# Patient Record
Sex: FEMALE | Race: WHITE | NOT HISPANIC OR LATINO | Employment: OTHER | URBAN - METROPOLITAN AREA
[De-identification: names, ages, dates, MRNs, and addresses within clinical notes are randomized per-mention and may not be internally consistent; named-entity substitution may affect disease eponyms.]

---

## 2017-01-06 ENCOUNTER — DOCUMENTATION ONLY (OUTPATIENT)
Dept: FAMILY MEDICINE | Facility: CLINIC | Age: 57
End: 2017-01-06

## 2017-01-06 NOTE — PROGRESS NOTES
Pre-Visit Chart Review  For Appointment Scheduled on 01/09/2017      There are no preventive care reminders to display for this patient.

## 2017-01-10 ENCOUNTER — DOCUMENTATION ONLY (OUTPATIENT)
Dept: FAMILY MEDICINE | Facility: CLINIC | Age: 57
End: 2017-01-10

## 2017-01-10 NOTE — PROGRESS NOTES
Pre-Visit Chart Review  For Appointment Scheduled on 1/11/17    Health Maintenance Due   Topic Date Due    Hepatitis C Screening  1960    Lipid Panel  1960    TETANUS VACCINE  03/29/1980    Pap Smear  03/29/1983    Mammogram  03/29/2002    Colonoscopy  03/29/2012    Influenza Vaccine  08/01/2016

## 2017-06-23 ENCOUNTER — HOSPITAL ENCOUNTER (INPATIENT)
Facility: HOSPITAL | Age: 57
LOS: 3 days | Discharge: HOME | DRG: 291 | End: 2017-06-27
Attending: EMERGENCY MEDICINE | Admitting: INTERNAL MEDICINE
Payer: MEDICARE

## 2017-06-23 DIAGNOSIS — R18.8 OTHER ASCITES: ICD-10-CM

## 2017-06-23 DIAGNOSIS — I50.9 CHF, ACUTE: ICD-10-CM

## 2017-06-23 DIAGNOSIS — I10 ESSENTIAL HYPERTENSION: ICD-10-CM

## 2017-06-23 DIAGNOSIS — I25.10 CORONARY ARTERY DISEASE, ANGINA PRESENCE UNSPECIFIED, UNSPECIFIED VESSEL OR LESION TYPE, UNSPECIFIED WHETHER NATIVE OR TRANSPLANTED HEART: ICD-10-CM

## 2017-06-23 DIAGNOSIS — I50.23 ACUTE ON CHRONIC SYSTOLIC CONGESTIVE HEART FAILURE: ICD-10-CM

## 2017-06-23 DIAGNOSIS — I50.9 ACUTE ON CHRONIC CONGESTIVE HEART FAILURE, UNSPECIFIED CONGESTIVE HEART FAILURE TYPE: Primary | ICD-10-CM

## 2017-06-23 DIAGNOSIS — R06.02 SHORTNESS OF BREATH: ICD-10-CM

## 2017-06-23 PROCEDURE — 96374 THER/PROPH/DIAG INJ IV PUSH: CPT

## 2017-06-23 PROCEDURE — 99285 EMERGENCY DEPT VISIT HI MDM: CPT | Mod: 25

## 2017-06-23 PROCEDURE — 93005 ELECTROCARDIOGRAM TRACING: CPT

## 2017-06-23 RX ORDER — GLIMEPIRIDE 4 MG/1
4 TABLET ORAL
Status: ON HOLD | COMMUNITY
End: 2017-11-27 | Stop reason: HOSPADM

## 2017-06-23 RX ORDER — ALBUTEROL SULFATE 1.25 MG/3ML
1.25 SOLUTION RESPIRATORY (INHALATION) EVERY 6 HOURS PRN
Status: ON HOLD | COMMUNITY
End: 2017-12-29 | Stop reason: HOSPADM

## 2017-06-23 RX ORDER — GABAPENTIN 300 MG/1
300 CAPSULE ORAL 3 TIMES DAILY
Status: ON HOLD | COMMUNITY
End: 2017-11-27 | Stop reason: HOSPADM

## 2017-06-24 PROBLEM — I10 ESSENTIAL HYPERTENSION: Status: ACTIVE | Noted: 2017-06-24

## 2017-06-24 PROBLEM — I25.10 CAD (CORONARY ARTERY DISEASE): Status: ACTIVE | Noted: 2017-06-24

## 2017-06-24 PROBLEM — J90 PLEURAL EFFUSION: Status: ACTIVE | Noted: 2017-06-24

## 2017-06-24 PROBLEM — N18.30 CONTROLLED TYPE 2 DIABETES MELLITUS WITH STAGE 3 CHRONIC KIDNEY DISEASE: Status: ACTIVE | Noted: 2017-06-24

## 2017-06-24 PROBLEM — E03.9 HYPOTHYROID: Status: ACTIVE | Noted: 2017-06-24

## 2017-06-24 PROBLEM — I50.9 ACUTE ON CHRONIC CONGESTIVE HEART FAILURE: Status: ACTIVE | Noted: 2017-06-24

## 2017-06-24 PROBLEM — N18.30 CKD (CHRONIC KIDNEY DISEASE) STAGE 3, GFR 30-59 ML/MIN: Status: ACTIVE | Noted: 2017-06-24

## 2017-06-24 PROBLEM — E11.22 CONTROLLED TYPE 2 DIABETES MELLITUS WITH STAGE 3 CHRONIC KIDNEY DISEASE: Status: ACTIVE | Noted: 2017-06-24

## 2017-06-24 LAB
ALBUMIN SERPL BCP-MCNC: 3.1 G/DL
ALP SERPL-CCNC: 69 U/L
ALT SERPL W/O P-5'-P-CCNC: 8 U/L
ANION GAP SERPL CALC-SCNC: 10 MMOL/L
ANION GAP SERPL CALC-SCNC: 11 MMOL/L
AST SERPL-CCNC: 18 U/L
BASOPHILS # BLD AUTO: 0.1 K/UL
BASOPHILS NFR BLD: 1 %
BILIRUB SERPL-MCNC: 0.8 MG/DL
BNP SERPL-MCNC: 1092 PG/ML
BUN SERPL-MCNC: 22 MG/DL
BUN SERPL-MCNC: 24 MG/DL
CALCIUM SERPL-MCNC: 9 MG/DL
CALCIUM SERPL-MCNC: 9.3 MG/DL
CHLORIDE SERPL-SCNC: 102 MMOL/L
CHLORIDE SERPL-SCNC: 102 MMOL/L
CO2 SERPL-SCNC: 23 MMOL/L
CO2 SERPL-SCNC: 26 MMOL/L
CREAT SERPL-MCNC: 1.3 MG/DL
CREAT SERPL-MCNC: 1.4 MG/DL
DIFFERENTIAL METHOD: ABNORMAL
EOSINOPHIL # BLD AUTO: 0.2 K/UL
EOSINOPHIL NFR BLD: 2.9 %
ERYTHROCYTE [DISTWIDTH] IN BLOOD BY AUTOMATED COUNT: 17.5 %
EST. GFR  (AFRICAN AMERICAN): 49 ML/MIN/1.73 M^2
EST. GFR  (AFRICAN AMERICAN): 53 ML/MIN/1.73 M^2
EST. GFR  (NON AFRICAN AMERICAN): 42 ML/MIN/1.73 M^2
EST. GFR  (NON AFRICAN AMERICAN): 46 ML/MIN/1.73 M^2
GLUCOSE SERPL-MCNC: 119 MG/DL
GLUCOSE SERPL-MCNC: 151 MG/DL
HCT VFR BLD AUTO: 41.7 %
HGB BLD-MCNC: 13 G/DL
INR PPP: 1.3
LYMPHOCYTES # BLD AUTO: 1.2 K/UL
LYMPHOCYTES NFR BLD: 14.2 %
MCH RBC QN AUTO: 24.5 PG
MCHC RBC AUTO-ENTMCNC: 31.1 %
MCV RBC AUTO: 79 FL
MONOCYTES # BLD AUTO: 0.8 K/UL
MONOCYTES NFR BLD: 9.1 %
NEUTROPHILS # BLD AUTO: 6 K/UL
NEUTROPHILS NFR BLD: 72.8 %
PLATELET # BLD AUTO: 235 K/UL
PMV BLD AUTO: 9.3 FL
POCT GLUCOSE: 114 MG/DL (ref 70–110)
POCT GLUCOSE: 165 MG/DL (ref 70–110)
POCT GLUCOSE: 177 MG/DL (ref 70–110)
POCT GLUCOSE: 248 MG/DL (ref 70–110)
POTASSIUM SERPL-SCNC: 4.3 MMOL/L
POTASSIUM SERPL-SCNC: 4.9 MMOL/L
PROT SERPL-MCNC: 6.8 G/DL
PROTHROMBIN TIME: 13.6 SEC
RBC # BLD AUTO: 5.3 M/UL
SODIUM SERPL-SCNC: 136 MMOL/L
SODIUM SERPL-SCNC: 138 MMOL/L
TROPONIN I SERPL DL<=0.01 NG/ML-MCNC: 0.04 NG/ML
WBC # BLD AUTO: 8.3 K/UL

## 2017-06-24 PROCEDURE — 83880 ASSAY OF NATRIURETIC PEPTIDE: CPT

## 2017-06-24 PROCEDURE — 63600175 PHARM REV CODE 636 W HCPCS: Performed by: NURSE PRACTITIONER

## 2017-06-24 PROCEDURE — C8929 TTE W OR WO FOL WCON,DOPPLER: HCPCS

## 2017-06-24 PROCEDURE — 36415 COLL VENOUS BLD VENIPUNCTURE: CPT

## 2017-06-24 PROCEDURE — 97161 PT EVAL LOW COMPLEX 20 MIN: CPT

## 2017-06-24 PROCEDURE — 94640 AIRWAY INHALATION TREATMENT: CPT

## 2017-06-24 PROCEDURE — 63600175 PHARM REV CODE 636 W HCPCS: Performed by: HOSPITALIST

## 2017-06-24 PROCEDURE — 80053 COMPREHEN METABOLIC PANEL: CPT

## 2017-06-24 PROCEDURE — 25000003 PHARM REV CODE 250: Performed by: NURSE PRACTITIONER

## 2017-06-24 PROCEDURE — 84484 ASSAY OF TROPONIN QUANT: CPT

## 2017-06-24 PROCEDURE — 63600175 PHARM REV CODE 636 W HCPCS: Performed by: EMERGENCY MEDICINE

## 2017-06-24 PROCEDURE — 27000221 HC OXYGEN, UP TO 24 HOURS

## 2017-06-24 PROCEDURE — 85610 PROTHROMBIN TIME: CPT

## 2017-06-24 PROCEDURE — 94761 N-INVAS EAR/PLS OXIMETRY MLT: CPT

## 2017-06-24 PROCEDURE — 85025 COMPLETE CBC W/AUTO DIFF WBC: CPT

## 2017-06-24 PROCEDURE — 25000242 PHARM REV CODE 250 ALT 637 W/ HCPCS: Performed by: NURSE PRACTITIONER

## 2017-06-24 PROCEDURE — 80048 BASIC METABOLIC PNL TOTAL CA: CPT

## 2017-06-24 PROCEDURE — 97530 THERAPEUTIC ACTIVITIES: CPT

## 2017-06-24 PROCEDURE — 63600175 PHARM REV CODE 636 W HCPCS: Performed by: SPECIALIST

## 2017-06-24 PROCEDURE — 12000002 HC ACUTE/MED SURGE SEMI-PRIVATE ROOM

## 2017-06-24 RX ORDER — ONDANSETRON 2 MG/ML
4 INJECTION INTRAMUSCULAR; INTRAVENOUS EVERY 6 HOURS PRN
Status: DISCONTINUED | OUTPATIENT
Start: 2017-06-24 | End: 2017-06-27 | Stop reason: HOSPADM

## 2017-06-24 RX ORDER — IBUPROFEN 200 MG
16 TABLET ORAL
Status: DISCONTINUED | OUTPATIENT
Start: 2017-06-24 | End: 2017-06-25

## 2017-06-24 RX ORDER — LISINOPRIL 10 MG/1
10 TABLET ORAL DAILY
Status: DISCONTINUED | OUTPATIENT
Start: 2017-06-24 | End: 2017-06-27

## 2017-06-24 RX ORDER — LEVOTHYROXINE SODIUM 50 UG/1
50 TABLET ORAL
Status: DISCONTINUED | OUTPATIENT
Start: 2017-06-24 | End: 2017-06-27 | Stop reason: HOSPADM

## 2017-06-24 RX ORDER — ACETAMINOPHEN 500 MG
1000 TABLET ORAL EVERY 6 HOURS PRN
Status: DISCONTINUED | OUTPATIENT
Start: 2017-06-24 | End: 2017-06-27 | Stop reason: HOSPADM

## 2017-06-24 RX ORDER — GABAPENTIN 300 MG/1
300 CAPSULE ORAL 3 TIMES DAILY
Status: DISCONTINUED | OUTPATIENT
Start: 2017-06-24 | End: 2017-06-27 | Stop reason: HOSPADM

## 2017-06-24 RX ORDER — CARVEDILOL 25 MG/1
25 TABLET ORAL 2 TIMES DAILY
Status: DISCONTINUED | OUTPATIENT
Start: 2017-06-24 | End: 2017-06-27 | Stop reason: HOSPADM

## 2017-06-24 RX ORDER — SODIUM CHLORIDE 0.9 % (FLUSH) 0.9 %
3 SYRINGE (ML) INJECTION EVERY 8 HOURS
Status: DISCONTINUED | OUTPATIENT
Start: 2017-06-24 | End: 2017-06-27 | Stop reason: HOSPADM

## 2017-06-24 RX ORDER — ACETAMINOPHEN 325 MG/1
650 TABLET ORAL EVERY 4 HOURS PRN
Status: DISCONTINUED | OUTPATIENT
Start: 2017-06-24 | End: 2017-06-24

## 2017-06-24 RX ORDER — GLIMEPIRIDE 2 MG/1
4 TABLET ORAL
Status: DISCONTINUED | OUTPATIENT
Start: 2017-06-24 | End: 2017-06-24

## 2017-06-24 RX ORDER — GLUCAGON 1 MG
1 KIT INJECTION
Status: DISCONTINUED | OUTPATIENT
Start: 2017-06-24 | End: 2017-06-25

## 2017-06-24 RX ORDER — INSULIN ASPART 100 [IU]/ML
INJECTION, SUSPENSION SUBCUTANEOUS DAILY PRN
Status: ON HOLD | COMMUNITY
Start: 2017-03-15 | End: 2017-11-27 | Stop reason: HOSPADM

## 2017-06-24 RX ORDER — FUROSEMIDE 10 MG/ML
40 INJECTION INTRAMUSCULAR; INTRAVENOUS
Status: COMPLETED | OUTPATIENT
Start: 2017-06-24 | End: 2017-06-24

## 2017-06-24 RX ORDER — INSULIN ASPART 100 [IU]/ML
0-5 INJECTION, SOLUTION INTRAVENOUS; SUBCUTANEOUS
Status: DISCONTINUED | OUTPATIENT
Start: 2017-06-24 | End: 2017-06-24

## 2017-06-24 RX ORDER — ASPIRIN 81 MG/1
81 TABLET ORAL DAILY
Status: DISCONTINUED | OUTPATIENT
Start: 2017-06-24 | End: 2017-06-27 | Stop reason: HOSPADM

## 2017-06-24 RX ORDER — ZOLPIDEM TARTRATE 5 MG/1
5 TABLET ORAL NIGHTLY PRN
Status: DISCONTINUED | OUTPATIENT
Start: 2017-06-24 | End: 2017-06-27 | Stop reason: HOSPADM

## 2017-06-24 RX ORDER — INSULIN ASPART 100 [IU]/ML
1-10 INJECTION, SOLUTION INTRAVENOUS; SUBCUTANEOUS
Status: DISCONTINUED | OUTPATIENT
Start: 2017-06-24 | End: 2017-06-25

## 2017-06-24 RX ORDER — IBUPROFEN 200 MG
24 TABLET ORAL
Status: DISCONTINUED | OUTPATIENT
Start: 2017-06-24 | End: 2017-06-25

## 2017-06-24 RX ORDER — ENOXAPARIN SODIUM 100 MG/ML
40 INJECTION SUBCUTANEOUS EVERY 24 HOURS
Status: DISCONTINUED | OUTPATIENT
Start: 2017-06-24 | End: 2017-06-27 | Stop reason: HOSPADM

## 2017-06-24 RX ORDER — FUROSEMIDE 10 MG/ML
40 INJECTION INTRAMUSCULAR; INTRAVENOUS 2 TIMES DAILY
Status: DISCONTINUED | OUTPATIENT
Start: 2017-06-24 | End: 2017-06-27 | Stop reason: HOSPADM

## 2017-06-24 RX ORDER — CITALOPRAM 10 MG/1
20 TABLET ORAL DAILY
Status: DISCONTINUED | OUTPATIENT
Start: 2017-06-24 | End: 2017-06-27 | Stop reason: HOSPADM

## 2017-06-24 RX ORDER — ALBUTEROL SULFATE 2.5 MG/.5ML
1.25 SOLUTION RESPIRATORY (INHALATION) EVERY 6 HOURS
Status: DISCONTINUED | OUTPATIENT
Start: 2017-06-24 | End: 2017-06-27 | Stop reason: HOSPADM

## 2017-06-24 RX ADMIN — ALBUTEROL SULFATE 1.25 MG: 2.5 SOLUTION RESPIRATORY (INHALATION) at 01:06

## 2017-06-24 RX ADMIN — LEVOTHYROXINE SODIUM 50 MCG: 50 TABLET ORAL at 06:06

## 2017-06-24 RX ADMIN — SODIUM CHLORIDE, PRESERVATIVE FREE 3 ML: 5 INJECTION INTRAVENOUS at 01:06

## 2017-06-24 RX ADMIN — ALBUTEROL SULFATE 1.25 MG: 2.5 SOLUTION RESPIRATORY (INHALATION) at 07:06

## 2017-06-24 RX ADMIN — SODIUM CHLORIDE, PRESERVATIVE FREE 3 ML: 5 INJECTION INTRAVENOUS at 06:06

## 2017-06-24 RX ADMIN — ASPIRIN 81 MG: 81 TABLET, COATED ORAL at 08:06

## 2017-06-24 RX ADMIN — FUROSEMIDE 40 MG: 10 INJECTION, SOLUTION INTRAMUSCULAR; INTRAVENOUS at 08:06

## 2017-06-24 RX ADMIN — GABAPENTIN 300 MG: 300 CAPSULE ORAL at 09:06

## 2017-06-24 RX ADMIN — CARVEDILOL 25 MG: 25 TABLET, FILM COATED ORAL at 08:06

## 2017-06-24 RX ADMIN — GABAPENTIN 300 MG: 300 CAPSULE ORAL at 06:06

## 2017-06-24 RX ADMIN — FUROSEMIDE 40 MG: 10 INJECTION, SOLUTION INTRAMUSCULAR; INTRAVENOUS at 02:06

## 2017-06-24 RX ADMIN — HUMAN ALBUMIN MICROSPHERES AND PERFLUTREN 0.11 MG: 10; .22 INJECTION, SOLUTION INTRAVENOUS at 03:06

## 2017-06-24 RX ADMIN — ALBUTEROL SULFATE 2.5 MG: 2.5 SOLUTION RESPIRATORY (INHALATION) at 07:06

## 2017-06-24 RX ADMIN — LISINOPRIL 10 MG: 10 TABLET ORAL at 08:06

## 2017-06-24 RX ADMIN — GLIMEPIRIDE 4 MG: 2 TABLET ORAL at 06:06

## 2017-06-24 RX ADMIN — ENOXAPARIN SODIUM 40 MG: 100 INJECTION SUBCUTANEOUS at 05:06

## 2017-06-24 RX ADMIN — INSULIN ASPART 2 UNITS: 100 INJECTION, SOLUTION INTRAVENOUS; SUBCUTANEOUS at 09:06

## 2017-06-24 RX ADMIN — CITALOPRAM HYDROBROMIDE 20 MG: 10 TABLET ORAL at 08:06

## 2017-06-24 RX ADMIN — GABAPENTIN 300 MG: 300 CAPSULE ORAL at 01:06

## 2017-06-24 RX ADMIN — CARVEDILOL 25 MG: 25 TABLET, FILM COATED ORAL at 09:06

## 2017-06-24 RX ADMIN — FUROSEMIDE 40 MG: 10 INJECTION, SOLUTION INTRAMUSCULAR; INTRAVENOUS at 05:06

## 2017-06-24 NOTE — HPI
Ms. Ramos is a 58yo F with a PMH MI, HTN, DM2, and Hypothyroid. She presents to the hospital with c/o abdominal distention. It started about 1 month ago. She started to develop leg swelling and weakness, so she came to the hospital. Other associated symptoms include decreased appetite, abdominal pain, and umbilical discharge. In the ED, her BNP was found to be 1092 and her CXR showed a pleural effusion and she was given lasix 40mg IV. She currently reports feeling like her abdomen has less fluid since getting the lasix. She denies sob, pain, or other discomforts.

## 2017-06-24 NOTE — ASSESSMENT & PLAN NOTE
Not on chronic statin or ASA. States she was not told that she should take ASA.  Start ASA 81mg daily

## 2017-06-24 NOTE — PLAN OF CARE
Problem: Patient Care Overview  Goal: Plan of Care Review  Plan of care reviewed with pt when they arrived on the unit at 0300.  Pt/family verbalized understanding.  Hourly/ Q 2 hour rounds completed on this pt throughout shift.  Pain monitored and covered with medication if needed.  Restroom offered, repositions per self, safety maintained.  Patient has remained free from fall/injury, no skin breakdown noted.  Side rails up x 2, bed locked and lowest position, call light within reach.  Needs attended to, will continue to monitor/update as indicated.  O2 delivered via NC at 2l.  SpO2 monitored intermittently.

## 2017-06-24 NOTE — PT/OT/SLP EVAL
"Physical Therapy  Evaluation    Juliane Ramos   MRN: [id number]   Admitting Diagnosis: Acute on chronic congestive heart failure    PT Received On: 06/24/17  PT Start Time: 0845     PT Stop Time: 0915    PT Total Time (min): 30 min       Billable Minutes:  Re-eval 10 and Therapeutic Activity 20    Diagnosis: Acute on chronic congestive heart failure      Past Medical History:   Diagnosis Date    CHF (congestive heart failure)     COPD (chronic obstructive pulmonary disease)     Coronary artery disease     Diabetes mellitus     Encounter for blood transfusion     Hypertension     MI (myocardial infarction) 2010    Stroke     July 2005    Thyroid disease       Past Surgical History:   Procedure Laterality Date    ABCESS DRAINAGE Right     Between "little toe" and the one next to it    BREAST SURGERY      Reduction [id number]    CARDIAC SURGERY      TUBAL LIGATION  03/1986       Referring physician:   Date referred to PT: 6/24/17    General Precautions: Standard, fall  Orthopedic Precautions: N/A   Braces:         Do you have any cultural, spiritual, Mormon conflicts, given your current situation?: none    Patient History:  Lives With: spouse  Living Arrangements: mobile home  Home Accessibility: stairs to enter home  Transportation Available: family or friend will provide  Equipment Currently Used at Home: walker, rolling, rollator, 3-in-1 commode  DME owned (not currently used): rolling walker and bedside commode    Previous Level of Function:  Ambulation Skills: needs device  Transfer Skills: needs device  ADL Skills: needs device and assist  Work/Leisure Activity: needs device    Subjective:  Communicated with pt's nurse Hina prior to session.  Pt sitting eob willing to participate w PT. States she does not typically use O2  Chief Complaint: weakness  Patient goals: go home soon    Pain/Comfort  Pain Rating 1: 3/10  Location - Orientation 1: generalized  Pain Rating Post-Intervention 1: " 2/10  Pain Addressed 2: Reposition, Distraction      Objective:   Patient found with: telemetry, peripheral IV, oxygen     Cognitive Exam:  Oriented to: Person, Place and Situation    Follows Commands/attention: Follows multistep  commands  Communication: clear/fluent  Safety awareness/insight to disability: intact    Physical Exam:  Postural examination/scapula alignment: Rounded shoulder, Head forward and Posterior pelvic tilt    Skin integrity: Visible skin intact  Edema: noted B ankles    Sensation:   Intact    Upper Extremity Range of Motion:  Right Upper Extremity: WFL  Left Upper Extremity: WFL    Upper Extremity Strength:  Right Upper Extremity: WFL  Left Upper Extremity: WFL    Lower Extremity Range of Motion:  Right Lower Extremity: WFL  Left Lower Extremity: WFL    Lower Extremity Strength:  Right Lower Extremity: 4/5 globally  Left Lower Extremity: 4/5 globally     Fine motor coordination:     5 x sit to stand using AD from elevated bed level = 25 sec    Functional Mobility:  Bed Mobility:  Rolling/Turning to Left: Modified independent  Rolling/Turning Right: Modified independent  Scooting/Bridging: Modified Independent  Supine to Sit: Modified Independent  Sit to Supine: Modified Independent    Transfers:  Sit <> Stand Assistance: Stand By Assistance  Sit <> Stand Assistive Device: Rolling Walker  Bed <> Chair Technique: Stand Pivot  Bed <> Chair Assistance: Supervision  Bed <> Chair Assistive Device: Rolling Walker    Gait:   Gait Distance: 220 ft  Assistance 1: Contact Guard Assistance  Gait Assistive Device: Rollator  Gait Pattern: 3-point gait  Gait Deviation(s): decreased estefania, increased stride width, decreased swing-to-stance ratio, decreased weight-shifting ability, decreased toe-to-floor clearance    Stairs:  Pt ascended/descend 4 stair(s) with rail on L to ascend/rail on R to descend similar to use at home with step to pattern  turned to side step with Contact Guard Assistance.     Balance:    Static Sit: GOOD+: Takes MAXIMAL challenges from all directions.    Dynamic Sit: GOOD-: Maintains balance through MODERATE excursions of active trunk movement,     Static Stand: FAIR: Maintains without assist but unable to take challenges  Dynamic stand: FAIR: Needs CONTACT GUARD during gait    Therapeutic Activities and Exercises:  Assisted pt w tx sit to stand. Educated on raising level of bed for greater ease w transfers. CGA w amb in hallway w personal rollator 220 ft requiring 3 rest breaks. Pt declined use of O2. Will moniter when ambulating in future. Noted poor eccentric control in quads w sit to stand and stairclimbing. Pt stepping down w poor knee control 2 HHA on rail and CGA from PT.    AM-PAC 6 CLICK MOBILITY  How much help from another person does this patient currently need?   1 = Unable, Total/Dependent Assistance  2 = A lot, Maximum/Moderate Assistance  3 = A little, Minimum/Contact Guard/Supervision  4 = None, Modified [place]/Independent    Turning over in bed (including adjusting bedclothes, sheets and blankets)?: 3  Sitting down on and standing up from a chair with arms (e.g., wheelchair, bedside commode, etc.): 2  Moving from lying on back to sitting on the side of the bed?: 3  Moving to and from a bed to a chair (including a wheelchair)?: 3  Need to walk in hospital room?: 3  Climbing 3-5 steps with a railing?: 2  Total Score: 16     AM-PAC Raw Score CMS G-Code Modifier Level of Impairment Assistance   6 % Total / Unable   7 - 9 CM 80 - 100% Maximal Assist   10 - 14 CL 60 - 80% Moderate Assist   15 - 19 CK 40 - 60% Moderate Assist   20 - 22 CJ 20 - 40% Minimal Assist   23 CI 1-20% SBA / CGA   24 CH 0% Independent/ Mod I     Patient left HOB elevated with call button in reach and nursing notified.    Assessment:   Juliane Ramos is a 57 y.o. female with a medical diagnosis of Acute on chronic congestive heart failure and is at risk for falls. She presents with decreased strength  and endurance. She would benefit from skilled PT to address mobility and strength concerns.    Rehab identified problem list/impairments: Rehab identified problem list/impairments: weakness, impaired endurance, impaired functional mobilty, impaired balance, decreased lower extremity function    Rehab potential is good.    Activity tolerance: Good    Discharge recommendations: Discharge Facility/Level Of Care Needs: outpatient PT     Barriers to discharge:  may benefit from ramp to entrance of home    GOALS:    Physical Therapy Goals        Problem: Physical Therapy Goal    Goal Priority Disciplines Outcome Goal Variances Interventions   Physical Therapy Goal     PT/OT, PT      Description:  Goals to be met by: 17     Patient will increase functional independence with mobility by performin. Sit to stand transfer with [place]  2. Gait  x 300 feet with Modified [place] using Rolling Walker.   3. Ascend/descend 6 stair with left handrail to ascend, R to descend Stand-by Assistance   4. Lower extremity exercise program x25 reps with assistance as needed                      PLAN:    Patient to be seen 6 x/week to address the above listed problems via gait training, therapeutic activities, therapeutic exercises, neuromuscular re-education  Plan of Care expires: 17  Plan of Care reviewed with: patient, spouse          Luz Franco, PT  2017

## 2017-06-24 NOTE — ED PROVIDER NOTES
Encounter Date: 6/23/2017    SCRIBE #1 NOTE: IDuane, am scribing for, and in the presence of, Dr. Chilel.       History     Chief Complaint   Patient presents with    Leg Swelling    Weakness     06/23/2017  11:39 PM     Chief Complaint: Abd Distention     The patient is a 57 y.o. female who has a PMHx of CAD; diabetes mellitus; hypertension; MI; and thyroid disease is presenting to ED with c/o worsening abd distention since one month ago.  She has no abdominal pain.  Pt came to ED today because of leg swelling and weight gain. She reports decreased appetite since three days ago. Pt has a PSHx that includes cardiac surgery.        The history is provided by the patient.     Review of patient's allergies indicates:  No Known Allergies  Past Medical History:   Diagnosis Date    Coronary artery disease     Diabetes mellitus     Hypertension     MI (myocardial infarction) 2010    Thyroid disease      Past Surgical History:   Procedure Laterality Date    CARDIAC SURGERY       History reviewed. No pertinent family history.  Social History   Substance Use Topics    Smoking status: Never Smoker    Smokeless tobacco: Not on file    Alcohol use No     Review of Systems   Constitutional: Positive for appetite change (decreased). Negative for chills and fever.   HENT: Negative for sore throat.    Respiratory: Negative for shortness of breath.    Cardiovascular: Positive for leg swelling. Negative for chest pain.   Gastrointestinal: Positive for abdominal distention. Negative for abdominal pain and nausea.   Genitourinary: Negative for dysuria.   Musculoskeletal: Negative for back pain.   Skin: Negative for rash.   Neurological: Positive for weakness (bilateral leg).   Hematological: Does not bruise/bleed easily.   All other systems reviewed and are negative.      Physical Exam     Initial Vitals [06/23/17 2318]   BP Pulse Resp Temp SpO2   (!) 178/84 82 17 99 °F (37.2 °C) (!) 93 %      MAP       115.33          Physical Exam    Nursing note and vitals reviewed.  Constitutional: She appears well-developed and well-nourished. She is not diaphoretic.  Non-toxic appearance. She does not have a sickly appearance. She does not appear ill. No distress.   HENT:   Head: Normocephalic and atraumatic.   Eyes: EOM are normal.   Neck: Normal range of motion. Neck supple. Normal range of motion present.   Cardiovascular: Normal rate, regular rhythm and normal heart sounds.   Pulmonary/Chest: Breath sounds normal. No respiratory distress. She has no wheezes.   Midline CABG scar.    Abdominal: She exhibits distension (large). There is no tenderness. There is no rebound and no guarding.   No abdominal tenderness on exam   Musculoskeletal: Normal range of motion. She exhibits edema.   Bilateral lower extremity edema.    Neurological: She is alert and oriented to person, place, and time.   Skin: Skin is warm and dry.   Psychiatric: She has a normal mood and affect. Her behavior is normal. Judgment and thought content normal.         ED Course   Critical Care  Date/Time: 6/24/2017 3:20 AM  Performed by: SINGH KRUEGER  Authorized by: JAI SALEEM   Direct patient critical care time: 11 minutes  Additional history critical care time: 8 minutes  Ordering / reviewing critical care time: 15 minutes  Documentation critical care time: 8 minutes  Consulting other physicians critical care time: 5 minutes  Consult with family critical care time: 5 minutes  Total critical care time (exclusive of procedural time) : 52 minutes  Critical care was necessary to treat or prevent imminent or life-threatening deterioration of the following conditions: cardiac failure.  Critical care was time spent personally by me on the following activities: development of treatment plan with patient or surrogate, evaluation of patient's response to treatment, examination of patient, obtaining history from patient or surrogate, ordering and performing treatments and  interventions, ordering and review of radiographic studies, pulse oximetry, ordering and review of laboratory studies, re-evaluation of patient's condition and review of old charts.  Comments: Care for CHF requiring IV Lasix        Labs Reviewed - No data to display          Medical Decision Making:   History:   I obtained history from: someone other than patient.  Clinical Tests:   Lab Tests: Ordered and Reviewed  Radiological Study: Ordered and Reviewed  Medical Tests: Ordered and Reviewed            Scribe Attestation:   Scribe #1: I performed the above scribed service and the documentation accurately describes the services I performed. I attest to the accuracy of the note.    Attending Attestation:           Physician Attestation for Scribe:  Physician Attestation Statement for Scribe #1: I, Dr. Chilel, reviewed documentation, as scribed by Duane Day in my presence, and it is both accurate and complete.                 ED Course   Value Comment By Time    IMPRESSION:  1. Mild cardiomegaly with right-sided pleural effusion and ascites.  2. Probable cholelithiasis with gallbladder wall thickening, a nonspecific finding in this setting  of ascites. Sonographic correlation may be useful as clinically warranted.  3. Additional nonemergent findings, as above.  Discussed with Dr. Chilel at 1:55 AM on 6/24/2017.  Thank you for allowing us to participate in the care of your patient.  Dictated and Authenticated by: Lee Johnson MD  06/24/2017 1:01 AM Central Time ([place] & [place]) Alejandro Chilel MD 06/24 0115   BNP: (!) 1,092 (Reviewed) Alejandro Chilel MD 06/24 0115    Mazariegos to admit Alejandro Chilel MD 06/24 0126     Clinical Impression:   The primary encounter diagnosis was Acute on chronic congestive heart failure, unspecified congestive heart failure type. A diagnosis of Other ascites was also pertinent to this visit.              57-year-old female with a history of hypertension diabetes and MI presents to  the ER with abdominal distention general malaise weight gain and bilateral lower extremity edema.  No distress in the emergency department.  Chest x-ray demonstrates right sided pleural effusion.  CT of the abdomen and pelvis demonstrates ascites.  Gallbladder wall thickening but the patient has no abdominal tenderness on exam and I doubt cholecystitis.  She reports significant weight gain recently.  Bilateral lower extremity edema is present on exam.  BNP is markedly elevated.  Patient will be admitted to the hospital for a presumed heart failure exacerbation, she does not carry a diagnosis of heart failure so this would be new.  There is no distress in the emergency room.  No indication for BiPAP or intubation.  Patient will be admitted to hospital medicine for further care.             Alejandro Chilel MD  06/24/17 0235       Alejandro Chilel MD  06/24/17 0329

## 2017-06-24 NOTE — CONSULTS
"  [facility]  Cardiology  Consult Note    Patient Name: Juliane Ramos  MRN: [id number]  Admission Date: 6/23/2017  Hospital Length of Stay: 0 days  Code Status: Full Code   Attending Provider: Reg Devine MD   Consulting Provider: Manan Corrigan MD  Primary Care Physician: JOS Dumont  Principal Problem:Acute on chronic congestive heart failure    Patient information was obtained from patient and ER records.     Consults  Subjective:     Chief Complaint:  Sob due to chf     HPI:   Sob and gaining weight for 2 monts gainned 28 lbs,also having abdominal girth increase not responding to her regular medicines  Has swelling of the leggs, denied cp or angina   had cabg 2011 dr Fan     Past Medical History:   Diagnosis Date    CHF (congestive heart failure)     COPD (chronic obstructive pulmonary disease)     Coronary artery disease     Diabetes mellitus     Encounter for blood transfusion     Hypertension     MI (myocardial infarction) 2010    Stroke     July 2005    Thyroid disease        Past Surgical History:   Procedure Laterality Date    ABCESS DRAINAGE Right     Between "little toe" and the one next to it    BREAST SURGERY      Reduction [id number]    CARDIAC SURGERY      TUBAL LIGATION  03/1986       Review of patient's allergies indicates:  No Known Allergies    No current facility-administered medications on file prior to encounter.      Current Outpatient Prescriptions on File Prior to Encounter   Medication Sig    carvedilol (COREG) 12.5 MG tablet Take 25 mg by mouth 2 (two) times daily.     citalopram (CELEXA) 20 MG tablet Take 20 mg by mouth once daily.      furosemide (LASIX) 20 MG tablet Take 20 mg by mouth once daily.     levothyroxine (SYNTHROID) 50 MCG tablet Take 50 mcg by mouth once daily.      lisinopril (PRINIVIL,ZESTRIL) 20 MG tablet Take 10 mg by mouth once daily.     spironolactone (ALDACTONE) 25 MG tablet Take 25 mg by mouth once daily.   "     Family History     Problem Relation (Age of Onset)    Arthritis Mother    Cancer Father    Depression Sister    Heart disease Father    Hypertension Son        Social History Main Topics    Smoking status: Never Smoker    Smokeless tobacco: Not on file    Alcohol use 0.6 - 1.2 oz/week     1 - 2 Glasses of wine per week    Drug use: No    Sexual activity: Yes     Partners: Male     Birth control/ protection: None     Review of Systems   Cardiovascular: Positive for dyspnea on exertion, leg swelling, orthopnea and paroxysmal nocturnal dyspnea. Negative for chest pain, claudication and irregular heartbeat.   Respiratory: Positive for cough, shortness of breath and wheezing.    All other systems reviewed and are negative.    Objective:     Vital Signs (Most Recent):  Temp: 97.7 °F (36.5 °C) (06/24/17 1059)  Pulse: 73 (06/24/17 1059)  Resp: 18 (06/24/17 1059)  BP: 136/79 (06/24/17 1059)  SpO2: 95 % (06/24/17 1059) Vital Signs (24h Range):  Temp:  [97.6 °F (36.4 °C)-99 °F (37.2 °C)] 97.7 °F (36.5 °C)  Pulse:  [70-82] 73  Resp:  [16-18] 18  SpO2:  [91 %-95 %] 95 %  BP: (136-178)/(79-97) 136/79     Weight: 117.5 kg (259 lb)  Body mass index is 41.8 kg/m².    SpO2: 95 %  O2 Device (Oxygen Therapy): nasal cannula      Intake/Output Summary (Last 24 hours) at 06/24/17 1226  Last data filed at 06/24/17 0600   Gross per 24 hour   Intake               10 ml   Output                0 ml   Net               10 ml       Lines/Drains/Airways     Peripheral Intravenous Line                 Peripheral IV - Single Lumen 06/24/17 0007 Left Forearm less than 1 day                Physical Exam   Constitutional: She is oriented to person, place, and time.   HENT:   Head: Normocephalic.   Eyes: Pupils are equal, round, and reactive to light.   Neck: Neck supple.   Cardiovascular: Normal rate.  Exam reveals gallop.    Pulmonary/Chest: She has rales.   Abdominal: Soft. She exhibits distension.   Musculoskeletal: She exhibits edema.    Neurological: She is alert and oriented to person, place, and time.   Skin: Skin is warm.       Significant Labs:   ABG: No results for input(s): PH, PCO2, HCO3, POCSATURATED, BE in the last 48 hours., Blood Culture: No results for input(s): LABBLOO in the last 48 hours., BMP:   Recent Labs  Lab 06/24/17  0005 06/24/17  0504   * 119*    138   K 4.9 4.3    102   CO2 23 26   BUN 24* 22*   CREATININE 1.4 1.3   CALCIUM 9.3 9.0   , CMP   Recent Labs  Lab 06/24/17  0005 06/24/17  0504    138   K 4.9 4.3    102   CO2 23 26   * 119*   BUN 24* 22*   CREATININE 1.4 1.3   CALCIUM 9.3 9.0   PROT 6.8  --    ALBUMIN 3.1*  --    BILITOT 0.8  --    ALKPHOS 69  --    AST 18  --    ALT 8*  --    ANIONGAP 11 10   ESTGFRAFRICA 49* 53*   EGFRNONAA 42* 46*   , CBC   Recent Labs  Lab 06/24/17  0005   WBC 8.30   HGB 13.0   HCT 41.7      , INR   Recent Labs  Lab 06/24/17  0005   INR 1.3*   , Lipid Panel No results for input(s): CHOL, HDL, LDLCALC, TRIG, CHOLHDL in the last 48 hours. and Troponin   Recent Labs  Lab 06/24/17  0005   TROPONINI 0.035*       Significant Imaging: EKG:and X-Ray: CXR: X-Ray Chest 1 View (CXR): No results found for this visit on 06/23/17.    Assessment and Plan:     No notes have been filed under this hospital service.  Service: Cardiology  ischemic cmp  systolilc dysfuntion acute and chronic    VTE Risk Mitigation         Ordered     enoxaparin injection 40 mg  Daily     Route:  Subcutaneous        06/24/17 0237     Medium Risk of VTE  Once      06/24/17 0237      continue lasix iv 40mg q 12hrs  check a 2decho    Thank you for your consult. I will follow-up with patient. Please contact us if you have any additional questions.    Manan Corrigan MD  Cardiology   [facility]

## 2017-06-24 NOTE — SUBJECTIVE & OBJECTIVE
Past Medical History:   Diagnosis Date    Coronary artery disease     Diabetes mellitus     Hypertension     MI (myocardial infarction) 2010    Thyroid disease        Past Surgical History:   Procedure Laterality Date    CARDIAC SURGERY         Review of patient's allergies indicates:  No Known Allergies    No current facility-administered medications on file prior to encounter.      Current Outpatient Prescriptions on File Prior to Encounter   Medication Sig    carvedilol (COREG) 12.5 MG tablet Take 25 mg by mouth 2 (two) times daily.     citalopram (CELEXA) 20 MG tablet Take 20 mg by mouth once daily.      furosemide (LASIX) 20 MG tablet Take 20 mg by mouth once daily.     levothyroxine (SYNTHROID) 50 MCG tablet Take 50 mcg by mouth once daily.      lisinopril (PRINIVIL,ZESTRIL) 20 MG tablet Take 10 mg by mouth once daily.     spironolactone (ALDACTONE) 25 MG tablet Take 25 mg by mouth once daily.       Family History     None        Social History Main Topics    Smoking status: Never Smoker    Smokeless tobacco: Not on file    Alcohol use No    Drug use: No    Sexual activity: Not on file     Review of Systems   Constitutional: Positive for appetite change. Negative for fever.   HENT: Negative for congestion and postnasal drip.    Eyes: Negative for visual disturbance.   Respiratory: Positive for shortness of breath. Negative for cough.    Cardiovascular: Positive for leg swelling. Negative for chest pain and palpitations.   Gastrointestinal: Positive for abdominal distention and abdominal pain. Negative for nausea and vomiting.   Genitourinary: Negative for dysuria.   Musculoskeletal: Negative for arthralgias.   Skin: Negative for wound.   Neurological: Negative for dizziness, seizures and syncope.        BLE weakness   Hematological: Does not bruise/bleed easily.   Psychiatric/Behavioral: Negative for confusion and hallucinations.     Objective:     Vital Signs (Most Recent):  Temp: 99 °F (37.2  °C) (06/23/17 2318)  Pulse: 76 (06/24/17 0102)  Resp: 17 (06/23/17 2318)  BP: (!) 171/97 (06/24/17 0102)  SpO2: (!) 94 % (06/24/17 0102) Vital Signs (24h Range):  Temp:  [99 °F (37.2 °C)] 99 °F (37.2 °C)  Pulse:  [76-82] 76  Resp:  [17] 17  SpO2:  [93 %-94 %] 94 %  BP: (160-178)/(84-97) 171/97     Weight: 111.8 kg (246 lb 8 oz)  Body mass index is 39.79 kg/m².    Physical Exam   Constitutional: She is oriented to person, place, and time. No distress.   HENT:   Head: Normocephalic.   Eyes: Pupils are equal, round, and reactive to light.   Neck: Normal range of motion. Neck supple. No JVD present. No tracheal deviation present.   Cardiovascular: Normal rate, regular rhythm, normal heart sounds and intact distal pulses.    No murmur heard.  NSR on telemetry   Pulmonary/Chest: Effort normal and breath sounds normal. No respiratory distress.   Diminished right base. Wearing O2 via NC.   Abdominal: Soft. Bowel sounds are normal. She exhibits ascites. There is no tenderness.   Musculoskeletal: Normal range of motion. She exhibits edema.   1+ edema to BLE   Neurological: She is alert and oriented to person, place, and time. No cranial nerve deficit.   Skin: Skin is warm, dry and intact. Capillary refill takes less than 2 seconds.   Psychiatric: She has a normal mood and affect. Her behavior is normal. Judgment and thought content normal.        Significant Labs:   CBC:   Recent Labs  Lab 06/24/17  0005   WBC 8.30   HGB 13.0   HCT 41.7        CMP:   Recent Labs  Lab 06/24/17  0005      K 4.9      CO2 23   *   BUN 24*   CREATININE 1.4   CALCIUM 9.3   PROT 6.8   ALBUMIN 3.1*   BILITOT 0.8   ALKPHOS 69   AST 18   ALT 8*   ANIONGAP 11   EGFRNONAA 42*     Cardiac Markers:   Recent Labs  Lab 06/24/17  0005   BNP 1,092*     Coagulation:   Recent Labs  Lab 06/24/17  0005   INR 1.3*     Troponin:   Recent Labs  Lab 06/24/17  0005   TROPONINI 0.035*       Significant Imaging:     CXR: Reviewed film, shows  right pleural effusion

## 2017-06-24 NOTE — PLAN OF CARE
06/24/17 0747   Patient Assessment/Suction   Level of Consciousness (AVPU) alert   Respiratory Effort Unlabored   Expansion/Accessory Muscles/Retractions no use of accessory muscles   All Lung Fields Breath Sounds diminished;clear   PRE-TX-O2-ETCO2   O2 Device (Oxygen Therapy) nasal cannula   $ Is the patient on Oxygen? Yes   Flow (L/min) 2   Oxygen Concentration (%) 28   SpO2 (!) 91 %   Pulse Oximetry Type Intermittent   $ Pulse Oximetry - Multiple Charge Pulse Oximetry - Multiple   Pulse 70   Resp 16   Aerosol Therapy   $ Aerosol Therapy Charges Aerosol Treatment   Respiratory Treatment Status given   SVN/Inhaler Treatment Route mask   Position During Treatment HOB at 45 degrees   Patient Tolerance good   Post-Treatment   Post-treatment Heart Rate (beats/min) 74   Post-treatment Resp Rate (breaths/min) 16   All Fields Breath Sounds aeration increased   Ready to Wean/Extubation Screen   FIO2<60 (chart decimal) 0.28

## 2017-06-24 NOTE — H&P
[facility] Hospital Medicine  History & Physical    Patient Name: Juliane Ramos  MRN: [id number]  Admission Date: 6/23/2017  Attending Physician: Reg Devine MD   Primary Care Provider: JOS Dumont         Patient information was obtained from patient and ER records.     Subjective:     Principal Problem:Acute on chronic congestive heart failure    Chief Complaint:   Chief Complaint   Patient presents with    Leg Swelling    Weakness        HPI: Ms. Ramos is a 58yo F with a PMH MI, HTN, DM2, and Hypothyroid. She presents to the hospital with c/o abdominal distention. It started about 1 month ago. She started to develop leg swelling and weakness, so she came to the hospital. Other associated symptoms include decreased appetite, abdominal pain, and umbilical discharge. In the ED, her BNP was found to be 1092 and her CXR showed a pleural effusion and she was given lasix 40mg IV. She currently reports feeling like her abdomen has less fluid since getting the lasix. She denies sob, pain, or other discomforts.          Past Medical History:   Diagnosis Date    Coronary artery disease     Diabetes mellitus     Hypertension     MI (myocardial infarction) 2010    Thyroid disease        Past Surgical History:   Procedure Laterality Date    CARDIAC SURGERY         Review of patient's allergies indicates:  No Known Allergies    No current facility-administered medications on file prior to encounter.      Current Outpatient Prescriptions on File Prior to Encounter   Medication Sig    carvedilol (COREG) 12.5 MG tablet Take 25 mg by mouth 2 (two) times daily.     citalopram (CELEXA) 20 MG tablet Take 20 mg by mouth once daily.      furosemide (LASIX) 20 MG tablet Take 20 mg by mouth once daily.     levothyroxine (SYNTHROID) 50 MCG tablet Take 50 mcg by mouth once daily.      lisinopril (PRINIVIL,ZESTRIL) 20 MG tablet Take 10 mg by mouth once daily.     spironolactone (ALDACTONE) 25 MG  tablet Take 25 mg by mouth once daily.       Family History     None        Social History Main Topics    Smoking status: Never Smoker    Smokeless tobacco: Not on file    Alcohol use No    Drug use: No    Sexual activity: Not on file     Review of Systems   Constitutional: Positive for appetite change. Negative for fever.   HENT: Negative for congestion and postnasal drip.    Eyes: Negative for visual disturbance.   Respiratory: Positive for shortness of breath. Negative for cough.    Cardiovascular: Positive for leg swelling. Negative for chest pain and palpitations.   Gastrointestinal: Positive for abdominal distention and abdominal pain. Negative for nausea and vomiting.   Genitourinary: Negative for dysuria.   Musculoskeletal: Negative for arthralgias.   Skin: Negative for wound.   Neurological: Negative for dizziness, seizures and syncope.        BLE weakness   Hematological: Does not bruise/bleed easily.   Psychiatric/Behavioral: Negative for confusion and hallucinations.     Objective:     Vital Signs (Most Recent):  Temp: 99 °F (37.2 °C) (06/23/17 2318)  Pulse: 76 (06/24/17 0102)  Resp: 17 (06/23/17 2318)  BP: (!) 171/97 (06/24/17 0102)  SpO2: (!) 94 % (06/24/17 0102) Vital Signs (24h Range):  Temp:  [99 °F (37.2 °C)] 99 °F (37.2 °C)  Pulse:  [76-82] 76  Resp:  [17] 17  SpO2:  [93 %-94 %] 94 %  BP: (160-178)/(84-97) 171/97     Weight: 111.8 kg (246 lb 8 oz)  Body mass index is 39.79 kg/m².    Physical Exam   Constitutional: She is oriented to person, place, and time. No distress.   HENT:   Head: Normocephalic.   Eyes: Pupils are equal, round, and reactive to light.   Neck: Normal range of motion. Neck supple. No JVD present. No tracheal deviation present.   Cardiovascular: Normal rate, regular rhythm, normal heart sounds and intact distal pulses.    No murmur heard.  NSR on telemetry   Pulmonary/Chest: Effort normal and breath sounds normal. No respiratory distress.   Diminished right base. Wearing O2  via NC.   Abdominal: Soft. Bowel sounds are normal. She exhibits ascites. There is no tenderness.   Musculoskeletal: Normal range of motion. She exhibits edema.   1+ edema to BLE   Neurological: She is alert and oriented to person, place, and time. No cranial nerve deficit.   Skin: Skin is warm, dry and intact. Capillary refill takes less than 2 seconds.   Psychiatric: She has a normal mood and affect. Her behavior is normal. Judgment and thought content normal.        Significant Labs:   CBC:   Recent Labs  Lab 06/24/17  0005   WBC 8.30   HGB 13.0   HCT 41.7        CMP:   Recent Labs  Lab 06/24/17  0005      K 4.9      CO2 23   *   BUN 24*   CREATININE 1.4   CALCIUM 9.3   PROT 6.8   ALBUMIN 3.1*   BILITOT 0.8   ALKPHOS 69   AST 18   ALT 8*   ANIONGAP 11   EGFRNONAA 42*     Cardiac Markers:   Recent Labs  Lab 06/24/17  0005   BNP 1,092*     Coagulation:   Recent Labs  Lab 06/24/17  0005   INR 1.3*     Troponin:   Recent Labs  Lab 06/24/17  0005   TROPONINI 0.035*       Significant Imaging:     CXR: Reviewed film, shows right pleural effusion    Assessment/Plan:     * Acute on chronic congestive heart failure    Diures  Continue home ACEI, BB  Obtain echocardiogram  Consult Cardiology          Essential hypertension    Chronic. Continue chronic medications.  Uncontrolled--likely due to volume overload.   Diures          Pleural effusion    Diures  O2 therapy  Nebulizers          Hypothyroid    Continue home levothyroxine.          CAD (coronary artery disease)    Not on chronic statin or ASA. States she was not told that she should take ASA.  Start ASA 81mg daily        Controlled type 2 diabetes mellitus with stage 3 chronic kidney disease    Monitor blood sugars qac&hs  SSI prn  Continue amaryl  Diabetic diet          CKD (chronic kidney disease) stage 3, GFR 30-59 ml/min    No recent labs to compare to.  Creat nml  Monitor BMP while diuresing  Continue ACEI for now            VTE Risk  Mitigation         Ordered     enoxaparin injection 40 mg  Daily     Route:  Subcutaneous        06/24/17 0237     Medium Risk of VTE  Once      06/24/17 0237        Joselyn Mazariegos NP  Department of Hospital Medicine   [facility]

## 2017-06-24 NOTE — UM SECONDARY REVIEW
Physician Advisor External    Level of Care Issue    Approved Inpatient for admit 6/23/17 per dr cuellar at [facility]

## 2017-06-24 NOTE — SUBJECTIVE & OBJECTIVE
"Past Medical History:   Diagnosis Date    CHF (congestive heart failure)     COPD (chronic obstructive pulmonary disease)     Coronary artery disease     Diabetes mellitus     Encounter for blood transfusion     Hypertension     MI (myocardial infarction) 2010    Stroke     July 2005    Thyroid disease        Past Surgical History:   Procedure Laterality Date    ABCESS DRAINAGE Right     Between "little toe" and the one next to it    BREAST SURGERY      Reduction [id number]    CARDIAC SURGERY      TUBAL LIGATION  03/1986       Review of patient's allergies indicates:  No Known Allergies    No current facility-administered medications on file prior to encounter.      Current Outpatient Prescriptions on File Prior to Encounter   Medication Sig    carvedilol (COREG) 12.5 MG tablet Take 25 mg by mouth 2 (two) times daily.     citalopram (CELEXA) 20 MG tablet Take 20 mg by mouth once daily.      furosemide (LASIX) 20 MG tablet Take 20 mg by mouth once daily.     levothyroxine (SYNTHROID) 50 MCG tablet Take 50 mcg by mouth once daily.      lisinopril (PRINIVIL,ZESTRIL) 20 MG tablet Take 10 mg by mouth once daily.     spironolactone (ALDACTONE) 25 MG tablet Take 25 mg by mouth once daily.       Family History     Problem Relation (Age of Onset)    Arthritis Mother    Cancer Father    Depression Sister    Heart disease Father    Hypertension Son        Social History Main Topics    Smoking status: Never Smoker    Smokeless tobacco: Not on file    Alcohol use 0.6 - 1.2 oz/week     1 - 2 Glasses of wine per week    Drug use: No    Sexual activity: Yes     Partners: Male     Birth control/ protection: None     Review of Systems   Cardiovascular: Positive for dyspnea on exertion, leg swelling, orthopnea and paroxysmal nocturnal dyspnea. Negative for chest pain, claudication and irregular heartbeat.   Respiratory: Positive for cough, shortness of breath and wheezing.    All other systems reviewed and are " negative.    Objective:     Vital Signs (Most Recent):  Temp: 97.7 °F (36.5 °C) (06/24/17 1059)  Pulse: 73 (06/24/17 1059)  Resp: 18 (06/24/17 1059)  BP: 136/79 (06/24/17 1059)  SpO2: 95 % (06/24/17 1059) Vital Signs (24h Range):  Temp:  [97.6 °F (36.4 °C)-99 °F (37.2 °C)] 97.7 °F (36.5 °C)  Pulse:  [70-82] 73  Resp:  [16-18] 18  SpO2:  [91 %-95 %] 95 %  BP: (136-178)/(79-97) 136/79     Weight: 117.5 kg (259 lb)  Body mass index is 41.8 kg/m².    SpO2: 95 %  O2 Device (Oxygen Therapy): nasal cannula      Intake/Output Summary (Last 24 hours) at 06/24/17 1226  Last data filed at 06/24/17 0600   Gross per 24 hour   Intake               10 ml   Output                0 ml   Net               10 ml       Lines/Drains/Airways     Peripheral Intravenous Line                 Peripheral IV - Single Lumen 06/24/17 0007 Left Forearm less than 1 day                Physical Exam   Constitutional: She is oriented to person, place, and time.   HENT:   Head: Normocephalic.   Eyes: Pupils are equal, round, and reactive to light.   Neck: Neck supple.   Cardiovascular: Normal rate.  Exam reveals gallop.    Pulmonary/Chest: She has rales.   Abdominal: Soft. She exhibits distension.   Musculoskeletal: She exhibits edema.   Neurological: She is alert and oriented to person, place, and time.   Skin: Skin is warm.       Significant Labs:   ABG: No results for input(s): PH, PCO2, HCO3, POCSATURATED, BE in the last 48 hours., Blood Culture: No results for input(s): LABBLOO in the last 48 hours., BMP:   Recent Labs  Lab 06/24/17  0005 06/24/17  0504   * 119*    138   K 4.9 4.3    102   CO2 23 26   BUN 24* 22*   CREATININE 1.4 1.3   CALCIUM 9.3 9.0   , CMP   Recent Labs  Lab 06/24/17  0005 06/24/17  0504    138   K 4.9 4.3    102   CO2 23 26   * 119*   BUN 24* 22*   CREATININE 1.4 1.3   CALCIUM 9.3 9.0   PROT 6.8  --    ALBUMIN 3.1*  --    BILITOT 0.8  --    ALKPHOS 69  --    AST 18  --    ALT 8*  --     ANIONGAP 11 10   ESTGFRAFRICA 49* 53*   EGFRNONAA 42* 46*   , CBC   Recent Labs  Lab 06/24/17  0005   WBC 8.30   HGB 13.0   HCT 41.7      , INR   Recent Labs  Lab 06/24/17  0005   INR 1.3*   , Lipid Panel No results for input(s): CHOL, HDL, LDLCALC, TRIG, CHOLHDL in the last 48 hours. and Troponin   Recent Labs  Lab 06/24/17  0005   TROPONINI 0.035*       Significant Imaging: EKG:and X-Ray: CXR: X-Ray Chest 1 View (CXR): No results found for this visit on 06/23/17.

## 2017-06-24 NOTE — PLAN OF CARE
Problem: Patient Care Overview  Goal: Plan of Care Review  Outcome: Ongoing (interventions implemented as appropriate)  Resting quietly with eyes open, pt denies abdominal pain/discomfort at this time, abdomen is large, distended and soft, + BS to all 4 quads, abdomen is red and shiny in color, 02 2L NC in progress, pt is easily exerted with locomotion, non-productive cough noted, +2-3 edema palpated to BLE with + palpable pedal pulses, compliant with care, continue to monitor, observe and note any changes, safety maintain

## 2017-06-24 NOTE — ED NOTES
Patient identifiers for Juliane Patrolia checked and correct.  LOC: Patient is awake, alert, and aware of environment with an appropriate affect. Patient is oriented x 3 and speaking appropriately.  APPEARANCE: Patient resting comfortably and appears in mild distress. Patient is clean and well groomed, patient's clothing is properly fastened.  SKIN: The skin is warm and dry. Patient has normal skin turgor and moist mucus membrances. Skin is intact; no bruising or breakdown noted.  MUSCULOSKELETAL: Patient is moving all extremities well, no obvious deformities noted. Pulses intact.C/o weakness to the lower exts and weakness with changing positions, new, slow onset over the past month   RESPIRATORY: Airway is open and patent. Respirations are spontaneous and labored, unable to lie flat without increase in SOB  CARDIAC: Patient has a normal rate and rhythm. Romel Lower ext pitting peripheral edema noted. Capillary refill < 3 seconds.  ABDOMEN: Distended. Bowel sounds active in all 4 quadrants. Firm. Patient reports 20 pound weight gain in the past month  NEUROLOGICAL: PERRL. Facial expression is symmetrical. Hand grasps are equal bilaterally. Normal sensation in all extremities when touched with finger.  Allergies reported: Review of patient's allergies indicates:  No Known Allergies  ]

## 2017-06-24 NOTE — PLAN OF CARE
06/24/17 0328   Patient Assessment/Suction   Level of Consciousness (AVPU) alert   Respiratory Effort Normal;Unlabored   Expansion/Accessory Muscles/Retractions no use of accessory muscles   PRE-TX-O2-ETCO2   O2 Device (Oxygen Therapy) nasal cannula   Flow (L/min) 2   SpO2 (!) 94 %   Pulse 72   Pt tolerates NC well.

## 2017-06-25 LAB
ANION GAP SERPL CALC-SCNC: 8 MMOL/L
BASOPHILS # BLD AUTO: 0 K/UL
BASOPHILS NFR BLD: 0.6 %
BUN SERPL-MCNC: 23 MG/DL
CALCIUM SERPL-MCNC: 9.1 MG/DL
CHLORIDE SERPL-SCNC: 100 MMOL/L
CO2 SERPL-SCNC: 29 MMOL/L
CREAT SERPL-MCNC: 1.3 MG/DL
DIASTOLIC DYSFUNCTION: YES
DIFFERENTIAL METHOD: ABNORMAL
EOSINOPHIL # BLD AUTO: 0.2 K/UL
EOSINOPHIL NFR BLD: 3.6 %
ERYTHROCYTE [DISTWIDTH] IN BLOOD BY AUTOMATED COUNT: 17.7 %
EST. GFR  (AFRICAN AMERICAN): 53 ML/MIN/1.73 M^2
EST. GFR  (NON AFRICAN AMERICAN): 46 ML/MIN/1.73 M^2
ESTIMATED PA SYSTOLIC PRESSURE: 31.36
GLOBAL PERICARDIAL EFFUSION: ABNORMAL
GLUCOSE SERPL-MCNC: 122 MG/DL
HCT VFR BLD AUTO: 36.4 %
HGB BLD-MCNC: 11.6 G/DL
LYMPHOCYTES # BLD AUTO: 1 K/UL
LYMPHOCYTES NFR BLD: 16.6 %
MAGNESIUM SERPL-MCNC: 1.5 MG/DL
MCH RBC QN AUTO: 25.1 PG
MCHC RBC AUTO-ENTMCNC: 32 %
MCV RBC AUTO: 79 FL
MITRAL VALVE REGURGITATION: ABNORMAL
MONOCYTES # BLD AUTO: 0.7 K/UL
MONOCYTES NFR BLD: 11.9 %
NEUTROPHILS # BLD AUTO: 3.9 K/UL
NEUTROPHILS NFR BLD: 67.3 %
PLATELET # BLD AUTO: 205 K/UL
PMV BLD AUTO: 9.4 FL
POCT GLUCOSE: 178 MG/DL (ref 70–110)
POCT GLUCOSE: 199 MG/DL (ref 70–110)
POCT GLUCOSE: 254 MG/DL (ref 70–110)
POTASSIUM SERPL-SCNC: 4 MMOL/L
RBC # BLD AUTO: 4.64 M/UL
RETIRED EF AND QEF - SEE NOTES: 25 (ref 55–65)
SODIUM SERPL-SCNC: 137 MMOL/L
TRICUSPID VALVE REGURGITATION: ABNORMAL
WBC # BLD AUTO: 5.9 K/UL

## 2017-06-25 PROCEDURE — 25000003 PHARM REV CODE 250: Performed by: HOSPITALIST

## 2017-06-25 PROCEDURE — 83036 HEMOGLOBIN GLYCOSYLATED A1C: CPT

## 2017-06-25 PROCEDURE — 83735 ASSAY OF MAGNESIUM: CPT

## 2017-06-25 PROCEDURE — 63600175 PHARM REV CODE 636 W HCPCS: Performed by: NURSE PRACTITIONER

## 2017-06-25 PROCEDURE — 94761 N-INVAS EAR/PLS OXIMETRY MLT: CPT

## 2017-06-25 PROCEDURE — 36415 COLL VENOUS BLD VENIPUNCTURE: CPT

## 2017-06-25 PROCEDURE — 25000003 PHARM REV CODE 250: Performed by: NURSE PRACTITIONER

## 2017-06-25 PROCEDURE — 80048 BASIC METABOLIC PNL TOTAL CA: CPT

## 2017-06-25 PROCEDURE — 94640 AIRWAY INHALATION TREATMENT: CPT

## 2017-06-25 PROCEDURE — 27000221 HC OXYGEN, UP TO 24 HOURS

## 2017-06-25 PROCEDURE — 63600175 PHARM REV CODE 636 W HCPCS: Performed by: HOSPITALIST

## 2017-06-25 PROCEDURE — 25000242 PHARM REV CODE 250 ALT 637 W/ HCPCS: Performed by: NURSE PRACTITIONER

## 2017-06-25 PROCEDURE — 12000002 HC ACUTE/MED SURGE SEMI-PRIVATE ROOM

## 2017-06-25 PROCEDURE — 85025 COMPLETE CBC W/AUTO DIFF WBC: CPT

## 2017-06-25 RX ORDER — SODIUM,POTASSIUM PHOSPHATES 280-250MG
2 POWDER IN PACKET (EA) ORAL
Status: DISCONTINUED | OUTPATIENT
Start: 2017-06-25 | End: 2017-06-27 | Stop reason: HOSPADM

## 2017-06-25 RX ORDER — LANOLIN ALCOHOL/MO/W.PET/CERES
800 CREAM (GRAM) TOPICAL
Status: DISCONTINUED | OUTPATIENT
Start: 2017-06-25 | End: 2017-06-27 | Stop reason: HOSPADM

## 2017-06-25 RX ORDER — POTASSIUM CHLORIDE 20 MEQ/15ML
40 SOLUTION ORAL
Status: DISCONTINUED | OUTPATIENT
Start: 2017-06-25 | End: 2017-06-27 | Stop reason: HOSPADM

## 2017-06-25 RX ORDER — POTASSIUM CHLORIDE 20 MEQ/15ML
60 SOLUTION ORAL
Status: DISCONTINUED | OUTPATIENT
Start: 2017-06-25 | End: 2017-06-27 | Stop reason: HOSPADM

## 2017-06-25 RX ORDER — INSULIN ASPART 100 [IU]/ML
1-10 INJECTION, SOLUTION INTRAVENOUS; SUBCUTANEOUS
Status: DISCONTINUED | OUTPATIENT
Start: 2017-06-25 | End: 2017-06-27 | Stop reason: HOSPADM

## 2017-06-25 RX ORDER — IBUPROFEN 200 MG
24 TABLET ORAL
Status: DISCONTINUED | OUTPATIENT
Start: 2017-06-25 | End: 2017-06-27 | Stop reason: HOSPADM

## 2017-06-25 RX ORDER — ROSUVASTATIN CALCIUM 5 MG/1
10 TABLET, COATED ORAL DAILY
Status: DISCONTINUED | OUTPATIENT
Start: 2017-06-26 | End: 2017-06-27 | Stop reason: HOSPADM

## 2017-06-25 RX ORDER — GLUCAGON 1 MG
1 KIT INJECTION
Status: DISCONTINUED | OUTPATIENT
Start: 2017-06-25 | End: 2017-06-27 | Stop reason: HOSPADM

## 2017-06-25 RX ORDER — IBUPROFEN 200 MG
16 TABLET ORAL
Status: DISCONTINUED | OUTPATIENT
Start: 2017-06-25 | End: 2017-06-27 | Stop reason: HOSPADM

## 2017-06-25 RX ADMIN — FUROSEMIDE 40 MG: 10 INJECTION, SOLUTION INTRAMUSCULAR; INTRAVENOUS at 09:06

## 2017-06-25 RX ADMIN — ALBUTEROL SULFATE 2.5 MG: 2.5 SOLUTION RESPIRATORY (INHALATION) at 07:06

## 2017-06-25 RX ADMIN — ALBUTEROL SULFATE 1.25 MG: 2.5 SOLUTION RESPIRATORY (INHALATION) at 07:06

## 2017-06-25 RX ADMIN — GABAPENTIN 300 MG: 300 CAPSULE ORAL at 05:06

## 2017-06-25 RX ADMIN — ENOXAPARIN SODIUM 40 MG: 100 INJECTION SUBCUTANEOUS at 05:06

## 2017-06-25 RX ADMIN — LEVOTHYROXINE SODIUM 50 MCG: 50 TABLET ORAL at 05:06

## 2017-06-25 RX ADMIN — Medication 800 MG: at 01:06

## 2017-06-25 RX ADMIN — CARVEDILOL 25 MG: 25 TABLET, FILM COATED ORAL at 08:06

## 2017-06-25 RX ADMIN — INSULIN ASPART 3 UNITS: 100 INJECTION, SOLUTION INTRAVENOUS; SUBCUTANEOUS at 08:06

## 2017-06-25 RX ADMIN — ASPIRIN 81 MG: 81 TABLET, COATED ORAL at 08:06

## 2017-06-25 RX ADMIN — GABAPENTIN 300 MG: 300 CAPSULE ORAL at 01:06

## 2017-06-25 RX ADMIN — SODIUM CHLORIDE, PRESERVATIVE FREE 3 ML: 5 INJECTION INTRAVENOUS at 01:06

## 2017-06-25 RX ADMIN — FUROSEMIDE 40 MG: 10 INJECTION, SOLUTION INTRAMUSCULAR; INTRAVENOUS at 05:06

## 2017-06-25 RX ADMIN — ALBUTEROL SULFATE 1.25 MG: 2.5 SOLUTION RESPIRATORY (INHALATION) at 01:06

## 2017-06-25 RX ADMIN — CITALOPRAM HYDROBROMIDE 20 MG: 10 TABLET ORAL at 08:06

## 2017-06-25 RX ADMIN — LISINOPRIL 10 MG: 10 TABLET ORAL at 08:06

## 2017-06-25 RX ADMIN — ALBUTEROL SULFATE 2.5 MG: 2.5 SOLUTION RESPIRATORY (INHALATION) at 12:06

## 2017-06-25 RX ADMIN — GABAPENTIN 300 MG: 300 CAPSULE ORAL at 08:06

## 2017-06-25 NOTE — PLAN OF CARE
06/24/17 1959   Patient Assessment/Suction   Level of Consciousness (AVPU) alert   Respiratory Effort Normal;Unlabored   Expansion/Accessory Muscles/Retractions no use of accessory muscles   All Lung Fields Breath Sounds clear;diminished   PRE-TX-O2-ETCO2   O2 Device (Oxygen Therapy) nasal cannula   Flow (L/min) 2   SpO2 97 %   Pulse 77   Resp 20   Aerosol Therapy   $ Aerosol Therapy Charges Aerosol Treatment   Respiratory Treatment Status given   SVN/Inhaler Treatment Route mask;with oxygen   Position During Treatment Sitting on edge of bed (dangling)   Patient Tolerance good   Post-Treatment   Post-treatment Heart Rate (beats/min) 76   Post-treatment Resp Rate (breaths/min) 20   All Fields Breath Sounds unchanged   Pt tolerates NC and treatments well.

## 2017-06-25 NOTE — PROGRESS NOTES
[facility]  Cardiology  Progress Note    Patient Name: Juliane Ramos  MRN: [id number]  Admission Date: 6/23/2017  Hospital Length of Stay: 1 days  Code Status: Full Code   Attending Physician: Reg Devine MD   Primary Care Physician: JOS Dumont  Expected Discharge Date:   Principal Problem:Acute on chronic congestive heart failure    Subjective:     Hospital Course:still diuresing and  Due ef 25% needs lexiscan myoview if negative may need a vest or ICD    Interval History: swelling is improving    ROS sob and lower leg edema, denies chest pain  Objective:     Vital Signs (Most Recent):  Temp: 99 °F (37.2 °C) (06/25/17 1130)  Pulse: 78 (06/25/17 1355)  Resp: 18 (06/25/17 1355)  BP: 139/60 (06/25/17 1130)  SpO2: (!) 87 % (06/25/17 1355) Vital Signs (24h Range):  Temp:  [97.9 °F (36.6 °C)-99.4 °F (37.4 °C)] 99 °F (37.2 °C)  Pulse:  [70-86] 78  Resp:  [17-20] 18  SpO2:  [87 %-97 %] 87 %  BP: (139-172)/(60-97) 139/60     Weight: 113.5 kg (250 lb 4.8 oz)  Body mass index is 40.4 kg/m².    SpO2: (!) 87 %  O2 Device (Oxygen Therapy): nasal cannula      Intake/Output Summary (Last 24 hours) at 06/25/17 1452  Last data filed at 06/25/17 0500   Gross per 24 hour   Intake              840 ml   Output                0 ml   Net              840 ml       Lines/Drains/Airways     Peripheral Intravenous Line                 Peripheral IV - Single Lumen 06/24/17 0007 Left Forearm 1 day                Physical Exam   Constitutional: She is oriented to person, place, and time. She appears well-developed.   HENT:   Head: Normocephalic.   Eyes: Pupils are equal, round, and reactive to light.   Neck: Normal range of motion.   Cardiovascular: Normal rate.    Pulmonary/Chest: She has rales.   Abdominal: Soft.   Musculoskeletal: She exhibits edema.   Neurological: She is alert and oriented to person, place, and time.       Significant Labs:   CMP   Recent Labs  Lab 06/24/17  0005 06/24/17  0504 06/25/17  050     138 137   K 4.9 4.3 4.0    102 100   CO2 23 26 29   * 119* 122*   BUN 24* 22* 23*   CREATININE 1.4 1.3 1.3   CALCIUM 9.3 9.0 9.1   PROT 6.8  --   --    ALBUMIN 3.1*  --   --    BILITOT 0.8  --   --    ALKPHOS 69  --   --    AST 18  --   --    ALT 8*  --   --    ANIONGAP 11 10 8   ESTGFRAFRICA 49* 53* 53*   EGFRNONAA 42* 46* 46*   , INR   Recent Labs  Lab 06/24/17  0005   INR 1.3*    and Lipid Panel No results for input(s): CHOL, HDL, LDLCALC, TRIG, CHOLHDL in the last 48 hours.    Significant Imaging: Echocardiogram:   2D echo with color flow doppler:   Results for orders placed or performed during the hospital encounter of 06/23/17   2D echo with color flow doppler   Result Value Ref Range    EF 25 (A) 55 - 65    Mitral Valve Regurgitation MILD     Diastolic Dysfunction Yes (A)     Est. PA Systolic Pressure 31.36     Pericardial Effusion TRIVIAL     Tricuspid Valve Regurgitation MODERATE (A)      Assessment and Plan:     Brief HPI:  Sob and edema of lower extremities    Active Diagnoses:    Diagnosis Date Noted POA    PRINCIPAL PROBLEM:  Acute on chronic congestive heart failure [I50.9] 06/24/2017 Yes    Essential hypertension [I10] 06/24/2017 Yes    CKD (chronic kidney disease) stage 3, GFR 30-59 ml/min [N18.3] 06/24/2017 Yes    Controlled type 2 diabetes mellitus with stage 3 chronic kidney disease [E11.22, N18.3] 06/24/2017 Yes    CAD (coronary artery disease) [I25.10] 06/24/2017 Yes    Hypothyroid [E03.9] 06/24/2017 Yes    Pleural effusion [J90] 06/24/2017 Yes      Problems Resolved During this Admission:    Diagnosis Date Noted Date Resolved POA       VTE Risk Mitigation         Ordered     enoxaparin injection 40 mg  Daily     Route:  Subcutaneous        06/24/17 0237     Medium Risk of VTE  Once      06/24/17 0237      needs lexiscan myoview    Manan Corrigan MD  Cardiology  [facility]

## 2017-06-25 NOTE — PLAN OF CARE
Problem: Patient Care Overview  Goal: Plan of Care Review  Hourly rounding utilized. Pt. Able to verbalize needs. POC discussed with pt. And her . Both verbalized understanding to same. Increased SOB noted with exertion. Abd, distended, but soft. Bowel sounds audible x4 quads. No complaints of pain. O2 2L/NC. Tele monitored. Pt. Did shower this shift. Remains free from injury. Call light in reach. Bed low and locked.

## 2017-06-25 NOTE — PLAN OF CARE
06/25/17 0758   Patient Assessment/Suction   Level of Consciousness (AVPU) alert   Respiratory Effort Unlabored   Expansion/Accessory Muscles/Retractions no use of accessory muscles   All Lung Fields Breath Sounds clear;diminished   PRE-TX-O2-ETCO2   O2 Device (Oxygen Therapy) nasal cannula   $ Is the patient on Oxygen? Yes   Flow (L/min) 2   Oxygen Concentration (%) 28   SpO2 (!) 92 %   Pulse Oximetry Type Intermittent   $ Pulse Oximetry - Multiple Charge Pulse Oximetry - Multiple   Pulse 73   Resp 18   Aerosol Therapy   $ Aerosol Therapy Charges Aerosol Treatment   Respiratory Treatment Status given   SVN/Inhaler Treatment Route mask   Position During Treatment HOB at 30 degrees   Patient Tolerance good   Post-Treatment   Post-treatment Heart Rate (beats/min) 75   Post-treatment Resp Rate (breaths/min) 18   All Fields Breath Sounds aeration increased   Ready to Wean/Extubation Screen   FIO2<60 (chart decimal) 0.28

## 2017-06-25 NOTE — PROGRESS NOTES
"Progress Note  Hospital Medicine    Admit Date: 6/23/2017    SUBJECTIVE:     Follow-up For:  Acute on chronic congestive heart failure      Interval history (See H&P for complete P,F,SHx) : - Patient doing well. Diuresing well. Reports symptoms better controlled. No events overnight. Discussed with Dr. Corrigan- will need stress test tomorrow    Review of Systems:   Gen- Weakness/ Fatigue  Resp: SOB  Extrem- Swelling        OBJECTIVE:     Vital Signs Range (Last 24H):  Temp:  [97.9 °F (36.6 °C)-99.4 °F (37.4 °C)]   Pulse:  [70-86]   Resp:  [17-20]   BP: (139-172)/(60-97)   SpO2:  [87 %-97 %]     I & O (Last 24H):    Intake/Output Summary (Last 24 hours) at 06/25/17 1545  Last data filed at 06/25/17 0500   Gross per 24 hour   Intake              840 ml   Output                0 ml   Net              840 ml       Estimated body mass index is 40.4 kg/m² as calculated from the following:    Height as of this encounter: 5' 6" (1.676 m).    Weight as of this encounter: 113.5 kg (250 lb 4.8 oz).    Physical Exam:  General- Patient alert and oriented x3 in NAD  HEENT- PERRLA, EOMI, OP clear, MMM  Neck- No JVD, Lymphadenopathy, Thyromegaly  CV- Regular rate and rhythm, No Murmur/john/rubs  Resp- Lungs with crackles 1/2 up lower lung fields R>L.  GI- Non tender/non-distended, BS normoactive x4 quads, no HSM  Extrem- No cyanosis, clubbing, 2+ edema. Pulses 2+ and symmetric      Laboratory/Diagnostic Data:  Reviewed and noted in plan where applicable- Please see chart for full lab data.    Medications:  Medication list was reviewed and changes noted under Assessment/Plan.    ASSESSMENT/PLAN:     Active Problems:    Active Hospital Problems    Diagnosis  POA    *Acute on chronic congestive heart failure [I50.9]-   CHF improved. Latest ECHO shows ejection fraction of 35%. Continue BB, ACEi and Lasix and monitor clinical status closely. Monitor on telemetry. Last BNP is   Recent Labs  Lab 06/24/17  0005   BNP 1,092*   . Continue " to stress to patient importance of self efficacy and  on diet for CHF.    Yes    Essential hypertension [I10]- Chronic, controlled.  Continue home regimen monitoring BP closely.  Will titrate BP medications as needed for sustained BP control.  Yes    CKD (chronic kidney disease) stage 3, GFR 30-59 ml/min [N18.3]- Creatine stable for now. Monitor UOP and serial BMP and adjust therapy as needed. Renally dose meds.  Yes    Controlled type 2 diabetes mellitus with stage 3 chronic kidney disease [E11.22, N18.3]-   Patient's FSGs are controlled on current hypoglycemics.   Most recent inpatient diabetic meds include- low dose SSI  Will hold PO hypoglycemics and will not adjust insulin dose   Most recent fingerstick glucose reviewed-   Recent Labs  Lab 06/25/17  1141   POCTGLUCOSE 199*     Current correctional  scale  Low    Yes    CAD (coronary artery disease) [I25.10]- Patient with known CAD s/p stent placement. Will continue Aspirin and monitor for S/Sx of angina/ACS. Continue to monitor on telemetry.   Yes    Hypothyroid [E03.9]- Patient has chronic hypothyroidism. TFTs reviewed- No results found for: TSH. Will continue chronic levothyroxine and adjust for and clinical changes.  Yes    Pleural effusion [J90]-  D/t CHF. Treat CHF. No need for drainage for now.  Yes      Resolved Hospital Problems    Diagnosis Date Resolved POA   No resolved problems to display.         Disposition- Home    VTE Risk Mitigation         Ordered     enoxaparin injection 40 mg  Daily     Route:  Subcutaneous        06/24/17 0237     Medium Risk of VTE  Once      06/24/17 0237

## 2017-06-25 NOTE — PLAN OF CARE
Problem: Patient Care Overview  Goal: Plan of Care Review  Outcome: Ongoing (interventions implemented as appropriate)  Resting quietly with eyes open, abdomen is still distended, appear to has decrease in size a large amount compare to yesterday, abdomen is soft, tender, normal skin color, +3 edema palpated to BLE with + palpable pedal pulses, pt is encourage to elevate BLE while at rest, ambulates with rolling walker, clear diminished breathe sounds auscultated, pt is encourage to cough and deep breath,  at bedside, pt express feelings of doing better, continue to monitor, observe and note any changes

## 2017-06-26 LAB
ANION GAP SERPL CALC-SCNC: 10 MMOL/L
BASOPHILS # BLD AUTO: 0 K/UL
BASOPHILS NFR BLD: 0.5 %
BUN SERPL-MCNC: 24 MG/DL
CALCIUM SERPL-MCNC: 9 MG/DL
CHLORIDE SERPL-SCNC: 97 MMOL/L
CO2 SERPL-SCNC: 31 MMOL/L
CREAT SERPL-MCNC: 1.2 MG/DL
DIFFERENTIAL METHOD: ABNORMAL
EOSINOPHIL # BLD AUTO: 0.2 K/UL
EOSINOPHIL NFR BLD: 3.7 %
ERYTHROCYTE [DISTWIDTH] IN BLOOD BY AUTOMATED COUNT: 17.8 %
EST. GFR  (AFRICAN AMERICAN): 59 ML/MIN/1.73 M^2
EST. GFR  (NON AFRICAN AMERICAN): 51 ML/MIN/1.73 M^2
ESTIMATED AVG GLUCOSE: 240 MG/DL
GLUCOSE SERPL-MCNC: 115 MG/DL
HBA1C MFR BLD HPLC: 10 %
HCT VFR BLD AUTO: 38 %
HGB BLD-MCNC: 11.9 G/DL
LYMPHOCYTES # BLD AUTO: 0.9 K/UL
LYMPHOCYTES NFR BLD: 17 %
MAGNESIUM SERPL-MCNC: 1.6 MG/DL
MCH RBC QN AUTO: 24.8 PG
MCHC RBC AUTO-ENTMCNC: 31.4 %
MCV RBC AUTO: 79 FL
MONOCYTES # BLD AUTO: 0.6 K/UL
MONOCYTES NFR BLD: 11.3 %
NEUTROPHILS # BLD AUTO: 3.7 K/UL
NEUTROPHILS NFR BLD: 67.5 %
PLATELET # BLD AUTO: 207 K/UL
PMV BLD AUTO: 9.3 FL
POCT GLUCOSE: 120 MG/DL (ref 70–110)
POCT GLUCOSE: 232 MG/DL (ref 70–110)
POCT GLUCOSE: 291 MG/DL (ref 70–110)
POTASSIUM SERPL-SCNC: 4.5 MMOL/L
RBC # BLD AUTO: 4.8 M/UL
SODIUM SERPL-SCNC: 138 MMOL/L
WBC # BLD AUTO: 5.5 K/UL

## 2017-06-26 PROCEDURE — 99232 SBSQ HOSP IP/OBS MODERATE 35: CPT | Mod: ,,, | Performed by: INTERNAL MEDICINE

## 2017-06-26 PROCEDURE — 85025 COMPLETE CBC W/AUTO DIFF WBC: CPT

## 2017-06-26 PROCEDURE — 94761 N-INVAS EAR/PLS OXIMETRY MLT: CPT

## 2017-06-26 PROCEDURE — 63600175 PHARM REV CODE 636 W HCPCS

## 2017-06-26 PROCEDURE — 12000002 HC ACUTE/MED SURGE SEMI-PRIVATE ROOM

## 2017-06-26 PROCEDURE — 25000242 PHARM REV CODE 250 ALT 637 W/ HCPCS: Performed by: NURSE PRACTITIONER

## 2017-06-26 PROCEDURE — 63600175 PHARM REV CODE 636 W HCPCS: Performed by: NURSE PRACTITIONER

## 2017-06-26 PROCEDURE — 25000003 PHARM REV CODE 250: Performed by: SPECIALIST

## 2017-06-26 PROCEDURE — 83735 ASSAY OF MAGNESIUM: CPT

## 2017-06-26 PROCEDURE — 27000221 HC OXYGEN, UP TO 24 HOURS

## 2017-06-26 PROCEDURE — 97116 GAIT TRAINING THERAPY: CPT

## 2017-06-26 PROCEDURE — 94640 AIRWAY INHALATION TREATMENT: CPT

## 2017-06-26 PROCEDURE — 80048 BASIC METABOLIC PNL TOTAL CA: CPT

## 2017-06-26 PROCEDURE — 25000003 PHARM REV CODE 250: Performed by: NURSE PRACTITIONER

## 2017-06-26 PROCEDURE — 25000003 PHARM REV CODE 250: Performed by: HOSPITALIST

## 2017-06-26 RX ORDER — REGADENOSON 0.08 MG/ML
INJECTION, SOLUTION INTRAVENOUS
Status: DISPENSED
Start: 2017-06-26 | End: 2017-06-26

## 2017-06-26 RX ORDER — LISINOPRIL 10 MG/1
10 TABLET ORAL 2 TIMES DAILY
Status: DISCONTINUED | OUTPATIENT
Start: 2017-06-26 | End: 2017-06-27 | Stop reason: HOSPADM

## 2017-06-26 RX ADMIN — LISINOPRIL 10 MG: 10 TABLET ORAL at 01:06

## 2017-06-26 RX ADMIN — FUROSEMIDE 40 MG: 10 INJECTION, SOLUTION INTRAMUSCULAR; INTRAVENOUS at 08:06

## 2017-06-26 RX ADMIN — GABAPENTIN 300 MG: 300 CAPSULE ORAL at 08:06

## 2017-06-26 RX ADMIN — Medication 800 MG: at 01:06

## 2017-06-26 RX ADMIN — LISINOPRIL 10 MG: 10 TABLET ORAL at 08:06

## 2017-06-26 RX ADMIN — ALBUTEROL SULFATE 1.25 MG: 2.5 SOLUTION RESPIRATORY (INHALATION) at 07:06

## 2017-06-26 RX ADMIN — ALBUTEROL SULFATE 2.5 MG: 2.5 SOLUTION RESPIRATORY (INHALATION) at 12:06

## 2017-06-26 RX ADMIN — SODIUM CHLORIDE, PRESERVATIVE FREE 3 ML: 5 INJECTION INTRAVENOUS at 05:06

## 2017-06-26 RX ADMIN — ENOXAPARIN SODIUM 40 MG: 100 INJECTION SUBCUTANEOUS at 04:06

## 2017-06-26 RX ADMIN — GABAPENTIN 300 MG: 300 CAPSULE ORAL at 01:06

## 2017-06-26 RX ADMIN — ROSUVASTATIN CALCIUM 10 MG: 5 TABLET, FILM COATED ORAL at 01:06

## 2017-06-26 RX ADMIN — FUROSEMIDE 40 MG: 10 INJECTION, SOLUTION INTRAMUSCULAR; INTRAVENOUS at 05:06

## 2017-06-26 RX ADMIN — CARVEDILOL 25 MG: 25 TABLET, FILM COATED ORAL at 01:06

## 2017-06-26 RX ADMIN — INSULIN ASPART 6 UNITS: 100 INJECTION, SOLUTION INTRAVENOUS; SUBCUTANEOUS at 04:06

## 2017-06-26 RX ADMIN — INSULIN ASPART 2 UNITS: 100 INJECTION, SOLUTION INTRAVENOUS; SUBCUTANEOUS at 08:06

## 2017-06-26 RX ADMIN — ASPIRIN 81 MG: 81 TABLET, COATED ORAL at 05:06

## 2017-06-26 RX ADMIN — LEVOTHYROXINE SODIUM 50 MCG: 50 TABLET ORAL at 05:06

## 2017-06-26 RX ADMIN — GABAPENTIN 300 MG: 300 CAPSULE ORAL at 05:06

## 2017-06-26 RX ADMIN — CARVEDILOL 25 MG: 25 TABLET, FILM COATED ORAL at 08:06

## 2017-06-26 RX ADMIN — Medication 800 MG: at 05:06

## 2017-06-26 RX ADMIN — CITALOPRAM HYDROBROMIDE 20 MG: 10 TABLET ORAL at 01:06

## 2017-06-26 NOTE — PLAN OF CARE
Problem: Patient Care Overview  Goal: Plan of Care Review  Hourly rounding utilized. Pt. Able to verbalize needs. POC discussed with pt. And her . Both verbalized understanding to same. Increased SOB noted with exertion. Pt. States abdomen is more at baseline size now.  Bowel sounds audible x4 quads. No complaints of pain. O2 2L/NC. Tele monitored. Remains free from injury. Call light in reach. Bed

## 2017-06-26 NOTE — PHYSICIAN QUERY
"PT Name: Juliane Ramos  MR #: [id number]    Physician Query Form - Heart  Condition Clarification     CDS/: Jj Beck RN             Contact information:[phone]  This form is a permanent document in the medical record.     Query Date: June 26, 2017    By submitting this query, we are merely seeking further clarification of documentation. Please utilize your independent clinical judgment when addressing the question(s) below.    The medical record contains the following   Indicators     Supporting Clinical Findings Location in Medical Record   X BNP BNP 1092   6/24 Lab   X EF EF 25   6/24 echo   X Radiology findings Right basilar opacification consistent with combination of small right pleural effusion and atelectasis.    6/24 CXR   X Echo Results EF 55 - 65 25   Mitral Valve Regurgitation MILD  Diastolic Dysfunction Yes   Est. PA Systolic Pressure 31.36  Pericardial Effusion TRIVIAL   Tricuspid ValveRegurgitation MODERATE    6/24 Echo   X "Ascites" documented A diagnosis of Other ascites was also pertinent to this visit   6/23 ED MD note   X "SOB" or "MONAE" documented Positive for shortness of breath    Positive for cough, shortness of breath and wheezing   6/24 H&P    6/24 Cardiology Consult      "Hypoxia" documented     X Heart Failure documented Acute on chronic congestive heart failure     systolilc dysfuntion acute and chronic      6/24 H&P      6/24 Cardiology Consult   X "Edema" documented Bilateral lower extremity  edema is present on exam.    +2-3 edema palpated to BLE   6/23 ED MD note      6/24 Nurse care Plan   X Diuretics/Meds furosemide injection 40 mg  IV     furosemide injection 40 mg  IV  2 times daily    Home Medications  carvediol(Coreg) 12.5 mg Tabs BID  Furosemide(Lasix ) 20 mg daily   6/24 MAR    6/25 -6/26 MAR      6/24 MAR/ H &P   X Treatment: Cardiac Monitoring    In patient consult Cardiology    Pulse Oximetry every 4 hrs    Oxygen Continuous  6/24 MD orders          Other:    "       Provider, please specify diagnosis or diagnoses associated with above clinical findings.                               [  ] Acute Systolic Heart Failure ( EF < 40)*  [ x ] Acute on Chronic Systolic Heart Failure ( EF < 40)*  [  ] Chronic Systolic Heart Failure (EF < 40)*  [  ] Acute Combined Systolic and Diastolic Heart Failure  [  ] Acute on Chronic Combined Systolic and Diastolic Heart Failure  [  ] Other Type of Heart Failure (please specify type): _________________________  [  ] Heart Failure Ruled Out  [  ] Other (please specify): ___________________________________  [  ] Clinically Undetermined            *American [employer] Association                                                                                                          Please document in your progress notes daily for the duration of treatment until resolved and include in your discharge summary.

## 2017-06-26 NOTE — PLAN OF CARE
Problem: Patient Care Overview  Goal: Plan of Care Review  Outcome: Ongoing (interventions implemented as appropriate)  Resting quietly with eyes open, denies CP or abdominal pain at this time, abdomen is distended with improviement from yesterday , +2 edema palpated to BLE with + palpable pedal pulses bennie, pt is ambulating at [place] with rolling walker, easily exerted, desat to 80's when 02 2L is not applied, pt is encourage to wear 02 at all times, continue to monitor, observe and note any changes, safety maintain

## 2017-06-26 NOTE — PLAN OF CARE
Discharge planner met with patient at the bedside, accompanied by her spouse.   Patient denies any inpatient admissions less than 30 days. Patient provided discharge planner with corrected contact phone numbers, her new number is [phone] and spouse [phone], updated epic system.  No needs identified at this time, case management to continue to follow. [facility]    06/24/17 1405   Discharge Assessment   Assessment Type Discharge Planning Assessment   Confirmed/corrected address and phone number on facesheet? Yes   Assessment information obtained from? Patient   Expected Length of Stay (days) 3-5   Communicated expected length of stay with patient/caregiver Yes    Type of Healthcare Directive Received Other (Comment)  (no)   Prior to hospitalization cognitive status: Alert/Oriented   Prior to hospitalization functional status: Independent   Current cognitive status: Alert/Oriented   Current Functional Status: Independent   Arrived From home or self-care   Lives With spouse   Is patient able to care for self after discharge? Yes   Patient's perception of discharge disposition home or selfcare   Readmission Within The Last 30 Days no   Patient currently being followed by outpatient case management? No   Patient currently receives home health services? No   Does the patient currently use HME? Yes   Equipment Currently Used at Home Walker, rollator, nebulizer (as needed) , cane (not used)    Do you have any problems affording any of your prescribed medications? No   Is the patient taking medications as prescribed? yes   Do you have any financial concerns preventing you from receiving the healthcare you need? No   Does the patient have transportation to healthcare appointments? Yes   Transportation Available car   On Dialysis? No   Does the patient receive services at the Coumadin Clinic? No   Are there any open cases? No   Discharge Plan A Home with family   Discharge Plan B Home with family   Patient/Family  In Agreement With Plan Yes

## 2017-06-26 NOTE — PLAN OF CARE
06/25/17 1949   Patient Assessment/Suction   Level of Consciousness (AVPU) alert   Respiratory Effort Normal;Unlabored   Expansion/Accessory Muscles/Retractions no use of accessory muscles   All Lung Fields Breath Sounds clear;diminished   PRE-TX-O2-ETCO2   O2 Device (Oxygen Therapy) nasal cannula   Flow (L/min) 2   SpO2 95 %   Pulse 75   Resp 18   Aerosol Therapy   $ Aerosol Therapy Charges Aerosol Treatment   Respiratory Treatment Status given   SVN/Inhaler Treatment Route mask;with oxygen   Position During Treatment HOB at 30 degrees   Patient Tolerance good   Post-Treatment   Post-treatment Heart Rate (beats/min) 76   Post-treatment Resp Rate (breaths/min) 18   All Fields Breath Sounds unchanged   Pt tolerates NC and treatments well.

## 2017-06-26 NOTE — PROGRESS NOTES
Progress Note  Hospital Medicine  Patient Name:Juliane Ramos  MRN:  [id number]  Patient Class: IP- Inpatient  Admit Date: 6/23/2017  Length of Stay: 2 days  Expected Discharge Date:   Attending Physician: Reg Devine MD  Primary Care Provider:  JOS Dumont    SUBJECTIVE:     Principal Problem: Acute on chronic congestive heart failure  Initial history of present illness:  Ms. Ramos is a 58yo F with a PMH MI, HTN, DM2, and Hypothyroid. She presents to the hospital with c/o abdominal distention. It started about 1 month ago. She started to develop leg swelling and weakness, so she came to the hospital. Other associated symptoms include decreased appetite, abdominal pain, and umbilical discharge. In the ED, her BNP was found to be 1092 and her CXR showed a pleural effusion and she was given lasix 40mg IV. She currently reports feeling like her abdomen has less fluid since getting the lasix. She denies sob, pain, or other discomforts.    PMH/PSH/SH/FH/Meds: reviewed.    Symptoms/Review of Systems:  Less SOB, getting Lexiscan performed. No chest pain or headache, fever or abdominal pain.     Diet:  Adequate intake.    Activity level: Normal.    Pain:  Patient reports no pain.       OBJECTIVE:   Vital Signs (Most Recent):      Temp: 98.4 °F (36.9 °C) (06/26/17 0305)  Pulse: 72 (06/26/17 0732)  Resp: 18 (06/26/17 0732)  BP: 136/82 (06/26/17 0305)  SpO2: 96 % (06/26/17 0732)       Vital Signs Range (Last 24H):  Temp:  [97.8 °F (36.6 °C)-99.1 °F (37.3 °C)]   Pulse:  [72-79]   Resp:  [18-20]   BP: (136-155)/(60-82)   SpO2:  [87 %-96 %]     Weight: 111.9 kg (246 lb 9.6 oz)  Body mass index is 39.8 kg/m².    Intake/Output Summary (Last 24 hours) at 06/26/17 0838  Last data filed at 06/26/17 0500   Gross per 24 hour   Intake             1680 ml   Output                0 ml   Net             1680 ml     Physical Examination:  General appearance: well developed, appears stated age  Head: normocephalic, atraumatic  Eyes:   conjunctivae/corneas clear. PERRL.  Nose: Nares normal. Septum midline.  Throat: lips, mucosa, and tongue normal; teeth and gums normal, no throat erythema Neck: supple, symmetrical, trachea midline, no JVD and thyroid not enlarged, symmetric, no tenderness/mass/nodules  Lungs: Bibasal rales R>L.   Chest wall: no tenderness  Heart: regular rate and rhythm, S1, S2 normal, no murmur, click, rub or gallop  Abdomen: soft, non-tender non-distented; bowel sounds normal; no masses,  no organomegaly  Extremities: no cyanosis or clubbing. 2 + pitting edema.  Pulses: 2+ and symmetric  Skin: Skin color, texture, turgor normal. No rashes or lesions.  Lymph nodes: Cervical, supraclavicular, and axillary nodes normal.  Neurologic: Normal strength and tone. No focal numbness or weakness. CNII-XII intact.     CBC:    Recent Labs  Lab 06/24/17  0005 06/25/17  0508 06/26/17  0427   WBC 8.30 5.90 5.50   RBC 5.30 4.64 4.80   HGB 13.0 11.6* 11.9*   HCT 41.7 36.4* 38.0    205 207   MCV 79* 79* 79*   MCH 24.5* 25.1* 24.8*   MCHC 31.1* 32.0 31.4*   BMP    Recent Labs  Lab 06/24/17  0504 06/25/17  0508 06/26/17  0427   * 122* 115*    137 138   K 4.3 4.0 4.5    100 97   CO2 26 29 31*   BUN 22* 23* 24*   CREATININE 1.3 1.3 1.2   CALCIUM 9.0 9.1 9.0   MG  --  1.5* 1.6      Diagnostic Results:  Microbiology Results (last 7 days)     ** No results found for the last 168 hours. **      CT abdomen:  Small right pleural effusion and right posterior basilar atelectasis.  Diffuse subcutaneous fat stranding and fluid in the soft tissues of the lower chest and abdominal pelvic wall, small volume of ascites.  Cholelithiasis.  Mild gallbladder wall thickening which can be seen in the presence of ascites although recommend correlation clinically if indicated clinically ultrasound could be obtained to evaluate gallbladder.  Diverticulosis without CT findings of acute diverticulitis.     CXR: Right basilar opacification consistent  with combination of small right pleural effusion and atelectasis.    ECHO:  Technical Quality: This is a technically adequate study. This study was performed in conjunction with a 3ml intravenous injection of Optison contrast agent.     Aorta: The aortic root is normal in size, measuring 2.5 cm at sinotubular junction.     Left Atrium: The left atrial volume index is normal, measuring 30.02 cc/m2.     Left Ventricle: The left ventricle is normal in size, with an end-diastolic diameter of 6.4 cm, and an end-systolic diameter of 4.9 cm. LV wall thickness is normal, with the septum measuring 0.9 cm and the posterior wall measuring 1.0 cm across. Relative   wall thickness was normal at 0.31, and the LV mass index was increased at 138.3 g/m2 consistent with eccentric left ventricular hypertrophy. There are no regional wall motion abnormalities. Left ventricular systolic function appears severely depressed.   Visually estimated ejection fraction is 25-30%. The LV Doppler derived stroke volume equals 53.0 ccs.     Diastolic indices: E wave velocity 1.4 m/s, E/A ratio 1.4,  msec., E/e' ratio(avg) 36. There is pseudonormalization of mitral inflow pattern consistent with significant diastolic dysfunction.     Right Atrium: The right atrium is normal in size, measuring 5.4 cm in length and 3.7 cm in width in the apical view.     Right Ventricle: The right ventricle is normal in size measuring 3.0 cm at the base in the apical right ventricle-focused view. Global right ventricular systolic function appears normal. Tricuspid annular plane systolic excursion (TAPSE) is 1.6 cm. The   estimated PA systolic pressure is greater than 31 mmHg.     Aortic Valve:  The peak velocity obtained across the aortic valve is 1.34 m/s, which translates to a peak gradient of 7 mmHg. The mean gradient is 4 mmHg. Using a left ventricular outflow tract diameter of 2.0 cm, a left ventricular outflow tract   velocity time integral of 18 cm, and a  peak instantaneous transvalvular velocity time integral of 29 cm, the calculated aortic valve area is 1.84 cm2.     Mitral Valve:  The pressure half time is 75 msec. The calculated mitral valve area is 2.93 cm2. There is mild mitral regurgitation.     Tricuspid Valve:  There is moderate tricuspid regurgitation.     Pericardium: There is evidence of a trivial pericardial effusion.     Intracavitary: There is no evidence of intracavity mass, thrombi, or vegetation.   Assessment/Plan:      *Acute on chronic congestive heart failure [I50.9]-   CHF improved. Latest ECHO shows ejection fraction of 35%. Continue BB, ACEi and Lasix and monitor clinical status closely. Monitor on telemetry. Last BNP is   Recent Labs  Lab 06/24/17  0005   BNP 1,092*   Continue IV Lasix.  Continue to stress to patient importance of self efficacy and  on diet for CHF.   Yes    Essential hypertension [I10]- Chronic, controlled.  Continue home regimen monitoring BP closely.  Will titrate BP medications as needed for sustained BP control.   Yes    CKD (chronic kidney disease) stage 3, GFR 30-59 ml/min [N18.3]- Creatine stable for now. Monitor UOP and serial BMP and adjust therapy as needed. Renally dose meds.   Yes    Controlled type 2 diabetes mellitus with stage 3 chronic kidney disease [E11.22, N18.3]-   Patient's FSGs are controlled on current hypoglycemics.   Most recent inpatient diabetic meds include- low dose SSI  Will hold PO hypoglycemics and will not adjust insulin dose   Most recent fingerstick glucose reviewed-   Recent Labs  Lab 06/25/17  1141   POCTGLUCOSE 199*      Current correctional  scale  Low   Yes    CAD (coronary artery disease) [I25.10]- Patient with known CAD s/p stent placement. Will continue Aspirin and monitor for S/Sx of angina/ACS. Continue to monitor on telemetry.   Follow Lexiscan results. Discussed with Dr. STERLING Palomo.   Yes    Hypothyroid [E03.9]- Patient has chronic hypothyroidism. TFTs reviewed- No  results found for: TSH. Will continue chronic levothyroxine and adjust for and clinical changes.   Yes    Pleural effusion [J90]-  D/t CHF. Treat CHF. No need for drainage for now.   Yes       VTE Risk Mitigation         Ordered     enoxaparin injection 40 mg  Daily     Route:  Subcutaneous        06/24/17 0237     Medium Risk of VTE  Once      06/24/17 0237        Reg Devine MD  Department of Hospital Medicine   [facility]

## 2017-06-26 NOTE — PLAN OF CARE
Problem: Physical Therapy Goal  Goal: Physical Therapy Goal  Goals to be met by: 17     Patient will increase functional independence with mobility by performin. Sit to stand transfer with [place]  2. Gait  x 300 feet with Modified [place] using Rolling Walker.   3. Ascend/descend 6 stair with left handrail to ascend, R to descend Stand-by Assistance   4. Lower extremity exercise program x25 reps with assistance as needed     Outcome: Ongoing (interventions implemented as appropriate)  PT for gait training with Rollator

## 2017-06-26 NOTE — CONSULTS
CHF TEACHING PERFORMED USING BROCHURE AND VIDEO.  QUESTIONS ANSWERED AND PT VERBALIZES UNDERSTANDING.

## 2017-06-26 NOTE — PT/OT/SLP PROGRESS
Physical Therapy  Treatment    Juliane Ramos   MRN: [id number]   Admitting Diagnosis: Acute on chronic congestive heart failure    PT Received On: 06/26/17  PT Start Time: 0902     PT Stop Time: 0915    PT Total Time (min): 13 min       Billable Minutes:  Gait [id number]    Treatment Type: Treatment  PT/PTA: PTA     PTA Visit Number: 1       General Precautions: Standard, fall  Orthopedic Precautions: N/A   Braces:      Do you have any cultural, spiritual, Restoration conflicts, given your current situation?: none    Subjective:  Communicated with nurse Mcghee prior to session. Per nurse Mcghee, ambulate pt on room air and check O2 sats.   Pt agreeable to PT.     Pain/Comfort  Pain Rating 1: 0/10    Objective:   Patient found with: telemetry, peripheral IV, oxygen    Functional Mobility:  Bed Mobility:   Supine to Sit: Modified Independent  Sit to Supine: Modified Independent    Transfers:  Sit <> Stand Assistance: Stand By Assistance  Sit <> Stand Assistive Device: 4 wheeled walker    Gait:   Gait Distance: 240'  Assistance 1: Contact Guard Assistance  Gait Assistive Device: Rollator  Gait Deviation(s): decreased estefania, increased time in double stance, decreased velocity of limb motion, decreased toe-to-floor clearance        Balance:   Static Sit: NORMAL: No deviations seen in posture held statically  Dynamic Sit: NORMAL: No deviations seen in posture held dynamically  Static Stand: FAIR: Maintains without assist but unable to take challenges  Dynamic stand: FAIR: Needs CONTACT GUARD during gait     Therapeutic Activities and Exercises:  O2 75-77% on room air after ambulating 250'. Pt denied SOB. O2 placed back on pt and sats able to increase to 92-94%         Patient left seated EOB with all lines intact, call button in reach, nurse Mcghee notified and family memeber present.    Assessment:  Juliane Ramos is a 57 y.o. female with a medical diagnosis of Acute on chronic congestive heart failure and presents with  overall deconditioning and limited endurance.    Rehab identified problem list/impairments: Rehab identified problem list/impairments: weakness, impaired endurance, impaired functional mobilty, gait instability, decreased lower extremity function, impaired cardiopulmonary response to activity    Rehab potential is good.    Activity tolerance: Good      GOALS:    Physical Therapy Goals        Problem: Physical Therapy Goal    Goal Priority Disciplines Outcome Goal Variances Interventions   Physical Therapy Goal     PT/OT, PT      Description:  Goals to be met by: 17     Patient will increase functional independence with mobility by performin. Sit to stand transfer with [place]  2. Gait  x 300 feet with Modified [place] using Rolling Walker.   3. Ascend/descend 6 stair with left handrail to ascend, R to descend Stand-by Assistance   4. Lower extremity exercise program x25 reps with assistance as needed                      PLAN:    Patient to be seen 6 x/week  to address the above listed problems via gait training, therapeutic activities, therapeutic exercises, neuromuscular re-education  Plan of Care expires: 17  Plan of Care reviewed with: patient         Gladys Calderon, PTA  2017

## 2017-06-27 VITALS
BODY MASS INDEX: 38.58 KG/M2 | WEIGHT: 240.06 LBS | HEART RATE: 72 BPM | DIASTOLIC BLOOD PRESSURE: 95 MMHG | TEMPERATURE: 98 F | SYSTOLIC BLOOD PRESSURE: 187 MMHG | OXYGEN SATURATION: 95 % | HEIGHT: 66 IN | RESPIRATION RATE: 18 BRPM

## 2017-06-27 LAB
ANION GAP SERPL CALC-SCNC: 10 MMOL/L
BASOPHILS # BLD AUTO: 0 K/UL
BASOPHILS NFR BLD: 0.7 %
BUN SERPL-MCNC: 25 MG/DL
CALCIUM SERPL-MCNC: 9.1 MG/DL
CHLORIDE SERPL-SCNC: 97 MMOL/L
CHOLEST/HDLC SERPL: 4.6 {RATIO}
CO2 SERPL-SCNC: 32 MMOL/L
CREAT SERPL-MCNC: 1.1 MG/DL
DIASTOLIC DYSFUNCTION: NO
DIFFERENTIAL METHOD: ABNORMAL
EOSINOPHIL # BLD AUTO: 0.2 K/UL
EOSINOPHIL NFR BLD: 3.7 %
ERYTHROCYTE [DISTWIDTH] IN BLOOD BY AUTOMATED COUNT: 17.5 %
EST. GFR  (AFRICAN AMERICAN): >60 ML/MIN/1.73 M^2
EST. GFR  (NON AFRICAN AMERICAN): 57 ML/MIN/1.73 M^2
GLUCOSE SERPL-MCNC: 145 MG/DL
HCT VFR BLD AUTO: 36.4 %
HDL/CHOLESTEROL RATIO: 21.7 %
HDLC SERPL-MCNC: 115 MG/DL
HDLC SERPL-MCNC: 25 MG/DL
HGB BLD-MCNC: 11.6 G/DL
LDLC SERPL CALC-MCNC: 72.4 MG/DL
LYMPHOCYTES # BLD AUTO: 0.9 K/UL
LYMPHOCYTES NFR BLD: 15.4 %
MCH RBC QN AUTO: 25.1 PG
MCHC RBC AUTO-ENTMCNC: 32 %
MCV RBC AUTO: 78 FL
MONOCYTES # BLD AUTO: 0.7 K/UL
MONOCYTES NFR BLD: 12.9 %
NEUTROPHILS # BLD AUTO: 3.7 K/UL
NEUTROPHILS NFR BLD: 67.3 %
NONHDLC SERPL-MCNC: 90 MG/DL
PLATELET # BLD AUTO: 190 K/UL
PMV BLD AUTO: 9.6 FL
POCT GLUCOSE: 204 MG/DL (ref 70–110)
POCT GLUCOSE: 206 MG/DL (ref 70–110)
POTASSIUM SERPL-SCNC: 4.5 MMOL/L
RBC # BLD AUTO: 4.64 M/UL
SODIUM SERPL-SCNC: 139 MMOL/L
TRIGL SERPL-MCNC: 88 MG/DL
WBC # BLD AUTO: 5.5 K/UL

## 2017-06-27 PROCEDURE — 94640 AIRWAY INHALATION TREATMENT: CPT

## 2017-06-27 PROCEDURE — 25000242 PHARM REV CODE 250 ALT 637 W/ HCPCS: Performed by: NURSE PRACTITIONER

## 2017-06-27 PROCEDURE — 80048 BASIC METABOLIC PNL TOTAL CA: CPT

## 2017-06-27 PROCEDURE — 99239 HOSP IP/OBS DSCHRG MGMT >30: CPT | Mod: ,,, | Performed by: INTERNAL MEDICINE

## 2017-06-27 PROCEDURE — 80061 LIPID PANEL: CPT

## 2017-06-27 PROCEDURE — 97116 GAIT TRAINING THERAPY: CPT

## 2017-06-27 PROCEDURE — 25000003 PHARM REV CODE 250: Performed by: HOSPITALIST

## 2017-06-27 PROCEDURE — 94761 N-INVAS EAR/PLS OXIMETRY MLT: CPT

## 2017-06-27 PROCEDURE — 25000003 PHARM REV CODE 250: Performed by: NURSE PRACTITIONER

## 2017-06-27 PROCEDURE — 63600175 PHARM REV CODE 636 W HCPCS: Performed by: NURSE PRACTITIONER

## 2017-06-27 PROCEDURE — 36415 COLL VENOUS BLD VENIPUNCTURE: CPT

## 2017-06-27 PROCEDURE — 25000003 PHARM REV CODE 250: Performed by: SPECIALIST

## 2017-06-27 PROCEDURE — 85025 COMPLETE CBC W/AUTO DIFF WBC: CPT

## 2017-06-27 PROCEDURE — 27000221 HC OXYGEN, UP TO 24 HOURS

## 2017-06-27 RX ORDER — ASPIRIN 81 MG/1
81 TABLET ORAL DAILY
Refills: 0 | Status: ON HOLD | COMMUNITY
Start: 2017-06-27 | End: 2017-11-27 | Stop reason: HOSPADM

## 2017-06-27 RX ORDER — FUROSEMIDE 40 MG/1
TABLET ORAL
Qty: 40 TABLET | Refills: 0 | Status: ON HOLD | OUTPATIENT
Start: 2017-06-27 | End: 2017-07-04

## 2017-06-27 RX ORDER — ROSUVASTATIN CALCIUM 10 MG/1
10 TABLET, COATED ORAL DAILY
Qty: 30 TABLET | Refills: 0 | Status: ON HOLD | OUTPATIENT
Start: 2017-06-27 | End: 2017-12-29 | Stop reason: HOSPADM

## 2017-06-27 RX ADMIN — ALBUTEROL SULFATE 1.25 MG: 2.5 SOLUTION RESPIRATORY (INHALATION) at 12:06

## 2017-06-27 RX ADMIN — LISINOPRIL 10 MG: 10 TABLET ORAL at 08:06

## 2017-06-27 RX ADMIN — LEVOTHYROXINE SODIUM 50 MCG: 50 TABLET ORAL at 06:06

## 2017-06-27 RX ADMIN — GABAPENTIN 300 MG: 300 CAPSULE ORAL at 06:06

## 2017-06-27 RX ADMIN — FUROSEMIDE 40 MG: 10 INJECTION, SOLUTION INTRAMUSCULAR; INTRAVENOUS at 05:06

## 2017-06-27 RX ADMIN — ASPIRIN 81 MG: 81 TABLET, COATED ORAL at 08:06

## 2017-06-27 RX ADMIN — ROSUVASTATIN CALCIUM 10 MG: 5 TABLET, FILM COATED ORAL at 08:06

## 2017-06-27 RX ADMIN — SODIUM CHLORIDE, PRESERVATIVE FREE 3 ML: 5 INJECTION INTRAVENOUS at 02:06

## 2017-06-27 RX ADMIN — GABAPENTIN 300 MG: 300 CAPSULE ORAL at 02:06

## 2017-06-27 RX ADMIN — SODIUM CHLORIDE, PRESERVATIVE FREE 3 ML: 5 INJECTION INTRAVENOUS at 06:06

## 2017-06-27 RX ADMIN — INSULIN ASPART 4 UNITS: 100 INJECTION, SOLUTION INTRAVENOUS; SUBCUTANEOUS at 12:06

## 2017-06-27 RX ADMIN — CARVEDILOL 25 MG: 25 TABLET, FILM COATED ORAL at 08:06

## 2017-06-27 RX ADMIN — ALBUTEROL SULFATE 1.25 MG: 2.5 SOLUTION RESPIRATORY (INHALATION) at 07:06

## 2017-06-27 RX ADMIN — INSULIN ASPART 4 UNITS: 100 INJECTION, SOLUTION INTRAVENOUS; SUBCUTANEOUS at 05:06

## 2017-06-27 RX ADMIN — CITALOPRAM HYDROBROMIDE 20 MG: 10 TABLET ORAL at 08:06

## 2017-06-27 RX ADMIN — FUROSEMIDE 40 MG: 10 INJECTION, SOLUTION INTRAMUSCULAR; INTRAVENOUS at 08:06

## 2017-06-27 NOTE — PROGRESS NOTES
Progress Note  Cardiology    Admit Date: 6/23/2017   LOS: 3 days     Follow-up For:  CHF    Scheduled Meds:   albuterol sulfate  1.25 mg Nebulization Q6H    aspirin  81 mg Oral Daily    carvedilol  25 mg Oral BID    citalopram  20 mg Oral Daily    enoxaparin  40 mg Subcutaneous Daily    furosemide  40 mg Intravenous BID    gabapentin  300 mg Oral TID    levothyroxine  50 mcg Oral Before breakfast    lisinopril  10 mg Oral BID    rosuvastatin  10 mg Oral Daily    sodium chloride 0.9%  3 mL Intravenous Q8H     Continuous Infusions:   PRN Meds:acetaminophen, dextrose 50%, dextrose 50%, glucagon (human recombinant), glucose, glucose, insulin aspart, magnesium oxide, magnesium oxide, ondansetron, potassium chloride 10%, potassium chloride 10%, potassium chloride 10%, potassium, sodium phosphates, potassium, sodium phosphates, potassium, sodium phosphates, zolpidem    Review of patient's allergies indicates:  No Known Allergies    SUBJECTIVE:     Interval History: Patient has no complaint of SOB. amulated with PT and had MONAE.    Review of Systems  Respiratory: negative for hemoptysis and wheezing    OBJECTIVE:     Vital Signs (Most Recent)  Temp: 97.8 °F (36.6 °C) (06/27/17 1200)  Pulse: 72 (06/27/17 1200)  Resp: 16 (06/27/17 1200)  BP: (!) 158/84 (06/27/17 1200)  SpO2: 96 % (06/27/17 1200)    Vital Signs Range (Last 24H):  Temp:  [97.7 °F (36.5 °C)-98.8 °F (37.1 °C)]   Pulse:  [72-76]   Resp:  [16-20]   BP: (130-158)/(68-92)   SpO2:  [75 %-97 %]       Physical Exam:  Lungs: diminished breath sounds RLL  Heart: regular rate and rhythm, S1, S2 normal, no murmur, click, rub or gallop  Abdomen: soft, non-tender; bowel sounds normal; no masses,  no organomegaly  Extremities: Positive for: edema 1+ and pitting bilaterally    Recent Results (from the past 24 hour(s))   POCT glucose    Collection Time: 06/26/17  4:30 PM   Result Value Ref Range    POCT Glucose 291 (H) 70 - 110 mg/dL   POCT glucose    Collection Time:  06/26/17  8:02 PM   Result Value Ref Range    POCT Glucose 232 (H) 70 - 110 mg/dL   CBC auto differential    Collection Time: 06/27/17  5:03 AM   Result Value Ref Range    WBC 5.50 3.90 - 12.70 K/uL    RBC 4.64 4.00 - 5.40 M/uL    Hemoglobin 11.6 (L) 12.0 - 16.0 g/dL    Hematocrit 36.4 (L) 37.0 - 48.5 %    MCV 78 (L) 82 - 98 fL    MCH 25.1 (L) 27.0 - 31.0 pg    MCHC 32.0 32.0 - 36.0 %    RDW 17.5 (H) 11.5 - 14.5 %    Platelets 190 150 - 350 K/uL    MPV 9.6 9.2 - 12.9 fL    Gran # 3.7 1.8 - 7.7 K/uL    Lymph # 0.9 (L) 1.0 - 4.8 K/uL    Mono # 0.7 0.3 - 1.0 K/uL    Eos # 0.2 0.0 - 0.5 K/uL    Baso # 0.00 0.00 - 0.20 K/uL    Gran% 67.3 38.0 - 73.0 %    Lymph% 15.4 (L) 18.0 - 48.0 %    Mono% 12.9 4.0 - 15.0 %    Eosinophil% 3.7 0.0 - 8.0 %    Basophil% 0.7 0.0 - 1.9 %    Differential Method Automated    Basic metabolic panel     Collection Time: 06/27/17  5:03 AM   Result Value Ref Range    Sodium 139 136 - 145 mmol/L    Potassium 4.5 3.5 - 5.1 mmol/L    Chloride 97 95 - 110 mmol/L    CO2 32 (H) 23 - 29 mmol/L    Glucose 145 (H) 70 - 110 mg/dL    BUN, Bld 25 (H) 6 - 20 mg/dL    Creatinine 1.1 0.5 - 1.4 mg/dL    Calcium 9.1 8.7 - 10.5 mg/dL    Anion Gap 10 8 - 16 mmol/L    eGFR if African American >60 >60 mL/min/1.73 m^2    eGFR if non African American 57 (A) >60 mL/min/1.73 m^2   Lipid panel    Collection Time: 06/27/17  5:03 AM   Result Value Ref Range    Cholesterol 115 (L) 120 - 199 mg/dL    Triglycerides 88 30 - 150 mg/dL    HDL 25 (L) 40 - 75 mg/dL    LDL Cholesterol 72.4 63.0 - 159.0 mg/dL    HDL/Chol Ratio 21.7 20.0 - 50.0 %    Total Cholesterol/HDL Ratio 4.6 2.0 - 5.0    Non-HDL Cholesterol 90 mg/dL   POCT glucose    Collection Time: 06/27/17 11:49 AM   Result Value Ref Range    POCT Glucose 206 (H) 70 - 110 mg/dL       Diagnostic Results:  Labs: Reviewed    ASSESSMENT/PLAN:     Home today. Lasix 40 mg am and 20 mg pm for 1 Week. Resume ACEI. F/u in 2 weeks with labs. Daily weight and low Na diet.

## 2017-06-27 NOTE — PROGRESS NOTES
Pt D/C'd home with  via wheelchair, 3 LPM oxygen on per pts personal o2 tank. IV removed. Telemetry removed. Paper prescriptions given to patient. Pt. Verbalized understanding of discharge instructions. Care relinquished to pt and pt's .

## 2017-06-27 NOTE — DISCHARGE SUMMARY
Discharge Summary  Hospital Medicine    Admit Date: 6/23/2017    Date and Time: 6/27/20171:54 PM    Discharge Attending Physician: Reg Devine MD    Primary Care Physician: JOS Dumont    Diagnoses:  Active Hospital Problems    Diagnosis  POA    *Acute on chronic congestive heart failure [I50.9]  Yes    Essential hypertension [I10]  Yes    CKD (chronic kidney disease) stage 3, GFR 30-59 ml/min [N18.3]  Yes    Controlled type 2 diabetes mellitus with stage 3 chronic kidney disease [E11.22, N18.3]  Yes    CAD (coronary artery disease) [I25.10]  Yes    Hypothyroid [E03.9]  Yes    Pleural effusion [J90]  Yes      Resolved Hospital Problems    Diagnosis Date Resolved POA   No resolved problems to display.     Discharged Condition: Good    Hospital Course:   Ms. Ramos is a 56yo F with a PMH MI, HTN, DM2, and Hypothyroid. She presents to the hospital with c/o abdominal distention. It started about 1 month ago. She started to develop leg swelling and weakness, so she came to the hospital. Other associated symptoms include decreased appetite, abdominal pain, and umbilical discharge. In the ED, her BNP was found to be 1092 and her CXR showed a pleural effusion and she was given lasix 40mg IV. She currently reports feeling like her abdomen has less fluid since getting the lasix. She denies sob, pain, or other discomforts. Patient was admitted to Hospitalist medicine service. Patient was evaluated by Dr. STERLING Palomo. Patient had serial cardiac enzymes. Patient was given IV lasix and intake and output closely monitored. Patient was evaluated by cardiologist and underwent ECHO. Patient's renal panel and electrolytes closely monitored. Patient had daily weights. Patient was educated on monitoring daily weight, following low salt diet and in case if gain more than 3 pounds in 24 hours, to call their doctor for further advice. Patient's symptoms resolved. Patient was discharged home in stable condition with  following discharge plan of care. Total time with the patient was 30 minutes and greater than 50% was spent in counseling and coordination of care. The assessment and plan have been discussed at length. Physicians' notes reviewed. Labs and procedure reviewed.     Consults: Dr. STERLING Palomo    Significant Diagnostic Studies:   CT abdomen:  Small right pleural effusion and right posterior basilar atelectasis.  Diffuse subcutaneous fat stranding and fluid in the soft tissues of the lower chest and abdominal pelvic wall, small volume of ascites.  Cholelithiasis.  Mild gallbladder wall thickening which can be seen in the presence of ascites although recommend correlation clinically if indicated clinically ultrasound could be obtained to evaluate gallbladder.  Diverticulosis without CT findings of acute diverticulitis.      CXR: Right basilar opacification consistent with combination of small right pleural effusion and atelectasis.     ECHO:  Technical Quality: This is a technically adequate study. This study was performed in conjunction with a 3ml intravenous injection of Optison contrast agent.   Aorta: The aortic root is normal in size, measuring 2.5 cm at sinotubular junction.   Left Atrium: The left atrial volume index is normal, measuring 30.02 cc/m2.   Left Ventricle: The left ventricle is normal in size, with an end-diastolic diameter of 6.4 cm, and an end-systolic diameter of 4.9 cm. LV wall thickness is normal, with the septum measuring 0.9 cm and the posterior wall measuring 1.0 cm across. Relative   wall thickness was normal at 0.31, and the LV mass index was increased at 138.3 g/m2 consistent with eccentric left ventricular hypertrophy. There are no regional wall motion abnormalities. Left ventricular systolic function appears severely depressed.   Visually estimated ejection fraction is 25-30%. The LV Doppler derived stroke volume equals 53.0 ccs.   Diastolic indices: E wave velocity 1.4 m/s, E/A ratio 1.4, DT  258 msec., E/e' ratio(avg) 36. There is pseudonormalization of mitral inflow pattern consistent with significant diastolic dysfunction.   Right Atrium: The right atrium is normal in size, measuring 5.4 cm in length and 3.7 cm in width in the apical view.   Right Ventricle: The right ventricle is normal in size measuring 3.0 cm at the base in the apical right ventricle-focused view. Global right ventricular systolic function appears normal. Tricuspid annular plane systolic excursion (TAPSE) is 1.6 cm. The   estimated PA systolic pressure is greater than 31 mmHg.   Aortic Valve:  The peak velocity obtained across the aortic valve is 1.34 m/s, which translates to a peak gradient of 7 mmHg. The mean gradient is 4 mmHg. Using a left ventricular outflow tract diameter of 2.0 cm, a left ventricular outflow tract   velocity time integral of 18 cm, and a peak instantaneous transvalvular velocity time integral of 29 cm, the calculated aortic valve area is 1.84 cm2.   Mitral Valve:  The pressure half time is 75 msec. The calculated mitral valve area is 2.93 cm2. There is mild mitral regurgitation.   Tricuspid Valve:  There is moderate tricuspid regurgitation.   Pericardium: There is evidence of a trivial pericardial effusion.   Intracavitary: There is no evidence of intracavity mass, thrombi, or vegetation.      Lexiscan:  1.  Large, chronic anteroseptal infarct.  No evidence for acute myocardial ischemia..  2. Dilated, hypokinetic left ventricle with a moderately reduced ejection fraction of 35%.       Microbiology Results (last 7 days)     ** No results found for the last 168 hours. **        Special Treatments/Procedures: None  Disposition: Home or Self Care    Medications:  Reconciled Home Medications: Current Discharge Medication List      START taking these medications    Details   aspirin (ECOTRIN) 81 MG EC tablet Take 1 tablet (81 mg total) by mouth once daily.  Refills: 0         CONTINUE these medications which have  "CHANGED    Details   furosemide (LASIX) 40 MG tablet Take 1 tablet q AM and 1/2 tablet q PM for 1 week and then  Take 1 tablet q AM.  Qty: 40 tablet, Refills: 0      rosuvastatin (CRESTOR) 10 MG tablet Take 1 tablet (10 mg total) by mouth once daily.  Qty: 30 tablet, Refills: 0         CONTINUE these medications which have NOT CHANGED    Details   albuterol (ACCUNEB) 1.25 mg/3 mL Nebu Take 1.25 mg by nebulization every 6 (six) hours as needed. Rescue      carvedilol (COREG) 12.5 MG tablet Take 25 mg by mouth 2 (two) times daily.       citalopram (CELEXA) 20 MG tablet Take 20 mg by mouth once daily.        gabapentin (NEURONTIN) 300 MG capsule Take 300 mg by mouth 3 (three) times daily.      glimepiride (AMARYL) 4 MG tablet Take 4 mg by mouth before breakfast.      insulin aspart protamine-insulin aspart (NOVOLOG 70/30) 100 unit/mL (70-30) InPn pen Inject into the skin daily as needed (pt states "She told me to just take it whenever my sugar was too high").      levothyroxine (SYNTHROID) 50 MCG tablet Take 50 mcg by mouth once daily.        lisinopril (PRINIVIL,ZESTRIL) 20 MG tablet Take 10 mg by mouth once daily.       spironolactone (ALDACTONE) 25 MG tablet Take 25 mg by mouth once daily.           STOP taking these medications       ergocalciferol (VITAMIN D) 50,000 unit capsule Comments:   Reason for Stopping:         ferrous gluconate 246 mg (27 mg iron) Tab Comments:   Reason for Stopping:         metformin (GLUCOPHAGE) 1000 MG tablet Comments:   Reason for Stopping:         warfarin (COUMADIN) 2.5 MG tablet Comments:   Reason for Stopping:               Discharge Procedure Orders  OXYGEN FOR HOME USE   Order Specific Question Answer Comments   Liter Flow 3    Duration Continuous    Qualifying SpO2: 81%    Testing done at: Rest    Route nasal cannula    Portable mode: continuous    Device home concentrator with portable tanks    Length of need (in months): 3 mos    Patient condition with qualifying saturation " "CHF    Height: 5' 6" (1.676 m)    Weight: 108.9 kg (240 lb 1.3 oz)    Does patient have medical equipment at home? walker, rolling    Does patient have medical equipment at home? rollator    Does patient have medical equipment at home? 3-in-1 commode    Alternative treatment measures have been tried or considered and deemed clinically ineffective. Yes      Diet general   Order Comments: Cardiac/ 2 gram sodium low cholesterol diet.    Patient to monitor daily weight, follow low salt diet and in case if gain more than 3 pounds in 24 hours, please call your doctor for further advice.     Other restrictions (specify):   Order Comments: Fall precautions     Call MD for:   Order Comments: For worsening symptoms, chest pain, shortness of breath, increased abdominal pain, high grade fever, stroke or stroke like symptoms, immediately go to the nearest Emergency Room or call 911 as soon as possible.       Follow-up Information     JOS Dumont On 7/3/2017.    Specialty:  Family Medicine  Why:  @11:00am   Contact information:  [address] [facility] [address]  [place] [address]  [phone]             Dale Palomo MD In 2 weeks.    Specialty:  Cardiology  Contact information:  [address] [address]  [phone]             Please follow up.    Contact information:  Have BMP checked in 7 days.                            "

## 2017-06-27 NOTE — PLAN OF CARE
[facility] sent dme orders for home 02 to [facility]. Fax sent to [phone]. Faxed confirmation received. Awaiting approval  Vj LESTER

## 2017-06-27 NOTE — PT/OT/SLP PROGRESS
Physical Therapy  Treatment    Juliane Ramos   MRN: [id number]   Admitting Diagnosis: Acute on chronic congestive heart failure    PT Received On: 06/27/17  PT Start Time: 1343     PT Stop Time: 1402    PT Total Time (min): 19 min       Billable Minutes:  Gait [id number]    Treatment Type: Treatment  PT/PTA: PTA     PTA Visit Number: 2       General Precautions: Standard, fall  Orthopedic Precautions: N/A   Braces:      Do you have any cultural, spiritual, Latter day conflicts, given your current situation?: none    Subjective:  Communicated with nurse Campos prior to session.  Pt agreeable to gait training.    Pain/Comfort  Pain Rating 1: 0/10    Objective:   Patient found with: telemetry, peripheral IV, oxygen    Functional Mobility:  Bed Mobility:        Transfers:  Sit <> Stand Assistance: Stand By Assistance  Sit <> Stand Assistive Device: 4 wheeled walker    Gait:   Gait Distance: 600'  Assistance 1: Stand by Assistance  Gait Assistive Device: Rollator (and portable O2)  Gait Deviation(s): decreased estefania, increased time in double stance, decreased velocity of limb motion, decreased toe-to-floor clearance      Balance:   Static Sit: NORMAL: No deviations seen in posture held statically  Dynamic Sit: NORMAL: No deviations seen in posture held dynamically  Static Stand: FAIR+: Takes MINIMAL challenges from all directions  Dynamic stand: FAIR+: Needs CLOSE SUPERVISION during gait and is able to right self with minor LOB         Patient left seated on rollator seat with all lines intact, call button in reach and nursing notified.    Assessment:  Juliane Ramos is a 57 y.o. female with a medical diagnosis of Acute on chronic congestive heart failure and presents with significantly improved gait tolerance/distance today.    Rehab identified problem list/impairments: Rehab identified problem list/impairments: weakness, impaired endurance, gait instability, impaired cardiopulmonary response to activity    Rehab  potential is good.    Activity tolerance: Good        GOALS:    Physical Therapy Goals        Problem: Physical Therapy Goal    Goal Priority Disciplines Outcome Goal Variances Interventions   Physical Therapy Goal     PT/OT, PT Ongoing (interventions implemented as appropriate)     Description:  Goals to be met by: 17     Patient will increase functional independence with mobility by performin. Sit to stand transfer with [place]  2. Gait  x 300 feet with Modified [place] using Rolling Walker.   3. Ascend/descend 6 stair with left handrail to ascend, R to descend Stand-by Assistance   4. Lower extremity exercise program x25 reps with assistance as needed                      PLAN:    Patient to be seen 6 x/week  to address the above listed problems via gait training, therapeutic activities, therapeutic exercises, neuromuscular re-education  Plan of Care expires: 17  Plan of Care reviewed with: patient, spouse         Gladys Calderon, PTA  2017

## 2017-06-27 NOTE — PLAN OF CARE
Spoke with the pt and her  at the bedside regarding her discharge plan; Dr Devine is anticipating the pt discharging today once cleared by Dr Palomo, cardiology.   The pt is aware that she is going to go home with oxygen. She watched the CHF video yesterday and is aware of her restrictions.   She follows up with her PCP, Keri Escalante regularly and has an appt scheduled for 7/3/17 @11am.....OANH Viveros CM

## 2017-06-27 NOTE — PLAN OF CARE
Problem: Patient Care Overview  Goal: Plan of Care Review  Plan of care reviewed with pt/family at beginning of shift.  Pt/family verbalized understanding. Hourly/ Q2 hourly rounds completed on this pt throughout shift.  Pt denies need for pain medication up to this point, up to restroom with SBA, repositions self, safety maintained.  Patient has remained free from fall/injury, no skin breakdown noted.  Side rails up x2, bed in locked and lowest position, call light kept within reach.  Needs attended to, will continue to monitor/ update as indicated

## 2017-06-27 NOTE — TREATMENT PLAN
Received in report that pt was desaturating when off NC into the 70's.  O2 eval completed on this pt after ambulating to restroom on RA.  O2 sats with pt off of O2 via NC was 75%.

## 2017-06-27 NOTE — PLAN OF CARE
Problem: Patient Care Overview  Goal: Plan of Care Review  Outcome: Ongoing (interventions implemented as appropriate)  Patient receives aerosol therapy Q6 and is on NC-2L.

## 2017-06-27 NOTE — PROGRESS NOTES
06/27/17 1041   Home Oxygen Qualification   Room Air SpO2 At Rest (!) 81 %   SpO2 During Exertion on O2 92 %   Heart Rate on O2 121 bpm   Exertion O2 LPM 3 LPM   SpO2 on Recovery 93 %   Recovery Heart Rate 78 bpm   Recovery O2 LPM 3 LPM   Home O2 Eval Comments patient meets requirements for home 02

## 2017-06-27 NOTE — PLAN OF CARE
Problem: Physical Therapy Goal  Goal: Physical Therapy Goal  Goals to be met by: 17     Patient will increase functional independence with mobility by performin. Sit to stand transfer with [place]  2. Gait  x 300 feet with Modified [place] using Rolling Walker.   3. Ascend/descend 6 stair with left handrail to ascend, R to descend Stand-by Assistance   4. Lower extremity exercise program x25 reps with assistance as needed     Outcome: Ongoing (interventions implemented as appropriate)  PT for gait training with RW.

## 2017-06-27 NOTE — PLAN OF CARE
06/27/17 0724   Patient Assessment/Suction   Level of Consciousness (AVPU) alert   Respiratory Effort Unlabored   Expansion/Accessory Muscles/Retractions no use of accessory muscles   All Lung Fields Breath Sounds clear;diminished   PRE-TX-O2-ETCO2   O2 Device (Oxygen Therapy) nasal cannula   $ Is the patient on Oxygen? Yes   Flow (L/min) 2   Oxygen Concentration (%) 28   SpO2 (!) 94 %   Pulse 72   Resp 18   Aerosol Therapy   $ Aerosol Therapy Charges Aerosol Treatment   Respiratory Treatment Status given   SVN/Inhaler Treatment Route mask   Position During Treatment Sitting on edge of bed (dangling)   Patient Tolerance good   Post-Treatment   Post-treatment Heart Rate (beats/min) 75   Post-treatment Resp Rate (breaths/min) 18   All Fields Breath Sounds unchanged   Ready to Wean/Extubation Screen   FIO2<60 (chart decimal) 0.28

## 2017-06-28 NOTE — PLAN OF CARE
06/28/17 1417   Final Note   Assessment Type Final Discharge Note   Discharge Disposition Home   Discharge planning education complete? Yes

## 2017-06-29 ENCOUNTER — HOSPITAL ENCOUNTER (INPATIENT)
Facility: HOSPITAL | Age: 57
LOS: 5 days | Discharge: HOME HEALTH | DRG: 682 | End: 2017-07-04
Attending: EMERGENCY MEDICINE | Admitting: INTERNAL MEDICINE
Payer: MEDICARE

## 2017-06-29 DIAGNOSIS — E87.5 HYPERKALEMIA: ICD-10-CM

## 2017-06-29 DIAGNOSIS — I50.23 ACUTE ON CHRONIC SYSTOLIC CONGESTIVE HEART FAILURE: Primary | ICD-10-CM

## 2017-06-29 DIAGNOSIS — N17.9 AKI (ACUTE KIDNEY INJURY): ICD-10-CM

## 2017-06-29 LAB
ALBUMIN SERPL BCP-MCNC: 3.3 G/DL
ALP SERPL-CCNC: 70 U/L
ALT SERPL W/O P-5'-P-CCNC: 15 U/L
ANION GAP SERPL CALC-SCNC: 10 MMOL/L
AST SERPL-CCNC: 37 U/L
BASOPHILS # BLD AUTO: 0 K/UL
BASOPHILS NFR BLD: 0.1 %
BILIRUB SERPL-MCNC: 1.1 MG/DL
BNP SERPL-MCNC: 1126 PG/ML
BUN SERPL-MCNC: 39 MG/DL
CALCIUM SERPL-MCNC: 9.2 MG/DL
CHLORIDE SERPL-SCNC: 95 MMOL/L
CO2 SERPL-SCNC: 30 MMOL/L
CREAT SERPL-MCNC: 1.7 MG/DL
DIFFERENTIAL METHOD: ABNORMAL
EOSINOPHIL # BLD AUTO: 0 K/UL
EOSINOPHIL NFR BLD: 0.3 %
ERYTHROCYTE [DISTWIDTH] IN BLOOD BY AUTOMATED COUNT: 18.1 %
EST. GFR  (AFRICAN AMERICAN): 39 ML/MIN/1.73 M^2
EST. GFR  (NON AFRICAN AMERICAN): 33 ML/MIN/1.73 M^2
GLUCOSE SERPL-MCNC: 278 MG/DL
HCT VFR BLD AUTO: 39.9 %
HGB BLD-MCNC: 12.6 G/DL
INR PPP: 1.3
LYMPHOCYTES # BLD AUTO: 0.7 K/UL
LYMPHOCYTES NFR BLD: 7.3 %
MCH RBC QN AUTO: 25 PG
MCHC RBC AUTO-ENTMCNC: 31.6 %
MCV RBC AUTO: 79 FL
MONOCYTES # BLD AUTO: 0.8 K/UL
MONOCYTES NFR BLD: 9.3 %
NEUTROPHILS # BLD AUTO: 7.5 K/UL
NEUTROPHILS NFR BLD: 83 %
PLATELET # BLD AUTO: 172 K/UL
PMV BLD AUTO: 9.6 FL
POCT GLUCOSE: 267 MG/DL (ref 70–110)
POTASSIUM SERPL-SCNC: 6.1 MMOL/L
PROT SERPL-MCNC: 7.4 G/DL
PROTHROMBIN TIME: 13.5 SEC
RBC # BLD AUTO: 5.05 M/UL
SODIUM SERPL-SCNC: 135 MMOL/L
WBC # BLD AUTO: 9.1 K/UL

## 2017-06-29 PROCEDURE — 96365 THER/PROPH/DIAG IV INF INIT: CPT

## 2017-06-29 PROCEDURE — 63600175 PHARM REV CODE 636 W HCPCS: Performed by: INTERNAL MEDICINE

## 2017-06-29 PROCEDURE — 80053 COMPREHEN METABOLIC PANEL: CPT

## 2017-06-29 PROCEDURE — 84484 ASSAY OF TROPONIN QUANT: CPT

## 2017-06-29 PROCEDURE — 93005 ELECTROCARDIOGRAM TRACING: CPT

## 2017-06-29 PROCEDURE — 82553 CREATINE MB FRACTION: CPT

## 2017-06-29 PROCEDURE — 11000001 HC ACUTE MED/SURG PRIVATE ROOM

## 2017-06-29 PROCEDURE — 85025 COMPLETE CBC W/AUTO DIFF WBC: CPT

## 2017-06-29 PROCEDURE — 36415 COLL VENOUS BLD VENIPUNCTURE: CPT

## 2017-06-29 PROCEDURE — 83880 ASSAY OF NATRIURETIC PEPTIDE: CPT

## 2017-06-29 PROCEDURE — 99900035 HC TECH TIME PER 15 MIN (STAT)

## 2017-06-29 PROCEDURE — 94761 N-INVAS EAR/PLS OXIMETRY MLT: CPT

## 2017-06-29 PROCEDURE — 25000003 PHARM REV CODE 250: Performed by: EMERGENCY MEDICINE

## 2017-06-29 PROCEDURE — 99285 EMERGENCY DEPT VISIT HI MDM: CPT | Mod: 25

## 2017-06-29 PROCEDURE — 25000003 PHARM REV CODE 250: Performed by: INTERNAL MEDICINE

## 2017-06-29 PROCEDURE — 85610 PROTHROMBIN TIME: CPT

## 2017-06-29 PROCEDURE — 99223 1ST HOSP IP/OBS HIGH 75: CPT | Mod: ,,, | Performed by: INTERNAL MEDICINE

## 2017-06-29 RX ORDER — CITALOPRAM 10 MG/1
20 TABLET ORAL DAILY
Status: DISCONTINUED | OUTPATIENT
Start: 2017-06-30 | End: 2017-07-04 | Stop reason: HOSPADM

## 2017-06-29 RX ORDER — PANTOPRAZOLE SODIUM 40 MG/1
40 TABLET, DELAYED RELEASE ORAL DAILY
Status: DISCONTINUED | OUTPATIENT
Start: 2017-06-30 | End: 2017-07-04 | Stop reason: HOSPADM

## 2017-06-29 RX ORDER — FUROSEMIDE 20 MG/1
20 TABLET ORAL NIGHTLY
Status: ON HOLD | COMMUNITY
End: 2017-07-04 | Stop reason: HOSPADM

## 2017-06-29 RX ORDER — ALBUTEROL SULFATE 1.25 MG/3ML
1.25 SOLUTION RESPIRATORY (INHALATION) EVERY 6 HOURS PRN
Status: DISCONTINUED | OUTPATIENT
Start: 2017-06-29 | End: 2017-06-29 | Stop reason: SDUPTHER

## 2017-06-29 RX ORDER — AMOXICILLIN 250 MG
1 CAPSULE ORAL 2 TIMES DAILY PRN
Status: DISCONTINUED | OUTPATIENT
Start: 2017-06-29 | End: 2017-07-04 | Stop reason: HOSPADM

## 2017-06-29 RX ORDER — GABAPENTIN 300 MG/1
300 CAPSULE ORAL 3 TIMES DAILY
Status: DISCONTINUED | OUTPATIENT
Start: 2017-06-29 | End: 2017-07-04 | Stop reason: HOSPADM

## 2017-06-29 RX ORDER — INSULIN ASPART 100 [IU]/ML
0-5 INJECTION, SOLUTION INTRAVENOUS; SUBCUTANEOUS
Status: DISCONTINUED | OUTPATIENT
Start: 2017-06-29 | End: 2017-07-04 | Stop reason: HOSPADM

## 2017-06-29 RX ORDER — ASPIRIN 81 MG/1
81 TABLET ORAL DAILY
Status: DISCONTINUED | OUTPATIENT
Start: 2017-06-30 | End: 2017-07-04 | Stop reason: HOSPADM

## 2017-06-29 RX ORDER — IBUPROFEN 200 MG
24 TABLET ORAL
Status: DISCONTINUED | OUTPATIENT
Start: 2017-06-29 | End: 2017-07-04 | Stop reason: HOSPADM

## 2017-06-29 RX ORDER — LEVOTHYROXINE SODIUM 50 UG/1
50 TABLET ORAL
Status: DISCONTINUED | OUTPATIENT
Start: 2017-06-30 | End: 2017-07-04 | Stop reason: HOSPADM

## 2017-06-29 RX ORDER — IBUPROFEN 200 MG
16 TABLET ORAL
Status: DISCONTINUED | OUTPATIENT
Start: 2017-06-29 | End: 2017-07-04 | Stop reason: HOSPADM

## 2017-06-29 RX ORDER — ALBUTEROL SULFATE 2.5 MG/.5ML
1.25 SOLUTION RESPIRATORY (INHALATION) EVERY 6 HOURS PRN
Status: DISCONTINUED | OUTPATIENT
Start: 2017-06-29 | End: 2017-07-04 | Stop reason: HOSPADM

## 2017-06-29 RX ORDER — ONDANSETRON 2 MG/ML
4 INJECTION INTRAMUSCULAR; INTRAVENOUS EVERY 8 HOURS PRN
Status: DISCONTINUED | OUTPATIENT
Start: 2017-06-29 | End: 2017-07-04 | Stop reason: HOSPADM

## 2017-06-29 RX ORDER — ACETAMINOPHEN 325 MG/1
650 TABLET ORAL EVERY 6 HOURS PRN
Status: DISCONTINUED | OUTPATIENT
Start: 2017-06-29 | End: 2017-07-04 | Stop reason: HOSPADM

## 2017-06-29 RX ORDER — CARVEDILOL 25 MG/1
25 TABLET ORAL 2 TIMES DAILY
Status: DISCONTINUED | OUTPATIENT
Start: 2017-06-29 | End: 2017-07-04 | Stop reason: HOSPADM

## 2017-06-29 RX ORDER — GLUCAGON 1 MG
1 KIT INJECTION
Status: DISCONTINUED | OUTPATIENT
Start: 2017-06-29 | End: 2017-07-04 | Stop reason: HOSPADM

## 2017-06-29 RX ORDER — GLIMEPIRIDE 2 MG/1
4 TABLET ORAL
Status: DISCONTINUED | OUTPATIENT
Start: 2017-06-30 | End: 2017-07-04 | Stop reason: HOSPADM

## 2017-06-29 RX ORDER — ROSUVASTATIN CALCIUM 5 MG/1
10 TABLET, COATED ORAL DAILY
Status: DISCONTINUED | OUTPATIENT
Start: 2017-06-30 | End: 2017-07-04 | Stop reason: HOSPADM

## 2017-06-29 RX ORDER — ENOXAPARIN SODIUM 100 MG/ML
40 INJECTION SUBCUTANEOUS
Status: DISCONTINUED | OUTPATIENT
Start: 2017-06-29 | End: 2017-07-04 | Stop reason: HOSPADM

## 2017-06-29 RX ADMIN — ENOXAPARIN SODIUM 40 MG: 100 INJECTION SUBCUTANEOUS at 09:06

## 2017-06-29 RX ADMIN — SODIUM CHLORIDE 250 ML: 0.9 INJECTION, SOLUTION INTRAVENOUS at 03:06

## 2017-06-29 RX ADMIN — SODIUM POLYSTYRENE SULFONATE 30 G: 15 SUSPENSION ORAL; RECTAL at 03:06

## 2017-06-29 RX ADMIN — Medication 1 G: at 03:06

## 2017-06-29 RX ADMIN — INSULIN ASPART 1 UNITS: 100 INJECTION, SOLUTION INTRAVENOUS; SUBCUTANEOUS at 09:06

## 2017-06-29 RX ADMIN — CARVEDILOL 25 MG: 25 TABLET, FILM COATED ORAL at 09:06

## 2017-06-29 RX ADMIN — GABAPENTIN 300 MG: 300 CAPSULE ORAL at 09:06

## 2017-06-29 NOTE — ED PROVIDER NOTES
Encounter Date: 6/29/2017    SCRIBE #1 NOTE: I, Fe Jeffries, am scribing for, and in the presence of, Dr. Leung.       History     Chief Complaint   Patient presents with    Shortness of Breath     Pt became SOB when her O2 hose became disconnected from Tank, Issue resolved when reconnected.  Pt wanted to be checked out anyway.       06/29/2017 12:00 PM     Chief Complaint: Fatigue      The patient is a 57 y.o. female with a history of CAD, HTN, DM, MI, CHF, COPD, on 3 liters home O2, who presents to the ED with complaint of fatigue. This morning, the patient was napping without her home oxygen and her  had difficulty waking her up, so he called an ambulance. On EMS arrival, the patient states that she became more responsive. The patient states that she experienced an episode of fecal incontinence while napping this morning, but that her daughter cleaned her up. The patient also reports that she was staggering when walking to the bathroom this morning after waking from the nap. The patient took her oxygen off when napping this morning because her granddaughter was scared of the tubing. At baseline, her oxygen saturations drop into the 70s when she is not wearing it. The patient was recently admitted to the hospital on 6/23/2017 for an acute CHF exacerbation. She describes 40 pound weight gain from March to May, but states she lost 20 pounds during her admission. She denies chest pain, increased SOB, focal weakness, focal numbness, confusion, or headache. No known drug allergies. Pertinent surgical history includes: CABG.      The history is provided by the patient.     Review of patient's allergies indicates:  No Known Allergies  Past Medical History:   Diagnosis Date    CHF (congestive heart failure)     COPD (chronic obstructive pulmonary disease)     Coronary artery disease     Diabetes mellitus     Encounter for blood transfusion     Hypertension     MI (myocardial infarction) 2010    Stroke      "July 2005    Thyroid disease      Past Surgical History:   Procedure Laterality Date    ABCESS DRAINAGE Right     Between "little toe" and the one next to it    BREAST SURGERY      Reduction [id number]    CARDIAC SURGERY      CABG    CORONARY ARTERY BYPASS GRAFT      TUBAL LIGATION  03/1986     Family History   Problem Relation Age of Onset    Arthritis Mother     Heart disease Father     Cancer Father     Depression Sister     Hypertension Son      Social History   Substance Use Topics    Smoking status: Never Smoker    Smokeless tobacco: Never Used    Alcohol use 0.6 - 1.2 oz/week     1 - 2 Glasses of wine per week     Review of Systems   Constitutional: Positive for fatigue and unexpected weight change. Negative for fever.   HENT: Negative for sore throat.    Respiratory: Positive for shortness of breath (baseline).    Cardiovascular: Negative for chest pain.   Gastrointestinal: Negative for nausea.        +Fecal incontinence   Genitourinary: Negative for dysuria.   Musculoskeletal: Negative for back pain.   Skin: Negative for rash.   Neurological: Negative for weakness, numbness and headaches.   Hematological: Does not bruise/bleed easily.   Psychiatric/Behavioral: Negative for confusion.       Physical Exam     Initial Vitals [06/29/17 1152]   BP Pulse Resp Temp SpO2   (!) 144/76 79 20 96.5 °F (35.8 °C) 95 %      MAP       98.67         Physical Exam    Nursing note and vitals reviewed.  Constitutional: She appears well-developed and well-nourished. No distress.   Patient is unkept appearing.   HENT:   Head: Normocephalic and atraumatic.   Eyes: Conjunctivae are normal.   Neck: Normal range of motion. Neck supple.   Cardiovascular: Normal rate, regular rhythm, normal heart sounds and intact distal pulses.   Pulmonary/Chest: Tachypnea noted. She has no wheezes.   Equal breath sounds.  Patient is able to speak in full sentences.   Abdominal: Soft. She exhibits distension. There is no tenderness. "   Musculoskeletal: She exhibits edema (1+ pitting edema).   Neurological: She is alert and oriented to person, place, and time. She has normal strength. No cranial nerve deficit or sensory deficit.   Skin: Skin is warm and dry.   Psychiatric: She has a normal mood and affect.         ED Course   Procedures  Labs Reviewed   CBC W/ AUTO DIFFERENTIAL - Abnormal; Notable for the following:        Result Value    MCV 79 (*)     MCH 25.0 (*)     MCHC 31.6 (*)     RDW 18.1 (*)     Lymph # 0.7 (*)     Gran% 83.0 (*)     Lymph% 7.3 (*)     All other components within normal limits   COMPREHENSIVE METABOLIC PANEL - Abnormal; Notable for the following:     Sodium 135 (*)     Potassium 6.1 (*)     CO2 30 (*)     Glucose 278 (*)     BUN, Bld 39 (*)     Creatinine 1.7 (*)     Albumin 3.3 (*)     Total Bilirubin 1.1 (*)     eGFR if  39 (*)     eGFR if non  33 (*)     All other components within normal limits   B-TYPE NATRIURETIC PEPTIDE - Abnormal; Notable for the following:     BNP 1,126 (*)     All other components within normal limits   PROTIME-INR - Abnormal; Notable for the following:     Prothrombin Time 13.5 (*)     INR 1.3 (*)     All other components within normal limits     EKG Readings: (Independently Interpreted)   Initial Reading: No STEMI.   Normal sinus rhythm, 80 bpm, normal ST segments and T waves.  Lateral and septal infarct, age undetermined.       X-Rays:   Independently Interpreted Readings:   Chest X-Ray: Right basilar pleural effusion unchanged from previous chest x-ray     Medical Decision Making:   History:   Old Medical Records: I decided to obtain old medical records.  Initial Assessment:   Patient is a 57-year-old woman who presents emergency department for evaluation of decreased level of consciousness after being found asleep without her oxygen and difficult to arouse this morning.  EKG showed no acute ischemic changes.  Patient denies any complaints at this time.  She  is back at baseline mentally.  She has no focal neurological deficits on examination.  Patient states her oxygen drops in the 70s without her oxygen.  Currently she is at 95% on 4 L nasal cannula.  Chest x-ray can continue to show right basilar effusion.  She was recently discharged after treatment for CHF exacerbation with 40 pound weight gain and was diuresed 20 pounds prior to discharge.  Laboratory evaluation reveals that she has MC with increase in her creatinine and decrease in her BUN as well as hyperkalemia with potassium of 6.1.  I believe her change in mental status was likely secondary to anoxia and has now returned to baseline.  Have low suspicion for CVA.  I doubt ACS.  There is elevated BNP with 1126 but she states she does not feel more short of breath and normally.  Discussed with Dr. Devine who agrees to admit the patient.  Will gently hydrate the patient and she may have been diuresis to aggressively.  Will consult cardiology for further evaluation.  Clinical Tests:   Lab Tests: Ordered and Reviewed  Radiological Study: Reviewed and Ordered  Medical Tests: Ordered and Reviewed  ED Management:  Critical Care Time MDM    The high probability of sudden, clinically significant deterioration in the patient's condition required the highest level of my preparedness to intervene urgently.    The services I provided to this patient were to treat and/or prevent clinically significant deterioration that could result in permanent disability, chronic pain and/or death.     Services included the following: chart data review, reviewing nursing notes and/or old charts, documentation time, consultant collaboration regarding findings and treatment options, medication orders and management, direct patient care, vital sign assessments and ordering, interpreting and reviewing diagnostic studies/lab tests.    Aggregate critical care time was between 30 and 74 minutes, which includes only time during which I was engaged  in work directly related to the patient's care, as described above, whether at the bedside or elsewhere in the Emergency Department.     Markos Leung M.D.                 Scribe Attestation:   Scribe #1: I performed the above scribed service and the documentation accurately describes the services I performed. I attest to the accuracy of the note.    Attending Attestation:           Physician Attestation for Scribe:  Physician Attestation Statement for Scribe #1: I, Dr. Leung, reviewed documentation, as scribed by Fe Jeffries in my presence, and it is both accurate and complete.                 ED Course     Clinical Impression:   The primary encounter diagnosis was Hyperkalemia. A diagnosis of MC (acute kidney injury) was also pertinent to this visit.                           Markos Leung MD  06/29/17 1539       Markos Leung MD  07/15/17 170

## 2017-06-29 NOTE — H&P
PCP: JOS Dumont    History & Physical    Chief Complaint: Worsening SOB for 1 day    History of Present Illness:  Patient is a 57 y.o. female admitted to Hospitalist Service from [facility] Emergency Room with complaint of worsening SOB for 1 day. Patient reportedly has past medical history significant for CAD, HTN, DM, MI, Systolic CHF, COPD, on 3 liters home O2. Patient presented with complaint of fatigue. This morning, the patient was napping without her home oxygen and her  had difficulty waking her up, so he called an ambulance. On EMS arrival, the patient stated that she became more responsive. The patient stated that she experienced an episode of fecal incontinence while napping this morning, but that her daughter cleaned her up. The patient also reported that she was staggering when walking to the bathroom this morning after waking from the nap. The patient took her oxygen off when napping this morning because her granddaughter was scared of the tubing. At baseline, her oxygen saturations drop into the 70s when she is not wearing it. The patient was recently admitted to the hospital on 6/23/2017 for an acute CHF exacerbation. She describes 40 pound weight gain from March to May, but states she lost 20 pounds during her admission. Patient was taking lasix 60 mg PO q day. Patient was recently hospiatlized from 06-23-17 to 06-27-17 related to acute systolic CHF exacerbation. Patient has an known EF of 35%, under care by Dr. Palomo. Patient denied chest pain, abdominal pain, nausea, vomiting, headache, vision changes, focal neuro-deficits, cough or fever.    Past Medical History:   Diagnosis Date    CHF (congestive heart failure)     COPD (chronic obstructive pulmonary disease)     Coronary artery disease     Diabetes mellitus     Encounter for blood transfusion     Hypertension     MI (myocardial infarction) 2010    Stroke     July 2005    Thyroid disease      Past Surgical  "History:   Procedure Laterality Date    ABCESS DRAINAGE Right     Between "little toe" and the one next to it    BREAST SURGERY      Reduction [id number]    CARDIAC SURGERY      CABG    CORONARY ARTERY BYPASS GRAFT      TUBAL LIGATION  03/1986     Family History   Problem Relation Age of Onset    Arthritis Mother     Heart disease Father     Cancer Father     Depression Sister     Hypertension Son      Social History   Substance Use Topics    Smoking status: Never Smoker    Smokeless tobacco: Never Used    Alcohol use 0.6 - 1.2 oz/week     1 - 2 Glasses of wine per week      Review of patient's allergies indicates:  No Known Allergies    (Not in a hospital admission)  Review of Systems:  Constitutional: no fever or chills. + Fatigue. + Weight gain  Eyes: no visual changes  Ears, nose, mouth, throat, and face: no nasal congestion or sore throat  Respiratory: see HPI  Cardiovascular: no chest pain or palpitations  Gastrointestinal: no nausea or vomiting, no abdominal pain or change in bowel habits  Genitourinary: no hematuria or dysuria  Integument/breast: no rash or pruritis  Hematologic/lymphatic: no easy bruising or lymphadenopathy  Musculoskeletal: no arthralgias or myalgias  Neurological: no seizures or tremors.  Behavioral/Psych: no auditory or visual hallucinations  Endocrine: no heat or cold intolerance     OBJECTIVE:     Vital Signs (Most Recent)  Temp: 96.5 °F (35.8 °C) (06/29/17 1152)  Pulse: 78 (06/29/17 1440)  Resp: 20 (06/29/17 1440)  BP: (!) 156/88 (06/29/17 1431)  SpO2: 95 % (06/29/17 1440)    Physical Exam:  General appearance: well developed, appears stated age, unkept  Head: normocephalic, atraumatic  Eyes:  conjunctivae/corneas clear. PERRL.  Nose: Nares normal. Septum midline.  Throat: lips, mucosa, and tongue normal; teeth and gums normal, no throat erythema.  Neck: supple, symmetrical, trachea midline, no JVD and thyroid not enlarged, symmetric, no tenderness/mass/nodules  Lungs: " Decreased breath sounds at lung bases  Chest wall: no tenderness  Heart: regular rate and rhythm, S1, S2 normal, no murmur, click, rub or gallop  Abdomen: soft, non-tender non-distented; bowel sounds normal; no masses,  no organomegaly  Extremities: no cyanosis, clubbing. 1+ pitting edema.   Pulses: 2+ and symmetric  Skin: Skin color, texture, turgor normal. No rashes or lesions.  Lymph nodes: Cervical, supraclavicular, and axillary nodes normal.  Neurologic: Normal strength and tone. No focal numbness or weakness. CNII-XII intact.      Laboratory:   CBC:   Recent Labs  Lab 06/29/17  1230   WBC 9.10   RBC 5.05   HGB 12.6   HCT 39.9      MCV 79*   MCH 25.0*   MCHC 31.6*     CMP:   Recent Labs  Lab 06/29/17  1230   *   CALCIUM 9.2   ALBUMIN 3.3*   PROT 7.4   *   K 6.1*   CO2 30*   CL 95   BUN 39*   CREATININE 1.7*   ALKPHOS 70   ALT 15   AST 37   BILITOT 1.1*     Coagulation:   Recent Labs  Lab 06/29/17  1230   LABPROT 13.5*   INR 1.3*     Cardiac markers:   Recent Labs  Lab 06/24/17  0005   TROPONINI 0.035*     Microbiology Results (last 7 days)     ** No results found for the last 168 hours. **        Hemoglobin A1C   Date Value Ref Range Status   06/25/2017 10.0 (H) 4.0 - 5.6 % Final     Comment:     According to ADA guidelines, hemoglobin A1c <7.0% represents  optimal control in non-pregnant diabetic patients. Different  metrics may apply to specific patient populations.   Standards of Medical Care in Diabetes-2016.  For the purpose of screening for the presence of diabetes:  <5.7%     Consistent with the absence of diabetes  5.7-6.4%  Consistent with increasing risk for diabetes   (prediabetes)  >or=6.5%  Consistent with diabetes  Currently, no consensus exists for use of hemoglobin A1c  for diagnosis of diabetes for children.  This Hemoglobin A1c assay has significant interference with fetal   hemoglobin   (HbF). The results are invalid for patients with abnormal amounts of   HbF,   including  those with known Hereditary Persistence   of Fetal Hemoglobin. Heterozygous hemoglobin variants (HbAS, HbAC,   HbAD, HbAE, HbA2) do not significantly interfere with this assay;   however, presence of multiple variants in a sample may impact the %   interference.       Microbiology Results (last 7 days)     ** No results found for the last 168 hours. **        Diagnostic Results:  Chest X-Ray: Right basilar opacification consistent with small right pleural effusion and mild infiltrate or atelectasis not significantly changed.    Lexiscan (06-25-17):  1.  Large, chronic anteroseptal infarct.  No evidence for acute myocardial ischemia..  2. Dilated, hypokinetic left ventricle with a moderately reduced ejection fraction of 35%.    Assessment/Plan:     Active Hospital Problems    Diagnosis  POA    *MC (acute kidney injury) [N17.9]  DC lasix.  Start gentle IVF hydration.  Follow BMP.    Yes    Hyperkalemia [E87.5]  Tele-monitoring.  Continue IVF.   Patient received Ca gluconate IVPB and Kayexalte in the ER.  Repeat BMP in the evening.  Hold Aldactone.    Chronis Systolic CHF  Consult Dr. STERLING Palomo.  Supplemental O2 via nasal canula; titrate O2 saturation to >92%.  Serial Cardiac enzymes × 3.  Hold lasix and Aldactone.  Monitor electrolytes for hypokalemia and hypomagnesemia.  Daily weights.  Strict I/Os.  Fluid restriction.  Na restriction <2g/d.    Yes    Essential hypertension [I10]  Chronic problem. Will continue chronic medications and monitor for any changes, adjusting as needed.    Yes    CKD (chronic kidney disease) stage 3, GFR 30-59 ml/min [N18.3]  Monitor BUN/SCr.  Monitor I/Os.  Monitor electrolytes.    Yes    Pleural effusion [J90]  Closely follow pleural effusion, no need for thoracentesis at this time.    Yes    Hypothyroid [E03.9]  Chronic problem. Will continue chronic medications and monitor for any changes, adjusting as needed.    Yes    CAD (coronary artery disease) [I25.10]  Patient with known  CAD and monitor for S/Sx of angina/ACS. Continue to monitor on telemetry.    Yes    Controlled type 2 diabetes mellitus with stage 3 chronic kidney disease [E11.22, N18.3]  Check blood glucose level q AC/HS.  Use Novolog Insulin Sliding Scale as needed.   Continue American Diabetic Association 1800 Kcal diet.    Yes        VTE Risk Mitigation         Ordered     enoxaparin injection 40 mg  Every 24 hours (non-standard times)     Route:  Subcutaneous        06/29/17 1800     Medium Risk of VTE  Once      06/29/17 1615     Place MARY ELLEN hose  Until discontinued      06/29/17 1615        Reg Devine MD  Department of Hospital Medicine   [facility]

## 2017-06-30 LAB
ANION GAP SERPL CALC-SCNC: 8 MMOL/L
BASOPHILS # BLD AUTO: 0.1 K/UL
BASOPHILS NFR BLD: 1.4 %
BUN SERPL-MCNC: 41 MG/DL
CALCIUM SERPL-MCNC: 9.1 MG/DL
CHLORIDE SERPL-SCNC: 99 MMOL/L
CK MB SERPL-MCNC: 2.3 NG/ML
CK MB SERPL-MCNC: 2.3 NG/ML
CK MB SERPL-MCNC: 3.5 NG/ML
CK MB SERPL-RTO: 0.9 %
CK MB SERPL-RTO: 1 %
CK MB SERPL-RTO: 1.2 %
CK SERPL-CCNC: 226 U/L
CK SERPL-CCNC: 251 U/L
CK SERPL-CCNC: 297 U/L
CO2 SERPL-SCNC: 31 MMOL/L
CREAT SERPL-MCNC: 1.5 MG/DL
DIFFERENTIAL METHOD: ABNORMAL
EOSINOPHIL # BLD AUTO: 0.2 K/UL
EOSINOPHIL NFR BLD: 2.9 %
ERYTHROCYTE [DISTWIDTH] IN BLOOD BY AUTOMATED COUNT: 17.6 %
EST. GFR  (AFRICAN AMERICAN): 45 ML/MIN/1.73 M^2
EST. GFR  (NON AFRICAN AMERICAN): 39 ML/MIN/1.73 M^2
GLUCOSE SERPL-MCNC: 145 MG/DL
HCT VFR BLD AUTO: 36 %
HGB BLD-MCNC: 11.5 G/DL
LYMPHOCYTES # BLD AUTO: 1.4 K/UL
LYMPHOCYTES NFR BLD: 19.1 %
MCH RBC QN AUTO: 25.2 PG
MCHC RBC AUTO-ENTMCNC: 31.9 %
MCV RBC AUTO: 79 FL
MONOCYTES # BLD AUTO: 0.8 K/UL
MONOCYTES NFR BLD: 11.2 %
NEUTROPHILS # BLD AUTO: 4.6 K/UL
NEUTROPHILS NFR BLD: 65.4 %
PLATELET # BLD AUTO: 175 K/UL
PMV BLD AUTO: 10.1 FL
POCT GLUCOSE: 209 MG/DL (ref 70–110)
POCT GLUCOSE: 239 MG/DL (ref 70–110)
POCT GLUCOSE: 250 MG/DL (ref 70–110)
POTASSIUM SERPL-SCNC: 4.2 MMOL/L
RBC # BLD AUTO: 4.55 M/UL
SODIUM SERPL-SCNC: 138 MMOL/L
TROPONIN I SERPL DL<=0.01 NG/ML-MCNC: 0.1 NG/ML
TROPONIN I SERPL DL<=0.01 NG/ML-MCNC: 0.12 NG/ML
WBC # BLD AUTO: 7.1 K/UL

## 2017-06-30 PROCEDURE — 25000003 PHARM REV CODE 250: Performed by: EMERGENCY MEDICINE

## 2017-06-30 PROCEDURE — 25000003 PHARM REV CODE 250: Performed by: INTERNAL MEDICINE

## 2017-06-30 PROCEDURE — 82553 CREATINE MB FRACTION: CPT

## 2017-06-30 PROCEDURE — 63600175 PHARM REV CODE 636 W HCPCS: Performed by: INTERNAL MEDICINE

## 2017-06-30 PROCEDURE — 99232 SBSQ HOSP IP/OBS MODERATE 35: CPT | Mod: ,,, | Performed by: INTERNAL MEDICINE

## 2017-06-30 PROCEDURE — 27000221 HC OXYGEN, UP TO 24 HOURS

## 2017-06-30 PROCEDURE — 94761 N-INVAS EAR/PLS OXIMETRY MLT: CPT

## 2017-06-30 PROCEDURE — 80048 BASIC METABOLIC PNL TOTAL CA: CPT

## 2017-06-30 PROCEDURE — 99900035 HC TECH TIME PER 15 MIN (STAT)

## 2017-06-30 PROCEDURE — 36415 COLL VENOUS BLD VENIPUNCTURE: CPT

## 2017-06-30 PROCEDURE — 94640 AIRWAY INHALATION TREATMENT: CPT

## 2017-06-30 PROCEDURE — 11000001 HC ACUTE MED/SURG PRIVATE ROOM

## 2017-06-30 PROCEDURE — 85025 COMPLETE CBC W/AUTO DIFF WBC: CPT

## 2017-06-30 PROCEDURE — 84484 ASSAY OF TROPONIN QUANT: CPT

## 2017-06-30 PROCEDURE — 25000242 PHARM REV CODE 250 ALT 637 W/ HCPCS: Performed by: INTERNAL MEDICINE

## 2017-06-30 RX ADMIN — GABAPENTIN 300 MG: 300 CAPSULE ORAL at 06:06

## 2017-06-30 RX ADMIN — ROSUVASTATIN CALCIUM 10 MG: 5 TABLET, FILM COATED ORAL at 09:06

## 2017-06-30 RX ADMIN — ENOXAPARIN SODIUM 40 MG: 100 INJECTION SUBCUTANEOUS at 06:06

## 2017-06-30 RX ADMIN — CITALOPRAM HYDROBROMIDE 20 MG: 10 TABLET ORAL at 09:06

## 2017-06-30 RX ADMIN — CARVEDILOL 25 MG: 25 TABLET, FILM COATED ORAL at 09:06

## 2017-06-30 RX ADMIN — INSULIN ASPART 2 UNITS: 100 INJECTION, SOLUTION INTRAVENOUS; SUBCUTANEOUS at 11:06

## 2017-06-30 RX ADMIN — INSULIN ASPART 1 UNITS: 100 INJECTION, SOLUTION INTRAVENOUS; SUBCUTANEOUS at 08:06

## 2017-06-30 RX ADMIN — GABAPENTIN 300 MG: 300 CAPSULE ORAL at 02:06

## 2017-06-30 RX ADMIN — LEVOTHYROXINE SODIUM 50 MCG: 50 TABLET ORAL at 06:06

## 2017-06-30 RX ADMIN — GABAPENTIN 300 MG: 300 CAPSULE ORAL at 11:06

## 2017-06-30 RX ADMIN — PANTOPRAZOLE SODIUM 40 MG: 40 TABLET, DELAYED RELEASE ORAL at 09:06

## 2017-06-30 RX ADMIN — ACETAMINOPHEN 650 MG: 325 TABLET, FILM COATED ORAL at 12:06

## 2017-06-30 RX ADMIN — ALBUTEROL SULFATE 1.25 MG: 2.5 SOLUTION RESPIRATORY (INHALATION) at 12:06

## 2017-06-30 RX ADMIN — CARVEDILOL 25 MG: 25 TABLET, FILM COATED ORAL at 08:06

## 2017-06-30 RX ADMIN — GLIMEPIRIDE 4 MG: 2 TABLET ORAL at 06:06

## 2017-06-30 RX ADMIN — ASPIRIN 81 MG: 81 TABLET, COATED ORAL at 09:06

## 2017-06-30 RX ADMIN — INSULIN ASPART 2 UNITS: 100 INJECTION, SOLUTION INTRAVENOUS; SUBCUTANEOUS at 04:06

## 2017-06-30 NOTE — PLAN OF CARE
"Met with patient and spouse at bedside.  Patient reports she was "just here with CHF, she was found with her oxygen off and blue in color by spouse".  DME: home O2 with [employer], nebulizer, cane, Rollator, walker, lift chair. PCP Dr. Escalante, Dr. Palomo cardiologist. Has appt with PCP on July 3, at 11 A M.  Patient teaching regarding compliance and importance of home oxygen therapy and medications.  Education re: home health services, patient denies needing HH and is not home bound.  PATY Black RN [facility]     06/30/17 1539   Discharge Assessment   Assessment Type Discharge Planning Assessment   Confirmed/corrected address and phone number on facesheet? Yes   Assessment information obtained from? Patient   Expected Length of Stay (days) 2   Communicated expected length of stay with patient/caregiver yes   Prior to hospitilization cognitive status: Alert/Oriented   Prior to hospitalization functional status: Assistive Equipment   Current cognitive status: Alert/Oriented   Current Functional Status: Assistive Equipment   Arrived From home or self-care   Lives With spouse   Able to Return to Prior Arrangements yes   Is patient able to care for self after discharge? Yes   How many people do you have in your home that can help with your care after discharge? 1   Who are your caregiver(s) and their phone number(s)? - named Jan [phone]   Patient's perception of discharge disposition home or selfcare   Readmission Within The Last 30 Days current reason for admission unrelated to previous admission   Patient currently being followed by outpatient case management? No   Patient currently receives home health services? No   Does the patient currently use HME? Yes   Name and contact number for HME provider: [employer] home O2-- patient has nebulizer    Patient currently receives private duty nursing? No   Patient currently receives any other outside agency services? No   Equipment Currently Used at Home cane, " quad;rollator;walker, rolling;oxygen;other (see comments)   Do you have any problems affording any of your prescribed medications? No   Is the patient taking medications as prescribed? yes   Do you have any financial concerns preventing you from receiving the healthcare you need? No   Does the patient have transportation to healthcare appointments? Yes   Transportation Available air transport;family or friend will provide   On Dialysis? No   Discharge Plan A Home with family   Discharge Plan B Home Health   Patient/Family In Agreement With Plan yes

## 2017-06-30 NOTE — PHYSICIAN QUERY
PT Name: Juliane Ramos  MR #: [id number]    Physician Query Form - Respiratory Condition Clarification      CDS/: Jj Beck RN            Contact information:[phone]    This form is a permanent document in the medical record.    Query Date: June 30, 2017    By submitting this query, we are merely seeking further clarification of documentation. Please utilize your independent clinical judgment when addressing the question(s) below.    The Medical record contains the following   Indicators   Supporting Clinical Findings Location in Medical Record   X   SOB, MONAE, Wheezing, Productive Cough, Use of Accessory Muscles, etc. Patient is a 57 y.o. female admitted to  with complaint of worsening SOB for 1 day.   6/29 ED MD note   X   Acute/Chronic Illness CAD, HTN, DM, MI, Systolic  CHF, COPD, on 3 liters home O2   6/29 H&P   X   Radiology Findings Right basilar opacification consistent with small right pleural effusion and mild infiltrate or atelectasis    6/29 CXR      Respiratory Distress or Failure        Hypoxia or Hypercapnia     X   RR         ABGs         O2 sat At baseline, her oxygen saturations drop into the 70s when she is not wearing it    95% on 4 L nasal cannula    93 % on 3 L nasal cannula 6/29 H&P      6/29 ED MD note    6/30 VS Flowsheet  0429        BiPAP/Intubation     X   Supplemental O2     X   Home O2, Oxygen Dependence 3 Liters home O2 6/29 H&P   X   Treatment Inhalational tx's with albuterol 6/29 MAR   X   Other the patient was napping without her home oxygen and her  had difficulty waking her up, so he called an ambulance    I believe her change in mental status was likely secondary to anoxia and has now returned to baseline    Patient states her oxygen drops in the 70s without her oxygen. 6/29 ED MD note     Provider, please specify diagnosis or diagnoses associated with above clinical findings.      [ x ] Chronic Respiratory Failure with Hypoxia  [  ] Chronic Respiratory Failure  with Hypercapnia  [  ] Chronic Respiratory Failure with Hypoxia and Hypercapnia  [  ] Other Chronic Respiratory Failure  [  ] Other Respiratory Diagnosis (please specify): _______________________________  [  ] Clinically Undetermined    Please document in your progress notes daily for the duration of treatment until resolved and include in your discharge summary.

## 2017-06-30 NOTE — PLAN OF CARE
Problem: Patient Care Overview  Goal: Plan of Care Review  Outcome: Ongoing (interventions implemented as appropriate)  Safety maintained Remains on cardiac monitor Chi draining clear yellow urine No acute distress noted will continue to monitor

## 2017-06-30 NOTE — PLAN OF CARE
Problem: Patient Care Overview  Goal: Plan of Care Review  Outcome: Ongoing (interventions implemented as appropriate)  Safety maintained Remains on cardiac monitor No acute distress noted will continue to monitor

## 2017-06-30 NOTE — CONSULTS
Educated patient on CHF dietary restrictions. See education tab. Provided nutrition packet for after discharge with email address.

## 2017-06-30 NOTE — PROGRESS NOTES
Progress Note  Hospital Medicine  Patient Name:Juliane Ramos  MRN:  [id number]  Patient Class: IP- Inpatient  Admit Date: 6/29/2017  Length of Stay: 1 days  Expected Discharge Date:   Attending Physician: Reg Devine MD  Primary Care Provider:  JOS Dumont    SUBJECTIVE:     Principal Problem: MC (acute kidney injury)  Initial history of present illness: Patient is a 57 y.o. female admitted to Hospitalist Service from [facility] with complaint of worsening SOB for 1 day. Patient reportedly has past medical history significant for CAD, HTN, DM, MI, Systolic CHF, COPD, on 3 liters home O2. Patient presented with complaint of fatigue. This morning, the patient was napping without her home oxygen and her  had difficulty waking her up, so he called an ambulance. On EMS arrival, the patient stated that she became more responsive. The patient stated that she experienced an episode of fecal incontinence while napping this morning, but that her daughter cleaned her up. The patient also reported that she was staggering when walking to the bathroom this morning after waking from the nap. The patient took her oxygen off when napping this morning because her granddaughter was scared of the tubing. At baseline, her oxygen saturations drop into the 70s when she is not wearing it. The patient was recently admitted to the hospital on 6/23/2017 for an acute CHF exacerbation. She describes 40 pound weight gain from March to May, but states she lost 20 pounds during her admission. Patient was taking lasix 60 mg PO q day. Patient was recently hospiatlized from 06-23-17 to 06-27-17 related to acute systolic CHF exacerbation. Patient has an known EF of 35%, under care by Dr. Palomo. Patient denied chest pain, abdominal pain, nausea, vomiting, headache, vision changes, focal neuro-deficits, cough or fever.    PMH/PSH/SH/FH/Meds: reviewed.    Symptoms/Review of Systems:  Feeling better and  stronger. Received gentle IVF hydration. Diuretic therapy is on hold. No shortness of breath, cough, chest pain or headache, fever or abdominal pain.     Diet:  Adequate intake.    Activity level: Normal.    Pain:  Patient reports no pain.       OBJECTIVE:   Vital Signs (Most Recent):      Temp: 98.3 °F (36.8 °C) (06/30/17 0952)  Pulse: 72 (06/30/17 0952)  Resp: 16 (06/30/17 0952)  BP: (!) 152/95 (06/30/17 0952)  SpO2: 98 % (06/30/17 0952)       Vital Signs Range (Last 24H):  Temp:  [96.5 °F (35.8 °C)-98.5 °F (36.9 °C)]   Pulse:  [67-80]   Resp:  [16-20]   BP: (129-203)/()   SpO2:  [93 %-98 %]     Weight: 107.2 kg (236 lb 5.3 oz)  Body mass index is 38.15 kg/m².    Intake/Output Summary (Last 24 hours) at 06/30/17 1015  Last data filed at 06/30/17 0600   Gross per 24 hour   Intake             1190 ml   Output              600 ml   Net              590 ml     Physical Examination:  General appearance: well developed, appears stated age, unkept  Head: normocephalic, atraumatic  Eyes:  conjunctivae/corneas clear. PERRL.  Nose: Nares normal. Septum midline.  Throat: lips, mucosa, and tongue normal; teeth and gums normal, no throat erythema.  Neck: supple, symmetrical, trachea midline, no JVD and thyroid not enlarged, symmetric, no tenderness/mass/nodules  Lungs: Decreased breath sounds at lung bases  Chest wall: no tenderness  Heart: regular rate and rhythm, S1, S2 normal, no murmur, click, rub or gallop  Abdomen: soft, non-tender non-distented; bowel sounds normal; no masses,  no organomegaly  Extremities: no cyanosis, clubbing. 1+ pitting edema.   Pulses: 2+ and symmetric  Skin: Skin color, texture, turgor normal. No rashes or lesions.  Lymph nodes: Cervical, supraclavicular, and axillary nodes normal.  Neurologic: Normal strength and tone. No focal numbness or weakness. CNII-XII intact.      CBC:    Recent Labs  Lab 06/27/17  0503 06/29/17  1230 06/30/17  0553   WBC 5.50 9.10 7.10   RBC 4.64 5.05 4.55   HGB  11.6* 12.6 11.5*   HCT 36.4* 39.9 36.0*    172 175   MCV 78* 79* 79*   MCH 25.1* 25.0* 25.2*   MCHC 32.0 31.6* 31.9*   BMP    Recent Labs  Lab 06/25/17  0508 06/26/17  0427 06/27/17  0503 06/29/17  1230 06/30/17  0553   * 115* 145* 278* 145*    138 139 135* 138   K 4.0 4.5 4.5 6.1* 4.2    97 97 95 99   CO2 29 31* 32* 30* 31*   BUN 23* 24* 25* 39* 41*   CREATININE 1.3 1.2 1.1 1.7* 1.5*   CALCIUM 9.1 9.0 9.1 9.2 9.1   MG 1.5* 1.6  --   --   --       Diagnostic Results:  Microbiology Results (last 7 days)     ** No results found for the last 168 hours. **         Chest X-Ray: Right basilar opacification consistent with small right pleural effusion and mild infiltrate or atelectasis not significantly changed.     Lexiscan (06-25-17):  1.  Large, chronic anteroseptal infarct.  No evidence for acute myocardial ischemia..  2. Dilated, hypokinetic left ventricle with a moderately reduced ejection fraction of 35%.    Assessment/Plan:      *MC (acute kidney injury) [N17.9] - improving  DC lasix.  Now will hold IVF.  Follow BMP.      Yes    Hyperkalemia [E87.5] - corrected  Tele-monitoring.  Hold IVF.   Follow BMP.  Hold Aldactone.     Chronis Systolic CHF  Supplemental O2 via nasal canula; titrate O2 saturation to >92%.  Follow cardiology recommendations.  Hold lasix and Aldactone.  Monitor electrolytes for hypokalemia and hypomagnesemia.  Daily weights.  Strict I/Os.  Fluid restriction.  Na restriction <2g/d.     Yes    Essential hypertension [I10]  Chronic problem. Will continue chronic medications and monitor for any changes, adjusting as needed.     Yes    CKD (chronic kidney disease) stage 3, GFR 30-59 ml/min [N18.3]  Monitor BUN/SCr.  Monitor I/Os.  Monitor electrolytes.     Yes    Pleural effusion [J90]  Closely follow pleural effusion, no need for thoracentesis at this time.      Yes    Hypothyroid [E03.9]  Chronic problem. Will continue chronic medications and monitor for any changes,  adjusting as needed.     Yes    CAD (coronary artery disease) [I25.10]  Patient with known CAD and monitor for S/Sx of angina/ACS. Continue to monitor on telemetry.     Yes    Controlled type 2 diabetes mellitus with stage 3 chronic kidney disease [E11.22, N18.3]  Check blood glucose level q AC/HS.  Use Novolog Insulin Sliding Scale as needed.   Continue American Diabetic Association 1800 Kcal diet.   Yes   Discussed with patient's , likely needs less Lasix therapy for home than before.         VTE Risk Mitigation         Ordered     enoxaparin injection 40 mg  Every 24 hours (non-standard times)     Route:  Subcutaneous        06/29/17 1800     Medium Risk of VTE  Once      06/29/17 1615     Place MARY ELLEN hose  Until discontinued      06/29/17 1615        Reg Devine MD  Department of Hospital Medicine   [facility]

## 2017-07-01 LAB
ANION GAP SERPL CALC-SCNC: 10 MMOL/L
BASOPHILS # BLD AUTO: 0 K/UL
BASOPHILS NFR BLD: 0.6 %
BUN SERPL-MCNC: 38 MG/DL
CALCIUM SERPL-MCNC: 9.1 MG/DL
CHLORIDE SERPL-SCNC: 97 MMOL/L
CO2 SERPL-SCNC: 30 MMOL/L
CREAT SERPL-MCNC: 1.2 MG/DL
DIFFERENTIAL METHOD: ABNORMAL
EOSINOPHIL # BLD AUTO: 0.2 K/UL
EOSINOPHIL NFR BLD: 2.4 %
ERYTHROCYTE [DISTWIDTH] IN BLOOD BY AUTOMATED COUNT: 17.9 %
EST. GFR  (AFRICAN AMERICAN): 59 ML/MIN/1.73 M^2
EST. GFR  (NON AFRICAN AMERICAN): 51 ML/MIN/1.73 M^2
GLUCOSE SERPL-MCNC: 208 MG/DL
HCT VFR BLD AUTO: 36.6 %
HGB BLD-MCNC: 11.6 G/DL
LYMPHOCYTES # BLD AUTO: 0.9 K/UL
LYMPHOCYTES NFR BLD: 14.2 %
MCH RBC QN AUTO: 25 PG
MCHC RBC AUTO-ENTMCNC: 31.7 %
MCV RBC AUTO: 79 FL
MONOCYTES # BLD AUTO: 0.7 K/UL
MONOCYTES NFR BLD: 11.5 %
NEUTROPHILS # BLD AUTO: 4.4 K/UL
NEUTROPHILS NFR BLD: 71.3 %
PLATELET # BLD AUTO: 168 K/UL
PMV BLD AUTO: 10.2 FL
POCT GLUCOSE: 194 MG/DL (ref 70–110)
POCT GLUCOSE: 196 MG/DL (ref 70–110)
POCT GLUCOSE: 245 MG/DL (ref 70–110)
POTASSIUM SERPL-SCNC: 4.1 MMOL/L
RBC # BLD AUTO: 4.64 M/UL
SODIUM SERPL-SCNC: 137 MMOL/L
WBC # BLD AUTO: 6.2 K/UL

## 2017-07-01 PROCEDURE — 94761 N-INVAS EAR/PLS OXIMETRY MLT: CPT

## 2017-07-01 PROCEDURE — 25000003 PHARM REV CODE 250: Performed by: INTERNAL MEDICINE

## 2017-07-01 PROCEDURE — 25000003 PHARM REV CODE 250: Performed by: EMERGENCY MEDICINE

## 2017-07-01 PROCEDURE — 27000221 HC OXYGEN, UP TO 24 HOURS

## 2017-07-01 PROCEDURE — G8979 MOBILITY GOAL STATUS: HCPCS | Mod: CK

## 2017-07-01 PROCEDURE — G8980 MOBILITY D/C STATUS: HCPCS | Mod: CK

## 2017-07-01 PROCEDURE — 97161 PT EVAL LOW COMPLEX 20 MIN: CPT

## 2017-07-01 PROCEDURE — 63600175 PHARM REV CODE 636 W HCPCS: Performed by: INTERNAL MEDICINE

## 2017-07-01 PROCEDURE — G8978 MOBILITY CURRENT STATUS: HCPCS | Mod: CK

## 2017-07-01 PROCEDURE — 36415 COLL VENOUS BLD VENIPUNCTURE: CPT

## 2017-07-01 PROCEDURE — 99900035 HC TECH TIME PER 15 MIN (STAT)

## 2017-07-01 PROCEDURE — 80048 BASIC METABOLIC PNL TOTAL CA: CPT

## 2017-07-01 PROCEDURE — 85025 COMPLETE CBC W/AUTO DIFF WBC: CPT

## 2017-07-01 PROCEDURE — 11000001 HC ACUTE MED/SURG PRIVATE ROOM

## 2017-07-01 RX ADMIN — INSULIN ASPART 2 UNITS: 100 INJECTION, SOLUTION INTRAVENOUS; SUBCUTANEOUS at 05:07

## 2017-07-01 RX ADMIN — ROSUVASTATIN CALCIUM 10 MG: 5 TABLET, FILM COATED ORAL at 08:07

## 2017-07-01 RX ADMIN — ASPIRIN 81 MG: 81 TABLET, COATED ORAL at 08:07

## 2017-07-01 RX ADMIN — LEVOTHYROXINE SODIUM 50 MCG: 50 TABLET ORAL at 05:07

## 2017-07-01 RX ADMIN — CITALOPRAM HYDROBROMIDE 20 MG: 10 TABLET ORAL at 08:07

## 2017-07-01 RX ADMIN — INSULIN ASPART 2 UNITS: 100 INJECTION, SOLUTION INTRAVENOUS; SUBCUTANEOUS at 12:07

## 2017-07-01 RX ADMIN — GLIMEPIRIDE 4 MG: 2 TABLET ORAL at 05:07

## 2017-07-01 RX ADMIN — GABAPENTIN 300 MG: 300 CAPSULE ORAL at 08:07

## 2017-07-01 RX ADMIN — GABAPENTIN 300 MG: 300 CAPSULE ORAL at 05:07

## 2017-07-01 RX ADMIN — PANTOPRAZOLE SODIUM 40 MG: 40 TABLET, DELAYED RELEASE ORAL at 08:07

## 2017-07-01 RX ADMIN — ENOXAPARIN SODIUM 40 MG: 100 INJECTION SUBCUTANEOUS at 06:07

## 2017-07-01 RX ADMIN — CARVEDILOL 25 MG: 25 TABLET, FILM COATED ORAL at 08:07

## 2017-07-01 RX ADMIN — GABAPENTIN 300 MG: 300 CAPSULE ORAL at 01:07

## 2017-07-01 NOTE — PT/OT/SLP EVAL
"Physical Therapy  Evaluation    Juliane Ramos   MRN: [id number]   Admitting Diagnosis: MC (acute kidney injury)    PT Received On: 07/01/17  PT Start Time: 0907     PT Stop Time: 0917    PT Total Time (min): 10 min       Billable Minutes:  Evaluation 10    Diagnosis: MC (acute kidney injury)      Past Medical History:   Diagnosis Date    CHF (congestive heart failure)     COPD (chronic obstructive pulmonary disease)     Coronary artery disease     Diabetes mellitus     Encounter for blood transfusion     Hypertension     MI (myocardial infarction) 2010    Stroke     July 2005    Thyroid disease       Past Surgical History:   Procedure Laterality Date    ABCESS DRAINAGE Right     Between "little toe" and the one next to it    BREAST SURGERY      Reduction [id number]    CARDIAC SURGERY      CABG 4vessel ring in one valve mitral valve prolapse    CORONARY ARTERY BYPASS GRAFT      TUBAL LIGATION  03/1986       Referring physician:   Date referred to PT: 6/30/17    General Precautions: Standard, fall (STD)  Orthopedic Precautions: N/A   Braces:              Patient History:  Lives With: spouse  Living Arrangements: mobile home  Home Layout: Able to live on 1st floor  Transportation Available: car, family or friend will provide  Equipment Currently Used at Home: rollator  DME owned (not currently used): ROLLATOR    Previous Level of Function:  Ambulation Skills: independent (USING ROLLATOR PRN)  Transfer Skills: independent  ADL Skills: independent  Work/Leisure Activity: independent    Subjective:  Communicated with RN prior to session.  OK for PT eval.  Chief Complaint:   Patient goals: go home today.    Pain/Comfort  Pain Rating 1: 0/10  Pain Rating Post-Intervention 1: 0/10      Objective:   Patient found with: oxygen     Cognitive Exam:  Oriented to: Person, Place, Time and Situation    Follows Commands/attention: Follows multistep  commands  Communication: clear/fluent  Safety awareness/insight to " disability: intact    Physical Exam:  Postural examination/scapula alignment: Rounded shoulder and Head forward    Skin integrity: Visible skin intact  Edema: None noted     Sensation:   Intact    Upper Extremity Range of Motion:  Right Upper Extremity:   Left Upper Extremity:     Upper Extremity Strength:  Right Upper Extremity:   Left Upper Extremity:     Lower Extremity Range of Motion:  Right Lower Extremity: WFL  Left Lower Extremity: WFL    Lower Extremity Strength:  Right Lower Extremity: WFL  Left Lower Extremity: WFL     Fine motor coordination:      Gross motor coordination: WFL    Functional Mobility:  Bed Mobility:  Rolling/Turning to Left: Supervision  Scooting/Bridging: Stand by Assistance, Supervision  Supine to Sit: Stand by Assistance  Sit to Supine: Stand by Assistance    Transfers:  Sit <> Stand Assistance: Stand By Assistance  Sit <> Stand Assistive Device: Rolling Walker    Gait:   Gait Distance: 60  Assistance 1: Stand by Assistance  Gait Assistive Device: Rolling walker  Gait Pattern: 3-point gait  Gait Deviation(s): decreased estefania    Stairs:      Balance:   Static Sit: GOOD: Takes MODERATE challenges from all directions  Dynamic Sit: GOOD: Maintains balance through MODERATE excursions of active trunk movement  Static Stand: GOOD-: Takes MODERATE challenges from all directions inconsistently  Dynamic stand: FAIR+: Needs CLOSE SUPERVISION during gait and is able to right self with minor LOB    Therapeutic Activities and Exercises:      AM-PAC 6 CLICK MOBILITY  How much help from another person does this patient currently need?   1 = Unable, Total/Dependent Assistance  2 = A lot, Maximum/Moderate Assistance  3 = A little, Minimum/Contact Guard/Supervision  4 = None, Modified [place]/Independent          AM-PAC Raw Score CMS G-Code Modifier Level of Impairment Assistance   6 % Total / Unable   7 - 9 CM 80 - 100% Maximal Assist   10 - 14 CL 60 - 80% Moderate Assist   15 - 19 CK 40  - 60% Moderate Assist   20 - 22 CJ 20 - 40% Minimal Assist   23 CI 1-20% SBA / CGA   24 CH 0% Independent/ Mod I     Patient left supine with all lines intact, call button in reach and  present.    Assessment:   Juliane Ramos is a 57 y.o. female with a medical diagnosis of MC (acute kidney injury) and presents with decreased endurance.    Rehab identified problem list/impairments: Rehab identified problem list/impairments: impaired endurance    Rehab potential is good.    Activity tolerance: Good    Discharge recommendations: Discharge Facility/Level Of Care Needs: home     Barriers to discharge: Barriers to Discharge: None    Equipment recommendations: Equipment Needed After Discharge: none     GOALS:    Physical Therapy Goals     Not on file                PLAN:    Patient to be seen 1 x/week (Pt will not require skilled PT) to address the above listed problems via    Plan of Care expires: 07/01/17 ([place] PT services.)  Plan of Care reviewed with: patient, spouse (pt states she is independent with ambulation will not require skilled PT.)    Functional Assessment Tool Used: FIM  Functional Limitation: Mobility: Walking and moving around  Mobility: Walking and Moving Around Current Status (): CK  Mobility: Walking and Moving Around Goal Status (): CK  Mobility: Walking and Moving Around Discharge Status (): CK     Luis Shirley, PT  07/01/2017

## 2017-07-01 NOTE — PROGRESS NOTES
Progress Note  Hospital Medicine  Patient Name:Juliane Ramos  MRN:  [id number]  Patient Class: IP- Inpatient  Admit Date: 6/29/2017  Length of Stay: 2 days  Expected Discharge Date:   Attending Physician: Reg Devine MD  Primary Care Provider:  JOS Dumont    SUBJECTIVE:     Principal Problem: MC (acute kidney injury)  Initial history of present illness: Patient is a 57 y.o. female admitted to Hospitalist Service from [facility] with complaint of worsening SOB for 1 day. Patient reportedly has past medical history significant for CAD, HTN, DM, MI, Systolic CHF, COPD, on 3 liters home O2. Patient presented with complaint of fatigue. This morning, the patient was napping without her home oxygen and her  had difficulty waking her up, so he called an ambulance. On EMS arrival, the patient stated that she became more responsive. The patient stated that she experienced an episode of fecal incontinence while napping this morning, but that her daughter cleaned her up. The patient also reported that she was staggering when walking to the bathroom this morning after waking from the nap. The patient took her oxygen off when napping this morning because her granddaughter was scared of the tubing. At baseline, her oxygen saturations drop into the 70s when she is not wearing it. The patient was recently admitted to the hospital on 6/23/2017 for an acute CHF exacerbation. She describes 40 pound weight gain from March to May, but states she lost 20 pounds during her admission. Patient was taking lasix 60 mg PO q day. Patient was recently hospiatlized from 06-23-17 to 06-27-17 related to acute systolic CHF exacerbation. Patient has an known EF of 35%, under care by Dr. Palomo. Patient denied chest pain, abdominal pain, nausea, vomiting, headache, vision changes, focal neuro-deficits, cough or fever.    PMH/PSH/SH/FH/Meds: reviewed.    Symptoms/Review of Systems:  No acute events overnight.  Feels good. No shortness of breath, cough, chest pain or headache, fever or abdominal pain.     Diet:  Adequate intake.    Activity level: Normal.    Pain:  Patient reports no pain.       OBJECTIVE:   Vital Signs (Most Recent):      Temp: 97.8 °F (36.6 °C) (07/01/17 1100)  Pulse: 66 (07/01/17 1100)  Resp: 18 (07/01/17 1100)  BP: 139/87 (07/01/17 1100)  SpO2: 97 % (07/01/17 1100)       Vital Signs Range (Last 24H):  Temp:  [97.7 °F (36.5 °C)-98 °F (36.7 °C)]   Pulse:  [66-73]   Resp:  [18-20]   BP: (136-165)/(77-99)   SpO2:  [94 %-100 %]     Weight: 107.2 kg (236 lb 5.3 oz)  Body mass index is 38.15 kg/m².    Intake/Output Summary (Last 24 hours) at 07/01/17 1312  Last data filed at 07/01/17 0529   Gross per 24 hour   Intake             1190 ml   Output             1000 ml   Net              190 ml     Physical Examination:  General appearance: well developed, appears stated age  Head: normocephalic, atraumatic  Eyes:  conjunctivae/corneas clear. PERRL.  Nose: Nares normal. Septum midline.  Throat: lips, mucosa, and tongue normal; teeth and gums normal, no throat erythema.  Neck: supple, symmetrical, trachea midline, no JVD and thyroid not enlarged, symmetric, no tenderness/mass/nodules  Lungs: Good air movement bilaterally. CTAB  Chest wall: no tenderness  Heart: regular rate and rhythm, S1, S2 normal, no murmur, click, rub or gallop  Abdomen: soft, non-tender non-distented; bowel sounds normal; no masses,  no organomegaly  Extremities: no cyanosis, clubbing. 1+ pitting edema.   Pulses: 2+ and symmetric  Skin: Skin color, texture, turgor normal. No rashes or lesions.    CBC:    Recent Labs  Lab 06/29/17  1230 06/30/17  0553 07/01/17  0423   WBC 9.10 7.10 6.20   RBC 5.05 4.55 4.64   HGB 12.6 11.5* 11.6*   HCT 39.9 36.0* 36.6*    175 168   MCV 79* 79* 79*   MCH 25.0* 25.2* 25.0*   MCHC 31.6* 31.9* 31.7*   BMP    Recent Labs  Lab 06/25/17  0508 06/26/17  0427  06/29/17  1230 06/30/17  0553 07/01/17  0423   GLU  122* 115*  < > 278* 145* 208*    138  < > 135* 138 137   K 4.0 4.5  < > 6.1* 4.2 4.1    97  < > 95 99 97   CO2 29 31*  < > 30* 31* 30*   BUN 23* 24*  < > 39* 41* 38*   CREATININE 1.3 1.2  < > 1.7* 1.5* 1.2   CALCIUM 9.1 9.0  < > 9.2 9.1 9.1   MG 1.5* 1.6  --   --   --   --    < > = values in this interval not displayed.     Diagnostic Results:   Chest X-Ray: Right basilar opacification consistent with small right pleural effusion and mild infiltrate or atelectasis not significantly changed.     Lexiscan (06-25-17):  1.  Large, chronic anteroseptal infarct.  No evidence for acute myocardial ischemia..  2. Dilated, hypokinetic left ventricle with a moderately reduced ejection fraction of 35%.    Assessment/Plan:      *MC (acute kidney injury) [N17.9] - improving  DC lasix. Estimated Creatinine Clearance: 65.6 mL/min (based on Cr of 1.2).   Hold IVFs  Follow BMP.      Yes    Hyperkalemia [E87.5] - resolved      Chronic combined CHF  Last echo with EF of 35% w DD. Supplemental O2 via nasal canula; titrate O2 saturation to >92%.  Follow cardiology recommendations- awaiting consult  Hold lasix and Aldactone 2/2 MC  Monitor electrolytes for hypokalemia and hypomagnesemia.  Daily weights.  Strict I/Os.  Fluid restriction.  Na restriction <2g/d.     Yes    Essential hypertension [I10]  Stable. Chronic problem. Will continue chronic medications and monitor for any changes, adjusting as needed.     Yes    CKD (chronic kidney disease) stage 3, GFR 30-59 ml/min [N18.3]  With acute MC. Stable.      Yes    Pleural effusion [J90]  Stable. Closely follow pleural effusion, no need for thoracentesis at this time.      Yes    Hypothyroid [E03.9]  Chronic problem. Stable. Will continue chronic medications and monitor for any changes, adjusting as needed.     Yes    CAD (coronary artery disease) [I25.10]  Patient with known CAD and monitor for S/Sx of angina/ACS. Continue to monitor on telemetry.     Yes     Uncontrolled type 2 diabetes mellitus with stage 3 chronic kidney disease [E11.22, N18.3]  A1C 10 6 days ago. BS has been stable. Check blood glucose level q AC/HS.  Use Novolog Insulin Sliding Scale as needed.   Continue American Diabetic Association 1800 Kcal diet.   Yes           VTE Risk Mitigation         Ordered     enoxaparin injection 40 mg  Every 24 hours (non-standard times)     Route:  Subcutaneous        06/29/17 1800     Medium Risk of VTE  Once      06/29/17 1615     Place MARY ELLEN hose  Until discontinued      06/29/17 1615        Alivia Infante MD  Department of Hospital Medicine   [facility]

## 2017-07-01 NOTE — PLAN OF CARE
Problem: Patient Care Overview  Goal: Plan of Care Review  Outcome: Ongoing (interventions implemented as appropriate)   07/01/17 9348   Coping/Psychosocial   Plan Of Care Reviewed With patient;spouse   No distress or complaints tonight.  BLE edema  Denies pain

## 2017-07-01 NOTE — PLAN OF CARE
06/30/17 1917   Patient Assessment/Suction   Level of Consciousness (AVPU) alert   Respiratory Effort Normal;Unlabored   Expansion/Accessory Muscles/Retractions no use of accessory muscles   All Lung Fields Breath Sounds clear   PRE-TX-O2-ETCO2   O2 Device (Oxygen Therapy) nasal cannula   Flow (L/min) 3  (Down to 2Lpm)   SpO2 99 %   Pulse 68   Resp 20   Temp 98 °F (36.7 °C)   BP (!) 165/99   Aerosol Therapy   $ Aerosol Therapy Charges PRN treatment not required   Respiratory Treatment Status PRN treatment not required   Pt tolerates NC well. No PRN treatment required at this time.

## 2017-07-02 LAB
ANION GAP SERPL CALC-SCNC: 8 MMOL/L
BASOPHILS # BLD AUTO: 0 K/UL
BASOPHILS NFR BLD: 0.6 %
BNP SERPL-MCNC: 1336 PG/ML
BUN SERPL-MCNC: 35 MG/DL
CALCIUM SERPL-MCNC: 9.1 MG/DL
CHLORIDE SERPL-SCNC: 100 MMOL/L
CO2 SERPL-SCNC: 31 MMOL/L
CREAT SERPL-MCNC: 1.3 MG/DL
DIFFERENTIAL METHOD: ABNORMAL
EOSINOPHIL # BLD AUTO: 0.2 K/UL
EOSINOPHIL NFR BLD: 4.1 %
ERYTHROCYTE [DISTWIDTH] IN BLOOD BY AUTOMATED COUNT: 17.7 %
EST. GFR  (AFRICAN AMERICAN): 53 ML/MIN/1.73 M^2
EST. GFR  (NON AFRICAN AMERICAN): 46 ML/MIN/1.73 M^2
GLUCOSE SERPL-MCNC: 134 MG/DL
HCT VFR BLD AUTO: 35.9 %
HGB BLD-MCNC: 11.3 G/DL
LYMPHOCYTES # BLD AUTO: 0.9 K/UL
LYMPHOCYTES NFR BLD: 14.8 %
MCH RBC QN AUTO: 24.9 PG
MCHC RBC AUTO-ENTMCNC: 31.4 %
MCV RBC AUTO: 79 FL
MONOCYTES # BLD AUTO: 0.6 K/UL
MONOCYTES NFR BLD: 10.7 %
NEUTROPHILS # BLD AUTO: 4.2 K/UL
NEUTROPHILS NFR BLD: 69.8 %
PLATELET # BLD AUTO: 167 K/UL
PMV BLD AUTO: 10 FL
POCT GLUCOSE: 235 MG/DL (ref 70–110)
POCT GLUCOSE: 245 MG/DL (ref 70–110)
POTASSIUM SERPL-SCNC: 4.4 MMOL/L
RBC # BLD AUTO: 4.53 M/UL
SODIUM SERPL-SCNC: 139 MMOL/L
WBC # BLD AUTO: 6.1 K/UL

## 2017-07-02 PROCEDURE — 83880 ASSAY OF NATRIURETIC PEPTIDE: CPT

## 2017-07-02 PROCEDURE — 85025 COMPLETE CBC W/AUTO DIFF WBC: CPT

## 2017-07-02 PROCEDURE — 27000221 HC OXYGEN, UP TO 24 HOURS

## 2017-07-02 PROCEDURE — 11000001 HC ACUTE MED/SURG PRIVATE ROOM

## 2017-07-02 PROCEDURE — 63600175 PHARM REV CODE 636 W HCPCS: Performed by: FAMILY MEDICINE

## 2017-07-02 PROCEDURE — 63600175 PHARM REV CODE 636 W HCPCS: Performed by: INTERNAL MEDICINE

## 2017-07-02 PROCEDURE — 94761 N-INVAS EAR/PLS OXIMETRY MLT: CPT

## 2017-07-02 PROCEDURE — 25000003 PHARM REV CODE 250: Performed by: INTERNAL MEDICINE

## 2017-07-02 PROCEDURE — 99900035 HC TECH TIME PER 15 MIN (STAT)

## 2017-07-02 PROCEDURE — 25000003 PHARM REV CODE 250: Performed by: EMERGENCY MEDICINE

## 2017-07-02 PROCEDURE — 36415 COLL VENOUS BLD VENIPUNCTURE: CPT

## 2017-07-02 PROCEDURE — 80048 BASIC METABOLIC PNL TOTAL CA: CPT

## 2017-07-02 RX ORDER — FUROSEMIDE 10 MG/ML
40 INJECTION INTRAMUSCULAR; INTRAVENOUS 2 TIMES DAILY
Status: DISCONTINUED | OUTPATIENT
Start: 2017-07-03 | End: 2017-07-03

## 2017-07-02 RX ORDER — FUROSEMIDE 10 MG/ML
20 INJECTION INTRAMUSCULAR; INTRAVENOUS DAILY
Status: DISCONTINUED | OUTPATIENT
Start: 2017-07-02 | End: 2017-07-02 | Stop reason: SDUPTHER

## 2017-07-02 RX ADMIN — ROSUVASTATIN CALCIUM 10 MG: 5 TABLET, FILM COATED ORAL at 09:07

## 2017-07-02 RX ADMIN — CARVEDILOL 25 MG: 25 TABLET, FILM COATED ORAL at 09:07

## 2017-07-02 RX ADMIN — FUROSEMIDE 20 MG: 10 INJECTION, SOLUTION INTRAMUSCULAR; INTRAVENOUS at 02:07

## 2017-07-02 RX ADMIN — GABAPENTIN 300 MG: 300 CAPSULE ORAL at 09:07

## 2017-07-02 RX ADMIN — GABAPENTIN 300 MG: 300 CAPSULE ORAL at 02:07

## 2017-07-02 RX ADMIN — PANTOPRAZOLE SODIUM 40 MG: 40 TABLET, DELAYED RELEASE ORAL at 09:07

## 2017-07-02 RX ADMIN — LEVOTHYROXINE SODIUM 50 MCG: 50 TABLET ORAL at 05:07

## 2017-07-02 RX ADMIN — GLIMEPIRIDE 4 MG: 2 TABLET ORAL at 05:07

## 2017-07-02 RX ADMIN — INSULIN ASPART 2 UNITS: 100 INJECTION, SOLUTION INTRAVENOUS; SUBCUTANEOUS at 05:07

## 2017-07-02 RX ADMIN — ASPIRIN 81 MG: 81 TABLET, COATED ORAL at 09:07

## 2017-07-02 RX ADMIN — GABAPENTIN 300 MG: 300 CAPSULE ORAL at 05:07

## 2017-07-02 RX ADMIN — CITALOPRAM HYDROBROMIDE 20 MG: 10 TABLET ORAL at 09:07

## 2017-07-02 RX ADMIN — INSULIN ASPART 2 UNITS: 100 INJECTION, SOLUTION INTRAVENOUS; SUBCUTANEOUS at 12:07

## 2017-07-02 RX ADMIN — ENOXAPARIN SODIUM 40 MG: 100 INJECTION SUBCUTANEOUS at 06:07

## 2017-07-02 NOTE — PLAN OF CARE
Problem: Patient Care Overview  Goal: Plan of Care Review  Outcome: Ongoing (interventions implemented as appropriate)  PRN tx not needed.  02 saturation 95% on nc at 2lpm.  Patient placed on Humidifier.

## 2017-07-02 NOTE — PLAN OF CARE
Problem: Patient Care Overview  Goal: Plan of Care Review  Outcome: Ongoing (interventions implemented as appropriate)  Denies pain  No resp distress at rest  Safety maintained  Encourage incresed activity

## 2017-07-02 NOTE — PROGRESS NOTES
Progress Note  Hospital Medicine  Patient Name:Juliane Ramos  MRN:  [id number]  Patient Class: IP- Inpatient  Admit Date: 6/29/2017  Length of Stay: 3 days  Expected Discharge Date:   Attending Physician: Reg Devine MD  Primary Care Provider:  JOS Dumont    SUBJECTIVE:     Principal Problem: MC (acute kidney injury)  Initial history of present illness: Patient is a 57 y.o. female admitted to Hospitalist Service from [facility] with complaint of worsening SOB for 1 day. Patient reportedly has past medical history significant for CAD, HTN, DM, MI, Systolic CHF, COPD, on 3 liters home O2. Patient presented with complaint of fatigue. This morning, the patient was napping without her home oxygen and her  had difficulty waking her up, so he called an ambulance. On EMS arrival, the patient stated that she became more responsive. The patient stated that she experienced an episode of fecal incontinence while napping this morning, but that her daughter cleaned her up. The patient also reported that she was staggering when walking to the bathroom this morning after waking from the nap. The patient took her oxygen off when napping this morning because her granddaughter was scared of the tubing. At baseline, her oxygen saturations drop into the 70s when she is not wearing it. The patient was recently admitted to the hospital on 6/23/2017 for an acute CHF exacerbation. She describes 40 pound weight gain from March to May, but states she lost 20 pounds during her admission. Patient was taking lasix 60 mg PO q day. Patient was recently hospiatlized from 06-23-17 to 06-27-17 related to acute systolic CHF exacerbation. Patient has an known EF of 35%, under care by Dr. Palomo. Patient denied chest pain, abdominal pain, nausea, vomiting, headache, vision changes, focal neuro-deficits, cough or fever.    PMH/PSH/SH/FH/Meds: reviewed.    Symptoms/Review of Systems:  No acute events overnight.  Feels the same today, good. No shortness of breath, cough, chest pain or headache, fever or abdominal pain.     Diet:  Adequate intake.    Activity level: Normal.    Pain:  Patient reports no pain.       OBJECTIVE:   Vital Signs (Most Recent):      Temp: 98.3 °F (36.8 °C) (07/02/17 1122)  Pulse: 70 (07/02/17 1122)  Resp: 18 (07/02/17 1122)  BP: (!) 165/82 (07/02/17 1122)  SpO2: (!) 94 % (07/02/17 1122)       Vital Signs Range (Last 24H):  Temp:  [97.2 °F (36.2 °C)-98.3 °F (36.8 °C)]   Pulse:  [70-78]   Resp:  [17-19]   BP: (129-165)/(79-98)   SpO2:  [94 %-99 %]     Weight: 107.3 kg (236 lb 8.9 oz)  Body mass index is 38.18 kg/m².    Intake/Output Summary (Last 24 hours) at 07/02/17 1146  Last data filed at 07/02/17 0921   Gross per 24 hour   Intake             1330 ml   Output                0 ml   Net             1330 ml     Physical Examination:  General appearance: well developed, appears stated age  Head: normocephalic, atraumatic  Eyes:  conjunctivae/corneas clear. PERRL.  Nose: Nares normal. Septum midline.  Throat: lips, mucosa, and tongue normal; teeth and gums normal, no throat erythema.  Neck: supple, symmetrical, trachea midline, no JVD and thyroid not enlarged, symmetric, no tenderness/mass/nodules  Lungs: Good air movement bilaterally. CTAB  Chest wall: no tenderness  Heart: regular rate and rhythm, S1, S2 normal, no murmur, click, rub or gallop  Abdomen: soft, non-tender non-distented; bowel sounds normal; no masses,  no organomegaly  Extremities: no cyanosis, clubbing. 1+ pitting edema bilaterally.   Pulses: 2+ and symmetric  Skin: Skin color, texture, turgor normal. No rashes or lesions.    CBC:    Recent Labs  Lab 06/30/17  0553 07/01/17  0423 07/02/17  0437   WBC 7.10 6.20 6.10   RBC 4.55 4.64 4.53   HGB 11.5* 11.6* 11.3*   HCT 36.0* 36.6* 35.9*    168 167   MCV 79* 79* 79*   MCH 25.2* 25.0* 24.9*   MCHC 31.9* 31.7* 31.4*   BMP    Recent Labs  Lab 06/26/17  0427  06/30/17  0553  07/01/17  0423 07/02/17  0437   *  < > 145* 208* 134*     < > 138 137 139   K 4.5  < > 4.2 4.1 4.4   CL 97  < > 99 97 100   CO2 31*  < > 31* 30* 31*   BUN 24*  < > 41* 38* 35*   CREATININE 1.2  < > 1.5* 1.2 1.3   CALCIUM 9.0  < > 9.1 9.1 9.1   MG 1.6  --   --   --   --    < > = values in this interval not displayed.     Diagnostic Results:   Chest X-Ray: Right basilar opacification consistent with small right pleural effusion and mild infiltrate or atelectasis not significantly changed.     Lexiscan (06-25-17):  1.  Large, chronic anteroseptal infarct.  No evidence for acute myocardial ischemia..  2. Dilated, hypokinetic left ventricle with a moderately reduced ejection fraction of 35%.    Assessment/Plan:      *MC (acute kidney injury) [N17.9] - improving   Estimated Creatinine Clearance: 60.6 mL/min (based on Cr of 1.3).   Hold IVFs  Follow BMP.      Yes    Hyperkalemia [E87.5] - resolved      Chronic combined CHF- acute on chronic  BNP more elevated  Last echo with EF of 25% w DD. Supplemental O2 via nasal canula; titrate O2 saturation to >92%.  Follow cardiology recommendations- awaiting consult  Lasix IV daily intiated  Monitor electrolytes for hypokalemia and hypomagnesemia.  Daily weights.  Strict I/Os.  Fluid restriction.  Na restriction <2g/d.     Yes    Essential hypertension [I10]  Stable. Chronic problem. Will continue chronic medications and monitor for any changes, adjusting as needed.     Yes    CKD (chronic kidney disease) stage 3, GFR 30-59 ml/min [N18.3]  Stable.      Yes    Pleural effusion [J90]  Stable. Closely follow pleural effusion, no need for thoracentesis at this time.      Yes    Hypothyroid [E03.9]  Chronic problem. Stable. Will continue chronic medications and monitor for any changes, adjusting as needed.     Yes    CAD (coronary artery disease) [I25.10]  Patient with known CAD and monitor for S/Sx of angina/ACS. Continue to monitor on telemetry.     Yes     Uncontrolled type 2 diabetes mellitus with stage 3 chronic kidney disease [E11.22, N18.3]  A1C 10, 6 days ago. BS has been stable. Check blood glucose level q AC/HS.  Use Novolog Insulin Sliding Scale as needed.   Continue American Diabetic Association 1800 Kcal diet.   Yes           VTE Risk Mitigation         Ordered     enoxaparin injection 40 mg  Every 24 hours (non-standard times)     Route:  Subcutaneous        06/29/17 1800     Medium Risk of VTE  Once      06/29/17 1615     Place MARY ELLEN hose  Until discontinued      06/29/17 1615        Alivia Infante MD  Department of Hospital Medicine   [facility]

## 2017-07-02 NOTE — PROGRESS NOTES
07/01/17 2007   Patient Assessment/Suction   Level of Consciousness (AVPU) alert   Respiratory Effort Unlabored   All Lung Fields Breath Sounds clear   PRE-TX-O2-ETCO2   O2 Device (Oxygen Therapy) nasal cannula   $ Is the patient on Oxygen? Yes   Flow (L/min) 2   SpO2 96 %   Pulse Oximetry Type Intermittent   $ Pulse Oximetry - Multiple Charge Pulse Oximetry - Multiple   Pulse 76   Resp 18   Aerosol Therapy   $ Aerosol Therapy Charges PRN treatment not required

## 2017-07-02 NOTE — CONSULTS
PCP: JOS Dumont    CARDIOLOGY CONSULT    Chief Complaint: Worsening SOB for 1 day    Patient was seen by me during her last admit 1 week ago.    History of Present Illness:  Patient is a 57 y.o. female admitted to Hospitalist Service from [facility] with complaint of worsening SOB for 1 day. Patient reportedly has past medical history significant for CAD, HTN, DM, MI, Systolic CHF, COPD, on 3 liters home O2. Patient presented with complaint of fatigue. This morning, the patient was napping without her home oxygen and her  had difficulty waking her up;[ probably slept all night without her O2.]; so he called an ambulance. On EMS arrival, the patient stated that she became more responsive. The patient stated that she experienced an episode of fecal incontinence while napping this morning, but that her daughter cleaned her up. The patient also reported that she was staggering when walking to the bathroom this morning after waking from the nap. The patient took her oxygen off when napping this morning because her granddaughter was scared of the tubing. At baseline, her oxygen saturations drop into the 70s when she is not wearing it. The patient was recently admitted to the hospital on 6/23/2017 for an acute CHF exacerbation. She describes 40 pound weight gain from March to May, but states she lost 20 pounds during her admission. Patient was taking lasix 60 mg PO q day. Patient was recently hospiatlized from 06-23-17 to 06-27-17 related to acute systolic CHF exacerbation. Patient has an known EF of 35%.. Patient denied chest pain, abdominal pain, nausea, vomiting, headache, vision changes, focal neuro-deficits, cough or fever.    Past Medical History:   Diagnosis Date    CHF (congestive heart failure)     COPD (chronic obstructive pulmonary disease)     Coronary artery disease     Diabetes mellitus     Encounter for blood transfusion     Hypertension     MI (myocardial  "infarction)     Stroke     2005    Thyroid disease      Past Surgical History:   Procedure Laterality Date    ABCESS DRAINAGE Right     Between "little toe" and the one next to it    BREAST SURGERY      Reduction [id number]    CARDIAC SURGERY      CABG 4vessel ring in one valve mitral valve prolapse    CORONARY ARTERY BYPASS GRAFT      TUBAL LIGATION  1986     Family History   Problem Relation Age of Onset    Arthritis Mother     Heart disease Father     Cancer Father 68     prostae and bladder ca  in     Diabetes Father     Hypertension Father     Depression Sister     Hypertension Son     Diabetes Maternal Grandmother      lost leg    Kidney disease Paternal Grandfather      had kidney removed    Stroke Paternal Grandfather      Social History   Substance Use Topics    Smoking status: Never Smoker    Smokeless tobacco: Never Used    Alcohol use 0.6 - 1.2 oz/week     1 - 2 Glasses of wine per week      Review of patient's allergies indicates:  No Known Allergies  PTA Medications   Medication Sig    albuterol (ACCUNEB) 1.25 mg/3 mL Nebu Take 1.25 mg by nebulization every 6 (six) hours as needed. Rescue    aspirin (ECOTRIN) 81 MG EC tablet Take 1 tablet (81 mg total) by mouth once daily.    carvedilol (COREG) 25 MG tablet Take 25 mg by mouth 2 (two) times daily.     citalopram (CELEXA) 20 MG tablet Take 20 mg by mouth once daily.      furosemide (LASIX) 20 MG tablet Take 20 mg by mouth every evening.    furosemide (LASIX) 40 MG tablet Take 1 tablet q AM and 1/2 tablet q PM for 1 week and then  Take 1 tablet q AM. (Patient taking differently: Take 1 tablet q AM and 1/2 tablet q PM   .)    gabapentin (NEURONTIN) 300 MG capsule Take 300 mg by mouth 3 (three) times daily. 300 mg AM and 600 mg PM    glimepiride (AMARYL) 4 MG tablet Take 4 mg by mouth before breakfast.    insulin aspart protamine-insulin aspart (NOVOLOG 70/30) 100 unit/mL (70-30) InPn pen Inject into the skin " "daily as needed (pt states "She told me to just take it whenever my sugar was too high").    levothyroxine (SYNTHROID) 50 MCG tablet Take 50 mcg by mouth once daily.      lisinopril 10 MG tablet Take 10 mg by mouth once daily.     rosuvastatin (CRESTOR) 10 MG tablet Take 1 tablet (10 mg total) by mouth once daily.    spironolactone (ALDACTONE) 25 MG tablet Take 25 mg by mouth every evening.      Review of Systems:  Constitutional: no fever or chills. + Fatigue. + Weight gain  Eyes: no visual changes  Ears, nose, mouth, throat, and face: no nasal congestion or sore throat  Respiratory: see HPI  Cardiovascular: no chest pain or palpitations  Gastrointestinal: no nausea or vomiting, no abdominal pain or change in bowel habits  Genitourinary: no hematuria or dysuria  Integument/breast: no rash or pruritis  Hematologic/lymphatic: no easy bruising or lymphadenopathy  Musculoskeletal: no arthralgias or myalgias  Neurological: no seizures or tremors.  Behavioral/Psych: no auditory or visual hallucinations  Endocrine: no heat or cold intolerance     OBJECTIVE:     Vital Signs (Most Recent)  Temp: 99.3 °F (37.4 °C) (07/02/17 1541)  Pulse: 76 (07/02/17 1541)  Resp: 18 (07/02/17 1541)  BP: (!) 172/77 (07/02/17 1541)  SpO2: (!) 94 % (07/02/17 1541)    Physical Exam:  General appearance: well developed, appears stated age, unkept  Head: normocephalic, atraumatic  Eyes:  conjunctivae/corneas clear. PERRL.  Nose: Nares normal. Septum midline.  Throat: lips, mucosa, and tongue normal; teeth and gums normal, no throat erythema.  Neck: supple, symmetrical, trachea midline,marked JVD;  thyroid not enlarged, symmetric, no tenderness/mass/nodules  Lungs: Decreased breath sounds at lung bases  Chest wall: no tenderness  Heart: regular rate and rhythm, S1, S2 normal, no murmur, click, rub or gallop  Abdomen: soft, non-tender non-distented; bowel sounds normal; no masses,  no organomegaly  Extremities: no cyanosis, clubbing. 2+ pitting " edema.   Pulses: 2+ and symmetric  Skin: Skin color, texture, turgor normal. No rashes or lesions.  Lymph nodes: Cervical, supraclavicular, and axillary nodes normal.  Neurologic: Normal strength and tone. No focal numbness or weakness. CNII-XII intact.      Laboratory:   CBC:     Recent Labs  Lab 07/02/17  0437   WBC 6.10   RBC 4.53   HGB 11.3*   HCT 35.9*      MCV 79*   MCH 24.9*   MCHC 31.4*     CMP:   Recent Labs  Lab 06/29/17  1230  07/02/17  0437   *  < > 134*   CALCIUM 9.2  < > 9.1   ALBUMIN 3.3*  --   --    PROT 7.4  --   --    *  < > 139   K 6.1*  < > 4.4   CO2 30*  < > 31*   CL 95  < > 100   BUN 39*  < > 35*   CREATININE 1.7*  < > 1.3   ALKPHOS 70  --   --    ALT 15  --   --    AST 37  --   --    BILITOT 1.1*  --   --    < > = values in this interval not displayed.  Coagulation:     Recent Labs  Lab 06/29/17  1230   LABPROT 13.5*   INR 1.3*     Cardiac markers:     Recent Labs  Lab 06/30/17  0553 06/30/17  1123   CPKMB 2.3 2.3   TROPONINI 0.102*  --      Microbiology Results (last 7 days)     ** No results found for the last 168 hours. **        Hemoglobin A1C   Date Value Ref Range Status   06/25/2017 10.0 (H) 4.0 - 5.6 % Final     Comment:     According to ADA guidelines, hemoglobin A1c <7.0% represents  optimal control in non-pregnant diabetic patients. Different  metrics may apply to specific patient populations.   Standards of Medical Care in Diabetes-2016.  For the purpose of screening for the presence of diabetes:  <5.7%     Consistent with the absence of diabetes  5.7-6.4%  Consistent with increasing risk for diabetes   (prediabetes)  >or=6.5%  Consistent with diabetes  Currently, no consensus exists for use of hemoglobin A1c  for diagnosis of diabetes for children.  This Hemoglobin A1c assay has significant interference with fetal   hemoglobin   (HbF). The results are invalid for patients with abnormal amounts of   HbF,   including those with known Hereditary Persistence   of  Fetal Hemoglobin. Heterozygous hemoglobin variants (HbAS, HbAC,   HbAD, HbAE, HbA2) do not significantly interfere with this assay;   however, presence of multiple variants in a sample may impact the %   interference.       Microbiology Results (last 7 days)     ** No results found for the last 168 hours. **        Diagnostic Results:  Chest X-Ray: Right basilar opacification consistent with small right pleural effusion and mild infiltrate or atelectasis not significantly changed.    Lexiscan (06-25-17):  1.  Large, chronic anteroseptal infarct.  No evidence for acute myocardial ischemia..  2. Dilated, hypokinetic left ventricle with a moderately reduced ejection fraction of 35%.    Assessment/Plan:     Active Hospital Problems    Diagnosis  POA    *MC (acute kidney injury) [N17.9]  DC lasix.  Start gentle IVF hydration.  Follow BMP.  Resume IV Lasix; suspect pt has significant underlying CKD.    Yes    Hyperkalemia [E87.5]  Tele-monitoring.  Continue IVF.   Patient received Ca gluconate IVPB and Kayexalte in the ER.  Repeat BMP in the evening.  Hold Aldactone.    Chronis Systolic CHF  Consult Dr. STERLING Palomo.  Supplemental O2 via nasal canula; titrate O2 saturation to >92%.  Serial Cardiac enzymes × 3.  Hold lasix and Aldactone.  Monitor electrolytes for hypokalemia and hypomagnesemia.  Daily weights.  Strict I/Os.  Fluid restriction.  Na restriction <2g/d.    Yes    Essential hypertension [I10]  Chronic problem. Will continue chronic medications and monitor for any changes, adjusting as needed.    Yes    CKD (chronic kidney disease) stage 3, GFR 30-59 ml/min [N18.3]  Monitor BUN/SCr.  Monitor I/Os.  Monitor electrolytes.    Yes    Pleural effusion [J90]  Closely follow pleural effusion, no need for thoracentesis at this time.    Yes    Hypothyroid [E03.9]  Chronic problem. Will continue chronic medications and monitor for any changes, adjusting as needed.    Yes    CAD (coronary artery disease)  [I25.10]  Patient with known CAD and monitor for S/Sx of angina/ACS. Continue to monitor on telemetry.    Yes    Controlled type 2 diabetes mellitus with stage 3 chronic kidney disease [E11.22, N18.3]  Check blood glucose level q AC/HS.  Use Novolog Insulin Sliding Scale as needed.   Continue American Diabetic Association 1800 Kcal diet.    Yes           Suspect hypercapnia for the lethargy and AMS; HCO3 ? Contraction may have exacerbated the problem.         Lasix 40 mg iv bid 7/3 on. CXR in am.    VTE Risk Mitigation         Ordered     enoxaparin injection 40 mg  Every 24 hours (non-standard times)     Route:  Subcutaneous        06/29/17 1800     Medium Risk of VTE  Once      06/29/17 1615     Place MARY ELLEN hose  Until discontinued      06/29/17 1615        Dale Palomo MD  CARDIOLOGY  [facility]

## 2017-07-03 LAB
ALLENS TEST: ABNORMAL
ANION GAP SERPL CALC-SCNC: 8 MMOL/L
BASOPHILS # BLD AUTO: 0 K/UL
BASOPHILS NFR BLD: 0.2 %
BUN SERPL-MCNC: 38 MG/DL
CALCIUM SERPL-MCNC: 9.1 MG/DL
CHLORIDE SERPL-SCNC: 99 MMOL/L
CO2 SERPL-SCNC: 28 MMOL/L
CREAT SERPL-MCNC: 1.1 MG/DL
DELSYS: ABNORMAL
DIFFERENTIAL METHOD: ABNORMAL
EOSINOPHIL # BLD AUTO: 0.3 K/UL
EOSINOPHIL NFR BLD: 3.7 %
ERYTHROCYTE [DISTWIDTH] IN BLOOD BY AUTOMATED COUNT: 18.1 %
ERYTHROCYTE [SEDIMENTATION RATE] IN BLOOD BY WESTERGREN METHOD: 18 MM/H
EST. GFR  (AFRICAN AMERICAN): >60 ML/MIN/1.73 M^2
EST. GFR  (NON AFRICAN AMERICAN): 57 ML/MIN/1.73 M^2
FLOW: 2
GLUCOSE SERPL-MCNC: 139 MG/DL
HCO3 UR-SCNC: 30.7 MMOL/L (ref 24–28)
HCT VFR BLD AUTO: 34.6 %
HGB BLD-MCNC: 11 G/DL
LYMPHOCYTES # BLD AUTO: 0.9 K/UL
LYMPHOCYTES NFR BLD: 12.6 %
MCH RBC QN AUTO: 24.9 PG
MCHC RBC AUTO-ENTMCNC: 31.7 %
MCV RBC AUTO: 78 FL
MODE: ABNORMAL
MONOCYTES # BLD AUTO: 0.6 K/UL
MONOCYTES NFR BLD: 9.3 %
NEUTROPHILS # BLD AUTO: 5.1 K/UL
NEUTROPHILS NFR BLD: 74.2 %
PCO2 BLDA: 50.5 MMHG (ref 35–45)
PH SMN: 7.39 [PH] (ref 7.35–7.45)
PLATELET # BLD AUTO: 173 K/UL
PMV BLD AUTO: 9.9 FL
PO2 BLDA: 44 MMHG (ref 80–100)
POC BE: 6 MMOL/L
POC SATURATED O2: 78 % (ref 95–100)
POC TCO2: 32 MMOL/L (ref 23–27)
POCT GLUCOSE: 146 MG/DL (ref 70–110)
POCT GLUCOSE: 196 MG/DL (ref 70–110)
POCT GLUCOSE: 221 MG/DL (ref 70–110)
POCT GLUCOSE: 236 MG/DL (ref 70–110)
POCT GLUCOSE: 275 MG/DL (ref 70–110)
POTASSIUM SERPL-SCNC: 4.6 MMOL/L
RBC # BLD AUTO: 4.42 M/UL
SAMPLE: ABNORMAL
SITE: ABNORMAL
SODIUM SERPL-SCNC: 135 MMOL/L
SP02: 94
WBC # BLD AUTO: 6.9 K/UL

## 2017-07-03 PROCEDURE — 80048 BASIC METABOLIC PNL TOTAL CA: CPT

## 2017-07-03 PROCEDURE — 36415 COLL VENOUS BLD VENIPUNCTURE: CPT

## 2017-07-03 PROCEDURE — 63600175 PHARM REV CODE 636 W HCPCS: Performed by: SPECIALIST

## 2017-07-03 PROCEDURE — 63600175 PHARM REV CODE 636 W HCPCS: Performed by: INTERNAL MEDICINE

## 2017-07-03 PROCEDURE — 36600 WITHDRAWAL OF ARTERIAL BLOOD: CPT

## 2017-07-03 PROCEDURE — 11000001 HC ACUTE MED/SURG PRIVATE ROOM

## 2017-07-03 PROCEDURE — 27000221 HC OXYGEN, UP TO 24 HOURS

## 2017-07-03 PROCEDURE — 85025 COMPLETE CBC W/AUTO DIFF WBC: CPT

## 2017-07-03 PROCEDURE — 63600175 PHARM REV CODE 636 W HCPCS: Performed by: HOSPITALIST

## 2017-07-03 PROCEDURE — 25000003 PHARM REV CODE 250: Performed by: INTERNAL MEDICINE

## 2017-07-03 PROCEDURE — 25000003 PHARM REV CODE 250: Performed by: EMERGENCY MEDICINE

## 2017-07-03 PROCEDURE — 99900035 HC TECH TIME PER 15 MIN (STAT)

## 2017-07-03 PROCEDURE — 94761 N-INVAS EAR/PLS OXIMETRY MLT: CPT

## 2017-07-03 RX ORDER — FUROSEMIDE 10 MG/ML
40 INJECTION INTRAMUSCULAR; INTRAVENOUS DAILY
Status: DISCONTINUED | OUTPATIENT
Start: 2017-07-04 | End: 2017-07-04 | Stop reason: HOSPADM

## 2017-07-03 RX ORDER — FUROSEMIDE 10 MG/ML
40 INJECTION INTRAMUSCULAR; INTRAVENOUS ONCE
Status: COMPLETED | OUTPATIENT
Start: 2017-07-03 | End: 2017-07-03

## 2017-07-03 RX ADMIN — ROSUVASTATIN CALCIUM 10 MG: 5 TABLET, FILM COATED ORAL at 08:07

## 2017-07-03 RX ADMIN — LEVOTHYROXINE SODIUM 50 MCG: 50 TABLET ORAL at 05:07

## 2017-07-03 RX ADMIN — INSULIN ASPART 2 UNITS: 100 INJECTION, SOLUTION INTRAVENOUS; SUBCUTANEOUS at 12:07

## 2017-07-03 RX ADMIN — CITALOPRAM HYDROBROMIDE 20 MG: 10 TABLET ORAL at 08:07

## 2017-07-03 RX ADMIN — GLIMEPIRIDE 4 MG: 2 TABLET ORAL at 05:07

## 2017-07-03 RX ADMIN — ASPIRIN 81 MG: 81 TABLET, COATED ORAL at 08:07

## 2017-07-03 RX ADMIN — CARVEDILOL 25 MG: 25 TABLET, FILM COATED ORAL at 08:07

## 2017-07-03 RX ADMIN — FUROSEMIDE 40 MG: 10 INJECTION, SOLUTION INTRAMUSCULAR; INTRAVENOUS at 08:07

## 2017-07-03 RX ADMIN — GABAPENTIN 300 MG: 300 CAPSULE ORAL at 09:07

## 2017-07-03 RX ADMIN — GABAPENTIN 300 MG: 300 CAPSULE ORAL at 05:07

## 2017-07-03 RX ADMIN — GABAPENTIN 300 MG: 300 CAPSULE ORAL at 02:07

## 2017-07-03 RX ADMIN — INSULIN ASPART 1 UNITS: 100 INJECTION, SOLUTION INTRAVENOUS; SUBCUTANEOUS at 09:07

## 2017-07-03 RX ADMIN — INSULIN ASPART 2 UNITS: 100 INJECTION, SOLUTION INTRAVENOUS; SUBCUTANEOUS at 05:07

## 2017-07-03 RX ADMIN — FUROSEMIDE 40 MG: 10 INJECTION, SOLUTION INTRAMUSCULAR; INTRAVENOUS at 05:07

## 2017-07-03 RX ADMIN — CARVEDILOL 25 MG: 25 TABLET, FILM COATED ORAL at 09:07

## 2017-07-03 RX ADMIN — ENOXAPARIN SODIUM 40 MG: 100 INJECTION SUBCUTANEOUS at 07:07

## 2017-07-03 RX ADMIN — PANTOPRAZOLE SODIUM 40 MG: 40 TABLET, DELAYED RELEASE ORAL at 08:07

## 2017-07-03 NOTE — CONSULTS
Education completed with patient and  for CHF regarding dietary, S/S of exacerbation, when to call MD, medication compliance, and daily weights. Given [facility] education print outs for CHF.

## 2017-07-03 NOTE — PROGRESS NOTES
ABG's done per respiratory therapy. (Pt had order in computer for ABG's at midnight.)  Po2=44, PCo2=50.5, PH=7.39, HCO3=30.7 sat 78%.  Pt on 2 liters NC with capillary o2sat of 93%.  She is in no distress and wakes to speech.  Results of ABG's called to Dr. Palomo.  No new orders given.  MD states to just keep pt on the oxygen.

## 2017-07-03 NOTE — PROGRESS NOTES
07/02/17 2106   Patient Assessment/Suction   Level of Consciousness (AVPU) alert   Respiratory Effort Unlabored   PRE-TX-O2-ETCO2   O2 Device (Oxygen Therapy) nasal cannula   $ Is the patient on Oxygen? Yes   Flow (L/min) 2   SpO2 (!) 94 %   Pulse Oximetry Type Intermittent   $ Pulse Oximetry - Multiple Charge Pulse Oximetry - Multiple   Pulse 76   Resp 16   Aerosol Therapy   $ Aerosol Therapy Charges PRN treatment not required

## 2017-07-03 NOTE — PLAN OF CARE
Problem: Patient Care Overview  Goal: Plan of Care Review  No acute repiratory disress noted  Abnormal ABG's; discussed results with Dr. Palomo  Reminded of need for accurate intake and output  Daily weight

## 2017-07-03 NOTE — PLAN OF CARE
Problem: Patient Care Overview  Goal: Plan of Care Review  Outcome: Ongoing (interventions implemented as appropriate)  Pt lying in bed, respirations even and unlabored, no acute distress noted.  Respiratory treatments continued.  She ambulated to and from bathroom without assistance.  Side rails up times two, bed low and locked, call light within reach.

## 2017-07-03 NOTE — PROGRESS NOTES
Abg done per Dr Palomo order.   Ph 7.39  Pco2 50.5  Po2 44  Be 6  Hco3 30  Sats 78%    Results read back and verified to Kyle Smith.

## 2017-07-03 NOTE — PLAN OF CARE
Problem: Patient Care Overview  Goal: Plan of Care Review  Outcome: Ongoing (interventions implemented as appropriate)  Pt on 2L NC with Q6 PRN albuterol, no treatment needed at this time

## 2017-07-03 NOTE — PROGRESS NOTES
07/03/17 0118   Labs   $ Was an ABG obtained? Arterial Puncture   $ Labs Tech Time 15 min   Critical Value Communication   Notified Physician/Designee rn Luisana barger   Date Result Received 07/03/17   Time Result Received 0122   Resulting Department of Critical Value resp    Who communicated critical value from resulting department? dariela billy RRT   Critical Test #1 ph   Critical Test #1 Result 7.39   Critical Test #2 Co2   Critical Test #2 Result 50.5   Critical Test #3  Po2   Critical Test #3 Result 44   Critical Test #4 Be   Critical Test #4 Result 6   Critical Test #5 Hco3   Critical Test #5 Result 30

## 2017-07-04 VITALS
RESPIRATION RATE: 20 BRPM | DIASTOLIC BLOOD PRESSURE: 84 MMHG | BODY MASS INDEX: 38.02 KG/M2 | OXYGEN SATURATION: 94 % | TEMPERATURE: 99 F | HEART RATE: 70 BPM | SYSTOLIC BLOOD PRESSURE: 147 MMHG | HEIGHT: 66 IN | WEIGHT: 236.56 LBS

## 2017-07-04 LAB
ANION GAP SERPL CALC-SCNC: 9 MMOL/L
BASOPHILS # BLD AUTO: 0 K/UL
BASOPHILS NFR BLD: 0.7 %
BUN SERPL-MCNC: 36 MG/DL
CALCIUM SERPL-MCNC: 9.3 MG/DL
CHLORIDE SERPL-SCNC: 98 MMOL/L
CO2 SERPL-SCNC: 30 MMOL/L
CREAT SERPL-MCNC: 1.1 MG/DL
DIFFERENTIAL METHOD: ABNORMAL
EOSINOPHIL # BLD AUTO: 0.3 K/UL
EOSINOPHIL NFR BLD: 5.2 %
ERYTHROCYTE [DISTWIDTH] IN BLOOD BY AUTOMATED COUNT: 18 %
EST. GFR  (AFRICAN AMERICAN): >60 ML/MIN/1.73 M^2
EST. GFR  (NON AFRICAN AMERICAN): 57 ML/MIN/1.73 M^2
GLUCOSE SERPL-MCNC: 208 MG/DL
HCT VFR BLD AUTO: 35.3 %
HGB BLD-MCNC: 11.2 G/DL
LYMPHOCYTES # BLD AUTO: 0.9 K/UL
LYMPHOCYTES NFR BLD: 16.4 %
MCH RBC QN AUTO: 24.9 PG
MCHC RBC AUTO-ENTMCNC: 31.9 %
MCV RBC AUTO: 78 FL
MONOCYTES # BLD AUTO: 0.6 K/UL
MONOCYTES NFR BLD: 11.4 %
NEUTROPHILS # BLD AUTO: 3.6 K/UL
NEUTROPHILS NFR BLD: 66.3 %
PLATELET # BLD AUTO: 186 K/UL
PMV BLD AUTO: 10.4 FL
POCT GLUCOSE: 239 MG/DL (ref 70–110)
POTASSIUM SERPL-SCNC: 4.3 MMOL/L
RBC # BLD AUTO: 4.51 M/UL
SODIUM SERPL-SCNC: 137 MMOL/L
WBC # BLD AUTO: 5.4 K/UL

## 2017-07-04 PROCEDURE — 36415 COLL VENOUS BLD VENIPUNCTURE: CPT

## 2017-07-04 PROCEDURE — 63600175 PHARM REV CODE 636 W HCPCS: Performed by: HOSPITALIST

## 2017-07-04 PROCEDURE — 25000003 PHARM REV CODE 250: Performed by: EMERGENCY MEDICINE

## 2017-07-04 PROCEDURE — 99900035 HC TECH TIME PER 15 MIN (STAT)

## 2017-07-04 PROCEDURE — 85025 COMPLETE CBC W/AUTO DIFF WBC: CPT

## 2017-07-04 PROCEDURE — 25000003 PHARM REV CODE 250: Performed by: INTERNAL MEDICINE

## 2017-07-04 PROCEDURE — 94761 N-INVAS EAR/PLS OXIMETRY MLT: CPT

## 2017-07-04 PROCEDURE — 80048 BASIC METABOLIC PNL TOTAL CA: CPT

## 2017-07-04 PROCEDURE — 25000003 PHARM REV CODE 250: Performed by: SPECIALIST

## 2017-07-04 PROCEDURE — 27000221 HC OXYGEN, UP TO 24 HOURS

## 2017-07-04 RX ORDER — FUROSEMIDE 40 MG/1
TABLET ORAL
Qty: 30 TABLET | Refills: 0 | Status: SHIPPED | OUTPATIENT
Start: 2017-07-04 | End: 2017-07-04

## 2017-07-04 RX ORDER — LISINOPRIL 2.5 MG/1
5 TABLET ORAL DAILY
Status: DISCONTINUED | OUTPATIENT
Start: 2017-07-04 | End: 2017-07-04 | Stop reason: HOSPADM

## 2017-07-04 RX ORDER — LISINOPRIL 2.5 MG/1
2.5 TABLET ORAL DAILY
Qty: 30 TABLET | Refills: 2 | Status: ON HOLD | OUTPATIENT
Start: 2017-07-04 | End: 2017-11-27 | Stop reason: HOSPADM

## 2017-07-04 RX ORDER — FUROSEMIDE 40 MG/1
TABLET ORAL
Qty: 30 TABLET | Refills: 0 | Status: ON HOLD | OUTPATIENT
Start: 2017-07-04 | End: 2017-11-27 | Stop reason: HOSPADM

## 2017-07-04 RX ADMIN — INSULIN ASPART 2 UNITS: 100 INJECTION, SOLUTION INTRAVENOUS; SUBCUTANEOUS at 11:07

## 2017-07-04 RX ADMIN — ASPIRIN 81 MG: 81 TABLET, COATED ORAL at 08:07

## 2017-07-04 RX ADMIN — LEVOTHYROXINE SODIUM 50 MCG: 50 TABLET ORAL at 05:07

## 2017-07-04 RX ADMIN — CITALOPRAM HYDROBROMIDE 20 MG: 10 TABLET ORAL at 08:07

## 2017-07-04 RX ADMIN — ROSUVASTATIN CALCIUM 10 MG: 5 TABLET, FILM COATED ORAL at 08:07

## 2017-07-04 RX ADMIN — GABAPENTIN 300 MG: 300 CAPSULE ORAL at 05:07

## 2017-07-04 RX ADMIN — GABAPENTIN 300 MG: 300 CAPSULE ORAL at 01:07

## 2017-07-04 RX ADMIN — GLIMEPIRIDE 4 MG: 2 TABLET ORAL at 05:07

## 2017-07-04 RX ADMIN — PANTOPRAZOLE SODIUM 40 MG: 40 TABLET, DELAYED RELEASE ORAL at 08:07

## 2017-07-04 RX ADMIN — INSULIN ASPART 2 UNITS: 100 INJECTION, SOLUTION INTRAVENOUS; SUBCUTANEOUS at 05:07

## 2017-07-04 RX ADMIN — LISINOPRIL 5 MG: 2.5 TABLET ORAL at 11:07

## 2017-07-04 RX ADMIN — FUROSEMIDE 40 MG: 10 INJECTION, SOLUTION INTRAMUSCULAR; INTRAVENOUS at 08:07

## 2017-07-04 RX ADMIN — CARVEDILOL 25 MG: 25 TABLET, FILM COATED ORAL at 08:07

## 2017-07-04 NOTE — PLAN OF CARE
07/04/17 1333   Final Note   Assessment Type Final Discharge Note   Discharge Disposition Home-Health   Discharge planning education complete? Yes

## 2017-07-04 NOTE — PROGRESS NOTES
07/03/17 2019   Patient Assessment/Suction   Level of Consciousness (AVPU) alert   Respiratory Effort Unlabored   PRE-TX-O2-ETCO2   O2 Device (Oxygen Therapy) nasal cannula w/ humidification   $ Is the patient on Oxygen? Yes   Flow (L/min) 2   SpO2 (!) 94 %   Pulse Oximetry Type Continuous  (setup at this time )   SpO2 Alarm Limit Low 90   SpO2 Alarm Limit High 100   Probe Placed On (Pulse Ox) Left:;finger   Oximetry Probe Site Applied;Assessed;Intact

## 2017-07-04 NOTE — PLAN OF CARE
Problem: Patient Care Overview  Goal: Plan of Care Review  Outcome: Ongoing (interventions implemented as appropriate)  POC reviewed with pt, verbalized understanding  Spouse at bedside all shift  NAD noted  Vs obtained  Pt ambulated to BR  SR on tele, HR 68  Denies sob, pain  Free of fall or injury  Call light and personal belongings at bedside  Bed in lowest position, wheels locked  Side rails up X2  Call light in reach  Will continue to monitor

## 2017-07-04 NOTE — PLAN OF CARE
CM met with patient and spouse at bedside to address social service consult order. Patient wants to go home with HH and plans to use [facility], patient choice disclosure signed and placed in blue folder.  Patient also requesting a portable oxygen tank for home.     1250pm CM called in referral for HH to [facility], faxed H&P, AVS, orders and consult to [phone].    100pm CM received call back from DELMY Castillo RN , nurse on call for [facility] and they will accept patient and admit to HH tomorrow.     120pm CM informed patient that HH is set up and floor nurse notified also

## 2017-07-04 NOTE — PROGRESS NOTES
Progress Note  Cardiology    Admit Date: 6/29/2017   LOS: 5 days     Follow-up For:  Chf; arf; ams.    Scheduled Meds:   aspirin  81 mg Oral Daily    carvedilol  25 mg Oral BID    citalopram  20 mg Oral Daily    enoxaparin  40 mg Subcutaneous Q24H    furosemide  40 mg Intravenous Daily    gabapentin  300 mg Oral TID    glimepiride  4 mg Oral Before breakfast    levothyroxine  50 mcg Oral Before breakfast    lisinopril  5 mg Oral Daily    pantoprazole  40 mg Oral Daily    rosuvastatin  10 mg Oral Daily     Continuous Infusions:   PRN Meds:acetaminophen, albuterol sulfate, dextrose 50%, dextrose 50%, glucagon (human recombinant), glucose, glucose, insulin aspart, ondansetron, senna-docusate 8.6-50 mg    Review of patient's allergies indicates:  No Known Allergies    SUBJECTIVE:     Interval History: Patient has no complaint of sob or cp.    Review of Systems  Respiratory: positive for cough, negative for sputum and wheezing  Cardiovascular: negative for chest pressure/discomfort, orthopnea, palpitations and paroxysmal nocturnal dyspnea    OBJECTIVE:     Vital Signs (Most Recent)  Temp: 98.4 °F (36.9 °C) (07/04/17 0720)  Pulse: 74 (07/04/17 0759)  Resp: 18 (07/04/17 0759)  BP: (!) 145/86 (07/04/17 0720)  SpO2: 96 % (07/04/17 0759)    Vital Signs Range (Last 24H):  Temp:  [97.1 °F (36.2 °C)-98.4 °F (36.9 °C)]   Pulse:  [68-78]   Resp:  [18]   BP: (145-167)/()   SpO2:  [92 %-98 %]       Physical Exam:  Neck: no carotid bruit and supple, symmetrical, trachea midline  Lungs: diminished breath sounds RLL  Heart: regular rate and rhythm, S1, S2 normal, no murmur, click, rub or gallop  Abdomen: soft, non-tender; bowel sounds normal; no masses,  no organomegaly  Extremities: Positive for: edema 2+ and pitting bilaterally    Recent Results (from the past 24 hour(s))   POCT glucose    Collection Time: 07/03/17 10:54 AM   Result Value Ref Range    POCT Glucose 221 (H) 70 - 110 mg/dL   POCT glucose    Collection  Time: 07/03/17  5:05 PM   Result Value Ref Range    POCT Glucose 236 (H) 70 - 110 mg/dL   POCT glucose    Collection Time: 07/03/17  9:13 PM   Result Value Ref Range    POCT Glucose 275 (H) 70 - 110 mg/dL   CBC auto differential    Collection Time: 07/04/17  4:20 AM   Result Value Ref Range    WBC 5.40 3.90 - 12.70 K/uL    RBC 4.51 4.00 - 5.40 M/uL    Hemoglobin 11.2 (L) 12.0 - 16.0 g/dL    Hematocrit 35.3 (L) 37.0 - 48.5 %    MCV 78 (L) 82 - 98 fL    MCH 24.9 (L) 27.0 - 31.0 pg    MCHC 31.9 (L) 32.0 - 36.0 %    RDW 18.0 (H) 11.5 - 14.5 %    Platelets 186 150 - 350 K/uL    MPV 10.4 9.2 - 12.9 fL    Gran # 3.6 1.8 - 7.7 K/uL    Lymph # 0.9 (L) 1.0 - 4.8 K/uL    Mono # 0.6 0.3 - 1.0 K/uL    Eos # 0.3 0.0 - 0.5 K/uL    Baso # 0.00 0.00 - 0.20 K/uL    Gran% 66.3 38.0 - 73.0 %    Lymph% 16.4 (L) 18.0 - 48.0 %    Mono% 11.4 4.0 - 15.0 %    Eosinophil% 5.2 0.0 - 8.0 %    Basophil% 0.7 0.0 - 1.9 %    Differential Method Automated    Basic metabolic panel    Collection Time: 07/04/17  4:20 AM   Result Value Ref Range    Sodium 137 136 - 145 mmol/L    Potassium 4.3 3.5 - 5.1 mmol/L    Chloride 98 95 - 110 mmol/L    CO2 30 (H) 23 - 29 mmol/L    Glucose 208 (H) 70 - 110 mg/dL    BUN, Bld 36 (H) 6 - 20 mg/dL    Creatinine 1.1 0.5 - 1.4 mg/dL    Calcium 9.3 8.7 - 10.5 mg/dL    Anion Gap 9 8 - 16 mmol/L    eGFR if African American >60 >60 mL/min/1.73 m^2    eGFR if non African American 57 (A) >60 mL/min/1.73 m^2       Diagnostic Results:  Labs: Reviewed  X-Ray: Reviewed    ASSESSMENT/PLAN:     No significant weight loss. Clinically better. Resume lisinopril at a lower dose.  Home am.

## 2017-07-04 NOTE — PLAN OF CARE
Problem: Patient Care Overview  Goal: Plan of Care Review  Aerosol Q 6 prn not needed at this time Nc 3 lpm

## 2017-07-04 NOTE — HOSPITAL COURSE
Admitted with mc-lasix and aldactone and aceI held. Pt with known systolic and diastolic hf(no echo available here) and became volume overloaded so lasix resumed. MC resolved and renal function returned to normal -lasix decreased to once daily, lisinopril resumed at lower dose(2.5 from 10 mg) and aldactone not resumed. HH to follow and monitor labs 2x weekly and report to Dr KANCHAN Maldonado her cardiologist. She will also fu with him outpt and fu with her pcp. In next 1-2 weeks.   She will continue home oxygen and portable o2 ordered.   She has known Cad s/p CaBG and resumed asa statin and b blocker.

## 2017-07-04 NOTE — PROGRESS NOTES
Progress Note  Hospital Medicine  Patient Name:Juliane Ramos  MRN:  [id number]  Patient Class: IP- Inpatient  Admit Date: 6/29/2017  Length of Stay: 4 days  Expected Discharge Date:   Attending Physician: Reg Devine MD  Primary Care Provider:  JOS Dumont    SUBJECTIVE:     Principal Problem: MC (acute kidney injury)  Initial history of present illness: Patient is a 57 y.o. female admitted to Hospitalist Service from [facility] Room with complaint of worsening SOB for 1 day. Patient reportedly has past medical history significant for CAD, HTN, DM, MI, Systolic CHF, COPD, on 3 liters home O2. Patient presented with complaint of fatigue. This morning, the patient was napping without her home oxygen and her  had difficulty waking her up, so he called an ambulance. On EMS arrival, the patient stated that she became more responsive. The patient stated that she experienced an episode of fecal incontinence while napping this morning, but that her daughter cleaned her up. The patient also reported that she was staggering when walking to the bathroom this morning after waking from the nap. The patient took her oxygen off when napping this morning because her granddaughter was scared of the tubing. At baseline, her oxygen saturations drop into the 70s when she is not wearing it. The patient was recently admitted to the hospital on 6/23/2017 for an acute CHF exacerbation. She describes 40 pound weight gain from March to May, but states she lost 20 pounds during her admission. Patient was taking lasix 60 mg PO q day. Patient was recently hospiatlized from 06-23-17 to 06-27-17 related to acute systolic CHF exacerbation. Patient has an known EF of 35%, under care by Dr. Palomo. Patient denied chest pain, abdominal pain, nausea, vomiting, headache, vision changes, focal neuro-deficits, cough or fever.    PMH/PSH/SH/FH/Meds: reviewed.    Symptoms/Review of Systems:  No acute events overnight.  Feels the same today, good. No shortness of breath, cough, chest pain or headache, fever or abdominal pain.     Diet:  Adequate intake.    Activity level: Normal.    Pain:  Patient reports no pain.       OBJECTIVE:   Vital Signs (Most Recent):      Temp: 98 °F (36.7 °C) (07/03/17 1700)  Pulse: 68 (07/03/17 1700)  Resp: 18 (07/03/17 1700)  BP: (!) 167/100 (07/03/17 1700)  SpO2: 98 % (07/03/17 1700)       Vital Signs Range (Last 24H):  Temp:  [97.1 °F (36.2 °C)-98.5 °F (36.9 °C)]   Pulse:  [68-79]   Resp:  [16-20]   BP: (124-167)/()   SpO2:  [90 %-98 %]     Weight: 108.5 kg (239 lb 3.2 oz)  Body mass index is 38.61 kg/m².    Intake/Output Summary (Last 24 hours) at 07/03/17 1905  Last data filed at 07/03/17 1500   Gross per 24 hour   Intake              900 ml   Output             1350 ml   Net             -450 ml     Physical Examination:  General appearance: well developed, appears stated age  Head: normocephalic, atraumatic  Eyes:  conjunctivae/corneas clear. PERRL.  Nose: Nares normal. Septum midline.  Throat: lips, mucosa, and tongue normal; teeth and gums normal, no throat erythema.  Neck: supple, symmetrical, trachea midline, no JVD and thyroid not enlarged, symmetric, no tenderness/mass/nodules  Lungs: Good air movement bilaterally. CTAB  Chest wall: no tenderness  Heart: regular rate and rhythm, S1, S2 normal, no murmur, click, rub or gallop  Abdomen: soft, non-tender non-distented; bowel sounds normal; no masses,  no organomegaly  Extremities: no cyanosis, clubbing. 1+ pitting edema bilaterally.   Pulses: 2+ and symmetric  Skin: Skin color, texture, turgor normal. No rashes or lesions.    CBC:    Recent Labs  Lab 07/01/17  0423 07/02/17  0437 07/03/17  0459   WBC 6.20 6.10 6.90   RBC 4.64 4.53 4.42   HGB 11.6* 11.3* 11.0*   HCT 36.6* 35.9* 34.6*    167 173   MCV 79* 79* 78*   MCH 25.0* 24.9* 24.9*   MCHC 31.7* 31.4* 31.7*   BMP    Recent Labs  Lab 07/01/17  0423 07/02/17  0437 07/03/17  0453    * 134* 139*    139 135*   K 4.1 4.4 4.6   CL 97 100 99   CO2 30* 31* 28   BUN 38* 35* 38*   CREATININE 1.2 1.3 1.1   CALCIUM 9.1 9.1 9.1        Diagnostic Results:   Chest X-Ray: Right basilar opacification consistent with small right pleural effusion and mild infiltrate or atelectasis not significantly changed.     Lexiscan (06-25-17):  1.  Large, chronic anteroseptal infarct.  No evidence for acute myocardial ischemia..  2. Dilated, hypokinetic left ventricle with a moderately reduced ejection fraction of 35%.    Assessment/Plan:      *MC (acute kidney injury) [N17.9] - improving   Estimated Creatinine Clearance: 72.1 mL/min (based on Cr of 1.1).   Hold IVFs  Follow BMP.improving. Increase diureses       Yes    Hyperkalemia [E87.5] - resolved      Chronic combined CHF- acute on chronic  BNP more elevated  Last echo with EF of 25% w DD. Supplemental O2 via nasal canula; titrate O2 saturation to >92%.  Follow cardiology recommendations- awaiting consult  Lasix IV daily intiated  Monitor electrolytes for hypokalemia and hypomagnesemia.  Daily weights.  Strict I/Os.  Fluid restriction.  Na restriction <2g/d.  HH on dischart   Overnight pulse oximetry as was noted hypoxia early am      Yes    Essential hypertension [I10]  Stable. Chronic problem. Will continue chronic medications and monitor for any changes, adjusting as needed.     Yes    CKD (chronic kidney disease) stage 3, GFR 30-59 ml/min [N18.3]   CKD (chronic kidney disease) stage 3, GFR 30-59 ml/min renal dose all meds; no nephrotoxic agents          Yes    Pleural effusion [J90]  Stable. Closely follow pleural effusion, no need for thoracentesis at this time.  cxr showed persistent effusion-increase lasix and monitor   Likely cause of hypoxia with increased chf due to hypoxia at home       Yes    Hypothyroid [E03.9]  Chronic problem. Stable. Will continue chronic medications and monitor for any changes, adjusting as needed.  No results  found for: TSH     Yes    CAD (coronary artery disease) [I25.10]  Patient with known CAD and monitor for S/Sx of angina/ACS. Continue to monitor on telemetry.  On asa, statin  .     Yes    Uncontrolled type 2 diabetes mellitus with stage 3 chronic kidney disease [E11.22, N18.3]  A1C 10, 6 days ago. BS has been stable. Check blood glucose level q AC/HS.  Use Novolog Insulin Sliding Scale as needed.   Continue American Diabetic Association 1800 Kcal diet.  Diabetic education    Yes           VTE Risk Mitigation         Ordered     enoxaparin injection 40 mg  Every 24 hours (non-standard times)     Route:  Subcutaneous        06/29/17 1800     Medium Risk of VTE  Once      06/29/17 1615     Place MARY ELLEN hose  Until discontinued      06/29/17 1615      dispo-home with theresa murguia -REFUGIO consulted     Loli Padron MD  Department of Hospital Medicine   [facility]

## 2017-11-18 ENCOUNTER — HOSPITAL ENCOUNTER (INPATIENT)
Facility: HOSPITAL | Age: 57
LOS: 6 days | Discharge: ANOTHER HOSPITAL | DRG: 637 | End: 2017-11-24
Attending: EMERGENCY MEDICINE | Admitting: INTERNAL MEDICINE
Payer: MEDICARE

## 2017-11-18 DIAGNOSIS — E11.22 CONTROLLED TYPE 2 DIABETES MELLITUS WITH STAGE 3 CHRONIC KIDNEY DISEASE, UNSPECIFIED LONG TERM INSULIN USE STATUS: ICD-10-CM

## 2017-11-18 DIAGNOSIS — N18.30 CKD (CHRONIC KIDNEY DISEASE) STAGE 3, GFR 30-59 ML/MIN: ICD-10-CM

## 2017-11-18 DIAGNOSIS — E03.9 HYPOTHYROIDISM, UNSPECIFIED TYPE: ICD-10-CM

## 2017-11-18 DIAGNOSIS — N18.3 CONTROLLED TYPE 2 DIABETES MELLITUS WITH STAGE 3 CHRONIC KIDNEY DISEASE, UNSPECIFIED LONG TERM INSULIN USE STATUS: ICD-10-CM

## 2017-11-18 DIAGNOSIS — R06.02 SHORTNESS OF BREATH: ICD-10-CM

## 2017-11-18 DIAGNOSIS — I10 ESSENTIAL HYPERTENSION: ICD-10-CM

## 2017-11-18 DIAGNOSIS — E11.622 DIABETIC LEG ULCER: Primary | ICD-10-CM

## 2017-11-18 DIAGNOSIS — I50.9 CHF (CONGESTIVE HEART FAILURE): ICD-10-CM

## 2017-11-18 DIAGNOSIS — L97.909 DIABETIC LEG ULCER: Primary | ICD-10-CM

## 2017-11-18 LAB
ANION GAP SERPL CALC-SCNC: 10 MMOL/L
BASOPHILS # BLD AUTO: 0 K/UL
BASOPHILS NFR BLD: 0.3 %
BUN SERPL-MCNC: 38 MG/DL
CALCIUM SERPL-MCNC: 9 MG/DL
CHLORIDE SERPL-SCNC: 98 MMOL/L
CO2 SERPL-SCNC: 27 MMOL/L
CREAT SERPL-MCNC: 1.4 MG/DL
CRP SERPL-MCNC: 83.9 MG/L
DIFFERENTIAL METHOD: ABNORMAL
EOSINOPHIL # BLD AUTO: 0.2 K/UL
EOSINOPHIL NFR BLD: 2.7 %
ERYTHROCYTE [DISTWIDTH] IN BLOOD BY AUTOMATED COUNT: 18.4 %
EST. GFR  (AFRICAN AMERICAN): 49 ML/MIN/1.73 M^2
EST. GFR  (NON AFRICAN AMERICAN): 42 ML/MIN/1.73 M^2
GLUCOSE SERPL-MCNC: 166 MG/DL
HCT VFR BLD AUTO: 32.4 %
HGB BLD-MCNC: 10.3 G/DL
LYMPHOCYTES # BLD AUTO: 0.8 K/UL
LYMPHOCYTES NFR BLD: 9.1 %
MCH RBC QN AUTO: 24.6 PG
MCHC RBC AUTO-ENTMCNC: 31.8 G/DL
MCV RBC AUTO: 77 FL
MONOCYTES # BLD AUTO: 1 K/UL
MONOCYTES NFR BLD: 10.8 %
NEUTROPHILS # BLD AUTO: 7.1 K/UL
NEUTROPHILS NFR BLD: 77.1 %
PLATELET # BLD AUTO: 228 K/UL
PMV BLD AUTO: 9.4 FL
POCT GLUCOSE: 183 MG/DL (ref 70–110)
POCT GLUCOSE: 211 MG/DL (ref 70–110)
POTASSIUM SERPL-SCNC: 5.2 MMOL/L
RBC # BLD AUTO: 4.19 M/UL
SODIUM SERPL-SCNC: 135 MMOL/L
WBC # BLD AUTO: 9.2 K/UL

## 2017-11-18 PROCEDURE — 85025 COMPLETE CBC W/AUTO DIFF WBC: CPT

## 2017-11-18 PROCEDURE — 80048 BASIC METABOLIC PNL TOTAL CA: CPT

## 2017-11-18 PROCEDURE — 94640 AIRWAY INHALATION TREATMENT: CPT

## 2017-11-18 PROCEDURE — 83036 HEMOGLOBIN GLYCOSYLATED A1C: CPT

## 2017-11-18 PROCEDURE — 63600175 PHARM REV CODE 636 W HCPCS: Performed by: EMERGENCY MEDICINE

## 2017-11-18 PROCEDURE — 96366 THER/PROPH/DIAG IV INF ADDON: CPT

## 2017-11-18 PROCEDURE — 36415 COLL VENOUS BLD VENIPUNCTURE: CPT

## 2017-11-18 PROCEDURE — 25000003 PHARM REV CODE 250: Performed by: EMERGENCY MEDICINE

## 2017-11-18 PROCEDURE — 96367 TX/PROPH/DG ADDL SEQ IV INF: CPT

## 2017-11-18 PROCEDURE — 96365 THER/PROPH/DIAG IV INF INIT: CPT

## 2017-11-18 PROCEDURE — 86140 C-REACTIVE PROTEIN: CPT

## 2017-11-18 PROCEDURE — 87040 BLOOD CULTURE FOR BACTERIA: CPT | Mod: 59

## 2017-11-18 PROCEDURE — 25000003 PHARM REV CODE 250: Performed by: INTERNAL MEDICINE

## 2017-11-18 PROCEDURE — 99285 EMERGENCY DEPT VISIT HI MDM: CPT

## 2017-11-18 PROCEDURE — 25000242 PHARM REV CODE 250 ALT 637 W/ HCPCS: Performed by: INTERNAL MEDICINE

## 2017-11-18 PROCEDURE — 63600175 PHARM REV CODE 636 W HCPCS: Performed by: INTERNAL MEDICINE

## 2017-11-18 PROCEDURE — 12000002 HC ACUTE/MED SURGE SEMI-PRIVATE ROOM

## 2017-11-18 PROCEDURE — 99223 1ST HOSP IP/OBS HIGH 75: CPT | Mod: ,,, | Performed by: INTERNAL MEDICINE

## 2017-11-18 RX ORDER — LEVOTHYROXINE SODIUM 50 UG/1
50 TABLET ORAL DAILY
Status: DISCONTINUED | OUTPATIENT
Start: 2017-11-19 | End: 2017-11-24 | Stop reason: HOSPADM

## 2017-11-18 RX ORDER — GABAPENTIN 300 MG/1
300 CAPSULE ORAL 3 TIMES DAILY
Status: DISCONTINUED | OUTPATIENT
Start: 2017-11-18 | End: 2017-11-24 | Stop reason: HOSPADM

## 2017-11-18 RX ORDER — ALBUTEROL SULFATE 2.5 MG/.5ML
1.25 SOLUTION RESPIRATORY (INHALATION) EVERY 6 HOURS PRN
Status: DISCONTINUED | OUTPATIENT
Start: 2017-11-18 | End: 2017-11-21

## 2017-11-18 RX ORDER — ENOXAPARIN SODIUM 100 MG/ML
40 INJECTION SUBCUTANEOUS EVERY 24 HOURS
Status: DISCONTINUED | OUTPATIENT
Start: 2017-11-18 | End: 2017-11-18

## 2017-11-18 RX ORDER — ACETAMINOPHEN 325 MG/1
650 TABLET ORAL EVERY 6 HOURS PRN
Status: DISCONTINUED | OUTPATIENT
Start: 2017-11-18 | End: 2017-11-24 | Stop reason: HOSPADM

## 2017-11-18 RX ORDER — ENOXAPARIN SODIUM 100 MG/ML
30 INJECTION SUBCUTANEOUS EVERY 24 HOURS
Status: DISCONTINUED | OUTPATIENT
Start: 2017-11-18 | End: 2017-11-19

## 2017-11-18 RX ORDER — ALBUTEROL SULFATE 2.5 MG/.5ML
1.25 SOLUTION RESPIRATORY (INHALATION) EVERY 6 HOURS PRN
Status: DISCONTINUED | OUTPATIENT
Start: 2017-11-18 | End: 2017-11-18

## 2017-11-18 RX ORDER — FUROSEMIDE 10 MG/ML
20 INJECTION INTRAMUSCULAR; INTRAVENOUS ONCE
Status: COMPLETED | OUTPATIENT
Start: 2017-11-18 | End: 2017-11-18

## 2017-11-18 RX ORDER — ROSUVASTATIN CALCIUM 5 MG/1
10 TABLET, COATED ORAL DAILY
Status: DISCONTINUED | OUTPATIENT
Start: 2017-11-19 | End: 2017-11-24 | Stop reason: HOSPADM

## 2017-11-18 RX ORDER — LISINOPRIL 2.5 MG/1
2.5 TABLET ORAL DAILY
Status: DISCONTINUED | OUTPATIENT
Start: 2017-11-19 | End: 2017-11-22

## 2017-11-18 RX ORDER — CARVEDILOL 25 MG/1
25 TABLET ORAL 2 TIMES DAILY
Status: DISCONTINUED | OUTPATIENT
Start: 2017-11-18 | End: 2017-11-24 | Stop reason: HOSPADM

## 2017-11-18 RX ORDER — CITALOPRAM 10 MG/1
20 TABLET ORAL DAILY
Status: DISCONTINUED | OUTPATIENT
Start: 2017-11-19 | End: 2017-11-24 | Stop reason: HOSPADM

## 2017-11-18 RX ORDER — IBUPROFEN 200 MG
16 TABLET ORAL
Status: DISCONTINUED | OUTPATIENT
Start: 2017-11-18 | End: 2017-11-24 | Stop reason: HOSPADM

## 2017-11-18 RX ORDER — IBUPROFEN 200 MG
24 TABLET ORAL
Status: DISCONTINUED | OUTPATIENT
Start: 2017-11-18 | End: 2017-11-24 | Stop reason: HOSPADM

## 2017-11-18 RX ORDER — IPRATROPIUM BROMIDE AND ALBUTEROL SULFATE 2.5; .5 MG/3ML; MG/3ML
3 SOLUTION RESPIRATORY (INHALATION) EVERY 4 HOURS
Status: DISCONTINUED | OUTPATIENT
Start: 2017-11-18 | End: 2017-11-24 | Stop reason: HOSPADM

## 2017-11-18 RX ORDER — SODIUM CHLORIDE 0.9 % (FLUSH) 0.9 %
5 SYRINGE (ML) INJECTION
Status: DISCONTINUED | OUTPATIENT
Start: 2017-11-18 | End: 2017-11-24 | Stop reason: HOSPADM

## 2017-11-18 RX ORDER — FUROSEMIDE 40 MG/1
40 TABLET ORAL DAILY
Status: DISCONTINUED | OUTPATIENT
Start: 2017-11-19 | End: 2017-11-18

## 2017-11-18 RX ORDER — ASPIRIN 81 MG/1
81 TABLET ORAL DAILY
Status: DISCONTINUED | OUTPATIENT
Start: 2017-11-19 | End: 2017-11-24

## 2017-11-18 RX ORDER — GLUCAGON 1 MG
1 KIT INJECTION
Status: DISCONTINUED | OUTPATIENT
Start: 2017-11-18 | End: 2017-11-24 | Stop reason: HOSPADM

## 2017-11-18 RX ORDER — IPRATROPIUM BROMIDE AND ALBUTEROL SULFATE 2.5; .5 MG/3ML; MG/3ML
3 SOLUTION RESPIRATORY (INHALATION) EVERY 4 HOURS
Status: DISCONTINUED | OUTPATIENT
Start: 2017-11-18 | End: 2017-11-18

## 2017-11-18 RX ORDER — INSULIN ASPART 100 [IU]/ML
0-5 INJECTION, SOLUTION INTRAVENOUS; SUBCUTANEOUS
Status: DISCONTINUED | OUTPATIENT
Start: 2017-11-18 | End: 2017-11-24 | Stop reason: HOSPADM

## 2017-11-18 RX ORDER — HYDROCODONE BITARTRATE AND ACETAMINOPHEN 10; 325 MG/1; MG/1
1 TABLET ORAL EVERY 6 HOURS PRN
Status: DISCONTINUED | OUTPATIENT
Start: 2017-11-18 | End: 2017-11-24 | Stop reason: HOSPADM

## 2017-11-18 RX ADMIN — INSULIN DETEMIR 10 UNITS: 100 INJECTION, SOLUTION SUBCUTANEOUS at 09:11

## 2017-11-18 RX ADMIN — Medication 3.38 G: at 03:11

## 2017-11-18 RX ADMIN — IPRATROPIUM BROMIDE AND ALBUTEROL SULFATE 3 ML: .5; 3 SOLUTION RESPIRATORY (INHALATION) at 07:11

## 2017-11-18 RX ADMIN — VANCOMYCIN HYDROCHLORIDE 1250 MG: 1 INJECTION, POWDER, LYOPHILIZED, FOR SOLUTION INTRAVENOUS at 04:11

## 2017-11-18 RX ADMIN — PIPERACILLIN SODIUM AND TAZOBACTAM SODIUM 3.38 G: 3; .375 INJECTION, POWDER, FOR SOLUTION INTRAVENOUS at 08:11

## 2017-11-18 RX ADMIN — CARVEDILOL 25 MG: 25 TABLET, FILM COATED ORAL at 08:11

## 2017-11-18 RX ADMIN — SODIUM POLYSTYRENE SULFONATE 15 G: 15 SUSPENSION ORAL; RECTAL at 06:11

## 2017-11-18 RX ADMIN — GABAPENTIN 300 MG: 300 CAPSULE ORAL at 08:11

## 2017-11-18 RX ADMIN — FUROSEMIDE 20 MG: 10 INJECTION, SOLUTION INTRAMUSCULAR; INTRAVENOUS at 06:11

## 2017-11-18 RX ADMIN — ENOXAPARIN SODIUM 30 MG: 100 INJECTION SUBCUTANEOUS at 06:11

## 2017-11-18 RX ADMIN — HYDROCODONE BITARTRATE AND ACETAMINOPHEN 1 TABLET: 10; 325 TABLET ORAL at 06:11

## 2017-11-18 NOTE — ASSESSMENT & PLAN NOTE
- likely osteo  - continue vanc/zosyn  - blood cultures drawn  - podiatry consulted  - wound care consult

## 2017-11-18 NOTE — ED PROVIDER NOTES
"Encounter Date: 2017    SCRIBE #1 NOTE: I, Sarah Howard, am scribing for, and in the presence of, Dr. Barker.       History     Chief Complaint   Patient presents with    Abscess     L heel with purulent drainage x 2 weeks.       2017  2:18 PM    Chief Complaint: Left Heel Wound      The patient is a 57 y.o. female who presents with worsening left heel wound x 2 weeks with associated symptoms of 102°F fever, drainage, and difficulty ambulating secondary to pain. Pt reports the wound started small, but never healed after regular foot care regimen. Pt reports her regular foot surgeon is Dr. Wick. Denies injury, vomiting. PMHx of CHF, COPD, coronary artery disease, diabetes mellitus with neuropathy; hypertension; MI (), stroke, and thyroid disease. PSHx that includes breast surgery; abscess drainage (right), CABG, and cardiac surgery.        The history is provided by the patient and the spouse.     Review of patient's allergies indicates:  No Known Allergies  Past Medical History:   Diagnosis Date    CHF (congestive heart failure)     COPD (chronic obstructive pulmonary disease)     Coronary artery disease     Diabetes mellitus     Encounter for blood transfusion     Hypertension     MI (myocardial infarction)     Stroke     2005    Thyroid disease      Past Surgical History:   Procedure Laterality Date    ABCESS DRAINAGE Right     Between "little toe" and the one next to it    BREAST SURGERY      Reduction [id number]    CARDIAC SURGERY      CABG 4vessel ring in one valve mitral valve prolapse    CORONARY ARTERY BYPASS GRAFT      TUBAL LIGATION  1986     Family History   Problem Relation Age of Onset    Arthritis Mother     Heart disease Father     Cancer Father 68     prostae and bladder ca  in     Diabetes Father     Hypertension Father     Depression Sister     Hypertension Son     Diabetes Maternal Grandmother      lost leg    Kidney disease Paternal " Grandfather      had kidney removed    Stroke Paternal Grandfather      Social History   Substance Use Topics    Smoking status: Never Smoker    Smokeless tobacco: Never Used    Alcohol use 0.6 - 1.2 oz/week     1 - 2 Glasses of wine per week     Review of Systems   Constitutional: Positive for fever (102).        Positive for difficulty ambulating secondary to pain.   HENT: Negative for sore throat.    Respiratory: Negative for shortness of breath.    Cardiovascular: Negative for chest pain.   Gastrointestinal: Negative for nausea and vomiting.   Genitourinary: Negative for dysuria.   Musculoskeletal: Negative for back pain.   Skin: Positive for wound (Left posterior ankle.). Negative for rash.   Neurological: Negative for weakness.   Hematological: Does not bruise/bleed easily.       Physical Exam     Initial Vitals [11/18/17 1410]   BP Pulse Resp Temp SpO2   109/60 77 20 99.4 °F (37.4 °C) 95 %      MAP       76.33         Physical Exam    Nursing note and vitals reviewed.  Constitutional: She appears well-developed and well-nourished. She is not diaphoretic. No distress.   HENT:   Head: Normocephalic and atraumatic.   Mouth/Throat: Oropharynx is clear and moist.   Eyes: Conjunctivae are normal.   Neck: Neck supple.   Cardiovascular: Normal rate, regular rhythm, normal heart sounds and intact distal pulses. Exam reveals no gallop and no friction rub.    No murmur heard.  Pulses:       Dorsalis pedis pulses are 1+ on the right side, and 1+ on the left side.        Posterior tibial pulses are 1+ on the right side, and 1+ on the left side.   Pulmonary/Chest: Breath sounds normal. She has no wheezes. She has no rhonchi. She has no rales.   Pt is on O2.   Abdominal: Soft. She exhibits no distension. There is no tenderness.   Musculoskeletal: Normal range of motion.   4 cm ulceration to left posterior ankle area with erosion into achilles tendon. Induration, no fluctuance. Maggots seen in wound margin and moving.  Tender to palpation around wound. Full ROM.   Neurological: She is alert and oriented to person, place, and time. She has normal strength.   Decreased, but symmetric sensation to BLE. Intact motor.   Skin: No rash noted.   Psychiatric: She has a normal mood and affect. Her speech is normal and behavior is normal. Judgment and thought content normal.         ED Course   Procedures  Labs Reviewed   CBC W/ AUTO DIFFERENTIAL - Abnormal; Notable for the following:        Result Value    Hemoglobin 10.3 (*)     Hematocrit 32.4 (*)     MCV 77 (*)     MCH 24.6 (*)     MCHC 31.8 (*)     RDW 18.4 (*)     Lymph # 0.8 (*)     Gran% 77.1 (*)     Lymph% 9.1 (*)     All other components within normal limits   BASIC METABOLIC PANEL             Medical Decision Making:   History:   Old Medical Records: I decided to obtain old medical records.  Clinical Tests:   Lab Tests: Ordered and Reviewed            Scribe Attestation:   Scribe #1: I performed the above scribed service and the documentation accurately describes the services I performed. I attest to the accuracy of the note.    I, Dr. Ernesto Barker, personally performed the services described in this documentation. All medical record entries made by the scribe were at my direction and in my presence.  I have reviewed the chart and agree that the record reflects my personal performance and is accurate and complete. Ernesto Barker MD.  3:33 PM 11/18/2017    Juliane Ramos is a 57 y.o. female presenting with left posterior ankle wound with erosion into the fascia.  Achilles tendon is exposed without obvious compromise and function.  No abscess amenable to bedside drainage.  There is necrotic tissue requiring debridement.  There is associated cellulitis.  There is no pain out of proportion to exam or crepitus.  I do not think immediate emergent operative intervention is necessary.  She will be admitted for podiatry consultation with likely debridement as well as IV  antibiotics.  IV vancomycin and Zosyn initiated in the ED.  I discussed with hospitalist service who will assume care.  Laboratories pending at the time admission to be followed by hospitalist service.          ED Course      Clinical Impression:     1. Diabetic leg ulcer          Disposition:   Disposition: Admitted                        Ernesto Barker MD  11/18/17 8841

## 2017-11-18 NOTE — SUBJECTIVE & OBJECTIVE
"Past Medical History:   Diagnosis Date    CHF (congestive heart failure)     COPD (chronic obstructive pulmonary disease)     Coronary artery disease     Diabetes mellitus     Encounter for blood transfusion     Hypertension     MI (myocardial infarction) 2010    Stroke     July 2005    Thyroid disease        Past Surgical History:   Procedure Laterality Date    ABCESS DRAINAGE Right     Between "little toe" and the one next to it    BREAST SURGERY      Reduction [id number]    CARDIAC SURGERY      CABG 4vessel ring in one valve mitral valve prolapse    CORONARY ARTERY BYPASS GRAFT      TUBAL LIGATION  03/1986       Review of patient's allergies indicates:  No Known Allergies    No current facility-administered medications on file prior to encounter.      Current Outpatient Prescriptions on File Prior to Encounter   Medication Sig    albuterol (ACCUNEB) 1.25 mg/3 mL Nebu Take 1.25 mg by nebulization every 6 (six) hours as needed. Rescue    aspirin (ECOTRIN) 81 MG EC tablet Take 1 tablet (81 mg total) by mouth once daily.    carvedilol (COREG) 25 MG tablet Take 25 mg by mouth 2 (two) times daily.     citalopram (CELEXA) 20 MG tablet Take 20 mg by mouth once daily.      furosemide (LASIX) 40 MG tablet ONE TAB DAILY IN AM . TAKE EXTRA 1/2 TABLET IF WEIGHT IS >3-4 POUNDS   WEIGH YOURSELF EVERY EM    gabapentin (NEURONTIN) 300 MG capsule Take 300 mg by mouth 3 (three) times daily. 300 mg AM and 600 mg PM    glimepiride (AMARYL) 4 MG tablet Take 4 mg by mouth before breakfast.    insulin aspart protamine-insulin aspart (NOVOLOG 70/30) 100 unit/mL (70-30) InPn pen Inject into the skin daily as needed (pt states "She told me to just take it whenever my sugar was too high").    levothyroxine (SYNTHROID) 50 MCG tablet Take 50 mcg by mouth once daily.      lisinopril (PRINIVIL,ZESTRIL) 2.5 MG tablet Take 1 tablet (2.5 mg total) by mouth once daily.    rosuvastatin (CRESTOR) 10 MG tablet Take 1 tablet (10 mg " total) by mouth once daily.     Family History     Problem Relation (Age of Onset)    Arthritis Mother    Cancer Father (68)    Depression Sister    Diabetes Father, Maternal Grandmother    Heart disease Father    Hypertension Father, Son    Kidney disease Paternal Grandfather    Stroke Paternal Grandfather        Social History Main Topics    Smoking status: Never Smoker    Smokeless tobacco: Never Used    Alcohol use 0.6 - 1.2 oz/week     1 - 2 Glasses of wine per week    Drug use: No    Sexual activity: Yes     Partners: Male     Birth control/ protection: None     Review of Systems   Constitutional: Positive for fever.   Skin: Positive for wound.        Left heel wound     Objective:     Vital Signs (Most Recent):  Temp: 99.4 °F (37.4 °C) (11/18/17 1410)  Pulse: 77 (11/18/17 1410)  Resp: 20 (11/18/17 1410)  BP: 109/60 (11/18/17 1410)  SpO2: 95 % (on 3L/NC) (11/18/17 1410) Vital Signs (24h Range):  Temp:  [99.4 °F (37.4 °C)] 99.4 °F (37.4 °C)  Pulse:  [77] 77  Resp:  [20] 20  SpO2:  [95 %] 95 %  BP: (109)/(60) 109/60     Weight: 106.6 kg (235 lb)  Body mass index is 37.93 kg/m².    Physical Exam   Constitutional: She is oriented to person, place, and time. She appears well-developed and well-nourished. No distress.   HENT:   Head: Normocephalic and atraumatic.   Eyes: Pupils are equal, round, and reactive to light.   Neck: Neck supple. No thyromegaly present.   Cardiovascular: Normal rate and regular rhythm.  Exam reveals no gallop and no friction rub.    No murmur heard.  Pulmonary/Chest: Effort normal and breath sounds normal. No respiratory distress. She has no wheezes.   Mild crackles bilaterally   Abdominal: Soft. Bowel sounds are normal. She exhibits no distension. There is no tenderness. There is no guarding.   Musculoskeletal: Normal range of motion. She exhibits no edema.   Neurological: She is alert and oriented to person, place, and time.   Skin: Skin is warm and dry. No erythema.   Left heel  wound with mild surrounding erythema but no tracking up the leg.  Bandaged at this time   Psychiatric: She has a normal mood and affect.        Significant Labs:   CBC:   Recent Labs  Lab 11/18/17  1458   WBC 9.20   HGB 10.3*   HCT 32.4*          Significant Imaging: I have reviewed all pertinent imaging results/findings within the past 24 hours.

## 2017-11-18 NOTE — H&P
"[facility]  History & Physical    Patient Name: Juliane Ramos  MRN: [id number]  Admission Date: 11/18/2017  Attending Physician: Ernesto Barker MD   Primary Care Provider: JOS Dumont         Patient information was obtained from patient and ER records.     Subjective:     Principal Problem:Diabetic leg ulcer    Chief Complaint:   Chief Complaint   Patient presents with    Abscess     L heel with purulent drainage x 2 weeks.        HPI: 57 year old female with HTN, DM2, HF, CKD here for a left heel ulcer that she first noticed two weeks ago.  She states that over the past few days it has been associated with worsening pain, drainage, and a fever of up to 102 and for this she came for evaluation.  On initial exam in the ED, she was afebrile and stable but her wound supposedly had maggots on the edges.  She denies any chest pain, shortness of breath, abdominal pain, confusion, or any other related complaints.  She states she is compliant with her medications but only takes her basal insulin when she needs it per sliding scale.    Past records reviewed.    Collateral information obtained from  in the room.     Past Medical History:   Diagnosis Date    CHF (congestive heart failure)     COPD (chronic obstructive pulmonary disease)     Coronary artery disease     Diabetes mellitus     Encounter for blood transfusion     Hypertension     MI (myocardial infarction) 2010    Stroke     July 2005    Thyroid disease        Past Surgical History:   Procedure Laterality Date    ABCESS DRAINAGE Right     Between "little toe" and the one next to it    BREAST SURGERY      Reduction [id number]    CARDIAC SURGERY      CABG 4vessel ring in one valve mitral valve prolapse    CORONARY ARTERY BYPASS GRAFT      TUBAL LIGATION  03/1986       Review of patient's allergies indicates:  No Known Allergies    No current facility-administered medications on file prior to " "encounter.      Current Outpatient Prescriptions on File Prior to Encounter   Medication Sig    albuterol (ACCUNEB) 1.25 mg/3 mL Nebu Take 1.25 mg by nebulization every 6 (six) hours as needed. Rescue    aspirin (ECOTRIN) 81 MG EC tablet Take 1 tablet (81 mg total) by mouth once daily.    carvedilol (COREG) 25 MG tablet Take 25 mg by mouth 2 (two) times daily.     citalopram (CELEXA) 20 MG tablet Take 20 mg by mouth once daily.      furosemide (LASIX) 40 MG tablet ONE TAB DAILY IN AM . TAKE EXTRA 1/2 TABLET IF WEIGHT IS >3-4 POUNDS   WEIGH YOURSELF EVERY EM    gabapentin (NEURONTIN) 300 MG capsule Take 300 mg by mouth 3 (three) times daily. 300 mg AM and 600 mg PM    glimepiride (AMARYL) 4 MG tablet Take 4 mg by mouth before breakfast.    insulin aspart protamine-insulin aspart (NOVOLOG 70/30) 100 unit/mL (70-30) InPn pen Inject into the skin daily as needed (pt states "She told me to just take it whenever my sugar was too high").    levothyroxine (SYNTHROID) 50 MCG tablet Take 50 mcg by mouth once daily.      lisinopril (PRINIVIL,ZESTRIL) 2.5 MG tablet Take 1 tablet (2.5 mg total) by mouth once daily.    rosuvastatin (CRESTOR) 10 MG tablet Take 1 tablet (10 mg total) by mouth once daily.     Family History     Problem Relation (Age of Onset)    Arthritis Mother    Cancer Father (68)    Depression Sister    Diabetes Father, Maternal Grandmother    Heart disease Father    Hypertension Father, Son    Kidney disease Paternal Grandfather    Stroke Paternal Grandfather        Social History Main Topics    Smoking status: Never Smoker    Smokeless tobacco: Never Used    Alcohol use 0.6 - 1.2 oz/week     1 - 2 Glasses of wine per week    Drug use: No    Sexual activity: Yes     Partners: Male     Birth control/ protection: None     Review of Systems   Constitutional: Positive for fever.   Skin: Positive for wound.        Left heel wound     Objective:     Vital Signs (Most Recent):  Temp: 99.4 °F (37.4 °C) " (11/18/17 1410)  Pulse: 77 (11/18/17 1410)  Resp: 20 (11/18/17 1410)  BP: 109/60 (11/18/17 1410)  SpO2: 95 % (on 3L/NC) (11/18/17 1410) Vital Signs (24h Range):  Temp:  [99.4 °F (37.4 °C)] 99.4 °F (37.4 °C)  Pulse:  [77] 77  Resp:  [20] 20  SpO2:  [95 %] 95 %  BP: (109)/(60) 109/60     Weight: 106.6 kg (235 lb)  Body mass index is 37.93 kg/m².    Physical Exam   Constitutional: She is oriented to person, place, and time. She appears well-developed and well-nourished. No distress.   HENT:   Head: Normocephalic and atraumatic.   Eyes: Pupils are equal, round, and reactive to light.   Neck: Neck supple. No thyromegaly present.   Cardiovascular: Normal rate and regular rhythm.  Exam reveals no gallop and no friction rub.    No murmur heard.  Pulmonary/Chest: Effort normal and breath sounds normal. No respiratory distress. She has no wheezes.   Mild crackles bilaterally   Abdominal: Soft. Bowel sounds are normal. She exhibits no distension. There is no tenderness. There is no guarding.   Musculoskeletal: Normal range of motion. She exhibits no edema.   Neurological: She is alert and oriented to person, place, and time.   Skin: Skin is warm and dry. No erythema.   Left heel wound with mild surrounding erythema but no tracking up the leg.  Bandaged at this time   Psychiatric: She has a normal mood and affect.        Significant Labs:   CBC:   Recent Labs  Lab 11/18/17  1458   WBC 9.20   HGB 10.3*   HCT 32.4*          Significant Imaging: I have reviewed all pertinent imaging results/findings within the past 24 hours.    Assessment/Plan:     * Diabetic leg ulcer    - likely osteo  - continue vanc/zosyn  - blood cultures drawn  - podiatry consulted  - wound care consult          Hypothyroid    - continue synthroid          Controlled type 2 diabetes mellitus with stage 3 chronic kidney disease    - SSI  - Lantus 10 units nightly with titration as needed          CKD (chronic kidney disease) stage 3, GFR 30-59 ml/min     - avoid nephrotoxins and renally dose meds  - mild hyperkalemia at this time will treat with kayexalate           Essential hypertension    - continue home meds and will titrate as needed            VTE Risk Mitigation     None             Dano Casillas MD  Department of Hospital Medicine   [facility]

## 2017-11-18 NOTE — ASSESSMENT & PLAN NOTE
- avoid nephrotoxins and renally dose meds  - mild hyperkalemia at this time will treat with kayexalate

## 2017-11-18 NOTE — PROGRESS NOTES
Spoke with [facility] answering service for Podiatry consult.  Pt info and room number given.  Awaiting call back.

## 2017-11-18 NOTE — ED NOTES
Dressed stage 3 diabetic ulcer on poster aspect of left ankle with non-adherent dressing, gauze 4x4s and Koban.

## 2017-11-18 NOTE — CONSULTS
Juliane Ramos [id number] is a 57 y.o. female who has been consulted for vancomycin dosing.    The patient has the following labs:     Date Creatinine (mg/dl)    WBC Count   11/18/2017 Estimated Creatinine Clearance: 56.1 mL/min (based on SCr of 1.4 mg/dL). Lab Results   Component Value Date    WBC 9.20 11/18/2017        Current weight is 106.6 kg (235 lb)    Pt being treated with vancomycin for skin and soft tissue.    The patient received  1250 mg on 11/18 at 1605.    The patient will be started on vancomycin at a dose of 1250 mg every 12 hours (15mg/kg/dose). The vancomycin trough has been ordered for 11/20 at 0330.  Target goal is 10-20.     Patient will be followed by pharmacy for changes in renal function, toxicity, and efficacy.      Thank you for allowing us to participate in this patient's care.     Lisa Saldana, JessD

## 2017-11-18 NOTE — HPI
57 year old female with HTN, DM2, HF, CKD here for a left heel ulcer that she first noticed two weeks ago.  She states that over the past few days it has been associated with worsening pain, drainage, and a fever of up to 102 and for this she came for evaluation.  On initial exam in the ED, she was afebrile and stable but her wound supposedly had maggots on the edges.  She denies any chest pain, shortness of breath, abdominal pain, confusion, or any other related complaints.  She states she is compliant with her medications but only takes her basal insulin when she needs it per sliding scale.    Past records reviewed.    Collateral information obtained from  in the room.

## 2017-11-19 LAB
ESTIMATED AVG GLUCOSE: 194 MG/DL
HBA1C MFR BLD HPLC: 8.4 %
POCT GLUCOSE: 136 MG/DL (ref 70–110)
POCT GLUCOSE: 198 MG/DL (ref 70–110)
POCT GLUCOSE: 224 MG/DL (ref 70–110)
POCT GLUCOSE: 251 MG/DL (ref 70–110)

## 2017-11-19 PROCEDURE — 12000002 HC ACUTE/MED SURGE SEMI-PRIVATE ROOM

## 2017-11-19 PROCEDURE — 94761 N-INVAS EAR/PLS OXIMETRY MLT: CPT

## 2017-11-19 PROCEDURE — 27000221 HC OXYGEN, UP TO 24 HOURS

## 2017-11-19 PROCEDURE — 63600175 PHARM REV CODE 636 W HCPCS: Performed by: INTERNAL MEDICINE

## 2017-11-19 PROCEDURE — 94640 AIRWAY INHALATION TREATMENT: CPT

## 2017-11-19 PROCEDURE — 25000003 PHARM REV CODE 250: Performed by: EMERGENCY MEDICINE

## 2017-11-19 PROCEDURE — 25000003 PHARM REV CODE 250: Performed by: INTERNAL MEDICINE

## 2017-11-19 PROCEDURE — 25000242 PHARM REV CODE 250 ALT 637 W/ HCPCS: Performed by: INTERNAL MEDICINE

## 2017-11-19 RX ORDER — ENOXAPARIN SODIUM 100 MG/ML
40 INJECTION SUBCUTANEOUS EVERY 24 HOURS
Status: DISCONTINUED | OUTPATIENT
Start: 2017-11-19 | End: 2017-11-20

## 2017-11-19 RX ADMIN — INSULIN ASPART 1 UNITS: 100 INJECTION, SOLUTION INTRAVENOUS; SUBCUTANEOUS at 09:11

## 2017-11-19 RX ADMIN — IPRATROPIUM BROMIDE AND ALBUTEROL SULFATE 3 ML: .5; 3 SOLUTION RESPIRATORY (INHALATION) at 03:11

## 2017-11-19 RX ADMIN — ASPIRIN 81 MG: 81 TABLET, COATED ORAL at 08:11

## 2017-11-19 RX ADMIN — HYDROCODONE BITARTRATE AND ACETAMINOPHEN 1 TABLET: 10; 325 TABLET ORAL at 12:11

## 2017-11-19 RX ADMIN — IPRATROPIUM BROMIDE AND ALBUTEROL SULFATE 3 ML: .5; 3 SOLUTION RESPIRATORY (INHALATION) at 07:11

## 2017-11-19 RX ADMIN — IPRATROPIUM BROMIDE AND ALBUTEROL SULFATE 3 ML: .5; 3 SOLUTION RESPIRATORY (INHALATION) at 12:11

## 2017-11-19 RX ADMIN — LISINOPRIL 2.5 MG: 2.5 TABLET ORAL at 08:11

## 2017-11-19 RX ADMIN — HYDROCODONE BITARTRATE AND ACETAMINOPHEN 1 TABLET: 10; 325 TABLET ORAL at 07:11

## 2017-11-19 RX ADMIN — IPRATROPIUM BROMIDE AND ALBUTEROL SULFATE 3 ML: .5; 3 SOLUTION RESPIRATORY (INHALATION) at 04:11

## 2017-11-19 RX ADMIN — IPRATROPIUM BROMIDE AND ALBUTEROL SULFATE 3 ML: .5; 3 SOLUTION RESPIRATORY (INHALATION) at 11:11

## 2017-11-19 RX ADMIN — ENOXAPARIN SODIUM 40 MG: 100 INJECTION SUBCUTANEOUS at 06:11

## 2017-11-19 RX ADMIN — LEVOTHYROXINE SODIUM 50 MCG: 50 TABLET ORAL at 08:11

## 2017-11-19 RX ADMIN — CARVEDILOL 25 MG: 25 TABLET, FILM COATED ORAL at 08:11

## 2017-11-19 RX ADMIN — PIPERACILLIN SODIUM AND TAZOBACTAM SODIUM 3.38 G: 3; .375 INJECTION, POWDER, FOR SOLUTION INTRAVENOUS at 04:11

## 2017-11-19 RX ADMIN — CARVEDILOL 25 MG: 25 TABLET, FILM COATED ORAL at 09:11

## 2017-11-19 RX ADMIN — INSULIN ASPART 2 UNITS: 100 INJECTION, SOLUTION INTRAVENOUS; SUBCUTANEOUS at 05:11

## 2017-11-19 RX ADMIN — PIPERACILLIN SODIUM AND TAZOBACTAM SODIUM 3.38 G: 3; .375 INJECTION, POWDER, FOR SOLUTION INTRAVENOUS at 09:11

## 2017-11-19 RX ADMIN — PIPERACILLIN SODIUM AND TAZOBACTAM SODIUM 3.38 G: 3; .375 INJECTION, POWDER, FOR SOLUTION INTRAVENOUS at 03:11

## 2017-11-19 RX ADMIN — VANCOMYCIN HYDROCHLORIDE 1250 MG: 1 INJECTION, POWDER, LYOPHILIZED, FOR SOLUTION INTRAVENOUS at 05:11

## 2017-11-19 RX ADMIN — GABAPENTIN 300 MG: 300 CAPSULE ORAL at 02:11

## 2017-11-19 RX ADMIN — INSULIN DETEMIR 10 UNITS: 100 INJECTION, SOLUTION SUBCUTANEOUS at 09:11

## 2017-11-19 RX ADMIN — ROSUVASTATIN CALCIUM 10 MG: 5 TABLET ORAL at 08:11

## 2017-11-19 RX ADMIN — GABAPENTIN 300 MG: 300 CAPSULE ORAL at 10:11

## 2017-11-19 RX ADMIN — GABAPENTIN 300 MG: 300 CAPSULE ORAL at 05:11

## 2017-11-19 RX ADMIN — HYDROCODONE BITARTRATE AND ACETAMINOPHEN 1 TABLET: 10; 325 TABLET ORAL at 06:11

## 2017-11-19 RX ADMIN — CITALOPRAM HYDROBROMIDE 20 MG: 10 TABLET ORAL at 08:11

## 2017-11-19 NOTE — PLAN OF CARE
Problem: Patient Care Overview  Goal: Plan of Care Review  Outcome: Ongoing (interventions implemented as appropriate)  Patient AAOx4. POC reviewed with patient. Verbalized understanding. Dressing to left ankle; CDI. Neuro checks every 4 hours; Neuro intact. Blood glucose monitoring during shift; insulin coverage given per orders. Patient reports pain treated with medication ordered by Md. Patient tolerating a regular diet well. Patient up to the bathroom independently. Hourly rounding on patient to promote safety. Safety maintained throughout the shift.Patient positions and repositions self  Independently. No  Skin break down during shift.   Bed locked and in lowest position. Call light in reach. Side rails up x2. NON skid socks on when OOB. Patient remained free of falls/ trauma.  Will continue to monitor.

## 2017-11-19 NOTE — PROGRESS NOTES
Pharmacist Renal Dose Adjustment Note    Juliane Ramos is a 57 y.o. female being treated with the medication Lovenox     Patient Data:    Vital Signs (Most Recent):  Temp: 98.4 °F (36.9 °C) (11/19/17 0811)  Pulse: 71 (11/19/17 1200)  Resp: 18 (11/19/17 1200)  BP: 132/87 (11/19/17 1200)  SpO2: 96 % (11/19/17 1200) Vital Signs (72h Range):  Temp:  [97.7 °F (36.5 °C)-99.4 °F (37.4 °C)]   Pulse:  [64-80]   Resp:  [16-20]   BP: (109-142)/(60-87)   SpO2:  [92 %-97 %]        Recent Labs     Lab 11/18/17  1458   CREATININE 1.4     Serum creatinine: 1.4 mg/dL 11/18/17 1458  Estimated creatinine clearance: 56.1 mL/min    Medication:Lovenox  dose: 30mg  frequency q24h will be changed to medication: dose:40mg frequency:q24h    Pharmacist's Name: Kobi Junior

## 2017-11-19 NOTE — PLAN OF CARE
11/18/17 1900   Patient Assessment/Suction   Level of Consciousness (AVPU) alert   Respiratory Effort Normal;Unlabored   Expansion/Accessory Muscles/Retractions no use of accessory muscles   All Lung Fields Breath Sounds clear   PRE-TX-O2-ETCO2   O2 Device (Oxygen Therapy) nasal cannula   Flow (L/min) 2   SpO2 96 %   Pulse 80   Resp 20   Aerosol Therapy   $ Aerosol Therapy Charges Aerosol Treatment   Respiratory Treatment Status given   SVN/Inhaler Treatment Route mouthpiece;with oxygen   Position During Treatment HOB at 30 degrees   Patient Tolerance good   Post-Treatment   Post-treatment Heart Rate (beats/min) 79   Post-treatment Resp Rate (breaths/min) 20   All Fields Breath Sounds unchanged   Pt tolerates NC and treatments well.

## 2017-11-19 NOTE — PROGRESS NOTES
[facility]  Progress Note    Patient Name: Juliane Ramos  MRN: [id number]  Patient Class: IP- Inpatient   Admission Date: 11/18/2017  Length of Stay: 1 days  Attending Physician: Reg Devine MD  Primary Care Provider: JOS Dumont        Subjective:     Principal Problem:Diabetic leg ulcer    HPI:  57 year old female with HTN, DM2, HF, CKD here for a left heel ulcer that she first noticed two weeks ago.  She states that over the past few days it has been associated with worsening pain, drainage, and a fever of up to 102 and for this she came for evaluation.  On initial exam in the ED, she was afebrile and stable but her wound supposedly had maggots on the edges.  She denies any chest pain, shortness of breath, abdominal pain, confusion, or any other related complaints.  She states she is compliant with her medications but only takes her basal insulin when she needs it per sliding scale.    Past records reviewed.    Collateral information obtained from  in the room.     Hospital Course:  No notes on file    Interval History: Patient seen and examined with no acute events overnight. Wound cleaned by podiatry this morning.    Review of Systems   Constitutional: Negative for activity change, appetite change and fever.   HENT: Negative.    Eyes: Negative.    Respiratory: Negative for chest tightness, shortness of breath and wheezing.    Cardiovascular: Negative for chest pain, palpitations and leg swelling.   Gastrointestinal: Negative for abdominal distention, abdominal pain, blood in stool, diarrhea and vomiting.   Genitourinary: Negative for dysuria and hematuria.   Neurological: Negative for headaches.   Hematological: Negative for adenopathy.   Psychiatric/Behavioral: Negative for confusion.     Objective:     Vital Signs (Most Recent):  Temp: 98.4 °F (36.9 °C) (11/19/17 0811)  Pulse: 64 (11/19/17 1108)  Resp: 18 (11/19/17 1108)  BP: 124/79 (11/19/17 0811)  SpO2:  97 % (11/19/17 1108) Vital Signs (24h Range):  Temp:  [97.7 °F (36.5 °C)-99.4 °F (37.4 °C)] 98.4 °F (36.9 °C)  Pulse:  [64-80] 64  Resp:  [16-20] 18  SpO2:  [92 %-97 %] 97 %  BP: (109-142)/(60-82) 124/79     Weight: 106.6 kg (235 lb)  Body mass index is 37.93 kg/m².    Intake/Output Summary (Last 24 hours) at 11/19/17 1130  Last data filed at 11/19/17 0942   Gross per 24 hour   Intake             1475 ml   Output                0 ml   Net             1475 ml      Physical Exam   Constitutional: She is oriented to person, place, and time. She appears well-developed and well-nourished. No distress.   HENT:   Head: Normocephalic and atraumatic.   Eyes: Pupils are equal, round, and reactive to light.   Neck: Neck supple. No thyromegaly present.   Cardiovascular: Normal rate and regular rhythm.  Exam reveals no gallop and no friction rub.    No murmur heard.  Pulmonary/Chest: Effort normal and breath sounds normal. No respiratory distress. She has no wheezes.   Abdominal: Soft. Bowel sounds are normal. She exhibits no distension. There is no tenderness. There is no guarding.   Musculoskeletal: Normal range of motion. She exhibits no edema.   Neurological: She is alert and oriented to person, place, and time.   Skin: Skin is warm and dry. No erythema.   Psychiatric: She has a normal mood and affect.       Significant Labs:   CBC:   Recent Labs  Lab 11/18/17  1458   WBC 9.20   HGB 10.3*   HCT 32.4*          Significant Imaging: I have reviewed all pertinent imaging results/findings within the past 24 hours.    Assessment/Plan:      * Diabetic leg ulcer    - likely osteo  - continue vanc/zosyn  - to OR tomorrow with podiatry          Hypothyroid    - continue synthroid          Controlled type 2 diabetes mellitus with stage 3 chronic kidney disease    - SSI  - Lantus 10 units nightly with titration as needed          CKD (chronic kidney disease) stage 3, GFR 30-59 ml/min    - avoid nephrotoxins and renally dose  meds          Essential hypertension    - continue home meds and will titrate as needed            VTE Risk Mitigation         Ordered     enoxaparin injection 30 mg  Daily     Route:  Subcutaneous        11/18/17 1642     Medium Risk of VTE  Once      11/18/17 1642              Dano Casillas MD  Department of Hospital Medicine   [facility]

## 2017-11-19 NOTE — PLAN OF CARE
11/19/17 0729   Patient Assessment/Suction   Level of Consciousness (AVPU) alert   All Lung Fields Breath Sounds clear;diminished   PRE-TX-O2-ETCO2   O2 Device (Oxygen Therapy) nasal cannula   $ Is the patient on Low Flow Oxygen? Yes   Flow (L/min) 2   Oxygen Concentration (%) 28   SpO2 95 %   Pulse 70   Resp 18   Aerosol Therapy   $ Aerosol Therapy Charges Aerosol Treatment   Respiratory Treatment Status given   SVN/Inhaler Treatment Route mask   Position During Treatment HOB at 45 degrees   Patient Tolerance good   Post-Treatment   Post-treatment Heart Rate (beats/min) 71   Post-treatment Resp Rate (breaths/min) 18   All Fields Breath Sounds unchanged   Ready to Wean/Extubation Screen   FIO2<=50 (chart decimal) 0.28

## 2017-11-19 NOTE — PLAN OF CARE
SW met w/ pt for assessment.  Pt is not current w/ HH but had [facility] previously (d/c approx 1 month ago), pt wants [facility] if HH is ordered again     11/19/17 1427   Discharge Assessment   Assessment Type Discharge Planning Assessment   Confirmed/corrected address and phone number on facesheet? Yes   Assessment information obtained from? Patient   Prior to hospitilization cognitive status: Alert/Oriented   Prior to hospitalization functional status: Independent   Current cognitive status: Alert/Oriented   Current Functional Status: Independent   Lives With spouse   Able to Return to Prior Arrangements yes   Is patient able to care for self after discharge? Yes   Who are your caregiver(s) and their phone number(s)? : Nba Ramos [phone]    dtr:  Flower Holman [phone]   Patient's perception of discharge disposition home health   Readmission Within The Last 30 Days no previous admission in last 30 days   Patient currently being followed by outpatient case management? No   Patient currently receives any other outside agency services? No   Equipment Currently Used at Home rollator;lift device;hospital bed;power chair;shower chair   Do you have any problems affording any of your prescribed medications? No   Is the patient taking medications as prescribed? yes   Does the patient have transportation home? Yes   Transportation Available family or friend will provide   Does the patient receive services at the [facility]? No   Discharge Plan A Home Health;Home with family   Discharge Plan B Home Health;Home with family   Patient/Family In Agreement With Plan yes   .

## 2017-11-19 NOTE — SUBJECTIVE & OBJECTIVE
Interval History: Patient seen and examined with no acute events overnight. Wound cleaned by podiatry this morning.    Review of Systems   Constitutional: Negative for activity change, appetite change and fever.   HENT: Negative.    Eyes: Negative.    Respiratory: Negative for chest tightness, shortness of breath and wheezing.    Cardiovascular: Negative for chest pain, palpitations and leg swelling.   Gastrointestinal: Negative for abdominal distention, abdominal pain, blood in stool, diarrhea and vomiting.   Genitourinary: Negative for dysuria and hematuria.   Neurological: Negative for headaches.   Hematological: Negative for adenopathy.   Psychiatric/Behavioral: Negative for confusion.     Objective:     Vital Signs (Most Recent):  Temp: 98.4 °F (36.9 °C) (11/19/17 0811)  Pulse: 64 (11/19/17 1108)  Resp: 18 (11/19/17 1108)  BP: 124/79 (11/19/17 0811)  SpO2: 97 % (11/19/17 1108) Vital Signs (24h Range):  Temp:  [97.7 °F (36.5 °C)-99.4 °F (37.4 °C)] 98.4 °F (36.9 °C)  Pulse:  [64-80] 64  Resp:  [16-20] 18  SpO2:  [92 %-97 %] 97 %  BP: (109-142)/(60-82) 124/79     Weight: 106.6 kg (235 lb)  Body mass index is 37.93 kg/m².    Intake/Output Summary (Last 24 hours) at 11/19/17 1130  Last data filed at 11/19/17 0942   Gross per 24 hour   Intake             1475 ml   Output                0 ml   Net             1475 ml      Physical Exam   Constitutional: She is oriented to person, place, and time. She appears well-developed and well-nourished. No distress.   HENT:   Head: Normocephalic and atraumatic.   Eyes: Pupils are equal, round, and reactive to light.   Neck: Neck supple. No thyromegaly present.   Cardiovascular: Normal rate and regular rhythm.  Exam reveals no gallop and no friction rub.    No murmur heard.  Pulmonary/Chest: Effort normal and breath sounds normal. No respiratory distress. She has no wheezes.   Abdominal: Soft. Bowel sounds are normal. She exhibits no distension. There is no tenderness. There is  no guarding.   Musculoskeletal: Normal range of motion. She exhibits no edema.   Neurological: She is alert and oriented to person, place, and time.   Skin: Skin is warm and dry. No erythema.   Psychiatric: She has a normal mood and affect.       Significant Labs:   CBC:   Recent Labs  Lab 11/18/17  1458   WBC 9.20   HGB 10.3*   HCT 32.4*          Significant Imaging: I have reviewed all pertinent imaging results/findings within the past 24 hours.

## 2017-11-19 NOTE — HPI
Posterior ankle ulcer left foot for months be treated by out pt home health.  Foot and ankle became more infected and pt admitted through ED for treatment.  The ulcer has magets in the wound with foul odor.

## 2017-11-20 LAB
ALLENS TEST: ABNORMAL
ALLENS TEST: ABNORMAL
ANION GAP SERPL CALC-SCNC: 9 MMOL/L
BASOPHILS # BLD AUTO: 0 K/UL
BASOPHILS NFR BLD: 0.1 %
BUN SERPL-MCNC: 36 MG/DL
CALCIUM SERPL-MCNC: 8.4 MG/DL
CHLORIDE SERPL-SCNC: 98 MMOL/L
CK MB SERPL-MCNC: 4.6 NG/ML
CK MB SERPL-MCNC: 4.8 NG/ML
CK MB SERPL-RTO: 4.6 %
CK MB SERPL-RTO: 5 %
CK SERPL-CCNC: 96 U/L
CK SERPL-CCNC: 96 U/L
CK SERPL-CCNC: 99 U/L
CK SERPL-CCNC: 99 U/L
CO2 SERPL-SCNC: 25 MMOL/L
CREAT SERPL-MCNC: 1.4 MG/DL
CREAT SERPL-MCNC: 1.4 MG/DL
DELSYS: ABNORMAL
DELSYS: ABNORMAL
DIFFERENTIAL METHOD: ABNORMAL
EOSINOPHIL # BLD AUTO: 0.3 K/UL
EOSINOPHIL NFR BLD: 4.1 %
EP: 6
ERYTHROCYTE [DISTWIDTH] IN BLOOD BY AUTOMATED COUNT: 18.5 %
ERYTHROCYTE [SEDIMENTATION RATE] IN BLOOD BY WESTERGREN METHOD: 12 MM/H
EST. GFR  (AFRICAN AMERICAN): 49 ML/MIN/1.73 M^2
EST. GFR  (AFRICAN AMERICAN): 49 ML/MIN/1.73 M^2
EST. GFR  (NON AFRICAN AMERICAN): 42 ML/MIN/1.73 M^2
EST. GFR  (NON AFRICAN AMERICAN): 42 ML/MIN/1.73 M^2
FIO2: 30
GLUCOSE SERPL-MCNC: 162 MG/DL
HCO3 UR-SCNC: 27.8 MMOL/L (ref 24–28)
HCO3 UR-SCNC: 30 MMOL/L (ref 24–28)
HCT VFR BLD AUTO: 30.7 %
HGB BLD-MCNC: 9.6 G/DL
IP: 12
LYMPHOCYTES # BLD AUTO: 0.6 K/UL
LYMPHOCYTES NFR BLD: 8 %
MCH RBC QN AUTO: 24.4 PG
MCHC RBC AUTO-ENTMCNC: 31.5 G/DL
MCV RBC AUTO: 77 FL
MIN VOL: 8.4
MODE: ABNORMAL
MONOCYTES # BLD AUTO: 0.9 K/UL
MONOCYTES NFR BLD: 11.5 %
NEUTROPHILS # BLD AUTO: 5.8 K/UL
NEUTROPHILS NFR BLD: 76.3 %
PCO2 BLDA: 58.4 MMHG (ref 35–45)
PCO2 BLDA: 59.7 MMHG (ref 35–45)
PH SMN: 7.29 [PH] (ref 7.35–7.45)
PH SMN: 7.31 [PH] (ref 7.35–7.45)
PLATELET # BLD AUTO: 187 K/UL
PMV BLD AUTO: 8.9 FL
PO2 BLDA: 69 MMHG (ref 80–100)
PO2 BLDA: 78 MMHG (ref 80–100)
POC BE: 1 MMOL/L
POC BE: 4 MMOL/L
POC SATURATED O2: 91 % (ref 95–100)
POC SATURATED O2: 93 % (ref 95–100)
POC TCO2: 30 MMOL/L (ref 23–27)
POC TCO2: 32 MMOL/L (ref 23–27)
POCT GLUCOSE: 141 MG/DL (ref 70–110)
POCT GLUCOSE: 149 MG/DL (ref 70–110)
POCT GLUCOSE: 182 MG/DL (ref 70–110)
POCT GLUCOSE: 194 MG/DL (ref 70–110)
POCT GLUCOSE: 201 MG/DL (ref 70–110)
POTASSIUM SERPL-SCNC: 4.6 MMOL/L
RBC # BLD AUTO: 3.96 M/UL
SAMPLE: ABNORMAL
SAMPLE: ABNORMAL
SITE: ABNORMAL
SITE: ABNORMAL
SODIUM SERPL-SCNC: 132 MMOL/L
SP02: 96
SPONT RATE: 13
TROPONIN I SERPL DL<=0.01 NG/ML-MCNC: 0.01 NG/ML
TROPONIN I SERPL DL<=0.01 NG/ML-MCNC: <0.006 NG/ML
VANCOMYCIN TROUGH SERPL-MCNC: 22.5 UG/ML
VANCOMYCIN TROUGH SERPL-MCNC: 30.5 UG/ML
WBC # BLD AUTO: 7.6 K/UL

## 2017-11-20 PROCEDURE — 82553 CREATINE MB FRACTION: CPT

## 2017-11-20 PROCEDURE — 99232 SBSQ HOSP IP/OBS MODERATE 35: CPT | Mod: ,,, | Performed by: INTERNAL MEDICINE

## 2017-11-20 PROCEDURE — 80202 ASSAY OF VANCOMYCIN: CPT

## 2017-11-20 PROCEDURE — 25000003 PHARM REV CODE 250: Performed by: INTERNAL MEDICINE

## 2017-11-20 PROCEDURE — 25000242 PHARM REV CODE 250 ALT 637 W/ HCPCS: Performed by: INTERNAL MEDICINE

## 2017-11-20 PROCEDURE — 12000002 HC ACUTE/MED SURGE SEMI-PRIVATE ROOM

## 2017-11-20 PROCEDURE — 63600175 PHARM REV CODE 636 W HCPCS: Performed by: INTERNAL MEDICINE

## 2017-11-20 PROCEDURE — 94761 N-INVAS EAR/PLS OXIMETRY MLT: CPT

## 2017-11-20 PROCEDURE — 99900035 HC TECH TIME PER 15 MIN (STAT)

## 2017-11-20 PROCEDURE — 93005 ELECTROCARDIOGRAM TRACING: CPT

## 2017-11-20 PROCEDURE — 94660 CPAP INITIATION&MGMT: CPT

## 2017-11-20 PROCEDURE — 25500020 PHARM REV CODE 255

## 2017-11-20 PROCEDURE — 94640 AIRWAY INHALATION TREATMENT: CPT

## 2017-11-20 PROCEDURE — 80202 ASSAY OF VANCOMYCIN: CPT | Mod: 91

## 2017-11-20 PROCEDURE — 63600175 PHARM REV CODE 636 W HCPCS: Performed by: NURSE PRACTITIONER

## 2017-11-20 PROCEDURE — 27000190 HC CPAP FULL FACE MASK W/VALVE

## 2017-11-20 PROCEDURE — 27000221 HC OXYGEN, UP TO 24 HOURS

## 2017-11-20 PROCEDURE — 99291 CRITICAL CARE FIRST HOUR: CPT | Mod: ,,, | Performed by: INTERNAL MEDICINE

## 2017-11-20 PROCEDURE — 84484 ASSAY OF TROPONIN QUANT: CPT

## 2017-11-20 PROCEDURE — 82803 BLOOD GASES ANY COMBINATION: CPT

## 2017-11-20 PROCEDURE — 25000003 PHARM REV CODE 250: Performed by: EMERGENCY MEDICINE

## 2017-11-20 PROCEDURE — 80048 BASIC METABOLIC PNL TOTAL CA: CPT

## 2017-11-20 PROCEDURE — 63600175 PHARM REV CODE 636 W HCPCS: Performed by: SPECIALIST

## 2017-11-20 PROCEDURE — 36415 COLL VENOUS BLD VENIPUNCTURE: CPT

## 2017-11-20 PROCEDURE — 85025 COMPLETE CBC W/AUTO DIFF WBC: CPT

## 2017-11-20 PROCEDURE — 36600 WITHDRAWAL OF ARTERIAL BLOOD: CPT

## 2017-11-20 RX ORDER — LIDOCAINE HYDROCHLORIDE 10 MG/ML
1 INJECTION, SOLUTION EPIDURAL; INFILTRATION; INTRACAUDAL; PERINEURAL
Status: CANCELLED | OUTPATIENT
Start: 2017-11-20

## 2017-11-20 RX ORDER — SODIUM CHLORIDE 0.9 % (FLUSH) 0.9 %
3 SYRINGE (ML) INJECTION EVERY 8 HOURS
Status: CANCELLED | OUTPATIENT
Start: 2017-11-20

## 2017-11-20 RX ORDER — PANTOPRAZOLE SODIUM 40 MG/1
40 TABLET, DELAYED RELEASE ORAL DAILY
Status: DISCONTINUED | OUTPATIENT
Start: 2017-11-20 | End: 2017-11-24 | Stop reason: HOSPADM

## 2017-11-20 RX ORDER — ENOXAPARIN SODIUM 100 MG/ML
1 INJECTION SUBCUTANEOUS EVERY 12 HOURS
Status: DISCONTINUED | OUTPATIENT
Start: 2017-11-20 | End: 2017-11-20

## 2017-11-20 RX ORDER — ENOXAPARIN SODIUM 100 MG/ML
1 INJECTION SUBCUTANEOUS EVERY 12 HOURS
Status: DISCONTINUED | OUTPATIENT
Start: 2017-11-21 | End: 2017-11-23

## 2017-11-20 RX ORDER — ONDANSETRON 2 MG/ML
8 INJECTION INTRAMUSCULAR; INTRAVENOUS EVERY 8 HOURS PRN
Status: DISCONTINUED | OUTPATIENT
Start: 2017-11-20 | End: 2017-11-24 | Stop reason: HOSPADM

## 2017-11-20 RX ORDER — ENOXAPARIN SODIUM 100 MG/ML
70 INJECTION SUBCUTANEOUS ONCE
Status: COMPLETED | OUTPATIENT
Start: 2017-11-20 | End: 2017-11-20

## 2017-11-20 RX ORDER — FUROSEMIDE 10 MG/ML
40 INJECTION INTRAMUSCULAR; INTRAVENOUS ONCE
Status: COMPLETED | OUTPATIENT
Start: 2017-11-20 | End: 2017-11-20

## 2017-11-20 RX ORDER — FUROSEMIDE 20 MG/1
20 TABLET ORAL ONCE
Status: COMPLETED | OUTPATIENT
Start: 2017-11-20 | End: 2017-11-20

## 2017-11-20 RX ADMIN — ASPIRIN 81 MG: 81 TABLET, COATED ORAL at 09:11

## 2017-11-20 RX ADMIN — PIPERACILLIN SODIUM AND TAZOBACTAM SODIUM 3.38 G: 3; .375 INJECTION, POWDER, FOR SOLUTION INTRAVENOUS at 09:11

## 2017-11-20 RX ADMIN — GABAPENTIN 300 MG: 300 CAPSULE ORAL at 02:11

## 2017-11-20 RX ADMIN — IPRATROPIUM BROMIDE AND ALBUTEROL SULFATE 3 ML: .5; 3 SOLUTION RESPIRATORY (INHALATION) at 11:11

## 2017-11-20 RX ADMIN — IPRATROPIUM BROMIDE AND ALBUTEROL SULFATE 3 ML: .5; 3 SOLUTION RESPIRATORY (INHALATION) at 07:11

## 2017-11-20 RX ADMIN — VANCOMYCIN HYDROCHLORIDE 1 G: 1 INJECTION, POWDER, LYOPHILIZED, FOR SOLUTION INTRAVENOUS at 10:11

## 2017-11-20 RX ADMIN — ROSUVASTATIN CALCIUM 10 MG: 5 TABLET ORAL at 09:11

## 2017-11-20 RX ADMIN — ENOXAPARIN SODIUM 70 MG: 100 INJECTION SUBCUTANEOUS at 06:11

## 2017-11-20 RX ADMIN — IOHEXOL 100 ML: 350 INJECTION, SOLUTION INTRAVENOUS at 03:11

## 2017-11-20 RX ADMIN — CARVEDILOL 25 MG: 25 TABLET, FILM COATED ORAL at 10:11

## 2017-11-20 RX ADMIN — INSULIN ASPART 2 UNITS: 100 INJECTION, SOLUTION INTRAVENOUS; SUBCUTANEOUS at 04:11

## 2017-11-20 RX ADMIN — FUROSEMIDE 40 MG: 10 INJECTION, SOLUTION INTRAVENOUS at 10:11

## 2017-11-20 RX ADMIN — IPRATROPIUM BROMIDE AND ALBUTEROL SULFATE 3 ML: .5; 3 SOLUTION RESPIRATORY (INHALATION) at 04:11

## 2017-11-20 RX ADMIN — PIPERACILLIN SODIUM AND TAZOBACTAM SODIUM 3.38 G: 3; .375 INJECTION, POWDER, FOR SOLUTION INTRAVENOUS at 03:11

## 2017-11-20 RX ADMIN — CARVEDILOL 25 MG: 25 TABLET, FILM COATED ORAL at 09:11

## 2017-11-20 RX ADMIN — CITALOPRAM HYDROBROMIDE 20 MG: 10 TABLET ORAL at 09:11

## 2017-11-20 RX ADMIN — ENOXAPARIN SODIUM 40 MG: 100 INJECTION SUBCUTANEOUS at 04:11

## 2017-11-20 RX ADMIN — ONDANSETRON 8 MG: 2 INJECTION INTRAMUSCULAR; INTRAVENOUS at 03:11

## 2017-11-20 RX ADMIN — IPRATROPIUM BROMIDE AND ALBUTEROL SULFATE 3 ML: .5; 3 SOLUTION RESPIRATORY (INHALATION) at 12:11

## 2017-11-20 RX ADMIN — LISINOPRIL 2.5 MG: 2.5 TABLET ORAL at 09:11

## 2017-11-20 RX ADMIN — GABAPENTIN 300 MG: 300 CAPSULE ORAL at 05:11

## 2017-11-20 RX ADMIN — GABAPENTIN 300 MG: 300 CAPSULE ORAL at 10:11

## 2017-11-20 RX ADMIN — LEVOTHYROXINE SODIUM 50 MCG: 50 TABLET ORAL at 09:11

## 2017-11-20 RX ADMIN — IPRATROPIUM BROMIDE AND ALBUTEROL SULFATE 3 ML: .5; 3 SOLUTION RESPIRATORY (INHALATION) at 08:11

## 2017-11-20 RX ADMIN — ONDANSETRON 8 MG: 2 INJECTION INTRAMUSCULAR; INTRAVENOUS at 05:11

## 2017-11-20 RX ADMIN — PIPERACILLIN SODIUM AND TAZOBACTAM SODIUM 3.38 G: 3; .375 INJECTION, POWDER, FOR SOLUTION INTRAVENOUS at 10:11

## 2017-11-20 RX ADMIN — HYDROCODONE BITARTRATE AND ACETAMINOPHEN 1 TABLET: 10; 325 TABLET ORAL at 05:11

## 2017-11-20 RX ADMIN — FUROSEMIDE 20 MG: 20 TABLET ORAL at 10:11

## 2017-11-20 NOTE — PROGRESS NOTES
This nurse was waved to room by  of patient where patient was found having difficulty breathing, RRT called and charge notified, vent-mask placed and head of patient held up, RRT personnel arrived and MD at bedside

## 2017-11-20 NOTE — PLAN OF CARE
Problem: Patient Care Overview  Goal: Plan of Care Review  Outcome: Ongoing (interventions implemented as appropriate)  Patient AAOx4. POC reviewed with patient. Verbalized understanding. Dressing to left ankle; CDI. Neuro checks every 4 hours; Neuro intact. Blood glucose monitoring during shift; insulin coverage given per doctors  orders. Patient reports pain treated with medication ordered by Md. Patient NPO after midnight. Patient up to the bathroom independently. Hourly rounding on patient to promote safety. Safety maintained throughout the shift.Patient positions and repositions self  independently. No  Skin break down during shift.   Bed locked and in lowest position. Call light in reach. Side rails up x2. NON skid socks on when OOB. Patient remained free of falls/ trauma.  Will continue to monitor.

## 2017-11-20 NOTE — PROGRESS NOTES
Progress Note  Hospital Medicine  Patient Name:Juliane Ramos  MRN:  [id number]  Patient Class: IP- Inpatient  Admit Date: 11/18/2017  Length of Stay: 2 days  Expected Discharge Date:   Attending Physician: Reg Devine MD  Primary Care Provider:  JOS Dumont    SUBJECTIVE:     Principal Problem: Diabetic leg ulcer  Initial history of present illness: 57 year old female with HTN, DM2, HF, CKD here for a left heel ulcer that she first noticed two weeks ago.  She states that over the past few days it has been associated with worsening pain, drainage, and a fever of up to 102 and for this she came for evaluation.  On initial exam in the ED, she was afebrile and stable but her wound supposedly had maggots on the edges.  She denies any chest pain, shortness of breath, abdominal pain, confusion, or any other related complaints.  She states she is compliant with her medications but only takes her basal insulin when she needs it per sliding scale.    PMH/PSH/SH/FH/Meds: reviewed.    Symptoms/Review of Systems: Patient is scheduled for foot wound debridement today.  No shortness of breath, cough, chest pain or headache, fever or abdominal pain.     Diet:  NPO   Activity level: Normal.    Pain:  Patient reports no pain.       OBJECTIVE:   Vital Signs (Most Recent):      Temp: 97.2 °F (36.2 °C) (11/20/17 0747)  Pulse: 62 (11/20/17 0747)  Resp: 16 (11/20/17 0747)  BP: 112/70 (11/20/17 0747)  SpO2: (!) 94 % (11/20/17 0747)       Vital Signs Range (Last 24H):  Temp:  [96.6 °F (35.9 °C)-97.8 °F (36.6 °C)]   Pulse:  [62-71]   Resp:  [16-20]   BP: (101-140)/(56-87)   SpO2:  [64 %-99 %]     Weight: 106.6 kg (235 lb)  Body mass index is 37.93 kg/m².    Intake/Output Summary (Last 24 hours) at 11/20/17 0913  Last data filed at 11/20/17 0630   Gross per 24 hour   Intake              725 ml   Output                0 ml   Net              725 ml     Physical Examination:  General appearance: well developed, appears stated age  Head:  normocephalic, atraumatic  Eyes:  conjunctivae/corneas clear. PERRL.  Nose: Nares normal. Septum midline.  Throat: lips, mucosa, and tongue normal; teeth and gums normal, no throat erythema Neck: supple, symmetrical, trachea midline, no JVD and thyroid not enlarged, symmetric, no tenderness/mass/nodules  Lungs:  clear to auscultation bilaterally and normal respiratory effort  Chest wall: no tenderness  Heart: regular rate and rhythm, S1, S2 normal, no murmur, click, rub or gallop  Abdomen: soft, non-tender non-distented; bowel sounds normal; no masses,  no organomegaly  Extremities: no cyanosis or edema, or clubbing. Left heel wound with mild surrounding erythema but no tracking up the leg.  Bandaged at this time.  Pulses: 2+ and symmetric  Skin: Skin color, texture, turgor normal. No rashes or lesions.  Lymph nodes: Cervical, supraclavicular, and axillary nodes normal.  Neurologic: Normal strength and tone. No focal numbness or weakness. CNII-XII intact.     CBC:    Recent Labs  Lab 11/18/17  1458   WBC 9.20   RBC 4.19   HGB 10.3*   HCT 32.4*      MCV 77*   MCH 24.6*   MCHC 31.8*   BMP    Recent Labs  Lab 11/18/17  1458   *   *   K 5.2*   CL 98   CO2 27   BUN 38*   CREATININE 1.4   CALCIUM 9.0      Diagnostic Results:  Microbiology Results (last 7 days)     Procedure Component Value Units Date/Time    Blood culture (site 2) [[id number]] Collected:  11/18/17 1658    Order Status:  Completed Specimen:  Blood from Antecubital, Left  Arm Updated:  11/20/17 0613     Blood Culture, Routine No Growth to date     Blood Culture, Routine No Growth to date    Narrative:       Site # 2, aerobic only    Blood culture (site 1) [[id number]] Collected:  11/18/17 1657    Order Status:  Completed Specimen:  Blood from Antecubital, Right  Arm Updated:  11/20/17 0613     Blood Culture, Routine No Growth to date     Blood Culture, Routine No Growth to date    Narrative:       Site # 1, aerobic and anaerobic          Left Calcaneum:   1.  No radiographically apparent acute osteomyelitis. Skin irregularity and bandage noted in the posterior aspect of the ankle.  2. Degenerative changes in the ankle, hindfoot and midfoot are noted.    MRI left foot:  1. Retro-Achilles soft tissue wound. No evidence for osteomyelitis.  2. Mild nonspecific Achilles and posterior tibial tenosynovitis.  3. Small tibiotalar joint effusion.    Assessment/Plan:     * Diabetic leg ulcer     - continue vanc/zosyn  - to OR today with podiatry.  - Follow ID recommendations.  - wound care.           Hypothyroid     - continue synthroid          Controlled type 2 diabetes mellitus with stage 3 chronic kidney disease     - SSI  - Lantus 10 units nightly with titration as needed          CKD (chronic kidney disease) stage 3, GFR 30-59 ml/min     - avoid nephrotoxins and renally dose meds          Essential hypertension     - continue home meds and will titrate as needed       VTE Risk Mitigation         Ordered     enoxaparin injection 40 mg  Daily     Route:  Subcutaneous        11/19/17 1334     Medium Risk of VTE  Once      11/18/17 1642        Reg Devine MD  Department of Hospital Medicine   [facility]

## 2017-11-20 NOTE — PLAN OF CARE
11/19/17 1901   Patient Assessment/Suction   Level of Consciousness (AVPU) alert   Respiratory Effort Normal;Unlabored   Expansion/Accessory Muscles/Retractions no use of accessory muscles   All Lung Fields Breath Sounds clear;diminished   PRE-TX-O2-ETCO2   O2 Device (Oxygen Therapy) nasal cannula   Flow (L/min) 2   SpO2 95 %   Pulse 64   Resp 18   Aerosol Therapy   $ Aerosol Therapy Charges Aerosol Treatment   Respiratory Treatment Status given   SVN/Inhaler Treatment Route mouthpiece;with oxygen   Position During Treatment HOB at 30 degrees   Patient Tolerance good   Post-Treatment   Post-treatment Heart Rate (beats/min) 65   Post-treatment Resp Rate (breaths/min) 18   All Fields Breath Sounds unchanged   Pt tolerates NC and treatments well.

## 2017-11-20 NOTE — PROGRESS NOTES
11/20/17 0715   Patient Assessment/Suction   Level of Consciousness (AVPU) alert   All Lung Fields Breath Sounds diminished   Cough Frequency infrequent   Cough Type good;nonproductive   PRE-TX-O2-ETCO2   O2 Device (Oxygen Therapy) nasal cannula   $ Is the patient on Low Flow Oxygen? Yes   SpO2 (!) 93 %   Pulse Oximetry Type Intermittent   $ Pulse Oximetry - Multiple Charge Pulse Oximetry - Multiple   Pulse 65   Resp 20   Aerosol Therapy   $ Aerosol Therapy Charges Aerosol Treatment   Respiratory Treatment Status given   SVN/Inhaler Treatment Route mouthpiece   Position During Treatment Sitting on edge of bed (dangling)   Patient Tolerance good   Post-Treatment   Post-treatment Heart Rate (beats/min) 68   Post-treatment Resp Rate (breaths/min) 20   All Fields Breath Sounds unchanged

## 2017-11-20 NOTE — SIGNIFICANT EVENT
1500-  RRT called for unresponsive patient.  This charge nurse present at bedside with primary nurse, staff nurses and Dr. Devine.  Face mask applied.  O2 sats 86% on 10L.  Pt placed in Trendelenburg position.  Positive pulse noted.    1505-  Navya- ICU charge nurse +ICU staff nurse, Galo- House Supervisor, Vale- RT, Kristin- RT, Yany RT & unit float nurse all present at bedside.  FSBG= 194.  /87 HR 66 O2 97% on 10L.  Cardiac monitor in place= NSR 68 @ at this time.  ABG's, Cardiac profile, CTA  and EKG ordered.  Pt arousable at this time.      1510- EKG and ABG's completed.  Dr. Devine reviewed.  Considering cardiology consult at this time.  Pt placed on NC.  Pt N/V.  Observed partially digested burger, red beans and rice.      1515-  Repeat VS: /99 HR 68 sats 94% 2L NC.  Pt remains alert and oriented at this time.  Answering questions appropriately.      1520- This charge nurse contacted OR charge nurse to cancel surgery per Dr. Devine recommendation and notified of the above- Anesthesia informed.  Call to Dr. Wick via cell phone- no response.  Contacted Carmen Mcgregor MD's office- notified of the above.      1540- Return call from Carmen @ Dr. Wick's office requesting change OR time to tomorrow @ 0700.  Call referred to OR control desk.  Primary nurse updated.  Cardiology consulted.  Pt preparing for CTA.

## 2017-11-20 NOTE — PROGRESS NOTES
Reported to RRT call. Patient found unresponsive in bed  With  at bedside. Patient has been NPO for foot surgery planned for this evening at 6 PM. Patient looked pale with dusky facial discoloration suggesting central cyanosis. Vital signs immediately checked. Patient noted to have pulse. Patient was able to respond slowly to painful stimulus. Blood glucose 130 mg% range. EKG performed, NSR, 56 bpm, anteroseptal MI changes. ABG showing acute on chronic hypoxic and hypercarbic respiratory failure. Tele-monitor placed. Patient later on vomited chunks of Marcelino which she and her , just had prior to RRT. Patient admitted being non-compliant with CPAP. Needs evaluation by her cardiologist. Will follow serial cardiac enzymes and obtain CTA of chest to rule out PE. Patient already receiving SQ Lovenox. Critical care time spent on the evaluation and treatment of severe organ dysfunction in excess of 35 minutes, review of pertinent labs and imaging studies, discussions with consulting providers and discussions with patient/family  Minutes. Nursing supervisor to inform Dr. Wick and anesthesiologist to cancel procedure.

## 2017-11-20 NOTE — PLAN OF CARE
Problem: Patient Care Overview  Goal: Plan of Care Review  Outcome: Ongoing (interventions implemented as appropriate)  Plan of care reviewed with patient she verbalized understanding. She is a 57 yr old female admitted for Left leg Diabetic ulcer posterior. Wound is deep down to the tendon Dr Wick did wound care at the bedside stated there were magnets present in the wound. Scheduled her for surgery tomorrow if his schedule permits she is to be NPO at midnight. Cont on IV Zosyn and Vanco no ADR noted also is blood glucose monitoring ssc given x 1 medicated for pain with prn which was effective linen changed.  Call light within reach will cont to monitor.

## 2017-11-20 NOTE — CONSULTS
Juliane Ramos [id number] is a 57 y.o. female who has been consulted for vancomycin dosing.    The patient has the following labs:     Date Creatinine (mg/dl)    BUN WBC Count   11/20/2017 Estimated Creatinine Clearance: 56.1 mL/min (based on SCr of 1.4 mg/dL). Lab Results   Component Value Date    BUN 38 (H) 11/18/2017     Lab Results   Component Value Date    WBC 9.20 11/18/2017        Current weight is 106.6 kg (235 lb)    Pt is receiving vancomycin 1250 mg every 12 hours.  Vancomycin trough from 11/20 at 0255 was 30.5 mg/dL.  Target trough range is 15-20 mg/dL.   Trough was drawn on time and anticipate it is supratherapeutic. Pharmacy will hold one dose and re-check level in 12 hours.A level  has been ordered prior to next dose due 11/20 at 1700.      Patient will be followed by pharmacy for changes in renal function, toxicity, and efficacy.  Thank you for allowing us to participate in this patient's care.     Wandy Castañeda

## 2017-11-21 LAB
ALBUMIN SERPL BCP-MCNC: 2.6 G/DL
ALLENS TEST: ABNORMAL
ALP SERPL-CCNC: 65 U/L
ALT SERPL W/O P-5'-P-CCNC: 10 U/L
ANION GAP SERPL CALC-SCNC: 9 MMOL/L
AST SERPL-CCNC: 10 U/L
BASOPHILS # BLD AUTO: 0.1 K/UL
BASOPHILS NFR BLD: 1 %
BILIRUB SERPL-MCNC: 0.6 MG/DL
BNP SERPL-MCNC: 1202 PG/ML
BUN SERPL-MCNC: 37 MG/DL
CALCIUM SERPL-MCNC: 8.5 MG/DL
CHLORIDE SERPL-SCNC: 97 MMOL/L
CK MB SERPL-MCNC: 4.4 NG/ML
CK MB SERPL-RTO: 4.4 %
CK SERPL-CCNC: 101 U/L
CK SERPL-CCNC: 101 U/L
CO2 SERPL-SCNC: 27 MMOL/L
CREAT SERPL-MCNC: 1.6 MG/DL
DELSYS: ABNORMAL
DIFFERENTIAL METHOD: ABNORMAL
EOSINOPHIL # BLD AUTO: 0.3 K/UL
EOSINOPHIL NFR BLD: 4.3 %
EP: 6
ERYTHROCYTE [DISTWIDTH] IN BLOOD BY AUTOMATED COUNT: 17.9 %
ERYTHROCYTE [SEDIMENTATION RATE] IN BLOOD BY WESTERGREN METHOD: 10 MM/H
EST. GFR  (AFRICAN AMERICAN): 42 ML/MIN/1.73 M^2
EST. GFR  (NON AFRICAN AMERICAN): 36 ML/MIN/1.73 M^2
FIO2: 30
GLUCOSE SERPL-MCNC: 110 MG/DL
HCO3 UR-SCNC: 29.9 MMOL/L (ref 24–28)
HCT VFR BLD AUTO: 30.4 %
HGB BLD-MCNC: 9.7 G/DL
IP: 12
LYMPHOCYTES # BLD AUTO: 0.8 K/UL
LYMPHOCYTES NFR BLD: 10.4 %
MCH RBC QN AUTO: 24.7 PG
MCHC RBC AUTO-ENTMCNC: 31.9 G/DL
MCV RBC AUTO: 78 FL
MIN VOL: 9.2
MODE: ABNORMAL
MONOCYTES # BLD AUTO: 0.9 K/UL
MONOCYTES NFR BLD: 10.9 %
NEUTROPHILS # BLD AUTO: 6 K/UL
NEUTROPHILS NFR BLD: 73.4 %
PCO2 BLDA: 58 MMHG (ref 35–45)
PH SMN: 7.32 [PH] (ref 7.35–7.45)
PLATELET # BLD AUTO: 204 K/UL
PMV BLD AUTO: 9.5 FL
PO2 BLDA: 78 MMHG (ref 80–100)
POC BE: 4 MMOL/L
POC SATURATED O2: 94 % (ref 95–100)
POC TCO2: 32 MMOL/L (ref 23–27)
POCT GLUCOSE: 111 MG/DL (ref 70–110)
POCT GLUCOSE: 162 MG/DL (ref 70–110)
POCT GLUCOSE: 166 MG/DL (ref 70–110)
POTASSIUM SERPL-SCNC: 4.7 MMOL/L
PROT SERPL-MCNC: 6.4 G/DL
RBC # BLD AUTO: 3.91 M/UL
SAMPLE: ABNORMAL
SITE: ABNORMAL
SODIUM SERPL-SCNC: 133 MMOL/L
SP02: 96
SPONT RATE: 15
TROPONIN I SERPL DL<=0.01 NG/ML-MCNC: 0.01 NG/ML
TROPONIN I SERPL DL<=0.01 NG/ML-MCNC: <0.006 NG/ML
VANCOMYCIN SERPL-MCNC: 33.1 UG/ML
WBC # BLD AUTO: 8.1 K/UL

## 2017-11-21 PROCEDURE — 80053 COMPREHEN METABOLIC PANEL: CPT

## 2017-11-21 PROCEDURE — 84484 ASSAY OF TROPONIN QUANT: CPT | Mod: 91

## 2017-11-21 PROCEDURE — 99232 SBSQ HOSP IP/OBS MODERATE 35: CPT | Mod: ,,, | Performed by: INTERNAL MEDICINE

## 2017-11-21 PROCEDURE — 94640 AIRWAY INHALATION TREATMENT: CPT

## 2017-11-21 PROCEDURE — 36600 WITHDRAWAL OF ARTERIAL BLOOD: CPT

## 2017-11-21 PROCEDURE — 80202 ASSAY OF VANCOMYCIN: CPT

## 2017-11-21 PROCEDURE — 25000242 PHARM REV CODE 250 ALT 637 W/ HCPCS: Performed by: INTERNAL MEDICINE

## 2017-11-21 PROCEDURE — 27000221 HC OXYGEN, UP TO 24 HOURS

## 2017-11-21 PROCEDURE — 99900035 HC TECH TIME PER 15 MIN (STAT)

## 2017-11-21 PROCEDURE — 94761 N-INVAS EAR/PLS OXIMETRY MLT: CPT

## 2017-11-21 PROCEDURE — 97802 MEDICAL NUTRITION INDIV IN: CPT

## 2017-11-21 PROCEDURE — 85025 COMPLETE CBC W/AUTO DIFF WBC: CPT

## 2017-11-21 PROCEDURE — 82803 BLOOD GASES ANY COMBINATION: CPT

## 2017-11-21 PROCEDURE — 83880 ASSAY OF NATRIURETIC PEPTIDE: CPT

## 2017-11-21 PROCEDURE — 36415 COLL VENOUS BLD VENIPUNCTURE: CPT

## 2017-11-21 PROCEDURE — 25000003 PHARM REV CODE 250: Performed by: EMERGENCY MEDICINE

## 2017-11-21 PROCEDURE — 63600175 PHARM REV CODE 636 W HCPCS: Performed by: INTERNAL MEDICINE

## 2017-11-21 PROCEDURE — 25000003 PHARM REV CODE 250: Performed by: INTERNAL MEDICINE

## 2017-11-21 PROCEDURE — C8929 TTE W OR WO FOL WCON,DOPPLER: HCPCS

## 2017-11-21 PROCEDURE — 12000002 HC ACUTE/MED SURGE SEMI-PRIVATE ROOM

## 2017-11-21 PROCEDURE — 82553 CREATINE MB FRACTION: CPT

## 2017-11-21 PROCEDURE — 94660 CPAP INITIATION&MGMT: CPT

## 2017-11-21 PROCEDURE — 84484 ASSAY OF TROPONIN QUANT: CPT

## 2017-11-21 PROCEDURE — 63600175 PHARM REV CODE 636 W HCPCS: Performed by: SPECIALIST

## 2017-11-21 RX ORDER — LIDOCAINE HYDROCHLORIDE 10 MG/ML
1 INJECTION, SOLUTION EPIDURAL; INFILTRATION; INTRACAUDAL; PERINEURAL ONCE
Status: CANCELLED | OUTPATIENT
Start: 2017-11-21 | End: 2017-11-21

## 2017-11-21 RX ORDER — SCOLOPAMINE TRANSDERMAL SYSTEM 1 MG/1
1 PATCH, EXTENDED RELEASE TRANSDERMAL
Status: CANCELLED | OUTPATIENT
Start: 2017-11-21

## 2017-11-21 RX ADMIN — GABAPENTIN 300 MG: 300 CAPSULE ORAL at 02:11

## 2017-11-21 RX ADMIN — LEVOTHYROXINE SODIUM 50 MCG: 50 TABLET ORAL at 09:11

## 2017-11-21 RX ADMIN — CARVEDILOL 25 MG: 25 TABLET, FILM COATED ORAL at 09:11

## 2017-11-21 RX ADMIN — ASPIRIN 81 MG: 81 TABLET, COATED ORAL at 09:11

## 2017-11-21 RX ADMIN — CITALOPRAM HYDROBROMIDE 20 MG: 10 TABLET ORAL at 09:11

## 2017-11-21 RX ADMIN — PIPERACILLIN SODIUM AND TAZOBACTAM SODIUM 3.38 G: 3; .375 INJECTION, POWDER, FOR SOLUTION INTRAVENOUS at 09:11

## 2017-11-21 RX ADMIN — GABAPENTIN 300 MG: 300 CAPSULE ORAL at 05:11

## 2017-11-21 RX ADMIN — IPRATROPIUM BROMIDE AND ALBUTEROL SULFATE 3 ML: .5; 3 SOLUTION RESPIRATORY (INHALATION) at 11:11

## 2017-11-21 RX ADMIN — ENOXAPARIN SODIUM 110 MG: 100 INJECTION SUBCUTANEOUS at 07:11

## 2017-11-21 RX ADMIN — PANTOPRAZOLE SODIUM 40 MG: 40 TABLET, DELAYED RELEASE ORAL at 09:11

## 2017-11-21 RX ADMIN — IPRATROPIUM BROMIDE AND ALBUTEROL SULFATE 3 ML: .5; 3 SOLUTION RESPIRATORY (INHALATION) at 03:11

## 2017-11-21 RX ADMIN — IPRATROPIUM BROMIDE AND ALBUTEROL SULFATE 3 ML: .5; 3 SOLUTION RESPIRATORY (INHALATION) at 07:11

## 2017-11-21 RX ADMIN — HUMAN ALBUMIN MICROSPHERES AND PERFLUTREN 0.11 MG: 10; .22 INJECTION, SOLUTION INTRAVENOUS at 09:11

## 2017-11-21 RX ADMIN — INSULIN DETEMIR 10 UNITS: 100 INJECTION, SOLUTION SUBCUTANEOUS at 09:11

## 2017-11-21 RX ADMIN — IPRATROPIUM BROMIDE AND ALBUTEROL SULFATE 3 ML: .5; 3 SOLUTION RESPIRATORY (INHALATION) at 12:11

## 2017-11-21 RX ADMIN — LISINOPRIL 2.5 MG: 2.5 TABLET ORAL at 09:11

## 2017-11-21 RX ADMIN — IPRATROPIUM BROMIDE AND ALBUTEROL SULFATE 3 ML: .5; 3 SOLUTION RESPIRATORY (INHALATION) at 04:11

## 2017-11-21 RX ADMIN — IPRATROPIUM BROMIDE AND ALBUTEROL SULFATE 3 ML: .5; 3 SOLUTION RESPIRATORY (INHALATION) at 08:11

## 2017-11-21 RX ADMIN — ROSUVASTATIN CALCIUM 10 MG: 5 TABLET ORAL at 09:11

## 2017-11-21 RX ADMIN — PIPERACILLIN SODIUM AND TAZOBACTAM SODIUM 3.38 G: 3; .375 INJECTION, POWDER, FOR SOLUTION INTRAVENOUS at 02:11

## 2017-11-21 RX ADMIN — GABAPENTIN 300 MG: 300 CAPSULE ORAL at 09:11

## 2017-11-21 RX ADMIN — PIPERACILLIN SODIUM AND TAZOBACTAM SODIUM 3.38 G: 3; .375 INJECTION, POWDER, FOR SOLUTION INTRAVENOUS at 04:11

## 2017-11-21 NOTE — ASSESSMENT & PLAN NOTE
Nutrition Diagnosis  Increased energy needs    Related to (etiology):   Increased physiological needs for healing    Signs and Symptoms (as evidenced by):   Diabetic leg ulcer    Interventions/Recommendations (treatment strategy):  1) continue diabetic diet 2) add ONS    Nutrition Diagnosis Status:   New

## 2017-11-21 NOTE — PLAN OF CARE
11/21/17 0718   Patient Assessment/Suction   Level of Consciousness (AVPU) alert   Respiratory Effort Normal;Unlabored   Expansion/Accessory Muscles/Retractions no retractions;no use of accessory muscles   All Lung Fields Breath Sounds diminished   Rhythm/Pattern, Respiratory depth regular;pattern regular;unlabored   Cough Frequency infrequent   Cough Type good;nonproductive   PRE-TX-O2-ETCO2   O2 Device (Oxygen Therapy) BiPAP   $ Is the patient on Low Flow Oxygen? Yes   Oxygen Concentration (%) 30   SpO2 97 %   Pulse Oximetry Type Intermittent   $ Pulse Oximetry - Multiple Charge Pulse Oximetry - Multiple   Pulse 88   Resp 20   Aerosol Therapy   $ Aerosol Therapy Charges Aerosol Treatment   Respiratory Treatment Status given   SVN/Inhaler Treatment Route in-line   Position During Treatment HOB at 30 degrees   Patient Tolerance good   Post-Treatment   Post-treatment Heart Rate (beats/min) 88   Post-treatment Resp Rate (breaths/min) 18   All Fields Breath Sounds aeration increased   Preset CPAP/BiPAP Settings   Mode Of Delivery BiPAP S/T   $ Initial CPAP/BiPAP Setup? No   $ Is patient using? Yes   Equipment Type V60   Airway Device Type medium full face mask   Ipap 12   EPAP (cm H2O) 6   Pressure Support (cm H2O) 6   Set Rate (Breaths/Min) 12   ITime (sec) 1   Rise Time (sec) 0.2   Patient CPAP/BiPAP Settings   RR Total (Breaths/Min) 17   Tidal Volume (mL) 417   VE Minute Ventilation (L/min) 7 L/min   Peak Inspiratory Pressure (cm H2O) 12   TiTOT (%) 28   Total Leak (L/Min) 15   Patient Trigger - ST Mode Only (%) 93   CPAP/BiPAP Alarms   High Pressure (cm H2O) 25   Low Pressure (cm H2O) 5   Low Pressure Delay (Sec) 20   Minute Ventilation (L/Min) 2   High RR (breaths/min) 40   Low RR (breaths/min) 8       Aerosol treatments q4 with Duoneb. Bipap PRN and QHS. Patient resting comfortably with no respiratory distress noted.

## 2017-11-21 NOTE — CONSULTS
"Consult Note  Infectious Disease    Reason for Consult:  Wound infection    HPI: Juliane Ramos is a  57 y.o. female with a history of advanced cardiomyopathy, on home oxygen, with diabetes and neuropathy, presented with complicated wound of left achilles, present x 2 weeks. For 2 days prior to admission she had fever and escalating redness of the left leg. She cannot explain how the wound originated. She was found on admission to have full thickness necrosis and maggots present in the wound. She was started on vanc and zosyn and Dr. Wick prescribed wound care. She was to have debridement yesterday but aspirated food that she was not supposed to have been eating(NPO) and suffered a respiratory arrest. Further evaluation did reveal a pulmonary embolus. She has been afebrile. WBC are WNL.     Review of patient's allergies indicates:  No Known Allergies  Past Medical History:   Diagnosis Date    CHF (congestive heart failure)     COPD (chronic obstructive pulmonary disease), despite being a never smoker? Or Pickwickian?     Coronary artery disease     Diabetes mellitus     Encounter for blood transfusion     Hypertension     MI (myocardial infarction) 2010    Stroke     July 2005    Thyroid disease      Past Surgical History:   Procedure Laterality Date    ABCESS DRAINAGE Right     Between "little toe" and the one next to it, with osteomyelitis      BREAST SURGERY      Reduction [id number]    CARDIAC SURGERY      CABG 4vessel ring in one valve mitral valve prolapse    CORONARY ARTERY BYPASS GRAFT      hyperlipidemia      TUBAL LIGATION  03/1986     Social History     Social History    Marital status:      Spouse name: N/A    Number of children: N/A    Years of education: N/A     Social History Main Topics    Smoking status: Never Smoker    Smokeless tobacco: Never Used    Alcohol use No    Drug use: No    Sexual activity: Yes     Partners: Male     Birth control/ protection: None     Other " Topics Concern    None     Social History Narrative    None     Family History   Problem Relation Age of Onset    Arthritis Mother     Heart disease Father     Cancer Father 68     prostae and bladder ca  in     Diabetes Father     Hypertension Father     Depression Sister     Hypertension Son     Diabetes Maternal Grandmother      lost leg    Kidney disease Paternal Grandfather      had kidney removed    Stroke Paternal Grandfather        Pertinent medications noted:     Review of Systems:   Fever x 2 days PTA  Chronic SOB, wears oxygen, chronic RLL fluid  Diabetes with A1c 8.5%, with peripheral neuropathy. Takes po amaryl and prn insulin  PCP is MsJayy Jonathan in [place]  No recognized trauma. Sleeps in a recliner, ?erosion from pressure   reports home health was putting barrier cream on skin lesion  No recent antibiotics  Has been treated by Dr. Blair a year ago    EXAM & DIAGNOSTICS REVIEWED:   Vitals:     Temp:  [96.2 °F (35.7 °C)-98.4 °F (36.9 °C)]   Temp: 98.4 °F (36.9 °C) (17 1629)  Pulse: 68 (17 162)  Resp: 18 (17 162)  BP: (!) 99/58 (17 1629)  SpO2: (!) 92 % (17 1629)    Intake/Output Summary (Last 24 hours) at 17 1702  Last data filed at 17 2245   Gross per 24 hour   Intake              300 ml   Output              400 ml   Net             -100 ml       General:  In NAD. Looks chronically ill appearing,  Alert and attentive, cooperative  Eyes:  Anicteric, PERRL, EOMI  ENT:  Mouth w/ pink MMM, no lesions/exudate,   Neck:  Trachea midline, supple, no adenopathy appreciated  Lungs: Decreased BS RLL, no E to A changes, no wheezes or pleural rub  Heart:  RRR, no gallop/murmur noted  Abd:  soft, obese, NT, ND, normal BS, no masses/organomegaly appreciated.  :  Voids  Musc:  Joints without effusion, swelling,  erythema, synovitis,   Skin:  Generally warm, dry, normal for color.   Wound: Left achilles has oval wound with exposed tendon. No  purulence can be expressed, the maggots have been cleansed away. There is no crepitance, bullae formation.   Neuro: AAOx3, speech clear, moves all extrems equally  Extrem: LLE has edema and mild cellulitis to mid calf. No lymphangitis. Pulses at DP and popliteal are palpable  VAD:  peripherals    Lines/Tubes/Drains:    General Labs reviewed:    Recent Labs  Lab 11/21/17 0421   WBC 8.10   RBC 3.91*   HGB 9.7*   HCT 30.4*      MCV 78*   MCH 24.7*   MCHC 31.9*       Recent Labs  Lab 11/21/17 0421   CALCIUM 8.5*   PROT 6.4   *   K 4.7   CO2 27   CL 97   BUN 37*   CREATININE 1.6*   ALKPHOS 65   ALT 10   AST 10   BILITOT 0.6       Micro: no wound cultures  Microbiology Results (last 7 days)     Procedure Component Value Units Date/Time    Blood culture (site 2) [[id number]] Collected:  11/18/17 1658    Order Status:  Completed Specimen:  Blood from Antecubital, Left  Arm Updated:  11/21/17 0612     Blood Culture, Routine No Growth to date     Blood Culture, Routine No Growth to date     Blood Culture, Routine No Growth to date    Narrative:       Site # 2, aerobic only    Blood culture (site 1) [[id number]] Collected:  11/18/17 1657    Order Status:  Completed Specimen:  Blood from Antecubital, Right  Arm Updated:  11/21/17 0612     Blood Culture, Routine No Growth to date     Blood Culture, Routine No Growth to date     Blood Culture, Routine No Growth to date    Narrative:       Site # 1, aerobic and anaerobic        Imaging Reviewed:   CXR and CT and MRI ankle  FINDINGS: There is an apparent retro-achilles soft tissue wound. No associated fluid collection is identified. There is mild fluid surrounding the Achilles tendon. There is also moderate T2 hyperintense signal representing soft tissue fluid/edema anteromedial and anterolateral about the ankle as well as along the dorsal midfoot. A small tibiotalar joint effusion is present. Increased T2 signal is present within the plantar musculature of the foot of  reflective of diabetic muscle edema.    There is no abnormal marrow signal suggestive for osteomyelitis. There is scattered degenerative hypertrophic changes of the midfoot.    Mild increased fluid is present within the posterior tibial tendon sheath in keeping with tenosynovitis.   Impression       1. Retro-Achilles soft tissue wound. No evidence for osteomyelitis.  2. Mild nonspecific Achilles and posterior tibial tenosynovitis.  3. Small tibiotalar joint effusion.           IMPRESSION & PLAN   1. Cellulitis of the left leg, originating in pressure wound with necrosis left achilles  2. Diabetes, CHF, oxygen dependence, obesity  3. Acute PE, code, aspiration event, chronic right sided atelectasis/effusion  4.    Recommendation:   Continue vanc and zosyn, will follow intermittently

## 2017-11-21 NOTE — PROGRESS NOTES
The pt apparently aspirated on hamburger when she was suppose to be NPO for surgery.  Apparently she has also had a DVT.  The posterior left leg ulcer was not redressed today and still has a foul odor.  I will postpone debridement of the achilles tendon and ulcer and application of primatrix until cleared by cardiology.  Will have santyl applied to wound until surgery.  I will follow while in pt.
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
## 2017-11-21 NOTE — PROGRESS NOTES
[facility]  Adult Nutrition  Consult Note    SUMMARY     Recommendations    Recommendation/Intervention: 1) continue diabetic diet 2) add Boost Glucose Control, BID   Goals: Pt will meet at least 85% of EEN  Nutrition Goal Status: new  Communication of RD Recs:  (sticky note, second sign)    Discharge Plan     diabetic diet with ONS for prn use    Reason for Assessment    Reason for Assessment: identified at risk by screening criteria      1. Diabetic leg ulcer    2. Shortness of breath    3. CKD (chronic kidney disease) stage 3, GFR 30-59 ml/min    4. Controlled type 2 diabetes mellitus with stage 3 chronic kidney disease, unspecified long term insulin use status    5. Essential hypertension    6. Hypothyroidism, unspecified type    7. CHF (congestive heart failure)    8. CHF (congestive heart failure)      Past Medical History:   Diagnosis Date    CHF (congestive heart failure)     COPD (chronic obstructive pulmonary disease)     Coronary artery disease     Diabetes mellitus     Encounter for blood transfusion     Hypertension     MI (myocardial infarction) 2010    Stroke     July 2005    Thyroid disease        General Information Comments: Admitted with infected ulcer lt ankle and fever. Pt erports poor appetite since last Tuesday, but is better today. Wt appears stable.   Wt Readings from Last 1 Encounters:   11/21/17 1452 106.6 kg (235 lb)   11/18/17 1410 106.6 kg (235 lb)       Nutrition Prescription Ordered    Current Diet Order: diabetic 1800 calorie diet       Evaluation of Received Nutrients/Fluid Intake        Energy Calories Required: not meeting needs      Protein Required: not meeting needs    Intake/Output Summary (Last 24 hours) at 11/21/17 1502  Last data filed at 11/20/17 2245   Gross per 24 hour   Intake              300 ml   Output              400 ml   Net             -100 ml         Fluid Required: not meeting needs     Tolerance: tolerating  % Intake of Estimated  Energy Needs: 50 - 75 %  % Meal Intake: 100%  (for lunch on 11/21, with NPO for breakfast)    Nutrition Risk Screen     Nutrition Risk Screen: large or nonhealing wound, burn or pressure ulcer    Nutrition/Diet History    Patient Reported Diet/Restrictions/Preferences: diabetic diet  Typical Food/Fluid Intake: Reports use of 2 Boost daily at home.   Food Preferences: No cultural or religous food preferences identified.       Labs/Tests/Procedures/Meds    Diagnostic Test/Procedure Review: reviewed, pertinent  Pertinent Labs Reviewed: reviewed, pertinent      BMP  Lab Results   Component Value Date     (L) 11/21/2017    K 4.7 11/21/2017    CL 97 11/21/2017    CO2 27 11/21/2017    BUN 37 (H) 11/21/2017    CREATININE 1.6 (H) 11/21/2017    CALCIUM 8.5 (L) 11/21/2017    ANIONGAP 9 11/21/2017    ESTGFRAFRICA 42 (A) 11/21/2017    EGFRNONAA 36 (A) 11/21/2017     Lab Results   Component Value Date    CALCIUM 8.5 (L) 11/21/2017       Recent Labs  Lab 11/21/17  1103   POCTGLUCOSE 111*     Lab Results   Component Value Date    HGBA1C 8.4 (H) 11/18/2017     Lab Results   Component Value Date    CHOL 115 (L) 06/27/2017     Lab Results   Component Value Date    HDL 25 (L) 06/27/2017     Lab Results   Component Value Date    LDLCALC 72.4 06/27/2017     Lab Results   Component Value Date    TRIG 88 06/27/2017     Lab Results   Component Value Date    CHOLHDL 21.7 06/27/2017       Pertinent Medications Reviewed: reviewed, pertinent      Scheduled Meds:   albuterol-ipratropium 2.5mg-0.5mg/3mL  3 mL Nebulization Q4H    aspirin  81 mg Oral Daily    carvedilol  25 mg Oral BID    citalopram  20 mg Oral Daily    enoxaparin  1 mg/kg Subcutaneous Q12H    gabapentin  300 mg Oral TID    insulin detemir  10 Units Subcutaneous QHS    levothyroxine  50 mcg Oral Daily    lisinopril  2.5 mg Oral Daily    pantoprazole  40 mg Oral Daily    piperacillin-tazobactam 3.375 g in sodium chloride 0.9 % 50 mL IVPB (ready to mix system)  3.375  "g Intravenous Q6H    rosuvastatin  10 mg Oral Daily     Continuous Infusions:     Physical Findings    Overall Physical Appearance: obese     Oral/Mouth Cavity: tooth/teeth missing  Skin:  (infected ulcer lt ankle)    Anthropometrics    Temp: 98.2 °F (36.8 °C)     Height: 5' 6"  Weight Method: Stated  Weight: 106.6 kg (235 lb)     Ideal Body Weight (IBW), Female: 130 lb     % Ideal Body Weight, Female (lb): 180.77 lb  BMI (Calculated): 38  BMI Grade: 35 - 39.9 - obesity - grade II     Usual Body Weight (UBW), k.9 kg     % Usual Body Weight: 98.09  % Weight Change From Usual Weight: -2.12 %     Estimated/Assessed Needs    Weight Used For Calorie Calculations: 106.6 kg (235 lb 0.2 oz)   Height (cm): 167.6 cm     Energy Need Method: Ford-St Jeor      RMR (Ford-St. Jeor Equation): 1677.75 x 1.25=2097      Weight Used For Protein Calculations: 59 kg (130 lb) (1.2-1.5)     1.2 gm Protein (gm): 70.91 and 1.5 gm Protein (gm): 88.64     Fluid Need Method:  (or per MD rec)       CHO Requirement: 45-50% of EEN (188-236 gms)    Assessment and Plan    * Diabetic leg ulcer    Nutrition Diagnosis  Increased energy needs    Related to (etiology):   Increased physiological needs for healing    Signs and Symptoms (as evidenced by):   Diabetic leg ulcer    Interventions/Recommendations (treatment strategy):  1) continue diabetic diet 2) add ONS    Nutrition Diagnosis Status:   New              Monitor and Evaluation    Food and Nutrient Intake: energy intake  Food and Nutrient Adminstration: diet order   Anthropometric Measurements: weight, weight change  Biochemical Data, Medical Tests and Procedures: glucose/endocrine profile, inflammatory profile, lipid profile  Nutrition-Focused Physical Findings: overall appearance, skin    Nutrition Risk    Level of Risk:  (2 x weekly)    Nutrition Follow-Up     yes    "

## 2017-11-21 NOTE — CONSULTS
Juliane Ramos [id number] is a 57 y.o. female who has been consulted for vancomycin dosing.    The patient has the following labs:     Date Creatinine (mg/dl)    BUN WBC Count   11/20/2017 Estimated Creatinine Clearance: 56.1 mL/min (based on SCr of 1.4 mg/dL). Lab Results   Component Value Date    BUN 36 (H) 11/20/2017     Lab Results   Component Value Date    WBC 7.60 11/20/2017        Current weight is 106.6 kg (235 lb)    Pt is receiving vancomycin 1250 mg every 12 hours.  Vancomycin trough from 11/20 at 1832 was 22.5 mg/dL.  Target trough range is 15-20 mg/dL.   Trough was drawn about 1.5 hours late and anticipate it is supra/therapeutic.  Pharmacy will change current regimen to a vancomycin dose of 1000 mg every 12 hours. A vancomycin trough has been ordered prior to 3rd dose due 11/21 at 2130.      Patient will be followed by pharmacy for changes in renal function, toxicity, and efficacy.  Thank you for allowing us to participate in this patient's care.     Karan Jaimes, Pharm D

## 2017-11-21 NOTE — PROGRESS NOTES
Dr. Goodson was phoned and updated on pt's current ABG results.  MD ordered for pt to be NPO with BIPAP to be worn overnight. MD would like ABG;s repeated in the a.m. Unless pt becomes symptomatic then to repeat ABG's @ 0000.  Nurse will carry out orders and continue to monitor pt.

## 2017-11-21 NOTE — PROGRESS NOTES
Dr. Palomo called ordering a troponin level to be drawn in the a.m. With pt's a.m. Labs.  Order was placed.

## 2017-11-21 NOTE — CONSULTS
Date: 11/21/2017   Juliane Ramos [id number] is a 57 y.o. female who has been consulted for vancomycin dosing.    The patient has the following labs:     Creatinine (mg/dl)    WBC Count   Serum creatinine: 1.6 mg/dL High 11/21/17 0421  Estimated creatinine clearance: 49 mL/min Lab Results   Component Value Date    WBC 8.10 11/21/2017        Current weight is 106.6 kg (235 lb)      Pt's renal function is not stable. SCr trending up.  Pharmacy will dose by level. Target goal is 15-20 mg/dL.   Vanc level 11/21/2017 at 0933 was 33.1 mg/dL.  Level was taken 12 hours after last dose of vancomycin 1gm 11/20 at 2245.     A vancomycin level will be ordered on 11/21/17 at 2300 (24 hours after last dose).     Patient will be followed by pharmacy for changes in renal function, toxicity, and efficacy.    Thank you for allowing us to participate in this patient's care.     Jj Junior, Pharmacist

## 2017-11-21 NOTE — PLAN OF CARE
Problem: Pressure Ulcer Risk (Donny Scale) (Adult,Obstetrics,Pediatric)  Intervention: Promote/Optimize Nutrition  Recommendation/Intervention: 1) continue diabetic diet 2) add Boost Glucose Control, BID   Goals: Pt will meet at least 85% of EEN  Nutrition Goal Status: new  Communication of RD Recs:  (sticky note, second sign)

## 2017-11-21 NOTE — PROGRESS NOTES
Progress Note  Hospital Medicine  Patient Name:Juliane Ramos  MRN:  [id number]  Patient Class: IP- Inpatient  Admit Date: 11/18/2017  Length of Stay: 3 days  Expected Discharge Date:   Attending Physician: Reg Devine MD  Primary Care Provider:  JOS Dumont    SUBJECTIVE:     Principal Problem: Diabetic leg ulcer  Initial history of present illness: 57 year old female with HTN, DM2, HF, CKD here for a left heel ulcer that she first noticed two weeks ago.  She states that over the past few days it has been associated with worsening pain, drainage, and a fever of up to 102 and for this she came for evaluation.  On initial exam in the ED, she was afebrile and stable but her wound supposedly had maggots on the edges.  She denies any chest pain, shortness of breath, abdominal pain, confusion, or any other related complaints.  She states she is compliant with her medications but only takes her basal insulin when she needs it per sliding scale.    PMH/PSH/SH/FH/Meds: reviewed.    Symptoms/Review of Systems: No new complaints. CTA showed RLL PE. No shortness of breath, cough, chest pain or headache, fever or abdominal pain.     Diet: cardiac diabetic   Activity level: Normal.    Pain:  Patient reports no pain.       OBJECTIVE:   Vital Signs (Most Recent):      Temp: 96.2 °F (35.7 °C) (11/21/17 0812)  Pulse: 67 (11/21/17 0812)  Resp: 20 (11/21/17 0812)  BP: 125/88 (11/21/17 0812)  SpO2: 97 % (11/21/17 0812)       Vital Signs Range (Last 24H):  Temp:  [96.2 °F (35.7 °C)-97.7 °F (36.5 °C)]   Pulse:  [62-88]   Resp:  [16-25]   BP: ()/(50-88)   SpO2:  [95 %-98 %]     Weight: 106.6 kg (235 lb)  Body mass index is 37.93 kg/m².    Intake/Output Summary (Last 24 hours) at 11/21/17 0844  Last data filed at 11/20/17 2245   Gross per 24 hour   Intake              300 ml   Output              400 ml   Net             -100 ml     Physical Examination:  General appearance: well developed, appears stated age  Head:  normocephalic, atraumatic  Eyes:  conjunctivae/corneas clear. PERRL.  Nose: Nares normal. Septum midline.  Throat: lips, mucosa, and tongue normal; teeth and gums normal, no throat erythema Neck: supple, symmetrical, trachea midline, no JVD and thyroid not enlarged, symmetric, no tenderness/mass/nodules  Lungs:  clear to auscultation bilaterally and normal respiratory effort  Chest wall: no tenderness  Heart: regular rate and rhythm, S1, S2 normal, no murmur, click, rub or gallop  Abdomen: soft, non-tender non-distented; bowel sounds normal; no masses,  no organomegaly  Extremities: no cyanosis or edema, or clubbing. Left heel wound with mild surrounding erythema but no tracking up the leg.  Bandaged at this time.  Pulses: 2+ and symmetric  Skin: Skin color, texture, turgor normal. No rashes or lesions.  Lymph nodes: Cervical, supraclavicular, and axillary nodes normal.  Neurologic: Normal strength and tone. No focal numbness or weakness. CNII-XII intact.     CBC:    Recent Labs  Lab 11/18/17  1458 11/20/17  1007 11/21/17  0421   WBC 9.20 7.60 8.10   RBC 4.19 3.96* 3.91*   HGB 10.3* 9.6* 9.7*   HCT 32.4* 30.7* 30.4*    187 204   MCV 77* 77* 78*   MCH 24.6* 24.4* 24.7*   MCHC 31.8* 31.5* 31.9*   BMP    Recent Labs  Lab 11/18/17  1458 11/20/17  1007 11/21/17  0421   * 162* 110   * 132* 133*   K 5.2* 4.6 4.7   CL 98 98 97   CO2 27 25 27   BUN 38* 36* 37*   CREATININE 1.4 1.4  1.4 1.6*   CALCIUM 9.0 8.4* 8.5*      Diagnostic Results:  Microbiology Results (last 7 days)     Procedure Component Value Units Date/Time    Blood culture (site 2) [[id number]] Collected:  11/18/17 1658    Order Status:  Completed Specimen:  Blood from Antecubital, Left  Arm Updated:  11/21/17 0612     Blood Culture, Routine No Growth to date     Blood Culture, Routine No Growth to date     Blood Culture, Routine No Growth to date    Narrative:       Site # 2, aerobic only    Blood culture (site 1) [[id number]] Collected:   11/18/17 1657    Order Status:  Completed Specimen:  Blood from Antecubital, Right  Arm Updated:  11/21/17 0612     Blood Culture, Routine No Growth to date     Blood Culture, Routine No Growth to date     Blood Culture, Routine No Growth to date    Narrative:       Site # 1, aerobic and anaerobic         Left Calcaneum:   1.  No radiographically apparent acute osteomyelitis. Skin irregularity and bandage noted in the posterior aspect of the ankle.  2. Degenerative changes in the ankle, hindfoot and midfoot are noted.    MRI left foot:  1. Retro-Achilles soft tissue wound. No evidence for osteomyelitis.  2. Mild nonspecific Achilles and posterior tibial tenosynovitis.  3. Small tibiotalar joint effusion.    Bilateral leg venous doppler scan: There are some limitations but no definite acute DVT in seen in either lower extremity    Assessment/Plan:     * Diabetic leg ulcer     - continue vanc/zosyn  - Wound debridement possibly tomorrow  - Follow ID recommendations.  - wound care.          Acute RLL pulmonary embolism  Continue Lovenox 1 mg/kg sq q 12 hrs. Blood thinner risks and benefits and fall precautions discussed with patient and her .     Acute on chronic hypoxic hypercarbic respiratory failure.  Supplemental O2 via nasal canula; titrate O2 saturation to >92%.   Continue beta 2 agonist bronchodilator treatments.   Use BiPAP during sleep. Needs Polysomnogram upon DC.     Hypothyroid     - continue synthroid          Controlled type 2 diabetes mellitus with stage 3 chronic kidney disease     - SSI  - Lantus 10 units nightly with titration as needed          CKD (chronic kidney disease) stage 3, GFR 30-59 ml/min     - avoid nephrotoxins and renally dose meds          Essential hypertension     - continue home meds and will titrate as needed       VTE Risk Mitigation         Ordered     enoxaparin injection 110 mg  Every 12 hours     Route:  Subcutaneous        11/20/17 1726     Medium Risk of VTE  Once       11/18/17 1642        Reg Devine MD  Department of Hospital Medicine   [facility]

## 2017-11-21 NOTE — PLAN OF CARE
11/20/17 2044   Patient Assessment/Suction   Level of Consciousness (AVPU) alert   Respiratory Effort Normal;Unlabored   Expansion/Accessory Muscles/Retractions no use of accessory muscles   All Lung Fields Breath Sounds diminished   PRE-TX-O2-ETCO2   O2 Device (Oxygen Therapy) BiPAP   Flow (L/min) 30   SpO2 95 %   Pulse 68   Resp (!) 25   Aerosol Therapy   $ Aerosol Therapy Charges Aerosol Treatment   Respiratory Treatment Status given   SVN/Inhaler Treatment Route in-line;with oxygen   Position During Treatment HOB at 30 degrees   Patient Tolerance good   Post-Treatment   Post-treatment Heart Rate (beats/min) 68   Post-treatment Resp Rate (breaths/min) 25   All Fields Breath Sounds unchanged   Preset CPAP/BiPAP Settings   Mode Of Delivery BiPAP   $ CPAP/BiPAP Daily Charge BiPAP/CPAP Daily   $ Initial CPAP/BiPAP Setup? No   $ Is patient using? Yes   Equipment Type V60   Airway Device Type medium full face mask   Ipap 12   EPAP (cm H2O) 6   Pressure Support (cm H2O) 6   Set Rate (Breaths/Min) 12   ITime (sec) 1   Rise Time (sec) 0.2   Patient CPAP/BiPAP Settings   RR Total (Breaths/Min) 25   Tidal Volume (mL) 466   VE Minute Ventilation (L/min) 8.4 L/min   Peak Inspiratory Pressure (cm H2O) 13   TiTOT (%) 22   Total Leak (L/Min) 8   Patient Trigger - ST Mode Only (%) 99   CPAP/BiPAP Alarms   High Pressure (cm H2O) 25   Low Pressure (cm H2O) 5   Low Pressure Delay (Sec) 20   Minute Ventilation (L/Min) 2   High RR (breaths/min) 40   Low RR (breaths/min) 8   Pt tolerates BiPAP, NC, and treatments well. Pt states she has not used home CPAP in at least 10 years.

## 2017-11-22 LAB
ANION GAP SERPL CALC-SCNC: 10 MMOL/L
BASOPHILS # BLD AUTO: 0 K/UL
BASOPHILS NFR BLD: 0.4 %
BUN SERPL-MCNC: 42 MG/DL
CALCIUM SERPL-MCNC: 8.4 MG/DL
CHLORIDE SERPL-SCNC: 98 MMOL/L
CO2 SERPL-SCNC: 25 MMOL/L
CREAT SERPL-MCNC: 2.9 MG/DL
DIFFERENTIAL METHOD: ABNORMAL
EOSINOPHIL # BLD AUTO: 0.3 K/UL
EOSINOPHIL NFR BLD: 3.7 %
ERYTHROCYTE [DISTWIDTH] IN BLOOD BY AUTOMATED COUNT: 18.7 %
EST. GFR  (AFRICAN AMERICAN): 20 ML/MIN/1.73 M^2
EST. GFR  (NON AFRICAN AMERICAN): 18 ML/MIN/1.73 M^2
ESTIMATED PA SYSTOLIC PRESSURE: 27.25
GLUCOSE SERPL-MCNC: 119 MG/DL
HCT VFR BLD AUTO: 29.1 %
HGB BLD-MCNC: 9.2 G/DL
LYMPHOCYTES # BLD AUTO: 0.9 K/UL
LYMPHOCYTES NFR BLD: 10.8 %
MCH RBC QN AUTO: 24.4 PG
MCHC RBC AUTO-ENTMCNC: 31.6 G/DL
MCV RBC AUTO: 77 FL
MITRAL VALVE REGURGITATION: ABNORMAL
MONOCYTES # BLD AUTO: 0.9 K/UL
MONOCYTES NFR BLD: 11.6 %
NEUTROPHILS # BLD AUTO: 5.8 K/UL
NEUTROPHILS NFR BLD: 73.5 %
PLATELET # BLD AUTO: 216 K/UL
PMV BLD AUTO: 9.2 FL
POCT GLUCOSE: 169 MG/DL (ref 70–110)
POCT GLUCOSE: 174 MG/DL (ref 70–110)
POCT GLUCOSE: 211 MG/DL (ref 70–110)
POTASSIUM SERPL-SCNC: 5.2 MMOL/L
RBC # BLD AUTO: 3.77 M/UL
RETIRED EF AND QEF - SEE NOTES: 37 (ref 55–65)
SODIUM SERPL-SCNC: 133 MMOL/L
VANCOMYCIN SERPL-MCNC: 29.9 UG/ML
WBC # BLD AUTO: 7.9 K/UL

## 2017-11-22 PROCEDURE — 25000003 PHARM REV CODE 250: Performed by: INTERNAL MEDICINE

## 2017-11-22 PROCEDURE — 85025 COMPLETE CBC W/AUTO DIFF WBC: CPT

## 2017-11-22 PROCEDURE — 12000002 HC ACUTE/MED SURGE SEMI-PRIVATE ROOM

## 2017-11-22 PROCEDURE — 80048 BASIC METABOLIC PNL TOTAL CA: CPT

## 2017-11-22 PROCEDURE — 94761 N-INVAS EAR/PLS OXIMETRY MLT: CPT

## 2017-11-22 PROCEDURE — 99233 SBSQ HOSP IP/OBS HIGH 50: CPT | Mod: ,,, | Performed by: INTERNAL MEDICINE

## 2017-11-22 PROCEDURE — 25000242 PHARM REV CODE 250 ALT 637 W/ HCPCS: Performed by: INTERNAL MEDICINE

## 2017-11-22 PROCEDURE — 99900035 HC TECH TIME PER 15 MIN (STAT)

## 2017-11-22 PROCEDURE — 94660 CPAP INITIATION&MGMT: CPT

## 2017-11-22 PROCEDURE — 25000003 PHARM REV CODE 250: Performed by: EMERGENCY MEDICINE

## 2017-11-22 PROCEDURE — 63600175 PHARM REV CODE 636 W HCPCS: Performed by: INTERNAL MEDICINE

## 2017-11-22 PROCEDURE — 27000221 HC OXYGEN, UP TO 24 HOURS

## 2017-11-22 PROCEDURE — 94640 AIRWAY INHALATION TREATMENT: CPT

## 2017-11-22 PROCEDURE — 80202 ASSAY OF VANCOMYCIN: CPT

## 2017-11-22 RX ORDER — LIDOCAINE HYDROCHLORIDE 10 MG/ML
1 INJECTION, SOLUTION EPIDURAL; INFILTRATION; INTRACAUDAL; PERINEURAL ONCE
Status: CANCELLED | OUTPATIENT
Start: 2017-11-22 | End: 2017-11-22

## 2017-11-22 RX ORDER — SODIUM CHLORIDE 9 MG/ML
INJECTION, SOLUTION INTRAVENOUS CONTINUOUS
Status: ACTIVE | OUTPATIENT
Start: 2017-11-22 | End: 2017-11-23

## 2017-11-22 RX ADMIN — PIPERACILLIN AND TAZOBACTAM 3.38 G: 3; .375 INJECTION, POWDER, LYOPHILIZED, FOR SOLUTION INTRAVENOUS; PARENTERAL at 10:11

## 2017-11-22 RX ADMIN — IPRATROPIUM BROMIDE AND ALBUTEROL SULFATE 3 ML: .5; 3 SOLUTION RESPIRATORY (INHALATION) at 12:11

## 2017-11-22 RX ADMIN — CARVEDILOL 25 MG: 25 TABLET, FILM COATED ORAL at 08:11

## 2017-11-22 RX ADMIN — LISINOPRIL 2.5 MG: 2.5 TABLET ORAL at 08:11

## 2017-11-22 RX ADMIN — IPRATROPIUM BROMIDE AND ALBUTEROL SULFATE 3 ML: .5; 3 SOLUTION RESPIRATORY (INHALATION) at 04:11

## 2017-11-22 RX ADMIN — ASPIRIN 81 MG: 81 TABLET, COATED ORAL at 08:11

## 2017-11-22 RX ADMIN — LEVOTHYROXINE SODIUM 50 MCG: 50 TABLET ORAL at 08:11

## 2017-11-22 RX ADMIN — ROSUVASTATIN CALCIUM 10 MG: 5 TABLET ORAL at 08:11

## 2017-11-22 RX ADMIN — IPRATROPIUM BROMIDE AND ALBUTEROL SULFATE 3 ML: .5; 3 SOLUTION RESPIRATORY (INHALATION) at 07:11

## 2017-11-22 RX ADMIN — ENOXAPARIN SODIUM 110 MG: 100 INJECTION SUBCUTANEOUS at 06:11

## 2017-11-22 RX ADMIN — INSULIN ASPART 2 UNITS: 100 INJECTION, SOLUTION INTRAVENOUS; SUBCUTANEOUS at 04:11

## 2017-11-22 RX ADMIN — IPRATROPIUM BROMIDE AND ALBUTEROL SULFATE 3 ML: .5; 3 SOLUTION RESPIRATORY (INHALATION) at 11:11

## 2017-11-22 RX ADMIN — IPRATROPIUM BROMIDE AND ALBUTEROL SULFATE 3 ML: .5; 3 SOLUTION RESPIRATORY (INHALATION) at 03:11

## 2017-11-22 RX ADMIN — PIPERACILLIN SODIUM AND TAZOBACTAM SODIUM 3.38 G: 3; .375 INJECTION, POWDER, FOR SOLUTION INTRAVENOUS at 08:11

## 2017-11-22 RX ADMIN — PIPERACILLIN SODIUM AND TAZOBACTAM SODIUM 3.38 G: 3; .375 INJECTION, POWDER, FOR SOLUTION INTRAVENOUS at 02:11

## 2017-11-22 RX ADMIN — INSULIN DETEMIR 10 UNITS: 100 INJECTION, SOLUTION SUBCUTANEOUS at 10:11

## 2017-11-22 RX ADMIN — GABAPENTIN 300 MG: 300 CAPSULE ORAL at 02:11

## 2017-11-22 RX ADMIN — PANTOPRAZOLE SODIUM 40 MG: 40 TABLET, DELAYED RELEASE ORAL at 08:11

## 2017-11-22 RX ADMIN — GABAPENTIN 300 MG: 300 CAPSULE ORAL at 10:11

## 2017-11-22 RX ADMIN — SODIUM CHLORIDE: 0.9 INJECTION, SOLUTION INTRAVENOUS at 04:11

## 2017-11-22 RX ADMIN — CITALOPRAM HYDROBROMIDE 20 MG: 10 TABLET ORAL at 08:11

## 2017-11-22 RX ADMIN — CARVEDILOL 25 MG: 25 TABLET, FILM COATED ORAL at 09:11

## 2017-11-22 NOTE — PLAN OF CARE
11/21/17 2056   Patient Assessment/Suction   Level of Consciousness (AVPU) alert   Respiratory Effort Normal;Unlabored   Expansion/Accessory Muscles/Retractions no use of accessory muscles   All Lung Fields Breath Sounds clear;diminished   PRE-TX-O2-ETCO2   O2 Device (Oxygen Therapy) nasal cannula   Flow (L/min) 3   SpO2 (!) 94 %   Pulse 69   Resp 18   Aerosol Therapy   $ Aerosol Therapy Charges Aerosol Treatment   Respiratory Treatment Status given   SVN/Inhaler Treatment Route mask   Position During Treatment HOB at 30 degrees   Patient Tolerance good   Post-Treatment   Post-treatment Heart Rate (beats/min) 68   Post-treatment Resp Rate (breaths/min) 18   All Fields Breath Sounds unchanged   Pt tolerates NC and treatments well.

## 2017-11-22 NOTE — PROGRESS NOTES
Patient had 2D echo this shift, awaiting results from cardiology. Patient will go for surgery tomorrow approx 12:00pm if cleared by cardiology. Patient to be NPO after midnight.   Dressing changed to patients wound x2 this shift, VSS, bed in the lowest and locked position, side rails up x2, call light in reach, family at bedside will continue to monitor.

## 2017-11-22 NOTE — PROGRESS NOTES
Progress Note  Cardiology    Admit Date: 11/18/2017   LOS: 4 days     Follow-up For: CHF; BETH; resp failure    Scheduled Meds:   albuterol-ipratropium 2.5mg-0.5mg/3mL  3 mL Nebulization Q4H    aspirin  81 mg Oral Daily    carvedilol  25 mg Oral BID    citalopram  20 mg Oral Daily    enoxaparin  1 mg/kg Subcutaneous Q12H    gabapentin  300 mg Oral TID    insulin detemir  10 Units Subcutaneous QHS    levothyroxine  50 mcg Oral Daily    pantoprazole  40 mg Oral Daily    piperacillin-tazobactam 3.375 g in sodium chloride 0.9 % 50 mL IVPB (ready to mix system)  3.375 g Intravenous Q6H    rosuvastatin  10 mg Oral Daily     Continuous Infusions:   sodium chloride 0.9%       PRN Meds:acetaminophen, dextrose 50%, dextrose 50%, glucagon (human recombinant), glucose, glucose, hydrocodone-acetaminophen 10-325mg, insulin aspart, ondansetron, sodium chloride 0.9%    Review of patient's allergies indicates:  No Known Allergies    SUBJECTIVE:     Interval History: Patient has no complaint of sob..    Review of Systems  Respiratory: negative for cough, hemoptysis, sputum and wheezing  Cardiovascular: negative for chest pressure/discomfort, orthopnea, palpitations and paroxysmal nocturnal dyspnea    OBJECTIVE:     Vital Signs (Most Recent)  Temp: 97.6 °F (36.4 °C) (11/22/17 1207)  Pulse: 63 (11/22/17 1537)  Resp: 18 (11/22/17 1537)  BP: 129/80 (11/22/17 1207)  SpO2: 99 % (11/22/17 1537)    Vital Signs Range (Last 24H):  Temp:  [97 °F (36.1 °C)-98.6 °F (37 °C)]   Pulse:  [63-76]   Resp:  [18-20]   BP: ()/(58-81)   SpO2:  [92 %-99 %]       Physical Exam:  Neck: JVD - 2 cm above sternal notch, no carotid bruit and supple, symmetrical, trachea midline  Lungs: clear to auscultation bilaterally, normal respiratory effort  Heart: regular rate and rhythm, S1, S2 normal, no murmur, click, rub or gallop  Abdomen: abnormal findings:  hepatomegaly, liver edge palpable 3 cm below costal margin  Extremities: Positive for: edema  Trace and pitting left    Recent Results (from the past 24 hour(s))   POCT glucose    Collection Time: 11/21/17  4:33 PM   Result Value Ref Range    POCT Glucose 166 (H) 70 - 110 mg/dL   POCT glucose    Collection Time: 11/21/17  9:09 PM   Result Value Ref Range    POCT Glucose 162 (H) 70 - 110 mg/dL   CBC auto differential    Collection Time: 11/22/17  5:17 AM   Result Value Ref Range    WBC 7.90 3.90 - 12.70 K/uL    RBC 3.77 (L) 4.00 - 5.40 M/uL    Hemoglobin 9.2 (L) 12.0 - 16.0 g/dL    Hematocrit 29.1 (L) 37.0 - 48.5 %    MCV 77 (L) 82 - 98 fL    MCH 24.4 (L) 27.0 - 31.0 pg    MCHC 31.6 (L) 32.0 - 36.0 g/dL    RDW 18.7 (H) 11.5 - 14.5 %    Platelets 216 150 - 350 K/uL    MPV 9.2 9.2 - 12.9 fL    Gran # 5.8 1.8 - 7.7 K/uL    Lymph # 0.9 (L) 1.0 - 4.8 K/uL    Mono # 0.9 0.3 - 1.0 K/uL    Eos # 0.3 0.0 - 0.5 K/uL    Baso # 0.00 0.00 - 0.20 K/uL    Gran% 73.5 (H) 38.0 - 73.0 %    Lymph% 10.8 (L) 18.0 - 48.0 %    Mono% 11.6 4.0 - 15.0 %    Eosinophil% 3.7 0.0 - 8.0 %    Basophil% 0.4 0.0 - 1.9 %    Differential Method Automated    Basic metabolic panel    Collection Time: 11/22/17  5:17 AM   Result Value Ref Range    Sodium 133 (L) 136 - 145 mmol/L    Potassium 5.2 (H) 3.5 - 5.1 mmol/L    Chloride 98 95 - 110 mmol/L    CO2 25 23 - 29 mmol/L    Glucose 119 (H) 70 - 110 mg/dL    BUN, Bld 42 (H) 6 - 20 mg/dL    Creatinine 2.9 (H) 0.5 - 1.4 mg/dL    Calcium 8.4 (L) 8.7 - 10.5 mg/dL    Anion Gap 10 8 - 16 mmol/L    eGFR if African American 20 (A) >60 mL/min/1.73 m^2    eGFR if non African American 18 (A) >60 mL/min/1.73 m^2   Vancomycin, random    Collection Time: 11/22/17  5:17 AM   Result Value Ref Range    Vancomycin, Random 29.9 Not established ug/mL   POCT glucose    Collection Time: 11/22/17 11:27 AM   Result Value Ref Range    POCT Glucose 174 (H) 70 - 110 mg/dL       Diagnostic Results:  Labs: Reviewed    ASSESSMENT/PLAN:     Creat elevated . Diuretics on hold. BNP in am. Unclear if some element of ATN from 11/21  episode.

## 2017-11-22 NOTE — PLAN OF CARE
AAOX4. Up with assist. PIV infused antibiotics per order. Dressing change to Left lower leg preformed. Pt tolerated well. Denies pain, denies needs.  O2 in use. Tele monitor in use. BS monitored AC/HS per order. Lovenox administered for VTE profJayy Neuro checks preformed Q4hrs. Bed low locked, call light in reach. Will continue to monitor.

## 2017-11-22 NOTE — PLAN OF CARE
Problem: Patient Care Overview  Goal: Plan of Care Review  Outcome: Ongoing (interventions implemented as appropriate)  Pt alert and oriented, pt on bipap at night, took bipap off several times and resp called,  at bedside, dsg on left heel intact, pt NPO after MN and pt understands, safety monitored and maintained, call light next to pt

## 2017-11-22 NOTE — CONSULTS
INPATIENT NEPHROLOGY CONSULT   [facility] NEPHROLOGY    Patient Name: Juliane Ramos  Date: 11/22/2017    Reason for consultation: MC    Chief Complaint:   Chief Complaint   Patient presents with    Abscess     L heel with purulent drainage x 2 weeks.     History of Present Illness:  Ms. Ramos is a 58 y/o F with a hx of HTN, DM2, CHF, and stage III CKD who p/w a left heel ulcer x 2 weeks, nonhealing. She reports 2-3 days of worsening pain, purulent drainage, and a fever of up to 102. She denies any exac/reliev factors. We have been consulted for MC.    Plan of Care:    Assessment:  Diabetic foot wound- on IV antbx, getting debridement with podiatry  New diagnosis of PE- on AC  AoCKI, baseline stage III CKD (baseline Cr 1.1-1.4)  Contrast induced nephropathy  Hypotension  Hyponatremia  Hyperkalemia  Anemia     Plan:    1. Acute on Chronic Kidney Injury- Suspect ATN 2/2 ischemia (hypotension) and toxins (contrast). Ordered gentle hydration for 12 hours. Keep ACE/diuretic on hold. No NSAIDs or IV contrast. Renal u/s is negative for obstruction. Dose antbx for CrCl < 20.    2. Volume/Blood Pressure- BP is low. See above. Due to hx of CHF and current imaging show R pleural effusion and anasarca, will run fluids slowly.    3. Electrolytes/Acid Base- Mild abn 2/2 MC (impaired clearance of free water, K)- will trend with IVFs. Suggest a renal diet.     4. Anemia of CKD- Stable- no new recs.    Thank you for allowing us to participate in this patient's care. We will continue to follow.    Vital Signs:  Temp Readings from Last 3 Encounters:   11/22/17 97.4 °F (36.3 °C) (Oral)   07/04/17 98.9 °F (37.2 °C) (Oral)   06/27/17 98 °F (36.7 °C) (Oral)     Pulse Readings from Last 3 Encounters:   11/22/17 68   07/04/17 70   06/27/17 72     BP Readings from Last 3 Encounters:   11/22/17 130/63   07/04/17 (!) 147/84   06/27/17 (!) 187/95     Weight:  Wt Readings from Last 3 Encounters:   11/22/17 106.6 kg (234 lb 15.8 oz)  "  07/04/17 107.3 kg (236 lb 8.9 oz)   06/27/17 108.9 kg (240 lb 1.3 oz)     Past Medical & Surgical History:  Past Medical History:   Diagnosis Date    CHF (congestive heart failure)     COPD (chronic obstructive pulmonary disease)     Coronary artery disease     Diabetes mellitus     Encounter for blood transfusion     Hypertension     MI (myocardial infarction) 2010    Stroke     July 2005    Thyroid disease      Past Surgical History:   Procedure Laterality Date    ABCESS DRAINAGE Right     Between "little toe" and the one next to it    BREAST SURGERY      Reduction [id number]    CARDIAC SURGERY      CABG 4vessel ring in one valve mitral valve prolapse    CORONARY ARTERY BYPASS GRAFT      hyperlipidemia      TUBAL LIGATION  03/1986     Past Social History:  Social History     Social History    Marital status:      Spouse name: N/A    Number of children: N/A    Years of education: N/A     Social History Main Topics    Smoking status: Never Smoker    Smokeless tobacco: Never Used    Alcohol use No    Drug use: No    Sexual activity: Yes     Partners: Male     Birth control/ protection: None     Other Topics Concern    None     Social History Narrative    None     Medications:  No current facility-administered medications on file prior to encounter.      Current Outpatient Prescriptions on File Prior to Encounter   Medication Sig Dispense Refill    albuterol (ACCUNEB) 1.25 mg/3 mL Nebu Take 1.25 mg by nebulization every 6 (six) hours as needed. Rescue      aspirin (ECOTRIN) 81 MG EC tablet Take 1 tablet (81 mg total) by mouth once daily.  0    carvedilol (COREG) 25 MG tablet Take 25 mg by mouth 2 (two) times daily.       citalopram (CELEXA) 20 MG tablet Take 20 mg by mouth once daily.        furosemide (LASIX) 40 MG tablet ONE TAB DAILY IN AM . TAKE EXTRA 1/2 TABLET IF WEIGHT IS >3-4 POUNDS   WEIGH YOURSELF EVERY EM 30 tablet 0    gabapentin (NEURONTIN) 300 MG capsule Take 300 mg by " "mouth 3 (three) times daily. 300 mg AM and 600 mg PM      glimepiride (AMARYL) 4 MG tablet Take 4 mg by mouth before breakfast.      insulin aspart protamine-insulin aspart (NOVOLOG 70/30) 100 unit/mL (70-30) InPn pen Inject into the skin daily as needed (pt states "She told me to just take it whenever my sugar was too high").      levothyroxine (SYNTHROID) 50 MCG tablet Take 50 mcg by mouth once daily.        lisinopril (PRINIVIL,ZESTRIL) 2.5 MG tablet Take 1 tablet (2.5 mg total) by mouth once daily. 30 tablet 2    rosuvastatin (CRESTOR) 10 MG tablet Take 1 tablet (10 mg total) by mouth once daily. 30 tablet 0     Scheduled Meds:   albuterol-ipratropium 2.5mg-0.5mg/3mL  3 mL Nebulization Q4H    aspirin  81 mg Oral Daily    carvedilol  25 mg Oral BID    citalopram  20 mg Oral Daily    enoxaparin  1 mg/kg Subcutaneous Q12H    gabapentin  300 mg Oral TID    insulin detemir  10 Units Subcutaneous QHS    levothyroxine  50 mcg Oral Daily    pantoprazole  40 mg Oral Daily    piperacillin-tazobactam 3.375 g in sodium chloride 0.9 % 50 mL IVPB (ready to mix system)  3.375 g Intravenous Q6H    rosuvastatin  10 mg Oral Daily     Continuous Infusions:   PRN Meds:.acetaminophen, dextrose 50%, dextrose 50%, glucagon (human recombinant), glucose, glucose, hydrocodone-acetaminophen 10-325mg, insulin aspart, ondansetron, sodium chloride 0.9%    Allergies:  Patient has no known allergies.    Past Family History:  Reviewed; refer to Hospitalist Admission Note    Review of Systems:  Review of Systems - All 14 systems reviewed and negative, except as noted in HPI    Physical Exam:  /63 (Patient Position: Lying)   Pulse 68   Temp 97.4 °F (36.3 °C) (Oral)   Resp 18   Ht 5' 6" (1.676 m)   Wt 106.6 kg (234 lb 15.8 oz)   LMP 08/29/2016 (Approximate)   SpO2 97%   Breastfeeding? No   BMI 37.93 kg/m²     General Appearance:    NAD, AAO x 3, cooperative, appears stated age   Head:    Normocephalic, atraumatic "   Eyes:    PER, EOMI, and conjunctiva/sclera clear bilaterally        Throat:   Moist mucus membranes, no thrush or oral lesions, normal      dentition   Lungs:     Clear to auscultation bilaterally, no wheeze, crackles, rales      or rhonchi, symmetric air movement, respirations unlabored   Heart:    Regular rate and rhythm, S1 and S2 normal, no murmur, rub   or gallop   Abdomen:     Soft, non-tender, non-distended, bowel sounds active all four   quadrants, no RT or guarding, no masses, no organomegaly   Extremities:   Warm and well perfused, distal pulses intact, no cyanosis or    peripheral edema   MSK:   No muscle swelling, tenderness or deformity   Skin:   Foot wrapped   Neurologic/Psychiatric:   CNII-XII intact; normal affect     Results:  Lab Results   Component Value Date     (L) 11/22/2017    K 5.2 (H) 11/22/2017    CL 98 11/22/2017    CO2 25 11/22/2017    BUN 42 (H) 11/22/2017    CREATININE 2.9 (H) 11/22/2017    CALCIUM 8.4 (L) 11/22/2017    ANIONGAP 10 11/22/2017    ESTGFRAFRICA 20 (A) 11/22/2017    EGFRNONAA 18 (A) 11/22/2017     Lab Results   Component Value Date    CALCIUM 8.4 (L) 11/22/2017       Recent Labs  Lab 11/22/17  0517   WBC 7.90   RBC 3.77*   HGB 9.2*   HCT 29.1*      MCV 77*   MCH 24.4*   MCHC 31.6*     I have personally reviewed pertinent radiological imaging and reports.    Amairani Young MD MPH  [facility]  [address] [place] [id number]  [phone] (p)  [phone] (f)

## 2017-11-22 NOTE — PROGRESS NOTES
Data reviewed  Cellulitis of left leg is gradually receding. Calf is still edematous.  Plans for surgery moved to Friday 11/24  Cr noted  vanc has been stopped and I will reduce the zosyn to q8  Will review remotely 11/23

## 2017-11-22 NOTE — PHYSICIAN QUERY
"PT Name: Juliane Ramos  MR #: [id number]    Physician Query Form - Heart  Condition Clarification     Jj Beck RN CDS    Contact Information: [phone]  This form is a permanent document in the medical record.     Query Date: November 22, 2017    By submitting this query, we are merely seeking further clarification of documentation. Please utilize your independent clinical judgment when addressing the question(s) below.    The medical record contains the following   Indicators     Supporting Clinical Findings Location in Medical Record   X BNP BNP 1202 Lab 11/21   X EF EF 37  Echo 11/21   X Radiology findings There is increased opacification right lung consistent with infiltrate/atelectasis and pleural effusion CXR 11/21   X Echo Results Moderate left ventricular enlargement.     2 - Moderately depressed left ventricular systolic function (EF 35-40%).     3 - The estimated PA systolic pressure is greater than 27 mmHg.     4 - Mild mitral regurgitation.  Echo 11/21    "Ascites" documented     X "SOB" or "MONAE" documented Chronic SOB, wears oxygen, chronic RLL fluid   ID Consult 11/21   X "Hypoxia" documented Probable acute respiratory arrest; hypoxia with respiratory acidosis --   Cardiology 11/20   X Heart Failure documented CHF (congestive heart failure)   H&P 11/18   X "Edema" documented Pattern of mild pulmonary edema/CHF is also noted.  Diffuse body wall edema is also noted consistent with anasarca.   CTA Chest 11/20   X Diuretics/Meds Furosemide 40 mg and 20 mg  IVP    Home meds:   carvedilol (COREG) 25 MG tablet Take 25 mg by mouth 2 (two) times daily.   furosemide (LASIX) 40 MG tablet ONE TAB DAILY IN AM . TAKE EXTRA 1/2   MAR 11/20      H&P 11/18  MAR 11/21    Treatment:     X Other:  history of advanced cardiomyopathy, on home oxygen,    · Ms. Ramos is a 58 y/o F with a hx of HTN, DM2, CHF, and stage III CKD who p/w a left heel ulcer x 2 weeks, nonhealing.   ID Consult  11/21      Neph Consult 11/22 "       Provider, please specify diagnosis or diagnoses associated with above clinical findings.                                 [ x ] Acute on Chronic Systolic Heart Failure ( EF < 40)*  [  ] Chronic Systolic Heart Failure (EF < 40)*  [  ] Chronic Combined Systolic and Diastolic Heart Failure  [  ] Other Type of Heart Failure (please specify type): _________________________  [  ] Heart Failure Ruled Out  [  ] Other (please specify): ___________________________________  [  ] Clinically Undetermined            *American Heart Association                                                                                                          Please document in your progress notes daily for the duration of treatment until resolved and include in your discharge summary.

## 2017-11-22 NOTE — PLAN OF CARE
Problem: Patient Care Overview  Goal: Plan of Care Review  Outcome: Ongoing (interventions implemented as appropriate)  Patient receiving aerosol tx via duoneb now and q4hr.   Hr 64 and 02 saturation 95% on nc at 3lpm ..Bipap at bedside for sleeping hrs.

## 2017-11-22 NOTE — PROGRESS NOTES
Progress Note  Hospital Medicine  Patient Name:Juliane Ramos  MRN:  [id number]  Patient Class: IP- Inpatient  Admit Date: 11/18/2017  Length of Stay: 4 days  Expected Discharge Date:   Attending Physician: Reg Devine MD  Primary Care Provider:  JOS Dumont    SUBJECTIVE:     Principal Problem: Diabetic leg ulcer  Initial history of present illness: 57 year old female with HTN, DM2, HF, CKD here for a left heel ulcer that she first noticed two weeks ago.  She states that over the past few days it has been associated with worsening pain, drainage, and a fever of up to 102 and for this she came for evaluation.  On initial exam in the ED, she was afebrile and stable but her wound supposedly had maggots on the edges.  She denies any chest pain, shortness of breath, abdominal pain, confusion, or any other related complaints.  She states she is compliant with her medications but only takes her basal insulin when she needs it per sliding scale.    PMH/PSH/SH/FH/Meds: reviewed.    Symptoms/Review of Systems: No new complaints. Looking and feeling better. No shortness of breath, cough, chest pain or headache, fever or abdominal pain.     Diet: cardiac diabetic   Activity level: Normal.    Pain:  Patient reports no pain.       OBJECTIVE:   Vital Signs (Most Recent):      Temp: 97.4 °F (36.3 °C) (11/22/17 0858)  Pulse: 68 (11/22/17 0858)  Resp: 18 (11/22/17 0858)  BP: 130/63 (11/22/17 0858)  SpO2: 97 % (11/22/17 0858)       Vital Signs Range (Last 24H):  Temp:  [97 °F (36.1 °C)-98.6 °F (37 °C)]   Pulse:  [61-75]   Resp:  [18-20]   BP: ()/(58-82)   SpO2:  [92 %-100 %]     Weight: 106.6 kg (235 lb)  Body mass index is 37.93 kg/m².    Intake/Output Summary (Last 24 hours) at 11/22/17 1022  Last data filed at 11/21/17 1800   Gross per 24 hour   Intake              150 ml   Output                0 ml   Net              150 ml     Physical Examination:  General appearance: well developed, appears stated age  Head:  normocephalic, atraumatic  Eyes:  conjunctivae/corneas clear. PERRL.  Nose: Nares normal. Septum midline.  Throat: lips, mucosa, and tongue normal; teeth and gums normal, no throat erythema Neck: supple, symmetrical, trachea midline, no JVD and thyroid not enlarged, symmetric, no tenderness/mass/nodules  Lungs:  clear to auscultation bilaterally and normal respiratory effort  Chest wall: no tenderness  Heart: regular rate and rhythm, S1, S2 normal, no murmur, click, rub or gallop  Abdomen: soft, non-tender non-distented; bowel sounds normal; no masses,  no organomegaly  Extremities: no cyanosis or edema, or clubbing. Left heel wound with mild surrounding erythema but no tracking up the leg.  Bandaged at this time.  Pulses: 2+ and symmetric  Skin: Skin color, texture, turgor normal. No rashes or lesions.  Lymph nodes: Cervical, supraclavicular, and axillary nodes normal.  Neurologic: Normal strength and tone. No focal numbness or weakness. CNII-XII intact.     CBC:    Recent Labs  Lab 11/20/17 1007 11/21/17 0421 11/22/17  0517   WBC 7.60 8.10 7.90   RBC 3.96* 3.91* 3.77*   HGB 9.6* 9.7* 9.2*   HCT 30.7* 30.4* 29.1*    204 216   MCV 77* 78* 77*   MCH 24.4* 24.7* 24.4*   MCHC 31.5* 31.9* 31.6*   BMP    Recent Labs  Lab 11/20/17 1007 11/21/17 0421 11/22/17  0517   * 110 119*   * 133* 133*   K 4.6 4.7 5.2*   CL 98 97 98   CO2 25 27 25   BUN 36* 37* 42*   CREATININE 1.4  1.4 1.6* 2.9*   CALCIUM 8.4* 8.5* 8.4*      Diagnostic Results:  Microbiology Results (last 7 days)     Procedure Component Value Units Date/Time    Blood culture (site 2) [[id number]] Collected:  11/18/17 1751    Order Status:  Completed Specimen:  Blood from Antecubital, Left  Arm Updated:  11/22/17 0612     Blood Culture, Routine No Growth to date     Blood Culture, Routine No Growth to date     Blood Culture, Routine No Growth to date     Blood Culture, Routine No Growth to date    Narrative:       Site # 2, aerobic only     Blood culture (site 1) [[id number]] Collected:  11/18/17 0840    Order Status:  Completed Specimen:  Blood from Antecubital, Right  Arm Updated:  11/22/17 0612     Blood Culture, Routine No Growth to date     Blood Culture, Routine No Growth to date     Blood Culture, Routine No Growth to date     Blood Culture, Routine No Growth to date    Narrative:       Site # 1, aerobic and anaerobic         Left Calcaneum:   1.  No radiographically apparent acute osteomyelitis. Skin irregularity and bandage noted in the posterior aspect of the ankle.  2. Degenerative changes in the ankle, hindfoot and midfoot are noted.    MRI left foot:  1. Retro-Achilles soft tissue wound. No evidence for osteomyelitis.  2. Mild nonspecific Achilles and posterior tibial tenosynovitis.  3. Small tibiotalar joint effusion.    Bilateral leg venous doppler scan: There are some limitations but no definite acute DVT in seen in either lower extremity    Assessment/Plan:     * Diabetic leg ulcer     - continue zosyn  - Wound debridement pending cardiac clearance  - Follow ID recommendations.  - wound care.          Acute RLL pulmonary embolism  Continue Lovenox 1 mg/kg sq q 12 hrs. Blood thinner risks and benefits and fall precautions discussed with patient and her .     Acute on chronic hypoxic hypercarbic respiratory failure.  Supplemental O2 via nasal canula; titrate O2 saturation to >92%.   Continue beta 2 agonist bronchodilator treatments.   Use BiPAP during sleep. Needs Polysomnogram upon DC.    MC  DC Vancomycin.  Start IVF hydration.Follwo BMP.  US Kidney and follow nephrology recommendations.     Hypothyroid     - continue synthroid          Controlled type 2 diabetes mellitus with stage 3 chronic kidney disease     - SSI  - Lantus 10 units nightly with titration as needed          CKD (chronic kidney disease) stage 3, GFR 30-59 ml/min     - avoid nephrotoxins and renally dose meds          Essential hypertension     - continue home  meds and will titrate as needed       VTE Risk Mitigation         Ordered     enoxaparin injection 110 mg  Every 12 hours     Route:  Subcutaneous        11/20/17 1726     Medium Risk of VTE  Once      11/18/17 1642        Reg Devine MD  Department of Hospital Medicine   [facility]

## 2017-11-22 NOTE — CONSULTS
Juliane Ramos [id number] is a 57 y.o. female who has been consulted for vancomycin dosing.    The patient has the following labs:     Date Creatinine (mg/dl)    BUN WBC Count   11/22/2017 Estimated Creatinine Clearance: 27.1 mL/min (based on SCr of 2.9 mg/dL (H)). Lab Results   Component Value Date    BUN 42 (H) 11/22/2017     Lab Results   Component Value Date    WBC 7.90 11/22/2017        Current weight is 106.6 kg (235 lb)    Pt received vancomycin 1000 mg on 11/20 at 2245.  Vancomycin level from 11/22 at 0517 was 29.9 mg/dL.  Target level range is 15-20 mg/dL. Anticipate vancomycin is supratherapeutic. Pharmacy will continue to hold dose. Vancomycin level has been ordered with AM labs on 11/23/17 to assess the need for re.       Patient will be followed by pharmacy for changes in renal function, toxicity, and efficacy.  Thank you for allowing us to participate in this patient's care.     Zachary Baptiste    11/22/2017

## 2017-11-23 LAB
ANION GAP SERPL CALC-SCNC: 11 MMOL/L
BASOPHILS # BLD AUTO: 0 K/UL
BASOPHILS # BLD AUTO: 0 K/UL
BASOPHILS NFR BLD: 0.5 %
BASOPHILS NFR BLD: 0.5 %
BILIRUB UR QL STRIP: NEGATIVE
BNP SERPL-MCNC: 1648 PG/ML
BUN SERPL-MCNC: 48 MG/DL
CALCIUM SERPL-MCNC: 8.4 MG/DL
CHLORIDE SERPL-SCNC: 98 MMOL/L
CLARITY UR: CLEAR
CO2 SERPL-SCNC: 24 MMOL/L
COLOR UR: YELLOW
CREAT SERPL-MCNC: 4 MG/DL
DIFFERENTIAL METHOD: ABNORMAL
DIFFERENTIAL METHOD: ABNORMAL
EOSINOPHIL # BLD AUTO: 0.3 K/UL
EOSINOPHIL # BLD AUTO: 0.3 K/UL
EOSINOPHIL NFR BLD: 3.6 %
EOSINOPHIL NFR BLD: 4.1 %
ERYTHROCYTE [DISTWIDTH] IN BLOOD BY AUTOMATED COUNT: 18.6 %
ERYTHROCYTE [DISTWIDTH] IN BLOOD BY AUTOMATED COUNT: 18.8 %
EST. GFR  (AFRICAN AMERICAN): 14 ML/MIN/1.73 M^2
EST. GFR  (NON AFRICAN AMERICAN): 12 ML/MIN/1.73 M^2
GLUCOSE SERPL-MCNC: 131 MG/DL
GLUCOSE UR QL STRIP: NEGATIVE
HCT VFR BLD AUTO: 28.6 %
HCT VFR BLD AUTO: 29 %
HGB BLD-MCNC: 9 G/DL
HGB BLD-MCNC: 9.2 G/DL
HGB UR QL STRIP: ABNORMAL
INR PPP: 1.2
KETONES UR QL STRIP: NEGATIVE
LEUKOCYTE ESTERASE UR QL STRIP: NEGATIVE
LYMPHOCYTES # BLD AUTO: 0.9 K/UL
LYMPHOCYTES # BLD AUTO: 0.9 K/UL
LYMPHOCYTES NFR BLD: 12.1 %
LYMPHOCYTES NFR BLD: 12.8 %
MCH RBC QN AUTO: 24.2 PG
MCH RBC QN AUTO: 24.3 PG
MCHC RBC AUTO-ENTMCNC: 31.5 G/DL
MCHC RBC AUTO-ENTMCNC: 31.6 G/DL
MCV RBC AUTO: 77 FL
MCV RBC AUTO: 77 FL
MONOCYTES # BLD AUTO: 0.9 K/UL
MONOCYTES # BLD AUTO: 0.9 K/UL
MONOCYTES NFR BLD: 12.9 %
MONOCYTES NFR BLD: 13.2 %
NEUTROPHILS # BLD AUTO: 4.9 K/UL
NEUTROPHILS # BLD AUTO: 5.1 K/UL
NEUTROPHILS NFR BLD: 69.9 %
NEUTROPHILS NFR BLD: 70.4 %
NITRITE UR QL STRIP: NEGATIVE
PH UR STRIP: 5 [PH] (ref 5–8)
PLATELET # BLD AUTO: 205 K/UL
PLATELET # BLD AUTO: 211 K/UL
PMV BLD AUTO: 8.2 FL
PMV BLD AUTO: 8.4 FL
POCT GLUCOSE: 174 MG/DL (ref 70–110)
POCT GLUCOSE: 181 MG/DL (ref 70–110)
POCT GLUCOSE: 208 MG/DL (ref 70–110)
POTASSIUM SERPL-SCNC: 5.2 MMOL/L
PROT UR QL STRIP: ABNORMAL
PROTHROMBIN TIME: 12.4 SEC
RBC # BLD AUTO: 3.72 M/UL
RBC # BLD AUTO: 3.79 M/UL
SODIUM SERPL-SCNC: 133 MMOL/L
SP GR UR STRIP: 1.01 (ref 1–1.03)
URN SPEC COLLECT METH UR: ABNORMAL
UROBILINOGEN UR STRIP-ACNC: NEGATIVE EU/DL
WBC # BLD AUTO: 7 K/UL
WBC # BLD AUTO: 7.3 K/UL

## 2017-11-23 PROCEDURE — 83880 ASSAY OF NATRIURETIC PEPTIDE: CPT

## 2017-11-23 PROCEDURE — 86580 TB INTRADERMAL TEST: CPT | Performed by: INTERNAL MEDICINE

## 2017-11-23 PROCEDURE — 87205 SMEAR GRAM STAIN: CPT

## 2017-11-23 PROCEDURE — 99232 SBSQ HOSP IP/OBS MODERATE 35: CPT | Mod: ,,, | Performed by: INTERNAL MEDICINE

## 2017-11-23 PROCEDURE — 12000002 HC ACUTE/MED SURGE SEMI-PRIVATE ROOM

## 2017-11-23 PROCEDURE — 27000221 HC OXYGEN, UP TO 24 HOURS

## 2017-11-23 PROCEDURE — 25000003 PHARM REV CODE 250: Performed by: INTERNAL MEDICINE

## 2017-11-23 PROCEDURE — 81003 URINALYSIS AUTO W/O SCOPE: CPT

## 2017-11-23 PROCEDURE — 80048 BASIC METABOLIC PNL TOTAL CA: CPT

## 2017-11-23 PROCEDURE — 63600175 PHARM REV CODE 636 W HCPCS: Performed by: INTERNAL MEDICINE

## 2017-11-23 PROCEDURE — 94761 N-INVAS EAR/PLS OXIMETRY MLT: CPT

## 2017-11-23 PROCEDURE — 36415 COLL VENOUS BLD VENIPUNCTURE: CPT

## 2017-11-23 PROCEDURE — 85610 PROTHROMBIN TIME: CPT

## 2017-11-23 PROCEDURE — 25000242 PHARM REV CODE 250 ALT 637 W/ HCPCS: Performed by: INTERNAL MEDICINE

## 2017-11-23 PROCEDURE — 94640 AIRWAY INHALATION TREATMENT: CPT

## 2017-11-23 PROCEDURE — 85025 COMPLETE CBC W/AUTO DIFF WBC: CPT | Mod: 91

## 2017-11-23 PROCEDURE — 99900035 HC TECH TIME PER 15 MIN (STAT)

## 2017-11-23 PROCEDURE — 94660 CPAP INITIATION&MGMT: CPT

## 2017-11-23 PROCEDURE — 25000003 PHARM REV CODE 250: Performed by: EMERGENCY MEDICINE

## 2017-11-23 RX ORDER — ENOXAPARIN SODIUM 100 MG/ML
1 INJECTION SUBCUTANEOUS
Status: DISCONTINUED | OUTPATIENT
Start: 2017-11-24 | End: 2017-11-23

## 2017-11-23 RX ADMIN — INSULIN DETEMIR 10 UNITS: 100 INJECTION, SOLUTION SUBCUTANEOUS at 09:11

## 2017-11-23 RX ADMIN — IPRATROPIUM BROMIDE AND ALBUTEROL SULFATE 3 ML: .5; 3 SOLUTION RESPIRATORY (INHALATION) at 08:11

## 2017-11-23 RX ADMIN — ROSUVASTATIN CALCIUM 10 MG: 5 TABLET ORAL at 08:11

## 2017-11-23 RX ADMIN — HYDROCODONE BITARTRATE AND ACETAMINOPHEN 1 TABLET: 10; 325 TABLET ORAL at 09:11

## 2017-11-23 RX ADMIN — Medication 3.38 G: at 05:11

## 2017-11-23 RX ADMIN — INSULIN ASPART 2 UNITS: 100 INJECTION, SOLUTION INTRAVENOUS; SUBCUTANEOUS at 12:11

## 2017-11-23 RX ADMIN — PANTOPRAZOLE SODIUM 40 MG: 40 TABLET, DELAYED RELEASE ORAL at 08:11

## 2017-11-23 RX ADMIN — Medication 5 UNITS: at 12:11

## 2017-11-23 RX ADMIN — IPRATROPIUM BROMIDE AND ALBUTEROL SULFATE 3 ML: .5; 3 SOLUTION RESPIRATORY (INHALATION) at 03:11

## 2017-11-23 RX ADMIN — CITALOPRAM HYDROBROMIDE 20 MG: 10 TABLET ORAL at 08:11

## 2017-11-23 RX ADMIN — CARVEDILOL 25 MG: 25 TABLET, FILM COATED ORAL at 08:11

## 2017-11-23 RX ADMIN — ASPIRIN 81 MG: 81 TABLET, COATED ORAL at 08:11

## 2017-11-23 RX ADMIN — GABAPENTIN 300 MG: 300 CAPSULE ORAL at 09:11

## 2017-11-23 RX ADMIN — IPRATROPIUM BROMIDE AND ALBUTEROL SULFATE 3 ML: .5; 3 SOLUTION RESPIRATORY (INHALATION) at 04:11

## 2017-11-23 RX ADMIN — LEVOTHYROXINE SODIUM 50 MCG: 50 TABLET ORAL at 08:11

## 2017-11-23 RX ADMIN — PIPERACILLIN AND TAZOBACTAM 3.38 G: 3; .375 INJECTION, POWDER, LYOPHILIZED, FOR SOLUTION INTRAVENOUS; PARENTERAL at 06:11

## 2017-11-23 RX ADMIN — GABAPENTIN 300 MG: 300 CAPSULE ORAL at 06:11

## 2017-11-23 RX ADMIN — IPRATROPIUM BROMIDE AND ALBUTEROL SULFATE 3 ML: .5; 3 SOLUTION RESPIRATORY (INHALATION) at 12:11

## 2017-11-23 RX ADMIN — GABAPENTIN 300 MG: 300 CAPSULE ORAL at 02:11

## 2017-11-23 RX ADMIN — ENOXAPARIN SODIUM 110 MG: 100 INJECTION SUBCUTANEOUS at 06:11

## 2017-11-23 RX ADMIN — HYDROCODONE BITARTRATE AND ACETAMINOPHEN 1 TABLET: 10; 325 TABLET ORAL at 12:11

## 2017-11-23 RX ADMIN — CARVEDILOL 25 MG: 25 TABLET, FILM COATED ORAL at 09:11

## 2017-11-23 NOTE — PLAN OF CARE
Problem: Patient Care Overview  Goal: Plan of Care Review  Outcome: Ongoing (interventions implemented as appropriate)  Patient remains on aerosol tx via duoneb now and q4hr.  Hr 62 and 02 saturation 98% on nc at 3.5lpm.  Bipap at bedside for sleeping hrs.

## 2017-11-23 NOTE — PROGRESS NOTES
INPATIENT NEPHROLOGY PROGRESS NOTE  [facility] NEPHROLOGY    Juliane Ramos  11/23/2017    Reason for consultation:         karla    Chief Complaint:   Chief Complaint   Patient presents with    Abscess     L heel with purulent drainage x 2 weeks.        History of Present Illness:     Ms. Ramos is a 56 y/o F with a hx of HTN, DM2, CHF, and stage III CKD who p/w a left heel ulcer x 2 weeks, nonhealing. She reports 2-3 days of worsening pain, purulent drainage, and a fever of up to 102. She denies any exac/reliev factors.      11/23  No sob, nausea, vomiting or chest pain.  She says she hasn't urinated since yesterday            Plan of Care:     Assessment:    Acute kidney injury--iv contrast, vancomycin, hypotension  Inability to urinate vs anuria  Diabetic foot ulcer  anemia    Plan:     1. Acute Kidney Injury-trend bun/cr.  No nsaids, coxibs.  Chi catheter.    2. Volume/Blood Pressure-stop iv fluids due to effusion    3. Electrolytes/Acid Base-kayexalate for k    4. Bone Mineral Metabolism-no active issues    5. Anemia--trend h/h    6. Medications-renal dose for gfr less than 10     I have discussed the risks and benefits of hemodialysis with the patient.  All of their questions were answered.  Risks include low blood pressure, stroke, heart attack, seizure, infection, nausea, delirium, and death.  If her creatinine keeps rising she will need RRT.      Thank you for allowing us to participate in this patient's care. We will continue to follow.    Medications:  No current facility-administered medications on file prior to encounter.      Current Outpatient Prescriptions on File Prior to Encounter   Medication Sig Dispense Refill    albuterol (ACCUNEB) 1.25 mg/3 mL Nebu Take 1.25 mg by nebulization every 6 (six) hours as needed. Rescue      aspirin (ECOTRIN) 81 MG EC tablet Take 1 tablet (81 mg total) by mouth once daily.  0    carvedilol (COREG) 25 MG tablet Take 25 mg by mouth 2 (two) times daily.        "citalopram (CELEXA) 20 MG tablet Take 20 mg by mouth once daily.        furosemide (LASIX) 40 MG tablet ONE TAB DAILY IN AM . TAKE EXTRA 1/2 TABLET IF WEIGHT IS >3-4 POUNDS   WEIGH YOURSELF EVERY EM 30 tablet 0    gabapentin (NEURONTIN) 300 MG capsule Take 300 mg by mouth 3 (three) times daily. 300 mg AM and 600 mg PM      glimepiride (AMARYL) 4 MG tablet Take 4 mg by mouth before breakfast.      insulin aspart protamine-insulin aspart (NOVOLOG 70/30) 100 unit/mL (70-30) InPn pen Inject into the skin daily as needed (pt states "She told me to just take it whenever my sugar was too high").      levothyroxine (SYNTHROID) 50 MCG tablet Take 50 mcg by mouth once daily.        lisinopril (PRINIVIL,ZESTRIL) 2.5 MG tablet Take 1 tablet (2.5 mg total) by mouth once daily. 30 tablet 2    rosuvastatin (CRESTOR) 10 MG tablet Take 1 tablet (10 mg total) by mouth once daily. 30 tablet 0     Scheduled Meds:   albuterol-ipratropium 2.5mg-0.5mg/3mL  3 mL Nebulization Q4H    aspirin  81 mg Oral Daily    carvedilol  25 mg Oral BID    citalopram  20 mg Oral Daily    [START ON 11/24/2017] enoxaparin  1 mg/kg Subcutaneous Q24H    gabapentin  300 mg Oral TID    insulin detemir  10 Units Subcutaneous QHS    levothyroxine  50 mcg Oral Daily    pantoprazole  40 mg Oral Daily    piperacillin-tazobactam 3.375 g in sodium chloride 0.9 % 50 mL IVPB (ready to mix system)  3.375 g Intravenous Q8H    rosuvastatin  10 mg Oral Daily     Continuous Infusions:   PRN Meds:.acetaminophen, dextrose 50%, dextrose 50%, glucagon (human recombinant), glucose, glucose, hydrocodone-acetaminophen 10-325mg, insulin aspart, ondansetron, sodium chloride 0.9%    Allergies:  Patient has no known allergies.    Vital Signs:  Temp Readings from Last 3 Encounters:   11/23/17 97.3 °F (36.3 °C) (Oral)   07/04/17 98.9 °F (37.2 °C) (Oral)   06/27/17 98 °F (36.7 °C) (Oral)       Pulse Readings from Last 3 Encounters:   11/23/17 63   07/04/17 70   06/27/17 " 72       BP Readings from Last 3 Encounters:   11/23/17 126/72   07/04/17 (!) 147/84   06/27/17 (!) 187/95       Weight:  Wt Readings from Last 3 Encounters:   11/22/17 106.6 kg (234 lb 15.8 oz)   07/04/17 107.3 kg (236 lb 8.9 oz)   06/27/17 108.9 kg (240 lb 1.3 oz)       Review of Systems:  Review of Systems - All 14 systems reviewed and negative, except as noted in HPI    Physical Exam:    Constitutional: NAD  Neuro: No asterixis  Psych: Calm  EENT: NCAT, anicteric sclera   Neck:  supple  Cards: No rub  Lungs: diminished right base  GI:  Pos bowel sounds, no hsm, NTND   MSK:  WNWD  Skin: no purpura, rashes     Results:  Lab Results   Component Value Date     (L) 11/23/2017    K 5.2 (H) 11/23/2017    CL 98 11/23/2017    CO2 24 11/23/2017    BUN 48 (H) 11/23/2017    CREATININE 4.0 (H) 11/23/2017    CALCIUM 8.4 (L) 11/23/2017    ANIONGAP 11 11/23/2017    ESTGFRAFRICA 14 (A) 11/23/2017    EGFRNONAA 12 (A) 11/23/2017       Lab Results   Component Value Date    CALCIUM 8.4 (L) 11/23/2017         Recent Labs  Lab 11/23/17  0517   WBC 7.00   RBC 3.72*   HGB 9.0*   HCT 28.6*      MCV 77*   MCH 24.3*   MCHC 31.5*       I have personally reviewed pertinent radiological imaging and reports.        Elijah Gonzalez MD  Nephrology  [facility] [place]  [phone]

## 2017-11-23 NOTE — PROGRESS NOTES
Progress Note  Cardiology    Admit Date: 11/18/2017   LOS: 5 days     Follow-up For:  Cardiology    Scheduled Meds:   albuterol-ipratropium 2.5mg-0.5mg/3mL  3 mL Nebulization Q4H    aspirin  81 mg Oral Daily    carvedilol  25 mg Oral BID    citalopram  20 mg Oral Daily    [START ON 11/24/2017] enoxaparin  1 mg/kg Subcutaneous Q24H    gabapentin  300 mg Oral TID    insulin detemir  10 Units Subcutaneous QHS    levothyroxine  50 mcg Oral Daily    pantoprazole  40 mg Oral Daily    piperacillin-tazobactam 3.375 g in sodium chloride 0.9 % 50 mL IVPB (ready to mix system)  3.375 g Intravenous Q12H    rosuvastatin  10 mg Oral Daily     Continuous Infusions:   PRN Meds:acetaminophen, dextrose 50%, dextrose 50%, glucagon (human recombinant), glucose, glucose, hydrocodone-acetaminophen 10-325mg, insulin aspart, ondansetron, sodium chloride 0.9%    Review of patient's allergies indicates:  No Known Allergies    SUBJECTIVE:     Interval History: Patient has no complaints.      OBJECTIVE:     Vital Signs (Most Recent)  Temp: 97.4 °F (36.3 °C) (11/23/17 1217)  Pulse: 62 (11/23/17 1217)  Resp: 18 (11/23/17 1217)  BP: (!) 120/91 (11/23/17 1217)  SpO2: 100 % (11/23/17 1217)    Vital Signs Range (Last 24H):  Temp:  [97 °F (36.1 °C)-98 °F (36.7 °C)]   Pulse:  [61-65]   Resp:  [14-20]   BP: (119-139)/(59-91)   SpO2:  [95 %-100 %]       Physical Exam:  Neck: no carotid bruit and no JVD  Lungs: diminished breath sounds bibasilar  Heart: regular rate and rhythm  Extremities: Negative for: edema    Recent Results (from the past 24 hour(s))   POCT glucose    Collection Time: 11/22/17  4:48 PM   Result Value Ref Range    POCT Glucose 211 (H) 70 - 110 mg/dL   POCT glucose    Collection Time: 11/22/17  8:52 PM   Result Value Ref Range    POCT Glucose 169 (H) 70 - 110 mg/dL   CBC auto differential    Collection Time: 11/23/17  5:17 AM   Result Value Ref Range    WBC 7.00 3.90 - 12.70 K/uL    RBC 3.72 (L) 4.00 - 5.40 M/uL     Hemoglobin 9.0 (L) 12.0 - 16.0 g/dL    Hematocrit 28.6 (L) 37.0 - 48.5 %    MCV 77 (L) 82 - 98 fL    MCH 24.3 (L) 27.0 - 31.0 pg    MCHC 31.5 (L) 32.0 - 36.0 g/dL    RDW 18.8 (H) 11.5 - 14.5 %    Platelets 211 150 - 350 K/uL    MPV 8.4 (L) 9.2 - 12.9 fL    Gran # 4.9 1.8 - 7.7 K/uL    Lymph # 0.9 (L) 1.0 - 4.8 K/uL    Mono # 0.9 0.3 - 1.0 K/uL    Eos # 0.3 0.0 - 0.5 K/uL    Baso # 0.00 0.00 - 0.20 K/uL    Gran% 69.9 38.0 - 73.0 %    Lymph% 12.8 (L) 18.0 - 48.0 %    Mono% 13.2 4.0 - 15.0 %    Eosinophil% 3.6 0.0 - 8.0 %    Basophil% 0.5 0.0 - 1.9 %    Differential Method Automated    Basic metabolic panel    Collection Time: 11/23/17  5:17 AM   Result Value Ref Range    Sodium 133 (L) 136 - 145 mmol/L    Potassium 5.2 (H) 3.5 - 5.1 mmol/L    Chloride 98 95 - 110 mmol/L    CO2 24 23 - 29 mmol/L    Glucose 131 (H) 70 - 110 mg/dL    BUN, Bld 48 (H) 6 - 20 mg/dL    Creatinine 4.0 (H) 0.5 - 1.4 mg/dL    Calcium 8.4 (L) 8.7 - 10.5 mg/dL    Anion Gap 11 8 - 16 mmol/L    eGFR if African American 14 (A) >60 mL/min/1.73 m^2    eGFR if non African American 12 (A) >60 mL/min/1.73 m^2   Brain natriuretic peptide    Collection Time: 11/23/17  5:17 AM   Result Value Ref Range    BNP 1,648 (H) 0 - 99 pg/mL   Urinalysis    Collection Time: 11/23/17 12:52 PM   Result Value Ref Range    Specimen UA Urine, Catheterized     Color, UA Yellow Yellow, Straw, Najma    Appearance, UA Clear Clear    pH, UA 5.0 5.0 - 8.0    Specific Gravity, UA 1.015 1.005 - 1.030    Protein, UA Trace (A) Negative    Glucose, UA Negative Negative    Ketones, UA Negative Negative    Bilirubin (UA) Negative Negative    Occult Blood UA Trace (A) Negative    Nitrite, UA Negative Negative    Urobilinogen, UA Negative <2.0 EU/dL    Leukocytes, UA Negative Negative       Diagnostic Results:  Labs: Reviewed    ASSESSMENT/PLAN:     Stable CVS. Worsening of renal function.Ok for debridement of wound on left leg under local anesthesia.. Fluctuations in BP under G,A may  adversely effect renal function.    Plan: Continue Current.

## 2017-11-23 NOTE — PLAN OF CARE
Problem: Patient Care Overview  Goal: Plan of Care Review  Outcome: Ongoing (interventions implemented as appropriate)  Pt alert and oriented,  at bedside, pt wears o2 at 3L and her cpap at night, on tele [id number], safety monitored and maintained, bed alarm on and call light next to pt

## 2017-11-23 NOTE — PLAN OF CARE
AAOX4. PIV infusing per order. Dressing change preformed to L lower leg. Chi catheter draining dark yellow urine. UA collected this shift. TB placed LEFT forearm. PRN pain meds administered for c/o 7/10 pain-full relief obtained. BS monitored. SSI administered for coverage. Bed low locked call light in reach.  at bedside. Will continue to monitor.

## 2017-11-23 NOTE — PROGRESS NOTES
Foot with large posterior ankle ulcer with necrotic achilles tendon.  I will have nurses check with Dr. Devine to see if pt is cleared for debridement on Friday.Will continue the santyl to wound.

## 2017-11-24 ENCOUNTER — HOSPITAL ENCOUNTER (INPATIENT)
Facility: HOSPITAL | Age: 57
LOS: 3 days | Discharge: ANOTHER HOSPITAL | DRG: 085 | End: 2017-11-27
Attending: PSYCHIATRY & NEUROLOGY | Admitting: PSYCHIATRY & NEUROLOGY
Payer: MEDICARE

## 2017-11-24 VITALS
OXYGEN SATURATION: 94 % | TEMPERATURE: 97 F | BODY MASS INDEX: 37.77 KG/M2 | HEART RATE: 65 BPM | SYSTOLIC BLOOD PRESSURE: 108 MMHG | HEIGHT: 66 IN | RESPIRATION RATE: 19 BRPM | WEIGHT: 235 LBS | DIASTOLIC BLOOD PRESSURE: 58 MMHG

## 2017-11-24 DIAGNOSIS — E87.5 HYPERKALEMIA: ICD-10-CM

## 2017-11-24 DIAGNOSIS — N17.9 ACUTE RENAL FAILURE SUPERIMPOSED ON STAGE 3 CHRONIC KIDNEY DISEASE, UNSPECIFIED ACUTE RENAL FAILURE TYPE: ICD-10-CM

## 2017-11-24 DIAGNOSIS — E11.622 DIABETIC LEG ULCER: ICD-10-CM

## 2017-11-24 DIAGNOSIS — I26.99 OTHER ACUTE PULMONARY EMBOLISM WITHOUT ACUTE COR PULMONALE: ICD-10-CM

## 2017-11-24 DIAGNOSIS — N18.3 ACUTE RENAL FAILURE SUPERIMPOSED ON STAGE 3 CHRONIC KIDNEY DISEASE, UNSPECIFIED ACUTE RENAL FAILURE TYPE: ICD-10-CM

## 2017-11-24 DIAGNOSIS — S06.6X0A SUBARACHNOID HEMORRHAGE FOLLOWING INJURY, NO LOSS OF CONSCIOUSNESS, INITIAL ENCOUNTER: ICD-10-CM

## 2017-11-24 DIAGNOSIS — N18.30 CKD (CHRONIC KIDNEY DISEASE) STAGE 3, GFR 30-59 ML/MIN: ICD-10-CM

## 2017-11-24 DIAGNOSIS — N17.9 AKI (ACUTE KIDNEY INJURY): ICD-10-CM

## 2017-11-24 DIAGNOSIS — I60.9 SUBARACHNOID HEMORRHAGE: ICD-10-CM

## 2017-11-24 DIAGNOSIS — S06.6X0D SUBARACHNOID HEMORRHAGE FOLLOWING INJURY, NO LOSS OF CONSCIOUSNESS, SUBSEQUENT ENCOUNTER: ICD-10-CM

## 2017-11-24 DIAGNOSIS — L97.909 DIABETIC LEG ULCER: ICD-10-CM

## 2017-11-24 DIAGNOSIS — S06.6XAA TRAUMATIC SUBARACHNOID HEMORRHAGE: Primary | ICD-10-CM

## 2017-11-24 DIAGNOSIS — A49.01 STAPH AUREUS INFECTION: ICD-10-CM

## 2017-11-24 DIAGNOSIS — J90 PLEURAL EFFUSION: ICD-10-CM

## 2017-11-24 LAB
ABO + RH BLD: NORMAL
ALBUMIN SERPL BCP-MCNC: 2.5 G/DL
ALBUMIN SERPL BCP-MCNC: 2.6 G/DL
ALP SERPL-CCNC: 66 U/L
ALP SERPL-CCNC: 71 U/L
ALT SERPL W/O P-5'-P-CCNC: 10 U/L
ALT SERPL W/O P-5'-P-CCNC: 8 U/L
ANION GAP SERPL CALC-SCNC: 11 MMOL/L
ANION GAP SERPL CALC-SCNC: 13 MMOL/L
AST SERPL-CCNC: 12 U/L
AST SERPL-CCNC: 17 U/L
BACTERIA #/AREA URNS AUTO: ABNORMAL /HPF
BACTERIA BLD CULT: NORMAL
BACTERIA BLD CULT: NORMAL
BASOPHILS # BLD AUTO: 0 K/UL
BASOPHILS # BLD AUTO: 0.06 K/UL
BASOPHILS NFR BLD: 0.2 %
BASOPHILS NFR BLD: 0.6 %
BILIRUB SERPL-MCNC: 0.6 MG/DL
BILIRUB SERPL-MCNC: 0.6 MG/DL
BILIRUB UR QL STRIP: NEGATIVE
BLD GP AB SCN CELLS X3 SERPL QL: NORMAL
BUN SERPL-MCNC: 55 MG/DL
BUN SERPL-MCNC: 58 MG/DL
CALCIUM SERPL-MCNC: 8.3 MG/DL
CALCIUM SERPL-MCNC: 8.6 MG/DL
CHLORIDE SERPL-SCNC: 97 MMOL/L
CHLORIDE SERPL-SCNC: 98 MMOL/L
CHOLEST SERPL-MCNC: 75 MG/DL
CHOLEST/HDLC SERPL: 3.9 {RATIO}
CK SERPL-CCNC: 40 U/L
CLARITY UR REFRACT.AUTO: ABNORMAL
CO2 SERPL-SCNC: 22 MMOL/L
CO2 SERPL-SCNC: 23 MMOL/L
COLOR UR AUTO: ABNORMAL
CREAT SERPL-MCNC: 4.9 MG/DL
CREAT SERPL-MCNC: 5.6 MG/DL
CREAT UR-MCNC: 94 MG/DL
DIFFERENTIAL METHOD: ABNORMAL
DIFFERENTIAL METHOD: ABNORMAL
EOSINOPHIL # BLD AUTO: 0.2 K/UL
EOSINOPHIL # BLD AUTO: 0.2 K/UL
EOSINOPHIL NFR BLD: 1.9 %
EOSINOPHIL NFR BLD: 3.4 %
EOSINOPHIL URNS QL WRIGHT STN: NORMAL
ERYTHROCYTE [DISTWIDTH] IN BLOOD BY AUTOMATED COUNT: 17.5 %
ERYTHROCYTE [DISTWIDTH] IN BLOOD BY AUTOMATED COUNT: 18.5 %
EST. GFR  (AFRICAN AMERICAN): 11 ML/MIN/1.73 M^2
EST. GFR  (AFRICAN AMERICAN): 9.1 ML/MIN/1.73 M^2
EST. GFR  (NON AFRICAN AMERICAN): 7.9 ML/MIN/1.73 M^2
EST. GFR  (NON AFRICAN AMERICAN): 9 ML/MIN/1.73 M^2
FACT X PPP CHRO-ACNC: 0.7 IU/ML
FERRITIN SERPL-MCNC: 71 NG/ML
GLUCOSE SERPL-MCNC: 102 MG/DL
GLUCOSE SERPL-MCNC: 106 MG/DL
GLUCOSE UR QL STRIP: ABNORMAL
HBV CORE IGM SERPL QL IA: NEGATIVE
HBV SURFACE AB SER-ACNC: NEGATIVE M[IU]/ML
HBV SURFACE AG SERPL QL IA: NEGATIVE
HCT VFR BLD AUTO: 27.3 %
HCT VFR BLD AUTO: 28.5 %
HDLC SERPL-MCNC: 19 MG/DL
HDLC SERPL: 25.3 %
HGB BLD-MCNC: 8.3 G/DL
HGB BLD-MCNC: 9 G/DL
HGB UR QL STRIP: ABNORMAL
HYALINE CASTS UR QL AUTO: 0 /LPF
IMM GRANULOCYTES # BLD AUTO: 0.04 K/UL
IMM GRANULOCYTES NFR BLD AUTO: 0.4 %
INR PPP: 1.1
IRON SERPL-MCNC: 30 UG/DL
KETONES UR QL STRIP: NEGATIVE
LDLC SERPL CALC-MCNC: 42.8 MG/DL
LEUKOCYTE ESTERASE UR QL STRIP: NEGATIVE
LYMPHOCYTES # BLD AUTO: 0.5 K/UL
LYMPHOCYTES # BLD AUTO: 0.8 K/UL
LYMPHOCYTES NFR BLD: 11.6 %
LYMPHOCYTES NFR BLD: 5.5 %
MAGNESIUM SERPL-MCNC: 2.4 MG/DL
MCH RBC QN AUTO: 24 PG
MCH RBC QN AUTO: 24.5 PG
MCHC RBC AUTO-ENTMCNC: 30.4 G/DL
MCHC RBC AUTO-ENTMCNC: 31.7 G/DL
MCV RBC AUTO: 77 FL
MCV RBC AUTO: 79 FL
MICROSCOPIC COMMENT: ABNORMAL
MONOCYTES # BLD AUTO: 0.8 K/UL
MONOCYTES # BLD AUTO: 0.9 K/UL
MONOCYTES NFR BLD: 13.4 %
MONOCYTES NFR BLD: 8.5 %
NEUTROPHILS # BLD AUTO: 5 K/UL
NEUTROPHILS # BLD AUTO: 8 K/UL
NEUTROPHILS NFR BLD: 71.4 %
NEUTROPHILS NFR BLD: 83.1 %
NITRITE UR QL STRIP: NEGATIVE
NONHDLC SERPL-MCNC: 56 MG/DL
NRBC BLD-RTO: 0 /100 WBC
OSMOLALITY UR: 317 MOSM/KG
PH UR STRIP: 5 [PH] (ref 5–8)
PHOSPHATE SERPL-MCNC: 7.3 MG/DL
PHOSPHATE SERPL-MCNC: 7.8 MG/DL
PLATELET # BLD AUTO: 197 K/UL
PLATELET # BLD AUTO: 208 K/UL
PMV BLD AUTO: 10.8 FL
PMV BLD AUTO: 8.7 FL
POCT GLUCOSE: 112 MG/DL (ref 70–110)
POCT GLUCOSE: 117 MG/DL (ref 70–110)
POTASSIUM SERPL-SCNC: 5.3 MMOL/L
POTASSIUM SERPL-SCNC: 5.8 MMOL/L
PROT SERPL-MCNC: 6.6 G/DL
PROT SERPL-MCNC: 6.7 G/DL
PROT UR QL STRIP: ABNORMAL
PROT UR-MCNC: 832 MG/DL
PROT/CREAT RATIO, UR: 8.85
PROTHROMBIN TIME: 12 SEC
RBC # BLD AUTO: 3.46 M/UL
RBC # BLD AUTO: 3.69 M/UL
RBC #/AREA URNS AUTO: >100 /HPF (ref 0–4)
SATURATED IRON: 7 %
SODIUM SERPL-SCNC: 131 MMOL/L
SODIUM SERPL-SCNC: 133 MMOL/L
SODIUM UR-SCNC: 44 MMOL/L
SP GR UR STRIP: >1.03 (ref 1–1.03)
SQUAMOUS #/AREA URNS AUTO: 8 /HPF
T4 FREE SERPL-MCNC: 0.93 NG/DL
TOTAL IRON BINDING CAPACITY: 429 UG/DL
TRANSFERRIN SERPL-MCNC: 290 MG/DL
TRIGL SERPL-MCNC: 66 MG/DL
TSH SERPL DL<=0.005 MIU/L-ACNC: 9.5 UIU/ML
URN SPEC COLLECT METH UR: ABNORMAL
UROBILINOGEN UR STRIP-ACNC: NEGATIVE EU/DL
WBC # BLD AUTO: 7 K/UL
WBC # BLD AUTO: 9.61 K/UL
WBC #/AREA URNS AUTO: 0 /HPF (ref 0–5)

## 2017-11-24 PROCEDURE — 94660 CPAP INITIATION&MGMT: CPT

## 2017-11-24 PROCEDURE — 02HV33Z INSERTION OF INFUSION DEVICE INTO SUPERIOR VENA CAVA, PERCUTANEOUS APPROACH: ICD-10-PCS | Performed by: PSYCHIATRY & NEUROLOGY

## 2017-11-24 PROCEDURE — 25000003 PHARM REV CODE 250: Performed by: INTERNAL MEDICINE

## 2017-11-24 PROCEDURE — 82550 ASSAY OF CK (CPK): CPT

## 2017-11-24 PROCEDURE — 20000000 HC ICU ROOM

## 2017-11-24 PROCEDURE — 36415 COLL VENOUS BLD VENIPUNCTURE: CPT

## 2017-11-24 PROCEDURE — 63600175 PHARM REV CODE 636 W HCPCS: Performed by: STUDENT IN AN ORGANIZED HEALTH CARE EDUCATION/TRAINING PROGRAM

## 2017-11-24 PROCEDURE — 86920 COMPATIBILITY TEST SPIN: CPT

## 2017-11-24 PROCEDURE — 99900035 HC TECH TIME PER 15 MIN (STAT)

## 2017-11-24 PROCEDURE — 36620 INSERTION CATHETER ARTERY: CPT | Mod: ,,, | Performed by: PSYCHIATRY & NEUROLOGY

## 2017-11-24 PROCEDURE — 83735 ASSAY OF MAGNESIUM: CPT

## 2017-11-24 PROCEDURE — 27000190 HC CPAP FULL FACE MASK W/VALVE

## 2017-11-24 PROCEDURE — 99223 1ST HOSP IP/OBS HIGH 75: CPT | Mod: ,,, | Performed by: PHYSICIAN ASSISTANT

## 2017-11-24 PROCEDURE — 86705 HEP B CORE ANTIBODY IGM: CPT

## 2017-11-24 PROCEDURE — 80053 COMPREHEN METABOLIC PANEL: CPT

## 2017-11-24 PROCEDURE — 84439 ASSAY OF FREE THYROXINE: CPT

## 2017-11-24 PROCEDURE — 63600175 PHARM REV CODE 636 W HCPCS: Performed by: INTERNAL MEDICINE

## 2017-11-24 PROCEDURE — 25000242 PHARM REV CODE 250 ALT 637 W/ HCPCS: Performed by: INTERNAL MEDICINE

## 2017-11-24 PROCEDURE — 25000003 PHARM REV CODE 250: Performed by: EMERGENCY MEDICINE

## 2017-11-24 PROCEDURE — 82570 ASSAY OF URINE CREATININE: CPT

## 2017-11-24 PROCEDURE — 86706 HEP B SURFACE ANTIBODY: CPT

## 2017-11-24 PROCEDURE — 94640 AIRWAY INHALATION TREATMENT: CPT

## 2017-11-24 PROCEDURE — 84100 ASSAY OF PHOSPHORUS: CPT | Mod: 91

## 2017-11-24 PROCEDURE — 85025 COMPLETE CBC W/AUTO DIFF WBC: CPT

## 2017-11-24 PROCEDURE — 99239 HOSP IP/OBS DSCHRG MGMT >30: CPT | Mod: ,,, | Performed by: INTERNAL MEDICINE

## 2017-11-24 PROCEDURE — 80053 COMPREHEN METABOLIC PANEL: CPT | Mod: 91

## 2017-11-24 PROCEDURE — 82728 ASSAY OF FERRITIN: CPT

## 2017-11-24 PROCEDURE — 87340 HEPATITIS B SURFACE AG IA: CPT

## 2017-11-24 PROCEDURE — 36800 INSERTION OF CANNULA: CPT | Mod: GC,,, | Performed by: PSYCHIATRY & NEUROLOGY

## 2017-11-24 PROCEDURE — 94761 N-INVAS EAR/PLS OXIMETRY MLT: CPT

## 2017-11-24 PROCEDURE — 99223 1ST HOSP IP/OBS HIGH 75: CPT | Mod: ,,, | Performed by: INTERNAL MEDICINE

## 2017-11-24 PROCEDURE — 84300 ASSAY OF URINE SODIUM: CPT

## 2017-11-24 PROCEDURE — 99223 1ST HOSP IP/OBS HIGH 75: CPT | Mod: AI,25,, | Performed by: PSYCHIATRY & NEUROLOGY

## 2017-11-24 PROCEDURE — 25000003 PHARM REV CODE 250

## 2017-11-24 PROCEDURE — 85520 HEPARIN ASSAY: CPT

## 2017-11-24 PROCEDURE — 86850 RBC ANTIBODY SCREEN: CPT

## 2017-11-24 PROCEDURE — 83540 ASSAY OF IRON: CPT

## 2017-11-24 PROCEDURE — 85260 CLOT FACTOR X STUART-POWER: CPT

## 2017-11-24 PROCEDURE — 84443 ASSAY THYROID STIM HORMONE: CPT

## 2017-11-24 PROCEDURE — 84100 ASSAY OF PHOSPHORUS: CPT

## 2017-11-24 PROCEDURE — 86900 BLOOD TYPING SEROLOGIC ABO: CPT

## 2017-11-24 PROCEDURE — 81001 URINALYSIS AUTO W/SCOPE: CPT

## 2017-11-24 PROCEDURE — 80069 RENAL FUNCTION PANEL: CPT

## 2017-11-24 PROCEDURE — 85025 COMPLETE CBC W/AUTO DIFF WBC: CPT | Mod: 91

## 2017-11-24 PROCEDURE — 80061 LIPID PANEL: CPT

## 2017-11-24 PROCEDURE — 25000003 PHARM REV CODE 250: Performed by: STUDENT IN AN ORGANIZED HEALTH CARE EDUCATION/TRAINING PROGRAM

## 2017-11-24 PROCEDURE — A4216 STERILE WATER/SALINE, 10 ML: HCPCS | Performed by: STUDENT IN AN ORGANIZED HEALTH CARE EDUCATION/TRAINING PROGRAM

## 2017-11-24 PROCEDURE — 85610 PROTHROMBIN TIME: CPT

## 2017-11-24 PROCEDURE — 27000221 HC OXYGEN, UP TO 24 HOURS

## 2017-11-24 PROCEDURE — 83935 ASSAY OF URINE OSMOLALITY: CPT

## 2017-11-24 RX ORDER — ROSUVASTATIN CALCIUM 5 MG/1
10 TABLET, COATED ORAL DAILY
Status: CANCELLED | OUTPATIENT
Start: 2017-11-25

## 2017-11-24 RX ORDER — INSULIN ASPART 100 [IU]/ML
1-10 INJECTION, SOLUTION INTRAVENOUS; SUBCUTANEOUS EVERY 4 HOURS PRN
Status: DISCONTINUED | OUTPATIENT
Start: 2017-11-24 | End: 2017-11-27 | Stop reason: HOSPADM

## 2017-11-24 RX ORDER — GLUCAGON 1 MG
1 KIT INJECTION
Status: CANCELLED | OUTPATIENT
Start: 2017-11-24

## 2017-11-24 RX ORDER — LEVOTHYROXINE SODIUM 50 UG/1
50 TABLET ORAL DAILY
Status: CANCELLED | OUTPATIENT
Start: 2017-11-25

## 2017-11-24 RX ORDER — GABAPENTIN 100 MG/1
100 CAPSULE ORAL 3 TIMES DAILY
Status: DISCONTINUED | OUTPATIENT
Start: 2017-11-24 | End: 2017-11-25

## 2017-11-24 RX ORDER — SODIUM CHLORIDE 0.9 % (FLUSH) 0.9 %
5 SYRINGE (ML) INJECTION
Status: CANCELLED | OUTPATIENT
Start: 2017-11-24

## 2017-11-24 RX ORDER — CARVEDILOL 25 MG/1
25 TABLET ORAL 2 TIMES DAILY
Status: DISCONTINUED | OUTPATIENT
Start: 2017-11-24 | End: 2017-11-25

## 2017-11-24 RX ORDER — ACETAMINOPHEN 325 MG/1
650 TABLET ORAL EVERY 6 HOURS PRN
Status: CANCELLED | OUTPATIENT
Start: 2017-11-24

## 2017-11-24 RX ORDER — FENTANYL CITRATE 50 UG/ML
INJECTION, SOLUTION INTRAMUSCULAR; INTRAVENOUS
Status: DISCONTINUED
Start: 2017-11-24 | End: 2017-11-24 | Stop reason: WASHOUT

## 2017-11-24 RX ORDER — INSULIN ASPART 100 [IU]/ML
0-5 INJECTION, SOLUTION INTRAVENOUS; SUBCUTANEOUS
Status: CANCELLED | OUTPATIENT
Start: 2017-11-24

## 2017-11-24 RX ORDER — GABAPENTIN 300 MG/1
300 CAPSULE ORAL 3 TIMES DAILY
Status: DISCONTINUED | OUTPATIENT
Start: 2017-11-24 | End: 2017-11-24

## 2017-11-24 RX ORDER — GABAPENTIN 300 MG/1
300 CAPSULE ORAL 3 TIMES DAILY
Status: CANCELLED | OUTPATIENT
Start: 2017-11-24

## 2017-11-24 RX ORDER — ALBUTEROL SULFATE 90 UG/1
2 AEROSOL, METERED RESPIRATORY (INHALATION) EVERY 6 HOURS PRN
Status: DISCONTINUED | OUTPATIENT
Start: 2017-11-24 | End: 2017-11-27 | Stop reason: HOSPADM

## 2017-11-24 RX ORDER — FUROSEMIDE 10 MG/ML
120 INJECTION INTRAMUSCULAR; INTRAVENOUS ONCE
Status: COMPLETED | OUTPATIENT
Start: 2017-11-24 | End: 2017-11-24

## 2017-11-24 RX ORDER — CARVEDILOL 25 MG/1
25 TABLET ORAL 2 TIMES DAILY
Status: CANCELLED | OUTPATIENT
Start: 2017-11-24

## 2017-11-24 RX ORDER — FENTANYL CITRATE 50 UG/ML
25 INJECTION, SOLUTION INTRAMUSCULAR; INTRAVENOUS ONCE
Status: COMPLETED | OUTPATIENT
Start: 2017-11-24 | End: 2017-11-24

## 2017-11-24 RX ORDER — GLUCAGON 1 MG
1 KIT INJECTION
Status: DISCONTINUED | OUTPATIENT
Start: 2017-11-24 | End: 2017-11-27 | Stop reason: HOSPADM

## 2017-11-24 RX ORDER — LEVOTHYROXINE SODIUM 50 UG/1
50 TABLET ORAL
Status: DISCONTINUED | OUTPATIENT
Start: 2017-11-25 | End: 2017-11-25

## 2017-11-24 RX ORDER — HYDROCODONE BITARTRATE AND ACETAMINOPHEN 10; 325 MG/1; MG/1
1 TABLET ORAL EVERY 6 HOURS PRN
Status: CANCELLED | OUTPATIENT
Start: 2017-11-24

## 2017-11-24 RX ORDER — ONDANSETRON 2 MG/ML
8 INJECTION INTRAMUSCULAR; INTRAVENOUS EVERY 8 HOURS PRN
Status: CANCELLED | OUTPATIENT
Start: 2017-11-24

## 2017-11-24 RX ORDER — PANTOPRAZOLE SODIUM 40 MG/1
40 TABLET, DELAYED RELEASE ORAL DAILY
Status: CANCELLED | OUTPATIENT
Start: 2017-11-25

## 2017-11-24 RX ORDER — ROSUVASTATIN CALCIUM 10 MG/1
10 TABLET, COATED ORAL DAILY
Status: DISCONTINUED | OUTPATIENT
Start: 2017-11-25 | End: 2017-11-25

## 2017-11-24 RX ORDER — IBUPROFEN 200 MG
16 TABLET ORAL
Status: CANCELLED | OUTPATIENT
Start: 2017-11-24

## 2017-11-24 RX ORDER — LABETALOL HYDROCHLORIDE 5 MG/ML
10 INJECTION, SOLUTION INTRAVENOUS
Status: DISCONTINUED | OUTPATIENT
Start: 2017-11-24 | End: 2017-11-27 | Stop reason: HOSPADM

## 2017-11-24 RX ORDER — LABETALOL HYDROCHLORIDE 5 MG/ML
10 INJECTION, SOLUTION INTRAVENOUS
Status: DISCONTINUED | OUTPATIENT
Start: 2017-11-24 | End: 2017-11-24

## 2017-11-24 RX ORDER — LIDOCAINE HYDROCHLORIDE 10 MG/ML
INJECTION INFILTRATION; PERINEURAL
Status: COMPLETED
Start: 2017-11-24 | End: 2017-11-24

## 2017-11-24 RX ORDER — SODIUM CHLORIDE 0.9 % (FLUSH) 0.9 %
3 SYRINGE (ML) INJECTION EVERY 8 HOURS
Status: DISCONTINUED | OUTPATIENT
Start: 2017-11-24 | End: 2017-11-27 | Stop reason: HOSPADM

## 2017-11-24 RX ORDER — IPRATROPIUM BROMIDE AND ALBUTEROL SULFATE 2.5; .5 MG/3ML; MG/3ML
3 SOLUTION RESPIRATORY (INHALATION) EVERY 4 HOURS
Status: CANCELLED | OUTPATIENT
Start: 2017-11-24

## 2017-11-24 RX ORDER — LEVETIRACETAM 250 MG/1
250 TABLET ORAL 2 TIMES DAILY
Status: DISCONTINUED | OUTPATIENT
Start: 2017-11-24 | End: 2017-11-25

## 2017-11-24 RX ORDER — IBUPROFEN 200 MG
24 TABLET ORAL
Status: CANCELLED | OUTPATIENT
Start: 2017-11-24

## 2017-11-24 RX ORDER — CITALOPRAM 10 MG/1
20 TABLET ORAL DAILY
Status: CANCELLED | OUTPATIENT
Start: 2017-11-25

## 2017-11-24 RX ADMIN — CARVEDILOL 25 MG: 25 TABLET, FILM COATED ORAL at 11:11

## 2017-11-24 RX ADMIN — GABAPENTIN 100 MG: 100 CAPSULE ORAL at 10:11

## 2017-11-24 RX ADMIN — Medication 3 ML: at 10:11

## 2017-11-24 RX ADMIN — DESMOPRESSIN ACETATE 31.98 MCG: 4 SOLUTION INTRAVENOUS at 11:11

## 2017-11-24 RX ADMIN — Medication 3.38 G: at 06:11

## 2017-11-24 RX ADMIN — CITALOPRAM HYDROBROMIDE 20 MG: 10 TABLET ORAL at 11:11

## 2017-11-24 RX ADMIN — GABAPENTIN 300 MG: 300 CAPSULE ORAL at 06:11

## 2017-11-24 RX ADMIN — LIDOCAINE HYDROCHLORIDE 10 MG: 10 INJECTION, SOLUTION INFILTRATION; PERINEURAL at 06:11

## 2017-11-24 RX ADMIN — LEVETIRACETAM 250 MG: 250 TABLET, FILM COATED ORAL at 08:11

## 2017-11-24 RX ADMIN — ROSUVASTATIN CALCIUM 10 MG: 5 TABLET ORAL at 11:11

## 2017-11-24 RX ADMIN — INSULIN DETEMIR 6 UNITS: 100 INJECTION, SOLUTION SUBCUTANEOUS at 08:11

## 2017-11-24 RX ADMIN — IPRATROPIUM BROMIDE AND ALBUTEROL SULFATE 3 ML: .5; 3 SOLUTION RESPIRATORY (INHALATION) at 07:11

## 2017-11-24 RX ADMIN — PIPERACILLIN AND TAZOBACTAM 4.5 G: 4; .5 INJECTION, POWDER, LYOPHILIZED, FOR SOLUTION INTRAVENOUS; PARENTERAL at 06:11

## 2017-11-24 RX ADMIN — PANTOPRAZOLE SODIUM 40 MG: 40 TABLET, DELAYED RELEASE ORAL at 11:11

## 2017-11-24 RX ADMIN — IPRATROPIUM BROMIDE AND ALBUTEROL SULFATE 3 ML: .5; 3 SOLUTION RESPIRATORY (INHALATION) at 04:11

## 2017-11-24 RX ADMIN — IPRATROPIUM BROMIDE AND ALBUTEROL SULFATE 3 ML: .5; 3 SOLUTION RESPIRATORY (INHALATION) at 11:11

## 2017-11-24 RX ADMIN — CARVEDILOL 25 MG: 25 TABLET, FILM COATED ORAL at 08:11

## 2017-11-24 RX ADMIN — FENTANYL CITRATE 25 MCG: 50 INJECTION, SOLUTION INTRAMUSCULAR; INTRAVENOUS at 08:11

## 2017-11-24 RX ADMIN — LEVOTHYROXINE SODIUM 50 MCG: 50 TABLET ORAL at 11:11

## 2017-11-24 RX ADMIN — FUROSEMIDE 120 MG: 10 INJECTION, SOLUTION INTRAVENOUS at 06:11

## 2017-11-24 RX ADMIN — IPRATROPIUM BROMIDE AND ALBUTEROL SULFATE 3 ML: .5; 3 SOLUTION RESPIRATORY (INHALATION) at 12:11

## 2017-11-24 NOTE — PLAN OF CARE
11/23/17 2033   Patient Assessment/Suction   Level of Consciousness (AVPU) alert   All Lung Fields Breath Sounds clear   LLL Breath Sounds diminished   RLL Breath Sounds diminished   Cough Type none   PRE-TX-O2-ETCO2   O2 Device (Oxygen Therapy) nasal cannula   Flow (L/min) 3   Oxygen Concentration (%) 32   SpO2 95 %   Pulse Oximetry Type Intermittent   Pulse 61   Resp 18   Aerosol Therapy   $ Aerosol Therapy Charges Aerosol Treatment   Respiratory Treatment Status given   SVN/Inhaler Treatment Route mask   Position During Treatment HOB at 45 degrees   Patient Tolerance good   Post-Treatment   Post-treatment Heart Rate (beats/min) 64   Post-treatment Resp Rate (breaths/min) 16   All Fields Breath Sounds unchanged   Ready to Wean/Extubation Screen   FIO2<=50 (chart decimal) 0.32

## 2017-11-24 NOTE — ASSESSMENT & PLAN NOTE
- history of uncontrolled diabetes, on oral medication at home plus SSI  - hemoglobin A1c 8.4% on 11/18/17  - continuing gabapentin 100 mg TID (renally dosed)  - at OSH on detemir 10 at night and 2 during day  - starting insulin detemir 6 at night + SSI

## 2017-11-24 NOTE — PROGRESS NOTES
INPATIENT NEPHROLOGY PROGRESS NOTE  [facility] NEPHROLOGY    Juliane Ramos  11/24/2017    Reason for consultation:         karla    Chief Complaint:   Chief Complaint   Patient presents with    Abscess     L heel with purulent drainage x 2 weeks.        History of Present Illness:     Ms. Ramos is a 58 y/o F with a hx of HTN, DM2, CHF, and stage III CKD who p/w a left heel ulcer x 2 weeks, nonhealing. She reports 2-3 days of worsening pain, purulent drainage, and a fever of up to 102. She denies any exac/reliev factors.      11/23  No sob, nausea, vomiting or chest pain.  She says she hasn't urinated since yesterday    11/24  Fell today.  Has subarachnoid bleed.  Neurologically intact.  Has hematuria after villegas placement.            Plan of Care:     Assessment:    Acute kidney injury--iv contrast, vancomycin, hypotension  Inability to urinate vs anuria  Diabetic foot ulcer  Anemia  subarachnoid bleed     Plan:     1. Acute Kidney Injury-trend bun/cr.  No nsaids, coxibs.  Villegas catheter placed.  Creatinine keeps rising.  If it keeps rising with villegas placement she will need hd.    DDAVP for uremic platelet dysfunction    2. Volume/Blood Pressure-stop iv fluids due to effusion    3. Electrolytes/Acid Base-kayexalate for k    4. Bone Mineral Metabolism-no active issues    5. Anemia--trend h/h    6. Medications-renal dose for gfr less than 10    7.  Subarachnoid hemorrhage--recommend transfer to higher level of care with neurosurgical back up.      I have discussed the risks and benefits of hemodialysis with the patient.  All of their questions were answered.  Risks include low blood pressure, stroke, heart attack, seizure, infection, nausea, delirium, and death.  If her creatinine keeps rising she will need RRT.      Thank you for allowing us to participate in this patient's care. We will continue to follow.    Medications:  No current facility-administered medications on file prior to encounter.      Current  "Outpatient Prescriptions on File Prior to Encounter   Medication Sig Dispense Refill    albuterol (ACCUNEB) 1.25 mg/3 mL Nebu Take 1.25 mg by nebulization every 6 (six) hours as needed. Rescue      aspirin (ECOTRIN) 81 MG EC tablet Take 1 tablet (81 mg total) by mouth once daily.  0    carvedilol (COREG) 25 MG tablet Take 25 mg by mouth 2 (two) times daily.       citalopram (CELEXA) 20 MG tablet Take 20 mg by mouth once daily.        furosemide (LASIX) 40 MG tablet ONE TAB DAILY IN AM . TAKE EXTRA 1/2 TABLET IF WEIGHT IS >3-4 POUNDS   WEIGH YOURSELF EVERY EM 30 tablet 0    gabapentin (NEURONTIN) 300 MG capsule Take 300 mg by mouth 3 (three) times daily. 300 mg AM and 600 mg PM      glimepiride (AMARYL) 4 MG tablet Take 4 mg by mouth before breakfast.      insulin aspart protamine-insulin aspart (NOVOLOG 70/30) 100 unit/mL (70-30) InPn pen Inject into the skin daily as needed (pt states "She told me to just take it whenever my sugar was too high").      levothyroxine (SYNTHROID) 50 MCG tablet Take 50 mcg by mouth once daily.        lisinopril (PRINIVIL,ZESTRIL) 2.5 MG tablet Take 1 tablet (2.5 mg total) by mouth once daily. 30 tablet 2    rosuvastatin (CRESTOR) 10 MG tablet Take 1 tablet (10 mg total) by mouth once daily. 30 tablet 0     Scheduled Meds:   albuterol-ipratropium 2.5mg-0.5mg/3mL  3 mL Nebulization Q4H    carvedilol  25 mg Oral BID    citalopram  20 mg Oral Daily    gabapentin  300 mg Oral TID    insulin detemir  10 Units Subcutaneous QHS    levothyroxine  50 mcg Oral Daily    pantoprazole  40 mg Oral Daily    piperacillin-tazobactam 3.375 g in sodium chloride 0.9 % 50 mL IVPB (ready to mix system)  3.375 g Intravenous Q12H    rosuvastatin  10 mg Oral Daily     Continuous Infusions:   PRN Meds:.acetaminophen, dextrose 50%, dextrose 50%, glucagon (human recombinant), glucose, glucose, hydrocodone-acetaminophen 10-325mg, insulin aspart, ondansetron, sodium chloride " 0.9%    Allergies:  Patient has no known allergies.    Vital Signs:  Temp Readings from Last 3 Encounters:   11/23/17 97.4 °F (36.3 °C)   07/04/17 98.9 °F (37.2 °C) (Oral)   06/27/17 98 °F (36.7 °C) (Oral)       Pulse Readings from Last 3 Encounters:   11/24/17 64   07/04/17 70   06/27/17 72       BP Readings from Last 3 Encounters:   11/24/17 (!) 141/67   07/04/17 (!) 147/84   06/27/17 (!) 187/95       Weight:  Wt Readings from Last 3 Encounters:   11/22/17 106.6 kg (234 lb 15.8 oz)   07/04/17 107.3 kg (236 lb 8.9 oz)   06/27/17 108.9 kg (240 lb 1.3 oz)       Review of Systems:  Review of Systems - All 14 systems reviewed and negative, except as noted in HPI    Physical Exam:    Constitutional: NAD  Neuro: No asterixis  Psych: Calm  EENT: NCAT, anicteric sclera   Neck:  supple  Cards: No rub  Lungs: diminished right base  GI:  Pos bowel sounds, no hsm, NTND   MSK:  WNWD  Skin: no purpura, rashes     Results:  Lab Results   Component Value Date     (L) 11/24/2017    K 5.3 (H) 11/24/2017    CL 97 11/24/2017    CO2 23 11/24/2017    BUN 55 (H) 11/24/2017    CREATININE 4.9 (H) 11/24/2017    CALCIUM 8.6 (L) 11/24/2017    ANIONGAP 13 11/24/2017    ESTGFRAFRICA 11 (A) 11/24/2017    EGFRNONAA 9 (A) 11/24/2017       Lab Results   Component Value Date    CALCIUM 8.6 (L) 11/24/2017    PHOS 7.3 (H) 11/24/2017         Recent Labs  Lab 11/24/17  0512   WBC 7.00   RBC 3.69*   HGB 9.0*   HCT 28.5*      MCV 77*   MCH 24.5*   MCHC 31.7*       I have personally reviewed pertinent radiological imaging and reports.        Elijah Gonzalez MD  Nephrology  [facility] [place]  [phone]

## 2017-11-24 NOTE — ASSESSMENT & PLAN NOTE
- CT Head at OSH shows bilateral posttraumatic subarachnoid blood  - repeat CT Head pending  - NSGY consulted  - goal SBP <140  - holding aspirin, lovenox; Anti-Xa 0.70  - started keppra 250 mg q12 x7days  - repeating ABG, electrolytes q12  - BiPap QHS

## 2017-11-24 NOTE — SUBJECTIVE & OBJECTIVE
"Past Medical History:   Diagnosis Date    CHF (congestive heart failure)     COPD (chronic obstructive pulmonary disease)     Coronary artery disease     Diabetes mellitus     Encounter for blood transfusion     Hypertension     MI (myocardial infarction) 2010    Stroke     July 2005    Thyroid disease        Past Surgical History:   Procedure Laterality Date    ABCESS DRAINAGE Right     Between "little toe" and the one next to it    BREAST SURGERY      Reduction [id number]    CARDIAC SURGERY      CABG 4vessel ring in one valve mitral valve prolapse    CORONARY ARTERY BYPASS GRAFT      hyperlipidemia      TUBAL LIGATION  03/1986       Review of patient's allergies indicates:  No Known Allergies  Current Facility-Administered Medications   Medication Frequency    albuterol inhaler 2 puff Q6H PRN    carvedilol tablet 25 mg BID    dextrose 50% injection 12.5 g PRN    fentaNYL (SUBLIMAZE) 50 mcg/mL injection     gabapentin capsule 100 mg TID    glucagon (human recombinant) injection 1 mg PRN    insulin aspart pen 1-10 Units Q4H PRN    insulin detemir pen 6 Units QHS    labetalol injection 10 mg Q15 Min PRN    levETIRAcetam tablet 250 mg BID    [START ON 11/25/2017] levothyroxine tablet 50 mcg Before breakfast    piperacillin-tazobactam 4.5 g in dextrose 5 % 100 mL IVPB (ready to mix system) Q12H    [START ON 11/25/2017] rosuvastatin tablet 10 mg Daily    sodium chloride 0.9% flush 3 mL Q8H     Family History     Problem Relation (Age of Onset)    Arthritis Mother    Cancer Father (68)    Depression Sister    Diabetes Father, Maternal Grandmother    Heart disease Father    Hypertension Father, Son    Kidney disease Paternal Grandfather    Stroke Paternal Grandfather        Social History Main Topics    Smoking status: Never Smoker    Smokeless tobacco: Never Used    Alcohol use No    Drug use: No    Sexual activity: Yes     Partners: Male     Birth control/ protection: None     Review of " Systems   Constitutional: Positive for activity change. Negative for appetite change, fatigue, fever and unexpected weight change.   HENT: Positive for tinnitus. Negative for facial swelling and hearing loss.    Eyes: Negative for visual disturbance.   Respiratory: Positive for shortness of breath. Negative for chest tightness and wheezing.    Cardiovascular: Positive for leg swelling. Negative for chest pain.   Gastrointestinal: Negative for abdominal pain and nausea.   Genitourinary: Negative for difficulty urinating, dysuria and flank pain.   Musculoskeletal: Positive for arthralgias and myalgias.   Skin: Negative for color change.   Neurological: Positive for syncope, weakness and light-headedness. Negative for dizziness and headaches.   Psychiatric/Behavioral: Negative for behavioral problems and confusion.     Objective:     Vital Signs (Most Recent):  Temp: 97.8 °F (36.6 °C) (11/24/17 1500)  Pulse: 70 (11/24/17 1734)  Resp: (!) 21 (11/24/17 1734)  BP: (!) 151/67 (11/24/17 1715)  SpO2: 97 % (11/24/17 1734)  O2 Device (Oxygen Therapy): room air (11/24/17 1734) Vital Signs (24h Range):  Temp:  [97.4 °F (36.3 °C)-97.8 °F (36.6 °C)] 97.8 °F (36.6 °C)  Pulse:  [60-70] 70  Resp:  [16-22] 21  SpO2:  [92 %-100 %] 97 %  BP: (106-151)/(57-98) 151/67     Weight: 117.6 kg (259 lb 4.2 oz) (11/24/17 1508)  Body mass index is 41.85 kg/m².  Body surface area is 2.34 meters squared.    No intake/output data recorded.    Physical Exam   Constitutional: She is oriented to person, place, and time. She appears well-developed. No distress.   HENT:   Head: Normocephalic.   Eyes: Conjunctivae are normal. Pupils are equal, round, and reactive to light.   Neck: Trachea normal and normal range of motion. Neck supple. No JVD present.   Cardiovascular: Normal rate, regular rhythm, S1 normal, S2 normal, intact distal pulses and normal pulses.  Exam reveals no gallop and no friction rub.    Murmur heard.  Pulmonary/Chest: Effort normal. She  has decreased breath sounds in the right lower field. She has no wheezes. She has rales in the right lower field and the left lower field.   Abdominal: Soft. Bowel sounds are normal.   Musculoskeletal: Normal range of motion. She exhibits edema (1+ pitting edema LE).   Neurological: She is alert and oriented to person, place, and time.   Skin: Skin is warm and dry. Capillary refill takes less than 2 seconds.   Psychiatric: She has a normal mood and affect. Her behavior is normal.   Nursing note and vitals reviewed.      Significant Labs:  ABGs:   Recent Labs  Lab 11/21/17  0421   PH 7.320*   PCO2 58.0*   HCO3 29.9*   POCSATURATED 94*   BE 4     BMP:   Recent Labs  Lab 11/24/17  0512      CL 97   CO2 23   BUN 55*   CREATININE 4.9*   CALCIUM 8.6*     Cardiac Markers: No results for input(s): CKMB, TROPONINT, MYOGLOBIN in the last 168 hours.  CBC:   Recent Labs  Lab 11/24/17  0512   WBC 7.00   RBC 3.69*   HGB 9.0*   HCT 28.5*      MCV 77*   MCH 24.5*   MCHC 31.7*     CMP:   Recent Labs  Lab 11/24/17  0512      CALCIUM 8.6*   ALBUMIN 2.6*   PROT 6.7   *   K 5.3*   CO2 23   CL 97   BUN 55*   CREATININE 4.9*   ALKPHOS 71   ALT 10   AST 12   BILITOT 0.6       Recent Labs  Lab 11/23/17  1252   COLORU Yellow   SPECGRAV 1.015   PHUR 5.0   PROTEINUA Trace*   NITRITE Negative   LEUKOCYTESUR Negative   UROBILINOGEN Negative     All labs within the past 24 hours have been reviewed.    Significant Imaging:  Labs: Reviewed  ECG: Reviewed  X-Ray: Reviewed

## 2017-11-24 NOTE — PROGRESS NOTES
Yoana meyer  to pick pt up for transport to [facility], pt's family aware.    Misael Lake RN

## 2017-11-24 NOTE — CONSULTS
"[facility]  Nephrology  Consult Note    Patient Name: Juliane Ramos  MRN: [id number]  Admission Date: 11/24/2017  Hospital Length of Stay: 0 days  Attending Provider: Adan Iverson MD   Primary Care Physician: JOS Dumont  Principal Problem:Subarachnoid hemorrhage    Inpatient consult to Nephrology  Consult performed by: LINDA BOOKER  Consult ordered by: YOEL KEMP  Reason for consult: mc        Subjective:     HPI: Juliane Ramos is a 57 y.o.  female with a PMHx relevant for HFrEF( EF 35% on 11/21/17), CKD 3, DMT2 (a1c 8.4%), CVA, 4v CABG (2011), PE, diabetic foot ulcer of left foot transfer to [facility] from [facility] secondary to new onset SAH after fall. She was admitted with OSH on 11/18 for a diabetic left heel wound, non-healing x3 weeks with fever, purulent drainage, and reportedly with maggots, treated with zosyn and vanc. She was dx with acute PE post syncopal episode secondary to hypercapnic resp failure. CTA done 11/20/2017 with eGFR < 45 mg/dl and was started on Lovenox. She has a sCr baseline 1.1-1.4 mg/dl. She was seen by nephrology at OSH secondary to MC due to ATN and CI-MC. She was treated initially with IV fluids for gentle hydration but jordan sCr continue to rise. She presents to [facility] with sCr of 4.1 and decreased UO. She developed pleural effusion and IV fluids where stopped. Since then per report it seems like his UO has been decreasing. Her K has alos steadily bernard rising. 5.3 today.      Past Medical History:   Diagnosis Date    CHF (congestive heart failure)     COPD (chronic obstructive pulmonary disease)     Coronary artery disease     Diabetes mellitus     Encounter for blood transfusion     Hypertension     MI (myocardial infarction) 2010    Stroke     July 2005    Thyroid disease        Past Surgical History:   Procedure Laterality Date    ABCESS DRAINAGE Right     Between "little toe" and the one next to it    BREAST SURGERY "      Reduction [id number]    CARDIAC SURGERY      CABG 4vessel ring in one valve mitral valve prolapse    CORONARY ARTERY BYPASS GRAFT      hyperlipidemia      TUBAL LIGATION  03/1986       Review of patient's allergies indicates:  No Known Allergies  Current Facility-Administered Medications   Medication Frequency    albuterol inhaler 2 puff Q6H PRN    carvedilol tablet 25 mg BID    dextrose 50% injection 12.5 g PRN    fentaNYL (SUBLIMAZE) 50 mcg/mL injection     gabapentin capsule 100 mg TID    glucagon (human recombinant) injection 1 mg PRN    insulin aspart pen 1-10 Units Q4H PRN    insulin detemir pen 6 Units QHS    labetalol injection 10 mg Q15 Min PRN    levETIRAcetam tablet 250 mg BID    [START ON 11/25/2017] levothyroxine tablet 50 mcg Before breakfast    piperacillin-tazobactam 4.5 g in dextrose 5 % 100 mL IVPB (ready to mix system) Q12H    [START ON 11/25/2017] rosuvastatin tablet 10 mg Daily    sodium chloride 0.9% flush 3 mL Q8H     Family History     Problem Relation (Age of Onset)    Arthritis Mother    Cancer Father (68)    Depression Sister    Diabetes Father, Maternal Grandmother    Heart disease Father    Hypertension Father, Son    Kidney disease Paternal Grandfather    Stroke Paternal Grandfather        Social History Main Topics    Smoking status: Never Smoker    Smokeless tobacco: Never Used    Alcohol use No    Drug use: No    Sexual activity: Yes     Partners: Male     Birth control/ protection: None     Review of Systems   Constitutional: Positive for activity change. Negative for appetite change, fatigue, fever and unexpected weight change.   HENT: Positive for tinnitus. Negative for facial swelling and hearing loss.    Eyes: Negative for visual disturbance.   Respiratory: Positive for shortness of breath. Negative for chest tightness and wheezing.    Cardiovascular: Positive for leg swelling. Negative for chest pain.   Gastrointestinal: Negative for abdominal pain and  nausea.   Genitourinary: Negative for difficulty urinating, dysuria and flank pain.   Musculoskeletal: Positive for arthralgias and myalgias.   Skin: Negative for color change.   Neurological: Positive for syncope, weakness and light-headedness. Negative for dizziness and headaches.   Psychiatric/Behavioral: Negative for behavioral problems and confusion.     Objective:     Vital Signs (Most Recent):  Temp: 97.8 °F (36.6 °C) (11/24/17 1500)  Pulse: 70 (11/24/17 1734)  Resp: (!) 21 (11/24/17 1734)  BP: (!) 151/67 (11/24/17 1715)  SpO2: 97 % (11/24/17 1734)  O2 Device (Oxygen Therapy): room air (11/24/17 1734) Vital Signs (24h Range):  Temp:  [97.4 °F (36.3 °C)-97.8 °F (36.6 °C)] 97.8 °F (36.6 °C)  Pulse:  [60-70] 70  Resp:  [16-22] 21  SpO2:  [92 %-100 %] 97 %  BP: (106-151)/(57-98) 151/67     Weight: 117.6 kg (259 lb 4.2 oz) (11/24/17 1508)  Body mass index is 41.85 kg/m².  Body surface area is 2.34 meters squared.    No intake/output data recorded.    Physical Exam   Constitutional: She is oriented to person, place, and time. She appears well-developed. No distress.   HENT:   Head: Normocephalic.   Eyes: Conjunctivae are normal. Pupils are equal, round, and reactive to light.   Neck: Trachea normal and normal range of motion. Neck supple. No JVD present.   Cardiovascular: Normal rate, regular rhythm, S1 normal, S2 normal, intact distal pulses and normal pulses.  Exam reveals no gallop and no friction rub.    Murmur heard.  Pulmonary/Chest: Effort normal. She has decreased breath sounds in the right lower field. She has no wheezes. She has rales in the right lower field and the left lower field.   Abdominal: Soft. Bowel sounds are normal.   Musculoskeletal: Normal range of motion. She exhibits edema (1+ pitting edema LE).   Neurological: She is alert and oriented to person, place, and time.   Skin: Skin is warm and dry. Capillary refill takes less than 2 seconds.   Psychiatric: She has a normal mood and affect. Her  behavior is normal.   Nursing note and vitals reviewed.      Significant Labs:  ABGs:   Recent Labs  Lab 11/21/17  0421   PH 7.320*   PCO2 58.0*   HCO3 29.9*   POCSATURATED 94*   BE 4     BMP:   Recent Labs  Lab 11/24/17  0512      CL 97   CO2 23   BUN 55*   CREATININE 4.9*   CALCIUM 8.6*     Cardiac Markers: No results for input(s): CKMB, TROPONINT, MYOGLOBIN in the last 168 hours.  CBC:   Recent Labs  Lab 11/24/17  0512   WBC 7.00   RBC 3.69*   HGB 9.0*   HCT 28.5*      MCV 77*   MCH 24.5*   MCHC 31.7*     CMP:   Recent Labs  Lab 11/24/17  0512      CALCIUM 8.6*   ALBUMIN 2.6*   PROT 6.7   *   K 5.3*   CO2 23   CL 97   BUN 55*   CREATININE 4.9*   ALKPHOS 71   ALT 10   AST 12   BILITOT 0.6       Recent Labs  Lab 11/23/17  1252   COLORU Yellow   SPECGRAV 1.015   PHUR 5.0   PROTEINUA Trace*   NITRITE Negative   LEUKOCYTESUR Negative   UROBILINOGEN Negative     All labs within the past 24 hours have been reviewed.    Significant Imaging:  Labs: Reviewed  ECG: Reviewed  X-Ray: Reviewed    Assessment/Plan:     MC (acute kidney injury)    MC on CKD 3 oliguric and progressing to aniria, most likely secondary to iATN from hymodymamyc changes with hypotension and Toxic tubular injuries     · Obtain urine lytes, urine creat, urine urea to calculate FeNa/Fe urea  · Check protein creat ratio, wrights stain   · Check renal/retroperitoneal USG   · Consider Furosemide Challenge 120 mg IV x 1if UO increasing may redose at 6 hrs.  · Monitor RFP one this evening with plans to initiated RRT- SLED if renal function decline  · She has been consented and consent in her chart   · Monitor Ins and Outs and daily weights.   · Avoid nephrotoxic agents such as NSAIDS, Gadolinium and IV Radiocontrast  · Renal Dose meds to current GFR/Creat Clereance.  · Will follow with you.  · Thank you very much for you consult              CKD (chronic kidney disease) stage 3, GFR 30-59 ml/min    - suspect secondary to poorly  control A1C as in 6/2017 A1C was 10.0. During admission her A1C was 8.2  - need to optimize DMT2 for maintaining adequate ranal function control .  - Goqal A1C < 7        Hyperkalemia    - Will monitor tonight with low threshold for RRT if labs worsenning.            Thank you for your consult. I will follow-up with patient. Please contact us if you have any additional questions.    Pierre Benitez MD  Nephrology  [facility]

## 2017-11-24 NOTE — PLAN OF CARE
11/24/17 0737   Patient Assessment/Suction   Level of Consciousness (AVPU) alert   Respiratory Effort Unlabored;Normal   Expansion/Accessory Muscles/Retractions no retractions;no use of accessory muscles   All Lung Fields Breath Sounds clear;equal bilaterally   Rhythm/Pattern, Respiratory depth regular;pattern regular;unlabored   Cough Frequency infrequent   Cough Type none   PRE-TX-O2-ETCO2   O2 Device (Oxygen Therapy) nasal cannula   $ Is the patient on Low Flow Oxygen? Yes   Flow (L/min) 3   Oxygen Concentration (%) 32   SpO2 (!) 93 %   Pulse Oximetry Type Intermittent   $ Pulse Oximetry - Multiple Charge Pulse Oximetry - Multiple   Pulse 63   Resp 18   Aerosol Therapy   $ Aerosol Therapy Charges Aerosol Treatment   Respiratory Treatment Status given   SVN/Inhaler Treatment Route mask   Position During Treatment HOB at 45 degrees   Patient Tolerance good   Post-Treatment   Post-treatment Heart Rate (beats/min) 65   Post-treatment Resp Rate (breaths/min) 16   All Fields Breath Sounds unchanged       Aerosol treatments q4. Bipap on standby at bedside.

## 2017-11-24 NOTE — NURSING
Previous IV site to R wrist continuously bleeding.  Completely saturated existing dressing.  Held pressure to site for 5 minutes with patient's arm elevated.  Bleeding slowed.  Applied pressure dressing.  Will continue to monitor.

## 2017-11-24 NOTE — PROGRESS NOTES
Referral center called and stated pt would be going to Neuro Critical Care room 3228. Report may be called to [phone]. She stated that she would call and setup transport via ambulance.    Misael Lake RN

## 2017-11-24 NOTE — ASSESSMENT & PLAN NOTE
- suspect secondary to poorly control A1C as in 6/2017 A1C was 10.0. During admission her A1C was 8.2  - need to optimize DMT2 for maintaining adequate ranal function control .  - Goqal A1C < 7

## 2017-11-24 NOTE — PROGRESS NOTES
Progress Note  Hospital Medicine  Patient Name:Juliane Ramos  MRN:  [id number]  Patient Class: IP- Inpatient  Admit Date: 11/18/2017  Length of Stay: 6 days  Expected Discharge Date:   Attending Physician: Reg Devine MD  Primary Care Provider:  JOS Dumont    SUBJECTIVE:     Principal Problem: Diabetic leg ulcer  Initial history of present illness: 57 year old female with HTN, DM2, HF, CKD here for a left heel ulcer that she first noticed two weeks ago.  She states that over the past few days it has been associated with worsening pain, drainage, and a fever of up to 102 and for this she came for evaluation.  On initial exam in the ED, she was afebrile and stable but her wound supposedly had maggots on the edges.  She denies any chest pain, shortness of breath, abdominal pain, confusion, or any other related complaints.  She states she is compliant with her medications but only takes her basal insulin when she needs it per sliding scale.    PMH/PSH/SH/FH/Meds: reviewed.    Symptoms/Review of Systems: Patient while ambulated with her  lost balance and hit left forehead to the wall. Left forehead contusion noted. No LOC or any focal neurological complaints or HA or vision changes. Lovenox dose held yesterday due to bleeding from IV site. No new complaints. Renal functions worsening. No shortness of breath, cough, chest pain or headache, fever or abdominal pain.     Diet: cardiac diabetic   Activity level: Normal.    Pain:  Patient reports no pain.       OBJECTIVE:   Vital Signs (Most Recent):      Temp: 97.4 °F (36.3 °C) (11/23/17 2028)  Pulse: 64 (11/24/17 0836)  Resp: 20 (11/24/17 0836)  BP: (!) 141/67 (11/24/17 0836)  SpO2: 96 % (11/24/17 0836)       Vital Signs Range (Last 24H):  Temp:  [97.4 °F (36.3 °C)]   Pulse:  [60-64]   Resp:  [16-21]   BP: (120-141)/(67-91)   SpO2:  [93 %-100 %]     Weight: 106.6 kg (234 lb 15.8 oz)  Body mass index is 37.93 kg/m².    Intake/Output Summary (Last 24 hours) at  11/24/17 0956  Last data filed at 11/24/17 0600   Gross per 24 hour   Intake              560 ml   Output             1050 ml   Net             -490 ml     Physical Examination:  General appearance: well developed, appears stated age  Head: normocephalic, Left forehead contusion  Eyes:  conjunctivae/corneas clear. PERRL.  Nose: Nares normal. Septum midline.  Throat: lips, mucosa, and tongue normal; teeth and gums normal, no throat erythema Neck: supple, symmetrical, trachea midline, no JVD and thyroid not enlarged, symmetric, no tenderness/mass/nodules  Lungs:  clear to auscultation bilaterally and normal respiratory effort  Chest wall: no tenderness  Heart: regular rate and rhythm, S1, S2 normal, no murmur, click, rub or gallop  Abdomen: soft, non-tender non-distented; bowel sounds normal; no masses,  no organomegaly  Extremities: no cyanosis or edema, or clubbing. Left heel wound with mild surrounding erythema but no tracking up the leg.  Bandaged at this time.  Pulses: 2+ and symmetric  Skin: Skin color, texture, turgor normal. Easy bruising and ecchymoses at abdomen and extremities.  Lymph nodes: Cervical, supraclavicular, and axillary nodes normal.  Neurologic: Normal strength and tone. No focal numbness or weakness. CNII-XII intact.     CBC:    Recent Labs  Lab 11/23/17 0517 11/23/17 2110 11/24/17 0512   WBC 7.00 7.30 7.00   RBC 3.72* 3.79* 3.69*   HGB 9.0* 9.2* 9.0*   HCT 28.6* 29.0* 28.5*    205 208   MCV 77* 77* 77*   MCH 24.3* 24.2* 24.5*   MCHC 31.5* 31.6* 31.7*   BMP    Recent Labs  Lab 11/22/17 0517 11/23/17 0517 11/24/17 0512   * 131* 106   * 133* 133*   K 5.2* 5.2* 5.3*   CL 98 98 97   CO2 25 24 23   BUN 42* 48* 55*   CREATININE 2.9* 4.0* 4.9*   CALCIUM 8.4* 8.4* 8.6*      Diagnostic Results:  Microbiology Results (last 7 days)     Procedure Component Value Units Date/Time    Blood culture (site 2) [[id number]] Collected:  11/18/17 0940    Order Status:  Completed Specimen:   Blood from Antecubital, Left  Arm Updated:  11/24/17 0612     Blood Culture, Routine No growth after 5 days.    Narrative:       Site # 2, aerobic only    Blood culture (site 1) [[id number]] Collected:  11/18/17 1657    Order Status:  Completed Specimen:  Blood from Antecubital, Right  Arm Updated:  11/24/17 0612     Blood Culture, Routine No growth after 5 days.    Narrative:       Site # 1, aerobic and anaerobic         Left Calcaneum:   1.  No radiographically apparent acute osteomyelitis. Skin irregularity and bandage noted in the posterior aspect of the ankle.  2. Degenerative changes in the ankle, hindfoot and midfoot are noted.    MRI left foot:  1. Retro-Achilles soft tissue wound. No evidence for osteomyelitis.  2. Mild nonspecific Achilles and posterior tibial tenosynovitis.  3. Small tibiotalar joint effusion.    Bilateral leg venous doppler scan: There are some limitations but no definite acute DVT in seen in either lower extremity.    Renal US: Unremarkable appearance of the kidneys.  No hydronephrosis.    CT head:  1. Abnormal study.  There is a left forehead and frontal scalp hematoma without underlying fracture.  Additionally, there is bilateral posttraumatic subarachnoid blood with hyperdense blood seen in right parietal sulci as well as in the left sylvian fissure and adjacent left frontal and parietal sulci.  There is no acute parenchymal hemorrhage.  2.  Remote right frontal lobe infarction with ex vacuo dilatation of the right frontal horn.  There is no obvious acute infarction.  3.  Atherosclerosis.    Assessment/Plan:      Acute post-traumatic sub-arachnoid hemorrhage     CT head results reviewed and discussed with patient, her , RN and neurosurgeon Dr. Whittaker [place] [facility]    Transfer patient to ICU for closer monitoring.    Continue neurochecks.    Lovenox is already on hold.     ASA discontinued.     * Diabetic leg ulcer     - continue zosyn  - Wound debridement pending cardiac clearance  -  Follow ID recommendations.  - wound care.          Acute RLL pulmonary embolism  Reduce Lovenox 1 mg/kg sq q 24 hrs due to reduced renal functions.    Acute on chronic hypoxic hypercarbic respiratory failure.  Supplemental O2 via nasal canula; titrate O2 saturation to >92%.   Continue beta 2 agonist bronchodilator treatments.   Use BiPAP during sleep. Needs Polysomnogram upon DC.    MC - worsening  Continue IVF hydration.Follow BMP  And nephrology recommendations.  US Kidney results reviewed.     Hypothyroid     - continue synthroid          Controlled type 2 diabetes mellitus with stage 3 chronic kidney disease     - SSI  - Lantus 10 units nightly with titration as needed          CKD (chronic kidney disease) stage 3, GFR 30-59 ml/min     - avoid nephrotoxins and renally dose meds          Essential hypertension     - continue home meds and will titrate as needed     I discussed case with Dr. Whittaker from neuro-surgery and Dr. Medina (neuro-critical care ) who accepted transfer.  See orders. Patient and  updated.    VTE Risk Mitigation         Ordered     Medium Risk of VTE  Once      11/18/17 1642        Reg Devine MD  Department of Hospital Medicine   [facility]

## 2017-11-24 NOTE — ASSESSMENT & PLAN NOTE
- goal SBP <140  - restarted carvedilol 25 mg BID  - holding lisinopril for MC  - PRN labetalol 10 mg IV for SBP>140

## 2017-11-24 NOTE — PROGRESS NOTES
Radiology from [facility] called and notified this nurse of post traumatic bilateral bleed of subarachnoid, MD notified, new order to transfer patient to ICU, neuro check done and patient asymptomatic at this time,

## 2017-11-24 NOTE — PLAN OF CARE
Problem: Patient Care Overview  Goal: Plan of Care Review  Outcome: Ongoing (interventions implemented as appropriate)  Patient AAO, VSS. Patient reports of pain mildly relieved with PO analgesics.  Dressing change of left heel completed.  Dressing was saturated.  Accucheck performed and patient did not need SSI.  Chi cath in and draining red tinted urine.  Dr. Goodson notified.  Also patient was bleeding from previous IV sites and subcut inj sites.  TB placed in left FA on 11/23/17.  Patient free from falls and injury during shift.  Bed in lowest position, brakes locked, and call light within reach.  Will continue to monitor.

## 2017-11-24 NOTE — H&P
[facility]  Neurocritical Care  History & Physical    Admit Date: 11/24/2017  Service Date: 11/24/2017  Length of Stay: 0    Subjective:     Chief Complaint: Subarachnoid hemorrhage    History of Present Illness: Patient is a 57 year old female with HTN, HLD, T2DM (a1c 8.4%), HFrEF ( EF 35% on 11/21/17), CKD 3, remote CVA (R frontal lobe in 2005), 4v CABG (2011) who presents as a transfer from [facility] for subarachnoid hemorrhage after a mechanical fall. She initially presented to the OSH on 11/18 for a diabetic left heel wound, non-healing x3 weeks with fever, purulent drainage, and reportedly with maggots. She was being treated with zosyn and vanc. Debridement was planned, but on the day of surgery she reportedly vomited chunks of [employer] despite being NPO. In addition, she had one episode of unresponsiveness on 11/20, ABG showed acute on chronic hypoxic and hypocarbic respiratory failure. CTA was done and showed a segmental and subsegmental PE of the RLL for which therapeutic lovenox (1mg/kg) BID was started. She developed an MC with rising creatinine, nephrology was consulted and it was thought to be due to ATN 2/2 a combination of ischemia (from hypotension) and IV contrast (from CTA). She was being treated with gentle hydration in the setting of HFrEF, and fluids were later stopped due to development of effusions on 11/23. She developed bleeding at the site of her Iv's, at the site of lovenox injection, and hematuria and lovenox was held, last dose the morning of 11/23. On the morning of 11/24, she was reportedly ambulating to the bathroom with her  when she tripped, had a mechanical fall and hit her forehead on the wall. She denies loss of consciousness, confusion, headache, blurry vision, focal deficits. CT Head showed bilateral posttraumatic subarachnoid blood. She was subsequently transferred to [facility] [place], [place] for higher level of care. Here, she denies headache,  "confusion, blurry vision, weakness, focal deficits, chest pain, shortness of breath.     Past Medical History:   Diagnosis Date    CHF (congestive heart failure)     COPD (chronic obstructive pulmonary disease)     Coronary artery disease     Diabetes mellitus     Encounter for blood transfusion     Hypertension     MI (myocardial infarction) 2010    Stroke     July 2005    Thyroid disease      Past Surgical History:   Procedure Laterality Date    ABCESS DRAINAGE Right     Between "little toe" and the one next to it    BREAST SURGERY      Reduction [id number]    CARDIAC SURGERY      CABG 4vessel ring in one valve mitral valve prolapse    CORONARY ARTERY BYPASS GRAFT      hyperlipidemia      TUBAL LIGATION  03/1986      Current Facility-Administered Medications on File Prior to Encounter   Medication Dose Route Frequency Provider Last Rate Last Dose    [COMPLETED] desmopressin (DDAVP) 31.98 mcg in sodium chloride 0.9% 50 mL IVPB  0.3 mcg/kg Intravenous Once Elijah Gonzalez  mL/hr at 11/24/17 1111 31.98 mcg at 11/24/17 1111    [DISCONTINUED] acetaminophen tablet 650 mg  650 mg Oral Q6H PRN Ernesto Barker MD        [DISCONTINUED] albuterol-ipratropium 2.5mg-0.5mg/3mL nebulizer solution 3 mL  3 mL Nebulization Q4H Adarsh Goodson MD   3 mL at 11/24/17 1152    [DISCONTINUED] aspirin EC tablet 81 mg  81 mg Oral Daily Ernesto Barker MD   81 mg at 11/23/17 0831    [DISCONTINUED] carvedilol tablet 25 mg  25 mg Oral BID Ernesto Barker MD   25 mg at 11/24/17 1111    [DISCONTINUED] citalopram tablet 20 mg  20 mg Oral Daily Ernesto Barker MD   20 mg at 11/24/17 1110    [DISCONTINUED] dextrose 50% injection 12.5 g  12.5 g Intravenous PRN Dano Casillas MD        [DISCONTINUED] dextrose 50% injection 25 g  25 g Intravenous PRN Dano Casillas MD        [DISCONTINUED] enoxaparin injection 110 mg  1 mg/kg Subcutaneous Q24H Reg Devine MD        [DISCONTINUED] gabapentin " capsule 300 mg  300 mg Oral TID Ernesto Barker MD   300 mg at 11/24/17 0630    [DISCONTINUED] glucagon (human recombinant) injection 1 mg  1 mg Intramuscular PRN Dano Casillas MD        [DISCONTINUED] glucose chewable tablet 16 g  16 g Oral PRN Dano Casillas MD        [DISCONTINUED] glucose chewable tablet 24 g  24 g Oral PRN Dano Casillas MD        [DISCONTINUED] hydrocodone-acetaminophen 10-325mg per tablet 1 tablet  1 tablet Oral Q6H PRN Dano Casillas MD   1 tablet at 11/23/17 2129    [DISCONTINUED] insulin aspart pen 0-5 Units  0-5 Units Subcutaneous QID (AC + HS) PRN Dano Casillas MD   2 Units at 11/23/17 1213    [DISCONTINUED] insulin detemir pen 10 Units  10 Units Subcutaneous QHS Dano Casillas MD   10 Units at 11/23/17 2132    [DISCONTINUED] levothyroxine tablet 50 mcg  50 mcg Oral Daily Ernesto Barker MD   50 mcg at 11/24/17 1111    [DISCONTINUED] ondansetron injection 8 mg  8 mg Intravenous Q8H PRN Luisana Valera NP   8 mg at 11/20/17 1517    [DISCONTINUED] pantoprazole EC tablet 40 mg  40 mg Oral Daily Reg Devine MD   40 mg at 11/24/17 1111    [DISCONTINUED] piperacillin-tazobactam 3.375 g in sodium chloride 0.9 % 50 mL IVPB (ready to mix system)  3.375 g Intravenous Q12H Pina Turner  mL/hr at 11/24/17 0630 3.375 g at 11/24/17 0630    [DISCONTINUED] rosuvastatin tablet 10 mg  10 mg Oral Daily Ernesto Barker MD   10 mg at 11/24/17 1110    [DISCONTINUED] sodium chloride 0.9% flush 5 mL  5 mL Intravenous PRN Dano Casillas MD         Current Outpatient Prescriptions on File Prior to Encounter   Medication Sig Dispense Refill    albuterol (ACCUNEB) 1.25 mg/3 mL Nebu Take 1.25 mg by nebulization every 6 (six) hours as needed. Rescue      aspirin (ECOTRIN) 81 MG EC tablet Take 1 tablet (81 mg total) by mouth once daily.  0    carvedilol (COREG) 25 MG tablet Take 25 mg by mouth 2 (two) times daily.       citalopram  "(CELEXA) 20 MG tablet Take 20 mg by mouth once daily.        furosemide (LASIX) 40 MG tablet ONE TAB DAILY IN AM . TAKE EXTRA 1/2 TABLET IF WEIGHT IS >3-4 POUNDS   WEIGH YOURSELF EVERY EM 30 tablet 0    gabapentin (NEURONTIN) 300 MG capsule Take 300 mg by mouth 3 (three) times daily. 300 mg AM and 600 mg PM      glimepiride (AMARYL) 4 MG tablet Take 4 mg by mouth before breakfast.      insulin aspart protamine-insulin aspart (NOVOLOG 70/30) 100 unit/mL (70-30) InPn pen Inject into the skin daily as needed (pt states "She told me to just take it whenever my sugar was too high").      levothyroxine (SYNTHROID) 50 MCG tablet Take 50 mcg by mouth once daily.        lisinopril (PRINIVIL,ZESTRIL) 2.5 MG tablet Take 1 tablet (2.5 mg total) by mouth once daily. 30 tablet 2    rosuvastatin (CRESTOR) 10 MG tablet Take 1 tablet (10 mg total) by mouth once daily. 30 tablet 0      Allergies: Patient has no known allergies.    Family History   Problem Relation Age of Onset    Arthritis Mother     Heart disease Father     Cancer Father 68     prostae and bladder ca  in     Diabetes Father     Hypertension Father     Depression Sister     Hypertension Son     Diabetes Maternal Grandmother      lost leg    Kidney disease Paternal Grandfather      had kidney removed    Stroke Paternal Grandfather      Social History   Substance Use Topics    Smoking status: Never Smoker    Smokeless tobacco: Never Used    Alcohol use No     Review of Systems   Constitutional: Negative for chills, fatigue and fever.   HENT: Negative for sore throat and trouble swallowing.    Eyes: Negative for pain and visual disturbance.   Respiratory: Positive for shortness of breath. Negative for cough and wheezing.    Cardiovascular: Positive for leg swelling. Negative for chest pain and palpitations.   Gastrointestinal: Positive for vomiting. Negative for abdominal pain, constipation, diarrhea and nausea.   Endocrine: Negative for " polyuria.   Genitourinary: Positive for decreased urine volume and hematuria.   Musculoskeletal: Positive for arthralgias. Negative for myalgias.   Skin: Positive for wound (left heel wound).   Neurological: Negative for dizziness, syncope, weakness, light-headedness and headaches.   Psychiatric/Behavioral: Negative for confusion and hallucinations.     Objective:     Vitals:  Temp: 97.8 °F (36.6 °C) (11/24/17 1500)  Pulse: 68 (11/24/17 1500)  Resp: (!) 21 (11/24/17 1500)  BP: 135/63 (11/24/17 1500)  SpO2: 97 % (11/24/17 1500)    Temp:  [97.4 °F (36.3 °C)-97.8 °F (36.6 °C)] 97.8 °F (36.6 °C)  Pulse:  [60-68] 68  Resp:  [16-22] 21  SpO2:  [92 %-100 %] 97 %  BP: (106-141)/(57-76) 135/63         Oxygen Concentration (%):  [30-32] 32    No intake/output data recorded.    Physical Exam   Constitutional: She is oriented to person, place, and time. She appears well-developed and well-nourished. No distress.   obese   HENT:   Head: Normocephalic.   Hematoma on forehead above left eye   Eyes: Conjunctivae and EOM are normal. Pupils are equal, round, and reactive to light.   Neck: Normal range of motion. Neck supple.   Cardiovascular: Normal rate, regular rhythm and normal heart sounds.    Pulmonary/Chest: Effort normal and breath sounds normal. No respiratory distress. She has no wheezes. She has no rales.   Abdominal: Soft. Bowel sounds are normal. There is no tenderness.   Musculoskeletal: She exhibits edema.   Neurological: She is alert and oriented to person, place, and time. No cranial nerve deficit or sensory deficit.   Sleepy but arousable   Skin: Skin is warm and dry.     Today I personally reviewed pertinent medications, lines/drains/airways, imaging, lab results, microbiology results, notably:    Assessment/Plan:     Neuro   * Subarachnoid hemorrhage    - CT Head at OSH shows bilateral posttraumatic subarachnoid blood  - repeat CT Head pending  - NSGY consulted  - goal SBP <140  - holding aspirin, lovenox; Anti-Xa  0.70  - started keppra 250 mg q12 x7days  - repeating ABG, electrolytes q12  - BiPap QHS        Cardiac/Vascular   CAD (coronary artery disease)    - continue rosuvastatin 10 mg PO daily        Essential hypertension    - goal SBP <140  - restarted carvedilol 25 mg BID  - holding lisinopril for MC  - PRN labetalol 10 mg IV for SBP>140        Renal/   MC (acute kidney injury)    - likely ATN secondary to ischemia and toxins  - consulted nephrology; see CKD        Hyperkalemia    - K 5.3, secondary to worsening renal function  - see above CKD        CKD (chronic kidney disease) stage 3, GFR 30-59 ml/min    - MC on CKD, worsening creatinine at outside hospital. Nephrology at OSH records indicate MC due to ATN 2/2 ischemia (hypotension) and toxins (IV contrast)  - worsening creatinine, now 4.9, with K 5.3, and anuria   - consulted nephrology, appreciate recs: dialysis tonight, IV lasix 120 mg x1        Hematology   Pulmonary embolism    - seen on CTA at outside hospital  - was receiving therapeutic lovenox, stopped after bleeding from IV sites and hematuria  - holding due to subarachnoid hemorrhage  - repeat ultrasound of lower extremities pending        Endocrine   Diabetic leg ulcer    - MRI from outside hospital shows retro-achilles soft tissue wound, no osteomyelitis  - continue zosyn from outside hospital  - consult Infectious disease  - consult podiatry        Hypothyroidism    - continue levothyroxin 50 mcg  - TSH pending        Uncontrolled type 2 diabetes with stage 3 chronic kidney disease GFR 30-59    - history of uncontrolled diabetes, on oral medication at home plus SSI  - hemoglobin A1c 8.4% on 11/18/17  - continuing gabapentin 100 mg TID (renally dosed)  - at OSH on detemir 10 at night and 2 during day  - starting insulin detemir 6 at night + SSI            Prophylaxis:  Venous Thromboembolism: none  Stress Ulcer: None  Ventilator Pneumonia: not applicable     Activity Orders          None         Full Code    Bg Sánchez MD  Neurocritical Care  [facility]

## 2017-11-24 NOTE — PROGRESS NOTES
1635 Brought patient with PCT to CT scan ED with cardiac monitor and portable oxygen hooked to nasal cannula at 3LPM. No acute events.     1715 Brought back patient to [id number] with PCT. No acute events during the transport. Will continue to monitor.

## 2017-11-24 NOTE — PROGRESS NOTES
Patient sitting on bedside chair, attempted to go to bathroom in a hurry for explosive BM,  assisting patient, patient states her left ankle gave out and she fell,  came out in deras to inform this nurse, entering the room the patient lying on her back,  stated patient hit head on wall, patient returned to bed via blanket times 5 assist, abrasions noted to left leg, techs cleaning up patient, to complete SOS, MD notified, new order for CT

## 2017-11-24 NOTE — CONSULTS
Inpatient consult to Physical Medicine Rehab  Consult performed by: UVALDO ALMEIDA  Consult ordered by: YOEL KEMP  Reason for consult: assess rehab needs        Reviewed patient history and current admission.  Rehab team following.  Full consult to follow on Monday.    ROBERTO Lombardi, FNP-C  Physical Medicine & Rehabilitation   11/24/2017  Spectralink: [id number]

## 2017-11-24 NOTE — PROGRESS NOTES
Pt received from 3rd floor via bed. She was connected to BS monitors. She does not appear in any distress. Plan to monitor here until room available at [facility]. See flowsheet for full assessment.    Misael Lake RN

## 2017-11-24 NOTE — HPI
Juliane Ramos is a 57 y.o.  female with a PMHx relevant for HFrEF( EF 35% on 11/21/17), CKD 3, DMT2 (a1c 8.4%), CVA, 4v CABG (2011), PE, diabetic foot ulcer of left foot transfer to [facility] from [facility] secondary to new onset SAH after fall. She was admitted with OSH on 11/18 for a diabetic left heel wound, non-healing x3 weeks with fever, purulent drainage, and reportedly with maggots, treated with zosyn and vanc. She was dx with acute PE post syncopal episode secondary to hypercapnic resp failure. CTA done 11/20/2017 with eGFR < 45 mg/dl and was started on Lovenox. She has a sCr baseline 1.1-1.4 mg/dl. She was seen by nephrology at OSH secondary to MC due to ATN and CI-MC. She was treated initially with IV fluids for gentle hydration but jordan sCr continue to rise. She presents to [facility] with sCr of 4.1 and decreased UO. She developed pleural effusion and IV fluids where stopped. Since then per report it seems like his UO has been decreasing. Her K has alos steadily bernard rising. 5.3 today.

## 2017-11-24 NOTE — PLAN OF CARE
ICU Attending Note  Neurocritical Care    CT head mild bilateral tSAH    [id number]  Bilateral asterixis.  Bleeding from L heel ulcer.    -consult Neurosurgery for small tSAH  -CT head now  -levetiracetam 250 mg q12h x7 d  -restart gabapentin  -plan nightly BiPAP 10/5  --140  -carvedilol, hold lisinopril given MC  -consult Nephrology regarding MC and consider HD  -q12h lytes, ABG  -ID, Podiatry consult for L ankle wound management  -check Xa level  -detemir 6 units qhs, ISS for DM  -ADAT  -up with assistance

## 2017-11-24 NOTE — ASSESSMENT & PLAN NOTE
MC on CKD 3 oliguric and progressing to aniria, most likely secondary to iATN from hymodymamyc changes with hypotension and Toxic tubular injuries     · Obtain urine lytes, urine creat, urine urea to calculate FeNa/Fe urea  · Check protein creat ratio, wrights stain   · Check renal/retroperitoneal USG   · Consider Furosemide Challenge 120 mg IV x 1if UO increasing may redose at 6 hrs.  · Monitor RFP one this evening with plans to initiated RRT- SLED if renal function decline  · She has been consented and consent in her chart   · Monitor Ins and Outs and daily weights.   · Avoid nephrotoxic agents such as NSAIDS, Gadolinium and IV Radiocontrast  · Renal Dose meds to current GFR/Creat Clereance.  · Will follow with you.  · Thank you very much for you consult

## 2017-11-24 NOTE — SUBJECTIVE & OBJECTIVE
"Past Medical History:   Diagnosis Date    CHF (congestive heart failure)     COPD (chronic obstructive pulmonary disease)     Coronary artery disease     Diabetes mellitus     Encounter for blood transfusion     Hypertension     MI (myocardial infarction) 2010    Stroke     July 2005    Thyroid disease      Past Surgical History:   Procedure Laterality Date    ABCESS DRAINAGE Right     Between "little toe" and the one next to it    BREAST SURGERY      Reduction [id number]    CARDIAC SURGERY      CABG 4vessel ring in one valve mitral valve prolapse    CORONARY ARTERY BYPASS GRAFT      hyperlipidemia      TUBAL LIGATION  03/1986      Current Facility-Administered Medications on File Prior to Encounter   Medication Dose Route Frequency Provider Last Rate Last Dose    [COMPLETED] desmopressin (DDAVP) 31.98 mcg in sodium chloride 0.9% 50 mL IVPB  0.3 mcg/kg Intravenous Once Elijah Gonzalez  mL/hr at 11/24/17 1111 31.98 mcg at 11/24/17 1111    [DISCONTINUED] acetaminophen tablet 650 mg  650 mg Oral Q6H PRN Ernesto Barker MD        [DISCONTINUED] albuterol-ipratropium 2.5mg-0.5mg/3mL nebulizer solution 3 mL  3 mL Nebulization Q4H Adarsh Goodson MD   3 mL at 11/24/17 1152    [DISCONTINUED] aspirin EC tablet 81 mg  81 mg Oral Daily Ernesto Barker MD   81 mg at 11/23/17 0831    [DISCONTINUED] carvedilol tablet 25 mg  25 mg Oral BID Ernesto Barker MD   25 mg at 11/24/17 1111    [DISCONTINUED] citalopram tablet 20 mg  20 mg Oral Daily Ernesto Barker MD   20 mg at 11/24/17 1110    [DISCONTINUED] dextrose 50% injection 12.5 g  12.5 g Intravenous PRN Dano Casillas MD        [DISCONTINUED] dextrose 50% injection 25 g  25 g Intravenous PRN Dano Casillas MD        [DISCONTINUED] enoxaparin injection 110 mg  1 mg/kg Subcutaneous Q24H Reg Devine MD        [DISCONTINUED] gabapentin capsule 300 mg  300 mg Oral TID Ernesto Barker MD   300 mg at 11/24/17 0630    " [DISCONTINUED] glucagon (human recombinant) injection 1 mg  1 mg Intramuscular PRN Dano Casillas MD        [DISCONTINUED] glucose chewable tablet 16 g  16 g Oral PRN Dano Casillas MD        [DISCONTINUED] glucose chewable tablet 24 g  24 g Oral PRN Dano Casillas MD        [DISCONTINUED] hydrocodone-acetaminophen 10-325mg per tablet 1 tablet  1 tablet Oral Q6H PRN Dano Casillas MD   1 tablet at 11/23/17 2129    [DISCONTINUED] insulin aspart pen 0-5 Units  0-5 Units Subcutaneous QID (AC + HS) PRN Dano Casillas MD   2 Units at 11/23/17 1213    [DISCONTINUED] insulin detemir pen 10 Units  10 Units Subcutaneous QHS Dano Casillas MD   10 Units at 11/23/17 2132    [DISCONTINUED] levothyroxine tablet 50 mcg  50 mcg Oral Daily Ernesto Barker MD   50 mcg at 11/24/17 1111    [DISCONTINUED] ondansetron injection 8 mg  8 mg Intravenous Q8H PRN Luisana Valera NP   8 mg at 11/20/17 1517    [DISCONTINUED] pantoprazole EC tablet 40 mg  40 mg Oral Daily Reg Devine MD   40 mg at 11/24/17 1111    [DISCONTINUED] piperacillin-tazobactam 3.375 g in sodium chloride 0.9 % 50 mL IVPB (ready to mix system)  3.375 g Intravenous Q12H Pina Turner  mL/hr at 11/24/17 0630 3.375 g at 11/24/17 0630    [DISCONTINUED] rosuvastatin tablet 10 mg  10 mg Oral Daily Ernesto Barker MD   10 mg at 11/24/17 1110    [DISCONTINUED] sodium chloride 0.9% flush 5 mL  5 mL Intravenous PRN Dano Casillas MD         Current Outpatient Prescriptions on File Prior to Encounter   Medication Sig Dispense Refill    albuterol (ACCUNEB) 1.25 mg/3 mL Nebu Take 1.25 mg by nebulization every 6 (six) hours as needed. Rescue      aspirin (ECOTRIN) 81 MG EC tablet Take 1 tablet (81 mg total) by mouth once daily.  0    carvedilol (COREG) 25 MG tablet Take 25 mg by mouth 2 (two) times daily.       citalopram (CELEXA) 20 MG tablet Take 20 mg by mouth once daily.        furosemide (LASIX) 40 MG  "tablet ONE TAB DAILY IN AM . TAKE EXTRA 1/2 TABLET IF WEIGHT IS >3-4 POUNDS   WEIGH YOURSELF EVERY EM 30 tablet 0    gabapentin (NEURONTIN) 300 MG capsule Take 300 mg by mouth 3 (three) times daily. 300 mg AM and 600 mg PM      glimepiride (AMARYL) 4 MG tablet Take 4 mg by mouth before breakfast.      insulin aspart protamine-insulin aspart (NOVOLOG 70/30) 100 unit/mL (70-30) InPn pen Inject into the skin daily as needed (pt states "She told me to just take it whenever my sugar was too high").      levothyroxine (SYNTHROID) 50 MCG tablet Take 50 mcg by mouth once daily.        lisinopril (PRINIVIL,ZESTRIL) 2.5 MG tablet Take 1 tablet (2.5 mg total) by mouth once daily. 30 tablet 2    rosuvastatin (CRESTOR) 10 MG tablet Take 1 tablet (10 mg total) by mouth once daily. 30 tablet 0      Allergies: Patient has no known allergies.    Family History   Problem Relation Age of Onset    Arthritis Mother     Heart disease Father     Cancer Father 68     prostae and bladder ca  in     Diabetes Father     Hypertension Father     Depression Sister     Hypertension Son     Diabetes Maternal Grandmother      lost leg    Kidney disease Paternal Grandfather      had kidney removed    Stroke Paternal Grandfather      Social History   Substance Use Topics    Smoking status: Never Smoker    Smokeless tobacco: Never Used    Alcohol use No     Review of Systems   Constitutional: Negative for chills, fatigue and fever.   HENT: Negative for sore throat and trouble swallowing.    Eyes: Negative for pain and visual disturbance.   Respiratory: Positive for shortness of breath. Negative for cough and wheezing.    Cardiovascular: Positive for leg swelling. Negative for chest pain and palpitations.   Gastrointestinal: Positive for vomiting. Negative for abdominal pain, constipation, diarrhea and nausea.   Endocrine: Negative for polyuria.   Genitourinary: Positive for decreased urine volume and hematuria. "   Musculoskeletal: Positive for arthralgias. Negative for myalgias.   Skin: Positive for wound (left heel wound).   Neurological: Negative for dizziness, syncope, weakness, light-headedness and headaches.   Psychiatric/Behavioral: Negative for confusion and hallucinations.     Objective:     Vitals:  Temp: 97.8 °F (36.6 °C) (11/24/17 1500)  Pulse: 68 (11/24/17 1500)  Resp: (!) 21 (11/24/17 1500)  BP: 135/63 (11/24/17 1500)  SpO2: 97 % (11/24/17 1500)    Temp:  [97.4 °F (36.3 °C)-97.8 °F (36.6 °C)] 97.8 °F (36.6 °C)  Pulse:  [60-68] 68  Resp:  [16-22] 21  SpO2:  [92 %-100 %] 97 %  BP: (106-141)/(57-76) 135/63         Oxygen Concentration (%):  [30-32] 32    No intake/output data recorded.    Physical Exam   Constitutional: She is oriented to person, place, and time. She appears well-developed and well-nourished. No distress.   obese   HENT:   Head: Normocephalic.   Hematoma on forehead above left eye   Eyes: Conjunctivae and EOM are normal. Pupils are equal, round, and reactive to light.   Neck: Normal range of motion. Neck supple.   Cardiovascular: Normal rate, regular rhythm and normal heart sounds.    Pulmonary/Chest: Effort normal and breath sounds normal. No respiratory distress. She has no wheezes. She has no rales.   Abdominal: Soft. Bowel sounds are normal. There is no tenderness.   Musculoskeletal: She exhibits edema.   Neurological: She is alert and oriented to person, place, and time. No cranial nerve deficit or sensory deficit.   Sleepy but arousable   Skin: Skin is warm and dry.     Today I personally reviewed pertinent medications, lines/drains/airways, imaging, lab results, microbiology results, notably:

## 2017-11-24 NOTE — DISCHARGE SUMMARY
Discharge Summary  Hospital Medicine    Admit Date: 11/18/2017    Date and Time: 11/24/201710:41 AM    Discharge Attending Physician: Reg Devine MD    Primary Care Physician: JOS Dumont    Diagnoses:  Active Hospital Problems    Diagnosis  POA    Acute post-traumatic sub-arachnoid hemorrhage  Diabetic leg ulcer [E11.622, L97.909]  Yes    Essential hypertension [I10]  MC  Yes    CKD (chronic kidney disease) stage 3, GFR 30-59 ml/min [N18.3]  Yes    Controlled type 2 diabetes mellitus with stage 3 chronic kidney disease [E11.22, N18.3]  Yes    Hypothyroidism [E03.9]  Yes        Discharged Condition: Fair    Hospital Course:   57 year old female with HTN, DM2, HF, CKD was admitted to the hospital for a left heel ulcer that she first noticed two weeks ago.  She stated that over the past few days it had been associated with worsening pain, drainage, and a fever of up to 102 and for this she came for evaluation.  She denied any chest pain, shortness of breath, abdominal pain, confusion, or any other related complaints.  She stated she was compliant with her medications but only takes her basal insulin when she needs it per sliding scale. Patient was admitted to Hospitalist medicine service. Patient was evaluated by Dr. Wick and Dr. Turner. Patient was being treated with IV antibiotics, wound care provided and was expected to have wound debridement. Patient had an episode of sudden onset of hypoxia and unresponsiveness. Patient vomited Marcelino sandwitch. Patient was expecte dto be NPO for podiatry procedure. Work up confirmed PE. Patient started on anticoagulation which was held yesterday due to bleeding from IV site. Patient cardiologist Dr. STERLING Palomo is following the patient as well. This morning, patient while ambulated with her  lost balance and hit left forehead to the wall. Left forehead contusion noted. No LOC or any focal neurological complaints or HA or vision changes. CT head is  showing SAH. Case accepted by Dr. Iverson. Patient was discharged to neuro-critical care ICU - [facility] under care of Dr. Iverson for further neurology and surgical management.    Consults: Dr. Turner, Dr. STERLING Palomo, Dr. Wick and Dr. Young    Significant Diagnostic Studies:     Microbiology Results (last 7 days)     Procedure Component Value Units Date/Time    Blood culture (site 2) [[id number]] Collected:  11/18/17 1658    Order Status:  Completed Specimen:  Blood from Antecubital, Left  Arm Updated:  11/24/17 0612     Blood Culture, Routine No growth after 5 days.    Narrative:       Site # 2, aerobic only    Blood culture (site 1) [[id number]] Collected:  11/18/17 1657    Order Status:  Completed Specimen:  Blood from Antecubital, Right  Arm Updated:  11/24/17 0612     Blood Culture, Routine No growth after 5 days.    Narrative:       Site # 1, aerobic and anaerobic        Special Treatments/Procedures: None  Disposition: [facility] Neuro-critical care ICU    Medications:  Reconciled Home Medications:   Current Discharge Medication List      CONTINUE these medications which have NOT CHANGED    Details   albuterol (ACCUNEB) 1.25 mg/3 mL Nebu Take 1.25 mg by nebulization every 6 (six) hours as needed. Rescue      aspirin (ECOTRIN) 81 MG EC tablet Take 1 tablet (81 mg total) by mouth once daily.  Refills: 0      carvedilol (COREG) 25 MG tablet Take 25 mg by mouth 2 (two) times daily.       citalopram (CELEXA) 20 MG tablet Take 20 mg by mouth once daily.        furosemide (LASIX) 40 MG tablet ONE TAB DAILY IN AM . TAKE EXTRA 1/2 TABLET IF WEIGHT IS >3-4 POUNDS   WEIGH YOURSELF EVERY EM  Qty: 30 tablet, Refills: 0      gabapentin (NEURONTIN) 300 MG capsule Take 300 mg by mouth 3 (three) times daily. 300 mg AM and 600 mg PM      glimepiride (AMARYL) 4 MG tablet Take 4 mg by mouth before breakfast.      insulin aspart protamine-insulin aspart (NOVOLOG 70/30) 100 unit/mL (70-30) InPn pen Inject into the skin daily as needed (pt  "states "She told me to just take it whenever my sugar was too high").      levothyroxine (SYNTHROID) 50 MCG tablet Take 50 mcg by mouth once daily.        lisinopril (PRINIVIL,ZESTRIL) 2.5 MG tablet Take 1 tablet (2.5 mg total) by mouth once daily.  Qty: 30 tablet, Refills: 2      rosuvastatin (CRESTOR) 10 MG tablet Take 1 tablet (10 mg total) by mouth once daily.  Qty: 30 tablet, Refills: 0           No discharge procedures on file.  Follow-up Information     [facility] [place].    Specialties:  Hospice Services, Home Health Services  Why:  Home Health  Contact information:  [address] [place] [address] [address]  [place] [address] [phone]                             "

## 2017-11-24 NOTE — ASSESSMENT & PLAN NOTE
- MC on CKD, worsening creatinine at outside hospital. Nephrology at OSH records indicate MC due to ATN 2/2 ischemia (hypotension) and toxins (IV contrast)  - worsening creatinine, now 4.9, with K 5.3, and anuria   - consulted nephrology, appreciate recs: dialysis tonight, IV lasix 120 mg x1

## 2017-11-24 NOTE — ASSESSMENT & PLAN NOTE
- seen on CTA at outside hospital  - was receiving therapeutic lovenox, stopped after bleeding from IV sites and hematuria  - holding due to subarachnoid hemorrhage  - repeat ultrasound of lower extremities pending

## 2017-11-24 NOTE — NURSING
Placed call to Dr. Goodson to inform of patient previous IV site continuously bleeding, previous subcutaneous injection sites bleeding, and urine draining to villegas catheter bag being reddened.   Received orders to D/C lovenox and order for stat CBC and PT/INR. Inform if Hgb was lower than 7.

## 2017-11-25 PROBLEM — S06.6XAA TRAUMATIC SUBARACHNOID HEMORRHAGE: Status: ACTIVE | Noted: 2017-11-24

## 2017-11-25 PROBLEM — N18.30 ACUTE RENAL FAILURE SUPERIMPOSED ON STAGE 3 CHRONIC KIDNEY DISEASE: Status: ACTIVE | Noted: 2017-11-25

## 2017-11-25 PROBLEM — N17.9 ACUTE RENAL FAILURE SUPERIMPOSED ON STAGE 3 CHRONIC KIDNEY DISEASE: Status: ACTIVE | Noted: 2017-11-25

## 2017-11-25 PROBLEM — I26.99 PULMONARY EMBOLISM: Status: ACTIVE | Noted: 2017-11-25

## 2017-11-25 LAB
ALBUMIN SERPL BCP-MCNC: 2.1 G/DL
ALBUMIN SERPL BCP-MCNC: 2.2 G/DL
ALBUMIN SERPL BCP-MCNC: 2.3 G/DL
ALLENS TEST: ABNORMAL
ANION GAP SERPL CALC-SCNC: 12 MMOL/L
ANION GAP SERPL CALC-SCNC: 13 MMOL/L
ANION GAP SERPL CALC-SCNC: 14 MMOL/L
ANION GAP SERPL CALC-SCNC: 14 MMOL/L
ANION GAP SERPL CALC-SCNC: 9 MMOL/L
ANION GAP SERPL CALC-SCNC: 9 MMOL/L
BASOPHILS # BLD AUTO: 0.05 K/UL
BASOPHILS # BLD AUTO: 0.06 K/UL
BASOPHILS NFR BLD: 0.5 %
BASOPHILS NFR BLD: 0.6 %
BUN SERPL-MCNC: 21 MG/DL
BUN SERPL-MCNC: 21 MG/DL
BUN SERPL-MCNC: 42 MG/DL
BUN SERPL-MCNC: 59 MG/DL
BUN SERPL-MCNC: 60 MG/DL
BUN SERPL-MCNC: 60 MG/DL
CALCIUM SERPL-MCNC: 8.2 MG/DL
CALCIUM SERPL-MCNC: 8.3 MG/DL
CALCIUM SERPL-MCNC: 8.4 MG/DL
CALCIUM SERPL-MCNC: 8.5 MG/DL
CHLORIDE SERPL-SCNC: 100 MMOL/L
CHLORIDE SERPL-SCNC: 103 MMOL/L
CHLORIDE SERPL-SCNC: 103 MMOL/L
CHLORIDE SERPL-SCNC: 98 MMOL/L
CO2 SERPL-SCNC: 19 MMOL/L
CO2 SERPL-SCNC: 21 MMOL/L
CO2 SERPL-SCNC: 21 MMOL/L
CO2 SERPL-SCNC: 22 MMOL/L
CO2 SERPL-SCNC: 24 MMOL/L
CO2 SERPL-SCNC: 24 MMOL/L
CREAT SERPL-MCNC: 2.4 MG/DL
CREAT SERPL-MCNC: 2.4 MG/DL
CREAT SERPL-MCNC: 4.1 MG/DL
CREAT SERPL-MCNC: 5.5 MG/DL
CREAT SERPL-MCNC: 5.8 MG/DL
CREAT SERPL-MCNC: 6.1 MG/DL
DELSYS: ABNORMAL
DIFFERENTIAL METHOD: ABNORMAL
DIFFERENTIAL METHOD: ABNORMAL
EOSINOPHIL # BLD AUTO: 0.1 K/UL
EOSINOPHIL # BLD AUTO: 0.1 K/UL
EOSINOPHIL NFR BLD: 1.3 %
EOSINOPHIL NFR BLD: 1.4 %
ERYTHROCYTE [DISTWIDTH] IN BLOOD BY AUTOMATED COUNT: 17.7 %
ERYTHROCYTE [DISTWIDTH] IN BLOOD BY AUTOMATED COUNT: 17.8 %
EST. GFR  (AFRICAN AMERICAN): 13.3 ML/MIN/1.73 M^2
EST. GFR  (AFRICAN AMERICAN): 25.4 ML/MIN/1.73 M^2
EST. GFR  (AFRICAN AMERICAN): 25.4 ML/MIN/1.73 M^2
EST. GFR  (AFRICAN AMERICAN): 8.2 ML/MIN/1.73 M^2
EST. GFR  (AFRICAN AMERICAN): 8.8 ML/MIN/1.73 M^2
EST. GFR  (AFRICAN AMERICAN): 9.3 ML/MIN/1.73 M^2
EST. GFR  (NON AFRICAN AMERICAN): 11.5 ML/MIN/1.73 M^2
EST. GFR  (NON AFRICAN AMERICAN): 22.1 ML/MIN/1.73 M^2
EST. GFR  (NON AFRICAN AMERICAN): 22.1 ML/MIN/1.73 M^2
EST. GFR  (NON AFRICAN AMERICAN): 7.1 ML/MIN/1.73 M^2
EST. GFR  (NON AFRICAN AMERICAN): 7.6 ML/MIN/1.73 M^2
EST. GFR  (NON AFRICAN AMERICAN): 8.1 ML/MIN/1.73 M^2
ESTIMATED AVG GLUCOSE: 183 MG/DL
FLOW: 5
GLUCOSE SERPL-MCNC: 100 MG/DL
GLUCOSE SERPL-MCNC: 107 MG/DL
GLUCOSE SERPL-MCNC: 108 MG/DL
GLUCOSE SERPL-MCNC: 94 MG/DL
GLUCOSE SERPL-MCNC: 98 MG/DL
GLUCOSE SERPL-MCNC: 98 MG/DL
HBA1C MFR BLD HPLC: 8 %
HCO3 UR-SCNC: 22.2 MMOL/L (ref 24–28)
HCT VFR BLD AUTO: 23.3 %
HCT VFR BLD AUTO: 24.3 %
HGB BLD-MCNC: 7 G/DL
HGB BLD-MCNC: 7.3 G/DL
IMM GRANULOCYTES # BLD AUTO: 0.03 K/UL
IMM GRANULOCYTES # BLD AUTO: 0.03 K/UL
IMM GRANULOCYTES NFR BLD AUTO: 0.3 %
IMM GRANULOCYTES NFR BLD AUTO: 0.3 %
LACTATE SERPL-SCNC: 0.8 MMOL/L
LYMPHOCYTES # BLD AUTO: 0.6 K/UL
LYMPHOCYTES # BLD AUTO: 0.6 K/UL
LYMPHOCYTES NFR BLD: 6 %
LYMPHOCYTES NFR BLD: 6.4 %
MAGNESIUM SERPL-MCNC: 2.4 MG/DL
MAGNESIUM SERPL-MCNC: 2.4 MG/DL
MCH RBC QN AUTO: 23.4 PG
MCH RBC QN AUTO: 23.7 PG
MCHC RBC AUTO-ENTMCNC: 30 G/DL
MCHC RBC AUTO-ENTMCNC: 30 G/DL
MCV RBC AUTO: 78 FL
MCV RBC AUTO: 79 FL
MODE: ABNORMAL
MONOCYTES # BLD AUTO: 1 K/UL
MONOCYTES # BLD AUTO: 1.1 K/UL
MONOCYTES NFR BLD: 10.7 %
MONOCYTES NFR BLD: 10.9 %
NEUTROPHILS # BLD AUTO: 7.4 K/UL
NEUTROPHILS # BLD AUTO: 8 K/UL
NEUTROPHILS NFR BLD: 80.5 %
NEUTROPHILS NFR BLD: 81.1 %
NRBC BLD-RTO: 0 /100 WBC
NRBC BLD-RTO: 0 /100 WBC
PCO2 BLDA: 49.2 MMHG (ref 35–45)
PH SMN: 7.26 [PH] (ref 7.35–7.45)
PHOSPHATE SERPL-MCNC: 4.5 MG/DL
PHOSPHATE SERPL-MCNC: 4.5 MG/DL
PHOSPHATE SERPL-MCNC: 6.3 MG/DL
PHOSPHATE SERPL-MCNC: 8.1 MG/DL
PHOSPHATE SERPL-MCNC: 8.2 MG/DL
PLATELET # BLD AUTO: 170 K/UL
PLATELET # BLD AUTO: 176 K/UL
PMV BLD AUTO: 10.1 FL
PMV BLD AUTO: 10.4 FL
PO2 BLDA: 84 MMHG (ref 80–100)
POC BE: -5 MMOL/L
POC SATURATED O2: 95 % (ref 95–100)
POC TCO2: 24 MMOL/L (ref 23–27)
POCT GLUCOSE: 107 MG/DL (ref 70–110)
POCT GLUCOSE: 108 MG/DL (ref 70–110)
POCT GLUCOSE: 108 MG/DL (ref 70–110)
POCT GLUCOSE: 89 MG/DL (ref 70–110)
POCT GLUCOSE: 98 MG/DL (ref 70–110)
POTASSIUM SERPL-SCNC: 3.9 MMOL/L
POTASSIUM SERPL-SCNC: 3.9 MMOL/L
POTASSIUM SERPL-SCNC: 4.7 MMOL/L
POTASSIUM SERPL-SCNC: 5.6 MMOL/L
POTASSIUM SERPL-SCNC: 5.9 MMOL/L
RBC # BLD AUTO: 2.99 M/UL
RBC # BLD AUTO: 3.08 M/UL
SAMPLE: ABNORMAL
SITE: ABNORMAL
SODIUM SERPL-SCNC: 133 MMOL/L
SODIUM SERPL-SCNC: 135 MMOL/L
SODIUM SERPL-SCNC: 136 MMOL/L
SODIUM SERPL-SCNC: 136 MMOL/L
VANCOMYCIN SERPL-MCNC: 23.3 UG/ML
WBC # BLD AUTO: 9.21 K/UL
WBC # BLD AUTO: 9.89 K/UL

## 2017-11-25 PROCEDURE — 99223 1ST HOSP IP/OBS HIGH 75: CPT | Mod: ,,, | Performed by: INTERNAL MEDICINE

## 2017-11-25 PROCEDURE — 25000003 PHARM REV CODE 250: Performed by: PHYSICIAN ASSISTANT

## 2017-11-25 PROCEDURE — 43752 NASAL/OROGASTRIC W/TUBE PLMT: CPT

## 2017-11-25 PROCEDURE — 83735 ASSAY OF MAGNESIUM: CPT

## 2017-11-25 PROCEDURE — 99900035 HC TECH TIME PER 15 MIN (STAT)

## 2017-11-25 PROCEDURE — 25000003 PHARM REV CODE 250: Performed by: INTERNAL MEDICINE

## 2017-11-25 PROCEDURE — 85025 COMPLETE CBC W/AUTO DIFF WBC: CPT | Mod: 91

## 2017-11-25 PROCEDURE — G8997 SWALLOW GOAL STATUS: HCPCS | Mod: CM

## 2017-11-25 PROCEDURE — 80069 RENAL FUNCTION PANEL: CPT | Mod: 91

## 2017-11-25 PROCEDURE — 37799 UNLISTED PX VASCULAR SURGERY: CPT

## 2017-11-25 PROCEDURE — 99233 SBSQ HOSP IP/OBS HIGH 50: CPT | Mod: ,,, | Performed by: INTERNAL MEDICINE

## 2017-11-25 PROCEDURE — 25000003 PHARM REV CODE 250: Performed by: STUDENT IN AN ORGANIZED HEALTH CARE EDUCATION/TRAINING PROGRAM

## 2017-11-25 PROCEDURE — 82803 BLOOD GASES ANY COMBINATION: CPT

## 2017-11-25 PROCEDURE — 27000221 HC OXYGEN, UP TO 24 HOURS

## 2017-11-25 PROCEDURE — A4216 STERILE WATER/SALINE, 10 ML: HCPCS | Performed by: STUDENT IN AN ORGANIZED HEALTH CARE EDUCATION/TRAINING PROGRAM

## 2017-11-25 PROCEDURE — 63600175 PHARM REV CODE 636 W HCPCS: Performed by: PHYSICIAN ASSISTANT

## 2017-11-25 PROCEDURE — 80048 BASIC METABOLIC PNL TOTAL CA: CPT

## 2017-11-25 PROCEDURE — 80202 ASSAY OF VANCOMYCIN: CPT

## 2017-11-25 PROCEDURE — 90945 DIALYSIS ONE EVALUATION: CPT

## 2017-11-25 PROCEDURE — 20000000 HC ICU ROOM

## 2017-11-25 PROCEDURE — 63600175 PHARM REV CODE 636 W HCPCS

## 2017-11-25 PROCEDURE — 63600175 PHARM REV CODE 636 W HCPCS: Performed by: STUDENT IN AN ORGANIZED HEALTH CARE EDUCATION/TRAINING PROGRAM

## 2017-11-25 PROCEDURE — 97802 MEDICAL NUTRITION INDIV IN: CPT

## 2017-11-25 PROCEDURE — S0030 INJECTION, METRONIDAZOLE: HCPCS | Performed by: PHYSICIAN ASSISTANT

## 2017-11-25 PROCEDURE — G8996 SWALLOW CURRENT STATUS: HCPCS | Mod: CN

## 2017-11-25 PROCEDURE — 83036 HEMOGLOBIN GLYCOSYLATED A1C: CPT

## 2017-11-25 PROCEDURE — 92610 EVALUATE SWALLOWING FUNCTION: CPT

## 2017-11-25 PROCEDURE — 83605 ASSAY OF LACTIC ACID: CPT

## 2017-11-25 PROCEDURE — 99233 SBSQ HOSP IP/OBS HIGH 50: CPT | Mod: ,,, | Performed by: PHYSICIAN ASSISTANT

## 2017-11-25 PROCEDURE — 94761 N-INVAS EAR/PLS OXIMETRY MLT: CPT

## 2017-11-25 PROCEDURE — 99233 SBSQ HOSP IP/OBS HIGH 50: CPT | Mod: GC,,, | Performed by: NEUROLOGICAL SURGERY

## 2017-11-25 RX ORDER — HYDROCODONE BITARTRATE AND ACETAMINOPHEN 500; 5 MG/1; MG/1
TABLET ORAL
Status: ACTIVE | OUTPATIENT
Start: 2017-11-25 | End: 2017-11-26

## 2017-11-25 RX ORDER — MAGNESIUM SULFATE HEPTAHYDRATE 40 MG/ML
2 INJECTION, SOLUTION INTRAVENOUS
Status: ACTIVE | OUTPATIENT
Start: 2017-11-25 | End: 2017-11-26

## 2017-11-25 RX ORDER — METRONIDAZOLE 500 MG/100ML
500 INJECTION, SOLUTION INTRAVENOUS
Status: DISCONTINUED | OUTPATIENT
Start: 2017-11-25 | End: 2017-11-27

## 2017-11-25 RX ORDER — CEFEPIME HYDROCHLORIDE 1 G/50ML
1 INJECTION, SOLUTION INTRAVENOUS
Status: DISCONTINUED | OUTPATIENT
Start: 2017-11-25 | End: 2017-11-27 | Stop reason: HOSPADM

## 2017-11-25 RX ORDER — LEVETIRACETAM 100 MG/ML
250 SOLUTION ORAL 2 TIMES DAILY
Status: DISCONTINUED | OUTPATIENT
Start: 2017-11-25 | End: 2017-11-27 | Stop reason: HOSPADM

## 2017-11-25 RX ORDER — CARVEDILOL 12.5 MG/1
12.5 TABLET ORAL 2 TIMES DAILY
Status: DISCONTINUED | OUTPATIENT
Start: 2017-11-25 | End: 2017-11-27 | Stop reason: HOSPADM

## 2017-11-25 RX ORDER — HYDROCODONE BITARTRATE AND ACETAMINOPHEN 500; 5 MG/1; MG/1
TABLET ORAL CONTINUOUS
Status: ACTIVE | OUTPATIENT
Start: 2017-11-25 | End: 2017-11-26

## 2017-11-25 RX ORDER — ROSUVASTATIN CALCIUM 10 MG/1
10 TABLET, COATED ORAL DAILY
Status: DISCONTINUED | OUTPATIENT
Start: 2017-11-26 | End: 2017-11-27 | Stop reason: HOSPADM

## 2017-11-25 RX ORDER — CARVEDILOL 12.5 MG/1
12.5 TABLET ORAL 2 TIMES DAILY
Status: DISCONTINUED | OUTPATIENT
Start: 2017-11-25 | End: 2017-11-25

## 2017-11-25 RX ORDER — LEVOTHYROXINE SODIUM 50 UG/1
50 TABLET ORAL
Status: DISCONTINUED | OUTPATIENT
Start: 2017-11-26 | End: 2017-11-27 | Stop reason: HOSPADM

## 2017-11-25 RX ORDER — ONDANSETRON 2 MG/ML
INJECTION INTRAMUSCULAR; INTRAVENOUS
Status: COMPLETED
Start: 2017-11-25 | End: 2017-11-25

## 2017-11-25 RX ORDER — ONDANSETRON 2 MG/ML
4 INJECTION INTRAMUSCULAR; INTRAVENOUS EVERY 6 HOURS PRN
Status: DISCONTINUED | OUTPATIENT
Start: 2017-11-25 | End: 2017-11-27 | Stop reason: HOSPADM

## 2017-11-25 RX ORDER — GABAPENTIN 100 MG/1
100 CAPSULE ORAL 3 TIMES DAILY
Status: DISCONTINUED | OUTPATIENT
Start: 2017-11-25 | End: 2017-11-27 | Stop reason: HOSPADM

## 2017-11-25 RX ORDER — MAGNESIUM SULFATE HEPTAHYDRATE 40 MG/ML
2 INJECTION, SOLUTION INTRAVENOUS
Status: DISPENSED | OUTPATIENT
Start: 2017-11-25 | End: 2017-11-26

## 2017-11-25 RX ADMIN — SODIUM POLYSTYRENE SULFONATE 30 G: 15 SUSPENSION ORAL; RECTAL at 02:11

## 2017-11-25 RX ADMIN — LEVOTHYROXINE SODIUM 50 MCG: 50 TABLET ORAL at 05:11

## 2017-11-25 RX ADMIN — METRONIDAZOLE 500 MG: 500 INJECTION, SOLUTION INTRAVENOUS at 09:11

## 2017-11-25 RX ADMIN — GABAPENTIN 100 MG: 100 CAPSULE ORAL at 09:11

## 2017-11-25 RX ADMIN — PIPERACILLIN AND TAZOBACTAM 4.5 G: 4; .5 INJECTION, POWDER, LYOPHILIZED, FOR SOLUTION INTRAVENOUS; PARENTERAL at 04:11

## 2017-11-25 RX ADMIN — SODIUM CHLORIDE: 0.9 INJECTION, SOLUTION INTRAVENOUS at 01:11

## 2017-11-25 RX ADMIN — CARVEDILOL 12.5 MG: 12.5 TABLET, FILM COATED ORAL at 09:11

## 2017-11-25 RX ADMIN — GABAPENTIN 100 MG: 100 CAPSULE ORAL at 05:11

## 2017-11-25 RX ADMIN — Medication 3 ML: at 05:11

## 2017-11-25 RX ADMIN — Medication 3 ML: at 02:11

## 2017-11-25 RX ADMIN — LEVETIRACETAM 250 MG: 500 SOLUTION ORAL at 09:11

## 2017-11-25 RX ADMIN — CARVEDILOL 25 MG: 25 TABLET, FILM COATED ORAL at 08:11

## 2017-11-25 RX ADMIN — ROSUVASTATIN CALCIUM 10 MG: 10 TABLET, FILM COATED ORAL at 08:11

## 2017-11-25 RX ADMIN — Medication 3 ML: at 09:11

## 2017-11-25 RX ADMIN — LEVETIRACETAM 250 MG: 250 TABLET, FILM COATED ORAL at 08:11

## 2017-11-25 RX ADMIN — ONDANSETRON 4 MG: 2 INJECTION INTRAMUSCULAR; INTRAVENOUS at 11:11

## 2017-11-25 RX ADMIN — METRONIDAZOLE 500 MG: 500 INJECTION, SOLUTION INTRAVENOUS at 12:11

## 2017-11-25 RX ADMIN — CEFEPIME HYDROCHLORIDE 1 G: 1 INJECTION, POWDER, FOR SOLUTION INTRAMUSCULAR; INTRAVENOUS at 01:11

## 2017-11-25 NOTE — PROCEDURES
"Juliane Ramos is a 57 y.o. female patient.    Temp: 97.8 °F (36.6 °C) (11/24/17 1500)  Pulse: 71 (11/24/17 1800)  Resp: (!) 24 (11/24/17 1800)  BP: 133/66 (11/24/17 1800)  SpO2: 97 % (11/24/17 1800)  Weight: 117.6 kg (259 lb 4.2 oz) (11/24/17 1508)  Height: 5' 6" (167.6 cm) (11/24/17 1508)       Central Line  Date/Time: 11/24/2017 6:22 PM  Location procedure was performed: [facility] NEURO CRITICAL CARE  Performed by: BERNY CARCAMO  Pre-operative Diagnosis: ICH  Post-operative diagnosis: ICH  Consent Done: Yes  Time out: Immediately prior to procedure a "time out" was called to verify the correct patient, procedure, equipment, support staff and site/side marked as required.  Indications: hemodialysis, med administration, vascular access and hemodynamic monitoring  Anesthesia: local infiltration    Anesthesia:  Local Anesthetic: lidocaine 1% without epinephrine  Anesthetic total: 3 mL  Preparation: skin prepped with ChloraPrep  Skin prep agent dried: skin prep agent completely dried prior to procedure  Sterile barriers: all five maximum sterile barriers used - cap, mask, sterile gown, sterile gloves, and large sterile sheet  Hand hygiene: hand hygiene performed prior to central venous catheter insertion  Location details: right internal jugular  Catheter type: triple lumen  Catheter size: 12 Fr  Catheter Length: 16cm    Ultrasound guidance: yes  Vessel Caliber: medium, large, patent, compressibility normal  Needle advanced into vessel with real time Ultrasound guidance.  Guidewire confirmed in vessel.  Sterile sheath used.  Manometry: Yes  Number of attempts: 2  Assessment: successful placement  Complications: none  Estimated blood loss (mL): 3  Post-procedure: line sutured,  chlorhexidine patch,  sterile dressing applied and blood return through all ports  Complications: No          Berny Carcamo  11/24/2017  "

## 2017-11-25 NOTE — PROGRESS NOTES
[facility]  Nephrology  Progress Note    Patient Name: Juliane Ramos  MRN: [id number]  Admission Date: 11/24/2017  Hospital Length of Stay: 1 days  Attending Provider: Adan Iverson MD   Primary Care Physician: JOS Dumont  Principal Problem:Traumatic subarachnoid hemorrhage    Subjective:     HPI: Juliane Ramos is a 57 y.o.  female with a PMHx relevant for HFrEF( EF 35% on 11/21/17), CKD 3, DMT2 (a1c 8.4%), CVA, 4v CABG (2011), PE, diabetic foot ulcer of left foot [place] [facility] secondary to new onset SAH after fall. She was admitted with OSH on 11/18 for a diabetic left heel wound, non-healing x3 weeks with fever, purulent drainage, and reportedly with maggots, treated with zosyn and vanc. She was dx with acute PE post syncopal episode secondary to hypercapnic resp failure. CTA done 11/20/2017 with eGFR < 45 mg/dl and was started on Lovenox. She has a sCr baseline 1.1-1.4 mg/dl. She was seen by nephrology at OSH secondary to MC due to ATN and CI-MC. She was treated initially with IV fluids for gentle hydration but jordan sCr continue to rise. She [place] [facility] with sCr of 4.1 and decreased UO. She developed pleural effusion and IV fluids where stopped. Since then per report it seems like his UO has been decreasing. Her K has alos steadily bernard rising. 5.3 today.      Interval History: lethargic and nauseated this morning, sCr increasing to 5.5 last night with a K of 5.9, no labs from this morning yet, did receive one dose of kayexylate    Review of patient's allergies indicates:  No Known Allergies  Current Facility-Administered Medications   Medication Frequency    albuterol inhaler 2 puff Q6H PRN    carvedilol tablet 25 mg BID    dextrose 50% injection 12.5 g PRN    gabapentin capsule 100 mg TID    glucagon (human recombinant) injection 1 mg PRN    insulin aspart pen 1-10 Units Q4H PRN    insulin detemir pen 6 Units QHS    labetalol  injection 10 mg Q15 Min PRN    levETIRAcetam tablet 250 mg BID    levothyroxine tablet 50 mcg Before breakfast    piperacillin-tazobactam 4.5 g in dextrose 5 % 100 mL IVPB (ready to mix system) Q12H    rosuvastatin tablet 10 mg Daily    sodium chloride 0.9% flush 3 mL Q8H       Objective:     Vital Signs (Most Recent):  Temp: 98.7 °F (37.1 °C) (11/25/17 0701)  Pulse: 70 (11/25/17 0701)  Resp: 18 (11/25/17 0701)  BP: (!) 107/54 (11/25/17 0701)  SpO2: 100 % (11/25/17 0701)  O2 Device (Oxygen Therapy): BiPAP (11/24/17 2305) Vital Signs (24h Range):  Temp:  [97.6 °F (36.4 °C)-99.1 °F (37.3 °C)] 98.7 °F (37.1 °C)  Pulse:  [65-71] 70  Resp:  [17-38] 18  SpO2:  [89 %-100 %] 100 %  BP: ()/(52-98) 107/54  Arterial Line BP: ()/(43-63) 104/48     Weight: 117.6 kg (259 lb 4.2 oz) (11/25/17 0701)  Body mass index is 41.85 kg/m².  Body surface area is 2.34 meters squared.    I/O last 3 completed shifts:  In: 545 [P.O.:60; I.V.:285; IV Piggyback:200]  Out: 55 [Urine:55]    Physical Exam   HENT:   Head: Normocephalic and atraumatic.   Eyes: EOM are normal.   Neck: No JVD present.   Cardiovascular: Normal rate and regular rhythm.    Pulmonary/Chest: Effort normal and breath sounds normal.   Abdominal: Soft. She exhibits no distension.   Musculoskeletal: She exhibits no edema or tenderness.   Skin: Skin is warm.           Assessment/Plan:     MC (acute kidney injury)    MC on CKD 3 oliguric and progressing to aniria, most likely secondary to iATN from hymodymamyc changes with hypotension and Toxic tubular injuries     Worsening renal function as of last night (pending this morning), further decrease in urine output, urine studies noted for UPC of 8.85 (may not be accurate due to markedly elevated SG on Ua, I.e., possibly even higher, etiology possibly long standing and poorly controlled Dm))    not on IVFs and taking in very limited Po, SG on UA from yesterday > 1.030 would indicate she is volume  depleted    Plan/Recommendations:  1) follow up on this morning's labs  2) start on IVFs would give at least 1,000ccs to begin with and closely observe urine output, in addition to repeat labs this afternoon  3) depending on labs and response to #2, may need SLED to be started later today              Hyperkalemia    - received kayexylate this morning, repeat labs pending        CKD (chronic kidney disease) stage 3, GFR 30-59 ml/min    - suspect secondary to poorly control A1C as in 6/2017 A1C was 10.0. During admission her A1C was 8.2  - need to optimize DMT2 for maintaining adequate ranal function control .  - Goqal A1C < 7            Thank you for your consult. I will follow-up with patient. Please contact us if you have any additional questions.    Dano Burch MD  Nephrology  [facility]

## 2017-11-25 NOTE — HOSPITAL COURSE
11/24: admit to [facility] s/p traumatic SAH, s/p fall on eliquis for PE  11/25: SAH stable. Renal failure, nephrology consulted, started SLED today.  11/26: mental status, electrolytes improved, hemodynamically stable, will transfer to floor and transition to hemodialysis

## 2017-11-25 NOTE — PROGRESS NOTES
[facility]  Neurocritical Care  Progress Note    Admit Date: 11/24/2017  Service Date: 11/25/2017  Length of Stay: 1    Subjective:     Chief Complaint: Traumatic subarachnoid hemorrhage    History of Present Illness: Patient is a 57 year old female with HTN, HLD, T2DM (a1c 8.4%), HFrEF ( EF 35% on 11/21/17), CKD 3, remote CVA (R frontal lobe in 2005), 4v CABG (2011) who presents as a transfer from [facility] for subarachnoid hemorrhage after a mechanical fall. She initially presented to the OSH on 11/18 for a diabetic left heel wound, non-healing x3 weeks with fever, purulent drainage, and reportedly with maggots. She was being treated with zosyn and vanc. Debridement was planned, but on the day of surgery she reportedly vomited chunks of [employer] despite being NPO. In addition, she had one episode of unresponsiveness on 11/20, ABG showed acute on chronic hypoxic and hypocarbic respiratory failure. CTA was done and showed a segmental and subsegmental PE of the RLL for which therapeutic lovenox (1mg/kg) BID was started. She developed an MC with rising creatinine, nephrology was consulted and it was thought to be due to ATN 2/2 a combination of ischemia (from hypotension) and IV contrast (from CTA). She was being treated with gentle hydration in the setting of HFrEF, and fluids were later stopped due to development of effusions on 11/23. She developed bleeding at the site of her Iv's, at the site of lovenox injection, and hematuria and lovenox was held, last dose the morning of 11/23. On the morning of 11/24, she was reportedly ambulating to the bathroom with her  when she tripped, had a mechanical fall and hit her forehead on the wall. She denies loss of consciousness, confusion, headache, blurry vision, focal deficits. CT Head showed bilateral posttraumatic subarachnoid blood. She was subsequently transferred to [facility] for higher level of care. Here, she denies headache,  confusion, blurry vision, weakness, focal deficits, chest pain, shortness of breath.     Hospital Course:  11/24: admit to [facility] s/p traumatic SAH, s/p fall on eliquis for PE  11/25: renal failure, nephrology consulted, dialysis today     Interval History: worsening renal function ovn, persistent hyperkalemia, fluid overload, pleural effusions CXR, anuric, metabolic and resp acidosis, severe asterixis    Review of Systems: Unable to obtain a complete ROS due to level of consciousness.     Vitals:   Temp: 98.7 °F (37.1 °C) (11/25/17 0701)  Pulse: 69 (11/25/17 1500)  Resp: 19 (11/25/17 1500)  BP: (!) 112/56 (11/25/17 1500)  SpO2: 98 % (11/25/17 1500)    Temp:  [98.7 °F (37.1 °C)-99.1 °F (37.3 °C)]   Pulse:  [68-72]   Resp:  [17-38]   BP: ()/(52-98)   SpO2:  [89 %-100 %]   Arterial Line BP: ()/(43-63)     O2 Device (Oxygen Therapy): nasal cannula  MAP (mmHg): 80    11/24 0701 - 11/25 0700  In: 545 [P.O.:60; I.V.:285]  Out: 55 [Urine:55]   Unmeasured Output  Stool Occurrence: 1     Examination:   Constitutional: Obese. Edematous.   Eyes: Conjunctiva clear, anicteric. Lids no lesions.  Head/Ears/Nose/Mouth/Throat/Neck: Moist mucous membranes. External ears, nose atraumatic.   Cardiovascular: Regular rhythm. No murmurs. No leg edema.  Respiratory: Comfortable respirations. Clear to auscultation.  Gastrointestinal: No hernia. Soft, nondistended, nontender. + bowel sounds.    Neurologic:  -GCS [id number]  -Lethargic but arouses to voice. Oriented to person, place, and time. Speech fluent. Follows commands.  -Cranial nerves II-XII intact  -Motor MEDEIROS spont   -Sensation to painful stimuli intact  -severe asterixis     Medications:   Continuous  sodium chloride 0.9% Last Rate: 200 mL/hr at 11/25/17 1322   Scheduled  carvedilol 12.5 mg BID   ceFEPime (MAXIPIME) IVPB 1 g Q24H   gabapentin 100 mg TID   levetiracetam oral soln 250 mg BID   [START ON 11/26/2017] levothyroxine 50 mcg Before breakfast   metronidazole 500 mg Q8H    [START ON 11/26/2017] rosuvastatin 10 mg Daily   sodium chloride 0.9% 3 mL Q8H   PRN  sodium chloride 0.9%  PRN   albuterol 2 puff Q6H PRN   dextrose 50% 12.5 g PRN   glucagon (human recombinant) 1 mg PRN   insulin aspart 1-10 Units Q4H PRN   labetalol 10 mg Q15 Min PRN   magnesium sulfate IVPB 2 g PRN   magnesium sulfate IVPB 2 g PRN   ondansetron 4 mg Q6H PRN   potassium phosphate IVPB 15 mmol Daily PRN      Today I independently reviewed pertinent medications, lines/drains/airways, imaging, cardiology, lab results, microbiology results, notably: meds, labs, imaging reviewed, pt started on HD with nephrology, airway secure, VSS, HDS, NPO, +NGT     Assessment/Plan:     Neuro   * Traumatic subarachnoid hemorrhage    - CT Head at OSH shows bilateral posttraumatic subarachnoid blood  - repeat CTH stable  - NSGY following, no surgical plan  - SBP <140  - holding aspirin, lovenox; Anti-Xa 0.70  - keppra 250 mg q12 x7days  - home gabapentin 100 mg TID   - q1h neurochecks         Pulmonary   Pleural effusion    -as seen on CXR  -IVF dc 2/2  volume overload on examination, CXR, CVP        Cardiac/Vascular   CAD (coronary artery disease)    - continue rosuvastatin 10 mg PO daily        Essential hypertension    - SBP <140  - carvedilol 12.5 mg BID  - holding lisinopril for MC            Renal/   MC (acute kidney injury)    - likely ATN secondary to ischemia and toxins  - consulted nephrology; see CKD        Hyperkalemia    - K 5.6, secondary to worsening renal function  - see above CKD        CKD (chronic kidney disease) stage 3, GFR 30-59 ml/min    - MC on CKD, worsening creatinine at outside hospital. Nephrology at OSH records indicate MC due to ATN 2/2 ischemia (hypotension) and toxins (IV contrast)  -plan HD for clearance given anuria, metabolic and respiratory acidosis, hyperkalemia  -nephrology following           Hematology   Pulmonary embolism    - seen on CTA at outside hospital  - was receiving therapeutic  lovenox, stopped after bleeding from IV sites and hematuria  - holding due to subarachnoid hemorrhage  - BLE US neg for DVT bilat         Endocrine   Diabetic leg ulcer    - MRI from outside hospital shows retro-achilles soft tissue wound, no osteomyelitis  - flagyl 500 mg BID, cefepime 1g q24h  - Infectious disease consulted   - podiatry consulted, no immediate surgical plan         Hypothyroidism    - continue levothyroxin 50 mcg          Uncontrolled type 2 diabetes with stage 3 chronic kidney disease GFR 30-59    - history of uncontrolled diabetes, on oral medication at home plus SSI  - hemoglobin A1c 8.4% on 11/18/17  - continuing gabapentin 100 mg TID (renally dosed)  - SSI only for now  - BG has been adequate without detemir, pt is npo            Prophylaxis:  Venous Thromboembolism: mechanical  Stress Ulcer: None  Ventilator Pneumonia: not applicable     Activity Orders          Activity as tolerated starting at 11/25 1100        Full Code    Agatha Hathaway PA-C  Neurocritical Care  [facility]

## 2017-11-25 NOTE — PT/OT/SLP EVAL
"Speech Language Pathology  Evaluation    Juliane Ramos   MRN: [id number]   Admitting Diagnosis: Traumatic subarachnoid hemorrhage    Diet recommendations: Solid Diet Level: NPO  Liquid Diet Level: NPO   - Strict oral care advised  - ST to continue to follow to assess swallow fx as level of alertness and status improves    SLP Treatment Date: 11/25/17  Speech Start Time: 1034     Speech Stop Time: 1042     Speech Total (min): 8 min       TREATMENT BILLABLE MINUTES:  Eval Swallow and Oral Function 8    Diagnosis: Traumatic subarachnoid hemorrhage  Diagnoses of Subarachnoid hemorrhage, Acute renal failure superimposed on stage 3 chronic kidney disease, unspecified acute renal failure type, and Traumatic subarachnoid hemorrhage were pertinent to this visit.      Past Medical History:   Diagnosis Date    CHF (congestive heart failure)     COPD (chronic obstructive pulmonary disease)     Coronary artery disease     Diabetes mellitus     Encounter for blood transfusion     Hypertension     MI (myocardial infarction) 2010    Stroke     July 2005    Thyroid disease      Past Surgical History:   Procedure Laterality Date    ABCESS DRAINAGE Right     Between "little toe" and the one next to it    BREAST SURGERY      Reduction [id number]    CARDIAC SURGERY      CABG 4vessel ring in one valve mitral valve prolapse    CORONARY ARTERY BYPASS GRAFT      hyperlipidemia      TUBAL LIGATION  03/1986       Has the patient been evaluated by SLP for swallowing? : Yes  Keep patient NPO?: Yes   General Precautions: Standard, aspiration, respiratory    Current Respiratory Status: nasal cannula     CXR 11/24/17: Right IJ catheter placed with tip overlying the SVC/RA junction.  No pneumothorax.    CT Head 11/24/17: Stable bilateral subarachnoid hematoma without significant change from 11/24/2017 at 9:39. Continued followup is advised. Remote right corona radiata remote infarct.    Social Hx: Patient lives with spouse.     Prior " "diet: Spouse reports Patient consumed regular diet with thin liquids w/o difficulty prior to admit     Subjective:  SLP reviewed Pt with nurse, nurse reports pt with increased lethargy this am and off of BiPAP. Patient with intermittent nausea.   Patient presents lethargic  Patient says "Juliane"  Patient goals: RIDDHI 2/2 lethargy   \Pain/Comfort  Pain Rating 1: other (see comments) (RIDDHI 2/2 cognitive status)    Objective:   Patient found with: peripheral IV, oxygen, blood pressure cuff, pulse ox (continuous). Pt found asleep in bed with emesis bag next to L hand. Patient's spouse at bedside. HOB elevated with nurse permission and SLP attempts to wake patient with maximum verba;/visual and tactile cueing. Nurse in room to assist in attempting to wake Patient for assessment     Oral Musculature Evaluation  Oral Musculature: general weakness, unable to assess due to poor participation/comprehension (pt with decreased BRADY, not able to attend to tx task greater than 5 seconds in length despite MAX cueing)  Dentition: present and adequate  Secretion Management: oral holding  Mucosal Quality: dry, other (see comments) (blood tinged/coated lingual surface )  Mandibular Strength and Mobility: other (see comments) (limited assessment 2/2 decreased BRADY and command following)  Oral Labial Strength and Mobility: other (see comments) (generalized weakness, cracked labial surfaces s/p fall )  Lingual Strength and Mobility:  (Unable to elicit laterlization or protrusion 2/2 decreased BRADY)  Buccal Strength and Mobility: decreased tone  Volitional Cough: RIDDHI  Volitional Swallow: RIDDHI  Voice Prior to PO Intake: strained, hoarse, decreased breath support     Bedside Swallow Eval: Pt with increased lethargy and decreased command following. Pt not able to maintain eye contact greater than 5 seconds at a time and easily falls back asleep despite max verbal and visual and tactile cueing from SLP and nurse. Patient not appropriate for PO " trials.   Consistencies Assessed: none administered  Oral Phase: RIDDHI, ST to continue to assess in session  Pharyngeal Phase:  RIDDHI, ST to continue to assess in session    Additional Treatment:  SLP educated Pt and spouse on SLP Role, aspiration precautions and ongoing SLP POC including plans for ongoing swallow and cognitive-linguistic assessment. Patient's spouse v/u SLP education. Patient with decreased BRADY and not able to demonstrate understanding. Whiteboard updated. Findings reviewed with nurse and PA following session.     FIM:  Social Interaction: 1 Total Assistance--The patient interact appropriately les than 25% of the time, or not at all, and may need restraint due to socially inappropriate behaviors.           Assessment:  Juliane Ramos is a 57 y.o. female with a medical diagnosis of Traumatic subarachnoid hemorrhage and presents with decreased sustained attention this am. Patient not appropriate for PO trials upon attempt to initiate Bedside Swallow Study. ST to continue to follow. Strict oral care advised.     Do you have any cultural, spiritual, Caodaism conflicts, given your current situation?: none noted     Discharge recommendations: Discharge Facility/Level Of Care Needs: nursing facility, skilled, other (see comments) (pending progress and PT/OT recs)     Goals:    SLP Goals        Problem: SLP Goal    Goal Priority Disciplines Outcome   SLP Goal     SLP Ongoing (interventions implemented as appropriate)   Description:  Speech Language Pathology Goals  Goals expected to be met by 12/02/2017  1. Pt will participate in ongoing swallow assessment to determine safest, least restrictive means of nutrition/hydration/medication  2. Pt will participate in speech, language and cognitive assessment to determine ongoing POC needs                         Plan:   Patient to be seen Therapy Frequency: 5 x/week   Plan of Care expires: 12/25/17  Plan of Care reviewed with: patient, spouse  SLP Follow-up?: Yes        CLIFF Ibrahim, [facility]-SLP  Speech-Language Pathology  Pager: [phone]  11/25/2017

## 2017-11-25 NOTE — PLAN OF CARE
"Problem: Patient Care Overview  Goal: Plan of Care Review  Outcome: Ongoing (interventions implemented as appropriate)  POC reviewed with pt at 0300. Pt verbalized understanding. Questions and concerns addressed. No acute events overnight. Pt progressing toward goals. Will continue to monitor. See flowsheets for full assessment and VS info.  Pt placed on bipap overnight.      Goal: Individualization & Mutuality  Past Medical History:   Diagnosis Date    CHF (congestive heart failure)     COPD (chronic obstructive pulmonary disease)     Coronary artery disease     Diabetes mellitus     Encounter for blood transfusion     Hypertension     MI (myocardial infarction) 2010    Stroke     July 2005    Thyroid disease      Past Surgical History:   Procedure Laterality Date    ABCESS DRAINAGE Right     Between "little toe" and the one next to it    BREAST SURGERY      Reduction [id number]    CARDIAC SURGERY      CABG 4vessel ring in one valve mitral valve prolapse    CORONARY ARTERY BYPASS GRAFT      hyperlipidemia      TUBAL LIGATION  03/1986       Dx: CARISSA  Pt prefers room colder         "

## 2017-11-25 NOTE — ASSESSMENT & PLAN NOTE
58 yo F with multiple medical co morbidities as detailed above, s/p fall with acute traumatic B/L SAH  - HCT is without IVH or hydrocephalus, Repeat HCT is stable  - No neurosurgical interventions indicated at this time   - Medical management per NCC  - SBP < 160  - Na goal 140-150  - Keppra fro Sz prophylaxis  - Will follow, please call with questions or change in neurologic status  - Discussed with Dr. Whittaker

## 2017-11-25 NOTE — ASSESSMENT & PLAN NOTE
- seen on CTA at outside hospital  - was receiving therapeutic lovenox, stopped after bleeding from IV sites and hematuria  - holding due to subarachnoid hemorrhage  - BLE US neg for DVT bilat

## 2017-11-25 NOTE — ASSESSMENT & PLAN NOTE
MC on CKD 3 oliguric and progressing to aniria, most likely secondary to iATN from hymodymamyc changes with hypotension and Toxic tubular injuries     Worsening renal function as of last night (pending this morning), further decrease in urine output, urine studies noted for UPC of 8.85 (may not be accurate due to markedly elevated SG on Ua, I.e., possibly even higher, etiology possibly long standing and poorly controlled Dm))    not on IVFs and taking in very limited Po, SG on UA from yesterday > 1.030 would indicate she is volume depleted    Plan/Recommendations:  1) follow up on this morning's labs  2) start on IVFs would give at least 1,000ccs to begin with and closely observe urine output, in addition to repeat labs this afternoon  3) depending on labs and response to #2, may need SLED to be started later today

## 2017-11-25 NOTE — SUBJECTIVE & OBJECTIVE
Interval History: lethargic and nauseated this morning, sCr increasing to 5.5 last night with a K of 5.9, no labs from this morning yet, did receive one dose of kayexylate    Review of patient's allergies indicates:  No Known Allergies  Current Facility-Administered Medications   Medication Frequency    albuterol inhaler 2 puff Q6H PRN    carvedilol tablet 25 mg BID    dextrose 50% injection 12.5 g PRN    gabapentin capsule 100 mg TID    glucagon (human recombinant) injection 1 mg PRN    insulin aspart pen 1-10 Units Q4H PRN    insulin detemir pen 6 Units QHS    labetalol injection 10 mg Q15 Min PRN    levETIRAcetam tablet 250 mg BID    levothyroxine tablet 50 mcg Before breakfast    piperacillin-tazobactam 4.5 g in dextrose 5 % 100 mL IVPB (ready to mix system) Q12H    rosuvastatin tablet 10 mg Daily    sodium chloride 0.9% flush 3 mL Q8H       Objective:     Vital Signs (Most Recent):  Temp: 98.7 °F (37.1 °C) (11/25/17 0701)  Pulse: 70 (11/25/17 0701)  Resp: 18 (11/25/17 0701)  BP: (!) 107/54 (11/25/17 0701)  SpO2: 100 % (11/25/17 0701)  O2 Device (Oxygen Therapy): BiPAP (11/24/17 2305) Vital Signs (24h Range):  Temp:  [97.6 °F (36.4 °C)-99.1 °F (37.3 °C)] 98.7 °F (37.1 °C)  Pulse:  [65-71] 70  Resp:  [17-38] 18  SpO2:  [89 %-100 %] 100 %  BP: ()/(52-98) 107/54  Arterial Line BP: ()/(43-63) 104/48     Weight: 117.6 kg (259 lb 4.2 oz) (11/25/17 0701)  Body mass index is 41.85 kg/m².  Body surface area is 2.34 meters squared.    I/O last 3 completed shifts:  In: 545 [P.O.:60; I.V.:285; IV Piggyback:200]  Out: 55 [Urine:55]    Physical Exam   HENT:   Head: Normocephalic and atraumatic.   Eyes: EOM are normal.   Neck: No JVD present.   Cardiovascular: Normal rate and regular rhythm.    Pulmonary/Chest: Effort normal and breath sounds normal.   Abdominal: Soft. She exhibits no distension.   Musculoskeletal: She exhibits no edema or tenderness.   Skin: Skin is warm.

## 2017-11-25 NOTE — PROGRESS NOTES
"[facility]  Neurosurgery  Progress Note    Subjective:     History of Present Illness: Patient is a 57 year old female with HTN, HLD, T2DM, CHF, CKD 3, remote right frontal infarct , 4v CABG in 2011.  Pt was transferred who presents as a transfer from [facility] for acute subarachnoid hemorrhage after a mechanical fall. Pt admitted initially for diabetic non healing infected left heel wound. She was being treated with zosyn and vanc.   Pt developed respiratory failure. CTA was done and showed PE of the RLL, she was started on therapeutic lovenox.  This morning of she was  ambulating to the bathroom and had a mechanical fall hitting her forehead.  Negative LOC.  Head CT showed bilateral subarachnoid Hemorrhage . She was subsequently transferred to [facility] for higher level of care. Neurosurgery consulted for evaluation of SAH.     Post-Op Info:  * No surgery found *         Prescriptions Prior to Admission   Medication Sig Dispense Refill Last Dose    albuterol (ACCUNEB) 1.25 mg/3 mL Nebu Take 1.25 mg by nebulization every 6 (six) hours as needed. Rescue   11/18/2017    aspirin (ECOTRIN) 81 MG EC tablet Take 1 tablet (81 mg total) by mouth once daily.  0 11/18/2017    carvedilol (COREG) 25 MG tablet Take 25 mg by mouth 2 (two) times daily.    11/18/2017    citalopram (CELEXA) 20 MG tablet Take 20 mg by mouth once daily.     11/18/2017    furosemide (LASIX) 40 MG tablet ONE TAB DAILY IN AM . TAKE EXTRA 1/2 TABLET IF WEIGHT IS >3-4 POUNDS   WEIGH YOURSELF EVERY EM 30 tablet 0 11/18/2017    gabapentin (NEURONTIN) 300 MG capsule Take 300 mg by mouth 3 (three) times daily. 300 mg AM and 600 mg PM   11/18/2017    glimepiride (AMARYL) 4 MG tablet Take 4 mg by mouth before breakfast.   11/18/2017    insulin aspart protamine-insulin aspart (NOVOLOG 70/30) 100 unit/mL (70-30) InPn pen Inject into the skin daily as needed (pt states "She told me to just take it whenever my sugar was too high").   11/18/2017    " "levothyroxine (SYNTHROID) 50 MCG tablet Take 50 mcg by mouth once daily.     11/18/2017    lisinopril (PRINIVIL,ZESTRIL) 2.5 MG tablet Take 1 tablet (2.5 mg total) by mouth once daily. 30 tablet 2 11/18/2017    rosuvastatin (CRESTOR) 10 MG tablet Take 1 tablet (10 mg total) by mouth once daily. 30 tablet 0 11/18/2017       Review of patient's allergies indicates:  No Known Allergies    Past Medical History:   Diagnosis Date    CHF (congestive heart failure)     COPD (chronic obstructive pulmonary disease)     Coronary artery disease     Diabetes mellitus     Encounter for blood transfusion     Hypertension     MI (myocardial infarction) 2010    Stroke     July 2005    Thyroid disease      Past Surgical History:   Procedure Laterality Date    ABCESS DRAINAGE Right     Between "little toe" and the one next to it    BREAST SURGERY      Reduction [id number]    CARDIAC SURGERY      CABG 4vessel ring in one valve mitral valve prolapse    CORONARY ARTERY BYPASS GRAFT      hyperlipidemia      TUBAL LIGATION  03/1986     Family History     Problem Relation (Age of Onset)    Arthritis Mother    Cancer Father (68)    Depression Sister    Diabetes Father, Maternal Grandmother    Heart disease Father    Hypertension Father, Son    Kidney disease Paternal Grandfather    Stroke Paternal Grandfather        Social History Main Topics    Smoking status: Never Smoker    Smokeless tobacco: Never Used    Alcohol use No    Drug use: No    Sexual activity: Yes     Partners: Male     Birth control/ protection: None     Review of Systems    Objective:     Weight: 117.6 kg (259 lb 4.2 oz)  Body mass index is 41.85 kg/m².  Vital Signs (Most Recent):  Temp: 98.7 °F (37.1 °C) (11/25/17 0701)  Pulse: 68 (11/25/17 1212)  Resp: 18 (11/25/17 1000)  BP: (!) 100/53 (11/25/17 1000)  SpO2: 98 % (11/25/17 1212) Vital Signs (24h Range):  Temp:  [97.6 °F (36.4 °C)-99.1 °F (37.3 °C)] 98.7 °F (37.1 °C)  Pulse:  [68-72] 68  Resp:  [17-38] " "18  SpO2:  [89 %-100 %] 98 %  BP: ()/(52-98) 100/53  Arterial Line BP: ()/(43-63) 106/47                 Resp Rate Total:  [18 br/min] 18 br/min         Urethral Catheter 11/23/17 1200 (Active)   Site Assessment Clean;Intact 11/24/2017  3:00 PM   Collection Container Urimeter 11/24/2017  3:00 PM   Securement Method secured to top of thigh w/ adhesive device 11/24/2017  3:00 PM   Catheter Care Performed yes 11/24/2017  3:00 PM   Reason for Continuing Urinary Catheterization Critically ill in ICU requiring intensive monitoring 11/24/2017  3:00 PM   CAUTI Prevention Bundle StatLock in place w 1" slack;Intact seal between catheter & drainage tubing;Drainage bag off the floor;Green sheeting clip in use;No dependent loops or kinks;Drainage bag below bladder;Drainage bag not overfilled (<2/3 full) 11/24/2017  1:45 PM   Output (mL) 0 mL 11/24/2017  7:05 PM       Neurosurgery Physical Exam     General: no distress  Neurologic: Alert and oriented. Thought content appropriate.  Head: normocephalic, bruising to left forehead   GCS: Motor: 6/Verbal: 5/Eyes: 4 GCS Total: 15  Cranial nerves: face symmetric, tongue midline, pupils equal, round, reactive to light with accomodation, extraocular muscles intact  Sensory: response to light touch throughout  Motor Strength: generalized weakness, no focal   Pronator Drift: no drift noted  Finger to nose normal  Lungs:  normal respiratory effort  Abdomen: soft, non-tender   Extremities: chronic left foot ulcer       Significant Labs:    Recent Labs  Lab 11/24/17 1834 11/24/17  2339 11/25/17  0821    100 108  107   * 133* 133*  133*   K 5.8* 5.9* 5.6*  5.6*  5.6*   CL 98 98 100  100   CO2 22* 22* 19*  21*   BUN 58* 59* 60*  60*   CREATININE 5.6* 5.5* 6.1*  5.8*   CALCIUM 8.3* 8.5* 8.3*  8.2*   MG 2.4  --   --        Recent Labs  Lab 11/24/17 1834 11/25/17  0136 11/25/17  0424   WBC 9.61 9.21 9.89   HGB 8.3* 7.3* 7.0*   HCT 27.3* 24.3* 23.3*    176 " 170       Recent Labs  Lab 11/23/17  2110 11/24/17  1502   INR 1.2 1.1     Microbiology Results (last 7 days)     ** No results found for the last 168 hours. **            Significant Diagnostics:  CT: Ct Head Without Contrast    Result Date: 11/24/2017   Stable bilateral subarachnoid hematoma without significant change from 11/24/2017 at 9:39. Continued followup is advised. Remote right corona radiata remote infarct. ______________________________________ Electronically signed by resident: ANDRADE VELÁSQUEZ MD Date:     11/24/17 Time:    17:53 As the supervising and teaching physician, I personally reviewed the images and resident's interpretation and I agree with the findings. Electronically signed by: IRVING WYATT MD Date:     11/24/17 Time:    18:21     Ct Head Without Contrast    Result Date: 11/24/2017   1. Abnormal study.  There is a left forehead and frontal scalp hematoma without underlying fracture.  Additionally, there is bilateral posttraumatic subarachnoid blood with hyperdense blood seen in right parietal sulci as well as in the left sylvian fissure and adjacent left frontal and parietal sulci.  There is no acute parenchymal hemorrhage. 2.  Remote right frontal lobe infarction with ex vacuo dilatation of the right frontal horn.  There is no obvious acute infarction. 3.  Atherosclerosis. Note: Critical results were discussed directly with the patient's nurse, Reymundo, at 9:51 AM.Results were flagged in Epic. Electronically signed by: Dr. Georges White Date:     11/24/17 Time:    09:57     Assessment/Plan:     * Traumatic subarachnoid hemorrhage    58 yo F tSAH from F with multiple medical co morbidities    Neuro stable  Repeat HCT is stable  No neurosurgical interventions indicated  - Keppra fro Sz prophylaxis x7days total  SBP < 160  Goal eunatremia.   Ok for sqh. Ok to resume ASA in 10 days.     dispo- medical management per NCC. Please Call w/ questions.                 Gurinder Nathan  MD  Neurosurgery  [facility]

## 2017-11-25 NOTE — SUBJECTIVE & OBJECTIVE
"Prescriptions Prior to Admission   Medication Sig Dispense Refill Last Dose    albuterol (ACCUNEB) 1.25 mg/3 mL Nebu Take 1.25 mg by nebulization every 6 (six) hours as needed. Rescue   11/18/2017    aspirin (ECOTRIN) 81 MG EC tablet Take 1 tablet (81 mg total) by mouth once daily.  0 11/18/2017    carvedilol (COREG) 25 MG tablet Take 25 mg by mouth 2 (two) times daily.    11/18/2017    citalopram (CELEXA) 20 MG tablet Take 20 mg by mouth once daily.     11/18/2017    furosemide (LASIX) 40 MG tablet ONE TAB DAILY IN AM . TAKE EXTRA 1/2 TABLET IF WEIGHT IS >3-4 POUNDS   WEIGH YOURSELF EVERY EM 30 tablet 0 11/18/2017    gabapentin (NEURONTIN) 300 MG capsule Take 300 mg by mouth 3 (three) times daily. 300 mg AM and 600 mg PM   11/18/2017    glimepiride (AMARYL) 4 MG tablet Take 4 mg by mouth before breakfast.   11/18/2017    insulin aspart protamine-insulin aspart (NOVOLOG 70/30) 100 unit/mL (70-30) InPn pen Inject into the skin daily as needed (pt states "She told me to just take it whenever my sugar was too high").   11/18/2017    levothyroxine (SYNTHROID) 50 MCG tablet Take 50 mcg by mouth once daily.     11/18/2017    lisinopril (PRINIVIL,ZESTRIL) 2.5 MG tablet Take 1 tablet (2.5 mg total) by mouth once daily. 30 tablet 2 11/18/2017    rosuvastatin (CRESTOR) 10 MG tablet Take 1 tablet (10 mg total) by mouth once daily. 30 tablet 0 11/18/2017       Review of patient's allergies indicates:  No Known Allergies    Past Medical History:   Diagnosis Date    CHF (congestive heart failure)     COPD (chronic obstructive pulmonary disease)     Coronary artery disease     Diabetes mellitus     Encounter for blood transfusion     Hypertension     MI (myocardial infarction) 2010    Stroke     July 2005    Thyroid disease      Past Surgical History:   Procedure Laterality Date    ABCESS DRAINAGE Right     Between "little toe" and the one next to it    BREAST SURGERY      Reduction [id number]    CARDIAC SURGERY "      CABG 4vessel ring in one valve mitral valve prolapse    CORONARY ARTERY BYPASS GRAFT      hyperlipidemia      TUBAL LIGATION  03/1986     Family History     Problem Relation (Age of Onset)    Arthritis Mother    Cancer Father (68)    Depression Sister    Diabetes Father, Maternal Grandmother    Heart disease Father    Hypertension Father, Son    Kidney disease Paternal Grandfather    Stroke Paternal Grandfather        Social History Main Topics    Smoking status: Never Smoker    Smokeless tobacco: Never Used    Alcohol use No    Drug use: No    Sexual activity: Yes     Partners: Male     Birth control/ protection: None     Review of Systems  Objective:     Weight: 117.6 kg (259 lb 4.2 oz)  Body mass index is 41.85 kg/m².  Vital Signs (Most Recent):  Temp: 99.1 °F (37.3 °C) (11/24/17 1905)  Pulse: 71 (11/24/17 1931)  Resp: (!) 21 (11/24/17 1931)  BP: (!) 115/59 (11/24/17 1905)  SpO2: 96 % (11/24/17 1931) Vital Signs (24h Range):  Temp:  [97.4 °F (36.3 °C)-99.1 °F (37.3 °C)] 99.1 °F (37.3 °C)  Pulse:  [60-71] 71  Resp:  [16-24] 21  SpO2:  [92 %-100 %] 96 %  BP: (106-151)/(57-98) 115/59  Arterial Line BP: (125)/(61) 125/61       Date 11/24/17 0700 - 11/25/17 0659   Shift 3469-6551 3456-2343 1281-8125 24 Hour Total   I  N  T  A  K  E   IV Piggyback  100  100    Shift Total  (mL/kg)  100  (0.9)  100  (0.9)   O  U  T  P  U  T   Urine  40  40    Shift Total  (mL/kg)  40  (0.3)  40  (0.3)   Weight (kg)  117.6 117.6 117.6              Oxygen Concentration (%):  [30-32] 32         Urethral Catheter 11/23/17 1200 (Active)   Site Assessment Clean;Intact 11/24/2017  3:00 PM   Collection Container Urimeter 11/24/2017  3:00 PM   Securement Method secured to top of thigh w/ adhesive device 11/24/2017  3:00 PM   Catheter Care Performed yes 11/24/2017  3:00 PM   Reason for Continuing Urinary Catheterization Critically ill in ICU requiring intensive monitoring 11/24/2017  3:00 PM   CAUTI Prevention Bundle StatLock in  "place w 1" slack;Intact seal between catheter & drainage tubing;Drainage bag off the floor;Green sheeting clip in use;No dependent loops or kinks;Drainage bag below bladder;Drainage bag not overfilled (<2/3 full) 11/24/2017  1:45 PM   Output (mL) 0 mL 11/24/2017  7:05 PM       Neurosurgery Physical Exam   General: no distress  Neurologic: Alert and oriented. Thought content appropriate.  Head: normocephalic, bruising to left forehead   GCS: Motor: 6/Verbal: 5/Eyes: 3 GCS Total: 14  Cranial nerves: face symmetric, tongue midline, pupils equal, round, reactive to light with accomodation, extraocular muscles intact  Sensory: response to light touch throughout  Motor Strength: generalized weakness, no focal   Pronator Drift: no drift noted  Finger to nose normal  Lungs:  normal respiratory effort  Abdomen: soft, non-tender   Extremities: chronic left foot ulcer       Significant Labs:    Recent Labs  Lab 11/23/17  0517 11/24/17  0512 11/24/17  1834   * 106 102   * 133* 131*   K 5.2* 5.3* 5.8*   CL 98 97 98   CO2 24 23 22*   BUN 48* 55* 58*   CREATININE 4.0* 4.9* 5.6*   CALCIUM 8.4* 8.6* 8.3*   MG  --   --  2.4       Recent Labs  Lab 11/23/17 2110 11/24/17  0512 11/24/17  1834   WBC 7.30 7.00 9.61   HGB 9.2* 9.0* 8.3*   HCT 29.0* 28.5* 27.3*    208 197       Recent Labs  Lab 11/23/17 2110 11/24/17  1502   INR 1.2 1.1     Microbiology Results (last 7 days)     ** No results found for the last 168 hours. **            Significant Diagnostics:  CT: Ct Head Without Contrast    Result Date: 11/24/2017   Stable bilateral subarachnoid hematoma without significant change from 11/24/2017 at 9:39. Continued followup is advised. Remote right corona radiata remote infarct. ______________________________________ Electronically signed by resident: ANDRADE VELÁSQUEZ MD Date:     11/24/17 Time:    17:53 As the supervising and teaching physician, I personally reviewed the images and resident's interpretation and I " agree with the findings. Electronically signed by: IRVING WYATT MD Date:     11/24/17 Time:    18:21     Ct Head Without Contrast    Result Date: 11/24/2017   1. Abnormal study.  There is a left forehead and frontal scalp hematoma without underlying fracture.  Additionally, there is bilateral posttraumatic subarachnoid blood with hyperdense blood seen in right parietal sulci as well as in the left sylvian fissure and adjacent left frontal and parietal sulci.  There is no acute parenchymal hemorrhage. 2.  Remote right frontal lobe infarction with ex vacuo dilatation of the right frontal horn.  There is no obvious acute infarction. 3.  Atherosclerosis. Note: Critical results were discussed directly with the patient's nurse, Remyundo, at 9:51 AM.Results were flagged in Epic. Electronically signed by: Dr. Georges White Date:     11/24/17 Time:    09:57

## 2017-11-25 NOTE — PLAN OF CARE
Problem: Patient Care Overview  Goal: Plan of Care Review  Outcome: Ongoing (interventions implemented as appropriate)  Plan of care reviewed with patient and  at 1500. Patient and  verbalized understanding. All questions and concerns addressed today. Patient remains free from injury and falls. No acute events. Patient progressing towards goal. Will continue to monitor. See flowsheet for full assessment and vitals throughout shift.

## 2017-11-25 NOTE — CONSULTS
"[facility]  Neurosurgery  Consult Note    Consults  Subjective:     Chief Complaint: SAH    History of Present Illness: Patient is a 57 year old female with HTN, HLD, T2DM, CHF, CKD 3, remote right frontal infarct , 4v CABG in 2011.  Pt was transferred who presents as a transfer from [facility] for acute subarachnoid hemorrhage after a mechanical fall. Pt admitted initially for diabetic non healing infected left heel wound. She was being treated with zosyn and vanc.   Pt developed respiratory failure. CTA was done and showed PE of the RLL, she was started on therapeutic lovenox.  This morning of she was  ambulating to the bathroom and had a mechanical fall hitting her forehead.  Negative LOC.  Head CT showed bilateral subarachnoid Hemorrhage . She was subsequently transferred to [facility] for higher level of care. Neurosurgery consulted for evaluation of SAH.     Prescriptions Prior to Admission   Medication Sig Dispense Refill Last Dose    albuterol (ACCUNEB) 1.25 mg/3 mL Nebu Take 1.25 mg by nebulization every 6 (six) hours as needed. Rescue   11/18/2017    aspirin (ECOTRIN) 81 MG EC tablet Take 1 tablet (81 mg total) by mouth once daily.  0 11/18/2017    carvedilol (COREG) 25 MG tablet Take 25 mg by mouth 2 (two) times daily.    11/18/2017    citalopram (CELEXA) 20 MG tablet Take 20 mg by mouth once daily.     11/18/2017    furosemide (LASIX) 40 MG tablet ONE TAB DAILY IN AM . TAKE EXTRA 1/2 TABLET IF WEIGHT IS >3-4 POUNDS   WEIGH YOURSELF EVERY EM 30 tablet 0 11/18/2017    gabapentin (NEURONTIN) 300 MG capsule Take 300 mg by mouth 3 (three) times daily. 300 mg AM and 600 mg PM   11/18/2017    glimepiride (AMARYL) 4 MG tablet Take 4 mg by mouth before breakfast.   11/18/2017    insulin aspart protamine-insulin aspart (NOVOLOG 70/30) 100 unit/mL (70-30) InPn pen Inject into the skin daily as needed (pt states "She told me to just take it whenever my sugar was too high").   11/18/2017    " "levothyroxine (SYNTHROID) 50 MCG tablet Take 50 mcg by mouth once daily.     11/18/2017    lisinopril (PRINIVIL,ZESTRIL) 2.5 MG tablet Take 1 tablet (2.5 mg total) by mouth once daily. 30 tablet 2 11/18/2017    rosuvastatin (CRESTOR) 10 MG tablet Take 1 tablet (10 mg total) by mouth once daily. 30 tablet 0 11/18/2017       Review of patient's allergies indicates:  No Known Allergies    Past Medical History:   Diagnosis Date    CHF (congestive heart failure)     COPD (chronic obstructive pulmonary disease)     Coronary artery disease     Diabetes mellitus     Encounter for blood transfusion     Hypertension     MI (myocardial infarction) 2010    Stroke     July 2005    Thyroid disease      Past Surgical History:   Procedure Laterality Date    ABCESS DRAINAGE Right     Between "little toe" and the one next to it    BREAST SURGERY      Reduction [id number]    CARDIAC SURGERY      CABG 4vessel ring in one valve mitral valve prolapse    CORONARY ARTERY BYPASS GRAFT      hyperlipidemia      TUBAL LIGATION  03/1986     Family History     Problem Relation (Age of Onset)    Arthritis Mother    Cancer Father (68)    Depression Sister    Diabetes Father, Maternal Grandmother    Heart disease Father    Hypertension Father, Son    Kidney disease Paternal Grandfather    Stroke Paternal Grandfather        Social History Main Topics    Smoking status: Never Smoker    Smokeless tobacco: Never Used    Alcohol use No    Drug use: No    Sexual activity: Yes     Partners: Male     Birth control/ protection: None     Review of Systems  Objective:     Weight: 117.6 kg (259 lb 4.2 oz)  Body mass index is 41.85 kg/m².  Vital Signs (Most Recent):  Temp: 99.1 °F (37.3 °C) (11/24/17 1905)  Pulse: 71 (11/24/17 1931)  Resp: (!) 21 (11/24/17 1931)  BP: (!) 115/59 (11/24/17 1905)  SpO2: 96 % (11/24/17 1931) Vital Signs (24h Range):  Temp:  [97.4 °F (36.3 °C)-99.1 °F (37.3 °C)] 99.1 °F (37.3 °C)  Pulse:  [60-71] 71  Resp:  [16-24] " "21  SpO2:  [92 %-100 %] 96 %  BP: (106-151)/(57-98) 115/59  Arterial Line BP: (125)/(61) 125/61       Date 11/24/17 0700 - 11/25/17 0659   Shift 5727-8585 9197-2601 2902-9272 24 Hour Total   I  N  T  A  K  E   IV Piggyback  100  100    Shift Total  (mL/kg)  100  (0.9)  100  (0.9)   O  U  T  P  U  T   Urine  40  40    Shift Total  (mL/kg)  40  (0.3)  40  (0.3)   Weight (kg)  117.6 117.6 117.6              Oxygen Concentration (%):  [30-32] 32         Urethral Catheter 11/23/17 1200 (Active)   Site Assessment Clean;Intact 11/24/2017  3:00 PM   Collection Container Urimeter 11/24/2017  3:00 PM   Securement Method secured to top of thigh w/ adhesive device 11/24/2017  3:00 PM   Catheter Care Performed yes 11/24/2017  3:00 PM   Reason for Continuing Urinary Catheterization Critically ill in ICU requiring intensive monitoring 11/24/2017  3:00 PM   CAUTI Prevention Bundle StatLock in place w 1" slack;Intact seal between catheter & drainage tubing;Drainage bag off the floor;Green sheeting clip in use;No dependent loops or kinks;Drainage bag below bladder;Drainage bag not overfilled (<2/3 full) 11/24/2017  1:45 PM   Output (mL) 0 mL 11/24/2017  7:05 PM       Neurosurgery Physical Exam   General: no distress  Neurologic: Alert and oriented. Thought content appropriate.  Head: normocephalic, bruising to left forehead   GCS: Motor: 6/Verbal: 5/Eyes: 3 GCS Total: 14  Cranial nerves: face symmetric, tongue midline, pupils equal, round, reactive to light with accomodation, extraocular muscles intact  Sensory: response to light touch throughout  Motor Strength: generalized weakness, no focal   Pronator Drift: no drift noted  Finger to nose normal  Lungs:  normal respiratory effort  Abdomen: soft, non-tender   Extremities: chronic left foot ulcer       Significant Labs:    Recent Labs  Lab 11/23/17  0517 11/24/17  0512 11/24/17  1834   * 106 102   * 133* 131*   K 5.2* 5.3* 5.8*   CL 98 97 98   CO2 24 23 22*   BUN 48* " 55* 58*   CREATININE 4.0* 4.9* 5.6*   CALCIUM 8.4* 8.6* 8.3*   MG  --   --  2.4       Recent Labs  Lab 11/23/17 2110 11/24/17  0512 11/24/17  1834   WBC 7.30 7.00 9.61   HGB 9.2* 9.0* 8.3*   HCT 29.0* 28.5* 27.3*    208 197       Recent Labs  Lab 11/23/17 2110 11/24/17  1502   INR 1.2 1.1     Microbiology Results (last 7 days)     ** No results found for the last 168 hours. **            Significant Diagnostics:  CT: Ct Head Without Contrast    Result Date: 11/24/2017   Stable bilateral subarachnoid hematoma without significant change from 11/24/2017 at 9:39. Continued followup is advised. Remote right corona radiata remote infarct. ______________________________________ Electronically signed by resident: ANDRADE VELÁSQUEZ MD Date:     11/24/17 Time:    17:53 As the supervising and teaching physician, I personally reviewed the images and resident's interpretation and I agree with the findings. Electronically signed by: IRVING WYATT MD Date:     11/24/17 Time:    18:21     Ct Head Without Contrast    Result Date: 11/24/2017   1. Abnormal study.  There is a left forehead and frontal scalp hematoma without underlying fracture.  Additionally, there is bilateral posttraumatic subarachnoid blood with hyperdense blood seen in right parietal sulci as well as in the left sylvian fissure and adjacent left frontal and parietal sulci.  There is no acute parenchymal hemorrhage. 2.  Remote right frontal lobe infarction with ex vacuo dilatation of the right frontal horn.  There is no obvious acute infarction. 3.  Atherosclerosis. Note: Critical results were discussed directly with the patient's nurse, Reymundo, at 9:51 AM.Results were flagged in Epic. Electronically signed by: Dr. Georges White Date:     11/24/17 Time:    09:57     Assessment/Plan:     * Subarachnoid hemorrhage    56 yo F with multiple medical co morbidities as detailed above, s/p fall with acute traumatic B/L SAH  - HCT is without IVH or  hydrocephalus, Repeat HCT is stable  - No neurosurgical interventions indicated at this time   - Medical management per NCC  - SBP < 160  - Na goal 140-150  - Keppra fro Sz prophylaxis  - Will follow, please call with questions or change in neurologic status  - Discussed with ARNULFO Monroy  Neurosurgery  [facility]

## 2017-11-25 NOTE — SUBJECTIVE & OBJECTIVE
"Past Medical History:   Diagnosis Date    CHF (congestive heart failure)     COPD (chronic obstructive pulmonary disease)     Coronary artery disease     Diabetes mellitus     Encounter for blood transfusion     Hypertension     MI (myocardial infarction) 2010    Stroke     July 2005    Thyroid disease        Past Surgical History:   Procedure Laterality Date    ABCESS DRAINAGE Right     Between "little toe" and the one next to it    BREAST SURGERY      Reduction [id number]    CARDIAC SURGERY      CABG 4vessel ring in one valve mitral valve prolapse    CORONARY ARTERY BYPASS GRAFT      hyperlipidemia      TUBAL LIGATION  03/1986       Review of patient's allergies indicates:  No Known Allergies    Medications:  Prescriptions Prior to Admission   Medication Sig    albuterol (ACCUNEB) 1.25 mg/3 mL Nebu Take 1.25 mg by nebulization every 6 (six) hours as needed. Rescue    aspirin (ECOTRIN) 81 MG EC tablet Take 1 tablet (81 mg total) by mouth once daily.    carvedilol (COREG) 25 MG tablet Take 25 mg by mouth 2 (two) times daily.     citalopram (CELEXA) 20 MG tablet Take 20 mg by mouth once daily.      furosemide (LASIX) 40 MG tablet ONE TAB DAILY IN AM . TAKE EXTRA 1/2 TABLET IF WEIGHT IS >3-4 POUNDS   WEIGH YOURSELF EVERY EM    gabapentin (NEURONTIN) 300 MG capsule Take 300 mg by mouth 3 (three) times daily. 300 mg AM and 600 mg PM    glimepiride (AMARYL) 4 MG tablet Take 4 mg by mouth before breakfast.    insulin aspart protamine-insulin aspart (NOVOLOG 70/30) 100 unit/mL (70-30) InPn pen Inject into the skin daily as needed (pt states "She told me to just take it whenever my sugar was too high").    levothyroxine (SYNTHROID) 50 MCG tablet Take 50 mcg by mouth once daily.      lisinopril (PRINIVIL,ZESTRIL) 2.5 MG tablet Take 1 tablet (2.5 mg total) by mouth once daily.    rosuvastatin (CRESTOR) 10 MG tablet Take 1 tablet (10 mg total) by mouth once daily.     Antibiotics     Start     Stop Route " Frequency Ordered    11/25/17 1315  metronidazole IVPB 500 mg      -- IV Every 8 hours (non-standard times) 11/25/17 1214    11/25/17 1214  cefepime in dextrose 5 % 1 gram/50 mL IVPB 1 g      -- IV Every 24 hours (non-standard times) 11/25/17 1214        Antifungals     None        Antivirals     None           Immunization History   Administered Date(s) Administered    PPD Test 11/23/2017       Family History     Problem Relation (Age of Onset)    Arthritis Mother    Cancer Father (68)    Depression Sister    Diabetes Father, Maternal Grandmother    Heart disease Father    Hypertension Father, Son    Kidney disease Paternal Grandfather    Stroke Paternal Grandfather        Social History     Social History    Marital status:      Spouse name: N/A    Number of children: N/A    Years of education: N/A     Social History Main Topics    Smoking status: Never Smoker    Smokeless tobacco: Never Used    Alcohol use No    Drug use: No    Sexual activity: Yes     Partners: Male     Birth control/ protection: None     Other Topics Concern    None     Social History Narrative    None     Review of Systems   Unable to perform ROS: Patient nonverbal     Objective:     Vital Signs (Most Recent):  Temp: 98.7 °F (37.1 °C) (11/25/17 0701)  Pulse: 70 (11/25/17 1000)  Resp: 18 (11/25/17 1000)  BP: (!) 100/53 (11/25/17 1000)  SpO2: 99 % (11/25/17 1000) Vital Signs (24h Range):  Temp:  [97.6 °F (36.4 °C)-99.1 °F (37.3 °C)] 98.7 °F (37.1 °C)  Pulse:  [68-72] 70  Resp:  [17-38] 18  SpO2:  [89 %-100 %] 99 %  BP: ()/(52-98) 100/53  Arterial Line BP: ()/(43-63) 106/47     Weight: 117.6 kg (259 lb 4.2 oz)  Body mass index is 41.85 kg/m².    Estimated Creatinine Clearance: 14.3 mL/min (based on SCr of 5.8 mg/dL (H)).    Physical Exam   Constitutional: She appears well-developed and well-nourished. She appears lethargic. No distress.   Cardiovascular: Normal rate and regular rhythm.    No murmur  heard.  Pulmonary/Chest: Effort normal and breath sounds normal. No respiratory distress.   Abdominal: Soft. Bowel sounds are normal. She exhibits no distension.   Musculoskeletal: Normal range of motion. She exhibits no edema.   Neurological: She appears lethargic.   Skin: Skin is warm and dry.   Left foot with surgical dressing in place   Psychiatric: She has a normal mood and affect. Her behavior is normal.       Significant Labs:   Blood Culture:   Recent Labs  Lab 11/18/17  1657 11/18/17  1658   LABBLOO No growth after 5 days. No growth after 5 days.     CBC:   Recent Labs  Lab 11/24/17  1834 11/25/17  0136 11/25/17  0424   WBC 9.61 9.21 9.89   HGB 8.3* 7.3* 7.0*   HCT 27.3* 24.3* 23.3*    176 170     CMP:   Recent Labs  Lab 11/24/17  0512 11/24/17  1834 11/24/17  2339 11/25/17  0821   * 131* 133* 133*  133*   K 5.3* 5.8* 5.9* 5.6*  5.6*  5.6*   CL 97 98 98 100  100   CO2 23 22* 22* 19*  21*    102 100 108  107   BUN 55* 58* 59* 60*  60*   CREATININE 4.9* 5.6* 5.5* 6.1*  5.8*   CALCIUM 8.6* 8.3* 8.5* 8.3*  8.2*   PROT 6.7 6.6  --   --    ALBUMIN 2.6* 2.5* 2.3* 2.1*   BILITOT 0.6 0.6  --   --    ALKPHOS 71 66  --   --    AST 12 17  --   --    ALT 10 8*  --   --    ANIONGAP 13 11 13 14  12   EGFRNONAA 9* 7.9* 8.1* 7.1*  7.6*       Significant Imaging: I have reviewed all pertinent imaging results/findings within the past 24 hours.

## 2017-11-25 NOTE — ASSESSMENT & PLAN NOTE
56 yo F tSAH from [facility] with multiple medical co morbidities    Neuro stable  Repeat HCT is stable  No neurosurgical interventions indicated  - Keppra fro Sz prophylaxis x7days total  SBP < 160  Goal eunatremia.   Ok for sqh. Ok to resume ASA in 10 days.     dispo- medical management per NCC. Please Call w/ questions.

## 2017-11-25 NOTE — SUBJECTIVE & OBJECTIVE
"Scheduled Meds:   carvedilol  12.5 mg Per NG tube BID    ceFEPime (MAXIPIME) IVPB  1 g Intravenous Q24H    gabapentin  100 mg Per NG tube TID    levETIRAcetam  250 mg Per NG tube BID    [START ON 11/26/2017] levothyroxine  50 mcg Per NG tube Before breakfast    metronidazole  500 mg Intravenous Q8H    [START ON 11/26/2017] rosuvastatin  10 mg Per NG tube Daily    sodium chloride 0.9%  3 mL Intravenous Q8H     Continuous Infusions:   sodium chloride 0.9% 200 mL/hr at 11/25/17 1322     PRN Meds:sodium chloride 0.9%, albuterol, dextrose 50%, glucagon (human recombinant), insulin aspart, labetalol, magnesium sulfate IVPB, magnesium sulfate IVPB, ondansetron, potassium phosphate IVPB    Review of patient's allergies indicates:  No Known Allergies     Past Medical History:   Diagnosis Date    CHF (congestive heart failure)     COPD (chronic obstructive pulmonary disease)     Coronary artery disease     Diabetes mellitus     Encounter for blood transfusion     Hypertension     MI (myocardial infarction) 2010    Stroke     July 2005    Thyroid disease      Past Surgical History:   Procedure Laterality Date    ABCESS DRAINAGE Right     Between "little toe" and the one next to it    BREAST SURGERY      Reduction [id number]    CARDIAC SURGERY      CABG 4vessel ring in one valve mitral valve prolapse    CORONARY ARTERY BYPASS GRAFT      hyperlipidemia      TUBAL LIGATION  03/1986       Family History     Problem Relation (Age of Onset)    Arthritis Mother    Cancer Father (68)    Depression Sister    Diabetes Father, Maternal Grandmother    Heart disease Father    Hypertension Father, Son    Kidney disease Paternal Grandfather    Stroke Paternal Grandfather        Social History Main Topics    Smoking status: Never Smoker    Smokeless tobacco: Never Used    Alcohol use No    Drug use: No    Sexual activity: Yes     Partners: Male     Birth control/ protection: None     Review of Systems   Unable to " perform ROS: Patient nonverbal   Constitutional: Positive for diaphoresis.     Objective:     Vital Signs (Most Recent):  Temp: 98.7 °F (37.1 °C) (11/25/17 0701)  Pulse: 68 (11/25/17 1400)  Resp: 18 (11/25/17 1400)  BP: (!) 111/56 (11/25/17 1400)  SpO2: 99 % (11/25/17 1400) Vital Signs (24h Range):  Temp:  [97.8 °F (36.6 °C)-99.1 °F (37.3 °C)] 98.7 °F (37.1 °C)  Pulse:  [68-72] 68  Resp:  [17-38] 18  SpO2:  [89 %-100 %] 99 %  BP: ()/(52-98) 111/56  Arterial Line BP: ()/(43-63) 110/48     Weight: 117.6 kg (259 lb 4.2 oz)  Body mass index is 41.85 kg/m².    Foot Exam    Right Foot/Ankle     Inspection and Palpation  Ecchymosis: none  Tenderness: none   Skin Exam: skin intact;     Neurovascular  Dorsalis pedis: 1+  Posterior tibial: 1+      Left Foot/Ankle      Inspection and Palpation  Ecchymosis: none (Periwound)  Tenderness: none   Skin Exam: drainage, ulcer and erythema;     Neurovascular  Dorsalis pedis: 1+  Posterior tibial: 1+          Left foot      Laboratory:  A1C:   Recent Labs  Lab 06/25/17  0508 11/18/17  1658 11/25/17  0821   HGBA1C 10.0* 8.4* 8.0*     Blood Cultures: No results for input(s): LABBLOO in the last 48 hours.  BMP:   Recent Labs  Lab 11/24/17  1834  11/25/17  0821     < > 108  107   *  < > 133*  133*   K 5.8*  < > 5.6*  5.6*  5.6*   CL 98  < > 100  100   CO2 22*  < > 19*  21*   BUN 58*  < > 60*  60*   CREATININE 5.6*  < > 6.1*  5.8*   CALCIUM 8.3*  < > 8.3*  8.2*   MG 2.4  --   --    < > = values in this interval not displayed.  CBC:   Recent Labs  Lab 11/25/17  0424   WBC 9.89   RBC 2.99*   HGB 7.0*   HCT 23.3*      MCV 78*   MCH 23.4*   MCHC 30.0*     CMP:   Recent Labs  Lab 11/24/17  1834  11/25/17  0821     < > 108  107   CALCIUM 8.3*  < > 8.3*  8.2*   ALBUMIN 2.5*  < > 2.1*   PROT 6.6  --   --    *  < > 133*  133*   K 5.8*  < > 5.6*  5.6*  5.6*   CO2 22*  < > 19*  21*   CL 98  < > 100  100   BUN 58*  < > 60*  60*   CREATININE  5.6*  < > 6.1*  5.8*   ALKPHOS 66  --   --    ALT 8*  --   --    AST 17  --   --    BILITOT 0.6  --   --    < > = values in this interval not displayed.  Coagulation:   Recent Labs  Lab 11/24/17  1502   INR 1.1     CRP:   Recent Labs  Lab 11/18/17  1458   CRP 83.9*     ESR: No results for input(s): SEDRATE in the last 168 hours.  Wound Cultures: No results for input(s): LABAERO in the last 4320 hours.    Diagnostic Results:  MRI: I have reviewed all pertinent results/findings within the past 24 hours.  reviwed  X-Ray: I have reviewed all pertinent results/findings within the past 24 hours.  reviewed  MRI 11/19:   1. Retro-Achilles soft tissue wound. No evidence for osteomyelitis.  2. Mild nonspecific Achilles and posterior tibial tenosynovitis.  3. Small tibiotalar joint effusion.    X-ray:   1.  No radiographically apparent acute osteomyelitis. Skin irregularity and bandage noted in the posterior aspect of the ankle.  2. Degenerative changes in the ankle, hindfoot and midfoot are noted.    Clinical Findings:  Wound to left posterior ankle measuring 4.2x1.1 cm with tendon exposure. Bloody drainage noted with periwound erythema and edema. No purulence.

## 2017-11-25 NOTE — PROCEDURES
"Juliane Ramos is a 57 y.o. female patient.    Temp: 97.8 °F (36.6 °C) (17 1500)  Pulse: 71 (17 1800)  Resp: (!) 24 (17 1800)  BP: 133/66 (17 1800)  SpO2: 97 % (17 1800)  Weight: 117.6 kg (259 lb 4.2 oz) (17 1508)  Height: 5' 6" (167.6 cm) (17 1508)       Arterial Line  Date/Time: 2017 6:37 PM  Location procedure was performed: [facility]  Performed by: BG KEMP  Authorized by: BG KEMP   Assisting provider: PATTI CARCAMO  Pre-op Diagnosis: SAH  Post-operative diagnosis: SAH  Consent Done: Yes  Consent: Written consent obtained.  Risks and benefits: risks, benefits and alternatives were discussed  Consent given by: spouse  Patient understanding: patient states understanding of the procedure being performed  Patient consent: the patient's understanding of the procedure matches consent given  Procedure consent: procedure consent matches procedure scheduled  Relevant documents: relevant documents present and verified  Patient identity confirmed: name and   Time out: Immediately prior to procedure a "time out" was called to verify the correct patient, procedure, equipment, support staff and site/side marked as required.  Preparation: Patient was prepped and draped in the usual sterile fashion.  Indications: multiple ABGs and hemodynamic monitoring  Location: left radial    Anesthesia:  Local Anesthetic: lidocaine 1% without epinephrine  Anesthetic total: 2 mL  Patient sedated: no  Number of attempts: 1  Complications: No  Estimated blood loss (mL): 0  Post-procedure: dressing applied  Patient tolerance: Patient tolerated the procedure well with no immediate complications          Bg Kemp  2017  "

## 2017-11-25 NOTE — ASSESSMENT & PLAN NOTE
-Retroachilles heel ulceration with periwound erythema noted  -Plan to obtain cultures tomorrow, consider tibotalar joint tap, per previous MRI results, will discuss with family.   -Will defer surgical debridement for now  -Wale ordered, orders in for daily dressing changes per nursing satff  -Wound dressed with 4x4, kerlix, coban  -Podiatry will follow

## 2017-11-25 NOTE — ASSESSMENT & PLAN NOTE
- CT Head at OSH shows bilateral posttraumatic subarachnoid blood  - repeat CTH stable  - NSGY following, no surgical plan  - SBP <140  - holding aspirin, lovenox; Anti-Xa 0.70  - keppra 250 mg q12 x7days  - home gabapentin 100 mg TID   - q1h neurochecks

## 2017-11-25 NOTE — CONSULTS
[facility]  Podiatry  Consult Note    Patient Name: Juliane Ramos  MRN: [id number]  Admission Date: 11/24/2017  Hospital Length of Stay: 1 days  Attending Physician: Adan Iverson MD  Primary Care Provider: JOS Dumont     Inpatient consult to Podiatry  Consult performed by: LUIS MCCABE  Consult ordered by: YOEL KEMP  Reason for consult: ulcer  Assessment/Recommendations: See a/p        Subjective:     History of Present Illness:  Pt is a 58 y/o female  has a past medical history of CHF (congestive heart failure); COPD (chronic obstructive pulmonary disease); Coronary artery disease; Diabetes mellitus; Encounter for blood transfusion; Hypertension; MI (myocardial infarction); Stroke; and Thyroid disease. Admitted for SAH and consulted to podiatry for retro achilles ulceration. Pt is asleep and  relates hx at bedside. States pt was previously admitted for retro achilles ulceration and transferred to [facility] for SAH. States she fell and hit her head at previous hospital. States that he thinks the wound is looking better. No other complaints at this time.     Scheduled Meds:   carvedilol  12.5 mg Per NG tube BID    ceFEPime (MAXIPIME) IVPB  1 g Intravenous Q24H    gabapentin  100 mg Per NG tube TID    levETIRAcetam  250 mg Per NG tube BID    [START ON 11/26/2017] levothyroxine  50 mcg Per NG tube Before breakfast    metronidazole  500 mg Intravenous Q8H    [START ON 11/26/2017] rosuvastatin  10 mg Per NG tube Daily    sodium chloride 0.9%  3 mL Intravenous Q8H     Continuous Infusions:   sodium chloride 0.9% 200 mL/hr at 11/25/17 1322     PRN Meds:sodium chloride 0.9%, albuterol, dextrose 50%, glucagon (human recombinant), insulin aspart, labetalol, magnesium sulfate IVPB, magnesium sulfate IVPB, ondansetron, potassium phosphate IVPB    Review of patient's allergies indicates:  No Known Allergies     Past Medical History:   Diagnosis Date    CHF (congestive  "heart failure)     COPD (chronic obstructive pulmonary disease)     Coronary artery disease     Diabetes mellitus     Encounter for blood transfusion     Hypertension     MI (myocardial infarction) 2010    Stroke     July 2005    Thyroid disease      Past Surgical History:   Procedure Laterality Date    ABCESS DRAINAGE Right     Between "little toe" and the one next to it    BREAST SURGERY      Reduction [id number]    CARDIAC SURGERY      CABG 4vessel ring in one valve mitral valve prolapse    CORONARY ARTERY BYPASS GRAFT      hyperlipidemia      TUBAL LIGATION  03/1986       Family History     Problem Relation (Age of Onset)    Arthritis Mother    Cancer Father (68)    Depression Sister    Diabetes Father, Maternal Grandmother    Heart disease Father    Hypertension Father, Son    Kidney disease Paternal Grandfather    Stroke Paternal Grandfather        Social History Main Topics    Smoking status: Never Smoker    Smokeless tobacco: Never Used    Alcohol use No    Drug use: No    Sexual activity: Yes     Partners: Male     Birth control/ protection: None     Review of Systems   Unable to perform ROS: Patient nonverbal   Constitutional: Positive for diaphoresis.     Objective:     Vital Signs (Most Recent):  Temp: 98.7 °F (37.1 °C) (11/25/17 0701)  Pulse: 68 (11/25/17 1400)  Resp: 18 (11/25/17 1400)  BP: (!) 111/56 (11/25/17 1400)  SpO2: 99 % (11/25/17 1400) Vital Signs (24h Range):  Temp:  [97.8 °F (36.6 °C)-99.1 °F (37.3 °C)] 98.7 °F (37.1 °C)  Pulse:  [68-72] 68  Resp:  [17-38] 18  SpO2:  [89 %-100 %] 99 %  BP: ()/(52-98) 111/56  Arterial Line BP: ()/(43-63) 110/48     Weight: 117.6 kg (259 lb 4.2 oz)  Body mass index is 41.85 kg/m².    Foot Exam    Right Foot/Ankle     Inspection and Palpation  Ecchymosis: none  Tenderness: none   Skin Exam: skin intact;     Neurovascular  Dorsalis pedis: 1+  Posterior tibial: 1+      Left Foot/Ankle      Inspection and Palpation  Ecchymosis: none " (Periwound)  Tenderness: none   Skin Exam: drainage, ulcer and erythema;     Neurovascular  Dorsalis pedis: 1+  Posterior tibial: 1+          Left foot      Laboratory:  A1C:   Recent Labs  Lab 06/25/17  0508 11/18/17  1658 11/25/17  0821   HGBA1C 10.0* 8.4* 8.0*     Blood Cultures: No results for input(s): LABBLOO in the last 48 hours.  BMP:   Recent Labs  Lab 11/24/17  1834  11/25/17  0821     < > 108  107   *  < > 133*  133*   K 5.8*  < > 5.6*  5.6*  5.6*   CL 98  < > 100  100   CO2 22*  < > 19*  21*   BUN 58*  < > 60*  60*   CREATININE 5.6*  < > 6.1*  5.8*   CALCIUM 8.3*  < > 8.3*  8.2*   MG 2.4  --   --    < > = values in this interval not displayed.  CBC:   Recent Labs  Lab 11/25/17  0424   WBC 9.89   RBC 2.99*   HGB 7.0*   HCT 23.3*      MCV 78*   MCH 23.4*   MCHC 30.0*     CMP:   Recent Labs  Lab 11/24/17  1834  11/25/17  0821     < > 108  107   CALCIUM 8.3*  < > 8.3*  8.2*   ALBUMIN 2.5*  < > 2.1*   PROT 6.6  --   --    *  < > 133*  133*   K 5.8*  < > 5.6*  5.6*  5.6*   CO2 22*  < > 19*  21*   CL 98  < > 100  100   BUN 58*  < > 60*  60*   CREATININE 5.6*  < > 6.1*  5.8*   ALKPHOS 66  --   --    ALT 8*  --   --    AST 17  --   --    BILITOT 0.6  --   --    < > = values in this interval not displayed.  Coagulation:   Recent Labs  Lab 11/24/17  1502   INR 1.1     CRP:   Recent Labs  Lab 11/18/17  1458   CRP 83.9*     ESR: No results for input(s): SEDRATE in the last 168 hours.  Wound Cultures: No results for input(s): LABAERO in the last 4320 hours.    Diagnostic Results:  MRI: I have reviewed all pertinent results/findings within the past 24 hours.  reviwed  X-Ray: I have reviewed all pertinent results/findings within the past 24 hours.  reviewed  MRI 11/19:   1. Retro-Achilles soft tissue wound. No evidence for osteomyelitis.  2. Mild nonspecific Achilles and posterior tibial tenosynovitis.  3. Small tibiotalar joint effusion.    X-ray:   1.  No  radiographically apparent acute osteomyelitis. Skin irregularity and bandage noted in the posterior aspect of the ankle.  2. Degenerative changes in the ankle, hindfoot and midfoot are noted.    Clinical Findings:  Wound to left posterior ankle measuring 4.2x1.1 cm with tendon exposure. Bloody drainage noted with periwound erythema and edema. No purulence.     Assessment/Plan:     * Traumatic subarachnoid hemorrhage    Per primary        Diabetic leg ulcer    -Retroachilles heel ulceration with periwound erythema noted  -Plan to obtain cultures tomorrow, consider tibotalar joint tap, per previous MRI results, will discuss with family.   -Will defer surgical debridement for now  -Santyl ordered, orders in for daily dressing changes per nursing satff  -Wound dressed with 4x4, kerlix, coban  -Podiatry will follow           Uncontrolled type 2 diabetes with stage 3 chronic kidney disease GFR 30-59    Per primary        CKD (chronic kidney disease) stage 3, GFR 30-59 ml/min    Per primary            Thank you for your consult. I will follow-up with patient. Please contact us if you have any additional questions.    Sarika Angel MD  Podiatry  [facility]

## 2017-11-25 NOTE — SUBJECTIVE & OBJECTIVE
"Prescriptions Prior to Admission   Medication Sig Dispense Refill Last Dose    albuterol (ACCUNEB) 1.25 mg/3 mL Nebu Take 1.25 mg by nebulization every 6 (six) hours as needed. Rescue   11/18/2017    aspirin (ECOTRIN) 81 MG EC tablet Take 1 tablet (81 mg total) by mouth once daily.  0 11/18/2017    carvedilol (COREG) 25 MG tablet Take 25 mg by mouth 2 (two) times daily.    11/18/2017    citalopram (CELEXA) 20 MG tablet Take 20 mg by mouth once daily.     11/18/2017    furosemide (LASIX) 40 MG tablet ONE TAB DAILY IN AM . TAKE EXTRA 1/2 TABLET IF WEIGHT IS >3-4 POUNDS   WEIGH YOURSELF EVERY EM 30 tablet 0 11/18/2017    gabapentin (NEURONTIN) 300 MG capsule Take 300 mg by mouth 3 (three) times daily. 300 mg AM and 600 mg PM   11/18/2017    glimepiride (AMARYL) 4 MG tablet Take 4 mg by mouth before breakfast.   11/18/2017    insulin aspart protamine-insulin aspart (NOVOLOG 70/30) 100 unit/mL (70-30) InPn pen Inject into the skin daily as needed (pt states "She told me to just take it whenever my sugar was too high").   11/18/2017    levothyroxine (SYNTHROID) 50 MCG tablet Take 50 mcg by mouth once daily.     11/18/2017    lisinopril (PRINIVIL,ZESTRIL) 2.5 MG tablet Take 1 tablet (2.5 mg total) by mouth once daily. 30 tablet 2 11/18/2017    rosuvastatin (CRESTOR) 10 MG tablet Take 1 tablet (10 mg total) by mouth once daily. 30 tablet 0 11/18/2017       Review of patient's allergies indicates:  No Known Allergies    Past Medical History:   Diagnosis Date    CHF (congestive heart failure)     COPD (chronic obstructive pulmonary disease)     Coronary artery disease     Diabetes mellitus     Encounter for blood transfusion     Hypertension     MI (myocardial infarction) 2010    Stroke     July 2005    Thyroid disease      Past Surgical History:   Procedure Laterality Date    ABCESS DRAINAGE Right     Between "little toe" and the one next to it    BREAST SURGERY      Reduction [id number]    CARDIAC SURGERY " "     CABG 4vessel ring in one valve mitral valve prolapse    CORONARY ARTERY BYPASS GRAFT      hyperlipidemia      TUBAL LIGATION  03/1986     Family History     Problem Relation (Age of Onset)    Arthritis Mother    Cancer Father (68)    Depression Sister    Diabetes Father, Maternal Grandmother    Heart disease Father    Hypertension Father, Son    Kidney disease Paternal Grandfather    Stroke Paternal Grandfather        Social History Main Topics    Smoking status: Never Smoker    Smokeless tobacco: Never Used    Alcohol use No    Drug use: No    Sexual activity: Yes     Partners: Male     Birth control/ protection: None     Review of Systems    Objective:     Weight: 117.6 kg (259 lb 4.2 oz)  Body mass index is 41.85 kg/m².  Vital Signs (Most Recent):  Temp: 98.7 °F (37.1 °C) (11/25/17 0701)  Pulse: 68 (11/25/17 1212)  Resp: 18 (11/25/17 1000)  BP: (!) 100/53 (11/25/17 1000)  SpO2: 98 % (11/25/17 1212) Vital Signs (24h Range):  Temp:  [97.6 °F (36.4 °C)-99.1 °F (37.3 °C)] 98.7 °F (37.1 °C)  Pulse:  [68-72] 68  Resp:  [17-38] 18  SpO2:  [89 %-100 %] 98 %  BP: ()/(52-98) 100/53  Arterial Line BP: ()/(43-63) 106/47                 Resp Rate Total:  [18 br/min] 18 br/min         Urethral Catheter 11/23/17 1200 (Active)   Site Assessment Clean;Intact 11/24/2017  3:00 PM   Collection Container Urimeter 11/24/2017  3:00 PM   Securement Method secured to top of thigh w/ adhesive device 11/24/2017  3:00 PM   Catheter Care Performed yes 11/24/2017  3:00 PM   Reason for Continuing Urinary Catheterization Critically ill in ICU requiring intensive monitoring 11/24/2017  3:00 PM   CAUTI Prevention Bundle StatLock in place w 1" slack;Intact seal between catheter & drainage tubing;Drainage bag off the floor;Green sheeting clip in use;No dependent loops or kinks;Drainage bag below bladder;Drainage bag not overfilled (<2/3 full) 11/24/2017  1:45 PM   Output (mL) 0 mL 11/24/2017  7:05 PM       Neurosurgery " Physical Exam     General: no distress  Neurologic: Alert and oriented. Thought content appropriate.  Head: normocephalic, bruising to left forehead   GCS: Motor: 6/Verbal: 5/Eyes: 4 GCS Total: 15  Cranial nerves: face symmetric, tongue midline, pupils equal, round, reactive to light with accomodation, extraocular muscles intact  Sensory: response to light touch throughout  Motor Strength: generalized weakness, no focal   Pronator Drift: no drift noted  Finger to nose normal  Lungs:  normal respiratory effort  Abdomen: soft, non-tender   Extremities: chronic left foot ulcer       Significant Labs:    Recent Labs  Lab 11/24/17  1834 11/24/17  2339 11/25/17  0821    100 108  107   * 133* 133*  133*   K 5.8* 5.9* 5.6*  5.6*  5.6*   CL 98 98 100  100   CO2 22* 22* 19*  21*   BUN 58* 59* 60*  60*   CREATININE 5.6* 5.5* 6.1*  5.8*   CALCIUM 8.3* 8.5* 8.3*  8.2*   MG 2.4  --   --        Recent Labs  Lab 11/24/17  1834 11/25/17  0136 11/25/17  0424   WBC 9.61 9.21 9.89   HGB 8.3* 7.3* 7.0*   HCT 27.3* 24.3* 23.3*    176 170       Recent Labs  Lab 11/23/17  2110 11/24/17  1502   INR 1.2 1.1     Microbiology Results (last 7 days)     ** No results found for the last 168 hours. **            Significant Diagnostics:  CT: Ct Head Without Contrast    Result Date: 11/24/2017   Stable bilateral subarachnoid hematoma without significant change from 11/24/2017 at 9:39. Continued followup is advised. Remote right corona radiata remote infarct. ______________________________________ Electronically signed by resident: ANDRADE VELÁSQUEZ MD Date:     11/24/17 Time:    17:53 As the supervising and teaching physician, I personally reviewed the images and resident's interpretation and I agree with the findings. Electronically signed by: IRVING WYATT MD Date:     11/24/17 Time:    18:21     Ct Head Without Contrast    Result Date: 11/24/2017   1. Abnormal study.  There is a left forehead and frontal scalp  hematoma without underlying fracture.  Additionally, there is bilateral posttraumatic subarachnoid blood with hyperdense blood seen in right parietal sulci as well as in the left sylvian fissure and adjacent left frontal and parietal sulci.  There is no acute parenchymal hemorrhage. 2.  Remote right frontal lobe infarction with ex vacuo dilatation of the right frontal horn.  There is no obvious acute infarction. 3.  Atherosclerosis. Note: Critical results were discussed directly with the patient's nurse, Reymundo, at 9:51 AM.Results were flagged in Epic. Electronically signed by: Dr. Georges White Date:     11/24/17 Time:    09:57

## 2017-11-25 NOTE — CONSULTS
[facility]  Infectious Disease  Consult Note    Patient Name: Juliane Ramos  MRN: [id number]  Admission Date: 11/24/2017  Hospital Length of Stay: 1 days  Attending Physician: Adan Iverson MD  Primary Care Provider: JOS Dumont     Isolation Status: No active isolations    Patient information was obtained from spouse/SO and past medical records.      Inpatient consult to Infectious Diseases  Consult performed by: GINO MARTINEZ  Consult ordered by: YOEL KEMP        Assessment/Plan:     Diabetic leg ulcer    57 year old diabetic originally admitted to OSH for left diabetic heel wound with hospital course c/b unresponsiveness found to have PE LLL (started on lovenox) who then developed MC- seen by nephrology who though ATN secondary to ischemia/IV contrast c/b pleural effusions c/b mechanical fall with post-traumatic SAH; transferred here for higher level of care; ID consulted for abx recs for heel wound:  - patient currently afebrile, without leukocytosis, and blood cultures negative  - MRI 11/20 without evidence of osteomyelitis; c/w tenosynovitis  - no culture data thus far    Plan:  1. Will check random vanc and re-dose once level below 20  2. Given nephrotoxicity of vanc/zosyn together, will d/c zosyn and start cefepime 1 gram IV q 24 hours (renally dosed) and flagyl 500 mg IV TID for now  3. Recommend podiatry obtain tissue culture sent to micro to help narrow abx therapy  4. Will likely need debridement once stable  5. ID will follow            Thank you for your consult. I will follow-up with patient. Please contact us if you have any additional questions.  ARNULFO Chiu, pager: [phone]  Infectious Disease  [facility]    Subjective:     Principal Problem: Traumatic subarachnoid hemorrhage    HPI: Patient is a 57 year old female with HTN, HLD, T2DM, CHF, CKD 3, remote right frontal infarct , 4v CABG in 2011.  Pt  presents as a transfer from [facility] for  "acute subarachnoid hemorrhage after a mechanical fall. Pt admitted initially for diabetic non healing infected left heel wound. Per chart review, she had a left heel wound with purulence and fever and found to have maggots. Per her , this started as a pressure wound from her chair. He denies cuts or scratches, animal or water exposure.  She was being treated with zosyn and vanc empirically at outside hospital. She was scheduled for debridement but then vomited on day of surgery.  Pt developed respiratory failure. CTA was done and showed PE of the RLL, she was started on therapeutic lovenox.  T he following day she was walking to the bathroom and had a mechanical fall hitting her forehead.  Negative LOC.  Head CT showed bilateral subarachnoid Hemorrhage . She was subsequently transferred to [facility] for higher level of care.     ID consulted for abx recs. vanc is currently on hold given MC. She remains on zosyn. MRI of foot was negative for osteomyelitis.     Past Medical History:   Diagnosis Date    CHF (congestive heart failure)     COPD (chronic obstructive pulmonary disease)     Coronary artery disease     Diabetes mellitus     Encounter for blood transfusion     Hypertension     MI (myocardial infarction) 2010    Stroke     July 2005    Thyroid disease        Past Surgical History:   Procedure Laterality Date    ABCESS DRAINAGE Right     Between "little toe" and the one next to it    BREAST SURGERY      Reduction [id number]    CARDIAC SURGERY      CABG 4vessel ring in one valve mitral valve prolapse    CORONARY ARTERY BYPASS GRAFT      hyperlipidemia      TUBAL LIGATION  03/1986       Review of patient's allergies indicates:  No Known Allergies    Medications:  Prescriptions Prior to Admission   Medication Sig    albuterol (ACCUNEB) 1.25 mg/3 mL Nebu Take 1.25 mg by nebulization every 6 (six) hours as needed. Rescue    aspirin (ECOTRIN) 81 MG EC tablet Take 1 tablet (81 mg total) by mouth once " "daily.    carvedilol (COREG) 25 MG tablet Take 25 mg by mouth 2 (two) times daily.     citalopram (CELEXA) 20 MG tablet Take 20 mg by mouth once daily.      furosemide (LASIX) 40 MG tablet ONE TAB DAILY IN AM . TAKE EXTRA 1/2 TABLET IF WEIGHT IS >3-4 POUNDS   WEIGH YOURSELF EVERY EM    gabapentin (NEURONTIN) 300 MG capsule Take 300 mg by mouth 3 (three) times daily. 300 mg AM and 600 mg PM    glimepiride (AMARYL) 4 MG tablet Take 4 mg by mouth before breakfast.    insulin aspart protamine-insulin aspart (NOVOLOG 70/30) 100 unit/mL (70-30) InPn pen Inject into the skin daily as needed (pt states "She told me to just take it whenever my sugar was too high").    levothyroxine (SYNTHROID) 50 MCG tablet Take 50 mcg by mouth once daily.      lisinopril (PRINIVIL,ZESTRIL) 2.5 MG tablet Take 1 tablet (2.5 mg total) by mouth once daily.    rosuvastatin (CRESTOR) 10 MG tablet Take 1 tablet (10 mg total) by mouth once daily.     Antibiotics     Start     Stop Route Frequency Ordered    11/25/17 1315  metronidazole IVPB 500 mg      -- IV Every 8 hours (non-standard times) 11/25/17 1214    11/25/17 1214  cefepime in dextrose 5 % 1 gram/50 mL IVPB 1 g      -- IV Every 24 hours (non-standard times) 11/25/17 1214        Antifungals     None        Antivirals     None           Immunization History   Administered Date(s) Administered    PPD Test 11/23/2017       Family History     Problem Relation (Age of Onset)    Arthritis Mother    Cancer Father (68)    Depression Sister    Diabetes Father, Maternal Grandmother    Heart disease Father    Hypertension Father, Son    Kidney disease Paternal Grandfather    Stroke Paternal Grandfather        Social History     Social History    Marital status:      Spouse name: N/A    Number of children: N/A    Years of education: N/A     Social History Main Topics    Smoking status: Never Smoker    Smokeless tobacco: Never Used    Alcohol use No    Drug use: No    Sexual " activity: Yes     Partners: Male     Birth control/ protection: None     Other Topics Concern    None     Social History Narrative    None     Review of Systems   Unable to perform ROS: Patient nonverbal     Objective:     Vital Signs (Most Recent):  Temp: 98.7 °F (37.1 °C) (11/25/17 0701)  Pulse: 70 (11/25/17 1000)  Resp: 18 (11/25/17 1000)  BP: (!) 100/53 (11/25/17 1000)  SpO2: 99 % (11/25/17 1000) Vital Signs (24h Range):  Temp:  [97.6 °F (36.4 °C)-99.1 °F (37.3 °C)] 98.7 °F (37.1 °C)  Pulse:  [68-72] 70  Resp:  [17-38] 18  SpO2:  [89 %-100 %] 99 %  BP: ()/(52-98) 100/53  Arterial Line BP: ()/(43-63) 106/47     Weight: 117.6 kg (259 lb 4.2 oz)  Body mass index is 41.85 kg/m².    Estimated Creatinine Clearance: 14.3 mL/min (based on SCr of 5.8 mg/dL (H)).    Physical Exam   Constitutional: She appears well-developed and well-nourished. She appears lethargic. No distress.   Cardiovascular: Normal rate and regular rhythm.    No murmur heard.  Pulmonary/Chest: Effort normal and breath sounds normal. No respiratory distress.   Abdominal: Soft. Bowel sounds are normal. She exhibits no distension.   Musculoskeletal: Normal range of motion. She exhibits no edema.   Neurological: She appears lethargic.   Skin: Skin is warm and dry.   Left foot with surgical dressing in place   Psychiatric: She has a normal mood and affect. Her behavior is normal.       Significant Labs:   Blood Culture:   Recent Labs  Lab 11/18/17  1657 11/18/17  1658   LABBLOO No growth after 5 days. No growth after 5 days.     CBC:   Recent Labs  Lab 11/24/17  1834 11/25/17  0136 11/25/17  0424   WBC 9.61 9.21 9.89   HGB 8.3* 7.3* 7.0*   HCT 27.3* 24.3* 23.3*    176 170     CMP:   Recent Labs  Lab 11/24/17  0512 11/24/17  1834 11/24/17  2339 11/25/17  0821   * 131* 133* 133*  133*   K 5.3* 5.8* 5.9* 5.6*  5.6*  5.6*   CL 97 98 98 100  100   CO2 23 22* 22* 19*  21*    102 100 108  107   BUN 55* 58* 59* 60*  60*    CREATININE 4.9* 5.6* 5.5* 6.1*  5.8*   CALCIUM 8.6* 8.3* 8.5* 8.3*  8.2*   PROT 6.7 6.6  --   --    ALBUMIN 2.6* 2.5* 2.3* 2.1*   BILITOT 0.6 0.6  --   --    ALKPHOS 71 66  --   --    AST 12 17  --   --    ALT 10 8*  --   --    ANIONGAP 13 11 13 14  12   EGFRNONAA 9* 7.9* 8.1* 7.1*  7.6*       Significant Imaging: I have reviewed all pertinent imaging results/findings within the past 24 hours.

## 2017-11-25 NOTE — ASSESSMENT & PLAN NOTE
- MC on CKD, worsening creatinine at outside hospital. Nephrology at OSH records indicate MC due to ATN 2/2 ischemia (hypotension) and toxins (IV contrast)  -plan HD for clearance given anuria, metabolic and respiratory acidosis, hyperkalemia  -nephrology following

## 2017-11-25 NOTE — NURSING
Called MD Ramona due to pt having sustained MAPs below 60.  Ramona ordered a 250cc fluid bolus.  Will continue to monitor pt.

## 2017-11-25 NOTE — ASSESSMENT & PLAN NOTE
- MRI from outside hospital shows retro-achilles soft tissue wound, no osteomyelitis  - flagyl 500 mg BID, cefepime 1g q24h  - Infectious disease consulted   - podiatry consulted, no immediate surgical plan

## 2017-11-25 NOTE — PLAN OF CARE
Problem: Patient Care Overview  Goal: Plan of Care Review  Recommendation/Intervention:   1. Rec 2200kcal Diabetic, Renal diet.  2. If PO intake <50% x 48hrs, order Novasource Renal, 1 carton, BID.   3. If TF inititaed, rec Novasource Renal continuous to a goal of 45ml/hr, water per MD.     RD following     Goals: Meet >75% EEN/EPN by RD f/u  Nutrition Goal Status: new

## 2017-11-25 NOTE — CONSULTS
"  [facility]  Adult Nutrition  Consult Note    SUMMARY     Recommendations    Recommendation/Intervention:   1. Rec 2200kcal Diabetic, Renal diet.  2. If PO intake <50% x 48hrs, order Novasource Renal, 1 carton, BID.   3. If TF inititaed, rec Novasource Renal continuous to a goal of 45ml/hr, water per MD. HUMPHREY following     Goals: Meet >75% EEN/EPN by RD f/u  Nutrition Goal Status: new  Communication of RD Recs: reviewed with RN    Reason for Assessment    Reason for Assessment: physician consult  Diagnosis:  (subarachnoid hemorrhage )  Relevent Medical History: CAD, DM2, HTN, CKD3, CABG, Hx stroke       General Information Comments: Pt lethargic this AM, did not eat breakfast. NC for resp. support. Diabetic wound to left heel.     Nutrition Discharge Planning: Renal, Diabetic diet     Nutrition Prescription Ordered    Current Diet Order: Renal      Evaluation of Received Nutrients/Fluid Intake    I/O: +490ml x 24 hrs, poor uop  % Intake of Estimated Energy Needs: 0-25 %  % Meal Intake: 0%     Nutrition Risk Screen     Nutrition Risk Screen: large or nonhealing wound, burn or pressure ulcer    Nutrition/Diet History    Food prefs: RIDDHI     Labs/Tests/Procedures/Meds    Pertinent Labs Reviewed: reviewed, pertinent  Pertinent Labs Comments: H/H 7.0/ 23.3, Na 133, K 5.9, BUN 59, GFR 8.1, Ca 8.5, Alb 2.3, POCT Gluc 108-224, a1c 8.4  Pertinent Medications Reviewed: reviewed, pertinent  Pertinent Medications Comments: levothyroxine, statin, insulin, furosemide     Physical Findings    Overall Physical Appearance: on oxygen therapy, obese, lethargic  Skin: non-healing wound(s) (diabetic L heel, abr-L leg )    Anthropometrics    Temp: 98.7 °F (37.1 °C)     Height: 5' 6" (167.6 cm)  Weight Method: Bed Scale  Weight: 117.6 kg (259 lb 4.2 oz)  Ideal Body Weight (IBW), Female: 130 lb  % Ideal Body Weight, Female (lb): 199.43 lb  BMI (Calculated): 41.9  BMI Grade: greater than 40 - morbid obesity   "     Estimated/Assessed Needs    Weight Used For Calorie Calculations: 117.6 kg (259 lb 4.2 oz)   Energy Calorie Requirements (kcal): 2233  Energy Need Method: Stafford-St Jeor (PAL 1.25)  RMR (Stafford-St. Jeor Equation): 1787.75  Weight Used For Protein Calculations: 59 kg (130 lb 1.1 oz)  Protein Requirements: 70-88g (1.2-1.5g/kg)  Fluid Requirements (mL): or per MD  Fluid Need Method: RDA Method  RDA Method (mL): 2233        Assessment and Plan    * Traumatic subarachnoid hemorrhage    Nutrition Problem  Inadequate energy intake     Related to (etiology):   Condition related diagnoses, AMS    Signs and Symptoms (as evidenced by):   Unable to stay awake to eat, PO intake >25%    Interventions/Recommendations (treatment strategy):  See recs     Nutrition Diagnosis Status:   New                  Monitor and Evaluation    Food and Nutrient Intake: energy intake, food and beverage intake  Nutrition-Focused Physical Findings: skin, overall appearance    Nutrition Follow-Up    RD Follow-up?: Yes

## 2017-11-25 NOTE — ASSESSMENT & PLAN NOTE
- history of uncontrolled diabetes, on oral medication at home plus SSI  - hemoglobin A1c 8.4% on 11/18/17  - continuing gabapentin 100 mg TID (renally dosed)  - SSI only for now  - BG has been adequate without detemir, pt is npo

## 2017-11-25 NOTE — HPI
Patient is a 57 year old female with HTN, HLD, T2DM, CHF, CKD 3, remote right frontal infarct , 4v CABG in 2011.  Pt was transferred who presents as a transfer from [facility] for acute subarachnoid hemorrhage after a mechanical fall. Pt admitted initially for diabetic non healing infected left heel wound. She was being treated with zosyn and vanc.   Pt developed respiratory failure. CTA was done and showed PE of the RLL, she was started on therapeutic lovenox.  This morning of she was  ambulating to the bathroom and had a mechanical fall hitting her forehead.  Negative LOC.  Head CT showed bilateral subarachnoid Hemorrhage . She was subsequently transferred to [facility] for higher level of care. Neurosurgery consulted for evaluation of SAH.

## 2017-11-25 NOTE — HPI
Pt is a 56 y/o female  has a past medical history of CHF (congestive heart failure); COPD (chronic obstructive pulmonary disease); Coronary artery disease; Diabetes mellitus; Encounter for blood transfusion; Hypertension; MI (myocardial infarction); Stroke; and Thyroid disease. Admitted for SAH and consulted to podiatry for retro achilles ulceration. Pt is asleep and  relates hx at bedside. States pt was previously admitted for retro achilles ulceration and transferred to [facility] for SAH. States she fell and hit her head at previous hospital. States that he thinks the wound is looking better. No other complaints at this time.

## 2017-11-25 NOTE — HPI
Patient is a 57 year old female with HTN, HLD, T2DM, CHF, CKD 3, remote right frontal infarct , 4v CABG in 2011.  Pt presents as a transfer from [facility] for acute subarachnoid hemorrhage after a mechanical fall. Pt admitted initially for diabetic non healing infected left heel wound. Per chart review, she had a left heel wound with purulence and fever and found to have maggots. Per her , this started as a pressure wound from her chair. He denies cuts or scratches, animal or water exposure.  She was being treated with zosyn and vanc empirically at outside hospital. She was scheduled for debridement but then vomited on day of surgery.  Pt developed respiratory failure. CTA was done and showed PE of the RLL, she was started on therapeutic lovenox.  The following day she was walking to the bathroom and had a mechanical fall hitting her forehead.  Negative LOC.  Head CT showed bilateral subarachnoid Hemorrhage . She was subsequently transferred to [facility] for higher level of care.     ID consulted for abx recs. vanc is currently on hold given MC. She remains on zosyn. MRI of foot was negative for osteomyelitis.

## 2017-11-25 NOTE — ASSESSMENT & PLAN NOTE
57 year old diabetic originally admitted to OSH for left diabetic heel wound with hospital course c/b unresponsiveness found to have PE LLL (started on lovenox) who then developed MC- seen by nephrology who though ATN secondary to ischemia/IV contrast c/b pleural effusions c/b mechanical fall with post-traumatic SAH; transferred here for higher level of care; ID consulted for abx recs for heel wound:  - patient currently afebrile, without leukocytosis, and blood cultures negative  - MRI 11/20 without evidence of osteomyelitis; c/w tenosynovitis  - no culture data thus far    Plan:  1. Will check random vanc and re-dose once level below 20  2. Given nephrotoxicity of vanc/zosyn together, will d/c zosyn and start cefepime 1 gram IV q 24 hours (renally dosed) and flagyl 500 mg IV TID for now  3. Recommend podiatry obtain tissue culture sent to micro to help narrow abx therapy  4. Will likely need debridement once stable  5. ID will follow

## 2017-11-25 NOTE — PLAN OF CARE
Problem: SLP Goal  Goal: SLP Goal  Speech Language Pathology Goals  Goals expected to be met by 12/02/2017  1. Pt will participate in ongoing swallow assessment to determine safest, least restrictive means of nutrition/hydration/medication  2. Pt will participate in speech, language and cognitive assessment to determine ongoing POC needs      Outcome: Ongoing (interventions implemented as appropriate)  Bedside Swallow Study initiated. Pt with decreased level of alertness and increased lethargy. Patient not appropriate for PO trials. SLP not able to initiate speech, language and cognitive assessment 2/2 decreased BRADY. REC; NPO and ST to continue to follow. Findings reviewed with nurse and MD team. Thank you.    FAITH Ibrahim., [facility]-SLP  Speech-Language Pathology  Pager: [phone]  11/25/2017

## 2017-11-25 NOTE — PLAN OF CARE
11/25/17 0841   Final Note   Assessment Type Final Discharge Note   Discharge Disposition Other Fac UT

## 2017-11-25 NOTE — ASSESSMENT & PLAN NOTE
Nutrition Problem  Inadequate energy intake     Related to (etiology):   Condition related diagnoses, AMS    Signs and Symptoms (as evidenced by):   PO intake >50%    Interventions/Recommendations (treatment strategy):  See recs     Nutrition Diagnosis Status:   New

## 2017-11-25 NOTE — NURSING
At 0207 while attempting to give pt kayexalate pt showed signs of confusion and was visibly agitated.  During a follow up neuro exam pt was disoriented to time and place.  Dr. Greene was notified.  He ordered a blood gas be performed and a lactic acid sent drawn.  Will continue to monitor the pt.

## 2017-11-25 NOTE — PT/OT/SLP PROGRESS
Occupational Therapy      Juliane Ramos  MRN: [id number]    Patient not seen today secondary to RN report of increased lethargy this date and currently on CRRT.  Will follow-up as scheduled.      SANJU Zarate  11/25/2017

## 2017-11-25 NOTE — PLAN OF CARE
ICU Attending Note  Neurocritical Care    [id number]  Severe asterixis.    -levetiracetam prophylaxis  -gabapentin  -wean O2 by NC  -BiPAP qhs  --140  -decrease carvedilol 12.5 mg q12h  -hold IVF given volume overload on examination, CXR, CVP  -plan HD for clearance given anuria, metabolic and respiratory acidosis, hyperkalemia  -Zosyn; await ID input  -stop detemir; start ISS  -NPO currently  -hold heparin prophylaxis currently  -activity as tolerated

## 2017-11-26 PROBLEM — N18.30 TYPE 2 DIABETES MELLITUS WITH STAGE 3 CHRONIC KIDNEY DISEASE, WITHOUT LONG-TERM CURRENT USE OF INSULIN: Chronic | Status: ACTIVE | Noted: 2017-06-24

## 2017-11-26 PROBLEM — I10 ESSENTIAL HYPERTENSION: Chronic | Status: ACTIVE | Noted: 2017-06-24

## 2017-11-26 PROBLEM — I25.10 CORONARY ARTERY DISEASE INVOLVING NATIVE CORONARY ARTERY OF NATIVE HEART WITHOUT ANGINA PECTORIS: Chronic | Status: ACTIVE | Noted: 2017-06-24

## 2017-11-26 PROBLEM — D63.1 ANEMIA IN STAGE 3 CHRONIC KIDNEY DISEASE: Status: ACTIVE | Noted: 2017-11-26

## 2017-11-26 PROBLEM — E11.22 TYPE 2 DIABETES MELLITUS WITH STAGE 3 CHRONIC KIDNEY DISEASE, WITHOUT LONG-TERM CURRENT USE OF INSULIN: Chronic | Status: ACTIVE | Noted: 2017-06-24

## 2017-11-26 PROBLEM — D64.9 ANEMIA: Status: ACTIVE | Noted: 2017-11-26

## 2017-11-26 PROBLEM — N17.9 AKI (ACUTE KIDNEY INJURY): Status: RESOLVED | Noted: 2017-06-29 | Resolved: 2017-11-26

## 2017-11-26 PROBLEM — N18.30 ANEMIA IN STAGE 3 CHRONIC KIDNEY DISEASE: Status: ACTIVE | Noted: 2017-11-26

## 2017-11-26 LAB
ALBUMIN SERPL BCP-MCNC: 2.1 G/DL
ALBUMIN SERPL BCP-MCNC: 2.2 G/DL
ALLENS TEST: ABNORMAL
ANION GAP SERPL CALC-SCNC: 10 MMOL/L
ANION GAP SERPL CALC-SCNC: 10 MMOL/L
ANION GAP SERPL CALC-SCNC: 11 MMOL/L
ANION GAP SERPL CALC-SCNC: 8 MMOL/L
ANION GAP SERPL CALC-SCNC: 9 MMOL/L
BASOPHILS # BLD AUTO: 0.06 K/UL
BASOPHILS NFR BLD: 0.8 %
BUN SERPL-MCNC: 11 MG/DL
BUN SERPL-MCNC: 11 MG/DL
BUN SERPL-MCNC: 18 MG/DL
BUN SERPL-MCNC: 5 MG/DL
BUN SERPL-MCNC: 7 MG/DL
CALCIUM SERPL-MCNC: 7.9 MG/DL
CALCIUM SERPL-MCNC: 8 MG/DL
CALCIUM SERPL-MCNC: 8.2 MG/DL
CHLORIDE SERPL-SCNC: 102 MMOL/L
CHLORIDE SERPL-SCNC: 104 MMOL/L
CHLORIDE SERPL-SCNC: 104 MMOL/L
CHLORIDE SERPL-SCNC: 105 MMOL/L
CHLORIDE SERPL-SCNC: 105 MMOL/L
CHLORIDE SERPL-SCNC: 106 MMOL/L
CO2 SERPL-SCNC: 23 MMOL/L
CO2 SERPL-SCNC: 24 MMOL/L
CO2 SERPL-SCNC: 25 MMOL/L
CREAT SERPL-MCNC: 0.9 MG/DL
CREAT SERPL-MCNC: 1.1 MG/DL
CREAT SERPL-MCNC: 1.4 MG/DL
CREAT SERPL-MCNC: 1.4 MG/DL
CREAT SERPL-MCNC: 1.5 MG/DL
CREAT SERPL-MCNC: 1.5 MG/DL
CREAT SERPL-MCNC: 2 MG/DL
DELSYS: ABNORMAL
DIFFERENTIAL METHOD: ABNORMAL
EOSINOPHIL # BLD AUTO: 0.1 K/UL
EOSINOPHIL NFR BLD: 1.6 %
ERYTHROCYTE [DISTWIDTH] IN BLOOD BY AUTOMATED COUNT: 17.6 %
EST. GFR  (AFRICAN AMERICAN): 31.7 ML/MIN/1.73 M^2
EST. GFR  (AFRICAN AMERICAN): 44.9 ML/MIN/1.73 M^2
EST. GFR  (AFRICAN AMERICAN): 44.9 ML/MIN/1.73 M^2
EST. GFR  (AFRICAN AMERICAN): 48.8 ML/MIN/1.73 M^2
EST. GFR  (AFRICAN AMERICAN): 48.8 ML/MIN/1.73 M^2
EST. GFR  (AFRICAN AMERICAN): >60 ML/MIN/1.73 M^2
EST. GFR  (NON AFRICAN AMERICAN): 27.5 ML/MIN/1.73 M^2
EST. GFR  (NON AFRICAN AMERICAN): 38.9 ML/MIN/1.73 M^2
EST. GFR  (NON AFRICAN AMERICAN): 38.9 ML/MIN/1.73 M^2
EST. GFR  (NON AFRICAN AMERICAN): 42.3 ML/MIN/1.73 M^2
EST. GFR  (NON AFRICAN AMERICAN): 42.3 ML/MIN/1.73 M^2
EST. GFR  (NON AFRICAN AMERICAN): 56.7 ML/MIN/1.73 M^2
EST. GFR  (NON AFRICAN AMERICAN): >60 ML/MIN/1.73 M^2
GLUCOSE SERPL-MCNC: 202 MG/DL
GLUCOSE SERPL-MCNC: 202 MG/DL
GLUCOSE SERPL-MCNC: 83 MG/DL
GLUCOSE SERPL-MCNC: 83 MG/DL
GLUCOSE SERPL-MCNC: 90 MG/DL
GLUCOSE SERPL-MCNC: 92 MG/DL
GLUCOSE SERPL-MCNC: 94 MG/DL
HCO3 UR-SCNC: 26 MMOL/L (ref 24–28)
HCT VFR BLD AUTO: 23.6 %
HGB BLD-MCNC: 7.1 G/DL
IMM GRANULOCYTES # BLD AUTO: 0.03 K/UL
IMM GRANULOCYTES NFR BLD AUTO: 0.4 %
LYMPHOCYTES # BLD AUTO: 0.5 K/UL
LYMPHOCYTES NFR BLD: 6.6 %
MAGNESIUM SERPL-MCNC: 1.7 MG/DL
MAGNESIUM SERPL-MCNC: 1.7 MG/DL
MAGNESIUM SERPL-MCNC: 1.8 MG/DL
MAGNESIUM SERPL-MCNC: 1.8 MG/DL
MAGNESIUM SERPL-MCNC: 2 MG/DL
MAGNESIUM SERPL-MCNC: 2 MG/DL
MCH RBC QN AUTO: 23.8 PG
MCHC RBC AUTO-ENTMCNC: 30.1 G/DL
MCV RBC AUTO: 79 FL
MONOCYTES # BLD AUTO: 0.9 K/UL
MONOCYTES NFR BLD: 12.4 %
NEUTROPHILS # BLD AUTO: 5.9 K/UL
NEUTROPHILS NFR BLD: 78.2 %
NRBC BLD-RTO: 0 /100 WBC
PCO2 BLDA: 53.4 MMHG (ref 35–45)
PH SMN: 7.29 [PH] (ref 7.35–7.45)
PHOSPHATE SERPL-MCNC: 2.2 MG/DL
PHOSPHATE SERPL-MCNC: 2.6 MG/DL
PHOSPHATE SERPL-MCNC: 2.7 MG/DL
PHOSPHATE SERPL-MCNC: 2.7 MG/DL
PHOSPHATE SERPL-MCNC: 3.1 MG/DL
PHOSPHATE SERPL-MCNC: 3.1 MG/DL
PHOSPHATE SERPL-MCNC: 3.8 MG/DL
PLATELET # BLD AUTO: 160 K/UL
PMV BLD AUTO: 10.7 FL
PO2 BLDA: 75 MMHG (ref 80–100)
POC BE: 0 MMOL/L
POC SATURATED O2: 93 % (ref 95–100)
POC TCO2: 28 MMOL/L (ref 23–27)
POCT GLUCOSE: 86 MG/DL (ref 70–110)
POCT GLUCOSE: 89 MG/DL (ref 70–110)
POCT GLUCOSE: 90 MG/DL (ref 70–110)
POTASSIUM SERPL-SCNC: 3.9 MMOL/L
POTASSIUM SERPL-SCNC: 4.1 MMOL/L
POTASSIUM SERPL-SCNC: 4.1 MMOL/L
POTASSIUM SERPL-SCNC: 4.3 MMOL/L
POTASSIUM SERPL-SCNC: 4.6 MMOL/L
POTASSIUM SERPL-SCNC: 4.6 MMOL/L
RBC # BLD AUTO: 2.98 M/UL
SAMPLE: ABNORMAL
SITE: ABNORMAL
SODIUM SERPL-SCNC: 136 MMOL/L
SODIUM SERPL-SCNC: 137 MMOL/L
SODIUM SERPL-SCNC: 137 MMOL/L
SODIUM SERPL-SCNC: 139 MMOL/L
SODIUM SERPL-SCNC: 140 MMOL/L
VANCOMYCIN SERPL-MCNC: 11.5 UG/ML
WBC # BLD AUTO: 7.58 K/UL

## 2017-11-26 PROCEDURE — 20000000 HC ICU ROOM

## 2017-11-26 PROCEDURE — 25000003 PHARM REV CODE 250: Performed by: STUDENT IN AN ORGANIZED HEALTH CARE EDUCATION/TRAINING PROGRAM

## 2017-11-26 PROCEDURE — 80069 RENAL FUNCTION PANEL: CPT | Mod: 91

## 2017-11-26 PROCEDURE — 63600175 PHARM REV CODE 636 W HCPCS: Performed by: PHYSICIAN ASSISTANT

## 2017-11-26 PROCEDURE — 99233 SBSQ HOSP IP/OBS HIGH 50: CPT | Mod: ,,, | Performed by: PSYCHIATRY & NEUROLOGY

## 2017-11-26 PROCEDURE — 82803 BLOOD GASES ANY COMBINATION: CPT

## 2017-11-26 PROCEDURE — 97166 OT EVAL MOD COMPLEX 45 MIN: CPT

## 2017-11-26 PROCEDURE — 80202 ASSAY OF VANCOMYCIN: CPT

## 2017-11-26 PROCEDURE — 37799 UNLISTED PX VASCULAR SURGERY: CPT

## 2017-11-26 PROCEDURE — 85025 COMPLETE CBC W/AUTO DIFF WBC: CPT

## 2017-11-26 PROCEDURE — 63600175 PHARM REV CODE 636 W HCPCS: Performed by: INTERNAL MEDICINE

## 2017-11-26 PROCEDURE — 87075 CULTR BACTERIA EXCEPT BLOOD: CPT

## 2017-11-26 PROCEDURE — 87106 FUNGI IDENTIFICATION YEAST: CPT

## 2017-11-26 PROCEDURE — 87205 SMEAR GRAM STAIN: CPT

## 2017-11-26 PROCEDURE — 90945 DIALYSIS ONE EVALUATION: CPT | Mod: ,,, | Performed by: INTERNAL MEDICINE

## 2017-11-26 PROCEDURE — 87070 CULTURE OTHR SPECIMN AEROBIC: CPT

## 2017-11-26 PROCEDURE — 63600175 PHARM REV CODE 636 W HCPCS: Performed by: STUDENT IN AN ORGANIZED HEALTH CARE EDUCATION/TRAINING PROGRAM

## 2017-11-26 PROCEDURE — A4216 STERILE WATER/SALINE, 10 ML: HCPCS | Performed by: STUDENT IN AN ORGANIZED HEALTH CARE EDUCATION/TRAINING PROGRAM

## 2017-11-26 PROCEDURE — 83735 ASSAY OF MAGNESIUM: CPT | Mod: 91

## 2017-11-26 PROCEDURE — 87186 SC STD MICRODIL/AGAR DIL: CPT

## 2017-11-26 PROCEDURE — S0030 INJECTION, METRONIDAZOLE: HCPCS | Performed by: PHYSICIAN ASSISTANT

## 2017-11-26 PROCEDURE — 87077 CULTURE AEROBIC IDENTIFY: CPT

## 2017-11-26 PROCEDURE — 25000003 PHARM REV CODE 250: Performed by: PHYSICIAN ASSISTANT

## 2017-11-26 RX ORDER — HYDROCODONE BITARTRATE AND ACETAMINOPHEN 500; 5 MG/1; MG/1
TABLET ORAL
Status: DISCONTINUED | OUTPATIENT
Start: 2017-11-26 | End: 2017-11-27 | Stop reason: HOSPADM

## 2017-11-26 RX ORDER — ACETAMINOPHEN 325 MG/1
650 TABLET ORAL EVERY 6 HOURS PRN
Status: DISCONTINUED | OUTPATIENT
Start: 2017-11-26 | End: 2017-11-27 | Stop reason: HOSPADM

## 2017-11-26 RX ADMIN — GABAPENTIN 100 MG: 100 CAPSULE ORAL at 05:11

## 2017-11-26 RX ADMIN — ACETAMINOPHEN 650 MG: 325 TABLET ORAL at 10:11

## 2017-11-26 RX ADMIN — LEVETIRACETAM 250 MG: 500 SOLUTION ORAL at 08:11

## 2017-11-26 RX ADMIN — Medication 3 ML: at 01:11

## 2017-11-26 RX ADMIN — METRONIDAZOLE 500 MG: 500 INJECTION, SOLUTION INTRAVENOUS at 05:11

## 2017-11-26 RX ADMIN — LEVOTHYROXINE SODIUM 50 MCG: 50 TABLET ORAL at 05:11

## 2017-11-26 RX ADMIN — CARVEDILOL 12.5 MG: 12.5 TABLET, FILM COATED ORAL at 09:11

## 2017-11-26 RX ADMIN — MAGNESIUM SULFATE IN WATER 2 G: 40 INJECTION, SOLUTION INTRAVENOUS at 10:11

## 2017-11-26 RX ADMIN — Medication 3 ML: at 09:11

## 2017-11-26 RX ADMIN — METRONIDAZOLE 500 MG: 500 INJECTION, SOLUTION INTRAVENOUS at 01:11

## 2017-11-26 RX ADMIN — GABAPENTIN 100 MG: 100 CAPSULE ORAL at 09:11

## 2017-11-26 RX ADMIN — CEFEPIME HYDROCHLORIDE 1 G: 1 INJECTION, POWDER, FOR SOLUTION INTRAMUSCULAR; INTRAVENOUS at 01:11

## 2017-11-26 RX ADMIN — INSULIN ASPART 2 UNITS: 100 INJECTION, SOLUTION INTRAVENOUS; SUBCUTANEOUS at 09:11

## 2017-11-26 RX ADMIN — VANCOMYCIN HYDROCHLORIDE 1750 MG: 100 INJECTION, POWDER, LYOPHILIZED, FOR SOLUTION INTRAVENOUS at 01:11

## 2017-11-26 RX ADMIN — Medication 3 ML: at 05:11

## 2017-11-26 RX ADMIN — GABAPENTIN 100 MG: 100 CAPSULE ORAL at 01:11

## 2017-11-26 RX ADMIN — ROSUVASTATIN CALCIUM 10 MG: 10 TABLET, FILM COATED ORAL at 08:11

## 2017-11-26 RX ADMIN — METRONIDAZOLE 500 MG: 500 INJECTION, SOLUTION INTRAVENOUS at 09:11

## 2017-11-26 RX ADMIN — CARVEDILOL 12.5 MG: 12.5 TABLET, FILM COATED ORAL at 08:11

## 2017-11-26 RX ADMIN — LEVETIRACETAM 250 MG: 500 SOLUTION ORAL at 09:11

## 2017-11-26 NOTE — ASSESSMENT & PLAN NOTE
- MC on CKD, worsening creatinine at outside hospital. Nephrology at OSH records indicate MC due to ATN 2/2 ischemia (hypotension) and toxins (IV contrast)  -plan HD for clearance given anuria, metabolic and respiratory acidosis, hyperkalemia  -nephrology following   - s/p SLED x24 hours 11/25-11/26; per nephrology ok for floor, will start hemodialysis

## 2017-11-26 NOTE — PLAN OF CARE
Problem: Occupational Therapy Goal  Goal: Occupational Therapy Goal  Goals to be met by: 12/10   Patient will increase functional independence with ADLs by performing:    Bed mobility with bedrails and SBA.  UE Dressing while seated EOB with Set-up Assistance and Minimal Assistance for dynamic postural control.  Grooming while seated EOB with Minimal Assistance.  Toileting from bedside commode with Moderate Assistance for hygiene and clothing management.   Supine to sit with Maximum Assistance.  Squat pivot t/f while maintaining WB precautions for L LE with max(A).  Upper extremity exercise program for endurance training x15 reps per handout, with assistance as needed.    Outcome: Ongoing (interventions implemented as appropriate)  OT eval completed. Rec SNF at this time. SANJU Lea 11/26/2017

## 2017-11-26 NOTE — PLAN OF CARE
Hospital Medicine Consult Service  Case discussed with Dr. Bg Sánchez from Neurocritical team. Patient to be accepted to hospital medicine service and hospital medicine to assume care of patient tomorrow morning at 7:00 am if patient is out of the Neuro ICU and on the floor. Patient to be placed on IMT list this evening and be reassigned to hospital medicine service for the am. Please see Neuro ICU stepdown note for full details on patient's hospital course.     In brief, 56 y/o female originally admitted to [facility] with infected diabetic heel ulcer to left achilles area and started on IV antibiotics with plans for surgical debridement by Podiatry. On morning of procedure patient had sudden drop in oxygen sats and surgery cancelled and work-up revealed multiple segmental and subsegmental thromboemboli in RLL. Patient started on therapeutic Lovenox at [facility]. Patient then suffered an accidental fall while ambulating with her  in hospital and brain imaging revealed SAH and patient transferred to Neuro ICU here at [facility] from observation and Neurosurgery evaluation and Lovenox discontinued. Patient did not require any surgical intervention as per Neurosurgery for SAH and CT scan of head on repeat showed stable bilateral subarachnoid hematoma. Podiatry here at [facility] consulted as well as Infectious disease for heel ulcer. Plan is not for any surgical debridement at this time and patient on IV Cefepime, Flagyl and Vancomycin and cultures from wound taken today, 11/26.   Also of significant note, patient developed MC after CTA of chest and had acute worsening after transfer so Nephrology consulted here and patient started on CRRT by Nephrology yesterday after temporary line place. According to Neuro ICU, prior to starting CRRT patient was lethargic and hyperkalemic and anuric and today after 24 hours of CRRT patient is not much more awake and alert. Nephrology is following  to manage dialysis needs and okay with transfer to floor and may not further hemodialysis but nephrology to assess for needs daily.     Outstanding issues:   · Close monitoring of renal function to see if patient will require further dialysis  · Decision will need to be made on treatment of pulmonary embolus as patient currently not anticoagulated and will need to check with neurosurgery will safe to resume anticoagulation    TARUN MONZON MD  Attending Staff Physician   [facility] Medicine  Pager: [phone]  Spectralink: [phone]

## 2017-11-26 NOTE — SUBJECTIVE & OBJECTIVE
Interval History: on SLED, net negative by about 450ccs over past 24hrs    Review of patient's allergies indicates:  No Known Allergies  Current Facility-Administered Medications   Medication Frequency    0.9%  NaCl infusion (CRRT USE ONLY) Continuous    0.9%  NaCl infusion (for blood administration) Q24H PRN    acetaminophen tablet 650 mg Q6H PRN    albuterol inhaler 2 puff Q6H PRN    carvedilol tablet 12.5 mg BID    cefepime in dextrose 5 % 1 gram/50 mL IVPB 1 g Q24H    dextrose 50% injection 12.5 g PRN    gabapentin capsule 100 mg TID    glucagon (human recombinant) injection 1 mg PRN    insulin aspart pen 1-10 Units Q4H PRN    labetalol injection 10 mg Q15 Min PRN    levetiracetam oral soln Soln 250 mg BID    levothyroxine tablet 50 mcg Before breakfast    metronidazole IVPB 500 mg Q8H    ondansetron injection 4 mg Q6H PRN    potassium phosphate 15 mmol in dextrose 5 % 250 mL infusion Daily PRN    rosuvastatin tablet 10 mg Daily    sodium chloride 0.9% flush 3 mL Q8H    vancomycin 1.75 g in 5 % dextrose 500 mL IVPB Q24H       Objective:     Vital Signs (Most Recent):  Temp: 97.3 °F (36.3 °C) (11/26/17 0701)  Pulse: 72 (11/26/17 1100)  Resp: (!) 33 (11/26/17 1100)  BP: 128/70 (11/26/17 1000)  SpO2: 96 % (11/26/17 1100)  O2 Device (Oxygen Therapy): nasal cannula (11/26/17 0600) Vital Signs (24h Range):  Temp:  [97.3 °F (36.3 °C)-98.8 °F (37.1 °C)] 97.3 °F (36.3 °C)  Pulse:  [68-76] 72  Resp:  [16-33] 33  SpO2:  [94 %-100 %] 96 %  BP: (110-142)/(56-73) 128/70  Arterial Line BP: (110-141)/(48-67) 126/60     Weight: 117 kg (257 lb 15 oz) (11/26/17 0701)  Body mass index is 41.63 kg/m².  Body surface area is 2.33 meters squared.    I/O last 3 completed shifts:  In: 4125 [P.O.:60; I.V.:3675; NG/GT:90; IV Piggyback:300]  Out: 4162 [Urine:30; Other:4132]    Physical Exam   HENT:   Head: Atraumatic.   Cardiovascular: Normal rate and regular rhythm.    Pulmonary/Chest: Effort normal and breath sounds  normal.   Abdominal: Soft. She exhibits no distension.   Musculoskeletal: She exhibits edema. She exhibits no tenderness.   Skin: Skin is warm.

## 2017-11-26 NOTE — PLAN OF CARE
Problem: Patient Care Overview  Goal: Plan of Care Review  Outcome: Ongoing (interventions implemented as appropriate)  Plan of care reviewed with patient and  at 1500. Patient verbalized understanding. All questions and concerns addressed today. Patient remains free from injury and falls. No acute events. Patient progressing towards goal. Will continue to monitor. See flowsheet for full assessment and vitals throughout shift.

## 2017-11-26 NOTE — PROGRESS NOTES
Neuro Critical Care Transfer of Care note    Date of Admit: 11/24/2017  Date of Transfer / Stepdown: 11/26/2017    Brief History of Present Illness:      History of Present Illness: Patient is a 57 year old female with HTN, HLD, T2DM (a1c 8.4%), HFrEF ( EF 35% on 11/21/17), CKD 3, remote CVA (R frontal lobe in 2005), 4v CABG (2011) who presents as a transfer from [facility] for subarachnoid hemorrhage after a mechanical fall. She initially presented to the OSH on 11/18 for a diabetic left heel wound, non-healing x3 weeks with fever, purulent drainage, and reportedly with maggots. She was being treated with zosyn and vanc. Debridement was planned, but on the day of surgery she reportedly vomited chunks of [employer] despite being NPO. In addition, she had one episode of unresponsiveness on 11/20, ABG showed acute on chronic hypoxic and hypocarbic respiratory failure. CTA was done and showed a segmental and subsegmental PE of the RLL for which therapeutic lovenox (1mg/kg) BID was started. She developed an MC with rising creatinine, nephrology was consulted and it was thought to be due to ATN 2/2 a combination of ischemia (from hypotension) and IV contrast (from CTA). She was being treated with gentle hydration in the setting of HFrEF, and fluids were later stopped due to development of effusions on 11/23. She developed bleeding at the site of her Iv's, at the site of lovenox injection, and hematuria and lovenox was held, last dose the morning of 11/23. On the morning of 11/24, she was reportedly ambulating to the bathroom with her  when she tripped, had a mechanical fall and hit her forehead on the wall. She denies loss of consciousness, confusion, headache, blurry vision, focal deficits. CT Head showed bilateral posttraumatic subarachnoid blood. She was subsequently transferred to [facility] [place] for higher level of care. Here, she denies headache, confusion, blurry vision, weakness, focal deficits, chest  pain, shortness of breath.      Hospital Course: 11/24: admit to [facility] s/p traumatic SAH, s/p fall on eliquis for PE  11/25: SAH stable. Renal failure, nephrology consulted, started SLED today.  11/26: mental status, electrolytes improved, hemodynamically stable, will transfer to floor and transition to hemodialysis        Interval History:  NAEON. Patient started on SLED on 11/25. This morning, patient much more alert, awake and oriented x3. She states she feels she is doing much better. She complains of moderate intermittent pain/spasms in her left lower calf area near the wound site. Denies fever, chills, chest pain, shortness of breath.    Hospital Course By Problem with Pertinent Findings:     Neuro   * Traumatic subarachnoid hemorrhage     - CT Head at OSH shows bilateral posttraumatic subarachnoid blood  - repeat CTH stable  - NSGY following, no surgical plan  - SBP <160  - holding aspirin, lovenox; Anti-Xa 0.70  - keppra 250 mg q12 x7days  - home gabapentin 100 mg TID   - q1h neurochecks        Pulmonary   Pleural effusion     -as seen on CXR  -IVF dc 2/2  volume overload on examination, CXR, CVP       Cardiac/Vascular   CAD (coronary artery disease)     - continue rosuvastatin 10 mg PO daily       Essential hypertension     - SBP <160  - carvedilol 12.5 mg BID  - holding lisinopril for MC             Renal/   MC (acute kidney injury)     - likely ATN secondary to ischemia and toxins  - consulted nephrology; see CKD       Hyperkalemia     - K 5.6, secondary to worsening renal function  - see above CKD  - now improved       CKD (chronic kidney disease) stage 3, GFR 30-59 ml/min     - MC on CKD, worsening creatinine at outside hospital. Nephrology at OSH records indicate MC due to ATN 2/2 ischemia (hypotension) and toxins (IV contrast)  -plan HD for clearance given anuria, metabolic and respiratory acidosis, hyperkalemia  -nephrology following   - s/p SLED x24 hours 11/25-11/26; per nephrology ok for  floor, will start hemodialysis          Hematology   Pulmonary embolism     - seen on CTA at outside hospital  - was receiving therapeutic lovenox, stopped after bleeding from IV sites and hematuria  - holding due to subarachnoid hemorrhage  - BLE US neg for DVT bilat   - plan to start heparin 11/27       Oncology   Anemia     - Hgb 7.1 today  - transfusing 1 unit pRBC in anticipation of transferring to floor       Endocrine   Diabetic leg ulcer     - MRI from outside hospital shows retro-achilles soft tissue wound, no osteomyelitis  - flagyl 500 mg BID, cefepime 1g q24h  - vanc level 11.5; redosed following SLED today. Per pharmacy, ok with 1750mg daily, check trough before 4th dose or following next dialysis session  - Infectious disease consulted, appreciate assistance  - podiatry consulted, no immediate surgery - plan to draw cultures from wound today, will eventually need debridement       Hypothyroidism     - continue levothyroxin 50 mcg          Uncontrolled type 2 diabetes with stage 3 chronic kidney disease GFR 30-59     - history of uncontrolled diabetes, on oral medication at home plus SSI  - hemoglobin A1c 8.4% on 11/18/17  - continuing gabapentin 100 mg TID (renally dosed)  - SSI only for now  - BG has been adequate without detemir as pt has been npo         Consultants and Procedures:     Consultants:  Nephrology, infectious disease, podiatry, NSGY    Procedures:        Transfer Information:     Diet:  Cardiac / Renal    Physical Activity:  PT/OT/SLP ordered    To Do / Pending Studies / Follow ups:  None    Bg Sánchez  Neuro Crtical Care

## 2017-11-26 NOTE — PLAN OF CARE
Problem: Patient Care Overview  Goal: Plan of Care Review  Outcome: Ongoing (interventions implemented as appropriate)  POC reviewed with pt and pts spouse at 0500. Pts spouse  verbalized understanding. Questions and concerns addressed. No acute events overnight. Pt progressing toward goals. Will continue to monitor. See flowsheets for full assessment and VS info

## 2017-11-26 NOTE — SUBJECTIVE & OBJECTIVE
"Interval Hx: Pt seen at bedside today, no complaints at this time.     Scheduled Meds:   carvedilol  12.5 mg Per NG tube BID    ceFEPime (MAXIPIME) IVPB  1 g Intravenous Q24H    gabapentin  100 mg Per NG tube TID    levetiracetam oral soln  250 mg Per NG tube BID    levothyroxine  50 mcg Per NG tube Before breakfast    metronidazole  500 mg Intravenous Q8H    rosuvastatin  10 mg Per NG tube Daily    sodium chloride 0.9%  3 mL Intravenous Q8H    vancomycin (VANCOCIN) IVPB  1,750 mg Intravenous Q24H     Continuous Infusions:     PRN Meds:sodium chloride, acetaminophen, albuterol, dextrose 50%, glucagon (human recombinant), insulin aspart, labetalol, ondansetron, potassium phosphate IVPB    Review of patient's allergies indicates:  No Known Allergies     Past Medical History:   Diagnosis Date    CHF (congestive heart failure)     COPD (chronic obstructive pulmonary disease)     Coronary artery disease     Diabetes mellitus     Encounter for blood transfusion     Hypertension     MI (myocardial infarction) 2010    Stroke     July 2005    Thyroid disease      Past Surgical History:   Procedure Laterality Date    ABCESS DRAINAGE Right     Between "little toe" and the one next to it    BREAST SURGERY      Reduction [id number]    CARDIAC SURGERY      CABG 4vessel ring in one valve mitral valve prolapse    CORONARY ARTERY BYPASS GRAFT      hyperlipidemia      TUBAL LIGATION  03/1986       Family History     Problem Relation (Age of Onset)    Arthritis Mother    Cancer Father (68)    Depression Sister    Diabetes Father, Maternal Grandmother    Heart disease Father    Hypertension Father, Son    Kidney disease Paternal Grandfather    Stroke Paternal Grandfather        Social History Main Topics    Smoking status: Never Smoker    Smokeless tobacco: Never Used    Alcohol use No    Drug use: No    Sexual activity: Yes     Partners: Male     Birth control/ protection: None     Review of Systems   Unable " to perform ROS: Patient nonverbal   Constitutional: Positive for diaphoresis.     Objective:     Vital Signs (Most Recent):  Temp: 98.6 °F (37 °C) (11/26/17 1100)  Pulse: 75 (11/26/17 1500)  Resp: (!) 21 (11/26/17 1500)  BP: 132/79 (11/26/17 1500)  SpO2: 98 % (11/26/17 1500) Vital Signs (24h Range):  Temp:  [97.3 °F (36.3 °C)-98.8 °F (37.1 °C)] 98.6 °F (37 °C)  Pulse:  [71-76] 75  Resp:  [16-33] 21  SpO2:  [94 %-100 %] 98 %  BP: (112-142)/(60-79) 132/79  Arterial Line BP: (120-141)/(49-67) 129/63     Weight: 117 kg (257 lb 15 oz)  Body mass index is 41.63 kg/m².    Foot Exam    Right Foot/Ankle     Inspection and Palpation  Ecchymosis: none  Tenderness: none   Skin Exam: skin intact;     Neurovascular  Dorsalis pedis: 1+  Posterior tibial: 1+      Left Foot/Ankle      Inspection and Palpation  Ecchymosis: none (Periwound)  Tenderness: none   Skin Exam: drainage, ulcer and erythema;     Neurovascular  Dorsalis pedis: 1+  Posterior tibial: 1+          Left foot      Laboratory:  A1C:     Recent Labs  Lab 06/25/17  0508 11/18/17  1658 11/25/17  0821   HGBA1C 10.0* 8.4* 8.0*     Blood Cultures: No results for input(s): LABBLOO in the last 48 hours.  BMP:     Recent Labs  Lab 11/26/17  1147   GLU 92  92  92     140  140   K 4.3  4.3  4.3     106  106   CO2 25  25  25   BUN 5*  5*  5*   CREATININE 0.9  0.9  0.9   CALCIUM 7.9*  7.9*  7.9*   MG 1.8  1.8     CBC:     Recent Labs  Lab 11/26/17  0323   WBC 7.58   RBC 2.98*   HGB 7.1*   HCT 23.6*      MCV 79*   MCH 23.8*   MCHC 30.1*     CMP:   Recent Labs  Lab 11/24/17  1834  11/26/17  1147     < > 92  92  92   CALCIUM 8.3*  < > 7.9*  7.9*  7.9*   ALBUMIN 2.5*  < > 2.1*  2.1*  2.1*   PROT 6.6  --   --    *  < > 140  140  140   K 5.8*  < > 4.3  4.3  4.3   CO2 22*  < > 25  25  25   CL 98  < > 106  106  106   BUN 58*  < > 5*  5*  5*   CREATININE 5.6*  < > 0.9  0.9  0.9   ALKPHOS 66  --   --    ALT 8*  --   --     AST 17  --   --    BILITOT 0.6  --   --    < > = values in this interval not displayed.  Coagulation:     Recent Labs  Lab 11/24/17  1502   INR 1.1     CRP: No results for input(s): CRP in the last 168 hours.  ESR: No results for input(s): SEDRATE in the last 168 hours.  Wound Cultures: No results for input(s): LABAERO in the last 4320 hours.    Diagnostic Results:  MRI: I have reviewed all pertinent results/findings within the past 24 hours.  reviwed  X-Ray: I have reviewed all pertinent results/findings within the past 24 hours.  reviewed  MRI 11/19:   1. Retro-Achilles soft tissue wound. No evidence for osteomyelitis.  2. Mild nonspecific Achilles and posterior tibial tenosynovitis.  3. Small tibiotalar joint effusion.    X-ray:   1.  No radiographically apparent acute osteomyelitis. Skin irregularity and bandage noted in the posterior aspect of the ankle.  2. Degenerative changes in the ankle, hindfoot and midfoot are noted.    Clinical Findings:  Wound to left posterior ankle measuring 4.2x1.1 cm with tendon exposure. Bloody drainage noted with periwound erythema and edema. No purulence.

## 2017-11-26 NOTE — ASSESSMENT & PLAN NOTE
- MRI from outside hospital shows retro-achilles soft tissue wound, no osteomyelitis  - flagyl 500 mg BID, cefepime 1g q24h  - vanc level 11.5; redosed following SLED today. Per pharmacy, ok with 1750mg daily, check trough before 4th dose or following next dialysis session  - Infectious disease consulted, appreciate assistance  - podiatry consulted, no immediate surgery - plan to draw cultures from wound today, will eventually need debridement

## 2017-11-26 NOTE — ASSESSMENT & PLAN NOTE
- history of uncontrolled diabetes, on oral medication at home plus SSI  - hemoglobin A1c 8.4% on 11/18/17  - continuing gabapentin 100 mg TID (renally dosed)  - SSI only for now  - BG has been adequate without detemir as pt has been npo

## 2017-11-26 NOTE — PROGRESS NOTES
[facility]  Nephrology  Progress Note    Patient Name: Juliane Ramos  MRN: [id number]  Admission Date: 11/24/2017  Hospital Length of Stay: 2 days  Attending Provider: Adan Iverson MD   Primary Care Physician: JOS Dumont  Principal Problem:Traumatic subarachnoid hemorrhage    Subjective:     HPI: Juliane Ramos is a 57 y.o.  female with a PMHx relevant for HFrEF( EF 35% on 11/21/17), CKD 3, DMT2 (a1c 8.4%), CVA, 4v CABG (2011), PE, diabetic foot ulcer of left foot [place] [facility] [place] [facility] secondary to new onset SAH after fall. She was admitted with OSH on 11/18 for a diabetic left heel wound, non-healing x3 weeks with fever, purulent drainage, and reportedly with maggots, treated with zosyn and vanc. She was dx with acute PE post syncopal episode secondary to hypercapnic resp failure. CTA done 11/20/2017 with eGFR < 45 mg/dl and was started on Lovenox. She has a sCr baseline 1.1-1.4 mg/dl. She was seen by nephrology at OSH secondary to MC due to ATN and CI-MC. She was treated initially with IV fluids for gentle hydration but jordan sCr continue to rise. [place] [facility] [place]Cr of 4.1 and decreased UO. She developed pleural effusion and IV fluids where stopped. Since then per report it seems like his UO has been decreasing. Her K has alos steadily bernard rising. 5.3 today.      Interval History: on SLED, net negative by about 450ccs over past 24hrs    Review of patient's allergies indicates:  No Known Allergies  Current Facility-Administered Medications   Medication Frequency    0.9%  NaCl infusion (CRRT USE ONLY) Continuous    0.9%  NaCl infusion (for blood administration) Q24H PRN    acetaminophen tablet 650 mg Q6H PRN    albuterol inhaler 2 puff Q6H PRN    carvedilol tablet 12.5 mg BID    cefepime in dextrose 5 % 1 gram/50 mL IVPB 1 g Q24H    dextrose 50% injection 12.5 g PRN    gabapentin capsule 100 mg TID    glucagon (human recombinant) injection  1 mg PRN    insulin aspart pen 1-10 Units Q4H PRN    labetalol injection 10 mg Q15 Min PRN    levetiracetam oral soln Soln 250 mg BID    levothyroxine tablet 50 mcg Before breakfast    metronidazole IVPB 500 mg Q8H    ondansetron injection 4 mg Q6H PRN    potassium phosphate 15 mmol in dextrose 5 % 250 mL infusion Daily PRN    rosuvastatin tablet 10 mg Daily    sodium chloride 0.9% flush 3 mL Q8H    vancomycin 1.75 g in 5 % dextrose 500 mL IVPB Q24H       Objective:     Vital Signs (Most Recent):  Temp: 97.3 °F (36.3 °C) (11/26/17 0701)  Pulse: 72 (11/26/17 1100)  Resp: (!) 33 (11/26/17 1100)  BP: 128/70 (11/26/17 1000)  SpO2: 96 % (11/26/17 1100)  O2 Device (Oxygen Therapy): nasal cannula (11/26/17 0600) Vital Signs (24h Range):  Temp:  [97.3 °F (36.3 °C)-98.8 °F (37.1 °C)] 97.3 °F (36.3 °C)  Pulse:  [68-76] 72  Resp:  [16-33] 33  SpO2:  [94 %-100 %] 96 %  BP: (110-142)/(56-73) 128/70  Arterial Line BP: (110-141)/(48-67) 126/60     Weight: 117 kg (257 lb 15 oz) (11/26/17 0701)  Body mass index is 41.63 kg/m².  Body surface area is 2.33 meters squared.    I/O last 3 completed shifts:  In: 4125 [P.O.:60; I.V.:3675; NG/GT:90; IV Piggyback:300]  Out: 4162 [Urine:30; Other:4132]    Physical Exam   HENT:   Head: Atraumatic.   Cardiovascular: Normal rate and regular rhythm.    Pulmonary/Chest: Effort normal and breath sounds normal.   Abdominal: Soft. She exhibits no distension.   Musculoskeletal: She exhibits edema. She exhibits no tenderness.   Skin: Skin is warm.           Assessment/Plan:     MC (acute kidney injury)    MC on CKD 3 oliguric and progressing to aniria, most likely secondary to iATN from hymodymamyc changes with hypotension and Toxic tubular injuries     On SLED yesterday and overnight, lytes, acid/base and volume status ok this morning, continues w/ minimal urine output    Plan/Recommendations:  -hold SLED for now and monitor urine output closely                  Thank you for your consult.  I will follow-up with patient. Please contact us if you have any additional questions.    Dano Burch MD  Nephrology  [facility]

## 2017-11-26 NOTE — SUBJECTIVE & OBJECTIVE
Interval History:  NAEON. Patient started on SLED on 11/25. This morning, patient much more alert, awake and oriented x3. She states she feels she is doing much better. She complains of moderate intermittent pain/spasms in her left lower calf area near the wound site. Denies fever, chills, chest pain, shortness of breath.    Review of Systems   Constitutional: Negative for chills, fatigue and fever.   HENT: Negative for sore throat and trouble swallowing.    Eyes: Negative for pain and visual disturbance.   Respiratory: Positive for shortness of breath. Negative for cough and wheezing.    Cardiovascular: Positive for leg swelling. Negative for chest pain and palpitations.   Gastrointestinal: Positive for vomiting. Negative for abdominal pain, constipation, diarrhea and nausea.   Endocrine: Negative for polyuria.   Genitourinary: Positive for decreased urine volume and hematuria.   Musculoskeletal: Positive for arthralgias. Negative for myalgias.   Skin: Positive for wound (left heel wound).   Neurological: Negative for dizziness, syncope, weakness, light-headedness and headaches.   Psychiatric/Behavioral: Negative for confusion and hallucinations.       Objective:     Vitals:  Temp: 97.3 °F (36.3 °C) (11/26/17 0701)  Pulse: 72 (11/26/17 1100)  Resp: (!) 33 (11/26/17 1100)  BP: 128/70 (11/26/17 1000)  SpO2: 96 % (11/26/17 1100)    Temp:  [97.3 °F (36.3 °C)-98.8 °F (37.1 °C)] 97.3 °F (36.3 °C)  Pulse:  [68-76] 72  Resp:  [16-33] 33  SpO2:  [94 %-100 %] 96 %  BP: (110-142)/(56-73) 128/70  Arterial Line BP: (110-141)/(48-67) 126/60              11/25 0701 - 11/26 0700  In: 3680 [I.V.:3390]  Out: 4147 [Urine:15]    Physical Exam   Constitutional: She is oriented to person, place, and time. She appears well-developed and well-nourished. No distress.   obese   HENT:   Head: Normocephalic.   Hematoma on forehead above left eye   Eyes: Conjunctivae and EOM are normal. Pupils are equal, round, and reactive to light.   Neck:  Normal range of motion. Neck supple.   Cardiovascular: Normal rate, regular rhythm and normal heart sounds.    Pulmonary/Chest: Effort normal and breath sounds normal. No respiratory distress. She has no wheezes. She has no rales.   Abdominal: Soft. Bowel sounds are normal. There is no tenderness.   Musculoskeletal: She exhibits edema.   Tenderness of the lower left calf close to wound   Neurological: She is alert and oriented to person, place, and time. No cranial nerve deficit or sensory deficit.   More alert compared to 11/25; asterixis negative   Skin: Skin is warm and dry.   Large left lower leg/heel wound, bandaged         Medications:  Continuous Scheduled  carvedilol 12.5 mg BID   ceFEPime (MAXIPIME) IVPB 1 g Q24H   gabapentin 100 mg TID   levetiracetam oral soln 250 mg BID   levothyroxine 50 mcg Before breakfast   metronidazole 500 mg Q8H   rosuvastatin 10 mg Daily   sodium chloride 0.9% 3 mL Q8H   vancomycin (VANCOCIN) IVPB 1,750 mg Q24H   PRN  sodium chloride  Q24H PRN   acetaminophen 650 mg Q6H PRN   albuterol 2 puff Q6H PRN   dextrose 50% 12.5 g PRN   glucagon (human recombinant) 1 mg PRN   insulin aspart 1-10 Units Q4H PRN   labetalol 10 mg Q15 Min PRN   ondansetron 4 mg Q6H PRN   potassium phosphate IVPB 15 mmol Daily PRN     Today I personally reviewed pertinent medications, lines/drains/airways, imaging, lab results, notably:

## 2017-11-26 NOTE — ASSESSMENT & PLAN NOTE
- CT Head at OSH shows bilateral posttraumatic subarachnoid blood  - repeat CTH stable  - NSGY following, no surgical plan  - SBP <160  - holding aspirin, lovenox; Anti-Xa 0.70  - keppra 250 mg q12 x7days  - home gabapentin 100 mg TID   - q1h neurochecks

## 2017-11-26 NOTE — PROGRESS NOTES
[facility]  Neurocritical [facility]  Progress Note    Admit Date: 11/24/2017  Service Date: 11/26/2017  Length of Stay: 2    Subjective:     Chief Complaint: Traumatic subarachnoid hemorrhage    History of Present Illness: Patient is a 57 year old female with HTN, HLD, T2DM (a1c 8.4%), HFrEF ( EF 35% on 11/21/17), CKD 3, remote CVA (R frontal lobe in 2005), 4v CABG (2011) who presents as a transfer from [facility] for subarachnoid hemorrhage after a mechanical fall. She initially presented to the OSH on 11/18 for a diabetic left heel wound, non-healing x3 weeks with fever, purulent drainage, and reportedly with maggots. She was being treated with zosyn and vanc. Debridement was planned, but on the day of surgery she reportedly vomited chunks of [employer] despite being NPO. In addition, she had one episode of unresponsiveness on 11/20, ABG showed acute on chronic hypoxic and hypocarbic respiratory failure. CTA was done and showed a segmental and subsegmental PE of the RLL for which therapeutic lovenox (1mg/kg) BID was started. She developed an MC with rising creatinine, nephrology was consulted and it was thought to be due to ATN 2/2 a combination of ischemia (from hypotension) and IV contrast (from CTA). She was being treated with gentle hydration in the setting of HFrEF, and fluids were later stopped due to development of effusions on 11/23. She developed bleeding at the site of her Iv's, at the site of lovenox injection, and hematuria and lovenox was held, last dose the morning of 11/23. On the morning of 11/24, she was reportedly ambulating to the bathroom with her  when she tripped, had a mechanical fall and hit her forehead on the wall. She denies loss of consciousness, confusion, headache, blurry vision, focal deficits. CT Head showed bilateral posttraumatic subarachnoid blood. She was subsequently transferred to [facility] for higher level of care. Here, she denies headache,  confusion, blurry vision, weakness, focal deficits, chest pain, shortness of breath.     Hospital Course: 11/24: admit to [facility] s/p traumatic SAH, s/p fall on eliquis for PE  11/25: SAH stable. Renal failure, nephrology consulted, started SLED today.  11/26: mental status, electrolytes improved, hemodynamically stable, will transfer to floor and transition to hemodialysis      Interval History:  NAEON. Patient started on SLED on 11/25. This morning, patient much more alert, awake and oriented x3. She states she feels she is doing much better. She complains of moderate intermittent pain/spasms in her left lower calf area near the wound site. Denies fever, chills, chest pain, shortness of breath.    Review of Systems   Constitutional: Negative for chills, fatigue and fever.   HENT: Negative for sore throat and trouble swallowing.    Eyes: Negative for pain and visual disturbance.   Respiratory: Positive for shortness of breath. Negative for cough and wheezing.    Cardiovascular: Positive for leg swelling. Negative for chest pain and palpitations.   Gastrointestinal: Positive for vomiting. Negative for abdominal pain, constipation, diarrhea and nausea.   Endocrine: Negative for polyuria.   Genitourinary: Positive for decreased urine volume and hematuria.   Musculoskeletal: Positive for arthralgias. Negative for myalgias.   Skin: Positive for wound (left heel wound).   Neurological: Negative for dizziness, syncope, weakness, light-headedness and headaches.   Psychiatric/Behavioral: Negative for confusion and hallucinations.       Objective:     Vitals:  Temp: 97.3 °F (36.3 °C) (11/26/17 0701)  Pulse: 72 (11/26/17 1100)  Resp: (!) 33 (11/26/17 1100)  BP: 128/70 (11/26/17 1000)  SpO2: 96 % (11/26/17 1100)    Temp:  [97.3 °F (36.3 °C)-98.8 °F (37.1 °C)] 97.3 °F (36.3 °C)  Pulse:  [68-76] 72  Resp:  [16-33] 33  SpO2:  [94 %-100 %] 96 %  BP: (110-142)/(56-73) 128/70  Arterial Line BP: (110-141)/(48-67) 126/60               11/25 0701 - 11/26 0700  In: 3680 [I.V.:3390]  Out: 4147 [Urine:15]    Physical Exam   Constitutional: She is oriented to person, place, and time. She appears well-developed and well-nourished. No distress.   obese   HENT:   Head: Normocephalic.   Hematoma on forehead above left eye   Eyes: Conjunctivae and EOM are normal. Pupils are equal, round, and reactive to light.   Neck: Normal range of motion. Neck supple.   Cardiovascular: Normal rate, regular rhythm and normal heart sounds.    Pulmonary/Chest: Effort normal and breath sounds normal. No respiratory distress. She has no wheezes. She has no rales.   Abdominal: Soft. Bowel sounds are normal. There is no tenderness.   Musculoskeletal: She exhibits edema.   Tenderness of the lower left calf close to wound   Neurological: She is alert and oriented to person, place, and time. No cranial nerve deficit or sensory deficit.   More alert compared to 11/25; asterixis negative   Skin: Skin is warm and dry.   Large left lower leg/heel wound, bandaged         Medications:  Continuous Scheduled  carvedilol 12.5 mg BID   ceFEPime (MAXIPIME) IVPB 1 g Q24H   gabapentin 100 mg TID   levetiracetam oral soln 250 mg BID   levothyroxine 50 mcg Before breakfast   metronidazole 500 mg Q8H   rosuvastatin 10 mg Daily   sodium chloride 0.9% 3 mL Q8H   vancomycin (VANCOCIN) IVPB 1,750 mg Q24H   PRN  sodium chloride  Q24H PRN   acetaminophen 650 mg Q6H PRN   albuterol 2 puff Q6H PRN   dextrose 50% 12.5 g PRN   glucagon (human recombinant) 1 mg PRN   insulin aspart 1-10 Units Q4H PRN   labetalol 10 mg Q15 Min PRN   ondansetron 4 mg Q6H PRN   potassium phosphate IVPB 15 mmol Daily PRN     Today I personally reviewed pertinent medications, lines/drains/airways, imaging, lab results, notably:    Assessment/Plan:     Neuro   * Traumatic subarachnoid hemorrhage    - CT Head at OSH shows bilateral posttraumatic subarachnoid blood  - repeat CTH stable  - NSGY following, no surgical plan  -  SBP <160  - holding aspirin, lovenox; Anti-Xa 0.70  - keppra 250 mg q12 x7days  - home gabapentin 100 mg TID   - q1h neurochecks         Pulmonary   Pleural effusion    -as seen on CXR  -IVF dc 2/2  volume overload on examination, CXR, CVP        Cardiac/Vascular   CAD (coronary artery disease)    - continue rosuvastatin 10 mg PO daily        Essential hypertension    - SBP <160  - carvedilol 12.5 mg BID  - holding lisinopril for MC            Renal/   MC (acute kidney injury)    - likely ATN secondary to ischemia and toxins  - consulted nephrology; see CKD        Hyperkalemia    - K 5.6, secondary to worsening renal function  - see above CKD  - now improved        CKD (chronic kidney disease) stage 3, GFR 30-59 ml/min    - MC on CKD, worsening creatinine at outside hospital. Nephrology at OSH records indicate MC due to ATN 2/2 ischemia (hypotension) and toxins (IV contrast)  -plan HD for clearance given anuria, metabolic and respiratory acidosis, hyperkalemia  -nephrology following   - s/p SLED x24 hours 11/25-11/26; per nephrology ok for floor, will start hemodialysis          Hematology   Pulmonary embolism    - seen on CTA at outside hospital  - was receiving therapeutic lovenox, stopped after bleeding from IV sites and hematuria  - holding due to subarachnoid hemorrhage  - BLE US neg for DVT bilat   - plan to start heparin 11/27        Oncology   Anemia    - Hgb 7.1 today  - transfusing 1 unit pRBC in anticipation of transferring to floor        Endocrine   Diabetic leg ulcer    - MRI from outside hospital shows retro-achilles soft tissue wound, no osteomyelitis  - flagyl 500 mg BID, cefepime 1g q24h  - vanc level 11.5; redosed following SLED today. Per pharmacy, ok with 1750mg daily, check trough before 4th dose or following next dialysis session  - Infectious disease consulted, appreciate assistance  - podiatry consulted, no immediate surgery - plan to draw cultures from wound today, will eventually  need debridement        Hypothyroidism    - continue levothyroxin 50 mcg          Uncontrolled type 2 diabetes with stage 3 chronic kidney disease GFR 30-59    - history of uncontrolled diabetes, on oral medication at home plus SSI  - hemoglobin A1c 8.4% on 11/18/17  - continuing gabapentin 100 mg TID (renally dosed)  - SSI only for now  - BG has been adequate without detemir as pt has been npo            Prophylaxis:  Venous Thromboembolism: none  Stress Ulcer: None  Ventilator Pneumonia: not applicable     Activity Orders          Activity as tolerated starting at 11/25 1100        Full Code    Bg Sánchez MD  Neurocritical Care  [facility]

## 2017-11-26 NOTE — PLAN OF CARE
ICU Attending Note  Neurocritical Care    [id number]  Much improved myoclonus    -levetiracetam prophylaxis x1 wk  -BiPAP qhs  -carvedilol 12.5 mg q12h  --160  -discuss HD rather than CRRT with Nephrology  -cefepime, metronidazole, restart vancomycin and dose by level  -HGB >7, 1 U PRBC  -ADAT  -start heparin prophylaxis tomorrow  -coordinate transfer to Medicine

## 2017-11-26 NOTE — PROGRESS NOTES
"[facility]  Podiatry  Progress Note    Patient Name: Juliane Ramos  MRN: [id number]  Admission Date: 11/24/2017  Hospital Length of Stay: 2 days  Attending Physician: Adan Iverson MD  Primary Care Provider: JOS Dumont   Interval Hx: Pt seen at bedside today, no complaints at this time.     Scheduled Meds:   carvedilol  12.5 mg Per NG tube BID    ceFEPime (MAXIPIME) IVPB  1 g Intravenous Q24H    gabapentin  100 mg Per NG tube TID    levetiracetam oral soln  250 mg Per NG tube BID    levothyroxine  50 mcg Per NG tube Before breakfast    metronidazole  500 mg Intravenous Q8H    rosuvastatin  10 mg Per NG tube Daily    sodium chloride 0.9%  3 mL Intravenous Q8H    vancomycin (VANCOCIN) IVPB  1,750 mg Intravenous Q24H     Continuous Infusions:     PRN Meds:sodium chloride, acetaminophen, albuterol, dextrose 50%, glucagon (human recombinant), insulin aspart, labetalol, ondansetron, potassium phosphate IVPB    Review of patient's allergies indicates:  No Known Allergies     Past Medical History:   Diagnosis Date    CHF (congestive heart failure)     COPD (chronic obstructive pulmonary disease)     Coronary artery disease     Diabetes mellitus     Encounter for blood transfusion     Hypertension     MI (myocardial infarction) 2010    Stroke     July 2005    Thyroid disease      Past Surgical History:   Procedure Laterality Date    ABCESS DRAINAGE Right     Between "little toe" and the one next to it    BREAST SURGERY      Reduction [id number]    CARDIAC SURGERY      CABG 4vessel ring in one valve mitral valve prolapse    CORONARY ARTERY BYPASS GRAFT      hyperlipidemia      TUBAL LIGATION  03/1986       Family History     Problem Relation (Age of Onset)    Arthritis Mother    Cancer Father (68)    Depression Sister    Diabetes Father, Maternal Grandmother    Heart disease Father    Hypertension Father, Son    Kidney disease Paternal Grandfather    Stroke Paternal " Grandfather        Social History Main Topics    Smoking status: Never Smoker    Smokeless tobacco: Never Used    Alcohol use No    Drug use: No    Sexual activity: Yes     Partners: Male     Birth control/ protection: None     Review of Systems   Unable to perform ROS: Patient nonverbal   Constitutional: Positive for diaphoresis.     Objective:     Vital Signs (Most Recent):  Temp: 98.6 °F (37 °C) (11/26/17 1100)  Pulse: 75 (11/26/17 1500)  Resp: (!) 21 (11/26/17 1500)  BP: 132/79 (11/26/17 1500)  SpO2: 98 % (11/26/17 1500) Vital Signs (24h Range):  Temp:  [97.3 °F (36.3 °C)-98.8 °F (37.1 °C)] 98.6 °F (37 °C)  Pulse:  [71-76] 75  Resp:  [16-33] 21  SpO2:  [94 %-100 %] 98 %  BP: (112-142)/(60-79) 132/79  Arterial Line BP: (120-141)/(49-67) 129/63     Weight: 117 kg (257 lb 15 oz)  Body mass index is 41.63 kg/m².    Foot Exam    Right Foot/Ankle     Inspection and Palpation  Ecchymosis: none  Tenderness: none   Skin Exam: skin intact;     Neurovascular  Dorsalis pedis: 1+  Posterior tibial: 1+      Left Foot/Ankle      Inspection and Palpation  Ecchymosis: none (Periwound)  Tenderness: none   Skin Exam: drainage, ulcer and erythema;     Neurovascular  Dorsalis pedis: 1+  Posterior tibial: 1+          Left foot      Laboratory:  A1C:     Recent Labs  Lab 06/25/17  0508 11/18/17  1658 11/25/17  0821   HGBA1C 10.0* 8.4* 8.0*     Blood Cultures: No results for input(s): LABBLOO in the last 48 hours.  BMP:     Recent Labs  Lab 11/26/17  1147   GLU 92  92  92     140  140   K 4.3  4.3  4.3     106  106   CO2 25 25 25   BUN 5*  5*  5*   CREATININE 0.9  0.9  0.9   CALCIUM 7.9*  7.9*  7.9*   MG 1.8  1.8     CBC:     Recent Labs  Lab 11/26/17  0323   WBC 7.58   RBC 2.98*   HGB 7.1*   HCT 23.6*      MCV 79*   MCH 23.8*   MCHC 30.1*     CMP:   Recent Labs  Lab 11/24/17  1834  11/26/17  1147     < > 92  92  92   CALCIUM 8.3*  < > 7.9*  7.9*  7.9*   ALBUMIN 2.5*  < > 2.1*   2.1*  2.1*   PROT 6.6  --   --    *  < > 140  140  140   K 5.8*  < > 4.3  4.3  4.3   CO2 22*  < > 25  25  25   CL 98  < > 106  106  106   BUN 58*  < > 5*  5*  5*   CREATININE 5.6*  < > 0.9  0.9  0.9   ALKPHOS 66  --   --    ALT 8*  --   --    AST 17  --   --    BILITOT 0.6  --   --    < > = values in this interval not displayed.  Coagulation:     Recent Labs  Lab 11/24/17  1502   INR 1.1     CRP: No results for input(s): CRP in the last 168 hours.  ESR: No results for input(s): SEDRATE in the last 168 hours.  Wound Cultures: No results for input(s): LABAERO in the last 4320 hours.    Diagnostic Results:  MRI: I have reviewed all pertinent results/findings within the past 24 hours.  reviwed  X-Ray: I have reviewed all pertinent results/findings within the past 24 hours.  reviewed  MRI 11/19:   1. Retro-Achilles soft tissue wound. No evidence for osteomyelitis.  2. Mild nonspecific Achilles and posterior tibial tenosynovitis.  3. Small tibiotalar joint effusion.    X-ray:   1.  No radiographically apparent acute osteomyelitis. Skin irregularity and bandage noted in the posterior aspect of the ankle.  2. Degenerative changes in the ankle, hindfoot and midfoot are noted.    Clinical Findings:  Wound to left posterior ankle measuring 4.2x1.1 cm with tendon exposure. Bloody drainage noted with periwound erythema and edema. No purulence.     Assessment/Plan:     * Traumatic subarachnoid hemorrhage     Per primary       Diabetic leg ulcer     -Retroachilles heel ulceration with periwound erythema noted  -Cultures obtained, orders placed  -Discussed debridement with possible ankle joint tap, however pt relates she would like to f/u as outpt with her podiatrist in [place].  -Will defer surgical debridement for now  -Nursing to change dressings daily with santyl, orders placed  -Wound dressed with 4x4, kerlix, coban  -Podiatry will follow           Uncontrolled type 2 diabetes with stage 3 chronic kidney  disease GFR 30-59     Per primary       CKD (chronic kidney disease) stage 3, GFR 30-59 ml/min     Per primary           Sarika Angel MD  Podiatry  [facility]

## 2017-11-26 NOTE — ASSESSMENT & PLAN NOTE
- seen on CTA at outside hospital  - was receiving therapeutic lovenox, stopped after bleeding from IV sites and hematuria  - holding due to subarachnoid hemorrhage  - BLE US neg for DVT bilat   - plan to start heparin 11/27

## 2017-11-26 NOTE — ASSESSMENT & PLAN NOTE
MC on CKD 3 oliguric and progressing to aniria, most likely secondary to iATN from hymodymamyc changes with hypotension and Toxic tubular injuries     On SLED yesterday and overnight, lytes, acid/base and volume status ok this morning, continues w/ minimal urine output    Plan/Recommendations:  -hold SLED for now and monitor urine output closely

## 2017-11-26 NOTE — PT/OT/SLP EVAL
"Occupational Therapy  Evaluation    Juliane Ramos   MRN: [id number]   Admitting Diagnosis: Traumatic subarachnoid hemorrhage    OT Date of Treatment: 11/26/17   OT Start Time: 0920  OT Stop Time: 0945  OT Total Time (min): 25 min    Billable Minutes:  Evaluation 25    Diagnosis: Traumatic subarachnoid hemorrhage (bilateral)  S/p fall in outside hospital; diabetic ulcer to L ankle/heel; pulmonary embolism    Past Medical History:   Diagnosis Date    CHF (congestive heart failure)     COPD (chronic obstructive pulmonary disease)     Coronary artery disease     Diabetes mellitus     Encounter for blood transfusion     Hypertension     MI (myocardial infarction) 2010    Stroke     July 2005    Thyroid disease       Past Surgical History:   Procedure Laterality Date    ABCESS DRAINAGE Right     Between "little toe" and the one next to it    BREAST SURGERY      Reduction [id number]    CARDIAC SURGERY      CABG 4vessel ring in one valve mitral valve prolapse    CORONARY ARTERY BYPASS GRAFT      hyperlipidemia      TUBAL LIGATION  03/1986       Referring physician: MD Princess   Date referred to OT: 11/24/2017    General Precautions: Standard, aspiration, fall, respiratory, NPO, seizure  Orthopedic Precautions: N/A (assumed NWB for L LE)  Braces: N/A    Patient History:  Living Environment  Living Environment Comment: Pt reported that she lives with  in mobile home with 4 JEFF and B handrails. Home has tub/shower combo with shower seat and hand held shower. Has bedside commode over toilet. She reported that she was using a Rollator for functional mobility and frequently was "furniture walking". She wears bifocals, does not drive. Stated she was using the scooters while grocery shopping and running errands 2/2 poor endurance. She wears 3L of O2.  She requires no added assistance for ADLs/self care.  Equipment Currently Used at Home: rollator, bedside commode, shower chair (per chart review: Pt also has hospital " "bed and lift device)    Prior level of function:   Bed Mobility/Transfers: needs device  Grooming: independent  Bathing: needs device  Upper Body Dressing: independent  Lower Body Dressing: independent  Toileting: independent  Homemaking Responsibilities: Yes  Driving License: No  Occupation: On disability  Type of Occupation: Reported 2/2 MD siting "Legal Blindness"   Roles: Wife, care taker to self, home and community dweller  Dominant hand: right    Subjective:  Communicated with RN prior to session.  Pt's  at bedside. Pt agreeable to OT evaluation.  Chief Complaint: L ankle and knee pain  Patient/Family stated goals: "Get this cleared up and get me home"    Pain/Comfort  Pain Rating 1: 8/10  Location - Side 1: Left  Location 1: ankle  Pain Addressed 1: Reposition (elevated)  Pain Rating Post-Intervention 1: 8/10    Objective:  Patient found with: arterial line, bed alarm, blood pressure cuff, pulse ox (continuous), telemetry, oxygen, peripheral IV, central line, NG tube (CRRT)    Cognitive Exam:  Oriented to: Person, Place, semi oriented to Time (took 3 trials for year/no cues to correct; oriented to month/stated Friday for date) and Situation  Follows Commands/attention: Follows multistep  commands  Communication: clear/fluent  Memory:  No Deficits noted  Safety awareness/insight to disability: impaired  Coping skills/emotional control: Appropriate to situation    Visual/perceptual:  Intact  tracking    Physical Exam:  Postural examination/scapula alignment: Rounded shoulder and Head forward  Skin integrity: would L ankle; dressing in tact  Edema: Mild B UE/LE    Sensation:   Intact for B UE    Upper Extremity Range of Motion:  Right Upper Extremity: WFL  Left Upper Extremity: WFL    Upper Extremity Strength:  Right Upper Extremity: 4+/5  Left Upper Extremity: 4+/5   Strength: 4+/5 B    Fine motor coordination:   Intact  Left hand, manipulation of objects and Right hand, manipulation of " objects    Gross motor coordination: WFL for B UE    Functional Mobility:  Bed Mobility:  Rolling/Turning to Left: Minimum assistance, With side rail  Rolling/Turning Right: Minimum assistance, With side rail  Scooting/Bridging: Maximum Assistance, With assist of 2 (to HOB via drawsheet)  Supine to Sit:  Unable to complete; Pt with SOB and O2 sats in lower 90s with sustained side lying on R side. Return to supine pt reported SOB and mild dizziness; did not complete sitting on this date    Transfers:  Sit <> Stand Assistance: Activity did not occur (not appropriate//need clearance for WBS and [place] for L LE)    Activities of Daily Living:  Feeding Level of Assistance: Activity did not occur (NPO)  UE Dressing Level of Assistance: Minimum assistance (supine HOB elevated)  LE Dressing Level of Assistance: Total assistance  Toileting Where Assessed: Bed level (Pt on bed pan upon arriva; required total assistance for perineal care)  Toileting Level of Assistance: Total assistance     Balance:   Unable to assess EOB on this date (see above)    Additional:  -Pt alert and agreeable to OT eval; edu on OT role in care  -Completed AAROM for L knee flex/ext, Hip IR/ER, hip flex/ext, toe flex/ext as tolerated x8 reps  -L LE re-elevated and heel floated ; HOB Elevated   -Communication board updated; edu pt and family on importance of increased activity while supine. Encouraged chair like position throughout the day    AM-PAC 6 CLICK ADL  How much help from another person does this patient currently need?  1 = Unable, Total/Dependent Assistance  2 = A lot, Maximum/Moderate Assistance  3 = A little, Minimum/Contact Guard/Supervision  4 = None, Modified [place]/Independent    Putting on and taking off regular lower body clothing? : 1  Bathing (including washing, rinsing, drying)?: 2  Toileting, which includes using toilet, bedpan, or urinal? : 2  Putting on and taking off regular upper body clothing?: 3  Taking care of personal  "grooming such as brushing teeth?: 3  Eating meals?: 3  Total Score: 14    AM-PAC Raw Score CMS "G-Code Modifier Level of Impairment Assistance   6 % Total / Unable   7 - 9 CM 80 - 100% Maximal Assist   10-14 CL 60 - 80% Moderate Assist   15 - 19 CK 40 - 60% Moderate Assist   20 - 22 CJ 20 - 40% Minimal Assist   23 CI 1-20% SBA / CGA   24 CH 0% Independent/ Mod I       Patient left HOB elevated with all lines intact, call button in reach, bed alarm on and RN and spouse present    Assessment:  Juliane Ramos is a 57 y.o. female with a medical diagnosis of Traumatic subarachnoid hemorrhage (bilateral) S/p fall in outside hospital. Pt initially presented to [facility] for diabetic ulcer to L ankle/heel. Moreover, she also has pulmonary embolism. She presents with overall debility at this time with poor insight into self care. She demo generalized weakness and decline in endurance with bed mobility on this date. She is limited to bed level ADLs/self care on this date, which is not her PLOF/baseline level of functional indep. She demo good understanding for OT role in care and she will cont to benefit from skilled OT services at this time for best functional outcome. Rec SNF at this time pending endurance and increased activity. Will follow up 5x/w at this time.    Rehab identified problem list/impairments: Rehab identified problem list/impairments: weakness, impaired endurance, impaired self care skills, impaired functional mobilty, gait instability, impaired sensation, decreased lower extremity function, decreased safety awareness, pain, impaired skin, edema, impaired cardiopulmonary response to activity, impaired balance    Rehab potential is fair+    Activity tolerance: Limited; Pt with SOB with rolling and on CRRT on this date/Limited to bedlevel    Discharge recommendations: Discharge Facility/Level Of Care Needs: nursing facility, skilled     Barriers to discharge: Barriers to Discharge: Inaccessible home " environment, Decreased caregiver support    Equipment recommendations: none     GOALS:    Occupational Therapy Goals        Problem: Occupational Therapy Goal    Goal Priority Disciplines Outcome Interventions   Occupational Therapy Goal     OT, PT/OT Ongoing (interventions implemented as appropriate)    Description:  Goals to be met by: 12/10   Patient will increase functional independence with ADLs by performing:    Bed mobility with bedrails and SBA.  UE Dressing while seated EOB with Set-up Assistance and Minimal Assistance for dynamic postural control.  Grooming while seated EOB with Minimal Assistance.  Toileting from bedside commode with Moderate Assistance for hygiene and clothing management.   Supine to sit with Maximum Assistance.  Squat pivot t/f while maintaining WB precautions for L LE with max(A).  Upper extremity exercise program for endurance training x15 reps per handout, with assistance as needed.                      PLAN:  Patient to be seen 5 x/week to address the above listed problems via self-care/home management, therapeutic activities, therapeutic exercises, neuromuscular re-education  Plan of Care expires: 12/24/17  Plan of Care reviewed with: patient, spouse    SANJU Lea  11/26/2017

## 2017-11-27 ENCOUNTER — HOSPITAL ENCOUNTER (INPATIENT)
Facility: HOSPITAL | Age: 57
LOS: 14 days | Discharge: NURSING, REHAB OR LONG-TERM CARE | DRG: 981 | End: 2017-12-11
Attending: INTERNAL MEDICINE | Admitting: INTERNAL MEDICINE
Payer: MEDICARE

## 2017-11-27 VITALS
HEART RATE: 67 BPM | WEIGHT: 257.94 LBS | SYSTOLIC BLOOD PRESSURE: 123 MMHG | HEIGHT: 66 IN | RESPIRATION RATE: 18 BRPM | OXYGEN SATURATION: 97 % | DIASTOLIC BLOOD PRESSURE: 67 MMHG | BODY MASS INDEX: 41.45 KG/M2 | TEMPERATURE: 97 F

## 2017-11-27 DIAGNOSIS — J96.02 ACUTE RESPIRATORY FAILURE WITH HYPOXIA AND HYPERCAPNIA: ICD-10-CM

## 2017-11-27 DIAGNOSIS — N18.3 ACUTE RENAL FAILURE SUPERIMPOSED ON STAGE 3 CHRONIC KIDNEY DISEASE, UNSPECIFIED ACUTE RENAL FAILURE TYPE: ICD-10-CM

## 2017-11-27 DIAGNOSIS — R55 SYNCOPE, NEAR: ICD-10-CM

## 2017-11-27 DIAGNOSIS — N17.9 ACUTE RENAL FAILURE SUPERIMPOSED ON STAGE 3 CHRONIC KIDNEY DISEASE, UNSPECIFIED ACUTE RENAL FAILURE TYPE: ICD-10-CM

## 2017-11-27 DIAGNOSIS — N18.30 CKD (CHRONIC KIDNEY DISEASE) STAGE 3, GFR 30-59 ML/MIN: ICD-10-CM

## 2017-11-27 DIAGNOSIS — J96.01 ACUTE RESPIRATORY FAILURE WITH HYPOXIA AND HYPERCAPNIA: ICD-10-CM

## 2017-11-27 DIAGNOSIS — R09.02 HYPOXIA: ICD-10-CM

## 2017-11-27 DIAGNOSIS — I26.99 OTHER PULMONARY EMBOLISM WITHOUT ACUTE COR PULMONALE, UNSPECIFIED CHRONICITY: ICD-10-CM

## 2017-11-27 DIAGNOSIS — I26.99 PULMONARY EMBOLUS: ICD-10-CM

## 2017-11-27 DIAGNOSIS — I25.10 CORONARY ARTERY DISEASE INVOLVING NATIVE CORONARY ARTERY OF NATIVE HEART WITHOUT ANGINA PECTORIS: Chronic | ICD-10-CM

## 2017-11-27 DIAGNOSIS — S06.6X0D SUBARACHNOID HEMORRHAGE FOLLOWING INJURY, NO LOSS OF CONSCIOUSNESS, SUBSEQUENT ENCOUNTER: ICD-10-CM

## 2017-11-27 DIAGNOSIS — S06.6X0A SUBARACHNOID HEMORRHAGE FOLLOWING INJURY, NO LOSS OF CONSCIOUSNESS, INITIAL ENCOUNTER: Primary | ICD-10-CM

## 2017-11-27 LAB
ALBUMIN SERPL BCP-MCNC: 2.2 G/DL
ALBUMIN SERPL BCP-MCNC: 2.3 G/DL
ALBUMIN SERPL BCP-MCNC: 2.4 G/DL
ANION GAP SERPL CALC-SCNC: 6 MMOL/L
ANION GAP SERPL CALC-SCNC: 7 MMOL/L
ANION GAP SERPL CALC-SCNC: 8 MMOL/L
ANION GAP SERPL CALC-SCNC: 9 MMOL/L
ANION GAP SERPL CALC-SCNC: 9 MMOL/L
BASOPHILS # BLD AUTO: 0.08 K/UL
BASOPHILS NFR BLD: 0.7 %
BLD PROD TYP BPU: NORMAL
BLOOD UNIT EXPIRATION DATE: NORMAL
BLOOD UNIT TYPE CODE: 5100
BLOOD UNIT TYPE: NORMAL
BUN SERPL-MCNC: 10 MG/DL
BUN SERPL-MCNC: 12 MG/DL
BUN SERPL-MCNC: 8 MG/DL
CALCIUM SERPL-MCNC: 8.2 MG/DL
CALCIUM SERPL-MCNC: 8.4 MG/DL
CALCIUM SERPL-MCNC: 8.4 MG/DL
CALCIUM SERPL-MCNC: 8.7 MG/DL
CALCIUM SERPL-MCNC: 8.8 MG/DL
CHLORIDE SERPL-SCNC: 102 MMOL/L
CHLORIDE SERPL-SCNC: 102 MMOL/L
CHLORIDE SERPL-SCNC: 103 MMOL/L
CO2 SERPL-SCNC: 24 MMOL/L
CO2 SERPL-SCNC: 24 MMOL/L
CO2 SERPL-SCNC: 25 MMOL/L
CO2 SERPL-SCNC: 27 MMOL/L
CO2 SERPL-SCNC: 28 MMOL/L
CODING SYSTEM: NORMAL
CREAT SERPL-MCNC: 1.5 MG/DL
CREAT SERPL-MCNC: 1.9 MG/DL
CREAT SERPL-MCNC: 2.1 MG/DL
CREAT SERPL-MCNC: 2.1 MG/DL
CREAT SERPL-MCNC: 2.2 MG/DL
DIFFERENTIAL METHOD: ABNORMAL
DISPENSE STATUS: NORMAL
EOSINOPHIL # BLD AUTO: 0.2 K/UL
EOSINOPHIL NFR BLD: 1.4 %
ERYTHROCYTE [DISTWIDTH] IN BLOOD BY AUTOMATED COUNT: 17.6 %
EST. GFR  (AFRICAN AMERICAN): 28.3 ML/MIN/1.73 M^2
EST. GFR  (AFRICAN AMERICAN): 29.9 ML/MIN/1.73 M^2
EST. GFR  (AFRICAN AMERICAN): 29.9 ML/MIN/1.73 M^2
EST. GFR  (AFRICAN AMERICAN): 33.7 ML/MIN/1.73 M^2
EST. GFR  (AFRICAN AMERICAN): 44.9 ML/MIN/1.73 M^2
EST. GFR  (NON AFRICAN AMERICAN): 24.5 ML/MIN/1.73 M^2
EST. GFR  (NON AFRICAN AMERICAN): 25.9 ML/MIN/1.73 M^2
EST. GFR  (NON AFRICAN AMERICAN): 25.9 ML/MIN/1.73 M^2
EST. GFR  (NON AFRICAN AMERICAN): 29.3 ML/MIN/1.73 M^2
EST. GFR  (NON AFRICAN AMERICAN): 38.9 ML/MIN/1.73 M^2
GLUCOSE SERPL-MCNC: 178 MG/DL
GLUCOSE SERPL-MCNC: 184 MG/DL
GLUCOSE SERPL-MCNC: 184 MG/DL
GLUCOSE SERPL-MCNC: 194 MG/DL
GLUCOSE SERPL-MCNC: 196 MG/DL
GRAM STN SPEC: NORMAL
GRAM STN SPEC: NORMAL
HCT VFR BLD AUTO: 29.5 %
HGB BLD-MCNC: 8.9 G/DL
IMM GRANULOCYTES # BLD AUTO: 0.08 K/UL
IMM GRANULOCYTES NFR BLD AUTO: 0.7 %
LYMPHOCYTES # BLD AUTO: 0.5 K/UL
LYMPHOCYTES NFR BLD: 4.5 %
MAGNESIUM SERPL-MCNC: 1.9 MG/DL
MAGNESIUM SERPL-MCNC: 1.9 MG/DL
MAGNESIUM SERPL-MCNC: 2.1 MG/DL
MCH RBC QN AUTO: 24.6 PG
MCHC RBC AUTO-ENTMCNC: 30.2 G/DL
MCV RBC AUTO: 82 FL
MONOCYTES # BLD AUTO: 1.1 K/UL
MONOCYTES NFR BLD: 10.2 %
NEUTROPHILS # BLD AUTO: 9.2 K/UL
NEUTROPHILS NFR BLD: 82.5 %
NRBC BLD-RTO: 0 /100 WBC
PHOSPHATE SERPL-MCNC: 2.7 MG/DL
PHOSPHATE SERPL-MCNC: 3.2 MG/DL
PHOSPHATE SERPL-MCNC: 3.3 MG/DL
PLATELET # BLD AUTO: 188 K/UL
PMV BLD AUTO: 10 FL
POCT GLUCOSE: 181 MG/DL (ref 70–110)
POCT GLUCOSE: 185 MG/DL (ref 70–110)
POCT GLUCOSE: 204 MG/DL (ref 70–110)
POCT GLUCOSE: 218 MG/DL (ref 70–110)
POTASSIUM SERPL-SCNC: 4.3 MMOL/L
POTASSIUM SERPL-SCNC: 4.3 MMOL/L
POTASSIUM SERPL-SCNC: 4.4 MMOL/L
POTASSIUM SERPL-SCNC: 4.4 MMOL/L
POTASSIUM SERPL-SCNC: 4.5 MMOL/L
RBC # BLD AUTO: 3.62 M/UL
SODIUM SERPL-SCNC: 135 MMOL/L
SODIUM SERPL-SCNC: 135 MMOL/L
SODIUM SERPL-SCNC: 136 MMOL/L
SODIUM SERPL-SCNC: 136 MMOL/L
SODIUM SERPL-SCNC: 138 MMOL/L
TRANS ERYTHROCYTES VOL PATIENT: NORMAL ML
WBC # BLD AUTO: 11.1 K/UL

## 2017-11-27 PROCEDURE — 99233 SBSQ HOSP IP/OBS HIGH 50: CPT | Mod: ,,, | Performed by: PHYSICIAN ASSISTANT

## 2017-11-27 PROCEDURE — 85025 COMPLETE CBC W/AUTO DIFF WBC: CPT

## 2017-11-27 PROCEDURE — 25000003 PHARM REV CODE 250: Performed by: PHYSICIAN ASSISTANT

## 2017-11-27 PROCEDURE — 99233 SBSQ HOSP IP/OBS HIGH 50: CPT | Mod: ,,, | Performed by: INTERNAL MEDICINE

## 2017-11-27 PROCEDURE — 83735 ASSAY OF MAGNESIUM: CPT | Mod: 91

## 2017-11-27 PROCEDURE — 80069 RENAL FUNCTION PANEL: CPT | Mod: 91

## 2017-11-27 PROCEDURE — 92523 SPEECH SOUND LANG COMPREHEN: CPT

## 2017-11-27 PROCEDURE — 25000003 PHARM REV CODE 250: Performed by: STUDENT IN AN ORGANIZED HEALTH CARE EDUCATION/TRAINING PROGRAM

## 2017-11-27 PROCEDURE — A4216 STERILE WATER/SALINE, 10 ML: HCPCS | Performed by: STUDENT IN AN ORGANIZED HEALTH CARE EDUCATION/TRAINING PROGRAM

## 2017-11-27 PROCEDURE — 99233 SBSQ HOSP IP/OBS HIGH 50: CPT | Mod: ,,, | Performed by: PODIATRIST

## 2017-11-27 PROCEDURE — 92526 ORAL FUNCTION THERAPY: CPT

## 2017-11-27 PROCEDURE — S0030 INJECTION, METRONIDAZOLE: HCPCS | Performed by: PHYSICIAN ASSISTANT

## 2017-11-27 PROCEDURE — P9021 RED BLOOD CELLS UNIT: HCPCS

## 2017-11-27 PROCEDURE — 99222 1ST HOSP IP/OBS MODERATE 55: CPT | Mod: ,,, | Performed by: NURSE PRACTITIONER

## 2017-11-27 PROCEDURE — 12000002 HC ACUTE/MED SURGE SEMI-PRIVATE ROOM

## 2017-11-27 PROCEDURE — 36415 COLL VENOUS BLD VENIPUNCTURE: CPT

## 2017-11-27 PROCEDURE — 97161 PT EVAL LOW COMPLEX 20 MIN: CPT

## 2017-11-27 PROCEDURE — 36430 TRANSFUSION BLD/BLD COMPNT: CPT

## 2017-11-27 PROCEDURE — 99239 HOSP IP/OBS DSCHRG MGMT >30: CPT | Mod: ,,, | Performed by: HOSPITALIST

## 2017-11-27 PROCEDURE — 99900035 HC TECH TIME PER 15 MIN (STAT)

## 2017-11-27 PROCEDURE — 63600175 PHARM REV CODE 636 W HCPCS: Performed by: PHYSICIAN ASSISTANT

## 2017-11-27 RX ORDER — INSULIN ASPART 100 [IU]/ML
1-10 INJECTION, SOLUTION INTRAVENOUS; SUBCUTANEOUS
Status: DISCONTINUED | OUTPATIENT
Start: 2017-11-28 | End: 2017-12-11 | Stop reason: HOSPADM

## 2017-11-27 RX ORDER — LANOLIN ALCOHOL/MO/W.PET/CERES
800 CREAM (GRAM) TOPICAL
Status: DISCONTINUED | OUTPATIENT
Start: 2017-11-27 | End: 2017-12-11 | Stop reason: HOSPADM

## 2017-11-27 RX ORDER — ALBUTEROL SULFATE 90 UG/1
2 AEROSOL, METERED RESPIRATORY (INHALATION) EVERY 6 HOURS PRN
Status: DISCONTINUED | OUTPATIENT
Start: 2017-11-27 | End: 2017-11-27 | Stop reason: SDUPTHER

## 2017-11-27 RX ORDER — GABAPENTIN 100 MG/1
100 CAPSULE ORAL 3 TIMES DAILY
Status: DISCONTINUED | OUTPATIENT
Start: 2017-11-27 | End: 2017-11-29

## 2017-11-27 RX ORDER — ALBUTEROL SULFATE 2.5 MG/.5ML
2.5 SOLUTION RESPIRATORY (INHALATION) EVERY 6 HOURS PRN
Status: DISCONTINUED | OUTPATIENT
Start: 2017-11-27 | End: 2017-12-11 | Stop reason: HOSPADM

## 2017-11-27 RX ORDER — LABETALOL HYDROCHLORIDE 5 MG/ML
10 INJECTION, SOLUTION INTRAVENOUS EVERY 6 HOURS PRN
Status: DISCONTINUED | OUTPATIENT
Start: 2017-11-27 | End: 2017-12-11 | Stop reason: HOSPADM

## 2017-11-27 RX ORDER — GABAPENTIN 100 MG/1
100 CAPSULE ORAL 3 TIMES DAILY
Qty: 90 CAPSULE | Refills: 11 | Status: ON HOLD
Start: 2017-11-27 | End: 2017-12-29 | Stop reason: HOSPADM

## 2017-11-27 RX ORDER — IBUPROFEN 200 MG
16 TABLET ORAL
Status: DISCONTINUED | OUTPATIENT
Start: 2017-11-27 | End: 2017-12-11 | Stop reason: HOSPADM

## 2017-11-27 RX ORDER — LEVETIRACETAM 100 MG/ML
250 SOLUTION ORAL 2 TIMES DAILY
Status: DISCONTINUED | OUTPATIENT
Start: 2017-11-27 | End: 2017-11-30

## 2017-11-27 RX ORDER — ONDANSETRON 2 MG/ML
4 INJECTION INTRAMUSCULAR; INTRAVENOUS EVERY 6 HOURS PRN
Status: DISCONTINUED | OUTPATIENT
Start: 2017-11-27 | End: 2017-12-11 | Stop reason: HOSPADM

## 2017-11-27 RX ORDER — LEVOTHYROXINE SODIUM 50 UG/1
50 TABLET ORAL
Status: DISCONTINUED | OUTPATIENT
Start: 2017-11-28 | End: 2017-12-10

## 2017-11-27 RX ORDER — IBUPROFEN 200 MG
24 TABLET ORAL
Status: DISCONTINUED | OUTPATIENT
Start: 2017-11-27 | End: 2017-12-11 | Stop reason: HOSPADM

## 2017-11-27 RX ORDER — ACETAMINOPHEN 325 MG/1
650 TABLET ORAL EVERY 6 HOURS PRN
Status: DISCONTINUED | OUTPATIENT
Start: 2017-11-27 | End: 2017-12-11 | Stop reason: HOSPADM

## 2017-11-27 RX ORDER — LEVETIRACETAM 100 MG/ML
250 SOLUTION ORAL 2 TIMES DAILY
Qty: 150 ML | Refills: 11 | Status: ON HOLD
Start: 2017-11-27 | End: 2017-12-29 | Stop reason: HOSPADM

## 2017-11-27 RX ORDER — CEFEPIME HYDROCHLORIDE 1 G/50ML
1 INJECTION, SOLUTION INTRAVENOUS
Status: ON HOLD
Start: 2017-11-27 | End: 2017-12-29 | Stop reason: HOSPADM

## 2017-11-27 RX ORDER — GLUCAGON 1 MG
1 KIT INJECTION
Status: DISCONTINUED | OUTPATIENT
Start: 2017-11-27 | End: 2017-12-11 | Stop reason: HOSPADM

## 2017-11-27 RX ORDER — CARVEDILOL 6.25 MG/1
12.5 TABLET ORAL 2 TIMES DAILY
Status: DISCONTINUED | OUTPATIENT
Start: 2017-11-27 | End: 2017-12-10

## 2017-11-27 RX ORDER — CARVEDILOL 12.5 MG/1
12.5 TABLET ORAL 2 TIMES DAILY
Qty: 60 TABLET | Refills: 11 | Status: ON HOLD
Start: 2017-11-27 | End: 2017-12-29 | Stop reason: HOSPADM

## 2017-11-27 RX ORDER — SODIUM CHLORIDE 0.9 % (FLUSH) 0.9 %
3 SYRINGE (ML) INJECTION EVERY 8 HOURS
Status: DISCONTINUED | OUTPATIENT
Start: 2017-11-27 | End: 2017-12-11 | Stop reason: HOSPADM

## 2017-11-27 RX ORDER — CEFEPIME HYDROCHLORIDE 1 G/50ML
1 INJECTION, SOLUTION INTRAVENOUS
Status: DISCONTINUED | OUTPATIENT
Start: 2017-11-28 | End: 2017-11-28

## 2017-11-27 RX ORDER — METRONIDAZOLE 500 MG/100ML
500 INJECTION, SOLUTION INTRAVENOUS EVERY 8 HOURS
Status: ON HOLD
Start: 2017-11-27 | End: 2017-12-29 | Stop reason: HOSPADM

## 2017-11-27 RX ORDER — ROSUVASTATIN CALCIUM 5 MG/1
10 TABLET, COATED ORAL DAILY
Status: DISCONTINUED | OUTPATIENT
Start: 2017-11-28 | End: 2017-12-10

## 2017-11-27 RX ADMIN — LEVOTHYROXINE SODIUM 50 MCG: 50 TABLET ORAL at 05:11

## 2017-11-27 RX ADMIN — ROSUVASTATIN CALCIUM 10 MG: 10 TABLET, FILM COATED ORAL at 09:11

## 2017-11-27 RX ADMIN — METRONIDAZOLE 500 MG: 500 INJECTION, SOLUTION INTRAVENOUS at 05:11

## 2017-11-27 RX ADMIN — METRONIDAZOLE 500 MG: 500 INJECTION, SOLUTION INTRAVENOUS at 12:11

## 2017-11-27 RX ADMIN — GABAPENTIN 100 MG: 100 CAPSULE ORAL at 03:11

## 2017-11-27 RX ADMIN — COLLAGENASE SANTYL: 250 OINTMENT TOPICAL at 09:11

## 2017-11-27 RX ADMIN — Medication 3 ML: at 06:11

## 2017-11-27 RX ADMIN — CARVEDILOL 12.5 MG: 12.5 TABLET, FILM COATED ORAL at 09:11

## 2017-11-27 RX ADMIN — GABAPENTIN 100 MG: 100 CAPSULE ORAL at 05:11

## 2017-11-27 RX ADMIN — LEVETIRACETAM 250 MG: 500 SOLUTION ORAL at 09:11

## 2017-11-27 RX ADMIN — CEFEPIME HYDROCHLORIDE 1 G: 1 INJECTION, POWDER, FOR SOLUTION INTRAMUSCULAR; INTRAVENOUS at 11:11

## 2017-11-27 NOTE — CONSULTS
[facility]  Physical Medicine & Rehab  Consult Note    Patient Name: Juliane Ramos  MRN: [id number]  Admission Date: 11/24/2017  Hospital Length of Stay: 3 days  Attending Physician: Magalys Morales MD     Inpatient consult to Physical Medicine & Rehabilitation  Consult performed by: Flower Kaiser NP  Consult requested by:  Magalys Morales MD    Reason for Consult:  assess rehabilitation needs    Consults  Subjective:     Principal Problem: Traumatic subarachnoid hemorrhage    HPI: Juliane Ramos is a 57-year-old female with PMHx of HTN, HLD, DM2, CHF ( EF 35% on 11/21/17), CKD 3, remote CVA (R frontal lobe in 2005), 4v CABG (2011).  She initially presented to the OSH on 11/18 for a diabetic left heel wound, non-healing x3 weeks with fever, purulent drainage, and reportedly with maggots. She was being treated with zosyn and vanc. Debridement was planned, but on the day of surgery she reportedly vomited chunks of [employer] despite being NPO. In addition, she had one episode of unresponsiveness on 11/20, ABG showed acute on chronic hypoxic and hypocarbic respiratory failure. CTA was done and showed a segmental and subsegmental PE of the RLL for which therapeutic lovenox (1mg/kg) BID was started. She developed an MC with rising creatinine, nephrology was consulted and it was thought to be due to ATN 2/2 a combination of ischemia (from hypotension) and IV contrast (from CTA). She was being treated with gentle hydration in the setting of HFrEF, and fluids were later stopped due to development of effusions on 11/23. She developed bleeding at the site of her IVs, at the site of lovenox injection, and hematuria and lovenox was held, last dose the morning of 11/23 and was sent home.       She then presented to [facility] on [facility]/24 after a mechanical fall where a subarachnoid hemorrhage was discovered.  [place] [facility] [place] for higher care.  Upon arrival, CTH revealed left forehead  "and frontal scalp hematoma without underlying fracture with bilateral posttraumatic subarachnoid blood, remote right frontal lobe infarction, and atherosclerosis.  NSGY was consulted and no acute neurosurgical intervention was warranted.  Hospital course was further complicated by MC from contrast induced nephropathy, ischemic ATN (nephrology following and started on CRRT), diabetic leg ulcer (ID following-Cefepime, Flagyl, and Vac-will need debridement once stable), hyperkalemia, & pulmonary effusion (seen on CXR on 11/24).     Functional History: Patient lives in [place], [place] with  in a mobile home with 4 steps to enter.  Prior to admission, (I) with ADLs and did use a rollator and "furniture walked".  DME: rollator.      Hospital Course: 11/26/17: Evaluated by therapy.  Bed mobility Min-MaxA .  No sit to stand 2/2 needs WBS clearance.  UBD Antony and LBD totalA.      Past Medical History:   Diagnosis Date    Anemia in stage 3 chronic kidney disease 11/26/2017    CHF (congestive heart failure)     COPD (chronic obstructive pulmonary disease)     Coronary artery disease     Diabetes mellitus     Encounter for blood transfusion     Essential hypertension 6/24/2017    Hypertension     MI (myocardial infarction) 2010    Segmental and subsegmental pulmonary emboli of RLL without acute cor pulmonale 11/25/2017    Stroke     July 2005    Thyroid disease     Traumatic subarachnoid hemorrhage 11/24/2017    Type 2 diabetes mellitus with stage 3 chronic kidney disease, without long-term current use of insulin 6/24/2017     Past Surgical History:   Procedure Laterality Date    ABCESS DRAINAGE Right     Between "little toe" and the one next to it    BREAST SURGERY      Reduction [id number]    CARDIAC SURGERY      CABG 4vessel ring in one valve mitral valve prolapse    CORONARY ARTERY BYPASS GRAFT      hyperlipidemia      TUBAL LIGATION  03/1986     Review of patient's allergies indicates:  No Known " Allergies    Scheduled Medications:    carvedilol  12.5 mg Per NG tube BID    ceFEPime (MAXIPIME) IVPB  1 g Intravenous Q24H    collagenase   Topical Daily    gabapentin  100 mg Per NG tube TID    levetiracetam oral soln  250 mg Per NG tube BID    levothyroxine  50 mcg Per NG tube Before breakfast    metronidazole  500 mg Intravenous Q8H    rosuvastatin  10 mg Per NG tube Daily    sodium chloride 0.9%  3 mL Intravenous Q8H    vancomycin (VANCOCIN) IVPB  1,750 mg Intravenous Q24H       PRN Medications: sodium chloride, acetaminophen, albuterol, dextrose 50%, glucagon (human recombinant), insulin aspart, labetalol, ondansetron, potassium phosphate IVPB    Family History     Problem Relation (Age of Onset)    Arthritis Mother    Cancer Father (68)    Depression Sister    Diabetes Father, Maternal Grandmother    Heart disease Father    Hypertension Father, Son    Kidney disease Paternal Grandfather    Stroke Paternal Grandfather        Social History Main Topics    Smoking status: Never Smoker    Smokeless tobacco: Never Used    Alcohol use No    Drug use: No    Sexual activity: Yes     Partners: Male     Birth control/ protection: None     Review of Systems   Constitutional: Negative for chills, fatigue and fever.   HENT: Negative for trouble swallowing and voice change.    Eyes: Negative for photophobia and visual disturbance.   Respiratory: Negative for cough, shortness of breath and wheezing.    Cardiovascular: Negative for chest pain and palpitations.   Gastrointestinal: Negative for abdominal distention, nausea and vomiting.   Genitourinary: Negative for difficulty urinating and flank pain.   Musculoskeletal: Positive for arthralgias (LLE) and gait problem.   Skin: Positive for wound (LLE). Negative for color change.   Neurological: Positive for weakness. Negative for speech difficulty, numbness and headaches.   Psychiatric/Behavioral: Negative for agitation and confusion.     Objective:     Vital  Signs (Most Recent):  Temp: 97 °F (36.1 °C) (17)  Pulse: 71 (17)  Resp: 18 (17)  BP: (!) 143/78 (17)  SpO2: 96 % (17)    Vital Signs (24h Range):  Temp:  [96.4 °F (35.8 °C)-98.2 °F (36.8 °C)] 97 °F (36.1 °C)  Pulse:  [65-82] 71  Resp:  [11-] 18  SpO2:  [95 %-100 %] 96 %  BP: (112-154)/(58-89) 143/78  Arterial Line BP: (124-132)/(58-65) 132/63     Body mass index is 41.63 kg/m².    Physical Exam   Constitutional: She is oriented to person, place, and time. She appears well-developed and well-nourished.   HENT:   Head: Normocephalic and atraumatic.   Ecchymosis noted to face    Eyes: EOM are normal. Pupils are equal, round, and reactive to light.   Neck: Normal range of motion.   Cardiovascular: Normal rate and regular rhythm.    Pulmonary/Chest: No respiratory distress.   Abdominal: Soft. There is no tenderness.   Musculoskeletal: Normal range of motion. Erythema and edema noted to LLE.   Neurological: She is alert and oriented to person, place, and time.   -  Mental Status:  AAOx3.  Follows commands.  Answers correct age and .  Recent and remote memory intact.  No neglect.    -  Speech and language:  - aphasia - dysarthria.    -  Vision:  - hemianopsia.  - ptosis.     (Hypometria/hypermetria/dysmetria/ataxia)  Ataxia with rapid alt movements.  -  Motor:  RUE: .  LUE: .  RLE: 3/5.  LLE: .  -  Tone:  normal  -  Sensory:  Decreased to light touch and pin prick to LLE.  Skin: Skin is warm and dry.  Dressing noted to LLE. C/d/i.   Psychiatric: She has a normal mood and affect. Her behavior is normal.   Vitals reviewed.          Diagnostic Results:   Labs: Reviewed  ECG: Reviewed  X-Ray: Reviewed  US: Reviewed  CT: Reviewed  MRI: Reviewed    Assessment/Plan:     * Traumatic subarachnoid hemorrhage    Functional status: pending  Cognitive/Speech/Language status:  pending  Nutrition/Swallow Status:  Pending bedside swallow evaluation.      Recommendations  -  Monitor sleep disturbances and establish consistent sleep-wake cycle  ·  Day time- lights on and shades open, night time- lights dim/off  -  Environmental modifications to limit agitation/confusion   · Appropriate lighting, family at bedside, visible clock and calendar, updated white board, reduce noise, limited visitors, clustered nursing care  -  Reorient patient to person, place, time, and situation on each encounter  -  If possible, avoid restraints  -  May benefit from 24/7 supervision by sitter or camera sitter  -  Avoid/limit medications that can worsen delirium (benzodiazepines, antihistamines, anticholinergics, hypnotics, opiates)  -  Encourage mobility, OOB in chair at least 3 hours per day, and early ambulation as appropriate  -  PT/OT evaluate and treat  -  SLP speech and cognitive evaluate and treat  -  Monitor for bowel and bladder dysfunction  -  Monitor for and prevent skin breakdown and pressure ulcers  · Early mobility, repositioning/weight shifting every 20-30 minutes when sitting, turn patient every 2 hours, proper mattress/overlay and chair cushioning, pressure relief/heel protector boots  -  DVT prophylaxis:  SCDs and TEDS  -  Reviewed discharge options (IP rehab, SNF, HH therapy, and OP therapy)        Segmental and subsegmental pulmonary emboli of RLL without acute cor pulmonale    -noted on imaging from OSH  -started on Lovenox at OSH        Diabetic leg ulcer    -ID and podiatry following-will need eventual debridement per Podiatry  -MRI from outside hospital shows retro-achilles soft tissue wound, no osteomyelitis  -On Vanc, Cefepime, and Flagyl        Hyperkalemia    -improved        Pleural effusion    -noted on CXR  -IVF d/c'd        CKD (chronic kidney disease) stage 3, GFR 30-59 ml/min    -nephrology following and started on HD        Participating with therapy.  Will follow and discuss with rehab team for rehab recommendation.      Thank you for your  consult.    Flower Kaiser NP  Department of Physical Medicine & Rehab  [facility]

## 2017-11-27 NOTE — SUBJECTIVE & OBJECTIVE
Interval History:   Patient evaluated at bedside with family member present, afebrile, hemodynamically stable, no respiratory distress, total intake 2.5 L and output 2.2 L (urine 255 cc). Still with villegas cath in place.     Review of patient's allergies indicates:  No Known Allergies  Current Facility-Administered Medications   Medication Frequency    0.9%  NaCl infusion (for blood administration) Q24H PRN    acetaminophen tablet 650 mg Q6H PRN    albuterol inhaler 2 puff Q6H PRN    carvedilol tablet 12.5 mg BID    cefepime in dextrose 5 % 1 gram/50 mL IVPB 1 g Q24H    collagenase ointment Daily    dextrose 50% injection 12.5 g PRN    gabapentin capsule 100 mg TID    glucagon (human recombinant) injection 1 mg PRN    insulin aspart pen 1-10 Units Q4H PRN    labetalol injection 10 mg Q15 Min PRN    levetiracetam oral soln Soln 250 mg BID    levothyroxine tablet 50 mcg Before breakfast    metronidazole IVPB 500 mg Q8H    ondansetron injection 4 mg Q6H PRN    potassium phosphate 15 mmol in dextrose 5 % 250 mL infusion Daily PRN    rosuvastatin tablet 10 mg Daily    sodium chloride 0.9% flush 3 mL Q8H    vancomycin 1.75 g in 5 % dextrose 500 mL IVPB Q24H       Objective:     Vital Signs (Most Recent):  Temp: 97 °F (36.1 °C) (11/27/17 1510)  Pulse: 67 (11/27/17 1510)  Resp: 18 (11/27/17 1510)  BP: 123/67 (11/27/17 1510)  SpO2: 97 % (11/27/17 1510)  O2 Device (Oxygen Therapy): room air (11/27/17 1510) Vital Signs (24h Range):  Temp:  [96.4 °F (35.8 °C)-98.2 °F (36.8 °C)] 97 °F (36.1 °C)  Pulse:  [65-82] 67  Resp:  [11-27] 18  SpO2:  [95 %-100 %] 97 %  BP: (115-154)/(58-89) 123/67  Arterial Line BP: (131-132)/(60-63) 132/63     Weight: 117 kg (257 lb 15 oz) (11/27/17 0525)  Body mass index is 41.63 kg/m².  Body surface area is 2.33 meters squared.    I/O last 3 completed shifts:  In: 5313.3 [I.V.:4223.3; NG/GT:90; IV Piggyback:1000]  Out: 5149 [Urine:270; Other:4879]    Physical Exam   Constitutional:  She is oriented to person, place, and time. She appears well-developed.   obeity   HENT:   Head: Normocephalic.   Eyes: Right eye exhibits no discharge. Left eye exhibits no discharge.   Neck: No JVD present.   Cardiovascular: Normal rate and regular rhythm.    No murmur heard.  Pulmonary/Chest: Effort normal and breath sounds normal.   Abdominal: Soft. She exhibits distension.   Musculoskeletal: She exhibits edema. She exhibits no tenderness.   Neurological: She is alert and oriented to person, place, and time.   Skin: Skin is warm.       Significant Labs:  ABGs:   Recent Labs  Lab 11/26/17  0455   PH 7.295*   PCO2 53.4*   HCO3 26.0   POCSATURATED 93*   BE 0     BMP:   Recent Labs  Lab 11/27/17  0747 11/27/17  1357   * 184*  184*    102  102   CO2 28 24  24   BUN 10 12  12   CREATININE 1.9* 2.1*  2.1*   CALCIUM 8.7 8.4*  8.4*   MG 2.1  2.1  --      CBC:   Recent Labs  Lab 11/27/17  0747   WBC 11.10   RBC 3.62*   HGB 8.9*   HCT 29.5*      MCV 82   MCH 24.6*   MCHC 30.2*     CMP:   Recent Labs  Lab 11/24/17  1834  11/27/17  1357     < > 184*  184*   CALCIUM 8.3*  < > 8.4*  8.4*   ALBUMIN 2.5*  < > 2.3*  2.3*   PROT 6.6  --   --    *  < > 135*  135*   K 5.8*  < > 4.4  4.4   CO2 22*  < > 24  24   CL 98  < > 102  102   BUN 58*  < > 12  12   CREATININE 5.6*  < > 2.1*  2.1*   ALKPHOS 66  --   --    ALT 8*  --   --    AST 17  --   --    BILITOT 0.6  --   --    < > = values in this interval not displayed.  All labs within the past 24 hours have been reviewed.

## 2017-11-27 NOTE — PLAN OF CARE
See previous notes.  PAT [place], is setting up transport at extension [phone] once the      11/27/17 1505   Final Note   Assessment Type Final Discharge Note   Discharge Disposition (transfer back to [facility] )   Hospital Follow Up  Appt(s) scheduled? (n/a)   Discharge plans and expectations educations in teach back method with documentation complete? Yes   Right Care Referral Info   Post Acute Recommendation Other   nurse Dee calls report.

## 2017-11-27 NOTE — ASSESSMENT & PLAN NOTE
-Retroachilles heel ulceration stable no purulent drainage noted.   -Will defer surgical debridement for now, pt requesting debridement be performed in [place] by Dr. Wick podiatrist who regularly follows her. Wound stable no purulent drainage noted.   -Santyl applied covered with adaptic, wrapped with kerlix and ACE.   -Podiatry will follow     Weightbearing Status: NWB left   Offloading Device: CAM boot     Shelly Harrison DPM PGY-3  Pager: [phone]

## 2017-11-27 NOTE — PLAN OF CARE
3:05 PM  SW received call from [facility] with the transfer center who stated accepting physician at [facility] is Dr. Devine.     Nurse to call report to [phone]. Pt will be going to room 301 bed 2.     Arranged transportation with [facility]. [facility] will arrive at 4:30PM to  patient.     Informed RN, Dee.    Chloe Hughes, JERONIMO   [facility]  Ext [phone]

## 2017-11-27 NOTE — PLAN OF CARE
[facility]    FACILITY TRANSFER ORDERS      Patient Name: Juliane Ramos  YOB: 1960    PCP: JOS Dumont   PCP Address: [address] [place] [address] [place] / [place] [id number]  PCP Phone Number: [phone]  PCP Fax: [phone]    Encounter Date: 11/27/2017    Admit to: [facility]     Vital Signs:  Routine    Diagnoses:   Active Hospital Problems    Diagnosis  POA    *Traumatic subarachnoid hemorrhage [S06.6X9A]  Yes    Subarachnoid hemorrhage following injury, no loss of consciousness [S06.6X0A]  Unknown    Anemia in stage 3 chronic kidney disease [N18.3, D63.1]  Yes    Segmental and subsegmental pulmonary emboli of RLL without acute cor pulmonale [I26.99]  Yes    Acute renal failure superimposed on stage 3 chronic kidney disease [N17.9, N18.3]  Yes    Diabetic leg ulcer [E11.622, L97.909]  Yes    Hyperkalemia [E87.5]  Yes    Essential hypertension [I10]  Yes     Chronic    CKD (chronic kidney disease) stage 3, GFR 30-59 ml/min [N18.3]  Yes    Type 2 diabetes mellitus with stage 3 chronic kidney disease, without long-term current use of insulin [E11.22, N18.3]  Yes     Chronic    Coronary artery disease involving native coronary artery of native heart without angina pectoris [I25.10]  Yes     Chronic    Acquired hypothyroidism [E03.9]  Yes    Pleural effusion [J90]  Yes      Resolved Hospital Problems    Diagnosis Date Resolved POA    MC (acute kidney injury) [N17.9] 11/26/2017 Yes       Allergies:Review of patient's allergies indicates:  No Known Allergies    Diet: renal diet    Activities: Activity as tolerated    Nursing: per protocol     Labs: CBC and CMP Daily for 5 days     CONSULTS:    Physical Therapy to evaluate and treat.  and Occupational Therapy to evaluate and treat.    MISCELLANEOUS CARE:  Chi Care: Empty Chi bag every shift. Change Chi every month. and Routine Skin for Bedridden Patients: Apply moisture barrier cream to all skin folds and wet  areas in perineal area daily and after baths and all bowel movements.    WOUND CARE ORDERS  None    Medications: Review discharge medications with patient and family and provide education.      Current Discharge Medication List      START taking these medications    Details   cefepime in dextrose 5 % (MAXIPIME) 1 gram/50 mL PgBk Inject 50 mLs (1 g total) into the vein every 24 hours as needed.      levetiracetam oral soln 500 mg/5 mL (5 mL) Soln 2.5 mLs (250 mg total) by Per NG tube route 2 (two) times daily.  Qty: 150 mL, Refills: 11      metronidazole (FLAGYL) 500 mg/100 mL IVPB Inject 100 mLs (500 mg total) into the vein every 8 (eight) hours.      VANCOMYCIN HCL (VANCOMYCIN IN 5 % DEXTROSE) 1.75 gram/500 mL Soln Inject 500 mLs (1,750 mg total) into the vein once daily.         CONTINUE these medications which have CHANGED    Details   carvedilol (COREG) 12.5 MG tablet Take 1 tablet (12.5 mg total) by mouth 2 (two) times daily.  Qty: 60 tablet, Refills: 11      gabapentin (NEURONTIN) 100 MG capsule Take 1 capsule (100 mg total) by mouth 3 (three) times daily.  Qty: 90 capsule, Refills: 11         CONTINUE these medications which have NOT CHANGED    Details   albuterol (ACCUNEB) 1.25 mg/3 mL Nebu Take 1.25 mg by nebulization every 6 (six) hours as needed. Rescue      levothyroxine (SYNTHROID) 50 MCG tablet Take 50 mcg by mouth once daily.        rosuvastatin (CRESTOR) 10 MG tablet Take 1 tablet (10 mg total) by mouth once daily.  Qty: 30 tablet, Refills: 0         STOP taking these medications       aspirin (ECOTRIN) 81 MG EC tablet Comments:   Reason for Stopping:         citalopram (CELEXA) 20 MG tablet Comments:   Reason for Stopping:         furosemide (LASIX) 40 MG tablet Comments:   Reason for Stopping:         glimepiride (AMARYL) 4 MG tablet Comments:   Reason for Stopping:         insulin aspart protamine-insulin aspart (NOVOLOG 70/30) 100 unit/mL (70-30) InPn pen Comments:   Reason for Stopping:          lisinopril (PRINIVIL,ZESTRIL) 2.5 MG tablet Comments:   Reason for Stopping:                    _________________________________  Magalys Morales MD  11/27/2017

## 2017-11-27 NOTE — ASSESSMENT & PLAN NOTE
Functional status: pending  Cognitive/Speech/Language status:  pending  Nutrition/Swallow Status:  Pending bedside swallow evaluation.     Recommendations  -  Monitor sleep disturbances and establish consistent sleep-wake cycle  ·  Day time- lights on and shades open, night time- lights dim/off  -  Environmental modifications to limit agitation/confusion   · Appropriate lighting, family at bedside, visible clock and calendar, updated white board, reduce noise, limited visitors, clustered nursing care  -  Reorient patient to person, place, time, and situation on each encounter  -  If possible, avoid restraints  -  May benefit from 24/7 supervision by sitter or camera sitter  -  Avoid/limit medications that can worsen delirium (benzodiazepines, antihistamines, anticholinergics, hypnotics, opiates)  -  Encourage mobility, OOB in chair at least 3 hours per day, and early ambulation as appropriate  -  PT/OT evaluate and treat  -  SLP speech and cognitive evaluate and treat  -  Monitor for bowel and bladder dysfunction  -  Monitor for and prevent skin breakdown and pressure ulcers  · Early mobility, repositioning/weight shifting every 20-30 minutes when sitting, turn patient every 2 hours, proper mattress/overlay and chair cushioning, pressure relief/heel protector boots  -  DVT prophylaxis:  SCDs and TEDS  -  Reviewed discharge options (IP rehab, SNF, HH therapy, and OP therapy)

## 2017-11-27 NOTE — HPI
Juliane Ramos is a 57-year-old female with PMHx of HTN, HLD, DM2, CHF ( EF 35% on 11/21/17), CKD 3, remote CVA (R frontal lobe in 2005), 4v CABG (2011).  She initially presented to the OSH on 11/18 for a diabetic left heel wound, non-healing x3 weeks with fever, purulent drainage, and reportedly with maggots. She was being treated with zosyn and vanc. Debridement was planned, but on the day of surgery she reportedly vomited chunks of [employer] despite being NPO. In addition, she had one episode of unresponsiveness on 11/20, ABG showed acute on chronic hypoxic and hypocarbic respiratory failure. CTA was done and showed a segmental and subsegmental PE of the RLL for which therapeutic lovenox (1mg/kg) BID was started. She developed an MC with rising creatinine, nephrology was consulted and it was thought to be due to ATN 2/2 a combination of ischemia (from hypotension) and IV contrast (from CTA). She was being treated with gentle hydration in the setting of HFrEF, and fluids were later stopped due to development of effusions on 11/23. She developed bleeding at the site of her IVs, at the site of lovenox injection, and hematuria and lovenox was held, last dose the morning of 11/23 and was sent home.       She then presented to [facility] on 11/24 after a mechanical fall where a subarachnoid hemorrhage was discovered.  She was transferred to [facility] on 11/24 for higher care.  Upon arrival, CTH revealed left forehead and frontal scalp hematoma without underlying fracture with bilateral posttraumatic subarachnoid blood, remote right frontal lobe infarction, and atherosclerosis.  NSGY was consulted and no acute neurosurgical intervention was warranted.  Hospital course was further complicated by MC from contrast induced nephropathy, ischemic ATN (nephrology following and started on CRRT), diabetic leg ulcer (ID following-Cefepime, Flagyl, and Vac-will need debridement once stable), hyperkalemia, & pulmonary effusion  "(seen on CXR on 11/24).     Functional History: Patient lives in [place], [place] with  in a mobile home with 4 steps to enter.  Prior to admission, (I) with ADLs and did use a rollator and "furniture walked".  DME: rollator.    "

## 2017-11-27 NOTE — PLAN OF CARE
Problem: Physical Therapy Goal  Goal: Physical Therapy Goal  Goals to be met by: 17    Patient will increase functional independence with mobility by performin. Supine to sit with MInimal Assistance  2. Sit to supine with MInimal Assistance  3. Sit to stand transfer with Moderate Assistance  4. Bed to chair transfer with Maximum Assistance using Rolling Walker  5. Gait  x 4 feet with Moderate Assistance using Rolling Walker.     Outcome: Ongoing (interventions implemented as appropriate)  Evaluation complete    DC rec's for SNF.    Ralph Huitron III, DPT, PT  2017

## 2017-11-27 NOTE — PLAN OF CARE
SPoke with PAT Corona and DR WAGGONER in huddle: possible transfer back to [facility] today. DR. WAGGONER to update. PAT Corona confirmed w pt and . SEE MSW note.

## 2017-11-27 NOTE — PROGRESS NOTES
"[facility]  Podiatry  Progress Note    Patient Name: Juliane Ramos  MRN: [id number]  Admission Date: 11/24/2017  Hospital Length of Stay: 3 days  Attending Physician: Magalys Morales MD  Primary Care Provider: JOS Dumont   Interval Hx: Pt. Seen bedside family present. Bandage intact to left foot.     Scheduled Meds:   carvedilol  12.5 mg Per NG tube BID    ceFEPime (MAXIPIME) IVPB  1 g Intravenous Q24H    collagenase   Topical Daily    gabapentin  100 mg Per NG tube TID    levetiracetam oral soln  250 mg Per NG tube BID    levothyroxine  50 mcg Per NG tube Before breakfast    metronidazole  500 mg Intravenous Q8H    rosuvastatin  10 mg Per NG tube Daily    sodium chloride 0.9%  3 mL Intravenous Q8H    vancomycin (VANCOCIN) IVPB  1,750 mg Intravenous Q24H     Continuous Infusions:     PRN Meds:sodium chloride, acetaminophen, albuterol, dextrose 50%, glucagon (human recombinant), insulin aspart, labetalol, ondansetron, potassium phosphate IVPB    Review of patient's allergies indicates:  No Known Allergies     Past Medical History:   Diagnosis Date    Anemia in stage 3 chronic kidney disease 11/26/2017    CHF (congestive heart failure)     COPD (chronic obstructive pulmonary disease)     Coronary artery disease     Diabetes mellitus     Encounter for blood transfusion     Essential hypertension 6/24/2017    Hypertension     MI (myocardial infarction) 2010    Segmental and subsegmental pulmonary emboli of RLL without acute cor pulmonale 11/25/2017    Stroke     July 2005    Thyroid disease     Traumatic subarachnoid hemorrhage 11/24/2017    Type 2 diabetes mellitus with stage 3 chronic kidney disease, without long-term current use of insulin 6/24/2017     Past Surgical History:   Procedure Laterality Date    ABCESS DRAINAGE Right     Between "little toe" and the one next to it    BREAST SURGERY      Reduction [id number]    CARDIAC SURGERY      CABG 4vessel ring in " one valve mitral valve prolapse    CORONARY ARTERY BYPASS GRAFT      hyperlipidemia      TUBAL LIGATION  03/1986       Family History     Problem Relation (Age of Onset)    Arthritis Mother    Cancer Father (68)    Depression Sister    Diabetes Father, Maternal Grandmother    Heart disease Father    Hypertension Father, Son    Kidney disease Paternal Grandfather    Stroke Paternal Grandfather        Social History Main Topics    Smoking status: Never Smoker    Smokeless tobacco: Never Used    Alcohol use No    Drug use: No    Sexual activity: Yes     Partners: Male     Birth control/ protection: None     Review of Systems   Unable to perform ROS: Patient nonverbal   Constitutional: Positive for diaphoresis.     Objective:     Vital Signs (Most Recent):  Temp: 97 °F (36.1 °C) (11/27/17 1122)  Pulse: 71 (11/27/17 1122)  Resp: 18 (11/27/17 1122)  BP: (!) 143/78 (11/27/17 1122)  SpO2: 96 % (11/27/17 1122) Vital Signs (24h Range):  Temp:  [96.4 °F (35.8 °C)-98.2 °F (36.8 °C)] 97 °F (36.1 °C)  Pulse:  [65-82] 71  Resp:  [11-27] 18  SpO2:  [95 %-100 %] 96 %  BP: (112-154)/(58-89) 143/78  Arterial Line BP: (124-132)/(58-65) 132/63     Weight: 117 kg (257 lb 15 oz)  Body mass index is 41.63 kg/m².    Foot Exam    Right Foot/Ankle     Inspection and Palpation  Ecchymosis: none  Tenderness: none   Skin Exam: skin intact;     Neurovascular  Dorsalis pedis: 1+  Posterior tibial: 1+      Left Foot/Ankle      Inspection and Palpation  Ecchymosis: none (Periwound)  Tenderness: none   Skin Exam: drainage, ulcer and erythema;     Neurovascular  Dorsalis pedis: 1+  Posterior tibial: 1+          11/27/17        Left foot      Laboratory:  A1C:     Recent Labs  Lab 06/25/17  0508 11/18/17  1658 11/25/17  0821   HGBA1C 10.0* 8.4* 8.0*     Blood Cultures: No results for input(s): LABBLOO in the last 48 hours.  BMP:     Recent Labs  Lab 11/27/17  0747   *      K 4.5      CO2 28   BUN 10   CREATININE 1.9*    CALCIUM 8.7   MG 2.1  2.1     CBC:     Recent Labs  Lab 11/27/17  0747   WBC 11.10   RBC 3.62*   HGB 8.9*   HCT 29.5*      MCV 82   MCH 24.6*   MCHC 30.2*     CMP:   Recent Labs  Lab 11/24/17  1834  11/27/17  0747     < > 178*   CALCIUM 8.3*  < > 8.7   ALBUMIN 2.5*  < > 2.3*   PROT 6.6  --   --    *  < > 138   K 5.8*  < > 4.5   CO2 22*  < > 28   CL 98  < > 103   BUN 58*  < > 10   CREATININE 5.6*  < > 1.9*   ALKPHOS 66  --   --    ALT 8*  --   --    AST 17  --   --    BILITOT 0.6  --   --    < > = values in this interval not displayed.  Coagulation:     Recent Labs  Lab 11/24/17  1502   INR 1.1     CRP: No results for input(s): CRP in the last 168 hours.  ESR: No results for input(s): SEDRATE in the last 168 hours.  Wound Cultures: No results for input(s): LABAERO in the last 4320 hours.    Diagnostic Results:    MRI 11/19:   1. Retro-Achilles soft tissue wound. No evidence for osteomyelitis.  2. Mild nonspecific Achilles and posterior tibial tenosynovitis.  3. Small tibiotalar joint effusion.    X-ray:   1.  No radiographically apparent acute osteomyelitis. Skin irregularity and bandage noted in the posterior aspect of the ankle.  2. Degenerative changes in the ankle, hindfoot and midfoot are noted.    Clinical Findings:  Wound to left posterior ankle measuring 4.2x1.1 cm with tendon exposure. Bloody drainage noted with periwound erythema and edema. No purulence.     Assessment/Plan:     * Traumatic subarachnoid hemorrhage    Per primary        Diabetic leg ulcer    -Retroachilles heel ulceration stable no purulent drainage noted.   -Will defer surgical debridement for now, pt requesting debridement be performed in [place] by Dr. Wick podiatrist who regularly follows her. Wound stable no purulent drainage noted.   -Santyl applied covered with adaptic, wrapped with kerlix and ACE.   -Podiatry will follow     Weightbearing Status: NWB left   Offloading Device: CAM boot     Shelly Harrison DPM  PGY-3  Pager: [phone]          Type 2 diabetes mellitus with stage 3 chronic kidney disease, without long-term current use of insulin    Per primary        CKD (chronic kidney disease) stage 3, GFR 30-59 ml/min    Per primary            Shelly Harrison MD  Podiatry  [facility]

## 2017-11-27 NOTE — PLAN OF CARE
CM met with pt and her . Per , pt was tx from [facility]. Pt has a cane, w/c, rollator.  Per , pt received new HD  PMR following  OT rec SNF   Pt and  want to go home w hh  Used [facility] in the past and want to resume w this agency  If pt can't go home, they want to be transferred back to [facility].  Either way, they want to speak w PAT Lou.  SW consulted     11/27/17 0842   Discharge Assessment   Assessment Type Discharge Planning Assessment   Confirmed/corrected address and phone number on facesheet? Yes   Assessment information obtained from? Patient   Expected Length of Stay (days) 2   Communicated expected length of stay with patient/caregiver yes   Prior to hospitilization cognitive status: Alert/Oriented   Prior to hospitalization functional status: Assistive Equipment;Needs Assistance   Current cognitive status: Alert/Oriented   Current Functional Status: Assistive Equipment;Needs Assistance   Lives With spouse   Able to Return to Prior Arrangements yes   Is patient able to care for self after discharge? Yes   Who are your caregiver(s) and their phone number(s)?  Jan    [place] [phone]    Patient's perception of discharge disposition home health   Patient currently being followed by outpatient case management? No   Patient currently receives any other outside agency services? No   Equipment Currently Used at Home rollator;bedside commode;shower chair   Do you have any problems affording any of your prescribed medications? Yes   Is the patient taking medications as prescribed? yes   Does the patient have transportation home? Yes   Transportation Available family or friend will provide   Does the patient receive services at the [facility]? No   Discharge Plan A Home with family;Home Health   Discharge Plan B Skilled Nursing Facility   Patient/Family In Agreement With Plan yes

## 2017-11-27 NOTE — PLAN OF CARE
"Plan of Care Note         Per Dr. Lobo's note:     "Hospital Medicine Consult Service  Case discussed with Dr. Bg Sánchez from Neurocritical team. Patient to be accepted to hospital medicine service and hospital medicine to assume care of patient tomorrow morning at 7:00 am if patient is out of the Neuro ICU and on the floor. Patient to be placed on IMT list this evening and be reassigned to hospital medicine service for the am. Please see Neuro ICU stepdown note for full details on patient's hospital course.      In brief, 58 y/o female originally admitted to [facility] with infected diabetic heel ulcer to left achilles area and started on IV antibiotics with plans for surgical debridement by Podiatry. On morning of procedure patient had sudden drop in oxygen sats and surgery cancelled and work-up revealed multiple segmental and subsegmental thromboemboli in RLL. Patient started on therapeutic Lovenox at [facility]. Patient then suffered an accidental fall while ambulating with her  in hospital and brain imaging revealed SAH and patient transferred to Neuro ICU here at [facility] from observation and Neurosurgery evaluation and Lovenox discontinued. Patient did not require any surgical intervention as per Neurosurgery for SAH and CT scan of head on repeat showed stable bilateral subarachnoid hematoma. Podiatry here at [facility] consulted as well as Infectious disease for heel ulcer. Plan is not for any surgical debridement at this time and patient on IV Cefepime, Flagyl and Vancomycin and cultures from wound taken today, 11/26.   Also of significant note, patient developed MC after CTA of chest and had acute worsening after transfer so Nephrology consulted here and patient started on CRRT by Nephrology yesterday after temporary line place. According to Neuro ICU, prior to starting CRRT patient was lethargic and hyperkalemic and anuric and today after 24 hours of CRRT patient is " not much more awake and alert. Nephrology is following to manage dialysis needs and okay with transfer to floor and may not further hemodialysis but nephrology to assess for needs daily.      Outstanding issues:   · Close monitoring of renal function to see if patient will require further dialysis per nephrology consult.   · Decision will need to be made on treatment of pulmonary embolus as patient currently not anticoagulated and will need to check with neurosurgery will safe to resume anticoagulation Neurosurgery to place final recs in chart     Other outstanding issues   - Surgical Debriedment deferred per Podiatry. ID consult to follow up tissue culture and tailor abx recs.  Full Discharge Summary to follow.       Magalys Morales MD  [facility] Medicine

## 2017-11-27 NOTE — PROGRESS NOTES
Last HCT on 11/24/17 reviewed with Dr. Whittaker. Imaging shows stable hemorrhage. Continue to hold therapeutic lovenox for recent PE due to SAH. Will have patient follow up in clinic in 1 week with repeat head CT. If stable, OK to resume therapeutic Lovenox. SQH ok at this time.     Discussed with Dr. Whittaker  Please call with any questions      Natasha Arshad PA-C   Neurosurgery   Pager: [phone]

## 2017-11-27 NOTE — PT/OT/SLP EVAL
"Physical Therapy  Evaluation    Juliane Ramos   MRN: [id number]   Admitting Diagnosis: Traumatic subarachnoid hemorrhage    PT Received On: 11/27/17  PT Start Time: 1425     PT Stop Time: 1438    PT Total Time (min): 13 min       Billable Minutes:  Evaluation 13    Diagnosis: Traumatic subarachnoid hemorrhage      Past Medical History:   Diagnosis Date    Anemia in stage 3 chronic kidney disease 11/26/2017    CHF (congestive heart failure)     COPD (chronic obstructive pulmonary disease)     Coronary artery disease     Diabetes mellitus     Encounter for blood transfusion     Essential hypertension 6/24/2017    Hypertension     MI (myocardial infarction) 2010    Segmental and subsegmental pulmonary emboli of RLL without acute cor pulmonale 11/25/2017    Stroke     July 2005    Thyroid disease     Traumatic subarachnoid hemorrhage 11/24/2017    Type 2 diabetes mellitus with stage 3 chronic kidney disease, without long-term current use of insulin 6/24/2017      Past Surgical History:   Procedure Laterality Date    ABCESS DRAINAGE Right     Between "little toe" and the one next to it    BREAST SURGERY      Reduction [id number]    CARDIAC SURGERY      CABG 4vessel ring in one valve mitral valve prolapse    CORONARY ARTERY BYPASS GRAFT      hyperlipidemia      TUBAL LIGATION  03/1986       Referring physician: nAdrew  Date referred to PT: 11/24/17    General Precautions: Standard, fall, aspiration, vision impaired, seizure  Orthopedic Precautions: LLE non weight bearing (per podiatry)   Braces: N/A       Do you have any cultural, spiritual, Catholic conflicts, given your current situation?: none noted    Patient History:  Lives With: spouse  Living Arrangements: mobile home  Home Accessibility: stairs to enter home  Number of Stairs to Enter Home: 4  Stair Railings at Home: outside, present at both sides  Transportation Available: family or friend will provide  Living Environment Comment: Patient lives " with  in 1-story mobile home with 4 JEFF and BHRs. Tub/shower combo with seat. BSC over toilet. Indep with mobility PTA with occasional use of rollator or [employer] scooter for community ambulation.   Equipment Currently Used at Home: rollator, bedside commode, shower chair, hospital bed, lift chair  DME owned (not currently used): hospital bed    Previous Level of Function:  Ambulation Skills: needs device (rollator for community ambulation)  Transfer Skills: needs device (lift chair)  ADL Skills: needs assist    Subjective:  Communicated with RN prior to session.  Patient agreeable to PT session.  present. Reports fall PTA.  Chief Complaint: LE weakness  Patient goals: return home    Pain/Comfort  Pain Rating 1: 0/10  Pain Rating Post-Intervention 1: 0/10      Objective:   Patient found with: arterial line, telemetry, pulse ox (continuous), peripheral IV     Cognitive Exam:  Oriented to: Person, Place, Time, and Situation    Follows Commands/attention: Follows multistep  commands  Communication: clear/fluent  Safety awareness/insight to disability: intact    Physical Exam:  Postural examination/scapula alignment: Rounded shoulder and Head forward    Skin integrity: Visible skin intact; L foot wound bandaged; bruising around L eye  Edema: Moderate BLE    Sensation:   Intact BLE    Lower Extremity Range of Motion:  Right Lower Extremity: WFL  Left Lower Extremity: WFL except ankle ROM NT    Lower Extremity Strength:  Right Lower Extremity: Deficits: 3/5  Left Lower Extremity: Deficits: 2/5 knee extension; no visible hip flexion contraction; ankle strength NT secondary to wound    Functional Mobility:  Bed Mobility:  Supine to Sit: Moderate Assistance  Sit to Supine:  (not observed; left seated EOB with  present)    Transfers:  Sit <> Stand Assistance: Maximum Assistance  Sit <> Stand Assistive Device: No Assistive Device  Bed <> Chair Assistance: Activity did not occur    Gait:   Gait Distance:  Unable to perform    Balance:   Static Sit: NORMAL: No deviations seen in posture held statically  Dynamic Sit: FAIR+: Maintains balance through MINIMAL excursions of active trunk motion  Static Stand: 0: Needs MAXIMAL assist to maintain      Therapeutic Activities and Exercises:  Patient educated on:   - role of PT/POC   - safety with all functional mobility   - importance of participation with therapy services   - NWB LLE precautions   - decreased safety associated with transfer at this time secondary to LE weakness    Verbalized understanding of all education provided  present.  Educated  on bed positioning to assist patient from supine-sit and sit-supine to avoid caregiver injury. Verbalized understanding.      AM-PAC 6 CLICK MOBILITY  How much help from another person does this patient currently need?   1 = Unable, Total/Dependent Assistance  2 = A lot, Maximum/Moderate Assistance  3 = A little, Minimum/Contact Guard/Supervision  4 = None, Modified [place]/Independent    Turning over in bed (including adjusting bedclothes, sheets and blankets)?: 3  Sitting down on and standing up from a chair with arms (e.g., wheelchair, bedside commode, etc.): 2  Moving from lying on back to sitting on the side of the bed?: 2  Moving to and from a bed to a chair (including a wheelchair)?: 2  Need to walk in hospital room?: 1  Climbing 3-5 steps with a railing?: 1  Total Score: 11     AM-PAC Raw Score CMS G-Code Modifier Level of Impairment Assistance   6 % Total / Unable   7 - 9 CM 80 - 100% Maximal Assist   10 - 14 CL 60 - 80% Moderate Assist   15 - 19 CK 40 - 60% Moderate Assist   20 - 22 CJ 20 - 40% Minimal Assist   23 CI 1-20% SBA / CGA   24 CH 0% Independent/ Mod I     Patient left seated EOB with all lines intact, call button in reach, RN notified and  present.    Assessment:   Juliane Ramos is a 57 y.o. female with a medical diagnosis of Traumatic subarachnoid hemorrhage and presents  with rehab identified impairments listed below. Functional mobility significantly limited by weakness and NWB LLE precautions. Unable to hop/step laterally along EOB towards head of bed despite Max Assist to maintain WB precautions and RUE support on bed rail. Unsafe to attempt transfer this attempt secondary to weakness. To benefit from continued skilled intervention to address deficits.    History: personal factors and/or comorbidities that impact the plan of care: Traumatic subarachnoid hemorrhage; fall; recent; NWB LLE  Evaluation of Body Systems: cardiovascular/pulmonary, integumentary, musculoskeletal, neuromuscular, cognition/communication  Functional Outcome Tools: AMPAC, ROM, MMT  Clinical Presentation: stable      Evaluation Complexity Level: Low    Rehab identified problem list/impairments: Rehab identified problem list/impairments: weakness, impaired endurance, gait instability, impaired functional mobilty, impaired self care skills, impaired balance, decreased lower extremity function, impaired sensation, impaired skin, edema, impaired cardiopulmonary response to activity, orthopedic precautions    Rehab potential is fair.    Activity tolerance: Fair    Discharge recommendations: Discharge Facility/Level Of Care Needs: nursing facility, skilled     Barriers to discharge: Barriers to Discharge: Decreased caregiver support, Inaccessible home environment    Equipment recommendations: Equipment Needed After Discharge:  (TBD)     GOALS:    Physical Therapy Goals        Problem: Physical Therapy Goal    Goal Priority Disciplines Outcome Goal Variances Interventions   Physical Therapy Goal     PT/OT, PT Ongoing (interventions implemented as appropriate)     Description:  Goals to be met by: 17    Patient will increase functional independence with mobility by performin. Supine to sit with MInimal Assistance  2. Sit to supine with MInimal Assistance  3. Sit to stand transfer with Moderate  Assistance  4. Bed to chair transfer with Maximum Assistance using Rolling Walker  5. Gait  x 4 feet with Moderate Assistance using Rolling Walker.                       PLAN:    Patient to be seen 3 x/week to address the above listed problems via therapeutic activities, gait training, therapeutic exercises  Plan of Care expires: 12/27/17  Plan of Care reviewed with: patient          Ralph DOBSON Elana III, PT  11/27/2017

## 2017-11-27 NOTE — PLAN OF CARE
Problem: SLP Goal  Goal: SLP Goal  Speech Language Pathology Goals  Goals expected to be met by 12/02/2017  1. Pt will participate in ongoing swallow assessment to determine safest, least restrictive means of nutrition/hydration/medication  2. Pt will participate in speech, language and cognitive assessment to determine ongoing POC needs       Outcome: Ongoing (interventions implemented as appropriate)  Patient seen for speech, language and cognitive assessment and ongoing swallow assessment this service day. Patient with mild cognitive-linguistic impairment c/b decreased STM, attention, problem solving, reading and visiospatial skills. Patient tolerating trials of regular solids and thin liquids at the bedside w/o overt S/S aspiration. Please see note for full details.  REC: Continue regular (renal) diet with thin liquids, no straws, strict aspiration precautions, and assistance with meal set-up. ST to continue to follow to monitor tolerance of diet and improve cognitive-linguistic skills. Thank you.    FAITH Ibrahim., [facility]-SLP  Speech-Language Pathology  Pager: [phone]  11/27/2017

## 2017-11-27 NOTE — ASSESSMENT & PLAN NOTE
- suspect secondary to poorly control A1C as in 6/2017 A1C was 10.0. During admission her A1C was 8.2  - need to optimize DMT2 for maintaining adequate ranal function control .  - Goqal A1C < 7  - Patient continue to be anuric, follow strict intake and output   - Last Hd was overnight on the 11/15/17, doesn't require dialysis today   - Will schedule next dialysis tomorrow if required   - Avoid nephrotoxic medication and renal dose medications  - Follow renal diet low sodium, low potassium

## 2017-11-27 NOTE — PLAN OF CARE
2:48 PM  SW received notification that pt and family would like to transfer back to [facility]. Retrieved cost of wheelchair van paid by family of $247.50, quoted by [employer]. Informed CM who is telling family. Will await call transfer center to call so transportation can be arranged .    Chloe Hughes LMSW   [facility]  Ext [phone]

## 2017-11-27 NOTE — SUBJECTIVE & OBJECTIVE
Interval History: NAEON. Afebrile. No leukocytosis. Wound cx are showing Staph aureus. Susceptibilities are pending. No acute complaints today. Requesting to be transferred back to [place] for debridement by her podiatrist, Dr. Wick.    Review of Systems   Constitutional: Negative for chills, fatigue and fever.   HENT: Negative for trouble swallowing and voice change.    Eyes: Negative for photophobia and visual disturbance.   Respiratory: Negative for cough, shortness of breath and wheezing.    Cardiovascular: Positive for leg swelling. Negative for chest pain and palpitations.   Gastrointestinal: Negative for abdominal distention, diarrhea, nausea and vomiting.   Genitourinary: Positive for hematuria. Negative for difficulty urinating and flank pain.   Musculoskeletal: Positive for arthralgias (LLE) and gait problem. Negative for back pain.   Skin: Positive for wound (LLE). Negative for color change.   Neurological: Positive for weakness and headaches. Negative for speech difficulty and numbness.   Psychiatric/Behavioral: Negative for agitation and confusion.     Objective:     Vital Signs (Most Recent):  Temp: 97 °F (36.1 °C) (11/27/17 1122)  Pulse: 71 (11/27/17 1122)  Resp: 18 (11/27/17 1122)  BP: (!) 143/78 (11/27/17 1122)  SpO2: 96 % (11/27/17 1122) Vital Signs (24h Range):  Temp:  [96.4 °F (35.8 °C)-98.2 °F (36.8 °C)] 97 °F (36.1 °C)  Pulse:  [65-82] 71  Resp:  [11-27] 18  SpO2:  [95 %-100 %] 96 %  BP: (115-154)/(58-89) 143/78  Arterial Line BP: (124-132)/(60-63) 132/63     Weight: 117 kg (257 lb 15 oz)  Body mass index is 41.63 kg/m².    Estimated Creatinine Clearance: 43.5 mL/min (based on SCr of 1.9 mg/dL (H)).    Physical Exam   Constitutional: She is oriented to person, place, and time. She appears well-developed and well-nourished. No distress.   HENT:   Head: Normocephalic.   Hematoma to left eye   Eyes: Conjunctivae are normal. No scleral icterus.   Cardiovascular: Normal rate, regular rhythm and  normal heart sounds.    Pulmonary/Chest: Effort normal and breath sounds normal. No respiratory distress. She has no wheezes.   Abdominal: Soft. Bowel sounds are normal. There is no tenderness.   obese   Musculoskeletal: She exhibits edema and tenderness.   Neurological: She is alert and oriented to person, place, and time.   More alert today   Skin: Skin is warm and dry.   Large left leg ulcer overlying achilles with necrotic wound base. No purulence or malodor. Mild surrounding erythema       Significant Labs:   Blood Culture:   Recent Labs  Lab 11/18/17  1657 11/18/17  1658   LABBLOO No growth after 5 days. No growth after 5 days.     CBC:   Recent Labs  Lab 11/26/17  0323 11/27/17  0747   WBC 7.58 11.10   HGB 7.1* 8.9*   HCT 23.6* 29.5*    188     CMP:   Recent Labs  Lab 11/26/17  2115 11/26/17  2348 11/27/17  0747     137 136 138   K 4.6  4.6 4.3 4.5     104 103 103   CO2 24  24 27 28   *  202* 196* 178*   BUN 7  7 8 10   CREATININE 1.4  1.4 1.5* 1.9*   CALCIUM 8.2*  8.2* 8.2* 8.7   ALBUMIN 2.2*  2.2* 2.2* 2.3*   ANIONGAP 9  9 6* 7*   EGFRNONAA 42.3*  42.3* 38.9* 29.3*     Lactic Acid: No results for input(s): LACTATE in the last 48 hours.  Prealbumin: No results for input(s): PREALBUMIN in the last 48 hours.  Procalcitonin: No results for input(s): PROCAL in the last 48 hours.  Quantiferon: No results for input(s): NIL, TBAG, TBAGNIL, MITOGENNIL, TBGOLD in the last 48 hours.  Respiratory Culture: No results for input(s): GSRESP, RESPIRATORYC in the last 4320 hours.  Urine Culture: No results for input(s): LABURIN in the last 4320 hours.  Urine Studies:   Recent Labs  Lab 11/24/17  1837   COLORU Red*   APPEARANCEUA Cloudy*   PHUR 5.0   SPECGRAV >1.030*   PROTEINUA 2+*   GLUCUA 1+*   KETONESU Negative   BILIRUBINUA Negative   OCCULTUA 3+*   NITRITE Negative   UROBILINOGEN Negative   LEUKOCYTESUR Negative   RBCUA >100*   WBCUA 0   BACTERIA Rare   SQUAMEPITHEL 8   HYALINECASTS  0     Wound Culture:   Recent Labs  Lab 11/26/17  1251   LABAERO STAPHYLOCOCCUS AUREUSFewSusceptibility pending       Significant Imaging: I have reviewed all pertinent imaging results/findings within the past 24 hours.

## 2017-11-27 NOTE — PLAN OF CARE
Problem: Patient Care Overview  Goal: Plan of Care Review  Outcome: Ongoing (interventions implemented as appropriate)  Pt free of falls or injury during shift.  VSS, afebrile, AAO x 4. 1 unit PRBs given per orders. Chi to gravity draining dark brown urine to drainage bag. Incont of bowel. Pain assessed and denied.  Bed locked and in lowest position, SR raised x 2.  Call light within reach, pt belongings within reach. Spouse at bedside. Will monitor.

## 2017-11-27 NOTE — PROGRESS NOTES
Pt transferred to 1123 A by bed from 7094 on tele and portable O2 at 4 L by nurse X 2.  No acute events.  Report given to Stacy.

## 2017-11-27 NOTE — HOSPITAL COURSE
11/26/17: Evaluated by therapy.  Bed mobility Min-MaxA .  No sit to stand 2/2 needs WBS clearance.  UBD Antony and LBD totalA.

## 2017-11-27 NOTE — ASSESSMENT & PLAN NOTE
57 year old female with hx of CMP and DM2 originally admitted to OSH for infected left achilles wound and cellulitis. She was found on admission to have full thickness necrosis and maggots present in the wound. Hospital course c/b respiratory failure after vomiting and patient found to have PE on CTA (started on lovenox) who then developed MC- seen by nephrology who though ATN secondary to ischemia/IV contrast. Course further complicated by mechanical fall with post-traumatic SAH. She was transferred here for higher level of care. ID consulted for abx recs for heel wound.    Patient afebrile here without leukocytosis. On empiric Vanc/Cefepime/Flagyl. Blood cx are NGTD. Wound culture is now showing Staph aureus, susceptibilities pending. Podiatry has evaluated patient and recommends surgical debridement of necrotic wound. MRI without evidence of osteomyelitis and more c/w tenosynovitis. AMS improved.       Plan:  - Continue IV Vancomycin and Cefepime. Will need Vanc trough before 4th dose  - Discontinue Flagyl  - Will likely need debridement once stable. Patient has requested transfer back to [place] for debridement by primary podiatrist, Dr. Wick.   - If patient transferred today, please consult ID physician, Dr. Turner at [facility] to follow patient and patient cultures  - ID will follow while at [facility] and tailor antibiotics according to culture growth.

## 2017-11-27 NOTE — SUBJECTIVE & OBJECTIVE
"Past Medical History:   Diagnosis Date    Anemia in stage 3 chronic kidney disease 11/26/2017    CHF (congestive heart failure)     COPD (chronic obstructive pulmonary disease)     Coronary artery disease     Diabetes mellitus     Encounter for blood transfusion     Essential hypertension 6/24/2017    Hypertension     MI (myocardial infarction) 2010    Segmental and subsegmental pulmonary emboli of RLL without acute cor pulmonale 11/25/2017    Stroke     July 2005    Thyroid disease     Traumatic subarachnoid hemorrhage 11/24/2017    Type 2 diabetes mellitus with stage 3 chronic kidney disease, without long-term current use of insulin 6/24/2017     Past Surgical History:   Procedure Laterality Date    ABCESS DRAINAGE Right     Between "little toe" and the one next to it    BREAST SURGERY      Reduction [id number]    CARDIAC SURGERY      CABG 4vessel ring in one valve mitral valve prolapse    CORONARY ARTERY BYPASS GRAFT      hyperlipidemia      TUBAL LIGATION  03/1986     Review of patient's allergies indicates:  No Known Allergies    Scheduled Medications:    carvedilol  12.5 mg Per NG tube BID    ceFEPime (MAXIPIME) IVPB  1 g Intravenous Q24H    collagenase   Topical Daily    gabapentin  100 mg Per NG tube TID    levetiracetam oral soln  250 mg Per NG tube BID    levothyroxine  50 mcg Per NG tube Before breakfast    metronidazole  500 mg Intravenous Q8H    rosuvastatin  10 mg Per NG tube Daily    sodium chloride 0.9%  3 mL Intravenous Q8H    vancomycin (VANCOCIN) IVPB  1,750 mg Intravenous Q24H       PRN Medications: sodium chloride, acetaminophen, albuterol, dextrose 50%, glucagon (human recombinant), insulin aspart, labetalol, ondansetron, potassium phosphate IVPB    Family History     Problem Relation (Age of Onset)    Arthritis Mother    Cancer Father (68)    Depression Sister    Diabetes Father, Maternal Grandmother    Heart disease Father    Hypertension Father, Son    Kidney " disease Paternal Grandfather    Stroke Paternal Grandfather        Social History Main Topics    Smoking status: Never Smoker    Smokeless tobacco: Never Used    Alcohol use No    Drug use: No    Sexual activity: Yes     Partners: Male     Birth control/ protection: None     Review of Systems   Constitutional: Negative for chills, fatigue and fever.   HENT: Negative for trouble swallowing and voice change.    Eyes: Negative for photophobia and visual disturbance.   Respiratory: Negative for cough, shortness of breath and wheezing.    Cardiovascular: Negative for chest pain and palpitations.   Gastrointestinal: Negative for abdominal distention, nausea and vomiting.   Genitourinary: Negative for difficulty urinating and flank pain.   Musculoskeletal: Positive for arthralgias (LLE) and gait problem.   Skin: Positive for wound (LLE). Negative for color change.   Neurological: Positive for weakness. Negative for speech difficulty, numbness and headaches.   Psychiatric/Behavioral: Negative for agitation and confusion.     Objective:     Vital Signs (Most Recent):  Temp: 97 °F (36.1 °C) (11/27/17 1122)  Pulse: 71 (11/27/17 1122)  Resp: 18 (11/27/17 1122)  BP: (!) 143/78 (11/27/17 1122)  SpO2: 96 % (11/27/17 1122)    Vital Signs (24h Range):  Temp:  [96.4 °F (35.8 °C)-98.2 °F (36.8 °C)] 97 °F (36.1 °C)  Pulse:  [65-82] 71  Resp:  [11-27] 18  SpO2:  [95 %-100 %] 96 %  BP: (112-154)/(58-89) 143/78  Arterial Line BP: (124-132)/(58-65) 132/63     Body mass index is 41.63 kg/m².    Physical Exam   Constitutional: She is oriented to person, place, and time. She appears well-developed and well-nourished.   HENT:   Head: Normocephalic and atraumatic.   Ecchymosis noted to face    Eyes: EOM are normal. Pupils are equal, round, and reactive to light.   Neck: Normal range of motion.   Cardiovascular: Normal rate and regular rhythm.    Pulmonary/Chest: No respiratory distress.   Abdominal: Soft. There is no tenderness.    Musculoskeletal: Normal range of motion. She exhibits no deformity.   Neurological: She is alert and oriented to person, place, and time.   -  Mental Status:  AAOx3.  Follows commands.  Answers correct age and .  Recent and remote memory intact.  No neglect.    -  Speech and language:  - aphasia - dysarthria.    -  Vision:  - hemianopsia.  - ptosis.     (Hypometria/hypermetria/dysmetria/ataxia)  Ataxia with rapid alt movements.  -  Motor:  RUE: .  LUE: .  RLE: 3/5.  LLE: .  -  Tone:  normal  -  Sensory:  Decreased to light touch and pin prick to LLE.       Skin: Skin is warm and dry.   Psychiatric: She has a normal mood and affect. Her behavior is normal.   Vitals reviewed.    NEUROLOGICAL EXAMINATION:     MENTAL STATUS   Oriented to person, place, and time.     CRANIAL NERVES     CN III, IV, VI   Pupils are equal, round, and reactive to light.  Extraocular motions are normal.       Diagnostic Results:   Labs: Reviewed  ECG: Reviewed  X-Ray: Reviewed  US: Reviewed  CT: Reviewed  MRI: Reviewed

## 2017-11-27 NOTE — PLAN OF CARE
CM met with pt /pt's  Nba in the room; Cm explained and provided it on paper that the cost obtained by PAT Chloe  For wheelchair van/oxygen transport back to [facility] is $247.50. Pt and  Nba stated that they are agreeable to pay this out of pocket cost of 247.50 which is not covered by the hospital or insurance.  PAT Corona was notifed that pt and  agree to pay out of pocket cost of $247.50 to have pt transfer back to [facility] via wheelchair van and oxygen.  Cost is not covered by [facility] or transfer center since pt is not transferring to higher level of care.  Pt was tx here for the higher level of care.  and pt requests to transfer back to [facility] since pt is not medically stable for discharge.  Nurse Dee was notified at extension [phone] that PAT Corona, will be calling her with nurse's info to call report to and transport info.  Chloe was given Dee's name/number to contact once Pat in transfer center clears pt for transport back to [facility].

## 2017-11-27 NOTE — PROGRESS NOTES
[facility]  Infectious Disease  Progress Note    Patient Name: Juliane Ramos  MRN: [id number]  Admission Date: 11/24/2017  Length of Stay: 3 days  Attending Physician: Magalys Morales MD  Primary Care Provider: JOS Dumont    Isolation Status: No active isolations  Assessment/Plan:      Diabetic leg ulcer    57 year old female with hx of CMP and DM2 originally admitted to OSH for infected left achilles wound and cellulitis. She was found on admission to have full thickness necrosis and maggots present in the wound. Hospital course c/b respiratory failure after vomiting and patient found to have PE on CTA (started on lovenox) who then developed MC- seen by nephrology who though ATN secondary to ischemia/IV contrast. Course further complicated by mechanical fall with post-traumatic SAH. She was transferred here for higher level of care. ID consulted for abx recs for heel wound.    Patient afebrile here without leukocytosis. On empiric Vanc/Cefepime/Flagyl. Blood cx are NGTD. Wound culture is now showing Staph aureus, susceptibilities pending. Podiatry has evaluated patient and recommends surgical debridement of necrotic wound. MRI without evidence of osteomyelitis and more c/w tenosynovitis. AMS improved.       Plan:  - Continue IV Vancomycin and Cefepime. Will need Vanc trough before 4th dose  - Discontinue Flagyl  - Will likely need debridement once stable. Patient has requested transfer back to [place] for debridement by primary podiatrist, Dr. Wick.   - If patient transferred today, please consult ID physician, Dr. Turner at [facility] to follow patient and patient cultures  - ID will follow while at [facility] and tailor antibiotics according to culture growth.          Please call for any questions. Thank you.  Trish Baig PA-C  Phone: [phone]  Pager: [phone]    Subjective:     Principal Problem:Traumatic subarachnoid hemorrhage    HPI: Patient is a 57 year old female with  HTN, HLD, T2DM, CHF, CKD 3, remote right frontal infarct , 4v CABG in 2011.  Pt  presents as a transfer from [facility] for acute subarachnoid hemorrhage after a mechanical fall. Pt admitted initially for diabetic non healing infected left heel wound. Per chart review, she had a left heel wound with purulence and fever and found to have maggots. Per her , this started as a pressure wound from her chair. He denies cuts or scratches, animal or water exposure.  She was being treated with zosyn and vanc empirically at outside hospital. She was scheduled for debridement but then vomited on day of surgery.  Pt developed respiratory failure. CTA was done and showed PE of the RLL, she was started on therapeutic lovenox.  T he following day she was walking to the bathroom and had a mechanical fall hitting her forehead.  Negative LOC.  Head CT showed bilateral subarachnoid Hemorrhage . [place] was [place] [facility] [place].     ID consulted for abx recs. vanc is currently on hold given MC. She remains on zosyn. MRI of foot was negative for osteomyelitis.   Interval History: NAEON. Afebrile. No leukocytosis. Wound cx are showing Staph aureus. Susceptibilities are pending. No acute complaints today. Requesting to be transferred back to [place] for debridement by her podiatrist, Dr. Wick.    Review of Systems   Constitutional: Negative for chills, fatigue and fever.   HENT: Negative for trouble swallowing and voice change.    Eyes: Negative for photophobia and visual disturbance.   Respiratory: Negative for cough, shortness of breath and wheezing.    Cardiovascular: Positive for leg swelling. Negative for chest pain and palpitations.   Gastrointestinal: Negative for abdominal distention, diarrhea, nausea and vomiting.   Genitourinary: Positive for hematuria. Negative for difficulty urinating and flank pain.   Musculoskeletal: Positive for arthralgias (LLE) and gait problem. Negative for back pain.    Skin: Positive for wound (LLE). Negative for color change.   Neurological: Positive for weakness and headaches. Negative for speech difficulty and numbness.   Psychiatric/Behavioral: Negative for agitation and confusion.     Objective:     Vital Signs (Most Recent):  Temp: 97 °F (36.1 °C) (11/27/17 1122)  Pulse: 71 (11/27/17 1122)  Resp: 18 (11/27/17 1122)  BP: (!) 143/78 (11/27/17 1122)  SpO2: 96 % (11/27/17 1122) Vital Signs (24h Range):  Temp:  [96.4 °F (35.8 °C)-98.2 °F (36.8 °C)] 97 °F (36.1 °C)  Pulse:  [65-82] 71  Resp:  [11-27] 18  SpO2:  [95 %-100 %] 96 %  BP: (115-154)/(58-89) 143/78  Arterial Line BP: (124-132)/(60-63) 132/63     Weight: 117 kg (257 lb 15 oz)  Body mass index is 41.63 kg/m².    Estimated Creatinine Clearance: 43.5 mL/min (based on SCr of 1.9 mg/dL (H)).    Physical Exam   Constitutional: She is oriented to person, place, and time. She appears well-developed and well-nourished. No distress.   HENT:   Head: Normocephalic.   Hematoma to left eye   Eyes: Conjunctivae are normal. No scleral icterus.   Cardiovascular: Normal rate, regular rhythm and normal heart sounds.    Pulmonary/Chest: Effort normal and breath sounds normal. No respiratory distress. She has no wheezes.   Abdominal: Soft. Bowel sounds are normal. There is no tenderness.   obese   Musculoskeletal: She exhibits edema and tenderness.   Neurological: She is alert and oriented to person, place, and time.   More alert today   Skin: Skin is warm and dry.   Large left leg ulcer overlying achilles with necrotic wound base. No purulence or malodor. Mild surrounding erythema       Significant Labs:   Blood Culture:   Recent Labs  Lab 11/18/17  1657 11/18/17  1658   LABBLOO No growth after 5 days. No growth after 5 days.     CBC:   Recent Labs  Lab 11/26/17  0323 11/27/17  0747   WBC 7.58 11.10   HGB 7.1* 8.9*   HCT 23.6* 29.5*    188     CMP:   Recent Labs  Lab 11/26/17  2115 11/26/17  2348 11/27/17  0747     137 136  138   K 4.6  4.6 4.3 4.5     104 103 103   CO2 24  24 27 28   *  202* 196* 178*   BUN 7  7 8 10   CREATININE 1.4  1.4 1.5* 1.9*   CALCIUM 8.2*  8.2* 8.2* 8.7   ALBUMIN 2.2*  2.2* 2.2* 2.3*   ANIONGAP 9  9 6* 7*   EGFRNONAA 42.3*  42.3* 38.9* 29.3*     Lactic Acid: No results for input(s): LACTATE in the last 48 hours.  Prealbumin: No results for input(s): PREALBUMIN in the last 48 hours.  Procalcitonin: No results for input(s): PROCAL in the last 48 hours.  Quantiferon: No results for input(s): NIL, TBAG, TBAGNIL, MITOGENNIL, TBGOLD in the last 48 hours.  Respiratory Culture: No results for input(s): GSRESP, RESPIRATORYC in the last 4320 hours.  Urine Culture: No results for input(s): LABURIN in the last 4320 hours.  Urine Studies:   Recent Labs  Lab 11/24/17  1837   COLORU Red*   APPEARANCEUA Cloudy*   PHUR 5.0   SPECGRAV >1.030*   PROTEINUA 2+*   GLUCUA 1+*   KETONESU Negative   BILIRUBINUA Negative   OCCULTUA 3+*   NITRITE Negative   UROBILINOGEN Negative   LEUKOCYTESUR Negative   RBCUA >100*   WBCUA 0   BACTERIA Rare   SQUAMEPITHEL 8   HYALINECASTS 0     Wound Culture:   Recent Labs  Lab 11/26/17  1251   LABAERO STAPHYLOCOCCUS AUREUSFewSusceptibility pending       Significant Imaging: I have reviewed all pertinent imaging results/findings within the past 24 hours.

## 2017-11-27 NOTE — NURSING
Pharmacy has not sent the 1315 dose of Vancomycin. Report has already been called to other facility. Notified Nanda at [facility] in [place] that Vanc has not been given yet. She states pt will receive med once she gets there.

## 2017-11-27 NOTE — PROGRESS NOTES
[facility]  Nephrology  Progress Note    Patient Name: Juliane Ramos  MRN: [id number]  Admission Date: 11/24/2017  Hospital Length of Stay: 3 days  Attending Provider: Magalys Morales MD   Primary Care Physician: JOS Dumont  Principal Problem:Traumatic subarachnoid hemorrhage    Subjective:     HPI: Juliane Ramos is a 57 y.o.  female with a PMHx relevant for HFrEF( EF 35% on 11/21/17), CKD 3, DMT2 (a1c 8.4%), CVA, 4v CABG (2011), PE, diabetic foot ulcer of left foot transfer to [facility] from [facility] secondary to new onset SAH after fall. She was admitted with OSH on 11/18 for a diabetic left heel wound, non-healing x3 weeks with fever, purulent drainage, and reportedly with maggots, treated with zosyn and vanc. She was dx with acute PE post syncopal episode secondary to hypercapnic resp failure. CTA done 11/20/2017 with eGFR < 45 mg/dl and was started on Lovenox. She has a sCr baseline 1.1-1.4 mg/dl. She was seen by nephrology at OSH secondary to MC due to ATN and CI-MC. She was treated initially with IV fluids for gentle hydration but jordan sCr continue to rise. She presents to [facility] with sCr of 4.1 and decreased UO. She developed pleural effusion and IV fluids where stopped. Since then per report it seems like his UO has been decreasing. Her K has alos steadily bernard rising. 5.3 today.      Interval History:   Patient evaluated at bedside with family member present, afebrile, hemodynamically stable, no respiratory distress, total intake 2.5 L and output 2.2 L (urine 255 cc). Still with villegas cath in place.     Review of patient's allergies indicates:  No Known Allergies  Current Facility-Administered Medications   Medication Frequency    0.9%  NaCl infusion (for blood administration) Q24H PRN    acetaminophen tablet 650 mg Q6H PRN    albuterol inhaler 2 puff Q6H PRN    carvedilol tablet 12.5 mg BID    cefepime in dextrose 5 % 1 gram/50 mL IVPB 1 g Q24H    collagenase  ointment Daily    dextrose 50% injection 12.5 g PRN    gabapentin capsule 100 mg TID    glucagon (human recombinant) injection 1 mg PRN    insulin aspart pen 1-10 Units Q4H PRN    labetalol injection 10 mg Q15 Min PRN    levetiracetam oral soln Soln 250 mg BID    levothyroxine tablet 50 mcg Before breakfast    metronidazole IVPB 500 mg Q8H    ondansetron injection 4 mg Q6H PRN    potassium phosphate 15 mmol in dextrose 5 % 250 mL infusion Daily PRN    rosuvastatin tablet 10 mg Daily    sodium chloride 0.9% flush 3 mL Q8H    vancomycin 1.75 g in 5 % dextrose 500 mL IVPB Q24H       Objective:     Vital Signs (Most Recent):  Temp: 97 °F (36.1 °C) (11/27/17 1510)  Pulse: 67 (11/27/17 1510)  Resp: 18 (11/27/17 1510)  BP: 123/67 (11/27/17 1510)  SpO2: 97 % (11/27/17 1510)  O2 Device (Oxygen Therapy): room air (11/27/17 1510) Vital Signs (24h Range):  Temp:  [96.4 °F (35.8 °C)-98.2 °F (36.8 °C)] 97 °F (36.1 °C)  Pulse:  [65-82] 67  Resp:  [11-27] 18  SpO2:  [95 %-100 %] 97 %  BP: (115-154)/(58-89) 123/67  Arterial Line BP: (131-132)/(60-63) 132/63     Weight: 117 kg (257 lb 15 oz) (11/27/17 0525)  Body mass index is 41.63 kg/m².  Body surface area is 2.33 meters squared.    I/O last 3 completed shifts:  In: 5313.3 [I.V.:4223.3; NG/GT:90; IV Piggyback:1000]  Out: 5149 [Urine:270; Other:4879]    Physical Exam   Constitutional: She is oriented to person, place, and time. She appears well-developed.   obeity   HENT:   Head: Normocephalic.   Eyes: Right eye exhibits no discharge. Left eye exhibits no discharge.   Neck: No JVD present.   Cardiovascular: Normal rate and regular rhythm.    No murmur heard.  Pulmonary/Chest: Effort normal and breath sounds normal.   Abdominal: Soft. She exhibits distension.   Musculoskeletal: She exhibits edema. She exhibits no tenderness.   Neurological: She is alert and oriented to person, place, and time.   Skin: Skin is warm.       Significant Labs:  ABGs:   Recent Labs  Lab  11/26/17  0455   PH 7.295*   PCO2 53.4*   HCO3 26.0   POCSATURATED 93*   BE 0     BMP:   Recent Labs  Lab 11/27/17  0747 11/27/17  1357   * 184*  184*    102  102   CO2 28 24  24   BUN 10 12  12   CREATININE 1.9* 2.1*  2.1*   CALCIUM 8.7 8.4*  8.4*   MG 2.1  2.1  --      CBC:   Recent Labs  Lab 11/27/17  0747   WBC 11.10   RBC 3.62*   HGB 8.9*   HCT 29.5*      MCV 82   MCH 24.6*   MCHC 30.2*     CMP:   Recent Labs  Lab 11/24/17  1834  11/27/17  1357     < > 184*  184*   CALCIUM 8.3*  < > 8.4*  8.4*   ALBUMIN 2.5*  < > 2.3*  2.3*   PROT 6.6  --   --    *  < > 135*  135*   K 5.8*  < > 4.4  4.4   CO2 22*  < > 24  24   CL 98  < > 102  102   BUN 58*  < > 12  12   CREATININE 5.6*  < > 2.1*  2.1*   ALKPHOS 66  --   --    ALT 8*  --   --    AST 17  --   --    BILITOT 0.6  --   --    < > = values in this interval not displayed.  All labs within the past 24 hours have been reviewed.       Assessment/Plan:     Hyperkalemia    - Currently normal range 4.5        CKD (chronic kidney disease) stage 3, GFR 30-59 ml/min    - suspect secondary to poorly control A1C as in 6/2017 A1C was 10.0. During admission her A1C was 8.2  - need to optimize DMT2 for maintaining adequate ranal function control .  - Goqal A1C < 7  - Patient continue to be anuric, follow strict intake and output   - Last Hd was overnight on the 11/15/17, doesn't require dialysis today   - Will schedule next dialysis tomorrow if required   - Avoid nephrotoxic medication and renal dose medications  - Follow renal diet low sodium, low potassium             Skinny Cardona  Nephrology  Fellow  [facility]    Pager [phone]

## 2017-11-27 NOTE — SUBJECTIVE & OBJECTIVE
"Interval Hx: Pt. Seen bedside family present. Bandage intact to left foot.     Scheduled Meds:   carvedilol  12.5 mg Per NG tube BID    ceFEPime (MAXIPIME) IVPB  1 g Intravenous Q24H    collagenase   Topical Daily    gabapentin  100 mg Per NG tube TID    levetiracetam oral soln  250 mg Per NG tube BID    levothyroxine  50 mcg Per NG tube Before breakfast    metronidazole  500 mg Intravenous Q8H    rosuvastatin  10 mg Per NG tube Daily    sodium chloride 0.9%  3 mL Intravenous Q8H    vancomycin (VANCOCIN) IVPB  1,750 mg Intravenous Q24H     Continuous Infusions:     PRN Meds:sodium chloride, acetaminophen, albuterol, dextrose 50%, glucagon (human recombinant), insulin aspart, labetalol, ondansetron, potassium phosphate IVPB    Review of patient's allergies indicates:  No Known Allergies     Past Medical History:   Diagnosis Date    Anemia in stage 3 chronic kidney disease 11/26/2017    CHF (congestive heart failure)     COPD (chronic obstructive pulmonary disease)     Coronary artery disease     Diabetes mellitus     Encounter for blood transfusion     Essential hypertension 6/24/2017    Hypertension     MI (myocardial infarction) 2010    Segmental and subsegmental pulmonary emboli of RLL without acute cor pulmonale 11/25/2017    Stroke     July 2005    Thyroid disease     Traumatic subarachnoid hemorrhage 11/24/2017    Type 2 diabetes mellitus with stage 3 chronic kidney disease, without long-term current use of insulin 6/24/2017     Past Surgical History:   Procedure Laterality Date    ABCESS DRAINAGE Right     Between "little toe" and the one next to it    BREAST SURGERY      Reduction [id number]    CARDIAC SURGERY      CABG 4vessel ring in one valve mitral valve prolapse    CORONARY ARTERY BYPASS GRAFT      hyperlipidemia      TUBAL LIGATION  03/1986       Family History     Problem Relation (Age of Onset)    Arthritis Mother    Cancer Father (68)    Depression Sister    Diabetes Father, " Maternal Grandmother    Heart disease Father    Hypertension Father, Son    Kidney disease Paternal Grandfather    Stroke Paternal Grandfather        Social History Main Topics    Smoking status: Never Smoker    Smokeless tobacco: Never Used    Alcohol use No    Drug use: No    Sexual activity: Yes     Partners: Male     Birth control/ protection: None     Review of Systems   Unable to perform ROS: Patient nonverbal   Constitutional: Positive for diaphoresis.     Objective:     Vital Signs (Most Recent):  Temp: 97 °F (36.1 °C) (11/27/17 1122)  Pulse: 71 (11/27/17 1122)  Resp: 18 (11/27/17 1122)  BP: (!) 143/78 (11/27/17 1122)  SpO2: 96 % (11/27/17 1122) Vital Signs (24h Range):  Temp:  [96.4 °F (35.8 °C)-98.2 °F (36.8 °C)] 97 °F (36.1 °C)  Pulse:  [65-82] 71  Resp:  [11-27] 18  SpO2:  [95 %-100 %] 96 %  BP: (112-154)/(58-89) 143/78  Arterial Line BP: (124-132)/(58-65) 132/63     Weight: 117 kg (257 lb 15 oz)  Body mass index is 41.63 kg/m².    Foot Exam    Right Foot/Ankle     Inspection and Palpation  Ecchymosis: none  Tenderness: none   Skin Exam: skin intact;     Neurovascular  Dorsalis pedis: 1+  Posterior tibial: 1+      Left Foot/Ankle      Inspection and Palpation  Ecchymosis: none (Periwound)  Tenderness: none   Skin Exam: drainage, ulcer and erythema;     Neurovascular  Dorsalis pedis: 1+  Posterior tibial: 1+          11/27/17        Left foot      Laboratory:  A1C:     Recent Labs  Lab 06/25/17  0508 11/18/17  1658 11/25/17  0821   HGBA1C 10.0* 8.4* 8.0*     Blood Cultures: No results for input(s): LABBLOO in the last 48 hours.  BMP:     Recent Labs  Lab 11/27/17  0747   *      K 4.5      CO2 28   BUN 10   CREATININE 1.9*   CALCIUM 8.7   MG 2.1  2.1     CBC:     Recent Labs  Lab 11/27/17  0747   WBC 11.10   RBC 3.62*   HGB 8.9*   HCT 29.5*      MCV 82   MCH 24.6*   MCHC 30.2*     CMP:   Recent Labs  Lab 11/24/17  1834  11/27/17  0747     < > 178*   CALCIUM 8.3*  < >  8.7   ALBUMIN 2.5*  < > 2.3*   PROT 6.6  --   --    *  < > 138   K 5.8*  < > 4.5   CO2 22*  < > 28   CL 98  < > 103   BUN 58*  < > 10   CREATININE 5.6*  < > 1.9*   ALKPHOS 66  --   --    ALT 8*  --   --    AST 17  --   --    BILITOT 0.6  --   --    < > = values in this interval not displayed.  Coagulation:     Recent Labs  Lab 11/24/17  1502   INR 1.1     CRP: No results for input(s): CRP in the last 168 hours.  ESR: No results for input(s): SEDRATE in the last 168 hours.  Wound Cultures: No results for input(s): LABAERO in the last 4320 hours.    Diagnostic Results:    MRI 11/19:   1. Retro-Achilles soft tissue wound. No evidence for osteomyelitis.  2. Mild nonspecific Achilles and posterior tibial tenosynovitis.  3. Small tibiotalar joint effusion.    X-ray:   1.  No radiographically apparent acute osteomyelitis. Skin irregularity and bandage noted in the posterior aspect of the ankle.  2. Degenerative changes in the ankle, hindfoot and midfoot are noted.    Clinical Findings:  Wound to left posterior ankle measuring 4.2x1.1 cm with tendon exposure. Bloody drainage noted with periwound erythema and edema. No purulence.

## 2017-11-27 NOTE — PT/OT/SLP EVAL
Speech Language Pathology   Evaluation and treatment    Juliane Ramos   MRN: [id number]   Admitting Diagnosis: Traumatic subarachnoid hemorrhage    Diet recommendations: Solid Diet Level: Regular  Liquid Diet Level: Thin Feed only when awake/alert, No straws, Small bites/sips, Alternating bites/sips, 1 bite/sip at a time, Remain upright 30 minutes post meal, Meds whole 1 at a time, Eliminate distractions, Avoid talking while eating and Assistance with meals  +Continue to monitor for signs and symptoms of aspiration and discontinue oral feeding should you notice any of the following: watery eyes, reddened facial area, wet vocal quality, increased work of breathing, change in respiratory status, increased congestion, coughing, fever, etc.    SLP Treatment Date: 11/27/17  Speech Start Time: 1055     Speech Stop Time: 1120     Speech Total (min): 25 min       TREATMENT BILLABLE MINUTES:  Eval 15  and Treatment Swallowing Dysfunction 10    Diagnosis: Traumatic subarachnoid hemorrhage  Diagnoses of Subarachnoid hemorrhage, Acute renal failure superimposed on stage 3 chronic kidney disease, unspecified acute renal failure type, Traumatic subarachnoid hemorrhage, and Subarachnoid hemorrhage following injury, no loss of consciousness, initial encounter were pertinent to this visit.      Past Medical History:   Diagnosis Date    Anemia in stage 3 chronic kidney disease 11/26/2017    CHF (congestive heart failure)     COPD (chronic obstructive pulmonary disease)     Coronary artery disease     Diabetes mellitus     Encounter for blood transfusion     Essential hypertension 6/24/2017    Hypertension     MI (myocardial infarction) 2010    Segmental and subsegmental pulmonary emboli of RLL without acute cor pulmonale 11/25/2017    Stroke     July 2005    Thyroid disease     Traumatic subarachnoid hemorrhage 11/24/2017    Type 2 diabetes mellitus with stage 3 chronic kidney disease, without long-term current use of  "insulin 6/24/2017     Past Surgical History:   Procedure Laterality Date    ABCESS DRAINAGE Right     Between "little toe" and the one next to it    BREAST SURGERY      Reduction [id number]    CARDIAC SURGERY      CABG 4vessel ring in one valve mitral valve prolapse    CORONARY ARTERY BYPASS GRAFT      hyperlipidemia      TUBAL LIGATION  03/1986       Has the patient been evaluated by SLP for swallowing? : Yes  Keep patient NPO?: No   General Precautions: Standard, aspiration, fall, vision impaired, seizure    Current Respiratory Status: nasal cannula     CXR 11/24/17: Right IJ catheter placed with tip overlying the SVC/RA junction.  No pneumothorax.    Social Hx: Patient lives with spouse in [place]. She reports she is Independent with ADLs then shares her  helps her with medications.     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 Pt enjoys spending time with grandchildren.    Subjective:  SLP reviewed Pt with nurse, nurse reports no difficulty with medications.   Pt presents fatigued  Pt explains, "I'll try something but I really don't have an appetite"  Patient goals: "[facility]"     Pain/Comfort  Pain Rating 1: 0/10  Pain Rating Post-Intervention 1: 0/10    Objective:   Patient found with: arterial line, bed alarm, blood pressure cuff, pulse ox (continuous), telemetry, peripheral IV. Pt found reclined in bed, awake, with daughter at bedside. Son and spouse in and out of room t/o session. SLP calls for CNA to assist in repositioning pt in bed. Spouse in room and insists on helping to reposition pt. Pt repositioned in bed and HOB elevated.     Oral Musculature Evaluation  Oral Musculature: general weakness  Dentition: present and adequate  Secretion Management: oral holding  Mucosal Quality: adequate  Mandibular Strength and Mobility: WFL  Oral Labial Strength and Mobility: impaired retraction, impaired pursing (2/2 L facial edema  " s/p fall)  Lingual Strength and Mobility: functional strength, functional protrusion  Buccal Strength and Mobility: decreased tone  Volitional Cough: present   Volitional Swallow: elcited, timely   Voice Prior to PO Intake: clear, mildly strained      Cognitive Status:  Behavioral Observations: lethargic-  Memory and Orientation: Pt oriented x4. She recalls 3-4 digit combinations and 3 unrelated words for immediate recall WFL. She recalls 2/3 unrelated items post 3 minute filled delay I'ly and up to 3/3 provided mild verbal cueing (categorical.) Pt answers LTM/general info questions WFL.  Attention: Impaired. Pt easily distracted t/o session and repositioning self in bed, requiring moderate verbal/visual cueing to attend to ST task up to 1 minute in length.   Problem Solving: Pt answers safety/ general reasoning questions with 75% accuracy across 4 attempts, I'ly. She solves math word problems for money and time management with 25% accuracy I'ly across 4 attempts.   Pragmatics: flat affect  Executive Function: Pt with midl difficulty with cognitive oranization tasks for FO3. Pt with mildly decreased self-monitoring and self-correction noted t/o p.s. tasks.     Language: no    Auditory Comprehension: Pt answers basic and 2-unit YNQ with 100% accuracy. She answers m/u YNQ with 80% accuracy, I'ly.     Verbal Expression: Patient with occasional hesitations and semantic paraphasias at the conversational level. No difficulty ntoed with object naming, automatic, responsive speech or picture description tasks.    Motor Speech: MIld Dysarthria c/b decreased articulatory precision and breath support    Voice: Clear, mildly strained, adequate intensity    Augmentative Alternative Communication: no    Reading: pt with 80% accuracy on sentence reading tasks on 5 attempts. Patient without reading glasses present, reads enlarged font this service day.     Writing: DNA 2/2 MDs/providers in and out of room during session.      Visual-Spatial: Impaired. Pt keeping L eye closed intermittently t/o session 2/2 edema. Reading glasses not present at facility.     Additional Treatment:  Pt seen for ongoing swallow assessment. Pt seen with presentations of thin liquids (cup edge,x4, self-fed) and solids (bites of rebecca crackerx2.) No overt S/S aspiration noted with minimal trials accepted.  Pt reporting minimal appetite. Spouse reports no difficulty with 1/2 oatmeal cup on breakfast tray. Patient and family educated on SLP role, safety precautions for problem solving/attention, aspiration precautions, needs for ongoing Supervision with money/medication/time mngt tasks and ongoing ST following d/c from acute. Patient and family v/u. No further questions. Whiteboard updated.     FIM:  Social Interaction: 5 Supervision--The patient requires supervision (e.g., monitoring, verbal control, cueing, or coaxing) only under stressful or unfamiliar conditions, but less than 10% of the time.  The patient may require encouragement to initiate participation.   Problem Solvin Minimal Direction--The patient solves routine problems 75 to 90% of the time.   Comprehension: 5 Standby Prompting--The patient understands directions and conversation about basic daily needs more than 90% of the time.  The patient requires prompting (slowed speech rate, use of repetition, stressing particular words or phrases, pauses, visual or   Expression: 5 Standby Prompting--The patient expresses basic daily needs and ideas more than 90% of the time.  Requires prompting (e.g., frequent repetition) less than 10% of the time to be understood.   Memory: 3 Moderate Prompting--The patient recognizes and remembers 50 to 74% of the time.     Assessment:  Juliane Ramos is a 57 y.o. female with a SLP diagnosis of Cognitive-Linguistic Impairment. She presents with functional oral and pharyngeal phases of swallow. Strict aspiration precautions advised 2/2 decreased attention and  facial edema. ST to continue to follow.     Do you have any cultural, spiritual, Mandaeism conflicts, given your current situation?: none noted     Discharge recommendations: Discharge Facility/Level Of Care Needs: nursing facility, skilled     Goals:    SLP Goals        Problem: SLP Goal    Goal Priority Disciplines Outcome   SLP Goal     SLP Ongoing (interventions implemented as appropriate)   Description:  Speech Language Pathology Goals  Goals expected to be met by 12/02/2017  1. Pt will participate in ongoing swallow assessment to determine safest, least restrictive means of nutrition/hydration/medication MET 11/27  2. Pt will participate in speech, language and cognitive assessment to determine ongoing POC needs MET 11/27  3. Pt will tolerate regular diet with thin liquids w/o overt S/S aspiration, Supervision  4. Pt will complete reading comprehension tasks at the sentence level with 90% accuracy, MIN A, to improve receptive language skills  5. Pt will complete basic scanning tasks with 90% accuracy, MIN A, to improve visiospatial skills  6. Pt will recall at least 3/4 unrelated items post 3 minute filled delay, 90% of attempts, MIN A, to improve short term memory  7. Pt will participate in ongoing assessment of writing skills  8. Educate pt and family on safety awareness and compensatory strategies for cognition                           Plan:   Patient to be seen Therapy Frequency: 5 x/week   Plan of Care expires: 12/25/17  Plan of Care reviewed with: patient  SLP Follow-up?: Yes            CLIFF Ibrahim, [facility]-SLP  Speech-Language Pathology  Pager: [phone]  11/27/2017

## 2017-11-27 NOTE — PLAN OF CARE
Spoke with Pat at the Transfer Center. [phone] regarding transferring back to [facility].  Pt is not medically stable for next level of care Per MD: oxygen 4liters nc, kidney function monitoring, possible need for HD, Podiatry/Infectious Disease following.  PAT [facility]ona will find out cost to transfer back as this will be paid by pt/ since pt is not transferring to higher level of care.   If pt was transferring to higher level of care, cost would be covered.  Dr. WAGGONER will speak with pt/. Chloe to speak w pt/ re: cost of transfer/transport.    Update from Dr WAGGONER: pt and  okay with paying for transport but PAT [facility]ona will obtain cost info to make sure they are okay w paying for this.  Pt will need oxygen via nc-4 liters.  Pat in Transfer Center is calling [facility] and will call PAT Corona if pt accepted so Chloe can set this transport up.

## 2017-11-27 NOTE — ASSESSMENT & PLAN NOTE
-ID and podiatry following-will need eventual debridement per Podiatry  -MRI from outside hospital shows retro-achilles soft tissue wound, no osteomyelitis  -On Vanc, Cefepime, and Flagyl

## 2017-11-28 PROBLEM — S06.6X0A SUBARACHNOID HEMORRHAGE FOLLOWING INJURY, NO LOSS OF CONSCIOUSNESS: Status: ACTIVE | Noted: 2017-11-28

## 2017-11-28 PROBLEM — A49.01 STAPH AUREUS INFECTION: Status: ACTIVE | Noted: 2017-11-28

## 2017-11-28 LAB
ANION GAP SERPL CALC-SCNC: 8 MMOL/L
BACTERIA SPEC AEROBE CULT: NORMAL
BACTERIA SPEC AEROBE CULT: NORMAL
BASOPHILS # BLD AUTO: 0 K/UL
BASOPHILS NFR BLD: 0.3 %
BUN SERPL-MCNC: 15 MG/DL
CALCIUM SERPL-MCNC: 8.6 MG/DL
CHLORIDE SERPL-SCNC: 103 MMOL/L
CO2 SERPL-SCNC: 25 MMOL/L
CREAT SERPL-MCNC: 2.7 MG/DL
DIFFERENTIAL METHOD: ABNORMAL
EOSINOPHIL # BLD AUTO: 0.2 K/UL
EOSINOPHIL NFR BLD: 2.6 %
ERYTHROCYTE [DISTWIDTH] IN BLOOD BY AUTOMATED COUNT: 19.3 %
EST. GFR  (AFRICAN AMERICAN): 22 ML/MIN/1.73 M^2
EST. GFR  (NON AFRICAN AMERICAN): 19 ML/MIN/1.73 M^2
FACT X ACT/NOR PPP: 93 %
GLUCOSE SERPL-MCNC: 139 MG/DL
HCT VFR BLD AUTO: 26.3 %
HGB BLD-MCNC: 8.4 G/DL
LYMPHOCYTES # BLD AUTO: 0.6 K/UL
LYMPHOCYTES NFR BLD: 6.9 %
MCH RBC QN AUTO: 25.1 PG
MCHC RBC AUTO-ENTMCNC: 31.9 G/DL
MCV RBC AUTO: 79 FL
MONOCYTES # BLD AUTO: 0.9 K/UL
MONOCYTES NFR BLD: 9.8 %
NEUTROPHILS # BLD AUTO: 7.2 K/UL
NEUTROPHILS NFR BLD: 80.4 %
PLATELET # BLD AUTO: 184 K/UL
PMV BLD AUTO: 8.4 FL
POCT GLUCOSE: 178 MG/DL (ref 70–110)
POCT GLUCOSE: 235 MG/DL (ref 70–110)
POTASSIUM SERPL-SCNC: 4.3 MMOL/L
RBC # BLD AUTO: 3.34 M/UL
SODIUM SERPL-SCNC: 136 MMOL/L
VANCOMYCIN SERPL-MCNC: 26.1 UG/ML
VANCOMYCIN SERPL-MCNC: 26.7 UG/ML
WBC # BLD AUTO: 9 K/UL

## 2017-11-28 PROCEDURE — 97166 OT EVAL MOD COMPLEX 45 MIN: CPT

## 2017-11-28 PROCEDURE — 63600175 PHARM REV CODE 636 W HCPCS: Performed by: NURSE PRACTITIONER

## 2017-11-28 PROCEDURE — 63600175 PHARM REV CODE 636 W HCPCS: Performed by: INTERNAL MEDICINE

## 2017-11-28 PROCEDURE — A4216 STERILE WATER/SALINE, 10 ML: HCPCS | Performed by: NURSE PRACTITIONER

## 2017-11-28 PROCEDURE — 99900035 HC TECH TIME PER 15 MIN (STAT)

## 2017-11-28 PROCEDURE — 97535 SELF CARE MNGMENT TRAINING: CPT

## 2017-11-28 PROCEDURE — 27000190 HC CPAP FULL FACE MASK W/VALVE

## 2017-11-28 PROCEDURE — 92523 SPEECH SOUND LANG COMPREHEN: CPT

## 2017-11-28 PROCEDURE — 80048 BASIC METABOLIC PNL TOTAL CA: CPT

## 2017-11-28 PROCEDURE — 97162 PT EVAL MOD COMPLEX 30 MIN: CPT

## 2017-11-28 PROCEDURE — 94761 N-INVAS EAR/PLS OXIMETRY MLT: CPT

## 2017-11-28 PROCEDURE — 92610 EVALUATE SWALLOWING FUNCTION: CPT

## 2017-11-28 PROCEDURE — 80202 ASSAY OF VANCOMYCIN: CPT | Mod: 91

## 2017-11-28 PROCEDURE — 80202 ASSAY OF VANCOMYCIN: CPT

## 2017-11-28 PROCEDURE — 27000221 HC OXYGEN, UP TO 24 HOURS

## 2017-11-28 PROCEDURE — 36415 COLL VENOUS BLD VENIPUNCTURE: CPT

## 2017-11-28 PROCEDURE — 99223 1ST HOSP IP/OBS HIGH 75: CPT | Mod: AI,,, | Performed by: INTERNAL MEDICINE

## 2017-11-28 PROCEDURE — 12000002 HC ACUTE/MED SURGE SEMI-PRIVATE ROOM

## 2017-11-28 PROCEDURE — 85025 COMPLETE CBC W/AUTO DIFF WBC: CPT

## 2017-11-28 PROCEDURE — G8987 SELF CARE CURRENT STATUS: HCPCS | Mod: CK

## 2017-11-28 PROCEDURE — 25000003 PHARM REV CODE 250: Performed by: NURSE PRACTITIONER

## 2017-11-28 PROCEDURE — 94660 CPAP INITIATION&MGMT: CPT

## 2017-11-28 PROCEDURE — G8988 SELF CARE GOAL STATUS: HCPCS | Mod: CJ

## 2017-11-28 RX ORDER — CEFAZOLIN SODIUM 1 G/50ML
1 SOLUTION INTRAVENOUS
Status: DISCONTINUED | OUTPATIENT
Start: 2017-11-28 | End: 2017-11-30

## 2017-11-28 RX ADMIN — GABAPENTIN 100 MG: 100 CAPSULE ORAL at 10:11

## 2017-11-28 RX ADMIN — INSULIN ASPART 4 UNITS: 100 INJECTION, SOLUTION INTRAVENOUS; SUBCUTANEOUS at 04:11

## 2017-11-28 RX ADMIN — SODIUM CHLORIDE, PRESERVATIVE FREE 3 ML: 5 INJECTION INTRAVENOUS at 03:11

## 2017-11-28 RX ADMIN — GABAPENTIN 100 MG: 100 CAPSULE ORAL at 03:11

## 2017-11-28 RX ADMIN — LEVETIRACETAM 250 MG: 100 SOLUTION ORAL at 11:11

## 2017-11-28 RX ADMIN — COLLAGENASE SANTYL: 250 OINTMENT TOPICAL at 09:11

## 2017-11-28 RX ADMIN — SODIUM CHLORIDE, PRESERVATIVE FREE 3 ML: 5 INJECTION INTRAVENOUS at 10:11

## 2017-11-28 RX ADMIN — CEFEPIME HYDROCHLORIDE 1 G: 1 INJECTION, POWDER, FOR SOLUTION INTRAMUSCULAR; INTRAVENOUS at 01:11

## 2017-11-28 RX ADMIN — LEVOTHYROXINE SODIUM 50 MCG: 50 TABLET ORAL at 05:11

## 2017-11-28 RX ADMIN — CARVEDILOL 12.5 MG: 6.25 TABLET, FILM COATED ORAL at 10:11

## 2017-11-28 RX ADMIN — SODIUM CHLORIDE, PRESERVATIVE FREE 3 ML: 5 INJECTION INTRAVENOUS at 01:11

## 2017-11-28 RX ADMIN — CEFAZOLIN SODIUM 1 G: 1 SOLUTION INTRAVENOUS at 05:11

## 2017-11-28 RX ADMIN — ROSUVASTATIN CALCIUM 10 MG: 5 TABLET ORAL at 09:11

## 2017-11-28 RX ADMIN — CARVEDILOL 12.5 MG: 6.25 TABLET, FILM COATED ORAL at 09:11

## 2017-11-28 RX ADMIN — SODIUM CHLORIDE, PRESERVATIVE FREE 3 ML: 5 INJECTION INTRAVENOUS at 05:11

## 2017-11-28 RX ADMIN — INSULIN ASPART 2 UNITS: 100 INJECTION, SOLUTION INTRAVENOUS; SUBCUTANEOUS at 11:11

## 2017-11-28 RX ADMIN — CARVEDILOL 12.5 MG: 6.25 TABLET, FILM COATED ORAL at 12:11

## 2017-11-28 RX ADMIN — GABAPENTIN 100 MG: 100 CAPSULE ORAL at 05:11

## 2017-11-28 RX ADMIN — GABAPENTIN 100 MG: 100 CAPSULE ORAL at 12:11

## 2017-11-28 RX ADMIN — LEVETIRACETAM 250 MG: 100 SOLUTION ORAL at 09:11

## 2017-11-28 RX ADMIN — LEVETIRACETAM 250 MG: 100 SOLUTION ORAL at 12:11

## 2017-11-28 NOTE — PT/OT/SLP EVAL
"Physical Therapy  Evaluation/Treatment    Juliane Ramos   MRN: [id number]   Admitting Diagnosis: L foot wound, SAH    PT Received On: 11/28/17  PT Start Time: 1308     PT Stop Time: 1340    PT Total Time (min): 32 min co-eval with OT    Billable Minutes:  Evaluation 15 Therapeutic Activity 8    Diagnosis: L heel wound, SAH    Past Medical History:   Diagnosis Date    Anemia in stage 3 chronic kidney disease 11/26/2017    CHF (congestive heart failure)     COPD (chronic obstructive pulmonary disease)     Coronary artery disease     Diabetes mellitus     Encounter for blood transfusion     Essential hypertension 6/24/2017    Hypertension     MI (myocardial infarction) 2010    Segmental and subsegmental pulmonary emboli of RLL without acute cor pulmonale 11/25/2017    Stroke     July 2005    Thyroid disease     Traumatic subarachnoid hemorrhage 11/24/2017    Type 2 diabetes mellitus with stage 3 chronic kidney disease, without long-term current use of insulin 6/24/2017      Past Surgical History:   Procedure Laterality Date    ABCESS DRAINAGE Right     Between "little toe" and the one next to it    BREAST SURGERY      Reduction [id number]    CARDIAC SURGERY      CABG 4vessel ring in one valve mitral valve prolapse    CORONARY ARTERY BYPASS GRAFT      hyperlipidemia      TUBAL LIGATION  03/1986       Referring physician: Luisana Valera NP   Date referred to PT: 11/28/2017    General Precautions: Standard, fall  Orthopedic Precautions: LLE non weight bearing   Braces:  (CAM boot for LLE)       Do you have any cultural, spiritual, Jew conflicts, given your current situation?: None    Patient History:  Lives With: spouse  Living Arrangements: mobile home  Home Accessibility: stairs to enter home  Number of Stairs to Enter Home: 4  Stair Railings at Home: outside, present at both sides  Transportation Available: family or friend will provide  Living Environment Comment: Pt lives with her  in " "a SSH with 4 JEFF. PTA she was mod I with mobility, occasionally using a rollator for community ambulation. Her  is with her at all times. SHe received HHPT and HHOT.   Equipment Currently Used at Home: walker, rolling, rollator, hospital bed, bedside commode, shower chair    Previous Level of Function:  Ambulation Skills: needs device  Transfer Skills: needs device  ADL Skills: needs device and assist  Work/Leisure Activity: needs device    Subjective:  Communicated with RN prior to session.  Pt reported "I don't know why my left leg didn't heal. My therapist said it's probably from my lift chair because I sleep in it."  Chief Complaint: L heel pain  Patient goals: To get stronger    Pain/Comfort  Pain Rating 1: 7/10  Location - Side 1: Left  Location - Orientation 1: generalized  Location 1: heel  Pain Addressed 1: Reposition, Distraction, Nurse notified  Pain Rating Post-Intervention 1: 5/10      Objective:   Patient found with: villegas catheter, oxygen, central line, telemetry     Cognitive Exam:  Oriented to: Person, Place, Time and Situation    Follows Commands/attention: Follows two-step commands  Communication: clear/fluent  Safety awareness/insight to disability: impaired    Physical Exam:  Postural examination/scapula alignment: Rounded shoulder, Head forward and Posterior pelvic tilt    Skin integrity: Visible skin intact except gauze wrap to L heel  Edema: Mild B feet    Sensation:   Impaired  light/touch BLE    Upper Extremity Range of Motion:  See OT note    Lower Extremity Range of Motion:  Right Lower Extremity: WFL  Left Lower Extremity: WFL    Lower Extremity Strength:  Right Lower Extremity: 4/5 throughout  Left Lower Extremity: 3+/5 throughout    Gross motor coordination: WFL    Functional Mobility:  Bed Mobility:  Supine to Sit: Maximum Assistance (x3)  Sit to Supine: Maximum Assistance (x3)    Transfers:  Sit <> Stand Assistance: Minimum Assistance (x2 ppl assist)  Sit <> Stand Assistive " Device: Rolling Walker    Gait:   Gait Distance: 4 side steps toawrd HOB  Assistance 1: Minimum assistance (x2; cues for NWB LLE)  Gait Assistive Device: Rolling walker  Gait Pattern: 3-point gait  Gait Deviation(s): decreased estefania, increased time in double stance, decreased velocity of limb motion, decreased step length, decreased weight-shifting ability, decreased toe-to-floor clearance  Pt with CAM boot on during gait. She was encouraged to maintain NWB LLE but occasionally placed toes on ground for stability.     Balance:   Static Sit: FAIR+: Able to take MINIMAL challenges from all directions  Dynamic Sit: FAIR+: Maintains balance through MINIMAL excursions of active trunk motion  Static Stand: POOR+: Needs MINIMAL assist to maintain  Dynamic stand: POOR: N/A    Therapeutic Activities and Exercises:  Pt able to scoot along side of bed and to EOB in sitting with stand by assist. Cues given to push up with BUE.     AM-PAC 6 CLICK MOBILITY  How much help from another person does this patient currently need?   1 = Unable, Total/Dependent Assistance  2 = A lot, Maximum/Moderate Assistance  3 = A little, Minimum/Contact Guard/Supervision  4 = None, Modified [place]/Independent    Turning over in bed (including adjusting bedclothes, sheets and blankets)?: 2  Sitting down on and standing up from a chair with arms (e.g., wheelchair, bedside commode, etc.): 2  Moving from lying on back to sitting on the side of the bed?: 2  Moving to and from a bed to a chair (including a wheelchair)?: 2  Need to walk in hospital room?: 1  Climbing 3-5 steps with a railing?: 1  Total Score: 10     AM-PAC Raw Score CMS G-Code Modifier Level of Impairment Assistance   6 % Total / Unable   7 - 9 CM 80 - 100% Maximal Assist   10 - 14 CL 60 - 80% Moderate Assist   15 - 19 CK 40 - 60% Moderate Assist   20 - 22 CJ 20 - 40% Minimal Assist   23 CI 1-20% SBA / CGA   24 CH 0% Independent/ Mod I     Patient left HOB elevated with all  lines intact and call button in reach.    Assessment:   Juliane Ramos is a 57 y.o. female with a medical diagnosis of SAH, L foot wound and presents with pain in LLE, decreased independence with all functional mobility and fall risk 2/2 weight bearing precautions. She would benefit from continued PT to progress mobility. Due to stairs needed to enter home and current level of assist required, she would benefit from SNF placement.    Rehab identified problem list/impairments: Rehab identified problem list/impairments: weakness, impaired endurance, impaired self care skills, impaired functional mobilty, gait instability, impaired balance, decreased lower extremity function, pain, impaired skin, decreased ROM, impaired cardiopulmonary response to activity, orthopedic precautions    Rehab potential is fair.    Activity tolerance: Fair    Discharge recommendations: Discharge Facility/Level Of Care Needs: nursing facility, skilled     Barriers to discharge: Barriers to Discharge: Inaccessible home environment, Decreased caregiver support    Equipment recommendations: Equipment Needed After Discharge:  (pt may need wheelchair pending progress)     GOALS:    Physical Therapy Goals        Problem: Physical Therapy Goal    Goal Priority Disciplines Outcome Goal Variances Interventions   Physical Therapy Goal     PT/OT, PT Ongoing (interventions implemented as appropriate)     Description:  Goals to be met by: 2017     Patient will increase functional independence with mobility by performin. Supine to sit with MInimal Assistance  2. Sit to supine with MInimal Assistance  3. Sit to stand transfer with Minimal Assistance  4. Bed to chair transfer with Minimal Assistance using Rolling Walker  5. Stand for 5 minutes with Stand-by Assistance using Rolling Walker  6. Lower extremity exercise program x10 reps, with supervision  Wheelchair management goal to be made pending progress                      PLAN:    Patient  to be seen 6 x/week to address the above listed problems via gait training, therapeutic activities, therapeutic exercises  Plan of Care expires: 12/27/17  Plan of Care reviewed with: patient, spouse    Jessica LeJeune, PT, DPT

## 2017-11-28 NOTE — CONSULTS
"Nephrology Consult Note        Patient Name: Juliane Ramos  MRN: [id number]    Patient Class: IP- Inpatient   Admission Date: 11/27/2017  Length of Stay: 1 days    Attending Physician: Reg Devine MD  Primary Care Provider: JOS Dumont    Reason for Consult: MC on CKD3, dialysis dependent    SUBJECTIVE:     HPI: 58 y/o F with HTN, DM2, CHF, stage III CKD with baseline sCr 1.4 on 11/20 was admitted for management of a non-healing left heel ulcer, cellulitis. Was considered for surgery but developed PE, anticoagulated and MC due to IV contrast/vanco/infection. Before Surgery had a fall and SAH, requiring transfer to [facility]. There, she was started on HD via Harry cath on 11/25 and had only 1 therapy - SLED. There is a plan now by Podiary for surgical intervention. She makes a fair amont of urine, cloudy.    Past Medical History:   Diagnosis Date    Anemia in stage 3 chronic kidney disease 11/26/2017    CHF (congestive heart failure)     COPD (chronic obstructive pulmonary disease)     Coronary artery disease     Diabetes mellitus     Encounter for blood transfusion     Essential hypertension 6/24/2017    Hypertension     MI (myocardial infarction) 2010    Segmental and subsegmental pulmonary emboli of RLL without acute cor pulmonale 11/25/2017    Stroke     July 2005    Thyroid disease     Traumatic subarachnoid hemorrhage 11/24/2017    Type 2 diabetes mellitus with stage 3 chronic kidney disease, without long-term current use of insulin 6/24/2017     Past Surgical History:   Procedure Laterality Date    ABCESS DRAINAGE Right     Between "little toe" and the one next to it    BREAST SURGERY      Reduction [id number]    CARDIAC SURGERY      CABG 4vessel ring in one valve mitral valve prolapse    CORONARY ARTERY BYPASS GRAFT      hyperlipidemia      TUBAL LIGATION  03/1986     Family History   Problem Relation Age of Onset    Arthritis Mother     Heart disease Father     Cancer Father 68     " prostae and bladder ca  in     Diabetes Father     Hypertension Father     Depression Sister     Hypertension Son     Diabetes Maternal Grandmother      lost leg    Kidney disease Paternal Grandfather      had kidney removed    Stroke Paternal Grandfather      Social History   Substance Use Topics    Smoking status: Never Smoker    Smokeless tobacco: Never Used    Alcohol use No       Review of patient's allergies indicates:  No Known Allergies    Scheduled meds:   carvedilol  12.5 mg Per NG tube BID    ceFEPime (MAXIPIME) IVPB  1 g Intravenous Q12H    collagenase   Topical Daily    gabapentin  100 mg Per NG tube TID    insulin aspart  1-10 Units Subcutaneous TIDWM    levetiracetam oral soln  250 mg Per NG tube BID    levothyroxine  50 mcg Per NG tube Before breakfast    rosuvastatin  10 mg Per NG tube Daily    sodium chloride 0.9%  3 mL Intravenous Q8H       Infusions:       PRN meds:  acetaminophen, albuterol sulfate, dextrose 50%, glucagon (human recombinant), glucose, glucose, labetalol, magnesium oxide, magnesium oxide, ondansetron, potassium phosphate IVPB    Review of Systems:  Review of Systems   Constitutional: Negative for chills, fever, malaise/fatigue and weight loss.   HENT: Negative for hearing loss and nosebleeds.    Eyes: Negative for blurred vision, double vision and photophobia.   Respiratory: Negative for cough, shortness of breath and wheezing.    Cardiovascular: Positive for leg swelling. Negative for chest pain and palpitations.   Gastrointestinal: Negative for abdominal pain, constipation, diarrhea, heartburn, nausea and vomiting.   Genitourinary: Negative for dysuria, frequency and urgency.   Musculoskeletal: Positive for falls. Negative for joint pain and myalgias.   Skin: Negative for itching and rash.   Neurological: Negative for dizziness, speech change, focal weakness, loss of consciousness and headaches.   Endo/Heme/Allergies: Does not bruise/bleed easily.    Psychiatric/Behavioral: Negative for depression and substance abuse. The patient is not nervous/anxious.        OBJECTIVE:     Vital Signs and IO (Last 24H):  Temp:  [96.1 °F (35.6 °C)-98 °F (36.7 °C)]   Pulse:  [58-71]   Resp:  [16-18]   BP: (117-143)/(61-78)   SpO2:  [92 %-98 %]   No intake/output data recorded.    Wt Readings from Last 5 Encounters:   11/27/17 117.2 kg (258 lb 6.4 oz)   11/27/17 117 kg (257 lb 15 oz)   11/22/17 106.6 kg (234 lb 15.8 oz)   07/04/17 107.3 kg (236 lb 8.9 oz)   06/27/17 108.9 kg (240 lb 1.3 oz)         Physical Exam:  Physical Exam   Constitutional: She is oriented to person, place, and time. She appears well-developed and well-nourished.   HENT:   Head: Normocephalic. Head is with raccoon's eyes.   Mouth/Throat: Oropharynx is clear and moist.   Eyes: EOM are normal. Pupils are equal, round, and reactive to light. No scleral icterus.   Neck: Neck supple.   Cardiovascular: Normal rate and regular rhythm.    Pulmonary/Chest: Effort normal. No stridor. No respiratory distress.   Abdominal: Soft. She exhibits no distension.   Musculoskeletal: Normal range of motion. She exhibits no edema or deformity.   RIJ Harry catheter, LLE with dressing.   Neurological: She is alert and oriented to person, place, and time. No cranial nerve deficit.   Skin: Skin is warm and dry. No rash noted. She is not diaphoretic. No erythema.   Psychiatric: She has a normal mood and affect. Her behavior is normal.       Body mass index is 41.71 kg/m².    Laboratory:    Recent Labs  Lab 11/27/17  0747 11/27/17  1357 11/27/17  1604 11/28/17  0537    135*  135* 136 136   K 4.5 4.4  4.4 4.3 4.3    102  102 103 103   CO2 28 24  24 25 25   BUN 10 12  12 12 15   CREATININE 1.9* 2.1*  2.1* 2.2* 2.7*   ESTGFRAFRICA 33.7* 29.9*  29.9* 28.3* 22*   EGFRNONAA 29.3* 25.9*  25.9* 24.5* 19*   CALCIUM 8.7 8.4*  8.4* 8.8 8.6*   ALBUMIN 2.3* 2.3*  2.3* 2.4*  --    PHOS 3.2 3.3  3.3 3.3  --          Recent  Labs  Lab 11/26/17  0323 11/27/17  0747 11/28/17  0537   WBC 7.58 11.10 9.00   HGB 7.1* 8.9* 8.4*   HCT 23.6* 29.5* 26.3*    188 184   MCV 79* 82 79*   MCHC 30.1* 30.2* 31.9*   MONO 12.4  0.9 10.2  1.1* 9.8  0.9         Recent Labs  Lab 11/24/17  0512 11/24/17  1834  11/27/17  0747 11/27/17  1357 11/27/17  1604   ALKPHOS 71 66  --   --   --   --    BILITOT 0.6 0.6  --   --   --   --    PROT 6.7 6.6  --   --   --   --    ALBUMIN 2.6* 2.5*  < > 2.3* 2.3*  2.3* 2.4*   ALT 10 8*  --   --   --   --    AST 12 17  --   --   --   --    < > = values in this interval not displayed.        ASSESSMENT/PLAN:     Non-oliguric MC from ATN due to infection, trauma, medications, IV contrast, abx, unclear if she will need more HD.  CKD stage 3 with baseline sCr 1.4 = eGFR in the 40's due to DN/HTN.  Hypolabuminemia.  Diabetic foot infection.  PE.  SAH.  No NSAIDs, ACEI/ARB, IV contrast or other nephrotoxins.  Keep MAP > 60, SBP > 100.  Dose meds for GFR < 15 ml/min, redose meds as needed after HD, monitor vanco levels.  Renal diet - low K, low phos.  No need for HD today, will re-evaluate daily and dialyze as needed. Harry day 3.  Control infection, abx and surgery timing per ID and Podiatry.    Anemia of CKD  Consider YASHIRA to keep Hb 8-10. Transfusion as needed per Podiatry/Primary team.  No need for IV iron.    MBD / Secondary HPT  Monitor phos levels. Low phos diet.    HTN  Controlled.   Tolerate asymptomatic HTN up to -160.  Continue home meds as needed.  Low sodium diet.    Thank you for allowing us to participate in the care of your patient!   We will follow the patient and provide recommendations as needed.    Edson Crystal MD    [facility]  [address] [place] [id number]    [phone] - tel  [phone] - fax    11/28/2017

## 2017-11-28 NOTE — PLAN OF CARE
Problem: Occupational Therapy Goal  Goal: Occupational Therapy Goal  Goals to be met by: 12/12/2017     Patient will increase functional independence with ADLs by performing:    LE Dressing with Contact Guard Assistance and Assistive Devices as needed.  Grooming while standing at sink with Contact Guard Assistance.  Supine to sit with Minimal Assistance.  Toilet transfer to bedside commode with Minimal Assistance.  Upper extremity exercise program x10 reps per handout, with supervision.    Outcome: Ongoing (interventions implemented as appropriate)  OT evaluation completed today. Goals & care plan established.    SANJU Jones  11/28/2017

## 2017-11-28 NOTE — PLAN OF CARE
Pt lives at home with her spouse. She was active with [facility] and has a home Rollator, lift, hospital bed, power chair and shower chair. Liana Florence LMSW     11/28/17 1216   Discharge Assessment   Assessment Type Discharge Planning Assessment   Confirmed/corrected address and phone number on facesheet? Yes   Assessment information obtained from? Medical Record;Caregiver   Communicated expected length of stay with patient/caregiver no   Prior to hospitilization cognitive status: Unable to Assess   Prior to hospitalization functional status: Assistive Equipment   Current cognitive status: Unable to Assess   Current Functional Status: Assistive Equipment   Lives With spouse   Able to Return to Prior Arrangements yes   Is patient able to care for self after discharge? Yes   Who are your caregiver(s) and their phone number(s)? Jan [phone] [facility] [phone]   Patient's perception of discharge disposition home health   Readmission Within The Last 30 Days other (see comments)   Patient currently being followed by outpatient case management? No   Patient currently receives any other outside agency services? No   Equipment Currently Used at Home rollator;bedside commode;shower chair;lift device;hospital bed   Do you have any problems affording any of your prescribed medications? No   Is the patient taking medications as prescribed? yes   Does the patient have transportation home? Yes   Transportation Available family or friend will provide   Does the patient receive services at the [facility]? No   Discharge Plan A Home;Home Health   Discharge Plan B Home with family;Home Health

## 2017-11-28 NOTE — PLAN OF CARE
States diabetic, here after transfer from [facility].  Wound with eschar and some bloody drainage left achilles area noted.  Dr Wick has been consulted.  Nursing recommendations written.

## 2017-11-28 NOTE — CONSULTS
Juliane Ramos [id number] is a 57 y.o. female who has been consulted for vancomycin dosing.    The patient has the following labs:     Date Creatinine (mg/dl)    BUN WBC Count   11/28/2017 Estimated Creatinine Clearance: 37.6 mL/min (based on SCr of 2.2 mg/dL (H)). Lab Results   Component Value Date    BUN 12 11/27/2017     Lab Results   Component Value Date    WBC 11.10 11/27/2017        Current weight is 117.2 kg (258 lb 6.4 oz)      The patient received  1750 mg on 11/26 at 1319 (in main campus)    Vancomycin level from 11/28 at 0017 is 26.7. Pharmacy will hold vancomycin dose.Vancomycin level has been ordered for 11/29 with AM labs. Vancomycin will be started when level < 20 ml/dL.      Patient will be followed by pharmacy for changes in renal function, toxicity, and efficacy.   Thank you for allowing us to participate in this patient's care.     Sirisha Howard

## 2017-11-28 NOTE — PT/OT/SLP DISCHARGE
Physical Therapy Discharge Summary    Juliane Ramos  MRN: [id number]   Traumatic subarachnoid hemorrhage   Patient Discharged from acute Physical Therapy on 17.  Please refer to prior PT noted date on 17 for functional status.     Assessment:   Patient appropriate for care in another setting.  GOALS:    Physical Therapy Goals        Problem: Physical Therapy Goal    Goal Priority Disciplines Outcome Goal Variances Interventions   Physical Therapy Goal     PT/OT, PT Ongoing (interventions implemented as appropriate)     Description:  Goals to be met by: 17    Patient will increase functional independence with mobility by performin. Supine to sit with MInimal Assistance  2. Sit to supine with MInimal Assistance  3. Sit to stand transfer with Moderate Assistance  4. Bed to chair transfer with Maximum Assistance using Rolling Walker  5. Gait  x 4 feet with Moderate Assistance using Rolling Walker.                     Reasons for Discontinuation of Therapy Services  Transfer to alternate level of care.      Plan:  Patient Discharged to: [facility].    Ralph Huitron III, DPT, PT  2017

## 2017-11-28 NOTE — PROGRESS NOTES
2120 - patient arrived to unit with DOM villegas IJ trialysis intact, on 4L O2.     Patient AAOx4.  VSS.  Has remained afebrile this shift.  IV abx infused per orders.  Cardiac monitoring initiated.  BiPAP utilized at night.  Suction set up at bedside.  Aspiration precautions maintained.  POC reviewed with patient.  Open discussion was facilitated.  Patient verbalized understanding.  Bed in lowest position, side rails up x2, call light within reach, and safety measures maintained throughout shift.  Patient denies any needs at this time.  Will continue to monitor.

## 2017-11-28 NOTE — PLAN OF CARE
Problem: SLP Goal  Goal: SLP Goal    1) Name 10 times in concrete category in one minute with SPV.  2) Recall details of short story, presented verbally, with SPV.  3) Functional money/time word problems with MIN A  4) Simple visuospatial tasks with MIN A    Juliane Ramos is a 57 y.o. female with a SLP diagnosis of Mild Cognitive-Linguistic Impairment. Clinical swallowing evaluation completed. All PO trials tolerated with no overt s/s swallow dysfunction. REC regular textures and thin liquids. Skilled ST recommended to address cognitive deficits..

## 2017-11-28 NOTE — PLAN OF CARE
Pt is a readmit from 11/18/17 and 11/24/17. CM will continue to follow for discharge needs. Liana Florence [facility]     11/28/17 1214   Readmission Questionnaire   At the time of your discharge, did someone talk to you about what your health problems were? Yes   At the time of discharge, did someone talk to you about what to watch out for regarding worsening of your health problem? Yes   At the time of discharge, did someone talk to you about what to do if you experienced worsening of your health problem? Yes   At the time of discharge, did someone talk to you about which medication to take when you left the hospital and which ones to stop taking? Yes   At the time of discharge, did someone talk to you about when and where to follow up with a doctor after you left the hospital? Yes   How often do you need to have someone help you when you read instructions, pamphlets, or other written material from your doctor or pharmacy? Often   Do you have problems taking your medications as prescribed? No   Do you have any problems affording any of  your prescribed medications? No   Do you have problems obtaining/receiving your medications? No   Does the patient have transportation to healthcare appointments? Yes   Lives With spouse   Living Arrangements mobile home   Does the patient have family/friends to help with healtcare needs after discharge? yes   Does your caregiver provide all the help you need? Yes   Are you currently feeling confused? No   Are you currently having problems thinking? No   Are you currently having memory problems? No   Have you felt down, depressed, or hopeless? 1   Have you felt little interest or pleasure in doing things? 1   In the last 7 days, my sleep quality was: good

## 2017-11-28 NOTE — PHYSICIAN QUERY
PT Name: Juliane Ramos  MR #: [id number]     Physician Query Form - Documentation Clarification      CDS/: Shazia Rhodes RN, CCDS             Contact information: [email or web address]    This form is a permanent document in the medical record.     Query Date: November 28, 2017    By submitting this query, we are merely seeking further clarification of documentation. Please utilize your independent clinical judgment when addressing the question(s) below.    The Medical record reflects the following:    Supporting Clinical Findings Location in Medical Record   Uncontrolled type 2 diabetes with stage 3 chronic kidney disease     history of uncontrolled diabetes, on oral medication at home plus SSI   11/24 h/p, 11 25 consult, 11/25, 11/26 progress note    11/24 h/p     Glucose- 106, 202, 196, 178, 184       11/24 thru 11/27 lab                                                                            Doctor, Please specify diagnosis or diagnoses associated with above clinical findings.    Provider Use Only      (  x  ) Type 2 DM with hyperglycemia    (    ) Type 2 DM with hypoglycemia    (    )  Other____________________                                                                                                                         [  ] Clinically undetermined

## 2017-11-28 NOTE — PT/OT/SLP EVAL
"Occupational Therapy  Evaluation    Juliane Ramos   MRN: [id number]   Admitting Diagnosis: <principal problem not specified>    OT Date of Treatment: 11/28/17   OT Start Time: 1308  OT Stop Time: 1340  OT Total Time (min): 32 min    Billable Minutes:  Evaluation 10  Self Care/Home Management 22    Diagnosis: <principal problem not specified>       Past Medical History:   Diagnosis Date    Anemia in stage 3 chronic kidney disease 11/26/2017    CHF (congestive heart failure)     COPD (chronic obstructive pulmonary disease)     Coronary artery disease     Diabetes mellitus     Encounter for blood transfusion     Essential hypertension 6/24/2017    Hypertension     MI (myocardial infarction) 2010    Segmental and subsegmental pulmonary emboli of RLL without acute cor pulmonale 11/25/2017    Stroke     July 2005    Thyroid disease     Traumatic subarachnoid hemorrhage 11/24/2017    Type 2 diabetes mellitus with stage 3 chronic kidney disease, without long-term current use of insulin 6/24/2017      Past Surgical History:   Procedure Laterality Date    ABCESS DRAINAGE Right     Between "little toe" and the one next to it    BREAST SURGERY      Reduction [id number]    CARDIAC SURGERY      CABG 4vessel ring in one valve mitral valve prolapse    CORONARY ARTERY BYPASS GRAFT      hyperlipidemia      TUBAL LIGATION  03/1986       Referring physician:   Date referred to OT: 11/27/2017    General Precautions: Standard, fall, vision impaired  Orthopedic Precautions: LLE non weight bearing  Braces: N/A          Patient History:  Living Environment  Lives With: spouse  Living Arrangements: mobile home  Home Accessibility: stairs to enter home  Number of Stairs to Enter Home: 4  Stair Railings at Home: outside, present at both sides  Transportation Available: family or friend will provide  Equipment Currently Used at Home: rollator, hospital bed, walker, standard, bedside commode, shower chair    Prior level of " "function:   Bed Mobility/Transfers: needs device  Grooming: independent  Bathing: needs device  Upper Body Dressing: independent  Lower Body Dressing: independent  Toileting: independent  Home Management Skills: independent  Homemaking Responsibilities: Yes  Driving License: No  Mode of Transportation: Family  Occupation: On disability     Dominant hand: left    Subjective:  Communicated with nurse Padmini prior to session.  Pt stated "I was having spasms in my L ankle."  Chief Complaint: ankle pain  Patient/Family stated goals: home    Pain/Comfort  Pain Rating 1: 7/10  Location - Side 1: Left  Location - Orientation 1: generalized  Location 1: heel  Pain Addressed 1: Distraction, Reposition, Nurse notified  Pain Rating Post-Intervention 1: 5/10    Objective:  Patient found with: villegas catheter, peripheral IV, telemetry, central line    Cognitive Exam:  Oriented to: x4   Follows Commands/attention: Follows multistep  commands  Communication: clear/fluent  Memory:  No Deficits noted  Safety awareness/insight to disability: impaired  Coping skills/emotional control: Appropriate to situation    Visual/perceptual:  Intact    Physical Exam:  Postural examination/scapula alignment: Rounded shoulder, Head forward and Posterior pelvic tilt  Skin integrity: Wound L ankle  Edema: Mild B LE    Sensation:   Intact    Upper Extremity Range of Motion:  Right Upper Extremity: WFL  Left Upper Extremity: WFL    Upper Extremity Strength:  Right Upper Extremity: 4/5  Left Upper Extremity: 3+/5   Strength: WFL Bilaterally    Fine motor coordination:   Intact    Gross motor coordination: WFL in B UE ; Limited in B LE 2/2 L LE injury.    Functional Mobility:  Bed Mobility:  Rolling/Turning to Left: Minimum assistance  Rolling/Turning Right: Minimum assistance  Scooting/Bridging: Maximum Assistance, With assist of 2 (mod A for forward scooting; max A x 2 for lateral scooting)  Supine to Sit: Moderate Assistance (Mod A x 3)  Sit to " "Supine: Moderate Assistance (mod A x 3)    Transfers:  Sit <> Stand Assistance: Minimum Assistance (min A x 2)  Sit <> Stand Assistive Device: Rolling Walker    Functional Ambulation: Pt took ~4 sidesteps to HOB with RW and min A x 2. Pt c/o fatigue when returned to EOB.     Activities of Daily Living:  LE Dressing Level of Assistance: Moderate assistance, Assistive Devices as needed (Total A to doff R sock; Mod A with AD to don R sock.)  LE adaptive equipment: Sock aid       Balance:   Static Sit: FAIR+: Able to take MINIMAL challenges from all directions  Dynamic Sit: FAIR: Cannot move trunk without losing balance  Static Stand: POOR+: Needs MINIMAL assist to maintain  Dynamic stand: POOR: N/A    Therapeutic Activities and Exercises:  OT ed patient on safety with walker use for functional mobility with cues for hand placement & sequencing.   OT ed pt on use of adaptive equipment for LB dressing & safe item retrieval with reacher with demonstration provided.      AM-PAC 6 CLICK ADL  How much help from another person does this patient currently need?  1 = Unable, Total/Dependent Assistance  2 = A lot, Maximum/Moderate Assistance  3 = A little, Minimum/Contact Guard/Supervision  4 = None, Modified [place]/Independent    Putting on and taking off regular lower body clothing? : 2  Bathing (including washing, rinsing, drying)?: 2  Toileting, which includes using toilet, bedpan, or urinal? : 2  Putting on and taking off regular upper body clothing?: 3  Taking care of personal grooming such as brushing teeth?: 3  Eating meals?: 3  Total Score: 15    AM-PAC Raw Score CMS "G-Code Modifier Level of Impairment Assistance   6 % Total / Unable   7 - 9 CM 80 - 100% Maximal Assist   10-14 CL 60 - 80% Moderate Assist   15 - 19 CK 40 - 60% Moderate Assist   20 - 22 CJ 20 - 40% Minimal Assist   23 CI 1-20% SBA / CGA   24 CH 0% Independent/ Mod I       Patient left HOB elevated with all lines intact, call button in reach " and  present    Assessment:  Juliane Ramos is a 57 y.o. female with a medical diagnosis of <principal problem not specified> and presents with a decline in functional status due to the below listed impairments, impacting ADLs and functional mobility. Recommend OT treatment to maximize endurance, safety & independence with ADL's & functional mobility.        Rehab identified problem list/impairments: Rehab identified problem list/impairments: weakness, impaired endurance, impaired self care skills, gait instability, impaired balance, decreased lower extremity function, impaired sensation, orthopedic precautions, impaired cardiopulmonary response to activity, edema, impaired skin    Rehab potential is fair.    Activity tolerance: Poor    Discharge recommendations: Discharge Facility/Level Of Care Needs: nursing facility, skilled     Barriers to discharge: Barriers to Discharge: Decreased caregiver support, Inaccessible home environment    Equipment recommendations: none     GOALS:    Occupational Therapy Goals        Problem: Occupational Therapy Goal    Goal Priority Disciplines Outcome Interventions   Occupational Therapy Goal     OT, PT/OT Ongoing (interventions implemented as appropriate)    Description:  Goals to be met by: 12/12/2017     Patient will increase functional independence with ADLs by performing:    LE Dressing with Contact Guard Assistance and Assistive Devices as needed.  Grooming while standing at sink with Contact Guard Assistance.  Supine to sit with Minimal Assistance.  Toilet transfer to bedside commode with Minimal Assistance.  Upper extremity exercise program x10 reps per handout, with supervision.                      PLAN:  Patient to be seen 3 x/week, 4 x/week to address the above listed problems via self-care/home management, therapeutic activities, therapeutic exercises, neuromuscular re-education  Plan of Care expires: 12/12/17  Plan of Care reviewed with: patient, spouse          Anthony Castrejon, OT  11/28/2017

## 2017-11-28 NOTE — PLAN OF CARE
11/28/17 0715   PRE-TX-O2-ETCO2   O2 Device (Oxygen Therapy) nasal cannula   $ Is the patient on Low Flow Oxygen? Yes   Flow (L/min) 2   Oxygen Concentration (%) 28   SpO2 96 %   Pulse Oximetry Type Intermittent   $ Pulse Oximetry - Multiple Charge Pulse Oximetry - Multiple   Ready to Wean/Extubation Screen   FIO2<=50 (chart decimal) 0.28   Preset CPAP/BiPAP Settings   Mode Of Delivery (On Standby)

## 2017-11-28 NOTE — H&P
PCP: JOS Dumont    History & Physical    Chief Complaint: Left diabetic foot ulcer    History of Present Illness:  57 year old female with HTN, DM2, HF, CKD [place] [facility] [place] admitted on 11-18-17 with left diabetic foot ulcer. She stated that over the past few days it had been associated with worsening pain, drainage, and a fever of up to 102 and for this she came for evaluation.  She stated she was compliant with her medications but only takes her basal insulin when she needs it per sliding scale. Patient was admitted to Hospitalist medicine service. Patient was evaluated by Dr. Wcik and Dr. Turner. Patient was being treated with IV antibiotics, wound care provided and was expected to have wound debridement. Patient had an episode of sudden onset of hypoxia and unresponsiveness. Patient vomited Marcelino sandwitch. Patient was expected to be NPO for podiatry procedure. Work up confirmed PE. Patient started on anticoagulation which was held yesterday due to bleeding from IV site. On the morning of 11-24-17, patient while ambulated with her  lost balance and hit left forehead to the wall. Left forehead contusion noted. No LOC or any focal neurological complaints or HA or vision changes. CT head is showed SAH. [place] [facility] [place] and was evaluated by neuro-surgeon. SAH deemed stable. Patient needed one round of HD. Wound Cx grew MSSA treated with Maxipine and Vancomycin. Patient is in need for diabetic foot ulcer debridement.    Past Medical History:   Diagnosis Date    Anemia in stage 3 chronic kidney disease 11/26/2017    CHF (congestive heart failure)     COPD (chronic obstructive pulmonary disease)     Coronary artery disease     Diabetes mellitus     Encounter for blood transfusion     Essential hypertension 6/24/2017    Hypertension     MI (myocardial infarction) 2010    Segmental and subsegmental pulmonary emboli of RLL  "without acute cor pulmonale 2017    Stroke     2005    Thyroid disease     Traumatic subarachnoid hemorrhage 2017    Type 2 diabetes mellitus with stage 3 chronic kidney disease, without long-term current use of insulin 2017     Past Surgical History:   Procedure Laterality Date    ABCESS DRAINAGE Right     Between "little toe" and the one next to it    BREAST SURGERY      Reduction [id number]    CARDIAC SURGERY      CABG 4vessel ring in one valve mitral valve prolapse    CORONARY ARTERY BYPASS GRAFT      hyperlipidemia      TUBAL LIGATION  1986     Family History   Problem Relation Age of Onset    Arthritis Mother     Heart disease Father     Cancer Father 68     prostae and bladder ca  in     Diabetes Father     Hypertension Father     Depression Sister     Hypertension Son     Diabetes Maternal Grandmother      lost leg    Kidney disease Paternal Grandfather      had kidney removed    Stroke Paternal Grandfather      Social History   Substance Use Topics    Smoking status: Never Smoker    Smokeless tobacco: Never Used    Alcohol use No      Review of patient's allergies indicates:  No Known Allergies  PTA Medications   Medication Sig    albuterol (ACCUNEB) 1.25 mg/3 mL Nebu Take 1.25 mg by nebulization every 6 (six) hours as needed. Rescue    carvedilol (COREG) 12.5 MG tablet Take 1 tablet (12.5 mg total) by mouth 2 (two) times daily.    cefepime in dextrose 5 % (MAXIPIME) 1 gram/50 mL PgBk Inject 50 mLs (1 g total) into the vein every 24 hours as needed.    gabapentin (NEURONTIN) 100 MG capsule Take 1 capsule (100 mg total) by mouth 3 (three) times daily.    levetiracetam oral soln 500 mg/5 mL (5 mL) Soln 2.5 mLs (250 mg total) by Per NG tube route 2 (two) times daily.    levothyroxine (SYNTHROID) 50 MCG tablet Take 50 mcg by mouth once daily.      metronidazole (FLAGYL) 500 mg/100 mL IVPB Inject 100 mLs (500 mg total) into the vein every 8 (eight) " hours.    rosuvastatin (CRESTOR) 10 MG tablet Take 1 tablet (10 mg total) by mouth once daily.    VANCOMYCIN HCL (VANCOMYCIN IN 5 % DEXTROSE) 1.75 gram/500 mL Soln Inject 500 mLs (1,750 mg total) into the vein once daily.     Review of Systems:  Constitutional: no fever or chills  Eyes: no visual changes  Ears, nose, mouth, throat, and face: no nasal congestion or sore throat  Respiratory: no cough or shorness of breath  Cardiovascular: no chest pain or palpitations  Gastrointestinal: no nausea or vomiting, no abdominal pain or change in bowel habits  Genitourinary: no hematuria or dysuria  Integument/breast: see HPI  Hematologic/lymphatic: no easy bruising or lymphadenopathy  Musculoskeletal: see HPI  Neurological: no seizures or tremors. See HPI.  Behavioral/Psych: no auditory or visual hallucinations  Endocrine: no heat or cold intolerance     OBJECTIVE:     Vital Signs (Most Recent)  Temp: 96.3 °F (35.7 °C) (11/28/17 0746)  Pulse: 65 (11/28/17 0746)  Resp: 16 (11/28/17 0746)  BP: 117/61 (11/28/17 0746)  SpO2: (!) 92 % (11/28/17 0746)    Physical Exam:  General appearance: well developed, appears stated age  Head: normocephalic, Left black eye and left forehead ecchymoses  Eyes:  conjunctivae/corneas clear. PERRL.  Nose: Nares normal. Septum midline.  Throat: lips, mucosa, and tongue normal; teeth and gums normal, no throat erythema.  Neck: supple, symmetrical, trachea midline, no JVD and thyroid not enlarged, symmetric, no tenderness/mass/nodules  Lungs:  clear to auscultation bilaterally and normal respiratory effort  Chest wall: no tenderness  Heart: regular rate and rhythm, S1, S2 normal, no murmur, click, rub or gallop  Abdomen: soft, non-tender non-distented; bowel sounds normal; no masses,  no organomegaly  Extremities: no cyanosis, clubbing or edema.   Pulses: 2+ and symmetric  Skin: Skin color, texture, turgor normal. Left Achilis Tendon wound with black eschar and mixed granulation tissue, tendon is  exposed possibly. Mild brawny edema of left foot and ankle.  Lymph nodes: Cervical, supraclavicular, and axillary nodes normal.  Neurologic: Normal strength and tone. No focal numbness or weakness. CNII-XII intact.      Laboratory:   CBC:   Recent Labs  Lab 11/28/17  0537   WBC 9.00   RBC 3.34*   HGB 8.4*   HCT 26.3*      MCV 79*   MCH 25.1*   MCHC 31.9*     CMP:   Recent Labs  Lab 11/24/17  1834  11/27/17  1604 11/28/17  0537     < > 194* 139*   CALCIUM 8.3*  < > 8.8 8.6*   ALBUMIN 2.5*  < > 2.4*  --    PROT 6.6  --   --   --    *  < > 136 136   K 5.8*  < > 4.3 4.3   CO2 22*  < > 25 25   CL 98  < > 103 103   BUN 58*  < > 12 15   CREATININE 5.6*  < > 2.2* 2.7*   ALKPHOS 66  --   --   --    ALT 8*  --   --   --    AST 17  --   --   --    BILITOT 0.6  --   --   --    < > = values in this interval not displayed.  Coagulation:   Recent Labs  Lab 11/24/17  1502   LABPROT 12.0   INR 1.1       Recent Labs  Lab 11/24/17  1837   COLORU Red*   SPECGRAV >1.030*   PHUR 5.0   PROTEINUA 2+*   BACTERIA Rare   NITRITE Negative   LEUKOCYTESUR Negative   UROBILINOGEN Negative   HYALINECASTS 0       Hemoglobin A1C   Date Value Ref Range Status   11/25/2017 8.0 (H) 4.0 - 5.6 % Final     Comment:     According to ADA guidelines, hemoglobin A1c <7.0% represents  optimal control in non-pregnant diabetic patients. Different  metrics may apply to specific patient populations.   Standards of Medical Care in Diabetes-2016.  For the purpose of screening for the presence of diabetes:  <5.7%     Consistent with the absence of diabetes  5.7-6.4%  Consistent with increasing risk for diabetes   (prediabetes)  >or=6.5%  Consistent with diabetes  Currently, no consensus exists for use of hemoglobin A1c  for diagnosis of diabetes for children.  This Hemoglobin A1c assay has significant interference with fetal   hemoglobin   (HbF). The results are invalid for patients with abnormal amounts of   HbF,   including those with known  Hereditary Persistence   of Fetal Hemoglobin. Heterozygous hemoglobin variants (HbAS, HbAC,   HbAD, HbAE, HbA2) do not significantly interfere with this assay;   however, presence of multiple variants in a sample may impact the %   interference.     11/18/2017 8.4 (H) 4.0 - 5.6 % Final     Comment:     According to ADA guidelines, hemoglobin A1c <7.0% represents  optimal control in non-pregnant diabetic patients. Different  metrics may apply to specific patient populations.   Standards of Medical Care in Diabetes-2016.  For the purpose of screening for the presence of diabetes:  <5.7%     Consistent with the absence of diabetes  5.7-6.4%  Consistent with increasing risk for diabetes   (prediabetes)  >or=6.5%  Consistent with diabetes  Currently, no consensus exists for use of hemoglobin A1c  for diagnosis of diabetes for children.  This Hemoglobin A1c assay has significant interference with fetal   hemoglobin   (HbF). The results are invalid for patients with abnormal amounts of   HbF,   including those with known Hereditary Persistence   of Fetal Hemoglobin. Heterozygous hemoglobin variants (HbAS, HbAC,   HbAD, HbAE, HbA2) do not significantly interfere with this assay;   however, presence of multiple variants in a sample may impact the %   interference.     06/25/2017 10.0 (H) 4.0 - 5.6 % Final     Comment:     According to ADA guidelines, hemoglobin A1c <7.0% represents  optimal control in non-pregnant diabetic patients. Different  metrics may apply to specific patient populations.   Standards of Medical Care in Diabetes-2016.  For the purpose of screening for the presence of diabetes:  <5.7%     Consistent with the absence of diabetes  5.7-6.4%  Consistent with increasing risk for diabetes   (prediabetes)  >or=6.5%  Consistent with diabetes  Currently, no consensus exists for use of hemoglobin A1c  for diagnosis of diabetes for children.  This Hemoglobin A1c assay has significant interference with fetal    hemoglobin   (HbF). The results are invalid for patients with abnormal amounts of   HbF,   including those with known Hereditary Persistence   of Fetal Hemoglobin. Heterozygous hemoglobin variants (HbAS, HbAC,   HbAD, HbAE, HbA2) do not significantly interfere with this assay;   however, presence of multiple variants in a sample may impact the %   interference.       Microbiology Results (last 7 days)     ** No results found for the last 168 hours. **        Diagnostic Results:  Left Calcaneum:   1.  No radiographically apparent acute osteomyelitis. Skin irregularity and bandage noted in the posterior aspect of the ankle.  2. Degenerative changes in the ankle, hindfoot and midfoot are noted.     MRI left foot:  1. Retro-Achilles soft tissue wound. No evidence for osteomyelitis.  2. Mild nonspecific Achilles and posterior tibial tenosynovitis.  3. Small tibiotalar joint effusion.     Bilateral leg venous doppler scan: There are some limitations but no definite acute DVT in seen in either lower extremity.     Renal US: Unremarkable appearance of the kidneys.  No hydronephrosis.     CT head:  1. Abnormal study.  There is a left forehead and frontal scalp hematoma without underlying fracture.  Additionally, there is bilateral posttraumatic subarachnoid blood with hyperdense blood seen in right parietal sulci as well as in the left sylvian fissure and adjacent left frontal and parietal sulci.  There is no acute parenchymal hemorrhage.  2.  Remote right frontal lobe infarction with ex vacuo dilatation of the right frontal horn.  There is no obvious acute infarction.  3.  Atherosclerosis.       Assessment/Plan:      Acute post-traumatic sub-arachnoid hemorrhage  Fall precaution.  Continue PT and OT.        * Diabetic leg ulcer     - continue Cefepime. Consult Dr. Turner.  - Consult Dr. Wick for wound debridement.  - Follow ID recommendations.  - wound care.          Acute RLL pulmonary embolism  No full dose Lovenox.  Only Lovenox 30 mg Sq daily. Future therapeutic dose to be decided after outpatient neurosurgery followup.     Acute on chronic hypoxic hypercarbic respiratory failure.  Supplemental O2 via nasal canula; titrate O2 saturation to >92%.   Continue beta 2 agonist bronchodilator treatments.   Use BiPAP during sleep. Needs Polysomnogram upon DC.     MC - now on HD  Follow nephrology recommendations. Await renal recovery.     Hypothyroid     - continue synthroid          Controlled type 2 diabetes mellitus with stage 3 chronic kidney disease     - SSI  - Lantus 10 units nightly with titration as needed          CKD (chronic kidney disease) stage 3, GFR 30-59 ml/min     - avoid nephrotoxins and renally dose meds          Essential hypertension     - continue home meds and will titrate as needed       VTE Risk Mitigation         Ordered     Medium Risk of VTE  Once      11/27/17 2158     Place MARY ELLEN hose  Until discontinued      11/27/17 2158     Place sequential compression device  Until discontinued      11/27/17 2158        Reg Devine MD  Department of Hospital Medicine   [facility]

## 2017-11-28 NOTE — PT/OT/SLP EVAL
"Speech Language Pathology Evaluation    Juliane Ramos   MRN: [id number]   Admitting Diagnosis: <principal problem not specified>    Diet recommendations: Solid Diet Level: Regular  Liquid Diet Level: Thin Feed only when awake/alert, HOB to 90 degrees and 1 bite/sip at a time    SLP Treatment Date: 11/28/17  Speech Start Time: 1103     Speech Stop Time: 1153     Speech Total (min): 50 min       TREATMENT BILLABLE MINUTES:  Eval 40 , Eval Swallow and Oral Function 10 and Total Time 50    Diagnosis: <principal problem not specified>      Past Medical History:   Diagnosis Date    Anemia in stage 3 chronic kidney disease 11/26/2017    CHF (congestive heart failure)     COPD (chronic obstructive pulmonary disease)     Coronary artery disease     Diabetes mellitus     Encounter for blood transfusion     Essential hypertension 6/24/2017    Hypertension     MI (myocardial infarction) 2010    Segmental and subsegmental pulmonary emboli of RLL without acute cor pulmonale 11/25/2017    Stroke     July 2005    Thyroid disease     Traumatic subarachnoid hemorrhage 11/24/2017    Type 2 diabetes mellitus with stage 3 chronic kidney disease, without long-term current use of insulin 6/24/2017     Past Surgical History:   Procedure Laterality Date    ABCESS DRAINAGE Right     Between "little toe" and the one next to it    BREAST SURGERY      Reduction [id number]    CARDIAC SURGERY      CABG 4vessel ring in one valve mitral valve prolapse    CORONARY ARTERY BYPASS GRAFT      hyperlipidemia      TUBAL LIGATION  03/1986       Has the patient been evaluated by SLP for swallowing? : Yes  Keep patient NPO?: No   General Precautions: Standard, fall, vision impaired    Current Respiratory Status: nasal cannula     Social Hx: Patient lives with .    Prior diet: Regular textures and thin liquids.    Occupational/hobbies/homemaking: High school education. Worked for [employer] as ; retired/disabled. " "Cares for young grandchildren during the week. She reports she is independent with ADLs except putting on socks. She does not drive and  manges finances. She is independent with medication management per her report.     Subjective:  "I feel a little confused and slow."  Pain/Comfort  Pain Rating 1: 0/10    Objective:   Pt opens eyes to voice. MD and wound care nurse present during parts of evaluation. Pt noted to provide accurate history.   Patient found with: villegas catheter, oxygen, telemetry    Oral Musculature Evaluation  Oral Musculature: WFL  Dentition: present and adequate  Mucosal Quality: adequate  Mandibular Strength and Mobility: WFL  Oral Labial Strength and Mobility: WFL  Lingual Strength and Mobility: WFL  Velar Elevation: WFL  Buccal Strength and Mobility: decreased tone  Voice Prior to PO Intake: clear     Cognitive Status:  Behavioral Observations:awake, alert and cooperative. Mildly decreased eye contact.  Memory and Orientation: Pt oriented x4. Immediate recall of 4/5 unrelated words. Delayed recall of 4/5 unrelated words after 5 minute filled delay. Pt answers LTM/general info questions WFL. Poor immediate and delayed recall of details of short story.   Attention: Mildly impaired sustained attention /concentration. Participated in entire evaluation without redirection.    Problem Solving: Pt answers simple problems solving questions with 100% accuracy across attempts, I'ly. She solves math word problems for money and time management with 0% accuracy across 3 attempts. Simple math calculation (+,-,x,/) with 100%^ accuracy independently  Pragmatics: flat affect  Executive Function: Poor performance on visuospatial/executive function portion of MOCA; 1/5. Poor planning and self correction.    Language: no     Auditory Comprehension: Pt answers basic and 2-unit Y/N Q with 100% accuracy. She answers m/u YNQ with 100%  accuracy, I'ly. Pt able to answer questions following paragraph length " material, presented verbally,  with 100% accuracy.      Verbal Expression: Language fluent at conversational level. No paraphasic errors noted. However, decreased word fluency (named 5 items in con create category in one minute)     Motor Speech: WF; 100% intelligible     Reading: pt with 100% accuracy on 1-3 sentence reading tasks on 5 attempts.     Visual-Spatial: Impaired. Reading glasses not present at facility.      Additional Treatment:  MOCA 21/30, indicating mild cognitive impairment      Bedside Swallow Eval:  Consistencies Assessed: Thin liquids via cup and straw, Puree via tsp, Soft solids diced peaches and Solids cookie  Oral Phase: WFL  Pharyngeal Phase: no overt clinical  signs/symptoms of aspiration and no overt clinical signs/symptoms of pharyngeal dysphagia    Assessment:  Juliane Ramos is a 57 y.o. female with a SLP diagnosis of Mild Cognitive-Linguistic Impairment. Clinical swallowing evaluation completed. All PO trials tolerated with no overt s/s swallow dysfunction. REC regular textures and thin liquids. Skilled ST recommended to address cognitive deficits..        Goals:    SLP Goals        Problem: SLP Goal    Goal Priority Disciplines Outcome   SLP Goal     SLP    Description:    1) Name 10 times in concrete category in one minute with SPV.  2) Recall details of short story, presented verbally, with SPV.  3) Functional money/time word problems with MIN A  4) Simple visuospatial tasks with MIN A                     Plan:   Patient to be seen Therapy Frequency: 5 x/week   Plan of Care expires: 12/05/17  Plan of Care reviewed with: patient  SLP Follow-up?: Yes  SLP - Next Visit Date: 11/29/17           Elena Torres CCC-SLP  11/28/2017

## 2017-11-28 NOTE — PHYSICIAN QUERY
PT Name: Juliane Ramos  MR #: [id number]     Physician Query Form - Documentation Clarification      CDS/: Shazia Rhodes RN, CCDS              Contact information: [email or web address]    This form is a permanent document in the medical record.     Query Date: November 28, 2017    By submitting this query, we are merely seeking further clarification of documentation. Please utilize your independent clinical judgment when addressing the question(s) below.    The Medical record reflects the following:    Supporting Clinical Findings Location in Medical Record     obese       11/24 h/p, 11/25- 11/27 progress notes   5ft 6in  Wt- 259 lbs  (bedscale)  117.6 kg  BMI 41.9    BMI 41.9  BMI Grade: greater than 40 - morbid obesity 11/24 vs           Nutrition note 11/25                                                                             Doctor, Please specify diagnosis or diagnoses associated with above clinical findings.    Provider Use Only      ( x   )  Morbid Obesity due to excess calories    (    )  Other______________                                                                                                                         [  ] Clinically undetermined

## 2017-11-28 NOTE — PROGRESS NOTES
Pharmacist Renal Dose Adjustment Note    Juliane Ramos is a 57 y.o. female being treated with the medication cefepime    Patient Data:    Vital Signs (Most Recent):  Temp: 96.1 °F (35.6 °C) (11/27/17 2121)  Pulse: 67 (11/27/17 2121)  Resp: 16 (11/27/17 2121)  BP: 119/73 (11/27/17 2121)  SpO2: (!) 94 % (11/27/17 2121)   Vital Signs (72h Range):  Temp:  [96.1 °F (35.6 °C)-98.9 °F (37.2 °C)]   Pulse:  [65-82]   Resp:  [11-38]   BP: ()/(52-89)   SpO2:  [89 %-100 %]   Arterial Line BP: ()/(43-67)        Recent Labs     Lab 11/27/17  0747 11/27/17  1357 11/27/17  1604   CREATININE 1.9* 2.1*  2.1* 2.2*     Serum creatinine: 2.2 mg/dL High 11/27/17 1604  Estimated creatinine clearance: 37.6 mL/min    Medication:cefepime dose: 1  g frequency every 24 hours will be changed to medication:cefepime dose:1 g frequency:every 12 hours    Pharmacist's Name: Sirisha Howard

## 2017-11-28 NOTE — PLAN OF CARE
Problem: Physical Therapy Goal  Goal: Physical Therapy Goal  Goals to be met by: 2017     Patient will increase functional independence with mobility by performin. Supine to sit with MInimal Assistance  2. Sit to supine with MInimal Assistance  3. Sit to stand transfer with Minimal Assistance  4. Bed to chair transfer with Minimal Assistance using Rolling Walker  5. Stand for 5 minutes with Stand-by Assistance using Rolling Walker  6. Lower extremity exercise program x10 reps, with supervision  Wheelchair management goal to be made pending progress    Outcome: Ongoing (interventions implemented as appropriate)  PT evaluation complete. Recommend SNF at discharge.

## 2017-11-28 NOTE — CONSULTS
Juliane Ramos [id number] is a 57 y.o. female who had been consulted for vancomycin dosing.    Vancomycin has been discontinued.  Pharmacy consult for vancomycin dosing in no longer required.      Thank you for allowing us to participate in this patient's care.     Karan Jaimes, PharmD

## 2017-11-28 NOTE — NURSING
Pt discharged [facility] with family.  Went over discharge instructions, new and/or changed medications, follow-up appointments and education with patient. Patient verbalized understanding. Answered all questions in reference to discharge.     Pain rating at time of discharge: 0  Stable.   NADN    Patient ambulated with a steady gait with family member at discharge.    Vitals:    11/27/17 1510   BP: 123/67   Pulse: 67   Resp: 18   Temp: 97 °F (36.1 °C)       Dee Madsen RN

## 2017-11-29 LAB
ANION GAP SERPL CALC-SCNC: 10 MMOL/L
BASOPHILS # BLD AUTO: 0 K/UL
BASOPHILS NFR BLD: 0.1 %
BUN SERPL-MCNC: 23 MG/DL
CALCIUM SERPL-MCNC: 8.5 MG/DL
CHLORIDE SERPL-SCNC: 101 MMOL/L
CO2 SERPL-SCNC: 23 MMOL/L
CREAT SERPL-MCNC: 3.8 MG/DL
DIFFERENTIAL METHOD: ABNORMAL
EOSINOPHIL # BLD AUTO: 0.2 K/UL
EOSINOPHIL NFR BLD: 1.9 %
ERYTHROCYTE [DISTWIDTH] IN BLOOD BY AUTOMATED COUNT: 20.1 %
EST. GFR  (AFRICAN AMERICAN): 15 ML/MIN/1.73 M^2
EST. GFR  (NON AFRICAN AMERICAN): 13 ML/MIN/1.73 M^2
GLUCOSE SERPL-MCNC: 156 MG/DL
HCT VFR BLD AUTO: 27.8 %
HGB BLD-MCNC: 8.9 G/DL
LYMPHOCYTES # BLD AUTO: 0.8 K/UL
LYMPHOCYTES NFR BLD: 8 %
MCH RBC QN AUTO: 24.8 PG
MCHC RBC AUTO-ENTMCNC: 32 G/DL
MCV RBC AUTO: 78 FL
MONOCYTES # BLD AUTO: 1.2 K/UL
MONOCYTES NFR BLD: 11.7 %
NEUTROPHILS # BLD AUTO: 8.3 K/UL
NEUTROPHILS NFR BLD: 78.3 %
PLATELET # BLD AUTO: 203 K/UL
PMV BLD AUTO: 8.7 FL
POCT GLUCOSE: 145 MG/DL (ref 70–110)
POCT GLUCOSE: 187 MG/DL (ref 70–110)
POCT GLUCOSE: 190 MG/DL (ref 70–110)
POTASSIUM SERPL-SCNC: 4.5 MMOL/L
RBC # BLD AUTO: 3.59 M/UL
SODIUM SERPL-SCNC: 134 MMOL/L
WBC # BLD AUTO: 10.6 K/UL

## 2017-11-29 PROCEDURE — 25000003 PHARM REV CODE 250: Performed by: INTERNAL MEDICINE

## 2017-11-29 PROCEDURE — 99233 SBSQ HOSP IP/OBS HIGH 50: CPT | Mod: ,,, | Performed by: INTERNAL MEDICINE

## 2017-11-29 PROCEDURE — 92507 TX SP LANG VOICE COMM INDIV: CPT

## 2017-11-29 PROCEDURE — 27000221 HC OXYGEN, UP TO 24 HOURS

## 2017-11-29 PROCEDURE — 99900035 HC TECH TIME PER 15 MIN (STAT)

## 2017-11-29 PROCEDURE — 93010 ELECTROCARDIOGRAM REPORT: CPT | Mod: ,,, | Performed by: INTERNAL MEDICINE

## 2017-11-29 PROCEDURE — 95816 EEG AWAKE AND DROWSY: CPT | Mod: 26,,, | Performed by: PSYCHIATRY & NEUROLOGY

## 2017-11-29 PROCEDURE — A4216 STERILE WATER/SALINE, 10 ML: HCPCS | Performed by: NURSE PRACTITIONER

## 2017-11-29 PROCEDURE — 85025 COMPLETE CBC W/AUTO DIFF WBC: CPT

## 2017-11-29 PROCEDURE — 93005 ELECTROCARDIOGRAM TRACING: CPT

## 2017-11-29 PROCEDURE — 63600175 PHARM REV CODE 636 W HCPCS: Performed by: NURSE PRACTITIONER

## 2017-11-29 PROCEDURE — 63600175 PHARM REV CODE 636 W HCPCS: Performed by: INTERNAL MEDICINE

## 2017-11-29 PROCEDURE — 80048 BASIC METABOLIC PNL TOTAL CA: CPT

## 2017-11-29 PROCEDURE — 94761 N-INVAS EAR/PLS OXIMETRY MLT: CPT

## 2017-11-29 PROCEDURE — 99221 1ST HOSP IP/OBS SF/LOW 40: CPT | Mod: ,,, | Performed by: PSYCHIATRY & NEUROLOGY

## 2017-11-29 PROCEDURE — 25000003 PHARM REV CODE 250: Performed by: NURSE PRACTITIONER

## 2017-11-29 PROCEDURE — 80100014 HC HEMODIALYSIS 1:1

## 2017-11-29 PROCEDURE — 12000002 HC ACUTE/MED SURGE SEMI-PRIVATE ROOM

## 2017-11-29 RX ORDER — HEPARIN SODIUM 1000 [USP'U]/ML
1000 INJECTION, SOLUTION INTRAVENOUS; SUBCUTANEOUS ONCE
Status: DISCONTINUED | OUTPATIENT
Start: 2017-11-29 | End: 2017-12-01

## 2017-11-29 RX ORDER — SODIUM CHLORIDE 9 MG/ML
INJECTION, SOLUTION INTRAVENOUS
Status: DISCONTINUED | OUTPATIENT
Start: 2017-11-29 | End: 2017-12-11 | Stop reason: HOSPADM

## 2017-11-29 RX ORDER — SODIUM CHLORIDE 9 MG/ML
INJECTION, SOLUTION INTRAVENOUS ONCE
Status: DISCONTINUED | OUTPATIENT
Start: 2017-11-29 | End: 2017-12-01

## 2017-11-29 RX ORDER — HEPARIN SODIUM 5000 [USP'U]/ML
5000 INJECTION, SOLUTION INTRAVENOUS; SUBCUTANEOUS EVERY 12 HOURS
Status: DISCONTINUED | OUTPATIENT
Start: 2017-11-29 | End: 2017-12-11 | Stop reason: HOSPADM

## 2017-11-29 RX ORDER — GABAPENTIN 100 MG/1
100 CAPSULE ORAL 3 TIMES DAILY
Status: DISCONTINUED | OUTPATIENT
Start: 2017-11-29 | End: 2017-12-11 | Stop reason: HOSPADM

## 2017-11-29 RX ADMIN — INSULIN ASPART 2 UNITS: 100 INJECTION, SOLUTION INTRAVENOUS; SUBCUTANEOUS at 05:11

## 2017-11-29 RX ADMIN — GABAPENTIN 100 MG: 100 CAPSULE ORAL at 05:11

## 2017-11-29 RX ADMIN — HEPARIN SODIUM 5000 UNITS: 5000 INJECTION, SOLUTION INTRAVENOUS; SUBCUTANEOUS at 09:11

## 2017-11-29 RX ADMIN — CEFAZOLIN SODIUM 1 G: 1 SOLUTION INTRAVENOUS at 04:11

## 2017-11-29 RX ADMIN — CEFAZOLIN SODIUM 1 G: 1 SOLUTION INTRAVENOUS at 05:11

## 2017-11-29 RX ADMIN — CARVEDILOL 12.5 MG: 6.25 TABLET, FILM COATED ORAL at 08:11

## 2017-11-29 RX ADMIN — INSULIN ASPART 2 UNITS: 100 INJECTION, SOLUTION INTRAVENOUS; SUBCUTANEOUS at 11:11

## 2017-11-29 RX ADMIN — LEVETIRACETAM 250 MG: 100 SOLUTION ORAL at 08:11

## 2017-11-29 RX ADMIN — ACETAMINOPHEN 650 MG: 325 TABLET ORAL at 05:11

## 2017-11-29 RX ADMIN — SODIUM CHLORIDE, PRESERVATIVE FREE 3 ML: 5 INJECTION INTRAVENOUS at 05:11

## 2017-11-29 RX ADMIN — LEVOTHYROXINE SODIUM 50 MCG: 50 TABLET ORAL at 05:11

## 2017-11-29 RX ADMIN — ROSUVASTATIN CALCIUM 10 MG: 5 TABLET ORAL at 08:11

## 2017-11-29 RX ADMIN — SODIUM CHLORIDE, PRESERVATIVE FREE 3 ML: 5 INJECTION INTRAVENOUS at 02:11

## 2017-11-29 RX ADMIN — INSULIN ASPART 2 UNITS: 100 INJECTION, SOLUTION INTRAVENOUS; SUBCUTANEOUS at 08:11

## 2017-11-29 RX ADMIN — COLLAGENASE SANTYL: 250 OINTMENT TOPICAL at 08:11

## 2017-11-29 RX ADMIN — GABAPENTIN 100 MG: 100 CAPSULE ORAL at 02:11

## 2017-11-29 NOTE — PLAN OF CARE
Problem: Patient Care Overview  Goal: Plan of Care Review  Outcome: Ongoing (interventions implemented as appropriate)  Patient AAOx4.  VSS.  R IJ trialysis intact. Has remained afebrile this shift.  IV abx infused per orders.  BiPAP utilized throughout shift.  POC reviewed with patient.  Open discussion was facilitated.  Patient verbalized understanding.  Bed in lowest position, side rails up x2, call light within reach, and safety measures maintained throughout shift.  Patient has remained free of falls, trauma, and injury this shift.  Patient denies any needs at this time.  Will continue to monitor.

## 2017-11-29 NOTE — CONSULTS
Pt. Well known to me with earlier admission for the infected posterior ulcer left ankle with exposed tendon and magots.  She was scheduled for surgery but ate food aspirated and then had a P.E.  Later she had a bleeding episode and hit her had and had cranial bleed and was transferred to [facility].  Now back to [facility] and I have been reconsulted for the debridement of the ulcer.  The pt has been cleared for the debridement under local.    O - There is a larger necrotic grade 3 ulcer posterior left ankle with necrotic exposed achilles tendon.  There is redness and induration of the posterior leg.  I do not see any purulent drainage.  Vascular status is intact.    Grade 3 ulcer with necrotic achilles tendon left foot.    P- I will arrange for debridement and application of skin substitute once pt is cleared.  Continue the santyl daily.

## 2017-11-29 NOTE — PT/OT/SLP PROGRESS
Physical Therapy      Juliane Ramos  MRN: [id number]    Patient not seen today secondary to Unavailable (Comment) (Nurse giving meds, then cardiac testing. ). Will follow-up tomorrow.    Erin Tavares, PTA

## 2017-11-29 NOTE — PHYSICIAN QUERY
"PT Name: Juliane Ramos  MR #: [id number]     Physician Query Form - Documentation Clarification      CDS/: Carolynn Millard RN CDI     Contact information:  [phone]    This form is a permanent document in the medical record.     Query Date: November 29, 2017    By submitting this query, we are merely seeking further clarification of documentation. Please utilize your independent clinical judgment when addressing the question(s) below.    The Medical record reflects the following:    Supporting Clinical Findings Location in Medical Record   Patient was originally admitted on 11-18-17 with left diabetic foot ulcer.         * Diabetic leg ulcer    - continue Cefepime. Consult Dr. Turner.  - Consult Dr. Wick for wound debridement.  - Follow ID recommendations.  - wound care.       H&P                                                                            Doctor, Please specify diagnosis or diagnoses associated with above clinical findings.    Doctor, Please clatify the location and depth of the "Diabetic foot ulcer."    Provider Use Only         A.  [  x  ] Left diabetic foot ulcer, specify location on Lt. Foot_posterior ankle/Achilis tendon_______ and depth, ruled in, POA.                  .    [    ] Left diabetic foot ulcer, specify location on Lt. foot________ and depth, ruled in, Not  POA.                         [    ] Skin breakdown only                 [  x  ] Exposed fat layer                 [    ] Muscle involvement without evidence of necrosis                 [    ] Muscle necrosis                 [    ] Bone involvement without evidence of necrosis                 [    ] Bone necrosis                 [    ] Other, please specify__________.                 [    ] Unspecified.        [    ] Left diabetic foot ulcer ruled out.          B.  [    ] Diabetic leg ulcer, specify location____________, and depth, ruled in, POA.          [    ] Diabetic leg ulcer, specify location____________, and " depth, ruled in, Not POA.                 [    ] Skin breakdown only                 [    ] Exposed fat layer                 [    ] Muscle involvement without evidence of necrosis                 [    ] Muscle necrosis                 [    ] Bone involvement without evidence of necrosis                 [    ] Bone necrosis                 [    ] Other, please specify__________.                 [    ] Unspecified.         [    ] Diabetic leg ulcer ruled out.          C.  [    ] Other, please specify location_____________and depth,                 [    ] Skin breakdown only                 [    ] Exposed fat layer                 [    ] Muscle involvement without evidence of necrosis                 [    ] Muscle necrosis                 [    ] Bone involvement without evidence of necrosis                 [    ] Bone necrosis                 [    ] Other, please specify__________.                 [    ] Unspecified.          D. Unable to determine.                                                                                                             [  ] Clinically undetermined

## 2017-11-29 NOTE — PROGRESS NOTES
Progress Note  Hospital Medicine  Patient Name:Juliane Ramos  MRN:  [id number]  Patient Class: IP- Inpatient  Admit Date: 11/27/2017  Length of Stay: 2 days  Expected Discharge Date:   Attending Physician: Reg Devine MD  Primary Care Provider:  JOS Dumont    SUBJECTIVE:     Principal Problem: Left diabetic foot ulcer  Initial history of present illness: 57 year old female with HTN, DM2, HF, CKD has returned from [facility]. Patient was originally admitted on 11-18-17 with left diabetic foot ulcer. She stated that over the past few days it had been associated with worsening pain, drainage, and a fever of up to 102 and for this she came for evaluation.  She stated she was compliant with her medications but only takes her basal insulin when she needs it per sliding scale. Patient was admitted to Hospitalist medicine service. Patient was evaluated by Dr. Wick and Dr. Turner. Patient was being treated with IV antibiotics, wound care provided and was expected to have wound debridement. Patient had an episode of sudden onset of hypoxia and unresponsiveness. Patient vomited Marcelino sandwitch. Patient was expected to be NPO for podiatry procedure. Work up confirmed PE. Patient started on anticoagulation which was held yesterday due to bleeding from IV site. On the morning of 11-24-17, patient while ambulated with her  lost balance and hit left forehead to the wall. Left forehead contusion noted. No LOC or any focal neurological complaints or HA or vision changes. CT head is showed SAH. Patient transferred to [facility] neuro-critical care ICU and was evaluated by neuro-surgeon. SAH deemed stable. Patient needed one round of HD. Wound Cx grew MSSA treated with Maxipine and Vancomycin. Patient is in need for diabetic foot ulcer debridement.    PMH/PSH/SH/FH/Meds: reviewed.    Symptoms/Review of Systems: Per nursing, patient possibly has brief seizure-like activity with LOC. No post-ictal state or loss of bowel or  bladder function noted. No shortness of breath, cough, chest pain or headache, fever or abdominal pain.     Diet:  Adequate intake.    Activity level: Up with assistance  Pain:  Patient reports no pain.       OBJECTIVE:   Vital Signs (Most Recent):      Temp: 97.6 °F (36.4 °C) (11/29/17 0737)  Pulse: 67 (11/29/17 0737)  Resp: 18 (11/29/17 0737)  BP: 123/69 (11/29/17 0737)  SpO2: (!) 91 % (11/29/17 0737)       Vital Signs Range (Last 24H):  Temp:  [97.5 °F (36.4 °C)-99 °F (37.2 °C)]   Pulse:  [67-75]   Resp:  [16-20]   BP: (111-139)/(54-69)   SpO2:  [91 %-99 %]     Weight: 117.2 kg (258 lb 6.4 oz)  Body mass index is 41.71 kg/m².    Intake/Output Summary (Last 24 hours) at 11/29/17 0859  Last data filed at 11/28/17 1700   Gross per 24 hour   Intake               50 ml   Output                0 ml   Net               50 ml     Physical Examination:  General appearance: well developed, appears stated age  Head: normocephalic, Left black eye and left forehead ecchymoses  Eyes:  conjunctivae/corneas clear. PERRL.  Nose: Nares normal. Septum midline.  Throat: lips, mucosa, and tongue normal; teeth and gums normal, no throat erythema.  Neck: supple, symmetrical, trachea midline, no JVD and thyroid not enlarged, symmetric, no tenderness/mass/nodules  Lungs:  clear to auscultation bilaterally and normal respiratory effort  Chest wall: no tenderness  Heart: regular rate and rhythm, S1, S2 normal, no murmur, click, rub or gallop  Abdomen: soft, non-tender non-distented; bowel sounds normal; no masses,  no organomegaly  Extremities: no cyanosis, clubbing or edema.   Pulses: 2+ and symmetric  Skin: Skin color, texture, turgor normal. Left Achilis Tendon wound with black eschar and mixed granulation tissue, tendon is exposed possibly. Mild brawny edema of left foot and ankle.  Lymph nodes: Cervical, supraclavicular, and axillary nodes normal.  Neurologic: Normal strength and tone. No focal numbness or weakness. CNII-XII intact.       CBC:    Recent Labs  Lab 11/27/17  0747 11/28/17  0537 11/29/17  0528   WBC 11.10 9.00 10.60   RBC 3.62* 3.34* 3.59*   HGB 8.9* 8.4* 8.9*   HCT 29.5* 26.3* 27.8*    184 203   MCV 82 79* 78*   MCH 24.6* 25.1* 24.8*   MCHC 30.2* 31.9* 32.0   BMP    Recent Labs  Lab 11/26/17  2348 11/27/17  0747  11/27/17  1604 11/28/17  0537 11/29/17  0528   * 178*  < > 194* 139* 156*    138  < > 136 136 134*   K 4.3 4.5  < > 4.3 4.3 4.5    103  < > 103 103 101   CO2 27 28  < > 25 25 23   BUN 8 10  < > 12 15 23*   CREATININE 1.5* 1.9*  < > 2.2* 2.7* 3.8*   CALCIUM 8.2* 8.7  < > 8.8 8.6* 8.5*   MG 1.9  1.9 2.1  2.1  --  2.1  2.1  --   --    < > = values in this interval not displayed.   Diagnostic Results:  Microbiology Results (last 7 days)     ** No results found for the last 168 hours. **         Left Calcaneum:   1.  No radiographically apparent acute osteomyelitis. Skin irregularity and bandage noted in the posterior aspect of the ankle.  2. Degenerative changes in the ankle, hindfoot and midfoot are noted.     MRI left foot:  1. Retro-Achilles soft tissue wound. No evidence for osteomyelitis.  2. Mild nonspecific Achilles and posterior tibial tenosynovitis.  3. Small tibiotalar joint effusion.     Bilateral leg venous doppler scan: There are some limitations but no definite acute DVT in seen in either lower extremity.     Renal US: Unremarkable appearance of the kidneys.  No hydronephrosis.     CT head:  1. Abnormal study.  There is a left forehead and frontal scalp hematoma without underlying fracture.  Additionally, there is bilateral posttraumatic subarachnoid blood with hyperdense blood seen in right parietal sulci as well as in the left sylvian fissure and adjacent left frontal and parietal sulci.  There is no acute parenchymal hemorrhage.  2.  Remote right frontal lobe infarction with ex vacuo dilatation of the right frontal horn.  There is no obvious acute infarction.  3.   Atherosclerosis.      CT head:  1. There is no definite new abnormality.  There has been some resolution in redistribution of previously demonstrated posttraumatic subarachnoid blood.  This blood is now most apparent in the right parietal sulcus.  There is no parenchymal hemorrhage.  2.  There is no obvious acute infarction.  This would however be better evaluated with MRI.  3.  There are chronic infarctions in the superior right basal ganglia and corona radiata with ex vacuo dilatation of the right lateral ventricle.  There is also a chronic infarct in the posterior right cerebellum.  In retrospect, this was present on prior studies.  4.  Interval improvement in left forehead and inferior frontal scalp contusion.    Assessment/Plan:      Acute post-traumatic sub-arachnoid hemorrhage  Fall precaution.  Continue PT and OT.           * Diabetic leg ulcer     - continue IV Ancef as per Dr. Turner.  - Consult Dr. Wick for wound debridement.  - Follow ID recommendations.  - wound care.          Acute RLL pulmonary embolism  Future therapeutic dose to be decided after outpatient neurosurgery followup.     Acute on chronic hypoxic hypercarbic respiratory failure.  Supplemental O2 via nasal canula; titrate O2 saturation to >92%.   Continue beta 2 agonist bronchodilator treatments.   Use BiPAP during sleep. Needs Polysomnogram upon DC.    Possible seizure  Repeat CT head results reviewed.  Continue neuro-checks, fall and seizure precautions.  EEG ordered, neurologist to evaluate the patient.     MC - now on HD  Follow nephrology recommendations. Await renal recovery.      Hypothyroid     - continue synthroid          Controlled type 2 diabetes mellitus with stage 3 chronic kidney disease     Check blood glucose level q AC/HS.  Use Novolog Insulin Sliding Scale as needed.   Continue American Diabetic Association 1800 Kcal diet          CKD (chronic kidney disease) stage 3, GFR 30-59 ml/min     - avoid nephrotoxins and  renally dose meds          Essential hypertension     - continue home meds and will titrate as needed      Discussed with patient's , answered all the questions.     DVT Prophylaxis: Heparin 5K SQ q 12 hrs.    Reg Devine MD  Department of Hospital Medicine   [facility]

## 2017-11-29 NOTE — PLAN OF CARE
Problem: SLP Goal  Goal: SLP Goal    1) Name 10 times in concrete category in one minute with SPV.  2) Recall details of short story, presented verbally, with SPV.  3) Functional money/time word problems with MIN A  4) Simple visuospatial tasks with MIN A   Outcome: Ongoing (interventions implemented as appropriate)  1. 9 in 1 min indep x3, 6 in 1 min x2 in concrete categories  2. 3 of 4 details in 3 stories read to her, indep  3. ModA to recall details & calculate simple time/math word problems  4. Placed hands on ready made clocks to indicate times, 60% acc indep

## 2017-11-29 NOTE — CONSULTS
Nephrology Consult Progress Note        Patient Name: Juliane Ramos  MRN: [id number]    Patient Class: IP- Inpatient   Admission Date: 11/27/2017  Length of Stay: 2 days    Attending Physician: Reg Devine MD  Primary Care Provider: JOS Dumont    Reason for Consult: MC on CKD3, dialysis dependent    SUBJECTIVE:     HPI: 58 y/o F with HTN, DM2, CHF, stage III CKD with baseline sCr 1.4 on 11/20 was admitted for management of a non-healing left heel ulcer, cellulitis. Was considered for surgery but developed PE, anticoagulated and MC due to IV contrast/vanco/infection. Before Surgery had a fall and SAH, requiring transfer to [facility]. There, she was started on HD via Harry cath on 11/25 and had only 1 therapy - SLED. There is a plan now by Podiary for surgical intervention. She makes a fair amont of urine, cloudy.     11/29 had possible seizure episode today, witnessed by staff. Later I saw her on HD but she had problem with blood flow through HD access and HD had to be aborted. Dr. Deal was consulted for placement of another temp HD line, due to infection concerns from infected toe and hopes that she will not need permanent dialysis. Plan for OR tomorrow or Friday is unclear at this time due to inability to do HD today. Discussed with patient, family and staff.    Review of patient's allergies indicates:  No Known Allergies    Scheduled meds:   sodium chloride 0.9%   Intravenous Once    carvedilol  12.5 mg Per NG tube BID    ceFAZolin (ANCEF) IVPB  1 g Intravenous Q12H    collagenase   Topical Daily    gabapentin  100 mg Oral TID    heparin (porcine)  5,000 Units Subcutaneous Q12H    insulin aspart  1-10 Units Subcutaneous TIDWM    levetiracetam oral soln  250 mg Per NG tube BID    levothyroxine  50 mcg Per NG tube Before breakfast    rosuvastatin  10 mg Per NG tube Daily    sodium chloride 0.9%  3 mL Intravenous Q8H       Infusions:       PRN meds:  sodium chloride 0.9%, acetaminophen, albuterol  sulfate, dextrose 50%, glucagon (human recombinant), glucose, glucose, labetalol, magnesium oxide, magnesium oxide, ondansetron, potassium phosphate IVPB    Review of Systems:  ROS    OBJECTIVE:     Vital Signs and IO (Last 24H):  Temp:  [97.5 °F (36.4 °C)-99 °F (37.2 °C)]   Pulse:  [65-75]   Resp:  [16-24]   BP: (111-139)/(54-69)   SpO2:  [91 %-99 %]   I/O last 3 completed shifts:  In: 50 [IV Piggyback:50]  Out: -     Wt Readings from Last 5 Encounters:   11/27/17 117.2 kg (258 lb 6.4 oz)   11/27/17 117 kg (257 lb 15 oz)   11/22/17 106.6 kg (234 lb 15.8 oz)   07/04/17 107.3 kg (236 lb 8.9 oz)   06/27/17 108.9 kg (240 lb 1.3 oz)         Physical Exam:  Physical Exam   Constitutional: She is oriented to person, place, and time. She appears well-developed and well-nourished.   HENT:   Head: Normocephalic. Head is with raccoon's eyes.   Mouth/Throat: Oropharynx is clear and moist.   Eyes: EOM are normal. Pupils are equal, round, and reactive to light. No scleral icterus.   Neck: Neck supple.   Cardiovascular: Normal rate and regular rhythm.    Pulmonary/Chest: Effort normal. No stridor. No respiratory distress.   Abdominal: Soft. She exhibits no distension.   Musculoskeletal: Normal range of motion. She exhibits no edema or deformity.   RIJ Harry catheter, LLE with dressing.   Neurological: She is alert and oriented to person, place, and time. No cranial nerve deficit.   Skin: Skin is warm and dry. No rash noted. She is not diaphoretic. No erythema.   Psychiatric: She has a normal mood and affect. Her behavior is normal.       Body mass index is 41.71 kg/m².    Laboratory:    Recent Labs  Lab 11/27/17  0747 11/27/17  1357 11/27/17  1604 11/28/17  0537 11/29/17  0528    135*  135* 136 136 134*   K 4.5 4.4  4.4 4.3 4.3 4.5    102  102 103 103 101   CO2 28 24  24 25 25 23   BUN 10 12  12 12 15 23*   CREATININE 1.9* 2.1*  2.1* 2.2* 2.7* 3.8*   ESTGFRAFRICA 33.7* 29.9*  29.9* 28.3* 22* 15*   EGFRNONAA  29.3* 25.9*  25.9* 24.5* 19* 13*   CALCIUM 8.7 8.4*  8.4* 8.8 8.6* 8.5*   ALBUMIN 2.3* 2.3*  2.3* 2.4*  --   --    PHOS 3.2 3.3  3.3 3.3  --   --          Recent Labs  Lab 11/27/17  0747 11/28/17  0537 11/29/17  0528   WBC 11.10 9.00 10.60   HGB 8.9* 8.4* 8.9*   HCT 29.5* 26.3* 27.8*    184 203   MCV 82 79* 78*   MCHC 30.2* 31.9* 32.0   MONO 10.2  1.1* 9.8  0.9 11.7  1.2*         Recent Labs  Lab 11/24/17  0512 11/24/17  1834  11/27/17  0747 11/27/17  1357 11/27/17  1604   ALKPHOS 71 66  --   --   --   --    BILITOT 0.6 0.6  --   --   --   --    PROT 6.7 6.6  --   --   --   --    ALBUMIN 2.6* 2.5*  < > 2.3* 2.3*  2.3* 2.4*   ALT 10 8*  --   --   --   --    AST 12 17  --   --   --   --    < > = values in this interval not displayed.        ASSESSMENT/PLAN:     Non-oliguric MC from ATN due to infection, trauma, medications, IV contrast, abx, unclear if she will need more HD.  CKD stage 3 with baseline sCr 1.4 = eGFR in the 40's due to DN/HTN.  Hypolabuminemia.  Diabetic foot infection.  PE.  SAH.  No NSAIDs, ACEI/ARB, IV contrast or other nephrotoxins.  Keep MAP > 60, SBP > 100.  Dose meds for GFR < 15 ml/min, redose meds as needed after HD, monitor vanco levels.  Renal diet - low K, low phos.  No emergent need for HD today, will re-evaluate daily and dialyze as needed.   Harry day 4. Awaiting Dr. Deal to replace line in a day or two.  Control infection, abx and surgery timing per ID and Podiatry.    Anemia of CKD  Consider YASHIRA to keep Hb 8-10. Transfusion as needed per Podiatry/Primary team.  No need for IV iron.    MBD / Secondary HPT  Monitor phos levels. Low phos diet.    HTN  Controlled.   Tolerate asymptomatic HTN up to -160.  Continue home meds as needed.  Low sodium diet.    Thank you for allowing us to participate in the care of your patient!   We will follow the patient and provide recommendations as needed.    Edson Crystal MD    [facility]  [address]  [place] [address]    [phone] - tel  [phone] - fax    11/29/2017

## 2017-11-29 NOTE — PLAN OF CARE
AAOX4. VSS. O2-3L in use. Tele monitor in use. Dressing change to left lower leg preformed, pt tolerated well. Chi draining 100ml throughout entire shift. Dark cloudy urine. Teds/ SCD applied to right leg, unable to apply to left leg due to wound. heparin administered this shift. Bruises to left eye, forehead, arms, and legs present prior to shift. Pt denies pain, denies needs this shift.  at bedside, bed low locked will continue to monitor.

## 2017-11-29 NOTE — PROGRESS NOTES
Call received from Dr. Wick.  Patient needs cardiology clearance before being able to have anesthesia for L heel debridement.  If patient does not receive clearance, wound care will continue as ordered (cleaned with wound cleanser, apply santyl, and apply mepilex dressing).  Will consult cardiology.  Day shift nurse notified.

## 2017-11-29 NOTE — PROGRESS NOTES
Progress Note  Cardiology    Admit Date: 11/27/2017   LOS: 2 days     Follow-up For:  preop clearance.    Scheduled Meds:   sodium chloride 0.9%   Intravenous Once    carvedilol  12.5 mg Per NG tube BID    ceFAZolin (ANCEF) IVPB  1 g Intravenous Q12H    collagenase   Topical Daily    gabapentin  100 mg Oral TID    heparin (porcine)  5,000 Units Subcutaneous Q12H    insulin aspart  1-10 Units Subcutaneous TIDWM    levetiracetam oral soln  250 mg Per NG tube BID    levothyroxine  50 mcg Per NG tube Before breakfast    rosuvastatin  10 mg Per NG tube Daily    sodium chloride 0.9%  3 mL Intravenous Q8H     Continuous Infusions:   PRN Meds:sodium chloride 0.9%, acetaminophen, albuterol sulfate, dextrose 50%, glucagon (human recombinant), glucose, glucose, labetalol, magnesium oxide, magnesium oxide, ondansetron, potassium phosphate IVPB    Review of patient's allergies indicates:  No Known Allergies    SUBJECTIVE:     Interval History: Patient has no complaint of cp or sob. Events of the weekend noted. Fall and SAH  - no syncope as per  at the bed side. Monitored at the main campus and transferred back. Prior PE. While being cleaned up this am - pt ?collapsed and had to be laid back in bed. No LOC . No arrhythmias on monitor.    Review of Systems  Respiratory: positive for cough, negative for hemoptysis, sputum and wheezing  Cardiovascular: negative for chest pressure/discomfort, orthopnea, palpitations and paroxysmal nocturnal dyspnea    OBJECTIVE:     Vital Signs (Most Recent)  Temp: 97.6 °F (36.4 °C) (11/29/17 0737)  Pulse: 65 (11/29/17 0916)  Resp: (!) 24 (11/29/17 0916)  BP: 113/66 (11/29/17 0916)  SpO2: (!) 92 % (11/29/17 0916)    Vital Signs Range (Last 24H):  Temp:  [97.5 °F (36.4 °C)-99 °F (37.2 °C)]   Pulse:  [65-75]   Resp:  [16-24]   BP: (111-139)/(54-69)   SpO2:  [91 %-99 %]       Physical Exam:  Neck: no carotid bruit, no JVD and supple, symmetrical, trachea midline  Back: no kyphosis or  scoliosis  Lungs: clear to auscultation bilaterally, normal respiratory effort  Heart: regular rate and rhythm, S1, S2 normal, no murmur, click, rub or gallop  Abdomen: soft, non-tender; bowel sounds normal; no masses,  no organomegaly   Orbital hematoma.    Recent Results (from the past 24 hour(s))   POCT glucose    Collection Time: 11/28/17  4:03 PM   Result Value Ref Range    POCT Glucose 235 (H) 70 - 110 mg/dL   CBC auto differential    Collection Time: 11/29/17  5:28 AM   Result Value Ref Range    WBC 10.60 3.90 - 12.70 K/uL    RBC 3.59 (L) 4.00 - 5.40 M/uL    Hemoglobin 8.9 (L) 12.0 - 16.0 g/dL    Hematocrit 27.8 (L) 37.0 - 48.5 %    MCV 78 (L) 82 - 98 fL    MCH 24.8 (L) 27.0 - 31.0 pg    MCHC 32.0 32.0 - 36.0 g/dL    RDW 20.1 (H) 11.5 - 14.5 %    Platelets 203 150 - 350 K/uL    MPV 8.7 (L) 9.2 - 12.9 fL    Gran # 8.3 (H) 1.8 - 7.7 K/uL    Lymph # 0.8 (L) 1.0 - 4.8 K/uL    Mono # 1.2 (H) 0.3 - 1.0 K/uL    Eos # 0.2 0.0 - 0.5 K/uL    Baso # 0.00 0.00 - 0.20 K/uL    Gran% 78.3 (H) 38.0 - 73.0 %    Lymph% 8.0 (L) 18.0 - 48.0 %    Mono% 11.7 4.0 - 15.0 %    Eosinophil% 1.9 0.0 - 8.0 %    Basophil% 0.1 0.0 - 1.9 %    Differential Method Automated    Basic Metabolic Panel (BMP)    Collection Time: 11/29/17  5:28 AM   Result Value Ref Range    Sodium 134 (L) 136 - 145 mmol/L    Potassium 4.5 3.5 - 5.1 mmol/L    Chloride 101 95 - 110 mmol/L    CO2 23 23 - 29 mmol/L    Glucose 156 (H) 70 - 110 mg/dL    BUN, Bld 23 (H) 6 - 20 mg/dL    Creatinine 3.8 (H) 0.5 - 1.4 mg/dL    Calcium 8.5 (L) 8.7 - 10.5 mg/dL    Anion Gap 10 8 - 16 mmol/L    eGFR if African American 15 (A) >60 mL/min/1.73 m^2    eGFR if non African American 13 (A) >60 mL/min/1.73 m^2   POCT glucose    Collection Time: 11/29/17 11:40 AM   Result Value Ref Range    POCT Glucose 187 (H) 70 - 110 mg/dL       Diagnostic Results:  Labs: Reviewed  ECG: Reviewed   No arrhthymias to account for the spell earlier today.    ASSESSMENT/PLAN:     Pt is optimal for  surgery.

## 2017-11-29 NOTE — PT/OT/SLP PROGRESS
Speech Language Pathology  Treatment    Juliane Ramos   MRN: [id number]   Admitting Diagnosis: <principal problem not specified>    Diet recommendations: Solid Diet Level: Regular  Liquid Diet Level: Thin     SLP Treatment Date: 11/29/17  Speech Start Time: 1443     Speech Stop Time: 1503     Speech Total (min): 20 min       TREATMENT BILLABLE MINUTES:  Speech Therapy Individual 20           General Precautions: Standard, fall, vision impaired  Current Respiratory Status: nasal cannula       Subjective:  Yeah, she came yesterday Elena.     Objective:    Pt seen for tx.  Alert & cooperative, having a hard time getting comfortable initially, which interfered with attn.  See Care Plan for tx details.    Assessment:  Juliane Ramos is a 57 y.o. female with a medical diagnosis of pulmonary emboli, Acute post-traumatic sub-arachnoid hemorrhage, s/p CVA 2005 and presented with good effort, but need for min-modA for memory & problem solving tasks.  Cont POC.    Discharge recommendations:   Outpatient ST    Goals:    SLP Goals        Problem: SLP Goal    Goal Priority Disciplines Outcome   SLP Goal     SLP Ongoing (interventions implemented as appropriate)   Description:    1) Name 10 times in concrete category in one minute with SPV.  2) Recall details of short story, presented verbally, with SPV.  3) Functional money/time word problems with MIN A  4) Simple visuospatial tasks with MIN A                     Plan:   Patient to be seen Therapy Frequency: 5 x/week   Plan of Care expires: 12/05/17  Plan of Care reviewed with: patient, spouse  SLP Follow-up?: Yes  SLP - Next Visit Date: 11/30/17           Lillian Mcconnell CCC-SLP/A  11/29/2017

## 2017-11-29 NOTE — PT/OT/SLP PROGRESS
Physical Therapy      Juliane Patlayoia  MRN: [id number]    Patient not seen today secondary to  (CT scan). Will follow-up today if appropriate.    Erin Tavares, PTA

## 2017-11-29 NOTE — PLAN OF CARE
"While PCT and student were assisting patient from bedside chair back to bed PCT,Kaylee  stated "pt began to stare at the wall then closed her eyes, for approximately 30 seconds then opened her eyes again. Pt was able to recall memory of what had happen and stated." I just blacked out for a second" PCT, Kaylee denied any bodily jerking movements. VSS Obtained. Dr. Devine, notified new ordered obtained. Charge nurse Heena aware.   "

## 2017-11-29 NOTE — PROGRESS NOTES
HD:  Catheter positional, pressures too high in the lines to run HD without frequent alarms.  Dr. Crystal notified, he will enter orders for a line consult.    NET UF:  + 190 mL

## 2017-11-29 NOTE — PROGRESS NOTES
[id number]  Paged Dr Gil to notify of consult. Callback # provided. Awaiting response.    9:52 AM  Recv'd call back. Dr Gil notified of consult.

## 2017-11-30 ENCOUNTER — TELEPHONE (OUTPATIENT)
Dept: NEUROSURGERY | Facility: CLINIC | Age: 57
End: 2017-11-30

## 2017-11-30 LAB
ALLENS TEST: ABNORMAL
ALLENS TEST: ABNORMAL
ANION GAP SERPL CALC-SCNC: 10 MMOL/L
ANION GAP SERPL CALC-SCNC: 12 MMOL/L
BACTERIA SPEC ANAEROBE CULT: NORMAL
BASOPHILS # BLD AUTO: 0 K/UL
BASOPHILS NFR BLD: 0 %
BUN SERPL-MCNC: 20 MG/DL
BUN SERPL-MCNC: 26 MG/DL
CALCIUM SERPL-MCNC: 8.6 MG/DL
CALCIUM SERPL-MCNC: 8.7 MG/DL
CHLORIDE SERPL-SCNC: 101 MMOL/L
CHLORIDE SERPL-SCNC: 102 MMOL/L
CK SERPL-CCNC: 21 U/L
CO2 SERPL-SCNC: 19 MMOL/L
CO2 SERPL-SCNC: 21 MMOL/L
CREAT SERPL-MCNC: 3.3 MG/DL
CREAT SERPL-MCNC: 4.2 MG/DL
DELSYS: ABNORMAL
DELSYS: ABNORMAL
DIFFERENTIAL METHOD: ABNORMAL
EOSINOPHIL # BLD AUTO: 0.2 K/UL
EOSINOPHIL NFR BLD: 2.4 %
EP: 5
EP: 5
ERYTHROCYTE [DISTWIDTH] IN BLOOD BY AUTOMATED COUNT: 20 %
ERYTHROCYTE [SEDIMENTATION RATE] IN BLOOD BY WESTERGREN METHOD: 6 MM/H
ERYTHROCYTE [SEDIMENTATION RATE] IN BLOOD BY WESTERGREN METHOD: 6 MM/H
EST. GFR  (AFRICAN AMERICAN): 13 ML/MIN/1.73 M^2
EST. GFR  (AFRICAN AMERICAN): 17 ML/MIN/1.73 M^2
EST. GFR  (NON AFRICAN AMERICAN): 11 ML/MIN/1.73 M^2
EST. GFR  (NON AFRICAN AMERICAN): 15 ML/MIN/1.73 M^2
FIO2: 30
FIO2: 40
GLUCOSE SERPL-MCNC: 142 MG/DL
GLUCOSE SERPL-MCNC: 157 MG/DL
HCO3 UR-SCNC: 25 MMOL/L (ref 24–28)
HCO3 UR-SCNC: 27.1 MMOL/L (ref 24–28)
HCT VFR BLD AUTO: 27.2 %
HGB BLD-MCNC: 8.8 G/DL
IP: 10
IP: 10
LYMPHOCYTES # BLD AUTO: 0.8 K/UL
LYMPHOCYTES NFR BLD: 9.1 %
MAGNESIUM SERPL-MCNC: 2.1 MG/DL
MCH RBC QN AUTO: 25.1 PG
MCHC RBC AUTO-ENTMCNC: 32.2 G/DL
MCV RBC AUTO: 78 FL
MIN VOL: 8
MODE: ABNORMAL
MODE: ABNORMAL
MONOCYTES # BLD AUTO: 1 K/UL
MONOCYTES NFR BLD: 10.4 %
NEUTROPHILS # BLD AUTO: 7.2 K/UL
NEUTROPHILS NFR BLD: 78.1 %
PCO2 BLDA: 44.8 MMHG (ref 35–45)
PCO2 BLDA: 50.5 MMHG (ref 35–45)
PH SMN: 7.34 [PH] (ref 7.35–7.45)
PH SMN: 7.35 [PH] (ref 7.35–7.45)
PLATELET # BLD AUTO: 187 K/UL
PMV BLD AUTO: 8.4 FL
PO2 BLDA: 112 MMHG (ref 80–100)
PO2 BLDA: 130 MMHG (ref 80–100)
POC BE: -1 MMOL/L
POC BE: 1 MMOL/L
POC SATURATED O2: 98 % (ref 95–100)
POC SATURATED O2: 99 % (ref 95–100)
POC TCO2: 26 MMOL/L (ref 23–27)
POC TCO2: 29 MMOL/L (ref 23–27)
POCT GLUCOSE: 136 MG/DL (ref 70–110)
POCT GLUCOSE: 137 MG/DL (ref 70–110)
POCT GLUCOSE: 178 MG/DL (ref 70–110)
POCT GLUCOSE: 212 MG/DL (ref 70–110)
POTASSIUM SERPL-SCNC: 4.3 MMOL/L
POTASSIUM SERPL-SCNC: 4.5 MMOL/L
RBC # BLD AUTO: 3.48 M/UL
SAMPLE: ABNORMAL
SAMPLE: ABNORMAL
SITE: ABNORMAL
SITE: ABNORMAL
SODIUM SERPL-SCNC: 132 MMOL/L
SODIUM SERPL-SCNC: 133 MMOL/L
SP02: 97
SPONT RATE: 11
SPONT RATE: 20
TROPONIN I SERPL DL<=0.01 NG/ML-MCNC: 0.01 NG/ML
WBC # BLD AUTO: 9.3 K/UL

## 2017-11-30 PROCEDURE — 82803 BLOOD GASES ANY COMBINATION: CPT

## 2017-11-30 PROCEDURE — 25000003 PHARM REV CODE 250: Performed by: INTERNAL MEDICINE

## 2017-11-30 PROCEDURE — 97530 THERAPEUTIC ACTIVITIES: CPT

## 2017-11-30 PROCEDURE — 63600175 PHARM REV CODE 636 W HCPCS: Performed by: INTERNAL MEDICINE

## 2017-11-30 PROCEDURE — 99900035 HC TECH TIME PER 15 MIN (STAT)

## 2017-11-30 PROCEDURE — A4216 STERILE WATER/SALINE, 10 ML: HCPCS | Performed by: NURSE PRACTITIONER

## 2017-11-30 PROCEDURE — 20000000 HC ICU ROOM

## 2017-11-30 PROCEDURE — 27000221 HC OXYGEN, UP TO 24 HOURS

## 2017-11-30 PROCEDURE — 25000003 PHARM REV CODE 250: Performed by: NURSE PRACTITIONER

## 2017-11-30 PROCEDURE — 93005 ELECTROCARDIOGRAM TRACING: CPT

## 2017-11-30 PROCEDURE — 83735 ASSAY OF MAGNESIUM: CPT

## 2017-11-30 PROCEDURE — 95819 EEG AWAKE AND ASLEEP: CPT

## 2017-11-30 PROCEDURE — 99232 SBSQ HOSP IP/OBS MODERATE 35: CPT | Mod: ,,, | Performed by: INTERNAL MEDICINE

## 2017-11-30 PROCEDURE — 97532 *HC SP COG SKL DEV EA 15 MIN: CPT

## 2017-11-30 PROCEDURE — 84484 ASSAY OF TROPONIN QUANT: CPT

## 2017-11-30 PROCEDURE — 80048 BASIC METABOLIC PNL TOTAL CA: CPT | Mod: 91

## 2017-11-30 PROCEDURE — 36600 WITHDRAWAL OF ARTERIAL BLOOD: CPT

## 2017-11-30 PROCEDURE — 94761 N-INVAS EAR/PLS OXIMETRY MLT: CPT

## 2017-11-30 PROCEDURE — 36415 COLL VENOUS BLD VENIPUNCTURE: CPT

## 2017-11-30 PROCEDURE — 85025 COMPLETE CBC W/AUTO DIFF WBC: CPT

## 2017-11-30 PROCEDURE — 94660 CPAP INITIATION&MGMT: CPT

## 2017-11-30 PROCEDURE — 82550 ASSAY OF CK (CPK): CPT

## 2017-11-30 PROCEDURE — 99291 CRITICAL CARE FIRST HOUR: CPT | Mod: ,,, | Performed by: INTERNAL MEDICINE

## 2017-11-30 PROCEDURE — 80100016 HC MAINTENANCE HEMODIALYSIS

## 2017-11-30 PROCEDURE — 93010 ELECTROCARDIOGRAM REPORT: CPT | Mod: ,,, | Performed by: INTERNAL MEDICINE

## 2017-11-30 PROCEDURE — 80048 BASIC METABOLIC PNL TOTAL CA: CPT

## 2017-11-30 RX ORDER — LEVETIRACETAM 100 MG/ML
250 SOLUTION ORAL 2 TIMES DAILY
Status: COMPLETED | OUTPATIENT
Start: 2017-11-30 | End: 2017-12-01

## 2017-11-30 RX ORDER — LEVETIRACETAM 100 MG/ML
250 SOLUTION ORAL DAILY PRN
Status: DISCONTINUED | OUTPATIENT
Start: 2017-11-30 | End: 2017-12-11 | Stop reason: HOSPADM

## 2017-11-30 RX ORDER — LIDOCAINE HYDROCHLORIDE 10 MG/ML
1 INJECTION, SOLUTION EPIDURAL; INFILTRATION; INTRACAUDAL; PERINEURAL ONCE
Status: CANCELLED | OUTPATIENT
Start: 2017-11-30 | End: 2017-11-30

## 2017-11-30 RX ADMIN — COLLAGENASE SANTYL: 250 OINTMENT TOPICAL at 08:11

## 2017-11-30 RX ADMIN — LEVETIRACETAM 250 MG: 100 SOLUTION ORAL at 12:11

## 2017-11-30 RX ADMIN — SODIUM CHLORIDE, PRESERVATIVE FREE 3 ML: 5 INJECTION INTRAVENOUS at 01:11

## 2017-11-30 RX ADMIN — CARVEDILOL 12.5 MG: 6.25 TABLET, FILM COATED ORAL at 12:11

## 2017-11-30 RX ADMIN — HEPARIN SODIUM 5000 UNITS: 5000 INJECTION, SOLUTION INTRAVENOUS; SUBCUTANEOUS at 12:11

## 2017-11-30 RX ADMIN — GABAPENTIN 100 MG: 100 CAPSULE ORAL at 10:11

## 2017-11-30 RX ADMIN — GABAPENTIN 100 MG: 100 CAPSULE ORAL at 07:11

## 2017-11-30 RX ADMIN — GABAPENTIN 100 MG: 100 CAPSULE ORAL at 01:11

## 2017-11-30 RX ADMIN — ROSUVASTATIN CALCIUM 10 MG: 5 TABLET ORAL at 08:11

## 2017-11-30 RX ADMIN — VANCOMYCIN HYDROCHLORIDE 1750 MG: 1 INJECTION, POWDER, LYOPHILIZED, FOR SOLUTION INTRAVENOUS at 08:11

## 2017-11-30 RX ADMIN — INSULIN ASPART 2 UNITS: 100 INJECTION, SOLUTION INTRAVENOUS; SUBCUTANEOUS at 06:11

## 2017-11-30 RX ADMIN — GABAPENTIN 100 MG: 100 CAPSULE ORAL at 12:11

## 2017-11-30 RX ADMIN — SODIUM CHLORIDE, PRESERVATIVE FREE 3 ML: 5 INJECTION INTRAVENOUS at 10:11

## 2017-11-30 RX ADMIN — CEFAZOLIN 1 G: 1 INJECTION, POWDER, FOR SOLUTION INTRAMUSCULAR; INTRAVENOUS at 05:11

## 2017-11-30 RX ADMIN — LEVETIRACETAM 250 MG: 100 SOLUTION ORAL at 08:11

## 2017-11-30 RX ADMIN — HEPARIN SODIUM 5000 UNITS: 5000 INJECTION, SOLUTION INTRAVENOUS; SUBCUTANEOUS at 08:11

## 2017-11-30 RX ADMIN — INSULIN ASPART 4 UNITS: 100 INJECTION, SOLUTION INTRAVENOUS; SUBCUTANEOUS at 11:11

## 2017-11-30 RX ADMIN — HEPARIN SODIUM 5000 UNITS: 5000 INJECTION, SOLUTION INTRAVENOUS; SUBCUTANEOUS at 10:11

## 2017-11-30 RX ADMIN — LEVETIRACETAM 250 MG: 500 SOLUTION ORAL at 10:11

## 2017-11-30 RX ADMIN — CARVEDILOL 12.5 MG: 6.25 TABLET, FILM COATED ORAL at 08:11

## 2017-11-30 RX ADMIN — CARVEDILOL 12.5 MG: 6.25 TABLET, FILM COATED ORAL at 10:11

## 2017-11-30 RX ADMIN — LEVOTHYROXINE SODIUM 50 MCG: 50 TABLET ORAL at 07:11

## 2017-11-30 NOTE — PROCEDURES
ROUTINE ELECTROENCEPHALOGRAM REPORT      Juliane Ramos  [id number]  1960    DATE OF SERVICE: 11/29/17    REQUESTING PROVIDER: Reg Devine MD    REASON FOR CONSULT: 56yo F with multiple medical co-morbidities, including recent traumatic SAH; noted to have seizures.    PRIOR EEG: none    MEDICATIONS:   Current Facility-Administered Medications   Medication    0.9%  NaCl infusion    0.9%  NaCl infusion    acetaminophen tablet 650 mg    albuterol sulfate nebulizer solution 2.5 mg    carvedilol tablet 12.5 mg    ceFAZolin (ANCEF) 1 gram in dextrose 5 % 50 mL IVPB (premix)    collagenase ointment    dextrose 50% injection 12.5 g    gabapentin capsule 100 mg    glucagon (human recombinant) injection 1 mg    glucose chewable tablet 16 g    glucose chewable tablet 24 g    heparin (porcine) injection 1,000 Units    heparin (porcine) injection 5,000 Units    insulin aspart pen 1-10 Units    labetalol injection 10 mg    levetiracetam oral soln Soln 250 mg    levetiracetam oral soln Soln 250 mg    levothyroxine tablet 50 mcg    magnesium oxide tablet 800 mg    magnesium oxide tablet 800 mg    ondansetron injection 4 mg    potassium phosphate 15 mmol in dextrose 5 % 250 mL infusion    rosuvastatin tablet 10 mg    sodium chloride 0.9% flush 3 mL     METHODOLOGY   Electroencephalographic (EEG) recording is with electrodes placed according to the International 10-20 placement system.  Thirty two (32) channels of digital signal (sampling rate of 512/sec) including T1 and T2 was simultaneously recorded from the scalp and may include  EKG, EMG, and/or eye monitors.  Recording band pass was 0.1 to 512 hz.  Digital video recording of the patient is simultaneously recorded with the EEG.  The patient is instructed report clinical symptoms which may occur during the recording session.  EEG and video recording is stored and archived in digital format. Activation procedures which include photic stimulation,  hyperventilation and instructing patients to perform simple task are done in selected patients.    The EEG is displayed on a monitor screen and can be reviewed using different montages.  Computer assisted analysis is employed to detect spike and electrographic seizure activity.   The entire record is submitted for computer analysis.  The entire recording is visually reviewed and the times identified by computer analysis as being spikes or seizures are reviewed again.  Compresses spectral analysis (CSA) is also performed on the activity recorded from each individual channel.  This is displayed as a power display of frequencies from 0 to 30 Hz over time.   The CSA is reviewed looking for asymmetries in power between homologous areas of the scalp and then compared with the original EEG recording.     [employer] software was also utilized in the review of this study.  This software suite analyzes the EEG recording in multiple domains.  Coherence and rhythmicity is computed to identify EEG sections which may contain organized seizures.  Each channel undergoes analysis to detect presence of spike and sharp waves which have special and morphological characteristic of epileptic activity.  The routine EEG recording is converted from spacial into frequency domain.  This is then displayed comparing homologous areas to identify areas of significant asymmetry.  Algorithm to identify non-cortically generated artifact is used to separate eye movement, EMG and other artifact from the EEG    EEG FINGINGS  Background activity:   The background rhythm was characterized by theta (6-7 Hz) activity with a 7Hz posterior dominant alpha rhythm at 30-70 microvolts.   Symmetry and continuity: The background was continuous; asymmetry with lower amplitude and delta > theta (3-4 Hz) slowing was noted on the left    Sleep:   Not seen.    Activation procedures:   Photic stimulation was performed with no abnormalities seen  Hyperventilation was  performed with no abnormalities seen    Abnormal activity:   No epileptiform discharges, periodic discharges, lateralized rhythmic delta activity or electrographic seizures were seen.    IMPRESSION:   This is an abnormal routine EEG due to:  1) focal left sided slowing with decreased amplitude, suggestive of underlying structural lesion  2) moderate generalized slowing seen, suggestive of moderate diffuse or multifocal cerebral dysfunction.    No epileptiform activity or electrographic seizures were seen.    CLINICAL CORRELATION IS RECOMMENDED.    Zunilda Chiu MD, GABRIELLA(), FACNS.  Neurology-Epilepsy.

## 2017-11-30 NOTE — PLAN OF CARE
Problem: Patient Care Overview  Goal: Plan of Care Review  Outcome: Outcome(s) achieved Date Met: 11/30/17  Pt alert and oriented, family at bedside, Dr. Deal at bedside to put new HD acciss, pt changed, has bm and ointment applied to buttocks, blood sugar taken and was 145 and no insulin given,  Pt on tele [id number] and sr,sb, safety monitored and maintained, call light next to pt, villegas intact and draining clear yellow urine, on bipap at present

## 2017-11-30 NOTE — PROGRESS NOTES
11/30/17 1204   PRE-TX-O2-ETCO2   O2 Device (Oxygen Therapy) nasal cannula w/ humidification   $ Is the patient on Low Flow Oxygen? Yes   Flow (L/min) 3   Oxygen Concentration (%) 32   SpO2 (!) 94 %   Pulse Oximetry Type Intermittent   $ Pulse Oximetry - Multiple Charge Pulse Oximetry - Multiple   PRN albuterol not required.

## 2017-11-30 NOTE — DISCHARGE SUMMARY
[facility] Hospital Medicine  Discharge Summary      Patient Name: Juliane Ramos  MRN: [id number]  Admission Date: 11/24/2017  Hospital Length of Stay: 3 days  Discharge Date and Time: 11/27/2017  7:25 PM  Attending Physician: Meli att. providers found   Discharging Provider: Magalys Morales MD  Primary Care Provider: Isha Escalante [facility] Medicine Team: [facility] HOSP MED L Magalys Morales MD    Chief Complaint: Subarachnoid hemorrhage     History of Present Illness: Patient is a 57 year old female with HTN, HLD, T2DM (a1c 8.4%), HFrEF ( EF 35% on 11/21/17), CKD 3, remote CVA (R frontal lobe in 2005), 4v CABG (2011) who presents as a transfer from [facility] for subarachnoid hemorrhage after a mechanical fall. She initially presented to the OSH on 11/18 for a diabetic left heel wound, non-healing x3 weeks with fever, purulent drainage, and reportedly with maggots. She was being treated with zosyn and vanc. Debridement was planned, but on the day of surgery she reportedly vomited chunks of [employer] despite being NPO. In addition, she had one episode of unresponsiveness on 11/20, ABG showed acute on chronic hypoxic and hypocarbic respiratory failure. CTA was done and showed a segmental and subsegmental PE of the RLL for which therapeutic lovenox (1mg/kg) BID was started. She developed an MC with rising creatinine, nephrology was consulted and it was thought to be due to ATN 2/2 a combination of ischemia (from hypotension) and IV contrast (from CTA). She was being treated with gentle hydration in the setting of HFrEF, and fluids were later stopped due to development of effusions on 11/23. She developed bleeding at the site of her Iv's, at the site of lovenox injection, and hematuria and lovenox was held, last dose the morning of 11/23. On the morning of 11/24, she was reportedly ambulating to the bathroom with her  when she tripped, had a mechanical fall and hit her  forehead on the wall. She denies loss of consciousness, confusion, headache, blurry vision, focal deficits. CT Head showed bilateral posttraumatic subarachnoid blood. She was subsequently [place] [facility] [place] higher level of care. Here, she denies headache, confusion, blurry vision, weakness, focal deficits, chest pain, shortness of breath.        * No surgery found *      Hospital Course:     58 y/o female originally admitted to [facility] with infected diabetic heel ulcer to left achilles area and started on IV antibiotics with plans for surgical debridement by Podiatry. On morning of procedure patient had sudden drop in oxygen sats and surgery cancelled and work-up revealed multiple segmental and subsegmental thromboemboli in RLL. Patient started on therapeutic Lovenox at [facility]. Patient then suffered an accidental fall while ambulating with her  in hospital and brain imaging revealed SAH and patient transferred to Neuro ICU here at [facility] from observation and Neurosurgery evaluation and Lovenox discontinued. Patient did not require any surgical intervention as per Neurosurgery for SAH and CT scan of head on repeat showed stable bilateral subarachnoid hematoma. Podiatry here at [facility] consulted as well as Infectious disease for heel ulcer. Plan is not for any surgical debridement at this time and patient on IV Cefepime, Flagyl and Vancomycin and cultures from wound taken 11/26. Also of significant note, patient developed MC after CTA of chest and had acute worsening after transfer so Nephrology consulted here and patient started on CRRT by Nephrology 11/26 after temporary line place. According to Neuro ICU, prior to starting CRRT patient was lethargic and hyperkalemic and anuric but after 24 hours of CRRT patient is much more awake and alert. Nephrology is following to manage dialysis needs. Pt was stepped down to the floor on 11/27 for further management. Family is  requesting transfer back to [facility] to be closer to family. Given Neurosurgical evaluation complete and no further intervention is pending, pt transferred back per request.     Acute issues:  - Assessing need for further hemodialysis. If pt deemed dialysis dependent will need to set up of outpt dialysis. Nephrology consult needed.   - Pt currently not on treatment for PE. Final recs per neurosurgery:  Continue to hold therapeutic lovenox for recent PE due to SAH. Will have patient follow up in clinic in 1 week with repeat head CT. If stable, OK to resume therapeutic Lovenox. SQH ok at this time.   - Follow up tissues cultures for further tailoring of abx therapy. On vanc, cefepime, and flagyl at time of transfer.  Will need consultation with infectious disease and podiatry.     Consults:   Consults         Status Ordering Provider     Inpatient consult to Infectious Diseases  Once     Provider:  (Not yet assigned)    Completed KEMP, YOEL H     Inpatient consult to Nephrology  Once     Provider:  (Not yet assigned)    Completed KEMP, YOEL H     Inpatient consult to Physical Medicine Rehab  Once     Provider:  (Not yet assigned)    Completed KEMP, YOEL H     Inpatient consult to Podiatry  Once     Provider:  (Not yet assigned)    Completed KEMP, YOEL H     IP consult to dietary  Once     Provider:  (Not yet assigned)    Completed KEMP, YOEL H          Final Active Diagnoses:    Diagnosis Date Noted POA    PRINCIPAL PROBLEM:  Traumatic subarachnoid hemorrhage [S06.6X9A] 11/24/2017 Yes    Subarachnoid hemorrhage following injury, no loss of consciousness [S06.6X0A] 11/28/2017 Yes    Staph aureus infection [A49.01] 11/28/2017 Yes    Anemia in stage 3 chronic kidney disease [N18.3, D63.1] 11/26/2017 Yes    Segmental and subsegmental pulmonary emboli of RLL without acute cor pulmonale [I26.99] 11/25/2017 Yes    Acute renal failure superimposed on stage 3 chronic kidney disease [N17.9, N18.3]  11/25/2017 Yes    Diabetic leg ulcer [E11.622, L97.909] 11/18/2017 Yes    Hyperkalemia [E87.5] 06/29/2017 Yes    Essential hypertension [I10] 06/24/2017 Yes     Chronic    CKD (chronic kidney disease) stage 3, GFR 30-59 ml/min [N18.3] 06/24/2017 Yes    Type 2 diabetes mellitus with stage 3 chronic kidney disease, without long-term current use of insulin [E11.22, N18.3] 06/24/2017 Yes     Chronic    Coronary artery disease involving native coronary artery of native heart without angina pectoris [I25.10] 06/24/2017 Yes     Chronic    Acquired hypothyroidism [E03.9] 06/24/2017 Yes    Pleural effusion [J90] 06/24/2017 Yes      Problems Resolved During this Admission:    Diagnosis Date Noted Date Resolved POA    MC (acute kidney injury) [N17.9] 06/29/2017 11/26/2017 Yes      Discharged Condition: stable    Disposition: Short Term Hospital    Follow Up:    Patient Instructions:     Diet general     Activity as tolerated     Shower on day dressing removed (No bath)     Sponge bath only until clinic visit     Keep surgical extremity elevated     Ice to affected area     Lifting restrictions     Weight bearing restrictions (specify)     Call MD for:  temperature >100.4     Call MD for:  persistent nausea and vomiting or diarrhea     Call MD for:  severe uncontrolled pain     Call MD for:  redness, tenderness, or signs of infection (pain, swelling, redness, odor or green/yellow discharge around incision site)     Call MD for:  difficulty breathing or increased cough     Call MD for:  severe persistent headache     Call MD for:  worsening rash     Call MD for:  persistent dizziness, light-headedness, or visual disturbances     Call MD for:  increased confusion or weakness       Medications:  Reconciled Home Medications:   Discharge Medication List as of 11/27/2017  7:38 PM      START taking these medications    Details   cefepime in dextrose 5 % (MAXIPIME) 1 gram/50 mL PgBk Inject 50 mLs (1 g total) into the vein  every 24 hours as needed., Starting Mon 11/27/2017, No Print      levetiracetam oral soln 500 mg/5 mL (5 mL) Soln 2.5 mLs (250 mg total) by Per NG tube route 2 (two) times daily., Starting Mon 11/27/2017, Until Tue 11/27/2018, No Print      metronidazole (FLAGYL) 500 mg/100 mL IVPB Inject 100 mLs (500 mg total) into the vein every 8 (eight) hours., Starting Mon 11/27/2017, No Print      VANCOMYCIN HCL (VANCOMYCIN IN 5 % DEXTROSE) 1.75 gram/500 mL Soln Inject 500 mLs (1,750 mg total) into the vein once daily., Starting Mon 11/27/2017, No Print         CONTINUE these medications which have CHANGED    Details   carvedilol (COREG) 12.5 MG tablet Take 1 tablet (12.5 mg total) by mouth 2 (two) times daily., Starting Mon 11/27/2017, Until Tue 11/27/2018, No Print      gabapentin (NEURONTIN) 100 MG capsule Take 1 capsule (100 mg total) by mouth 3 (three) times daily., Starting Mon 11/27/2017, Until Tue 11/27/2018, No Print         CONTINUE these medications which have NOT CHANGED    Details   albuterol (ACCUNEB) 1.25 mg/3 mL Nebu Take 1.25 mg by nebulization every 6 (six) hours as needed. Rescue, Historical Med      levothyroxine (SYNTHROID) 50 MCG tablet Take 50 mcg by mouth once daily.  , Until Discontinued, Historical Med      rosuvastatin (CRESTOR) 10 MG tablet Take 1 tablet (10 mg total) by mouth once daily., Starting Tue 6/27/2017, Print         STOP taking these medications       aspirin (ECOTRIN) 81 MG EC tablet Comments:   Reason for Stopping:         citalopram (CELEXA) 20 MG tablet Comments:   Reason for Stopping:         furosemide (LASIX) 40 MG tablet Comments:   Reason for Stopping:         glimepiride (AMARYL) 4 MG tablet Comments:   Reason for Stopping:         insulin aspart protamine-insulin aspart (NOVOLOG 70/30) 100 unit/mL (70-30) InPn pen Comments:   Reason for Stopping:         lisinopril (PRINIVIL,ZESTRIL) 2.5 MG tablet Comments:   Reason for Stopping:               Significant Diagnostic Studies:  Radiology: CT scan: CT Head Without Contrast   Status: Final result   MyChart Results Release     MyChart Status: Declined    PACS Images     Show images for CT Head Without Contrast   External Result Report     External Result Report   Narrative     CT head without contrast    11/24/17 17:32:24    Accession# [id number]    CLINICAL INDICATION: 57 year old F with subarachnoid hemorrhage     TECHNIQUE: Axial CT images obtained throughout the region of the head without the use of intravenous contrast. Axial, sagittal and coronal reconstructions were performed.    COMPARISON: CT head 11/24/2017.    FINDINGS:    The ventricles are stable in size without evidence of hydrocephalus. There is a remote right corona radiata infarct with ex vacuo phenomena involving the right lateral ventricle. There is redemonstration of subarachnoid hematoma within the bilateral cerebral hemispheres notably within the sulci of the left and right parietal lobes and left sylvian fissure, similar to 11/24/2017 at 9:39. There is a small scalp hematoma along the left frontal calvarium. No underlying calvarial fractures. No evidence of major vascular distribution infarct or mass. The paranasal sinuses and mastoid air cells are clear. There are calcifications involving the cavernous carotids and vertebral arteries.   Impression          Stable bilateral subarachnoid hematoma without significant change from 11/24/2017 at 9:39. Continued followup is advised.    Remote right corona radiata remote infarct.      ______________________________________     Electronically signed by resident: ANDRADE VELÁSQUEZ MD  Date: 11/24/17  Time: 17:53            As the supervising and teaching physician, I personally reviewed the images and resident's interpretation and I agree with the findings.          Electronically signed by: IRVING WYATT MD  Date: 11/24/17  Time: 18:21     Encounter     View Encounter          Attestation     As the supervising and teaching  physician, I personally reviewed the images and resident interpretation and I agree with the findings.   Signed by     Signed Credentials Date/Time  Phone Pager   IRVING WYATT MD 11/24/2017 18:21 [phone]   Exam Details     Performed Procedure Technologist Supporting Staff Performing Physician   CT Head Without Contrast Anirudh Davenport, RT Si Rorandy, RT       Appointment Date/Status Modality Department    11/24/2017     Completed [facility] CT3 ER LIMIT 600 LBS [facility] CT SCAN ED       Begin Exam End Exam Begin Exam Questionnaires End Exam Questionnaires   11/24/2017  5:32 PM 11/24/2017  5:32 PM RIS PREGNANCY TECH NAVIGATOR IMAGING END ALL      Order Report      Order Details       Pending Diagnostic Studies:     Procedure Component Value Units Date/Time    Renal function panel [[id number]] Collected:  11/25/17 1400    Order Status:  Sent Lab Status:  No result     Specimen:  Blood from Blood     Renal function panel [[id number]] Collected:  11/25/17 1400    Order Status:  Sent Lab Status:  No result     Specimen:  Blood from Blood         Indwelling Lines/Drains at time of discharge:   Lines/Drains/Airways     Central Venous Catheter Line                 Percutaneous Central Line Insertion/Assessment - triple lumen  11/24/17 1758 right internal jugular 5 days          Drain                 Urethral Catheter 11/23/17 1200 6 days                Time spent on the discharge of patient: >30 minutes  Patient was seen and examined on the date of discharge and determined to be suitable for discharge.         Magalys Morales MD  Department of Hospital Medicine  [facility]

## 2017-11-30 NOTE — TELEPHONE ENCOUNTER
----- Message from ARNULFO Zaldivar sent at 11/27/2017  2:57 PM CST -----  Please have her follow up with any PA in 10 days (or so) with repeat head CT. Orders in. This is hospital follow up for a traumatic SAH while on anticoagulation.     Thanks. Natasha

## 2017-11-30 NOTE — CONSULTS
Nephrology Consult Progress Note        Patient Name: Juliane Ramos  MRN: [id number]    Patient Class: IP- Inpatient   Admission Date: 11/27/2017  Length of Stay: 3 days    Attending Physician: Reg Devine MD  Primary Care Provider: JOS Dumont    Reason for Consult: MC on CKD3, dialysis dependent    SUBJECTIVE:     HPI: 58 y/o F with HTN, DM2, CHF, stage III CKD with baseline sCr 1.4 on 11/20 was admitted for management of a non-healing left heel ulcer, cellulitis. Was considered for surgery but developed PE, anticoagulated and MC due to IV contrast/vanco/infection. Before Surgery had a fall and SAH, requiring transfer to [facility]. There, she was started on HD via Harry cath on 11/25 and had only 1 therapy - SLED. There is a plan now by Podiary for surgical intervention. She makes a fair amont of urine, cloudy.     11/29 had possible seizure episode today, witnessed by staff. Later I saw her on HD but she had problem with blood flow through HD access and HD had to be aborted. Dr. Deal was consulted for placement of another temp HD line, due to infection concerns from infected toe and hopes that she will not need permanent dialysis. Plan for OR tomorrow or Friday is unclear at this time due to inability to do HD today. Discussed with patient, family and staff.    11/30 Appreciate prompt response by Dr. Deal and changing HD catheter. Plan for HD today. Discussed with family at bedside.    Review of patient's allergies indicates:  No Known Allergies    Scheduled meds:   sodium chloride 0.9%   Intravenous Once    carvedilol  12.5 mg Per NG tube BID    ceFAZolin (ANCEF) IVPB  1 g Intravenous Q12H    collagenase   Topical Daily    gabapentin  100 mg Oral TID    heparin (porcine)  1,000 Units Intravenous Once    heparin (porcine)  5,000 Units Subcutaneous Q12H    insulin aspart  1-10 Units Subcutaneous TIDWM    levetiracetam oral soln  250 mg Per NG tube BID    levothyroxine  50 mcg Per NG tube  Before breakfast    rosuvastatin  10 mg Per NG tube Daily    sodium chloride 0.9%  3 mL Intravenous Q8H       Infusions:       PRN meds:  sodium chloride 0.9%, acetaminophen, albuterol sulfate, dextrose 50%, glucagon (human recombinant), glucose, glucose, labetalol, levetiracetam oral soln, magnesium oxide, magnesium oxide, ondansetron, potassium phosphate IVPB    Review of Systems:  ROS    OBJECTIVE:     Vital Signs and IO (Last 24H):  Temp:  [96.2 °F (35.7 °C)-98.6 °F (37 °C)]   Pulse:  [60-67]   Resp:  [18-24]   BP: (113-140)/(66-76)   SpO2:  [92 %-99 %]   I/O last 3 completed shifts:  In: 820 [P.O.:720; IV Piggyback:100]  Out: 200 [Urine:100; Other:100]    Wt Readings from Last 5 Encounters:   11/27/17 117.2 kg (258 lb 6.4 oz)   11/27/17 117 kg (257 lb 15 oz)   11/22/17 106.6 kg (234 lb 15.8 oz)   07/04/17 107.3 kg (236 lb 8.9 oz)   06/27/17 108.9 kg (240 lb 1.3 oz)         Physical Exam:  Physical Exam   Constitutional: She is oriented to person, place, and time. She appears well-developed and well-nourished.   HENT:   Head: Normocephalic. Head is with raccoon's eyes.   Mouth/Throat: Oropharynx is clear and moist.   Eyes: EOM are normal. Pupils are equal, round, and reactive to light. No scleral icterus.   Neck: Neck supple.   Cardiovascular: Normal rate and regular rhythm.    Pulmonary/Chest: Effort normal. No stridor. No respiratory distress.   Abdominal: Soft. She exhibits no distension.   Musculoskeletal: Normal range of motion. She exhibits no edema or deformity.   RIJ Harry catheter, LLE with dressing.   Neurological: She is alert and oriented to person, place, and time. No cranial nerve deficit.   Skin: Skin is warm and dry. No rash noted. She is not diaphoretic. No erythema.   Psychiatric: She has a normal mood and affect. Her behavior is normal.       Body mass index is 41.71 kg/m².    Laboratory:    Recent Labs  Lab 11/27/17  0747 11/27/17  1357 11/27/17  1604 11/28/17  0537 11/29/17  0528  11/30/17  0521    135*  135* 136 136 134* 132*   K 4.5 4.4  4.4 4.3 4.3 4.5 4.5    102  102 103 103 101 101   CO2 28 24  24 25 25 23 21*   BUN 10 12  12 12 15 23* 26*   CREATININE 1.9* 2.1*  2.1* 2.2* 2.7* 3.8* 4.2*   ESTGFRAFRICA 33.7* 29.9*  29.9* 28.3* 22* 15* 13*   EGFRNONAA 29.3* 25.9*  25.9* 24.5* 19* 13* 11*   CALCIUM 8.7 8.4*  8.4* 8.8 8.6* 8.5* 8.6*   ALBUMIN 2.3* 2.3*  2.3* 2.4*  --   --   --    PHOS 3.2 3.3  3.3 3.3  --   --   --          Recent Labs  Lab 11/28/17  0537 11/29/17  0528 11/30/17  0521   WBC 9.00 10.60 9.30   HGB 8.4* 8.9* 8.8*   HCT 26.3* 27.8* 27.2*    203 187   MCV 79* 78* 78*   MCHC 31.9* 32.0 32.2   MONO 9.8  0.9 11.7  1.2* 10.4  1.0         Recent Labs  Lab 11/24/17  0512 11/24/17  1834  11/27/17  0747 11/27/17  1357 11/27/17  1604   ALKPHOS 71 66  --   --   --   --    BILITOT 0.6 0.6  --   --   --   --    PROT 6.7 6.6  --   --   --   --    ALBUMIN 2.6* 2.5*  < > 2.3* 2.3*  2.3* 2.4*   ALT 10 8*  --   --   --   --    AST 12 17  --   --   --   --    < > = values in this interval not displayed.        ASSESSMENT/PLAN:     Non-oliguric MC from ATN due to infection, trauma, medications, IV contrast, abx, unclear if she will need more HD.  CKD stage 3 with baseline sCr 1.4 = eGFR in the 40's due to DN/HTN.  Hypolabuminemia.  Diabetic foot infection.  PE.  SAH.  No NSAIDs, ACEI/ARB, IV contrast or other nephrotoxins.  Keep MAP > 60, SBP > 100.  Dose meds for GFR < 15 ml/min, redose meds as needed after HD, monitor vanco levels.  Renal diet - low K, low phos.  Harry day 1, apprciate Dr. Deal replacing the cath.  Control infection, abx and surgery timing per ID and Podiatry.    Anemia of CKD  Will consider YASHIRA to keep Hb 8-10. Transfusion as needed per Podiatry/Primary team.  No need for IV iron.    MBD / Secondary HPT  Monitor phos levels. Low phos diet.    HTN  Controlled.   Tolerate asymptomatic HTN up to -160.  Continue home meds as  needed.  Low sodium diet.    Thank you for allowing us to participate in the care of your patient!   We will follow the patient and provide recommendations as needed.    Edson Crystal MD    [facility]  [address] [place] [address]  [place] [id number]    [phone] - tel  [phone] - fax    11/30/2017

## 2017-11-30 NOTE — PROGRESS NOTES
After informed consent obtained the patient was prepped and draped and the catheter cleaned with betadine    The old trialysis catheter was exchanged over guide wire for a new catheter without difficulty  Both ports flused and davidson back easily  Each lumen flushed with Heparin per protocol  Follow up CXR pending

## 2017-11-30 NOTE — PLAN OF CARE
Problem: Patient Care Overview  Goal: Plan of Care Review  Outcome: Ongoing (interventions implemented as appropriate)  Pt is on Oxygen. No PRN Tx needed @this time.

## 2017-11-30 NOTE — CONSULTS
"[facility]  Neurology  Consult    Patient Name: Juliane Ramos  MRN: [id number]  Admission Date: 11/27/2017  Hospital Length of Stay: 2 days  Code Status: Full Code   Attending Provider: Reg Devine MD  Primary Care Physician: JOS Dumont   Principal Problem:<principal problem not specified>      Reason for consult:  Possible seizure/altered mental status     Informant:  Chart, patient        HPI:     Juliane Ramos is a 57 y.o. year old female with hypertension, diabetes, heart failure, and chronic kidney disease who was trasnferred back from [facility] after originally being admitted here 11/18/17 with diabetic foot ulcer. She was supposed to have surgery for this but ate food, aspirated, and had a pulmonary embolus. Then she fell, hit her head and sustained a subarachnoid hemorrhage. She was trasnferred to [facility] for monitoring of the SAH which was deemed stable and she was transferred back.    Today, 11/29 she had a possible seizure witnessed by staff. This was described as patient staring blankly for 30 seconds while being assisted from chair to bed, was able to recall what happened and said "I just blanked out for a second." She did not have any witnessed jerking, urinary incontinence, or tongue biting. She has been on keppra 250mg BID (for sz ppx post SAH) since at least 11/28.     Her family is at bedside and report that she has had 3 of these events in total when her oxygen is removed, prior to her having the SAH.     Review of systems:  Comprehensive review of systems is negative except as mentioned in the HPI.     Allergies/Meds:   Allergies:  Patient has no known allergies.    Prior to Admission medications    Medication Sig Start Date End Date Taking? Authorizing Provider   albuterol (ACCUNEB) 1.25 mg/3 mL Nebu Take 1.25 mg by nebulization every 6 (six) hours as needed. Rescue    Historical Provider, MD   carvedilol (COREG) 12.5 MG tablet Take 1 tablet (12.5 mg total) by mouth 2 (two) " times daily. 11/27/17 11/27/18  Magalys Morales MD   cefepime in dextrose 5 % (MAXIPIME) 1 gram/50 mL PgBk Inject 50 mLs (1 g total) into the vein every 24 hours as needed. 11/27/17   Magalys Morales MD   gabapentin (NEURONTIN) 100 MG capsule Take 1 capsule (100 mg total) by mouth 3 (three) times daily. 11/27/17 11/27/18  Magalys Morales MD   levetiracetam oral soln 500 mg/5 mL (5 mL) Soln 2.5 mLs (250 mg total) by Per NG tube route 2 (two) times daily. 11/27/17 11/27/18  Magalys Morales MD   levothyroxine (SYNTHROID) 50 MCG tablet Take 50 mcg by mouth once daily.      Historical Provider, MD   metronidazole (FLAGYL) 500 mg/100 mL IVPB Inject 100 mLs (500 mg total) into the vein every 8 (eight) hours. 11/27/17   Magalys Morales MD   rosuvastatin (CRESTOR) 10 MG tablet Take 1 tablet (10 mg total) by mouth once daily. 6/27/17   Reg Devine MD   VANCOMYCIN HCL (VANCOMYCIN IN 5 % DEXTROSE) 1.75 gram/500 mL Soln Inject 500 mLs (1,750 mg total) into the vein once daily. 11/27/17   Magalys Morales MD       Current Medications:    Current Facility-Administered Medications   Medication Dose Route Frequency Provider Last Rate Last Dose    0.9%  NaCl infusion   Intravenous PRN Edson Crystal MD        0.9%  NaCl infusion   Intravenous Once Edson Crystal MD        acetaminophen tablet 650 mg  650 mg Oral Q6H PRN Luisana Valera NP   650 mg at 11/29/17 1737    albuterol sulfate nebulizer solution 2.5 mg  2.5 mg Nebulization Q6H PRN Reg Devine MD        carvedilol tablet 12.5 mg  12.5 mg Per NG tube BID Luisana Valera NP   12.5 mg at 11/29/17 0813    ceFAZolin (ANCEF) 1 gram in dextrose 5 % 50 mL IVPB (premix)  1 g Intravenous Q12H Pina Turner  mL/hr at 11/29/17 1616 1 g at 11/29/17 1616    collagenase ointment   Topical Daily Luisana Valera, NP        dextrose 50% injection 12.5 g  12.5 g Intravenous PRN Luisana Valera NP        gabapentin capsule 100 mg  100 mg  Oral TID Edson Crystal MD   100 mg at 11/29/17 1400    glucagon (human recombinant) injection 1 mg  1 mg Intramuscular PRN Luisana Valera NP        glucose chewable tablet 16 g  16 g Oral PRN Luisana Valera NP        glucose chewable tablet 24 g  24 g Oral PRN Luisana Valera NP        heparin (porcine) injection 5,000 Units  5,000 Units Subcutaneous Q12H Reg Devine MD   5,000 Units at 11/29/17 0930    insulin aspart pen 1-10 Units  1-10 Units Subcutaneous TIDWM Luisana Valera NP   2 Units at 11/29/17 1737    labetalol injection 10 mg  10 mg Intravenous Q6H PRN Luisana Valera NP        levetiracetam oral soln Soln 250 mg  250 mg Per NG tube BID Luisana Valera NP   250 mg at 11/29/17 0813    levothyroxine tablet 50 mcg  50 mcg Per NG tube Before breakfast Luisana Valera NP   50 mcg at 11/29/17 0532    magnesium oxide tablet 800 mg  800 mg Oral PRN Luisana Valera NP        magnesium oxide tablet 800 mg  800 mg Oral PRN Luisana Valera NP        ondansetron injection 4 mg  4 mg Intravenous Q6H PRN Luisana Valera NP        potassium phosphate 15 mmol in dextrose 5 % 250 mL infusion  15 mmol Intravenous Daily PRN Luisana Valera NP        rosuvastatin tablet 10 mg  10 mg Per NG tube Daily Luisana Valera NP   10 mg at 11/29/17 0813    sodium chloride 0.9% flush 3 mL  3 mL Intravenous Q8H Luisana Valera NP   3 mL at 11/29/17 1400       Past Medical/Surgical/Family History/Social:   Medical:   Past Medical History:   Diagnosis Date    Anemia in stage 3 chronic kidney disease 11/26/2017    CHF (congestive heart failure)     COPD (chronic obstructive pulmonary disease)     Coronary artery disease     Diabetes mellitus     Encounter for blood transfusion     Essential hypertension 6/24/2017    Hypertension     MI (myocardial infarction) 2010    Segmental and subsegmental pulmonary emboli of RLL without acute cor pulmonale 11/25/2017  "   Stroke     2005    Thyroid disease     Traumatic subarachnoid hemorrhage 2017    Type 2 diabetes mellitus with stage 3 chronic kidney disease, without long-term current use of insulin 2017      Surgeries:   Past Surgical History:   Procedure Laterality Date    ABCESS DRAINAGE Right     Between "little toe" and the one next to it    BREAST SURGERY      Reduction [id number]    CARDIAC SURGERY      CABG 4vessel ring in one valve mitral valve prolapse    CORONARY ARTERY BYPASS GRAFT      hyperlipidemia      TUBAL LIGATION  1986      Family:   Family History   Problem Relation Age of Onset    Arthritis Mother     Heart disease Father     Cancer Father 68     prostae and bladder ca  in     Diabetes Father     Hypertension Father     Depression Sister     Hypertension Son     Diabetes Maternal Grandmother      lost leg    Kidney disease Paternal Grandfather      had kidney removed    Stroke Paternal Grandfather      Social:  reports that she has never smoked. She has never used smokeless tobacco. She reports that she does not drink alcohol or use drugs.    Physical Exam:   Vital Signs:   Vitals:    17 1639   BP: 137/71   Pulse: 66   Resp: (!) 22   Temp: 96.2 °F (35.7 °C)          NEUROLOGICAL EXAM:  Mental status: Heavily asleep. Briefly awakens to verbal stimulation then drifts back to sleep. Says she is in the hospital. Follows peripheral commands in her sleep.   Speech/Language: unable to assess   Cranial nerves: Pupils equal round and reactive to light, face is symmetric   Motor: limbs move symmetrically     Data:     I have personally reviewed the referring provider's notes, labs, & imaging made available to me today.     LABORATORY STUDIES:  Recent Results (from the past 24 hour(s))   CBC auto differential    Collection Time: 17  5:28 AM   Result Value Ref Range    WBC 10.60 3.90 - 12.70 K/uL    RBC 3.59 (L) 4.00 - 5.40 M/uL    Hemoglobin 8.9 (L) 12.0 - 16.0 " g/dL    Hematocrit 27.8 (L) 37.0 - 48.5 %    MCV 78 (L) 82 - 98 fL    MCH 24.8 (L) 27.0 - 31.0 pg    MCHC 32.0 32.0 - 36.0 g/dL    RDW 20.1 (H) 11.5 - 14.5 %    Platelets 203 150 - 350 K/uL    MPV 8.7 (L) 9.2 - 12.9 fL    Gran # 8.3 (H) 1.8 - 7.7 K/uL    Lymph # 0.8 (L) 1.0 - 4.8 K/uL    Mono # 1.2 (H) 0.3 - 1.0 K/uL    Eos # 0.2 0.0 - 0.5 K/uL    Baso # 0.00 0.00 - 0.20 K/uL    Gran% 78.3 (H) 38.0 - 73.0 %    Lymph% 8.0 (L) 18.0 - 48.0 %    Mono% 11.7 4.0 - 15.0 %    Eosinophil% 1.9 0.0 - 8.0 %    Basophil% 0.1 0.0 - 1.9 %    Differential Method Automated    Basic Metabolic Panel (BMP)    Collection Time: 11/29/17  5:28 AM   Result Value Ref Range    Sodium 134 (L) 136 - 145 mmol/L    Potassium 4.5 3.5 - 5.1 mmol/L    Chloride 101 95 - 110 mmol/L    CO2 23 23 - 29 mmol/L    Glucose 156 (H) 70 - 110 mg/dL    BUN, Bld 23 (H) 6 - 20 mg/dL    Creatinine 3.8 (H) 0.5 - 1.4 mg/dL    Calcium 8.5 (L) 8.7 - 10.5 mg/dL    Anion Gap 10 8 - 16 mmol/L    eGFR if African American 15 (A) >60 mL/min/1.73 m^2    eGFR if non African American 13 (A) >60 mL/min/1.73 m^2   POCT glucose    Collection Time: 11/29/17 11:40 AM   Result Value Ref Range    POCT Glucose 187 (H) 70 - 110 mg/dL   POCT glucose    Collection Time: 11/29/17  5:34 PM   Result Value Ref Range    POCT Glucose 190 (H) 70 - 110 mg/dL       RADIOLOGY STUDIES:  I have personally reviewed the pertinent images performed.   Imaging Results          CT Head Without Contrast (Final result)  Result time 11/29/17 10:31:04    Final result by Georges White MD (11/29/17 10:31:04)                 Impression:         1. There is no definite new abnormality.  There has been some resolution in redistribution of previously demonstrated posttraumatic subarachnoid blood.  This blood is now most apparent in the right parietal sulcus.  There is no parenchymal hemorrhage.    2.  There is no obvious acute infarction.  This would however be better evaluated with MRI.    3.  There are  chronic infarctions in the superior right basal ganglia and corona radiata with ex vacuo dilatation of the right lateral ventricle.  There is also a chronic infarct in the posterior right cerebellum.  In retrospect, this was present on prior studies.    4.  Interval improvement in left forehead and inferior frontal scalp contusion.      Electronically signed by: Dr. Georges White  Date:     11/29/17  Time:    10:31              Narrative:    EXAM: Head CT without contrast    INDICATION: Altered mental status    TECHNIQUE: Routine unenhanced axial images were obtained through the head. Sagittal and coronal reformatted images were created.     COMPARISON: Head CTs dated 11/24/2017      FINDINGS:     Intracranial contents: Redemonstrate is mild scattered hyperdense material filling biparietal sulci.  The overall amount of posttraumatic subarachnoid blood has decreased compared to the prior study suggesting some degree of redistribution and resolution.  This is slightly more apparent in the right parietal sulci but there is no definite parenchymal hemorrhage or new hemorrhage.  In retrospect, there is a chronic infarction in posterior right cerebellum.  This was present on the comparison studies.  Also, there is a remote infarction in the superior right basal ganglia and corona radiata with ex vacuo dilatation of the right lateral ventricle.  This is not new.  There is a punctate calcification in the right paramedian jahaira.  This was present previously and is nonspecific, and not acute.  The basilar cisterns are open.  There is no midline shift.  The gray-white interface is otherwise preserved without obvious acute infarction.    Calvarium and extracranial contents: There is no acute skull fracture.  There has been interval decrease in left forehead and inferior frontal scalp contusion since the initial studies.                              Assessment and Plan:  P     #1 Transient loss of awareness - agree she is at  risk for seizures due to her subarachnoid hemorrhage. This was a brief event which would have had to involve bilateral hemispheres based on seminology yet she remembered what happened during event. This makes it less likely seizure. It appears to happen per family distinctly when her oxygen is removed. I do favor keeping her on keppra but I would not raise the dose or start any other AEDs, just monitor for additional events.         Problem List Items Addressed This Visit        Neuro    Subarachnoid hemorrhage following injury, no loss of consciousness       Cardiac/Vascular    Coronary artery disease involving native coronary artery of native heart without angina pectoris (Chronic)       Renal/    CKD (chronic kidney disease) stage 3, GFR 30-59 ml/min    Acute renal failure superimposed on stage 3 chronic kidney disease       Hematology    Pulmonary embolus      Other Visit Diagnoses     Syncope, near        Relevant Orders    EKG 12-lead                 Recommendations:    1. Cont Keppra 250mg BID     Thank you for the opportunity to assist in this patient's care. I will sign off at this time, if she has further events, please let me know. If you have any questions or concerns, please do not hesitate to contact me at any time.     Gaby Gil MD  Neurologist

## 2017-11-30 NOTE — PROGRESS NOTES
3 hour dialysis tx today.  Right IJ cath was positionsl and lines had to be reversed, but ran well most of tx. Net uf 2.7L. vss

## 2017-11-30 NOTE — PLAN OF CARE
Problem: SLP Goal  Goal: SLP Goal    1) Name 10 times in concrete category in one minute with SPV.  2) Recall details of short story, presented verbally, with SPV.  3) Functional money/time word problems with MIN A  4) Simple visuospatial tasks with MIN A   Alternate version of MOCA administered today with decline in score from 21/30 to 18/30 when compared to MOCA administered on Tuesday, November 28. Mildly increased difficulty attending today, decreased sentence repetition and decreased delayed recall of unrelated words per MOCA. She answered math word problems related to time with MOD/MAX A. Performed improved with cues to attend to speaker, repetitions and visual cue. Pt/ educated re: ST role, POC, need for continued ST, etc. Both receptive to information provided. Pt remains motivated and cooperative. Continue skilled ST to address deific ts.

## 2017-11-30 NOTE — PLAN OF CARE
Problem: Physical Therapy Goal  Goal: Physical Therapy Goal  Goals to be met by: 2017     Patient will increase functional independence with mobility by performin. Supine to sit with Minimal Assistance  2. Sit to supine with Minimal Assistance  3. Sit to stand transfer with Minimal Assistance  4. Bed to chair transfer with Minimal Assistance using Rolling Walker  5. Stand for 5 minutes with Stand-by Assistance using Rolling Walker  6. Lower extremity exercise program x10 reps, with supervision  Wheelchair management goal to be made pending progress      Outcome: Ongoing (interventions implemented as appropriate)  Decreased assistance needed with bed mobility today.  Mod a supine<>eob.

## 2017-11-30 NOTE — PROGRESS NOTES
Progress Note  Hospital Medicine  Patient Name:Juliane Ramos  MRN:  [id number]  Patient Class: IP- Inpatient  Admit Date: 11/27/2017  Length of Stay: 3 days  Expected Discharge Date:   Attending Physician: Reg Devine MD  Primary Care Provider:  JOS Dumont    SUBJECTIVE:     Principal Problem: Left diabetic foot ulcer  Initial history of present illness: 57 year old female with HTN, DM2, HF, CKD has returned from [facility]. Patient was originally admitted on 11-18-17 with left diabetic foot ulcer. She stated that over the past few days it had been associated with worsening pain, drainage, and a fever of up to 102 and for this she came for evaluation.  She stated she was compliant with her medications but only takes her basal insulin when she needs it per sliding scale. Patient was admitted to Hospitalist medicine service. Patient was evaluated by Dr. Wick and Dr. Turner. Patient was being treated with IV antibiotics, wound care provided and was expected to have wound debridement. Patient had an episode of sudden onset of hypoxia and unresponsiveness. Patient vomited Marcelino sandwitch. Patient was expected to be NPO for podiatry procedure. Work up confirmed PE. Patient started on anticoagulation which was held yesterday due to bleeding from IV site. On the morning of 11-24-17, patient while ambulated with her  lost balance and hit left forehead to the wall. Left forehead contusion noted. No LOC or any focal neurological complaints or HA or vision changes. CT head is showed SAH. Patient transferred to [facility] neuro-critical care ICU and was evaluated by neuro-surgeon. SAH deemed stable. Patient needed one round of HD. Wound Cx grew MSSA treated with Maxipine and Vancomycin. Patient is in need for diabetic foot ulcer debridement.    PMH/PSH/SH/FH/Meds: reviewed.    Symptoms/Review of Systems: HD catheter changed by Dr. Deal. HD is scheduled for today. No shortness of breath, cough, chest pain or headache,  fever or abdominal pain.     Diet:  Adequate intake.    Activity level: Up with assistance  Pain:  Patient reports no pain.       OBJECTIVE:   Vital Signs (Most Recent):      Temp: 97.6 °F (36.4 °C) (11/30/17 0746)  Pulse: 62 (11/30/17 0746)  Resp: 18 (11/30/17 0746)  BP: 126/76 (11/30/17 0746)  SpO2: 98 % (11/30/17 0746)       Vital Signs Range (Last 24H):  Temp:  [96.2 °F (35.7 °C)-98.6 °F (37 °C)]   Pulse:  [60-67]   Resp:  [18-22]   BP: (121-140)/(67-76)   SpO2:  [95 %-99 %]     Weight: 117.2 kg (258 lb 6.4 oz)  Body mass index is 41.71 kg/m².    Intake/Output Summary (Last 24 hours) at 11/30/17 0935  Last data filed at 11/30/17 0500   Gross per 24 hour   Intake              700 ml   Output              200 ml   Net              500 ml     Physical Examination:  General appearance: well developed, appears stated age  Head: normocephalic, Left black eye and left forehead ecchymoses  Eyes:  conjunctivae/corneas clear. PERRL.  Nose: Nares normal. Septum midline.  Throat: lips, mucosa, and tongue normal; teeth and gums normal, no throat erythema.  Neck: supple, symmetrical, trachea midline, no JVD and thyroid not enlarged, symmetric, no tenderness/mass/nodules  Lungs:  clear to auscultation bilaterally and normal respiratory effort  Chest wall: no tenderness  Heart: regular rate and rhythm, S1, S2 normal, no murmur, click, rub or gallop  Abdomen: soft, non-tender non-distented; bowel sounds normal; no masses,  no organomegaly  Extremities: no cyanosis, clubbing or edema.   Pulses: 2+ and symmetric  Skin: Skin color, texture, turgor normal. Left Achilis Tendon wound with black eschar and mixed granulation tissue, tendon is exposed possibly. Mild brawny edema of left foot and ankle.  Lymph nodes: Cervical, supraclavicular, and axillary nodes normal.  Neurologic: Normal strength and tone. No focal numbness or weakness. CNII-XII intact.      CBC:    Recent Labs  Lab 11/28/17  0537 11/29/17  0528 11/30/17  0521   WBC  9.00 10.60 9.30   RBC 3.34* 3.59* 3.48*   HGB 8.4* 8.9* 8.8*   HCT 26.3* 27.8* 27.2*    203 187   MCV 79* 78* 78*   MCH 25.1* 24.8* 25.1*   MCHC 31.9* 32.0 32.2   BMP    Recent Labs  Lab 11/26/17  2348 11/27/17  0747  11/27/17  1604 11/28/17  0537 11/29/17  0528 11/30/17  0521   * 178*  < > 194* 139* 156* 157*    138  < > 136 136 134* 132*   K 4.3 4.5  < > 4.3 4.3 4.5 4.5    103  < > 103 103 101 101   CO2 27 28  < > 25 25 23 21*   BUN 8 10  < > 12 15 23* 26*   CREATININE 1.5* 1.9*  < > 2.2* 2.7* 3.8* 4.2*   CALCIUM 8.2* 8.7  < > 8.8 8.6* 8.5* 8.6*   MG 1.9  1.9 2.1  2.1  --  2.1  2.1  --   --   --    < > = values in this interval not displayed.   Diagnostic Results:  Microbiology Results (last 7 days)     ** No results found for the last 168 hours. **         Left Calcaneum:   1.  No radiographically apparent acute osteomyelitis. Skin irregularity and bandage noted in the posterior aspect of the ankle.  2. Degenerative changes in the ankle, hindfoot and midfoot are noted.     MRI left foot:  1. Retro-Achilles soft tissue wound. No evidence for osteomyelitis.  2. Mild nonspecific Achilles and posterior tibial tenosynovitis.  3. Small tibiotalar joint effusion.     Bilateral leg venous doppler scan: There are some limitations but no definite acute DVT in seen in either lower extremity.     Renal US: Unremarkable appearance of the kidneys.  No hydronephrosis.     CT head:  1. Abnormal study.  There is a left forehead and frontal scalp hematoma without underlying fracture.  Additionally, there is bilateral posttraumatic subarachnoid blood with hyperdense blood seen in right parietal sulci as well as in the left sylvian fissure and adjacent left frontal and parietal sulci.  There is no acute parenchymal hemorrhage.  2.  Remote right frontal lobe infarction with ex vacuo dilatation of the right frontal horn.  There is no obvious acute infarction.  3.  Atherosclerosis.      CT head:  1. There  is no definite new abnormality.  There has been some resolution in redistribution of previously demonstrated posttraumatic subarachnoid blood.  This blood is now most apparent in the right parietal sulcus.  There is no parenchymal hemorrhage.  2.  There is no obvious acute infarction.  This would however be better evaluated with MRI.  3.  There are chronic infarctions in the superior right basal ganglia and corona radiata with ex vacuo dilatation of the right lateral ventricle.  There is also a chronic infarct in the posterior right cerebellum.  In retrospect, this was present on prior studies.  4.  Interval improvement in left forehead and inferior frontal scalp contusion.    CXR: Unchanged right lung airspace disease and pleural effusion. Component of CHF likely. Appropriate positioning of right IJ catheter.    Assessment/Plan:      Acute post-traumatic sub-arachnoid hemorrhage  Fall precaution.  Continue PT and OT.           * Diabetic leg ulcer - grade 3 , left posterior ankle     - continue IV Ancef as per Dr. Turner.  - Dr. Wick following, getting Sanyl application. Surgical debridement soon.  - Follow ID recommendations.  - wound care.          Acute RLL pulmonary embolism  Future therapeutic dose to be decided after outpatient neurosurgery followup.     Acute on chronic hypoxic hypercarbic respiratory failure.  Supplemental O2 via nasal canula; titrate O2 saturation to >92%.   Continue beta 2 agonist bronchodilator treatments.   Use BiPAP during sleep. Needs Polysomnogram upon DC.    Possible seizure  Appreciated Dr. Gil's assistance, continue PO Keppra.  Continue neuro-checks, fall and seizure precautions.       MC - now on HD  Follow nephrology recommendations. Await renal recovery.      Hypothyroid     - continue synthroid          Controlled type 2 diabetes mellitus with stage 3 chronic kidney disease     Check blood glucose level q AC/HS.  Use Novolog Insulin Sliding Scale as needed.   Continue  American Diabetic Association 1800 Kcal diet          CKD (chronic kidney disease) stage 3, GFR 30-59 ml/min     - avoid nephrotoxins and renally dose meds          Essential hypertension     - continue home meds and will titrate as needed      Discussed with patient's , answered all the questions.     DVT Prophylaxis: Heparin 5K SQ q 12 hrs.    Reg Devine MD  Department of Hospital Medicine   [facility]

## 2017-11-30 NOTE — PT/OT/SLP PROGRESS
Physical Therapy  Treatment    Juliane Ramos   MRN: [id number]   Admitting Diagnosis: <principal problem not specified>    PT Received On: 11/30/17  PT Start Time: 1115     PT Stop Time: 1127    PT Total Time (min): 12 min       Billable Minutes:  Therapeutic Activity 12    Treatment Type: Treatment  PT/PTA: PTA     PTA Visit Number: 2       General Precautions: Standard, fall  Orthopedic Precautions: LLE non weight bearing   Braces:      Do you have any cultural, spiritual, [employer] conflicts, given your current situation?: None    Subjective:  Communicated with nurse Patricia prior to session.  Pt agreeable to tx.    Pain/Comfort  Pain Rating 1: 0/10    Objective:   Patient found with: central line, villegas catheter, telemetry, oxygen    Functional Mobility:  Bed Mobility:   Rolling/Turning to Left: Moderate assistance  Rolling/Turning Right: Moderate assistance  Scooting/Bridging: Maximum Assistance, Moderate Assistance  Supine to Sit: Moderate Assistance  Sit to Supine: Moderate Assistance    Balance:   Static Sit: POOR+: Needs MINIMAL assist to maintain  Dynamic Sit: FAIR: Cannot move trunk without losing balance     Therapeutic Activities and Exercises:  Supine TE: AAROM: slr, hip abd/add, ap, sat eob x 6 minutes, cga-sba.     AM-PAC 6 CLICK MOBILITY  How much help from another person does this patient currently need?   1 = Unable, Total/Dependent Assistance  2 = A lot, Maximum/Moderate Assistance  3 = A little, Minimum/Contact Guard/Supervision  4 = None, Modified [place]/Independent         AM-PAC Raw Score CMS G-Code Modifier Level of Impairment Assistance   6 % Total / Unable   7 - 9 CM 80 - 100% Maximal Assist   10 - 14 CL 60 - 80% Moderate Assist   15 - 19 CK 40 - 60% Moderate Assist   20 - 22 CJ 20 - 40% Minimal Assist   23 CI 1-20% SBA / CGA   24 CH 0% Independent/ Mod I     Patient left right sidelying with all lines intact, call button in reach and nurse present.    Assessment:  Juliane  Richard is a 57 y.o. female with a medical diagnosis of <principal problem not specified> and presents with weakness.  Decreased assistance needed with bed mobility today.  Extra time needed with activity.    Rehab identified problem list/impairments: Rehab identified problem list/impairments: weakness, impaired endurance, impaired self care skills, gait instability, impaired balance, decreased coordination, decreased lower extremity function, decreased safety awareness, impaired coordination    Activity tolerance: Fair    Discharge recommendations:       Barriers to discharge:      Equipment recommendations:       GOALS:    Physical Therapy Goals        Problem: Physical Therapy Goal    Goal Priority Disciplines Outcome Goal Variances Interventions   Physical Therapy Goal     PT/OT, PT Ongoing (interventions implemented as appropriate)     Description:  Goals to be met by: 2017     Patient will increase functional independence with mobility by performin. Supine to sit with Minimal Assistance  2. Sit to supine with Minimal Assistance  3. Sit to stand transfer with Minimal Assistance  4. Bed to chair transfer with Minimal Assistance using Rolling Walker  5. Stand for 5 minutes with Stand-by Assistance using Rolling Walker  6. Lower extremity exercise program x10 reps, with supervision  Wheelchair management goal to be made pending progress                       PLAN:    Patient to be seen 6 x/week  to address the above listed problems via gait training, therapeutic activities, therapeutic exercises  Plan of Care expires: 17  Plan of Care reviewed with: patient, spouse         Sarika Pérez, PTA  2017

## 2017-11-30 NOTE — PT/OT/SLP PROGRESS
"Speech Language Pathology  Treatment    Juliane Ramos   MRN: [id number]   Admitting Diagnosis: <principal problem not specified>    Diet recommendations: Solid Diet Level: Regular  Liquid Diet Level: Thin     SLP Treatment Date: 11/30/17  Speech Start Time: 1030     Speech Stop Time: 1101     Speech Total (min): 31 min       TREATMENT BILLABLE MINUTES:  Speech Therapy Individual 31 and Total Time 31    Has the patient been evaluated by SLP for swallowing? : Yes  Keep patient NPO?: No   General Precautions: Standard, fall, vision impaired  Current Respiratory Status: nasal cannula       Subjective:  "Elena, right?"  "They think I might have had a stroke yesterday."  "I really have to work to find my words."  Pt reports change in mental status, speech and language."    Objective: Patient found with: central line, oxygen  Pt alert and cooperative. Slight dysarthria is noted today. She is mildly SOB with speech. MOCA re administered 2/2 pt report of change in mental status, speech and language.   3) Functional money/time word problems with MIN A    Assessment:  Juliane Ramos is a 57 y.o. female with a medical diagnosis of pulmonary embolus and presents with cognitive-linguistic impairment. Alternate version of MOCA administered today with decline in score from 21/30 to 18/30 when compared to MOCA administered on Tuesday, November 28. No obvious deficits noted at conversational level other than her speech appears mildly dysarthric; imprecise articulation. She did have increased difficulty attending today, decreased sentence repetition and decreased delayed recall of unrelated words per MOCA. She answered math word problems related to time with MOD/MAX A. Performed improved with cues to attend to speaker, repetitions and visual cue. Pt/ educated re: ST role, POC, need for continued ST, etc. Both receptive to information provided. Pt remains motivated and cooperative. Continue skilled ST to address deific ts. "     Discharge recommendations: Discharge Facility/Level Of Care Needs: nursing facility, skilled     Goals:    SLP Goals        Problem: SLP Goal    Goal Priority Disciplines Outcome   SLP Goal     SLP Ongoing (interventions implemented as appropriate)   Description:    1) Name 10 times in concrete category in one minute with SPV.  2) Recall details of short story, presented verbally, with SPV.  3) Functional money/time word problems with MIN A  4) Simple visuospatial tasks with MIN A                     Plan:   Patient to be seen Therapy Frequency: 5 x/week   Plan of Care expires: 12/05/17  Plan of Care reviewed with: patient, spouse  SLP Follow-up?: Yes  SLP - Next Visit Date: 12/01/17           Elena Torres CCC-SLP  11/30/2017

## 2017-11-30 NOTE — PLAN OF CARE
Problem: Patient Care Overview  Goal: Plan of Care Review  Outcome: Ongoing (interventions implemented as appropriate)  Pt alert and oriented. No new symptoms. Pt currently in Dialysis. Blood sugar monitored and covered. VSS. IV intact. Chi draining clear urine. Wound care done. No injuries or falls this shift. Will continue to monitor.

## 2017-12-01 PROBLEM — E66.9 OBESITY: Status: ACTIVE | Noted: 2017-12-01

## 2017-12-01 LAB
ALBUMIN SERPL BCP-MCNC: 2 G/DL
ANION GAP SERPL CALC-SCNC: 10 MMOL/L
BASOPHILS # BLD AUTO: 0 K/UL
BASOPHILS NFR BLD: 0.2 %
BUN SERPL-MCNC: 23 MG/DL
CALCIUM SERPL-MCNC: 8.4 MG/DL
CHLORIDE SERPL-SCNC: 99 MMOL/L
CK SERPL-CCNC: 10 U/L
CK SERPL-CCNC: 10 U/L
CO2 SERPL-SCNC: 23 MMOL/L
CREAT SERPL-MCNC: 4.1 MG/DL
DIFFERENTIAL METHOD: ABNORMAL
EOSINOPHIL # BLD AUTO: 0.3 K/UL
EOSINOPHIL NFR BLD: 2.6 %
ERYTHROCYTE [DISTWIDTH] IN BLOOD BY AUTOMATED COUNT: 19.6 %
EST. GFR  (AFRICAN AMERICAN): 13 ML/MIN/1.73 M^2
EST. GFR  (NON AFRICAN AMERICAN): 12 ML/MIN/1.73 M^2
GLUCOSE SERPL-MCNC: 145 MG/DL
HCT VFR BLD AUTO: 27.4 %
HGB BLD-MCNC: 8.9 G/DL
LYMPHOCYTES # BLD AUTO: 0.8 K/UL
LYMPHOCYTES NFR BLD: 8.1 %
MCH RBC QN AUTO: 25.2 PG
MCHC RBC AUTO-ENTMCNC: 32.3 G/DL
MCV RBC AUTO: 78 FL
MONOCYTES # BLD AUTO: 1 K/UL
MONOCYTES NFR BLD: 10 %
NEUTROPHILS # BLD AUTO: 8.2 K/UL
NEUTROPHILS NFR BLD: 79.1 %
PHOSPHATE SERPL-MCNC: 3.6 MG/DL
PLATELET # BLD AUTO: 185 K/UL
PMV BLD AUTO: 8.8 FL
POCT GLUCOSE: 147 MG/DL (ref 70–110)
POCT GLUCOSE: 167 MG/DL (ref 70–110)
POCT GLUCOSE: 188 MG/DL (ref 70–110)
POCT GLUCOSE: 206 MG/DL (ref 70–110)
POTASSIUM SERPL-SCNC: 4.4 MMOL/L
RBC # BLD AUTO: 3.52 M/UL
SODIUM SERPL-SCNC: 132 MMOL/L
TROPONIN I SERPL DL<=0.01 NG/ML-MCNC: 0.01 NG/ML
TROPONIN I SERPL DL<=0.01 NG/ML-MCNC: 0.01 NG/ML
WBC # BLD AUTO: 10.4 K/UL

## 2017-12-01 PROCEDURE — 99233 SBSQ HOSP IP/OBS HIGH 50: CPT | Mod: ,,, | Performed by: INTERNAL MEDICINE

## 2017-12-01 PROCEDURE — 63600175 PHARM REV CODE 636 W HCPCS: Performed by: NURSE PRACTITIONER

## 2017-12-01 PROCEDURE — 63600175 PHARM REV CODE 636 W HCPCS: Performed by: INTERNAL MEDICINE

## 2017-12-01 PROCEDURE — 25000003 PHARM REV CODE 250: Performed by: INTERNAL MEDICINE

## 2017-12-01 PROCEDURE — 20000000 HC ICU ROOM

## 2017-12-01 PROCEDURE — 94761 N-INVAS EAR/PLS OXIMETRY MLT: CPT

## 2017-12-01 PROCEDURE — 94660 CPAP INITIATION&MGMT: CPT

## 2017-12-01 PROCEDURE — 82550 ASSAY OF CK (CPK): CPT | Mod: 91

## 2017-12-01 PROCEDURE — A4216 STERILE WATER/SALINE, 10 ML: HCPCS | Performed by: NURSE PRACTITIONER

## 2017-12-01 PROCEDURE — 27000221 HC OXYGEN, UP TO 24 HOURS

## 2017-12-01 PROCEDURE — 36415 COLL VENOUS BLD VENIPUNCTURE: CPT

## 2017-12-01 PROCEDURE — 97802 MEDICAL NUTRITION INDIV IN: CPT | Performed by: DIETITIAN, REGISTERED

## 2017-12-01 PROCEDURE — 99900035 HC TECH TIME PER 15 MIN (STAT)

## 2017-12-01 PROCEDURE — 84484 ASSAY OF TROPONIN QUANT: CPT

## 2017-12-01 PROCEDURE — 25000003 PHARM REV CODE 250: Performed by: NURSE PRACTITIONER

## 2017-12-01 PROCEDURE — 85025 COMPLETE CBC W/AUTO DIFF WBC: CPT

## 2017-12-01 PROCEDURE — 82550 ASSAY OF CK (CPK): CPT

## 2017-12-01 PROCEDURE — 80069 RENAL FUNCTION PANEL: CPT

## 2017-12-01 RX ADMIN — COLLAGENASE SANTYL: 250 OINTMENT TOPICAL at 10:12

## 2017-12-01 RX ADMIN — HEPARIN SODIUM 5000 UNITS: 5000 INJECTION, SOLUTION INTRAVENOUS; SUBCUTANEOUS at 10:12

## 2017-12-01 RX ADMIN — SODIUM CHLORIDE, PRESERVATIVE FREE 3 ML: 5 INJECTION INTRAVENOUS at 06:12

## 2017-12-01 RX ADMIN — HEPARIN SODIUM 5000 UNITS: 5000 INJECTION, SOLUTION INTRAVENOUS; SUBCUTANEOUS at 09:12

## 2017-12-01 RX ADMIN — CEFAZOLIN 1 G: 1 INJECTION, POWDER, FOR SOLUTION INTRAMUSCULAR; INTRAVENOUS at 06:12

## 2017-12-01 RX ADMIN — CARVEDILOL 12.5 MG: 6.25 TABLET, FILM COATED ORAL at 09:12

## 2017-12-01 RX ADMIN — INSULIN ASPART 2 UNITS: 100 INJECTION, SOLUTION INTRAVENOUS; SUBCUTANEOUS at 12:12

## 2017-12-01 RX ADMIN — GABAPENTIN 100 MG: 100 CAPSULE ORAL at 03:12

## 2017-12-01 RX ADMIN — ROSUVASTATIN CALCIUM 10 MG: 5 TABLET ORAL at 10:12

## 2017-12-01 RX ADMIN — INSULIN ASPART 2 UNITS: 100 INJECTION, SOLUTION INTRAVENOUS; SUBCUTANEOUS at 04:12

## 2017-12-01 RX ADMIN — SODIUM CHLORIDE, PRESERVATIVE FREE 3 ML: 5 INJECTION INTRAVENOUS at 10:12

## 2017-12-01 RX ADMIN — LEVETIRACETAM 250 MG: 500 SOLUTION ORAL at 09:12

## 2017-12-01 RX ADMIN — ACETAMINOPHEN 650 MG: 325 TABLET ORAL at 06:12

## 2017-12-01 RX ADMIN — GABAPENTIN 100 MG: 100 CAPSULE ORAL at 09:12

## 2017-12-01 NOTE — PLAN OF CARE
Problem: Patient Care Overview  Goal: Individualization & Mutuality  Without falls or injuries; pt AAOx4; without resp distress on Bi-pap; remains NPO;

## 2017-12-01 NOTE — CONSULTS
Dictation #1  MRN:[id number]  CSN:[id number]  PE  -infarct/atele/effus  Chronic hypoxia  -home O2 after CHF  DM  Noncompliance  BETH  -non  Use of cpap    Continue RX  Follow conservative RX

## 2017-12-01 NOTE — PLAN OF CARE
Problem: Patient Care Overview  Goal: Plan of Care Review  Outcome: Ongoing (interventions implemented as appropriate)  Recommendations  Recommendation/Intervention: 1.) When appropriate recommend 2000 Diabetic Renal diet   Goals: 1.) diet will advance within 96 hrs. 2.) patient will meet >80% of EEN while admitted  Nutrition Goal Status: new  Communication of RD Recs: reviewed with RN

## 2017-12-01 NOTE — PROGRESS NOTES
"Progress Note  Infectious Disease    Follow-up For:  Left leg cellulitis    SUBJECTIVE:   ROS:  After dialysis, she developed nausea, then had apnea. Monitor had 6 beats of VT but unsure of the timing of both. Moved to ICU. Has bipap on.    finds a correllation between these "spells" and her hearing that she is going to have surgery (left achilles). He reports that she has had syncope on 2 occasions at home when she took her oxygen off.   She denies headache, change in vision, persistent nausea, abd pain, cough, sputum production.     Antibiotics     Start     Stop Route Frequency Ordered    11/30/17 1815  ceFAZolin (ANCEF) 1 g in dextrose 5 % 50 mL IVPB      -- IV Every 12 hours (non-standard times) 11/30/17 1710          Pertinent Medications noted:    EXAM & DIAGNOSTICS REVIEWED:   Vitals:     Temp:  [96.3 °F (35.7 °C)-98.6 °F (37 °C)]   Temp: 97.3 °F (36.3 °C) (11/30/17 1635)  Pulse: 62 (11/30/17 1635)  Resp: 20 (11/30/17 1635)  BP: 127/75 (11/30/17 1635)  SpO2: (!) 94 % (11/30/17 1204)    Intake/Output Summary (Last 24 hours) at 11/30/17 1833  Last data filed at 11/30/17 1635   Gross per 24 hour   Intake              600 ml   Output             3400 ml   Net            -2800 ml       General:  In NAD. Looks alert, non toxic. Ability to give history is impaired  Eyes:  Anicteric, PERRL, EOMI  ENT:  bipap in place  Neck:  Trachea midline, supple, no adenopathy appreciated  Lungs:  Decreased BS on right  Heart:  RRR,65, no gallop/murmur noted  Abd:  soft, NT, ND, normal BS, no masses/organomegaly appreciated.  :  Chi   Musc:  Joints without effusion synovitis,   Skin:  Generally warm, dry, normal for color. No rashes  Wound: Left achilles wound is no change from 11/28  Neuro: AAOx3, speech clear, moves all extrems equally  Extrem: Left leg erythema is increased from 11/28. This could be from dependent congestion or worsening cellulitis(favor former)  VAD:  trialysis catheter " RIJ    Lines/Tubes/Drains:    General Labs reviewed:    Recent Labs  Lab 11/30/17  0521   WBC 9.30   RBC 3.48*   HGB 8.8*   HCT 27.2*      MCV 78*   MCH 25.1*   MCHC 32.2       Recent Labs  Lab 11/30/17  0521   CALCIUM 8.6*   *   K 4.5   CO2 21*      BUN 26*   CREATININE 4.2*       Micro:MSSA from achilles wound culture from 11/26    Imaging Reviewed:  CXR with large effusion right    ASSESSMENT & PLAN       Acute on chronic congestive heart failure    Essential hypertension    CKD (chronic kidney disease) stage 3, GFR 30-59 ml/min    Type 2 diabetes mellitus with stage 3 chronic kidney disease, without long-term current use of insulin    Coronary artery disease involving native coronary artery of native heart without angina pectoris    Acquired hypothyroidism    Pleural effusion    Hyperkalemia    Diabetic leg ulcer, Staph aureus infection    Traumatic subarachnoid hemorrhage    Segmental and subsegmental pulmonary emboli of RLL without acute cor pulmonale    Acute renal failure superimposed on stage 3 chronic kidney disease    Anemia in stage 3 chronic kidney disease    Subarachnoid hemorrhage following injury, no loss of consciousness         Pulmonary embolus   Apnea after VT(unsure how close the 2 events were)  Left posterior leg is either more congested and edematous or more cellulitic    Recommendation: Continue ancef. Will add back vanc    D/w with

## 2017-12-01 NOTE — PROGRESS NOTES
Spoke with Dr. Devine regarding whether surgery would take place after Apneic event that happened last evening. Dr. Devine stated surgery should be postponed due to pt's complexity of her condition and would like pulmonary to evaluate her prior to surgery. Carlos RN is surgery is aware and will notify Dr. Trotter of the cancellation. Laxmi HUGO, patient's nurse made aware

## 2017-12-01 NOTE — PROGRESS NOTES
[facility]  Adult Nutrition  Progress Note    SUMMARY     Recommendations  Recommendation/Intervention: 1.) When appropriate recommend 2000 Diabetic Renal diet   Goals: 1.) diet will advance within 96 hrs. 2.) patient will meet >80% of EEN while admitted  Nutrition Goal Status: new  Communication of KAM Recs: reviewed with RN    1. Pulmonary embolus    2. Acute renal failure superimposed on stage 3 chronic kidney disease, unspecified acute renal failure type    3. CKD (chronic kidney disease) stage 3, GFR 30-59 ml/min    4. Coronary artery disease involving native coronary artery of native heart without angina pectoris    5. Subarachnoid hemorrhage following injury, no loss of consciousness, subsequent encounter    6. Syncope, near    7. Hypoxia    8. Other pulmonary embolism without acute cor pulmonale, unspecified chronicity      Past Medical History:   Diagnosis Date    Anemia in stage 3 chronic kidney disease 11/26/2017    CHF (congestive heart failure)     COPD (chronic obstructive pulmonary disease)     Coronary artery disease     Diabetes mellitus     Encounter for blood transfusion     Essential hypertension 6/24/2017    Hypertension     MI (myocardial infarction) 2010    Segmental and subsegmental pulmonary emboli of RLL without acute cor pulmonale 11/25/2017    Stroke     July 2005    Thyroid disease     Traumatic subarachnoid hemorrhage 11/24/2017    Type 2 diabetes mellitus with stage 3 chronic kidney disease, without long-term current use of insulin 6/24/2017       Reason for Assessment  Reason for Assessment: identified at risk by screening criteria  Interdisciplinary Rounds: attended  General Information Comments: Admits with diabetic left foot ulcer. Transferred to ICU after rapid response. MC, on CKD III, dialysis dependent per nephrology.  Remains NPO this AM. on Bipap.       Nutrition Prescription Ordered  Current Diet Order: NPO      Evaluation of Received  "Nutrients/Fluid Intake  Oral Fluid (mL): 150  Other Fluid (mL): 600 (HD)   % Intake of Estimated Energy Needs: 0 - 25 %  % Meal Intake: NPO     Nutrition Risk Screen  Nutrition Risk Screen: large or nonhealing wound, burn or pressure ulcer    Nutrition/Diet History  Food Preferences: no cultural or religius food preferences. NKFA.     Labs/Tests/Procedures/Meds  Diagnostic Test/Procedure Review: reviewed, pertinent  Pertinent Labs Reviewed: reviewed, pertinent  BMP  Lab Results   Component Value Date     (L) 12/01/2017    K 4.4 12/01/2017    CL 99 12/01/2017    CO2 23 12/01/2017    BUN 23 (H) 12/01/2017    CREATININE 4.1 (H) 12/01/2017    CALCIUM 8.4 (L) 12/01/2017    ANIONGAP 10 12/01/2017    ESTGFRAFRICA 13 (A) 12/01/2017    EGFRNONAA 12 (A) 12/01/2017     Lab Results   Component Value Date    ALBUMIN 2.0 (L) 12/01/2017     Lab Results   Component Value Date    CALCIUM 8.4 (L) 12/01/2017    PHOS 3.6 12/01/2017       Recent Labs  Lab 12/01/17  1151   POCTGLUCOSE 167*       Pertinent Medications Reviewed: reviewed  Scheduled Meds:   carvedilol  12.5 mg Per NG tube BID    ceFAZolin (ANCEF) IVPB  1 g Intravenous Q12H    collagenase   Topical Daily    [START ON 12/2/2017] epoetin donita (PROCRIT) injection  5,000 Units Intravenous Every Tues, Thurs, Sat    gabapentin  100 mg Oral TID    heparin (porcine)  5,000 Units Subcutaneous Q12H    insulin aspart  1-10 Units Subcutaneous TIDWM    levothyroxine  50 mcg Per NG tube Before breakfast    rosuvastatin  10 mg Per NG tube Daily    sodium chloride 0.9%  3 mL Intravenous Q8H         Physical Findings  Overall Physical Appearance: on oxygen therapy, obese  Oral/Mouth Cavity: tooth/teeth missing  Skin: non-healing wound(s) (Donny score 14)    Anthropometrics  Temp: 98.1 °F (36.7 °C)  Height: 5' 6"  Weight Method: Bed Scale  Weight: 121 kg (266 lb 12.1 oz)  Ideal Body Weight (IBW), Female: 130 lb  % Ideal Body Weight, Female (lb): 205.2 lb  BMI (Calculated): " 43.1  BMI Grade: greater than 40 - morbid obesity      Estimated/Assessed Needs  Weight Used For Calorie Calculations: 82 kg (180 lb 12.4 oz) (Nhanes II SBW)   30 kcal/kg (kcal): 2460 and 35 kcal/kg (kcal): 2870 - HD  RMR (Prowers-St. Jeor Equation): 1431.75  Weight Used For Protein Calculations: 82 kg (180 lb 12.4 oz) (Nhanes II SBW)  1.2 gm Protein (gm): 98.61 and 1.5 gm Protein (gm): 123.26 - HD  Fluid Need Method: RDA Method (or per MD)   Grams of CHO per day: 270-315 g       Assessment and Plan    Obesity    Related to (etiology):   Excessive energy intake    Signs and Symptoms (as evidenced by):   BMI >40    Interventions/Recommendations (treatment strategy):  Advance to 2000 diabetic renal diet     Nutrition Diagnosis Status:   New              Monitor and Evaluation  Food and Nutrient Intake: energy intake, food and beverage intake  Food and Nutrient Adminstration: diet order  Anthropometric Measurements: weight, body mass index, weight change  Biochemical Data, Medical Tests and Procedures: electrolyte and renal panel, glucose/endocrine profile, lipid profile  Nutrition-Focused Physical Findings: overall appearance    Nutrition Risk  Level of Risk:  (x2 weekly)    Nutrition Follow-Up  RD Follow-up?: Yes     Discharge Planning: unable to determine at this time.

## 2017-12-01 NOTE — PROGRESS NOTES
Pt AAO; without c/o of pain, SOB or chest pains; po meds given with jello and tolerated well; tolerated on N/C well; replaced on bi-PAP with same settings and glory well; old brusing noted about face and body; very low urine output, cloudy; pt states she is sleeping better with Bi-pap in place; pt to be NPO after MN

## 2017-12-01 NOTE — PLAN OF CARE
12/01/17 0708   Patient Assessment/Suction   Level of Consciousness (AVPU) alert   All Lung Fields Breath Sounds clear;diminished   PRE-TX-O2-ETCO2   O2 Device (Oxygen Therapy) BiPAP   $ Is the patient on Low Flow Oxygen? Yes   SpO2 98 %   Pulse Oximetry Type Continuous   $ Pulse Oximetry - Multiple Charge Pulse Oximetry - Multiple   Pulse 69   Resp (!) 25   Aerosol Therapy   $ Aerosol Therapy Charges PRN treatment not required   Preset CPAP/BiPAP Settings   Mode Of Delivery BiPAP   $ CPAP/BiPAP Daily Charge BiPAP/CPAP Daily   $ Is patient using? Yes   Equipment Type V60   Airway Device Type medium full face mask   Ipap 10   EPAP (cm H2O) 5   Pressure Support (cm H2O) 5   Set Rate (Breaths/Min) 6   Flow Rate (L/Min) 1   ITime (sec) 1   Patient CPAP/BiPAP Settings   Timed Inspiration (Sec) 1   IPAP Rise Time (sec) 3   RR Total (Breaths/Min) 28   Tidal Volume (mL) 259   VE Minute Ventilation (L/min) 9.3 L/min   Peak Inspiratory Pressure (cm H2O) 13   TiTOT (%) 36   Total Leak (L/Min) 14   Patient Trigger - ST Mode Only (%) 100   CPAP/BiPAP Alarms   High Pressure (cm H2O) 25   Low Pressure (cm H2O) 5   Low Pressure Delay (Sec) 20   Minute Ventilation (L/Min) 2   High RR (breaths/min) 40   Low RR (breaths/min) 5

## 2017-12-01 NOTE — SIGNIFICANT EVENT
11/30/17 1756   Patient Assessment/Suction   Level of Consciousness (AVPU) alert   PRE-TX-O2-ETCO2   O2 Device (Oxygen Therapy) BiPAP   Oxygen Concentration (%) 40   Ready to Wean/Extubation Screen   FIO2<=50 (chart decimal) 0.4   Preset CPAP/BiPAP Settings   Mode Of Delivery BiPAP   $ CPAP/BiPAP Daily Charge BiPAP/CPAP Daily   $ Initial CPAP/BiPAP Setup? No   $ Is patient using? Yes   Equipment Type V60   Airway Device Type medium full face mask   Ipap 10   EPAP (cm H2O) 5   Pressure Support (cm H2O) 5   Set Rate (Breaths/Min) 6   Flow Rate (L/Min) 1   ITime (sec) 1   Rise Time (sec) 3   Labs   $ Was an ABG obtained? Arterial Puncture;ISTAT - Blood gas   $ Labs Tech Time 15 min   Critical Value Communication   Notified Physician/Designee    Date Result Received 11/30/17   Time Result Received 1756   Resulting Department of Critical Value resp   Who communicated critical value from resulting department? Haley   Critical Test #1 pH   Critical Test #1 Result 7.33   Critical Test #2 POC2   Critical Test #2 Result 50.5   Critical Test #3  PO2   Critical Test #3 Result 130   Date Notified 11/30/17   Time Notified 1756   Read Back Verification Yes   Physician Directive moved to ICU   Transport Patient   $ Transport Tech Time Charge 30 min   Time to transport 1759   Oxygen Method Mask   Transport to ICU   Toleration Good   Ambu and mask at bedside Yes

## 2017-12-01 NOTE — PROGRESS NOTES
INPATIENT NEPHROLOGY PROGRESS NOTE  [facility] NEPHROLOGY    Patient Name: Juliane Ramos  Date: 12/01/2017    Reason for consultation: MC     Chief Complaint: Left diabetic foot ulcer    History of Present Illness:  Ms. Ramos is a 56 y/o F with a hx of HTN, DM2, CHF, and stage III CKD who p/w a left heel ulcer/cellulitis x 2 weeks, nonhealing. Hospital course c/b PE, fall with SAH, and MC 2/2 contrast/vancomycin/infection requiring RRT. We have been consulted for MC.    · Interval History/Subjective:    - VSS, on 4L NC/BIPAP  - 100cc UOP  - had HD yesterday, got off 3L  - apneic episode overnight with 6 beat NSVT, RRT called- placed on BIPAP and transferred to ICU  - CXR yesterday: right pleural effusion and alveolar opacity in the right upper and lower lung     · Review of Systems: All 14 systems reviewed and negative, except as noted in HPI    Plan of Care:    Assessment:  Oliguric MC 2/2 multifactorial ATN, dialysis dependent  Hyponatremia  Metabolic acidosis  Anemia    Plan:    1. Acute Kidney Injury- She remains dialysis dependent. She got HD yesterday. Will plan for HD in AM on a TTS schedule.     2. Volume/Blood Pressure- Will plan for 2-3L UF with HD tomorrow. CXR reviewed- unclear if there is PNA in addition to fluid. She is already on antbx for diabetic wound infection so should be covered. If no improvement with UF, may need R thoracentesis. Agree with BIPAP given apneic episode.      3. Electrolytes/Acid Base- HypoNa is stable. Acidosis has resolved with dialysis.     4. Anemia of CKD- Hb is stable. Ordered EPO 5K with HD.     Thank you for allowing us to participate in this patient's care. We will continue to follow.    Medications:  No current facility-administered medications on file prior to encounter.      Current Outpatient Prescriptions on File Prior to Encounter   Medication Sig Dispense Refill    albuterol (ACCUNEB) 1.25 mg/3 mL Nebu Take 1.25 mg by nebulization every 6 (six) hours as  needed. Rescue      carvedilol (COREG) 12.5 MG tablet Take 1 tablet (12.5 mg total) by mouth 2 (two) times daily. 60 tablet 11    cefepime in dextrose 5 % (MAXIPIME) 1 gram/50 mL PgBk Inject 50 mLs (1 g total) into the vein every 24 hours as needed.      gabapentin (NEURONTIN) 100 MG capsule Take 1 capsule (100 mg total) by mouth 3 (three) times daily. 90 capsule 11    levetiracetam oral soln 500 mg/5 mL (5 mL) Soln 2.5 mLs (250 mg total) by Per NG tube route 2 (two) times daily. 150 mL 11    levothyroxine (SYNTHROID) 50 MCG tablet Take 50 mcg by mouth once daily.        metronidazole (FLAGYL) 500 mg/100 mL IVPB Inject 100 mLs (500 mg total) into the vein every 8 (eight) hours.      rosuvastatin (CRESTOR) 10 MG tablet Take 1 tablet (10 mg total) by mouth once daily. 30 tablet 0    VANCOMYCIN HCL (VANCOMYCIN IN 5 % DEXTROSE) 1.75 gram/500 mL Soln Inject 500 mLs (1,750 mg total) into the vein once daily.       Scheduled Meds:   sodium chloride 0.9%   Intravenous Once    carvedilol  12.5 mg Per NG tube BID    ceFAZolin (ANCEF) IVPB  1 g Intravenous Q12H    collagenase   Topical Daily    gabapentin  100 mg Oral TID    heparin (porcine)  1,000 Units Intravenous Once    heparin (porcine)  5,000 Units Subcutaneous Q12H    insulin aspart  1-10 Units Subcutaneous TIDWM    levothyroxine  50 mcg Per NG tube Before breakfast    rosuvastatin  10 mg Per NG tube Daily    sodium chloride 0.9%  3 mL Intravenous Q8H     Continuous Infusions:   PRN Meds:.sodium chloride 0.9%, acetaminophen, albuterol sulfate, dextrose 50%, glucagon (human recombinant), glucose, glucose, labetalol, levetiracetam oral soln, magnesium oxide, magnesium oxide, ondansetron, potassium phosphate IVPB    Allergies:  Patient has no known allergies.    Vital Signs:  Temp Readings from Last 3 Encounters:   12/01/17 98.1 °F (36.7 °C) (Oral)   11/27/17 97 °F (36.1 °C) (Oral)   11/23/17 97.4 °F (36.3 °C)     Pulse Readings from Last 3 Encounters:    12/01/17 68   11/27/17 67   11/24/17 65     BP Readings from Last 3 Encounters:   12/01/17 122/69   11/27/17 123/67   11/24/17 (!) 108/58     Weight:  Wt Readings from Last 3 Encounters:   12/01/17 121 kg (266 lb 12.1 oz)   11/27/17 117 kg (257 lb 15 oz)   11/22/17 106.6 kg (234 lb 15.8 oz)     Physical Exam:  Constitutional: nad, aao x 3  Heart: rrr, no m/r/g, wwp, trace edema  Lungs: ant ausc coarse, no w/r/r/c, no lb   Abdomen: s/nt/nd, +BS    Results:  Lab Results   Component Value Date     (L) 11/30/2017    K 4.3 11/30/2017     11/30/2017    CO2 19 (L) 11/30/2017    BUN 20 11/30/2017    CREATININE 3.3 (H) 11/30/2017    CALCIUM 8.7 11/30/2017    ANIONGAP 12 11/30/2017    ESTGFRAFRICA 17 (A) 11/30/2017    EGFRNONAA 15 (A) 11/30/2017     Lab Results   Component Value Date    CALCIUM 8.7 11/30/2017    PHOS 3.3 11/27/2017       Recent Labs  Lab 12/01/17  0631   WBC 10.40   RBC 3.52*   HGB 8.9*   HCT 27.4*      MCV 78*   MCH 25.2*   MCHC 32.3     I have personally reviewed pertinent radiological imaging and reports.    Amairani Young MD MPH  [facility]  [address] [place] [address]  [place] [address]  [phone] (p)  [phone] (f)

## 2017-12-01 NOTE — PLAN OF CARE
This note also relates to the following rows which could not be included:  SpO2 - Cannot attach notes to unvalidated device data  Pulse - Cannot attach notes to unvalidated device data  Resp - Cannot attach notes to unvalidated device data       12/01/17 0708   Patient Assessment/Suction   Level of Consciousness (AVPU) alert   All Lung Fields Breath Sounds clear;diminished   PRE-TX-O2-ETCO2   O2 Device (Oxygen Therapy) BiPAP   $ Is the patient on Low Flow Oxygen? Yes   Pulse Oximetry Type Continuous   $ Pulse Oximetry - Multiple Charge Pulse Oximetry - Multiple   Aerosol Therapy   $ Aerosol Therapy Charges PRN treatment not required

## 2017-12-01 NOTE — PROGRESS NOTES
Responded to [employer] . Found pt. Unresponsive with O2 sats 70's and dropping. Ambu bag applied with 15L O2 supplied. BP wnl HR 90's . Good bounding pulse. Pt. Responded with increased O2 sats to 100% after a couple of minutes. EKG obtained. Dr. Og and Dr. Devine at . Pt. Awoke when O2 sat increased. Moved to ICU with VSS. Denies complaints. ABG obtained, placed on bipap. Dr. Palomo reviewed EKG. Plan to consult . Not yet called. Report to Carolyn HUGO and is aware need of consult called.

## 2017-12-01 NOTE — ASSESSMENT & PLAN NOTE
Related to (etiology):   Excessive energy intake    Signs and Symptoms (as evidenced by):   BMI >40    Interventions/Recommendations (treatment strategy):  Advance to 2000 diabetic renal diet     Nutrition Diagnosis Status:   New

## 2017-12-01 NOTE — PROGRESS NOTES
RRT called by the nursing staff. Patient found in the bed cyanotic and unresponsive. VSS and accu-check > 110 mg %. Patient had just returned from HD. Patient's oxygenation improved with 100% NRB. ABG consistent with acute on chronic hypoxic hypercarbic respiratory failure. Patient is not responding and following commands. Placed on BiPAP and transferred to ICU for closer monitoring. Per tele-, patient had a short run of V-tach during the episode. Patient denied any CP or palpitations. Patient's  was updated and answered all the questions. Will request Dr. Mcfadden to evaluate the patient. Labs and CXR obtained. EKG without account changes. Critical care time spent on the evaluation and treatment of severe organ dysfunction, review of pertinent labs and imaging studies, discussions with consulting providers and discussions with patient/family 40 minutes

## 2017-12-01 NOTE — CONSULTS
Date: 12/1/2017   Juliane Ramos [id number] is a 57 y.o. female who has been consulted for vancomycin dosing.    The patient has the following labs:     Creatinine (mg/dl)    WBC Count   Serum creatinine: 4.1 mg/dL High 12/01/17 1103  Estimated creatinine clearance: 20.6 mL/min Lab Results   Component Value Date    WBC 10.40 12/01/2017        Current weight is 121 kg (266 lb 12.1 oz)    Pt is on HD.  Pharmacy will dose by level.  Pharmacy will dose vancomycin  1000 mg will be scheduled at 1700 on postHD days T, Th, Sat.  Target goal is 10-15 mg/dL.   A vancomycin level will be ordered before hemodialysis on 12/2/17 am lab.  Pharmacy will follow Vanc daily.  Vanc post HD doses will be adjusted if needed.      Patient will be followed by pharmacy for changes in renal function, toxicity, and efficacy.    Thank you for allowing us to participate in this patient's care.     Jj Junior, Pharmacist

## 2017-12-01 NOTE — NURSING
Pt alert and talking, left room to call pharmacy and came back in a pt was not talking like she was before and I tried to wake her up, and her eyes were opening, but she wasn't talking. Called Charge nurse. She then called and code bc pt had become unresponsive.

## 2017-12-01 NOTE — PLAN OF CARE
Problem: Patient Care Overview  Goal: Plan of Care Review  Pt remains on and off of bipap this shift. Able to shift self in bed but weakly- using bed to turn pt and hold in position to aid with pulmonary toileting. Pt with generalized weakness- more left sided than right sided. No syncopal episodes this shift. Left posterior calf wound care performed- see flowsheet for assessment.

## 2017-12-01 NOTE — NURSING
Called Dr. Palomo per Dr. Devine orders to let him know that pt has a 7 beat run of V- tach and is now in ICU. He ordered a mag and BMP.

## 2017-12-01 NOTE — CONSULTS
Juliane Ramos [id number] is a 57 y.o. female who has been consulted for vancomycin dosing.    The patient has the following labs:     Date Creatinine (mg/dl)    BUN WBC Count   11/30/2017 Estimated Creatinine Clearance: 25.1 mL/min (based on SCr of 3.3 mg/dL (H)). Lab Results   Component Value Date    BUN 20 11/30/2017     Lab Results   Component Value Date    WBC 9.30 11/30/2017        Current weight is 117.2 kg (258 lb 6.4 oz)    The patient will be started on vancomycin at a dose of 1750 mg x 1 dose for now due to MC (15mg/kg/dose). The vancomycin trough has been ordered for 12/01 at 1930 (24 hours after given dose).  Target trough goal is 10 to 15 mg/dL for cellulitis.    Patient will be followed by pharmacy for changes in renal function, toxicity, and efficacy.   Thank you for allowing us to participate in this patient's care.     Karan Jaimes, JessD

## 2017-12-01 NOTE — PROGRESS NOTES
Progress Note  Hospital Medicine  Patient Name:Juliane Ramos  MRN:  [id number]  Patient Class: IP- Inpatient  Admit Date: 11/27/2017  Length of Stay: 4 days  Expected Discharge Date:   Attending Physician: Reg Devine MD  Primary Care Provider:  JOS Dumont    SUBJECTIVE:     Principal Problem: Left diabetic foot ulcer  Initial history of present illness: 57 year old female with HTN, DM2, HF, CKD has returned from [facility]. Patient was originally admitted on 11-18-17 with left diabetic foot ulcer. She stated that over the past few days it had been associated with worsening pain, drainage, and a fever of up to 102 and for this she came for evaluation.  She stated she was compliant with her medications but only takes her basal insulin when she needs it per sliding scale. Patient was admitted to Hospitalist medicine service. Patient was evaluated by Dr. Wick and Dr. Turner. Patient was being treated with IV antibiotics, wound care provided and was expected to have wound debridement. Patient had an episode of sudden onset of hypoxia and unresponsiveness. Patient vomited Marcelino sandwitch. Patient was expected to be NPO for podiatry procedure. Work up confirmed PE. Patient started on anticoagulation which was held yesterday due to bleeding from IV site. On the morning of 11-24-17, patient while ambulated with her  lost balance and hit left forehead to the wall. Left forehead contusion noted. No LOC or any focal neurological complaints or HA or vision changes. CT head is showed SAH. Patient transferred to [facility] neuro-critical care ICU and was evaluated by neuro-surgeon. SAH deemed stable. Patient needed one round of HD. Wound Cx grew MSSA treated with Maxipine and Vancomycin. Patient is in need for diabetic foot ulcer debridement.    PMH/PSH/SH/FH/Meds: reviewed.    Symptoms/Review of Systems: Patient is looking and feeling better.  No shortness of breath, cough, chest pain or headache, fever or abdominal  pain.     Diet:  Adequate intake.    Activity level: Up with assistance  Pain:  Patient reports no pain.       OBJECTIVE:   Vital Signs (Most Recent):      Temp: 98.1 °F (36.7 °C) (12/01/17 0800)  Pulse: 69 (12/01/17 0730)  Resp: (!) 31 (12/01/17 0730)  BP: 127/65 (12/01/17 0730)  SpO2: 98 % (12/01/17 0730)       Vital Signs Range (Last 24H):  Temp:  [96.3 °F (35.7 °C)-98.2 °F (36.8 °C)]   Pulse:  [62-69]   Resp:  [14-46]   BP: (111-138)/(58-87)   SpO2:  [94 %-100 %]     Weight: 121 kg (266 lb 12.1 oz)  Body mass index is 43.06 kg/m².    Intake/Output Summary (Last 24 hours) at 12/01/17 0958  Last data filed at 12/01/17 0600   Gross per 24 hour   Intake             1350 ml   Output             3400 ml   Net            -2050 ml     Physical Examination:  General appearance: well developed, appears stated age  Head: normocephalic, Left black eye and left forehead ecchymoses  Eyes:  conjunctivae/corneas clear. PERRL.  Nose: Nares normal. Septum midline.  Throat: lips, mucosa, and tongue normal; teeth and gums normal, no throat erythema.  Neck: supple, symmetrical, trachea midline, no JVD and thyroid not enlarged, symmetric, no tenderness/mass/nodules  Lungs:  clear to auscultation bilaterally and normal respiratory effort  Chest wall: no tenderness  Heart: regular rate and rhythm, S1, S2 normal, no murmur, click, rub or gallop  Abdomen: soft, non-tender non-distented; bowel sounds normal; no masses,  no organomegaly  Extremities: no cyanosis, clubbing or edema.   Pulses: 2+ and symmetric  Skin: Skin color, texture, turgor normal. Left Achilis Tendon wound with black eschar and mixed granulation tissue, tendon is exposed possibly. Mild brawny edema of left foot and ankle.  Lymph nodes: Cervical, supraclavicular, and axillary nodes normal.  Neurologic: Normal strength and tone. No focal numbness or weakness. CNII-XII intact.      CBC:    Recent Labs  Lab 11/29/17  0528 11/30/17  0521 12/01/17  0631   WBC 10.60 9.30 10.40    RBC 3.59* 3.48* 3.52*   HGB 8.9* 8.8* 8.9*   HCT 27.8* 27.2* 27.4*    187 185   MCV 78* 78* 78*   MCH 24.8* 25.1* 25.2*   MCHC 32.0 32.2 32.3   BMP    Recent Labs  Lab 11/27/17  0747  11/27/17  1604  11/29/17  0528 11/30/17  0521 11/30/17  1748   *  < > 194*  < > 156* 157* 142*     < > 136  < > 134* 132* 133*   K 4.5  < > 4.3  < > 4.5 4.5 4.3     < > 103  < > 101 101 102   CO2 28  < > 25  < > 23 21* 19*   BUN 10  < > 12  < > 23* 26* 20   CREATININE 1.9*  < > 2.2*  < > 3.8* 4.2* 3.3*   CALCIUM 8.7  < > 8.8  < > 8.5* 8.6* 8.7   MG 2.1  2.1  --  2.1  2.1  --   --   --  2.1   < > = values in this interval not displayed.   Diagnostic Results:  Microbiology Results (last 7 days)     ** No results found for the last 168 hours. **         Left Calcaneum:   1.  No radiographically apparent acute osteomyelitis. Skin irregularity and bandage noted in the posterior aspect of the ankle.  2. Degenerative changes in the ankle, hindfoot and midfoot are noted.     MRI left foot:  1. Retro-Achilles soft tissue wound. No evidence for osteomyelitis.  2. Mild nonspecific Achilles and posterior tibial tenosynovitis.  3. Small tibiotalar joint effusion.     Bilateral leg venous doppler scan: There are some limitations but no definite acute DVT in seen in either lower extremity.     Renal US: Unremarkable appearance of the kidneys.  No hydronephrosis.     CT head:  1. Abnormal study.  There is a left forehead and frontal scalp hematoma without underlying fracture.  Additionally, there is bilateral posttraumatic subarachnoid blood with hyperdense blood seen in right parietal sulci as well as in the left sylvian fissure and adjacent left frontal and parietal sulci.  There is no acute parenchymal hemorrhage.  2.  Remote right frontal lobe infarction with ex vacuo dilatation of the right frontal horn.  There is no obvious acute infarction.  3.  Atherosclerosis.      CT head:  1. There is no definite new  abnormality.  There has been some resolution in redistribution of previously demonstrated posttraumatic subarachnoid blood.  This blood is now most apparent in the right parietal sulcus.  There is no parenchymal hemorrhage.  2.  There is no obvious acute infarction.  This would however be better evaluated with MRI.  3.  There are chronic infarctions in the superior right basal ganglia and corona radiata with ex vacuo dilatation of the right lateral ventricle.  There is also a chronic infarct in the posterior right cerebellum.  In retrospect, this was present on prior studies.  4.  Interval improvement in left forehead and inferior frontal scalp contusion.    CXR: Unchanged right lung airspace disease and pleural effusion. Component of CHF likely. Appropriate positioning of right IJ catheter.    Assessment/Plan:      Acute post-traumatic sub-arachnoid hemorrhage  Fall precaution.  Continue PT and OT.           * Diabetic leg ulcer - grade 3 , left posterior ankle     - continue IV Ancef + Vancomycin as per Dr. Turner.  - Dr. Wick following, getting Sanyl application. Surgical debridement soon.  - Follow ID recommendations.  - wound care.          Acute RLL pulmonary embolism  Future therapeutic dose to be decided after outpatient neurosurgery followup.     Acute on chronic hypoxic hypercarbic respiratory failure.  Supplemental O2 via nasal canula; titrate O2 saturation to >92%.   Continue beta 2 agonist bronchodilator treatments.   Use BiPAP during sleep. Needs Polysomnogram upon DC.    Possible seizure  Appreciated Dr. Gil's assistance, continue PO Keppra.  Continue neuro-checks, fall and seizure precautions.       MC - now on HD  Follow nephrology recommendations. Await renal recovery.      Hypothyroid     - continue synthroid          Controlled type 2 diabetes mellitus with stage 3 chronic kidney disease     Check blood glucose level q AC/HS.  Use Novolog Insulin Sliding Scale as needed.   Continue  American Diabetic Association 1800 Kcal diet          CKD (chronic kidney disease) stage 3, GFR 30-59 ml/min     - avoid nephrotoxins and renally dose meds          Essential hypertension     - continue home meds and will titrate as needed      Discussed with patient's  and multiple family members. Also discussed with Dr. Barney. Patient not cleared for anesthesia purpose yet. Time spent in care of the patient, counseling and coordination of care (Greater than 50% spent in direct face to face contact): 35 min.     DVT Prophylaxis: Heparin 5K SQ q 12 hrs.    Reg Devine MD  Department of Hospital Medicine   [facility]

## 2017-12-02 PROBLEM — J96.01 ACUTE RESPIRATORY FAILURE WITH HYPOXIA AND HYPERCAPNIA: Status: ACTIVE | Noted: 2017-12-02

## 2017-12-02 PROBLEM — J96.02 ACUTE RESPIRATORY FAILURE WITH HYPOXIA AND HYPERCAPNIA: Status: ACTIVE | Noted: 2017-12-02

## 2017-12-02 LAB
ALBUMIN SERPL BCP-MCNC: 2.1 G/DL
ALP SERPL-CCNC: 70 U/L
ALT SERPL W/O P-5'-P-CCNC: <5 U/L
ANION GAP SERPL CALC-SCNC: 10 MMOL/L
ANION GAP SERPL CALC-SCNC: 10 MMOL/L
AST SERPL-CCNC: 7 U/L
BASOPHILS # BLD AUTO: 0 K/UL
BASOPHILS # BLD AUTO: 0 K/UL
BASOPHILS NFR BLD: 0.1 %
BASOPHILS NFR BLD: 0.1 %
BILIRUB SERPL-MCNC: 0.5 MG/DL
BUN SERPL-MCNC: 27 MG/DL
BUN SERPL-MCNC: 27 MG/DL
CALCIUM SERPL-MCNC: 8.6 MG/DL
CALCIUM SERPL-MCNC: 8.6 MG/DL
CHLORIDE SERPL-SCNC: 98 MMOL/L
CHLORIDE SERPL-SCNC: 98 MMOL/L
CO2 SERPL-SCNC: 24 MMOL/L
CO2 SERPL-SCNC: 24 MMOL/L
CREAT SERPL-MCNC: 4.6 MG/DL
CREAT SERPL-MCNC: 4.6 MG/DL
DIFFERENTIAL METHOD: ABNORMAL
DIFFERENTIAL METHOD: ABNORMAL
EOSINOPHIL # BLD AUTO: 0.3 K/UL
EOSINOPHIL # BLD AUTO: 0.3 K/UL
EOSINOPHIL NFR BLD: 2.5 %
EOSINOPHIL NFR BLD: 2.5 %
ERYTHROCYTE [DISTWIDTH] IN BLOOD BY AUTOMATED COUNT: 20.5 %
ERYTHROCYTE [DISTWIDTH] IN BLOOD BY AUTOMATED COUNT: 20.5 %
EST. GFR  (AFRICAN AMERICAN): 12 ML/MIN/1.73 M^2
EST. GFR  (AFRICAN AMERICAN): 12 ML/MIN/1.73 M^2
EST. GFR  (NON AFRICAN AMERICAN): 10 ML/MIN/1.73 M^2
EST. GFR  (NON AFRICAN AMERICAN): 10 ML/MIN/1.73 M^2
GLUCOSE SERPL-MCNC: 162 MG/DL
GLUCOSE SERPL-MCNC: 162 MG/DL
HCT VFR BLD AUTO: 27.3 %
HCT VFR BLD AUTO: 27.3 %
HGB BLD-MCNC: 8.8 G/DL
HGB BLD-MCNC: 8.8 G/DL
LYMPHOCYTES # BLD AUTO: 0.8 K/UL
LYMPHOCYTES # BLD AUTO: 0.8 K/UL
LYMPHOCYTES NFR BLD: 7.8 %
LYMPHOCYTES NFR BLD: 7.8 %
MAGNESIUM SERPL-MCNC: 2 MG/DL
MCH RBC QN AUTO: 25 PG
MCH RBC QN AUTO: 25 PG
MCHC RBC AUTO-ENTMCNC: 32.3 G/DL
MCHC RBC AUTO-ENTMCNC: 32.3 G/DL
MCV RBC AUTO: 77 FL
MCV RBC AUTO: 77 FL
MONOCYTES # BLD AUTO: 1.2 K/UL
MONOCYTES # BLD AUTO: 1.2 K/UL
MONOCYTES NFR BLD: 11.3 %
MONOCYTES NFR BLD: 11.3 %
NEUTROPHILS # BLD AUTO: 8.3 K/UL
NEUTROPHILS # BLD AUTO: 8.3 K/UL
NEUTROPHILS NFR BLD: 78.3 %
NEUTROPHILS NFR BLD: 78.3 %
PHOSPHATE SERPL-MCNC: 3.7 MG/DL
PLATELET # BLD AUTO: 212 K/UL
PLATELET # BLD AUTO: 212 K/UL
PMV BLD AUTO: 8.6 FL
PMV BLD AUTO: 8.6 FL
POCT GLUCOSE: 151 MG/DL (ref 70–110)
POCT GLUCOSE: 173 MG/DL (ref 70–110)
POCT GLUCOSE: 179 MG/DL (ref 70–110)
POCT GLUCOSE: 197 MG/DL (ref 70–110)
POTASSIUM SERPL-SCNC: 4.4 MMOL/L
POTASSIUM SERPL-SCNC: 4.4 MMOL/L
PROT SERPL-MCNC: 6.2 G/DL
RBC # BLD AUTO: 3.53 M/UL
RBC # BLD AUTO: 3.53 M/UL
SODIUM SERPL-SCNC: 132 MMOL/L
SODIUM SERPL-SCNC: 132 MMOL/L
VANCOMYCIN SERPL-MCNC: 32.3 UG/ML
WBC # BLD AUTO: 10.6 K/UL
WBC # BLD AUTO: 10.6 K/UL

## 2017-12-02 PROCEDURE — 83735 ASSAY OF MAGNESIUM: CPT

## 2017-12-02 PROCEDURE — 27000221 HC OXYGEN, UP TO 24 HOURS

## 2017-12-02 PROCEDURE — 20000000 HC ICU ROOM

## 2017-12-02 PROCEDURE — 90935 HEMODIALYSIS ONE EVALUATION: CPT

## 2017-12-02 PROCEDURE — A4216 STERILE WATER/SALINE, 10 ML: HCPCS | Performed by: NURSE PRACTITIONER

## 2017-12-02 PROCEDURE — 63600175 PHARM REV CODE 636 W HCPCS: Performed by: INTERNAL MEDICINE

## 2017-12-02 PROCEDURE — 94660 CPAP INITIATION&MGMT: CPT

## 2017-12-02 PROCEDURE — 25000003 PHARM REV CODE 250: Performed by: NURSE PRACTITIONER

## 2017-12-02 PROCEDURE — 80053 COMPREHEN METABOLIC PANEL: CPT

## 2017-12-02 PROCEDURE — 80202 ASSAY OF VANCOMYCIN: CPT

## 2017-12-02 PROCEDURE — 36415 COLL VENOUS BLD VENIPUNCTURE: CPT

## 2017-12-02 PROCEDURE — 99900035 HC TECH TIME PER 15 MIN (STAT)

## 2017-12-02 PROCEDURE — 94761 N-INVAS EAR/PLS OXIMETRY MLT: CPT

## 2017-12-02 PROCEDURE — 25000003 PHARM REV CODE 250: Performed by: INTERNAL MEDICINE

## 2017-12-02 PROCEDURE — 84100 ASSAY OF PHOSPHORUS: CPT

## 2017-12-02 PROCEDURE — 85025 COMPLETE CBC W/AUTO DIFF WBC: CPT

## 2017-12-02 RX ORDER — CEFAZOLIN SODIUM 1 G/50ML
1 SOLUTION INTRAVENOUS
Status: DISCONTINUED | OUTPATIENT
Start: 2017-12-02 | End: 2017-12-02

## 2017-12-02 RX ORDER — SIMETHICONE 80 MG
1 TABLET,CHEWABLE ORAL 3 TIMES DAILY PRN
Status: DISCONTINUED | OUTPATIENT
Start: 2017-12-02 | End: 2017-12-11 | Stop reason: HOSPADM

## 2017-12-02 RX ADMIN — INSULIN ASPART 2 UNITS: 100 INJECTION, SOLUTION INTRAVENOUS; SUBCUTANEOUS at 05:12

## 2017-12-02 RX ADMIN — SODIUM CHLORIDE, PRESERVATIVE FREE 3 ML: 5 INJECTION INTRAVENOUS at 09:12

## 2017-12-02 RX ADMIN — GABAPENTIN 100 MG: 100 CAPSULE ORAL at 06:12

## 2017-12-02 RX ADMIN — INSULIN ASPART 2 UNITS: 100 INJECTION, SOLUTION INTRAVENOUS; SUBCUTANEOUS at 02:12

## 2017-12-02 RX ADMIN — CEFAZOLIN 1 G: 1 INJECTION, POWDER, FOR SOLUTION INTRAMUSCULAR; INTRAVENOUS at 06:12

## 2017-12-02 RX ADMIN — COLLAGENASE SANTYL: 250 OINTMENT TOPICAL at 02:12

## 2017-12-02 RX ADMIN — GABAPENTIN 100 MG: 100 CAPSULE ORAL at 02:12

## 2017-12-02 RX ADMIN — SODIUM CHLORIDE, PRESERVATIVE FREE 3 ML: 5 INJECTION INTRAVENOUS at 06:12

## 2017-12-02 RX ADMIN — GABAPENTIN 100 MG: 100 CAPSULE ORAL at 09:12

## 2017-12-02 RX ADMIN — CARVEDILOL 12.5 MG: 6.25 TABLET, FILM COATED ORAL at 10:12

## 2017-12-02 RX ADMIN — INSULIN ASPART 2 UNITS: 100 INJECTION, SOLUTION INTRAVENOUS; SUBCUTANEOUS at 06:12

## 2017-12-02 RX ADMIN — LEVOTHYROXINE SODIUM 50 MCG: 50 TABLET ORAL at 06:12

## 2017-12-02 RX ADMIN — HEPARIN SODIUM 5000 UNITS: 5000 INJECTION, SOLUTION INTRAVENOUS; SUBCUTANEOUS at 10:12

## 2017-12-02 RX ADMIN — ROSUVASTATIN CALCIUM 10 MG: 5 TABLET ORAL at 10:12

## 2017-12-02 RX ADMIN — EPOETIN ALFA 5000 UNITS: 20000 SOLUTION INTRAVENOUS; SUBCUTANEOUS at 11:12

## 2017-12-02 RX ADMIN — HEPARIN SODIUM 5000 UNITS: 5000 INJECTION, SOLUTION INTRAVENOUS; SUBCUTANEOUS at 09:12

## 2017-12-02 RX ADMIN — SODIUM CHLORIDE, PRESERVATIVE FREE 3 ML: 5 INJECTION INTRAVENOUS at 02:12

## 2017-12-02 RX ADMIN — CARVEDILOL 12.5 MG: 6.25 TABLET, FILM COATED ORAL at 09:12

## 2017-12-02 NOTE — PROGRESS NOTES
"  Progress Note  [facility] Medicine    Admit Date: 11/27/2017    SUBJECTIVE:     Follow-up For:  Acute respiratory failure with hypoxia and hypercapnia      Interval history (See H&P for complete P,F,SHx) : Patient stable. Using BIPAP PRN. Case discussed with  at length. Wound care done today.    Review of Systems: Systems were reviewed and otherwise negative unless indicated below.  Gen- Weakness/ Fatigue  Neuro- Confusion  Resp: Cough/ SOB  Extrem- Pain/Swelling        OBJECTIVE:     Vital Signs Range (Last 24H):  Temp:  [97 °F (36.1 °C)-98.1 °F (36.7 °C)]   Pulse:  [64-90]   Resp:  [16-33]   BP: (114-160)/(61-90)   SpO2:  [96 %-100 %]     I & O (Last 24H):    Intake/Output Summary (Last 24 hours) at 12/02/17 2331  Last data filed at 12/02/17 1823   Gross per 24 hour   Intake             1020 ml   Output             3700 ml   Net            -2680 ml       Estimated body mass index is 43.06 kg/m² as calculated from the following:    Height as of this encounter: 5' 6" (1.676 m).    Weight as of this encounter: 121 kg (266 lb 12.1 oz).    Physical Exam:  General- Patient alert and oriented x3 in NAD + obese  HEENT- PERRLA, EOMI, OP clear, MMM + ecchymoses in face  Neck- No JVD, Lymphadenopathy, Thyromegaly  CV- Regular rate and rhythm, No Murmur/john/rubs  Resp- Lungs decreased BS bilaterally.  GI- Non tender/non-distended, BS normoactive x4 quads, no HSM  Extrem- No cyanosis, clubbing, edema. Pulses 2+ and symmetric  Neuro- Strength 5/5 flexors/extensors, DTRs 2+ and symmetric, Intact sensation to light touch grossly  Skin-  + foot lesion with ulcer    Laboratory/Diagnostic Data:  Reviewed and noted in plan where applicable- Please see chart for full lab data.    Medications:  Medication list was reviewed and changes noted under Assessment/Plan.    ASSESSMENT/PLAN:     Active Problems:    Active Hospital Problems    Diagnosis  POA    *Acute respiratory failure with hypoxia and hypercapnia " [J96.01, J96.02]-  Multiple etiologies; Renal failure with volume overload, Obesity hypoventilation and pulmonary embolus. Supplement with BIPAP, supplemental oxygen. Monitor closely.  Yes    Obesity [E66.9]- Body mass index is 43.06 kg/m². Morbid obesity complicates all aspects of disease management from diagnostic modalities to treatment. Weight loss encouraged and health benefits explained to patient.  Yes    Subarachnoid hemorrhage following injury, no loss of consciousness [S06.6X0A]-  Severe. Monitor neuro status closely.  Yes    Osteomyelitis of foot-  ID consulted. Going to OR in next 1-2 days. Continue ABX per ID reccs.      Pulmonary embolus [I26.99]-  Unable to Treat d/t recent SAH. Will set up for IVC filter.   Yes    Acute renal failure superimposed on stage 3 chronic kidney disease [N17.9, N18.3]-  RRT per nephrology. Renal dose meds.  Yes    Coronary artery disease involving native coronary artery of native heart without angina pectoris [I25.10]- Patient with known CAD s/p stent placement, which is controlled Will continue Statin and monitor for S/Sx of angina/ACS. Continue to monitor on telemetry.     Yes     Chronic    Type 2 diabetes mellitus with stage 3 chronic kidney disease, without long-term current use of insulin [E11.22, N18.3]-   Patient's FSGs are controlled on current hypoglycemics.   Last A1c reviewed- Lab Results   Component Value Date    HGBA1C 8.0 (H) 11/25/2017     Most recent fingerstick glucose reviewed-   Recent Labs  Lab 12/02/17  2227   POCTGLUCOSE 151*     Current correctional scale  Medium  Maintain insulin dose as follows- SSI  Yes     Chronic      Resolved Hospital Problems    Diagnosis Date Resolved POA   No resolved problems to display.         Disposition- Home    VTE Risk Mitigation         Ordered     heparin (porcine) injection 5,000 Units  Every 12 hours     Route:  Subcutaneous        11/29/17 0901     Medium Risk of VTE  Once      11/27/17 2158     [facility]  hose  Until discontinued      11/27/17 2158     Place sequential compression device  Until discontinued      11/27/17 2158          Critical care time spent on the evaluation and treatment of severe organ dysfunction, review of pertinent labs and imaging studies, discussions with consulting providers and discussions with patient/family 37 minutes

## 2017-12-02 NOTE — CONSULTS
Juliane Ramos [id number] is a 57 y.o. female who has been consulted for vancomycin dosing.    The patient has the following labs:     Date Creatinine (mg/dl)    BUN WBC Count   12/2/2017 Estimated Creatinine Clearance: 18.3 mL/min (based on SCr of 4.6 mg/dL (H)). Lab Results   Component Value Date    BUN 27 (H) 12/02/2017    BUN 27 (H) 12/02/2017     Lab Results   Component Value Date    WBC 10.60 12/02/2017    WBC 10.60 12/02/2017        Current weight is 121 kg (266 lb 12.1 oz)  Vanc level 32.3 predialysis.  Pharmacy will not re-dose vancomycin 12/02 . A vancomycin level has been ordered with AM labs on 12/05.       Patient will be followed by pharmacy for changes in renal function, toxicity, and efficacy.  Thank you for allowing us to participate in this patient's care.     Elba Dwyer

## 2017-12-02 NOTE — PROGRESS NOTES
[facility]  Cardiology  Progress Note    Patient Name: Juliane Ramos  MRN: [id number]  Admission Date: 11/27/2017  Hospital Length of Stay: 5 days  Code Status: Full Code   Attending Physician: Reg Devine MD   Primary Care Physician: JOS Dumont  Expected Discharge Date:   Principal Problem:<principal problem not specified>    Subjective:     Hospital Course:sp syncope    Interval History: sp sah  awakw alert and eating   No complaings    ROS  Objective:     Vital Signs (Most Recent):  Temp: 97 °F (36.1 °C) (12/02/17 1345)  Pulse: 85 (12/02/17 1400)  Resp: 20 (12/02/17 1400)  BP: (!) 159/83 (12/02/17 1400)  SpO2: 98 % (12/02/17 1400) Vital Signs (24h Range):  Temp:  [97 °F (36.1 °C)-98.1 °F (36.7 °C)] 97 °F (36.1 °C)  Pulse:  [64-86] 85  Resp:  [7-33] 20  SpO2:  [96 %-100 %] 98 %  BP: (127-159)/(61-90) 159/83     Weight: 121 kg (266 lb 12.1 oz)  Body mass index is 43.06 kg/m².    SpO2: 98 %  O2 Device (Oxygen Therapy): BiPAP      Intake/Output Summary (Last 24 hours) at 12/02/17 1505  Last data filed at 12/02/17 1345   Gross per 24 hour   Intake             1200 ml   Output             3725 ml   Net            -2525 ml       Lines/Drains/Airways     Central Venous Catheter Line                 Percutaneous Central Line Insertion/Assessment - triple lumen  11/24/17 1758 right internal jugular 7 days          Drain                 Urethral Catheter 11/23/17 1200 9 days                Physical Exam    Significant Labs:   CMP   Recent Labs  Lab 11/30/17  1748 12/01/17  1103 12/02/17  0324   * 132* 132*  132*   K 4.3 4.4 4.4  4.4    99 98  98   CO2 19* 23 24  24   * 145* 162*  162*   BUN 20 23* 27*  27*   CREATININE 3.3* 4.1* 4.6*  4.6*   CALCIUM 8.7 8.4* 8.6*  8.6*   PROT  --   --  6.2   ALBUMIN  --  2.0* 2.1*   BILITOT  --   --  0.5   ALKPHOS  --   --  70   AST  --   --  7*   ALT  --   --  <5*   ANIONGAP 12 10 10  10   ESTGFRAFRICA 17* 13* 12*  12*   EGFRNONAA  15* 12* 10*  10*   , CBC   Recent Labs  Lab 12/01/17  0631 12/02/17  0324   WBC 10.40 10.60  10.60   HGB 8.9* 8.8*  8.8*   HCT 27.4* 27.3*  27.3*    212  212   , INR No results for input(s): INR, PROTIME in the last 48 hours., Lipid Panel No results for input(s): CHOL, HDL, LDLCALC, TRIG, CHOLHDL in the last 48 hours. and Troponin   Recent Labs  Lab 11/30/17  1748 12/01/17  0026   TROPONINI 0.012 0.011  0.011       Significant Imaging: X-Ray: CXR: X-Ray Chest 1 View (CXR): No results found for this visit on 11/27/17.  Assessment and Plan:     Brief HPI:     Active Diagnoses:    Diagnosis Date Noted POA    Obesity [E66.9] 12/01/2017 Yes    Subarachnoid hemorrhage following injury, no loss of consciousness [S06.6X0A] 11/28/2017 Yes    Pulmonary embolus [I26.99] 11/27/2017 Yes    Acute renal failure superimposed on stage 3 chronic kidney disease [N17.9, N18.3] 11/25/2017 Yes    Acquired hypothyroidism [E03.9] 06/24/2017 Yes    CKD (chronic kidney disease) stage 3, GFR 30-59 ml/min [N18.3] 06/24/2017 Yes    Coronary artery disease involving native coronary artery of native heart without angina pectoris [I25.10] 06/24/2017 Yes     Chronic    Type 2 diabetes mellitus with stage 3 chronic kidney disease, without long-term current use of insulin [E11.22, N18.3] 06/24/2017 Yes     Chronic      Problems Resolved During this Admission:    Diagnosis Date Noted Date Resolved POA       VTE Risk Mitigation         Ordered     heparin (porcine) injection 5,000 Units  Every 12 hours     Route:  Subcutaneous        11/29/17 0901     Medium Risk of VTE  Once      11/27/17 2158     Place MARY ELLEN hose  Until discontinued      11/27/17 2158     Place sequential compression device  Until discontinued      11/27/17 2158          Manan Corrigan MD  Cardiology  [facility]

## 2017-12-02 NOTE — PROGRESS NOTES
Progress Note  Pul-ICU    Admit Date: 11/27/2017   LOS: 5 days     SUBJECTIVE:     Follow-up For:      PE  -infarct/atele/effus  Chronic hypoxia  -home O2 after CHF  DM  Noncompliance  BETH  -non  Use of cpap  Subarachnoid hem    Continuous Infusions:   Scheduled Meds:   carvedilol  12.5 mg Per NG tube BID    ceFAZolin (ANCEF) IVPB  1 g Intravenous Q12H    collagenase   Topical Daily    epoetin donita (PROCRIT) injection  5,000 Units Intravenous Every Tues, Thurs, Sat    gabapentin  100 mg Oral TID    heparin (porcine)  5,000 Units Subcutaneous Q12H    insulin aspart  1-10 Units Subcutaneous TIDWM    levothyroxine  50 mcg Per NG tube Before breakfast    rosuvastatin  10 mg Per NG tube Daily    sodium chloride 0.9%  3 mL Intravenous Q8H    [START ON 12/5/2017] vancomycin (VANCOCIN) IVPB  1,000 mg Intravenous Every Tues, Thurs, Sat     PRN Meds:sodium chloride 0.9%, acetaminophen, albuterol sulfate, dextrose 50%, glucagon (human recombinant), glucose, glucose, labetalol, levetiracetam oral soln, magnesium oxide, magnesium oxide, ondansetron, potassium phosphate IVPB    Review of patient's allergies indicates:  No Known Allergies    OBJECTIVE:     Vital Signs (Most Recent)  Temp: 98 °F (36.7 °C) (12/02/17 0400)  Pulse: 66 (12/02/17 0747)  Resp: 17 (12/02/17 0747)  BP: (!) 146/76 (12/02/17 0700)  SpO2: 99 % (12/02/17 0747)    Vital Signs Range (Last 24H):  Temp:  [97.8 °F (36.6 °C)-98.5 °F (36.9 °C)]   Pulse:  [64-75]   Resp:  [7-38]   BP: (112-153)/(59-90)   SpO2:  [96 %-99 %]     I & O (Last 24H):  Intake/Output Summary (Last 24 hours) at 12/02/17 0938  Last data filed at 12/02/17 0604   Gross per 24 hour   Intake              700 ml   Output              225 ml   Net              475 ml     Ventilator Data (Last 24H):     Oxygen Concentration (%):  [30] 30    Hemodynamic Parameters (Last 24H):       Constitutional: no fever or chills  ENT: negative for epistaxis  Respiratory: positive for dyspnea on exertion,  "negative for hemoptysis  Cardiovascular: no chest pain or palpitations  Gastrointestinal: positive for feels bloated, negative for bright red blood per rectum  Genitourinary: negative for hematuria  Hematologic/Lymphatic: negative for bleeding  Allergy/Immunology: no hives  Neurological: no seizures or tremors    Physical Exam:  General: well developed, appears older than stated age, no distress, morbidly obese  Head: normocephalic, bruising of face  Eyes:  negative findings: conjunctivae and sclerae normal  Nose: no discharge, no epistaxis  Throat: moist  Neck: no JVD  Lungs:  clear to auscultation bilaterally and diminished breath sounds bibasilar and RT >Lt  Chest Wall: no tenderness  Heart: no gallop  Abdomen: normal findings: soft, non-tender and abnormal findings:  obese and anaasarca  Extremities: edema 3  Skin: warm and dry  Neurologic: Mental status: Alert, oriented, thought content appropriate    Lines/Drains:       Percutaneous Central Line Insertion/Assessment - triple lumen  11/24/17 1758 right internal jugular (Active)   Dressing biopatch in place;dressing dry and intact 12/2/2017  4:00 AM   Securement catheter securement device utilized;secured w/ sutures 12/1/2017  5:12 PM   Distal Patency/Care normal saline locked 12/1/2017  5:12 PM   Medial Patency/Care normal saline locked 12/1/2017  5:12 PM   Proximal Patency/Care infusing 12/2/2017  4:00 AM   Daily Line Review Performed 11/28/2017  5:38 PM   Number of days: 7            Urethral Catheter 11/23/17 1200 (Active)   Site Assessment Clean;Intact 12/1/2017  9:00 PM   Collection Container Standard drainage bag 12/1/2017  9:00 PM   Securement Method secured to top of thigh w/ adhesive device 12/1/2017  9:00 PM   Catheter Care Performed yes 12/1/2017  9:00 PM   Reason for Continuing Urinary Catheterization Critically ill in ICU requiring intensive monitoring 12/1/2017  9:00 PM   CAUTI Prevention Bundle StatLock in place w 1" slack 12/1/2017  9:00 PM "   Output (mL) 100 mL 12/2/2017  5:30 AM   Number of days: 8       Laboratory (Last 24H):  CBC:    Recent Labs  Lab 12/02/17  0324   WBC 10.60  10.60   HGB 8.8*  8.8*   HCT 27.3*  27.3*     212     CMP:    Recent Labs  Lab 12/02/17  0324   CALCIUM 8.6*  8.6*   ALBUMIN 2.1*   PROT 6.2   *  132*   K 4.4  4.4   CO2 24  24   CL 98  98   BUN 27*  27*   CREATININE 4.6*  4.6*   ALKPHOS 70   ALT <5*   AST 7*   BILITOT 0.5     BMP:   Recent Labs  Lab 12/02/17  0324   *  162*   *  132*   K 4.4  4.4   CL 98  98   CO2 24  24   BUN 27*  27*   CREATININE 4.6*  4.6*   CALCIUM 8.6*  8.6*   MG 2.0     Coagulation: No results for input(s): INR, APTT in the last 168 hours.    Invalid input(s): PT  Cardiac Markers: No results for input(s): CKMB, TROPONINT, MYOGLOBIN in the last 168 hours.  ABGs:   Recent Labs  Lab 11/30/17 2236   PH 7.354   PCO2 44.8   HCO3 25.0   POCSATURATED 98   BE -1     Prealbumin: No results for input(s): PREALBUMIN in the last 168 hours.  Microbiology Results (last 7 days)     ** No results found for the last 168 hours. **        Labs within the past 24 hours have been reviewed.    Chest X-Ray: I personally reviewed the films and findings are:, hazy LT>rt about same    Diagnostic Results:  I personally reviewed the films and findings are:, hazy on rt about same/effusion & atelectasis    ASSESSMENT/PLAN:     Preventive Measures:     Patient Active Problem List    Diagnosis Date Noted    Obesity 12/01/2017    Subarachnoid hemorrhage following injury, no loss of consciousness 11/28/2017    Staph aureus infection 11/28/2017    Pulmonary embolus 11/27/2017    Anemia in stage 3 chronic kidney disease 11/26/2017    Segmental and subsegmental pulmonary emboli of RLL without acute cor pulmonale 11/25/2017    Acute renal failure superimposed on stage 3 chronic kidney disease 11/25/2017    Traumatic subarachnoid hemorrhage 11/24/2017    Diabetic leg ulcer 11/18/2017     Hyperkalemia 06/29/2017    Acute on chronic congestive heart failure 06/24/2017    Essential hypertension 06/24/2017    CKD (chronic kidney disease) stage 3, GFR 30-59 ml/min 06/24/2017    Type 2 diabetes mellitus with stage 3 chronic kidney disease, without long-term current use of insulin 06/24/2017    Coronary artery disease involving native coronary artery of native heart without angina pectoris 06/24/2017    Acquired hypothyroidism 06/24/2017    Pleural effusion 06/24/2017   PE effusion   Atelectasis  infarct      Plan:  Pulmonary: continue Rx consider Aleja,increase activity    Counseling/Consultation:I discussed the case with Dr. Hui and family.    Critical Care Time greater than: 30 Minutes

## 2017-12-02 NOTE — PROGRESS NOTES
INPATIENT NEPHROLOGY PROGRESS NOTE  [facility] NEPHROLOGY    Patient Name: Juliane Ramos  Date: 12/02/2017    Reason for consultation: MC     Chief Complaint: Left diabetic foot ulcer    History of Present Illness:  Ms. Ramos is a 56 y/o F with a hx of HTN, DM2, CHF, and stage III CKD who p/w a left heel ulcer/cellulitis x 2 weeks, nonhealing. Hospital course c/b PE, fall with SAH, and MC 2/2 contrast/vancomycin/infection requiring RRT. We have been consulted for MC.    · Interval History/Subjective:    - -150s  - on BIPAP  - had rough morning breathing wise, now better after dialysis, got off 3L  - UOP 225cc  - no cp, abd pain  - stable edema  - CXR this AM with large R pleural effusion, increasing    · Review of Systems: All 14 systems reviewed and negative, except as noted in HPI    Plan of Care:    Assessment:  Oliguric MC 2/2 multifactorial ATN, dialysis dependent  Hyponatremia  Anemia  Low albumin      Plan:     1. Acute Kidney Injury- She remains dialysis dependent. Continue HD TTS. Vanc trough is high- hold further doses of IV vanc and trend daily level.      2. Volume/Blood Pressure- Did well with 3L UF. If CXR unchanged in AM with respect to R pleural effusion, may need R thoracentesis. Continue NIPPV.     3. Electrolytes/Acid Base- HypoNa is stable, continue current dialysis/UF goals.       4. Anemia of CKD- Hb is stable. Continue EPO 5K with HD.     5. Low albumin- Ordered Nepro.    Thank you for allowing us to participate in this patient's care. We will continue to follow.    Medications:  No current facility-administered medications on file prior to encounter.      Current Outpatient Prescriptions on File Prior to Encounter   Medication Sig Dispense Refill    albuterol (ACCUNEB) 1.25 mg/3 mL Nebu Take 1.25 mg by nebulization every 6 (six) hours as needed. Rescue      carvedilol (COREG) 12.5 MG tablet Take 1 tablet (12.5 mg total) by mouth 2 (two) times daily. 60 tablet 11    cefepime  in dextrose 5 % (MAXIPIME) 1 gram/50 mL PgBk Inject 50 mLs (1 g total) into the vein every 24 hours as needed.      gabapentin (NEURONTIN) 100 MG capsule Take 1 capsule (100 mg total) by mouth 3 (three) times daily. 90 capsule 11    levetiracetam oral soln 500 mg/5 mL (5 mL) Soln 2.5 mLs (250 mg total) by Per NG tube route 2 (two) times daily. 150 mL 11    levothyroxine (SYNTHROID) 50 MCG tablet Take 50 mcg by mouth once daily.        metronidazole (FLAGYL) 500 mg/100 mL IVPB Inject 100 mLs (500 mg total) into the vein every 8 (eight) hours.      rosuvastatin (CRESTOR) 10 MG tablet Take 1 tablet (10 mg total) by mouth once daily. 30 tablet 0    VANCOMYCIN HCL (VANCOMYCIN IN 5 % DEXTROSE) 1.75 gram/500 mL Soln Inject 500 mLs (1,750 mg total) into the vein once daily.       Scheduled Meds:   carvedilol  12.5 mg Per NG tube BID    ceFAZolin (ANCEF) IVPB  1 g Intravenous Q12H    collagenase   Topical Daily    epoetin donita (PROCRIT) injection  5,000 Units Intravenous Every Tues, Thurs, Sat    gabapentin  100 mg Oral TID    heparin (porcine)  5,000 Units Subcutaneous Q12H    insulin aspart  1-10 Units Subcutaneous TIDWM    levothyroxine  50 mcg Per NG tube Before breakfast    rosuvastatin  10 mg Per NG tube Daily    sodium chloride 0.9%  3 mL Intravenous Q8H    [START ON 12/5/2017] vancomycin (VANCOCIN) IVPB  1,000 mg Intravenous Every Tues, Thurs, Sat     Continuous Infusions:   PRN Meds:.sodium chloride 0.9%, acetaminophen, albuterol sulfate, dextrose 50%, glucagon (human recombinant), glucose, glucose, labetalol, levetiracetam oral soln, magnesium oxide, magnesium oxide, ondansetron, potassium phosphate IVPB, simethicone    Allergies:  Patient has no known allergies.    Vital Signs:  Temp Readings from Last 3 Encounters:   12/02/17 97 °F (36.1 °C)   11/27/17 97 °F (36.1 °C) (Oral)   11/23/17 97.4 °F (36.3 °C)     Pulse Readings from Last 3 Encounters:   12/02/17 85   11/27/17 67   11/24/17 65     BP  Readings from Last 3 Encounters:   12/02/17 (!) 159/83   11/27/17 123/67   11/24/17 (!) 108/58     Weight:  Wt Readings from Last 3 Encounters:   12/01/17 121 kg (266 lb 12.1 oz)   11/27/17 117 kg (257 lb 15 oz)   11/22/17 106.6 kg (234 lb 15.8 oz)     Physical Exam:  Constitutional: nad, aao x 3   Heart: rrr, no m/r/g, wwp, + LLE edema  Lungs: ant ausc clear, no w/r/r/c, no lb  Abdomen: s/nt/nd, +BS    Results:  Lab Results   Component Value Date     (L) 12/02/2017     (L) 12/02/2017    K 4.4 12/02/2017    K 4.4 12/02/2017    CL 98 12/02/2017    CL 98 12/02/2017    CO2 24 12/02/2017    CO2 24 12/02/2017    BUN 27 (H) 12/02/2017    BUN 27 (H) 12/02/2017    CREATININE 4.6 (H) 12/02/2017    CREATININE 4.6 (H) 12/02/2017    CALCIUM 8.6 (L) 12/02/2017    CALCIUM 8.6 (L) 12/02/2017    ANIONGAP 10 12/02/2017    ANIONGAP 10 12/02/2017    ESTGFRAFRICA 12 (A) 12/02/2017    ESTGFRAFRICA 12 (A) 12/02/2017    EGFRNONAA 10 (A) 12/02/2017    EGFRNONAA 10 (A) 12/02/2017     Lab Results   Component Value Date    CALCIUM 8.6 (L) 12/02/2017    CALCIUM 8.6 (L) 12/02/2017    PHOS 3.7 12/02/2017       Recent Labs  Lab 12/02/17  0324   WBC 10.60  10.60   RBC 3.53*  3.53*   HGB 8.8*  8.8*   HCT 27.3*  27.3*     212   MCV 77*  77*   MCH 25.0*  25.0*   MCHC 32.3  32.3     I have personally reviewed pertinent radiological imaging and reports.    Amairani Young MD MPH  [facility] [address]  [place] [id number] [phone] (p)  [phone] (f)

## 2017-12-02 NOTE — CONSULTS
DATE OF CONSULTATION:  12/01/2017    The patient of Dr. Devine and Isha Escalante NP.    HISTORY OF PRESENT ILLNESS:  Ms. Ramos is a 57-year-old lady with multiple   comorbidities.  She has been a longstanding diabetic and noncompliant and has   suffered consequences.  She had been admitted this time with a foot ulcer and   fever.  During her hospital course, she had been found to have a pulmonary   embolism and with atelectasis and effusion.  She had also fallen and had a   subdural hematoma and had evaluation at the main [place].  She had acute renal   insufficiency and is now receiving dialysis.  Yesterday after dialysis, she had   an episode of unresponsiveness and cyanosis.  She required supplemental oxygen   and had been placed on a BiPAP and transferred to ICU.  This morning, she is   talkative, alert and awake.  She has not had development of any new hemoptysis,   no putrid sputum or fever.  She is a lady who is a nonsmoker.  She is on   supplemental oxygen at home after having an episode of congestive heart failure.    She remembers having an episode of bronchitis and pneumonia within the last   year.  There is no history of asthma.  No tuberculosis or cigarette consumption.    PAST MEDICAL HISTORY:  Pertinent for diabetes, traumatic subarachnoid   hemorrhage, thyroid disease, stroke, pulmonary embolism, pulmonary infarct,   effusion, atelectasis, myocardial infarction, hypertension, diabetes, congestive   heart failure, chronic anemia, obstructive sleep apnea, not using a CPAP   device, CABG, tubal ligation and foot ulcer.    FAMILY HISTORY:  Pertinent for cancer, diabetes, heart disease, stroke.    SOCIAL HISTORY:  She has never smoked.    CURRENT MEDICATIONS:  Regimen includes carvedilol, Ancef, gabapentin, heparin,   insulin, levothyroxine, rosuvastatin.    REVIEW OF SYSTEMS:  Otherwise negative by 12-point review.    PHYSICAL EXAMINATION:  VITAL SIGNS:  Shows her to have a heart rate 68, blood  pressure 122/69,   saturation 98.  She weighs 121 kilograms.  HEENT:  Her sclerae are nonicteric.  Nares are patent.  Throat was not injected.    She has a Mallampati grade IV.  Trachea is midline.  She has ecchymosis of her   left eye and left face.  CHEST:  Reveals no wheezing or crackles.  There is diminished air exchange of   the right base.  HEART:  Without a gallop.  ABDOMEN:  Soft, obese, no rebound.  EXTREMITIES:  She has anasarca and 2 to 3+ peripheral edema.  Cranial nerves   appear grossly intact.    IMAGING:  Her chest x-ray and CT scan are reviewed.  The right pleural   parenchymal changes are noted and right sided clot is seen on CT scanning.    LABORATORY DATA:  WBC is 10.4, hematocrit is 27.4 and platelets of 185.  CO2 was   19, BUN is 20, creatinine is 3.3, albumin was 2.4.  BNP last done is 1648.    Last blood gases 7.35, pCO2 is 44.8, pO2 is 112 with her hypoxic episode her pH   was 7.29 with a pCO2 of 53.    IMPRESSION:  1.  Diabetes, noncompliant.  2.  Foot ulcer.  3.  Pulmonary embolism.  4.  Atelectasis/pulmonary infarct.  5.  Effusion.  6.  Hypoxia, chronic with home O2 use after congestive heart failure episode.  7.  Prior episode of bronchitis pneumonia.  8.  Renal insufficiency with acute renal failure requiring dialysis.  9.  Myocardial infarction.  10.  History of coronary artery bypass graft.  11.  Subarachnoid hemorrhage.  12.  Obstructive sleep apnea with noncompliance and non-use of CPAP machine.  13.  Malnutrition with hypoalbuminemia.  14.  Chronic anemia.  15.  Acute respiratory failure yesterday requiring BiPAP.    PLAN:  I have discussed her clinical course with family and they understand the   overall poor health of the patient and tenuous survival.    Continue anticoagulants at the present, can use a BiPAP device.  When she is   more stable, she can be reevaluated for home CPAP/BiPAP, but it is unclear that   she will follow this.  She has an old machine at home, which she is  not using.    I discussed her pleural parenchymal changes.  We could do a thoracentesis, but   it would be hazardous with her requiring anticoagulants.  If she will heal by   her own accord that would be preferable than risking bleeding or pneumothorax   complication.      HES/HN  dd: 12/01/2017 10:59:40 (CST)  td: 12/01/2017 23:26:20 (CST)  Doc ID   #[id number]  Job ID #[id number]    CC: Reg ARGUELLO

## 2017-12-02 NOTE — PT/OT/SLP PROGRESS
Speech Language Pathology      Juliane Ramos  MRN: [id number]    Order rec'd to resume tx (suspended due to move up a level of care).  Patient not seen today secondary to C-pap & dialysis  . Will follow-up Monday.    Lillian Mcconnell, ETHAN-SLP/A

## 2017-12-02 NOTE — PROGRESS NOTES
Progress Note  Infectious Disease    Follow-up For:  Left leg cellulitis    SUBJECTIVE:   ROS:  No SOB. Tells me she did eat a bit. No dialysis today. Surgery for achilles postponed again.     Antibiotics     Start     Stop Route Frequency Ordered    12/02/17 1700  vancomycin 1 g in dextrose 5 % 250 mL IVPB (ready to mix system)      -- IV Every Tues, Thurs, Sat 12/01/17 1525    11/30/17 1815  ceFAZolin (ANCEF) 1 g in dextrose 5 % 50 mL IVPB      -- IV Every 12 hours (non-standard times) 11/30/17 1710          Pertinent Medications noted:    EXAM & DIAGNOSTICS REVIEWED:   Vitals:     Temp:  [97.8 °F (36.6 °C)-98.5 °F (36.9 °C)]   Temp: 98 °F (36.7 °C) (12/01/17 1515)  Pulse: 75 (12/01/17 1800)  Resp: (!) 31 (12/01/17 1800)  BP: 139/79 (12/01/17 1800)  SpO2: 98 % (12/01/17 1800)    Intake/Output Summary (Last 24 hours) at 12/01/17 1856  Last data filed at 12/01/17 1711   Gross per 24 hour   Intake             1120 ml   Output              225 ml   Net              895 ml       General:  In NAD. Ability to give history is impaired  Eyes:  Anicteric  ENT:  bipap in place  Neck:    Lungs:  Decreased BS on right  Heart:  RRR,75,   Abd:    :  Chi   Musc:  Joints without effusion synovitis,   Skin:  Generally warm, dry, normal for color. No rashes. Bruising left face and neck  Wound: Left achilles wound is no change from 11/28  Neuro: AAOx3, speech clear, moves all extrems equally  Extrem: Left leg erythema is increased from 11/28 but stable since 11/29. This could be from dependent congestion or worsening cellulitis(favor former)  VAD:  trialysis catheter RIJ    Lines/Tubes/Drains:    General Labs reviewed:    Recent Labs  Lab 12/01/17  0631   WBC 10.40   RBC 3.52*   HGB 8.9*   HCT 27.4*      MCV 78*   MCH 25.2*   MCHC 32.3       Recent Labs  Lab 12/01/17  1103   CALCIUM 8.4*   *   K 4.4   CO2 23   CL 99   BUN 23*   CREATININE 4.1*       Micro:MSSA from achilles wound culture from 11/26    Imaging  Reviewed:  CXR with large effusion right    ASSESSMENT & PLAN       Acute on chronic congestive heart failure    Essential hypertension    CKD (chronic kidney disease) stage 3, GFR 30-59 ml/min    Type 2 diabetes mellitus with stage 3 chronic kidney disease, without long-term current use of insulin    Coronary artery disease involving native coronary artery of native heart without angina pectoris    Acquired hypothyroidism    Pleural effusion    Hyperkalemia    Diabetic leg ulcer, Staph aureus infection    Traumatic subarachnoid hemorrhage    Segmental and subsegmental pulmonary emboli of RLL without acute cor pulmonale    Acute renal failure superimposed on stage 3 chronic kidney disease    Anemia in stage 3 chronic kidney disease    Subarachnoid hemorrhage following injury, no loss of consciousness         Pulmonary embolus   Apnea after VT(unsure how close the 2 events were)  Left posterior leg is either more congested and edematous or more cellulitic    Recommendation: Continue ancef. And vanc    Following intermittently

## 2017-12-02 NOTE — PROGRESS NOTES
Progress Note  Infectious Disease    Follow-up For:  Left leg cellulitis    SUBJECTIVE:   ROS:  Reports that her belly is bloated. No nausea, ate breakfast, no constipation. No sputum. No chest pain. To have dialysis today    Unsure if she is bloated from bipap, ascites or intestinal gas.    Antibiotics     Start     Stop Route Frequency Ordered    12/05/17 1700  vancomycin 1 g in dextrose 5 % 250 mL IVPB (ready to mix system)      -- IV Every Tues, Thurs, Sat 12/02/17 0845    11/30/17 1815  ceFAZolin (ANCEF) 1 g in dextrose 5 % 50 mL IVPB      -- IV Every 12 hours (non-standard times) 11/30/17 1710          Pertinent Medications noted:    EXAM & DIAGNOSTICS REVIEWED:   Vitals:     Temp:  [97.8 °F (36.6 °C)-98.5 °F (36.9 °C)]   Temp: 98 °F (36.7 °C) (12/02/17 0400)  Pulse: 66 (12/02/17 0747)  Resp: 17 (12/02/17 0747)  BP: (!) 146/76 (12/02/17 0700)  SpO2: 99 % (12/02/17 0747)    Intake/Output Summary (Last 24 hours) at 12/02/17 0938  Last data filed at 12/02/17 0604   Gross per 24 hour   Intake              700 ml   Output              225 ml   Net              475 ml       General:  In NAD. resp are a little shallow  Eyes:  Anicteric  ENT:  No oral lesions,   Neck:  Supple,   Lungs:  Decreased BS on right  Heart:  RRR,75,   Abd:  BS positive, enlarged, non tender  :  Chi   Musc:  Joints without effusion synovitis,   Skin:  Generally warm, dry, normal for color. No rashes. Bruising left face and neck  Wound: Left achilles wound is no change from 11/28  Neuro: AAOx3, speech clear, moves all extrems equally  Extrem: Left leg erythema isstable since 11/29. This could be from dependent congestion or worsening cellulitis(favor former)  VAD:  trialysis catheter RIJ    Lines/Tubes/Drains:    General Labs reviewed:    Recent Labs  Lab 12/02/17  0324   WBC 10.60  10.60   RBC 3.53*  3.53*   HGB 8.8*  8.8*   HCT 27.3*  27.3*     212   MCV 77*  77*   MCH 25.0*  25.0*   MCHC 32.3  32.3       Recent Labs  Lab  12/02/17  0324   CALCIUM 8.6*  8.6*   PROT 6.2   *  132*   K 4.4  4.4   CO2 24  24   CL 98  98   BUN 27*  27*   CREATININE 4.6*  4.6*   ALKPHOS 70   ALT <5*   AST 7*   BILITOT 0.5       Micro:MSSA from achilles wound culture from 11/26    Imaging Reviewed:  CXR with large effusion right    ASSESSMENT & PLAN       Acute on chronic congestive heart failure    Essential hypertension    CKD (chronic kidney disease) stage 3, GFR 30-59 ml/min    Type 2 diabetes mellitus with stage 3 chronic kidney disease, without long-term current use of insulin    Coronary artery disease involving native coronary artery of native heart without angina pectoris    Acquired hypothyroidism    Pleural effusion    Hyperkalemia    Diabetic leg ulcer, Staph aureus infection    Traumatic subarachnoid hemorrhage    Segmental and subsegmental pulmonary emboli of RLL without acute cor pulmonale    Acute renal failure superimposed on stage 3 chronic kidney disease    Anemia in stage 3 chronic kidney disease    Subarachnoid hemorrhage following injury, no loss of consciousness         Pulmonary embolus   Apnea after VT(unsure how close the 2 events were)  Left posterior leg iscongested and edematous  More so than infectious/cellulitic    Recommendation: Continue ancef. And vanc  Up in the chair  GasX  Following intermittently

## 2017-12-02 NOTE — PLAN OF CARE
Problem: Patient Care Overview  Goal: Plan of Care Review  Outcome: Ongoing (interventions implemented as appropriate)  Pt without distress this a.m.; pt states rested well with Bi-pap in place;  Goal: Individualization & Mutuality  Outcome: Ongoing (interventions implemented as appropriate)  Pt able to participate with [facility] well    Problem: Infection, Risk/Actual (Adult)  Goal: Infection Prevention/Resolution  Patient will demonstrate the desired outcomes by discharge/transition of care.   Outcome: Ongoing (interventions implemented as appropriate)  Without fever; no drainage noted to leg/ foot wounds    Problem: Fall Risk (Adult)  Goal: Absence of Falls  Patient will demonstrate the desired outcomes by discharge/transition of care.   Outcome: Ongoing (interventions implemented as appropriate)  Without falls or injuries

## 2017-12-02 NOTE — PLAN OF CARE
Problem: Patient Care Overview  Goal: Plan of Care Review  Outcome: Ongoing (interventions implemented as appropriate)  Bipap on stand by with Nc 4 lpm in use and resp PRN

## 2017-12-03 LAB
ALBUMIN SERPL BCP-MCNC: 2 G/DL
ALP SERPL-CCNC: 69 U/L
ALT SERPL W/O P-5'-P-CCNC: <5 U/L
ANION GAP SERPL CALC-SCNC: 10 MMOL/L
ANION GAP SERPL CALC-SCNC: 10 MMOL/L
AST SERPL-CCNC: 8 U/L
BASOPHILS # BLD AUTO: 0 K/UL
BASOPHILS # BLD AUTO: 0 K/UL
BASOPHILS NFR BLD: 0.4 %
BASOPHILS NFR BLD: 0.4 %
BILIRUB SERPL-MCNC: 0.5 MG/DL
BUN SERPL-MCNC: 24 MG/DL
BUN SERPL-MCNC: 24 MG/DL
CALCIUM SERPL-MCNC: 8.4 MG/DL
CALCIUM SERPL-MCNC: 8.4 MG/DL
CHLORIDE SERPL-SCNC: 99 MMOL/L
CHLORIDE SERPL-SCNC: 99 MMOL/L
CO2 SERPL-SCNC: 24 MMOL/L
CO2 SERPL-SCNC: 24 MMOL/L
CREAT SERPL-MCNC: 4.3 MG/DL
CREAT SERPL-MCNC: 4.3 MG/DL
DIFFERENTIAL METHOD: ABNORMAL
DIFFERENTIAL METHOD: ABNORMAL
EOSINOPHIL # BLD AUTO: 0.2 K/UL
EOSINOPHIL # BLD AUTO: 0.2 K/UL
EOSINOPHIL NFR BLD: 2.4 %
EOSINOPHIL NFR BLD: 2.4 %
ERYTHROCYTE [DISTWIDTH] IN BLOOD BY AUTOMATED COUNT: 21.3 %
ERYTHROCYTE [DISTWIDTH] IN BLOOD BY AUTOMATED COUNT: 21.3 %
EST. GFR  (AFRICAN AMERICAN): 13 ML/MIN/1.73 M^2
EST. GFR  (AFRICAN AMERICAN): 13 ML/MIN/1.73 M^2
EST. GFR  (NON AFRICAN AMERICAN): 11 ML/MIN/1.73 M^2
EST. GFR  (NON AFRICAN AMERICAN): 11 ML/MIN/1.73 M^2
GLUCOSE SERPL-MCNC: 122 MG/DL
GLUCOSE SERPL-MCNC: 122 MG/DL
HCT VFR BLD AUTO: 26 %
HCT VFR BLD AUTO: 26 %
HGB BLD-MCNC: 8.2 G/DL
HGB BLD-MCNC: 8.2 G/DL
LYMPHOCYTES # BLD AUTO: 0.9 K/UL
LYMPHOCYTES # BLD AUTO: 0.9 K/UL
LYMPHOCYTES NFR BLD: 9.3 %
LYMPHOCYTES NFR BLD: 9.3 %
MAGNESIUM SERPL-MCNC: 2 MG/DL
MCH RBC QN AUTO: 24.5 PG
MCH RBC QN AUTO: 24.5 PG
MCHC RBC AUTO-ENTMCNC: 31.5 G/DL
MCHC RBC AUTO-ENTMCNC: 31.5 G/DL
MCV RBC AUTO: 78 FL
MCV RBC AUTO: 78 FL
MONOCYTES # BLD AUTO: 1.1 K/UL
MONOCYTES # BLD AUTO: 1.1 K/UL
MONOCYTES NFR BLD: 11.2 %
MONOCYTES NFR BLD: 11.2 %
NEUTROPHILS # BLD AUTO: 7.3 K/UL
NEUTROPHILS # BLD AUTO: 7.3 K/UL
NEUTROPHILS NFR BLD: 76.7 %
NEUTROPHILS NFR BLD: 76.7 %
PHOSPHATE SERPL-MCNC: 3.2 MG/DL
PLATELET # BLD AUTO: 185 K/UL
PLATELET # BLD AUTO: 185 K/UL
PMV BLD AUTO: 8.2 FL
PMV BLD AUTO: 8.2 FL
POCT GLUCOSE: 132 MG/DL (ref 70–110)
POCT GLUCOSE: 158 MG/DL (ref 70–110)
POCT GLUCOSE: 225 MG/DL (ref 70–110)
POTASSIUM SERPL-SCNC: 4.2 MMOL/L
POTASSIUM SERPL-SCNC: 4.2 MMOL/L
PROT SERPL-MCNC: 5.9 G/DL
RBC # BLD AUTO: 3.34 M/UL
RBC # BLD AUTO: 3.34 M/UL
SODIUM SERPL-SCNC: 133 MMOL/L
SODIUM SERPL-SCNC: 133 MMOL/L
WBC # BLD AUTO: 9.5 K/UL
WBC # BLD AUTO: 9.5 K/UL

## 2017-12-03 PROCEDURE — 94640 AIRWAY INHALATION TREATMENT: CPT

## 2017-12-03 PROCEDURE — 25000242 PHARM REV CODE 250 ALT 637 W/ HCPCS: Performed by: INTERNAL MEDICINE

## 2017-12-03 PROCEDURE — 94761 N-INVAS EAR/PLS OXIMETRY MLT: CPT

## 2017-12-03 PROCEDURE — 25000003 PHARM REV CODE 250: Performed by: INTERNAL MEDICINE

## 2017-12-03 PROCEDURE — 63600175 PHARM REV CODE 636 W HCPCS: Performed by: NURSE PRACTITIONER

## 2017-12-03 PROCEDURE — 63600175 PHARM REV CODE 636 W HCPCS: Performed by: INTERNAL MEDICINE

## 2017-12-03 PROCEDURE — 83735 ASSAY OF MAGNESIUM: CPT

## 2017-12-03 PROCEDURE — 97530 THERAPEUTIC ACTIVITIES: CPT

## 2017-12-03 PROCEDURE — 97162 PT EVAL MOD COMPLEX 30 MIN: CPT

## 2017-12-03 PROCEDURE — A4216 STERILE WATER/SALINE, 10 ML: HCPCS | Performed by: NURSE PRACTITIONER

## 2017-12-03 PROCEDURE — 25000003 PHARM REV CODE 250: Performed by: NURSE PRACTITIONER

## 2017-12-03 PROCEDURE — 20000000 HC ICU ROOM

## 2017-12-03 PROCEDURE — 36415 COLL VENOUS BLD VENIPUNCTURE: CPT

## 2017-12-03 PROCEDURE — 80053 COMPREHEN METABOLIC PANEL: CPT

## 2017-12-03 PROCEDURE — 84100 ASSAY OF PHOSPHORUS: CPT

## 2017-12-03 PROCEDURE — 27000221 HC OXYGEN, UP TO 24 HOURS

## 2017-12-03 PROCEDURE — 94660 CPAP INITIATION&MGMT: CPT

## 2017-12-03 PROCEDURE — 85025 COMPLETE CBC W/AUTO DIFF WBC: CPT

## 2017-12-03 PROCEDURE — 99900035 HC TECH TIME PER 15 MIN (STAT)

## 2017-12-03 RX ORDER — CEFEPIME HYDROCHLORIDE 1 G/50ML
1 INJECTION, SOLUTION INTRAVENOUS EVERY 24 HOURS
Status: DISCONTINUED | OUTPATIENT
Start: 2017-12-03 | End: 2017-12-11 | Stop reason: HOSPADM

## 2017-12-03 RX ADMIN — ALBUTEROL SULFATE 2.5 MG: 2.5 SOLUTION RESPIRATORY (INHALATION) at 08:12

## 2017-12-03 RX ADMIN — COLLAGENASE SANTYL: 250 OINTMENT TOPICAL at 09:12

## 2017-12-03 RX ADMIN — SODIUM CHLORIDE, PRESERVATIVE FREE 3 ML: 5 INJECTION INTRAVENOUS at 06:12

## 2017-12-03 RX ADMIN — INSULIN ASPART 4 UNITS: 100 INJECTION, SOLUTION INTRAVENOUS; SUBCUTANEOUS at 11:12

## 2017-12-03 RX ADMIN — CARVEDILOL 12.5 MG: 6.25 TABLET, FILM COATED ORAL at 09:12

## 2017-12-03 RX ADMIN — ACETAMINOPHEN 650 MG: 325 TABLET ORAL at 06:12

## 2017-12-03 RX ADMIN — GABAPENTIN 100 MG: 100 CAPSULE ORAL at 09:12

## 2017-12-03 RX ADMIN — GABAPENTIN 100 MG: 100 CAPSULE ORAL at 06:12

## 2017-12-03 RX ADMIN — ONDANSETRON 4 MG: 2 INJECTION INTRAMUSCULAR; INTRAVENOUS at 09:12

## 2017-12-03 RX ADMIN — HEPARIN SODIUM 5000 UNITS: 5000 INJECTION, SOLUTION INTRAVENOUS; SUBCUTANEOUS at 09:12

## 2017-12-03 RX ADMIN — CEFAZOLIN 1 G: 1 INJECTION, POWDER, FOR SOLUTION INTRAMUSCULAR; INTRAVENOUS at 06:12

## 2017-12-03 RX ADMIN — LEVOTHYROXINE SODIUM 50 MCG: 50 TABLET ORAL at 06:12

## 2017-12-03 RX ADMIN — GABAPENTIN 100 MG: 100 CAPSULE ORAL at 01:12

## 2017-12-03 NOTE — PROGRESS NOTES
Progress Note  Infectious Disease    Follow-up For:  Left leg cellulitis    SUBJECTIVE:   ROS:  Breathing more comfortably, willing to get up in chair. Had dialysis yesterday. Effusion on right seems less dense    Antibiotics     Start     Stop Route Frequency Ordered    12/05/17 1700  vancomycin 1 g in dextrose 5 % 250 mL IVPB (ready to mix system)      -- IV Every Tues, Thurs, Sat 12/02/17 0845    11/30/17 1815  ceFAZolin (ANCEF) 1 g in dextrose 5 % 50 mL IVPB      -- IV Every 12 hours (non-standard times) 11/30/17 1710          Pertinent Medications noted:    EXAM & DIAGNOSTICS REVIEWED:   Vitals:     Temp:  [97 °F (36.1 °C)-98.9 °F (37.2 °C)]   Temp: 98 °F (36.7 °C) (12/03/17 0421)  Pulse: 70 (12/03/17 0600)  Resp: 19 (12/03/17 0600)  BP: 125/72 (12/03/17 0600)  SpO2: 97 % (12/03/17 0600)    Intake/Output Summary (Last 24 hours) at 12/03/17 0742  Last data filed at 12/03/17 0622   Gross per 24 hour   Intake              990 ml   Output             3700 ml   Net            -2710 ml       General:  In NAD. Breathing more comfortably, eating breakfast  Eyes:  Anicteric  ENT:     Neck:  Supple,   Lungs:    Heart:  RRR,75,   Abd:    :  Chi   Musc:  Joints without effusion synovitis,   Skin:  Generally warm, dry, normal for color. No rashes. Bruising left face and neck  Wound: Left achilles wound is more contracted, surface has dried out, forming eschar, no pus.  Neuro: AAOx3, speech clear, moves all extrems equally  Extrem: Left leg erythema is increased since 12/2. This could be from dependent congestion or worsening cellulitis(favor former)  VAD:  trialysis catheter RIJ    Lines/Tubes/Drains:    General Labs reviewed:    Recent Labs  Lab 12/03/17  0318   WBC 9.50  9.50   RBC 3.34*  3.34*   HGB 8.2*  8.2*   HCT 26.0*  26.0*     185   MCV 78*  78*   MCH 24.5*  24.5*   MCHC 31.5*  31.5*       Recent Labs  Lab 12/03/17  0318   CALCIUM 8.4*  8.4*   PROT 5.9*   *  133*   K 4.2  4.2   CO2 24   24   CL 99  99   BUN 24*  24*   CREATININE 4.3*  4.3*   ALKPHOS 69   ALT <5*   AST 8*   BILITOT 0.5       Micro:MSSA from achilles wound culture from 11/26    Imaging Reviewed:  CXR with large effusion right, less dense 12/3    ASSESSMENT & PLAN       Acute on chronic congestive heart failure    Essential hypertension    CKD (chronic kidney disease) stage 3, GFR 30-59 ml/min    Type 2 diabetes mellitus with stage 3 chronic kidney disease, without long-term current use of insulin    Coronary artery disease involving native coronary artery of native heart without angina pectoris    Acquired hypothyroidism    Pleural effusion    Hyperkalemia    Diabetic leg ulcer, Staph aureus infection    Traumatic subarachnoid hemorrhage    Segmental and subsegmental pulmonary emboli of RLL without acute cor pulmonale    Acute renal failure superimposed on stage 3 chronic kidney disease    Anemia in stage 3 chronic kidney disease    Subarachnoid hemorrhage following injury, no loss of consciousness         Pulmonary embolus   Apnea after VT(unsure how close the 2 events were)  Left posterior leg iscongested and edematous  More so than infectious/cellulitic though it is worse today 12/3    Recommendation: Changing back to maxipime from Ancef, continue Vanc  Up in the chair  GasX  Following intermittently

## 2017-12-03 NOTE — PT/OT/SLP PROGRESS
Physical Therapy      Juliane Ramos  MRN: [id number]    Patient not seen today secondary to unavailable--needing wound care and just placed on bipap. Will follow-up 12/3/17.    Ruth Ann Bejarano, PT

## 2017-12-03 NOTE — PROGRESS NOTES
Wound care completed by Cuba Hair Rn. She states that the wound looks worse than yesterday, possible cellulitis.

## 2017-12-03 NOTE — PLAN OF CARE
Problem: Patient Care Overview  Goal: Plan of Care Review  Outcome: Ongoing (interventions implemented as appropriate)  Pt c/o nausea Zofran 4mg given IV prn.  Dressing to left lower leg changed.

## 2017-12-03 NOTE — PROGRESS NOTES
[facility]  Cardiology  Progress Note    Patient Name: uJliane Ramos  MRN: [id number]  Admission Date: 11/27/2017  Hospital Length of Stay: 6 days  Code Status: Full Code   Attending Physician: Reg Devine MD   Primary Care Physician: JOS Dumont  Expected Discharge Date:   Principal Problem:Acute respiratory failure with hypoxia and hypercapnia    Subjective:     Hospital Course: respiratory failure due to copd    Interval History: cardiac wise stable , no cp or arrhtymias    ROS  Objective:     Vital Signs (Most Recent):  Temp: 98.9 °F (37.2 °C) (12/03/17 1300)  Pulse: 76 (12/03/17 1300)  Resp: (!) 29 (12/03/17 1300)  BP: (!) 148/76 (12/03/17 1300)  SpO2: 97 % (12/03/17 1300) Vital Signs (24h Range):  Temp:  [97.8 °F (36.6 °C)-99 °F (37.2 °C)] 98.9 °F (37.2 °C)  Pulse:  [67-89] 76  Resp:  [17-33] 29  SpO2:  [87 %-99 %] 97 %  BP: (106-149)/(59-90) 148/76     Weight: 121 kg (266 lb 12.1 oz)  Body mass index is 43.06 kg/m².    SpO2: 97 %  O2 Device (Oxygen Therapy): BiPAP      Intake/Output Summary (Last 24 hours) at 12/03/17 1505  Last data filed at 12/03/17 0622   Gross per 24 hour   Intake              250 ml   Output              200 ml   Net               50 ml       Lines/Drains/Airways     Central Venous Catheter Line                 Percutaneous Central Line Insertion/Assessment - triple lumen  11/24/17 1758 right internal jugular 8 days          Drain                 Urethral Catheter 11/23/17 1200 10 days                Physical Exam    Significant Labs:   BMP:   Recent Labs  Lab 12/02/17  0324 12/03/17  0318   *  162* 122*  122*   *  132* 133*  133*   K 4.4  4.4 4.2  4.2   CL 98  98 99  99   CO2 24  24 24  24   BUN 27*  27* 24*  24*   CREATININE 4.6*  4.6* 4.3*  4.3*   CALCIUM 8.6*  8.6* 8.4*  8.4*   MG 2.0 2.0   , CMP   Recent Labs  Lab 12/02/17  0324 12/03/17  0318   *  132* 133*  133*   K 4.4  4.4 4.2  4.2   CL 98  98 99  99   CO2 24   24 24  24   *  162* 122*  122*   BUN 27*  27* 24*  24*   CREATININE 4.6*  4.6* 4.3*  4.3*   CALCIUM 8.6*  8.6* 8.4*  8.4*   PROT 6.2 5.9*   ALBUMIN 2.1* 2.0*   BILITOT 0.5 0.5   ALKPHOS 70 69   AST 7* 8*   ALT <5* <5*   ANIONGAP 10  10 10  10   ESTGFRAFRICA 12*  12* 13*  13*   EGFRNONAA 10*  10* 11*  11*   , CBC   Recent Labs  Lab 12/02/17  0324 12/03/17  0318   WBC 10.60  10.60 9.50  9.50   HGB 8.8*  8.8* 8.2*  8.2*   HCT 27.3*  27.3* 26.0*  26.0*     212 185  185    and Troponin No results for input(s): TROPONINI in the last 48 hours.    Significant Imaging: X-Ray: CXR: X-Ray Chest 1 View (CXR): No results found for this visit on 11/27/17.  Assessment and Plan:     Brief HPI:    Active Diagnoses:    Diagnosis Date Noted POA    PRINCIPAL PROBLEM:  Acute respiratory failure with hypoxia and hypercapnia [J96.01, J96.02] 12/02/2017 Yes    Obesity [E66.9] 12/01/2017 Yes    Subarachnoid hemorrhage following injury, no loss of consciousness [S06.6X0A] 11/28/2017 Yes    Pulmonary embolus [I26.99] 11/27/2017 Yes    Acute renal failure superimposed on stage 3 chronic kidney disease [N17.9, N18.3] 11/25/2017 Yes    Acquired hypothyroidism [E03.9] 06/24/2017 Yes    CKD (chronic kidney disease) stage 3, GFR 30-59 ml/min [N18.3] 06/24/2017 Yes    Coronary artery disease involving native coronary artery of native heart without angina pectoris [I25.10] 06/24/2017 Yes     Chronic    Type 2 diabetes mellitus with stage 3 chronic kidney disease, without long-term current use of insulin [E11.22, N18.3] 06/24/2017 Yes     Chronic      Problems Resolved During this Admission:    Diagnosis Date Noted Date Resolved POA       VTE Risk Mitigation         Ordered     heparin (porcine) injection 5,000 Units  Every 12 hours     Route:  Subcutaneous        11/29/17 0901     Medium Risk of VTE  Once      11/27/17 2158     Place MARY ELLEN hose  Until discontinued      11/27/17 2158     Place sequential  compression device  Until discontinued      11/27/17 8598          Manan Corrigan MD  Cardiology  [facility]

## 2017-12-03 NOTE — PROGRESS NOTES
INPATIENT NEPHROLOGY PROGRESS NOTE  [facility] NEPHROLOGY    Patient Name: Juliane Ramos  Date: 12/03/2017    Reason for consultation: MC      Chief Complaint: Left diabetic foot ulcer     History of Present Illness:  Ms. Ramos is a 56 y/o F with a hx of HTN, DM2, CHF, and stage III CKD who p/w a left heel ulcer/cellulitis x 2 weeks, nonhealing. Hospital course c/b PE, fall with SAH, and MC 2/2 contrast/vancomycin/infection requiring RRT. We have been consulted for MC.    · Interval History/Subjective:    - no complaints (cp, dyspnea, abd pain), c/w edema  - UOP 200cc, BP is stable, O2 requirement is stable    · Review of Systems: All 14 systems reviewed and negative, except as noted in HPI    Plan of Care:    Assessment:  Oliguric MC 2/2 multifactorial ATN, dialysis dependent  Edema/Pleural effusion  Hyponatremia  Anemia  Low albumin      Plan:     1. Acute Kidney Injury- She remains dialysis dependent. Continue HD TTS. Consult VS for a hemosplit. She will need outpatient HD unit placement. No NSAIDs or IV contrast. Dose antbx for dialysis.      2. Volume/Blood Pressure- BP is stable. She is edematous. CXR shows stable R pleural effusion. Continue UF 3-4L with dialysis with albumin PRN. Consider R thoracentesis. Continue NIPPV. We are optimizing nutritional status.      3. Electrolytes/Acid Base- HypoNa is stable, continue current dialysis prescription/UF goals.       4. Anemia of CKD- Hb is stable. Continue EPO 5K with HD.      5. Low albumin- Stable, continue Nepro.    Thank you for allowing us to participate in this patient's care. We will continue to follow.    Medications:  No current facility-administered medications on file prior to encounter.      Current Outpatient Prescriptions on File Prior to Encounter   Medication Sig Dispense Refill    albuterol (ACCUNEB) 1.25 mg/3 mL Nebu Take 1.25 mg by nebulization every 6 (six) hours as needed. Rescue      carvedilol (COREG) 12.5 MG tablet Take 1 tablet  (12.5 mg total) by mouth 2 (two) times daily. 60 tablet 11    cefepime in dextrose 5 % (MAXIPIME) 1 gram/50 mL PgBk Inject 50 mLs (1 g total) into the vein every 24 hours as needed.      gabapentin (NEURONTIN) 100 MG capsule Take 1 capsule (100 mg total) by mouth 3 (three) times daily. 90 capsule 11    levetiracetam oral soln 500 mg/5 mL (5 mL) Soln 2.5 mLs (250 mg total) by Per NG tube route 2 (two) times daily. 150 mL 11    levothyroxine (SYNTHROID) 50 MCG tablet Take 50 mcg by mouth once daily.        metronidazole (FLAGYL) 500 mg/100 mL IVPB Inject 100 mLs (500 mg total) into the vein every 8 (eight) hours.      rosuvastatin (CRESTOR) 10 MG tablet Take 1 tablet (10 mg total) by mouth once daily. 30 tablet 0    VANCOMYCIN HCL (VANCOMYCIN IN 5 % DEXTROSE) 1.75 gram/500 mL Soln Inject 500 mLs (1,750 mg total) into the vein once daily.       Scheduled Meds:   carvedilol  12.5 mg Per NG tube BID    ceFEPime (MAXIPIME) IVPB  1 g Intravenous Daily    collagenase   Topical Daily    epoetin donita (PROCRIT) injection  5,000 Units Intravenous Every Tues, Thurs, Sat    gabapentin  100 mg Oral TID    heparin (porcine)  5,000 Units Subcutaneous Q12H    insulin aspart  1-10 Units Subcutaneous TIDWM    levothyroxine  50 mcg Per NG tube Before breakfast    rosuvastatin  10 mg Per NG tube Daily    sodium chloride 0.9%  3 mL Intravenous Q8H    [START ON 12/5/2017] vancomycin (VANCOCIN) IVPB  1,000 mg Intravenous Every Tues, Thurs, Sat     Continuous Infusions:   PRN Meds:.sodium chloride 0.9%, acetaminophen, albuterol sulfate, dextrose 50%, glucagon (human recombinant), glucose, glucose, labetalol, levetiracetam oral soln, magnesium oxide, magnesium oxide, ondansetron, potassium phosphate IVPB, simethicone    Allergies:  Patient has no known allergies.    Vital Signs:  Temp Readings from Last 3 Encounters:   12/03/17 98.9 °F (37.2 °C) (Oral)   11/27/17 97 °F (36.1 °C) (Oral)   11/23/17 97.4 °F (36.3 °C)     Pulse  Readings from Last 3 Encounters:   12/03/17 76   11/27/17 67   11/24/17 65     BP Readings from Last 3 Encounters:   12/03/17 (!) 148/76   11/27/17 123/67   11/24/17 (!) 108/58     Weight:  Wt Readings from Last 3 Encounters:   12/01/17 121 kg (266 lb 12.1 oz)   11/27/17 117 kg (257 lb 15 oz)   11/22/17 106.6 kg (234 lb 15.8 oz)     Physical Exam:  Constitutional: nad, aao x 3   Heart: rrr, no m/r/g, wwp, + edema  Lungs: ant ausc clear, no w/r/r/c, no lb  Abdomen: s/nt/nd, +BS    Results:  Lab Results   Component Value Date     (L) 12/03/2017     (L) 12/03/2017    K 4.2 12/03/2017    K 4.2 12/03/2017    CL 99 12/03/2017    CL 99 12/03/2017    CO2 24 12/03/2017    CO2 24 12/03/2017    BUN 24 (H) 12/03/2017    BUN 24 (H) 12/03/2017    CREATININE 4.3 (H) 12/03/2017    CREATININE 4.3 (H) 12/03/2017    CALCIUM 8.4 (L) 12/03/2017    CALCIUM 8.4 (L) 12/03/2017    ANIONGAP 10 12/03/2017    ANIONGAP 10 12/03/2017    ESTGFRAFRICA 13 (A) 12/03/2017    ESTGFRAFRICA 13 (A) 12/03/2017    EGFRNONAA 11 (A) 12/03/2017    EGFRNONAA 11 (A) 12/03/2017     Lab Results   Component Value Date    CALCIUM 8.4 (L) 12/03/2017    CALCIUM 8.4 (L) 12/03/2017    PHOS 3.2 12/03/2017       Recent Labs  Lab 12/03/17  0318   WBC 9.50  9.50   RBC 3.34*  3.34*   HGB 8.2*  8.2*   HCT 26.0*  26.0*     185   MCV 78*  78*   MCH 24.5*  24.5*   MCHC 31.5*  31.5*     I have personally reviewed pertinent radiological imaging and reports.    Amairani Young MD MPH  [facility]  [address] [place] [id number]  [phone] (p)  [phone] (f)

## 2017-12-03 NOTE — PLAN OF CARE
12/02/17 1956   Patient Assessment/Suction   Level of Consciousness (AVPU) alert   Respiratory Effort Unlabored   Expansion/Accessory Muscles/Retractions no use of accessory muscles   PRE-TX-O2-ETCO2   O2 Device (Oxygen Therapy) BiPAP   Oxygen Concentration (%) 30   SpO2 97 %   Pulse 71   Resp 18   Aerosol Therapy   $ Aerosol Therapy Charges PRN treatment not required   Respiratory Treatment Status PRN treatment not required   Preset CPAP/BiPAP Settings   Mode Of Delivery BiPAP   $ Initial CPAP/BiPAP Setup? No   $ Is patient using? Yes   Ipap 10   EPAP (cm H2O) 5   Pressure Support (cm H2O) 5   Set Rate (Breaths/Min) 6   ITime (sec) 1   Rise Time (sec) 0.3   Patient CPAP/BiPAP Settings   RR Total (Breaths/Min) 25   Tidal Volume (mL) 325   VE Minute Ventilation (L/min) 7.8 L/min   Peak Inspiratory Pressure (cm H2O) 11   TiTOT (%) 25   Total Leak (L/Min) 11   Patient Trigger - ST Mode Only (%) 100   CPAP/BiPAP Alarms   High Pressure (cm H2O) 15   Low Pressure (cm H2O) 5   Low Pressure Delay (Sec) 20   Minute Ventilation (L/Min) 2   High RR (breaths/min) 40   Low RR (breaths/min) 5   Pt tolerates BIPAP well. BiPAP to red outlet with alarms on and functioning. No PRN treatment required at this time.

## 2017-12-03 NOTE — PLAN OF CARE
Problem: Patient Care Overview  Goal: Plan of Care Review  Outcome: Ongoing (interventions implemented as appropriate)  Patient repositioned per request. Left foot dressing remains CDI. Denies pain. Slept well throughout the night with Bipap. VSS.

## 2017-12-03 NOTE — PLAN OF CARE
12/01/17 1900   Patient Assessment/Suction   Level of Consciousness (AVPU) alert   Respiratory Effort Normal;Unlabored   Expansion/Accessory Muscles/Retractions no use of accessory muscles   All Lung Fields Breath Sounds clear;diminished   Rhythm/Pattern, Respiratory depth regular;pattern regular;unlabored   PRE-TX-O2-ETCO2   O2 Device (Oxygen Therapy) BiPAP   Oxygen Concentration (%) 30   SpO2 96 %   Pulse 75   Resp (!) 33   Temp 97.8 °F (36.6 °C)   /70   Aerosol Therapy   $ Aerosol Therapy Charges PRN treatment not required   Respiratory Treatment Status PRN treatment not required   Preset CPAP/BiPAP Settings   Mode Of Delivery BiPAP   $ Initial CPAP/BiPAP Setup? No   $ Is patient using? Yes   Equipment Type V60   Airway Device Type medium full face mask   Ipap 10   EPAP (cm H2O) 5   Pressure Support (cm H2O) 5   Set Rate (Breaths/Min) 6   ITime (sec) 1   Rise Time (sec) 0.3   Patient CPAP/BiPAP Settings   RR Total (Breaths/Min) 32   Tidal Volume (mL) 371   VE Minute Ventilation (L/min) 11.9 L/min   Peak Inspiratory Pressure (cm H2O) 11   TiTOT (%) 29   Total Leak (L/Min) 15   Patient Trigger - ST Mode Only (%) 100   CPAP/BiPAP Alarms   High Pressure (cm H2O) 25   Low Pressure (cm H2O) 5   Low Pressure Delay (Sec) 20   Minute Ventilation (L/Min) 2   High RR (breaths/min) 40   Low RR (breaths/min) 5   Pt tolerates BiPAP well. BiPAP to red outlet with alarms on and functioning. No PRN treatment required at this time.

## 2017-12-03 NOTE — PROGRESS NOTES
Progress Note  Pul-ICU    Admit Date: 11/27/2017   LOS: 6 days     SUBJECTIVE:     Follow-up For:      PE  -infarct/atele/effus  Chronic hypoxia  -home O2 after CHF  DM  Noncompliance  BETH  -non  Use of cpap  Subarachnoid hem  dysphagia    Continuous Infusions:   Scheduled Meds:   carvedilol  12.5 mg Per NG tube BID    ceFEPime (MAXIPIME) IVPB  1 g Intravenous Daily    collagenase   Topical Daily    epoetin donita (PROCRIT) injection  5,000 Units Intravenous Every Tues, Thurs, Sat    gabapentin  100 mg Oral TID    heparin (porcine)  5,000 Units Subcutaneous Q12H    insulin aspart  1-10 Units Subcutaneous TIDWM    levothyroxine  50 mcg Per NG tube Before breakfast    rosuvastatin  10 mg Per NG tube Daily    sodium chloride 0.9%  3 mL Intravenous Q8H    [START ON 12/5/2017] vancomycin (VANCOCIN) IVPB  1,000 mg Intravenous Every Tues, Thurs, Sat     PRN Meds:sodium chloride 0.9%, acetaminophen, albuterol sulfate, dextrose 50%, glucagon (human recombinant), glucose, glucose, labetalol, levetiracetam oral soln, magnesium oxide, magnesium oxide, ondansetron, potassium phosphate IVPB, simethicone    Review of patient's allergies indicates:  No Known Allergies    OBJECTIVE:     Vital Signs (Most Recent)  Temp: 99 °F (37.2 °C) (12/03/17 0810)  Pulse: 76 (12/03/17 1100)  Resp: (!) 32 (12/03/17 1100)  BP: (!) 149/82 (12/03/17 1100)  SpO2: 99 % (12/03/17 1100)    Vital Signs Range (Last 24H):  Temp:  [97 °F (36.1 °C)-99 °F (37.2 °C)]   Pulse:  [67-90]   Resp:  [17-33]   BP: (106-160)/(59-90)   SpO2:  [87 %-100 %]     I & O (Last 24H):    Intake/Output Summary (Last 24 hours) at 12/03/17 1209  Last data filed at 12/03/17 0622   Gross per 24 hour   Intake              750 ml   Output             3700 ml   Net            -2950 ml     Ventilator Data (Last 24H):     Oxygen Concentration (%):  [30-40] 40    Hemodynamic Parameters (Last 24H):       Constitutional: no fever or chills  ENT: negative for epistaxis  Respiratory:  positive for dyspnea on exertion, negative for hemoptysis coughed with eating today  Cardiovascular: no chest pain or palpitations  Gastrointestinal: positive for feels bloated, negative for bright red blood per rectum  Genitourinary: negative for hematuria  Hematologic/Lymphatic: negative for bleeding  Allergy/Immunology: no hives  Neurological: no seizures or tremors    Physical Exam:  General: well developed, appears older than stated age, no distress, morbidly obese  Head: normocephalic, bruising of face  Eyes:  negative findings: conjunctivae and sclerae normal  Nose: no discharge, no epistaxis  Throat: moist  Neck: no JVD  Lungs:  clear to auscultation bilaterally and diminished breath sounds bibasilar and RT >Lt  Chest Wall: no tenderness  Heart: no gallop  Abdomen: normal findings: soft, non-tender and abnormal findings:  obese and anaasarca  Extremities: edema 3  Skin: warm and dry  Neurologic: Mental status: Alert, oriented, thought content appropriate    Lines/Drains:       Percutaneous Central Line Insertion/Assessment - triple lumen  11/24/17 1758 right internal jugular (Active)   Dressing biopatch in place;dressing dry and intact 12/2/2017  4:00 AM   Securement catheter securement device utilized;secured w/ sutures 12/1/2017  5:12 PM   Distal Patency/Care normal saline locked 12/1/2017  5:12 PM   Medial Patency/Care normal saline locked 12/1/2017  5:12 PM   Proximal Patency/Care infusing 12/2/2017  4:00 AM   Daily Line Review Performed 11/28/2017  5:38 PM   Number of days: 7            Urethral Catheter 11/23/17 1200 (Active)   Site Assessment Clean;Intact 12/1/2017  9:00 PM   Collection Container Standard drainage bag 12/1/2017  9:00 PM   Securement Method secured to top of thigh w/ adhesive device 12/1/2017  9:00 PM   Catheter Care Performed yes 12/1/2017  9:00 PM   Reason for Continuing Urinary Catheterization Critically ill in ICU requiring intensive monitoring 12/1/2017  9:00 PM   CAUTI Prevention  "Bundle StatLock in place w 1" slack 12/1/2017  9:00 PM   Output (mL) 100 mL 12/2/2017  5:30 AM   Number of days: 8       Laboratory (Last 24H):  CBC:    Recent Labs  Lab 12/03/17 0318   WBC 9.50  9.50   HGB 8.2*  8.2*   HCT 26.0*  26.0*     185     CMP:    Recent Labs  Lab 12/03/17 0318   CALCIUM 8.4*  8.4*   ALBUMIN 2.0*   PROT 5.9*   *  133*   K 4.2  4.2   CO2 24  24   CL 99  99   BUN 24*  24*   CREATININE 4.3*  4.3*   ALKPHOS 69   ALT <5*   AST 8*   BILITOT 0.5     BMP:     Recent Labs  Lab 12/03/17 0318   *  122*   *  133*   K 4.2  4.2   CL 99  99   CO2 24  24   BUN 24*  24*   CREATININE 4.3*  4.3*   CALCIUM 8.4*  8.4*   MG 2.0     Coagulation: No results for input(s): INR, APTT in the last 168 hours.    Invalid input(s): PT  Cardiac Markers: No results for input(s): CKMB, TROPONINT, MYOGLOBIN in the last 168 hours.  ABGs:     Recent Labs  Lab 11/30/17 2236   PH 7.354   PCO2 44.8   HCO3 25.0   POCSATURATED 98   BE -1     Prealbumin: No results for input(s): PREALBUMIN in the last 168 hours.  Microbiology Results (last 7 days)     ** No results found for the last 168 hours. **        Labs within the past 24 hours have been reviewed.    Chest X-Ray: I personally reviewed the films and findings are:, hazy LT>rt about same    Diagnostic Results:  I personally reviewed the films and findings are:, hazy on rt about same/effusion & atelectasis    ASSESSMENT/PLAN:     Preventive Measures:     Patient Active Problem List    Diagnosis Date Noted    Acute respiratory failure with hypoxia and hypercapnia 12/02/2017    Obesity 12/01/2017    Subarachnoid hemorrhage following injury, no loss of consciousness 11/28/2017    Staph aureus infection 11/28/2017    Pulmonary embolus 11/27/2017    Anemia in stage 3 chronic kidney disease 11/26/2017    Segmental and subsegmental pulmonary emboli of RLL without acute cor pulmonale 11/25/2017    Acute renal failure superimposed on " stage 3 chronic kidney disease 11/25/2017    Traumatic subarachnoid hemorrhage 11/24/2017    Diabetic leg ulcer 11/18/2017    Hyperkalemia 06/29/2017    Acute on chronic congestive heart failure 06/24/2017    Essential hypertension 06/24/2017    CKD (chronic kidney disease) stage 3, GFR 30-59 ml/min 06/24/2017    Type 2 diabetes mellitus with stage 3 chronic kidney disease, without long-term current use of insulin 06/24/2017    Coronary artery disease involving native coronary artery of native heart without angina pectoris 06/24/2017    Acquired hypothyroidism 06/24/2017    Pleural effusion 06/24/2017   PE effusion   Atelectasis  Infarct  Dysphagia  --?aspiration      Plan:  Pulmonary: continue Rx consider Aleja,increase activity  For MBSS  Fu cxr  Counseling/Consultation:I discussed the case with Dr. Hui and family.    Critical Care Time greater than: 30 Minutes

## 2017-12-03 NOTE — PROGRESS NOTES
Bedside report received. Patient AAO watching TV with spouse at bedside. VSS, NAD noted. No needs voiced at this time. Will continue to monitor.

## 2017-12-03 NOTE — PLAN OF CARE
Problem: Patient Care Overview  Goal: Plan of Care Review  Outcome: Ongoing (interventions implemented as appropriate)  Care plan reviewed.  Safety maintained this shift.  Accu checks Q ac and hs with insulin coverage with meals.   Patient had HD today with 3 L removed.  IV antibiotics for treatment of infection. Daily wound care with santyl. Afebrile.  Bipap when asleep, nasal cannula at other times. SPO2 has stayed above 95%.

## 2017-12-03 NOTE — SIGNIFICANT EVENT
[place] patient coughing, patient states she choked on apple juice and grits, RN notified and resp tx given with much relief.

## 2017-12-04 PROBLEM — R60.1 ANASARCA: Status: ACTIVE | Noted: 2017-12-04

## 2017-12-04 PROBLEM — N18.5 TYPE 2 DIABETES MELLITUS WITH STAGE 5 CHRONIC KIDNEY DISEASE NOT ON CHRONIC DIALYSIS, WITHOUT LONG-TERM CURRENT USE OF INSULIN: Status: ACTIVE | Noted: 2017-06-24

## 2017-12-04 PROBLEM — N18.5 ACUTE RENAL FAILURE SUPERIMPOSED ON STAGE 5 CHRONIC KIDNEY DISEASE, NOT ON CHRONIC DIALYSIS: Status: ACTIVE | Noted: 2017-11-25

## 2017-12-04 LAB
ALBUMIN SERPL BCP-MCNC: 2.1 G/DL
ALP SERPL-CCNC: 67 U/L
ALT SERPL W/O P-5'-P-CCNC: <5 U/L
ANION GAP SERPL CALC-SCNC: 11 MMOL/L
ANION GAP SERPL CALC-SCNC: 11 MMOL/L
AST SERPL-CCNC: 11 U/L
BASOPHILS # BLD AUTO: 0.1 K/UL
BASOPHILS # BLD AUTO: 0.1 K/UL
BASOPHILS NFR BLD: 0.5 %
BASOPHILS NFR BLD: 0.5 %
BILIRUB SERPL-MCNC: 0.5 MG/DL
BUN SERPL-MCNC: 30 MG/DL
BUN SERPL-MCNC: 30 MG/DL
CALCIUM SERPL-MCNC: 8.6 MG/DL
CALCIUM SERPL-MCNC: 8.6 MG/DL
CHLORIDE SERPL-SCNC: 98 MMOL/L
CHLORIDE SERPL-SCNC: 98 MMOL/L
CO2 SERPL-SCNC: 24 MMOL/L
CO2 SERPL-SCNC: 24 MMOL/L
CREAT SERPL-MCNC: 4.8 MG/DL
CREAT SERPL-MCNC: 4.8 MG/DL
DIFFERENTIAL METHOD: ABNORMAL
DIFFERENTIAL METHOD: ABNORMAL
EOSINOPHIL # BLD AUTO: 0.3 K/UL
EOSINOPHIL # BLD AUTO: 0.3 K/UL
EOSINOPHIL NFR BLD: 2.2 %
EOSINOPHIL NFR BLD: 2.2 %
ERYTHROCYTE [DISTWIDTH] IN BLOOD BY AUTOMATED COUNT: 22 %
ERYTHROCYTE [DISTWIDTH] IN BLOOD BY AUTOMATED COUNT: 22 %
EST. GFR  (AFRICAN AMERICAN): 11 ML/MIN/1.73 M^2
EST. GFR  (AFRICAN AMERICAN): 11 ML/MIN/1.73 M^2
EST. GFR  (NON AFRICAN AMERICAN): 10 ML/MIN/1.73 M^2
EST. GFR  (NON AFRICAN AMERICAN): 10 ML/MIN/1.73 M^2
GLUCOSE SERPL-MCNC: 113 MG/DL
GLUCOSE SERPL-MCNC: 113 MG/DL
HCT VFR BLD AUTO: 27.4 %
HCT VFR BLD AUTO: 27.4 %
HGB BLD-MCNC: 8.9 G/DL
HGB BLD-MCNC: 8.9 G/DL
LYMPHOCYTES # BLD AUTO: 0.8 K/UL
LYMPHOCYTES # BLD AUTO: 0.8 K/UL
LYMPHOCYTES NFR BLD: 6.3 %
LYMPHOCYTES NFR BLD: 6.3 %
MAGNESIUM SERPL-MCNC: 2 MG/DL
MCH RBC QN AUTO: 24.9 PG
MCH RBC QN AUTO: 24.9 PG
MCHC RBC AUTO-ENTMCNC: 32.4 G/DL
MCHC RBC AUTO-ENTMCNC: 32.4 G/DL
MCV RBC AUTO: 77 FL
MCV RBC AUTO: 77 FL
MONOCYTES # BLD AUTO: 1.4 K/UL
MONOCYTES # BLD AUTO: 1.4 K/UL
MONOCYTES NFR BLD: 11.7 %
MONOCYTES NFR BLD: 11.7 %
NEUTROPHILS # BLD AUTO: 9.4 K/UL
NEUTROPHILS # BLD AUTO: 9.4 K/UL
NEUTROPHILS NFR BLD: 79.3 %
NEUTROPHILS NFR BLD: 79.3 %
PHOSPHATE SERPL-MCNC: 4.1 MG/DL
PLATELET # BLD AUTO: 195 K/UL
PLATELET # BLD AUTO: 195 K/UL
PMV BLD AUTO: 8.4 FL
PMV BLD AUTO: 8.4 FL
POCT GLUCOSE: 107 MG/DL (ref 70–110)
POCT GLUCOSE: 120 MG/DL (ref 70–110)
POTASSIUM SERPL-SCNC: 4.5 MMOL/L
POTASSIUM SERPL-SCNC: 4.5 MMOL/L
PROT SERPL-MCNC: 6.3 G/DL
RBC # BLD AUTO: 3.56 M/UL
RBC # BLD AUTO: 3.56 M/UL
SODIUM SERPL-SCNC: 133 MMOL/L
SODIUM SERPL-SCNC: 133 MMOL/L
WBC # BLD AUTO: 11.9 K/UL
WBC # BLD AUTO: 11.9 K/UL

## 2017-12-04 PROCEDURE — 63600175 PHARM REV CODE 636 W HCPCS: Performed by: INTERNAL MEDICINE

## 2017-12-04 PROCEDURE — 94660 CPAP INITIATION&MGMT: CPT

## 2017-12-04 PROCEDURE — 20000000 HC ICU ROOM

## 2017-12-04 PROCEDURE — 99900035 HC TECH TIME PER 15 MIN (STAT)

## 2017-12-04 PROCEDURE — 99233 SBSQ HOSP IP/OBS HIGH 50: CPT | Mod: ,,, | Performed by: INTERNAL MEDICINE

## 2017-12-04 PROCEDURE — 36415 COLL VENOUS BLD VENIPUNCTURE: CPT

## 2017-12-04 PROCEDURE — 84100 ASSAY OF PHOSPHORUS: CPT

## 2017-12-04 PROCEDURE — 99232 SBSQ HOSP IP/OBS MODERATE 35: CPT | Mod: ,,, | Performed by: INTERNAL MEDICINE

## 2017-12-04 PROCEDURE — 83735 ASSAY OF MAGNESIUM: CPT

## 2017-12-04 PROCEDURE — A4216 STERILE WATER/SALINE, 10 ML: HCPCS | Performed by: NURSE PRACTITIONER

## 2017-12-04 PROCEDURE — 80053 COMPREHEN METABOLIC PANEL: CPT

## 2017-12-04 PROCEDURE — 97530 THERAPEUTIC ACTIVITIES: CPT

## 2017-12-04 PROCEDURE — 25000003 PHARM REV CODE 250: Performed by: INTERNAL MEDICINE

## 2017-12-04 PROCEDURE — 25000003 PHARM REV CODE 250: Performed by: NURSE PRACTITIONER

## 2017-12-04 PROCEDURE — 85025 COMPLETE CBC W/AUTO DIFF WBC: CPT

## 2017-12-04 PROCEDURE — 97110 THERAPEUTIC EXERCISES: CPT

## 2017-12-04 PROCEDURE — 92611 MOTION FLUOROSCOPY/SWALLOW: CPT

## 2017-12-04 RX ADMIN — LEVOTHYROXINE SODIUM 50 MCG: 50 TABLET ORAL at 06:12

## 2017-12-04 RX ADMIN — ROSUVASTATIN CALCIUM 10 MG: 5 TABLET ORAL at 09:12

## 2017-12-04 RX ADMIN — CARVEDILOL 12.5 MG: 6.25 TABLET, FILM COATED ORAL at 09:12

## 2017-12-04 RX ADMIN — HEPARIN SODIUM 5000 UNITS: 5000 INJECTION, SOLUTION INTRAVENOUS; SUBCUTANEOUS at 09:12

## 2017-12-04 RX ADMIN — GABAPENTIN 100 MG: 100 CAPSULE ORAL at 06:12

## 2017-12-04 RX ADMIN — COLLAGENASE SANTYL: 250 OINTMENT TOPICAL at 09:12

## 2017-12-04 RX ADMIN — GABAPENTIN 100 MG: 100 CAPSULE ORAL at 09:12

## 2017-12-04 RX ADMIN — CEFEPIME 1 G: 1 INJECTION, POWDER, FOR SOLUTION INTRAMUSCULAR; INTRAVENOUS at 09:12

## 2017-12-04 RX ADMIN — INSULIN ASPART 2 UNITS: 100 INJECTION, SOLUTION INTRAVENOUS; SUBCUTANEOUS at 10:12

## 2017-12-04 RX ADMIN — SODIUM CHLORIDE, PRESERVATIVE FREE 3 ML: 5 INJECTION INTRAVENOUS at 04:12

## 2017-12-04 RX ADMIN — ACETAMINOPHEN 650 MG: 325 TABLET ORAL at 12:12

## 2017-12-04 RX ADMIN — GABAPENTIN 100 MG: 100 CAPSULE ORAL at 04:12

## 2017-12-04 NOTE — PLAN OF CARE
Problem: Physical Therapy Goal  Goal: Physical Therapy Goal  Goals to be met by: 17     Patient will increase functional independence with mobility by performin. Supine to sit with MInimal Assistance  2. Bed to chair transfer with Minimal Assistance    Outcome: Ongoing (interventions implemented as appropriate)  PT evaluation completed

## 2017-12-04 NOTE — PLAN OF CARE
12/04/17 1100   Discharge Reassessment   Assessment Type Discharge Planning Reassessment     Spoke with the pt and her  at the bedside along with Dr Devine to answer questions regarding the pt's plan of care and discharge plan. Dr Devine all questions regarding plan of care; the pt is pending a possible thoracentesis, has a MBSS scheduled for today, Dr Deal is going to see the pt for hemosplit placement and possible IVC filter. Dr Cosby will be notified by the pt's nurse once the pt is cleared to have her foot I&D.   The pt will need Inpt Rehab at the time of discharge and most likely new outpt dialysis arranged. They are requesting [facility] in [place] for outpt dialysis if/when needed.   I will continue to follow and assist as needed...OANH Viveros CM

## 2017-12-04 NOTE — PROGRESS NOTES
Progress Note  Hospital Medicine  Patient Name:Juliane Ramos  MRN:  [id number]  Patient Class: IP- Inpatient  Admit Date: 11/27/2017  Length of Stay: 7 days  Expected Discharge Date:   Attending Physician: eRg Devine MD  Primary Care Provider:  JOS Dumont    SUBJECTIVE:     Principal Problem: Left diabetic foot ulcer  Initial history of present illness: 57 year old female with HTN, DM2, HF, CKD has returned from [facility]. Patient was originally admitted on 11-18-17 with left diabetic foot ulcer. She stated that over the past few days it had been associated with worsening pain, drainage, and a fever of up to 102 and for this she came for evaluation.  She stated she was compliant with her medications but only takes her basal insulin when she needs it per sliding scale. Patient was admitted to Hospitalist medicine service. Patient was evaluated by Dr. Wick and Dr. Turner. Patient was being treated with IV antibiotics, wound care provided and was expected to have wound debridement. Patient had an episode of sudden onset of hypoxia and unresponsiveness. Patient vomited Marcelino sandwitch. Patient was expected to be NPO for podiatry procedure. Work up confirmed PE. Patient started on anticoagulation which was held yesterday due to bleeding from IV site. On the morning of 11-24-17, patient while ambulated with her  lost balance and hit left forehead to the wall. Left forehead contusion noted. No LOC or any focal neurological complaints or HA or vision changes. CT head is showed SAH. Patient transferred to [facility] neuro-critical care ICU and was evaluated by neuro-surgeon. SAH deemed stable. Patient needed one round of HD. Wound Cx grew MSSA treated with Maxipine and Vancomycin. Patient is in need for diabetic foot ulcer debridement.    PMH/PSH/SH/FH/Meds: reviewed.    Symptoms/Review of Systems: Patient is looking and feeling better. Sitting in chair with flat affect,  at bedside. No shortness of breath,  cough, chest pain or headache, fever or abdominal pain.     Diet:  Adequate intake.    Activity level: Up with assistance, fall precautions  Pain:  Patient reports no pain.       OBJECTIVE:   Vital Signs (Most Recent):      Temp: 98 °F (36.7 °C) (12/04/17 0800)  Pulse: 68 (12/04/17 0800)  Resp: 18 (12/04/17 0800)  BP: (!) 147/72 (12/04/17 0800)  SpO2: 99 % (12/04/17 0800)       Vital Signs Range (Last 24H):  Temp:  [97.6 °F (36.4 °C)-99.5 °F (37.5 °C)]   Pulse:  [66-76]   Resp:  [16-32]   BP: (126-155)/(69-84)   SpO2:  [97 %-100 %]     Weight: 118 kg (260 lb 2.3 oz)  Body mass index is 41.99 kg/m².    Intake/Output Summary (Last 24 hours) at 12/04/17 1019  Last data filed at 12/04/17 0700   Gross per 24 hour   Intake               60 ml   Output              375 ml   Net             -315 ml     Physical Examination:  General appearance: well developed, appears stated age  Head: normocephalic, Left black eye and left forehead ecchymoses  Eyes:  conjunctivae/corneas clear. PERRL.  Nose: Nares normal. Septum midline.  Throat: lips, mucosa, and tongue normal; teeth and gums normal, no throat erythema.  Neck: supple, symmetrical, trachea midline, no JVD and thyroid not enlarged, symmetric, no tenderness/mass/nodules  Lungs:  Decreased breath sounds at lung bases  Chest wall: no tenderness  Heart: regular rate and rhythm, S1, S2 normal, no murmur, click, rub or gallop  Abdomen: soft, non-tender non-distented; bowel sounds normal; no masses,  no organomegaly  Extremities: no cyanosis, clubbing or edema.   Pulses: 2+ and symmetric  Skin: Skin color, texture, turgor normal. Left Achilis Tendon wound with black eschar and mixed granulation tissue, tendon is exposed possibly. Mild brawny edema of left foot and ankle.  Lymph nodes: Cervical, supraclavicular, and axillary nodes normal.  Neurologic: Normal strength and tone. No focal numbness or weakness. CNII-XII intact.      CBC:    Recent Labs  Lab 12/02/17  3266  12/03/17  0318 12/04/17  0502   WBC 10.60  10.60 9.50  9.50 11.90  11.90   RBC 3.53*  3.53* 3.34*  3.34* 3.56*  3.56*   HGB 8.8*  8.8* 8.2*  8.2* 8.9*  8.9*   HCT 27.3*  27.3* 26.0*  26.0* 27.4*  27.4*     212 185  185 195  195   MCV 77*  77* 78*  78* 77*  77*   MCH 25.0*  25.0* 24.5*  24.5* 24.9*  24.9*   MCHC 32.3  32.3 31.5*  31.5* 32.4  32.4   BMP    Recent Labs  Lab 12/02/17  0324 12/03/17 0318 12/04/17  0502   *  162* 122*  122* 113*  113*   *  132* 133*  133* 133*  133*   K 4.4  4.4 4.2  4.2 4.5  4.5   CL 98  98 99  99 98  98   CO2 24  24 24  24 24  24   BUN 27*  27* 24*  24* 30*  30*   CREATININE 4.6*  4.6* 4.3*  4.3* 4.8*  4.8*   CALCIUM 8.6*  8.6* 8.4*  8.4* 8.6*  8.6*   MG 2.0 2.0 2.0      Diagnostic Results:  Microbiology Results (last 7 days)     ** No results found for the last 168 hours. **         Left Calcaneum:   1.  No radiographically apparent acute osteomyelitis. Skin irregularity and bandage noted in the posterior aspect of the ankle.  2. Degenerative changes in the ankle, hindfoot and midfoot are noted.     MRI left foot:  1. Retro-Achilles soft tissue wound. No evidence for osteomyelitis.  2. Mild nonspecific Achilles and posterior tibial tenosynovitis.  3. Small tibiotalar joint effusion.     Bilateral leg venous doppler scan: There are some limitations but no definite acute DVT in seen in either lower extremity.     Renal US: Unremarkable appearance of the kidneys.  No hydronephrosis.     CT head:  1. Abnormal study.  There is a left forehead and frontal scalp hematoma without underlying fracture.  Additionally, there is bilateral posttraumatic subarachnoid blood with hyperdense blood seen in right parietal sulci as well as in the left sylvian fissure and adjacent left frontal and parietal sulci.  There is no acute parenchymal hemorrhage.  2.  Remote right frontal lobe infarction with ex vacuo dilatation of the right  frontal horn.  There is no obvious acute infarction.  3.  Atherosclerosis.      CT head:  1. There is no definite new abnormality.  There has been some resolution in redistribution of previously demonstrated posttraumatic subarachnoid blood.  This blood is now most apparent in the right parietal sulcus.  There is no parenchymal hemorrhage.  2.  There is no obvious acute infarction.  This would however be better evaluated with MRI.  3.  There are chronic infarctions in the superior right basal ganglia and corona radiata with ex vacuo dilatation of the right lateral ventricle.  There is also a chronic infarct in the posterior right cerebellum.  In retrospect, this was present on prior studies.  4.  Interval improvement in left forehead and inferior frontal scalp contusion.    CXR: Unchanged right lung airspace disease and pleural effusion. Component of CHF likely. Appropriate positioning of right IJ catheter.    CXR: Increasing right pleural effusion with infiltrate and atelectasis in the right lung. Central line ends at the level of the right atrium. Mild cardiomegaly. Prior sternotomy. No.    CXR: Stable positioning of right IJ catheter.  Unchanged right pleural effusion and atelectasis/consolidation.    Assessment/Plan:      Acute post-traumatic sub-arachnoid hemorrhage  Fall precaution.  Continue PT and OT.           * Diabetic leg ulcer - grade 3 , left posterior ankle     - continue IV Cefepime + Vancomycin as per Dr. Turner.  - Dr. Wick following, getting Sanyl application. Surgical debridement soon.  - Follow ID recommendations.  - wound care.          Acute RLL pulmonary embolism  Future therapeutic dose to be decided after outpatient neurosurgery followup.  Consult Dr. Deal for IVC filter and Harry Catheter placement.     Acute on chronic hypoxic hypercarbic respiratory failure.  Supplemental O2 via nasal canula; titrate O2 saturation to >92%.   Continue beta 2 agonist bronchodilator treatments.    Use BiPAP during sleep. Needs Polysomnogram upon DC.    Possible seizure  Appreciated Dr. Gil's assistance, continue PO Keppra.  Continue neuro-checks, fall and seizure precautions.    MC - now on HD  Follow nephrology recommendations. Await renal recovery.      Hypothyroid     - continue synthroid          Controlled type 2 diabetes mellitus with stage 3 chronic kidney disease     Check blood glucose level q AC/HS.  Use Novolog Insulin Sliding Scale as needed.   Continue American Diabetic Association 1800 Kcal diet          CKD (chronic kidney disease) stage 3, GFR 30-59 ml/min     - avoid nephrotoxins and renally dose meds          Essential hypertension     - continue home meds and will titrate as needed      Discussed with patient's  and multiple family members. Also discussed with Dr. Mcfadden. Time spent in care of the patient, counseling and coordination of care (Greater than 50% spent in direct face to face contact): 35 min.     DVT Prophylaxis: Heparin 5K SQ q 12 hrs.    Reg Devine MD  Department of Hospital Medicine   [facility]

## 2017-12-04 NOTE — PLAN OF CARE
Problem: Infection, Risk/Actual (Adult)  Intervention: Manage Suspected/Actual Infection  Rested quietly during night. ABX therapy in progress. Remains a febrile. Possible SX today for foot ulcer. Trialysis port WDL. Nursing port flushes easily with good blood return.

## 2017-12-04 NOTE — PLAN OF CARE
Problem: Patient Care Overview  Goal: Plan of Care Review  Outcome: Revised  NPO for modified swallow study and debridement of left heel wound. Medicated with Tylenol per patient request for left knee pain. Assisted with meds and chin tuck to swallow, no coughing or difficulty noted. Safety maintained.

## 2017-12-04 NOTE — PLAN OF CARE
This note also relates to the following rows which could not be included:  SpO2 - Cannot attach notes to unvalidated device data  Pulse - Cannot attach notes to unvalidated device data  Resp - Cannot attach notes to unvalidated device data       12/04/17 1119   Preset CPAP/BiPAP Settings   Mode Of Delivery BiPAP   $ CPAP/BiPAP Daily Charge BiPAP/CPAP Daily   $ Is patient using? Yes   Equipment Type V60   Airway Device Type medium full face mask   Ipap 10   EPAP (cm H2O) 5   Pressure Support (cm H2O) 5   Set Rate (Breaths/Min) 6   Flow Rate (L/Min) 1   ITime (sec) 1   Rise Time (sec) 0.3   Patient CPAP/BiPAP Settings   Timed Inspiration (Sec) 1   IPAP Rise Time (sec) 0.3   RR Total (Breaths/Min) 24   Tidal Volume (mL) 420   VE Minute Ventilation (L/min) 10 L/min   Peak Inspiratory Pressure (cm H2O) 12   TiTOT (%) 28   Total Leak (L/Min) 12   Patient Trigger - ST Mode Only (%) 99   CPAP/BiPAP Alarms   High Pressure (cm H2O) 15   Low Pressure (cm H2O) 5   Low Pressure Delay (Sec) 20   Minute Ventilation (L/Min) 2   High RR (breaths/min) 40   Low RR (breaths/min) 5

## 2017-12-04 NOTE — PROGRESS NOTES
"  Progress Note  [facility]    Admit Date: 11/27/2017    SUBJECTIVE:     Follow-up For:  Acute respiratory failure with hypoxia and hypercapnia      Interval history (See H&P for complete P,F,SHx) : Good night. Wearing BIPAP. O2 sats still low. Noted aspiration with foods by nursing.    Review of Systems: Systems were reviewed and otherwise negative unless indicated below.  Gen- Weakness/ Fatigue  Neuro- Confusion  Resp: Cough/ SOB  Extrem- Pain/Swelling        OBJECTIVE:     Vital Signs Range (Last 24H):  Temp:  [98 °F (36.7 °C)-99.5 °F (37.5 °C)]   Pulse:  [67-79]   Resp:  [17-32]   BP: (106-149)/(59-82)   SpO2:  [87 %-100 %]     I & O (Last 24H):    Intake/Output Summary (Last 24 hours) at 12/03/17 2133  Last data filed at 12/03/17 1816   Gross per 24 hour   Intake              260 ml   Output              250 ml   Net               10 ml       Estimated body mass index is 43.06 kg/m² as calculated from the following:    Height as of this encounter: 5' 6" (1.676 m).    Weight as of this encounter: 121 kg (266 lb 12.1 oz).    Physical Exam:  General- Patient alert and oriented x3 in NAD + obese  HEENT- PERRLA, EOMI, OP clear, MMM + ecchymoses in face  Neck- No JVD, Lymphadenopathy, Thyromegaly  CV- Regular rate and rhythm, No Murmur/john/rubs  Resp- Lungs decreased BS bilaterally.  GI- Non tender/non-distended, BS normoactive x4 quads, no HSM  Extrem- No cyanosis, clubbing, edema. Pulses 2+ and symmetric  Neuro- Strength 5/5 flexors/extensors, DTRs 2+ and symmetric, Intact sensation to light touch grossly  Skin-  + foot lesion with ulcer    Laboratory/Diagnostic Data:  Reviewed and noted in plan where applicable- Please see chart for full lab data.    Medications:  Medication list was reviewed and changes noted under Assessment/Plan.    ASSESSMENT/PLAN:     Active Problems:    Active Hospital Problems    Diagnosis  POA    *Acute respiratory failure with hypoxia and hypercapnia [J96.01, " J96.02]-  Multiple etiologies; Renal failure with volume overload, Obesity hypoventilation and pulmonary embolus. Supplement with BIPAP, supplemental oxygen. Monitor closely. NPO for SLP eval for aspiration.  Yes    Obesity [E66.9]- Body mass index is 43.06 kg/m². Morbid obesity complicates all aspects of disease management from diagnostic modalities to treatment. Weight loss encouraged and health benefits explained to patient.  Yes    Subarachnoid hemorrhage following injury, no loss of consciousness [S06.6X0A]-  Severe. Monitor neuro status closely.  Yes    Osteomyelitis of foot-  ID consulted. Going to OR in next 1-2 days. Continue ABX per ID reccs.      Pulmonary embolus [I26.99]-  Unable to Treat d/t recent SAH. Will set up for IVC filter.   Yes    Acute renal failure superimposed on stage 3 chronic kidney disease [N17.9, N18.3]-  RRT per nephrology. Renal dose meds.  Yes    Coronary artery disease involving native coronary artery of native heart without angina pectoris [I25.10]- Patient with known CAD s/p stent placement, which is controlled Will continue Statin and monitor for S/Sx of angina/ACS. Continue to monitor on telemetry.     Yes     Chronic    Aspiration-  NPO pending SLP eval.       Type 2 diabetes mellitus with stage 3 chronic kidney disease, without long-term current use of insulin [E11.22, N18.3]-   Patient's FSGs are controlled on current hypoglycemics.   Last A1c reviewed-   Lab Results   Component Value Date    HGBA1C 8.0 (H) 11/25/2017     Most recent fingerstick glucose reviewed-     Recent Labs  Lab 12/03/17  1623   POCTGLUCOSE 158*     Current correctional scale  Medium  Maintain insulin dose as follows- SSI  Yes     Chronic      Resolved Hospital Problems    Diagnosis Date Resolved POA   No resolved problems to display.         Disposition- Home    VTE Risk Mitigation         Ordered     heparin (porcine) injection 5,000 Units  Every 12 hours     Route:  Subcutaneous        11/29/17  0901     Medium Risk of VTE  Once      11/27/17 2158     Place MARY ELLEN hose  Until discontinued      11/27/17 2158     Place sequential compression device  Until discontinued      11/27/17 2158          Critical care time spent on the evaluation and treatment of severe organ dysfunction, review of pertinent labs and imaging studies, discussions with consulting providers and discussions with patient/family 36 minutes

## 2017-12-04 NOTE — PLAN OF CARE
Problem: Patient Care Overview  Goal: Plan of Care Review  Pt remains free from injury.  Tolerated oob to chair for short while today.  Max assist to return to bed.  Dressing and cath tips changed to trialysis (not TLC) per policy.  Dressing changed to left heel as ordered.  Pt tolerated.  MBSS performed and pt ok to eat and tolerating diet.  recs are to have hemosplit and ivc filter placed prior to debridement.  Dialysis scheduled for 12/5/17.  Sacral area escoriated and cream applied.  Repositioning every 2 hours to relief pressure.  CBGs remaining stable at this time.  POC discussed with patient and .  Verbalized understanding.

## 2017-12-04 NOTE — PLAN OF CARE
Problem: Physical Therapy Goal  Goal: Physical Therapy Goal  Goals to be met by: 17     Patient will increase functional independence with mobility by performin. Supine to sit with MInimal Assistance  2. Bed to chair transfer with Minimal Assistance     Outcome: Ongoing (interventions implemented as appropriate)  PT for bed mobility, transfer training and therex/ROM

## 2017-12-04 NOTE — PROGRESS NOTES
Progress Note  PULMONARY    Admit Date: 11/27/2017 12/04/2017      SUBJECTIVE:     Dec 4, some sob, diffuse edema,       PFSH and Allergies reviewed.    OBJECTIVE:     Vitals (Most recent):  Vitals:    12/04/17 1500   BP: 130/76   Pulse: 69   Resp: (!) 25   Temp:        Vitals (24 hour range):  Temp:  [97.6 °F (36.4 °C)-99.5 °F (37.5 °C)]   Pulse:  [66-76]   Resp:  [16-32]   BP: (126-155)/(67-84)   SpO2:  [97 %-100 %]       Intake/Output Summary (Last 24 hours) at 12/04/17 1651  Last data filed at 12/04/17 0700   Gross per 24 hour   Intake               60 ml   Output              375 ml   Net             -315 ml          Physical Exam:  The patient's neuro status (alertness,orientation,cognitive function,motor skills,), pharyngeal exam (oral lesions, hygiene, abn dentition,), Neck (jvd,mass,thyroid,nodes in neck and above/below clavicle),RESPIRATORY(symmetry,effort,fremitus,percussion,auscultation),  Cor(rhythm,heart tones including gallops,perfusion,edema)ABD(distention,hepatic&splenomegaly,tenderness,masses), Skin(rash,cyanosis),Psyc(affect,judgement,).  Exam negative except for these pertinent findings:    Obese, no distress, edema from groin down, symmetric chest, good bs, dull 1/2 down.    Radiographs reviewed: view by direct vision  Right effusion back to June and now sl worse, pe seen rll  Results for orders placed during the hospital encounter of 11/24/17   X-Ray Chest 1 View    Narrative AP chest    Comparison: 11/23/17    Results: There are sternotomy wires.  The cardiac silhouette is mildly enlarged.  The pulmonary vascularity is increased.  This is worrisome for CHF.  No focal large area of airspace disease.  Can't exclude a small layering right pleural effusion.  No pneumothorax.  The osseous structures appear within normal limits.    Impression  No significant change from the prior study.      Electronically signed by: IRVING WYATT MD  Date:     11/24/17  Time:    16:36    ]    Labs        Recent Labs  Lab 12/04/17  0502   WBC 11.90  11.90   HGB 8.9*  8.9*   HCT 27.4*  27.4*     195     Recent Labs  Lab 12/04/17  0502   *  133*   K 4.5  4.5   CL 98  98   CO2 24  24   BUN 30*  30*   CREATININE 4.8*  4.8*   *  113*   CALCIUM 8.6*  8.6*   MG 2.0   PHOS 4.1   AST 11   ALT <5*   ALKPHOS 67   BILITOT 0.5   PROT 6.3   ALBUMIN 2.1*   No results for input(s): PH, PCO2, PO2, HCO3 in the last 24 hours.  Microbiology Results (last 7 days)     ** No results found for the last 168 hours. **          Impression:  Active Hospital Problems    Diagnosis  POA    *Acute respiratory failure with hypoxia and hypercapnia [J96.01, J96.02]  Yes    Obesity [E66.9]  Yes    Subarachnoid hemorrhage following injury, no loss of consciousness [S06.6X0A]  Yes    Pulmonary embolus [I26.99]  Yes    Acute renal failure superimposed on stage 3 chronic kidney disease [N17.9, N18.3]  Yes    Acquired hypothyroidism [E03.9]  Yes    CKD (chronic kidney disease) stage 3, GFR 30-59 ml/min [N18.3]  Yes    Coronary artery disease involving native coronary artery of native heart without angina pectoris [I25.10]  Yes     Chronic    Type 2 diabetes mellitus with stage 3 chronic kidney disease, without long-term current use of insulin [E11.22, N18.3]  Yes     Chronic      Resolved Hospital Problems    Diagnosis Date Resolved POA   No resolved problems to display.               Plan:     Dec 4, 2017, pulm embolism should be rx as best able, reasoanble to place ivc filter with neg leg study and pul embolism as SAH precludes anticoagg near future.  Thoracentesis would be optimal once edema controlled but expect dialysis will clear edema.                                    .

## 2017-12-04 NOTE — PLAN OF CARE
1300: dressing changed to left heel.  Area cleaned with wound cleanser.  Santyl applied and wrapped with gauze wrap. Extremity elevated to pillow.  Dressing to trialysis changed per sterile technique.  Catheter tips changed as well per policy.  Pt tolerated.      1340: pt off floor to [facility] and returned shortly after and cleared for diet.  Barrier cream applied to sacrum and pt repositioned for pressure relief.  Blister noted to left, top back area.  Groin folds cleansed and barrier sheet applied.  Left leg elevated on pillow.   at bedside.

## 2017-12-04 NOTE — PLAN OF CARE
1027: [facility] for Dr Deal re consult.  1110: s/c Laura at office [phone] re consult

## 2017-12-04 NOTE — PT/OT/SLP PROGRESS
Physical Therapy Treatment    Patient Name:  Juliane Ramos   MRN:  [id number]    Recommendations:     Discharge Recommendations:  nursing facility, skilled     Assessment:     Juliane Ramos is a 57 y.o. female admitted with a medical diagnosis of Acute respiratory failure with hypoxia and hypercapnia.  She presents with the following impairments/functional limitations:  weakness, impaired endurance, impaired self care skills, impaired functional mobilty, gait instability, impaired balance, decreased lower extremity function, decreased safety awareness, pain, decreased ROM, impaired skin, orthopedic precautions .    Rehab Prognosis:  fair; patient would benefit from acute skilled PT services to address these deficits and reach maximum level of function.      Recent Surgery: Procedure(s) (LRB):  DEBRIDEMENT-FOOT and apply primatrix (Left)      Plan:     During this hospitalization, patient to be seen 6 x/week to address the above listed problems via gait training, therapeutic activities, therapeutic exercises, therapeutic groups, wheelchair management/training  · Plan of Care Expires:  12/29/17   Plan of Care Reviewed with: patient    Subjective     Communicated with nurse Perez prior to session.  Patient found supine in bed upon PT entry to room, agreeable to treatment.      Chief Complaint: weakness  Patient comments/goals: get stronger and go home  Pain/Comfort:  · Pain Rating 1:  (did not rate, requested pain medicine once back in bed)  · Location - Side 1: Left  · Location 1: ankle (and knee)  · Pain Addressed 1: Reposition, Distraction, Cessation of Activity, Nurse notified    Patients cultural, spiritual, Adventism conflicts given the current situation: none    Objective:     Patient found with: blood pressure cuff, villegas catheter, oxygen, peripheral IV, pulse ox (continuous), telemetry     General Precautions: Standard, fall   Orthopedic Precautions:LLE non weight bearing       Functional Mobility:  · Bed  Mobility:     · Rolling Left:  minimum assistance  · Scooting: moderate assistance to EOB while seated, increased time required to complete  · Bridging: total assistance and of 2 persons to HOB while supine  · Supine to Sit: moderate assistance  · Sit to Supine: maximal assistance and of 2 persons (one for trunk and one for LE's)  · Transfers:     · Sit to Stand:  moderate assistance from raised bed, total assistance and of 2 persons from bedside chair with 2 attempts total with rolling walker. Max cueing and instruction for proper tech and to maintain NWB LLE  · Bed to Chair and Chair to Bed: maximal assistance and of 2 persons with  rolling walker  using  Stand Pivot. Max cueing and instruction for proper tech and to maintain NWB LLE      Therapeutic Activities and Exercises:  Increased time and assistance required throughout session for multiple line management and energy conservation.  Pt required min A for EOB sitting balance prior to bed to chair transfer, as pt with increased posterior lean 2' decreased core strength.   Pt assisted to chair with max A. Once repositioned for comfort in chair, pt performed BLE therex: AP, LAQ, marching, hip abd/add. Pt instructed to perform x10 reps each periodically throughout the day.  Pt assisted BTB after being seated in chair approx 1 hour and 45 minutes.     Patient left supine after seated in chair approx 1 hour and 45 minutes with all lines intact, call button in reach and nurse Ana and pt's family present..    GOALS:    Physical Therapy Goals        Problem: Physical Therapy Goal    Goal Priority Disciplines Outcome Goal Variances Interventions   Physical Therapy Goal     PT/OT, PT Ongoing (interventions implemented as appropriate)     Description:  Goals to be met by: 2017     Patient will increase functional independence with mobility by performin. Supine to sit with Minimal Assistance  2. Sit to supine with Minimal Assistance  3. Sit to stand transfer  with Minimal Assistance  4. Bed to chair transfer with Minimal Assistance using Rolling Walker  5. Stand for 5 minutes with Stand-by Assistance using Rolling Walker  6. Lower extremity exercise program x10 reps, with supervision  Wheelchair management goal to be made pending progress              Problem: Physical Therapy Goal    Goal Priority Disciplines Outcome Goal Variances Interventions   Physical Therapy Goal     PT/OT, PT Ongoing (interventions implemented as appropriate)     Description:  Goals to be met by: 17     Patient will increase functional independence with mobility by performin. Supine to sit with MInimal Assistance  2. Bed to chair transfer with Minimal Assistance                      Time Tracking:     PT Received On: 17  PT Start Time: 935     PT Stop Time: 1002 (BTB 11:45-12:00)  PT Total Time (min): 42 min     Billable Minutes: Therapeutic Activity 34 and Therapeutic Exercise 8    Treatment Type: Treatment  PT/PTA: PTA     PTA Visit Number: 1     Gladys Calderon, KEVIN  2017

## 2017-12-04 NOTE — PLAN OF CARE
12/03/17 1940   Patient Assessment/Suction   Level of Consciousness (AVPU) alert   Respiratory Effort Unlabored   Expansion/Accessory Muscles/Retractions no use of accessory muscles   All Lung Fields Breath Sounds diminished   PRE-TX-O2-ETCO2   O2 Device (Oxygen Therapy) BiPAP   Oxygen Concentration (%) 40   SpO2 98 %   Pulse 74   Resp (!) 28   Aerosol Therapy   $ Aerosol Therapy Charges PRN treatment not required   Respiratory Treatment Status PRN treatment not required   Preset CPAP/BiPAP Settings   Mode Of Delivery BiPAP   $ Initial CPAP/BiPAP Setup? No   $ Is patient using? Yes   Equipment Type V60   Airway Device Type medium full face mask   Ipap 10   EPAP (cm H2O) 5   Pressure Support (cm H2O) 5   Set Rate (Breaths/Min) 6   ITime (sec) 1   Rise Time (sec) 0.3   Patient CPAP/BiPAP Settings   RR Total (Breaths/Min) 28   Tidal Volume (mL) 325   VE Minute Ventilation (L/min) 10 L/min   Peak Inspiratory Pressure (cm H2O) 11   TiTOT (%) 31   Total Leak (L/Min) 9   Patient Trigger - ST Mode Only (%) 100   CPAP/BiPAP Alarms   High Pressure (cm H2O) 15   Low Pressure (cm H2O) 5   Low Pressure Delay (Sec) 20   Minute Ventilation (L/Min) 2   High RR (breaths/min) 40   Low RR (breaths/min) 5   Pt tolerates NC and BiPAP well. BiPAP to red outlet with alarms on and functioning.

## 2017-12-04 NOTE — PROGRESS NOTES
"Modified Barium Swallowing Study    Past Medical History:   Diagnosis Date    Anemia in stage 3 chronic kidney disease 11/26/2017    CHF (congestive heart failure)     COPD (chronic obstructive pulmonary disease)     Coronary artery disease     Diabetes mellitus     Encounter for blood transfusion     Essential hypertension 6/24/2017    Hypertension     MI (myocardial infarction) 2010    Segmental and subsegmental pulmonary emboli of RLL without acute cor pulmonale 11/25/2017    Stroke     July 2005    Thyroid disease     Traumatic subarachnoid hemorrhage 11/24/2017    Type 2 diabetes mellitus with stage 3 chronic kidney disease, without long-term current use of insulin 6/24/2017     Past Medical History:   Diagnosis Date    Anemia in stage 3 chronic kidney disease 11/26/2017    CHF (congestive heart failure)     COPD (chronic obstructive pulmonary disease)     Coronary artery disease     Diabetes mellitus     Encounter for blood transfusion     Essential hypertension 6/24/2017    Hypertension     MI (myocardial infarction) 2010    Segmental and subsegmental pulmonary emboli of RLL without acute cor pulmonale 11/25/2017    Stroke     July 2005    Thyroid disease     Traumatic subarachnoid hemorrhage 11/24/2017    Type 2 diabetes mellitus with stage 3 chronic kidney disease, without long-term current use of insulin 6/24/2017     Past Surgical History:   Procedure Laterality Date    ABCESS DRAINAGE Right     Between "little toe" and the one next to it    BREAST SURGERY      Reduction [id number]    CARDIAC SURGERY  01/2011    CABG 4vessel ring in one valve mitral valve prolapse    CORONARY ARTERY BYPASS GRAFT      hyperlipidemia      TUBAL LIGATION  03/1986     Pt seen today for MBSS due to s/s aspiration over the weekend.     Today, pt was found to have WFL oropharyngeal stages for all textures & consistencies trialed.  Additionally, barium pill was taken with a single swallow of liquid & " passed without problem from mouth to stomach.  Recommend resume regular textures & liquids.  G Codes Swallowing Current CH     Goal CH     D/C CH     12/04/17 1400   Speech Time Calculation   Speech Start Time 1357   Speech Stop Time 1412   Speech Total (min) 15 min   General Information   SLP Treatment Date 12/04/17   Referring Physician    General Observations alert, cooperative, no dysartrhria   Pertinent History of Current Problem episode of s/s aspiration over the weekend   Chest X-ray results Unchanged right pleural effusion and atelectasis/consolidation.   Current Respiratory Status nasal cannula   General Precautions fall   Current Diet   Current Diet Textures NPO   Subjective   Patient states I'd like to get soemthing to eat   Oral Musculature Evaluation   Oral Musculature WFL  (see BSS done 11/27)   Dentition present and adequate       MBS Eval: Thin Liquid Trial   Mode of Presentation, Thin Liquid spoon;fed by clinician;cup;straw;self fed   Volume of Thin Liquid Presented tsp, cup sip, straw sip, serial straw swallows   Oral Prep/Phase, Thin Liquid WNL   Pharyngeal Phase, Thin Liquid WFL  (decreased epiglottic inversion)   Rosenbeck's 8-Point Penetration-Aspiration Scale, Thin Liquids (1) Material does not enter airway.   Esophageal Phase, Thin (nothing which affected pharyngeal swallow)   Diagnostic Statement WFL oropharyngeal stages       MBS Eval: Pureed Trial   Mode of Presentation, Puree spoon   Volume of Puree Presented tsp   Oral Prep/Phase, Puree WNL   Pharyngeal Phase, Puree WFL  (decreased epiglottic inversion)   Rosenbeck's 8-Point Penetration-Aspiration Scale, Pureed (1) Material does not enter airway.   Diagnostic Statement WFL oropharyngeal stages       MBS Eval: Soft Solid Trial   Mode of Presentation, Semisolid spoon   Volume of Semisolid Food Presented tsp peaches in thin barium   Oral Prep/Phase, Semisolid WNL   Pharyngeal Phase, Semisolid WFL  (decreased epiglottic inversion)    Rosenbeck's 8-Point Penetration-Aspiration Scale, Semisolid (1) Material does not enter airway.   Diagnostic Statement L oropharyngeal stages       MBS Eval: Solid Food Texture Trial   Mode of Presentation, Solid spoon   Volume of Solid Food Presented 1/2 cookie with barium paste   Oral Prep/Phase, Solid WNL   Pharyngeal Phase, Solid WFL  (decreased epiglottic  inversion)   Rosenbeck's 8-Point Penetration-Aspiration Scale, Solid (1) Material does not enter airway.   Diagnostic Statement [facility] oropharyngeal stages   Recommendations   Solid Diet Level Regular   Liquid Diet Level Thin   Treatment/Billable Minutes   Motion Fluoro Swallow, Cine/Vid 15   Total Time 15

## 2017-12-04 NOTE — PROGRESS NOTES
INPATIENT NEPHROLOGY PROGRESS NOTE  [facility] NEPHROLOGY    Patient Name: Juliane Ramos  Date: 12/04/2017    Reason for consultation: MC      Chief Complaint: Left diabetic foot ulcer     History of Present Illness:  Ms. Ramos is a 56 y/o F with a hx of HTN, DM2, CHF, and stage III CKD who p/w a left heel ulcer/cellulitis x 2 weeks, nonhealing. Hospital course c/b PE, fall with SAH, and MC 2/2 contrast/vancomycin/infection requiring RRT. We have been consulted for MC.    · Interval History/Subjective:    - no complaints (cp, dyspnea, abd pain), c/w edema  - UOP 200cc, BP is stable, O2 requirement is stable    12/4  Denies n,v, chest pair sob.       Plan of Care:    Assessment:  Oliguric MC 2/2 multifactorial ATN, dialysis dependent  Edema/Pleural effusion  Hyponatremia  Anemia  Low albumin      Plan:     1. Acute Kidney Injury- She remains dialysis dependent. Continue HD TTS. Consulted VS for a hemosplit. She will need outpatient HD unit placement. No NSAIDs or IV contrast. Dose antbx for dialysis.      2. Volume/Blood Pressure- BP is stable. She is edematous. CXR shows stable R pleural effusion. Continue UF 3-4L with dialysis with albumin PRN. Consider R thoracentesis. Continue NIPPV. We are optimizing nutritional status.      3. Electrolytes/Acid Base- HypoNa is stable, continue current dialysis prescription/UF goals.       4. Anemia of CKD- Hb is stable. Continue EPO 5K with HD.      5. Low albumin- Stable, continue Nepro.    Thank you for allowing us to participate in this patient's care. We will continue to follow.    Medications:  No current facility-administered medications on file prior to encounter.      Current Outpatient Prescriptions on File Prior to Encounter   Medication Sig Dispense Refill    albuterol (ACCUNEB) 1.25 mg/3 mL Nebu Take 1.25 mg by nebulization every 6 (six) hours as needed. Rescue      carvedilol (COREG) 12.5 MG tablet Take 1 tablet (12.5 mg total) by mouth 2 (two) times  daily. 60 tablet 11    cefepime in dextrose 5 % (MAXIPIME) 1 gram/50 mL PgBk Inject 50 mLs (1 g total) into the vein every 24 hours as needed.      gabapentin (NEURONTIN) 100 MG capsule Take 1 capsule (100 mg total) by mouth 3 (three) times daily. 90 capsule 11    levetiracetam oral soln 500 mg/5 mL (5 mL) Soln 2.5 mLs (250 mg total) by Per NG tube route 2 (two) times daily. 150 mL 11    levothyroxine (SYNTHROID) 50 MCG tablet Take 50 mcg by mouth once daily.        metronidazole (FLAGYL) 500 mg/100 mL IVPB Inject 100 mLs (500 mg total) into the vein every 8 (eight) hours.      rosuvastatin (CRESTOR) 10 MG tablet Take 1 tablet (10 mg total) by mouth once daily. 30 tablet 0    VANCOMYCIN HCL (VANCOMYCIN IN 5 % DEXTROSE) 1.75 gram/500 mL Soln Inject 500 mLs (1,750 mg total) into the vein once daily.       Scheduled Meds:   carvedilol  12.5 mg Per NG tube BID    ceFEPime (MAXIPIME) IVPB  1 g Intravenous Daily    collagenase   Topical Daily    epoetin donita (PROCRIT) injection  5,000 Units Intravenous Every Tues, Thurs, Sat    gabapentin  100 mg Oral TID    heparin (porcine)  5,000 Units Subcutaneous Q12H    insulin aspart  1-10 Units Subcutaneous TIDWM    levothyroxine  50 mcg Per NG tube Before breakfast    rosuvastatin  10 mg Per NG tube Daily    sodium chloride 0.9%  3 mL Intravenous Q8H    [START ON 12/5/2017] vancomycin (VANCOCIN) IVPB  1,000 mg Intravenous Every Tues, Thurs, Sat     Continuous Infusions:   PRN Meds:.sodium chloride 0.9%, acetaminophen, albuterol sulfate, dextrose 50%, glucagon (human recombinant), glucose, glucose, labetalol, levetiracetam oral soln, magnesium oxide, magnesium oxide, ondansetron, potassium phosphate IVPB, simethicone    Allergies:  Patient has no known allergies.    Vital Signs:  Temp Readings from Last 3 Encounters:   12/04/17 97.8 °F (36.6 °C)   11/27/17 97 °F (36.1 °C) (Oral)   11/23/17 97.4 °F (36.3 °C)     Pulse Readings from Last 3 Encounters:   12/04/17 69    11/27/17 67   11/24/17 65     BP Readings from Last 3 Encounters:   12/04/17 (!) 151/84   11/27/17 123/67   11/24/17 (!) 108/58     Weight:  Wt Readings from Last 3 Encounters:   12/04/17 118 kg (260 lb 2.3 oz)   11/27/17 117 kg (257 lb 15 oz)   11/22/17 106.6 kg (234 lb 15.8 oz)     Physical Exam:  Constitutional: nad, aao x 3   Heart: rrr, no m/r/g, wwp, + edema  Lungs: ant ausc clear, no w/r/r/c, no lb  Abdomen: s/nt/nd, +BS    Results:  Lab Results   Component Value Date     (L) 12/04/2017     (L) 12/04/2017    K 4.5 12/04/2017    K 4.5 12/04/2017    CL 98 12/04/2017    CL 98 12/04/2017    CO2 24 12/04/2017    CO2 24 12/04/2017    BUN 30 (H) 12/04/2017    BUN 30 (H) 12/04/2017    CREATININE 4.8 (H) 12/04/2017    CREATININE 4.8 (H) 12/04/2017    CALCIUM 8.6 (L) 12/04/2017    CALCIUM 8.6 (L) 12/04/2017    ANIONGAP 11 12/04/2017    ANIONGAP 11 12/04/2017    ESTGFRAFRICA 11 (A) 12/04/2017    ESTGFRAFRICA 11 (A) 12/04/2017    EGFRNONAA 10 (A) 12/04/2017    EGFRNONAA 10 (A) 12/04/2017     Lab Results   Component Value Date    CALCIUM 8.6 (L) 12/04/2017    CALCIUM 8.6 (L) 12/04/2017    PHOS 4.1 12/04/2017       Recent Labs  Lab 12/04/17  0502   WBC 11.90  11.90   RBC 3.56*  3.56*   HGB 8.9*  8.9*   HCT 27.4*  27.4*     195   MCV 77*  77*   MCH 24.9*  24.9*   MCHC 32.4  32.4     I have personally reviewed pertinent radiological imaging and reports.    Elijah Gonzalez MD  Nephrology  [facility]  [phone]

## 2017-12-05 LAB
ALBUMIN SERPL BCP-MCNC: 2 G/DL
ALP SERPL-CCNC: 91 U/L
ALT SERPL W/O P-5'-P-CCNC: <5 U/L
ANION GAP SERPL CALC-SCNC: 10 MMOL/L
ANION GAP SERPL CALC-SCNC: 10 MMOL/L
AST SERPL-CCNC: 9 U/L
BASOPHILS # BLD AUTO: 0 K/UL
BASOPHILS # BLD AUTO: 0 K/UL
BASOPHILS NFR BLD: 0.1 %
BASOPHILS NFR BLD: 0.1 %
BILIRUB SERPL-MCNC: 0.4 MG/DL
BUN SERPL-MCNC: 39 MG/DL
BUN SERPL-MCNC: 39 MG/DL
CALCIUM SERPL-MCNC: 8.3 MG/DL
CALCIUM SERPL-MCNC: 8.3 MG/DL
CHLORIDE SERPL-SCNC: 98 MMOL/L
CHLORIDE SERPL-SCNC: 98 MMOL/L
CO2 SERPL-SCNC: 25 MMOL/L
CO2 SERPL-SCNC: 25 MMOL/L
CREAT SERPL-MCNC: 5.4 MG/DL
CREAT SERPL-MCNC: 5.4 MG/DL
DIFFERENTIAL METHOD: ABNORMAL
DIFFERENTIAL METHOD: ABNORMAL
EOSINOPHIL # BLD AUTO: 0.2 K/UL
EOSINOPHIL # BLD AUTO: 0.2 K/UL
EOSINOPHIL NFR BLD: 2.1 %
EOSINOPHIL NFR BLD: 2.1 %
ERYTHROCYTE [DISTWIDTH] IN BLOOD BY AUTOMATED COUNT: 21.3 %
ERYTHROCYTE [DISTWIDTH] IN BLOOD BY AUTOMATED COUNT: 21.3 %
EST. GFR  (AFRICAN AMERICAN): 10 ML/MIN/1.73 M^2
EST. GFR  (AFRICAN AMERICAN): 10 ML/MIN/1.73 M^2
EST. GFR  (NON AFRICAN AMERICAN): 8 ML/MIN/1.73 M^2
EST. GFR  (NON AFRICAN AMERICAN): 8 ML/MIN/1.73 M^2
GLUCOSE SERPL-MCNC: 195 MG/DL
GLUCOSE SERPL-MCNC: 195 MG/DL
HCT VFR BLD AUTO: 26.8 %
HCT VFR BLD AUTO: 26.8 %
HGB BLD-MCNC: 8.5 G/DL
HGB BLD-MCNC: 8.5 G/DL
LYMPHOCYTES # BLD AUTO: 0.6 K/UL
LYMPHOCYTES # BLD AUTO: 0.6 K/UL
LYMPHOCYTES NFR BLD: 6 %
LYMPHOCYTES NFR BLD: 6 %
MAGNESIUM SERPL-MCNC: 2.1 MG/DL
MCH RBC QN AUTO: 24.5 PG
MCH RBC QN AUTO: 24.5 PG
MCHC RBC AUTO-ENTMCNC: 31.7 G/DL
MCHC RBC AUTO-ENTMCNC: 31.7 G/DL
MCV RBC AUTO: 77 FL
MCV RBC AUTO: 77 FL
MONOCYTES # BLD AUTO: 1.2 K/UL
MONOCYTES # BLD AUTO: 1.2 K/UL
MONOCYTES NFR BLD: 11.6 %
MONOCYTES NFR BLD: 11.6 %
NEUTROPHILS # BLD AUTO: 8.4 K/UL
NEUTROPHILS # BLD AUTO: 8.4 K/UL
NEUTROPHILS NFR BLD: 80.2 %
NEUTROPHILS NFR BLD: 80.2 %
PHOSPHATE SERPL-MCNC: 4.4 MG/DL
PLATELET # BLD AUTO: 185 K/UL
PLATELET # BLD AUTO: 185 K/UL
PMV BLD AUTO: 8.5 FL
PMV BLD AUTO: 8.5 FL
POCT GLUCOSE: 136 MG/DL (ref 70–110)
POCT GLUCOSE: 177 MG/DL (ref 70–110)
POCT GLUCOSE: 189 MG/DL (ref 70–110)
POTASSIUM SERPL-SCNC: 4.8 MMOL/L
POTASSIUM SERPL-SCNC: 4.8 MMOL/L
PROT SERPL-MCNC: 6.3 G/DL
RBC # BLD AUTO: 3.47 M/UL
RBC # BLD AUTO: 3.47 M/UL
SODIUM SERPL-SCNC: 133 MMOL/L
SODIUM SERPL-SCNC: 133 MMOL/L
VANCOMYCIN SERPL-MCNC: 23.4 UG/ML
WBC # BLD AUTO: 10.5 K/UL
WBC # BLD AUTO: 10.5 K/UL

## 2017-12-05 PROCEDURE — 80202 ASSAY OF VANCOMYCIN: CPT

## 2017-12-05 PROCEDURE — 36415 COLL VENOUS BLD VENIPUNCTURE: CPT

## 2017-12-05 PROCEDURE — 25000003 PHARM REV CODE 250: Performed by: INTERNAL MEDICINE

## 2017-12-05 PROCEDURE — 63600175 PHARM REV CODE 636 W HCPCS: Performed by: INTERNAL MEDICINE

## 2017-12-05 PROCEDURE — A4216 STERILE WATER/SALINE, 10 ML: HCPCS | Performed by: NURSE PRACTITIONER

## 2017-12-05 PROCEDURE — 99233 SBSQ HOSP IP/OBS HIGH 50: CPT | Mod: ,,, | Performed by: INTERNAL MEDICINE

## 2017-12-05 PROCEDURE — 84100 ASSAY OF PHOSPHORUS: CPT

## 2017-12-05 PROCEDURE — 99900035 HC TECH TIME PER 15 MIN (STAT)

## 2017-12-05 PROCEDURE — 80053 COMPREHEN METABOLIC PANEL: CPT

## 2017-12-05 PROCEDURE — 83735 ASSAY OF MAGNESIUM: CPT

## 2017-12-05 PROCEDURE — 99232 SBSQ HOSP IP/OBS MODERATE 35: CPT | Mod: ,,, | Performed by: INTERNAL MEDICINE

## 2017-12-05 PROCEDURE — 97803 MED NUTRITION INDIV SUBSEQ: CPT | Performed by: DIETITIAN, REGISTERED

## 2017-12-05 PROCEDURE — 20000000 HC ICU ROOM

## 2017-12-05 PROCEDURE — 25000003 PHARM REV CODE 250: Performed by: NURSE PRACTITIONER

## 2017-12-05 PROCEDURE — 97110 THERAPEUTIC EXERCISES: CPT

## 2017-12-05 PROCEDURE — 97530 THERAPEUTIC ACTIVITIES: CPT

## 2017-12-05 PROCEDURE — 85025 COMPLETE CBC W/AUTO DIFF WBC: CPT

## 2017-12-05 PROCEDURE — 97802 MEDICAL NUTRITION INDIV IN: CPT | Performed by: DIETITIAN, REGISTERED

## 2017-12-05 PROCEDURE — 80100014 HC HEMODIALYSIS 1:1

## 2017-12-05 RX ORDER — DOXYLAMINE SUCCINATE 25 MG
TABLET ORAL 2 TIMES DAILY
Status: DISCONTINUED | OUTPATIENT
Start: 2017-12-05 | End: 2017-12-08

## 2017-12-05 RX ORDER — HEPARIN SODIUM 1000 [USP'U]/ML
4000 INJECTION, SOLUTION INTRAVENOUS; SUBCUTANEOUS
Status: DISCONTINUED | OUTPATIENT
Start: 2017-12-05 | End: 2017-12-11 | Stop reason: HOSPADM

## 2017-12-05 RX ORDER — FLUCONAZOLE 50 MG/1
100 TABLET ORAL DAILY
Status: DISCONTINUED | OUTPATIENT
Start: 2017-12-05 | End: 2017-12-11 | Stop reason: HOSPADM

## 2017-12-05 RX ADMIN — GABAPENTIN 100 MG: 100 CAPSULE ORAL at 10:12

## 2017-12-05 RX ADMIN — INSULIN ASPART 2 UNITS: 100 INJECTION, SOLUTION INTRAVENOUS; SUBCUTANEOUS at 04:12

## 2017-12-05 RX ADMIN — GABAPENTIN 100 MG: 100 CAPSULE ORAL at 06:12

## 2017-12-05 RX ADMIN — VANCOMYCIN HYDROCHLORIDE 1000 MG: 1 INJECTION, POWDER, LYOPHILIZED, FOR SOLUTION INTRAVENOUS at 04:12

## 2017-12-05 RX ADMIN — MICONAZOLE NITRATE: 20 CREAM TOPICAL at 05:12

## 2017-12-05 RX ADMIN — CEFEPIME 1 G: 1 INJECTION, POWDER, FOR SOLUTION INTRAMUSCULAR; INTRAVENOUS at 12:12

## 2017-12-05 RX ADMIN — FLUCONAZOLE 100 MG: 50 TABLET ORAL at 04:12

## 2017-12-05 RX ADMIN — INSULIN ASPART 2 UNITS: 100 INJECTION, SOLUTION INTRAVENOUS; SUBCUTANEOUS at 07:12

## 2017-12-05 RX ADMIN — SODIUM CHLORIDE, PRESERVATIVE FREE 3 ML: 5 INJECTION INTRAVENOUS at 10:12

## 2017-12-05 RX ADMIN — HEPARIN SODIUM 5000 UNITS: 5000 INJECTION, SOLUTION INTRAVENOUS; SUBCUTANEOUS at 10:12

## 2017-12-05 RX ADMIN — EPOETIN ALFA 5000 UNITS: 20000 SOLUTION INTRAVENOUS; SUBCUTANEOUS at 10:12

## 2017-12-05 RX ADMIN — COLLAGENASE SANTYL: 250 OINTMENT TOPICAL at 12:12

## 2017-12-05 RX ADMIN — CARVEDILOL 12.5 MG: 6.25 TABLET, FILM COATED ORAL at 12:12

## 2017-12-05 RX ADMIN — GABAPENTIN 100 MG: 100 CAPSULE ORAL at 01:12

## 2017-12-05 RX ADMIN — LEVOTHYROXINE SODIUM 50 MCG: 50 TABLET ORAL at 06:12

## 2017-12-05 RX ADMIN — HEPARIN SODIUM 5000 UNITS: 5000 INJECTION, SOLUTION INTRAVENOUS; SUBCUTANEOUS at 08:12

## 2017-12-05 RX ADMIN — CARVEDILOL 12.5 MG: 6.25 TABLET, FILM COATED ORAL at 10:12

## 2017-12-05 RX ADMIN — HEPARIN SODIUM 4000 UNITS: 1000 INJECTION, SOLUTION INTRAVENOUS; SUBCUTANEOUS at 10:12

## 2017-12-05 RX ADMIN — ROSUVASTATIN CALCIUM 10 MG: 5 TABLET ORAL at 12:12

## 2017-12-05 NOTE — PROGRESS NOTES
Progress Note  Infectious Disease    Follow-up For:  Left leg cellulitis    SUBJECTIVE:   ROS:  Breathing more comfortably,since 3 liters removed by dialysis.  Tomorrow for hemosplit and IVC filter and ?leg debridement    Antibiotics     Start     Stop Route Frequency Ordered    12/05/17 1800  vancomycin 1 g in dextrose 5 % 250 mL IVPB (ready to mix system)      -- IV Once 12/05/17 1453    12/03/17 0900  cefepime in dextrose 5 % 1 gram/50 mL IVPB 1 g      -- IV Daily 12/03/17 0745          Pertinent Medications noted:    EXAM & DIAGNOSTICS REVIEWED:   Vitals:     Temp:  [97.4 °F (36.3 °C)-99.2 °F (37.3 °C)]   Temp: 99.2 °F (37.3 °C) (12/05/17 1520)  Pulse: 78 (12/05/17 1520)  Resp: (!) 40 (12/05/17 1520)  BP: 113/60 (12/05/17 1520)  SpO2: 96 % (12/05/17 1520)    Intake/Output Summary (Last 24 hours) at 12/05/17 1551  Last data filed at 12/05/17 1130   Gross per 24 hour   Intake              790 ml   Output             3700 ml   Net            -2910 ml       General:  In NAD. Breathing more comfortably,   Eyes:  Anicteric  ENT:     Neck:  Supple,   Lungs:    Heart:  RRR,75, no longer orthopneic  Abd:    :  Chi   Musc:  Joints without effusion synovitis,   Skin:  Generally warm, dry, normal for color. No rashes. Bruising left face and neck, improved  Wound: Left achilles wound is more contracted, surface has dried out, forming eschar, no pus but lots of necrosis. More generous application of santyl applied, obtaining head rest to relieve pressure on the ulcer  Neuro: AAOx3, speech clear, moves all extrems equally  Extrem: Left leg erythema is stable since 12/2. This could be from dependent congestion or worsening cellulitis(favor former)  VAD:  trialysis catheter RIJ    Lines/Tubes/Drains:    General Labs reviewed:    Recent Labs  Lab 12/05/17  0325   WBC 10.50  10.50   RBC 3.47*  3.47*   HGB 8.5*  8.5*   HCT 26.8*  26.8*     185   MCV 77*  77*   MCH 24.5*  24.5*   MCHC 31.7*  31.7*       Recent  Labs  Lab 12/05/17  0325   CALCIUM 8.3*  8.3*   PROT 6.3   *  133*   K 4.8  4.8   CO2 25  25   CL 98  98   BUN 39*  39*   CREATININE 5.4*  5.4*   ALKPHOS 91   ALT <5*   AST 9*   BILITOT 0.4       Micro:MSSA from achilles wound culture from 11/26    Imaging Reviewed:  CXR with large effusion right, less dense 12/3    ASSESSMENT & PLAN       Acute on chronic congestive heart failure    Essential hypertension    CKD (chronic kidney disease) stage 3, GFR 30-59 ml/min    Type 2 diabetes mellitus with stage 3 chronic kidney disease, without long-term current use of insulin    Coronary artery disease involving native coronary artery of native heart without angina pectoris    Acquired hypothyroidism    Pleural effusion    Hyperkalemia    Diabetic leg ulcer, Staph aureus infection    Traumatic subarachnoid hemorrhage    Segmental and subsegmental pulmonary emboli of RLL without acute cor pulmonale    Acute renal failure superimposed on stage 3 chronic kidney disease    Anemia in stage 3 chronic kidney disease    Subarachnoid hemorrhage following injury, no loss of consciousness         Pulmonary embolus   Apnea after VT(unsure how close the 2 events were)  Left achilles necrotic wound. posterior leg is congested and edematous  Looks like venous stasis/hyperemia    Recommendation:  Continue maxipime and  Vanc  Pressure relief for leg ulcer discussed with nursing, redressed  Debride leg when medically feasible    Following intermittently

## 2017-12-05 NOTE — PT/OT/SLP PROGRESS
Occupational Therapy      Patient Name:  Juliane Ramos   MRN:  [id number]    Patient not seen today secondary to pt receiving dialysis. Will follow-up 12/6/2017.    Anthony Castrejon OT  12/5/2017

## 2017-12-05 NOTE — PROGRESS NOTES
Pt requested to be placed on bipap at this time.  Vitals remain stable.  No acute distress noted.  Pt awake alert and oriented.

## 2017-12-05 NOTE — PROGRESS NOTES
Progress Note  Hospital Medicine  Patient Name:Juliane Ramos  MRN:  [id number]  Patient Class: IP- Inpatient  Admit Date: 11/27/2017  Length of Stay: 8 days  Expected Discharge Date:   Attending Physician: Reg Devine MD  Primary Care Provider:  JOS Dumont    SUBJECTIVE:     Principal Problem: Left diabetic foot ulcer  Initial history of present illness: 57 year old female with HTN, DM2, HF, CKD [place] [facility] [place] Patient was originally admitted on 11-18-17 with left diabetic foot ulcer. She stated that over the past few days it had been associated with worsening pain, drainage, and a fever of up to 102 and for this she came for evaluation.  She stated she was compliant with her medications but only takes her basal insulin when she needs it per sliding scale. Patient was admitted to Hospitalist medicine service. Patient was evaluated by Dr. Wick and Dr. Turner. Patient was being treated with IV antibiotics, wound care provided and was expected to have wound debridement. Patient had an episode of sudden onset of hypoxia and unresponsiveness. Patient vomited Marcelino sandwitch. Patient was expected to be NPO for podiatry procedure. Work up confirmed PE. Patient started on anticoagulation which was held yesterday due to bleeding from IV site. On the morning of 11-24-17, patient while ambulated with her  lost balance and hit left forehead to the wall. Left forehead contusion noted. No LOC or any focal neurological complaints or HA or vision changes. CT head is showed SAH. Patient transferred to [facility] [place] neuro-critical care ICU and was evaluated by neuro-surgeon. SAH deemed stable. Patient needed one round of HD. Wound Cx grew MSSA treated with Maxipine and Vancomycin. Patient is in need for diabetic foot ulcer debridement.    PMH/PSH/SH/FH/Meds: reviewed.    Symptoms/Review of Systems: Patient is looking and feeling better. Anxiously awaiting HD catheter and IVC filter placement. No shortness of  breath, cough, chest pain or headache, fever or abdominal pain.     Diet:  Adequate intake.    Activity level: Up with assistance, fall precautions  Pain:  Patient reports no pain.       OBJECTIVE:   Vital Signs (Most Recent):      Temp: 97.6 °F (36.4 °C) (12/05/17 0830)  Pulse: 75 (12/05/17 0915)  Resp: (!) 25 (12/05/17 0915)  BP: (!) 144/79 (12/05/17 0915)  SpO2: 97 % (12/05/17 0915)       Vital Signs Range (Last 24H):  Temp:  [97.6 °F (36.4 °C)-98.9 °F (37.2 °C)]   Pulse:  [69-76]   Resp:  [22-33]   BP: (116-144)/(65-85)   SpO2:  [97 %-100 %]     Weight: 118 kg (260 lb 2.3 oz)  Body mass index is 41.99 kg/m².    Intake/Output Summary (Last 24 hours) at 12/05/17 0927  Last data filed at 12/04/17 1800   Gross per 24 hour   Intake              290 ml   Output              200 ml   Net               90 ml     Physical Examination:  General appearance: well developed, appears stated age  Head: normocephalic, Left black eye and left forehead ecchymoses  Eyes:  conjunctivae/corneas clear. PERRL.  Nose: Nares normal. Septum midline.  Throat: lips, mucosa, and tongue normal; teeth and gums normal, no throat erythema.  Neck: supple, symmetrical, trachea midline, no JVD and thyroid not enlarged, symmetric, no tenderness/mass/nodules  Lungs:  Decreased breath sounds at lung bases  Chest wall: no tenderness  Heart: regular rate and rhythm, S1, S2 normal, no murmur, click, rub or gallop  Abdomen: soft, non-tender non-distented; bowel sounds normal; no masses,  no organomegaly  Extremities: no cyanosis, clubbing or edema.   Pulses: 2+ and symmetric  Skin: Skin color, texture, turgor normal. Left Achilis Tendon wound with black eschar and mixed granulation tissue, tendon is exposed possibly. Mild brawny edema of left foot and ankle.  Lymph nodes: Cervical, supraclavicular, and axillary nodes normal.  Neurologic: Normal strength and tone. No focal numbness or weakness. CNII-XII intact.      CBC:    Recent Labs  Lab 12/03/17  7386  12/04/17  0502 12/05/17  0325   WBC 9.50  9.50 11.90  11.90 10.50  10.50   RBC 3.34*  3.34* 3.56*  3.56* 3.47*  3.47*   HGB 8.2*  8.2* 8.9*  8.9* 8.5*  8.5*   HCT 26.0*  26.0* 27.4*  27.4* 26.8*  26.8*     185 195  195 185  185   MCV 78*  78* 77*  77* 77*  77*   MCH 24.5*  24.5* 24.9*  24.9* 24.5*  24.5*   MCHC 31.5*  31.5* 32.4  32.4 31.7*  31.7*   BMP    Recent Labs  Lab 12/03/17 0318 12/04/17  0502 12/05/17  0325   *  122* 113*  113* 195*  195*   *  133* 133*  133* 133*  133*   K 4.2  4.2 4.5  4.5 4.8  4.8   CL 99  99 98  98 98  98   CO2 24  24 24  24 25  25   BUN 24*  24* 30*  30* 39*  39*   CREATININE 4.3*  4.3* 4.8*  4.8* 5.4*  5.4*   CALCIUM 8.4*  8.4* 8.6*  8.6* 8.3*  8.3*   MG 2.0 2.0 2.1      Diagnostic Results:  Microbiology Results (last 7 days)     ** No results found for the last 168 hours. **         Left Calcaneum:   1.  No radiographically apparent acute osteomyelitis. Skin irregularity and bandage noted in the posterior aspect of the ankle.  2. Degenerative changes in the ankle, hindfoot and midfoot are noted.     MRI left foot:  1. Retro-Achilles soft tissue wound. No evidence for osteomyelitis.  2. Mild nonspecific Achilles and posterior tibial tenosynovitis.  3. Small tibiotalar joint effusion.     Bilateral leg venous doppler scan: There are some limitations but no definite acute DVT in seen in either lower extremity.     Renal US: Unremarkable appearance of the kidneys.  No hydronephrosis.     CT head:  1. Abnormal study.  There is a left forehead and frontal scalp hematoma without underlying fracture.  Additionally, there is bilateral posttraumatic subarachnoid blood with hyperdense blood seen in right parietal sulci as well as in the left sylvian fissure and adjacent left frontal and parietal sulci.  There is no acute parenchymal hemorrhage.  2.  Remote right frontal lobe infarction with ex vacuo dilatation of the right  frontal horn.  There is no obvious acute infarction.  3.  Atherosclerosis.      CT head:  1. There is no definite new abnormality.  There has been some resolution in redistribution of previously demonstrated posttraumatic subarachnoid blood.  This blood is now most apparent in the right parietal sulcus.  There is no parenchymal hemorrhage.  2.  There is no obvious acute infarction.  This would however be better evaluated with MRI.  3.  There are chronic infarctions in the superior right basal ganglia and corona radiata with ex vacuo dilatation of the right lateral ventricle.  There is also a chronic infarct in the posterior right cerebellum.  In retrospect, this was present on prior studies.  4.  Interval improvement in left forehead and inferior frontal scalp contusion.    CXR: Unchanged right lung airspace disease and pleural effusion. Component of CHF likely. Appropriate positioning of right IJ catheter.    CXR: Increasing right pleural effusion with infiltrate and atelectasis in the right lung. Central line ends at the level of the right atrium. Mild cardiomegaly. Prior sternotomy. No.    CXR: Stable positioning of right IJ catheter.  Unchanged right pleural effusion and atelectasis/consolidation.    Assessment/Plan:      Acute post-traumatic sub-arachnoid hemorrhage  Fall precaution.  Continue PT and OT.           * Diabetic leg ulcer - grade 3 , left posterior ankle     - continue IV Cefepime + Vancomycin as per Dr. Turner.  - Dr. Wick following, getting Sanyl application. Surgical debridement soon.  - Follow ID recommendations.  - wound care.          Acute RLL pulmonary embolism  Future therapeutic dose to be decided after outpatient neurosurgery followup.  Await IVC filter and Harry Catheter placement by Dr. Deal.     Acute on chronic hypoxic hypercarbic respiratory failure.  Supplemental O2 via nasal canula; titrate O2 saturation to >92%.   Continue beta 2 agonist bronchodilator treatments.   Use  BiPAP during sleep. Needs Polysomnogram upon DC.    Possible seizure  Appreciated Dr. Gil's assistance, continue PO Keppra.  Continue neuro-checks, fall and seizure precautions.    MC - now on HD  Follow nephrology recommendations. Await renal recovery.      Hypothyroid     - continue synthroid          Controlled type 2 diabetes mellitus with stage 3 chronic kidney disease     Check blood glucose level q AC/HS.  Use Novolog Insulin Sliding Scale as needed.   Continue [employer] Diabetic Association 1800 Kcal diet          CKD (chronic kidney disease) stage 3, GFR 30-59 ml/min     - avoid nephrotoxins and renally dose meds          Essential hypertension     - continue home meds and will titrate as needed      Discussed with patient's  and multiple family members. Time spent in care of the patient, counseling and coordination of care (Greater than 50% spent in direct face to face contact): 35 min.     DVT Prophylaxis: Heparin 5K SQ q 12 hrs.    Reg Devine MD  Department of Hospital Medicine   [facility]

## 2017-12-05 NOTE — PLAN OF CARE
Problem: Patient Care Overview  Goal: Plan of Care Review  Outcome: Ongoing (interventions implemented as appropriate)  Recommendations  Recommendation/Intervention: 1.) Continue with 1800 Diabetic diet + Novasource renal BID   Goals: 1.) diet will advance within 96 hrs. 2.) patient will meet >80% of EEN while admitted  Nutrition Goal Status: 1.) goal met 2.) progressing toward goal   Communication of RD Recs: reviewed with RN

## 2017-12-05 NOTE — PT/OT/SLP PROGRESS
Physical Therapy Treatment    Patient Name:  Juliane Ramos   MRN:  [id number]    Recommendations:     Discharge Recommendations:  nursing facility, skilled       Assessment:     Juliane Ramos is a 57 y.o. female admitted with a medical diagnosis of Acute respiratory failure with hypoxia and hypercapnia.  She presents with the following impairments/functional limitations:  weakness, impaired endurance, impaired self care skills, impaired functional mobilty, impaired balance, decreased upper extremity function, decreased lower extremity function, decreased safety awareness, pain, decreased ROM, impaired skin, edema, impaired cardiopulmonary response to activity, orthopedic precautions . Pt with notable increased weakness today following dialysis.    Rehab Prognosis:  fair; patient would benefit from acute skilled PT services to address these deficits and reach maximum level of function.      Recent Surgery: Procedure(s) (LRB):  DEBRIDEMENT-FOOT and apply primatrix (Left)      Plan:     During this hospitalization, patient to be seen 6 x/week to address the above listed problems via gait training, therapeutic activities, therapeutic exercises, therapeutic groups, wheelchair management/training  · Plan of Care Expires:  12/29/17   Plan of Care Reviewed with: patient    Subjective     Communicated with nurse Collazo prior to session.  Patient found in bed with HOB elevated upon PT entry to room, agreeable to treatment.      Chief Complaint: weakness after dialysis    Pain/Comfort:  · Pain Rating 1:  (did not rate, pt reporting pain 2' cellulitis)  · Location - Side 1: Left  · Location 1:  (ankle up to knee)  · Pain Addressed 1: Reposition, Distraction, Cessation of Activity, Nurse notified    Patients cultural, spiritual, [employer]ology conflicts given the current situation: none    Objective:     Patient found with: blood pressure cuff, villegas catheter, oxygen, peripheral IV, pulse ox (continuous), telemetry     General  Precautions: Standard, fall   Orthopedic Precautions:LLE non weight bearing   Braces:       Functional Mobility:  · Bed Mobility:     · Scooting: maximal assistance to EOB   · Bridging: total assistance and of 2 persons; utilizing draw sheet to HOB while pt supine  · Supine to Sit: maximal assistance  · Sit to Supine: maximal assistance  · Transfers:   did not occur 2' increased weakness and poor sitting balance at EOB      Therapeutic Activities and Exercises:   pt assisted to EOB with difficulty.   pt sat EOB approx 7-8' with max-mod A initially for balance but able to progress to min A-CGA after some time and proper placement.    Seated EOB therex: AP, laq, hip flex/abd/add x 10 reps each   pt then assisted back supine, pillow placed under LLE for elevation and heel lift.    Patient left HOB elevated with all lines intact, call button in reach, nurse notified and spouse present..    GOALS:    Physical Therapy Goals        Problem: Physical Therapy Goal    Goal Priority Disciplines Outcome Goal Variances Interventions   Physical Therapy Goal     PT/OT, PT Ongoing (interventions implemented as appropriate)     Description:  Goals to be met by: 2017     Patient will increase functional independence with mobility by performin. Supine to sit with Minimal Assistance  2. Sit to supine with Minimal Assistance  3. Sit to stand transfer with Minimal Assistance  4. Bed to chair transfer with Minimal Assistance using Rolling Walker  5. Stand for 5 minutes with Stand-by Assistance using Rolling Walker  6. Lower extremity exercise program x10 reps, with supervision  Wheelchair management goal to be made pending progress              Problem: Physical Therapy Goal    Goal Priority Disciplines Outcome Goal Variances Interventions   Physical Therapy Goal     PT/OT, PT Ongoing (interventions implemented as appropriate)     Description:  Goals to be met by: 17     Patient will increase functional independence with  mobility by performin. Supine to sit with MInimal Assistance  2. Bed to chair transfer with Minimal Assistance                      Time Tracking:     PT Received On: 17  PT Start Time: 1350     PT Stop Time: 1409  PT Total Time (min): 19 min     Billable Minutes: Therapeutic Activity 10 and Therapeutic Exercise 8    Treatment Type: Treatment  PT/PTA: PTA     PTA Visit Number: 2     Gladys Calderon, PTA  2017

## 2017-12-05 NOTE — ASSESSMENT & PLAN NOTE
Related to (etiology):   Excessive energy intake    Signs and Symptoms (as evidenced by):   BMI >40    Interventions/Recommendations (treatment strategy):  Continue with 1800 diabetic diet + novasource renal BID     Nutrition Diagnosis Status:   Continues

## 2017-12-05 NOTE — PROGRESS NOTES
Will plan Schedule tunneled hemodialysis catheter and plan for IVC filter ASAP - Likely Wednesday

## 2017-12-05 NOTE — PROGRESS NOTES
[facility]  Adult Nutrition  Progress Note    SUMMARY     Recommendations  Recommendation/Intervention: 1.) Continue with 1800 Diabetic diet + Novasource renal BID   Goals: 1.) diet will advance within 96 hrs. 2.) patient will meet >80% of EEN while admitted  Nutrition Goal Status: 1.) goal met 2.) progressing toward goal   Communication of RD Recs: reviewed with RN    1. Pulmonary embolus    2. Acute renal failure superimposed on stage 3 chronic kidney disease, unspecified acute renal failure type    3. CKD (chronic kidney disease) stage 3, GFR 30-59 ml/min    4. Coronary artery disease involving native coronary artery of native heart without angina pectoris    5. Subarachnoid hemorrhage following injury, no loss of consciousness, subsequent encounter    6. Syncope, near    7. Hypoxia    8. Other pulmonary embolism without acute cor pulmonale, unspecified chronicity    9. Acute respiratory failure with hypoxia and hypercapnia      Past Medical History:   Diagnosis Date    Anemia in stage 3 chronic kidney disease 11/26/2017    CHF (congestive heart failure)     COPD (chronic obstructive pulmonary disease)     Coronary artery disease     Diabetes mellitus     Encounter for blood transfusion     Essential hypertension 6/24/2017    Hypertension     MI (myocardial infarction) 2010    Segmental and subsegmental pulmonary emboli of RLL without acute cor pulmonale 11/25/2017    Stroke     July 2005    Thyroid disease     Traumatic subarachnoid hemorrhage 11/24/2017    Type 2 diabetes mellitus with stage 3 chronic kidney disease, without long-term current use of insulin 6/24/2017       Reason for Assessment  Reason for Assessment: RD follow-up  Interdisciplinary Rounds: attended  General Information Comments: Admits with diabetic left foot ulcer. Transferred to ICU after rapid response. MC, on CKD III, dialysis dependent per nephrology. Diet has advanced. Patient in alert and oriented with  family at bedside.  Tolerating HD well per chart. Patient reports she has a normal appetite. Denies n/v. No food allergies. Complains of dry mouth, wants pudding, jellos, and fresh fruit with meals. On bipap.       Nutrition Prescription Ordered  Current Diet Order: 1800 Diabetic diet   Oral Nutrition Supplement: novasource renal BID      Evaluation of Received Nutrients/Fluid Intake  Oral Fluid (mL): 240  Other Fluid (mL): 600 (HD)   % Intake of Estimated Energy Needs: 25 - 50 %  % Meal Intake: 25%     Nutrition Risk Screen  Nutrition Risk Screen: large or nonhealing wound, burn or pressure ulcer    Nutrition/Diet History  Food Preferences: no cultural or religius food preferences. NKFA.   Factors Affecting Nutritional Intake: NPO    Labs/Tests/Procedures/Meds  Diagnostic Test/Procedure Review: reviewed, pertinent  Pertinent Labs Reviewed: reviewed, pertinent  BMP  Lab Results   Component Value Date     (L) 12/05/2017     (L) 12/05/2017    K 4.8 12/05/2017    K 4.8 12/05/2017    CL 98 12/05/2017    CL 98 12/05/2017    CO2 25 12/05/2017    CO2 25 12/05/2017    BUN 39 (H) 12/05/2017    BUN 39 (H) 12/05/2017    CREATININE 5.4 (H) 12/05/2017    CREATININE 5.4 (H) 12/05/2017    CALCIUM 8.3 (L) 12/05/2017    CALCIUM 8.3 (L) 12/05/2017    ANIONGAP 10 12/05/2017    ANIONGAP 10 12/05/2017    ESTGFRAFRICA 10 (A) 12/05/2017    ESTGFRAFRICA 10 (A) 12/05/2017    EGFRNONAA 8 (A) 12/05/2017    EGFRNONAA 8 (A) 12/05/2017     Lab Results   Component Value Date    ALBUMIN 2.0 (L) 12/05/2017     Lab Results   Component Value Date    CALCIUM 8.3 (L) 12/05/2017    CALCIUM 8.3 (L) 12/05/2017    PHOS 4.4 12/05/2017       Recent Labs  Lab 12/05/17  1613   POCTGLUCOSE 177*       Pertinent Medications Reviewed: reviewed  Scheduled Meds:   carvedilol  12.5 mg Per NG tube BID    ceFEPime (MAXIPIME) IVPB  1 g Intravenous Daily    collagenase   Topical Daily    epoetin donita (PROCRIT) injection  5,000 Units Intravenous Every  "Tues, Thurs, Sat    fluconazole  100 mg Oral Daily    gabapentin  100 mg Oral TID    heparin (porcine)  5,000 Units Subcutaneous Q12H    insulin aspart  1-10 Units Subcutaneous TIDWM    levothyroxine  50 mcg Per NG tube Before breakfast    miconazole   Topical (Top) BID    rosuvastatin  10 mg Per NG tube Daily    sodium chloride 0.9%  3 mL Intravenous Q8H    vancomycin (VANCOCIN) IVPB  1,000 mg Intravenous Once         Physical Findings  Overall Physical Appearance: on oxygen therapy, obese  Oral/Mouth Cavity: tooth/teeth missing  Skin: edema (wound. Donny score 16)    Anthropometrics  Temp: 97.4 °F (36.3 °C)  Height: 5' 6"  Weight Method: Bed Scale  Weight: 118 kg (260 lb 2.3 oz)  Ideal Body Weight (IBW), Female: 130 lb  % Ideal Body Weight, Female (lb): 200.12 lb  BMI (Calculated): 42.1  BMI Grade: greater than 40 - morbid obesity  Usual Body Weight (UBW), k.9 kg  % Usual Body Weight: 108.58  % Weight Change From Usual Weight: 8.36 %    Estimated/Assessed Needs  Weight Used For Calorie Calculations: 82 kg (180 lb 12.4 oz) (Nhanes II SBW)   30 kcal/kg (kcal): 2460 and 35 kcal/kg (kcal): 2870   RMR ([place]-St. Jeor Equation): 1431.75  Weight Used For Protein Calculations: 82 kg (180 lb 12.4 oz) (Nhanes II SBW)  1.2 gm Protein (gm): 98.61 and 2.0 gm Protein (gm): 164.34  Fluid Need Method: RDA Method (or per MD)      Assessment and Plan    Obesity    Related to (etiology):   Excessive energy intake    Signs and Symptoms (as evidenced by):   BMI >40    Interventions/Recommendations (treatment strategy):  Continue with 1800 diabetic diet + novasource renal BID     Nutrition Diagnosis Status:   Continues               Monitor and Evaluation  Food and Nutrient Intake: energy intake, food and beverage intake  Food and Nutrient Adminstration: diet order  Anthropometric Measurements: weight, body mass index, weight change  Biochemical Data, Medical Tests and Procedures: electrolyte and renal panel, " glucose/endocrine profile, lipid profile  Nutrition-Focused Physical Findings: overall appearance    Nutrition Risk  Level of Risk:  (x2 weekly)    Nutrition Follow-Up  RD Follow-up?: Yes     Discharge Planning: discharge on diet above.

## 2017-12-05 NOTE — CONSULTS
Juliane Ramos [id number] is a 57 y.o. female who has been consulted for vancomycin dosing.    The patient has the following labs:     Date Creatinine (mg/dl)    BUN WBC Count   12/5/2017 Estimated Creatinine Clearance: 15.4 mL/min (based on SCr of 5.4 mg/dL (H)). Lab Results   Component Value Date    BUN 39 (H) 12/05/2017    BUN 39 (H) 12/05/2017     Lab Results   Component Value Date    WBC 10.50 12/05/2017    WBC 10.50 12/05/2017        Current weight is 118 kg (260 lb 2.3 oz)  .  Target level range is 12/05-9.1 mg/dL.  Pharmacy will re-dose vancomycin 1000mg on 12/05 at 1800. A vancomycin level has been ordered with AM labs on 12/07.       Patient will be followed by pharmacy for changes in renal function, toxicity, and efficacy.  Thank you for allowing us to participate in this patient's care.     Elba Dwyer

## 2017-12-05 NOTE — PLAN OF CARE
Problem: Physical Therapy Goal  Goal: Physical Therapy Goal  Goals to be met by: 17     Patient will increase functional independence with mobility by performin. Supine to sit with MInimal Assistance  2. Bed to chair transfer with Minimal Assistance     Outcome: Ongoing (interventions implemented as appropriate)  PT for bed mobility, sitting balance and therex

## 2017-12-05 NOTE — PROGRESS NOTES
Progress Note  PULMONARY    Admit Date: 11/27/2017 12/05/2017      SUBJECTIVE:     Dec 4, some sob, diffuse edema,   Dec 5, 2017, less sob, much better,    PFSH and Allergies reviewed.    OBJECTIVE:     Vitals (Most recent):  Vitals:    12/05/17 1520   BP: 113/60   Pulse: 78   Resp: (!) 40   Temp: 99.2 °F (37.3 °C)       Vitals (24 hour range):  Temp:  [97.4 °F (36.3 °C)-99.2 °F (37.3 °C)]   Pulse:  [74-78]   Resp:  [20-40]   BP: (113-156)/(60-97)   SpO2:  [96 %-100 %]       Intake/Output Summary (Last 24 hours) at 12/05/17 1708  Last data filed at 12/05/17 1130   Gross per 24 hour   Intake              500 ml   Output             3700 ml   Net            -3200 ml          Physical Exam:  The patient's neuro status (alertness,orientation,cognitive function,motor skills,), pharyngeal exam (oral lesions, hygiene, abn dentition,), Neck (jvd,mass,thyroid,nodes in neck and above/below clavicle),RESPIRATORY(symmetry,effort,fremitus,percussion,auscultation),  Cor(rhythm,heart tones including gallops,perfusion,edema)ABD(distention,hepatic&splenomegaly,tenderness,masses), Skin(rash,cyanosis),Psyc(affect,judgement,).  Exam negative except for these pertinent findings:    Obese, no distress, edema nearly gone, symmetric chest, good bs, .    Radiographs reviewed: view by direct vision  Right effusion back to June and now sl worse 12/3, pe seen rll  Results for orders placed during the hospital encounter of 11/24/17   X-Ray Chest 1 View    Narrative AP chest    Comparison: 11/23/17    Results: There are sternotomy wires.  The cardiac silhouette is mildly enlarged.  The pulmonary vascularity is increased.  This is worrisome for CHF.  No focal large area of airspace disease.  Can't exclude a small layering right pleural effusion.  No pneumothorax.  The osseous structures appear within normal limits.    Impression  No significant change from the prior study.      Electronically signed by: IRVING WYATT MD  Date:      11/24/17  Time:    16:36    ]    Labs       Recent Labs  Lab 12/05/17  0325   WBC 10.50  10.50   HGB 8.5*  8.5*   HCT 26.8*  26.8*     185       Recent Labs  Lab 12/05/17  0325   *  133*   K 4.8  4.8   CL 98  98   CO2 25  25   BUN 39*  39*   CREATININE 5.4*  5.4*   *  195*   CALCIUM 8.3*  8.3*   MG 2.1   PHOS 4.4   AST 9*   ALT <5*   ALKPHOS 91   BILITOT 0.4   PROT 6.3   ALBUMIN 2.0*   No results for input(s): PH, PCO2, PO2, HCO3 in the last 24 hours.  Microbiology Results (last 7 days)     ** No results found for the last 168 hours. **          Impression:  Active Hospital Problems    Diagnosis  POA    *Acute respiratory failure with hypoxia and hypercapnia [J96.01, J96.02]  Yes    Anasarca [R60.1]  Yes    Obesity [E66.9]  Yes    Subarachnoid hemorrhage following injury, no loss of consciousness [S06.6X0A]  Yes    Pulmonary embolus [I26.99]  Yes    Acute renal failure superimposed on stage 5 chronic kidney disease, not on chronic dialysis [N17.9, N18.5]  Yes    Traumatic subarachnoid hemorrhage [S06.6X9A]  Yes    Acquired hypothyroidism [E03.9]  Yes    CKD (chronic kidney disease) stage 3, GFR 30-59 ml/min [N18.3]  Yes    Coronary artery disease involving native coronary artery of native heart without angina pectoris [I25.10]  Yes     Chronic    Type 2 diabetes mellitus with stage 5 chronic kidney disease not on chronic dialysis, without long-term current use of insulin [E11.22, N18.5]  Yes    Acute on chronic congestive heart failure [I50.9]  Yes    Recurrent right pleural effusion [J90]  Yes      Resolved Hospital Problems    Diagnosis Date Resolved POA   No resolved problems to display.               Plan:     Dec 4, 2017, pulm embolism should be rx as best able, reasoanble to place ivc filter with neg leg study and pul embolism as SAH precludes anticoagg near future.  Thoracentesis would be optimal once edema controlled but expect dialysis will clear edema.  Dec  5, breathing better and for ivc filter.  Expect effusion likely to clear with rx vol overload,and pe and chf.                                    .

## 2017-12-05 NOTE — PLAN OF CARE
12/04/17 2330   Patient Assessment/Suction   Level of Consciousness (AVPU) alert   Respiratory Effort Short of breath   Expansion/Accessory Muscles/Retractions abdominal muscle use  (Pt SOB and wanted to be placed on BiPAP. )   PRE-TX-O2-ETCO2   O2 Device (Oxygen Therapy) BiPAP   Oxygen Concentration (%) 40   SpO2 98 %   Pulse 75   Resp (!) 31   Aerosol Therapy   $ Aerosol Therapy Charges PRN treatment not required   Respiratory Treatment Status PRN treatment not required   Preset CPAP/BiPAP Settings   Mode Of Delivery BiPAP   $ Initial CPAP/BiPAP Setup? No   $ Is patient using? Yes   Equipment Type V60   Airway Device Type medium full face mask   Ipap 10   EPAP (cm H2O) 5   Pressure Support (cm H2O) 5   Set Rate (Breaths/Min) 6   ITime (sec) 1   Rise Time (sec) 0.3   Patient CPAP/BiPAP Settings   RR Total (Breaths/Min) 32   Tidal Volume (mL) 368   VE Minute Ventilation (L/min) 11.9 L/min   Peak Inspiratory Pressure (cm H2O) 10   TiTOT (%) 34   Total Leak (L/Min) 15   Patient Trigger - ST Mode Only (%) 100   CPAP/BiPAP Alarms   High Pressure (cm H2O) 15   Low Pressure (cm H2O) 5   Low Pressure Delay (Sec) 20   Minute Ventilation (L/Min) 2   High RR (breaths/min) 40   Low RR (breaths/min) 5   Pt has been on and off BiPAP every 30-45 minutes,

## 2017-12-06 LAB
ALBUMIN SERPL BCP-MCNC: 2.1 G/DL
ALP SERPL-CCNC: 85 U/L
ALT SERPL W/O P-5'-P-CCNC: <5 U/L
ANION GAP SERPL CALC-SCNC: 9 MMOL/L
ANION GAP SERPL CALC-SCNC: 9 MMOL/L
ANISOCYTOSIS BLD QL SMEAR: ABNORMAL
ANISOCYTOSIS BLD QL SMEAR: ABNORMAL
AST SERPL-CCNC: 10 U/L
BASOPHILS # BLD AUTO: 0 K/UL
BASOPHILS # BLD AUTO: 0 K/UL
BASOPHILS NFR BLD: 0.5 %
BASOPHILS NFR BLD: 0.5 %
BILIRUB SERPL-MCNC: 0.4 MG/DL
BUN SERPL-MCNC: 31 MG/DL
BUN SERPL-MCNC: 31 MG/DL
CALCIUM SERPL-MCNC: 8.4 MG/DL
CALCIUM SERPL-MCNC: 8.4 MG/DL
CHLORIDE SERPL-SCNC: 99 MMOL/L
CHLORIDE SERPL-SCNC: 99 MMOL/L
CO2 SERPL-SCNC: 25 MMOL/L
CO2 SERPL-SCNC: 25 MMOL/L
CREAT SERPL-MCNC: 4.4 MG/DL
CREAT SERPL-MCNC: 4.4 MG/DL
DIFFERENTIAL METHOD: ABNORMAL
DIFFERENTIAL METHOD: ABNORMAL
EOSINOPHIL # BLD AUTO: 0.3 K/UL
EOSINOPHIL # BLD AUTO: 0.3 K/UL
EOSINOPHIL NFR BLD: 3 %
EOSINOPHIL NFR BLD: 3 %
ERYTHROCYTE [DISTWIDTH] IN BLOOD BY AUTOMATED COUNT: 22.5 %
ERYTHROCYTE [DISTWIDTH] IN BLOOD BY AUTOMATED COUNT: 22.5 %
EST. GFR  (AFRICAN AMERICAN): 12 ML/MIN/1.73 M^2
EST. GFR  (AFRICAN AMERICAN): 12 ML/MIN/1.73 M^2
EST. GFR  (NON AFRICAN AMERICAN): 11 ML/MIN/1.73 M^2
EST. GFR  (NON AFRICAN AMERICAN): 11 ML/MIN/1.73 M^2
GLUCOSE SERPL-MCNC: 154 MG/DL
GLUCOSE SERPL-MCNC: 154 MG/DL
HCT VFR BLD AUTO: 25.8 %
HCT VFR BLD AUTO: 25.8 %
HGB BLD-MCNC: 8.2 G/DL
HGB BLD-MCNC: 8.2 G/DL
HYPOCHROMIA BLD QL SMEAR: ABNORMAL
HYPOCHROMIA BLD QL SMEAR: ABNORMAL
LYMPHOCYTES # BLD AUTO: 0.7 K/UL
LYMPHOCYTES # BLD AUTO: 0.7 K/UL
LYMPHOCYTES NFR BLD: 8.7 %
LYMPHOCYTES NFR BLD: 8.7 %
MAGNESIUM SERPL-MCNC: 2.1 MG/DL
MCH RBC QN AUTO: 24.8 PG
MCH RBC QN AUTO: 24.8 PG
MCHC RBC AUTO-ENTMCNC: 31.9 G/DL
MCHC RBC AUTO-ENTMCNC: 31.9 G/DL
MCV RBC AUTO: 78 FL
MCV RBC AUTO: 78 FL
MONOCYTES # BLD AUTO: 1.2 K/UL
MONOCYTES # BLD AUTO: 1.2 K/UL
MONOCYTES NFR BLD: 14.3 %
MONOCYTES NFR BLD: 14.3 %
NEUTROPHILS # BLD AUTO: 6.2 K/UL
NEUTROPHILS # BLD AUTO: 6.2 K/UL
NEUTROPHILS NFR BLD: 73.5 %
NEUTROPHILS NFR BLD: 73.5 %
PHOSPHATE SERPL-MCNC: 3.6 MG/DL
PLATELET # BLD AUTO: 156 K/UL
PLATELET # BLD AUTO: 156 K/UL
PLATELET BLD QL SMEAR: ABNORMAL
PLATELET BLD QL SMEAR: ABNORMAL
PMV BLD AUTO: 8.3 FL
PMV BLD AUTO: 8.3 FL
POCT GLUCOSE: 126 MG/DL (ref 70–110)
POCT GLUCOSE: 129 MG/DL (ref 70–110)
POCT GLUCOSE: 160 MG/DL (ref 70–110)
POLYCHROMASIA BLD QL SMEAR: ABNORMAL
POLYCHROMASIA BLD QL SMEAR: ABNORMAL
POTASSIUM SERPL-SCNC: 4.4 MMOL/L
POTASSIUM SERPL-SCNC: 4.4 MMOL/L
PROT SERPL-MCNC: 6.4 G/DL
RBC # BLD AUTO: 3.32 M/UL
RBC # BLD AUTO: 3.32 M/UL
SODIUM SERPL-SCNC: 133 MMOL/L
SODIUM SERPL-SCNC: 133 MMOL/L
WBC # BLD AUTO: 8.5 K/UL
WBC # BLD AUTO: 8.5 K/UL

## 2017-12-06 PROCEDURE — 80053 COMPREHEN METABOLIC PANEL: CPT

## 2017-12-06 PROCEDURE — 36415 COLL VENOUS BLD VENIPUNCTURE: CPT

## 2017-12-06 PROCEDURE — 27000221 HC OXYGEN, UP TO 24 HOURS

## 2017-12-06 PROCEDURE — 63600175 PHARM REV CODE 636 W HCPCS: Performed by: INTERNAL MEDICINE

## 2017-12-06 PROCEDURE — 97110 THERAPEUTIC EXERCISES: CPT

## 2017-12-06 PROCEDURE — 99900035 HC TECH TIME PER 15 MIN (STAT)

## 2017-12-06 PROCEDURE — 25000003 PHARM REV CODE 250: Performed by: INTERNAL MEDICINE

## 2017-12-06 PROCEDURE — 99232 SBSQ HOSP IP/OBS MODERATE 35: CPT | Mod: ,,, | Performed by: INTERNAL MEDICINE

## 2017-12-06 PROCEDURE — 97530 THERAPEUTIC ACTIVITIES: CPT

## 2017-12-06 PROCEDURE — 20000000 HC ICU ROOM

## 2017-12-06 PROCEDURE — 84100 ASSAY OF PHOSPHORUS: CPT

## 2017-12-06 PROCEDURE — G8997 SWALLOW GOAL STATUS: HCPCS | Mod: CH

## 2017-12-06 PROCEDURE — A4216 STERILE WATER/SALINE, 10 ML: HCPCS | Performed by: NURSE PRACTITIONER

## 2017-12-06 PROCEDURE — G9162 LANG EXPRESS CURRENT STATUS: HCPCS | Mod: CI

## 2017-12-06 PROCEDURE — 83735 ASSAY OF MAGNESIUM: CPT

## 2017-12-06 PROCEDURE — 85025 COMPLETE CBC W/AUTO DIFF WBC: CPT

## 2017-12-06 PROCEDURE — G8998 SWALLOW D/C STATUS: HCPCS | Mod: CH

## 2017-12-06 PROCEDURE — 94660 CPAP INITIATION&MGMT: CPT

## 2017-12-06 PROCEDURE — 25000003 PHARM REV CODE 250: Performed by: NURSE PRACTITIONER

## 2017-12-06 PROCEDURE — G8996 SWALLOW CURRENT STATUS: HCPCS | Mod: CH

## 2017-12-06 PROCEDURE — G9163 LANG EXPRESS GOAL STATUS: HCPCS | Mod: CH

## 2017-12-06 PROCEDURE — 94761 N-INVAS EAR/PLS OXIMETRY MLT: CPT

## 2017-12-06 PROCEDURE — 92507 TX SP LANG VOICE COMM INDIV: CPT

## 2017-12-06 RX ADMIN — COLLAGENASE SANTYL: 250 OINTMENT TOPICAL at 10:12

## 2017-12-06 RX ADMIN — LEVOTHYROXINE SODIUM 50 MCG: 50 TABLET ORAL at 06:12

## 2017-12-06 RX ADMIN — MICONAZOLE NITRATE: 20 CREAM TOPICAL at 10:12

## 2017-12-06 RX ADMIN — SODIUM CHLORIDE, PRESERVATIVE FREE 3 ML: 5 INJECTION INTRAVENOUS at 06:12

## 2017-12-06 RX ADMIN — INSULIN ASPART 2 UNITS: 100 INJECTION, SOLUTION INTRAVENOUS; SUBCUTANEOUS at 06:12

## 2017-12-06 RX ADMIN — MICONAZOLE NITRATE: 20 CREAM TOPICAL at 09:12

## 2017-12-06 RX ADMIN — SODIUM CHLORIDE, PRESERVATIVE FREE 3 ML: 5 INJECTION INTRAVENOUS at 09:12

## 2017-12-06 RX ADMIN — SODIUM CHLORIDE, PRESERVATIVE FREE 3 ML: 5 INJECTION INTRAVENOUS at 01:12

## 2017-12-06 RX ADMIN — ROSUVASTATIN CALCIUM 10 MG: 5 TABLET ORAL at 10:12

## 2017-12-06 RX ADMIN — GABAPENTIN 100 MG: 100 CAPSULE ORAL at 06:12

## 2017-12-06 RX ADMIN — GABAPENTIN 100 MG: 100 CAPSULE ORAL at 09:12

## 2017-12-06 RX ADMIN — GABAPENTIN 100 MG: 100 CAPSULE ORAL at 01:12

## 2017-12-06 RX ADMIN — CARVEDILOL 12.5 MG: 6.25 TABLET, FILM COATED ORAL at 09:12

## 2017-12-06 RX ADMIN — CEFEPIME 1 G: 1 INJECTION, POWDER, FOR SOLUTION INTRAMUSCULAR; INTRAVENOUS at 10:12

## 2017-12-06 RX ADMIN — HEPARIN SODIUM 5000 UNITS: 5000 INJECTION, SOLUTION INTRAVENOUS; SUBCUTANEOUS at 10:12

## 2017-12-06 RX ADMIN — HEPARIN SODIUM 5000 UNITS: 5000 INJECTION, SOLUTION INTRAVENOUS; SUBCUTANEOUS at 09:12

## 2017-12-06 RX ADMIN — CARVEDILOL 12.5 MG: 6.25 TABLET, FILM COATED ORAL at 10:12

## 2017-12-06 RX ADMIN — FLUCONAZOLE 100 MG: 50 TABLET ORAL at 10:12

## 2017-12-06 NOTE — PROGRESS NOTES
INPATIENT NEPHROLOGY PROGRESS NOTE  [facility] NEPHROLOGY    Patient Name: Juliane Ramos  Date: 12/06/2017    Reason for consultation: MC      Chief Complaint: Left diabetic foot ulcer     History of Present Illness:  Ms. Ramos is a 58 y/o F with a hx of HTN, DM2, CHF, and stage III CKD who p/w a left heel ulcer/cellulitis x 2 weeks, nonhealing. Hospital course c/b PE, fall with SAH, and MC 2/2 contrast/vancomycin/infection requiring RRT. We have been consulted for MC.    · Interval History/Subjective:         12/4  Denies n,v, chest pair sob.  12/5  Seen on dialysis.  Was sob earlier, doing ok now  12/6  No nausea, vomiting, chest pain.  More alert       Plan of Care:    Assessment:  Oliguric MC 2/2 multifactorial ATN, dialysis dependent  Edema/Pleural effusion  Hyponatremia  Anemia  Low albumin      Plan:     1. Acute Kidney Injury- seen on dialysis yesterday.  Repeat tomorrow     2. Volume/Blood Pressure- BP is stable. uf as tolerated with hd     3. Electrolytes/Acid Base- HypoNa is stable, continue current dialysis prescription/UF goals.       4. Anemia of CKD- Hb is stable. Continue EPO 5K with HD.      5. Low albumin- Stable, continue Nepro.    Thank you for allowing us to participate in this patient's care. We will continue to follow.    Medications:  No current facility-administered medications on file prior to encounter.      Current Outpatient Prescriptions on File Prior to Encounter   Medication Sig Dispense Refill    albuterol (ACCUNEB) 1.25 mg/3 mL Nebu Take 1.25 mg by nebulization every 6 (six) hours as needed. Rescue      carvedilol (COREG) 12.5 MG tablet Take 1 tablet (12.5 mg total) by mouth 2 (two) times daily. 60 tablet 11    cefepime in dextrose 5 % (MAXIPIME) 1 gram/50 mL PgBk Inject 50 mLs (1 g total) into the vein every 24 hours as needed.      gabapentin (NEURONTIN) 100 MG capsule Take 1 capsule (100 mg total) by mouth 3 (three) times daily. 90 capsule 11    levetiracetam oral  soln 500 mg/5 mL (5 mL) Soln 2.5 mLs (250 mg total) by Per NG tube route 2 (two) times daily. 150 mL 11    levothyroxine (SYNTHROID) 50 MCG tablet Take 50 mcg by mouth once daily.        metronidazole (FLAGYL) 500 mg/100 mL IVPB Inject 100 mLs (500 mg total) into the vein every 8 (eight) hours.      rosuvastatin (CRESTOR) 10 MG tablet Take 1 tablet (10 mg total) by mouth once daily. 30 tablet 0    VANCOMYCIN HCL (VANCOMYCIN IN 5 % DEXTROSE) 1.75 gram/500 mL Soln Inject 500 mLs (1,750 mg total) into the vein once daily.       Scheduled Meds:   carvedilol  12.5 mg Per NG tube BID    ceFEPime (MAXIPIME) IVPB  1 g Intravenous Daily    collagenase   Topical Daily    epoetin donita (PROCRIT) injection  5,000 Units Intravenous Every Tues, Thurs, Sat    fluconazole  100 mg Oral Daily    gabapentin  100 mg Oral TID    heparin (porcine)  5,000 Units Subcutaneous Q12H    insulin aspart  1-10 Units Subcutaneous TIDWM    levothyroxine  50 mcg Per NG tube Before breakfast    miconazole   Topical (Top) BID    rosuvastatin  10 mg Per NG tube Daily    sodium chloride 0.9%  3 mL Intravenous Q8H     Continuous Infusions:   PRN Meds:.sodium chloride 0.9%, acetaminophen, albuterol sulfate, dextrose 50%, glucagon (human recombinant), glucose, glucose, heparin (porcine), labetalol, levetiracetam oral soln, magnesium oxide, magnesium oxide, ondansetron, potassium phosphate IVPB, simethicone    Allergies:  Patient has no known allergies.    Vital Signs:  Temp Readings from Last 3 Encounters:   12/06/17 98.7 °F (37.1 °C)   11/27/17 97 °F (36.1 °C) (Oral)   11/23/17 97.4 °F (36.3 °C)     Pulse Readings from Last 3 Encounters:   12/06/17 69   11/27/17 67   11/24/17 65     BP Readings from Last 3 Encounters:   12/06/17 (!) 143/89   11/27/17 123/67   11/24/17 (!) 108/58     Weight:  Wt Readings from Last 3 Encounters:   12/05/17 118 kg (260 lb 2.3 oz)   11/27/17 117 kg (257 lb 15 oz)   11/22/17 106.6 kg (234 lb 15.8 oz)      Physical Exam:  Constitutional: nad, aao x 3   Heart: rrr, no m/r/g, wwp, + edema  Lungs: ant ausc clear, no w/r/r/c, no lb  Abdomen: s/nt/nd, +BS    Results:  Lab Results   Component Value Date     (L) 12/06/2017     (L) 12/06/2017    K 4.4 12/06/2017    K 4.4 12/06/2017    CL 99 12/06/2017    CL 99 12/06/2017    CO2 25 12/06/2017    CO2 25 12/06/2017    BUN 31 (H) 12/06/2017    BUN 31 (H) 12/06/2017    CREATININE 4.4 (H) 12/06/2017    CREATININE 4.4 (H) 12/06/2017    CALCIUM 8.4 (L) 12/06/2017    CALCIUM 8.4 (L) 12/06/2017    ANIONGAP 9 12/06/2017    ANIONGAP 9 12/06/2017    ESTGFRAFRICA 12 (A) 12/06/2017    ESTGFRAFRICA 12 (A) 12/06/2017    EGFRNONAA 11 (A) 12/06/2017    EGFRNONAA 11 (A) 12/06/2017     Lab Results   Component Value Date    CALCIUM 8.4 (L) 12/06/2017    CALCIUM 8.4 (L) 12/06/2017    PHOS 3.6 12/06/2017       Recent Labs  Lab 12/06/17  0417   WBC 8.50  8.50   RBC 3.32*  3.32*   HGB 8.2*  8.2*   HCT 25.8*  25.8*     156   MCV 78*  78*   MCH 24.8*  24.8*   MCHC 31.9*  31.9*     I have personally reviewed pertinent radiological imaging and reports.    Elijah Gonzalez MD  Nephrology  [facility]  [phone]

## 2017-12-06 NOTE — PLAN OF CARE
12/05/17 1954   Patient Assessment/Suction   Level of Consciousness (AVPU) alert   Respiratory Effort Normal;Unlabored   Expansion/Accessory Muscles/Retractions no use of accessory muscles   All Lung Fields Breath Sounds diminished   PRE-TX-O2-ETCO2   O2 Device (Oxygen Therapy) nasal cannula w/ humidification   Flow (L/min) 4   SpO2 97 %   Pulse 76   Resp 20   Aerosol Therapy   $ Aerosol Therapy Charges PRN treatment not required   Respiratory Treatment Status PRN treatment not required   Pt tolerates NC well. No PRN treatment required at this time.

## 2017-12-06 NOTE — PROGRESS NOTES
Pt resting well in bed awake and alert.  No acute distress noted at this time.  Requested to change position and lay on her left side for a while.  Vitals remain stable.

## 2017-12-06 NOTE — PROGRESS NOTES
Progress Note  Cardiology    Admit Date: 11/27/2017   LOS: 9 days     Follow-up For:  CHF; HF l EF;  ischemic COM; PE; SAH after fall; CRF; dialysis;  ?syncopal spells - no arrhythmia.    Scheduled Meds:   carvedilol  12.5 mg Per NG tube BID    ceFEPime (MAXIPIME) IVPB  1 g Intravenous Daily    collagenase   Topical Daily    epoetin donita (PROCRIT) injection  5,000 Units Intravenous Every Tues, Thurs, Sat    fluconazole  100 mg Oral Daily    gabapentin  100 mg Oral TID    heparin (porcine)  5,000 Units Subcutaneous Q12H    insulin aspart  1-10 Units Subcutaneous TIDWM    levothyroxine  50 mcg Per NG tube Before breakfast    miconazole   Topical (Top) BID    rosuvastatin  10 mg Per NG tube Daily    sodium chloride 0.9%  3 mL Intravenous Q8H     Continuous Infusions:   PRN Meds:sodium chloride 0.9%, acetaminophen, albuterol sulfate, dextrose 50%, glucagon (human recombinant), glucose, glucose, heparin (porcine), labetalol, levetiracetam oral soln, magnesium oxide, magnesium oxide, ondansetron, potassium phosphate IVPB, simethicone    Review of patient's allergies indicates:  No Known Allergies    SUBJECTIVE:     Interval History: Patient has no complaint of cp or sob.    Review of Systems  Respiratory: positive for cough, negative for hemoptysis, sputum and wheezing  Cardiovascular: negative for chest pressure/discomfort, dyspnea, orthopnea, palpitations and paroxysmal nocturnal dyspnea    OBJECTIVE:     Vital Signs (Most Recent)  Temp: 98.7 °F (37.1 °C) (12/06/17 1202)  Pulse: 69 (12/06/17 1202)  Resp: (!) 23 (12/06/17 1202)  BP: (!) 143/89 (12/06/17 1200)  SpO2: 100 % (12/06/17 1200)    Vital Signs Range (Last 24H):  Temp:  [98.7 °F (37.1 °C)-99.2 °F (37.3 °C)]   Pulse:  [68-80]   Resp:  [16-40]   BP: (113-143)/(60-89)   SpO2:  [94 %-100 %]       Physical Exam:  Neck: JVD - 1 cm above sternal notch, no carotid bruit and supple, symmetrical, trachea midline  Lungs: diminished breath sounds bilaterally and  especially RLL.  Heart: regular rate and rhythm, S1, S2 normal, no murmur, click, rub or gallop  Abdomen: soft, non-tender; bowel sounds normal; no masses,  no organomegaly  Extremities: Positive for: edema 1+ and pitting bilaterally    Recent Results (from the past 24 hour(s))   POCT glucose    Collection Time: 12/05/17  4:13 PM   Result Value Ref Range    POCT Glucose 177 (H) 70 - 110 mg/dL   CBC auto differential    Collection Time: 12/06/17  4:17 AM   Result Value Ref Range    WBC 8.50 3.90 - 12.70 K/uL    RBC 3.32 (L) 4.00 - 5.40 M/uL    Hemoglobin 8.2 (L) 12.0 - 16.0 g/dL    Hematocrit 25.8 (L) 37.0 - 48.5 %    MCV 78 (L) 82 - 98 fL    MCH 24.8 (L) 27.0 - 31.0 pg    MCHC 31.9 (L) 32.0 - 36.0 g/dL    RDW 22.5 (H) 11.5 - 14.5 %    Platelets 156 150 - 350 K/uL    MPV 8.3 (L) 9.2 - 12.9 fL    Gran # 6.2 1.8 - 7.7 K/uL    Lymph # 0.7 (L) 1.0 - 4.8 K/uL    Mono # 1.2 (H) 0.3 - 1.0 K/uL    Eos # 0.3 0.0 - 0.5 K/uL    Baso # 0.00 0.00 - 0.20 K/uL    Gran% 73.5 (H) 38.0 - 73.0 %    Lymph% 8.7 (L) 18.0 - 48.0 %    Mono% 14.3 4.0 - 15.0 %    Eosinophil% 3.0 0.0 - 8.0 %    Basophil% 0.5 0.0 - 1.9 %    Platelet Estimate Appears normal     Aniso Moderate     Poly Occasional     Hypo Occasional     Differential Method Automated    Basic metabolic panel    Collection Time: 12/06/17  4:17 AM   Result Value Ref Range    Sodium 133 (L) 136 - 145 mmol/L    Potassium 4.4 3.5 - 5.1 mmol/L    Chloride 99 95 - 110 mmol/L    CO2 25 23 - 29 mmol/L    Glucose 154 (H) 70 - 110 mg/dL    BUN, Bld 31 (H) 6 - 20 mg/dL    Creatinine 4.4 (H) 0.5 - 1.4 mg/dL    Calcium 8.4 (L) 8.7 - 10.5 mg/dL    Anion Gap 9 8 - 16 mmol/L    eGFR if African American 12 (A) >60 mL/min/1.73 m^2    eGFR if non African American 11 (A) >60 mL/min/1.73 m^2   Magnesium    Collection Time: 12/06/17  4:17 AM   Result Value Ref Range    Magnesium 2.1 1.6 - 2.6 mg/dL   Phosphorus    Collection Time: 12/06/17  4:17 AM   Result Value Ref Range    Phosphorus 3.6 2.7 - 4.5  mg/dL   CBC auto differential    Collection Time: 12/06/17  4:17 AM   Result Value Ref Range    WBC 8.50 3.90 - 12.70 K/uL    RBC 3.32 (L) 4.00 - 5.40 M/uL    Hemoglobin 8.2 (L) 12.0 - 16.0 g/dL    Hematocrit 25.8 (L) 37.0 - 48.5 %    MCV 78 (L) 82 - 98 fL    MCH 24.8 (L) 27.0 - 31.0 pg    MCHC 31.9 (L) 32.0 - 36.0 g/dL    RDW 22.5 (H) 11.5 - 14.5 %    Platelets 156 150 - 350 K/uL    MPV 8.3 (L) 9.2 - 12.9 fL    Gran # 6.2 1.8 - 7.7 K/uL    Lymph # 0.7 (L) 1.0 - 4.8 K/uL    Mono # 1.2 (H) 0.3 - 1.0 K/uL    Eos # 0.3 0.0 - 0.5 K/uL    Baso # 0.00 0.00 - 0.20 K/uL    Gran% 73.5 (H) 38.0 - 73.0 %    Lymph% 8.7 (L) 18.0 - 48.0 %    Mono% 14.3 4.0 - 15.0 %    Eosinophil% 3.0 0.0 - 8.0 %    Basophil% 0.5 0.0 - 1.9 %    Platelet Estimate Appears normal     Aniso Moderate     Poly Occasional     Hypo Occasional     Differential Method Automated    Comprehensive metabolic panel    Collection Time: 12/06/17  4:17 AM   Result Value Ref Range    Sodium 133 (L) 136 - 145 mmol/L    Potassium 4.4 3.5 - 5.1 mmol/L    Chloride 99 95 - 110 mmol/L    CO2 25 23 - 29 mmol/L    Glucose 154 (H) 70 - 110 mg/dL    BUN, Bld 31 (H) 6 - 20 mg/dL    Creatinine 4.4 (H) 0.5 - 1.4 mg/dL    Calcium 8.4 (L) 8.7 - 10.5 mg/dL    Total Protein 6.4 6.0 - 8.4 g/dL    Albumin 2.1 (L) 3.5 - 5.2 g/dL    Total Bilirubin 0.4 0.1 - 1.0 mg/dL    Alkaline Phosphatase 85 55 - 135 U/L    AST 10 10 - 40 U/L    ALT <5 (L) 10 - 44 U/L    Anion Gap 9 8 - 16 mmol/L    eGFR if African American 12 (A) >60 mL/min/1.73 m^2    eGFR if non African American 11 (A) >60 mL/min/1.73 m^2   POCT glucose    Collection Time: 12/06/17 11:47 AM   Result Value Ref Range    POCT Glucose 160 (H) 70 - 110 mg/dL       Diagnostic Results:  Labs: Reviewed  X-Ray: Reviewed    ASSESSMENT/PLAN:     Negative 3 L on dialysis. IVC filter planned. BNP.

## 2017-12-06 NOTE — PT/OT/SLP PROGRESS
Speech Language Pathology Treatment    Patient Name:  Juliane Ramos   MRN:  [id number]  Admitting Diagnosis: Acute respiratory failure with hypoxia and hypercapnia    Recommendations:                 General Recommendations:  Cognitive-linguistic therapy  Diet recommendations:  Regular, Liquid Diet Level: Thin   General Precautions: Standard, fall  Communication strategies:  provide increased time to answer    Subjective     I can;t eat today; they are putting in a filter.      Objective:     Has the patient been evaluated by SLP for swallowing?   Yes  Keep patient NPO? No   Current Respiratory Status: nasal cannula      Pt alert & cooperative for tx.  See Care Plan for tx details    Assessment:     Juliane Ramos is a 57 y.o. female with an SLP diagnosis of Cognitive-Linguistic Impairment.  She presented with improved divergent naming & ability to ID strategies she uses for word problem solving.  Cont POC    Goals:    SLP Goals        Problem: SLP Goal    Goal Priority Disciplines Outcome   SLP Goal     SLP Ongoing (interventions implemented as appropriate)   Description:    1) Name 10 times in concrete category in one minute with SPV.  2) Recall details of short story, presented verbally, with SPV.  3) Functional money/time word problems with MIN A  4) Simple visuospatial tasks with MIN A                    Plan:     · Patient to be seen:  5 x/week   · Plan of Care expires:  12/13/17  · Plan of Care reviewed with:  patient, spouse   · SLP Follow-Up:  Yes       Discharge recommendations:  outpatient speech therapy   Barriers to Discharge:  None    Time Tracking:     SLP Treatment Date:   12/06/17  Speech Start Time:  1100  Speech Stop Time:  1115     Speech Total Time (min):  15 min    Billable Minutes: Speech Therapy Individual 15    Lillian Mcconnell CCC-SLP/A  12/06/2017

## 2017-12-06 NOTE — PROGRESS NOTES
Pt agrees to be repositioned in bed.  She is able to do most of the moving and repositioning herself with minimal assist.  She had been advised to wear her bipap though she continues to refuse.

## 2017-12-06 NOTE — PLAN OF CARE
Problem: Physical Therapy Goal  Goal: Physical Therapy Goal  Goals to be met by: 17     Patient will increase functional independence with mobility by performin. Supine to sit with MInimal Assistance  2. Bed to chair transfer with Minimal Assistance     Outcome: Ongoing (interventions implemented as appropriate)  PT for bed mobility, EOB sitting/balance and BLE therex/ROM.

## 2017-12-06 NOTE — PLAN OF CARE
Problem: Patient Care Overview  Goal: Plan of Care Review  Outcome: Ongoing (interventions implemented as appropriate)  Pt needs ivc filter placed, plan to tx her to [facility] when bed available for procedure demetrio. She's remained free from falls/injuries this shift.

## 2017-12-06 NOTE — PT/OT/SLP PROGRESS
"Physical Therapy Treatment    Patient Name:  Juliane Ramos   MRN:  [id number]    Recommendations:     Discharge Recommendations:  nursing facility, skilled       Assessment:     Juliane Ramos is a 57 y.o. female admitted with a medical diagnosis of Acute respiratory failure with hypoxia and hypercapnia.  She presents with the following impairments/functional limitations:  weakness, impaired endurance, impaired self care skills, impaired functional mobilty, impaired balance, decreased upper extremity function, decreased lower extremity function, decreased safety awareness, pain, decreased ROM, impaired skin, edema, impaired cardiopulmonary response to activity, orthopedic precautions. Pt with reported increased SOB today, which alleviated after sitting EOB. SOB, weakness and fatigue limiting PT session.    Rehab Prognosis:  fair; patient would benefit from acute skilled PT services to address these deficits and reach maximum level of function.      Recent Surgery: Procedure(s) (LRB):  DEBRIDEMENT-FOOT and apply primatrix (Left)      Plan:     During this hospitalization, patient to be seen 6 x/week to address the above listed problems via gait training, therapeutic activities, therapeutic exercises, therapeutic groups, wheelchair management/training  · Plan of Care Expires:  12/29/17   Plan of Care Reviewed with: patient    Subjective     Communicated with nurse Perez prior to session. Per nurse Perez, pt with increased troubled breathing today, light PT only.  Patient found L sidelying upon PT entry to room, agreeable to treatment.      Chief Complaint: SOB and fatigue. Pt requesting assistance to reposition to help improving breathing.  Patient comments/goals: get well and breath better  Pain/Comfort:  · Pain Rating 1:  (did not rate, pt reporting "my foot feels better but my knee hurts where the wound is.")  · Location - Side 1: Left  · Pain Addressed 1: Reposition, Distraction, Cessation of Activity, Nurse " notified    Patients cultural, spiritual, Rastafarian conflicts given the current situation: none    Objective:     Patient found with: blood pressure cuff, villegas catheter, oxygen, peripheral IV, telemetry (L foot pressure relief pillow/wedge)     General Precautions: Standard, fall   Orthopedic Precautions:LLE non weight bearing       Functional Mobility:  · Bed Mobility:     · Scooting: mod A to EOB while seated  · Bridging: total assistance of 2 utilizing draw sheet to HOB while supine  · Supine to Sit: moderate assistance  · Sit to Supine: moderate assistance  · Balance: min A-CGA for EOB sitting balance (static and dynamic)        Therapeutic Activities and Exercises:   pt seated EOB approx 10-12' with min A initially but able to progress to CGA for stability/safety. Pt instructed in proper breathing tech. Pt able to report relief and decreased SOB after seated EOB a few minutes.     seated BLE therex: AP, LAQ, marching, hip abd/add x 10-15 reps each     pt assisted back to bed with mod A and assisted to HOB with A of 2. Pillow/wedge place underneath L foot for pressure relief. Bed repositioned and HOB elevated to promote upright position to aide with breathing.     Patient left HOB elevated with all lines intact, call button in reach and nurse Ana notified..    GOALS:    Physical Therapy Goals        Problem: Physical Therapy Goal    Goal Priority Disciplines Outcome Goal Variances Interventions   Physical Therapy Goal     PT/OT, PT Ongoing (interventions implemented as appropriate)     Description:  Goals to be met by: 2017     Patient will increase functional independence with mobility by performin. Supine to sit with Minimal Assistance  2. Sit to supine with Minimal Assistance  3. Sit to stand transfer with Minimal Assistance  4. Bed to chair transfer with Minimal Assistance using Rolling Walker  5. Stand for 5 minutes with Stand-by Assistance using Rolling Walker  6. Lower extremity exercise  program x10 reps, with supervision  Wheelchair management goal to be made pending progress              Problem: Physical Therapy Goal    Goal Priority Disciplines Outcome Goal Variances Interventions   Physical Therapy Goal     PT/OT, PT Ongoing (interventions implemented as appropriate)     Description:  Goals to be met by: 17     Patient will increase functional independence with mobility by performin. Supine to sit with MInimal Assistance  2. Bed to chair transfer with Minimal Assistance                      Time Tracking:     PT Received On: 17  PT Start Time: 855     PT Stop Time: 926  PT Total Time (min): 31 min     Billable Minutes: Therapeutic Activity 23 and Therapeutic Exercise 8    Treatment Type: Treatment  PT/PTA: PTA     PTA Visit Number: 3     Gladys Calderon, PTA  2017

## 2017-12-06 NOTE — PLAN OF CARE
Problem: SLP Goal  Goal: SLP Goal    1) Name 10 times in concrete category in one minute with SPV.  2) Recall details of short story, presented verbally, with SPV.  3) Functional money/time word problems with MIN A  4) Simple visuospatial tasks with MIN A   Outcome: Ongoing (interventions implemented as appropriate)  1. 8,10, 12, 12 re concrete items in 1 min indep  2. 5-6 details in 3 sentence paragraph indep  3. Simple money word problems, 100% acc indep, given extra time.

## 2017-12-06 NOTE — PT/OT/SLP PROGRESS
Occupational Therapy      Patient Name:  Juliane Ramos   MRN:  [id number]    Patient not seen today secondary respiratory distress and fatigue. Nurse strongly advised OT evaluation be deferred until tomorrow. Will follow-up 12/7/2017.    Anthony Castrejon, OT  12/6/2017

## 2017-12-06 NOTE — PLAN OF CARE
Problem: Patient Care Overview  Goal: Plan of Care Review  Outcome: Ongoing (interventions implemented as appropriate)  Nc 4 lpm in use with bipap for HS, aerosol txs PRN

## 2017-12-06 NOTE — PROGRESS NOTES
Progress Note  Hospital Medicine  Patient Name:Juliane Ramos  MRN:  [id number]  Patient Class: IP- Inpatient  Admit Date: 11/27/2017  Length of Stay: 9 days  Expected Discharge Date:   Attending Physician: Reg Devine MD  Primary Care Provider:  JOS Dumont    SUBJECTIVE:     Principal Problem: Left diabetic foot ulcer  Initial history of present illness: 57 year old female with HTN, DM2, HF, CKD has returned from [facility]. Patient was originally admitted on 11-18-17 with left diabetic foot ulcer. She stated that over the past few days it had been associated with worsening pain, drainage, and a fever of up to 102 and for this she came for evaluation.  She stated she was compliant with her medications but only takes her basal insulin when she needs it per sliding scale. Patient was admitted to Hospitalist medicine service. Patient was evaluated by Dr. Wick and Dr. Turner. Patient was being treated with IV antibiotics, wound care provided and was expected to have wound debridement. Patient had an episode of sudden onset of hypoxia and unresponsiveness. Patient vomited Marcelino sandwitch. Patient was expected to be NPO for podiatry procedure. Work up confirmed PE. Patient started on anticoagulation which was held yesterday due to bleeding from IV site. On the morning of 11-24-17, patient while ambulated with her  lost balance and hit left forehead to the wall. Left forehead contusion noted. No LOC or any focal neurological complaints or HA or vision changes. CT head is showed SAH. Patient transferred to [facility] neuro-critical care ICU and was evaluated by neuro-surgeon. SAH deemed stable. Patient needed one round of HD. Wound Cx grew MSSA treated with Maxipine and Vancomycin. Patient is in need for diabetic foot ulcer debridement.    PMH/PSH/SH/FH/Meds: reviewed.    Symptoms/Review of Systems: Patient is looking and feeling better. Anxiously awaiting HD catheter and IVC filter placement. No shortness of  breath, cough, chest pain or headache, fever or abdominal pain.     Diet:  Adequate intake.    Activity level: Up with assistance, fall precautions  Pain:  Patient reports no pain.       OBJECTIVE:   Vital Signs (Most Recent):      Temp: 98.8 °F (37.1 °C) (12/06/17 0804)  Pulse: 72 (12/06/17 0804)  Resp: (!) 26 (12/06/17 0804)  BP: 123/66 (12/06/17 0804)  SpO2: 98 % (12/06/17 0804)       Vital Signs Range (Last 24H):  Temp:  [97.4 °F (36.3 °C)-99.2 °F (37.3 °C)]   Pulse:  [68-80]   Resp:  [16-40]   BP: (113-142)/(60-97)   SpO2:  [94 %-100 %]     Weight: 118 kg (260 lb 2.3 oz)  Body mass index is 41.99 kg/m².    Intake/Output Summary (Last 24 hours) at 12/06/17 0954  Last data filed at 12/05/17 1800   Gross per 24 hour   Intake             1140 ml   Output             3625 ml   Net            -2485 ml     Physical Examination:  General appearance: well developed, appears stated age  Head: normocephalic, Left black eye and left forehead ecchymoses  Eyes:  conjunctivae/corneas clear. PERRL.  Nose: Nares normal. Septum midline.  Throat: lips, mucosa, and tongue normal; teeth and gums normal, no throat erythema.  Neck: supple, symmetrical, trachea midline, no JVD and thyroid not enlarged, symmetric, no tenderness/mass/nodules  Lungs:  Decreased breath sounds at lung bases  Chest wall: no tenderness  Heart: regular rate and rhythm, S1, S2 normal, no murmur, click, rub or gallop  Abdomen: soft, non-tender non-distented; bowel sounds normal; no masses,  no organomegaly  Extremities: no cyanosis, clubbing or edema.   Pulses: 2+ and symmetric  Skin: Skin color, texture, turgor normal. Left Achilis Tendon wound with black eschar and mixed granulation tissue, tendon is exposed possibly. Mild brawny edema of left foot and ankle.  Lymph nodes: Cervical, supraclavicular, and axillary nodes normal.  Neurologic: Normal strength and tone. No focal numbness or weakness. CNII-XII intact.      CBC:    Recent Labs  Lab 12/04/17  0502  12/05/17  0325 12/06/17  0417   WBC 11.90  11.90 10.50  10.50 8.50  8.50   RBC 3.56*  3.56* 3.47*  3.47* 3.32*  3.32*   HGB 8.9*  8.9* 8.5*  8.5* 8.2*  8.2*   HCT 27.4*  27.4* 26.8*  26.8* 25.8*  25.8*     195 185  185 156  156   MCV 77*  77* 77*  77* 78*  78*   MCH 24.9*  24.9* 24.5*  24.5* 24.8*  24.8*   MCHC 32.4  32.4 31.7*  31.7* 31.9*  31.9*   BMP    Recent Labs  Lab 12/04/17  0502 12/05/17 0325 12/06/17  0417   *  113* 195*  195* 154*  154*   *  133* 133*  133* 133*  133*   K 4.5  4.5 4.8  4.8 4.4  4.4   CL 98  98 98  98 99  99   CO2 24  24 25  25 25  25   BUN 30*  30* 39*  39* 31*  31*   CREATININE 4.8*  4.8* 5.4*  5.4* 4.4*  4.4*   CALCIUM 8.6*  8.6* 8.3*  8.3* 8.4*  8.4*   MG 2.0 2.1 2.1      Diagnostic Results:  Microbiology Results (last 7 days)     ** No results found for the last 168 hours. **         Left Calcaneum:   1.  No radiographically apparent acute osteomyelitis. Skin irregularity and bandage noted in the posterior aspect of the ankle.  2. Degenerative changes in the ankle, hindfoot and midfoot are noted.     MRI left foot:  1. Retro-Achilles soft tissue wound. No evidence for osteomyelitis.  2. Mild nonspecific Achilles and posterior tibial tenosynovitis.  3. Small tibiotalar joint effusion.     Bilateral leg venous doppler scan: There are some limitations but no definite acute DVT in seen in either lower extremity.     Renal US: Unremarkable appearance of the kidneys.  No hydronephrosis.     CT head:  1. Abnormal study.  There is a left forehead and frontal scalp hematoma without underlying fracture.  Additionally, there is bilateral posttraumatic subarachnoid blood with hyperdense blood seen in right parietal sulci as well as in the left sylvian fissure and adjacent left frontal and parietal sulci.  There is no acute parenchymal hemorrhage.  2.  Remote right frontal lobe infarction with ex vacuo dilatation of the right  frontal horn.  There is no obvious acute infarction.  3.  Atherosclerosis.      CT head:  1. There is no definite new abnormality.  There has been some resolution in redistribution of previously demonstrated posttraumatic subarachnoid blood.  This blood is now most apparent in the right parietal sulcus.  There is no parenchymal hemorrhage.  2.  There is no obvious acute infarction.  This would however be better evaluated with MRI.  3.  There are chronic infarctions in the superior right basal ganglia and corona radiata with ex vacuo dilatation of the right lateral ventricle.  There is also a chronic infarct in the posterior right cerebellum.  In retrospect, this was present on prior studies.  4.  Interval improvement in left forehead and inferior frontal scalp contusion.    CXR: Unchanged right lung airspace disease and pleural effusion. Component of CHF likely. Appropriate positioning of right IJ catheter.    CXR: Increasing right pleural effusion with infiltrate and atelectasis in the right lung. Central line ends at the level of the right atrium. Mild cardiomegaly. Prior sternotomy. No.    CXR: Stable positioning of right IJ catheter.  Unchanged right pleural effusion and atelectasis/consolidation.    Assessment/Plan:      Acute post-traumatic sub-arachnoid hemorrhage  Fall precaution.  Continue PT and OT.           * Diabetic leg ulcer - grade 3 , left posterior ankle     - continue IV Cefepime + Vancomycin as per Dr. Turner.  - Dr. Wick following, getting Sanyl application. Surgical debridement soon.  - Follow ID recommendations.  - wound care.          Acute RLL pulmonary embolism  Future therapeutic dose to be decided after outpatient neurosurgery followup.  Await IVC filter and Harry Catheter placement by Dr. Deal.     Acute on chronic hypoxic hypercarbic respiratory failure.  Supplemental O2 via nasal canula; titrate O2 saturation to >92%.   Continue beta 2 agonist bronchodilator treatments.   Use  BiPAP during sleep. Needs Polysomnogram upon DC.    Possible seizure  Appreciated Dr. Gil's assistance, continue PO Keppra.  Continue neuro-checks, fall and seizure precautions.    MC - now on HD  Follow nephrology recommendations. Await renal recovery.      Hypothyroid     - continue synthroid          Controlled type 2 diabetes mellitus with stage 3 chronic kidney disease     Check blood glucose level q AC/HS.  Use Novolog Insulin Sliding Scale as needed.   Continue American Diabetic Association 1800 Kcal diet          CKD (chronic kidney disease) stage 3, GFR 30-59 ml/min     - avoid nephrotoxins and renally dose meds          Essential hypertension     - continue home meds and will titrate as needed      Discussed with patient's . Patient is expected to be [facility] where she will get IVC filter and HD catheter placed. Discussed with Dr. Mcfadden and Dr. STERLING Palomo.     DVT Prophylaxis: Heparin 5K SQ q 12 hrs.    Reg Devine MD  Department of Hospital Medicine   [facility]

## 2017-12-07 LAB
ALBUMIN SERPL BCP-MCNC: 2 G/DL
ALP SERPL-CCNC: 79 U/L
ALT SERPL W/O P-5'-P-CCNC: <5 U/L
ANION GAP SERPL CALC-SCNC: 10 MMOL/L
ANION GAP SERPL CALC-SCNC: 10 MMOL/L
AST SERPL-CCNC: 10 U/L
BASOPHILS # BLD AUTO: 0.1 K/UL
BASOPHILS # BLD AUTO: 0.1 K/UL
BASOPHILS NFR BLD: 0.6 %
BASOPHILS NFR BLD: 0.6 %
BILIRUB SERPL-MCNC: 0.5 MG/DL
BNP SERPL-MCNC: 3290 PG/ML
BUN SERPL-MCNC: 37 MG/DL
BUN SERPL-MCNC: 37 MG/DL
CALCIUM SERPL-MCNC: 8.4 MG/DL
CALCIUM SERPL-MCNC: 8.4 MG/DL
CHLORIDE SERPL-SCNC: 98 MMOL/L
CHLORIDE SERPL-SCNC: 98 MMOL/L
CO2 SERPL-SCNC: 24 MMOL/L
CO2 SERPL-SCNC: 24 MMOL/L
CREAT SERPL-MCNC: 4.9 MG/DL
CREAT SERPL-MCNC: 4.9 MG/DL
DIFFERENTIAL METHOD: ABNORMAL
DIFFERENTIAL METHOD: ABNORMAL
EOSINOPHIL # BLD AUTO: 0.2 K/UL
EOSINOPHIL # BLD AUTO: 0.2 K/UL
EOSINOPHIL NFR BLD: 2.5 %
EOSINOPHIL NFR BLD: 2.5 %
ERYTHROCYTE [DISTWIDTH] IN BLOOD BY AUTOMATED COUNT: 22 %
ERYTHROCYTE [DISTWIDTH] IN BLOOD BY AUTOMATED COUNT: 22 %
EST. GFR  (AFRICAN AMERICAN): 11 ML/MIN/1.73 M^2
EST. GFR  (AFRICAN AMERICAN): 11 ML/MIN/1.73 M^2
EST. GFR  (NON AFRICAN AMERICAN): 9 ML/MIN/1.73 M^2
EST. GFR  (NON AFRICAN AMERICAN): 9 ML/MIN/1.73 M^2
GLUCOSE SERPL-MCNC: 129 MG/DL
GLUCOSE SERPL-MCNC: 129 MG/DL
HCT VFR BLD AUTO: 26 %
HCT VFR BLD AUTO: 26 %
HGB BLD-MCNC: 8.2 G/DL
HGB BLD-MCNC: 8.2 G/DL
LYMPHOCYTES # BLD AUTO: 0.7 K/UL
LYMPHOCYTES # BLD AUTO: 0.7 K/UL
LYMPHOCYTES NFR BLD: 8.3 %
LYMPHOCYTES NFR BLD: 8.3 %
MAGNESIUM SERPL-MCNC: 2.2 MG/DL
MCH RBC QN AUTO: 24.3 PG
MCH RBC QN AUTO: 24.3 PG
MCHC RBC AUTO-ENTMCNC: 31.6 G/DL
MCHC RBC AUTO-ENTMCNC: 31.6 G/DL
MCV RBC AUTO: 77 FL
MCV RBC AUTO: 77 FL
MONOCYTES # BLD AUTO: 0.9 K/UL
MONOCYTES # BLD AUTO: 0.9 K/UL
MONOCYTES NFR BLD: 11.1 %
MONOCYTES NFR BLD: 11.1 %
NEUTROPHILS # BLD AUTO: 6.5 K/UL
NEUTROPHILS # BLD AUTO: 6.5 K/UL
NEUTROPHILS NFR BLD: 77.5 %
NEUTROPHILS NFR BLD: 77.5 %
PHOSPHATE SERPL-MCNC: 4.3 MG/DL
PLATELET # BLD AUTO: 153 K/UL
PLATELET # BLD AUTO: 153 K/UL
PMV BLD AUTO: 8.3 FL
PMV BLD AUTO: 8.3 FL
POCT GLUCOSE: 138 MG/DL (ref 70–110)
POTASSIUM SERPL-SCNC: 4.8 MMOL/L
POTASSIUM SERPL-SCNC: 4.8 MMOL/L
PROT SERPL-MCNC: 6.3 G/DL
RBC # BLD AUTO: 3.38 M/UL
RBC # BLD AUTO: 3.38 M/UL
SODIUM SERPL-SCNC: 132 MMOL/L
SODIUM SERPL-SCNC: 132 MMOL/L
VANCOMYCIN SERPL-MCNC: 24.8 UG/ML
WBC # BLD AUTO: 8.4 K/UL
WBC # BLD AUTO: 8.4 K/UL

## 2017-12-07 PROCEDURE — A4216 STERILE WATER/SALINE, 10 ML: HCPCS | Performed by: NURSE PRACTITIONER

## 2017-12-07 PROCEDURE — 25000003 PHARM REV CODE 250: Performed by: NURSE PRACTITIONER

## 2017-12-07 PROCEDURE — 25000003 PHARM REV CODE 250

## 2017-12-07 PROCEDURE — 82947 ASSAY GLUCOSE BLOOD QUANT: CPT

## 2017-12-07 PROCEDURE — 99152 MOD SED SAME PHYS/QHP 5/>YRS: CPT

## 2017-12-07 PROCEDURE — 37191 INS ENDOVAS VENA CAVA FILTR: CPT

## 2017-12-07 PROCEDURE — C1880 VENA CAVA FILTER: HCPCS

## 2017-12-07 PROCEDURE — 84100 ASSAY OF PHOSPHORUS: CPT

## 2017-12-07 PROCEDURE — C1750 CATH, HEMODIALYSIS,LONG-TERM: HCPCS

## 2017-12-07 PROCEDURE — 12000002 HC ACUTE/MED SURGE SEMI-PRIVATE ROOM

## 2017-12-07 PROCEDURE — 25000003 PHARM REV CODE 250: Performed by: INTERNAL MEDICINE

## 2017-12-07 PROCEDURE — 80202 ASSAY OF VANCOMYCIN: CPT

## 2017-12-07 PROCEDURE — 36556 INSERT NON-TUNNEL CV CATH: CPT

## 2017-12-07 PROCEDURE — 99153 MOD SED SAME PHYS/QHP EA: CPT

## 2017-12-07 PROCEDURE — 36415 COLL VENOUS BLD VENIPUNCTURE: CPT

## 2017-12-07 PROCEDURE — 63600175 PHARM REV CODE 636 W HCPCS: Performed by: INTERNAL MEDICINE

## 2017-12-07 PROCEDURE — 36558 INSERT TUNNELED CV CATH: CPT

## 2017-12-07 PROCEDURE — 80100016 HC MAINTENANCE HEMODIALYSIS

## 2017-12-07 PROCEDURE — 27000221 HC OXYGEN, UP TO 24 HOURS

## 2017-12-07 PROCEDURE — 94660 CPAP INITIATION&MGMT: CPT

## 2017-12-07 PROCEDURE — 83735 ASSAY OF MAGNESIUM: CPT

## 2017-12-07 PROCEDURE — 85025 COMPLETE CBC W/AUTO DIFF WBC: CPT

## 2017-12-07 PROCEDURE — 94761 N-INVAS EAR/PLS OXIMETRY MLT: CPT

## 2017-12-07 PROCEDURE — 99232 SBSQ HOSP IP/OBS MODERATE 35: CPT | Mod: ,,, | Performed by: INTERNAL MEDICINE

## 2017-12-07 PROCEDURE — 83880 ASSAY OF NATRIURETIC PEPTIDE: CPT

## 2017-12-07 PROCEDURE — 80053 COMPREHEN METABOLIC PANEL: CPT

## 2017-12-07 PROCEDURE — 99900035 HC TECH TIME PER 15 MIN (STAT)

## 2017-12-07 RX ADMIN — GABAPENTIN 100 MG: 100 CAPSULE ORAL at 08:12

## 2017-12-07 RX ADMIN — ACETAMINOPHEN 650 MG: 325 TABLET ORAL at 08:12

## 2017-12-07 RX ADMIN — SODIUM CHLORIDE, PRESERVATIVE FREE 3 ML: 5 INJECTION INTRAVENOUS at 10:12

## 2017-12-07 RX ADMIN — HEPARIN SODIUM 5000 UNITS: 5000 INJECTION, SOLUTION INTRAVENOUS; SUBCUTANEOUS at 08:12

## 2017-12-07 RX ADMIN — CARVEDILOL 12.5 MG: 6.25 TABLET, FILM COATED ORAL at 08:12

## 2017-12-07 NOTE — PHYSICIAN QUERY
PT Name: Juliane Ramos  MR #: [id number]     Physician Query Form - Documentation Clarification      CDS/: Carolynn Millard RN CDI        Contact information:  [phone]    This form is a permanent document in the medical record.     Query Date: December 7, 2017    By submitting this query, we are merely seeking further clarification of documentation. Please utilize your independent clinical judgment when addressing the question(s) below.    The Medical record reflects the following:    Supporting Clinical Findings Location in Medical Record       CHF (congestive heart failure)    Acute Renal Failure    CKD 3 -       CABG 4vessel ring in one valve mitral   valve prolapse    CORONARY ARTERY BYPASS GRAFT     HTN    DMII        H&P           Unchanged right lung airspace disease and pleural        effusion. Component of CHF likely.       BMP = 3,290 on 12/7      Chest Xray 11/30       Lab results 12/7         3 hour dialysis today     dialysis dependent per nephrology            progress note 11/30      Progress note 12/1           Carvedilol (coreg) 12.5 mg oral started 11/27        Med sheet.                                                                            Doctor, Please specify diagnosis or diagnoses associated with above clinical findings.    Provider Use Only       [    ] CHF being treated this admit, history of CHF,                  specify Acuity____________, and Type____________.     [    ] CHF not being treated this admit, history of CHF,                  Specify Acuity____________, and Type____________.     [    ] No CHF     [   x ] Other, please specify___Chronic systolic CHF_________.                                                                                                             [  ] Clinically undetermined

## 2017-12-07 NOTE — PROGRESS NOTES
INPATIENT NEPHROLOGY PROGRESS NOTE  [facility] NEPHROLOGY    Patient Name: Juliane Ramos  Date: 12/07/2017    Reason for consultation: MC      Chief Complaint: Left diabetic foot ulcer     History of Present Illness:  Ms. Ramos is a 56 y/o F with a hx of HTN, DM2, CHF, and stage III CKD who p/w a left heel ulcer/cellulitis x 2 weeks, nonhealing. Hospital course c/b PE, fall with SAH, and MC 2/2 contrast/vancomycin/infection requiring RRT. We have been consulted for MC.    · Interval History/Subjective:         12/4  Denies n,v, chest pair sob.  12/5  Seen on dialysis.  Was sob earlier, doing ok now  12/6  No nausea, vomiting, chest pain.  More alert  12/7   No sob or chest pain.  Small nonpruritic rash under blood pressure cuff     Plan of Care:    Assessment:  Oliguric MC 2/2 multifactorial ATN, dialysis dependent  Edema/Pleural effusion  Hyponatremia  Anemia  Low albumin      Plan:     1. Acute Kidney Injury- dialysis today after GFF and hemesplit at [facility]     2. Volume/Blood Pressure- BP is stable. uf as tolerated with hd     3. Electrolytes/Acid Base- HypoNa is stable, continue current dialysis prescription/UF goals.       4. Anemia of CKD- Hb is stable. Continue EPO 5K with HD.      5. Low albumin- Stable, continue Nepro.    Thank you for allowing us to participate in this patient's care. We will continue to follow.    Medications:  No current facility-administered medications on file prior to encounter.      Current Outpatient Prescriptions on File Prior to Encounter   Medication Sig Dispense Refill    albuterol (ACCUNEB) 1.25 mg/3 mL Nebu Take 1.25 mg by nebulization every 6 (six) hours as needed. Rescue      carvedilol (COREG) 12.5 MG tablet Take 1 tablet (12.5 mg total) by mouth 2 (two) times daily. 60 tablet 11    cefepime in dextrose 5 % (MAXIPIME) 1 gram/50 mL PgBk Inject 50 mLs (1 g total) into the vein every 24 hours as needed.      gabapentin (NEURONTIN) 100 MG capsule Take 1 capsule (100 mg  total) by mouth 3 (three) times daily. 90 capsule 11    levetiracetam oral soln 500 mg/5 mL (5 mL) Soln 2.5 mLs (250 mg total) by Per NG tube route 2 (two) times daily. 150 mL 11    levothyroxine (SYNTHROID) 50 MCG tablet Take 50 mcg by mouth once daily.        metronidazole (FLAGYL) 500 mg/100 mL IVPB Inject 100 mLs (500 mg total) into the vein every 8 (eight) hours.      rosuvastatin (CRESTOR) 10 MG tablet Take 1 tablet (10 mg total) by mouth once daily. 30 tablet 0    VANCOMYCIN HCL (VANCOMYCIN IN 5 % DEXTROSE) 1.75 gram/500 mL Soln Inject 500 mLs (1,750 mg total) into the vein once daily.       Scheduled Meds:   carvedilol  12.5 mg Per NG tube BID    ceFEPime (MAXIPIME) IVPB  1 g Intravenous Daily    collagenase   Topical Daily    epoetin donita (PROCRIT) injection  5,000 Units Intravenous Every Tues, Thurs, Sat    fluconazole  100 mg Oral Daily    gabapentin  100 mg Oral TID    heparin (porcine)  5,000 Units Subcutaneous Q12H    insulin aspart  1-10 Units Subcutaneous TIDWM    levothyroxine  50 mcg Per NG tube Before breakfast    miconazole   Topical (Top) BID    rosuvastatin  10 mg Per NG tube Daily    sodium chloride 0.9%  3 mL Intravenous Q8H     Continuous Infusions:   PRN Meds:.sodium chloride 0.9%, acetaminophen, albuterol sulfate, dextrose 50%, glucagon (human recombinant), glucose, glucose, heparin (porcine), labetalol, levetiracetam oral soln, magnesium oxide, magnesium oxide, ondansetron, potassium phosphate IVPB, simethicone    Allergies:  Patient has no known allergies.    Vital Signs:  Temp Readings from Last 3 Encounters:   12/07/17 97.8 °F (36.6 °C) (Axillary)   11/27/17 97 °F (36.1 °C) (Oral)   11/23/17 97.4 °F (36.3 °C)     Pulse Readings from Last 3 Encounters:   12/07/17 62   11/27/17 67   11/24/17 65     BP Readings from Last 3 Encounters:   12/07/17 139/82   11/27/17 123/67   11/24/17 (!) 108/58     Weight:  Wt Readings from Last 3 Encounters:   12/05/17 118 kg (260 lb 2.3  oz)   11/27/17 117 kg (257 lb 15 oz)   11/22/17 106.6 kg (234 lb 15.8 oz)     Physical Exam:  Constitutional: nad, aao x 3   Heart: rrr, no m/r/g, wwp, + edema  Lungs: ant ausc clear, no w/r/r/c, no lb  Abdomen: s/nt/nd, +BS    Results:  Lab Results   Component Value Date     (L) 12/07/2017     (L) 12/07/2017    K 4.8 12/07/2017    K 4.8 12/07/2017    CL 98 12/07/2017    CL 98 12/07/2017    CO2 24 12/07/2017    CO2 24 12/07/2017    BUN 37 (H) 12/07/2017    BUN 37 (H) 12/07/2017    CREATININE 4.9 (H) 12/07/2017    CREATININE 4.9 (H) 12/07/2017    CALCIUM 8.4 (L) 12/07/2017    CALCIUM 8.4 (L) 12/07/2017    ANIONGAP 10 12/07/2017    ANIONGAP 10 12/07/2017    ESTGFRAFRICA 11 (A) 12/07/2017    ESTGFRAFRICA 11 (A) 12/07/2017    EGFRNONAA 9 (A) 12/07/2017    EGFRNONAA 9 (A) 12/07/2017     Lab Results   Component Value Date    CALCIUM 8.4 (L) 12/07/2017    CALCIUM 8.4 (L) 12/07/2017    PHOS 4.3 12/07/2017       Recent Labs  Lab 12/07/17  0155   WBC 8.40  8.40   RBC 3.38*  3.38*   HGB 8.2*  8.2*   HCT 26.0*  26.0*     153   MCV 77*  77*   MCH 24.3*  24.3*   MCHC 31.6*  31.6*     I have personally reviewed pertinent radiological imaging and reports.    Elijah Gonzalez MD  Nephrology  [facility] [place]  [phone]

## 2017-12-07 NOTE — PLAN OF CARE
Problem: Patient Care Overview  Goal: Plan of Care Review  Outcome: Ongoing (interventions implemented as appropriate)  Pt put on bipap for dialysis, tolerating well

## 2017-12-07 NOTE — PROGRESS NOTES
Progress Note  Hospital Medicine  Patient Name:Juliane Ramos  MRN:  [id number]  Patient Class: IP- Inpatient  Admit Date: 11/27/2017  Length of Stay: 10 days  Expected Discharge Date:   Attending Physician: Reg Devine MD  Primary Care Provider:  JOS Dumont    SUBJECTIVE:     Principal Problem: Left diabetic foot ulcer  Initial history of present illness: 57 year old female with HTN, DM2, HF, CKD has returned from [facility]. Patient was originally admitted on 11-18-17 with left diabetic foot ulcer. She stated that over the past few days it had been associated with worsening pain, drainage, and a fever of up to 102 and for this she came for evaluation.  She stated she was compliant with her medications but only takes her basal insulin when she needs it per sliding scale. Patient was admitted to Hospitalist medicine service. Patient was evaluated by Dr. Wick and Dr. Turner. Patient was being treated with IV antibiotics, wound care provided and was expected to have wound debridement. Patient had an episode of sudden onset of hypoxia and unresponsiveness. Patient vomited Marcelino sandwitch. Patient was expected to be NPO for podiatry procedure. Work up confirmed PE. Patient started on anticoagulation which was held yesterday due to bleeding from IV site. On the morning of 11-24-17, patient while ambulated with her  lost balance and hit left forehead to the wall. Left forehead contusion noted. No LOC or any focal neurological complaints or HA or vision changes. CT head is showed SAH. Patient transferred to [facility] neuro-critical care ICU and was evaluated by neuro-surgeon. SAH deemed stable. Patient needed one round of HD. Wound Cx grew MSSA treated with Maxipine and Vancomycin. Patient is in need for diabetic foot ulcer debridement.    PMH/PSH/SH/FH/Meds: reviewed.    Symptoms/Review of Systems: Patient is looking and feeling better. s/p HD catheter and IVC filter placement. No shortness of breath, cough,  chest pain or headache, fever or abdominal pain.     Diet:  Adequate intake.    Activity level: Up with assistance, fall precautions  Pain:  Patient reports no pain.       OBJECTIVE:   Vital Signs (Most Recent):      Temp: 97.8 °F (36.6 °C) (12/07/17 0730)  Pulse: 63 (12/07/17 0800)  Resp: (!) 25 (12/07/17 0800)  BP: (!) 140/80 (12/07/17 0800)  SpO2: 98 % (12/07/17 0800)       Vital Signs Range (Last 24H):  Temp:  [97.8 °F (36.6 °C)-98.7 °F (37.1 °C)]   Pulse:  [61-74]   Resp:  [14-34]   BP: (117-174)/(58-97)   SpO2:  [96 %-100 %]     Weight: 118 kg (260 lb 2.3 oz)  Body mass index is 41.99 kg/m².    Intake/Output Summary (Last 24 hours) at 12/07/17 0916  Last data filed at 12/07/17 0600   Gross per 24 hour   Intake              750 ml   Output              325 ml   Net              425 ml     Physical Examination:  General appearance: well developed, appears stated age  Head: normocephalic, Left black eye and left forehead ecchymoses  Eyes:  conjunctivae/corneas clear. PERRL.  Nose: Nares normal. Septum midline.  Throat: lips, mucosa, and tongue normal; teeth and gums normal, no throat erythema.  Neck: supple, symmetrical, trachea midline, no JVD and thyroid not enlarged, symmetric, no tenderness/mass/nodules  Lungs:  Decreased breath sounds at lung bases  Chest wall: no tenderness  Heart: regular rate and rhythm, S1, S2 normal, no murmur, click, rub or gallop  Abdomen: soft, non-tender non-distented; bowel sounds normal; no masses,  no organomegaly  Extremities: no cyanosis, clubbing or edema.   Pulses: 2+ and symmetric  Skin: Skin color, texture, turgor normal. Left Achilis Tendon wound with black eschar and mixed granulation tissue, tendon is exposed possibly. Mild brawny edema of left foot and ankle.  Lymph nodes: Cervical, supraclavicular, and axillary nodes normal.  Neurologic: Normal strength and tone. No focal numbness or weakness. CNII-XII intact.      CBC:    Recent Labs  Lab 12/05/17  0325 12/06/17  9875  12/07/17  0155   WBC 10.50  10.50 8.50  8.50 8.40  8.40   RBC 3.47*  3.47* 3.32*  3.32* 3.38*  3.38*   HGB 8.5*  8.5* 8.2*  8.2* 8.2*  8.2*   HCT 26.8*  26.8* 25.8*  25.8* 26.0*  26.0*     185 156  156 153  153   MCV 77*  77* 78*  78* 77*  77*   MCH 24.5*  24.5* 24.8*  24.8* 24.3*  24.3*   MCHC 31.7*  31.7* 31.9*  31.9* 31.6*  31.6*   BMP    Recent Labs  Lab 12/05/17  0325 12/06/17  0417 12/07/17  0155   *  195* 154*  154* 129*  129*   *  133* 133*  133* 132*  132*   K 4.8  4.8 4.4  4.4 4.8  4.8   CL 98  98 99  99 98  98   CO2 25  25 25  25 24  24   BUN 39*  39* 31*  31* 37*  37*   CREATININE 5.4*  5.4* 4.4*  4.4* 4.9*  4.9*   CALCIUM 8.3*  8.3* 8.4*  8.4* 8.4*  8.4*   MG 2.1 2.1 2.2      Diagnostic Results:  Microbiology Results (last 7 days)     ** No results found for the last 168 hours. **         Left Calcaneum:   1.  No radiographically apparent acute osteomyelitis. Skin irregularity and bandage noted in the posterior aspect of the ankle.  2. Degenerative changes in the ankle, hindfoot and midfoot are noted.     MRI left foot:  1. Retro-Achilles soft tissue wound. No evidence for osteomyelitis.  2. Mild nonspecific Achilles and posterior tibial tenosynovitis.  3. Small tibiotalar joint effusion.     Bilateral leg venous doppler scan: There are some limitations but no definite acute DVT in seen in either lower extremity.     Renal US: Unremarkable appearance of the kidneys.  No hydronephrosis.     CT head:  1. Abnormal study.  There is a left forehead and frontal scalp hematoma without underlying fracture.  Additionally, there is bilateral posttraumatic subarachnoid blood with hyperdense blood seen in right parietal sulci as well as in the left sylvian fissure and adjacent left frontal and parietal sulci.  There is no acute parenchymal hemorrhage.  2.  Remote right frontal lobe infarction with ex vacuo dilatation of the right frontal horn.   There is no obvious acute infarction.  3.  Atherosclerosis.      CT head:  1. There is no definite new abnormality.  There has been some resolution in redistribution of previously demonstrated posttraumatic subarachnoid blood.  This blood is now most apparent in the right parietal sulcus.  There is no parenchymal hemorrhage.  2.  There is no obvious acute infarction.  This would however be better evaluated with MRI.  3.  There are chronic infarctions in the superior right basal ganglia and corona radiata with ex vacuo dilatation of the right lateral ventricle.  There is also a chronic infarct in the posterior right cerebellum.  In retrospect, this was present on prior studies.  4.  Interval improvement in left forehead and inferior frontal scalp contusion.    CXR: Unchanged right lung airspace disease and pleural effusion. Component of CHF likely. Appropriate positioning of right IJ catheter.    CXR: Increasing right pleural effusion with infiltrate and atelectasis in the right lung. Central line ends at the level of the right atrium. Mild cardiomegaly. Prior sternotomy. No.    CXR: Stable positioning of right IJ catheter.  Unchanged right pleural effusion and atelectasis/consolidation.    CXR: Bilateral infiltrates right more so than left differential for which includes CHF and pneumonia.  Perihilar infiltrates appearing slightly more prominent than on the prior exam    Assessment/Plan:      Acute post-traumatic sub-arachnoid hemorrhage  Fall precaution.  Continue PT and OT.           * Diabetic leg ulcer - grade 3 , left posterior ankle     - continue IV Cefepime + Vancomycin as per Dr. Turner.  - Dr. Wick following, getting Sanyl application. Surgical debridement soon. RN calling him now to schedule debridement.  - Follow ID recommendations.  - wound care.          Acute RLL pulmonary embolism  S/p IVC filter placed  Future therapeutic dose to be decided after outpatient neurosurgery followup.    Acute on  chronic hypoxic hypercarbic respiratory failure.  Supplemental O2 via nasal canula; titrate O2 saturation to >92%.   Continue beta 2 agonist bronchodilator treatments.   Use BiPAP during sleep. Needs Polysomnogram upon DC.    Possible seizure  Appreciated Dr. Gil's assistance, continue PO Keppra.  Continue neuro-checks, fall and seizure precautions.    MC - now on HD  Follow nephrology recommendations. Await renal recovery.      Hypothyroid     - continue synthroid          Controlled type 2 diabetes mellitus with stage 3 chronic kidney disease     Check blood glucose level q AC/HS.  Use Novolog Insulin Sliding Scale as needed.   Continue American Diabetic Association 1800 Kcal diet          CKD (chronic kidney disease) stage 3, GFR 30-59 ml/min     - avoid nephrotoxins and renally dose meds          Essential hypertension     - continue home meds and will titrate as needed      Discussed with patient's .      DVT Prophylaxis: Heparin 5K SQ q 12 hrs.    Reg Devine MD  Department of Hospital Medicine   [facility]

## 2017-12-07 NOTE — PLAN OF CARE
Problem: Patient Care Overview  Goal: Plan of Care Review  Outcome: Ongoing (interventions implemented as appropriate)  Shift goals discussed with patient with time given for questions and answers.    Comments: Rested well this shift, aware of upcoming procedure at [facility]. Pain free this shift, NPO since midnight Injury free this shift.Vital signs stable.

## 2017-12-07 NOTE — PT/OT/SLP PROGRESS
Physical Therapy      Patient Name:  Juliane Ramos   MRN:  [id number]    Patient not seen today secondary to Unavailable (Comment) (pt out of hospital for procedure). Will follow-up .    Gladys Calderon, PTA

## 2017-12-07 NOTE — PROGRESS NOTES
12/06/17 2205   Patient Assessment/Suction   Level of Consciousness (AVPU) alert   Respiratory Effort Normal;Unlabored   PRE-TX-O2-ETCO2   O2 Device (Oxygen Therapy) BiPAP   Oxygen Concentration (%) 40   SpO2 98 %   Pulse 69   Resp (!) 22   Preset CPAP/BiPAP Settings   Mode Of Delivery BiPAP S/T   $ CPAP/BiPAP Daily Charge BiPAP/CPAP Daily   $ Initial CPAP/BiPAP Setup? No   $ Is patient using? Yes   Equipment Type V60   Airway Device Type medium full face mask   Humidifier not applicable   Ipap 10   EPAP (cm H2O) 5   Pressure Support (cm H2O) 5   Set Rate (Breaths/Min) 6   ITime (sec) 1   Rise Time (sec) 3   Patient CPAP/BiPAP Settings   RR Total (Breaths/Min) 21   Tidal Volume (mL) 434   VE Minute Ventilation (L/min) 8.9 L/min   Peak Inspiratory Pressure (cm H2O) 10   TiTOT (%) 30   Total Leak (L/Min) 4   Patient Trigger - ST Mode Only (%) 100   CPAP/BiPAP Alarms   High Pressure (cm H2O) 15   Low Pressure (cm H2O) 5   Low Pressure Delay (Sec) 20   Minute Ventilation (L/Min) 2.5   High RR (breaths/min) 40   Low RR (breaths/min) 6   pt placed on bipap at this time for sleep

## 2017-12-07 NOTE — PLAN OF CARE
12/07/17 0736   Patient Assessment/Suction   Level of Consciousness (AVPU) alert   Respiratory Effort Normal;Unlabored   Expansion/Accessory Muscles/Retractions no retractions;no use of accessory muscles   All Lung Fields Breath Sounds diminished;clear   Rhythm/Pattern, Respiratory depth regular;pattern regular;unlabored   Cough Frequency infrequent   PRE-TX-O2-ETCO2   O2 Device (Oxygen Therapy) nasal cannula   $ Is the patient on Low Flow Oxygen? Yes   Flow (L/min) 4   Oxygen Concentration (%) 36   SpO2 98 %   Pulse Oximetry Type Continuous   $ Pulse Oximetry - Multiple Charge Pulse Oximetry - Multiple   Pulse 62   Resp (!) 22   Aerosol Therapy   $ Aerosol Therapy Charges PRN treatment not required   Ready to Wean/Extubation Screen   FIO2<=50 (chart decimal) 0.36

## 2017-12-07 NOTE — PLAN OF CARE
12/07/17 0353   Patient Assessment/Suction   Level of Consciousness (AVPU) (sleeping)   Respiratory Effort Normal;Unlabored   PRE-TX-O2-ETCO2   O2 Device (Oxygen Therapy) BiPAP   Oxygen Concentration (%) 40   SpO2 100 %   Pulse 63   Resp 20   Preset CPAP/BiPAP Settings   Mode Of Delivery BiPAP S/T   $ Is patient using? Yes   Equipment Type V60   Airway Device Type medium full face mask   Humidifier not applicable   Ipap 10   EPAP (cm H2O) 5   Pressure Support (cm H2O) 5   Set Rate (Breaths/Min) 6   ITime (sec) 1   Rise Time (sec) 3   Patient CPAP/BiPAP Settings   RR Total (Breaths/Min) 15   Tidal Volume (mL) 519   VE Minute Ventilation (L/min) 7.6 L/min   Peak Inspiratory Pressure (cm H2O) 11   TiTOT (%) 23   Total Leak (L/Min) 6   Patient Trigger - ST Mode Only (%) 100   CPAP/BiPAP Alarms   High Pressure (cm H2O) 15   Low Pressure (cm H2O) 5   Low Pressure Delay (Sec) 20   Minute Ventilation (L/Min) 2.5   High RR (breaths/min) 40   Low RR (breaths/min) 6   Pt assessed for PRN neb tx, none needed.  Pt wore BiPAP throughout the night and tolerated this well.

## 2017-12-07 NOTE — PROGRESS NOTES
Spoke with Dr. Deal regarding insertion of IVC filter and hemasplit HD catheter. He is aware that patient would have to go to [facility] for IVC placement. Pt was scheduled for 1100 per Dr. Deal with [facility] cath lab. I notified Dr. Devine who stated he would write orders for transfer to [facility]. I contacted Pat in referral center who attempted to get a bed at [facility]. Currently critical care beds are not available as they are on diversion. Since that conversation, the patient has been stepped down to telemetry but is still housed in ICU. Dr. Devine, Bogdan Palomo and Johnny are aware of transfer for cath lab services. Reported to Esther Flores that patient is NPO after MN and that the House Supervisor as well as charge nurse should keep in contact with referral center and HS at [facility] to coordinate transfer in AM. Hernando in Cath Lab at [facility] is aware of case at 1100. Patient and family are also aware of procedure at [facility]. All questions answered.

## 2017-12-07 NOTE — PROGRESS NOTES
0728-Juanjose at transfer center for [facility] called by this nurse and requesting update on bed status for pt's impending transfer to [facility] for OR scheduled today. She states she will call [facility] and call me back.  0741- Call returned by Juanjose who states pt will go directly to [facility] and then be admitted to [facility] after OR- phone # for report [phone].  0745-pt updated on transfer progress. Pt's  called and updated that pt will be going to [facility] for transfer and OR and that pt will be admitted to [facility] after procedure.  0746- Dr Devine called and updated on acceptance of t; requesting discharge orders. Dr Devine stated that pt is only a transfer, not a discharge to [facility]. Charge nurse MARIE Raya RN updated- requested her to call transfer center to clarify admission/ discharge status.  [id number]- pt's dressing to left lower leg wound changed and pictures taken- continues to have redness and swelling around linear posterior calf wound. Noted to have continued eschar- cleaned with wound cleanser, santyl oint applied to area then covered with Ag mesh, 4x4 and ra. Pt bathed- noted to have red raised rash pattern to both upper arms, few in left axilla area, also generalized blanchable pink rash to anterior trunk, noted to have scabbed areas subscapular region on back, left hip, lateral left thigh. Pictures taken of all areas and uploaded into chart. Noted to have peeling reddened areas under abdominal folds and upper thighs near perineum and along mid gluteal fold, only redness under both breasts. Pt noted to be incontinent of small amt soft brown stool- cleaned. Antifungal cream applied to reddened and peeling areas. Dr Gonzalez on unit- brought into bedside to view pt's rash and wounds.  0901-pt loaded onto [employer] stretcher with all of her belongings- verified by her  who is at bedside and pt.  0905-Kim Preea RN called at [facility]- given room assignment 1401 with accepting physician Dr Valderrama.  Report given to Kim- she states that there are no available beds at [facility] and pt will be returning to [facility]. ERICA Mackay RN, CC notified- she states she will contact House Supervisor   WAQAS Connor RN.

## 2017-12-08 ENCOUNTER — ANESTHESIA (OUTPATIENT)
Dept: SURGERY | Facility: HOSPITAL | Age: 57
DRG: 981 | End: 2017-12-08
Payer: MEDICARE

## 2017-12-08 ENCOUNTER — ANESTHESIA EVENT (OUTPATIENT)
Dept: SURGERY | Facility: HOSPITAL | Age: 57
DRG: 981 | End: 2017-12-08
Payer: MEDICARE

## 2017-12-08 LAB
ALBUMIN SERPL BCP-MCNC: 2.1 G/DL
ALP SERPL-CCNC: 94 U/L
ALT SERPL W/O P-5'-P-CCNC: <5 U/L
ANION GAP SERPL CALC-SCNC: 9 MMOL/L
ANION GAP SERPL CALC-SCNC: 9 MMOL/L
AST SERPL-CCNC: 11 U/L
BASOPHILS # BLD AUTO: 0 K/UL
BASOPHILS # BLD AUTO: 0 K/UL
BASOPHILS NFR BLD: 0.5 %
BASOPHILS NFR BLD: 0.5 %
BILIRUB SERPL-MCNC: 0.5 MG/DL
BUN SERPL-MCNC: 34 MG/DL
BUN SERPL-MCNC: 34 MG/DL
CALCIUM SERPL-MCNC: 8.3 MG/DL
CALCIUM SERPL-MCNC: 8.3 MG/DL
CHLORIDE SERPL-SCNC: 100 MMOL/L
CHLORIDE SERPL-SCNC: 100 MMOL/L
CO2 SERPL-SCNC: 26 MMOL/L
CO2 SERPL-SCNC: 26 MMOL/L
CREAT SERPL-MCNC: 4.2 MG/DL
CREAT SERPL-MCNC: 4.2 MG/DL
DIFFERENTIAL METHOD: ABNORMAL
DIFFERENTIAL METHOD: ABNORMAL
EOSINOPHIL # BLD AUTO: 0.2 K/UL
EOSINOPHIL # BLD AUTO: 0.2 K/UL
EOSINOPHIL NFR BLD: 2.1 %
EOSINOPHIL NFR BLD: 2.1 %
ERYTHROCYTE [DISTWIDTH] IN BLOOD BY AUTOMATED COUNT: 21.8 %
ERYTHROCYTE [DISTWIDTH] IN BLOOD BY AUTOMATED COUNT: 21.8 %
EST. GFR  (AFRICAN AMERICAN): 13 ML/MIN/1.73 M^2
EST. GFR  (AFRICAN AMERICAN): 13 ML/MIN/1.73 M^2
EST. GFR  (NON AFRICAN AMERICAN): 11 ML/MIN/1.73 M^2
EST. GFR  (NON AFRICAN AMERICAN): 11 ML/MIN/1.73 M^2
GLUCOSE SERPL-MCNC: 164 MG/DL
GLUCOSE SERPL-MCNC: 164 MG/DL
HCT VFR BLD AUTO: 25 %
HCT VFR BLD AUTO: 25 %
HGB BLD-MCNC: 7.9 G/DL
HGB BLD-MCNC: 7.9 G/DL
LYMPHOCYTES # BLD AUTO: 0.5 K/UL
LYMPHOCYTES # BLD AUTO: 0.5 K/UL
LYMPHOCYTES NFR BLD: 5.8 %
LYMPHOCYTES NFR BLD: 5.8 %
MAGNESIUM SERPL-MCNC: 2.1 MG/DL
MCH RBC QN AUTO: 24.5 PG
MCH RBC QN AUTO: 24.5 PG
MCHC RBC AUTO-ENTMCNC: 31.6 G/DL
MCHC RBC AUTO-ENTMCNC: 31.6 G/DL
MCV RBC AUTO: 78 FL
MCV RBC AUTO: 78 FL
MONOCYTES # BLD AUTO: 0.9 K/UL
MONOCYTES # BLD AUTO: 0.9 K/UL
MONOCYTES NFR BLD: 10.5 %
MONOCYTES NFR BLD: 10.5 %
NEUTROPHILS # BLD AUTO: 6.7 K/UL
NEUTROPHILS # BLD AUTO: 6.7 K/UL
NEUTROPHILS NFR BLD: 81.1 %
NEUTROPHILS NFR BLD: 81.1 %
PHOSPHATE SERPL-MCNC: 3.7 MG/DL
PLATELET # BLD AUTO: 146 K/UL
PLATELET # BLD AUTO: 146 K/UL
PMV BLD AUTO: 8.5 FL
PMV BLD AUTO: 8.5 FL
POCT GLUCOSE: 141 MG/DL (ref 70–110)
POCT GLUCOSE: 149 MG/DL (ref 70–110)
POCT GLUCOSE: 162 MG/DL (ref 70–110)
POCT GLUCOSE: 174 MG/DL (ref 70–110)
POTASSIUM SERPL-SCNC: 4.5 MMOL/L
POTASSIUM SERPL-SCNC: 4.5 MMOL/L
PROT SERPL-MCNC: 6.5 G/DL
RBC # BLD AUTO: 3.22 M/UL
RBC # BLD AUTO: 3.22 M/UL
SODIUM SERPL-SCNC: 135 MMOL/L
SODIUM SERPL-SCNC: 135 MMOL/L
WBC # BLD AUTO: 8.3 K/UL
WBC # BLD AUTO: 8.3 K/UL

## 2017-12-08 PROCEDURE — 25000003 PHARM REV CODE 250: Performed by: INTERNAL MEDICINE

## 2017-12-08 PROCEDURE — 99900103 DSU ONLY-NO CHARGE-INITIAL HR (STAT): Performed by: PODIATRIST

## 2017-12-08 PROCEDURE — 63600175 PHARM REV CODE 636 W HCPCS: Performed by: NURSE PRACTITIONER

## 2017-12-08 PROCEDURE — 25000003 PHARM REV CODE 250: Performed by: NURSE ANESTHETIST, CERTIFIED REGISTERED

## 2017-12-08 PROCEDURE — 27000221 HC OXYGEN, UP TO 24 HOURS

## 2017-12-08 PROCEDURE — 27800903 OPTIME MED/SURG SUP & DEVICES OTHER IMPLANTS: Performed by: PODIATRIST

## 2017-12-08 PROCEDURE — 85025 COMPLETE CBC W/AUTO DIFF WBC: CPT

## 2017-12-08 PROCEDURE — 94761 N-INVAS EAR/PLS OXIMETRY MLT: CPT

## 2017-12-08 PROCEDURE — 99900035 HC TECH TIME PER 15 MIN (STAT)

## 2017-12-08 PROCEDURE — A4216 STERILE WATER/SALINE, 10 ML: HCPCS | Performed by: NURSE PRACTITIONER

## 2017-12-08 PROCEDURE — D9220A PRA ANESTHESIA: Mod: CRNA,,, | Performed by: NURSE ANESTHETIST, CERTIFIED REGISTERED

## 2017-12-08 PROCEDURE — 84100 ASSAY OF PHOSPHORUS: CPT

## 2017-12-08 PROCEDURE — 99232 SBSQ HOSP IP/OBS MODERATE 35: CPT | Mod: ,,, | Performed by: INTERNAL MEDICINE

## 2017-12-08 PROCEDURE — 87102 FUNGUS ISOLATION CULTURE: CPT

## 2017-12-08 PROCEDURE — 87116 MYCOBACTERIA CULTURE: CPT

## 2017-12-08 PROCEDURE — D9220A PRA ANESTHESIA: Mod: ANES,,, | Performed by: ANESTHESIOLOGY

## 2017-12-08 PROCEDURE — 12000002 HC ACUTE/MED SURGE SEMI-PRIVATE ROOM

## 2017-12-08 PROCEDURE — 63600175 PHARM REV CODE 636 W HCPCS: Performed by: NURSE ANESTHETIST, CERTIFIED REGISTERED

## 2017-12-08 PROCEDURE — 80053 COMPREHEN METABOLIC PANEL: CPT

## 2017-12-08 PROCEDURE — 83735 ASSAY OF MAGNESIUM: CPT

## 2017-12-08 PROCEDURE — 87205 SMEAR GRAM STAIN: CPT

## 2017-12-08 PROCEDURE — 25000003 PHARM REV CODE 250: Performed by: NURSE PRACTITIONER

## 2017-12-08 PROCEDURE — 36415 COLL VENOUS BLD VENIPUNCTURE: CPT

## 2017-12-08 PROCEDURE — 88342 IMHCHEM/IMCYTCHM 1ST ANTB: CPT | Mod: 26,,, | Performed by: PATHOLOGY

## 2017-12-08 PROCEDURE — 63600175 PHARM REV CODE 636 W HCPCS: Performed by: INTERNAL MEDICINE

## 2017-12-08 PROCEDURE — 87015 SPECIMEN INFECT AGNT CONCNTJ: CPT

## 2017-12-08 PROCEDURE — 71000033 HC RECOVERY, INTIAL HOUR: Performed by: PODIATRIST

## 2017-12-08 PROCEDURE — 37000008 HC ANESTHESIA 1ST 15 MINUTES: Performed by: PODIATRIST

## 2017-12-08 PROCEDURE — 87070 CULTURE OTHR SPECIMN AEROBIC: CPT

## 2017-12-08 PROCEDURE — 88304 TISSUE EXAM BY PATHOLOGIST: CPT | Performed by: PATHOLOGY

## 2017-12-08 PROCEDURE — 0LBW0ZZ EXCISION OF LEFT FOOT TENDON, OPEN APPROACH: ICD-10-PCS | Performed by: PODIATRIST

## 2017-12-08 PROCEDURE — 87075 CULTR BACTERIA EXCEPT BLOOD: CPT

## 2017-12-08 PROCEDURE — 36000706: Performed by: PODIATRIST

## 2017-12-08 PROCEDURE — 0HRNXK3 REPLACEMENT OF LEFT FOOT SKIN WITH NONAUTOLOGOUS TISSUE SUBSTITUTE, FULL THICKNESS, EXTERNAL APPROACH: ICD-10-PCS | Performed by: PODIATRIST

## 2017-12-08 PROCEDURE — 71000039 HC RECOVERY, EACH ADD'L HOUR: Performed by: PODIATRIST

## 2017-12-08 PROCEDURE — 94002 VENT MGMT INPAT INIT DAY: CPT

## 2017-12-08 PROCEDURE — 36000707: Performed by: PODIATRIST

## 2017-12-08 PROCEDURE — 94660 CPAP INITIATION&MGMT: CPT

## 2017-12-08 PROCEDURE — 37000009 HC ANESTHESIA EA ADD 15 MINS: Performed by: PODIATRIST

## 2017-12-08 DEVICE — IMPLANTABLE DEVICE: Type: IMPLANTABLE DEVICE | Site: ANKLE | Status: FUNCTIONAL

## 2017-12-08 RX ORDER — ONDANSETRON 2 MG/ML
INJECTION INTRAMUSCULAR; INTRAVENOUS
Status: DISCONTINUED | OUTPATIENT
Start: 2017-12-08 | End: 2017-12-08

## 2017-12-08 RX ORDER — DIPHENHYDRAMINE HYDROCHLORIDE 50 MG/ML
25 INJECTION INTRAMUSCULAR; INTRAVENOUS EVERY 6 HOURS PRN
Status: DISCONTINUED | OUTPATIENT
Start: 2017-12-08 | End: 2017-12-08 | Stop reason: HOSPADM

## 2017-12-08 RX ORDER — KETAMINE HYDROCHLORIDE 100 MG/ML
INJECTION, SOLUTION INTRAMUSCULAR; INTRAVENOUS
Status: DISCONTINUED | OUTPATIENT
Start: 2017-12-08 | End: 2017-12-08

## 2017-12-08 RX ORDER — MIDAZOLAM HYDROCHLORIDE 1 MG/ML
INJECTION, SOLUTION INTRAMUSCULAR; INTRAVENOUS
Status: DISCONTINUED | OUTPATIENT
Start: 2017-12-08 | End: 2017-12-08

## 2017-12-08 RX ORDER — OXYCODONE HYDROCHLORIDE 5 MG/1
5 TABLET ORAL ONCE AS NEEDED
Status: DISCONTINUED | OUTPATIENT
Start: 2017-12-09 | End: 2017-12-08 | Stop reason: HOSPADM

## 2017-12-08 RX ORDER — MEPERIDINE HYDROCHLORIDE 25 MG/ML
12.5 INJECTION INTRAMUSCULAR; INTRAVENOUS; SUBCUTANEOUS ONCE AS NEEDED
Status: DISCONTINUED | OUTPATIENT
Start: 2017-12-08 | End: 2017-12-08 | Stop reason: HOSPADM

## 2017-12-08 RX ORDER — GLYCOPYRROLATE 0.2 MG/ML
INJECTION INTRAMUSCULAR; INTRAVENOUS
Status: DISCONTINUED | OUTPATIENT
Start: 2017-12-08 | End: 2017-12-08

## 2017-12-08 RX ORDER — ROCURONIUM BROMIDE 10 MG/ML
INJECTION, SOLUTION INTRAVENOUS
Status: DISCONTINUED | OUTPATIENT
Start: 2017-12-08 | End: 2017-12-08

## 2017-12-08 RX ORDER — FENTANYL CITRATE 50 UG/ML
25 INJECTION, SOLUTION INTRAMUSCULAR; INTRAVENOUS EVERY 5 MIN PRN
Status: DISCONTINUED | OUTPATIENT
Start: 2017-12-08 | End: 2017-12-08 | Stop reason: HOSPADM

## 2017-12-08 RX ORDER — NEOSTIGMINE METHYLSULFATE 1 MG/ML
INJECTION, SOLUTION INTRAVENOUS
Status: DISCONTINUED | OUTPATIENT
Start: 2017-12-08 | End: 2017-12-08

## 2017-12-08 RX ORDER — ONDANSETRON 2 MG/ML
4 INJECTION INTRAMUSCULAR; INTRAVENOUS ONCE
Status: DISCONTINUED | OUTPATIENT
Start: 2017-12-08 | End: 2017-12-08 | Stop reason: HOSPADM

## 2017-12-08 RX ORDER — PROPOFOL 10 MG/ML
VIAL (ML) INTRAVENOUS
Status: DISCONTINUED | OUTPATIENT
Start: 2017-12-08 | End: 2017-12-08

## 2017-12-08 RX ORDER — SODIUM CHLORIDE, SODIUM LACTATE, POTASSIUM CHLORIDE, CALCIUM CHLORIDE 600; 310; 30; 20 MG/100ML; MG/100ML; MG/100ML; MG/100ML
75 INJECTION, SOLUTION INTRAVENOUS CONTINUOUS
Status: DISCONTINUED | OUTPATIENT
Start: 2017-12-08 | End: 2017-12-11

## 2017-12-08 RX ORDER — HYDROMORPHONE HYDROCHLORIDE 1 MG/ML
0.2 INJECTION, SOLUTION INTRAMUSCULAR; INTRAVENOUS; SUBCUTANEOUS EVERY 5 MIN PRN
Status: DISCONTINUED | OUTPATIENT
Start: 2017-12-08 | End: 2017-12-08 | Stop reason: HOSPADM

## 2017-12-08 RX ORDER — LIDOCAINE HCL/PF 100 MG/5ML
SYRINGE (ML) INTRAVENOUS
Status: DISCONTINUED | OUTPATIENT
Start: 2017-12-08 | End: 2017-12-08

## 2017-12-08 RX ORDER — SODIUM CHLORIDE 0.9 % (FLUSH) 0.9 %
3 SYRINGE (ML) INJECTION
Status: DISCONTINUED | OUTPATIENT
Start: 2017-12-08 | End: 2017-12-08 | Stop reason: HOSPADM

## 2017-12-08 RX ORDER — FENTANYL CITRATE 50 UG/ML
INJECTION, SOLUTION INTRAMUSCULAR; INTRAVENOUS
Status: DISCONTINUED | OUTPATIENT
Start: 2017-12-08 | End: 2017-12-08

## 2017-12-08 RX ADMIN — PROPOFOL 100 MG: 10 INJECTION, EMULSION INTRAVENOUS at 12:12

## 2017-12-08 RX ADMIN — SODIUM CHLORIDE 10 ML: 0.9 INJECTION, SOLUTION INTRAVENOUS at 11:12

## 2017-12-08 RX ADMIN — GABAPENTIN 100 MG: 100 CAPSULE ORAL at 06:12

## 2017-12-08 RX ADMIN — SODIUM CHLORIDE: 0.9 INJECTION, SOLUTION INTRAVENOUS at 01:12

## 2017-12-08 RX ADMIN — MICONAZOLE NITRATE: 20 OINTMENT TOPICAL at 10:12

## 2017-12-08 RX ADMIN — ONDANSETRON 4 MG: 2 INJECTION, SOLUTION INTRAMUSCULAR; INTRAVENOUS at 12:12

## 2017-12-08 RX ADMIN — SODIUM CHLORIDE, PRESERVATIVE FREE 3 ML: 5 INJECTION INTRAVENOUS at 06:12

## 2017-12-08 RX ADMIN — LIDOCAINE HYDROCHLORIDE 75 MG: 20 INJECTION, SOLUTION INTRAVENOUS at 12:12

## 2017-12-08 RX ADMIN — KETAMINE HYDROCHLORIDE 20 MG: 100 INJECTION, SOLUTION, CONCENTRATE INTRAMUSCULAR; INTRAVENOUS at 12:12

## 2017-12-08 RX ADMIN — SODIUM CHLORIDE, PRESERVATIVE FREE 3 ML: 5 INJECTION INTRAVENOUS at 09:12

## 2017-12-08 RX ADMIN — ROCURONIUM BROMIDE 40 MG: 10 INJECTION, SOLUTION INTRAVENOUS at 12:12

## 2017-12-08 RX ADMIN — SODIUM CHLORIDE, PRESERVATIVE FREE 3 ML: 5 INJECTION INTRAVENOUS at 02:12

## 2017-12-08 RX ADMIN — CEFEPIME 1 G: 1 INJECTION, POWDER, FOR SOLUTION INTRAMUSCULAR; INTRAVENOUS at 11:12

## 2017-12-08 RX ADMIN — MIDAZOLAM 1 MG: 1 INJECTION INTRAMUSCULAR; INTRAVENOUS at 12:12

## 2017-12-08 RX ADMIN — INSULIN ASPART 1 UNITS: 100 INJECTION, SOLUTION INTRAVENOUS; SUBCUTANEOUS at 10:12

## 2017-12-08 RX ADMIN — INSULIN ASPART 2 UNITS: 100 INJECTION, SOLUTION INTRAVENOUS; SUBCUTANEOUS at 06:12

## 2017-12-08 RX ADMIN — GLYCOPYRROLATE 0.2 MG: 0.2 INJECTION, SOLUTION INTRAMUSCULAR; INTRAVENOUS at 12:12

## 2017-12-08 RX ADMIN — FENTANYL CITRATE 50 MCG: 50 INJECTION, SOLUTION INTRAMUSCULAR; INTRAVENOUS at 12:12

## 2017-12-08 RX ADMIN — NEOSTIGMINE METHYLSULFATE 5 MG: 1 INJECTION INTRAVENOUS at 01:12

## 2017-12-08 RX ADMIN — HEPARIN SODIUM 5000 UNITS: 5000 INJECTION, SOLUTION INTRAVENOUS; SUBCUTANEOUS at 09:12

## 2017-12-08 RX ADMIN — LEVOTHYROXINE SODIUM 50 MCG: 50 TABLET ORAL at 06:12

## 2017-12-08 RX ADMIN — GABAPENTIN 100 MG: 100 CAPSULE ORAL at 09:12

## 2017-12-08 RX ADMIN — CARVEDILOL 12.5 MG: 6.25 TABLET, FILM COATED ORAL at 09:12

## 2017-12-08 RX ADMIN — GLYCOPYRROLATE 0.4 MG: 0.2 INJECTION, SOLUTION INTRAMUSCULAR; INTRAVENOUS at 01:12

## 2017-12-08 NOTE — PROGRESS NOTES
Progress Note  Hospital Medicine  Patient Name:Juliane Ramos  MRN:  [id number]  Patient Class: IP- Inpatient  Admit Date: 11/27/2017  Length of Stay: 11 days  Expected Discharge Date:   Attending Physician: Reg Devine MD  Primary Care Provider:  JOS Dumont    SUBJECTIVE:     Principal Problem: Left diabetic foot ulcer  Initial history of present illness: 57 year old female with HTN, DM2, HF, CKD has returned from [facility]. Patient was originally admitted on 11-18-17 with left diabetic foot ulcer. She stated that over the past few days it had been associated with worsening pain, drainage, and a fever of up to 102 and for this she came for evaluation.  She stated she was compliant with her medications but only takes her basal insulin when she needs it per sliding scale. Patient was admitted to Hospitalist medicine service. Patient was evaluated by Dr. Wick and Dr. Turner. Patient was being treated with IV antibiotics, wound care provided and was expected to have wound debridement. Patient had an episode of sudden onset of hypoxia and unresponsiveness. Patient vomited Marcelino sandwitch. Patient was expected to be NPO for podiatry procedure. Work up confirmed PE. Patient started on anticoagulation which was held yesterday due to bleeding from IV site. On the morning of 11-24-17, patient while ambulated with her  lost balance and hit left forehead to the wall. Left forehead contusion noted. No LOC or any focal neurological complaints or HA or vision changes. CT head is showed SAH. Patient transferred to [facility] neuro-critical care ICU and was evaluated by neuro-surgeon. SAH deemed stable. Patient needed one round of HD. Wound Cx grew MSSA treated with Maxipine and Vancomycin. Patient is in need for diabetic foot ulcer debridement.    PMH/PSH/SH/FH/Meds: reviewed.    Symptoms/Review of Systems: Patient is scheduled for ankle wound debridement today. No shortness of breath, cough, chest pain or headache, fever  or abdominal pain.     Diet:  Adequate intake.    Activity level: Up with assistance, fall precautions  Pain:  Patient reports no pain.       OBJECTIVE:   Vital Signs (Most Recent):      Temp: 97.8 °F (36.6 °C) (12/08/17 0819)  Pulse: 71 (12/08/17 0819)  Resp: 20 (12/08/17 0819)  BP: 131/73 (12/08/17 0819)  SpO2: 96 % (12/08/17 0819)       Vital Signs Range (Last 24H):  Temp:  [97 °F (36.1 °C)-98.5 °F (36.9 °C)]   Pulse:  [63-73]   Resp:  [16-20]   BP: (120-154)/(68-94)   SpO2:  [96 %-99 %]     Weight: 118 kg (260 lb 2.3 oz)  Body mass index is 41.99 kg/m².    Intake/Output Summary (Last 24 hours) at 12/08/17 0956  Last data filed at 12/08/17 0600   Gross per 24 hour   Intake              860 ml   Output             4750 ml   Net            -3890 ml     Physical Examination:  General appearance: well developed, appears stated age  Head: normocephalic, Left black eye and left forehead ecchymoses  Eyes:  conjunctivae/corneas clear. PERRL.  Nose: Nares normal. Septum midline.  Throat: lips, mucosa, and tongue normal; teeth and gums normal, no throat erythema.  Neck: supple, symmetrical, trachea midline, no JVD and thyroid not enlarged, symmetric, no tenderness/mass/nodules  Lungs:  Decreased breath sounds at lung bases  Chest wall: no tenderness  Heart: regular rate and rhythm, S1, S2 normal, no murmur, click, rub or gallop  Abdomen: soft, non-tender non-distented; bowel sounds normal; no masses,  no organomegaly  Extremities: no cyanosis, clubbing or edema.   Pulses: 2+ and symmetric  Skin: Skin color, texture, turgor normal. Left Achilis Tendon wound with black eschar and mixed granulation tissue, tendon is exposed possibly. Mild brawny edema of left foot and ankle.  Lymph nodes: Cervical, supraclavicular, and axillary nodes normal.  Neurologic: Normal strength and tone. No focal numbness or weakness. CNII-XII intact.      CBC:    Recent Labs  Lab 12/06/17  0417 12/07/17  0155 12/08/17  0450   WBC 8.50  8.50 8.40   8.40 8.30  8.30   RBC 3.32*  3.32* 3.38*  3.38* 3.22*  3.22*   HGB 8.2*  8.2* 8.2*  8.2* 7.9*  7.9*   HCT 25.8*  25.8* 26.0*  26.0* 25.0*  25.0*     156 153  153 146*  146*   MCV 78*  78* 77*  77* 78*  78*   MCH 24.8*  24.8* 24.3*  24.3* 24.5*  24.5*   MCHC 31.9*  31.9* 31.6*  31.6* 31.6*  31.6*   BMP    Recent Labs  Lab 12/06/17  0417 12/07/17  0155 12/08/17  0450   *  154* 129*  129* 164*  164*   *  133* 132*  132* 135*  135*   K 4.4  4.4 4.8  4.8 4.5  4.5   CL 99  99 98  98 100  100   CO2 25  25 24  24 26  26   BUN 31*  31* 37*  37* 34*  34*   CREATININE 4.4*  4.4* 4.9*  4.9* 4.2*  4.2*   CALCIUM 8.4*  8.4* 8.4*  8.4* 8.3*  8.3*   MG 2.1 2.2 2.1      Diagnostic Results:  Microbiology Results (last 7 days)     ** No results found for the last 168 hours. **         Left Calcaneum:   1.  No radiographically apparent acute osteomyelitis. Skin irregularity and bandage noted in the posterior aspect of the ankle.  2. Degenerative changes in the ankle, hindfoot and midfoot are noted.     MRI left foot:  1. Retro-Achilles soft tissue wound. No evidence for osteomyelitis.  2. Mild nonspecific Achilles and posterior tibial tenosynovitis.  3. Small tibiotalar joint effusion.     Bilateral leg venous doppler scan: There are some limitations but no definite acute DVT in seen in either lower extremity.     Renal US: Unremarkable appearance of the kidneys.  No hydronephrosis.     CT head:  1. Abnormal study.  There is a left forehead and frontal scalp hematoma without underlying fracture.  Additionally, there is bilateral posttraumatic subarachnoid blood with hyperdense blood seen in right parietal sulci as well as in the left sylvian fissure and adjacent left frontal and parietal sulci.  There is no acute parenchymal hemorrhage.  2.  Remote right frontal lobe infarction with ex vacuo dilatation of the right frontal horn.  There is no obvious acute infarction.  3.   Atherosclerosis.      CT head:  1. There is no definite new abnormality.  There has been some resolution in redistribution of previously demonstrated posttraumatic subarachnoid blood.  This blood is now most apparent in the right parietal sulcus.  There is no parenchymal hemorrhage.  2.  There is no obvious acute infarction.  This would however be better evaluated with MRI.  3.  There are chronic infarctions in the superior right basal ganglia and corona radiata with ex vacuo dilatation of the right lateral ventricle.  There is also a chronic infarct in the posterior right cerebellum.  In retrospect, this was present on prior studies.  4.  Interval improvement in left forehead and inferior frontal scalp contusion.    CXR: Unchanged right lung airspace disease and pleural effusion. Component of CHF likely. Appropriate positioning of right IJ catheter.    CXR: Increasing right pleural effusion with infiltrate and atelectasis in the right lung. Central line ends at the level of the right atrium. Mild cardiomegaly. Prior sternotomy. No.    CXR: Stable positioning of right IJ catheter.  Unchanged right pleural effusion and atelectasis/consolidation.    CXR: Bilateral infiltrates right more so than left differential for which includes CHF and pneumonia.  Perihilar infiltrates appearing slightly more prominent than on the prior exam    Assessment/Plan:      Acute post-traumatic sub-arachnoid hemorrhage  Fall precaution.  Continue PT and OT.           * Diabetic leg ulcer - grade 3 , left posterior ankle     - continue IV Cefepime + Vancomycin as per Dr. Turner.  - Dr. Wick following, getting Sanyl application. Surgical debridement today. RN calling him now to schedule debridement.  - Follow ID recommendations.  - wound care.          Acute RLL pulmonary embolism  S/p IVC filter placed  Future therapeutic dose to be decided after outpatient neurosurgery followup.    Acute on chronic hypoxic hypercarbic respiratory  failure.  Supplemental O2 via nasal canula; titrate O2 saturation to >92%.   Continue beta 2 agonist bronchodilator treatments.   Use BiPAP during sleep. Needs Polysomnogram upon DC.    Possible seizure  Appreciated Dr. Gil's assistance, continue PO Keppra.  Continue neuro-checks, fall and seizure precautions.    MC - now on HD  Follow nephrology recommendations. Await renal recovery.      Hypothyroid     - continue synthroid          Controlled type 2 diabetes mellitus with stage 3 chronic kidney disease     Check blood glucose level q AC/HS.  Use Novolog Insulin Sliding Scale as needed.   Continue American Diabetic Association 1800 Kcal diet          CKD (chronic kidney disease) stage 3, GFR 30-59 ml/min     - avoid nephrotoxins and renally dose meds          Essential hypertension     - continue home meds and will titrate as needed      Discussed with patient's .      DVT Prophylaxis: Heparin 5K SQ q 12 hrs.    Reg Devine MD  Department of Hospital Medicine   [facility]

## 2017-12-08 NOTE — NURSING
Alert and oriented. Transported from dialysis. Discussed plan of care with patient and spouse. Call light in reach. Will continue to monitor.

## 2017-12-08 NOTE — PLAN OF CARE
12/08/17 1326   Discharge Reassessment   Assessment Type Discharge Planning Reassessment     Per Jacinda at [facility], the pt will not be able to discharge to [facility] until Monday...OANH Viveros CM

## 2017-12-08 NOTE — ANESTHESIA PREPROCEDURE EVALUATION
12/08/2017  Juliane Ramos is a 57 y.o., female.    Anesthesia Evaluation    I have reviewed the Patient Summary Reports.    I have reviewed the Nursing Notes.   I have reviewed the Medications.     Review of Systems  Social:  Non-Smoker    Hematology/Oncology:         -- Anemia:   Cardiovascular:   Hypertension Past MI CAD   CHF    Pulmonary:   COPD    Renal/:   Chronic Renal Disease, CRI    Neurological:   CVA    Endocrine:   Diabetes, type 2 Hypothyroidism        Physical Exam  General:  Morbid Obesity    Airway/Jaw/Neck:  Airway Findings: Mouth Opening: Normal Tongue: Normal  General Airway Assessment: Adult  Mallampati: II        Eyes/Ears/Nose:  EYES/EARS/NOSE FINDINGS: Normal   Dental:  DENTAL FINDINGS: Normal   Chest/Lungs:  Chest/Lungs Findings: Clear to auscultation, Normal Respiratory Rate     Heart/Vascular:  Heart Findings: Rate: Normal  Rhythm: Regular Rhythm  Sounds: Normal        Mental Status:  Mental Status Findings: Normal        Anesthesia Plan  Type of Anesthesia, risks & benefits discussed:  Anesthesia Type:  general  Patient's Preference:   Intra-op Monitoring Plan: standard ASA monitors  Intra-op Monitoring Plan Comments:   Post Op Pain Control Plan:   Post Op Pain Control Plan Comments:   Induction:    Beta Blocker:  Patient is on a Beta-Blocker and has received one dose within the past 24 hours (No further documentation required).       Informed Consent: Patient understands risks and agrees with Anesthesia plan.  Questions answered. Anesthesia consent signed with patient.  ASA Score: 3     Day of Surgery Review of History & Physical: I have interviewed and examined the patient. I have reviewed the patient's H&P dated:  There are no significant changes.  H&P update referred to the provider.         Ready For Surgery From Anesthesia Perspective.

## 2017-12-08 NOTE — PROGRESS NOTES
INPATIENT NEPHROLOGY PROGRESS NOTE  [facility] NEPHROLOGY    Patient Name: Juliane Ramos  Date: 12/08/2017    Reason for consultation: MC      Chief Complaint: Left diabetic foot ulcer     History of Present Illness:  Ms. Ramos is a 58 y/o F with a hx of HTN, DM2, CHF, and stage III CKD who p/w a left heel ulcer/cellulitis x 2 weeks, nonhealing. Hospital course c/b PE, fall with SAH, and MC 2/2 contrast/vancomycin/infection requiring RRT. We have been consulted for MC.    · Interval History/Subjective:         12/4  Denies n,v, chest pair sob.  12/5  Seen on dialysis.  Was sob earlier, doing ok now  12/6  No nausea, vomiting, chest pain.  More alert  12/7   No sob or chest pain.  Small nonpruritic rash under blood pressure cuff  12/8  S/p GFF and hemesplit.  Rash worse, asymptomatic.  Waiting to go to surgery     Plan of Care:    Assessment:  Oliguric MC 2/2 multifactorial ATN, dialysis dependent  Edema/Pleural effusion  Hyponatremia  Anemia  Low albumin   rash     Plan:     1. Acute Kidney Injury- dialysis today after GFF and hemesplit at [facility]     2. Volume/Blood Pressure- BP is stable. uf as tolerated with hd     3. Electrolytes/Acid Base- HypoNa is stable, continue current dialysis prescription/UF goals.       4. Anemia of CKD- Hb is stable. Continue EPO 5K with HD.      5. Low albumin- Stable, continue Nepro.  6.  Rash--monitor, as per primary team.  No respiratory symptoms      Thank you for allowing us to participate in this patient's care. We will continue to follow.    Medications:  No current facility-administered medications on file prior to encounter.      Current Outpatient Prescriptions on File Prior to Encounter   Medication Sig Dispense Refill    albuterol (ACCUNEB) 1.25 mg/3 mL Nebu Take 1.25 mg by nebulization every 6 (six) hours as needed. Rescue      carvedilol (COREG) 12.5 MG tablet Take 1 tablet (12.5 mg total) by mouth 2 (two) times daily. 60 tablet 11    cefepime in dextrose 5 %  (MAXIPIME) 1 gram/50 mL PgBk Inject 50 mLs (1 g total) into the vein every 24 hours as needed.      gabapentin (NEURONTIN) 100 MG capsule Take 1 capsule (100 mg total) by mouth 3 (three) times daily. 90 capsule 11    levetiracetam oral soln 500 mg/5 mL (5 mL) Soln 2.5 mLs (250 mg total) by Per NG tube route 2 (two) times daily. 150 mL 11    levothyroxine (SYNTHROID) 50 MCG tablet Take 50 mcg by mouth once daily.        metronidazole (FLAGYL) 500 mg/100 mL IVPB Inject 100 mLs (500 mg total) into the vein every 8 (eight) hours.      rosuvastatin (CRESTOR) 10 MG tablet Take 1 tablet (10 mg total) by mouth once daily. 30 tablet 0    VANCOMYCIN HCL (VANCOMYCIN IN 5 % DEXTROSE) 1.75 gram/500 mL Soln Inject 500 mLs (1,750 mg total) into the vein once daily.       Scheduled Meds:   carvedilol  12.5 mg Per NG tube BID    ceFEPime (MAXIPIME) IVPB  1 g Intravenous Daily    collagenase   Topical Daily    epoetin donita (PROCRIT) injection  5,000 Units Intravenous Every Tues, Thurs, Sat    fluconazole  100 mg Oral Daily    gabapentin  100 mg Oral TID    heparin (porcine)  5,000 Units Subcutaneous Q12H    insulin aspart  1-10 Units Subcutaneous TIDWM    levothyroxine  50 mcg Per NG tube Before breakfast    miconazole   Topical (Top) BID    rosuvastatin  10 mg Per NG tube Daily    sodium chloride 0.9%  3 mL Intravenous Q8H     Continuous Infusions:   PRN Meds:.sodium chloride 0.9%, acetaminophen, albuterol sulfate, dextrose 50%, glucagon (human recombinant), glucose, glucose, heparin (porcine), labetalol, levetiracetam oral soln, magnesium oxide, magnesium oxide, ondansetron, potassium phosphate IVPB, simethicone    Allergies:  Patient has no known allergies.    Vital Signs:  Temp Readings from Last 3 Encounters:   12/08/17 97.8 °F (36.6 °C) (Oral)   11/27/17 97 °F (36.1 °C) (Oral)   11/23/17 97.4 °F (36.3 °C)     Pulse Readings from Last 3 Encounters:   12/08/17 71   11/27/17 67   11/24/17 65     BP Readings from  Last 3 Encounters:   12/08/17 131/73   11/27/17 123/67   11/24/17 (!) 108/58     Weight:  Wt Readings from Last 3 Encounters:   12/05/17 118 kg (260 lb 2.3 oz)   11/27/17 117 kg (257 lb 15 oz)   11/22/17 106.6 kg (234 lb 15.8 oz)     Physical Exam:  Constitutional: nad, aao x 3   Heart: rrr, no m/r/g, wwp, + edema  Lungs: ant ausc clear, no w/r/r/c, no lb  Abdomen: s/nt/nd, +BS    Results:  Lab Results   Component Value Date     (L) 12/08/2017     (L) 12/08/2017    K 4.5 12/08/2017    K 4.5 12/08/2017     12/08/2017     12/08/2017    CO2 26 12/08/2017    CO2 26 12/08/2017    BUN 34 (H) 12/08/2017    BUN 34 (H) 12/08/2017    CREATININE 4.2 (H) 12/08/2017    CREATININE 4.2 (H) 12/08/2017    CALCIUM 8.3 (L) 12/08/2017    CALCIUM 8.3 (L) 12/08/2017    ANIONGAP 9 12/08/2017    ANIONGAP 9 12/08/2017    ESTGFRAFRICA 13 (A) 12/08/2017    ESTGFRAFRICA 13 (A) 12/08/2017    EGFRNONAA 11 (A) 12/08/2017    EGFRNONAA 11 (A) 12/08/2017     Lab Results   Component Value Date    CALCIUM 8.3 (L) 12/08/2017    CALCIUM 8.3 (L) 12/08/2017    PHOS 3.7 12/08/2017       Recent Labs  Lab 12/08/17  0450   WBC 8.30  8.30   RBC 3.22*  3.22*   HGB 7.9*  7.9*   HCT 25.0*  25.0*   *  146*   MCV 78*  78*   MCH 24.5*  24.5*   MCHC 31.6*  31.6*     I have personally reviewed pertinent radiological imaging and reports.    Elijah Gonzalez MD  Nephrology  [facility]  [phone]

## 2017-12-08 NOTE — NURSING
Assisted patient to sit up at bedside. Spouse at bedside. Will continue to monitor. Instructed patient not to attempt to stand. Verbalized understanding.

## 2017-12-08 NOTE — OP NOTE
Pt underwent excisional debridement of stage 3 ulcer and excision of achilles tendon and application of primatrix to left posterior ankle.  Dressing to remain intact until Monday and then application of vac dressing.  Pt. Will need out pt home health for vac dressing changes every third day.  I will follow over next few days until discharge then see me out pt in office.

## 2017-12-08 NOTE — PLAN OF CARE
12/08/17 1023   Patient Assessment/Suction   Level of Consciousness (AVPU) alert   PRE-TX-O2-ETCO2   O2 Device (Oxygen Therapy) nasal cannula   $ Is the patient on Low Flow Oxygen? Yes   Flow (L/min) 4   Oxygen Concentration (%) 36   SpO2 98 %   Pulse Oximetry Type Intermittent   $ Pulse Oximetry - Multiple Charge Pulse Oximetry - Multiple   Aerosol Therapy   $ Aerosol Therapy Charges PRN treatment not required   Ready to Wean/Extubation Screen   FIO2<=50 (chart decimal) 0.36

## 2017-12-08 NOTE — CONSULTS
"[facility] [place]  Physical Medicine & Rehab  Consult Note    Patient Name: Juliane Ramos  MRN: [id number]  Admission Date: 11/27/2017  Hospital Length of Stay: 11 days  Attending Physician: Jai Devine MD   Primary Care Provider: JOS Dumont     Inpatient consult to Physical Medicine Rehab  Consult performed by: ABILIO GHOSH  Consult ordered by: JAI DEVINE        Subjective:     Principal Problem: Acute respiratory failure with hypoxia and hypercapnia    HPI: pt is 57 y.o female  admitted to [facility] 11-27-17 pt with diabetic foot ulcer  & recent exp of PE & fall & left  Forehead contusion. Pt with SDH.  Pt SDH was stable . Pt wound cx  + for MSSA & in need of  debridement .     Hospital Course: pt with working dx of acute posttraumatic  Sub - arachnoid hemorrhage , PE &  Respiratory failure , MC . Pt current ly under care of ID & podiatry for diabetic foot ulcer management . I am consulted for consideration of admission to in pt rehab     Functional History:      Current Functional Status:    Therapy eval pending     Past Medical History:   Diagnosis Date    Anemia in stage 3 chronic kidney disease 11/26/2017    CHF (congestive heart failure)     COPD (chronic obstructive pulmonary disease)     Coronary artery disease     Diabetes mellitus     Encounter for blood transfusion     Essential hypertension 6/24/2017    Hypertension     MI (myocardial infarction) 2010    Segmental and subsegmental pulmonary emboli of RLL without acute cor pulmonale 11/25/2017    Stroke     July 2005    Thyroid disease     Traumatic subarachnoid hemorrhage 11/24/2017    Type 2 diabetes mellitus with stage 3 chronic kidney disease, without long-term current use of insulin 6/24/2017     Past Surgical History:   Procedure Laterality Date    ABCESS DRAINAGE Right     Between "little toe" and the one next to it    BREAST SURGERY      Reduction [id number]    CARDIAC SURGERY  01/2011    CABG " 4vessel ring in one valve mitral valve prolapse    CORONARY ARTERY BYPASS GRAFT      hyperlipidemia      TUBAL LIGATION  03/1986     Review of patient's allergies indicates:  No Known Allergies    Scheduled Medications:    carvedilol  12.5 mg Per NG tube BID    ceFEPime (MAXIPIME) IVPB  1 g Intravenous Daily    collagenase   Topical Daily    epoetin donita (PROCRIT) injection  5,000 Units Intravenous Every Tues, Thurs, Sat    fluconazole  100 mg Oral Daily    gabapentin  100 mg Oral TID    heparin (porcine)  5,000 Units Subcutaneous Q12H    insulin aspart  1-10 Units Subcutaneous TIDWM    levothyroxine  50 mcg Per NG tube Before breakfast    miconazole   Topical (Top) BID    rosuvastatin  10 mg Per NG tube Daily    sodium chloride 0.9%  3 mL Intravenous Q8H       PRN Medications: sodium chloride 0.9%, acetaminophen, albuterol sulfate, dextrose 50%, glucagon (human recombinant), glucose, glucose, heparin (porcine), labetalol, levetiracetam oral soln, magnesium oxide, magnesium oxide, ondansetron, potassium phosphate IVPB, simethicone    Family History     Problem Relation (Age of Onset)    Arthritis Mother    Cancer Father (68)    Depression Sister    Diabetes Father, Maternal Grandmother    Heart disease Father    Hypertension Father, Son    Kidney disease Paternal Grandfather    Stroke Paternal Grandfather        Social History Main Topics    Smoking status: Never Smoker    Smokeless tobacco: Never Used    Alcohol use No    Drug use: No    Sexual activity: Yes     Partners: Male     Birth control/ protection: None     Review of Systems   Constitutional: Negative.    HENT: Negative.    Eyes: Negative.    Respiratory: Negative.    Cardiovascular: Negative.    Gastrointestinal: Negative.    Endocrine: Negative.    Genitourinary: Negative.    Musculoskeletal: Negative.    Allergic/Immunologic: Negative.    Neurological: Negative.    Hematological: Negative.    Psychiatric/Behavioral: Negative.       Objective:     Vital Signs (Most Recent):  Temp: 97.8 °F (36.6 °C) (12/08/17 0819)  Pulse: 71 (12/08/17 0819)  Resp: 20 (12/08/17 0819)  BP: 131/73 (12/08/17 0819)  SpO2: 96 % (12/08/17 0819)    Vital Signs (24h Range):  Temp:  [97 °F (36.1 °C)-98.5 °F (36.9 °C)] 97.8 °F (36.6 °C)  Pulse:  [63-73] 71  Resp:  [16-20] 20  SpO2:  [96 %-99 %] 96 %  BP: (120-154)/(68-94) 131/73     Body mass index is 41.99 kg/m².    Physical Exam   Constitutional: She is oriented to person, place, and time. She appears well-developed and well-nourished. No distress.   HENT:   Head: Normocephalic and atraumatic.   Right Ear: External ear normal.   Left Ear: External ear normal.   Nose: Nose normal.   Mouth/Throat: Oropharynx is clear and moist. No oropharyngeal exudate.   Eyes: Conjunctivae and EOM are normal. Pupils are equal, round, and reactive to light. Right eye exhibits no discharge. Left eye exhibits no discharge. No scleral icterus.   Neck: Normal range of motion. Neck supple. No JVD present. No tracheal deviation present. No thyromegaly present.   Cardiovascular: Normal rate, regular rhythm, normal heart sounds and intact distal pulses.  Exam reveals no gallop and no friction rub.    No murmur heard.  Pulmonary/Chest: Effort normal and breath sounds normal. No stridor. No respiratory distress. She has no wheezes. She has no rales. She exhibits no tenderness.   Abdominal: Soft. Bowel sounds are normal. She exhibits no distension and no mass. There is no tenderness. There is no rebound and no guarding. No hernia.   Genitourinary:   Genitourinary Comments: deferred   Musculoskeletal: Normal range of motion. She exhibits tenderness ( left ankle ). She exhibits no edema or deformity.   Mms:  Bilateral upper ext 3+/5,  Right lower ext 3/5, left lower ext 3-/5   rom wfl   Gait : deferred    Lymphadenopathy:     She has no cervical adenopathy.   Neurological: She is alert and oriented to person, place, and time. No cranial nerve  deficit. She exhibits normal muscle tone.   Skin: She is not diaphoretic.   Left ankle wound    Psychiatric: She has a normal mood and affect. Her behavior is normal.     NEUROLOGICAL EXAMINATION:     MENTAL STATUS   Oriented to person, place, and time.     CRANIAL NERVES     CN III, IV, VI   Pupils are equal, round, and reactive to light.  Extraocular motions are normal.       Diagnostic Results: available imaging report reviewed   Lab Results   Component Value Date    WBC 8.30 12/08/2017    WBC 8.30 12/08/2017    HGB 7.9 (L) 12/08/2017    HGB 7.9 (L) 12/08/2017    HCT 25.0 (L) 12/08/2017    HCT 25.0 (L) 12/08/2017    MCV 78 (L) 12/08/2017    MCV 78 (L) 12/08/2017     (L) 12/08/2017     (L) 12/08/2017     CMP  Sodium   Date Value Ref Range Status   12/08/2017 135 (L) 136 - 145 mmol/L Final   12/08/2017 135 (L) 136 - 145 mmol/L Final     Potassium   Date Value Ref Range Status   12/08/2017 4.5 3.5 - 5.1 mmol/L Final   12/08/2017 4.5 3.5 - 5.1 mmol/L Final     Chloride   Date Value Ref Range Status   12/08/2017 100 95 - 110 mmol/L Final   12/08/2017 100 95 - 110 mmol/L Final     CO2   Date Value Ref Range Status   12/08/2017 26 23 - 29 mmol/L Final   12/08/2017 26 23 - 29 mmol/L Final     Glucose   Date Value Ref Range Status   12/08/2017 164 (H) 70 - 110 mg/dL Final   12/08/2017 164 (H) 70 - 110 mg/dL Final     BUN, Bld   Date Value Ref Range Status   12/08/2017 34 (H) 6 - 20 mg/dL Final   12/08/2017 34 (H) 6 - 20 mg/dL Final     Creatinine   Date Value Ref Range Status   12/08/2017 4.2 (H) 0.5 - 1.4 mg/dL Final   12/08/2017 4.2 (H) 0.5 - 1.4 mg/dL Final   02/24/2012 1.0 0.2 - 1.4 mg/dl Final     Calcium   Date Value Ref Range Status   12/08/2017 8.3 (L) 8.7 - 10.5 mg/dL Final   12/08/2017 8.3 (L) 8.7 - 10.5 mg/dL Final   02/24/2012 9.6 8.6 - 10.2 mg/dl Final     Total Protein   Date Value Ref Range Status   12/08/2017 6.5 6.0 - 8.4 g/dL Final     Albumin   Date Value Ref Range Status   12/08/2017 2.1  (L) 3.5 - 5.2 g/dL Final     Total Bilirubin   Date Value Ref Range Status   12/08/2017 0.5 0.1 - 1.0 mg/dL Final     Comment:     For infants and newborns, interpretation of results should be based  on gestational age, weight and in agreement with clinical  observations.  Premature Infant recommended reference ranges:  Up to 24 hours.............<8.0 mg/dL  Up to 48 hours............<12.0 mg/dL  3-5 days..................<15.0 mg/dL  6-29 days.................<15.0 mg/dL       Alkaline Phosphatase   Date Value Ref Range Status   12/08/2017 94 55 - 135 U/L Final   02/24/2012 91 23 - 119 UNIT/L Final     AST   Date Value Ref Range Status   12/08/2017 11 10 - 40 U/L Final   02/24/2012 30 10 - 30 UNIT/L Final     ALT   Date Value Ref Range Status   12/08/2017 <5 (L) 10 - 44 U/L Final     Anion Gap   Date Value Ref Range Status   12/08/2017 9 8 - 16 mmol/L Final   12/08/2017 9 8 - 16 mmol/L Final   02/24/2012 12 5 - 15 meq/L Final     eGFR if    Date Value Ref Range Status   12/08/2017 13 (A) >60 mL/min/1.73 m^2 Final   12/08/2017 13 (A) >60 mL/min/1.73 m^2 Final     eGFR if non    Date Value Ref Range Status   12/08/2017 11 (A) >60 mL/min/1.73 m^2 Final     Comment:     Calculation used to obtain the estimated glomerular filtration  rate (eGFR) is the CKD-EPI equation.      12/08/2017 11 (A) >60 mL/min/1.73 m^2 Final     Comment:     Calculation used to obtain the estimated glomerular filtration  rate (eGFR) is the CKD-EPI equation.        Assessment/Plan:     No new Assessment & Plan notes have been filed under this hospital service since the last note was generated.  Service: Physical Medicine and Rehabilitation  SAH  PE  MC & dialysis  Diabetic foot wound / ulcer   OK for in pt rehab  Upon completion of acute workup     Thank you for your consult. I will sign off. Please contact us if you have any additional questions.    Rishi Ness MD  Department of Physical Medicine &  Rehab  [facility]

## 2017-12-08 NOTE — TRANSFER OF CARE
"Anesthesia Transfer of Care Note    Patient: Juliane Patrolia    Procedure(s) Performed: Procedure(s) (LRB):  DEBRIDEMENT-TENDON-ACHILLES (Left)    Patient location: PACU    Anesthesia Type: general    Transport from OR: Transported from OR intubated on 100% O2 by AMBU with assisted ventilation    Post pain: adequate analgesia    Post assessment: no apparent anesthetic complications and tolerated procedure well    Post vital signs: stable    Level of consciousness: sedated    Nausea/Vomiting: no nausea/vomiting    Complications: none    Transfer of care protocol was followed      Last vitals:   Visit Vitals  /62   Pulse 78   Temp 36.8 °C (98.2 °F) (Skin)   Resp 18   Ht 5' 6" (1.676 m)   Wt 117.9 kg (260 lb)   LMP 08/29/2016 (Approximate)   SpO2 99%   Breastfeeding? No   BMI 41.97 kg/m²     "

## 2017-12-08 NOTE — PROGRESS NOTES
[facility]  Adult Nutrition  Progress Note    SUMMARY     Recommendations  Recommendation/Intervention: 1.) Continue with 1800 Diabetic diet + Novasource renal BID   Goals: 1.) diet will advance within 96 hrs. 2.) patient will meet >80% of EEN while admitted  Nutrition Goal Status: 1.) goal met 2.) progressing toward goal   Communication of RD Recs: reviewed with RN    1. Pulmonary embolus    2. Acute renal failure superimposed on stage 3 chronic kidney disease, unspecified acute renal failure type    3. CKD (chronic kidney disease) stage 3, GFR 30-59 ml/min    4. Coronary artery disease involving native coronary artery of native heart without angina pectoris    5. Subarachnoid hemorrhage following injury, no loss of consciousness, subsequent encounter    6. Syncope, near    7. Hypoxia    8. Other pulmonary embolism without acute cor pulmonale, unspecified chronicity    9. Acute respiratory failure with hypoxia and hypercapnia      Past Medical History:   Diagnosis Date    Anemia in stage 3 chronic kidney disease 11/26/2017    CHF (congestive heart failure)     COPD (chronic obstructive pulmonary disease)     Coronary artery disease     Diabetes mellitus     Encounter for blood transfusion     Essential hypertension 6/24/2017    Hypertension     MI (myocardial infarction) 2010    Segmental and subsegmental pulmonary emboli of RLL without acute cor pulmonale 11/25/2017    Stroke     July 2005    Thyroid disease     Traumatic subarachnoid hemorrhage 11/24/2017    Type 2 diabetes mellitus with stage 3 chronic kidney disease, without long-term current use of insulin 6/24/2017       Reason for Assessment  Reason for Assessment: RD follow-up  Interdisciplinary Rounds: attended  General Information Comments: Admits with diabetic left foot ulcer. Transferred to ICU after rapid response. MC, on CKD III, dialysis dependent per nephrology. Diet has advanced. Patient in alert and oriented with  family at bedside.  Tolerating HD well per chart. Patient reports she has a normal appetite. Denies n/v. No food allergies. Complains of dry mouth, wants pudding, jellos, and fresh fruit with meals. On bipap.   Rd f/u: 12/8/17: patient if OR for debridement. Per chart, was accepted at rehab. Will plan to discharge today or tomorrow to rehab. Appetite seems to be improving.       Nutrition Prescription Ordered  Current Diet Order: 1800 Diabetic diet   Oral Nutrition Supplement: novasource renal BID      Evaluation of Received Nutrients/Fluid Intake  Oral Fluid (mL): 240  Other Fluid (mL): 600 (HD)   % Intake of Estimated Energy Needs: 50-75%  % Meal Intake: 75%     Nutrition Risk Screen  Nutrition Risk Screen: large or nonhealing wound, burn or pressure ulcer    Nutrition/Diet History  Food Preferences: no cultural or religius food preferences. NKFA.   Factors Affecting Nutritional Intake: NPO    Labs/Tests/Procedures/Meds  Diagnostic Test/Procedure Review: reviewed, pertinent  Pertinent Labs Reviewed: reviewed, pertinent  BMP  Lab Results   Component Value Date     (L) 12/08/2017     (L) 12/08/2017    K 4.5 12/08/2017    K 4.5 12/08/2017     12/08/2017     12/08/2017    CO2 26 12/08/2017    CO2 26 12/08/2017    BUN 34 (H) 12/08/2017    BUN 34 (H) 12/08/2017    CREATININE 4.2 (H) 12/08/2017    CREATININE 4.2 (H) 12/08/2017    CALCIUM 8.3 (L) 12/08/2017    CALCIUM 8.3 (L) 12/08/2017    ANIONGAP 9 12/08/2017    ANIONGAP 9 12/08/2017    ESTGFRAFRICA 13 (A) 12/08/2017    ESTGFRAFRICA 13 (A) 12/08/2017    EGFRNONAA 11 (A) 12/08/2017    EGFRNONAA 11 (A) 12/08/2017     Lab Results   Component Value Date    ALBUMIN 2.1 (L) 12/08/2017     Lab Results   Component Value Date    CALCIUM 8.3 (L) 12/08/2017    CALCIUM 8.3 (L) 12/08/2017    PHOS 3.7 12/08/2017       Recent Labs  Lab 12/07/17 2024   POCTGLUCOSE 149*       Pertinent Medications Reviewed: reviewed  Scheduled Meds:   carvedilol  12.5 mg Per NG  "tube BID    ceFEPime (MAXIPIME) IVPB  1 g Intravenous Daily    collagenase   Topical Daily    epoetin donita (PROCRIT) injection  5,000 Units Intravenous Every Tues, Thurs, Sat    fluconazole  100 mg Oral Daily    gabapentin  100 mg Oral TID    heparin (porcine)  5,000 Units Subcutaneous Q12H    insulin aspart  1-10 Units Subcutaneous TIDWM    levothyroxine  50 mcg Per NG tube Before breakfast    miconazole nitrate 2%   Topical (Top) BID    rosuvastatin  10 mg Per NG tube Daily    sodium chloride 0.9%  3 mL Intravenous Q8H         Physical Findings  Overall Physical Appearance: on oxygen therapy, obese  Oral/Mouth Cavity: tooth/teeth missing  Skin: edema (wound. Donny score 16)    Anthropometrics  Temp: 98.2 °F (36.8 °C)  Height: 5' 6"  Weight Method: Stated  Weight: 117.9 kg (260 lb)  Ideal Body Weight (IBW), Female: 130 lb  % Ideal Body Weight, Female (lb): 200 lb  BMI (Calculated): 42.1  BMI Grade: greater than 40 - morbid obesity  Usual Body Weight (UBW), k.9 kg  % Usual Body Weight: 108.58  % Weight Change From Usual Weight: 8.36 %    Estimated/Assessed Needs  Weight Used For Calorie Calculations: 82 kg (180 lb 12.4 oz) (Nhanes II SBW)   30 kcal/kg (kcal): 2460 and 35 kcal/kg (kcal): 2870   RMR (Sac-St. Jeor Equation): 1431.75  Weight Used For Protein Calculations: 82 kg (180 lb 12.4 oz) (Nhanes II SBW)  1.2 gm Protein (gm): 98.61 and 2.0 gm Protein (gm): 164.34  Fluid Need Method: RDA Method (or per MD)      Assessment and Plan  Obesity     Related to (etiology):   Excessive energy intake     Signs and Symptoms (as evidenced by):   BMI >40     Interventions/Recommendations (treatment strategy):  Continue with 1800 diabetic diet + novasource renal BID      Nutrition Diagnosis Status:   Continues                   Monitor and Evaluation  Food and Nutrient Intake: energy intake, food and beverage intake  Food and Nutrient Adminstration: diet order  Anthropometric Measurements: weight, body " mass index, weight change  Biochemical Data, Medical Tests and Procedures: electrolyte and renal panel, glucose/endocrine profile, lipid profile  Nutrition-Focused Physical Findings: overall appearance    Nutrition Risk  Level of Risk:  (x2 weekly)    Nutrition Follow-Up  RD Follow-up?: Yes     Discharge Planning: discharge to rehab on 1800 diabetic diet + novasource renal .

## 2017-12-08 NOTE — PLAN OF CARE
12/08/17 1043   Discharge Reassessment   Assessment Type Discharge Planning Reassessment     Emailed [facility] after reviewing the pt's chart as requested by Dr Devine for neurosurgery follow up plans. [facility] has a note in the system on 11/30/17 that they left a message for the pt regarding follow up needs. The pt has had a repeat CT of the head since returning to our hospital. Dr Devine is also wanting recommendations for future anticoagulation needs. I asked the PA to call me to discuss the above with her.     Spoke with Jacinda at [facility], Dr Felton has accepted the pt to rehab. The pt is scheduled for debridement with Dr Wick at noon today.  She is going to check if the pt can come this afternoon or tomorrow and if they will have transportation.   Jacinda will update me this afternoon....Chad Viveros CM

## 2017-12-08 NOTE — PLAN OF CARE
Pt in pre op via bed has TLC in right IJ normal saline up at kvo, also has defepime infusing into TLC this is her scheduled antibiotic due at 0900, but put up now. Pt aslo has bruisingf on left jaw and neck from fall 1-2 weeks ago. alos states left knee is mikala too., pt also has rash over body mostly on arms and ches from unknown condition. Pt on O4 NC . Pt ia alert and oriented.  at bedside. Will con timue to monitor  Pt has villegas draining clear yel urine. And lefty foor elevated on purple pillow wraped in gauze, this is achilles tendon that will be debrided  Dr kinsey at bedside explained procedure and marked left foot

## 2017-12-08 NOTE — PLAN OF CARE
Problem: Patient Care Overview  Goal: Discharge Needs Assessment  Outcome: Ongoing (interventions implemented as appropriate)  Alert and oriented  CVC, Dialysis cath remain intact.   Tolerated tylenol for c/o discomfort  Restless without bipap. Patient attempted to sleep without it. Strongly encouraged to use for optimal rest/oxygenation  Spouse remained with patient  Assisted patient to sit at bedside. Very weak. Mobility decreased.   Sinus rhythm  Diabetes management  Monitored for signs/symptoms of bleeding. Will monitor labs  Incontinent of bowel/bladder. Incontinence care to reddened perineum. Patient kept clean and dry.   NPO. Patient and spouse aware of purpose  Left lower extremity elevated  Chi care: straw colored urine  Safe

## 2017-12-08 NOTE — PLAN OF CARE
Dr Bryan  to reassess pt at bs vs given to him pt sat 97 % on 4 l nc as she came to us for preop    Released from Pacu to room 218 b via bed 02 sats 97 on 4 l nc 170/79 b/p dr bryan aware 62 pulse temp 98.4

## 2017-12-08 NOTE — ANESTHESIA POSTPROCEDURE EVALUATION
"Anesthesia Post Evaluation    Patient: Juliane Sungrolia    Procedure(s) Performed: Procedure(s) (LRB):  DEBRIDEMENT-TENDON-ACHILLES (Left)    Final Anesthesia Type: general  Patient location during evaluation: PACU  Patient participation: Yes- Able to Participate  Level of consciousness: awake and alert  Post-procedure vital signs: reviewed and stable  Pain management: adequate  Airway patency: patent  PONV status at discharge: No PONV  Anesthetic complications: no      Cardiovascular status: hemodynamically stable  Respiratory status: unassisted and room air  Hydration status: euvolemic  Follow-up not needed.        Visit Vitals  BP (!) 142/82   Pulse 62   Temp 36.9 °C (98.4 °F) (Oral)   Resp 12   Ht 5' 6" (1.676 m)   Wt 117.9 kg (260 lb)   LMP 08/29/2016 (Approximate)   SpO2 98%   Breastfeeding? No   BMI 41.97 kg/m²       Pain/Tiffanie Score: Pain Assessment Performed: Yes (12/8/2017  1:39 PM)  Presence of Pain: denies (12/8/2017  2:17 PM)  Pain Rating Prior to Med Admin: 0 (12/8/2017  1:40 PM)  Pain Rating Post Med Admin: 0 (12/8/2017 11:43 AM)  Tiffanie Score: 8 (12/8/2017  2:17 PM)      "

## 2017-12-08 NOTE — PLAN OF CARE
Dr raza called to assess pt on wake up vent pt following commands and able to cough instr not to bite tube and to stay calm  Extubated and placed on a face mask 4 ltiers doing well

## 2017-12-09 LAB
ANION GAP SERPL CALC-SCNC: 7 MMOL/L
ANISOCYTOSIS BLD QL SMEAR: ABNORMAL
BASOPHILS # BLD AUTO: 0 K/UL
BASOPHILS NFR BLD: 0.4 %
BUN SERPL-MCNC: 40 MG/DL
CALCIUM SERPL-MCNC: 8.8 MG/DL
CHLORIDE SERPL-SCNC: 101 MMOL/L
CO2 SERPL-SCNC: 26 MMOL/L
CREAT SERPL-MCNC: 5 MG/DL
DACRYOCYTES BLD QL SMEAR: ABNORMAL
DIFFERENTIAL METHOD: ABNORMAL
EOSINOPHIL # BLD AUTO: 0.2 K/UL
EOSINOPHIL NFR BLD: 2.7 %
ERYTHROCYTE [DISTWIDTH] IN BLOOD BY AUTOMATED COUNT: 22.2 %
EST. GFR  (AFRICAN AMERICAN): 10 ML/MIN/1.73 M^2
EST. GFR  (NON AFRICAN AMERICAN): 9 ML/MIN/1.73 M^2
GLUCOSE SERPL-MCNC: 163 MG/DL
GRAM STN SPEC: NORMAL
GRAM STN SPEC: NORMAL
HCT VFR BLD AUTO: 25 %
HGB BLD-MCNC: 7.9 G/DL
HYPOCHROMIA BLD QL SMEAR: ABNORMAL
LYMPHOCYTES # BLD AUTO: 0.6 K/UL
LYMPHOCYTES NFR BLD: 7.6 %
MAGNESIUM SERPL-MCNC: 2.4 MG/DL
MCH RBC QN AUTO: 24.8 PG
MCHC RBC AUTO-ENTMCNC: 31.8 G/DL
MCV RBC AUTO: 78 FL
MONOCYTES # BLD AUTO: 0.8 K/UL
MONOCYTES NFR BLD: 10.9 %
NEUTROPHILS # BLD AUTO: 5.9 K/UL
NEUTROPHILS NFR BLD: 78.4 %
OVALOCYTES BLD QL SMEAR: ABNORMAL
PHOSPHATE SERPL-MCNC: 4.7 MG/DL
PLATELET # BLD AUTO: 151 K/UL
PLATELET BLD QL SMEAR: ABNORMAL
PMV BLD AUTO: 8.9 FL
POCT GLUCOSE: 170 MG/DL (ref 70–110)
POCT GLUCOSE: 236 MG/DL (ref 70–110)
POCT GLUCOSE: 240 MG/DL (ref 70–110)
POIKILOCYTOSIS BLD QL SMEAR: SLIGHT
POLYCHROMASIA BLD QL SMEAR: ABNORMAL
POTASSIUM SERPL-SCNC: 5.1 MMOL/L
RBC # BLD AUTO: 3.21 M/UL
SODIUM SERPL-SCNC: 134 MMOL/L
VANCOMYCIN SERPL-MCNC: 20.7 UG/ML
WBC # BLD AUTO: 7.5 K/UL

## 2017-12-09 PROCEDURE — 63600175 PHARM REV CODE 636 W HCPCS: Performed by: INTERNAL MEDICINE

## 2017-12-09 PROCEDURE — 80202 ASSAY OF VANCOMYCIN: CPT

## 2017-12-09 PROCEDURE — 80100016 HC MAINTENANCE HEMODIALYSIS

## 2017-12-09 PROCEDURE — 94640 AIRWAY INHALATION TREATMENT: CPT

## 2017-12-09 PROCEDURE — 84100 ASSAY OF PHOSPHORUS: CPT

## 2017-12-09 PROCEDURE — 25000003 PHARM REV CODE 250: Performed by: NURSE PRACTITIONER

## 2017-12-09 PROCEDURE — 94660 CPAP INITIATION&MGMT: CPT

## 2017-12-09 PROCEDURE — 25000242 PHARM REV CODE 250 ALT 637 W/ HCPCS: Performed by: INTERNAL MEDICINE

## 2017-12-09 PROCEDURE — 83735 ASSAY OF MAGNESIUM: CPT

## 2017-12-09 PROCEDURE — A4216 STERILE WATER/SALINE, 10 ML: HCPCS | Performed by: NURSE PRACTITIONER

## 2017-12-09 PROCEDURE — 25000003 PHARM REV CODE 250: Performed by: INTERNAL MEDICINE

## 2017-12-09 PROCEDURE — 99900035 HC TECH TIME PER 15 MIN (STAT)

## 2017-12-09 PROCEDURE — 12000002 HC ACUTE/MED SURGE SEMI-PRIVATE ROOM

## 2017-12-09 PROCEDURE — 80048 BASIC METABOLIC PNL TOTAL CA: CPT

## 2017-12-09 PROCEDURE — 85025 COMPLETE CBC W/AUTO DIFF WBC: CPT

## 2017-12-09 RX ORDER — TRAMADOL HYDROCHLORIDE 50 MG/1
50 TABLET ORAL EVERY 4 HOURS PRN
Status: DISCONTINUED | OUTPATIENT
Start: 2017-12-09 | End: 2017-12-11 | Stop reason: HOSPADM

## 2017-12-09 RX ADMIN — ACETAMINOPHEN 650 MG: 325 TABLET ORAL at 04:12

## 2017-12-09 RX ADMIN — FLUCONAZOLE 100 MG: 50 TABLET ORAL at 08:12

## 2017-12-09 RX ADMIN — CARVEDILOL 12.5 MG: 6.25 TABLET, FILM COATED ORAL at 09:12

## 2017-12-09 RX ADMIN — SODIUM CHLORIDE, PRESERVATIVE FREE 3 ML: 5 INJECTION INTRAVENOUS at 05:12

## 2017-12-09 RX ADMIN — CEFEPIME 1 G: 1 INJECTION, POWDER, FOR SOLUTION INTRAMUSCULAR; INTRAVENOUS at 03:12

## 2017-12-09 RX ADMIN — GABAPENTIN 100 MG: 100 CAPSULE ORAL at 03:12

## 2017-12-09 RX ADMIN — HEPARIN SODIUM 5000 UNITS: 5000 INJECTION, SOLUTION INTRAVENOUS; SUBCUTANEOUS at 09:12

## 2017-12-09 RX ADMIN — EPOETIN ALFA 5000 UNITS: 20000 SOLUTION INTRAVENOUS; SUBCUTANEOUS at 01:12

## 2017-12-09 RX ADMIN — HEPARIN SODIUM 5000 UNITS: 5000 INJECTION, SOLUTION INTRAVENOUS; SUBCUTANEOUS at 08:12

## 2017-12-09 RX ADMIN — INSULIN ASPART 2 UNITS: 100 INJECTION, SOLUTION INTRAVENOUS; SUBCUTANEOUS at 08:12

## 2017-12-09 RX ADMIN — INSULIN ASPART 4 UNITS: 100 INJECTION, SOLUTION INTRAVENOUS; SUBCUTANEOUS at 04:12

## 2017-12-09 RX ADMIN — MICONAZOLE NITRATE: 20 OINTMENT TOPICAL at 10:12

## 2017-12-09 RX ADMIN — VANCOMYCIN HYDROCHLORIDE 1750 MG: 1 INJECTION, POWDER, LYOPHILIZED, FOR SOLUTION INTRAVENOUS at 09:12

## 2017-12-09 RX ADMIN — SODIUM CHLORIDE, PRESERVATIVE FREE 3 ML: 5 INJECTION INTRAVENOUS at 02:12

## 2017-12-09 RX ADMIN — SODIUM CHLORIDE, PRESERVATIVE FREE 3 ML: 5 INJECTION INTRAVENOUS at 10:12

## 2017-12-09 RX ADMIN — MICONAZOLE NITRATE: 20 OINTMENT TOPICAL at 08:12

## 2017-12-09 RX ADMIN — LEVOTHYROXINE SODIUM 50 MCG: 50 TABLET ORAL at 05:12

## 2017-12-09 RX ADMIN — GABAPENTIN 100 MG: 100 CAPSULE ORAL at 05:12

## 2017-12-09 RX ADMIN — ROSUVASTATIN CALCIUM 10 MG: 5 TABLET ORAL at 08:12

## 2017-12-09 RX ADMIN — HEPARIN SODIUM 4000 UNITS: 1000 INJECTION, SOLUTION INTRAVENOUS; SUBCUTANEOUS at 01:12

## 2017-12-09 RX ADMIN — INSULIN ASPART 2 UNITS: 100 INJECTION, SOLUTION INTRAVENOUS; SUBCUTANEOUS at 06:12

## 2017-12-09 RX ADMIN — ALBUTEROL SULFATE 2.5 MG: 2.5 SOLUTION RESPIRATORY (INHALATION) at 07:12

## 2017-12-09 RX ADMIN — GABAPENTIN 100 MG: 100 CAPSULE ORAL at 09:12

## 2017-12-09 NOTE — OP NOTE
DATE OF PROCEDURE:  12/08/2017.    PREOPERATIVE DIAGNOSIS:  Necrotic stage III ulcer posterior of the left leg with   necrotic exposed Achilles tendon.    POSTOPERATIVE DIAGNOSIS:  Necrotic stage III ulcer posterior of the left leg   with necrotic exposed Achilles tendon.    SURGEON:  Dennis Wick DPM    PROCEDURE PERFORMED:  Under general anesthesia:  The patient had an excisional   debridement of necrotic Achilles tendon, necrotic subcutaneous tissue and skin   with application of PriMatrix.    DESCRIPTION OF PROCEDURE:  The patient was brought to the Operating Room, had   general anesthesia obtained and was rolled over onto her stomach on the   operating table.  After the usual aseptic prep and drape of the left leg and   foot, attention was placed to the posterior aspect of the lower leg where there   was an ulceration approximately 8 cm in length with necrotic Achilles tendon   being exposed.  There was necrotic subcutaneous tissue and fat also exposed at   this point.  Using a #15 blade, I did an excisional debridement of all of the   exposed Achilles tendon down to the calcaneus and up to the tendino-muscular   junction and removed this and sent a piece for histopathology.  I removed all   necrotic perimeter subcutaneous tissue and deep subcutaneous tissue and then   debrided and curetted it down to good bleeding tissue.  No purulent drainage was   encountered.  There was some old hemorrhagic clotted type blood that was able   to be expressed by palpation of the posterior gastrocnemius muscle that would   express itself through the distal incision.  I did a culture and sensitivity of   this old blood for aerobic, anaerobic, acid-fast and fungal cultures.  No   purulent drainage was found.  I then applied a 6 x 6 fenestrated PriMatrix, then   was able to stretch to fill the whole void of the ulcer in the back of the   Achilles tendon back in a posterior lower leg.  I then sutured this in place   with  some 4.0 Prolene, placed a piece of Adaptic over it conformed it down to   the deep tissue with a wet 4 x 4, placed another piece of Adaptic over that 4 x   4s, a Kerlix and an Ace wrap.  A below-the-knee Cam walker cast was applied to   the lower extremity.  She then was flipped back over into a supine position and   had anesthesia reversed, left the Operating Room with stable vitals.      HASMUKH/IN  dd: 12/08/2017 13:23:45 (CST)  td: 12/08/2017 19:19:24 (CST)  Doc ID   #[id number]  Job ID #[id number]    CC:

## 2017-12-09 NOTE — PROGRESS NOTES
"Hospital Medicine    CC: s/p traumatic SDH with lower extremity diabetic foot wound    HPI 57 y.o. female seen and examined on return from HD.  No acute events overnight.  She remains afebrile.       Past Medical History:   Diagnosis Date    Anemia in stage 3 chronic kidney disease 11/26/2017    CHF (congestive heart failure)     COPD (chronic obstructive pulmonary disease)     Coronary artery disease     Diabetes mellitus     Encounter for blood transfusion     Essential hypertension 6/24/2017    Hypertension     MI (myocardial infarction) 2010    Segmental and subsegmental pulmonary emboli of RLL without acute cor pulmonale 11/25/2017    Stroke     July 2005    Thyroid disease     Traumatic subarachnoid hemorrhage 11/24/2017    Type 2 diabetes mellitus with stage 3 chronic kidney disease, without long-term current use of insulin 6/24/2017         Review of Systems  General ROS: negative for chills, fever or weight loss  Cardiovascular ROS: no chest pain or dyspnea on exertion  Gastrointestinal ROS: no abdominal pain, change in bowel habits, or black/ bloody stools    Physical Examination  /71 (BP Location: Right arm, Patient Position: Lying)   Pulse 79   Temp 97.8 °F (36.6 °C) (Tympanic)   Resp 18   Ht 5' 6" (1.676 m)   Wt 117.9 kg (260 lb)   LMP 08/29/2016 (Approximate)   SpO2 96%   Breastfeeding? No   BMI 41.97 kg/m²   General appearance: alert, cooperative, no distress  HENT: Normocephalic, atraumatic, neck symmetrical, no nasal discharge   Lungs: clear to auscultation bilaterally, no dullness to percussion bilaterally  Heart: regular rate and rhythm without rub; no displacement of the PMI   Abdomen: soft, non-tender; bowel sounds normoactive; no organomegaly  Extremities: extremities symmetric; no clubbing, cyanosis, or edema; wound noted  Neurologic: Alert and oriented X 3, globally decreased strength, normal coordination and gait    Labs:  Lab Results   Component Value Date    " WBC 7.50 12/09/2017    HGB 7.9 (L) 12/09/2017    HCT 25.0 (L) 12/09/2017    MCV 78 (L) 12/09/2017     12/09/2017         Imaging: no new imaging    Assessment and Plan:   Acute post-traumatic sub-arachnoid hemorrhage  Fall precaution.  Continue PT and OT.           * Diabetic leg ulcer - grade 3 , left posterior ankle     - continue IV Cefepime + Vancomycin as per Dr. Turner.  - Dr. Wick following, getting Sanyl application. S/p debridement     - Follow ID recommendations.  - wound care.          Acute RLL pulmonary embolism  S/p IVC filter placed  Future therapeutic dose to be decided after outpatient neurosurgery followup.     Acute on chronic hypoxic hypercarbic respiratory failure.  Supplemental O2 via nasal canula; titrate O2 saturation to >92%.   Continue beta 2 agonist bronchodilator treatments.   Use BiPAP during sleep. Needs Polysomnogram upon DC.     Possible seizure  Appreciated Dr. Gil's assistance, continue PO Keppra.  Continue neuro-checks, fall and seizure precautions.     MC - now on HD  Follow nephrology recommendations. Await renal recovery.      Hypothyroid     - continue synthroid          Controlled type 2 diabetes mellitus with stage 3 chronic kidney disease     Check blood glucose level q AC/HS.  Use Novolog Insulin Sliding Scale as needed.   Continue American Diabetic Association 1800 Kcal diet          CKD (chronic kidney disease) stage 3, GFR 30-59 ml/min     - avoid nephrotoxins and renally dose meds          Essential hypertension     - continue home meds and will titrate as needed      DVT Prophylaxis: Heparin 5K SQ q 12 hrs.

## 2017-12-09 NOTE — PROGRESS NOTES
HD:  Pt stable, tx complete, lines reinfused, catheter capped and clamped.  Pt toelrated tx well.    NET UF:  3000 mL

## 2017-12-09 NOTE — PLAN OF CARE
12/09/17 1116   Aerosol Therapy   $ Aerosol Therapy Charges Patient unavailable;PRN treatment not required   Respiratory Treatment Status patient unavailable;PRN treatment not required  (pt in dialysis)       Aerosol treatments PRN. Pt out of room for dialysis treatment at this time.

## 2017-12-09 NOTE — PLAN OF CARE
Problem: Diabetes, Type 2 (Adult)  Intervention: Optimize Glycemic Control  Pt resting quietly with no complaints of pain or discomfort. Wound to left leg is not to be touched. Pt is new to Dialysis and receives dialysis on Tuesday, Thursday and Saturday.

## 2017-12-09 NOTE — PROGRESS NOTES
INPATIENT NEPHROLOGY PROGRESS NOTE  [facility] NEPHROLOGY    Patient Name: Juliane Ramos  Date: 12/09/2017    Reason for consultation: MC      Chief Complaint: Left diabetic foot ulcer     History of Present Illness:  Ms. Ramos is a 58 y/o F with a hx of HTN, DM2, CHF, and stage III CKD who p/w a left heel ulcer/cellulitis x 2 weeks, nonhealing. Hospital course c/b PE, fall with SAH, and MC 2/2 contrast/vancomycin/infection requiring RRT. We have been consulted for MC.    · Interval History/Subjective:         12/4  Denies n,v, chest pair sob.  12/5  Seen on dialysis.  Was sob earlier, doing ok now  12/6  No nausea, vomiting, chest pain.  More alert  12/7   No sob or chest pain.  Small nonpruritic rash under blood pressure cuff  12/8  S/p GFF and hemesplit.  Rash worse, asymptomatic.  Waiting to go to surgery  12/9  VSS. Had HD today, no new complains.     Plan of Care:    Assessment:  Oliguric MC 2/2 multifactorial ATN, dialysis dependent  Edema/Pleural effusion  Hyponatremia  Anemia  Low albumin   rash     Plan:     1. Acute Kidney Injury- dialysis TTS or PRN based on need.     2. Volume/Blood Pressure- BP is stable. uf as tolerated with hd     3. Electrolytes/Acid Base- HypoNa is stable, continue current dialysis prescription/UF goals.       4. Anemia of CKD- Hb is stable. Continue EPO 5K with HD.      5. Low albumin- Stable, continue Nepro.    6.  Rash--monitor, as per primary team.  No respiratory symptoms    Thank you for allowing us to participate in this patient's care. We will continue to follow.    Medications:  No current facility-administered medications on file prior to encounter.      Current Outpatient Prescriptions on File Prior to Encounter   Medication Sig Dispense Refill    albuterol (ACCUNEB) 1.25 mg/3 mL Nebu Take 1.25 mg by nebulization every 6 (six) hours as needed. Rescue      carvedilol (COREG) 12.5 MG tablet Take 1 tablet (12.5 mg total) by mouth 2 (two) times daily. 60 tablet 11     cefepime in dextrose 5 % (MAXIPIME) 1 gram/50 mL PgBk Inject 50 mLs (1 g total) into the vein every 24 hours as needed.      gabapentin (NEURONTIN) 100 MG capsule Take 1 capsule (100 mg total) by mouth 3 (three) times daily. 90 capsule 11    levetiracetam oral soln 500 mg/5 mL (5 mL) Soln 2.5 mLs (250 mg total) by Per NG tube route 2 (two) times daily. 150 mL 11    levothyroxine (SYNTHROID) 50 MCG tablet Take 50 mcg by mouth once daily.        metronidazole (FLAGYL) 500 mg/100 mL IVPB Inject 100 mLs (500 mg total) into the vein every 8 (eight) hours.      rosuvastatin (CRESTOR) 10 MG tablet Take 1 tablet (10 mg total) by mouth once daily. 30 tablet 0    VANCOMYCIN HCL (VANCOMYCIN IN 5 % DEXTROSE) 1.75 gram/500 mL Soln Inject 500 mLs (1,750 mg total) into the vein once daily.       Scheduled Meds:   carvedilol  12.5 mg Per NG tube BID    ceFEPime (MAXIPIME) IVPB  1 g Intravenous Daily    collagenase   Topical Daily    epoetin donita (PROCRIT) injection  5,000 Units Intravenous Every Tues, Thurs, Sat    fluconazole  100 mg Oral Daily    gabapentin  100 mg Oral TID    heparin (porcine)  5,000 Units Subcutaneous Q12H    insulin aspart  1-10 Units Subcutaneous TIDWM    levothyroxine  50 mcg Per NG tube Before breakfast    miconazole nitrate 2%   Topical (Top) BID    rosuvastatin  10 mg Per NG tube Daily    sodium chloride 0.9%  3 mL Intravenous Q8H     Continuous Infusions:   lactated ringers 25 mL/hr (12/08/17 4605)     PRN Meds:.sodium chloride 0.9%, acetaminophen, albuterol sulfate, dextrose 50%, glucagon (human recombinant), glucose, glucose, heparin (porcine), labetalol, levetiracetam oral soln, magnesium oxide, magnesium oxide, ondansetron, potassium phosphate IVPB, simethicone, traMADol    Allergies:  Patient has no known allergies.    Vital Signs:  Temp Readings from Last 3 Encounters:   12/09/17 97.3 °F (36.3 °C) (Oral)   11/27/17 97 °F (36.1 °C) (Oral)   11/23/17 97.4 °F (36.3 °C)     Pulse  Readings from Last 3 Encounters:   12/09/17 83   11/27/17 67   11/24/17 65     BP Readings from Last 3 Encounters:   12/09/17 129/76   11/27/17 123/67   11/24/17 (!) 108/58     Weight:  Wt Readings from Last 3 Encounters:   12/08/17 117.9 kg (260 lb)   11/27/17 117 kg (257 lb 15 oz)   11/22/17 106.6 kg (234 lb 15.8 oz)     Physical Exam:  Physical Exam   Constitutional: She is oriented to person, place, and time. She appears well-developed and well-nourished.   HENT:   Head: Normocephalic and atraumatic.   Mouth/Throat: Oropharynx is clear and moist.   Eyes: EOM are normal. Pupils are equal, round, and reactive to light. No scleral icterus.   Neck: Neck supple.   Cardiovascular: Normal rate and regular rhythm.    Pulmonary/Chest: Effort normal. No stridor. No respiratory distress.   Abdominal: Soft. She exhibits no distension.   Musculoskeletal: Normal range of motion. She exhibits no edema.   Tunneled cath on right side and central line.   Neurological: She is alert and oriented to person, place, and time. No cranial nerve deficit.   Skin: Skin is warm and dry. No rash noted. She is not diaphoretic. No erythema.   Psychiatric: She has a normal mood and affect. Her behavior is normal.         Results:  Lab Results   Component Value Date     (L) 12/09/2017    K 5.1 12/09/2017     12/09/2017    CO2 26 12/09/2017    BUN 40 (H) 12/09/2017    CREATININE 5.0 (H) 12/09/2017    CALCIUM 8.8 12/09/2017    ANIONGAP 7 (L) 12/09/2017    ESTGFRAFRICA 10 (A) 12/09/2017    EGFRNONAA 9 (A) 12/09/2017     Lab Results   Component Value Date    CALCIUM 8.8 12/09/2017    PHOS 4.7 (H) 12/09/2017       Recent Labs  Lab 12/09/17  0513   WBC 7.50   RBC 3.21*   HGB 7.9*   HCT 25.0*      MCV 78*   MCH 24.8*   MCHC 31.8*     I have personally reviewed pertinent radiological imaging and reports.    Edson Crystal MD  Nephrology  [facility] [place]  [phone]

## 2017-12-10 LAB
ANION GAP SERPL CALC-SCNC: 8 MMOL/L
BASOPHILS # BLD AUTO: 0.1 K/UL
BASOPHILS NFR BLD: 0.9 %
BUN SERPL-MCNC: 32 MG/DL
CALCIUM SERPL-MCNC: 8.5 MG/DL
CHLORIDE SERPL-SCNC: 99 MMOL/L
CO2 SERPL-SCNC: 23 MMOL/L
CREAT SERPL-MCNC: 4.1 MG/DL
DIFFERENTIAL METHOD: ABNORMAL
EOSINOPHIL # BLD AUTO: 0.3 K/UL
EOSINOPHIL NFR BLD: 3.1 %
ERYTHROCYTE [DISTWIDTH] IN BLOOD BY AUTOMATED COUNT: 21.3 %
EST. GFR  (AFRICAN AMERICAN): 13 ML/MIN/1.73 M^2
EST. GFR  (NON AFRICAN AMERICAN): 12 ML/MIN/1.73 M^2
GLUCOSE SERPL-MCNC: 169 MG/DL
HCT VFR BLD AUTO: 25.2 %
HGB BLD-MCNC: 7.9 G/DL
LYMPHOCYTES # BLD AUTO: 0.7 K/UL
LYMPHOCYTES NFR BLD: 8.6 %
MAGNESIUM SERPL-MCNC: 2.1 MG/DL
MCH RBC QN AUTO: 24.2 PG
MCHC RBC AUTO-ENTMCNC: 31.5 G/DL
MCV RBC AUTO: 77 FL
MONOCYTES # BLD AUTO: 0.9 K/UL
MONOCYTES NFR BLD: 11.3 %
NEUTROPHILS # BLD AUTO: 6.1 K/UL
NEUTROPHILS NFR BLD: 76.1 %
PHOSPHATE SERPL-MCNC: 3.1 MG/DL
PLATELET # BLD AUTO: 175 K/UL
PMV BLD AUTO: 8.7 FL
POCT GLUCOSE: 164 MG/DL (ref 70–110)
POCT GLUCOSE: 251 MG/DL (ref 70–110)
POCT GLUCOSE: 255 MG/DL (ref 70–110)
POTASSIUM SERPL-SCNC: 4.7 MMOL/L
RBC # BLD AUTO: 3.28 M/UL
SODIUM SERPL-SCNC: 130 MMOL/L
WBC # BLD AUTO: 8.1 K/UL

## 2017-12-10 PROCEDURE — 25000003 PHARM REV CODE 250: Performed by: NURSE PRACTITIONER

## 2017-12-10 PROCEDURE — 80048 BASIC METABOLIC PNL TOTAL CA: CPT

## 2017-12-10 PROCEDURE — 27000221 HC OXYGEN, UP TO 24 HOURS

## 2017-12-10 PROCEDURE — A4216 STERILE WATER/SALINE, 10 ML: HCPCS | Performed by: NURSE PRACTITIONER

## 2017-12-10 PROCEDURE — 63600175 PHARM REV CODE 636 W HCPCS: Performed by: INTERNAL MEDICINE

## 2017-12-10 PROCEDURE — 85025 COMPLETE CBC W/AUTO DIFF WBC: CPT

## 2017-12-10 PROCEDURE — 83735 ASSAY OF MAGNESIUM: CPT

## 2017-12-10 PROCEDURE — 25000003 PHARM REV CODE 250: Performed by: INTERNAL MEDICINE

## 2017-12-10 PROCEDURE — 99900035 HC TECH TIME PER 15 MIN (STAT)

## 2017-12-10 PROCEDURE — 84100 ASSAY OF PHOSPHORUS: CPT

## 2017-12-10 PROCEDURE — 94761 N-INVAS EAR/PLS OXIMETRY MLT: CPT

## 2017-12-10 PROCEDURE — 94660 CPAP INITIATION&MGMT: CPT

## 2017-12-10 PROCEDURE — 12000002 HC ACUTE/MED SURGE SEMI-PRIVATE ROOM

## 2017-12-10 RX ORDER — FUROSEMIDE 10 MG/ML
60 INJECTION INTRAMUSCULAR; INTRAVENOUS ONCE
Status: COMPLETED | OUTPATIENT
Start: 2017-12-10 | End: 2017-12-10

## 2017-12-10 RX ORDER — CARVEDILOL 6.25 MG/1
12.5 TABLET ORAL 2 TIMES DAILY
Status: DISCONTINUED | OUTPATIENT
Start: 2017-12-10 | End: 2017-12-11 | Stop reason: HOSPADM

## 2017-12-10 RX ORDER — ROSUVASTATIN CALCIUM 5 MG/1
10 TABLET, COATED ORAL DAILY
Status: DISCONTINUED | OUTPATIENT
Start: 2017-12-11 | End: 2017-12-11 | Stop reason: HOSPADM

## 2017-12-10 RX ORDER — LEVOTHYROXINE SODIUM 50 UG/1
50 TABLET ORAL
Status: DISCONTINUED | OUTPATIENT
Start: 2017-12-11 | End: 2017-12-11 | Stop reason: HOSPADM

## 2017-12-10 RX ADMIN — CARVEDILOL 12.5 MG: 6.25 TABLET, FILM COATED ORAL at 10:12

## 2017-12-10 RX ADMIN — LEVOTHYROXINE SODIUM 50 MCG: 50 TABLET ORAL at 05:12

## 2017-12-10 RX ADMIN — FLUCONAZOLE 100 MG: 50 TABLET ORAL at 10:12

## 2017-12-10 RX ADMIN — HEPARIN SODIUM 5000 UNITS: 5000 INJECTION, SOLUTION INTRAVENOUS; SUBCUTANEOUS at 10:12

## 2017-12-10 RX ADMIN — ROSUVASTATIN CALCIUM 10 MG: 5 TABLET, FILM COATED ORAL at 10:12

## 2017-12-10 RX ADMIN — MICONAZOLE NITRATE: 20 OINTMENT TOPICAL at 09:12

## 2017-12-10 RX ADMIN — CARVEDILOL 12.5 MG: 6.25 TABLET, FILM COATED ORAL at 09:12

## 2017-12-10 RX ADMIN — GABAPENTIN 100 MG: 100 CAPSULE ORAL at 09:12

## 2017-12-10 RX ADMIN — ACETAMINOPHEN 650 MG: 325 TABLET ORAL at 06:12

## 2017-12-10 RX ADMIN — HEPARIN SODIUM 5000 UNITS: 5000 INJECTION, SOLUTION INTRAVENOUS; SUBCUTANEOUS at 09:12

## 2017-12-10 RX ADMIN — SODIUM CHLORIDE, PRESERVATIVE FREE 3 ML: 5 INJECTION INTRAVENOUS at 09:12

## 2017-12-10 RX ADMIN — SODIUM CHLORIDE, PRESERVATIVE FREE 3 ML: 5 INJECTION INTRAVENOUS at 02:12

## 2017-12-10 RX ADMIN — INSULIN ASPART 2 UNITS: 100 INJECTION, SOLUTION INTRAVENOUS; SUBCUTANEOUS at 04:12

## 2017-12-10 RX ADMIN — GABAPENTIN 100 MG: 100 CAPSULE ORAL at 05:12

## 2017-12-10 RX ADMIN — SODIUM CHLORIDE, PRESERVATIVE FREE 3 ML: 5 INJECTION INTRAVENOUS at 05:12

## 2017-12-10 RX ADMIN — FUROSEMIDE 60 MG: 10 INJECTION, SOLUTION INTRAMUSCULAR; INTRAVENOUS at 10:12

## 2017-12-10 RX ADMIN — INSULIN ASPART 3 UNITS: 100 INJECTION, SOLUTION INTRAVENOUS; SUBCUTANEOUS at 09:12

## 2017-12-10 RX ADMIN — MICONAZOLE NITRATE: 20 OINTMENT TOPICAL at 10:12

## 2017-12-10 RX ADMIN — INSULIN ASPART 2 UNITS: 100 INJECTION, SOLUTION INTRAVENOUS; SUBCUTANEOUS at 05:12

## 2017-12-10 RX ADMIN — GABAPENTIN 100 MG: 100 CAPSULE ORAL at 02:12

## 2017-12-10 RX ADMIN — INSULIN ASPART 6 UNITS: 100 INJECTION, SOLUTION INTRAVENOUS; SUBCUTANEOUS at 11:12

## 2017-12-10 NOTE — PLAN OF CARE
12/09/17 1936   Patient Assessment/Suction   Level of Consciousness (AVPU) alert   Respiratory Effort Normal;Unlabored   Expansion/Accessory Muscles/Retractions expansion symmetric   All Lung Fields Breath Sounds coarse   Rhythm/Pattern, Respiratory pattern regular   Cough Frequency infrequent   Cough Type nonproductive   PRE-TX-O2-ETCO2   O2 Device (Oxygen Therapy) nasal cannula   Flow (L/min) 4   SpO2 98 %   Pulse Oximetry Type Intermittent   Pulse 77   Resp 18   Aerosol Therapy   $ Aerosol Therapy Charges Aerosol Treatment   Respiratory Treatment Status given   SVN/Inhaler Treatment Route mask   Position During Treatment HOB at 30 degrees   Patient Tolerance good   Post-Treatment   Post-treatment Heart Rate (beats/min) 78   Post-treatment Resp Rate (breaths/min) 18   All Fields Breath Sounds unchanged

## 2017-12-10 NOTE — CONSULTS
Juliane Ramos [id number] is a 57 y.o. female who has been consulted for vancomycin dosing.    The patient has the following labs:     Date Creatinine (mg/dl)    BUN WBC Count   12/9/2017 Estimated Creatinine Clearance: 16.6 mL/min (based on SCr of 5 mg/dL (H)). Lab Results   Component Value Date    BUN 40 (H) 12/09/2017     Lab Results   Component Value Date    WBC 7.50 12/09/2017        Current weight is 117.9 kg (260 lb)    Pt received vancomycin 1000 mg on 12/5/17 at 1620.  Vancomycin level from 12/9/17 at 0513 was 20.7 mg/dL. Target level range is 10 -15 mg/dL. I anticipate vancomycin is therapeutic. Pharmacy will re-dose vancomycin 1750 mg x 1 dose on 12/09/17 at 2030. Vancomycin level has been ordered 12/12/17 / with AM labs.     Patient will be followed by pharmacy for changes in renal function, toxicity, and efficacy.  Thank you for allowing us to participate in this patient's care.     Zachary Baptiste   12/09/2017

## 2017-12-10 NOTE — PROGRESS NOTES
INPATIENT NEPHROLOGY PROGRESS NOTE  [facility] NEPHROLOGY    Patient Name: Juliane Ramos  Date: 12/10/2017    Reason for consultation: MC      Chief Complaint: Left diabetic foot ulcer     History of Present Illness:  Ms. Ramos is a 58 y/o F with a hx of HTN, DM2, CHF, and stage III CKD who p/w a left heel ulcer/cellulitis x 2 weeks, nonhealing. Hospital course c/b PE, fall with SAH, and MC 2/2 contrast/vancomycin/infection requiring RRT. We have been consulted for MC.    · Interval History/Subjective:     12/4  Denies n,v, chest pair sob.  12/5  Seen on dialysis.  Was sob earlier, doing ok now  12/6  No nausea, vomiting, chest pain.  More alert  12/7   No sob or chest pain.  Small nonpruritic rash under blood pressure cuff  12/8  S/p GFF and hemesplit.  Rash worse, asymptomatic.  Waiting to go to surgery  12/9  VSS. Had HD today, no new complains.  12/10 VSS, no new complains.     Plan of Care:    Assessment:  Oliguric MC 2/2 multifactorial ATN, dialysis dependent  Edema/Pleural effusion  Hyponatremia  Anemia  Low albumin   rash     Plan:     1. Acute Kidney Injury- dialysis TTS or PRN based on need.     2. Volume/Blood Pressure- BP is stable. uf as tolerated with hd     3. Electrolytes/Acid Base- HypoNa is stable, continue current dialysis prescription/UF goals.       4. Anemia of CKD- Hb is stable. Continue EPO 5K with HD.      5. Low albumin- Stable, continue Nepro.    6.  Rash--monitor, as per primary team.  No respiratory symptoms    Thank you for allowing us to participate in this patient's care. We will continue to follow.    Medications:  No current facility-administered medications on file prior to encounter.      Current Outpatient Prescriptions on File Prior to Encounter   Medication Sig Dispense Refill    albuterol (ACCUNEB) 1.25 mg/3 mL Nebu Take 1.25 mg by nebulization every 6 (six) hours as needed. Rescue      carvedilol (COREG) 12.5 MG tablet Take 1 tablet (12.5 mg total) by mouth 2 (two)  times daily. 60 tablet 11    cefepime in dextrose 5 % (MAXIPIME) 1 gram/50 mL PgBk Inject 50 mLs (1 g total) into the vein every 24 hours as needed.      gabapentin (NEURONTIN) 100 MG capsule Take 1 capsule (100 mg total) by mouth 3 (three) times daily. 90 capsule 11    levetiracetam oral soln 500 mg/5 mL (5 mL) Soln 2.5 mLs (250 mg total) by Per NG tube route 2 (two) times daily. 150 mL 11    levothyroxine (SYNTHROID) 50 MCG tablet Take 50 mcg by mouth once daily.        metronidazole (FLAGYL) 500 mg/100 mL IVPB Inject 100 mLs (500 mg total) into the vein every 8 (eight) hours.      rosuvastatin (CRESTOR) 10 MG tablet Take 1 tablet (10 mg total) by mouth once daily. 30 tablet 0    VANCOMYCIN HCL (VANCOMYCIN IN 5 % DEXTROSE) 1.75 gram/500 mL Soln Inject 500 mLs (1,750 mg total) into the vein once daily.       Scheduled Meds:   carvedilol  12.5 mg Oral BID    ceFEPime (MAXIPIME) IVPB  1 g Intravenous Daily    collagenase   Topical Daily    epoetin donita (PROCRIT) injection  5,000 Units Intravenous Every Tues, Thurs, Sat    fluconazole  100 mg Oral Daily    gabapentin  100 mg Oral TID    heparin (porcine)  5,000 Units Subcutaneous Q12H    insulin aspart  1-10 Units Subcutaneous TIDWM    [START ON 12/11/2017] levothyroxine  50 mcg Oral Before breakfast    miconazole nitrate 2%   Topical (Top) BID    [START ON 12/11/2017] rosuvastatin  10 mg Oral Daily    sodium chloride 0.9%  3 mL Intravenous Q8H     Continuous Infusions:   lactated ringers 25 mL/hr (12/08/17 1545)     PRN Meds:.sodium chloride 0.9%, acetaminophen, albuterol sulfate, dextrose 50%, glucagon (human recombinant), glucose, glucose, heparin (porcine), labetalol, levetiracetam oral soln, magnesium oxide, magnesium oxide, ondansetron, potassium phosphate IVPB, simethicone, traMADol    Allergies:  Patient has no known allergies.    Vital Signs:  Temp Readings from Last 3 Encounters:   12/10/17 97.2 °F (36.2 °C) (Oral)   11/27/17 97 °F (36.1  °C) (Oral)   11/23/17 97.4 °F (36.3 °C)     Pulse Readings from Last 3 Encounters:   12/10/17 70   11/27/17 67   11/24/17 65     BP Readings from Last 3 Encounters:   12/10/17 132/76   11/27/17 123/67   11/24/17 (!) 108/58     Weight:  Wt Readings from Last 3 Encounters:   12/08/17 117.9 kg (260 lb)   11/27/17 117 kg (257 lb 15 oz)   11/22/17 106.6 kg (234 lb 15.8 oz)     Physical Exam:  Physical Exam   Constitutional: She is oriented to person, place, and time. She appears well-developed and well-nourished.   HENT:   Head: Normocephalic and atraumatic.   Mouth/Throat: Oropharynx is clear and moist.   Eyes: EOM are normal. Pupils are equal, round, and reactive to light. No scleral icterus.   Neck: Neck supple.   Cardiovascular: Normal rate and regular rhythm.    Pulmonary/Chest: Effort normal. No stridor. No respiratory distress.   Abdominal: Soft. She exhibits no distension.   Musculoskeletal: Normal range of motion. She exhibits no edema.   Tunneled cath on right side and central line.   Neurological: She is alert and oriented to person, place, and time. No cranial nerve deficit.   Skin: Skin is warm and dry. No rash noted. She is not diaphoretic. No erythema.   Psychiatric: She has a normal mood and affect. Her behavior is normal.         Results:  Lab Results   Component Value Date     (L) 12/10/2017    K 4.7 12/10/2017    CL 99 12/10/2017    CO2 23 12/10/2017    BUN 32 (H) 12/10/2017    CREATININE 4.1 (H) 12/10/2017    CALCIUM 8.5 (L) 12/10/2017    ANIONGAP 8 12/10/2017    ESTGFRAFRICA 13 (A) 12/10/2017    EGFRNONAA 12 (A) 12/10/2017     Lab Results   Component Value Date    CALCIUM 8.5 (L) 12/10/2017    PHOS 3.1 12/10/2017       Recent Labs  Lab 12/10/17  0434   WBC 8.10   RBC 3.28*   HGB 7.9*   HCT 25.2*      MCV 77*   MCH 24.2*   MCHC 31.5*     I have personally reviewed pertinent radiological imaging and reports.    Edson Crystal MD  Nephrology  [facility] [place]  [phone]

## 2017-12-10 NOTE — PLAN OF CARE
Problem: Pressure Ulcer Risk (Donny Scale) (Adult,Obstetrics,Pediatric)  Intervention: Turn/Reposition Often  Pt having a restless night. Complaints of sob several times through the night asking to sit on side of bed. Bipap was placed twice but taken off by pt within the hour. Remove villegas catheter, pt tolerated well.

## 2017-12-10 NOTE — PROGRESS NOTES
"Hospital Medicine    CC: s/p traumatic SDH with lower extremity diabetic foot wound    HPI 57 y.o. female seen and examined. No acute events and breathing improved.       Past Medical History:   Diagnosis Date    Anemia in stage 3 chronic kidney disease 11/26/2017    CHF (congestive heart failure)     COPD (chronic obstructive pulmonary disease)     Coronary artery disease     Diabetes mellitus     Encounter for blood transfusion     Essential hypertension 6/24/2017    Hypertension     MI (myocardial infarction) 2010    Segmental and subsegmental pulmonary emboli of RLL without acute cor pulmonale 11/25/2017    Stroke     July 2005    Thyroid disease     Traumatic subarachnoid hemorrhage 11/24/2017    Type 2 diabetes mellitus with stage 3 chronic kidney disease, without long-term current use of insulin 6/24/2017         Review of Systems  General ROS: negative for chills, fever or weight loss  Cardiovascular ROS: no chest pain or dyspnea on exertion  Gastrointestinal ROS: no abdominal pain, change in bowel habits, or black/ bloody stools    Physical Examination  BP (!) 140/71 (Patient Position: Sitting)   Pulse 71   Temp 97.1 °F (36.2 °C) (Oral)   Resp 18   Ht 5' 6" (1.676 m)   Wt 117.9 kg (260 lb)   LMP 08/29/2016 (Approximate)   SpO2 99%   Breastfeeding? No   BMI 41.97 kg/m²   General appearance: alert, cooperative, no distress  HENT: Normocephalic, atraumatic, neck symmetrical, no nasal discharge   Lungs: clear to auscultation bilaterally, no dullness to percussion bilaterally  Heart: regular rate and rhythm without rub; no displacement of the PMI   Abdomen: soft, non-tender; bowel sounds normoactive; no organomegaly  Extremities: extremities symmetric; no clubbing, cyanosis, or edema; wound noted  Neurologic: Alert and oriented X 3, globally decreased strength, normal coordination and gait    Labs:  Lab Results   Component Value Date    WBC 8.10 12/10/2017    HGB 7.9 (L) 12/10/2017    HCT " 25.2 (L) 12/10/2017    MCV 77 (L) 12/10/2017     12/10/2017         Imaging: no new imaging    Assessment and Plan:   Acute post-traumatic sub-arachnoid hemorrhage  Fall precaution.  Continue PT and OT.           * Diabetic leg ulcer - grade 3 , left posterior ankle     - continue IV Cefepime + Vancomycin as per Dr. Turner.  - Dr. Wick following, getting Sanyl application. S/p debridement     - Follow ID recommendations.  - wound care.          Acute RLL pulmonary embolism  S/p IVC filter placed  Future therapeutic dose to be decided after outpatient neurosurgery followup.     Acute on chronic hypoxic hypercarbic respiratory failure.  Supplemental O2 via nasal canula; titrate O2 saturation to >92%.   Continue beta 2 agonist bronchodilator treatments.   Use BiPAP during sleep. Needs Polysomnogram upon DC.     Possible seizure  Appreciated Dr. Gil's assistance, continue PO Keppra.  Continue neuro-checks, fall and seizure precautions.     MC - now on HD  Follow nephrology recommendations. Await renal recovery.      Hypothyroid     - continue synthroid          Controlled type 2 diabetes mellitus with stage 3 chronic kidney disease     Check blood glucose level q AC/HS.  Use Novolog Insulin Sliding Scale as needed.   Continue American Diabetic Association 1800 Kcal diet          CKD (chronic kidney disease) stage 3, GFR 30-59 ml/min     - avoid nephrotoxins and renally dose meds          Essential hypertension     - continue home meds and will titrate as needed      DVT Prophylaxis: Heparin 5K SQ q 12 hrs.

## 2017-12-10 NOTE — PLAN OF CARE
Problem: Patient Care Overview  Goal: Plan of Care Review  Outcome: Ongoing (interventions implemented as appropriate)  Pt oriented x 4. Vitals stable. Pt on hemodialysis TTS. Has not produced urine today. Free from falls. Pt has no complaints at this time. Will continue to monitor.

## 2017-12-10 NOTE — PLAN OF CARE
12/10/17 1237   Patient Assessment/Suction   Level of Consciousness (AVPU) alert   Respiratory Effort Unlabored;Normal   Expansion/Accessory Muscles/Retractions no retractions;no use of accessory muscles;expansion symmetric   All Lung Fields Breath Sounds diminished   Rhythm/Pattern, Respiratory depth regular;pattern regular;unlabored   Cough Frequency infrequent   Cough Type good;nonproductive   PRE-TX-O2-ETCO2   O2 Device (Oxygen Therapy) nasal cannula   $ Is the patient on Low Flow Oxygen? Yes   Flow (L/min) 4   Oxygen Concentration (%) 36   SpO2 100 %   Pulse Oximetry Type Intermittent   $ Pulse Oximetry - Multiple Charge Pulse Oximetry - Multiple   Pulse 71   Resp 18   Aerosol Therapy   $ Aerosol Therapy Charges PRN treatment not required   Respiratory Treatment Status PRN treatment not required       Aerosol treatments PRN. Patient eating lunch with no respiratory distress noted.

## 2017-12-10 NOTE — NURSING
Pt awake in bed. Complaints of some sob. Will call respiratory. Bed low locked position, call light in reach.

## 2017-12-11 ENCOUNTER — HOSPITAL ENCOUNTER (INPATIENT)
Facility: HOSPITAL | Age: 57
LOS: 18 days | Discharge: HOME HEALTH | DRG: 091 | End: 2017-12-29
Attending: PHYSICAL MEDICINE & REHABILITATION | Admitting: PHYSICAL MEDICINE & REHABILITATION
Payer: MEDICARE

## 2017-12-11 VITALS
RESPIRATION RATE: 18 BRPM | SYSTOLIC BLOOD PRESSURE: 125 MMHG | TEMPERATURE: 97 F | WEIGHT: 260 LBS | BODY MASS INDEX: 41.78 KG/M2 | HEIGHT: 66 IN | OXYGEN SATURATION: 97 % | DIASTOLIC BLOOD PRESSURE: 59 MMHG | HEART RATE: 69 BPM

## 2017-12-11 DIAGNOSIS — I60.9 SAH (SUBARACHNOID HEMORRHAGE): ICD-10-CM

## 2017-12-11 LAB
ALLENS TEST: ABNORMAL
ANION GAP SERPL CALC-SCNC: 9 MMOL/L
BACTERIA SPEC AEROBE CULT: NO GROWTH
BASOPHILS # BLD AUTO: 0.1 K/UL
BASOPHILS NFR BLD: 1 %
BUN SERPL-MCNC: 40 MG/DL
CALCIUM SERPL-MCNC: 8.6 MG/DL
CHLORIDE SERPL-SCNC: 97 MMOL/L
CO2 SERPL-SCNC: 23 MMOL/L
CREAT SERPL-MCNC: 4.8 MG/DL
DELSYS: ABNORMAL
DIFFERENTIAL METHOD: ABNORMAL
EOSINOPHIL # BLD AUTO: 0.4 K/UL
EOSINOPHIL NFR BLD: 4.2 %
ERYTHROCYTE [DISTWIDTH] IN BLOOD BY AUTOMATED COUNT: 21.3 %
EST. GFR  (AFRICAN AMERICAN): 11 ML/MIN/1.73 M^2
EST. GFR  (NON AFRICAN AMERICAN): 10 ML/MIN/1.73 M^2
FLOW: 4
GLUCOSE SERPL-MCNC: 146 MG/DL
HCO3 UR-SCNC: 23 MMOL/L (ref 24–28)
HCT VFR BLD AUTO: 26.1 %
HGB BLD-MCNC: 8 G/DL
LYMPHOCYTES # BLD AUTO: 0.6 K/UL
LYMPHOCYTES NFR BLD: 7.6 %
MAGNESIUM SERPL-MCNC: 2.3 MG/DL
MCH RBC QN AUTO: 23.3 PG
MCHC RBC AUTO-ENTMCNC: 30.5 G/DL
MCV RBC AUTO: 77 FL
MODE: ABNORMAL
MONOCYTES # BLD AUTO: 0.9 K/UL
MONOCYTES NFR BLD: 11 %
NEUTROPHILS # BLD AUTO: 6.5 K/UL
NEUTROPHILS NFR BLD: 76.2 %
PCO2 BLDA: 41.6 MMHG (ref 35–45)
PH SMN: 7.35 [PH] (ref 7.35–7.45)
PHOSPHATE SERPL-MCNC: 3.8 MG/DL
PLATELET # BLD AUTO: 199 K/UL
PMV BLD AUTO: 8.4 FL
PO2 BLDA: 129 MMHG (ref 80–100)
POC BE: -3 MMOL/L
POC SATURATED O2: 99 % (ref 95–100)
POC TCO2: 24 MMOL/L (ref 23–27)
POCT GLUCOSE: 166 MG/DL (ref 70–110)
POCT GLUCOSE: 221 MG/DL (ref 70–110)
POTASSIUM SERPL-SCNC: 5.3 MMOL/L
RBC # BLD AUTO: 3.41 M/UL
SAMPLE: ABNORMAL
SITE: ABNORMAL
SODIUM SERPL-SCNC: 129 MMOL/L
WBC # BLD AUTO: 8.5 K/UL

## 2017-12-11 PROCEDURE — 94761 N-INVAS EAR/PLS OXIMETRY MLT: CPT

## 2017-12-11 PROCEDURE — 63600175 PHARM REV CODE 636 W HCPCS: Performed by: INTERNAL MEDICINE

## 2017-12-11 PROCEDURE — A4216 STERILE WATER/SALINE, 10 ML: HCPCS | Performed by: NURSE PRACTITIONER

## 2017-12-11 PROCEDURE — 80100016 HC MAINTENANCE HEMODIALYSIS

## 2017-12-11 PROCEDURE — 94660 CPAP INITIATION&MGMT: CPT

## 2017-12-11 PROCEDURE — 85025 COMPLETE CBC W/AUTO DIFF WBC: CPT

## 2017-12-11 PROCEDURE — 25000003 PHARM REV CODE 250: Performed by: INTERNAL MEDICINE

## 2017-12-11 PROCEDURE — 25000003 PHARM REV CODE 250: Performed by: NURSE PRACTITIONER

## 2017-12-11 PROCEDURE — 84100 ASSAY OF PHOSPHORUS: CPT

## 2017-12-11 PROCEDURE — 36600 WITHDRAWAL OF ARTERIAL BLOOD: CPT

## 2017-12-11 PROCEDURE — 27000221 HC OXYGEN, UP TO 24 HOURS

## 2017-12-11 PROCEDURE — 99239 HOSP IP/OBS DSCHRG MGMT >30: CPT | Mod: ,,, | Performed by: INTERNAL MEDICINE

## 2017-12-11 PROCEDURE — 99900035 HC TECH TIME PER 15 MIN (STAT)

## 2017-12-11 PROCEDURE — 82803 BLOOD GASES ANY COMBINATION: CPT

## 2017-12-11 PROCEDURE — 80048 BASIC METABOLIC PNL TOTAL CA: CPT

## 2017-12-11 PROCEDURE — 12800000 HC REHAB SEMI-PRIVATE ROOM

## 2017-12-11 PROCEDURE — 36415 COLL VENOUS BLD VENIPUNCTURE: CPT

## 2017-12-11 PROCEDURE — 83735 ASSAY OF MAGNESIUM: CPT

## 2017-12-11 PROCEDURE — A4216 STERILE WATER/SALINE, 10 ML: HCPCS | Performed by: INTERNAL MEDICINE

## 2017-12-11 PROCEDURE — 94640 AIRWAY INHALATION TREATMENT: CPT

## 2017-12-11 PROCEDURE — 25000242 PHARM REV CODE 250 ALT 637 W/ HCPCS: Performed by: INTERNAL MEDICINE

## 2017-12-11 RX ORDER — LANOLIN ALCOHOL/MO/W.PET/CERES
800 CREAM (GRAM) TOPICAL
Status: CANCELLED | OUTPATIENT
Start: 2017-12-11

## 2017-12-11 RX ORDER — CEFEPIME HYDROCHLORIDE 1 G/50ML
1 INJECTION, SOLUTION INTRAVENOUS EVERY 24 HOURS
Status: CANCELLED | OUTPATIENT
Start: 2017-12-12

## 2017-12-11 RX ORDER — ONDANSETRON 2 MG/ML
4 INJECTION INTRAMUSCULAR; INTRAVENOUS EVERY 6 HOURS PRN
Status: CANCELLED | OUTPATIENT
Start: 2017-12-11

## 2017-12-11 RX ORDER — ACETAMINOPHEN 325 MG/1
650 TABLET ORAL EVERY 6 HOURS PRN
Status: CANCELLED | OUTPATIENT
Start: 2017-12-11

## 2017-12-11 RX ORDER — CEFEPIME HYDROCHLORIDE 1 G/50ML
1 INJECTION, SOLUTION INTRAVENOUS EVERY 24 HOURS
Status: DISCONTINUED | OUTPATIENT
Start: 2017-12-12 | End: 2017-12-11

## 2017-12-11 RX ORDER — LEVETIRACETAM 100 MG/ML
250 SOLUTION ORAL DAILY PRN
Status: CANCELLED | OUTPATIENT
Start: 2017-12-11

## 2017-12-11 RX ORDER — HEPARIN SODIUM 1000 [USP'U]/ML
4000 INJECTION, SOLUTION INTRAVENOUS; SUBCUTANEOUS
Status: DISCONTINUED | OUTPATIENT
Start: 2017-12-11 | End: 2017-12-11

## 2017-12-11 RX ORDER — ALBUTEROL SULFATE 2.5 MG/.5ML
2.5 SOLUTION RESPIRATORY (INHALATION) EVERY 6 HOURS PRN
Status: CANCELLED | OUTPATIENT
Start: 2017-12-11

## 2017-12-11 RX ORDER — SIMETHICONE 80 MG
1 TABLET,CHEWABLE ORAL 3 TIMES DAILY PRN
Status: CANCELLED | OUTPATIENT
Start: 2017-12-11

## 2017-12-11 RX ORDER — HEPARIN SODIUM 5000 [USP'U]/ML
5000 INJECTION, SOLUTION INTRAVENOUS; SUBCUTANEOUS EVERY 12 HOURS
Status: CANCELLED | OUTPATIENT
Start: 2017-12-11

## 2017-12-11 RX ORDER — CARVEDILOL 6.25 MG/1
12.5 TABLET ORAL 2 TIMES DAILY
Status: CANCELLED | OUTPATIENT
Start: 2017-12-11

## 2017-12-11 RX ORDER — LEVOTHYROXINE SODIUM 50 UG/1
50 TABLET ORAL
Status: CANCELLED | OUTPATIENT
Start: 2017-12-12

## 2017-12-11 RX ORDER — TRAMADOL HYDROCHLORIDE 50 MG/1
50 TABLET ORAL EVERY 4 HOURS PRN
Status: DISCONTINUED | OUTPATIENT
Start: 2017-12-11 | End: 2017-12-29

## 2017-12-11 RX ORDER — LABETALOL HYDROCHLORIDE 5 MG/ML
10 INJECTION, SOLUTION INTRAVENOUS EVERY 6 HOURS PRN
Status: CANCELLED | OUTPATIENT
Start: 2017-12-11

## 2017-12-11 RX ORDER — FLUCONAZOLE 100 MG/1
100 TABLET ORAL DAILY
Status: DISCONTINUED | OUTPATIENT
Start: 2017-12-12 | End: 2017-12-29 | Stop reason: HOSPADM

## 2017-12-11 RX ORDER — IBUPROFEN 200 MG
24 TABLET ORAL
Status: DISCONTINUED | OUTPATIENT
Start: 2017-12-11 | End: 2017-12-29

## 2017-12-11 RX ORDER — ACETAMINOPHEN 325 MG/1
650 TABLET ORAL EVERY 6 HOURS PRN
Status: DISCONTINUED | OUTPATIENT
Start: 2017-12-11 | End: 2017-12-29

## 2017-12-11 RX ORDER — FLUCONAZOLE 50 MG/1
100 TABLET ORAL DAILY
Status: CANCELLED | OUTPATIENT
Start: 2017-12-12

## 2017-12-11 RX ORDER — INSULIN ASPART 100 [IU]/ML
1-10 INJECTION, SOLUTION INTRAVENOUS; SUBCUTANEOUS
Status: CANCELLED | OUTPATIENT
Start: 2017-12-11

## 2017-12-11 RX ORDER — GLUCAGON 1 MG
1 KIT INJECTION
Status: DISCONTINUED | OUTPATIENT
Start: 2017-12-11 | End: 2017-12-29

## 2017-12-11 RX ORDER — HEPARIN SODIUM 1000 [USP'U]/ML
4000 INJECTION, SOLUTION INTRAVENOUS; SUBCUTANEOUS
Status: CANCELLED | OUTPATIENT
Start: 2017-12-11

## 2017-12-11 RX ORDER — SODIUM CHLORIDE 0.9 % (FLUSH) 0.9 %
3 SYRINGE (ML) INJECTION EVERY 8 HOURS
Status: DISCONTINUED | OUTPATIENT
Start: 2017-12-11 | End: 2017-12-29 | Stop reason: HOSPADM

## 2017-12-11 RX ORDER — ROSUVASTATIN CALCIUM 5 MG/1
10 TABLET, COATED ORAL DAILY
Status: DISCONTINUED | OUTPATIENT
Start: 2017-12-12 | End: 2017-12-29 | Stop reason: HOSPADM

## 2017-12-11 RX ORDER — SODIUM CHLORIDE 9 MG/ML
INJECTION, SOLUTION INTRAVENOUS
Status: CANCELLED | OUTPATIENT
Start: 2017-12-11

## 2017-12-11 RX ORDER — SODIUM CHLORIDE 0.9 % (FLUSH) 0.9 %
3 SYRINGE (ML) INJECTION EVERY 8 HOURS
Status: CANCELLED | OUTPATIENT
Start: 2017-12-11

## 2017-12-11 RX ORDER — CARVEDILOL 6.25 MG/1
12.5 TABLET ORAL 2 TIMES DAILY
Status: DISCONTINUED | OUTPATIENT
Start: 2017-12-11 | End: 2017-12-19

## 2017-12-11 RX ORDER — INSULIN ASPART 100 [IU]/ML
1-10 INJECTION, SOLUTION INTRAVENOUS; SUBCUTANEOUS
Status: DISCONTINUED | OUTPATIENT
Start: 2017-12-11 | End: 2017-12-29 | Stop reason: HOSPADM

## 2017-12-11 RX ORDER — LEVETIRACETAM 100 MG/ML
250 SOLUTION ORAL DAILY PRN
Status: DISCONTINUED | OUTPATIENT
Start: 2017-12-11 | End: 2017-12-29

## 2017-12-11 RX ORDER — SIMETHICONE 80 MG
1 TABLET,CHEWABLE ORAL 3 TIMES DAILY PRN
Status: DISCONTINUED | OUTPATIENT
Start: 2017-12-11 | End: 2017-12-29

## 2017-12-11 RX ORDER — HEPARIN SODIUM 5000 [USP'U]/ML
5000 INJECTION, SOLUTION INTRAVENOUS; SUBCUTANEOUS EVERY 12 HOURS
Status: DISCONTINUED | OUTPATIENT
Start: 2017-12-11 | End: 2017-12-21

## 2017-12-11 RX ORDER — ONDANSETRON 2 MG/ML
4 INJECTION INTRAMUSCULAR; INTRAVENOUS EVERY 6 HOURS PRN
Status: DISCONTINUED | OUTPATIENT
Start: 2017-12-11 | End: 2017-12-17

## 2017-12-11 RX ORDER — TRAMADOL HYDROCHLORIDE 50 MG/1
50 TABLET ORAL EVERY 4 HOURS PRN
Status: CANCELLED | OUTPATIENT
Start: 2017-12-11

## 2017-12-11 RX ORDER — GLUCAGON 1 MG
1 KIT INJECTION
Status: CANCELLED | OUTPATIENT
Start: 2017-12-11

## 2017-12-11 RX ORDER — LANOLIN ALCOHOL/MO/W.PET/CERES
800 CREAM (GRAM) TOPICAL
Status: DISCONTINUED | OUTPATIENT
Start: 2017-12-11 | End: 2017-12-29

## 2017-12-11 RX ORDER — IBUPROFEN 200 MG
16 TABLET ORAL
Status: CANCELLED | OUTPATIENT
Start: 2017-12-11

## 2017-12-11 RX ORDER — IBUPROFEN 200 MG
24 TABLET ORAL
Status: CANCELLED | OUTPATIENT
Start: 2017-12-11

## 2017-12-11 RX ORDER — SODIUM CHLORIDE 9 MG/ML
INJECTION, SOLUTION INTRAVENOUS
Status: DISCONTINUED | OUTPATIENT
Start: 2017-12-11 | End: 2017-12-29

## 2017-12-11 RX ORDER — ALBUTEROL SULFATE 2.5 MG/.5ML
2.5 SOLUTION RESPIRATORY (INHALATION) EVERY 6 HOURS PRN
Status: DISCONTINUED | OUTPATIENT
Start: 2017-12-11 | End: 2017-12-29

## 2017-12-11 RX ORDER — IBUPROFEN 200 MG
16 TABLET ORAL
Status: DISCONTINUED | OUTPATIENT
Start: 2017-12-11 | End: 2017-12-29

## 2017-12-11 RX ORDER — CEPHALEXIN 500 MG/1
500 CAPSULE ORAL EVERY 12 HOURS
Status: DISCONTINUED | OUTPATIENT
Start: 2017-12-11 | End: 2017-12-19

## 2017-12-11 RX ORDER — LEVOTHYROXINE SODIUM 25 UG/1
50 TABLET ORAL
Status: DISCONTINUED | OUTPATIENT
Start: 2017-12-12 | End: 2017-12-29 | Stop reason: HOSPADM

## 2017-12-11 RX ORDER — GABAPENTIN 100 MG/1
100 CAPSULE ORAL 3 TIMES DAILY
Status: DISCONTINUED | OUTPATIENT
Start: 2017-12-11 | End: 2017-12-29 | Stop reason: HOSPADM

## 2017-12-11 RX ORDER — LABETALOL HYDROCHLORIDE 5 MG/ML
10 INJECTION, SOLUTION INTRAVENOUS EVERY 6 HOURS PRN
Status: DISCONTINUED | OUTPATIENT
Start: 2017-12-11 | End: 2017-12-29

## 2017-12-11 RX ORDER — ROSUVASTATIN CALCIUM 5 MG/1
10 TABLET, COATED ORAL DAILY
Status: CANCELLED | OUTPATIENT
Start: 2017-12-12

## 2017-12-11 RX ORDER — GABAPENTIN 100 MG/1
100 CAPSULE ORAL 3 TIMES DAILY
Status: CANCELLED | OUTPATIENT
Start: 2017-12-11

## 2017-12-11 RX ORDER — SODIUM CHLORIDE, SODIUM LACTATE, POTASSIUM CHLORIDE, CALCIUM CHLORIDE 600; 310; 30; 20 MG/100ML; MG/100ML; MG/100ML; MG/100ML
75 INJECTION, SOLUTION INTRAVENOUS CONTINUOUS
Status: CANCELLED | OUTPATIENT
Start: 2017-12-11

## 2017-12-11 RX ADMIN — ROSUVASTATIN CALCIUM 10 MG: 5 TABLET, FILM COATED ORAL at 08:12

## 2017-12-11 RX ADMIN — HEPARIN SODIUM 5000 UNITS: 5000 INJECTION, SOLUTION INTRAVENOUS; SUBCUTANEOUS at 08:12

## 2017-12-11 RX ADMIN — GABAPENTIN 100 MG: 100 CAPSULE ORAL at 09:12

## 2017-12-11 RX ADMIN — LEVOTHYROXINE SODIUM 50 MCG: 50 TABLET ORAL at 05:12

## 2017-12-11 RX ADMIN — GABAPENTIN 100 MG: 100 CAPSULE ORAL at 05:12

## 2017-12-11 RX ADMIN — INSULIN ASPART 2 UNITS: 100 INJECTION, SOLUTION INTRAVENOUS; SUBCUTANEOUS at 11:12

## 2017-12-11 RX ADMIN — CARVEDILOL 12.5 MG: 6.25 TABLET, FILM COATED ORAL at 08:12

## 2017-12-11 RX ADMIN — MICONAZOLE NITRATE: 20 OINTMENT TOPICAL at 08:12

## 2017-12-11 RX ADMIN — INSULIN ASPART 4 UNITS: 100 INJECTION, SOLUTION INTRAVENOUS; SUBCUTANEOUS at 06:12

## 2017-12-11 RX ADMIN — GABAPENTIN 100 MG: 100 CAPSULE ORAL at 03:12

## 2017-12-11 RX ADMIN — SODIUM CHLORIDE, PRESERVATIVE FREE 3 ML: 5 INJECTION INTRAVENOUS at 03:12

## 2017-12-11 RX ADMIN — MICONAZOLE NITRATE: 20 OINTMENT TOPICAL at 09:12

## 2017-12-11 RX ADMIN — SODIUM CHLORIDE, PRESERVATIVE FREE 3 ML: 5 INJECTION INTRAVENOUS at 09:12

## 2017-12-11 RX ADMIN — ALBUTEROL SULFATE 2.5 MG: 2.5 SOLUTION RESPIRATORY (INHALATION) at 04:12

## 2017-12-11 RX ADMIN — SODIUM CHLORIDE, PRESERVATIVE FREE 3 ML: 5 INJECTION INTRAVENOUS at 05:12

## 2017-12-11 RX ADMIN — CEPHALEXIN 500 MG: 500 CAPSULE ORAL at 09:12

## 2017-12-11 RX ADMIN — HEPARIN SODIUM 5000 UNITS: 5000 INJECTION, SOLUTION INTRAVENOUS; SUBCUTANEOUS at 09:12

## 2017-12-11 RX ADMIN — FLUCONAZOLE 100 MG: 50 TABLET ORAL at 08:12

## 2017-12-11 RX ADMIN — CEFEPIME 1 G: 1 INJECTION, POWDER, FOR SOLUTION INTRAMUSCULAR; INTRAVENOUS at 11:12

## 2017-12-11 NOTE — PROGRESS NOTES
[facility], Sumi, called and stated she will be coming in today to place a wound vac on pt's left foot wound.

## 2017-12-11 NOTE — NURSING
Report called to [facility] in rehab. Patient to be transported by van to facility. Patient and spouse updated about POC, V/U.

## 2017-12-11 NOTE — PROGRESS NOTES
I sent discharge orders to [facility] in rehab via [facility]. Report can be called to [facility] at [phone] and they will pick the pt up. I updated the pts nurse, Emilia. Liana Florence LMSW

## 2017-12-11 NOTE — NURSING
Tele monitor removed. Patient transported by rehab transport in [place], 3L O2 and wound vac in place.

## 2017-12-11 NOTE — CONSULTS
"Consult to Wound Care for Wound Vac Application to left achilles tendon s/p debridement of ulcer with necrotic tissue. Pictures taken. Assisted by floor nurse, Emilia.  present.     Left leg achilles tendon area - After reading Op Notes by Dr. Wick, I spoke with Dr. Wick to verify the dermal cellular matrix was to remain in place on wound base. Verified "yes". It supports cellular reproduction and revascularization of tissue and it is sewn in place. Pictures taken of wound with matrix in place. Wound base approximately 8 cm X 4 cm, with serosanguinous drainage. Black sutures noted at wound edges. No odor. Kerlix removed was saturated with serosanguinous drainage. Rafiq-wound area intact. No pain during dressing change.     Recommend: Left Achilles Tendon - Clean rafiq-wound area with wound cleanser. Protect rafiq-wound area with Sting Free Barrier Wipe then  with strips of drape. Place Vaseline Gauze over Matrix in wound bed. Fill wound space with black foam. Make a "Bridge" to patient's shin, protecting rafiq-wound with drape. Cut hole in black foam at end of "Bridge" and place TracPad over the hole. Set wound vac to 125mm Hg, continuous suction. Change M/W/F.    Place gauze padding under tubing over patient's shin, wrap with Kerlix and secure all with Stretch Gauze.      Kathie Lay RN, CWOCN    Left Achilles Tendon            "

## 2017-12-11 NOTE — PROGRESS NOTES
12/11/17 0110   Patient Assessment/Suction   Level of Consciousness (AVPU) alert   Respiratory Effort Normal;Unlabored   PRE-TX-O2-ETCO2   O2 Device (Oxygen Therapy) BiPAP   Oxygen Concentration (%) 40   SpO2 100 %   Pulse 66   Resp (!) 25   Preset CPAP/BiPAP Settings   Mode Of Delivery BiPAP S/T   $ Is patient using? Yes   Equipment Type V60   Airway Device Type medium full face mask   Ipap 10   EPAP (cm H2O) 5   Pressure Support (cm H2O) 5   Set Rate (Breaths/Min) 12   ITime (sec) 1   Rise Time (sec) 3   Patient CPAP/BiPAP Settings   RR Total (Breaths/Min) 25   Tidal Volume (mL) 417   VE Minute Ventilation (L/min) 9.5 L/min   Peak Inspiratory Pressure (cm H2O) 10   TiTOT (%) 31   Total Leak (L/Min) 13   Patient Trigger - ST Mode Only (%) 100   CPAP/BiPAP Alarms   High Pressure (cm H2O) 15   Low Pressure (cm H2O) 5   Low Pressure Delay (Sec) 20   Minute Ventilation (L/Min) 2.5   High RR (breaths/min) 40   Low RR (breaths/min) 12   pt placed on bipap for sleep

## 2017-12-11 NOTE — PROGRESS NOTES
INPATIENT NEPHROLOGY PROGRESS NOTE  [facility] NEPHROLOGY    Patient Name: Juliane Ramos  Date: 12/11/2017    Reason for consultation: MC    Chief Complaint: Diabetic foot ulcer    History of Present Illness:  Ms. Ramos is a 58 y/o F with a hx of HTN, DM2, CHF, and stage III CKD who p/w a left heel ulcer/cellulitis x 2 weeks, nonhealing. Hospital course c/b PE, fall with SAH, and MC 2/2 contrast/vancomycin/infection requiring RRT. We have been consulted for MC. She remains dialysis dependent.    · Interval History/Subjective:    - had HD on Saturday  - will be AL'ed to rehab today    · Review of Systems: All 14 systems reviewed and negative, except as noted in HPI    Plan of Care:    Assessment:  Oliguric MC 2/2 multifactorial ATN, dialysis dependent  Hyponatremia/Hyperkalemia  Anemia of CKD    Plan:    1. Acute Kidney Injury- She remains HD dependent. Continue HD TTS. No NSAIDs or IV contrast. Dose meds for dialysis. Avoid hypotension.    2. Volume/Blood Pressure- Parameters are stable.    3. Electrolytes/Acid Base- Continue UF and fluid restriction. Continue a 2K bath and a low K diet.    4. Bone Mineral Metabolism- Parameters are stable.    5. Anemia of CKD- Stable- continue EPO with HD.    Thank you for allowing us to participate in this patient's care. We will continue to follow.    Medications:  No current facility-administered medications on file prior to encounter.      Current Outpatient Prescriptions on File Prior to Encounter   Medication Sig Dispense Refill    albuterol (ACCUNEB) 1.25 mg/3 mL Nebu Take 1.25 mg by nebulization every 6 (six) hours as needed. Rescue      carvedilol (COREG) 12.5 MG tablet Take 1 tablet (12.5 mg total) by mouth 2 (two) times daily. 60 tablet 11    cefepime in dextrose 5 % (MAXIPIME) 1 gram/50 mL PgBk Inject 50 mLs (1 g total) into the vein every 24 hours as needed.      gabapentin (NEURONTIN) 100 MG capsule Take 1 capsule (100 mg total) by mouth 3 (three) times  daily. 90 capsule 11    levetiracetam oral soln 500 mg/5 mL (5 mL) Soln 2.5 mLs (250 mg total) by Per NG tube route 2 (two) times daily. 150 mL 11    levothyroxine (SYNTHROID) 50 MCG tablet Take 50 mcg by mouth once daily.        metronidazole (FLAGYL) 500 mg/100 mL IVPB Inject 100 mLs (500 mg total) into the vein every 8 (eight) hours.      rosuvastatin (CRESTOR) 10 MG tablet Take 1 tablet (10 mg total) by mouth once daily. 30 tablet 0    VANCOMYCIN HCL (VANCOMYCIN IN 5 % DEXTROSE) 1.75 gram/500 mL Soln Inject 500 mLs (1,750 mg total) into the vein once daily.       Scheduled Meds:   carvedilol  12.5 mg Oral BID    ceFEPime (MAXIPIME) IVPB  1 g Intravenous Daily    collagenase   Topical Daily    epoetin donita (PROCRIT) injection  5,000 Units Intravenous Every Tues, Thurs, Sat    fluconazole  100 mg Oral Daily    gabapentin  100 mg Oral TID    heparin (porcine)  5,000 Units Subcutaneous Q12H    insulin aspart  1-10 Units Subcutaneous TIDWM    levothyroxine  50 mcg Oral Before breakfast    miconazole nitrate 2%   Topical (Top) BID    rosuvastatin  10 mg Oral Daily    sodium chloride 0.9%  3 mL Intravenous Q8H     Continuous Infusions:   lactated ringers 25 mL/hr (12/08/17 1545)     PRN Meds:.sodium chloride 0.9%, acetaminophen, albuterol sulfate, dextrose 50%, glucagon (human recombinant), glucose, glucose, heparin (porcine), labetalol, levetiracetam oral soln, magnesium oxide, magnesium oxide, ondansetron, potassium phosphate IVPB, simethicone, traMADol    Allergies:  Patient has no known allergies.    Vital Signs:  Temp Readings from Last 3 Encounters:   12/11/17 97.4 °F (36.3 °C) (Oral)   11/27/17 97 °F (36.1 °C) (Oral)   11/23/17 97.4 °F (36.3 °C)       Pulse Readings from Last 3 Encounters:   12/11/17 69   11/27/17 67   11/24/17 65       BP Readings from Last 3 Encounters:   12/11/17 (!) 167/83   11/27/17 123/67   11/24/17 (!) 108/58       Weight:  Wt Readings from Last 3 Encounters:   12/08/17  117.9 kg (260 lb)   11/27/17 117 kg (257 lb 15 oz)   11/22/17 106.6 kg (234 lb 15.8 oz)       Physical Exam:  Constitutional: nad, aao x 3  Heart: rrr, no m/r/g, no edema  Lungs: ctab, no w/r/r/c, no lb  Abdomen: s/nt/nd, +BS    Results:  Lab Results   Component Value Date     (L) 12/11/2017    K 5.3 (H) 12/11/2017    CL 97 12/11/2017    CO2 23 12/11/2017    BUN 40 (H) 12/11/2017    CREATININE 4.8 (H) 12/11/2017    CALCIUM 8.6 (L) 12/11/2017    ANIONGAP 9 12/11/2017    ESTGFRAFRICA 11 (A) 12/11/2017    EGFRNONAA 10 (A) 12/11/2017       Lab Results   Component Value Date    CALCIUM 8.6 (L) 12/11/2017    PHOS 3.8 12/11/2017         Recent Labs  Lab 12/11/17  0420   WBC 8.50   RBC 3.41*   HGB 8.0*   HCT 26.1*      MCV 77*   MCH 23.3*   MCHC 30.5*       I have personally reviewed pertinent radiological imaging and reports.    Amairani Young MD MPH  [facility]  [address] [place] [address]  [place] [id number]  [phone] (p)  [phone] (f)

## 2017-12-11 NOTE — PLAN OF CARE
12/10/17 2004   Patient Assessment/Suction   Level of Consciousness (AVPU) alert   Respiratory Effort Normal;Unlabored   Expansion/Accessory Muscles/Retractions no use of accessory muscles   All Lung Fields Breath Sounds clear;diminished   PRE-TX-O2-ETCO2   O2 Device (Oxygen Therapy) nasal cannula   Flow (L/min) 3   SpO2 98 %   Pulse 74   Resp 20   Aerosol Therapy   $ Aerosol Therapy Charges PRN treatment not required   Preset CPAP/BiPAP Settings   Mode Of Delivery Standby  (pt states she does not want to wear this tonight )   Pt assessed for PRN neb tx, none needed at this time.

## 2017-12-11 NOTE — NURSING
Pt complains of shortness of breath. Perform bladder scan, negative for urine. Attempted to use Bipap, pt refuses to wear. Abdomen appears distended. Lasix given during day with no urine output. Moved pt to recliner. O2 at 4 liters. /80, O2 sat 100%. Will continue to monitor.

## 2017-12-11 NOTE — DISCHARGE SUMMARY
Discharge Summary  Hospital Medicine    Admit Date: 11/27/2017    Date and Time: 12/11/20172:11 PM    Discharge Attending Physician: Reg Devine MD    Primary Care Physician: JOS Dumont    Diagnoses:  Active Hospital Problems    Diagnosis  POA    *Acute respiratory failure with hypoxia and hypercapnia [J96.01, J96.02]  Yes    Anasarca [R60.1]  Yes    Obesity [E66.9]  Yes    Subarachnoid hemorrhage following injury, no loss of consciousness [S06.6X0A]  Yes    Pulmonary embolus [I26.99]  Yes    Acute renal failure superimposed on stage 5 chronic kidney disease, not on chronic dialysis [N17.9, N18.5]  Yes    Traumatic subarachnoid hemorrhage [S06.6X9A]  Yes    Acquired hypothyroidism [E03.9]  Yes    CKD (chronic kidney disease) stage 3, GFR 30-59 ml/min [N18.3]  Yes    Coronary artery disease involving native coronary artery of native heart without angina pectoris [I25.10]  Yes     Chronic    Type 2 diabetes mellitus with stage 5 chronic kidney disease not on chronic dialysis, without long-term current use of insulin [E11.22, N18.5]  Yes    Acute on chronic congestive heart failure [I50.9]  Yes    Recurrent right pleural effusion [J90]  Yes      Resolved Hospital Problems    Diagnosis Date Resolved POA   No resolved problems to display.     Discharged Condition: Good    Hospital Course:   57 year old female with HTN, DM2, HF, CKD has returned from [facility]. Patient was originally admitted on 11-18-17 with left diabetic foot ulcer. She stated that over the past few days it had been associated with worsening pain, drainage, and a fever of up to 102 and for this she came for evaluation.  She stated she was compliant with her medications but only takes her basal insulin when she needs it per sliding scale. Patient was admitted to Hospitalist medicine service. Patient was evaluated by Dr. Wick and Dr. Turner. Patient was being treated with IV antibiotics, wound care provided and was expected to have  wound debridement of ankle. Patient had an episode of sudden onset of hypoxia and unresponsiveness. Patient vomited Marcelino sandwitch. Patient was expected to be NPO for podiatry procedure. Work up confirmed PE. Patient started on anticoagulation which was held previous day due to bleeding from IV site. On the morning of 11-24-17, patient while ambulated with her  lost balance and hit left forehead to the wall in the bathroom. Left forehead contusion noted. No LOC or any focal neurological complaints or HA or vision changes. CT head showed SAH. Patient transferred to [facility] neuro-critical care ICU and was evaluated by neuro-surgeon. SAH deemed stable. Patient needed one round of HD for worsening renal functons. Wound Cx grew MSSA treated with Maxipine and Vancomycin.  Patient under went IVC filter placement and received permanent HD catheter by Dr. Deal. Dr. Wick was able to perform ankle wound debridement. A wound VAC is being placed. Patient is deconditioned and has been accepted for inpatient rehab therapy. Dr. Mcfadden monitored from pulmonary medicine. Patient was also evaluated by neurology team for possible seizure for which anti-epileptic agent started. Patient to follow up soon with neurosurgery team at [facility] to discuss future long-term anticoagulation. Patient is receiving routine HD treatments. Patient was discharged inpatient rehab in stable condition with following discharge plan of care. Total time with the patient was 30 minutes and greater than 50% was spent in counseling and coordination of care. The assessment and plan have been discussed at length. Physicians' notes reviewed. Labs and procedure reviewed.     Consults: Dr. STERLING Palomo, Dr. Wick, Dr. Mcfadden, Dr. Ness, Dr. Mcfadden, Dr. Gil, Dr. Crystal, Dr. Turner    Significant Diagnostic Studies:   Left Calcaneum:   1.  No radiographically apparent acute osteomyelitis. Skin irregularity and bandage noted in the posterior aspect of the  ankle.  2. Degenerative changes in the ankle, hindfoot and midfoot are noted.     MRI left foot:  1. Retro-Achilles soft tissue wound. No evidence for osteomyelitis.  2. Mild nonspecific Achilles and posterior tibial tenosynovitis.  3. Small tibiotalar joint effusion.     Bilateral leg venous doppler scan: There are some limitations but no definite acute DVT in seen in either lower extremity.     Renal US: Unremarkable appearance of the kidneys.  No hydronephrosis.     CT head:  1. Abnormal study.  There is a left forehead and frontal scalp hematoma without underlying fracture.  Additionally, there is bilateral posttraumatic subarachnoid blood with hyperdense blood seen in right parietal sulci as well as in the left sylvian fissure and adjacent left frontal and parietal sulci.  There is no acute parenchymal hemorrhage.  2.  Remote right frontal lobe infarction with ex vacuo dilatation of the right frontal horn.  There is no obvious acute infarction.  3.  Atherosclerosis.        CT head:  1. There is no definite new abnormality.  There has been some resolution in redistribution of previously demonstrated posttraumatic subarachnoid blood.  This blood is now most apparent in the right parietal sulcus.  There is no parenchymal hemorrhage.  2.  There is no obvious acute infarction.  This would however be better evaluated with MRI.  3.  There are chronic infarctions in the superior right basal ganglia and corona radiata with ex vacuo dilatation of the right lateral ventricle.  There is also a chronic infarct in the posterior right cerebellum.  In retrospect, this was present on prior studies.  4.  Interval improvement in left forehead and inferior frontal scalp contusion.     CXR: Unchanged right lung airspace disease and pleural effusion. Component of CHF likely. Appropriate positioning of right IJ catheter.     CXR: Increasing right pleural effusion with infiltrate and atelectasis in the right lung. Central line ends at  the level of the right atrium. Mild cardiomegaly. Prior sternotomy. No.     CXR: Stable positioning of right IJ catheter.  Unchanged right pleural effusion and atelectasis/consolidation.     CXR: Bilateral infiltrates right more so than left differential for which includes CHF and pneumonia.  Perihilar infiltrates appearing slightly more prominent than on the prior exam     CXR: Improvement in the appearance of the chest compared to the prior exam with partial clearing of the right lung, and the left lung appearing clear    Microbiology Results (last 7 days)     Procedure Component Value Units Date/Time    Aerobic culture [[id number]] Collected:  12/08/17 1351    Order Status:  Completed Specimen:  Wound from Ankle, Left Updated:  12/11/17 0912     Aerobic Bacterial Culture No growth    Narrative:       Left ankle swab    AFB Culture & Smear [[id number]] Collected:  12/08/17 1351    Order Status:  Completed Specimen:  Wound from Ankle, Left Updated:  12/09/17 2127     AFB Culture & Smear Culture in progress    Narrative:       Left ankle swab    Gram stain [[id number]] Collected:  12/08/17 1351    Order Status:  Completed Specimen:  Wound from Ankle, Left Updated:  12/09/17 0211     Gram Stain Result Few WBC's      No organisms seen    Narrative:       Left ankle swab    Fungus culture [[id number]] Collected:  12/08/17 1351    Order Status:  Sent Specimen:  Wound from Ankle, Left Updated:  12/08/17 2015    Culture, Anaerobe [[id number]] Collected:  12/08/17 1351    Order Status:  Sent Specimen:  Wound from Ankle, Left Updated:  12/08/17 2015        Special Treatments/Procedures: None  Disposition: inpatient Rehab    Medications:  Reconciled Home Medications:   Current Discharge Medication List      CONTINUE these medications which have NOT CHANGED    Details   albuterol (ACCUNEB) 1.25 mg/3 mL Nebu Take 1.25 mg by nebulization every 6 (six) hours as needed. Rescue      carvedilol (COREG) 12.5 MG tablet Take 1 tablet (12.5  mg total) by mouth 2 (two) times daily.  Qty: 60 tablet, Refills: 11      cefepime in dextrose 5 % (MAXIPIME) 1 gram/50 mL PgBk Inject 50 mLs (1 g total) into the vein every 24 hours as needed.      gabapentin (NEURONTIN) 100 MG capsule Take 1 capsule (100 mg total) by mouth 3 (three) times daily.  Qty: 90 capsule, Refills: 11      levetiracetam oral soln 500 mg/5 mL (5 mL) Soln 2.5 mLs (250 mg total) by Per NG tube route 2 (two) times daily.  Qty: 150 mL, Refills: 11      levothyroxine (SYNTHROID) 50 MCG tablet Take 50 mcg by mouth once daily.        metronidazole (FLAGYL) 500 mg/100 mL IVPB Inject 100 mLs (500 mg total) into the vein every 8 (eight) hours.      rosuvastatin (CRESTOR) 10 MG tablet Take 1 tablet (10 mg total) by mouth once daily.  Qty: 30 tablet, Refills: 0      VANCOMYCIN HCL (VANCOMYCIN IN 5 % DEXTROSE) 1.75 gram/500 mL Soln Inject 500 mLs (1,750 mg total) into the vein once daily.         STOP taking these medications       aspirin (ECOTRIN) 81 MG EC tablet Comments:   Reason for Stopping:         citalopram (CELEXA) 20 MG tablet Comments:   Reason for Stopping:         furosemide (LASIX) 40 MG tablet Comments:   Reason for Stopping:         glimepiride (AMARYL) 4 MG tablet Comments:   Reason for Stopping:         insulin aspart protamine-insulin aspart (NOVOLOG 70/30) 100 unit/mL (70-30) InPn pen Comments:   Reason for Stopping:         lisinopril (PRINIVIL,ZESTRIL) 2.5 MG tablet Comments:   Reason for Stopping:             No discharge procedures on file.

## 2017-12-11 NOTE — PROGRESS NOTES
12/11/17 0752   Patient Assessment/Suction   Level of Consciousness (AVPU) alert   Respiratory Effort Normal;Unlabored   YULIET Breath Sounds clear   LLL Breath Sounds diminished   RUL Breath Sounds diminished   RML Breath Sounds diminished   RLL Breath Sounds diminished   PRE-TX-O2-ETCO2   O2 Device (Oxygen Therapy) BiPAP   $ Is the patient on Low Flow Oxygen? Yes   Oxygen Concentration (%) 30   SpO2 100 %   Pulse Oximetry Type Intermittent   $ Pulse Oximetry - Multiple Charge Pulse Oximetry - Multiple   Pulse 67   Resp (!) 24   Aerosol Therapy   $ Aerosol Therapy Charges PRN treatment not required   Preset CPAP/BiPAP Settings   Mode Of Delivery BiPAP   $ CPAP/BiPAP Daily Charge BiPAP/CPAP Daily   $ Is patient using? Yes   Equipment Type V60   Airway Device Type medium full face mask   Ipap 10   EPAP (cm H2O) 5   Pressure Support (cm H2O) 5   Set Rate (Breaths/Min) 12   ITime (sec) 1   Rise Time (sec) 3   Patient CPAP/BiPAP Settings   RR Total (Breaths/Min) 21   Tidal Volume (mL) 511   VE Minute Ventilation (L/min) 9.9 L/min   Peak Inspiratory Pressure (cm H2O) 10   TiTOT (%) 23   Total Leak (L/Min) 5   Patient Trigger - ST Mode Only (%) 99   CPAP/BiPAP Alarms   High Pressure (cm H2O) 15   Low Pressure (cm H2O) 5   Low Pressure Delay (Sec) 20   Minute Ventilation (L/Min) 2.5   High RR (breaths/min) 40   Low RR (breaths/min) 12   ALB q6prn, bipap qhs, resting well on bipap, no prn tx required this am.

## 2017-12-12 LAB
ANION GAP SERPL CALC-SCNC: 11 MMOL/L
ANISOCYTOSIS BLD QL SMEAR: ABNORMAL
BASOPHILS # BLD AUTO: 0 K/UL
BASOPHILS NFR BLD: 0.4 %
BUN SERPL-MCNC: 51 MG/DL
CALCIUM SERPL-MCNC: 8.3 MG/DL
CHLORIDE SERPL-SCNC: 97 MMOL/L
CO2 SERPL-SCNC: 21 MMOL/L
CREAT SERPL-MCNC: 5.8 MG/DL
DACRYOCYTES BLD QL SMEAR: ABNORMAL
DIFFERENTIAL METHOD: ABNORMAL
EOSINOPHIL # BLD AUTO: 0.2 K/UL
EOSINOPHIL NFR BLD: 1.9 %
ERYTHROCYTE [DISTWIDTH] IN BLOOD BY AUTOMATED COUNT: 22.4 %
EST. GFR  (AFRICAN AMERICAN): 9 ML/MIN/1.73 M^2
EST. GFR  (NON AFRICAN AMERICAN): 8 ML/MIN/1.73 M^2
GLUCOSE SERPL-MCNC: 176 MG/DL
HCT VFR BLD AUTO: 23.6 %
HGB BLD-MCNC: 7.6 G/DL
HYPOCHROMIA BLD QL SMEAR: ABNORMAL
LYMPHOCYTES # BLD AUTO: 0.9 K/UL
LYMPHOCYTES NFR BLD: 10.1 %
MAGNESIUM SERPL-MCNC: 2.5 MG/DL
MCH RBC QN AUTO: 24.7 PG
MCHC RBC AUTO-ENTMCNC: 31.9 G/DL
MCV RBC AUTO: 77 FL
MONOCYTES # BLD AUTO: 1 K/UL
MONOCYTES NFR BLD: 11.6 %
NEUTROPHILS # BLD AUTO: 6.7 K/UL
NEUTROPHILS NFR BLD: 76 %
OVALOCYTES BLD QL SMEAR: ABNORMAL
PHOSPHATE SERPL-MCNC: 4.1 MG/DL
PLATELET # BLD AUTO: 212 K/UL
PLATELET BLD QL SMEAR: ABNORMAL
PMV BLD AUTO: 9.2 FL
POCT GLUCOSE: 162 MG/DL (ref 70–110)
POCT GLUCOSE: 176 MG/DL (ref 70–110)
POCT GLUCOSE: 183 MG/DL (ref 70–110)
POCT GLUCOSE: 192 MG/DL (ref 70–110)
POCT GLUCOSE: 223 MG/DL (ref 70–110)
POIKILOCYTOSIS BLD QL SMEAR: SLIGHT
POLYCHROMASIA BLD QL SMEAR: ABNORMAL
POTASSIUM SERPL-SCNC: 5.8 MMOL/L
RBC # BLD AUTO: 3.06 M/UL
SODIUM SERPL-SCNC: 129 MMOL/L
VANCOMYCIN SERPL-MCNC: 26.6 UG/ML
WBC # BLD AUTO: 8.8 K/UL

## 2017-12-12 PROCEDURE — 80202 ASSAY OF VANCOMYCIN: CPT

## 2017-12-12 PROCEDURE — 36415 COLL VENOUS BLD VENIPUNCTURE: CPT

## 2017-12-12 PROCEDURE — 80100016 HC MAINTENANCE HEMODIALYSIS

## 2017-12-12 PROCEDURE — 80048 BASIC METABOLIC PNL TOTAL CA: CPT

## 2017-12-12 PROCEDURE — 27000221 HC OXYGEN, UP TO 24 HOURS

## 2017-12-12 PROCEDURE — 84100 ASSAY OF PHOSPHORUS: CPT

## 2017-12-12 PROCEDURE — 99900035 HC TECH TIME PER 15 MIN (STAT)

## 2017-12-12 PROCEDURE — 85025 COMPLETE CBC W/AUTO DIFF WBC: CPT

## 2017-12-12 PROCEDURE — 12800000 HC REHAB SEMI-PRIVATE ROOM

## 2017-12-12 PROCEDURE — 5A1D70Z PERFORMANCE OF URINARY FILTRATION, INTERMITTENT, LESS THAN 6 HOURS PER DAY: ICD-10-PCS | Performed by: INTERNAL MEDICINE

## 2017-12-12 PROCEDURE — 97166 OT EVAL MOD COMPLEX 45 MIN: CPT

## 2017-12-12 PROCEDURE — 25000003 PHARM REV CODE 250: Performed by: INTERNAL MEDICINE

## 2017-12-12 PROCEDURE — 92523 SPEECH SOUND LANG COMPREHEN: CPT

## 2017-12-12 PROCEDURE — 97530 THERAPEUTIC ACTIVITIES: CPT

## 2017-12-12 PROCEDURE — 63600175 PHARM REV CODE 636 W HCPCS: Performed by: INTERNAL MEDICINE

## 2017-12-12 PROCEDURE — 83735 ASSAY OF MAGNESIUM: CPT

## 2017-12-12 PROCEDURE — A4216 STERILE WATER/SALINE, 10 ML: HCPCS | Performed by: INTERNAL MEDICINE

## 2017-12-12 PROCEDURE — 94761 N-INVAS EAR/PLS OXIMETRY MLT: CPT

## 2017-12-12 RX ADMIN — GABAPENTIN 100 MG: 100 CAPSULE ORAL at 09:12

## 2017-12-12 RX ADMIN — CEPHALEXIN 500 MG: 500 CAPSULE ORAL at 09:12

## 2017-12-12 RX ADMIN — ACETAMINOPHEN 650 MG: 325 TABLET ORAL at 05:12

## 2017-12-12 RX ADMIN — HEPARIN SODIUM 5000 UNITS: 5000 INJECTION, SOLUTION INTRAVENOUS; SUBCUTANEOUS at 09:12

## 2017-12-12 RX ADMIN — INSULIN ASPART 2 UNITS: 100 INJECTION, SOLUTION INTRAVENOUS; SUBCUTANEOUS at 04:12

## 2017-12-12 RX ADMIN — EPOETIN ALFA 5000 UNITS: 20000 SOLUTION INTRAVENOUS; SUBCUTANEOUS at 03:12

## 2017-12-12 RX ADMIN — SODIUM CHLORIDE, PRESERVATIVE FREE 3 ML: 5 INJECTION INTRAVENOUS at 04:12

## 2017-12-12 RX ADMIN — CARVEDILOL 12.5 MG: 6.25 TABLET, FILM COATED ORAL at 09:12

## 2017-12-12 RX ADMIN — INSULIN ASPART 2 UNITS: 100 INJECTION, SOLUTION INTRAVENOUS; SUBCUTANEOUS at 12:12

## 2017-12-12 RX ADMIN — ROSUVASTATIN CALCIUM 10 MG: 5 TABLET ORAL at 09:12

## 2017-12-12 RX ADMIN — MICONAZOLE NITRATE: 20 OINTMENT TOPICAL at 09:12

## 2017-12-12 RX ADMIN — GABAPENTIN 100 MG: 100 CAPSULE ORAL at 05:12

## 2017-12-12 RX ADMIN — GABAPENTIN 100 MG: 100 CAPSULE ORAL at 02:12

## 2017-12-12 RX ADMIN — LEVOTHYROXINE SODIUM 50 MCG: 25 TABLET ORAL at 05:12

## 2017-12-12 RX ADMIN — COLLAGENASE SANTYL: 250 OINTMENT TOPICAL at 09:12

## 2017-12-12 RX ADMIN — SODIUM CHLORIDE, PRESERVATIVE FREE 3 ML: 5 INJECTION INTRAVENOUS at 11:12

## 2017-12-12 RX ADMIN — FLUCONAZOLE 100 MG: 100 TABLET ORAL at 09:12

## 2017-12-12 RX ADMIN — SODIUM CHLORIDE, PRESERVATIVE FREE 3 ML: 5 INJECTION INTRAVENOUS at 05:12

## 2017-12-12 NOTE — PROGRESS NOTES
Followed at [facility]  Underwent debridement of achilles wound on 12/8 with placement of wound vac. Cultures from that tissue were negative (previous positive with MSSA). Wound photos were of the wound with graft material over. It seems that there is less erythema surrounding the wound than previously.   No fever, WBC are normal.  She feels a little winded, anticpating bipap to resume this evening    Will convert to po antibiotics now and will follow up.

## 2017-12-12 NOTE — NURSING
Report received from OANH Nieto.  Patient arrived to unit via w/c per [facility] transport. Alert and oriented forgetful.   at bedside.  Wound vac in place and on continuous suction at 125mmHG, LLe also with ra wrap and walking boot on.  R TLC and R hemasplit in place.  Oxygen at 3L via NC on. Oriented to room.     reports patient had villegas cath a day or two ago but only had a very small amount of urine present in bag;  reports patient had bowel accident during transfer to rehab;    Spoke with OANH Vallecillo - Dialysis nurse (present on rehab unit); informed of patient admit verbalized being aware of patient's admit to rehab and informed me she spoke with Chloe in Dialysis.

## 2017-12-12 NOTE — PT/OT/SLP EVAL
"Speech Language Pathology  Evaluation    Juliane Ramos   MRN: [id number]   Admitting Diagnosis: Subarachnoid hemorrhage     Diet recommendations: Solid Diet Level: Regular  Liquid Diet Level: Thin     SLP Treatment Date: 12/13/17  Speech Start Time: 0930     Speech Stop Time: 1030     Speech Total (min): 60 min       TREATMENT BILLABLE MINUTES:  Eval 30  and Total Time 30    Diagnosis: Subarachnoid hemorrhage     Past Medical History:   Diagnosis Date    Anemia in stage 3 chronic kidney disease 11/26/2017    CHF (congestive heart failure)     COPD (chronic obstructive pulmonary disease)     Coronary artery disease     Diabetes mellitus     Encounter for blood transfusion     Essential hypertension 6/24/2017    Hypertension     MI (myocardial infarction) 2010    Segmental and subsegmental pulmonary emboli of RLL without acute cor pulmonale 11/25/2017    Stroke     July 2005    Thyroid disease     Traumatic subarachnoid hemorrhage 11/24/2017    Type 2 diabetes mellitus with stage 3 chronic kidney disease, without long-term current use of insulin 6/24/2017     Past Surgical History:   Procedure Laterality Date    ABCESS DRAINAGE Right     Between "little toe" and the one next to it    BREAST SURGERY      Reduction [id number]    CARDIAC SURGERY  01/2011    CABG 4vessel ring in one valve mitral valve prolapse    CORONARY ARTERY BYPASS GRAFT      hyperlipidemia      TUBAL LIGATION  03/1986       Has the patient been evaluated by SLP for swallowing? : Yes  Keep patient NPO?: No   General Precautions: Standard, fall    Current Respiratory Status: nasal cannula     Social Hx: Patient lives with      Occupational/hobbies/homemaking: Retired/disabled; high school education. Pt cares for young grandchildren during the week. At baseline, Pt is independent for medication management.  resumes responsibility for finances.     Subjective:  "I was getting speech therapy at the hospital"  Pt alert, " "pleasant and participatory   Patient goals: return home       Objective:   Patient found with: wound vac, oxygen      Cognitive Status:  Behavioral Observations: alert and appropriate-  Memory: Intact long-term, short-term and immediate memory. Recalled 5/5 unrelated items immediately following verbal presentation and 3/5 following 5 minute delay increasing to 5/5 with categorical cue. She recalled 12/18 details from 4 sentence paragraph and answered questions re:material with 100% acc.   Orientation: Orientedx4  Attention: Impaired with increasing complexity; adequate sustained and selective attention however, Pt with breakdown alternating attention tasks such as symbol trail and serial subtractions.   Problem Solving: Adequate judgement/safety; assessment of time, money, and pill management warranted   Pragmatics: WFL   Executive Function: Impaired; Pt with impaired performance with increasing complexity such as symbol trail, generative naming, and copying of 3D image tasks. She demonstrates fair self monitoring, acknowledgement of errors, and provides adequate solutions to correct.     Language: Intact; fluent and appropriate in connected speech     Auditory Comprehension: Intact; able to identify objects, answers yes/no questions and able to follow commands    Verbal Expression: Intact naming, automatic speech, repetition, fluency and conversational speech    Motor Speech: WFL; 100% intelligible     Voice: WFL     Reading: Intact; 100% acc across oral reading tasks     Writing: Impaired; reduced legibility however, Pt states this is consistent with baseline following previous CVA     Visual-Spatial: Impaired at baseline. Pt states, "I've had peripheral vision problems for awhile".     Additional Treatment:    MOCA: 26/30 indicating cognitive linguistic skills WNL  CLQT: Composite score of 3.2 indiciating mild cognitive impairment   Memory and language: WNL   Attention and visuospatial skills: Mild " impairment   Executive function: Moderately impaired   It should however, be taken into account that Pt's baseline vision impairment is believed to have significantly affected task performance across executive function and attention domains which utilize visuoperceptual stimuli. Therefore, further measures of Pt's attention and executive function skills will be completed.      Assessment:  Juliane Ramos is a 57 y.o. female with a medical diagnosis of Subarachnoid hemorrhage and presents with mild cognitive impairment characterized by deficits in attention and executive function as indicated by formal measures on CLQT and MOCA.      Discharge recommendations: Discharge Facility/Level Of Care Needs: home     Goals:    SLP Goals        Problem: SLP Goal    Goal Priority Disciplines Outcome   SLP Goal     SLP Ongoing (interventions implemented as appropriate)   Description:  Short term goals:  1. Pt will complete assessment of complex reasoning (math, time, money mgmt)        Long Term Goals:  1. Pt will demonstrate adequate cognitive function for safe and efficient function in home, social and medical environments.                      Plan:   Patient to be seen Therapy Frequency: 5 x/week   Plan of Care expires:    Plan of Care reviewed with: patient  SLP Follow-up?: Yes  SLP - Next Visit Date: 12/14/17           ST Kristi  12/13/2017

## 2017-12-12 NOTE — PLAN OF CARE
Problem: Patient Care Overview  Goal: Plan of Care Review  Outcome: Ongoing (interventions implemented as appropriate)  Patient max assist x 2 to turn or pull her up in the bed.  Makes frequent request to sit on side of bed and dangle feet, relates she was allowed to @ hospital.  Patient informed she is weak and tired, for her to be left sitting on side of the be could result in a fall.  Patient had one episode of non-responsiveness after she removed her bi-pap that contained her O2.  Became responsive after O2 nasal canula replaced and HOB elevated.

## 2017-12-12 NOTE — PROGRESS NOTES
12/12/17 0711   Patient Assessment/Suction   Level of Consciousness (AVPU) alert   Respiratory Effort Normal;Unlabored   All Lung Fields Breath Sounds diminished   PRE-TX-O2-ETCO2   O2 Device (Oxygen Therapy) nasal cannula   $ Is the patient on Low Flow Oxygen? Yes   Flow (L/min) 4   Oxygen Concentration (%) 32   SpO2 (!) 93 %   Pulse Oximetry Type Intermittent   $ Pulse Oximetry - Multiple Charge Pulse Oximetry - Multiple   Pulse 70   Resp 20   Aerosol Therapy   $ Aerosol Therapy Charges PRN treatment not required   Respiratory Treatment Status PRN treatment not required   Ready to Wean/Extubation Screen   FIO2<=50 (chart decimal) 0.32

## 2017-12-12 NOTE — PROGRESS NOTES
INPATIENT NEPHROLOGY PROGRESS NOTE  [facility] NEPHROLOGY    Patient Name: Juliane Ramos  Date: 12/12/2017    Reason for consultation: MC    Chief Complaint: Diabetic foot ulcer    History of Present Illness:  Ms. Ramos is a 56 y/o F with a hx of HTN, DM2, CHF, and stage III CKD who p/w a left heel ulcer/cellulitis x 2 weeks, nonhealing. Hospital course c/b PE, fall with SAH, and MC 2/2 contrast/vancomycin/infection requiring RRT. We have been consulted for MC. She remains dialysis dependent.    · Interval History/Subjective:    - transferred to rehab  - seen on HD, going for 3.5L UF  - reports dyspnea  - no cp, abd pain, worsening edema  - worked with OT, went well    · Review of Systems: All 14 systems reviewed and negative, except as noted in HPI    Plan of Care:    Assessment:  Oliguric MC 2/2 multifactorial ATN, dialysis dependent  Hyponatremia/Hyperkalemia  Anemia of CKD     Plan:     1. Acute Kidney Injury- She remains HD dependent. Continue HD TTS. No NSAIDs or IV contrast.      2. Volume/Blood Pressure- Parameters are stable.     3. Electrolytes/Acid Base- Continue UF and fluid restriction for hypoNa. Continue a 2K bath and a low K diet for hyperK.     4. Bone Mineral Metabolism- Parameters are stable.     5. Anemia of CKD- Hb dropped- will transfuse 2 units with next HD. Bump EPO to 10K.    Thank you for allowing us to participate in this patient's care. We will continue to follow.    Medications:  Current Facility-Administered Medications on File Prior to Encounter   Medication Dose Route Frequency Provider Last Rate Last Dose    [DISCONTINUED] 0.9%  NaCl infusion   Intravenous PRN Edson Crystal MD        [DISCONTINUED] acetaminophen tablet 650 mg  650 mg Oral Q6H PRN Luisana Valera NP   650 mg at 12/10/17 1814    [DISCONTINUED] albuterol sulfate nebulizer solution 2.5 mg  2.5 mg Nebulization Q6H PRN Reg Devine MD   2.5 mg at 12/11/17 0420    [DISCONTINUED] carvedilol tablet 12.5  mg  12.5 mg Oral BID Reg Devine MD   12.5 mg at 12/11/17 0846    [DISCONTINUED] cefepime in dextrose 5 % 1 gram/50 mL IVPB 1 g  1 g Intravenous Daily Pina Turner  mL/hr at 12/11/17 1144 1 g at 12/11/17 1144    [DISCONTINUED] collagenase ointment   Topical Daily Luisana Valera NP   Stopped at 12/08/17 0900    [DISCONTINUED] dextrose 50% injection 12.5 g  12.5 g Intravenous PRN Luisana Valera NP        [DISCONTINUED] epoetin donita (PROCRIT) injection 5,000 Units  5,000 Units Intravenous Every Tues, Thurs, Sat Amairani Young MD   5,000 Units at 12/09/17 1307    [DISCONTINUED] fluconazole tablet 100 mg  100 mg Oral Daily Pina Turner MD   100 mg at 12/11/17 0845    [DISCONTINUED] gabapentin capsule 100 mg  100 mg Oral TID Edson Crystal MD   100 mg at 12/11/17 1500    [DISCONTINUED] glucagon (human recombinant) injection 1 mg  1 mg Intramuscular PRN Luisana Valera NP        [DISCONTINUED] glucose chewable tablet 16 g  16 g Oral PRN Luisana Valera NP        [DISCONTINUED] glucose chewable tablet 24 g  24 g Oral PRN Luisana Valera NP        [DISCONTINUED] heparin (porcine) injection 4,000 Units  4,000 Units Intravenous PRN Elijah Gonzalez MD   4,000 Units at 12/09/17 1308    [DISCONTINUED] heparin (porcine) injection 5,000 Units  5,000 Units Subcutaneous Q12H Reg Devine MD   5,000 Units at 12/11/17 0845    [DISCONTINUED] insulin aspart pen 1-10 Units  1-10 Units Subcutaneous TIDWM Luisana Valera NP   2 Units at 12/11/17 1144    [DISCONTINUED] labetalol injection 10 mg  10 mg Intravenous Q6H PRN Luisana Valera NP        [DISCONTINUED] lactated ringers infusion  75 mL/hr Intravenous Continuous Rigoberto Lang MD 25 mL/hr at 12/08/17 1545 25 mL/hr at 12/08/17 1545    [DISCONTINUED] levetiracetam oral soln Soln 250 mg  250 mg Oral Daily PRN Edson Crystal MD        [DISCONTINUED] levothyroxine tablet 50 mcg  50 mcg Oral Before breakfast  Reg Devine MD   50 mcg at 12/11/17 0520    [DISCONTINUED] magnesium oxide tablet 800 mg  800 mg Oral PRN Luisana Valera NP        [DISCONTINUED] magnesium oxide tablet 800 mg  800 mg Oral PRN Luisana Valera NP        [DISCONTINUED] miconazole nitrate 2% ointment   Topical (Top) BID Reg Devine MD        [DISCONTINUED] ondansetron injection 4 mg  4 mg Intravenous Q6H PRN Luisana Valera NP   4 mg at 12/03/17 0904    [DISCONTINUED] potassium phosphate 15 mmol in dextrose 5 % 250 mL infusion  15 mmol Intravenous Daily PRN Luisana Valera NP        [DISCONTINUED] rosuvastatin tablet 10 mg  10 mg Oral Daily Reg Devine MD   10 mg at 12/11/17 0846    [DISCONTINUED] simethicone chewable tablet 80 mg  1 tablet Oral TID PRN Pina Turner MD        [DISCONTINUED] sodium chloride 0.9% flush 3 mL  3 mL Intravenous Q8H Luisana Valera NP   3 mL at 12/11/17 1500    [DISCONTINUED] traMADol tablet 50 mg  50 mg Oral Q4H PRN Edson Crystal MD         Current Outpatient Prescriptions on File Prior to Encounter   Medication Sig Dispense Refill    albuterol (ACCUNEB) 1.25 mg/3 mL Nebu Take 1.25 mg by nebulization every 6 (six) hours as needed. Rescue      carvedilol (COREG) 12.5 MG tablet Take 1 tablet (12.5 mg total) by mouth 2 (two) times daily. 60 tablet 11    cefepime in dextrose 5 % (MAXIPIME) 1 gram/50 mL PgBk Inject 50 mLs (1 g total) into the vein every 24 hours as needed.      gabapentin (NEURONTIN) 100 MG capsule Take 1 capsule (100 mg total) by mouth 3 (three) times daily. 90 capsule 11    levetiracetam oral soln 500 mg/5 mL (5 mL) Soln 2.5 mLs (250 mg total) by Per NG tube route 2 (two) times daily. 150 mL 11    levothyroxine (SYNTHROID) 50 MCG tablet Take 50 mcg by mouth once daily.        metronidazole (FLAGYL) 500 mg/100 mL IVPB Inject 100 mLs (500 mg total) into the vein every 8 (eight) hours.      rosuvastatin (CRESTOR) 10 MG tablet Take 1 tablet (10 mg total) by  mouth once daily. 30 tablet 0    VANCOMYCIN HCL (VANCOMYCIN IN 5 % DEXTROSE) 1.75 gram/500 mL Soln Inject 500 mLs (1,750 mg total) into the vein once daily.       Scheduled Meds:   carvedilol  12.5 mg Oral BID    cephALEXin  500 mg Oral Q12H    collagenase   Topical Daily    epoetin donita (PROCRIT) injection  5,000 Units Intravenous Every Tues, Thurs, Sat    fluconazole  100 mg Oral Daily    gabapentin  100 mg Oral TID    heparin (porcine)  5,000 Units Subcutaneous Q12H    insulin aspart  1-10 Units Subcutaneous TIDWM    levothyroxine  50 mcg Oral Before breakfast    miconazole nitrate 2%   Topical (Top) BID    rosuvastatin  10 mg Oral Daily    sodium chloride 0.9%  3 mL Intravenous Q8H     Continuous Infusions:   PRN Meds:.sodium chloride 0.9%, acetaminophen, albuterol sulfate, dextrose 50%, glucagon (human recombinant), glucose, glucose, heparin (porcine), labetalol, levetiracetam oral soln, magnesium oxide, magnesium oxide, ondansetron, potassium phosphate IVPB, simethicone, traMADol    Allergies:  Patient has no known allergies.    Vital Signs:  Temp Readings from Last 3 Encounters:   12/12/17 98 °F (36.7 °C) (Oral)   12/11/17 97.4 °F (36.3 °C) (Oral)   11/27/17 97 °F (36.1 °C) (Oral)       Pulse Readings from Last 3 Encounters:   12/12/17 65   12/11/17 69   11/27/17 67       BP Readings from Last 3 Encounters:   12/12/17 117/72   12/11/17 (!) 125/59   11/27/17 123/67       Weight:  Wt Readings from Last 3 Encounters:   12/11/17 114.4 kg (252 lb 1.6 oz)   12/08/17 117.9 kg (260 lb)   11/27/17 117 kg (257 lb 15 oz)       Physical Exam:  Constitutional: nad, aao x 3  Heart: rrr, no m/r/g, wwp  Lungs: ant ausc clear, no w/r/r/c, no lb  Abdomen: s/nt/nd, +BS    Results:  Lab Results   Component Value Date     (L) 12/12/2017    K 5.8 (H) 12/12/2017    CL 97 12/12/2017    CO2 21 (L) 12/12/2017    BUN 51 (H) 12/12/2017    CREATININE 5.8 (H) 12/12/2017    CALCIUM 8.3 (L) 12/12/2017    ANIONGAP 11  12/12/2017    ESTGFRAFRICA 9 (A) 12/12/2017    EGFRNONAA 8 (A) 12/12/2017       Lab Results   Component Value Date    CALCIUM 8.3 (L) 12/12/2017    PHOS 4.1 12/12/2017         Recent Labs  Lab 12/12/17  0508   WBC 8.80   RBC 3.06*   HGB 7.6*   HCT 23.6*      MCV 77*   MCH 24.7*   MCHC 31.9*       I have personally reviewed pertinent radiological imaging and reports.    Amairani Young MD MPH  [facility]  [address] [place] [id number]  [phone] (p)  [phone] (f)

## 2017-12-12 NOTE — NURSING
Patient pushed call light for assist, when aid arrived in room, the patient was non-responsive.  The aid called for the RN.  Patient was lying in bed, eyes fixed, noted lips with blue tint, the patient had removed her Bi-Pap that had her O2.  HOB elevated, O2 placed @ 3L, vital signs taken, POCT sugar checked.  The patient now responding, answering questions, color returned to lips.

## 2017-12-12 NOTE — PLAN OF CARE
12/12/17 0749   Final Note   Assessment Type Final Discharge Note   Discharge Disposition Rehab

## 2017-12-12 NOTE — NURSING
1015- Consult called to Nephrology, spoke w/ Dr Hannah Murray on call.    1020- Consult called to Podiatry, spoke w/ Deanna, who will notify dr Wick

## 2017-12-12 NOTE — PT/OT/SLP EVAL
"Occupational Therapy  Evaluation    Juliane Ramos   MRN: [id number]   Admitting Diagnosis: Subarachnoid hemorrhage    OT Date of Treatment: 12/12/17   OT Start Time: 1030  OT Stop Time: 1145  OT Total Time (min): 75 min    Billable Minutes:  Evaluation 60 and Therapeutic Activity 15  Total Minutes: 75    Diagnosis: Subarachnoid hemorrhage  Recent seizure    Past Medical History:   Diagnosis Date    Anemia in stage 3 chronic kidney disease 11/26/2017    CHF (congestive heart failure)     COPD (chronic obstructive pulmonary disease)     Coronary artery disease     Diabetes mellitus     Encounter for blood transfusion     Essential hypertension 6/24/2017    Hypertension     MI (myocardial infarction) 2010    Segmental and subsegmental pulmonary emboli of RLL without acute cor pulmonale 11/25/2017    Stroke     July 2005    Thyroid disease     Traumatic subarachnoid hemorrhage 11/24/2017    Type 2 diabetes mellitus with stage 3 chronic kidney disease, without long-term current use of insulin 6/24/2017      Past Surgical History:   Procedure Laterality Date    ABCESS DRAINAGE Right     Between "little toe" and the one next to it    BREAST SURGERY      Reduction [id number]    CARDIAC SURGERY  01/2011    CABG 4vessel ring in one valve mitral valve prolapse    CORONARY ARTERY BYPASS GRAFT      hyperlipidemia      TUBAL LIGATION  03/1986       Referring physician: Grover  Date referred to OT: 12/12/17    General Precautions: Standard, fall  Orthopedic Precautions: LLE non weight bearing  Braces:  (CAM boot for L LE)    Do you have any cultural, spiritual, Temple conflicts, given your current situation?: None     Patient History:  Living Environment  Lives With: spouse  Living Arrangements: mobile home  Home Accessibility: stairs to enter home  Home Layout: Able to live on 1st floor  Number of Stairs to Enter Home: 4  Stair Railings at Home: outside, present at both sides  Transportation Available: family or " friend will provide  Living Environment Comment: Pt lives with spouse in [place] in mobile home with 4 JEFF, bennie HR, step over tub/shower combo with shower chair. Pt reports that prior to a few wks ago, she was (I) to Mod (I) with ADL with use of DME. Pt reports home health was seeing pt for wound care; however, recently discharged her. Her  is on disability and at home 24/7 to care for her.   Equipment Currently Used at Home: bedside commode, oxygen, shower chair, rollator    Prior level of function:   Bed Mobility/Transfers: needs device  Grooming: independent  Bathing: needs device (shower chair)  Upper Body Dressing: independent  Lower Body Dressing: independent  Toileting: independent  Homemaking Responsibilities:  (Yes - shares with spouse)  Driving License: No  Mode of Transportation: Family  Occupation: On disability     Dominant hand: left    Subjective:  Communicated with nurse prior to session.    Chief Complaint: Weakness  Patient/Family stated goals: To get stronger and be able to take care of herself the way she was able to before    Pain/Comfort  Pain Rating 1: 0/10  Pain Rating Post-Intervention 1: 0/10    Objective:  Patient found with: blood pressure cuff, oxygen, peripheral IV, wound vac, PICC line    Cognitive Exam:  Oriented to: Person, Place and Situation  Follows Commands/attention: Follows one-step commands  Communication: clear/fluent  Memory:  See ST eval  Safety awareness/insight to disability: impaired  Coping skills/emotional control: Appropriate to situation    Visual/perceptual:  Pt with cataracts of L eye and deficits of R eye    Physical Exam:  Postural examination/scapula alignment:    -       Rounded shoulders  -       Forward head  Skin integrity: red rash covering body; increased redness of rafiq area and under skin folds   Edema: Mild generalized    Sensation:   Bilateral UE light touch intact    Upper Extremity Range of Motion:  Right Upper Extremity: WFL  Left Upper  Extremity: WFL    Upper Extremity Strength:  Right Upper Extremity: 3+/5  Left Upper Extremity: 3 to 3+/5 neglect of L UE despite functional ability   Strength: 3+/5 bilaterally despite patient holding L UE in guarded position    Fine motor coordination:      -       Impaired  Bilaterally- serial opposition    Gross motor coordination: WFL    Functional Mobility:  Bed Mobility:   Supine to sit: Maximum Assistance   Sit to supine: Maximum Assistance    Transfers:   N/A 2* to safety    Feeding:  Set-up with bed in chair position    Grooming:  Set-up with bed in chair position    Bathing:  Patient performed bathing with Maximum Assistance with set up of items at Bed level.    UE Dressing:  Patient performed UE Dressing with Stand-by Assistance with   at Edge of bed.    LE Dressing:  Patient don/doffed socks with Total Assistance, Patient performed don/doffed adult brief with Total Assistance and Patient performed don/doffed pants with Total Assistance - pt able to roll R/L with Min (A)/SBA to receive (A) managing clothing over hips    Toileting:  Pt performed toileting with Total Assistance with   at Bed level.    Balance:   Static Sit: MIn (A) to SBA  Dynamic Sit:  Mod (A)     Additional Treatment:  OTR providing education regarding OT role/POC, Inpatient Rehab Therapy Services and schedule as well as safety awareness, use of bed/wheelchair alarms, calling for assistance.  OTR providing education regarding correct transfer sequence and techniques with proper hand placement, use of DME/AE, adaptive techniques how to increase [place] with self-care tasks    Patient left HOB elevated with all lines intact, call button in reach, bed alarm on and nurse notified    Assessment:  Juliane Ramos is a 57 y.o. female with a medical diagnosis of Subarachnoid hemorrhage with recent seizure and presents with decreased independence with ADL, functional mobility, and safety. Patient initially admitted for  nonhealing/infected diabetic ulcer - treated with antibiotics and scheduled for debridement; however, on the day of surgery, pt became ill, developing resp failure, PE of R LL and the following day had a fall on the way to the bathroom, thus resulting in hemorrhage. Prior to admit, pt independent to Mod (I) with ADL. Patient should benefit from skilled OT services to increase functional independence, mobility, strength, endurance, and safety.     Rehab potential is good    Activity tolerance: good    Discharge recommendations: home     Equipment recommendations:  (TBD)     GOALS:    Occupational Therapy Goals        Problem: Occupational Therapy Goal    Goal Priority Disciplines Outcome Interventions   Occupational Therapy Goal     OT, PT/OT     Description:  Goals to be met by: 12/29/17     Patient will increase functional independence with ADLs by performing:    Feeding with [place].  UE Dressing with [place].  LE Dressing with Supervision.  Grooming while seated with [place].  Toileting from toilet with bedside commode positioned over Minimal Assistance for hygiene and clothing management.   Bathing from shower chair/bench with Minimal Assistance with DME/AE as needed.  Shower transfer with Minimal Assistance with DME/AE as needed .  Toilet transfer to toilet with Minimal Assistance with DME as needed.                      PLAN: Patient to be seen 6 x/week to address the above listed problems via self-care/home management, community/work re-entry, therapeutic activities, therapeutic exercises, therapeutic groups, wheelchair management/training  Plan of Care expires: 12/29/17  Plan of Care reviewed with: patient    Chloe SCOTT Guerrier LOTR  12/12/2017

## 2017-12-12 NOTE — PLAN OF CARE
Around 2000 pt was placed on bipap with previous settings and 3LPM O2 running in device. Pt was 97%. Upon returning to rehab pt's RN stated the pt had a brief moment of unresponsiveness, color change, mental status change while pt had taken off her Bipap mask and laying flat on her back. RN then placed pt's 3NC back on her and pt seemed to come around after getting her set up in the bed and checking her vitals. At this time pt is not on her Bipap, but is currently wearing her Nc3.

## 2017-12-13 PROBLEM — T07.XXXA WOUNDS, MULTIPLE: Status: ACTIVE | Noted: 2017-12-13

## 2017-12-13 LAB
ABO + RH BLD: NORMAL
ANION GAP SERPL CALC-SCNC: 11 MMOL/L
BASOPHILS # BLD AUTO: 0 K/UL
BASOPHILS NFR BLD: 0.3 %
BLD GP AB SCN CELLS X3 SERPL QL: NORMAL
BUN SERPL-MCNC: 37 MG/DL
CALCIUM SERPL-MCNC: 8.4 MG/DL
CHLORIDE SERPL-SCNC: 99 MMOL/L
CO2 SERPL-SCNC: 21 MMOL/L
CREAT SERPL-MCNC: 4.9 MG/DL
DIFFERENTIAL METHOD: ABNORMAL
EOSINOPHIL # BLD AUTO: 0.3 K/UL
EOSINOPHIL NFR BLD: 4 %
ERYTHROCYTE [DISTWIDTH] IN BLOOD BY AUTOMATED COUNT: 22.6 %
EST. GFR  (AFRICAN AMERICAN): 11 ML/MIN/1.73 M^2
EST. GFR  (NON AFRICAN AMERICAN): 9 ML/MIN/1.73 M^2
GLUCOSE SERPL-MCNC: 147 MG/DL
HCT VFR BLD AUTO: 23.4 %
HGB BLD-MCNC: 7.4 G/DL
LYMPHOCYTES # BLD AUTO: 0.7 K/UL
LYMPHOCYTES NFR BLD: 10.2 %
MAGNESIUM SERPL-MCNC: 2.2 MG/DL
MCH RBC QN AUTO: 24.7 PG
MCHC RBC AUTO-ENTMCNC: 31.8 G/DL
MCV RBC AUTO: 78 FL
MONOCYTES # BLD AUTO: 1 K/UL
MONOCYTES NFR BLD: 13.5 %
NEUTROPHILS # BLD AUTO: 5.2 K/UL
NEUTROPHILS NFR BLD: 72 %
PHOSPHATE SERPL-MCNC: 4.1 MG/DL
PLATELET # BLD AUTO: 195 K/UL
PMV BLD AUTO: 8.4 FL
POCT GLUCOSE: 165 MG/DL (ref 70–110)
POCT GLUCOSE: 179 MG/DL (ref 70–110)
POCT GLUCOSE: 199 MG/DL (ref 70–110)
POCT GLUCOSE: 204 MG/DL (ref 70–110)
POTASSIUM SERPL-SCNC: 4.7 MMOL/L
RBC # BLD AUTO: 3.01 M/UL
SODIUM SERPL-SCNC: 131 MMOL/L
WBC # BLD AUTO: 7.2 K/UL

## 2017-12-13 PROCEDURE — 12800000 HC REHAB SEMI-PRIVATE ROOM

## 2017-12-13 PROCEDURE — 85025 COMPLETE CBC W/AUTO DIFF WBC: CPT

## 2017-12-13 PROCEDURE — 86920 COMPATIBILITY TEST SPIN: CPT

## 2017-12-13 PROCEDURE — 84100 ASSAY OF PHOSPHORUS: CPT

## 2017-12-13 PROCEDURE — 97162 PT EVAL MOD COMPLEX 30 MIN: CPT

## 2017-12-13 PROCEDURE — 80048 BASIC METABOLIC PNL TOTAL CA: CPT

## 2017-12-13 PROCEDURE — 94761 N-INVAS EAR/PLS OXIMETRY MLT: CPT

## 2017-12-13 PROCEDURE — 97535 SELF CARE MNGMENT TRAINING: CPT

## 2017-12-13 PROCEDURE — A4216 STERILE WATER/SALINE, 10 ML: HCPCS | Performed by: INTERNAL MEDICINE

## 2017-12-13 PROCEDURE — 27000221 HC OXYGEN, UP TO 24 HOURS

## 2017-12-13 PROCEDURE — 97110 THERAPEUTIC EXERCISES: CPT

## 2017-12-13 PROCEDURE — 97802 MEDICAL NUTRITION INDIV IN: CPT

## 2017-12-13 PROCEDURE — 25000003 PHARM REV CODE 250: Performed by: INTERNAL MEDICINE

## 2017-12-13 PROCEDURE — 92507 TX SP LANG VOICE COMM INDIV: CPT

## 2017-12-13 PROCEDURE — 92523 SPEECH SOUND LANG COMPREHEN: CPT

## 2017-12-13 PROCEDURE — 99900035 HC TECH TIME PER 15 MIN (STAT)

## 2017-12-13 PROCEDURE — 86901 BLOOD TYPING SEROLOGIC RH(D): CPT

## 2017-12-13 PROCEDURE — 83735 ASSAY OF MAGNESIUM: CPT

## 2017-12-13 PROCEDURE — 63600175 PHARM REV CODE 636 W HCPCS: Performed by: INTERNAL MEDICINE

## 2017-12-13 PROCEDURE — 97530 THERAPEUTIC ACTIVITIES: CPT

## 2017-12-13 RX ADMIN — INSULIN ASPART 2 UNITS: 100 INJECTION, SOLUTION INTRAVENOUS; SUBCUTANEOUS at 12:12

## 2017-12-13 RX ADMIN — INSULIN ASPART 2 UNITS: 100 INJECTION, SOLUTION INTRAVENOUS; SUBCUTANEOUS at 06:12

## 2017-12-13 RX ADMIN — CEPHALEXIN 500 MG: 500 CAPSULE ORAL at 09:12

## 2017-12-13 RX ADMIN — SODIUM CHLORIDE, PRESERVATIVE FREE 3 ML: 5 INJECTION INTRAVENOUS at 01:12

## 2017-12-13 RX ADMIN — FLUCONAZOLE 100 MG: 100 TABLET ORAL at 09:12

## 2017-12-13 RX ADMIN — GABAPENTIN 100 MG: 100 CAPSULE ORAL at 01:12

## 2017-12-13 RX ADMIN — HEPARIN SODIUM 5000 UNITS: 5000 INJECTION, SOLUTION INTRAVENOUS; SUBCUTANEOUS at 09:12

## 2017-12-13 RX ADMIN — ROSUVASTATIN CALCIUM 10 MG: 5 TABLET ORAL at 09:12

## 2017-12-13 RX ADMIN — SODIUM CHLORIDE, PRESERVATIVE FREE 3 ML: 5 INJECTION INTRAVENOUS at 09:12

## 2017-12-13 RX ADMIN — LEVOTHYROXINE SODIUM 50 MCG: 25 TABLET ORAL at 06:12

## 2017-12-13 RX ADMIN — MICONAZOLE NITRATE: 20 OINTMENT TOPICAL at 09:12

## 2017-12-13 RX ADMIN — GABAPENTIN 100 MG: 100 CAPSULE ORAL at 06:12

## 2017-12-13 RX ADMIN — CARVEDILOL 12.5 MG: 6.25 TABLET, FILM COATED ORAL at 09:12

## 2017-12-13 RX ADMIN — GABAPENTIN 100 MG: 100 CAPSULE ORAL at 09:12

## 2017-12-13 RX ADMIN — SODIUM CHLORIDE, PRESERVATIVE FREE 3 ML: 5 INJECTION INTRAVENOUS at 06:12

## 2017-12-13 NOTE — CONSULTS
"Kathie Lay RN Wound Ostomy Addendum Wound Care  Consults    Related encounter: Admission (Discharged) from 11/27/2017 in [facility]    Consult Orders:   Inpatient consult to Wound Care [[id number]] ordered by Dennis Wick DPM at 12/08/17 1334           Consult to Wound Care for Wound Vac Application to left achilles tendon s/p debridement of ulcer with necrotic tissue. Pictures taken. Assisted by floor nurseEmilia.  present.      Left leg achilles tendon area - After reading Op Notes by Dr. Wick, I spoke with Dr. Wick to verify the dermal cellular matrix was to remain in place on wound base. Verified "yes". It supports cellular reproduction and revascularization of tissue and it is sewn in place. Pictures taken of wound with matrix in place. Wound base approximately 8 cm X 4 cm, with serosanguinous drainage. Black sutures noted at wound edges. No odor. Kerlix removed was saturated with serosanguinous drainage. Elvi-wound area intact. No pain during dressing change.      Recommend: Left Achilles Tendon - Clean elvi-wound area with wound cleanser. Protect elvi-wound area with Sting Free Barrier Wipe then  with strips of drape. Place Vaseline Gauze over Matrix in wound bed. Fill wound space with black foam. Make a "Bridge" to patient's shin, protecting elvi-wound with drape. Cut hole in black foam at end of "Bridge" and place TracPad over the hole. Set wound vac to 125mm Hg, continuous suction. Change M/W/F.     Place gauze padding under tubing over patient's shin, wrap with Kerlix and secure all with Stretch Gauze.      Kathie Lay RN, CWOCN     Left Achilles Tendon                   Revision History                                "

## 2017-12-13 NOTE — H&P
ATTENDING:  Rishi Ness M.D.    REFERRING FACILITY:  [facility].    CHIEF COMPLAINT:  Unable to walk.    HISTORY OF PRESENT ILLNESS:  Mrs. Ramos is a pleasant 57-year-old female who   was initially admitted to [facility] on 11/27/2017.  The patient with   diagnosis of diabetic foot ulcer as well as history of recent pulmonary emboli   and fall and contusion to forehead, patient with diagnosis of subarachnoid   hemorrhage that was evaluated at [facility] and found to be stable with   no need for surgical intervention.  Additionally, the patient with wound culture   positive for MSSA and need for debridement.  The patient is status post   excisional debridement of the stage III ulcer and excision of the Achilles   tendon and application of the PriMatrix to left posterior ankle and subsequent   application of a wound VAC.  As the patient's medical status became stable, I   had the opportunity to evaluate the patient and deemed appropriate for admission   to Intensive inhouse rehabilitation prior to discharge to home to the care of   the family.    PAST MEDICAL HISTORY:  Significant for anemia, chronic kidney disease stage III,   dialysis dependent, CHF, COPD, coronary artery disease, diabetes, hypertension,   MI, CVA on July 2005, hypothyroidism, traumatic subarachnoid hemorrhage and   type 2 diabetes with stage III kidney disease.    PAST SURGICAL HISTORY:  Significant for abscess drainage, breast   surgery/reduction in 1995.  Cardiac surgery, CABG four-vessel, and tubal   ligation.    ALLERGIES:  No known drug allergies.    MEDICATIONS:  Carvedilol 12.5 mg 1 p.o. b.i.d., Keflex 500 mg 1 p.o. q. 12   hours, collagenase ointment topical daily.  Procrit 5000 units IV on Tuesdays,   Thursdays and Saturdays.  Fluconazole 100 mg 1 p.o. daily, gabapentin 100 mg 1   p.o. t.i.d., heparin 5000 units subQ q. 12 hours.  Insulin aspartate, sliding   scale t.i.d., levothyroxine 50 mcg 1 p.o.  before breakfast, atorvastatin 10 mg 1   p.o. daily.    LABORATORIES:  CBC on 12/12/2017 includes a white blood cell of 8.8, hemoglobin   7.6, hematocrit 23.6 and platelet of 212.  Basic metabolic panel on 12/12/2017   includes sodium of 129, potassium 5.8, chloride 97, CO2 of 21, glucose 176, BUN   51, creatinine 5.8, calcium 8.3.  On 12/12/2017, magnesium is 2.5 and phosphorus   is 4.1.  Vancomycin random is 26.6.    PHYSICAL EXAMINATION:  VITAL SIGNS:  Temperature is 97.7, pulse is 67, respiration is 20, systolic   blood pressure is 135, diastolic blood pressure is 85 and pulse ox is 100%.  GENERAL:  Calm, cooperative, in no acute distress.  HEENT:  Normocephalic, atraumatic.  Extraocular muscles are intact.  Sclerae is   nonicteric.  NECK:  Supple.  Throat is clear.  LUNGS:  Clear to auscultation bilateral.  HEART:  Regular rate and rhythm, no gallops noted.  ABDOMEN:  Soft, nontender, nondistended, obese, positive bowel sounds.  EXTREMITIES:  No clubbing, cyanosis or edema is noted.  The patient has Cam   walker involving her left foot/foreleg.  The patient also had postop dressing   and wound VAC, which was not assessed.  MANUAL MUSCLE STRENGTH:  Bilateral upper extremities are 3/5, right lower   extremity is 3-/5, left lower extremity straight leg raising is 1+/5.  GAIT:  Deferred.  GENITAL:  Deferred.  RECTAL:  Deferred.    ASSESSMENT:  1.  History of traumatic subarachnoid hemorrhage.  2.  History of recent seizure.  3.  History of pulmonary embolism.  4.  History of renal insufficiency and dialysis dependent.  5.  Status post debridement for diabetic foot ulcers and application of a wound   VAC.  6.  History of hypertension.  7.  History of neuropathy.  8.  History of hyperthyroidism.  9.  History of hypercholesterolemia.    PLAN:  PT/OT eval, treat as indicated.  Social Service to arrange for   post-discharge planning.  Rehab to nursing with emphasis on bowel and bladder   management, skin care and fall  precautions.    PROGNOSIS:  Good.    ESTIMATED LENGTH OF STAY:  Approximately 2 to 3 weeks.  Additionally, we will   have Speech to do cognitive evaluation.      AV/HN  dd: 12/12/2017 10:10:51 (CST)  td: 12/12/2017 19:25:21 (CST)  Doc ID   #[id number]  Job ID #[id number]    CC:

## 2017-12-13 NOTE — PLAN OF CARE
Problem: Physical Therapy Goal  Goal: Physical Therapy Goal  Goals to be met by: 2018     Patient will increase functional independence with mobility by performin). Supine to sit with Modified [place]  2). Sit to supine with Modified [place]  3). Sit to stand transfer with Stand-by Assistance  4). Bed to chair transfer with Stand-by Assistance   5). Wheelchair propulsion x > 250 feet with Modified [place]  6). Stand for  > 3 minutes with Stand-by Assistance NWB LLE    Outcome: Ongoing (interventions implemented as appropriate)  Initial evaluation completed. POC initiated with patient.

## 2017-12-13 NOTE — PT/OT/SLP EVAL
"PhysicalTherapy   Evaluation    Juliane Ramos   MRN: [id number]     PT Received On: 12/13/17  PT Start Time: 0805   1:50  PT Stop Time: 0830 2:20    PT Total Time (min): 375 min       Billable Minutes:  Evaluation 50  Total Minutes: 50    Diagnosis: <principal problem not specified>  Past Medical History:   Diagnosis Date    Anemia in stage 3 chronic kidney disease 11/26/2017    CHF (congestive heart failure)     COPD (chronic obstructive pulmonary disease)     Coronary artery disease     Diabetes mellitus     Encounter for blood transfusion     Essential hypertension 6/24/2017    Hypertension     MI (myocardial infarction) 2010    Segmental and subsegmental pulmonary emboli of RLL without acute cor pulmonale 11/25/2017    Stroke     July 2005    Thyroid disease     Traumatic subarachnoid hemorrhage 11/24/2017    Type 2 diabetes mellitus with stage 3 chronic kidney disease, without long-term current use of insulin 6/24/2017      Past Surgical History:   Procedure Laterality Date    ABCESS DRAINAGE Right     Between "little toe" and the one next to it    BREAST SURGERY      Reduction [id number]    CARDIAC SURGERY  01/2011    CABG 4vessel ring in one valve mitral valve prolapse    CORONARY ARTERY BYPASS GRAFT      hyperlipidemia      TUBAL LIGATION  03/1986       Referring physician: Grover  Date referred to PT: 12/12/2017    General Precautions: Standard, fall  Orthopedic Precautions: LLE non weight bearing   Braces:  (Left ankle/foot in CAM walker)      Patient History:  Lives With: spouse  Living Arrangements: mobile home  Home Layout: Able to live on 1st floor  Number of Stairs to Enter Home: 4  Stair Railings at Home: outside, present at both sides  Equipment Currently Used at Home: rollator, raised toilet      Previous Level of Function:  Ambulation Skills: needs device  Transfer Skills: needs device  ADL Skills: needs device    Subjective:  Communicated with RN prior to " session.    Pain/Comfort  Pain Rating 1: 0/10  Pain Rating Post-Intervention 1: 0/10    Objective:  Patient found supine in bed, in NAD.  Patient found with: oxygen, wound vac    Cognitive Exam: see ST eval    Upper Extremity Range of Motion:  Refer to OT evaluation for UE ROM.    Upper Extremity Strength:  Refer to OT evaluation for UE strength.    Lower Extremity Range of Motion:  Right Lower Extremity: WFL  Left Lower Extremity: WFL except ankle/foot N/T     Lower Extremity Strength:  Right Lower Extremity: ANKLE 3/5; HIP abd 3/5, flex 3+/5; KNEE exten 4/5  Left Lower Extremity: ANKLE N/T; HIP abd 3-/5, flex 3-/5; KNEE exten 4-/5     Functional Mobility:    Bed Mobility :   Supine to sit: Moderate Assistance   Sit to supine: Minimal Assistance   Rolling: Minimal Assistance     Transfers:  Sit to stand:Moderate Assistance with Grab bars  x 2  Bed <> Chair:  Squat Pivot with Moderate Assistance with Grab bars      Wheelchair: 40' with min assist and cues    Gait: N/T due to NWB LLE, weakness      Patient left supine with all lines intact, call button in reach, bed alarm on and O2 to wall 3 L/min via nc.    Assessment:  Juliane Ramos is a 57 y.o. female.    Rehab potential is fair.    Activity tolerance: Fair    Plan: plan care intiated, bed mobility initiated, transfer training iniated, gait training, balance training, strengthening, neuromuscular re-education and wheelchair management/propulsion    GOALS:    Physical Therapy Goals        Problem: Physical Therapy Goal    Goal Priority Disciplines Outcome Goal Variances Interventions   Physical Therapy Goal     PT/OT, PT      Description:  Goals to be met by: 2018     Patient will increase functional independence with mobility by performin). Supine to sit with Modified [place]  2). Sit to supine with Modified [place]  3). Sit to stand transfer with Stand-by Assistance  4). Bed to chair transfer with Stand-by Assistance   5). Wheelchair  propulsion x > 250 feet with Modified [place]  6). Stand for  > 3 minutes with Stand-by Assistance NWB LLE                      PLAN:    Patient to be seen daily to address the above listed problems via gait training, therapeutic activities, therapeutic exercises, neuromuscular re-education, wheelchair management/training  Plan of Care expires:    Plan of Care reviewed with: patient          Albert VAZQUEZ Benedicto, PT 12/13/2017

## 2017-12-13 NOTE — PLAN OF CARE
12/12/17 2220   Patient Assessment/Suction   Level of Consciousness (AVPU) responds to voice   Respiratory Effort Unlabored   Expansion/Accessory Muscles/Retractions expansion symmetric   All Lung Fields Breath Sounds diminished   Rhythm/Pattern, Respiratory pattern regular   Cough Type none   Is this patient in SNF or Inpatient Rehab? Yes   Therapy Start Time 2220   Therapy End Time 2225   Therapy Total Time (in minutes) 5   PRE-TX-O2-ETCO2   O2 Device (Oxygen Therapy) nasal cannula   Flow (L/min) 3   SpO2 100 %   Pulse Oximetry Type Intermittent   Aerosol Therapy   $ Aerosol Therapy Charges PRN treatment not required   Respiratory Treatment Status PRN treatment not required   Preset CPAP/BiPAP Settings   Mode Of Delivery Standby  (pt pulls bipap off, left bipap on s/b, pt stable on 3lpm nc)

## 2017-12-13 NOTE — CONSULTS
"Consult to Wound Care for wound vac dressing change to left achilles tendon. Assisted by floor nurse, Bettie. Pictures taken. Dr. Ness present at beginning of dressing and was able to visualize the wound.    Bettie will be changing the next wound vac dressing, as I will not be in the facility. She took pictures at different stages of the dressing change for later reference.    Left Achilles Tendon - Removed dressing, using wound cleanser to loosen from the wound bed. No drainage noted in the cannister. Gently cleaned with wound cleanser. Dried. Protected the rafiq-wound area up to the wound edges with drape. Stiches could be seen extending out from under the dermal cellular matrix that is sutured to the wound base. Covered the wound base with Vaseline Gauze, then black foam. Made a "Bridge" to the patient's shin with drape and black foam. Covered all with drape. Cut a small hole in the drape at end of the "Bridge" and placed TracPad over hole. Connected the tubing. Set wound vac to 125Hg mm, continuous suction.      Recommend: Remove dressing, using NS or wound cleanser if dressing is sticking. Clean all with wound cleanser. Protect rafiq-wound area using Drape, applying up to the wound edges. Cover wound base with Vaseline Gauze, then cover with black foam. Make a "Bridge" to patient's shin with black foam. Cover all with drape. Cut a small hole in the drape at the end of the "Bridge", place TracPad over hole. Set wound vac to 125hg mm, continuous suction. Change M/W/F.    Please note; there is a dermal cellular matrix sutured to the base of the wound that must not be removed.      Kathie Lay RN, CWOCN    Left Achilles Tendon            "

## 2017-12-13 NOTE — H&P
PHYSICAL MEDICINE AND REHABILITATION POST ADMISSION EVALUATION    ATTENDING PHYSICIAN:  Dr. Ness    DATE OF ADMISSION:  2017.    PHYSICIAN ADMISSION UPDATE AND COMMENTS REGARDING PREADMISSION SCREENING STATUS:    There are no substantial changes between the preadmission assessment and the   patient's current status.    APPROPRIATENESS FOR ADMISSION TO INPATIENT REHABILITATION FACILITY:  The   patient's condition is sufficiently stable to allow active participation in   intensive inpatient rehabilitation program.  The patient would benefit from   inpatient rehabilitation due to the followin.  Three hours of therapy at least 5 days per week.    PLAN FOR COMMUNITY DISCHARGE:  The patient has good family support.  The patient   is motivated.  The patient is willing to participate.  The patient has good   premorbid functional status and there is a need for interdisciplinary inpatient   rehabilitation provided by a physician with rehab focused nursing and need for   PT, OT and speech.    REHAB DIAGNOSIS:  Subarachnoid hemorrhage.    IMPAIRMENTS AND DISABILITIES:  Inability to ambulate safely and inability to   manage self ADLs.    REHABILITATION PRECAUTIONS AND RESTRICTIONS:  Fall.    POSSIBLE BARRIERS TO PROGRESS AND DISCHARGE:  The patient's progress may be   impaired by the followin.  Cognition.  2.  Obesity.  3.  Wound and management of the wound VAC.    ACTIVE PROBLEM LIST AND POTENTIAL COMPLICATION FROM PATIENT'S MEDICAL CONDITION   AND PLAN TO PREVENT AND ADDRESS THESE:  The patient remains at risk for fall,   DVT as well as pressure ulcer; however, likelihood of above will be decreased as   the patient will be engaged in therapeutic activities.    This patient's medical complexity requires close physician supervision and   24-hour rehabilitation nursing care to address the etiologic diagnosis, which is   for complicated by following comorbidities:  1.  Anemia.  2.  Hypertension.  3.   Diabetes.  4.  Diabetic foot ulceration/wound status post debridement.  5.  Gait abnormality.  6.  Pain.    FUNCTIONAL GOALS TO BE ACHIEVED:  Modified independent.    The patient requires intensive inpatient rehabilitation with care and treatment   by following disciplines:  1.  Medical supervision.  2.  A 24-hour rehabilitation nursing.  3.  Physical therapy.  4.  Occupational therapy.  5.  Speech therapy.  6.  Wound care.    PLAN OF CARE:  The interdisciplinary team will work collaboratively to address   the patient's problem as identified on the individualized interdisciplinary plan   of care.  The plan of care will be initiated and reviewed on a regular basis to   ensure that all appropriate concerns are being addressed throughout the entire   rehab stay.    The patient's expected level of improvement with inpatient rehabilitation   admission is good.    ANTICIPATED DESTINATION POST-DISCHARGE FROM INPATIENT REHABILITATION:  Home with   family.    INTERDISCIPLINARY CARE PLAN:  Reviewed and there are no changes at this time.    ESTIMATED LENGTH OF STAY:  Approximately 2 to 3 weeks.    MEDICAL REHABILITATION AND PROGNOSIS:  Good.    ARE THE ESTABLISHED GOALS SUFFICIENT FOR ACHIEVING THE OPTIMUM LEVEL OF   FUNCTION:  Yes.    ARE THE INTERVENTIONS NOTED ALONG WITH MEDICAL INTERVENTION AS DOCUMENTED IN THE   HISTORY AND PHYSICAL SUFFICIENT FOR ACHIEVING THE OPTIMUM LEVEL OF FUNCTION:    Yes.    THERAPY PLAN:  Physical therapy b.i.d. for 5 days and q. days for Saturday and   Sunday.  Occupational therapy b.i.d. for 5 days and q. days for Saturday and   Sunday and speech pathologist q. day for 5-day.    ANTICIPATED DISCHARGE DESTINATION:  Home with family.      AV/HN  dd: 12/13/2017 08:46:39 (CST)  td: 12/13/2017 09:14:47 (CST)  Doc ID   #[id number]  Job ID #[id number]    CC:

## 2017-12-13 NOTE — PLAN OF CARE
Problem: Patient Care Overview  Goal: Plan of Care Review  FALL PREVENTION ONGOING. 2 PERSON MAX ASSIST WITH TRANSFERS FOR SAFETY. ROUTINE MED TEACHING IN  PROGRESS. WOUND CARE TO LEFT LOWER FOOT COMPLETED BY ANA HUGO WOUND CARE NURSE THIS AM AND IS CONTINOUR SUCTION  MM ONGOING. BP AND BS MANAGEMENT IN PROGRESS. CENTRAL LINE RIGHT UPPER CHEST PATENT ALL LINES FLUSHED DRESSING INTACT. HEMISPLIT DIALYSIS SHUNT INTACT RIGHT UPPER CHEST WITH DRESSING DRY AND INTACT AND LINES CLAMPED. ABD LARGE OBESE, SAFETY REINFORCED  AND MAINTAINED.

## 2017-12-13 NOTE — PLAN OF CARE
Problem: SLP Goal  Goal: SLP Goal  Short term goals:  1. Pt will complete assessment of complex reasoning (math, time, money mgmt)        Long Term Goals:  1. Pt will demonstrate adequate cognitive function for safe and efficient function in home, social and medical environments.    Outcome: Ongoing (interventions implemented as appropriate)  Juliane Ramos is a 57 y.o. female with a SLP diagnosis of mild cognitive impairment characterized by deficits in executive function. Functionally, Pt demonstrates adequate planning, organization, and problem solving skills. However, given Pt's level of independence at home, further evaluation of higher level skills such as time, money, and pill management is warranted.

## 2017-12-13 NOTE — CONSULTS
[facility]  Adult Nutrition  Consult Note    SUMMARY     Recommendations    Recommendation/Intervention: 1) Increase calories in diabetic diet to 2000 and continue renal diet 2) recommend discontinuing Novasource Renal 2/2 excessive calorie intake and order Beneprotein, 2 packets TID to reduce calories and continue with protein supplementation 3) consider MVI for renal patients  Goals: 1) Pt will consume at least 85% of EEN/EPN daily   Nutrition Goal Status: new  Communication of RD Recs:  (sticky note, second sign)    Discharge Plan     diabetic, renal diet with protein supplement until wound heals    Reason for Assessment    Reason for Assessment: nurse/nurse practitioner consult      1. SAH (subarachnoid hemorrhage)      Past Medical History:   Diagnosis Date    Anemia in stage 3 chronic kidney disease 11/26/2017    CHF (congestive heart failure)     COPD (chronic obstructive pulmonary disease)     Coronary artery disease     Diabetes mellitus     Encounter for blood transfusion     Essential hypertension 6/24/2017    Hypertension     MI (myocardial infarction) 2010    Segmental and subsegmental pulmonary emboli of RLL without acute cor pulmonale 11/25/2017    Stroke     July 2005    Thyroid disease     Traumatic subarachnoid hemorrhage 11/24/2017    Type 2 diabetes mellitus with stage 3 chronic kidney disease, without long-term current use of insulin 6/24/2017        Interdisciplinary Rounds: attended     General Information Comments: Admitted for rehab s/p debridement of stage 3 diabetic foot ulcer (left). Is on wound vac. Pt reports good appetite and has gained wt.  Wt 6/27/17 was 240 lbs, 1.3 oz. Pt states  and is now 252 lbs, 1.6 oz. On HD.   Fluid vs actual wt gain?    Nutrition Prescription Ordered    Current Diet Order: 1800 calorie diabetic diet   Nutrition Order Comments: Note Novasource Renal has 475 calories and 21.6 gms protein per carton.    Oral Nutrition  Supplement: Novasource Renal, tid     Evaluation of Received Nutrients/Fluid Intake    Intake/Output Summary (Last 24 hours) at 12/13/17 7358  Last data filed at 12/13/17 1200   Gross per 24 hour   Intake              440 ml   Output                0 ml   Net              440 ml     Fluid Required:  (per primary team or UOP + 1000 mls)        % Intake of Estimated Energy Needs: 75 - 100 %  % Meal Intake: 75%     Nutrition Risk Screen     Nutrition Risk Screen: large or nonhealing wound, burn or pressure ulcer    Nutrition/Diet History    Patient Reported Diet/Restrictions/Preferences: diabetic diet  Typical Food/Fluid Intake: Pt uses Boost at home. NKFA  Food Preferences: no cultural or religous food preferences identified.      Labs/Tests/Procedures/Meds    Diagnostic Test/Procedure Review:  (on HD)  Pertinent Labs Reviewed: reviewed, pertinent   BMP  Lab Results   Component Value Date     (L) 12/13/2017    K 4.7 12/13/2017    CL 99 12/13/2017    CO2 21 (L) 12/13/2017    BUN 37 (H) 12/13/2017    CREATININE 4.9 (H) 12/13/2017    CALCIUM 8.4 (L) 12/13/2017    ANIONGAP 11 12/13/2017    ESTGFRAFRICA 11 (A) 12/13/2017    EGFRNONAA 9 (A) 12/13/2017     Lab Results   Component Value Date    CALCIUM 8.4 (L) 12/13/2017    PHOS 4.1 12/13/2017     Lab Results   Component Value Date    HGBA1C 8.0 (H) 11/25/2017       Recent Labs  Lab 12/13/17  1631   POCTGLUCOSE 179*     Lab Results   Component Value Date    ALBUMIN 2.1 (L) 12/08/2017     Lab Results   Component Value Date    CRP 83.9 (H) 11/18/2017     Lab Results   Component Value Date    CHOL 75 (L) 11/24/2017    CHOL 115 (L) 06/27/2017     Lab Results   Component Value Date    HDL 19 (L) 11/24/2017    HDL 25 (L) 06/27/2017     Lab Results   Component Value Date    LDLCALC 42.8 (L) 11/24/2017    LDLCALC 72.4 06/27/2017     Lab Results   Component Value Date    TRIG 66 11/24/2017    TRIG 88 06/27/2017     Lab Results   Component Value Date    CHOLHDL 25.3 11/24/2017     "CHOLHDL 21.7 2017      Pertinent Medications Reviewed: reviewed, pertinent      Scheduled Meds:   carvedilol  12.5 mg Oral BID    cephALEXin  500 mg Oral Q12H    [START ON 2017] epoetin donita (PROCRIT) injection  10,000 Units Intravenous Every Tues, Thurs, Sat    fluconazole  100 mg Oral Daily    gabapentin  100 mg Oral TID    heparin (porcine)  5,000 Units Subcutaneous Q12H    insulin aspart  1-10 Units Subcutaneous TIDWM    levothyroxine  50 mcg Oral Before breakfast    miconazole nitrate 2%   Topical (Top) BID    rosuvastatin  10 mg Oral Daily    sodium chloride 0.9%  3 mL Intravenous Q8H     Continuous Infusions:         Physical Findings    Overall Physical Appearance: obese     Oral/Mouth Cavity: tooth/teeth missing  Skin:  (Donny score 16; no edema noted in MD's  progress note)    Anthropometrics    Temp: 96.4 °F (35.8 °C)     Height: 5' 6"  Weight Method: Bed Scale  Weight: 114.4 kg (252 lb 3.3 oz)     Ideal Body Weight (IBW), Female: 130 lb     % Ideal Body Weight, Female (lb): 194.01 lb  BMI (Calculated): 40.8  BMI Grade: greater than 40 - morbid obesity     Usual Body Weight (UBW), k.9 kg (per chart review 17)     % Usual Body Weight: 105.27  % Weight Change From Usual Weight: 5.05 %     Estimated/Assessed Needs    Weight Used For Calorie Calculations: 82 kg (180 lb 12.4 oz) (per NHANES SBW for renal pts)   Height (cm): 167.6 cm   Energy Need Method: Kcal/kg   25 kcal/kg (kcal): 2050 and 30 kcal/kg (kcal): 2460   RMR (McPherson-St. Jeor Equation): 1431.75   Weight Used For Protein Calculations: 82 kg (180 lb 12.4 oz)   1.2 gm Protein (gm): 98.61 and 1.5 gm Protein (gm): 123.26  Fluid Requirements (mL):  (per primary team 2/2 renal issues)       CHO Requirement: 45-50% of EEN (225 gms)    Assessment and Plan    Wounds, multiple    Nutrition Diagnosis  Increase proteinl needs     Related to (etiology):   Wound healing    Signs and Symptoms (as evidenced by):   Presence " of stage 3 wound on left foot    Interventions/Recommendations (treatment strategy):  1) Continue diabetic, renal diet.  2) Recommend discontinuing Novasource Renal due to pt's excessive calorie intake and provide a protein supplement that is lower in calories    Nutrition Diagnosis Status:   New              Monitor and Evaluation    Food and Nutrient Intake: energy intake  Food and Nutrient Adminstration: diet order   Physical Activity and Function: nutrition-related ADLs and IADLs  Anthropometric Measurements: weight, weight change  Biochemical Data, Medical Tests and Procedures: electrolyte and renal panel, glucose/endocrine profile, inflammatory profile, lipid profile  Nutrition-Focused Physical Findings: overall appearance, skin    Nutrition Risk     1 x weekly    Nutrition Follow-Up     yes

## 2017-12-13 NOTE — PT/OT/SLP PROGRESS
"Speech Language Pathology Treatment          Juliane Ramos   MRN: [id number]     Diet recommendations: Solid Diet Level: Regular  Liquid Diet Level: Thin     SLP Treatment Date: 12/13/17  Speech Start Time: 0930     Speech Stop Time: 1030     Speech Total (min): 60 min       TREATMENT BILLABLE MINUTES:  Eval 45 , Speech Therapy Individual 15 and Total Time 60    General Precautions: Standard, fall  Current Respiratory Status: nasal cannula       Subjective:  "My grandbaby came last night. She ate all my green beans"- Pt demonstrates adequate recall of recent events   Pt alert, pleasant and participatory     Objective:    Patient found with: wound vac, oxygen   Upon arrival, Pt sitting upright at EOB with NSG at bedside. She communicated adequate orientation and recall of recent events such as visit from family on 12/12, dinner menu, PT name, and current medical situation. She was able to provide a thorough review of previous and recent medical history, denying any changes in speech, language or cognition. Pt participated in evaluation using MOCA and CLQT. She achieved score of 26/30 on MOCA indicating cognitive skills WNL. Across cognitive domains of attention, memory, executive function, language and visuospatial skills, Pt was placed WNL for both memory and language, mild impairment in attention, and moderate impairment in executive function, achieving a composite score of 3.2 on CLQT indicating mild cognitive impairment. Please see updated evaluation note, addended from 12/12 with evaluation findings and recommendations.     Assessment:  Juliane Ramos is a 57 y.o. female with a SLP diagnosis of mild cognitive impairment characterized by deficits in executive function. Functionally, Pt demonstrates adequate planning, organization, and problem solving skills. However, given Pt's level of independence at home, further evaluation of higher level skills such as time, money, and pill management is warranted.     Diet " recommendations:        Liquid Diet Level: Thin  -see above     Discharge recommendations: Discharge Facility/Level Of Care Needs: home     Goals:    SLP Goals        Problem: SLP Goal    Goal Priority Disciplines Outcome   SLP Goal     SLP Ongoing (interventions implemented as appropriate)   Description:  Short term goals:  1. Pt will complete assessment of complex reasoning (math, time, money mgmt)        Long Term Goals:  1. Pt will demonstrate adequate cognitive function for safe and efficient function in home, social and medical environments.                      Plan:   Patient to be seen Therapy Frequency: 5 x/week   Plan of Care Expires:    Plan of Care reviewed with: patient  SLP Follow-up?: Yes  SLP - Next Visit Date: 12/14/17           ST Kristi 12/13/2017

## 2017-12-13 NOTE — PLAN OF CARE
Problem: Patient Care Overview  Goal: Plan of Care Review  Outcome: Ongoing (interventions implemented as appropriate)  Lethargic from change of shift until 0200 in no distress. Vital signs wnl and responds to verbal and physical stimulation.  Denies pain or discomfort. At 0200 pt. Became fully alert and interacting with staff. Free of falls this shift. Safety ongoing with alarms in use.

## 2017-12-13 NOTE — PROGRESS NOTES
[facility]  Physical Medicine & Rehab  Progress Note    Patient Name: Juliane Ramos  MRN: [id number]  Patient Class: IP- Rehab   Admission Date: 12/11/2017  Length of Stay: 2 days  Attending Physician: Rishi Ness MD  Primary Care Provider: JOS Dumont    Subjective:     Principal Problem:  SAH  Interval History: pt is 57 y.o female with dx of SAH,  PE, renal insuff & dialysis dep, s/p debridement of  Stage 3 ulcer & excision of achillis tendon wound & application of wound vac . Pt remain motivated & participate with therapy     Scheduled Medications:    carvedilol  12.5 mg Oral BID    cephALEXin  500 mg Oral Q12H    collagenase   Topical Daily    [START ON 12/14/2017] epoetin donita (PROCRIT) injection  10,000 Units Intravenous Every Tues, Thurs, Sat    fluconazole  100 mg Oral Daily    gabapentin  100 mg Oral TID    heparin (porcine)  5,000 Units Subcutaneous Q12H    insulin aspart  1-10 Units Subcutaneous TIDWM    levothyroxine  50 mcg Oral Before breakfast    miconazole nitrate 2%   Topical (Top) BID    rosuvastatin  10 mg Oral Daily    sodium chloride 0.9%  3 mL Intravenous Q8H       PRN Medications: sodium chloride 0.9%, acetaminophen, albuterol sulfate, dextrose 50%, glucagon (human recombinant), glucose, glucose, heparin (porcine), labetalol, levetiracetam oral soln, magnesium oxide, magnesium oxide, ondansetron, potassium phosphate IVPB, simethicone, traMADol    Review of Systems   Constitutional: Negative.    HENT: Negative.    Eyes: Negative.    Respiratory: Negative.    Cardiovascular: Negative.    Gastrointestinal: Negative.    Endocrine: Negative.    Genitourinary: Negative.    Musculoskeletal: Negative.    Skin: Negative.    Allergic/Immunologic: Negative.    Neurological: Negative.    Hematological: Negative.    Psychiatric/Behavioral: Negative.      Objective:     Vital Signs (Most Recent):  Temp: 96.4 °F (35.8 °C) (12/13/17 0800)  Pulse: 66 (12/13/17  0800)  Resp: 20 (12/13/17 0800)  BP: 126/71 (12/13/17 0800)  SpO2: 99 % (12/13/17 0800)    Vital Signs (24h Range):  Temp:  [96.4 °F (35.8 °C)-98 °F (36.7 °C)] 96.4 °F (35.8 °C)  Pulse:  [64-68] 66  Resp:  [18-20] 20  SpO2:  [99 %-100 %] 99 %  BP: (107-138)/(54-85) 126/71     Physical Exam   Constitutional: She is oriented to person, place, and time. She appears well-developed and well-nourished. No distress.   HENT:   Head: Normocephalic and atraumatic.   Right Ear: External ear normal.   Left Ear: External ear normal.   Nose: Nose normal.   Mouth/Throat: Oropharynx is clear and moist. No oropharyngeal exudate.   Eyes: Conjunctivae and EOM are normal. Pupils are equal, round, and reactive to light. Right eye exhibits no discharge. Left eye exhibits no discharge. No scleral icterus.   Neck: Normal range of motion. Neck supple. No JVD present. No tracheal deviation present. No thyromegaly present.   Cardiovascular: Normal rate, regular rhythm, normal heart sounds and intact distal pulses.  Exam reveals no gallop and no friction rub.    No murmur heard.  Pulmonary/Chest: Effort normal and breath sounds normal. No stridor. No respiratory distress. She has no wheezes. She has no rales. She exhibits no tenderness.   Abdominal: Soft. Bowel sounds are normal. She exhibits no distension and no mass. There is no tenderness. There is no rebound and no guarding. No hernia.   Obese    Genitourinary:   Genitourinary Comments: deferred   Musculoskeletal: Normal range of motion. She exhibits no edema, tenderness or deformity.   Lymphadenopathy:     She has no cervical adenopathy.   Neurological: She is alert and oriented to person, place, and time. She exhibits normal muscle tone.   Skin: No rash noted. She is not diaphoretic. No erythema. No pallor.   Wound post heel / achillis tendon accessed along with wound care nurse & nursing staff as wound vac being administered    Psychiatric: She has a normal mood and affect. Her behavior  is normal.     NEUROLOGICAL EXAMINATION:     MENTAL STATUS   Oriented to person, place, and time.     CRANIAL NERVES     CN III, IV, VI   Pupils are equal, round, and reactive to light.  Extraocular motions are normal.       Assessment/Plan:      Juliane Ramos is a 57 y.o. female admitted to inpatient rehabilitation on 12/11/2017 for <principal problem not specified> with impaired mobility and ADLs. Patient remains appropriate for PT, OT, and as required Speech therapy. Patient continues to require 24 hour nursing care as well as daily Physician assessment.    Active Diagnoses:    Diagnosis Date Noted POA    SAH (subarachnoid hemorrhage) [I60.9] 12/11/2017 Yes      Problems Resolved During this Admission:    Diagnosis Date Noted Date Resolved POA     SAH, cont PT, OT, ST  Wounds s/p debridement , cont current management   HTN, vitals noted, cont current  meds  DVT prophylaxis, cont sq heparin  Renal insuff & dialysis dep, f/u by nephro     DISCHARGE PLANNING:  Tentative Discharge Date:     No future appointments.    Rishi Ness MD  Department of Physical Medicine & Rehab  [facility]

## 2017-12-13 NOTE — ASSESSMENT & PLAN NOTE
Nutrition Diagnosis  Increase proteinl needs     Related to (etiology):   Wound healing    Signs and Symptoms (as evidenced by):   Presence of stage 3 wound on left foot    Interventions/Recommendations (treatment strategy):  1) Continue diabetic, renal diet.  2) Recommend discontinuing Novasource Renal due to pt's excessive calorie intake and provide a protein supplement that is lower in calories    Nutrition Diagnosis Status:   New

## 2017-12-13 NOTE — PROGRESS NOTES
12/13/17 0935   Patient Assessment/Suction   Level of Consciousness (AVPU) alert   All Lung Fields Breath Sounds clear;diminished   PRE-TX-O2-ETCO2   O2 Device (Oxygen Therapy) nasal cannula   $ Is the patient on Low Flow Oxygen? Yes   Flow (L/min) 3   SpO2 100 %   Pulse Oximetry Type Intermittent   $ Pulse Oximetry - Multiple Charge Pulse Oximetry - Multiple

## 2017-12-13 NOTE — PT/OT/SLP PROGRESS
"Occupational Therapy  Treatment    Juliane Ramos   MRN: [id number]   Admitting Diagnosis: Subarachnoid hemorrhage     OT Date of Treatment: 12/13/17   Total Time (min): 70 min    Billable Minutes:  Self Care/Home Management 10, Therapeutic Activity 30 and Therapeutic Exercise 30  Total Minutes: 70    General Precautions: Standard, fall, seizure, vision impaired  Orthopedic Precautions: LLE non weight bearing  Braces:  (CAm boot L LE)    Do you have any cultural, spiritual, Sikh conflicts, given your current situation?: None    Subjective:  Communicated with nurse prior to session.  "I want to keep on doing as best as I can because I know it will help me get home sooner and that is my goal."    Pain/Comfort  Pain Rating 1: 0/10    Objective:  Patient found with: PICC line, wound vac, oxygen (3 liters/min) - seated EOB with brief up but pants at thigh level    Functional Mobility:  Transfer Training:   Bed <> Chair:  Stand Pivot with Max (A) of 1 and Min (A) of 2nd person (Total (A)) with No Assistive Device with verbal instruction for t/f sequence with proper hand placement and to adhere to NWB of L LE    Feeding:  Set-up of lunch tray from wheelchair level    Grooming:  Set-up of items from wheelchair level    Balance:   Static Sit: GOOD-: Takes MODERATE challenges from all directions but inconsistently  Dynamic Sit:  FAIR+: Maintains balance through MINIMAL excursions of active trunk motion to GOOD-    Additional Treatment:  - Wheelchair change to 22" width standard chair with B elevating leg rests  - Wheelchair propulsion with Max/Mod (A) with step by step verbal instruction for UE coordination for approximately 25 ft  - B serena with 8# for shoulder/elbow flexion/extension and shoulder ABD/ADD x 10 reps each direction with rest breaks between sets  - 2# dowel for UE strengthening - 4 planes x 10 reps each motion with rest breaks between sets   - 5# dowel for biceps strengthening x 20 reps    Patient left up " in chair with all lines intact, call button in reach, chair belt intact nurse notified and supplemental oxygen hooked up to wall unit    ASSESSMENT:  Juliane Ramos is a 57 y.o. female with a medical diagnosis of Subarachnoid hemorrhage and presents with great participation in today's session; pt performing bed to chair transfer with Max (A) of 1 and Min (A) of 2nd person. Patient should continue to benefit from skilled OT services per est POC to increase functional independence, mobility, strength, endurance, and safety.     Rehab potential is good    Activity tolerance: Good    Discharge recommendations: home     Equipment recommendations:  (tbd)     GOALS:    Occupational Therapy Goals        Problem: Occupational Therapy Goal    Goal Priority Disciplines Outcome Interventions   Occupational Therapy Goal     OT, PT/OT Ongoing (interventions implemented as appropriate)    Description:  Goals to be met by: 12/29/17     Patient will increase functional independence with ADLs by performing:    Feeding with [place].  UE Dressing with [place].  LE Dressing with Supervision.  Grooming while seated with [place].  Toileting from toilet with bedside commode positioned over Minimal Assistance for hygiene and clothing management.   Bathing from shower chair/bench with Minimal Assistance with DME/AE as needed.  Shower transfer with Minimal Assistance with DME/AE as needed .  Toilet transfer to toilet with Minimal Assistance with DME as needed.                      Plan:  Patient to be seen 6 x/week to address the above listed problems via self-care/home management, community/work re-entry, therapeutic activities, therapeutic exercises, therapeutic groups, wheelchair management/training  Plan of Care expires: 12/29/17  Plan of Care reviewed with: patient    SCOTT Antunez LOTR  12/13/2017

## 2017-12-13 NOTE — PROGRESS NOTES
INPATIENT NEPHROLOGY PROGRESS NOTE  [facility] NEPHROLOGY    Patient Name: Juliane Ramos  Date: 12/13/2017    Reason for consultation: MC    Chief Complaint: Diabetic foot ulcer    History of Present Illness:  Ms. Ramos is a 56 y/o F with a hx of HTN, DM2, CHF, and stage III CKD who p/w a left heel ulcer/cellulitis x 2 weeks, nonhealing. Hospital course c/b PE, fall with SAH, and MC 2/2 contrast/vancomycin/infection requiring RRT. She is currently dialysis dependent. She is now at rehab.    · Interval History/Subjective:    - working with PT  - no cp, dyspnea, abd pain, worsening edema    · Review of Systems: All 14 systems reviewed and negative, except as noted in HPI    Plan of Care:    Assessment:  Oliguric MC 2/2 multifactorial ATN, dialysis dependent  Hyponatremia/Hyperkalemia  Anemia of CKD     Plan:     1. Acute Kidney Injury- She remains HD dependent. Continue HD TTS. No NSAIDs or IV contrast.      2. Volume/Blood Pressure- Parameters are stable.     3. Electrolytes/Acid Base- Continue UF and fluid restriction for hypoNa- she is improving. Continue a 2K bath and a low K diet for hyperK- she is at goal.     4. Bone Mineral Metabolism- Parameters are stable.     5. Anemia of CKD- Hb dropped- will transfuse 2 units with HD in AM. Bump EPO to 10K.    Thank you for allowing us to participate in this patient's care. We will continue to follow.    Medications:  No current facility-administered medications on file prior to encounter.      Current Outpatient Prescriptions on File Prior to Encounter   Medication Sig Dispense Refill    albuterol (ACCUNEB) 1.25 mg/3 mL Nebu Take 1.25 mg by nebulization every 6 (six) hours as needed. Rescue      carvedilol (COREG) 12.5 MG tablet Take 1 tablet (12.5 mg total) by mouth 2 (two) times daily. 60 tablet 11    cefepime in dextrose 5 % (MAXIPIME) 1 gram/50 mL PgBk Inject 50 mLs (1 g total) into the vein every 24 hours as needed.      gabapentin (NEURONTIN) 100 MG  capsule Take 1 capsule (100 mg total) by mouth 3 (three) times daily. 90 capsule 11    levetiracetam oral soln 500 mg/5 mL (5 mL) Soln 2.5 mLs (250 mg total) by Per NG tube route 2 (two) times daily. 150 mL 11    levothyroxine (SYNTHROID) 50 MCG tablet Take 50 mcg by mouth once daily.        metronidazole (FLAGYL) 500 mg/100 mL IVPB Inject 100 mLs (500 mg total) into the vein every 8 (eight) hours.      rosuvastatin (CRESTOR) 10 MG tablet Take 1 tablet (10 mg total) by mouth once daily. 30 tablet 0    VANCOMYCIN HCL (VANCOMYCIN IN 5 % DEXTROSE) 1.75 gram/500 mL Soln Inject 500 mLs (1,750 mg total) into the vein once daily.       Scheduled Meds:   carvedilol  12.5 mg Oral BID    cephALEXin  500 mg Oral Q12H    [START ON 12/14/2017] epoetin donita (PROCRIT) injection  10,000 Units Intravenous Every Tues, Thurs, Sat    fluconazole  100 mg Oral Daily    gabapentin  100 mg Oral TID    heparin (porcine)  5,000 Units Subcutaneous Q12H    insulin aspart  1-10 Units Subcutaneous TIDWM    levothyroxine  50 mcg Oral Before breakfast    miconazole nitrate 2%   Topical (Top) BID    rosuvastatin  10 mg Oral Daily    sodium chloride 0.9%  3 mL Intravenous Q8H     Continuous Infusions:   PRN Meds:.sodium chloride 0.9%, acetaminophen, albuterol sulfate, dextrose 50%, glucagon (human recombinant), glucose, glucose, heparin (porcine), labetalol, levetiracetam oral soln, magnesium oxide, magnesium oxide, ondansetron, potassium phosphate IVPB, simethicone, traMADol    Allergies:  Patient has no known allergies.    Vital Signs:  Temp Readings from Last 3 Encounters:   12/13/17 96.4 °F (35.8 °C) (Oral)   12/11/17 97.4 °F (36.3 °C) (Oral)   11/27/17 97 °F (36.1 °C) (Oral)       Pulse Readings from Last 3 Encounters:   12/13/17 66   12/11/17 69   11/27/17 67       BP Readings from Last 3 Encounters:   12/13/17 126/71   12/11/17 (!) 125/59   11/27/17 123/67       Weight:  Wt Readings from Last 3 Encounters:   12/11/17 114.4 kg  (252 lb 1.6 oz)   12/08/17 117.9 kg (260 lb)   11/27/17 117 kg (257 lb 15 oz)       Physical Exam:  Constitutional: nad, aao x 3  Heart: rrr, no m/r/g, wwp, no edema in RLE  Lungs: ctab, no w/r/r/c, no lb  Abdomen: s/nt/nd, +BS    Results:  Lab Results   Component Value Date     (L) 12/13/2017    K 4.7 12/13/2017    CL 99 12/13/2017    CO2 21 (L) 12/13/2017    BUN 37 (H) 12/13/2017    CREATININE 4.9 (H) 12/13/2017    CALCIUM 8.4 (L) 12/13/2017    ANIONGAP 11 12/13/2017    ESTGFRAFRICA 11 (A) 12/13/2017    EGFRNONAA 9 (A) 12/13/2017       Lab Results   Component Value Date    CALCIUM 8.4 (L) 12/13/2017    PHOS 4.1 12/13/2017         Recent Labs  Lab 12/13/17  0736   WBC 7.20   RBC 3.01*   HGB 7.4*   HCT 23.4*      MCV 78*   MCH 24.7*   MCHC 31.8*       I have personally reviewed pertinent radiological imaging and reports.    Amairani Young MD MPH  [facility]  [address] [place] [id number]  [phone] (p)  [phone] (f)

## 2017-12-14 LAB
BLD PROD TYP BPU: NORMAL
BLD PROD TYP BPU: NORMAL
BLOOD UNIT EXPIRATION DATE: NORMAL
BLOOD UNIT EXPIRATION DATE: NORMAL
BLOOD UNIT TYPE CODE: 5100
BLOOD UNIT TYPE CODE: 5100
BLOOD UNIT TYPE: NORMAL
BLOOD UNIT TYPE: NORMAL
CODING SYSTEM: NORMAL
CODING SYSTEM: NORMAL
DISPENSE STATUS: NORMAL
DISPENSE STATUS: NORMAL
NUM UNITS TRANS PACKED RBC: NORMAL
NUM UNITS TRANS PACKED RBC: NORMAL
POCT GLUCOSE: 186 MG/DL (ref 70–110)
POCT GLUCOSE: 193 MG/DL (ref 70–110)

## 2017-12-14 PROCEDURE — 25000003 PHARM REV CODE 250: Performed by: INTERNAL MEDICINE

## 2017-12-14 PROCEDURE — 97530 THERAPEUTIC ACTIVITIES: CPT

## 2017-12-14 PROCEDURE — 63600175 PHARM REV CODE 636 W HCPCS: Performed by: INTERNAL MEDICINE

## 2017-12-14 PROCEDURE — 97110 THERAPEUTIC EXERCISES: CPT

## 2017-12-14 PROCEDURE — 63600175 PHARM REV CODE 636 W HCPCS: Performed by: PHYSICAL MEDICINE & REHABILITATION

## 2017-12-14 PROCEDURE — 97532 *HC SP COG SKL DEV EA 15 MIN: CPT

## 2017-12-14 PROCEDURE — 94761 N-INVAS EAR/PLS OXIMETRY MLT: CPT

## 2017-12-14 PROCEDURE — P9016 RBC LEUKOCYTES REDUCED: HCPCS

## 2017-12-14 PROCEDURE — 92507 TX SP LANG VOICE COMM INDIV: CPT

## 2017-12-14 PROCEDURE — 30233N1 TRANSFUSION OF NONAUTOLOGOUS RED BLOOD CELLS INTO PERIPHERAL VEIN, PERCUTANEOUS APPROACH: ICD-10-PCS | Performed by: INTERNAL MEDICINE

## 2017-12-14 PROCEDURE — 97542 WHEELCHAIR MNGMENT TRAINING: CPT

## 2017-12-14 PROCEDURE — 27000221 HC OXYGEN, UP TO 24 HOURS

## 2017-12-14 PROCEDURE — 94640 AIRWAY INHALATION TREATMENT: CPT

## 2017-12-14 PROCEDURE — 80100016 HC MAINTENANCE HEMODIALYSIS

## 2017-12-14 PROCEDURE — 99900035 HC TECH TIME PER 15 MIN (STAT)

## 2017-12-14 PROCEDURE — A4216 STERILE WATER/SALINE, 10 ML: HCPCS | Performed by: INTERNAL MEDICINE

## 2017-12-14 PROCEDURE — 12800000 HC REHAB SEMI-PRIVATE ROOM

## 2017-12-14 PROCEDURE — 25000242 PHARM REV CODE 250 ALT 637 W/ HCPCS: Performed by: INTERNAL MEDICINE

## 2017-12-14 RX ADMIN — SODIUM CHLORIDE, PRESERVATIVE FREE 3 ML: 5 INJECTION INTRAVENOUS at 09:12

## 2017-12-14 RX ADMIN — INSULIN ASPART 2 UNITS: 100 INJECTION, SOLUTION INTRAVENOUS; SUBCUTANEOUS at 06:12

## 2017-12-14 RX ADMIN — SODIUM CHLORIDE, PRESERVATIVE FREE 3 ML: 5 INJECTION INTRAVENOUS at 05:12

## 2017-12-14 RX ADMIN — HEPARIN SODIUM 4000 UNITS: 1000 INJECTION, SOLUTION INTRAVENOUS; SUBCUTANEOUS at 05:12

## 2017-12-14 RX ADMIN — GABAPENTIN 100 MG: 100 CAPSULE ORAL at 09:12

## 2017-12-14 RX ADMIN — MICONAZOLE NITRATE: 20 OINTMENT TOPICAL at 09:12

## 2017-12-14 RX ADMIN — MICONAZOLE NITRATE: 20 OINTMENT TOPICAL at 08:12

## 2017-12-14 RX ADMIN — ROSUVASTATIN CALCIUM 10 MG: 5 TABLET ORAL at 08:12

## 2017-12-14 RX ADMIN — CEPHALEXIN 500 MG: 500 CAPSULE ORAL at 09:12

## 2017-12-14 RX ADMIN — LEVOTHYROXINE SODIUM 50 MCG: 25 TABLET ORAL at 05:12

## 2017-12-14 RX ADMIN — INSULIN ASPART 2 UNITS: 100 INJECTION, SOLUTION INTRAVENOUS; SUBCUTANEOUS at 12:12

## 2017-12-14 RX ADMIN — CEPHALEXIN 500 MG: 500 CAPSULE ORAL at 08:12

## 2017-12-14 RX ADMIN — HEPARIN SODIUM 5000 UNITS: 5000 INJECTION, SOLUTION INTRAVENOUS; SUBCUTANEOUS at 09:12

## 2017-12-14 RX ADMIN — ERYTHROPOIETIN 10000 UNITS: 10000 INJECTION, SOLUTION INTRAVENOUS; SUBCUTANEOUS at 05:12

## 2017-12-14 RX ADMIN — GABAPENTIN 100 MG: 100 CAPSULE ORAL at 05:12

## 2017-12-14 RX ADMIN — HEPARIN SODIUM 5000 UNITS: 5000 INJECTION, SOLUTION INTRAVENOUS; SUBCUTANEOUS at 08:12

## 2017-12-14 RX ADMIN — ALBUTEROL SULFATE 2.5 MG: 2.5 SOLUTION RESPIRATORY (INHALATION) at 06:12

## 2017-12-14 RX ADMIN — FLUCONAZOLE 100 MG: 100 TABLET ORAL at 08:12

## 2017-12-14 NOTE — PLAN OF CARE
Problem: Physical Therapy Goal  Goal: Physical Therapy Goal  Goals to be met by: 2018     Patient will increase functional independence with mobility by performin). Supine to sit with Modified [place]  2). Sit to supine with Modified [place]  3). Sit to stand transfer with Stand-by Assistance  4). Bed to chair transfer with Stand-by Assistance   5). Wheelchair propulsion x > 250 feet with Modified [place]  6). Stand for  > 3 minutes with Stand-by Assistance NWB LLE     Outcome: Ongoing (interventions implemented as appropriate)  Prior goals still appropriate.

## 2017-12-14 NOTE — PT/OT/SLP PROGRESS
Physical Therapy         Treatment        Juliane Ramos   MRN: [id number]     PT Received On: 17  Total Time (min): 75        Billable Minutes:  Therapeutic Activity 20, Therapeutic Exercise 45 and Train/Wheelchair Management 10  Total Minutes: 75    Treatment Type: Treatment  PT/PTA: PT     PTA Visit Number: 0       General Precautions: Standard, fall  Orthopedic Precautions: Orthopedic Precautions : LLE non weight bearing   Braces:  CAM walker LLE in place throughout session    Subjective:    Pain/Comfort  Pain Rating 1: 0/10  Pain Rating Post-Intervention 1: 0/10    Objective:  Patient found seated in w/c, pleasant and cooperative.  Patient found with: oxygen, villegas catheter (CAM walker LLE)    Functional Mobility:    Transfer Training:  Sit to stand:Moderate Assistance with Grab bars  x 6    Wheelchair Trainin' x 2 (slow) with min assist and cues    Additional Treatment:  SEATED: hip adduction with ball, hip abduction with green tband, knee flexion with green tband, LAQ (R with 4#), x 40 reps each  STANDING: in / bars (LLE only, NWB LLE): sidekicks, SLR, x 10 reps x 3 sets each  STATIC STAND BALANCE TRAINING: in // bars (NWB LLE): x 2 min x 5 trials with CGA and cues     Activity Tolerance:  Patient tolerated treatment well    Patient left up in chair with all lines intact, call button in reach, chair alarm on and O2 to wall 3 L/min via nc.    Assessment:  Juliane Ramos is a 57 y.o. female.    Rehab potential is fair.    Activity tolerance: Fair    Discharge recommendations:   TBD    Equipment recommendations:  TBD    GOALS:    Physical Therapy Goals        Problem: Physical Therapy Goal    Goal Priority Disciplines Outcome Goal Variances Interventions   Physical Therapy Goal     PT/OT, PT Ongoing (interventions implemented as appropriate)     Description:  Goals to be met by: 2018     Patient will increase functional independence with mobility by performin). Supine to sit with Modified  [place]  2). Sit to supine with Modified [place]  3). Sit to stand transfer with Stand-by Assistance  4). Bed to chair transfer with Stand-by Assistance   5). Wheelchair propulsion x > 250 feet with Modified [place]  6). Stand for  > 3 minutes with Stand-by Assistance NWB LLE                      PLAN:    Patient to be seen daily  to address the above listed problems via gait training, therapeutic activities, therapeutic exercises, neuromuscular re-education, wheelchair management/training  Plan of Care expires:    Plan of Care reviewed with: patient         Albert VAZQUEZ Benedicto, PT 12/14/2017

## 2017-12-14 NOTE — PROGRESS NOTES
[facility]  Physical Medicine & Rehab  Progress Note    Patient Name: Juliane Ramos  MRN: [id number]  Patient Class: IP- Rehab   Admission Date: 12/11/2017  Length of Stay: 3 days  Attending Physician: Rishi Ness MD  Primary Care Provider: JOS Dumont    Subjective:     Principal Problem:  SAH  Interval History: pt is 57 y.o female  With dx of SAH, dialysis dep & s/p debridement of wound  / wound vac , particiapting with therapy & making progress    Scheduled Medications:    carvedilol  12.5 mg Oral BID    cephALEXin  500 mg Oral Q12H    epoetin donita (PROCRIT) injection  10,000 Units Intravenous Every Tues, Thurs, Sat    fluconazole  100 mg Oral Daily    gabapentin  100 mg Oral TID    heparin (porcine)  5,000 Units Subcutaneous Q12H    insulin aspart  1-10 Units Subcutaneous TIDWM    levothyroxine  50 mcg Oral Before breakfast    miconazole nitrate 2%   Topical (Top) BID    rosuvastatin  10 mg Oral Daily    sodium chloride 0.9%  3 mL Intravenous Q8H       PRN Medications: sodium chloride 0.9%, acetaminophen, albuterol sulfate, dextrose 50%, glucagon (human recombinant), glucose, glucose, heparin (porcine), labetalol, levetiracetam oral soln, magnesium oxide, magnesium oxide, ondansetron, potassium phosphate IVPB, simethicone, traMADol    Review of Systems   Constitutional: Negative.    HENT: Negative.    Eyes: Negative.    Respiratory: Negative.    Cardiovascular: Negative.    Gastrointestinal: Negative.    Endocrine: Negative.    Genitourinary: Negative.    Musculoskeletal: Negative.    Skin: Negative.    Allergic/Immunologic: Negative.    Neurological: Negative.    Hematological: Negative.    Psychiatric/Behavioral: Negative.      Objective:     Vital Signs (Most Recent):  Temp: 96 °F (35.6 °C) (12/14/17 0709)  Pulse: 67 (12/14/17 0709)  Resp: 19 (12/14/17 0709)  BP: (!) 116/59 (12/14/17 0709)  SpO2: 100 % (12/14/17 0709)    Vital Signs (24h Range):  Temp:  [96 °F (35.6  °C)-97.9 °F (36.6 °C)] 96 °F (35.6 °C)  Pulse:  [67-71] 67  Resp:  [14-19] 19  SpO2:  [99 %-100 %] 100 %  BP: (108-123)/(59-76) 116/59     Physical Exam   Constitutional: She is oriented to person, place, and time. She appears well-developed and well-nourished. No distress.   HENT:   Head: Normocephalic and atraumatic.   Right Ear: External ear normal.   Left Ear: External ear normal.   Nose: Nose normal.   Mouth/Throat: Oropharynx is clear and moist. No oropharyngeal exudate.   Eyes: Conjunctivae and EOM are normal. Pupils are equal, round, and reactive to light. Right eye exhibits no discharge. Left eye exhibits no discharge. No scleral icterus.   Neck: Normal range of motion. Neck supple. No JVD present. No tracheal deviation present. No thyromegaly present.   Cardiovascular: Normal rate, regular rhythm, normal heart sounds and intact distal pulses.  Exam reveals no gallop and no friction rub.    No murmur heard.  Pulmonary/Chest: Effort normal and breath sounds normal. No stridor. No respiratory distress. She has no wheezes. She has no rales. She exhibits no tenderness.   Abdominal: Soft. Bowel sounds are normal. She exhibits no distension and no mass. There is no tenderness. There is no rebound and no guarding. No hernia.   Genitourinary:   Genitourinary Comments: deferred   Musculoskeletal: Normal range of motion. She exhibits no edema, tenderness or deformity.   Lymphadenopathy:     She has no cervical adenopathy.   Neurological: She is alert and oriented to person, place, and time. She exhibits normal muscle tone.   Skin: No rash noted. She is not diaphoretic. No erythema. No pallor.   Psychiatric: She has a normal mood and affect. Her behavior is normal.     NEUROLOGICAL EXAMINATION:     MENTAL STATUS   Oriented to person, place, and time.     CRANIAL NERVES     CN III, IV, VI   Pupils are equal, round, and reactive to light.  Extraocular motions are normal.       Assessment/Plan:      Juliane Ramos is a  57 y.o. female admitted to inpatient rehabilitation on 12/11/2017 for <principal problem not specified> with impaired mobility and ADLs. Patient remains appropriate for PT, OT, and as required Speech therapy. Patient continues to require 24 hour nursing care as well as daily Physician assessment.    Active Diagnoses:    Diagnosis Date Noted POA    Wounds, multiple [T07.XXXA] 12/13/2017 Yes    SAH (subarachnoid hemorrhage) [I60.9] 12/11/2017 Yes      Problems Resolved During this Admission:    Diagnosis Date Noted Date Resolved POA     SAH, cont PT, OT, ST  Wounds: cont current management   Renal insuff, f/u by nephro   HTN, vitals noted, cont current meds  DVT prophylaxis, cont sq heparin     DISCHARGE PLANNING:  Tentative Discharge Date:     No future appointments.    Rishi Ness MD  Department of Physical Medicine & Rehab  [facility]

## 2017-12-14 NOTE — PT/OT/SLP PROGRESS
Speech Language Pathology Treatment          Juliane Ramos   MRN: [id number]     Diet recommendations: Solid Diet Level: Regular  Liquid Diet Level: Thin     SLP Treatment Date: 12/14/17  Speech Start Time: 1245     Speech Stop Time: 1320     Speech Total (min): 35 min       TREATMENT BILLABLE MINUTES:  Speech Therapy Individual 35 and Total Time 35    General Precautions: Standard, fall  Current Respiratory Status: nasal cannula       Subjective:  Pt alert, pleasant and participatory     Objective:    Patient found with: wound vac, oxygen; performance this date is as follows:   Pt required moderate cueing to demonstrate adequate attention and deductive reasoning skills in order to complete functional time management task. Given extra time and assistance, she was able to complete task with approx 70% acc. She answered word problems re: time management with 75% acc increasing to 90% acc with moderate assistance. Given repetition in 3/5 items, she was able to complete 4 item mental manipulations with 100% acc. With 3 items, Pt demonstrated improved working memory, as repetitions were required in 2/8 trials to enhance recall. She however, sequenced items in correct order with 100% acc.      Assessment:  Juliane Ramos is a 57 y.o. female with a SLP diagnosis of mild Cognitive Impairment characterized by deficits in attention and executive function, impacting her success and efficiency within ADLs such as time/money management.      Diet recommendations:        Liquid Diet Level: Thin  -see above     Discharge recommendations: Discharge Facility/Level Of Care Needs: home     Goals:    SLP Goals        Problem: SLP Goal    Goal Priority Disciplines Outcome   SLP Goal     SLP Ongoing (interventions implemented as appropriate)   Description:  Short term goals:  1. Pt will complete assessment of complex reasoning (math, time, money mgmt)        Long Term Goals:  1. Pt will demonstrate adequate cognitive function for safe  and efficient function in home, social and medical environments.                      Plan:   Patient to be seen Therapy Frequency: 5 x/week   Plan of Care Expires:    Plan of Care reviewed with: patient  SLP Follow-up?: Yes  SLP - Next Visit Date: 12/15/17           ST Kristi 12/14/2017

## 2017-12-14 NOTE — PLAN OF CARE
Problem: Patient Care Overview  Goal: Plan of Care Review  Outcome: Ongoing (interventions implemented as appropriate)  Awake, alert and oriented in no distress. Denies pain or discomfort. Incontinent of bowel. Max assist with transfers. Safety ongoing with alarmsin use.

## 2017-12-14 NOTE — PLAN OF CARE
Problem: Patient Care Overview  Goal: Individualization & Mutuality  Recommendation/Intervention: 1) Increase calories in diabetic diet to 2000 and continue renal diet 2) recommend discontinuing Novasource Renal 2/2 excessive calorie intake and order Beneprotein, 2 packets TID to reduce calories and continue with protein supplementation 3) consider MVI for renal patients  Goals: 1) Pt will consume at least 85% of EEN/EPN daily   Nutrition Goal Status: new  Communication of RD Recs:  (sticky note, second sign)

## 2017-12-14 NOTE — PT/OT/SLP PROGRESS
Occupational Therapy  Treatment    Juliane Ramos   MRN: [id number]   Admitting Diagnosis: Subarachnoid Hemorrhage    OT Date of Treatment: 12/14/17   Total Time (min): 75 min    Billable Minutes:  Therapeutic Activity 10 and Therapeutic Exercise 65  Total Minutes: 75    General Precautions: Standard, fall  Orthopedic Precautions: LLE non weight bearing  Braces:  (LLE CAM boot)    Do you have any cultural, spiritual, Denominational conflicts, given your current situation?: None    Subjective:  Communicated with nurse prior to session.    Pain/Comfort  Pain Rating 1: 0/10   >>Pain reports she has discomfort in L shin and that she usually experiences this - not rated; nursing notified    Objective:  Patient found with: oxygen, wound vac (up in wheelchair)    Functional Mobility:  Feeding:  Set-up of lunch tray; otherwise Mod (I)    Additional Treatment:  - UBE x 8 minutes total with rest breaks as needed  - 3# dowel for UE strengthening, 4 planes - 2 sets x 10 reps each motion with rest breaks as needed  - 5# dowel for biceps/triceps strengthening - 2 sets x 20 reps each motion with rest breaks as needed  - Rickshaw with 20# for triceps strengthening - 2 sets x 15 reps with rest break between sets    Patient left up in chair with all lines intact, call button in reach, nurse notified and nurse present    ASSESSMENT:  Juliane Ramos is a 57 y.o. female with a medical diagnosis of Subarachnoid hemorrhage and presents with significantly impaired functional independence, endurance, decreased strength. Patient highly motivated and participatory and should continue to benefit from skilled OT services per est POC to increase functional independence, mobility, and safety.     Rehab potential is good    Activity tolerance: Good    Discharge recommendations: home     Equipment recommendations:  (tbd)     GOALS:    Occupational Therapy Goals        Problem: Occupational Therapy Goal    Goal Priority Disciplines Outcome Interventions    Occupational Therapy Goal     OT, PT/OT Ongoing (interventions implemented as appropriate)    Description:  Goals to be met by: 12/29/17     Patient will increase functional independence with ADLs by performing:    Feeding with [place].  UE Dressing with [place].  LE Dressing with Supervision.  Grooming while seated with [place].  Toileting from toilet with bedside commode positioned over Minimal Assistance for hygiene and clothing management.   Bathing from shower chair/bench with Minimal Assistance with DME/AE as needed.  Shower transfer with Minimal Assistance with DME/AE as needed .  Toilet transfer to toilet with Minimal Assistance with DME as needed.                      Plan:  Patient to be seen 6 x/week to address the above listed problems via self-care/home management, community/work re-entry, therapeutic activities, therapeutic exercises, therapeutic groups, neuromuscular re-education, cognitive retraining, wheelchair management/training  Plan of Care expires: 12/29/17  Plan of Care reviewed with: patient    SCOTT Antunez LOTR  12/14/2017

## 2017-12-14 NOTE — PATIENT CARE CONFERENCE
Weekly Staffing Report      Date Admitted: 12/11/2017 :   Staffing Date: 12/14/2017     Patient Active Problem List   Diagnosis    Acute on chronic congestive heart failure    Essential hypertension    CKD (chronic kidney disease) stage 3, GFR 30-59 ml/min    Type 2 diabetes mellitus with stage 5 chronic kidney disease not on chronic dialysis, without long-term current use of insulin    Coronary artery disease involving native coronary artery of native heart without angina pectoris    Acquired hypothyroidism    Recurrent right pleural effusion    Hyperkalemia    Diabetic leg ulcer    Traumatic subarachnoid hemorrhage    Segmental and subsegmental pulmonary emboli of RLL without acute cor pulmonale    Acute renal failure superimposed on stage 3 chronic kidney disease    Anemia in stage 3 chronic kidney disease    Subarachnoid hemorrhage following injury, no loss of consciousness    Staph aureus infection    Pulmonary embolus    Obesity    Acute respiratory failure with hypoxia and hypercapnia    Anasarca    SAH (subarachnoid hemorrhage)    Wounds, multiple          Team Members Present:  Physician Team Member: Dr Ness  Nursing Team Member: Johan Palacios RN  Case Management Team Member: Valentina Ordonez CM/SW  PT Team Member: Albert Diane PT  OT Team Member: Chloe Guerrier OT  SLP Team Member: Barrett BATEMAN    Nursing  Skin: Intact wound achillis stable   Bowel: Continent  Bladder:continent  Last BM: 12-13-17  Diet: renal  Appetite: fair   Pain Level: 3/10    Physical Therapy  Supine to Sit:  moderate assist  Sit to Stand: moderate assist  Gait: na, non weight bearing on left lower ext  N/A    Wheelchair Mobility: 25-50 feet minimal assist  ROM: prom wfl, left ankle not tested   Stairs:na N/A  Strength: left ankle not tested, hip 3-/5, knee 4-/5, right ankle 3/5, hip 3- to 3+/5, knee 4/5    Occupational Therapy  Feeding: supervision  Grooming:set up  UED: standy by assistance  LED: total  assist  Bathing:maximal assist  At bed level   Toileting:total assist  Toilet Transfer:  pending due to safety   Tub Transfer:  pending due to safety   Strength: right 3+/5, left 3 to 3+/5     Speech Therapy  Swallow: wfl  MMS: 24/30  Memory: modified independent  Receptive Language: modified independent  Expressive Language: modified independent  Problem solving: standy by assistance            Summary of Progress:  Ongoing     Barriers to Progress/Discharge: weight bearing & wound vac & obesity     Tolerates 3 hours of therapy: Yes    Comments:     Estimated Length of Stay: 3 weeks

## 2017-12-14 NOTE — PLAN OF CARE
Problem: Patient Care Overview  Goal: Plan of Care Review  Alert, oriented appetite, fall prevention ongoing, bed and chair alarm in use at all times. Med teaching ongoing. bp management ongoing, blood sugar management in progress. Blood pressure management held bp meds day of dialysis. Wound vac at 125 mmgb in progress to left posterior heel area. No drainage present.

## 2017-12-14 NOTE — PLAN OF CARE
Problem: SLP Goal  Goal: SLP Goal  Short term goals:  1. Pt will complete assessment of complex reasoning (math, time, money mgmt)        Long Term Goals:  1. Pt will demonstrate adequate cognitive function for safe and efficient function in home, social and medical environments.    Outcome: Ongoing (interventions implemented as appropriate)  Pt required moderate cueing to demonstrate adequate attention and deductive reasoning skills in order to complete functional time management task. Given extra time and assistance, she was able to complete task with approx 70% acc. She answered word problems re: time management with 75% acc increasing to 90% acc with moderate assistance. Given repetition in 3/5 items, she was able to complete 4 item mental manipulations with 100% acc. With 3 items, Pt demonstrated improved working memory, as repetitions were required in 2/8 trials to enhance recall. She however, sequenced items in correct order with 100% acc.

## 2017-12-14 NOTE — PROGRESS NOTES
INPATIENT NEPHROLOGY PROGRESS NOTE  [facility] NEPHROLOGY    Patient Name: Juliane Ramos  Date: 12/14/2017    Reason for consultation: MC    Chief Complaint: Diabetic foot ulcer    History of Present Illness:  Ms. Ramos is a 56 y/o F with a hx of HTN, DM2, CHF, and stage III CKD who p/w a left heel ulcer/cellulitis x 2 weeks, nonhealing. Hospital course c/b PE, fall with SAH, and MC 2/2 contrast/vancomycin/infection requiring RRT. She is currently dialysis dependent. She is now at rehab.    · Interval History/Subjective:    - seen on HD  - getting 2 units of blood, will be net -3.3L  - + dyspnea  - no cp, abd pain, edema  - worked hard with PT today    · Review of Systems: All 14 systems reviewed and negative, except as noted in HPI    Plan of Care:    Assessment:  Oliguric MC 2/2 multifactorial ATN, dialysis dependent  HTN  Hyponatremia/Hyperkalemia  SHPT  Anemia of CKD     Plan:     1. Acute Kidney Injury- She remains HD dependent. Continue HD TTS. No NSAIDs or IV contrast.      2. Volume/Blood Pressure- Parameters are stable- no new med changes, continue UF 3-4L.     3. Electrolytes/Acid Base- Ordered renal panel for the AM.     4. Bone Mineral Metabolism- Will check phos in AM.     5. Anemia of CKD- Getting 2 units of blood with HD today. Continue EPO with HD. Ordered CBC for AM.  Suspect dyspnea is 2/2 anemia + deconditioning.    Thank you for allowing us to participate in this patient's care. We will continue to follow.    Medications:  No current facility-administered medications on file prior to encounter.      Current Outpatient Prescriptions on File Prior to Encounter   Medication Sig Dispense Refill    albuterol (ACCUNEB) 1.25 mg/3 mL Nebu Take 1.25 mg by nebulization every 6 (six) hours as needed. Rescue      carvedilol (COREG) 12.5 MG tablet Take 1 tablet (12.5 mg total) by mouth 2 (two) times daily. 60 tablet 11    cefepime in dextrose 5 % (MAXIPIME) 1 gram/50 mL PgBk Inject 50 mLs (1 g  total) into the vein every 24 hours as needed.      gabapentin (NEURONTIN) 100 MG capsule Take 1 capsule (100 mg total) by mouth 3 (three) times daily. 90 capsule 11    levetiracetam oral soln 500 mg/5 mL (5 mL) Soln 2.5 mLs (250 mg total) by Per NG tube route 2 (two) times daily. 150 mL 11    levothyroxine (SYNTHROID) 50 MCG tablet Take 50 mcg by mouth once daily.        metronidazole (FLAGYL) 500 mg/100 mL IVPB Inject 100 mLs (500 mg total) into the vein every 8 (eight) hours.      rosuvastatin (CRESTOR) 10 MG tablet Take 1 tablet (10 mg total) by mouth once daily. 30 tablet 0    VANCOMYCIN HCL (VANCOMYCIN IN 5 % DEXTROSE) 1.75 gram/500 mL Soln Inject 500 mLs (1,750 mg total) into the vein once daily.       Scheduled Meds:   carvedilol  12.5 mg Oral BID    cephALEXin  500 mg Oral Q12H    epoetin donita (PROCRIT) injection  10,000 Units Intravenous Every Tues, Thurs, Sat    fluconazole  100 mg Oral Daily    gabapentin  100 mg Oral TID    heparin (porcine)  5,000 Units Subcutaneous Q12H    insulin aspart  1-10 Units Subcutaneous TIDWM    levothyroxine  50 mcg Oral Before breakfast    miconazole nitrate 2%   Topical (Top) BID    rosuvastatin  10 mg Oral Daily    sodium chloride 0.9%  3 mL Intravenous Q8H     Continuous Infusions:  PRN Meds:.sodium chloride 0.9%, acetaminophen, albuterol sulfate, dextrose 50%, glucagon (human recombinant), glucose, glucose, heparin (porcine), labetalol, levetiracetam oral soln, magnesium oxide, magnesium oxide, ondansetron, potassium phosphate IVPB, simethicone, traMADol    Allergies:  Patient has no known allergies.    Vital Signs:  Temp Readings from Last 3 Encounters:   12/14/17 96 °F (35.6 °C)   12/11/17 97.4 °F (36.3 °C) (Oral)   11/27/17 97 °F (36.1 °C) (Oral)       Pulse Readings from Last 3 Encounters:   12/14/17 67   12/11/17 69   11/27/17 67       BP Readings from Last 3 Encounters:   12/14/17 (!) 116/59   12/11/17 (!) 125/59   11/27/17 123/67        Weight:  Wt Readings from Last 3 Encounters:   12/13/17 114.4 kg (252 lb 3.3 oz)   12/08/17 117.9 kg (260 lb)   11/27/17 117 kg (257 lb 15 oz)       Physical Exam:  Constitutional: tachypneic, aao x 3  Heart: rrr, no m/r/g, wwp, no edema in RLE  Lungs: ant ausc clear, no w/r/r/c  Abdomen: s/nt/nd, +BS    Results:  Lab Results   Component Value Date     (L) 12/13/2017    K 4.7 12/13/2017    CL 99 12/13/2017    CO2 21 (L) 12/13/2017    BUN 37 (H) 12/13/2017    CREATININE 4.9 (H) 12/13/2017    CALCIUM 8.4 (L) 12/13/2017    ANIONGAP 11 12/13/2017    ESTGFRAFRICA 11 (A) 12/13/2017    EGFRNONAA 9 (A) 12/13/2017       Lab Results   Component Value Date    CALCIUM 8.4 (L) 12/13/2017    PHOS 4.1 12/13/2017       No results for input(s): WBC, RBC, HGB, HCT, PLT, MCV, MCH, MCHC in the last 24 hours.    I have personally reviewed pertinent radiological imaging and reports.    Amairani Young MD MPH  [facility]  [address] [place] [id number]  [phone] (p)  [phone] (f)

## 2017-12-14 NOTE — PLAN OF CARE
Problem: Patient Care Overview  Goal: Plan of Care Review  Patient not in any distress.she refused BIPAP

## 2017-12-15 LAB
ALBUMIN SERPL BCP-MCNC: 2.3 G/DL
ANION GAP SERPL CALC-SCNC: 9 MMOL/L
BACTERIA SPEC ANAEROBE CULT: NORMAL
BUN SERPL-MCNC: 36 MG/DL
CALCIUM SERPL-MCNC: 8.6 MG/DL
CHLORIDE SERPL-SCNC: 99 MMOL/L
CO2 SERPL-SCNC: 23 MMOL/L
CREAT SERPL-MCNC: 4.8 MG/DL
ERYTHROCYTE [DISTWIDTH] IN BLOOD BY AUTOMATED COUNT: 22.6 %
EST. GFR  (AFRICAN AMERICAN): 11 ML/MIN/1.73 M^2
EST. GFR  (NON AFRICAN AMERICAN): 10 ML/MIN/1.73 M^2
GLUCOSE SERPL-MCNC: 188 MG/DL
HCT VFR BLD AUTO: 28.2 %
HGB BLD-MCNC: 9 G/DL
MCH RBC QN AUTO: 25.2 PG
MCHC RBC AUTO-ENTMCNC: 31.8 G/DL
MCV RBC AUTO: 79 FL
PHOSPHATE SERPL-MCNC: 3.9 MG/DL
PLATELET # BLD AUTO: 187 K/UL
PMV BLD AUTO: 8 FL
POCT GLUCOSE: 148 MG/DL (ref 70–110)
POCT GLUCOSE: 160 MG/DL (ref 70–110)
POCT GLUCOSE: 178 MG/DL (ref 70–110)
POCT GLUCOSE: 211 MG/DL (ref 70–110)
POCT GLUCOSE: 234 MG/DL (ref 70–110)
POTASSIUM SERPL-SCNC: 4.5 MMOL/L
RBC # BLD AUTO: 3.56 M/UL
SODIUM SERPL-SCNC: 131 MMOL/L
WBC # BLD AUTO: 7.8 K/UL

## 2017-12-15 PROCEDURE — 97532 *HC SP COG SKL DEV EA 15 MIN: CPT

## 2017-12-15 PROCEDURE — 97530 THERAPEUTIC ACTIVITIES: CPT

## 2017-12-15 PROCEDURE — 36415 COLL VENOUS BLD VENIPUNCTURE: CPT

## 2017-12-15 PROCEDURE — 12800000 HC REHAB SEMI-PRIVATE ROOM

## 2017-12-15 PROCEDURE — 97803 MED NUTRITION INDIV SUBSEQ: CPT

## 2017-12-15 PROCEDURE — 87449 NOS EACH ORGANISM AG IA: CPT

## 2017-12-15 PROCEDURE — 63600175 PHARM REV CODE 636 W HCPCS: Performed by: INTERNAL MEDICINE

## 2017-12-15 PROCEDURE — 97110 THERAPEUTIC EXERCISES: CPT

## 2017-12-15 PROCEDURE — 97542 WHEELCHAIR MNGMENT TRAINING: CPT

## 2017-12-15 PROCEDURE — 25000003 PHARM REV CODE 250: Performed by: INTERNAL MEDICINE

## 2017-12-15 PROCEDURE — A4216 STERILE WATER/SALINE, 10 ML: HCPCS | Performed by: INTERNAL MEDICINE

## 2017-12-15 PROCEDURE — 27000221 HC OXYGEN, UP TO 24 HOURS

## 2017-12-15 PROCEDURE — 94760 N-INVAS EAR/PLS OXIMETRY 1: CPT

## 2017-12-15 PROCEDURE — 25000003 PHARM REV CODE 250: Performed by: PHYSICAL MEDICINE & REHABILITATION

## 2017-12-15 PROCEDURE — 85027 COMPLETE CBC AUTOMATED: CPT

## 2017-12-15 PROCEDURE — 94761 N-INVAS EAR/PLS OXIMETRY MLT: CPT

## 2017-12-15 PROCEDURE — 99900035 HC TECH TIME PER 15 MIN (STAT)

## 2017-12-15 PROCEDURE — 80069 RENAL FUNCTION PANEL: CPT

## 2017-12-15 PROCEDURE — 92507 TX SP LANG VOICE COMM INDIV: CPT

## 2017-12-15 RX ORDER — DIPHENOXYLATE HYDROCHLORIDE AND ATROPINE SULFATE 2.5; .025 MG/1; MG/1
1 TABLET ORAL EVERY 6 HOURS PRN
Status: DISCONTINUED | OUTPATIENT
Start: 2017-12-15 | End: 2017-12-29

## 2017-12-15 RX ORDER — METRONIDAZOLE 250 MG/1
250 TABLET ORAL EVERY 8 HOURS
Status: COMPLETED | OUTPATIENT
Start: 2017-12-15 | End: 2017-12-22

## 2017-12-15 RX ADMIN — LEVOTHYROXINE SODIUM 50 MCG: 25 TABLET ORAL at 05:12

## 2017-12-15 RX ADMIN — GABAPENTIN 100 MG: 100 CAPSULE ORAL at 02:12

## 2017-12-15 RX ADMIN — FLUCONAZOLE 100 MG: 100 TABLET ORAL at 09:12

## 2017-12-15 RX ADMIN — CEPHALEXIN 500 MG: 500 CAPSULE ORAL at 09:12

## 2017-12-15 RX ADMIN — ROSUVASTATIN CALCIUM 10 MG: 5 TABLET ORAL at 09:12

## 2017-12-15 RX ADMIN — HEPARIN SODIUM 5000 UNITS: 5000 INJECTION, SOLUTION INTRAVENOUS; SUBCUTANEOUS at 09:12

## 2017-12-15 RX ADMIN — SODIUM CHLORIDE, PRESERVATIVE FREE 3 ML: 5 INJECTION INTRAVENOUS at 05:12

## 2017-12-15 RX ADMIN — METRONIDAZOLE 250 MG: 250 TABLET ORAL at 09:12

## 2017-12-15 RX ADMIN — DIPHENOXYLATE HYDROCHLORIDE AND ATROPINE SULFATE 1 TABLET: 2.5; .025 TABLET ORAL at 07:12

## 2017-12-15 RX ADMIN — INSULIN ASPART 2 UNITS: 100 INJECTION, SOLUTION INTRAVENOUS; SUBCUTANEOUS at 04:12

## 2017-12-15 RX ADMIN — CARVEDILOL 12.5 MG: 6.25 TABLET, FILM COATED ORAL at 09:12

## 2017-12-15 RX ADMIN — MICONAZOLE NITRATE: 20 OINTMENT TOPICAL at 09:12

## 2017-12-15 RX ADMIN — GABAPENTIN 100 MG: 100 CAPSULE ORAL at 05:12

## 2017-12-15 RX ADMIN — GABAPENTIN 100 MG: 100 CAPSULE ORAL at 09:12

## 2017-12-15 RX ADMIN — SODIUM CHLORIDE, PRESERVATIVE FREE 3 ML: 5 INJECTION INTRAVENOUS at 04:12

## 2017-12-15 RX ADMIN — INSULIN ASPART 4 UNITS: 100 INJECTION, SOLUTION INTRAVENOUS; SUBCUTANEOUS at 12:12

## 2017-12-15 NOTE — PROGRESS NOTES
HD:  Pt stable, tx complete, lines reinfused, catheter capped and clamped.  Pt tolerated tx well.  2 units PRBCs transfused during HD today.    NET UF:  3300 mL

## 2017-12-15 NOTE — ASSESSMENT & PLAN NOTE
Nutrition Diagnosis  Increase proteinl needs     Related to (etiology):   Wound healing    Signs and Symptoms (as evidenced by):   Presence of stage 3 wound on left foot    Interventions/Recommendations (treatment strategy):  1) Continue diabetic, renal diet.  2)  Novasource Renal has been d/c's due to pt's excessive calorie intake and pt receiving a protein supplement that is lower in calories    Nutrition Diagnosis Status:   Progressing

## 2017-12-15 NOTE — PLAN OF CARE
Problem: Patient Care Overview  Goal: Plan of Care Review  Outcome: Ongoing (interventions implemented as appropriate)  Seems very alert and energized. Ate 100%. Breathing easily with hob elevated at 60 degrees, was wearing 3 liter oxygen via nc, but now has bipap on. Abdomen very large. Was just dialyzed and removed 3.3 liter. Anuric. Has wound vac to left lower leg. Has rash to body, almost like ring worm.  Denies itching. Face bruised. Took po meds well. Cont poc. 2 person assist with turning and pulling up. Max to depend.  Teaching given regarding meds. Verbalized understanding.

## 2017-12-15 NOTE — PT/OT/SLP PROGRESS
Physical Therapy         Treatment        Juliane Ramos   MRN: [id number]     PT Received On: 12/15/17  Total Time (min): 90        Billable Minutes:  Therapeutic Activity 30, Therapeutic Exercise 50 and Train/Wheelchair Management 10  Total Minutes: 90    Treatment Type: Treatment  PT/PTA: PT     PTA Visit Number: 0       General Precautions: Standard, fall  Orthopedic Precautions: Orthopedic Precautions : LLE non weight bearing     Subjective:    Pain/Comfort  Pain Rating 1: 0/10  Pain Rating Post-Intervention 1: 0/10    Objective:  Patient found supine in bed, cooperative.  Patient found with: oxygen, wound vac (CAM boot LLE;  3 L/min via nc throughout session)    Functional Mobility:  Bed Mobility:   Supine to sit: Moderate Assistance     Transfer Training:  Sit to stand:Moderate Assistance with Grab bars  x 6  Bed <> Chair:  Squat Pivot with Moderate Assistance with No Assistive Device NWB LLE    Wheelchair Trainin' with min assist and cues    Additional Treatment:  SUPINE: ankle DF (R only), SLR (L with assist), hip abd/add (R only), heelslide (R only), x 20 reps x 2 sets each  SEATED: hip adduction with ball, hip abduction with green tband, knee flexion with green tband, LAQ (R with 4#), x 30 reps each (abd x 2 sets)  STANDING: in / bars (LLE only, NWB LLE): sidekicks, SLR, x 10 reps x 4 sets each  STATIC STAND BALANCE TRAINING: in // bars (NWB LLE): x 2 min x 5 trials with CGA and cues      Activity Tolerance:  Patient tolerated treatment well    Patient left up in chair with all lines intact, call button in reach and O2 to wall 3 L/min via nc.    Assessment:  Juliane Ramos is a 57 y.o. female    Rehab potential is fair.    Activity tolerance: Fair    GOALS:    Physical Therapy Goals        Problem: Physical Therapy Goal    Goal Priority Disciplines Outcome Goal Variances Interventions   Physical Therapy Goal     PT/OT, PT Ongoing (interventions implemented as appropriate)     Description:  Goals to be  met by: 2018     Patient will increase functional independence with mobility by performin). Supine to sit with Modified [place]  2). Sit to supine with Modified [place]  3). Sit to stand transfer with Stand-by Assistance  4). Bed to chair transfer with Stand-by Assistance   5). Wheelchair propulsion x > 250 feet with Modified [place]  6). Stand for  > 3 minutes with Stand-by Assistance NWB LLE                      PLAN:    Patient to be seen daily  to address the above listed problems via therapeutic activities, therapeutic exercises, neuromuscular re-education, wheelchair management/training  Plan of Care expires:    Plan of Care reviewed with: patient         Albert VAZQUEZ Pratikwilmer, PT 12/15/2017

## 2017-12-15 NOTE — PLAN OF CARE
Problem: SLP Goal  Goal: SLP Goal  Short term goals:  1. Pt will complete complex reasoning tasks (math, time, money mgmt) with 90% acc independently   2. Pt will complete complex sequencing tasks (pill mgmt/box, recall of transfer, etc.) with 90% acc independently   3. Pt will demonstrate adequate attention (sustained/alternating/selective) to complete functional tasks, as listed above, in 9/10 attempts independently      Long Term Goals:  1. Pt will demonstrate adequate cognitive function for safe and efficient function in home, social and medical environments.    Outcome: Ongoing (interventions implemented as appropriate)  Pt required minimal assistance to demonstrate adequate attention and deductive reasoning skills in order to complete functional time management task. Given extra time and assistance, she was able to complete task with approx 90% acc. Given functional visual stimuli such as receipt, she answered computation word problems with 50% acc independently increasing to 100% acc given moderate assistance. She answered time management and organization word problems with 100% acc independently given extra time.

## 2017-12-15 NOTE — PROGRESS NOTES
INPATIENT NEPHROLOGY PROGRESS NOTE  [facility] NEPHROLOGY    Patient Name: Juliane Ramos  Date: 12/15/2017    Reason for consultation: MC    Chief Complaint: Diabetic foot infection    History of Present Illness:  Ms. Ramos is a 58 y/o F with a hx of HTN, DM2, CHF, and stage III CKD who p/w a left heel ulcer/cellulitis x 2 weeks, nonhealing. Hospital course c/b PE, fall with SAH, and MC 2/2 contrast/vancomycin/infection requiring RRT. She is currently dialysis dependent. She is now at rehab.    · Interval History/Subjective:    - did well with PT  - some diarrhea after eating gravy, 1 episode today, otherwise qod  - no cp, dyspnea, abd pain, edema    · Review of Systems: All 14 systems reviewed and negative, except as noted in HPI    Plan of Care:    Assessment:  Oliguric MC 2/2 multifactorial ATN, dialysis dependent  HTN  Hyponatremia/Hyperkalemia  SHPT  Anemia of CKD     Plan:     1. Acute Kidney Injury- She remains HD dependent. Continue HD TTS. No NSAIDs or IV contrast.      2. Volume/Blood Pressure- Parameters are stable- continue UF 3-4L.     3. Electrolytes/Acid Base- HypoNa is stable. K is at goal.     4. Bone Mineral Metabolism- Phos is at goal- no need for binder.     5. Anemia of CKD- Hb is better after blood transfusion. Continue EPO with HD.    Labs ordered for Monday.    Thank you for allowing us to participate in this patient's care. We will continue to follow.    Medications:  No current facility-administered medications on file prior to encounter.      Current Outpatient Prescriptions on File Prior to Encounter   Medication Sig Dispense Refill    albuterol (ACCUNEB) 1.25 mg/3 mL Nebu Take 1.25 mg by nebulization every 6 (six) hours as needed. Rescue      carvedilol (COREG) 12.5 MG tablet Take 1 tablet (12.5 mg total) by mouth 2 (two) times daily. 60 tablet 11    cefepime in dextrose 5 % (MAXIPIME) 1 gram/50 mL PgBk Inject 50 mLs (1 g total) into the vein every 24 hours as needed.       gabapentin (NEURONTIN) 100 MG capsule Take 1 capsule (100 mg total) by mouth 3 (three) times daily. 90 capsule 11    levetiracetam oral soln 500 mg/5 mL (5 mL) Soln 2.5 mLs (250 mg total) by Per NG tube route 2 (two) times daily. 150 mL 11    levothyroxine (SYNTHROID) 50 MCG tablet Take 50 mcg by mouth once daily.        metronidazole (FLAGYL) 500 mg/100 mL IVPB Inject 100 mLs (500 mg total) into the vein every 8 (eight) hours.      rosuvastatin (CRESTOR) 10 MG tablet Take 1 tablet (10 mg total) by mouth once daily. 30 tablet 0    VANCOMYCIN HCL (VANCOMYCIN IN 5 % DEXTROSE) 1.75 gram/500 mL Soln Inject 500 mLs (1,750 mg total) into the vein once daily.       Scheduled Meds:   carvedilol  12.5 mg Oral BID    cephALEXin  500 mg Oral Q12H    epoetin donita (PROCRIT) injection  10,000 Units Intravenous Every Tues, Thurs, Sat    fluconazole  100 mg Oral Daily    gabapentin  100 mg Oral TID    heparin (porcine)  5,000 Units Subcutaneous Q12H    insulin aspart  1-10 Units Subcutaneous TIDWM    levothyroxine  50 mcg Oral Before breakfast    miconazole nitrate 2%   Topical (Top) BID    rosuvastatin  10 mg Oral Daily    sodium chloride 0.9%  3 mL Intravenous Q8H     Continuous Infusions:  PRN Meds:.sodium chloride 0.9%, acetaminophen, albuterol sulfate, dextrose 50%, glucagon (human recombinant), glucose, glucose, heparin (porcine), labetalol, levetiracetam oral soln, magnesium oxide, magnesium oxide, ondansetron, potassium phosphate IVPB, simethicone, traMADol    Allergies:  Patient has no known allergies.    Vital Signs:  Temp Readings from Last 3 Encounters:   12/15/17 97.7 °F (36.5 °C) (Oral)   12/11/17 97.4 °F (36.3 °C) (Oral)   11/27/17 97 °F (36.1 °C) (Oral)       Pulse Readings from Last 3 Encounters:   12/15/17 65   12/11/17 69   11/27/17 67       BP Readings from Last 3 Encounters:   12/15/17 117/70   12/11/17 (!) 125/59   11/27/17 123/67       Weight:  Wt Readings from Last 3 Encounters:   12/13/17  114.4 kg (252 lb 3.3 oz)   12/08/17 117.9 kg (260 lb)   11/27/17 117 kg (257 lb 15 oz)       Physical Exam:  Constitutional: nad, aao x 3  Heart: rrr, no m/r/g, wwp  Lungs: ant ausc clear, no w/r/r/c  Abdomen: s/nt/nd, +BS    Results:  Lab Results   Component Value Date     (L) 12/15/2017    K 4.5 12/15/2017    CL 99 12/15/2017    CO2 23 12/15/2017    BUN 36 (H) 12/15/2017    CREATININE 4.8 (H) 12/15/2017    CALCIUM 8.6 (L) 12/15/2017    ANIONGAP 9 12/15/2017    ESTGFRAFRICA 11 (A) 12/15/2017    EGFRNONAA 10 (A) 12/15/2017       Lab Results   Component Value Date    CALCIUM 8.6 (L) 12/15/2017    PHOS 3.9 12/15/2017         Recent Labs  Lab 12/15/17  0736   WBC 7.80   RBC 3.56*   HGB 9.0*   HCT 28.2*      MCV 79*   MCH 25.2*   MCHC 31.8*       I have personally reviewed pertinent radiological imaging and reports.    Amairani Young MD MPH  [facility]  [address] [place] [address]  [place] [address]  [phone] (p)  [phone] (f)

## 2017-12-15 NOTE — CONSULTS
[facility]  Adult Nutrition  Consult Note    SUMMARY     Recommendations    Recommendation/Intervention: 1) Increase calories in diabetic diet to 2000 and continue renal diet 2) recommend discontinuing Novasource Renal 2/2 excessive calorie and cho intake and order Beneprotein, 2 packets TID to reduce calories and continue with protein supplementation (1 carton of Novasource Renal contains 475 calories, 21.6 gms protein, 43.5 gms CHO) 3) consider MVI for renal patients  Goals: 1) Pt will consume at least 85% of EEN/EPN daily   Nutrition Goal Status: continues  Communication of RD Recs:  (sticky note, second sign)    Discharge Plan     diabetic, renal diet with protein supplement until wound heals    Reason for Assessment    Reason for Assessment: RD follow up      1. SAH (subarachnoid hemorrhage)      Past Medical History:   Diagnosis Date    Anemia in stage 3 chronic kidney disease 11/26/2017    CHF (congestive heart failure)     COPD (chronic obstructive pulmonary disease)     Coronary artery disease     Diabetes mellitus     Encounter for blood transfusion     Essential hypertension 6/24/2017    Hypertension     MI (myocardial infarction) 2010    Segmental and subsegmental pulmonary emboli of RLL without acute cor pulmonale 11/25/2017    Stroke     July 2005    Thyroid disease     Traumatic subarachnoid hemorrhage 11/24/2017    Type 2 diabetes mellitus with stage 3 chronic kidney disease, without long-term current use of insulin 6/24/2017        Interdisciplinary Rounds: attended     General Information Comments: Admitted for rehab s/p debridement of stage 3 diabetic foot ulcer (left). Is on wound vac. Pt reports good appetite and has gained wt.  Wt 6/27/17 was 240 lbs, 1.3 oz. Pt states  and is now 252 lbs, 1.6 oz. On HD.   Fluid vs actual wt gain?  12/15/17 Fair po intake with 67% meal intake x last 6 meals. Pt continues to receive Novasource Renal.   Wt Readings from  Last 1 Encounters:   12/13/17 1732 114.4 kg (252 lb 3.3 oz)   12/11/17 1737 114.4 kg (252 lb 1.6 oz)         Nutrition Prescription Ordered    Current Diet Order: 1800 calorie diabetic diet   Nutrition Order Comments: Note Novasource Renal has 475 calories, 21.6 gms protein and 43.5 gms CHO per carton.    Oral Nutrition Supplement: Novasource Renal, tid     Evaluation of Received Nutrients/Fluid Intake    Intake/Output Summary (Last 24 hours) at 12/15/17 1743  Last data filed at 12/15/17 0800   Gross per 24 hour   Intake             1220 ml   Output             4500 ml   Net            -3280 ml     Fluid Required:  (per primary team or UOP + 1000 mls)        % Intake of Estimated Energy Needs: 50-75 %  % Meal Intake:67% average for last 6 meals    Nutrition Risk Screen     Nutrition Risk Screen: large or nonhealing wound, burn or pressure ulcer    Nutrition/Diet History    Patient Reported Diet/Restrictions/Preferences: diabetic diet  Typical Food/Fluid Intake: Pt uses Boost at home. NKFA  Food Preferences: no cultural or religous food preferences identified.      Labs/Tests/Procedures/Meds    Diagnostic Test/Procedure Review:  (on HD)  Pertinent Labs Reviewed: reviewed, pertinent   BMP  Lab Results   Component Value Date     (L) 12/15/2017    K 4.5 12/15/2017    CL 99 12/15/2017    CO2 23 12/15/2017    BUN 36 (H) 12/15/2017    CREATININE 4.8 (H) 12/15/2017    CALCIUM 8.6 (L) 12/15/2017    ANIONGAP 9 12/15/2017    ESTGFRAFRICA 11 (A) 12/15/2017    EGFRNONAA 10 (A) 12/15/2017     Lab Results   Component Value Date    CALCIUM 8.6 (L) 12/15/2017    PHOS 3.9 12/15/2017     Lab Results   Component Value Date    HGBA1C 8.0 (H) 11/25/2017       Recent Labs  Lab 12/15/17  1201   POCTGLUCOSE 234*     Lab Results   Component Value Date    ALBUMIN 2.3 (L) 12/15/2017     Lab Results   Component Value Date    CRP 83.9 (H) 11/18/2017     Lab Results   Component Value Date    CHOL 75 (L) 11/24/2017    CHOL 115 (L) 06/27/2017  "    Lab Results   Component Value Date    HDL 19 (L) 2017    HDL 25 (L) 2017     Lab Results   Component Value Date    LDLCALC 42.8 (L) 2017    LDLCALC 72.4 2017     Lab Results   Component Value Date    TRIG 66 2017    TRIG 88 2017     Lab Results   Component Value Date    CHOLHDL 25.3 2017    CHOLHDL 21.7 2017      Pertinent Medications Reviewed: reviewed, pertinent      Scheduled Meds:   carvedilol  12.5 mg Oral BID    cephALEXin  500 mg Oral Q12H    epoetin donita (PROCRIT) injection  10,000 Units Intravenous Every Tues, Thurs, Sat    fluconazole  100 mg Oral Daily    gabapentin  100 mg Oral TID    heparin (porcine)  5,000 Units Subcutaneous Q12H    insulin aspart  1-10 Units Subcutaneous TIDWM    levothyroxine  50 mcg Oral Before breakfast    miconazole nitrate 2%   Topical (Top) BID    rosuvastatin  10 mg Oral Daily    sodium chloride 0.9%  3 mL Intravenous Q8H     Continuous Infusions:        Physical Findings    Overall Physical Appearance: obese     Oral/Mouth Cavity: tooth/teeth missing  Skin:  (Donny score 16; no edema noted in MD's  progress note)    Anthropometrics    Temp: 97.7 °F (36.5 °C)     Height: 5' 6"  Weight Method: Bed Scale  Weight: 114.4 kg (252 lb 3.3 oz)     Ideal Body Weight (IBW), Female: 130 lb     % Ideal Body Weight, Female (lb): 194.01 lb  BMI (Calculated): 40.8  BMI Grade: greater than 40 - morbid obesity     Usual Body Weight (UBW), k.9 kg (per chart review 17)     % Usual Body Weight: 105.27  % Weight Change From Usual Weight: 5.05 %     Estimated/Assessed Needs    Weight Used For Calorie Calculations: 82 kg (180 lb 12.4 oz) (per NHANES SBW for renal pts)   Height (cm): 167.6 cm   Energy Need Method: Kcal/kg   25 kcal/kg (kcal): 0 and 30 kcal/kg (kcal): 2460   RMR ([place]-St. Jeor Equation): 1431.75   Weight Used For Protein Calculations: 82 kg (180 lb 12.4 oz)   1.2 gm Protein (gm): 98.61 and 1.5 gm " Protein (gm): 123.26  Fluid Requirements (mL):  (per primary team 2/2 renal issues)       CHO Requirement: 45-50% of EEN (225 gms)    Assessment and Plan    Wounds, multiple    Nutrition Diagnosis  Increase proteinl needs     Related to (etiology):   Wound healing    Signs and Symptoms (as evidenced by):   Presence of stage 3 wound on left foot    Interventions/Recommendations (treatment strategy):  1) Continue diabetic, renal diet.  2) Recommend discontinuing Novasource Renal due to pt's excessive calorie intake and provide a protein supplement that is lower in calories    Nutrition Diagnosis Status:   Continues              Monitor and Evaluation    Food and Nutrient Intake: energy intake  Food and Nutrient Adminstration: diet order   Physical Activity and Function: nutrition-related ADLs and IADLs  Anthropometric Measurements: weight, weight change  Biochemical Data, Medical Tests and Procedures: electrolyte and renal panel, glucose/endocrine profile, inflammatory profile, lipid profile  Nutrition-Focused Physical Findings: overall appearance, skin    Nutrition Risk    2 x weekly    Nutrition Follow-Up    RD Follow-up?: Yes

## 2017-12-15 NOTE — PROGRESS NOTES
12/14/17 2185   Patient Assessment/Suction   Level of Consciousness (AVPU) alert   Respiratory Effort Normal;Unlabored   All Lung Fields Breath Sounds clear   PRE-TX-O2-ETCO2   O2 Device (Oxygen Therapy) nasal cannula   Flow (L/min) 3   Oxygen Concentration (%) 32   SpO2 96 %   Pulse 75   Resp 16   Aerosol Therapy   $ Aerosol Therapy Charges PRN treatment not required   Ready to Wean/Extubation Screen   FIO2<=50 (chart decimal) 0.32   Preset CPAP/BiPAP Settings   Mode Of Delivery BiPAP;other (see comments)  (pt eating, will call when ready)

## 2017-12-15 NOTE — PROGRESS NOTES
[facility]  Physical Medicine & Rehab  Progress Note    Patient Name: Juliane Ramos  MRN: [id number]  Patient Class: IP- Rehab   Admission Date: 12/11/2017  Length of Stay: 4 days  Attending Physician: Rishi Ness MD  Primary Care Provider: JOS Dumont    Subjective:     Principal Problem:  SAH  Interval History: pt is 57 y.o female with dx of SAH, s/p debridement of wound & dialysis dep . participating with therapy     Scheduled Medications:    carvedilol  12.5 mg Oral BID    cephALEXin  500 mg Oral Q12H    epoetin donita (PROCRIT) injection  10,000 Units Intravenous Every Tues, Thurs, Sat    fluconazole  100 mg Oral Daily    gabapentin  100 mg Oral TID    heparin (porcine)  5,000 Units Subcutaneous Q12H    insulin aspart  1-10 Units Subcutaneous TIDWM    levothyroxine  50 mcg Oral Before breakfast    miconazole nitrate 2%   Topical (Top) BID    rosuvastatin  10 mg Oral Daily    sodium chloride 0.9%  3 mL Intravenous Q8H       PRN Medications: sodium chloride 0.9%, acetaminophen, albuterol sulfate, dextrose 50%, glucagon (human recombinant), glucose, glucose, heparin (porcine), labetalol, levetiracetam oral soln, magnesium oxide, magnesium oxide, ondansetron, potassium phosphate IVPB, simethicone, traMADol    Review of Systems   Constitutional: Negative.    HENT: Negative.    Eyes: Negative.    Respiratory: Negative.    Cardiovascular: Negative.    Gastrointestinal: Negative.    Endocrine: Negative.    Genitourinary: Negative.    Musculoskeletal: Negative.    Skin: Negative.    Allergic/Immunologic: Negative.    Neurological: Negative.    Hematological: Negative.    Psychiatric/Behavioral: Negative.      Objective:     Vital Signs (Most Recent):  Temp: 97.3 °F (36.3 °C) (12/15/17 0517)  Pulse: 69 (12/15/17 0517)  Resp: 20 (12/15/17 0517)  BP: 111/73 (12/15/17 0517)  SpO2: 96 % (12/14/17 2258)    Vital Signs (24h Range):  Temp:  [97.3 °F (36.3 °C)-97.6 °F (36.4 °C)] 97.3 °F  (36.3 °C)  Pulse:  [65-78] 69  Resp:  [14-22] 20  SpO2:  [95 %-100 %] 96 %  BP: (111-144)/(63-92) 111/73     Physical Exam   Constitutional: She is oriented to person, place, and time. She appears well-developed and well-nourished. No distress.   HENT:   Head: Normocephalic and atraumatic.   Right Ear: External ear normal.   Left Ear: External ear normal.   Nose: Nose normal.   Mouth/Throat: Oropharynx is clear and moist. No oropharyngeal exudate.   Eyes: Conjunctivae and EOM are normal. Pupils are equal, round, and reactive to light. Right eye exhibits no discharge. Left eye exhibits no discharge. No scleral icterus.   Neck: Normal range of motion. Neck supple. No JVD present. No tracheal deviation present. No thyromegaly present.   Cardiovascular: Normal rate, regular rhythm, normal heart sounds and intact distal pulses.  Exam reveals no gallop and no friction rub.    No murmur heard.  Pulmonary/Chest: Effort normal and breath sounds normal. No stridor. No respiratory distress. She has no wheezes. She has no rales. She exhibits no tenderness.   Abdominal: Soft. Bowel sounds are normal. She exhibits no distension and no mass. There is no tenderness. There is no rebound and no guarding. No hernia.   Obese    Genitourinary:   Genitourinary Comments: deferred   Musculoskeletal: Normal range of motion. She exhibits no edema, tenderness or deformity.   Lymphadenopathy:     She has no cervical adenopathy.   Neurological: She is alert and oriented to person, place, and time. She exhibits normal muscle tone.   Skin: No rash noted. She is not diaphoretic. No erythema. No pallor.   Wound achillis tendon stable    Psychiatric: She has a normal mood and affect. Her behavior is normal.     NEUROLOGICAL EXAMINATION:     MENTAL STATUS   Oriented to person, place, and time.     CRANIAL NERVES     CN III, IV, VI   Pupils are equal, round, and reactive to light.  Extraocular motions are normal.       Assessment/Plan:      Juliane  Richard is a 57 y.o. female admitted to inpatient rehabilitation on 12/11/2017 for <principal problem not specified> with impaired mobility and ADLs. Patient remains appropriate for PT, OT, and as required Speech therapy. Patient continues to require 24 hour nursing care as well as daily Physician assessment.    Active Diagnoses:    Diagnosis Date Noted POA    Wounds, multiple [T07.XXXA] 12/13/2017 Yes    SAH (subarachnoid hemorrhage) [I60.9] 12/11/2017 Yes      Problems Resolved During this Admission:    Diagnosis Date Noted Date Resolved POA   SAH, PE, cont PT, OT, ST  Dialysis dep,  F/u by nephro  HTN, vitals noted, cont current meds  DVT prophylaxis, cont sq heparin     DISCHARGE PLANNING:  Tentative Discharge Date:     No future appointments.    Rishi Ness MD  Department of Physical Medicine & Rehab  [facility]

## 2017-12-15 NOTE — PT/OT/SLP PROGRESS
Speech Language Pathology Treatment             Juliane Ramos   MRN: [id number]     Diet recommendations: Solid Diet Level: Regular  Liquid Diet Level: Thin     SLP Treatment Date: 12/15/17  Speech Start Time: 1125     Speech Stop Time: 1155     Speech Total (min): 30 min       TREATMENT BILLABLE MINUTES:  Speech Therapy Individual 30 and Total Time 30    General Precautions: Standard, fall  Current Respiratory Status: nasal cannula       Subjective:  Pt alert, pleasant and participatory     Objective:    Patient found with: oxygen, wound vac; performance this date is as follows:   Pt required minimal assistance to demonstrate adequate attention and deductive reasoning skills in order to complete functional time management task. Given extra time and assistance, she was able to complete task with approx 90% acc. Given functional visual stimuli such as receipt, she answered computation word problems with 50% acc independently increasing to 100% acc given moderate assistance. She answered time management and organization word problems with 100% acc independently given extra time.      Assessment:  Juliane Ramos is a 57 y.o. female with a SLP diagnosis of mild Cognitive Impairment characterized by deficits in attention and executive function, impacting her success and efficiency within ADLs such as time/money management.      Diet recommendations:        Liquid Diet Level: Thin  -see above     Discharge recommendations: Discharge Facility/Level Of Care Needs: home     Goals:    SLP Goals        Problem: SLP Goal    Goal Priority Disciplines Outcome   SLP Goal     SLP Ongoing (interventions implemented as appropriate)   Description:  Short term goals:  1. Pt will complete assessment of complex reasoning (math, time, money mgmt)        Long Term Goals:  1. Pt will demonstrate adequate cognitive function for safe and efficient function in home, social and medical environments.                      Plan:   Patient to be  seen Therapy Frequency: 5 x/week   Plan of Care Expires:    Plan of Care reviewed with: patient  SLP Follow-up?: Yes  SLP - Next Visit Date: 12/16/17      Diet recommendations:        Liquid Diet Level: Thin  -see above     Discharge recommendations: Discharge Facility/Level Of Care Needs: home          ST Kristi 12/15/2017

## 2017-12-15 NOTE — PLAN OF CARE
Problem: Patient Care Overview  Goal: Individualization & Mutuality  Recommendation/Intervention: 1) Increase calories in diabetic diet to 2000 and continue renal diet 2) recommend discontinuing Novasource Renal 2/2 excessive calorie and cho intake and order Beneprotein, 2 packets TID to reduce calories and continue with protein supplementation (1 carton of Novasource Renal contains 475 calories, 21.6 gms protein, 43.5 gms CHO) 3) consider MVI for renal patients  Goals: 1) Pt will consume at least 85% of EEN/EPN daily   Nutrition Goal Status: continues  Communication of RD Recs:  (sticky note, second sign)

## 2017-12-15 NOTE — PT/OT/SLP PROGRESS
Occupational Therapy  Treatment    Juliane Ramos   MRN: [id number]   Admitting Diagnosis: Subarachnoid Hemorrhage    OT Date of Treatment: 12/15/17   Total Time (min): 55 min      Billable Minutes:  Therapeutic Activity 60  Total Minutes: 60    General Precautions: Standard, fall    Do you have any cultural, spiritual, Religion conflicts, given your current situation?: None    Subjective:  Communicated with nursing prior to session.    Pain/Comfort  Pain Rating 1: 0/10    Objective:  Patient found with: central line, oxygen, wound vac, sitting up in a wheelchair.  Wheelchair changed for better performing one, transfer completed between wheelchairs with min assistance and verbal cues for placement of hands, right foot. (non-weight bearing LLE).  Patient did self propel herself to and from the therapy gym with escort.  Educated patient on table top activities: She was given a large cut of yellow thera-puty with hidden marbles and beans for search and removal, all items found.  U-fint it used with several items located as called out by staff.    Additional Treatment:  Transfer safety discussed.    Patient left up in chair with all lines intact, call button in reach, chair alarm on and nursing notified    ASSESSMENT:  Juliane Ramos is a 57 y.o. female with a medical diagnosis of Subarachnoid Hemorrhage.    Rehab potential is good    Activity tolerance: Good      GOALS:    Occupational Therapy Goals        Problem: Occupational Therapy Goal    Goal Priority Disciplines Outcome Interventions   Occupational Therapy Goal     OT, PT/OT Ongoing (interventions implemented as appropriate)    Description:  Goals to be met by: 12/29/17     Patient will increase functional independence with ADLs by performing:    Feeding with [place].  UE Dressing with [place].  LE Dressing with Supervision.  Grooming while seated with [place].  Toileting from toilet with bedside commode positioned over Minimal Assistance for  hygiene and clothing management.   Bathing from shower chair/bench with Minimal Assistance with DME/AE as needed.  Shower transfer with Minimal Assistance with DME/AE as needed .  Toilet transfer to toilet with Minimal Assistance with DME as needed.                      Plan:  Cont POC  Patient to be seen 6 x/week to address the above listed problems via self-care/home management, community/work re-entry, therapeutic activities, therapeutic exercises, therapeutic groups, neuromuscular re-education, cognitive retraining, wheelchair management/training  Plan of Care expires: 12/29/17  Plan of Care reviewed with: patient         Dale CHRISTIANO Singer  12/15/2017

## 2017-12-16 LAB
C DIFF GDH STL QL: NEGATIVE
C DIFF TOX A+B STL QL IA: NEGATIVE
POCT GLUCOSE: 130 MG/DL (ref 70–110)
POCT GLUCOSE: 210 MG/DL (ref 70–110)
POCT GLUCOSE: 232 MG/DL (ref 70–110)

## 2017-12-16 PROCEDURE — 92507 TX SP LANG VOICE COMM INDIV: CPT

## 2017-12-16 PROCEDURE — 94761 N-INVAS EAR/PLS OXIMETRY MLT: CPT

## 2017-12-16 PROCEDURE — 63600175 PHARM REV CODE 636 W HCPCS: Performed by: INTERNAL MEDICINE

## 2017-12-16 PROCEDURE — 99900035 HC TECH TIME PER 15 MIN (STAT)

## 2017-12-16 PROCEDURE — 12800000 HC REHAB SEMI-PRIVATE ROOM

## 2017-12-16 PROCEDURE — 94660 CPAP INITIATION&MGMT: CPT

## 2017-12-16 PROCEDURE — 63600175 PHARM REV CODE 636 W HCPCS: Performed by: PHYSICAL MEDICINE & REHABILITATION

## 2017-12-16 PROCEDURE — 25000003 PHARM REV CODE 250: Performed by: INTERNAL MEDICINE

## 2017-12-16 PROCEDURE — 27000221 HC OXYGEN, UP TO 24 HOURS

## 2017-12-16 PROCEDURE — 80100016 HC MAINTENANCE HEMODIALYSIS

## 2017-12-16 PROCEDURE — 97110 THERAPEUTIC EXERCISES: CPT

## 2017-12-16 PROCEDURE — 97532 *HC SP COG SKL DEV EA 15 MIN: CPT

## 2017-12-16 PROCEDURE — 25000003 PHARM REV CODE 250: Performed by: PHYSICAL MEDICINE & REHABILITATION

## 2017-12-16 PROCEDURE — 94760 N-INVAS EAR/PLS OXIMETRY 1: CPT

## 2017-12-16 PROCEDURE — 97530 THERAPEUTIC ACTIVITIES: CPT

## 2017-12-16 PROCEDURE — A4216 STERILE WATER/SALINE, 10 ML: HCPCS | Performed by: INTERNAL MEDICINE

## 2017-12-16 PROCEDURE — 97542 WHEELCHAIR MNGMENT TRAINING: CPT

## 2017-12-16 RX ADMIN — GABAPENTIN 100 MG: 100 CAPSULE ORAL at 02:12

## 2017-12-16 RX ADMIN — HEPARIN SODIUM 4000 UNITS: 1000 INJECTION, SOLUTION INTRAVENOUS; SUBCUTANEOUS at 06:12

## 2017-12-16 RX ADMIN — HEPARIN SODIUM 5000 UNITS: 5000 INJECTION, SOLUTION INTRAVENOUS; SUBCUTANEOUS at 08:12

## 2017-12-16 RX ADMIN — FLUCONAZOLE 100 MG: 100 TABLET ORAL at 08:12

## 2017-12-16 RX ADMIN — METRONIDAZOLE 250 MG: 250 TABLET ORAL at 09:12

## 2017-12-16 RX ADMIN — GABAPENTIN 100 MG: 100 CAPSULE ORAL at 06:12

## 2017-12-16 RX ADMIN — MICONAZOLE NITRATE: 20 OINTMENT TOPICAL at 09:12

## 2017-12-16 RX ADMIN — CEPHALEXIN 500 MG: 500 CAPSULE ORAL at 09:12

## 2017-12-16 RX ADMIN — ROSUVASTATIN CALCIUM 10 MG: 5 TABLET ORAL at 08:12

## 2017-12-16 RX ADMIN — GABAPENTIN 100 MG: 100 CAPSULE ORAL at 09:12

## 2017-12-16 RX ADMIN — ONDANSETRON HYDROCHLORIDE 4 MG: 2 SOLUTION INTRAMUSCULAR; INTRAVENOUS at 03:12

## 2017-12-16 RX ADMIN — SODIUM CHLORIDE, PRESERVATIVE FREE 3 ML: 5 INJECTION INTRAVENOUS at 07:12

## 2017-12-16 RX ADMIN — SODIUM CHLORIDE, PRESERVATIVE FREE 3 ML: 5 INJECTION INTRAVENOUS at 02:12

## 2017-12-16 RX ADMIN — CARVEDILOL 12.5 MG: 6.25 TABLET, FILM COATED ORAL at 09:12

## 2017-12-16 RX ADMIN — SODIUM CHLORIDE, PRESERVATIVE FREE 3 ML: 5 INJECTION INTRAVENOUS at 09:12

## 2017-12-16 RX ADMIN — LEVOTHYROXINE SODIUM 50 MCG: 25 TABLET ORAL at 06:12

## 2017-12-16 RX ADMIN — METRONIDAZOLE 250 MG: 250 TABLET ORAL at 02:12

## 2017-12-16 RX ADMIN — INSULIN ASPART 2 UNITS: 100 INJECTION, SOLUTION INTRAVENOUS; SUBCUTANEOUS at 11:12

## 2017-12-16 RX ADMIN — HEPARIN SODIUM 5000 UNITS: 5000 INJECTION, SOLUTION INTRAVENOUS; SUBCUTANEOUS at 09:12

## 2017-12-16 RX ADMIN — ERYTHROPOIETIN 10000 UNITS: 10000 INJECTION, SOLUTION INTRAVENOUS; SUBCUTANEOUS at 03:12

## 2017-12-16 RX ADMIN — METRONIDAZOLE 250 MG: 250 TABLET ORAL at 06:12

## 2017-12-16 RX ADMIN — CEPHALEXIN 500 MG: 500 CAPSULE ORAL at 08:12

## 2017-12-16 RX ADMIN — MICONAZOLE NITRATE: 20 OINTMENT TOPICAL at 08:12

## 2017-12-16 NOTE — PLAN OF CARE
2 person transfer back to bed dialysis started per dialysis nurse respiratory called to place on bipap machine. bipap in progress.

## 2017-12-16 NOTE — PLAN OF CARE
Problem: SLP Goal  Goal: SLP Goal  Short term goals:  1. Pt will complete complex reasoning tasks (math, time, money mgmt) with 90% acc independently   2. Pt will complete complex sequencing tasks (pill mgmt/box, recall of transfer, etc.) with 90% acc independently   3. Pt will demonstrate adequate attention (sustained/alternating/selective) to complete functional tasks, as listed above, in 9/10 attempts independently      Long Term Goals:  1. Pt will demonstrate adequate cognitive function for safe and efficient function in home, social and medical environments.     Outcome: Ongoing (interventions implemented as appropriate)  Juliane Ramos is a 57 y.o. female with a SLP diagnosis of mild Cognitive-Linguistic Impairment. She presents with extended time to complete time management worksheet with SBA. Pt presented with 60% accuracy independently. Pt generated grocery list from 2 day breakfast, lunch, and dinner menu with 91% accuracy with SBA. Pt presented with 85% accuracy during alternating attention task with min A. Goals remain appropriate. Continue POC.

## 2017-12-16 NOTE — PROGRESS NOTES
[facility]  Physical Medicine & Rehab  Progress Note    Patient Name: Juliane Ramos  MRN: [id number]  Patient Class: IP- Rehab   Admission Date: 12/11/2017  Length of Stay: 5 days  Attending Physician: Rishi Ness MD  Primary Care Provider: JOS Dumont    Subjective:     Principal Problem: SAH, s/p debridement of achillis tendon   Interval History: pt is 57 y.o female with dx of SAH, PE, s/p wound debridement & dialysis dependent , participating with therapy    Scheduled Medications:    carvedilol  12.5 mg Oral BID    cephALEXin  500 mg Oral Q12H    epoetin donita (PROCRIT) injection  10,000 Units Intravenous Every Tues, Thurs, Sat    fluconazole  100 mg Oral Daily    gabapentin  100 mg Oral TID    heparin (porcine)  5,000 Units Subcutaneous Q12H    insulin aspart  1-10 Units Subcutaneous TIDWM    levothyroxine  50 mcg Oral Before breakfast    metroNIDAZOLE  250 mg Oral Q8H    miconazole nitrate 2%   Topical (Top) BID    rosuvastatin  10 mg Oral Daily    sodium chloride 0.9%  3 mL Intravenous Q8H       PRN Medications: sodium chloride 0.9%, acetaminophen, albuterol sulfate, dextrose 50%, diphenoxylate-atropine 2.5-0.025 mg, glucagon (human recombinant), glucose, glucose, heparin (porcine), labetalol, levetiracetam oral soln, magnesium oxide, magnesium oxide, ondansetron, potassium phosphate IVPB, simethicone, traMADol    Review of Systems   Constitutional: Negative.    HENT: Negative.    Eyes: Negative.    Respiratory: Negative.    Cardiovascular: Negative.    Gastrointestinal: Negative.    Endocrine: Negative.    Genitourinary: Negative.    Musculoskeletal: Negative.    Skin: Negative.    Allergic/Immunologic: Negative.    Neurological: Negative.    Hematological: Negative.    Psychiatric/Behavioral: Negative.      Objective:     Vital Signs (Most Recent):  Temp: 97.3 °F (36.3 °C) (12/16/17 1540)  Pulse: 75 (12/16/17 1530)  Resp: 18 (12/16/17 1540)  BP: 107/60 (12/16/17  1530)  SpO2: 100 % (12/16/17 1540)    Vital Signs (24h Range):  Temp:  [97.2 °F (36.2 °C)-98 °F (36.7 °C)] 97.3 °F (36.3 °C)  Pulse:  [66-76] 75  Resp:  [16-20] 18  SpO2:  [97 %-100 %] 100 %  BP: (107-139)/(60-84) 107/60     Physical Exam   Constitutional: She is oriented to person, place, and time. She appears well-developed and well-nourished. No distress.   HENT:   Head: Normocephalic and atraumatic.   Right Ear: External ear normal.   Left Ear: External ear normal.   Nose: Nose normal.   Mouth/Throat: Oropharynx is clear and moist. No oropharyngeal exudate.   Eyes: Conjunctivae and EOM are normal. Pupils are equal, round, and reactive to light. Right eye exhibits no discharge. Left eye exhibits no discharge. No scleral icterus.   Neck: Normal range of motion. Neck supple. No JVD present. No tracheal deviation present. No thyromegaly present.   Cardiovascular: Normal rate, regular rhythm, normal heart sounds and intact distal pulses.  Exam reveals no gallop and no friction rub.    No murmur heard.  Pulmonary/Chest: Effort normal and breath sounds normal. No stridor. No respiratory distress. She has no wheezes. She has no rales. She exhibits no tenderness.   Abdominal: Soft. Bowel sounds are normal. She exhibits no distension and no mass. There is no tenderness. There is no rebound and no guarding. No hernia.   Genitourinary:   Genitourinary Comments: deferred   Musculoskeletal: Normal range of motion. She exhibits no edema, tenderness or deformity.   Lymphadenopathy:     She has no cervical adenopathy.   Neurological: She is alert and oriented to person, place, and time. She displays normal reflexes. No cranial nerve deficit or sensory deficit. She exhibits normal muscle tone. Coordination normal.   Skin: No rash noted. She is not diaphoretic. No erythema. No pallor.   Psychiatric: She has a normal mood and affect. Her behavior is normal. Judgment and thought content normal.     NEUROLOGICAL EXAMINATION:      MENTAL STATUS   Oriented to person, place, and time.     CRANIAL NERVES     CN III, IV, VI   Pupils are equal, round, and reactive to light.  Extraocular motions are normal.       Assessment/Plan:      Juliane Ramos is a 57 y.o. female admitted to inpatient rehabilitation on 12/11/2017 for <principal problem not specified> with impaired mobility and ADLs. Patient remains appropriate for PT, OT, and as required Speech therapy. Patient continues to require 24 hour nursing care as well as daily Physician assessment.    Active Diagnoses:    Diagnosis Date Noted POA    Wounds, multiple [T07.XXXA] 12/13/2017 Yes    SAH (subarachnoid hemorrhage) [I60.9] 12/11/2017 Yes      Problems Resolved During this Admission:    Diagnosis Date Noted Date Resolved POA     SAH, cont PT, OT, ST  Wound cont current management   DVT prophylaxis, cont sq heparin  HTN, vitals noted, cont current meds  Diarrhea, now on flagyl, stool study pending  H/p PE, pulm eval pending  Need for wound vac , will communicate with podiatry on Monday   DISCHARGE PLANNING:  Tentative Discharge Date:     No future appointments.    Rishi Ness MD  Department of Physical Medicine & Rehab  [facility]

## 2017-12-16 NOTE — PLAN OF CARE
Problem: Patient Care Overview  Goal: Plan of Care Review  Outcome: Ongoing (interventions implemented as appropriate)  Bed alarm on at all times, side rails up for safety, call light is within easy reach , instructed patient to call at any time for assistance.Elvi care done as needed, patient cleaned and dried and clean brief applied, patient tolerated procedure well.Resp are even and unlabored, lungs sound clear, no s & s of distress noted at this time.  Will continue to monitor and observe during HS hours.Patient is able to turn and reposition as needed during HS hoursPatient will be kept pain free, during shift.

## 2017-12-16 NOTE — PT/OT/SLP PROGRESS
"Physical Therapy         Treatment        Juliane Ramos   MRN: [id number]     PT Received On: 12/16/17  Total Time (min): 64        Billable Minutes:  Therapeutic Activity 15, Therapeutic Exercise 30 and Train/Wheelchair Management 15  Total Minutes: 60    Treatment Type: Treatment  PT/PTA: PTA     PTA Visit Number: 1       General Precautions: Standard, fall  Orthopedic Precautions: Orthopedic Precautions : LLE non weight bearing   Braces:           Subjective:  Communicated with nurse and patient prior to session.   Patient agreeable to session, "Nope, I don't have any pain.  I need help with getting my left leg out of the bed, it's heavy and gets stuck in the sheets"    Pain/Comfort  Pain Rating 1: 0/10  Pain Rating Post-Intervention 1: 0/10    Objective:  Patient found in bed with HOB elevated Patient found with: oxygen, central line, wound vac    Functional Mobility:  Bed Mobility:   Supine to sit: Minimal Assistance, with L LE as boot and wound vac cause patient to be unable to get it out of bed herself   Sit to supine: Activity did not occur   Rolling: Activity did not occur   Scooting: Supervision or Set-up Assistance    Transfer Training:  Sit to stand:Moderate Assistance with No Assistive Device patient attempted 3 times to tf herself to standing without assist, unable to do so.  Bed <> Chair:  Stand Pivot with Moderate Assistance with No Assistive Device NWB L LE, wearing cam boot    Wheelchair Training:  Pt propelled Standard wheelchair x 70 feet on Level tile with  Bilateral upper extremity with Stand-by Assistance. PTA carrying patients wound vac.  It took patient 15' to travel 70ft, patient cued to make longer strides with each propulsion and educated on how to do so, however, patient unable to perform repeatedly.  Additional Treatment:  Patient performed the following therex 30x each bilaterally unless otherwise indicated:  Ankle pump R only, LAQ with 4# on R LE, Marching with 4# on R LE, active Hip " abd with 4# on R LE, Hip add with ball, Hip ER with GTB, Hamstring curl with GTB     Activity Tolerance:  Patient tolerated treatment well    Patient left up in WC in OT gym with all lines cleared and flowing with Pawan Sharma, OT present present.    Assessment:  Juliane Ramos is a 57 y.o. female with a medical diagnosis of <principal problem not specified>. She presents with decreased functional strength, decreased functional mobility, unable to perform ADL's without max A, NWB on L LE.    Rehab potential is fair.    Activity tolerance: Good    Discharge recommendations:       Equipment recommendations:       GOALS:    Physical Therapy Goals        Problem: Physical Therapy Goal    Goal Priority Disciplines Outcome Goal Variances Interventions   Physical Therapy Goal     PT/OT, PT Ongoing (interventions implemented as appropriate)     Description:  Goals to be met by: 2018     Patient will increase functional independence with mobility by performin). Supine to sit with Modified [place]  2). Sit to supine with Modified [place]  3). Sit to stand transfer with Stand-by Assistance  4). Bed to chair transfer with Stand-by Assistance   5). Wheelchair propulsion x > 250 feet with Modified [place]  6). Stand for  > 3 minutes with Stand-by Assistance NWB LLE                      PLAN:    Patient to be seen daily  to address the above listed problems via therapeutic activities, therapeutic exercises, wheelchair management/training, neuromuscular re-education  Plan of Care expires: 17  Plan of Care reviewed with: patient         Luisana Graves, PTA 2017

## 2017-12-16 NOTE — PLAN OF CARE
Problem: Physical Therapy Goal  Goal: Physical Therapy Goal  Goals to be met by: 2018     Patient will increase functional independence with mobility by performin). Supine to sit with Modified [place]  2). Sit to supine with Modified [place]  3). Sit to stand transfer with Stand-by Assistance  4). Bed to chair transfer with Stand-by Assistance   5). Wheelchair propulsion x > 250 feet with Modified [place]  6). Stand for  > 3 minutes with Stand-by Assistance NWB LLE     Outcome: Ongoing (interventions implemented as appropriate)  Patient in bed with HOB elevated and LE's extended, t/f to short sitting at EOB with min A for L LE, as boot is too heavy and got caught in sheets.  Sit to stand tf with mod A, after several attempts to perform without A.  Bed to chair t/f with mod A, NWB LLE  WC propelled 70ft in 15', with SBA and VC's for technique  Therex to increase strength in B LE's

## 2017-12-16 NOTE — PLAN OF CARE
Problem: Patient Care Overview  Goal: Plan of Care Review  Alert, oriented, appetite is fair to good. Med teaching ongoing bp and bs management in progress. Rash to entire body slowly improving no c/o pain or itching. o2 at 3 l/ n/c in use at all times. Shortness of breath with exertion at times. Wound vac to left posterior ankle area at 125 mm suction, no drainage at all noted. Cont of bm at times. No c/o loose stool today. Bed in low postiion, call light in reach side rails up

## 2017-12-16 NOTE — PT/OT/SLP PROGRESS
Occupational Therapy  Treatment    Juliane Ramos   MRN: [id number]   Admitting Diagnosis: see jose  OT Date of Treatment: 12/16/17   Total Time (min): 60 min      Billable Minutes:  Therapeutic Exercise 60  Total Minutes: 60    General Precautions: Standard, fall (nc 3 L)  Orthopedic Precautions:  no  Braces:  no    Do you have any cultural, spiritual, Islam conflicts, given your current situation?: None    Subjective:  Communicated with [facility] prior to session.    Pain/Comfort  Pain Rating 1: 0/10    Objective:     in w/c:    ube low resistance 5' intervals x 3    serena no weight multiple directions, multiple sets    various arom b ue exercises as tolerated-pain free      Mid session pt. Requested water, [facility] approved giving pt. Small amount of water without a straw, gave pt. About 2 ounces of water    Patient left up in chair with all lines intact and chair alarm on, call alarm by side    ASSESSMENT:  Juliane Ramos is a 57 y.o. female with decreased ADL independence, functional endurance, and functional transfers. Energy conversation education may be very beneficial.    Rehab potential is good    Activity tolerance: Good    Discharge recommendations:  (to be set)     Equipment recommendations:  (to be set)     GOALS:    Occupational Therapy Goals        Problem: Occupational Therapy Goal    Goal Priority Disciplines Outcome Interventions   Occupational Therapy Goal     OT, PT/OT Ongoing (interventions implemented as appropriate)    Description:  Goals to be met by: 12/29/17     Patient will increase functional independence with ADLs by performing:    Feeding with [place].  UE Dressing with [place].  LE Dressing with Supervision.  Grooming while seated with [place].  Toileting from toilet with bedside commode positioned over Minimal Assistance for hygiene and clothing management.   Bathing from shower chair/bench with Minimal Assistance with DME/AE as needed.  Shower transfer with Minimal  Assistance with DME/AE as needed .  Toilet transfer to toilet with Minimal Assistance with DME as needed.                      Plan:  Patient to be seen daily to address the above listed problems via self-care/home management, therapeutic activities, therapeutic exercises  Plan of Care expires: 12/29/17  Plan of Care reviewed with: patient         Pawan Stephanie OT/CHT  12/16/2017

## 2017-12-16 NOTE — PT/OT/SLP PROGRESS
"Speech Language Pathology Treatment          Juliane Ramos   MRN: [id number]     Diet recommendations: Solid Diet Level: Regular  Liquid Diet Level: Thin No straws    SLP Treatment Date: 12/16/17  Speech Start Time: 1345     Speech Stop Time: 1445     Speech Total (min): 60 min       TREATMENT BILLABLE MINUTES:  Speech Therapy Individual 60 and Total Time 60    General Precautions: Standard, fall  Current Respiratory Status: nasal cannula       Subjective:  "I'm tired from PT/OT." Pt participatory during session.     Objective:    Patient found with: oxygen, wound vac, central line in wheel chair at bedside.     Pain/Comfort  Pain Rating 1: 0/10    Assessment:  Juliane Ramos is a 57 y.o. female with a SLP diagnosis of mild Cognitive-Linguistic Impairment. She presents with extended time to complete time management worksheet with SBA. Pt presented with 60% accuracy independently. Pt generated grocery list from 2 day breakfast, lunch, and dinner menu with 91% accuracy with SBA. Pt presented with 85% accuracy during alternating attention task with min A. Goals remain appropriate. Continue POC.     Diet recommendations:        Liquid Diet Level: Thin  No straws    Discharge recommendations: Discharge Facility/Level Of Care Needs: home     Goals:    SLP Goals        Problem: SLP Goal    Goal Priority Disciplines Outcome   SLP Goal     SLP Ongoing (interventions implemented as appropriate)   Description:  Short term goals:  1. Pt will complete complex reasoning tasks (math, time, money mgmt) with 90% acc independently   2. Pt will complete complex sequencing tasks (pill mgmt/box, recall of transfer, etc.) with 90% acc independently   3. Pt will demonstrate adequate attention (sustained/alternating/selective) to complete functional tasks, as listed above, in 9/10 attempts independently      Long Term Goals:  1. Pt will demonstrate adequate cognitive function for safe and efficient function in home, social and medical " environments.                       Plan:   Patient to be seen Therapy Frequency: 5 x/week   Plan of Care Expires:    Plan of Care reviewed with: patient  SLP Follow-up?: Yes  SLP - Next Visit Date: 12/17/17           Sarah Garnett CCC-SLP 12/16/2017

## 2017-12-16 NOTE — PLAN OF CARE
Problem: Patient Care Overview  Goal: Plan of Care Review  Outcome: Ongoing (interventions implemented as appropriate)  1520  Patient placed on Bipap while she was receiving dialysis.  Hr       68 and 02 saturation 97%.

## 2017-12-17 LAB
POCT GLUCOSE: 161 MG/DL (ref 70–110)
POCT GLUCOSE: 188 MG/DL (ref 70–110)
POCT GLUCOSE: 191 MG/DL (ref 70–110)
POCT GLUCOSE: 228 MG/DL (ref 70–110)

## 2017-12-17 PROCEDURE — 25000003 PHARM REV CODE 250: Performed by: INTERNAL MEDICINE

## 2017-12-17 PROCEDURE — 94760 N-INVAS EAR/PLS OXIMETRY 1: CPT

## 2017-12-17 PROCEDURE — 12800000 HC REHAB SEMI-PRIVATE ROOM

## 2017-12-17 PROCEDURE — 27000221 HC OXYGEN, UP TO 24 HOURS

## 2017-12-17 PROCEDURE — 94761 N-INVAS EAR/PLS OXIMETRY MLT: CPT

## 2017-12-17 PROCEDURE — 97112 NEUROMUSCULAR REEDUCATION: CPT

## 2017-12-17 PROCEDURE — 97542 WHEELCHAIR MNGMENT TRAINING: CPT

## 2017-12-17 PROCEDURE — 99900035 HC TECH TIME PER 15 MIN (STAT)

## 2017-12-17 PROCEDURE — 25000003 PHARM REV CODE 250: Performed by: PHYSICAL MEDICINE & REHABILITATION

## 2017-12-17 PROCEDURE — 63600175 PHARM REV CODE 636 W HCPCS: Performed by: INTERNAL MEDICINE

## 2017-12-17 PROCEDURE — A4216 STERILE WATER/SALINE, 10 ML: HCPCS | Performed by: INTERNAL MEDICINE

## 2017-12-17 RX ORDER — ONDANSETRON 4 MG/1
4 TABLET, ORALLY DISINTEGRATING ORAL EVERY 6 HOURS PRN
Status: DISCONTINUED | OUTPATIENT
Start: 2017-12-17 | End: 2017-12-29

## 2017-12-17 RX ADMIN — SODIUM CHLORIDE, PRESERVATIVE FREE 3 ML: 5 INJECTION INTRAVENOUS at 09:12

## 2017-12-17 RX ADMIN — INSULIN ASPART 2 UNITS: 100 INJECTION, SOLUTION INTRAVENOUS; SUBCUTANEOUS at 05:12

## 2017-12-17 RX ADMIN — SODIUM CHLORIDE, PRESERVATIVE FREE 3 ML: 5 INJECTION INTRAVENOUS at 06:12

## 2017-12-17 RX ADMIN — GABAPENTIN 100 MG: 100 CAPSULE ORAL at 06:12

## 2017-12-17 RX ADMIN — SODIUM CHLORIDE, PRESERVATIVE FREE 3 ML: 5 INJECTION INTRAVENOUS at 01:12

## 2017-12-17 RX ADMIN — GABAPENTIN 100 MG: 100 CAPSULE ORAL at 01:12

## 2017-12-17 RX ADMIN — LEVOTHYROXINE SODIUM 50 MCG: 25 TABLET ORAL at 06:12

## 2017-12-17 RX ADMIN — HEPARIN SODIUM 5000 UNITS: 5000 INJECTION, SOLUTION INTRAVENOUS; SUBCUTANEOUS at 08:12

## 2017-12-17 RX ADMIN — METRONIDAZOLE 250 MG: 250 TABLET ORAL at 09:12

## 2017-12-17 RX ADMIN — FLUCONAZOLE 100 MG: 100 TABLET ORAL at 08:12

## 2017-12-17 RX ADMIN — CEPHALEXIN 500 MG: 500 CAPSULE ORAL at 09:12

## 2017-12-17 RX ADMIN — ROSUVASTATIN CALCIUM 10 MG: 5 TABLET ORAL at 08:12

## 2017-12-17 RX ADMIN — GABAPENTIN 100 MG: 100 CAPSULE ORAL at 09:12

## 2017-12-17 RX ADMIN — METRONIDAZOLE 250 MG: 250 TABLET ORAL at 01:12

## 2017-12-17 RX ADMIN — INSULIN ASPART 2 UNITS: 100 INJECTION, SOLUTION INTRAVENOUS; SUBCUTANEOUS at 06:12

## 2017-12-17 RX ADMIN — ONDANSETRON HYDROCHLORIDE 4 MG: 2 SOLUTION INTRAMUSCULAR; INTRAVENOUS at 04:12

## 2017-12-17 RX ADMIN — METRONIDAZOLE 250 MG: 250 TABLET ORAL at 06:12

## 2017-12-17 RX ADMIN — MICONAZOLE NITRATE: 20 OINTMENT TOPICAL at 08:12

## 2017-12-17 RX ADMIN — MICONAZOLE NITRATE: 20 OINTMENT TOPICAL at 09:12

## 2017-12-17 RX ADMIN — CARVEDILOL 12.5 MG: 6.25 TABLET, FILM COATED ORAL at 09:12

## 2017-12-17 RX ADMIN — INSULIN ASPART 4 UNITS: 100 INJECTION, SOLUTION INTRAVENOUS; SUBCUTANEOUS at 12:12

## 2017-12-17 RX ADMIN — CEPHALEXIN 500 MG: 500 CAPSULE ORAL at 08:12

## 2017-12-17 RX ADMIN — HEPARIN SODIUM 5000 UNITS: 5000 INJECTION, SOLUTION INTRAVENOUS; SUBCUTANEOUS at 09:12

## 2017-12-17 NOTE — PROGRESS NOTES
INPATIENT NEPHROLOGY PROGRESS NOTE  [facility] NEPHROLOGY    Patient Name: Juliane Ramos  Date: 12/17/2017    Reason for consultation: CM    Chief Complaint: Diabetic foot infection    History of Present Illness:  Ms. Ramos is a 56 y/o F with a hx of HTN, DM2, CHF, and stage III CKD who p/w a left heel ulcer/cellulitis x 2 weeks, nonhealing. Hospital course c/b PE, fall with SAH, and MC 2/2 contrast/vancomycin/infection requiring RRT. She is currently dialysis dependent. She is now at rehab.    · Interval History/Subjective:    12/17  Sob much improved after dialysis yesterday.  No chest pain or sob    · Review of Systems: All 14 systems reviewed and negative, except as noted in HPI    Plan of Care:    Assessment:  Oliguric MC 2/2 multifactorial ATN, dialysis dependent  HTN  Hyponatremia/Hyperkalemia  SHPT  Anemia of CKD     Plan:     1. Acute Kidney Injury- She remains HD dependent. Continue HD TTS. No NSAIDs or IV contrast.      2. Volume/Blood Pressure- Parameters are stable- continue UF 3-4L.     3. Electrolytes/Acid Base- HypoNa is stable. K is at goal.     4. Bone Mineral Metabolism- Phos is at goal- no need for binder.     5. Anemia of CKD- Hb is better after blood transfusion. Continue EPO with HD.    Labs ordered for Monday.    Thank you for allowing us to participate in this patient's care. We will continue to follow.    Medications:  No current facility-administered medications on file prior to encounter.      Current Outpatient Prescriptions on File Prior to Encounter   Medication Sig Dispense Refill    albuterol (ACCUNEB) 1.25 mg/3 mL Nebu Take 1.25 mg by nebulization every 6 (six) hours as needed. Rescue      carvedilol (COREG) 12.5 MG tablet Take 1 tablet (12.5 mg total) by mouth 2 (two) times daily. 60 tablet 11    cefepime in dextrose 5 % (MAXIPIME) 1 gram/50 mL PgBk Inject 50 mLs (1 g total) into the vein every 24 hours as needed.      gabapentin (NEURONTIN) 100 MG capsule Take 1 capsule  (100 mg total) by mouth 3 (three) times daily. 90 capsule 11    levetiracetam oral soln 500 mg/5 mL (5 mL) Soln 2.5 mLs (250 mg total) by Per NG tube route 2 (two) times daily. 150 mL 11    levothyroxine (SYNTHROID) 50 MCG tablet Take 50 mcg by mouth once daily.        metronidazole (FLAGYL) 500 mg/100 mL IVPB Inject 100 mLs (500 mg total) into the vein every 8 (eight) hours.      rosuvastatin (CRESTOR) 10 MG tablet Take 1 tablet (10 mg total) by mouth once daily. 30 tablet 0    VANCOMYCIN HCL (VANCOMYCIN IN 5 % DEXTROSE) 1.75 gram/500 mL Soln Inject 500 mLs (1,750 mg total) into the vein once daily.       Scheduled Meds:   carvedilol  12.5 mg Oral BID    cephALEXin  500 mg Oral Q12H    epoetin donita (PROCRIT) injection  10,000 Units Intravenous Every Tues, Thurs, Sat    fluconazole  100 mg Oral Daily    gabapentin  100 mg Oral TID    heparin (porcine)  5,000 Units Subcutaneous Q12H    insulin aspart  1-10 Units Subcutaneous TIDWM    levothyroxine  50 mcg Oral Before breakfast    metroNIDAZOLE  250 mg Oral Q8H    miconazole nitrate 2%   Topical (Top) BID    rosuvastatin  10 mg Oral Daily    sodium chloride 0.9%  3 mL Intravenous Q8H     Continuous Infusions:  PRN Meds:.sodium chloride 0.9%, acetaminophen, albuterol sulfate, dextrose 50%, diphenoxylate-atropine 2.5-0.025 mg, glucagon (human recombinant), glucose, glucose, heparin (porcine), labetalol, levetiracetam oral soln, magnesium oxide, magnesium oxide, ondansetron, potassium phosphate IVPB, simethicone, traMADol    Allergies:  Patient has no known allergies.    Vital Signs:  Temp Readings from Last 3 Encounters:   12/17/17 98.2 °F (36.8 °C) (Oral)   12/11/17 97.4 °F (36.3 °C) (Oral)   11/27/17 97 °F (36.1 °C) (Oral)       Pulse Readings from Last 3 Encounters:   12/17/17 71   12/11/17 69   11/27/17 67       BP Readings from Last 3 Encounters:   12/17/17 117/70   12/11/17 (!) 125/59   11/27/17 123/67       Weight:  Wt Readings from Last 3  Encounters:   12/17/17 106.9 kg (235 lb 11.2 oz)   12/08/17 117.9 kg (260 lb)   11/27/17 117 kg (257 lb 15 oz)       Physical Exam:  Constitutional: nad, aao x 3  Heart: rrr, no m/r/g, wwp.  Legs are edematous  Lungs: ant ausc clear, no w/r/r/c  Abdomen: s/nt/nd, +BS    Results:  Lab Results   Component Value Date     (L) 12/15/2017    K 4.5 12/15/2017    CL 99 12/15/2017    CO2 23 12/15/2017    BUN 36 (H) 12/15/2017    CREATININE 4.8 (H) 12/15/2017    CALCIUM 8.6 (L) 12/15/2017    ANIONGAP 9 12/15/2017    ESTGFRAFRICA 11 (A) 12/15/2017    EGFRNONAA 10 (A) 12/15/2017       Lab Results   Component Value Date    CALCIUM 8.6 (L) 12/15/2017    PHOS 3.9 12/15/2017       No results for input(s): WBC, RBC, HGB, HCT, PLT, MCV, MCH, MCHC in the last 24 hours.    I have personally reviewed pertinent radiological imaging and reports.    Elijah Gonzalez MD  Nephrology  [facility]  [phone]

## 2017-12-17 NOTE — PT/OT/SLP PROGRESS
Physical Therapy         Treatment        Juliane Ramos   MRN: [id number]     PT Received On: 17  Total Time (min): 35        Billable Minutes:  Neuromuscular Re-education 15 and Train/Wheelchair Management 15  Total Minutes: 30    Treatment Type: Treatment  PT/PTA: PT     PTA Visit Number: 1       General Precautions: Standard, fall  Orthopedic Precautions: Orthopedic Precautions : LLE non weight bearing   Braces:           Subjective:  Communicated with OANH Alexandre prior to session.         Objective:  Patient found in WC in room, with Patient found with: central line, wound vac, oxygen    Transfer Training:  Sit to stand:Moderate Assistance with // bars X 10 reps with 2 rest breaks    Wheelchair Training:  Pt propelled Standard wheelchair x 60 feet on Level tile with  Bilateral upper extremity and Right lower extremity with Moderate Assistance.     Activity Tolerance:  Patient tolerated treatment well    Patient left supine with all lines intact and chair alarm on.    Assessment:  Juliane Ramos is a 57 y.o. female who presents wound care, L LE NWB with impaired strength, mobility, endurance, balance, transfers, and gait.    Rehab potential is good.    Activity tolerance: Good      GOALS:    Physical Therapy Goals        Problem: Physical Therapy Goal    Goal Priority Disciplines Outcome Goal Variances Interventions   Physical Therapy Goal     PT/OT, PT Ongoing (interventions implemented as appropriate)     Description:  Goals to be met by: 2018     Patient will increase functional independence with mobility by performin). Supine to sit with Modified [place]  2). Sit to supine with Modified [place]  3). Sit to stand transfer with Stand-by Assistance  4). Bed to chair transfer with Stand-by Assistance   5). Wheelchair propulsion x > 250 feet with Modified [place]  6). Stand for  > 3 minutes with Stand-by Assistance NWB LLE                      PLAN:    Patient to be seen daily  to  address the above listed problems via therapeutic activities, therapeutic exercises, wheelchair management/training, neuromuscular re-education  Plan of Care expires: 12/29/17  Plan of Care reviewed with: patient         Satish Scott, PT 12/17/2017

## 2017-12-17 NOTE — PLAN OF CARE
Problem: Patient Care Overview  Goal: Plan of Care Review  Alert, oriented appetite is good, fall prevention ongoing. Med teaching ongoing. Transfers 2 person mod-max assist does not void. Central line right upper anterior chest alll ports flushed without difficulty. hemisplit with dressing dry and intact right anterior chest wall also.

## 2017-12-17 NOTE — PLAN OF CARE
Problem: Patient Care Overview  Goal: Plan of Care Review  Outcome: Ongoing (interventions implemented as appropriate)  Awake, alert and oriented in no distress. Incontinent of bowel. Denies pain or discomfort. Advancing with poc . Safety ongoing with alarms in use.

## 2017-12-17 NOTE — PROGRESS NOTES
[facility]  Physical Medicine & Rehab  Progress Note    Patient Name: Juliane Ramos  MRN: [id number]  Patient Class: IP- Rehab   Admission Date: 12/11/2017  Length of Stay: 6 days  Attending Physician: Rishi Ness MD  Primary Care Provider: JOS Dumont    Subjective:     Principal Problem:  SAH & s/p debridement of wound left achillis.   Interval History: pt is 57 y.o female with dx of SAH , non surgical, h/o PE  & s/p debridement of achillis tendon with application of wound vac    Scheduled Medications:    carvedilol  12.5 mg Oral BID    cephALEXin  500 mg Oral Q12H    epoetin donita (PROCRIT) injection  10,000 Units Intravenous Every Tues, Thurs, Sat    fluconazole  100 mg Oral Daily    gabapentin  100 mg Oral TID    heparin (porcine)  5,000 Units Subcutaneous Q12H    insulin aspart  1-10 Units Subcutaneous TIDWM    levothyroxine  50 mcg Oral Before breakfast    metroNIDAZOLE  250 mg Oral Q8H    miconazole nitrate 2%   Topical (Top) BID    rosuvastatin  10 mg Oral Daily    sodium chloride 0.9%  3 mL Intravenous Q8H       PRN Medications: sodium chloride 0.9%, acetaminophen, albuterol sulfate, dextrose 50%, diphenoxylate-atropine 2.5-0.025 mg, glucagon (human recombinant), glucose, glucose, heparin (porcine), labetalol, levetiracetam oral soln, magnesium oxide, magnesium oxide, ondansetron, potassium phosphate IVPB, simethicone, traMADol    Review of Systems   Constitutional: Negative.    HENT: Negative.    Eyes: Negative.    Respiratory: Negative.    Cardiovascular: Negative.    Gastrointestinal: Negative.    Endocrine: Negative.    Genitourinary: Negative.    Musculoskeletal: Negative.    Skin: Negative.    Allergic/Immunologic: Negative.    Neurological: Negative.    Hematological: Negative.    Psychiatric/Behavioral: Negative.      Objective:     Vital Signs (Most Recent):  Temp: 98.5 °F (36.9 °C) (12/17/17 0800)  Pulse: 68 (12/17/17 0800)  Resp: 20 (12/17/17  0800)  BP: 125/82 (12/17/17 0800)  SpO2: 100 % (12/17/17 0800)    Vital Signs (24h Range):  Temp:  [97.3 °F (36.3 °C)-98.5 °F (36.9 °C)] 98.5 °F (36.9 °C)  Pulse:  [68-86] 68  Resp:  [16-20] 20  SpO2:  [96 %-100 %] 100 %  BP: (117-161)/(70-88) 125/82     Physical Exam   Constitutional: She is oriented to person, place, and time. She appears well-developed and well-nourished. No distress.   HENT:   Head: Normocephalic and atraumatic.   Right Ear: External ear normal.   Left Ear: External ear normal.   Nose: Nose normal.   Mouth/Throat: Oropharynx is clear and moist. No oropharyngeal exudate.   Eyes: Conjunctivae and EOM are normal. Pupils are equal, round, and reactive to light. Right eye exhibits no discharge. Left eye exhibits no discharge. No scleral icterus.   Neck: Normal range of motion. Neck supple. No JVD present. No tracheal deviation present. No thyromegaly present.   Cardiovascular: Normal rate, regular rhythm, normal heart sounds and intact distal pulses.  Exam reveals no gallop and no friction rub.    No murmur heard.  Pulmonary/Chest: Effort normal and breath sounds normal. No stridor. No respiratory distress. She has no wheezes. She has no rales. She exhibits no tenderness.   Abdominal: Soft. Bowel sounds are normal. She exhibits no distension and no mass. There is no tenderness. There is no rebound and no guarding. No hernia.   Genitourinary:   Genitourinary Comments: deferred   Musculoskeletal: Normal range of motion. She exhibits no edema, tenderness or deformity.   Lymphadenopathy:     She has no cervical adenopathy.   Neurological: She is alert and oriented to person, place, and time. She displays normal reflexes. No cranial nerve deficit or sensory deficit. She exhibits normal muscle tone. Coordination normal.   Skin: No rash noted. She is not diaphoretic. No erythema. No pallor.   + wound vac   Psychiatric: She has a normal mood and affect. Her behavior is normal. Judgment and thought content  normal.     NEUROLOGICAL EXAMINATION:     MENTAL STATUS   Oriented to person, place, and time.     CRANIAL NERVES     CN III, IV, VI   Pupils are equal, round, and reactive to light.  Extraocular motions are normal.       Assessment/Plan:      Juliane Ramos is a 57 y.o. female admitted to inpatient rehabilitation on 12/11/2017 for <principal problem not specified> with impaired mobility and ADLs. Patient remains appropriate for PT, OT, and as required Speech therapy. Patient continues to require 24 hour nursing care as well as daily Physician assessment.    Active Diagnoses:    Diagnosis Date Noted POA    Wounds, multiple [T07.XXXA] 12/13/2017 Yes    SAH (subarachnoid hemorrhage) [I60.9] 12/11/2017 Yes      Problems Resolved During this Admission:    Diagnosis Date Noted Date Resolved POA     SAH, cont PT, OT, ST  H/o PE, pulm eval pending  HTN, vitals noted, cont current meds  DM, accu check noted, cont current meds  DVT prophylaxis, cont sq heparin    DISCHARGE PLANNING:  Tentative Discharge Date:     No future appointments.    Rishi Ness MD  Department of Physical Medicine & Rehab  [facility]

## 2017-12-18 PROBLEM — T14.8XXD WOUND HEALING, DELAYED: Status: ACTIVE | Noted: 2017-12-18

## 2017-12-18 LAB
ALBUMIN SERPL BCP-MCNC: 2.3 G/DL
ANION GAP SERPL CALC-SCNC: 10 MMOL/L
BUN SERPL-MCNC: 46 MG/DL
CALCIUM SERPL-MCNC: 8.7 MG/DL
CHLORIDE SERPL-SCNC: 99 MMOL/L
CO2 SERPL-SCNC: 23 MMOL/L
CREAT SERPL-MCNC: 5.4 MG/DL
ERYTHROCYTE [DISTWIDTH] IN BLOOD BY AUTOMATED COUNT: 23.6 %
EST. GFR  (AFRICAN AMERICAN): 10 ML/MIN/1.73 M^2
EST. GFR  (NON AFRICAN AMERICAN): 8 ML/MIN/1.73 M^2
GLUCOSE SERPL-MCNC: 138 MG/DL
HCT VFR BLD AUTO: 26.9 %
HGB BLD-MCNC: 8.6 G/DL
MCH RBC QN AUTO: 25.4 PG
MCHC RBC AUTO-ENTMCNC: 32 G/DL
MCV RBC AUTO: 79 FL
PHOSPHATE SERPL-MCNC: 4.4 MG/DL
PLATELET # BLD AUTO: 163 K/UL
PMV BLD AUTO: 8.1 FL
POTASSIUM SERPL-SCNC: 4.3 MMOL/L
RBC # BLD AUTO: 3.39 M/UL
SODIUM SERPL-SCNC: 132 MMOL/L
WBC # BLD AUTO: 7.2 K/UL

## 2017-12-18 PROCEDURE — 97112 NEUROMUSCULAR REEDUCATION: CPT

## 2017-12-18 PROCEDURE — 97532 *HC SP COG SKL DEV EA 15 MIN: CPT

## 2017-12-18 PROCEDURE — 80069 RENAL FUNCTION PANEL: CPT

## 2017-12-18 PROCEDURE — 25000003 PHARM REV CODE 250: Performed by: PHYSICAL MEDICINE & REHABILITATION

## 2017-12-18 PROCEDURE — 97542 WHEELCHAIR MNGMENT TRAINING: CPT

## 2017-12-18 PROCEDURE — 94660 CPAP INITIATION&MGMT: CPT

## 2017-12-18 PROCEDURE — 63600175 PHARM REV CODE 636 W HCPCS: Performed by: INTERNAL MEDICINE

## 2017-12-18 PROCEDURE — A4216 STERILE WATER/SALINE, 10 ML: HCPCS | Performed by: INTERNAL MEDICINE

## 2017-12-18 PROCEDURE — 25000003 PHARM REV CODE 250: Performed by: INTERNAL MEDICINE

## 2017-12-18 PROCEDURE — 85027 COMPLETE CBC AUTOMATED: CPT

## 2017-12-18 PROCEDURE — 97110 THERAPEUTIC EXERCISES: CPT

## 2017-12-18 PROCEDURE — 94761 N-INVAS EAR/PLS OXIMETRY MLT: CPT

## 2017-12-18 PROCEDURE — 36415 COLL VENOUS BLD VENIPUNCTURE: CPT

## 2017-12-18 PROCEDURE — 92507 TX SP LANG VOICE COMM INDIV: CPT

## 2017-12-18 PROCEDURE — 97530 THERAPEUTIC ACTIVITIES: CPT

## 2017-12-18 PROCEDURE — 97535 SELF CARE MNGMENT TRAINING: CPT

## 2017-12-18 PROCEDURE — 99900035 HC TECH TIME PER 15 MIN (STAT)

## 2017-12-18 PROCEDURE — 12800000 HC REHAB SEMI-PRIVATE ROOM

## 2017-12-18 RX ADMIN — METRONIDAZOLE 250 MG: 250 TABLET ORAL at 10:12

## 2017-12-18 RX ADMIN — GABAPENTIN 100 MG: 100 CAPSULE ORAL at 05:12

## 2017-12-18 RX ADMIN — GABAPENTIN 100 MG: 100 CAPSULE ORAL at 06:12

## 2017-12-18 RX ADMIN — INSULIN ASPART 2 UNITS: 100 INJECTION, SOLUTION INTRAVENOUS; SUBCUTANEOUS at 12:12

## 2017-12-18 RX ADMIN — INSULIN ASPART 2 UNITS: 100 INJECTION, SOLUTION INTRAVENOUS; SUBCUTANEOUS at 05:12

## 2017-12-18 RX ADMIN — HEPARIN SODIUM 5000 UNITS: 5000 INJECTION, SOLUTION INTRAVENOUS; SUBCUTANEOUS at 10:12

## 2017-12-18 RX ADMIN — SODIUM CHLORIDE, PRESERVATIVE FREE 3 ML: 5 INJECTION INTRAVENOUS at 05:12

## 2017-12-18 RX ADMIN — SODIUM CHLORIDE, PRESERVATIVE FREE 3 ML: 5 INJECTION INTRAVENOUS at 06:12

## 2017-12-18 RX ADMIN — ROSUVASTATIN CALCIUM 10 MG: 5 TABLET ORAL at 09:12

## 2017-12-18 RX ADMIN — CARVEDILOL 12.5 MG: 6.25 TABLET, FILM COATED ORAL at 10:12

## 2017-12-18 RX ADMIN — CARVEDILOL 12.5 MG: 6.25 TABLET, FILM COATED ORAL at 09:12

## 2017-12-18 RX ADMIN — MICONAZOLE NITRATE: 20 OINTMENT TOPICAL at 10:12

## 2017-12-18 RX ADMIN — CEPHALEXIN 500 MG: 500 CAPSULE ORAL at 10:12

## 2017-12-18 RX ADMIN — GABAPENTIN 100 MG: 100 CAPSULE ORAL at 10:12

## 2017-12-18 RX ADMIN — METRONIDAZOLE 250 MG: 250 TABLET ORAL at 05:12

## 2017-12-18 RX ADMIN — HEPARIN SODIUM 5000 UNITS: 5000 INJECTION, SOLUTION INTRAVENOUS; SUBCUTANEOUS at 09:12

## 2017-12-18 RX ADMIN — MICONAZOLE NITRATE: 20 OINTMENT TOPICAL at 09:12

## 2017-12-18 RX ADMIN — METRONIDAZOLE 250 MG: 250 TABLET ORAL at 06:12

## 2017-12-18 RX ADMIN — CEPHALEXIN 500 MG: 500 CAPSULE ORAL at 09:12

## 2017-12-18 RX ADMIN — LEVOTHYROXINE SODIUM 50 MCG: 25 TABLET ORAL at 06:12

## 2017-12-18 RX ADMIN — FLUCONAZOLE 100 MG: 100 TABLET ORAL at 09:12

## 2017-12-18 RX ADMIN — SODIUM CHLORIDE, PRESERVATIVE FREE 3 ML: 5 INJECTION INTRAVENOUS at 10:12

## 2017-12-18 NOTE — PT/OT/SLP PROGRESS
Occupational Therapy  Treatment    Juliane Ramos   MRN: [id number]   Admitting Diagnosis: SAH    OT Date of Treatment: 12/18/17   Total Time (min): 75 min    Billable Minutes:  Therapeutic Activity 15 and Therapeutic Exercise 60  Total Minutes: 75    General Precautions: Standard, fall, vision impaired (cataract of L eye)  Orthopedic Precautions: LLE non weight bearing  Braces:  (L LE CAM boot)    Do you have any cultural, spiritual, Jewish conflicts, given your current situation?: None    Subjective:  Communicated with nurse prior to session.    Pain/Comfort  Pain Rating 1: 0/10    Objective:  Patient found with: oxygen, wound vac (w/c alarm)    Balance:   Static Sit: Supervision  Dynamic Sit:  Supervision to SBA    Additional Treatment:  - UBE 2 sets x 5 mins - pt requiring no rest break during 2nd set   - Resistance pulleys with 20# for lat pull downs 2 sets x 15 reps and anterior chest press 1 set x 15 reps and 1 set x 20 reps; 10# biceps/triceps strengthening x 15 reps each motion  - B serena with 8# all directions for shoulder/elbow flexion/extension and shoulder ABD/ADD 2 sets x 15 reps each motion with rest breaks as needed  - Rickshaw with 20# 2 sets x 15 reps for triceps strengthening with rest break between sets    Patient left up in chair with all lines intact, call button in reach and \nurse notified    ASSESSMENT:  Juliane Ramos is a 57 y.o. female with a medical diagnosis of SAH and presents with improvements with UE strength/endurance. Patient should continue to benefit from skilled OT services per est POC to increase functional independence, mobility, and safety.     Rehab potential is good    Activity tolerance: Good    Discharge recommendations: home     Equipment recommendations:  (tbd)     GOALS:    Occupational Therapy Goals        Problem: Occupational Therapy Goal    Goal Priority Disciplines Outcome Interventions   Occupational Therapy Goal     OT, PT/OT Ongoing (interventions  implemented as appropriate)    Description:  Goals to be met by: 12/29/17     Patient will increase functional independence with ADLs by performing:    Feeding with [place].  UE Dressing with [place].  LE Dressing with Supervision.  Grooming while seated with [place].  Toileting from toilet with bedside commode positioned over Minimal Assistance for hygiene and clothing management.   Bathing from shower chair/bench with Minimal Assistance with DME/AE as needed.  Shower transfer with Minimal Assistance with DME/AE as needed .  Toilet transfer to toilet with Minimal Assistance with DME as needed.                      Plan:  Patient to be seen 6 x/week to address the above listed problems via self-care/home management, community/work re-entry, therapeutic activities, therapeutic exercises, therapeutic groups, wheelchair management/training  Plan of Care expires: 12/29/17  Plan of Care reviewed with: patient    Chloe SCOTT Guerrier LOTR  12/18/2017

## 2017-12-18 NOTE — PROGRESS NOTES
[facility]  Physical Medicine & Rehab  Progress Note    Patient Name: Juliane Ramos  MRN: [id number]  Patient Class: IP- Rehab   Admission Date: 12/11/2017  Length of Stay: 7 days  Attending Physician: Rishi Ness MD  Primary Care Provider: JOS Dumont    Subjective:     Principal Problem:  SAH  Interval History: pt is 57 y.o female s/p SAH , non surgical, PE, s/p debridement of achillis tendon     Scheduled Medications:    carvedilol  12.5 mg Oral BID    cephALEXin  500 mg Oral Q12H    epoetin donita (PROCRIT) injection  10,000 Units Intravenous Every Tues, Thurs, Sat    fluconazole  100 mg Oral Daily    gabapentin  100 mg Oral TID    heparin (porcine)  5,000 Units Subcutaneous Q12H    insulin aspart  1-10 Units Subcutaneous TIDWM    levothyroxine  50 mcg Oral Before breakfast    metroNIDAZOLE  250 mg Oral Q8H    miconazole nitrate 2%   Topical (Top) BID    rosuvastatin  10 mg Oral Daily    sodium chloride 0.9%  3 mL Intravenous Q8H       PRN Medications: sodium chloride 0.9%, acetaminophen, albuterol sulfate, dextrose 50%, diphenoxylate-atropine 2.5-0.025 mg, glucagon (human recombinant), glucose, glucose, heparin (porcine), labetalol, levetiracetam oral soln, magnesium oxide, magnesium oxide, ondansetron, potassium phosphate IVPB, simethicone, traMADol    Review of Systems   Constitutional: Negative.    HENT: Negative.    Eyes: Negative.    Respiratory: Negative.    Cardiovascular: Negative.    Gastrointestinal: Negative.    Endocrine: Negative.    Genitourinary: Negative.    Musculoskeletal: Negative.    Skin: Negative.    Allergic/Immunologic: Negative.    Neurological: Negative.    Hematological: Negative.    Psychiatric/Behavioral: Negative.      Objective:     Vital Signs (Most Recent):  Temp: 97.1 °F (36.2 °C) (12/18/17 0741)  Pulse: 64 (12/18/17 0740)  Resp: 20 (12/18/17 0740)  BP: (!) 144/76 (12/18/17 0740)  SpO2: 100 % (12/18/17 0740)    Vital Signs (24h  Range):  Temp:  [97.1 °F (36.2 °C)-98.6 °F (37 °C)] 97.1 °F (36.2 °C)  Pulse:  [64-84] 64  Resp:  [18-20] 20  SpO2:  [97 %-100 %] 100 %  BP: (121-144)/(68-76) 144/76     Physical Exam   Constitutional: She is oriented to person, place, and time. She appears well-developed and well-nourished. No distress.   HENT:   Head: Normocephalic and atraumatic.   Right Ear: External ear normal.   Left Ear: External ear normal.   Nose: Nose normal.   Mouth/Throat: Oropharynx is clear and moist. No oropharyngeal exudate.   Eyes: Conjunctivae and EOM are normal. Pupils are equal, round, and reactive to light. Right eye exhibits no discharge. Left eye exhibits no discharge. No scleral icterus.   Neck: Normal range of motion. Neck supple. No JVD present. No tracheal deviation present. No thyromegaly present.   Cardiovascular: Normal rate, regular rhythm, normal heart sounds and intact distal pulses.  Exam reveals no gallop and no friction rub.    No murmur heard.  Pulmonary/Chest: Effort normal and breath sounds normal. No stridor. No respiratory distress. She has no wheezes. She has no rales. She exhibits no tenderness.   Abdominal: Soft. Bowel sounds are normal. She exhibits no distension and no mass. There is no tenderness. There is no rebound and no guarding. No hernia.   Obese    Genitourinary:   Genitourinary Comments: deferred   Musculoskeletal: Normal range of motion. She exhibits no edema, tenderness or deformity.   Lymphadenopathy:     She has no cervical adenopathy.   Neurological: She is alert and oriented to person, place, and time. She displays normal reflexes. No cranial nerve deficit or sensory deficit. She exhibits normal muscle tone. Coordination normal.   Skin: She is not diaphoretic.   + wound vac achillis    Psychiatric: She has a normal mood and affect. Her behavior is normal.     NEUROLOGICAL EXAMINATION:     MENTAL STATUS   Oriented to person, place, and time.     CRANIAL NERVES     CN III, IV, VI   Pupils  are equal, round, and reactive to light.  Extraocular motions are normal.       Assessment/Plan:      Juliane Ramos is a 57 y.o. female admitted to inpatient rehabilitation on 12/11/2017 for <principal problem not specified> with impaired mobility and ADLs. Patient remains appropriate for PT, OT, and as required Speech therapy. Patient continues to require 24 hour nursing care as well as daily Physician assessment.    Active Diagnoses:    Diagnosis Date Noted POA    Wounds, multiple [T07.XXXA] 12/13/2017 Yes    SAH (subarachnoid hemorrhage) [I60.9] 12/11/2017 Yes      Problems Resolved During this Admission:    Diagnosis Date Noted Date Resolved POA     SAH, cont PT, OT, ST  H/o PE , pulm eval pending  HTN, vitals noted, cont current meds  Renal insuff, + dialysis  DVT prophylaxis, cont sq heparin  DM, accu check noted, cont current meds  Anemia, monitor lab    DISCHARGE PLANNING:  Tentative Discharge Date:     No future appointments.    Rishi Ness MD  Department of Physical Medicine & Rehab  [facility]

## 2017-12-18 NOTE — PT/OT/SLP PROGRESS
"Speech Language Pathology Treatment          Juliane Ramos   MRN: [id number]     Diet recommendations: Solid Diet Level: Regular  Liquid Diet Level: Thin No straws    SLP Treatment Date: 12/18/17  Speech Start Time: 0940     Speech Stop Time: 1020     Speech Total (min): 40 min       TREATMENT BILLABLE MINUTES:  Speech Therapy Individual 40 and Total Time 40    General Precautions: Standard, fall  Current Respiratory Status: nasal cannula       Subjective:  Pt alert, pleasant and participatory  "we did some money and math stuff"- Pt w/ adequate recall of previous ST    Objective:    Patient found with: wound vac, oxygen in wheel chair at bedside. Performance is as follows:   Pt demonstrated adequate recall of previous events as she was able to state contents and performance of ST session consistent with 12/17 note. She required moderate assistance throughout functional money task to enhance organization and attention to detail as she was noted to skip vital steps and apply basic computation principles. She performed independently with approx 60% success increasing to 80% success when given attention to error and skipped content. MOCA readministered w/ Pt achieving score of 25/30.  Given Pt's previous score of 26/30 on 12/18 (admit), Pt's performance suggests limited progress in the areas of executive function, attention, and short-term memory.      Assessment:  Juliane Ramos is a 57 y.o. female with a SLP diagnosis of mild Cognitive-Linguistic Impairment.     Diet recommendations:        Liquid Diet Level: Thin  No straws    Discharge recommendations: Discharge Facility/Level Of Care Needs: home     Goals:    SLP Goals        Problem: SLP Goal    Goal Priority Disciplines Outcome   SLP Goal     SLP Ongoing (interventions implemented as appropriate)   Description:  Short term goals:  1. Pt will complete complex reasoning tasks (math, time, money mgmt) with 90% acc independently   2. Pt will complete complex " sequencing tasks (pill mgmt/box, recall of transfer, etc.) with 90% acc independently   3. Pt will demonstrate adequate attention (sustained/alternating/selective) to complete functional tasks, as listed above, in 9/10 attempts independently      Long Term Goals:  1. Pt will demonstrate adequate cognitive function for safe and efficient function in home, social and medical environments.                       Plan:   Patient to be seen Therapy Frequency: 5 x/week   Plan of Care Expires:    Plan of Care reviewed with: patient  SLP Follow-up?: Yes  SLP - Next Visit Date: 12/17/17           ST Kristi 12/18/2017

## 2017-12-18 NOTE — PLAN OF CARE
Problem: Patient Care Overview  Goal: Plan of Care Review  Outcome: Ongoing (interventions implemented as appropriate)  AAOx3 tolerating therapy well no acute distress noted

## 2017-12-18 NOTE — PROGRESS NOTES
INPATIENT NEPHROLOGY PROGRESS NOTE  [facility] NEPHROLOGY    Patient Name: Juliane Ramos  Date: 12/18/2017    Reason for consultation: MC    Chief Complaint: Diabetic foot infection    History of Present Illness:  Ms. Ramos is a 58 y/o F with a hx of HTN, DM2, CHF, and stage III CKD who p/w a left heel ulcer/cellulitis x 2 weeks, nonhealing. Hospital course c/b PE, fall with SAH, and MC 2/2 contrast/vancomycin/infection requiring RRT. She is currently dialysis dependent. She is now at rehab.    · Interval History/Subjective:    - working with PT  - no cp, dyspnea, abd pain; some edema in RLE    · Review of Systems: All 14 systems reviewed and negative, except as noted in HPI    Plan of Care:    Assessment:  Oliguric MC 2/2 multifactorial ATN, dialysis dependent  HTN  Hyponatremia/Hyperkalemia  SHPT  Anemia of CKD     Plan:     1. Acute Kidney Injury- She remains HD dependent. Continue HD TTS. No NSAIDs or IV contrast.      2. Volume/Blood Pressure- Parameters are stable- continue UF 3-4L.     3. Electrolytes/Acid Base- HypoNa is stable. K is at goal.     4. Bone Mineral Metabolism- Phos is at goal- no need for binder.     5. Anemia of CKD- Hb is stable after blood transfusion. Continue EPO with HD.    Continue weekly labs.    Thank you for allowing us to participate in this patient's care. We will continue to follow.    Medications:  No current facility-administered medications on file prior to encounter.      Current Outpatient Prescriptions on File Prior to Encounter   Medication Sig Dispense Refill    albuterol (ACCUNEB) 1.25 mg/3 mL Nebu Take 1.25 mg by nebulization every 6 (six) hours as needed. Rescue      carvedilol (COREG) 12.5 MG tablet Take 1 tablet (12.5 mg total) by mouth 2 (two) times daily. 60 tablet 11    cefepime in dextrose 5 % (MAXIPIME) 1 gram/50 mL PgBk Inject 50 mLs (1 g total) into the vein every 24 hours as needed.      gabapentin (NEURONTIN) 100 MG capsule Take 1 capsule (100 mg  total) by mouth 3 (three) times daily. 90 capsule 11    levetiracetam oral soln 500 mg/5 mL (5 mL) Soln 2.5 mLs (250 mg total) by Per NG tube route 2 (two) times daily. 150 mL 11    levothyroxine (SYNTHROID) 50 MCG tablet Take 50 mcg by mouth once daily.        metronidazole (FLAGYL) 500 mg/100 mL IVPB Inject 100 mLs (500 mg total) into the vein every 8 (eight) hours.      rosuvastatin (CRESTOR) 10 MG tablet Take 1 tablet (10 mg total) by mouth once daily. 30 tablet 0    VANCOMYCIN HCL (VANCOMYCIN IN 5 % DEXTROSE) 1.75 gram/500 mL Soln Inject 500 mLs (1,750 mg total) into the vein once daily.       Scheduled Meds:   carvedilol  12.5 mg Oral BID    cephALEXin  500 mg Oral Q12H    epoetin donita (PROCRIT) injection  10,000 Units Intravenous Every Tues, Thurs, Sat    fluconazole  100 mg Oral Daily    gabapentin  100 mg Oral TID    heparin (porcine)  5,000 Units Subcutaneous Q12H    insulin aspart  1-10 Units Subcutaneous TIDWM    levothyroxine  50 mcg Oral Before breakfast    metroNIDAZOLE  250 mg Oral Q8H    miconazole nitrate 2%   Topical (Top) BID    rosuvastatin  10 mg Oral Daily    sodium chloride 0.9%  3 mL Intravenous Q8H     Continuous Infusions:  PRN Meds:.sodium chloride 0.9%, acetaminophen, albuterol sulfate, dextrose 50%, diphenoxylate-atropine 2.5-0.025 mg, glucagon (human recombinant), glucose, glucose, heparin (porcine), labetalol, levetiracetam oral soln, magnesium oxide, magnesium oxide, ondansetron, potassium phosphate IVPB, simethicone, traMADol    Allergies:  Patient has no known allergies.    Vital Signs:  Temp Readings from Last 3 Encounters:   12/18/17 97.1 °F (36.2 °C) (Oral)   12/11/17 97.4 °F (36.3 °C) (Oral)   11/27/17 97 °F (36.1 °C) (Oral)       Pulse Readings from Last 3 Encounters:   12/18/17 64   12/11/17 69   11/27/17 67       BP Readings from Last 3 Encounters:   12/18/17 (!) 144/76   12/11/17 (!) 125/59   11/27/17 123/67       Weight:  Wt Readings from Last 3  Encounters:   12/17/17 106.9 kg (235 lb 11.2 oz)   12/08/17 117.9 kg (260 lb)   11/27/17 117 kg (257 lb 15 oz)       Physical Exam:  Constitutional: nad, aao x 3  Heart: rrr, no m/r/g, wwp, + RLE edema  Lungs: ctab, no w/r/r/c, no lb  Abdomen: s/nt/nd, +BS    Results:  Lab Results   Component Value Date     (L) 12/18/2017    K 4.3 12/18/2017    CL 99 12/18/2017    CO2 23 12/18/2017    BUN 46 (H) 12/18/2017    CREATININE 5.4 (H) 12/18/2017    CALCIUM 8.7 12/18/2017    ANIONGAP 10 12/18/2017    ESTGFRAFRICA 10 (A) 12/18/2017    EGFRNONAA 8 (A) 12/18/2017       Lab Results   Component Value Date    CALCIUM 8.7 12/18/2017    PHOS 4.4 12/18/2017         Recent Labs  Lab 12/18/17  0632   WBC 7.20   RBC 3.39*   HGB 8.6*   HCT 26.9*      MCV 79*   MCH 25.4*   MCHC 32.0       I have personally reviewed pertinent radiological imaging and reports.    Amairani Young MD MPH  [facility]  [address] [place] [id number]  [phone] (p)  [phone] (f)

## 2017-12-18 NOTE — PLAN OF CARE
12/18/17 0800   PRE-TX-O2-ETCO2   O2 Device (Oxygen Therapy) room air   Flow (L/min) 3   Oxygen Concentration (%) 32   SpO2 100 %   Pulse Oximetry Type Intermittent   $ Pulse Oximetry - Multiple Charge Pulse Oximetry - Multiple   Aerosol Therapy   $ Aerosol Therapy Charges PRN treatment not required   Ready to Wean/Extubation Screen   FIO2<=50 (chart decimal) 0.32

## 2017-12-18 NOTE — PT/OT/SLP PROGRESS
Physical Therapy         Treatment        Juliane Ramos   MRN: [id number]     PT Received On: 17  Total Time (min): 65        Billable Minutes:  Therapeutic Activity 20, Therapeutic Exercise 25 and Train/Wheelchair Management 20  Total Minutes: 65    Treatment Type: Treatment  PT/PTA: PT     PTA Visit Number: 0       General Precautions: Standard, fall  Orthopedic Precautions: Orthopedic Precautions : LLE non weight bearing   Braces: Braces:  (boot L ankle/foot)       Subjective:  Communicated with RN prior to session.    Pain/Comfort  Pain Rating 1: 0/10  Pain Rating Post-Intervention 1: 0/10    Objective:  Patient found seated in reclining w/c, pleasant and cooperative.  Patient found with: wound vac, oxygen    Functional Mobility:    Transfer Training:  Sit to stand:Moderate Assistance with Grab bars  x 4    Wheelchair Training: 160' (slow) with min assist and cues    Gait Training: unable due to NWB LLE    Additional Treatment:  SEATED: hip adduction with ball, hip abduction with black tband, knee flexion with green tband, LAQ (R with 4#), x 30 reps each  STANDING: in / bars (LLE only, NWB LLE): sidekicks, SLR, x 20 reps x 2 sets each  STATIC STAND BALANCE TRAINING: in // bars (NWB LLE): x 4 min x 4 trials with CGA and cues     Reassessed function and LE strength.    Activity Tolerance:  Patient tolerated treatment well    Patient left up in chair with call button in reach and O2 to wall 3 L/min via nc.    Assessment:  Juliane Ramos is a 57 y.o. female.    Rehab potential is good.    Activity tolerance: Good    GOALS:    Physical Therapy Goals        Problem: Physical Therapy Goal    Goal Priority Disciplines Outcome Goal Variances Interventions   Physical Therapy Goal     PT/OT, PT Ongoing (interventions implemented as appropriate)     Description:  Goals to be met by: 2018     Patient will increase functional independence with mobility by performin). Supine to sit with Modified  Mallory  2). Sit to supine with Modified Mallory  3). Sit to stand transfer with Stand-by Assistance  4). Bed to chair transfer with Stand-by Assistance   5). Wheelchair propulsion x > 250 feet with Modified Mallory  6). Stand for  > 3 minutes with Stand-by Assistance NWB LLE                      PLAN:    Patient to be seen daily  to address the above listed problems via therapeutic activities, therapeutic exercises, neuromuscular re-education, wheelchair management/training  Plan of Care expires: 12/29/17  Plan of Care reviewed with: patient         Albert VAZQUEZ Benedicto, PT 12/18/2017

## 2017-12-18 NOTE — PLAN OF CARE
Problem: SLP Goal  Goal: SLP Goal  Short term goals:  1. Pt will complete complex reasoning tasks (math, time, money mgmt) with 90% acc independently   2. Pt will complete complex sequencing tasks (pill mgmt/box, recall of transfer, etc.) with 90% acc independently   3. Pt will demonstrate adequate attention (sustained/alternating/selective) to complete functional tasks, as listed above, in 9/10 attempts independently      Long Term Goals:  1. Pt will demonstrate adequate cognitive function for safe and efficient function in home, social and medical environments.     Outcome: Ongoing (interventions implemented as appropriate)  Pt demonstrated adequate recall of previous events as she was able to state contents and performance of ST session consistent with 12/17 note. She required moderate assistance throughout functional money task to enhance organization and attention to detail as she was noted to skip vital steps and apply basic computation principles. She performed independently with approx 60% success increasing to 80% success when given attention to error and skipped content. MOCA readministered w/ Pt achieving score of 25/30.  Given Pt's previous score of 26/30 on 12/18 (admit), Pt's performance suggests limited progress in the areas of executive function, attention, and short-term memory.

## 2017-12-19 LAB
POCT GLUCOSE: 148 MG/DL (ref 70–110)
POCT GLUCOSE: 149 MG/DL (ref 70–110)
POCT GLUCOSE: 157 MG/DL (ref 70–110)
POCT GLUCOSE: 162 MG/DL (ref 70–110)
POCT GLUCOSE: 198 MG/DL (ref 70–110)
POCT GLUCOSE: 207 MG/DL (ref 70–110)
POCT GLUCOSE: 230 MG/DL (ref 70–110)

## 2017-12-19 PROCEDURE — 63600175 PHARM REV CODE 636 W HCPCS: Performed by: INTERNAL MEDICINE

## 2017-12-19 PROCEDURE — 97803 MED NUTRITION INDIV SUBSEQ: CPT

## 2017-12-19 PROCEDURE — 97542 WHEELCHAIR MNGMENT TRAINING: CPT

## 2017-12-19 PROCEDURE — 80100016 HC MAINTENANCE HEMODIALYSIS

## 2017-12-19 PROCEDURE — 97535 SELF CARE MNGMENT TRAINING: CPT

## 2017-12-19 PROCEDURE — 97110 THERAPEUTIC EXERCISES: CPT

## 2017-12-19 PROCEDURE — 92507 TX SP LANG VOICE COMM INDIV: CPT

## 2017-12-19 PROCEDURE — 12800000 HC REHAB SEMI-PRIVATE ROOM

## 2017-12-19 PROCEDURE — A4216 STERILE WATER/SALINE, 10 ML: HCPCS | Performed by: INTERNAL MEDICINE

## 2017-12-19 PROCEDURE — 97532 *HC SP COG SKL DEV EA 15 MIN: CPT

## 2017-12-19 PROCEDURE — 97530 THERAPEUTIC ACTIVITIES: CPT

## 2017-12-19 PROCEDURE — 99223 1ST HOSP IP/OBS HIGH 75: CPT | Mod: ,,, | Performed by: INTERNAL MEDICINE

## 2017-12-19 PROCEDURE — 25000003 PHARM REV CODE 250: Performed by: INTERNAL MEDICINE

## 2017-12-19 PROCEDURE — 94761 N-INVAS EAR/PLS OXIMETRY MLT: CPT

## 2017-12-19 PROCEDURE — 63600175 PHARM REV CODE 636 W HCPCS: Performed by: PHYSICAL MEDICINE & REHABILITATION

## 2017-12-19 PROCEDURE — 25000003 PHARM REV CODE 250: Performed by: PHYSICAL MEDICINE & REHABILITATION

## 2017-12-19 PROCEDURE — 27000221 HC OXYGEN, UP TO 24 HOURS

## 2017-12-19 RX ADMIN — ERYTHROPOIETIN 10000 UNITS: 10000 INJECTION, SOLUTION INTRAVENOUS; SUBCUTANEOUS at 02:12

## 2017-12-19 RX ADMIN — SODIUM CHLORIDE, PRESERVATIVE FREE 3 ML: 5 INJECTION INTRAVENOUS at 09:12

## 2017-12-19 RX ADMIN — SODIUM CHLORIDE, PRESERVATIVE FREE 3 ML: 5 INJECTION INTRAVENOUS at 05:12

## 2017-12-19 RX ADMIN — METRONIDAZOLE 250 MG: 250 TABLET ORAL at 09:12

## 2017-12-19 RX ADMIN — HEPARIN SODIUM 5000 UNITS: 5000 INJECTION, SOLUTION INTRAVENOUS; SUBCUTANEOUS at 09:12

## 2017-12-19 RX ADMIN — MICONAZOLE NITRATE: 20 OINTMENT TOPICAL at 09:12

## 2017-12-19 RX ADMIN — HEPARIN SODIUM 4000 UNITS: 1000 INJECTION, SOLUTION INTRAVENOUS; SUBCUTANEOUS at 03:12

## 2017-12-19 RX ADMIN — LEVOTHYROXINE SODIUM 50 MCG: 25 TABLET ORAL at 05:12

## 2017-12-19 RX ADMIN — METRONIDAZOLE 250 MG: 250 TABLET ORAL at 05:12

## 2017-12-19 RX ADMIN — INSULIN ASPART 2 UNITS: 100 INJECTION, SOLUTION INTRAVENOUS; SUBCUTANEOUS at 07:12

## 2017-12-19 RX ADMIN — GABAPENTIN 100 MG: 100 CAPSULE ORAL at 09:12

## 2017-12-19 RX ADMIN — ROSUVASTATIN CALCIUM 10 MG: 5 TABLET ORAL at 09:12

## 2017-12-19 RX ADMIN — SODIUM CHLORIDE, PRESERVATIVE FREE 3 ML: 5 INJECTION INTRAVENOUS at 03:12

## 2017-12-19 RX ADMIN — GABAPENTIN 100 MG: 100 CAPSULE ORAL at 03:12

## 2017-12-19 RX ADMIN — INSULIN ASPART 4 UNITS: 100 INJECTION, SOLUTION INTRAVENOUS; SUBCUTANEOUS at 12:12

## 2017-12-19 RX ADMIN — FLUCONAZOLE 100 MG: 100 TABLET ORAL at 09:12

## 2017-12-19 RX ADMIN — CEPHALEXIN 500 MG: 500 CAPSULE ORAL at 09:12

## 2017-12-19 RX ADMIN — METRONIDAZOLE 250 MG: 250 TABLET ORAL at 03:12

## 2017-12-19 RX ADMIN — GABAPENTIN 100 MG: 100 CAPSULE ORAL at 05:12

## 2017-12-19 NOTE — PROGRESS NOTES
HD:  Pt stable, tx complete, lines reinfused, catheter capped and clamped.  Pt tolerated tx well.    NET UF:  0 mL

## 2017-12-19 NOTE — CONSULTS
[facility]  Adult Nutrition  Consult Note    SUMMARY     Recommendations    Recommendation/Intervention: 1) Increase calories in diabetic diet to 2000 and continue renal diet 2) recommend discontinuing Novasource Renal 2/2 excessive calorie and cho intake and order Beneprotein, 2 packets TID to reduce calories and continue with protein supplementation (1 carton of Novasource Renal contains 475 calories, 21.6 gms protein, 43.5 gms CHO) 3) consider MVI for renal patients  Goals: 1) Pt will consume at least 85% of EEN/EPN daily   Nutrition Goal Status:met/ongoing  Communication of RD Recs:  (discussed on rounds)    Discharge Plan     diabetic, renal diet with protein supplement until wound heals    Reason for Assessment    Reason for Assessment: RD follow up      1. SAH (subarachnoid hemorrhage)      Past Medical History:   Diagnosis Date    Anemia in stage 3 chronic kidney disease 11/26/2017    CHF (congestive heart failure)     COPD (chronic obstructive pulmonary disease)     Coronary artery disease     Diabetes mellitus     Encounter for blood transfusion     Essential hypertension 6/24/2017    Hypertension     MI (myocardial infarction) 2010    Segmental and subsegmental pulmonary emboli of RLL without acute cor pulmonale 11/25/2017    Stroke     July 2005    Thyroid disease     Traumatic subarachnoid hemorrhage 11/24/2017    Type 2 diabetes mellitus with stage 3 chronic kidney disease, without long-term current use of insulin 6/24/2017        Interdisciplinary Rounds: attended     General Information Comments: Admitted for rehab s/p debridement of stage 3 diabetic foot ulcer (left). Is on wound vac. Pt reports good appetite and has gained wt.  Wt 6/27/17 was 240 lbs, 1.3 oz. Pt states  and is now 252 lbs, 1.6 oz. On HD.   Fluid vs actual wt gain?  12/15/17 Fair po intake with 67% meal intake x last 6 meals. Pt continues to receive Novasource Renal.   12/19/17 Good po  intake. Wt loss of 16.5 lbs x 4 days ?? Fluid loss? Pt on HD.   Wt Readings from Last 1 Encounters:   12/17/17 0449 106.9 kg (235 lb 11.2 oz)   12/13/17 1732 114.4 kg (252 lb 3.3 oz)   12/11/17 1737 114.4 kg (252 lb 1.6 oz)         Nutrition Prescription Ordered    Current Diet Order: 1800 calorie diabetic diet   Nutrition Order Comments: Note Novasource Renal has 475 calories, 21.6 gms protein and 43.5 gms CHO per carton.    Oral Nutrition Supplement: Novasource Renal, tid     Evaluation of Received Nutrients/Fluid Intake    Intake/Output Summary (Last 24 hours) at 12/19/17 1308  Last data filed at 12/19/17 0641   Gross per 24 hour   Intake              240 ml   Output                0 ml   Net              240 ml     Fluid Required:  (per primary team or UOP + 1000 mls)        % Intake of Estimated Energy Needs:  %  % Meal Intake:90% average for last 5 meals    Nutrition Risk Screen     Nutrition Risk Screen: large or nonhealing wound, burn or pressure ulcer    Nutrition/Diet History    Patient Reported Diet/Restrictions/Preferences: diabetic diet  Typical Food/Fluid Intake: Pt uses Boost at home. NKFA  Food Preferences: no cultural or religous food preferences identified.      Labs/Tests/Procedures/Meds    Diagnostic Test/Procedure Review:  (on HD)  Pertinent Labs Reviewed: reviewed, pertinent   BMP  Lab Results   Component Value Date     (L) 12/18/2017    K 4.3 12/18/2017    CL 99 12/18/2017    CO2 23 12/18/2017    BUN 46 (H) 12/18/2017    CREATININE 5.4 (H) 12/18/2017    CALCIUM 8.7 12/18/2017    ANIONGAP 10 12/18/2017    ESTGFRAFRICA 10 (A) 12/18/2017    EGFRNONAA 8 (A) 12/18/2017     Lab Results   Component Value Date    CALCIUM 8.7 12/18/2017    PHOS 4.4 12/18/2017     Lab Results   Component Value Date    HGBA1C 8.0 (H) 11/25/2017       Recent Labs  Lab 12/19/17  1201   POCTGLUCOSE 207*     Lab Results   Component Value Date    ALBUMIN 2.3 (L) 12/18/2017     Lab Results   Component Value Date     "CRP 83.9 (H) 2017     Lab Results   Component Value Date    CHOL 75 (L) 2017    CHOL 115 (L) 2017     Lab Results   Component Value Date    HDL 19 (L) 2017    HDL 25 (L) 2017     Lab Results   Component Value Date    LDLCALC 42.8 (L) 2017    LDLCALC 72.4 2017     Lab Results   Component Value Date    TRIG 66 2017    TRIG 88 2017     Lab Results   Component Value Date    CHOLHDL 25.3 2017    CHOLHDL 21.7 2017      Pertinent Medications Reviewed: reviewed, pertinent      Scheduled Meds:   carvedilol  12.5 mg Oral BID    cephALEXin  500 mg Oral Q12H    epoetin dontia (PROCRIT) injection  10,000 Units Intravenous Every Tues, Thurs, Sat    fluconazole  100 mg Oral Daily    gabapentin  100 mg Oral TID    heparin (porcine)  5,000 Units Subcutaneous Q12H    insulin aspart  1-10 Units Subcutaneous TIDWM    levothyroxine  50 mcg Oral Before breakfast    metroNIDAZOLE  250 mg Oral Q8H    miconazole nitrate 2%   Topical (Top) BID    rosuvastatin  10 mg Oral Daily    sodium chloride 0.9%  3 mL Intravenous Q8H     Continuous Infusions:        Physical Findings    Overall Physical Appearance: obese     Oral/Mouth Cavity: tooth/teeth missing  Skin:  (Donny score 16; no edema noted in MD's  progress note)    Anthropometrics    Temp: 97.7 °F (36.5 °C)     Height: 5' 6"  Weight Method: Bed Scale  Weight: 106.9 kg (235 lb 11.2 oz) (weighed by Liliam Driver RN)     Ideal Body Weight (IBW), Female: 130 lb     % Ideal Body Weight, Female (lb): 194.01 lb  BMI (Calculated): 40.8  BMI Grade: greater than 40 - morbid obesity     Usual Body Weight (UBW), k.9 kg (per chart review 17)     % Usual Body Weight: 105.27  % Weight Change From Usual Weight: 5.05 %     Estimated/Assessed Needs    Weight Used For Calorie Calculations: 82 kg (180 lb 12.4 oz) (per NHANES SBW for renal pts)   Height (cm): 167.6 cm   Energy Need Method: Kcal/kg   25 kcal/kg (kcal): " 2050 and 30 kcal/kg (kcal): 2460   RMR ([place]-St. Jeor Equation): 1431.75   Weight Used For Protein Calculations: 82 kg (180 lb 12.4 oz)   1.2 gm Protein (gm): 98.61 and 1.5 gm Protein (gm): 123.26  Fluid Requirements (mL):  (per primary team 2/2 renal issues)       CHO Requirement: 45-50% of EEN (225 gms)    Assessment and Plan    Wounds, multiple    Nutrition Diagnosis  Increase proteinl needs     Related to (etiology):   Wound healing    Signs and Symptoms (as evidenced by):   Presence of stage 3 wound on left foot    Interventions/Recommendations (treatment strategy):  1) Continue diabetic, renal diet.  2) Recommend discontinuing Novasource Renal due to pt's excessive calorie intake and provide a protein supplement that is lower in calories    Nutrition Diagnosis Status:   Continues              Monitor and Evaluation    Food and Nutrient Intake: energy intake  Food and Nutrient Adminstration: diet order   Physical Activity and Function: nutrition-related ADLs and IADLs  Anthropometric Measurements: weight, weight change  Biochemical Data, Medical Tests and Procedures: electrolyte and renal panel, glucose/endocrine profile, inflammatory profile, lipid profile  Nutrition-Focused Physical Findings: overall appearance, skin    Nutrition Risk    2 x weekly     Nutrition Follow-Up    RD Follow-up?: Yes

## 2017-12-19 NOTE — PT/OT/SLP PROGRESS
Speech Language Pathology Treatment          Juliane Ramos   MRN: [id number]     Diet recommendations: Solid Diet Level: Regular  Liquid Diet Level: Thin     SLP Treatment Date: 12/19/17  Speech Start Time: 1230     Speech Stop Time: 1300     Speech Total (min): 30 min       TREATMENT BILLABLE MINUTES:  Speech Therapy Individual 30 and Total Time 30    General Precautions: Standard, fall  Current Respiratory Status: nasal cannula       Subjective:  Pt alert, pleasant and participatory      Objective:    Patient found with: oxygen, wound vac; performance this date is as follows:              Pt required minimal assistance to demonstrate adequate attention and deductive reasoning skills in order to complete functional time management task. Given extra time and assistance, she was able to complete task with approx 90% acc. Given functional visual stimuli such as receipt, she answered computation word problems with 50% acc independently increasing to 100% acc given moderate assistance. She answered time management and organization word problems with 100% acc independently given extra time.      Assessment:  Juliane Ramos is a 57 y.o. female with a SLP diagnosis of mild Cognitive Impairment characterized by deficits in attention and executive function, impacting her success and efficiency within ADLs such as time/money management.       Diet recommendations:        Liquid Diet Level: Thin  -see above     Discharge recommendations: Discharge Facility/Level Of Care Needs: home     Goals:    SLP Goals        Problem: SLP Goal    Goal Priority Disciplines Outcome   SLP Goal     SLP Ongoing (interventions implemented as appropriate)   Description:  Short term goals:  1. Pt will complete complex reasoning tasks (math, time, money mgmt) with 90% acc independently   2. Pt will complete complex sequencing tasks (pill mgmt/box, recall of transfer, etc.) with 90% acc independently   3. Pt will demonstrate adequate attention  (sustained/alternating/selective) to complete functional tasks, as listed above, in 9/10 attempts independently      Long Term Goals:  1. Pt will demonstrate adequate cognitive function for safe and efficient function in home, social and medical environments.                       Plan:   Patient to be seen Therapy Frequency: 5 x/week   Plan of Care Expires:    Plan of Care reviewed with: patient  SLP Follow-up?: Yes  SLP - Next Visit Date: 12/20/17           ST Kristi 12/19/2017

## 2017-12-19 NOTE — PLAN OF CARE
Problem: Patient Care Overview  Goal: Individualization & Mutuality  Recommendation/Intervention: 1) Increase calories in diabetic diet to 2000 and continue renal diet 2) recommend discontinuing Novasource Renal 2/2 excessive calorie and cho intake and order Beneprotein, 2 packets TID to reduce calories and continue with protein supplementation (1 carton of Novasource Renal contains 475 calories, 21.6 gms protein, 43.5 gms CHO) 3) consider MVI for renal patients  Goals: 1) Pt will consume at least 85% of EEN/EPN daily   Nutrition Goal Status:met/ongoing  Communication of RD Recs:  (discussed on rounds)

## 2017-12-19 NOTE — PROGRESS NOTES
INPATIENT NEPHROLOGY PROGRESS NOTE  [facility] NEPHROLOGY    Patient Name: Juliane Ramos  Date: 12/19/2017    Reason for consultation: MC    Chief Complaint: Diabetic foot infection    History of Present Illness:  Ms. Ramos is a 56 y/o F with a hx of HTN, DM2, CHF, and stage III CKD who p/w a left heel ulcer/cellulitis x 2 weeks, nonhealing. Hospital course c/b PE, fall with SAH, and MC 2/2 contrast/vancomycin/infection requiring RRT. She is currently dialysis dependent. She is now at rehab.    · Interval History/Subjective:    - seen on HD  - BP is low- turned UF to even  - no symptoms of low BP  - no cp, dyspnea, abd pain, diarrhea    · Review of Systems: All 14 systems reviewed and negative, except as noted in HPI    Plan of Care:    Assessment:  Oliguric MC 2/2 multifactorial ATN, dialysis dependent  HTN  Hyponatremia/Hyperkalemia  SHPT  Anemia of CKD     Plan:     1. Acute Kidney Injury- She remains HD dependent. Continue HD TTS. No NSAIDs or IV contrast.      2. Volume/Blood Pressure- BP is low. D/C coreg. Changed to UF even today.     3. Bone Mineral Metabolism- Check weekly phos- no need for binder.     4. Anemia of CKD- Check weekly CBC. Continue EPO with HD.    Thank you for allowing us to participate in this patient's care. We will continue to follow.    Medications:  No current facility-administered medications on file prior to encounter.      Current Outpatient Prescriptions on File Prior to Encounter   Medication Sig Dispense Refill    albuterol (ACCUNEB) 1.25 mg/3 mL Nebu Take 1.25 mg by nebulization every 6 (six) hours as needed. Rescue      carvedilol (COREG) 12.5 MG tablet Take 1 tablet (12.5 mg total) by mouth 2 (two) times daily. 60 tablet 11    cefepime in dextrose 5 % (MAXIPIME) 1 gram/50 mL PgBk Inject 50 mLs (1 g total) into the vein every 24 hours as needed.      gabapentin (NEURONTIN) 100 MG capsule Take 1 capsule (100 mg total) by mouth 3 (three) times daily. 90 capsule 11     levetiracetam oral soln 500 mg/5 mL (5 mL) Soln 2.5 mLs (250 mg total) by Per NG tube route 2 (two) times daily. 150 mL 11    levothyroxine (SYNTHROID) 50 MCG tablet Take 50 mcg by mouth once daily.        metronidazole (FLAGYL) 500 mg/100 mL IVPB Inject 100 mLs (500 mg total) into the vein every 8 (eight) hours.      rosuvastatin (CRESTOR) 10 MG tablet Take 1 tablet (10 mg total) by mouth once daily. 30 tablet 0    VANCOMYCIN HCL (VANCOMYCIN IN 5 % DEXTROSE) 1.75 gram/500 mL Soln Inject 500 mLs (1,750 mg total) into the vein once daily.       Scheduled Meds:   cephALEXin  500 mg Oral Q12H    epoetin donita (PROCRIT) injection  10,000 Units Intravenous Every Tues, Thurs, Sat    fluconazole  100 mg Oral Daily    gabapentin  100 mg Oral TID    heparin (porcine)  5,000 Units Subcutaneous Q12H    insulin aspart  1-10 Units Subcutaneous TIDWM    levothyroxine  50 mcg Oral Before breakfast    metroNIDAZOLE  250 mg Oral Q8H    miconazole nitrate 2%   Topical (Top) BID    rosuvastatin  10 mg Oral Daily    sodium chloride 0.9%  3 mL Intravenous Q8H     Continuous Infusions:  PRN Meds:.sodium chloride 0.9%, acetaminophen, albuterol sulfate, dextrose 50%, diphenoxylate-atropine 2.5-0.025 mg, glucagon (human recombinant), glucose, glucose, heparin (porcine), labetalol, levetiracetam oral soln, magnesium oxide, magnesium oxide, ondansetron, potassium phosphate IVPB, simethicone, traMADol    Allergies:  Patient has no known allergies.    Vital Signs:  Temp Readings from Last 3 Encounters:   12/19/17 96.8 °F (36 °C) (Oral)   12/11/17 97.4 °F (36.3 °C) (Oral)   11/27/17 97 °F (36.1 °C) (Oral)       Pulse Readings from Last 3 Encounters:   12/19/17 66   12/11/17 69   11/27/17 67       BP Readings from Last 3 Encounters:   12/19/17 96/64   12/11/17 (!) 125/59   11/27/17 123/67       Weight:  Wt Readings from Last 3 Encounters:   12/17/17 106.9 kg (235 lb 11.2 oz)   12/08/17 117.9 kg (260 lb)   11/27/17 117 kg (257 lb 15  oz)       Physical Exam:  Constitutional: nad, aao x 3  Heart: rrr, no m/r/g, wwp, trace RLE edema  Lungs: ctab, no w/r/r/c, no lb  Abdomen: s/nt/nd, +BS    Results:  Lab Results   Component Value Date     (L) 12/18/2017    K 4.3 12/18/2017    CL 99 12/18/2017    CO2 23 12/18/2017    BUN 46 (H) 12/18/2017    CREATININE 5.4 (H) 12/18/2017    CALCIUM 8.7 12/18/2017    ANIONGAP 10 12/18/2017    ESTGFRAFRICA 10 (A) 12/18/2017    EGFRNONAA 8 (A) 12/18/2017       Lab Results   Component Value Date    CALCIUM 8.7 12/18/2017    PHOS 4.4 12/18/2017       No results for input(s): WBC, RBC, HGB, HCT, PLT, MCV, MCH, MCHC in the last 24 hours.    I have personally reviewed pertinent radiological imaging and reports.    Amairani Young MD MPH  [facility]  [address] [place] [id number]  [phone] (p)  [phone] (f)

## 2017-12-19 NOTE — PATIENT CARE CONFERENCE
Weekly Staffing Report      Date Admitted: 12/11/2017 :   Staffing Date: 12/19/2017     Patient Active Problem List   Diagnosis    Acute on chronic congestive heart failure    Essential hypertension    CKD (chronic kidney disease) stage 3, GFR 30-59 ml/min    Type 2 diabetes mellitus with stage 5 chronic kidney disease not on chronic dialysis, without long-term current use of insulin    Coronary artery disease involving native coronary artery of native heart without angina pectoris    Acquired hypothyroidism    Recurrent right pleural effusion    Hyperkalemia    Diabetic leg ulcer    Traumatic subarachnoid hemorrhage    Segmental and subsegmental pulmonary emboli of RLL without acute cor pulmonale    Acute renal failure superimposed on stage 3 chronic kidney disease    Anemia in stage 3 chronic kidney disease    Subarachnoid hemorrhage following injury, no loss of consciousness    Staph aureus infection    Pulmonary embolus    Obesity    Acute respiratory failure with hypoxia and hypercapnia    Anasarca    SAH (subarachnoid hemorrhage)    Wounds, multiple    Wound healing, delayed          Team Members Present:  Physician Team Member: Dr Ness  Nursing Team Member: Johan Palacios RN  Case Management Team Member: Valentina Ordonez CM/SW  PT Team Member: Albert Diane PT  OT Team Member: Chloe Guerrier OT  SLP Team Member: Barrett BATEMAN    Nursing  Skin: Intact, wound achillis tendon stable   Bowel: Continent  Bladder:oligiuric   Last BM: 12-16-17  Diet: diabetic, 1800 calorie, renal diet   Appetite: good   Pain Level: 0/10    Physical Therapy  Supine to Sit:  moderate assist  Sit to Stand: moderate assist  Gait: NWB on left  N/A    Wheelchair Mobility: 125-150 feet minimal assist  ROM: left ankle not tested, otherwise wfl   Stairs:na N/A  Strength: left ankle not tested, left hip 3-/5, left knee 4-/5, right hip & ankle 3+/5, knee 4/5    Occupational Therapy  Feeding: set up  Grooming:set up  UED:  supervision  LED: maximal assist  Bathing:moderate assist   Toileting:moderate assist  Toilet Transfer:  moderate assist  Tub Transfer:  pending   Strength: right 4- to 4/5. Left 3+ to 4-/5    Speech Therapy  Swallow: wfl  MMS: 25/30  Memory: standy by assistance  Receptive Language: modified independent  Expressive Language: modified independent  Problem solving: standy by assistance            Summary of Progress:  good    Barriers to Progress/Discharge: weight bearing     Tolerates 3 hours of therapy: Yes    Comments:     Estimated Length of Stay: 12-29-17

## 2017-12-19 NOTE — PROGRESS NOTES
[facility]  Physical Medicine & Rehab  Progress Note    Patient Name: Juliane Ramos  MRN: [id number]  Patient Class: IP- Rehab   Admission Date: 12/11/2017  Length of Stay: 8 days  Attending Physician: Rishi Ness MD  Primary Care Provider: JOS Dumont    Subjective:     Principal Problem:  SAH  Interval History: pt is 57 y.o female with dx of SAH, PE, s/p debridement of achillis tendon. participating with therapy     Scheduled Medications:  carvedilol  12.5 mg Oral BID    cephALEXin  500 mg Oral Q12H    epoetin donita (PROCRIT) injection  10,000 Units Intravenous Every Tues, Thurs, Sat    fluconazole  100 mg Oral Daily    gabapentin  100 mg Oral TID    heparin (porcine)  5,000 Units Subcutaneous Q12H    insulin aspart  1-10 Units Subcutaneous TIDWM    levothyroxine  50 mcg Oral Before breakfast    metroNIDAZOLE  250 mg Oral Q8H    miconazole nitrate 2%   Topical (Top) BID    rosuvastatin  10 mg Oral Daily    sodium chloride 0.9%  3 mL Intravenous Q8H       PRN Medications: sodium chloride 0.9%, acetaminophen, albuterol sulfate, dextrose 50%, diphenoxylate-atropine 2.5-0.025 mg, glucagon (human recombinant), glucose, glucose, heparin (porcine), labetalol, levetiracetam oral soln, magnesium oxide, magnesium oxide, ondansetron, potassium phosphate IVPB, simethicone, traMADol    Review of Systems   Constitutional: Negative.    HENT: Negative.    Eyes: Negative.    Respiratory: Negative.    Cardiovascular: Negative.    Gastrointestinal: Negative.    Endocrine: Negative.    Genitourinary: Negative.    Musculoskeletal: Negative.    Skin: Negative.    Allergic/Immunologic: Negative.    Neurological: Negative.    Hematological: Negative.    Psychiatric/Behavioral: Negative.      Objective:     Vital Signs (Most Recent):  Temp: 98.4 °F (36.9 °C) (12/19/17 0541)  Pulse: 66 (12/19/17 0541)  Resp: 20 (12/19/17 0541)  BP: 111/76 (12/19/17 0541)  SpO2: 98 % (12/19/17 0541)    Vital Signs  (24h Range):  Temp:  [97.4 °F (36.3 °C)-98.8 °F (37.1 °C)] 98.4 °F (36.9 °C)  Pulse:  [66-75] 66  Resp:  [18-20] 20  SpO2:  [97 %-99 %] 98 %  BP: (111-141)/(76-89) 111/76     Physical Exam   Constitutional: She is oriented to person, place, and time. She appears well-developed and well-nourished. No distress.   HENT:   Head: Normocephalic and atraumatic.   Right Ear: External ear normal.   Left Ear: External ear normal.   Nose: Nose normal.   Mouth/Throat: Oropharynx is clear and moist. No oropharyngeal exudate.   Eyes: Conjunctivae and EOM are normal. Pupils are equal, round, and reactive to light. Right eye exhibits no discharge. Left eye exhibits no discharge. No scleral icterus.   Neck: Normal range of motion. Neck supple. No JVD present. No tracheal deviation present. No thyromegaly present.   Cardiovascular: Normal rate, regular rhythm, normal heart sounds and intact distal pulses.  Exam reveals no gallop and no friction rub.    No murmur heard.  Pulmonary/Chest: Effort normal and breath sounds normal. No stridor. No respiratory distress. She has no wheezes. She has no rales. She exhibits no tenderness.   Abdominal: Soft. Bowel sounds are normal. She exhibits no distension and no mass. There is no tenderness. There is no rebound and no guarding. No hernia.   Genitourinary:   Genitourinary Comments: deferred   Musculoskeletal: Normal range of motion. She exhibits no edema, tenderness or deformity.   Lymphadenopathy:     She has no cervical adenopathy.   Neurological: She is alert and oriented to person, place, and time. She displays normal reflexes. No cranial nerve deficit or sensory deficit. She exhibits normal muscle tone. Coordination normal.   Skin: No rash noted. She is not diaphoretic. No erythema. No pallor.   Psychiatric: She has a normal mood and affect. Her behavior is normal. Judgment and thought content normal.     NEUROLOGICAL EXAMINATION:     MENTAL STATUS   Oriented to person, place, and time.      CRANIAL NERVES     CN III, IV, VI   Pupils are equal, round, and reactive to light.  Extraocular motions are normal.       Assessment/Plan:      Juliane Ramos is a 57 y.o. female admitted to inpatient rehabilitation on 12/11/2017 for <principal problem not specified> with impaired mobility and ADLs. Patient remains appropriate for PT, OT, and as required Speech therapy. Patient continues to require 24 hour nursing care as well as daily Physician assessment.    Active Diagnoses:    Diagnosis Date Noted POA    Wound healing, delayed [T14.8XXD] 12/18/2017 Yes    Wounds, multiple [T07.XXXA] 12/13/2017 Yes    SAH (subarachnoid hemorrhage) [I60.9] 12/11/2017 Yes      Problems Resolved During this Admission:    Diagnosis Date Noted Date Resolved POA     SAH, cont PT, OT, ST  DVT prophylaxis, cont sq  Heparin   HTN, vitals noted, cont current meds  DM, accu check noted, cont current meds  Wounds, now off wound vac & applied cam walker     DISCHARGE PLANNING:  Tentative Discharge Date:     Future Appointments  Date Time Provider Department Center   12/20/2017 2:15 PM [facility] OIC-CT1 550 LB LIMIT [facility] CTSC IC Jeff Aguirre   12/20/2017 3:20 PM Nena Forte PA-C [facility] NEUROS7 Jeff Ness MD  Department of Physical Medicine & Rehab  [facility]

## 2017-12-19 NOTE — PT/OT/SLP PROGRESS
"Physical Therapy         Treatment        Juliane Ramos   MRN: [id number]     PT Received On: 17  Total Time (min): 70        Billable Minutes:  Therapeutic Activity 30, Therapeutic Exercise 30 and Train/Wheelchair Management 10  Total Minutes: 70    Treatment Type: Treatment  PT/PTA: PT     PTA Visit Number: 0       General Precautions: Standard, fall  Orthopedic Precautions: Orthopedic Precautions : LLE non weight bearing   Braces: Braces:  (L ankle/foot "boot")       Subjective:  Communicated with nurse prior to session.    Pain/Comfort  Pain Rating 1: 0/10  Pain Rating Post-Intervention 1: 0/10    Objective:  Patient found seated in w/c, cooperative. Patient found with: oxygen (3 L/min via nc throughout session)    Functional Mobility:    Transfer Training:  Sit to stand:Minimal Assistance and Moderate Assistance with Grab bars  x 6    Wheelchair Trainin' x 1 with min/mod assist and cues    Additional Treatment:  SEATED: hip adduction with ball, hip abduction with black tband, LAQ and hip flexion (R with 4#), x 30 reps each  STANDING: in / bars (LLE only, NWB LLE): sidekicks, SLR, x 20 reps x 2 sets each  STATIC STAND BALANCE TRAINING: in // bars (NWB LLE): x 5 min x 6 trials with CGA and cues     Activity Tolerance:  Patient tolerated treatment well    Patient left up in chair with call button in reach and O2 to wall 3 L/min via nc.    Assessment:  Juliane Ramos is a 57 y.o. female.    Rehab potential is good.    Activity tolerance: Good    GOALS:    Physical Therapy Goals        Problem: Physical Therapy Goal    Goal Priority Disciplines Outcome Goal Variances Interventions   Physical Therapy Goal     PT/OT, PT Ongoing (interventions implemented as appropriate)     Description:  Goals to be met by: 2018     Patient will increase functional independence with mobility by performin). Supine to sit with Modified [place]  2). Sit to supine with Modified [place]  3). Sit to stand " transfer with Stand-by Assistance  4). Bed to chair transfer with Stand-by Assistance   5). Wheelchair propulsion x > 250 feet with Modified [place]  6). Stand for  > 3 minutes with Stand-by Assistance NWB LLE                      PLAN:    Patient to be seen daily  to address the above listed problems via therapeutic activities, therapeutic exercises, gait training, neuromuscular re-education, wheelchair management/training  Plan of Care expires: 12/29/17  Plan of Care reviewed with: patient         Albert T Benedicto, PT 12/19/2017

## 2017-12-19 NOTE — PT/OT/SLP PROGRESS
Occupational Therapy  Treatment    Juliane Ramos   MRN: [id number]   Admitting Diagnosis: SAH    OT Date of Treatment: 12/19/17   Total Time (min): 80 min    Billable Minutes:  Self care 15 Therapeutic Activity 15 and Therapeutic Exercise 50  Total Minutes: 80    General Precautions: Standard, fall, vision impaired  Orthopedic Precautions: LLE non weight bearing  Braces:  (L LE CAM boot)    Do you have any cultural, spiritual, Yazidi conflicts, given your current situation?: None    Subjective:  Communicated with nurse prior to session.    Pain/Comfort  Pain Rating 1: 0/10    Objective:  Patient found with: oxygen (wound vac removed yesterday )    Functional Mobility:  Transfer Training:   Sit to stand:Moderate Assistance with Grab bars once standing; pt instructed on sequence/technique (ie scooting forward, leaning forward, etc)  Toilet Transfer:  Pt Stand Pivot with Moderate Assistance with grab bar once standing and bedside commode positioned over toilet instruction for transfer sequence and techniques with verbal cues for NWB of LLE     LE Dressing:  Pt doffed R sock with SBA using dressing stick - pt instructed on use of dressing stick to increase (I) with LB dressing   Pt donned R slip on tennis shoe with SBA from wheelchair with verbal cues     Toilet Training:  Pt performed toileting with Moderate Assistance with grab bar and bedside commode positioned over toilet - pt instructed on alternating UE support to manage clothing up/down hips     Additional Treatment:  - OTR instructing on adaptive dressing techniques to increase (I) with self care tasks, etc  - UBE 2 sets x 5 mins at start and end of session - no rest breaks   - Calibrated gripper 15# L hand 2 x 10 reps; 25# R hand 2 x 10 reps   - Yellow flex bar forearm strengthening x 10 reps each motion  - Rickshaw 30#, 2 sets x 15 reps with short rest break between sets  - Resistance pulleys with 10# biceps/triceps strengthening 2 x 15 reps, 25# anterior  chest press/lat pull downs 2 x 15 reps    Patient left up in chair with all lines intact, call button in reach, chair alarm on and nurse notified    ASSESSMENT:  Juliane Ramos is a 57 y.o. female with a medical diagnosis of SAH and presents with daily improvements towards OT goals. Patient should continue to benefit from skilled OT services per est POC to increase functional independence, mobility, and safety.     Rehab potential is good    Activity tolerance: Good    Discharge recommendations: home     Equipment recommendations:  (TBD)     GOALS:    Occupational Therapy Goals        Problem: Occupational Therapy Goal    Goal Priority Disciplines Outcome Interventions   Occupational Therapy Goal     OT, PT/OT Ongoing (interventions implemented as appropriate)    Description:  Goals to be met by: 12/29/17     Patient will increase functional independence with ADLs by performing:    Feeding with [place].  UE Dressing with [place].  LE Dressing with Supervision.  Grooming while seated with [place].  Toileting from toilet with bedside commode positioned over Minimal Assistance for hygiene and clothing management.   Bathing from shower chair/bench with Minimal Assistance with DME/AE as needed.  Shower transfer with Minimal Assistance with DME/AE as needed .  Toilet transfer to toilet with Minimal Assistance with DME as needed.                      Plan:  Patient to be seen 6 x/week to address the above listed problems via self-care/home management, community/work re-entry, therapeutic activities, therapeutic exercises, therapeutic groups, neuromuscular re-education, cognitive retraining, wheelchair management/training  Plan of Care expires: 12/29/17  Plan of Care reviewed with: patient    SCOTT Antunez LOTR  12/19/2017

## 2017-12-19 NOTE — CONSULTS
"  12/19/2017      Admit Date: 12/11/2017  Juliane Ramos  New Patient Consult    No chief complaint on file.  cc is sob    History of Present Illness:  Pt poorly mobile diabetic with foot ulcer.  Had quadruple bypass 2011 with ef low (35% on recent echo), never smoker, on 02 since July 2017.  She was found to have pulm embolism late November 24th 2017.  She fell with sah 11/24- going to main campus [facility].  Pt was to have f/u ct heat 12/11 but was still in hospital.  She had ivc filter placed (leg study negative) and now is in rehab.    Pt has had episodic sob needing niv.  She continues on 02.  No hemoptysis nor pleuritic pain. Per dc note from [facility] 11/27:- Pt currently not on treatment for PE. Final recs per neurosurgery:  Continue to hold therapeutic lovenox for recent PE due to SAH. Will have patient follow up in clinic in 1 week with repeat head CT. If stable, OK to resume therapeutic Lovenox. SQH ok at this time.           PFSH:  Past Medical History:   Diagnosis Date    Anemia in stage 3 chronic kidney disease 11/26/2017    CHF (congestive heart failure)     COPD (chronic obstructive pulmonary disease)     Coronary artery disease     Diabetes mellitus     Encounter for blood transfusion     Essential hypertension 6/24/2017    Hypertension     MI (myocardial infarction) 2010    Segmental and subsegmental pulmonary emboli of RLL without acute cor pulmonale 11/25/2017    Stroke     July 2005    Thyroid disease     Traumatic subarachnoid hemorrhage 11/24/2017    Type 2 diabetes mellitus with stage 3 chronic kidney disease, without long-term current use of insulin 6/24/2017     Past Surgical History:   Procedure Laterality Date    ABCESS DRAINAGE Right     Between "little toe" and the one next to it    BREAST SURGERY      Reduction [id number]    CARDIAC SURGERY  01/2011    CABG 4vessel ring in one valve mitral valve prolapse    CORONARY ARTERY BYPASS GRAFT      hyperlipidemia      TUBAL " "LIGATION  1986     Social History   Substance Use Topics    Smoking status: Never Smoker    Smokeless tobacco: Never Used    Alcohol use No     Family History   Problem Relation Age of Onset    Arthritis Mother     Heart disease Father     Cancer Father 68     prostae and bladder ca  in     Diabetes Father     Hypertension Father     Depression Sister     Hypertension Son     Diabetes Maternal Grandmother      lost leg    Kidney disease Paternal Grandfather      had kidney removed    Stroke Paternal Grandfather      Review of patient's allergies indicates:  No Known Allergies     Review of Systems:  a review of eleven systems covering constitutional, Psych, Eye, HEENT, Respiratory, Cardiac, GI, , Musculoskeletal, Endocrine, Dermatologicwas negative except the above mentioned abnormalities and for any pertinent findings as listed below: all good save above, had prior cva.       Exam:Comprehensive exam done. /72 (BP Location: Right arm, Patient Position: Sitting)   Pulse 73   Temp 97.3 °F (36.3 °C) (Tympanic)   Resp 20   Ht 5' 6" (1.676 m)   Wt 106.9 kg (235 lb 11.2 oz) Comment: weighed by Liliam Driver RN  LMP 2016 (Approximate)   SpO2 100%   Breastfeeding? No   BMI 38.04 kg/m²   Exam included Vitals as listed, and patient's appearance and affect and alertness and mood, oral exam for yeast and hygiene and pharynx lesions and Mallapatti (M) score, neck with inspection for jvd and masses and thyroid abnormalities and lymph nodes (supraclavicular and infraclavicular nodes also examined and noted if abn), chest exam included symmetry and effort and fremitus and percussion and auscultation, cardiac exam included rhythm and gallops and murmur and rubs and jvd and edema, abdominal exam for mass and hepatosplenomegaly and tenderness and hernias and bowel sounds, Musculoskeletal exam with muscle tone and posture and mobility/gait and  strenght, and skin for rashes and " cyanosis and pallor and turgor, extremity for clubbing.  Findings were normal except as listed below:  Obese lady, on 02,no distress, mouth and pharynx good, no jvd but bilat edema legs, no nodes neck or arms, chest symmetric, no distress, nl fremitus/percussion, distent cor tones,no rmumru or gallop but +3-4 edema, no hs megaly nor mass, no clubbing, numerous bruises    Radiographs reviewed: results reviewed  No recent 12/11 with mod right effusion.  Results for orders placed during the hospital encounter of 11/27/17   X-Ray Chest 1 View    Narrative Comparison study: The 11/24/2017  One view chest    Right jugular venous catheter remains in place.  The cardiac mediastinal silhouette is stable.  There is improved aeration the right lung with decrease of the opacification the right lung compared to prior exam.  Left parahilar markings also appear better defined.  There is no confluent infiltrate in the left and there is no pleural effusion on the left    Impression Improvement in the appearance of the chest compared to the prior exam with partial clearing of the right lung, and the left lung appearing clear      Electronically signed by: MEG VILLALBA MD  Date:     12/11/17  Time:    08:32    ]    Labs     No results for input(s): WBC, HGB, HCT, PLT, BAND, METAMYELOCYT, MYELOPCT, HGBA1C in the last 24 hours.No results for input(s): NA, K, CL, CO2, BUN, CREATININE, GLU, CALCIUM, CAION, MG, PHOS, AST, ALT, ALKPHOS, BILITOT, BILIDIR, PROT, ALBUMIN, PREALBUMIN, AMYLASE, LIPASE, CRP, HSCRP, SEDRATE, PROCAL, INR, PTT, LABHEPA, LACTATE, TROPONINI, CPK, CPKMB, MB, BNP in the last 24 hours.No results for input(s): PH, PCO2, PO2, HCO3 in the last 24 hours.  Microbiology Results (last 7 days)     Procedure Component Value Units Date/Time    Clostridium difficile EIA [[id number]] Collected:  12/15/17 2000    Order Status:  Completed Specimen:  Stool from Stool Updated:  12/16/17 1104     C. diff Antigen Negative     C difficile  Toxins A+B, EIA Negative     Comment: Testing not recommended for children <24 months old.           From  11/29/2017 d/c note late nov [facility] main admit:- Pt currently not on treatment for PE. Final recs per neurosurgery:  Continue to hold therapeutic lovenox for recent PE due to SAH. Will have patient follow up in clinic in 1 week with repeat head CT. If stable, OK to resume therapeutic Lovenox. SQH ok at this time.       Impression:  Active Hospital Problems    Diagnosis  POA    Wound healing, delayed [T14.8XXD]  Yes    Wounds, multiple [T07.XXXA]  Yes    SAH (subarachnoid hemorrhage) [I60.9]  Yes    Anasarca [R60.1]  Yes    Acute respiratory failure with hypoxia and hypercapnia [J96.01, J96.02]  Yes    Pulmonary embolus [I26.99]  Yes    Traumatic subarachnoid hemorrhage [S06.6X9A]  Yes    Acute on chronic congestive heart failure [I50.9]  Yes      Resolved Hospital Problems    Diagnosis Date Resolved POA   No resolved problems to display.               Plan: may be having persistent pul emboli despite ivc filter,  Pt is volume overloaded still and has low ef- cardiogenic edema expected.    Would f/u cxr, check ct brain, resume anticoagg if bleed resolved or have neuro follow up- doing repeat scans doesn't seem prudent.  pft later may be reasonable but pul emboli and volume/chf should be problem.

## 2017-12-19 NOTE — PROGRESS NOTES
Team conference attended and patient discussed. Spoke with patient to discuss treatment plan, goals and ELOS.  Spoke with patient's  to provide an update and discuss discharge plans.  SW will follow and assist with discharge planning as needed.

## 2017-12-19 NOTE — PLAN OF CARE
Problem: Physical Therapy Goal  Goal: Physical Therapy Goal  Goals to be met by: 2018     Patient will increase functional independence with mobility by performin). Supine to sit with Modified [place]  2). Sit to supine with Modified [place]  3). Sit to stand transfer with Stand-by Assistance  4). Bed to chair transfer with Stand-by Assistance   5). Wheelchair propulsion x > 250 feet with Modified [place]  6). Stand for  > 7 minutes with Stand-by Assistance NWB LLE     Outcome: Ongoing (interventions implemented as appropriate)  Updated goal due to patient progression.

## 2017-12-19 NOTE — PROGRESS NOTES
12/18/17 2300   Patient Assessment/Suction   Level of Consciousness (AVPU) alert   All Lung Fields Breath Sounds diminished   PRE-TX-O2-ETCO2   O2 Device (Oxygen Therapy) nasal cannula   Flow (L/min) 3   Oxygen Concentration (%) 32   SpO2 99 %   Pulse Oximetry Type Intermittent   Pulse 75   Resp 18   Aerosol Therapy   $ Aerosol Therapy Charges PRN treatment not required   Respiratory Treatment Status PRN treatment not required   Ready to Wean/Extubation Screen   FIO2<=50 (chart decimal) 0.32   Preset CPAP/BiPAP Settings   Mode Of Delivery BiPAP   $ CPAP/BiPAP Daily Charge BiPAP/CPAP Daily   $ Initial CPAP/BiPAP Setup? No   $ Is patient using? Yes   Equipment Type DeVilbiss   Airway Device Type medium full face mask   Ipap 10   EPAP (cm H2O) 5   Pressure Support (cm H2O) 5

## 2017-12-19 NOTE — PLAN OF CARE
Problem: Patient Care Overview  Goal: Plan of Care Review  Outcome: Ongoing (interventions implemented as appropriate)  Patient awake/alert.No c/o pain noted this shift. Wound vac no longer in use.Dressing to left lower leg D/I. 02 in se. Free from falls. Plan of care continued

## 2017-12-20 LAB
BASOPHILS # BLD AUTO: 0 K/UL
BASOPHILS NFR BLD: 0.5 %
DIFFERENTIAL METHOD: ABNORMAL
EOSINOPHIL # BLD AUTO: 0.3 K/UL
EOSINOPHIL NFR BLD: 4 %
ERYTHROCYTE [DISTWIDTH] IN BLOOD BY AUTOMATED COUNT: 23 %
HCT VFR BLD AUTO: 28 %
HGB BLD-MCNC: 9.1 G/DL
LYMPHOCYTES # BLD AUTO: 0.9 K/UL
LYMPHOCYTES NFR BLD: 14.1 %
MCH RBC QN AUTO: 25.3 PG
MCHC RBC AUTO-ENTMCNC: 32.5 G/DL
MCV RBC AUTO: 78 FL
MONOCYTES # BLD AUTO: 0.9 K/UL
MONOCYTES NFR BLD: 14.3 %
NEUTROPHILS # BLD AUTO: 4.4 K/UL
NEUTROPHILS NFR BLD: 67.1 %
PLATELET # BLD AUTO: 178 K/UL
PMV BLD AUTO: 7.9 FL
POCT GLUCOSE: 141 MG/DL (ref 70–110)
POCT GLUCOSE: 163 MG/DL (ref 70–110)
POCT GLUCOSE: 195 MG/DL (ref 70–110)
POCT GLUCOSE: 200 MG/DL (ref 70–110)
RBC # BLD AUTO: 3.59 M/UL
WBC # BLD AUTO: 6.5 K/UL

## 2017-12-20 PROCEDURE — 85025 COMPLETE CBC W/AUTO DIFF WBC: CPT

## 2017-12-20 PROCEDURE — 12800000 HC REHAB SEMI-PRIVATE ROOM

## 2017-12-20 PROCEDURE — 97532 *HC SP COG SKL DEV EA 15 MIN: CPT

## 2017-12-20 PROCEDURE — 63600175 PHARM REV CODE 636 W HCPCS: Performed by: INTERNAL MEDICINE

## 2017-12-20 PROCEDURE — 25000003 PHARM REV CODE 250: Performed by: PHYSICAL MEDICINE & REHABILITATION

## 2017-12-20 PROCEDURE — 97530 THERAPEUTIC ACTIVITIES: CPT

## 2017-12-20 PROCEDURE — 25000003 PHARM REV CODE 250: Performed by: INTERNAL MEDICINE

## 2017-12-20 PROCEDURE — 94761 N-INVAS EAR/PLS OXIMETRY MLT: CPT

## 2017-12-20 PROCEDURE — 36415 COLL VENOUS BLD VENIPUNCTURE: CPT

## 2017-12-20 PROCEDURE — 97110 THERAPEUTIC EXERCISES: CPT

## 2017-12-20 PROCEDURE — 94640 AIRWAY INHALATION TREATMENT: CPT

## 2017-12-20 PROCEDURE — 92507 TX SP LANG VOICE COMM INDIV: CPT

## 2017-12-20 PROCEDURE — 27000221 HC OXYGEN, UP TO 24 HOURS

## 2017-12-20 PROCEDURE — 25000242 PHARM REV CODE 250 ALT 637 W/ HCPCS: Performed by: INTERNAL MEDICINE

## 2017-12-20 PROCEDURE — 97535 SELF CARE MNGMENT TRAINING: CPT

## 2017-12-20 PROCEDURE — A4216 STERILE WATER/SALINE, 10 ML: HCPCS | Performed by: INTERNAL MEDICINE

## 2017-12-20 RX ADMIN — SODIUM CHLORIDE, PRESERVATIVE FREE 3 ML: 5 INJECTION INTRAVENOUS at 02:12

## 2017-12-20 RX ADMIN — ALBUTEROL SULFATE 2.5 MG: 2.5 SOLUTION RESPIRATORY (INHALATION) at 08:12

## 2017-12-20 RX ADMIN — SODIUM CHLORIDE, PRESERVATIVE FREE 3 ML: 5 INJECTION INTRAVENOUS at 09:12

## 2017-12-20 RX ADMIN — ALBUTEROL SULFATE 2.5 MG: 2.5 SOLUTION RESPIRATORY (INHALATION) at 09:12

## 2017-12-20 RX ADMIN — INSULIN ASPART 2 UNITS: 100 INJECTION, SOLUTION INTRAVENOUS; SUBCUTANEOUS at 12:12

## 2017-12-20 RX ADMIN — GABAPENTIN 100 MG: 100 CAPSULE ORAL at 02:12

## 2017-12-20 RX ADMIN — METRONIDAZOLE 250 MG: 250 TABLET ORAL at 05:12

## 2017-12-20 RX ADMIN — GABAPENTIN 100 MG: 100 CAPSULE ORAL at 09:12

## 2017-12-20 RX ADMIN — INSULIN ASPART 2 UNITS: 100 INJECTION, SOLUTION INTRAVENOUS; SUBCUTANEOUS at 04:12

## 2017-12-20 RX ADMIN — LEVOTHYROXINE SODIUM 50 MCG: 25 TABLET ORAL at 05:12

## 2017-12-20 RX ADMIN — METRONIDAZOLE 250 MG: 250 TABLET ORAL at 09:12

## 2017-12-20 RX ADMIN — MICONAZOLE NITRATE: 20 OINTMENT TOPICAL at 09:12

## 2017-12-20 RX ADMIN — SODIUM CHLORIDE, PRESERVATIVE FREE 3 ML: 5 INJECTION INTRAVENOUS at 06:12

## 2017-12-20 RX ADMIN — GABAPENTIN 100 MG: 100 CAPSULE ORAL at 05:12

## 2017-12-20 RX ADMIN — HEPARIN SODIUM 5000 UNITS: 5000 INJECTION, SOLUTION INTRAVENOUS; SUBCUTANEOUS at 08:12

## 2017-12-20 RX ADMIN — ROSUVASTATIN CALCIUM 10 MG: 5 TABLET ORAL at 08:12

## 2017-12-20 RX ADMIN — HEPARIN SODIUM 5000 UNITS: 5000 INJECTION, SOLUTION INTRAVENOUS; SUBCUTANEOUS at 09:12

## 2017-12-20 RX ADMIN — MICONAZOLE NITRATE: 20 OINTMENT TOPICAL at 08:12

## 2017-12-20 RX ADMIN — FLUCONAZOLE 100 MG: 100 TABLET ORAL at 08:12

## 2017-12-20 RX ADMIN — METRONIDAZOLE 250 MG: 250 TABLET ORAL at 02:12

## 2017-12-20 NOTE — PT/OT/SLP PROGRESS
Physical Therapy         Treatment        Juliane Ramos   MRN: [id number]     PT Received On: 17  Total Time (min): 40        Billable Minutes:  Therapeutic Activity 15 and Therapeutic Exercise 25  Total Minutes: 40    Treatment Type: Treatment  PT/PTA: PT     PTA Visit Number: 0       General Precautions: Standard, fall  Orthopedic Precautions: Orthopedic Precautions : LLE non weight bearing     Subjective:    Pain/Comfort  Pain Rating 1: 0/10  Pain Rating Post-Intervention 1: 0/10    Objective:  Patient found seated in w/c, Patient found with: oxygen, (L ankle/foot boot)    Functional Mobility:    Transfer Training:  Sit to stand:Minimal Assistance with Grab bars  x 4    Wheelchair Trainin' with min assist and cues    Additional Treatment:  SEATED: hip adduction with ball, hip abduction with black tband, LAQ (R with 5#), x 30 reps each  STANDING: in / bars (LLE only, NWB LLE): sidekicks, SLR, x 30 reps x 2 sets each  STATIC STAND BALANCE TRAINING: in // bars (NWB LLE): x 5 min x 3 trials with CGA and cues      Activity Tolerance:  Patient tolerated treatment well    Patient left up in chair with call button in reach, chair alarm on and O2 to wall 3 L/min via nc.    Assessment:  Juliane Ramos is a 57 y.o. female.    Rehab potential is good.    Activity tolerance: Fair    GOALS:    Physical Therapy Goals        Problem: Physical Therapy Goal    Goal Priority Disciplines Outcome Goal Variances Interventions   Physical Therapy Goal     PT/OT, PT Ongoing (interventions implemented as appropriate)     Description:  Goals to be met by: 2018     Patient will increase functional independence with mobility by performin). Supine to sit with Modified [place]  2). Sit to supine with Modified [place]  3). Sit to stand transfer with Stand-by Assistance  4). Bed to chair transfer with Stand-by Assistance   5). Wheelchair propulsion x > 250 feet with Modified [place]  6). Stand for  > 7  minutes with Stand-by Assistance NWB LLE                       PLAN:    Patient to be seen daily  to address the above listed problems via therapeutic activities, therapeutic exercises, neuromuscular re-education, wheelchair management/training  Plan of Care expires: 12/29/17  Plan of Care reviewed with: patient         Albert VAZQUEZ Benedicto, PT 12/20/2017

## 2017-12-20 NOTE — CONSULTS
[facility]  Physical Medicine & Rehab  Progress Note    Patient Name: Juliane Ramos  MRN: [id number]  Patient Class: IP- Rehab   Admission Date: 12/11/2017  Length of Stay: 9 days  Attending Physician: Rishi Ness MD  Primary Care Provider: JOS Dumont    Subjective:     Principal Problem:  SAH  Interval History: pt is  57 y.o female here with  SAH, PE, s/p debridment of achillis tendon. participating with therapy        Scheduled Medications:  epoetin donita (PROCRIT) injection  10,000 Units Intravenous Every Tues, Thurs, Sat    fluconazole  100 mg Oral Daily    gabapentin  100 mg Oral TID    heparin (porcine)  5,000 Units Subcutaneous Q12H    insulin aspart  1-10 Units Subcutaneous TIDWM    levothyroxine  50 mcg Oral Before breakfast    metroNIDAZOLE  250 mg Oral Q8H    miconazole nitrate 2%   Topical (Top) BID    rosuvastatin  10 mg Oral Daily    sodium chloride 0.9%  3 mL Intravenous Q8H       PRN Medications: sodium chloride 0.9%, acetaminophen, albuterol sulfate, dextrose 50%, diphenoxylate-atropine 2.5-0.025 mg, glucagon (human recombinant), glucose, glucose, heparin (porcine), labetalol, levetiracetam oral soln, magnesium oxide, magnesium oxide, ondansetron, potassium phosphate IVPB, simethicone, traMADol    Review of Systems   Constitutional: Negative.    HENT: Negative.    Eyes: Negative.    Respiratory: Negative.    Cardiovascular: Negative.    Gastrointestinal: Negative.    Endocrine: Negative.    Genitourinary: Negative.    Musculoskeletal: Negative.    Skin: Negative.    Allergic/Immunologic: Negative.    Neurological: Negative.    Hematological: Negative.    Psychiatric/Behavioral: Negative.      Objective:     Vital Signs (Most Recent):  Temp: 98.7 °F (37.1 °C) (12/20/17 0811)  Pulse: 78 (12/20/17 0811)  Resp: 16 (12/20/17 0811)  BP: 137/71 (12/20/17 0811)  SpO2: 96 % (12/20/17 0811)    Vital Signs (24h Range):  Temp:  [96.8 °F (36 °C)-98.8 °F (37.1 °C)] 98.7  °F (37.1 °C)  Pulse:  [] 78  Resp:  [16-22] 16  SpO2:  [95 %-100 %] 96 %  BP: ()/(63-84) 137/71     Physical Exam   Constitutional: She is oriented to person, place, and time. She appears well-developed and well-nourished. No distress.   HENT:   Head: Normocephalic and atraumatic.   Right Ear: External ear normal.   Left Ear: External ear normal.   Nose: Nose normal.   Mouth/Throat: Oropharynx is clear and moist. No oropharyngeal exudate.   Eyes: Conjunctivae and EOM are normal. Pupils are equal, round, and reactive to light. Right eye exhibits no discharge. Left eye exhibits no discharge. No scleral icterus.   Neck: Normal range of motion. Neck supple. No JVD present. No tracheal deviation present. No thyromegaly present.   Cardiovascular: Normal rate, regular rhythm, normal heart sounds and intact distal pulses.  Exam reveals no gallop and no friction rub.    No murmur heard.  Pulmonary/Chest: Effort normal and breath sounds normal. No stridor. No respiratory distress. She has no wheezes. She has no rales. She exhibits no tenderness.   Abdominal: Soft. Bowel sounds are normal. She exhibits no distension and no mass. There is no tenderness. There is no rebound and no guarding. No hernia.   Genitourinary:   Genitourinary Comments: deferred   Musculoskeletal: Normal range of motion. She exhibits no edema, tenderness or deformity.   Lymphadenopathy:     She has no cervical adenopathy.   Neurological: She is alert and oriented to person, place, and time. She exhibits normal muscle tone.   Skin: No rash noted. She is not diaphoretic. No erythema. No pallor.   Wound achillis stable    Psychiatric: She has a normal mood and affect. Her behavior is normal.     NEUROLOGICAL EXAMINATION:     MENTAL STATUS   Oriented to person, place, and time.     CRANIAL NERVES     CN III, IV, VI   Pupils are equal, round, and reactive to light.  Extraocular motions are normal.       Assessment/Plan:      Juliane Ramos is a 57  y.o. female admitted to inpatient rehabilitation on 12/11/2017 for <principal problem not specified> with impaired mobility and ADLs. Patient remains appropriate for PT, OT, and as required Speech therapy. Patient continues to require 24 hour nursing care as well as daily Physician assessment.    Active Diagnoses:    Diagnosis Date Noted POA    Wound healing, delayed [T14.8XXD] 12/18/2017 Yes    Wounds, multiple [T07.XXXA] 12/13/2017 Yes    SAH (subarachnoid hemorrhage) [I60.9] 12/11/2017 Yes    Anasarca [R60.1] 12/04/2017 Yes    Acute respiratory failure with hypoxia and hypercapnia [J96.01, J96.02] 12/02/2017 Yes    Pulmonary embolus [I26.99] 11/27/2017 Yes    Traumatic subarachnoid hemorrhage [S06.6X9A] 11/24/2017 Yes    Acute on chronic congestive heart failure [I50.9] 06/24/2017 Yes      Problems Resolved During this Admission:    Diagnosis Date Noted Date Resolved POA     SAH, cont PT, OT, ST  PE, pulm eval reviewed , check CXR & CT chest   Renal insuff, f/u by nephro   Wound, ID eval noted  DVT prophylexsis, cont sq heparin   DM, accu check noted, cont current meds    DISCHARGE PLANNING:  Tentative Discharge Date:     Future Appointments  Date Time Provider Department Center   12/20/2017 2:15 PM [facility] OI-CT1 550 LB LIMIT [facility] IC Jeff Aguirre   12/20/2017 3:20 PM Nena Forte PA-C [facility] NEUROS7 Jeff Ness MD  Department of Physical Medicine & Rehab  [facility]

## 2017-12-20 NOTE — PLAN OF CARE
Problem: Patient Care Overview  Goal: Plan of Care Review  Outcome: Ongoing (interventions implemented as appropriate)  Patient awake/alert. No c/o pain noted this shift. Slight swelling to LLE. Generalized weakness noted. Afebrile. Free from falls. Plan of care continued

## 2017-12-20 NOTE — PROGRESS NOTES
INPATIENT NEPHROLOGY PROGRESS NOTE  [facility] NEPHROLOGY    Patient Name: Juliane Ramos  Date: 12/20/2017    Reason for consultation: MC    Chief Complaint: Diabetic foot infection    History of Present Illness:  Patient with stage III CKD a/w diab foot infx, hospital course c/b MC requiring dialysis.     · Interval History/Subjective:    - BP is better off coreg  - no cp, dyspnea, abd pain; c/w RLE edema      · Review of Systems: All 14 systems reviewed and negative, except as noted in HPI    Plan of Care:    Assessment:  Oliguric MC 2/2 multifactorial ATN, dialysis dependent  HTN/Edema  SHPT  Anemia of CKD    Plan:    - continue HD TTS  - BP is better; keep coreg on hold  - aim for 2-3L UF tomorrow to help with edema  - prior phos at goal; no needs for binders  - prior Hb stable; getting PRN blood transfusions; continue EPO with HD     Thank you for allowing us to participate in this patient's care. We will continue to follow.    Medications:  No current facility-administered medications on file prior to encounter.      Current Outpatient Prescriptions on File Prior to Encounter   Medication Sig Dispense Refill    albuterol (ACCUNEB) 1.25 mg/3 mL Nebu Take 1.25 mg by nebulization every 6 (six) hours as needed. Rescue      carvedilol (COREG) 12.5 MG tablet Take 1 tablet (12.5 mg total) by mouth 2 (two) times daily. 60 tablet 11    cefepime in dextrose 5 % (MAXIPIME) 1 gram/50 mL PgBk Inject 50 mLs (1 g total) into the vein every 24 hours as needed.      gabapentin (NEURONTIN) 100 MG capsule Take 1 capsule (100 mg total) by mouth 3 (three) times daily. 90 capsule 11    levetiracetam oral soln 500 mg/5 mL (5 mL) Soln 2.5 mLs (250 mg total) by Per NG tube route 2 (two) times daily. 150 mL 11    levothyroxine (SYNTHROID) 50 MCG tablet Take 50 mcg by mouth once daily.        metronidazole (FLAGYL) 500 mg/100 mL IVPB Inject 100 mLs (500 mg total) into the vein every 8 (eight) hours.      rosuvastatin  (CRESTOR) 10 MG tablet Take 1 tablet (10 mg total) by mouth once daily. 30 tablet 0    VANCOMYCIN HCL (VANCOMYCIN IN 5 % DEXTROSE) 1.75 gram/500 mL Soln Inject 500 mLs (1,750 mg total) into the vein once daily.       Scheduled Meds:   epoetin donita (PROCRIT) injection  10,000 Units Intravenous Every Tues, Thurs, Sat    fluconazole  100 mg Oral Daily    gabapentin  100 mg Oral TID    heparin (porcine)  5,000 Units Subcutaneous Q12H    insulin aspart  1-10 Units Subcutaneous TIDWM    levothyroxine  50 mcg Oral Before breakfast    metroNIDAZOLE  250 mg Oral Q8H    miconazole nitrate 2%   Topical (Top) BID    rosuvastatin  10 mg Oral Daily    sodium chloride 0.9%  3 mL Intravenous Q8H     Continuous Infusions:  PRN Meds:.sodium chloride 0.9%, acetaminophen, albuterol sulfate, dextrose 50%, diphenoxylate-atropine 2.5-0.025 mg, glucagon (human recombinant), glucose, glucose, heparin (porcine), influenza, labetalol, levetiracetam oral soln, magnesium oxide, magnesium oxide, ondansetron, potassium phosphate IVPB, simethicone, traMADol    Allergies:  Patient has no known allergies.    Vital Signs:  Temp Readings from Last 3 Encounters:   12/20/17 98.7 °F (37.1 °C)   12/11/17 97.4 °F (36.3 °C) (Oral)   11/27/17 97 °F (36.1 °C) (Oral)       Pulse Readings from Last 3 Encounters:   12/20/17 78   12/11/17 69   11/27/17 67       BP Readings from Last 3 Encounters:   12/20/17 137/71   12/11/17 (!) 125/59   11/27/17 123/67       Weight:  Wt Readings from Last 3 Encounters:   12/17/17 106.9 kg (235 lb 11.2 oz)   12/08/17 117.9 kg (260 lb)   11/27/17 117 kg (257 lb 15 oz)       Physical Exam:  Constitutional: nad, aao x 3   Heart: rrr, no m/r/g, + RLE edema  Lungs: ant ausc clear, no w/r/r/c   Abdomen: s/nt/nd, +BS     Results:  Lab Results   Component Value Date     (L) 12/18/2017    K 4.3 12/18/2017    CL 99 12/18/2017    CO2 23 12/18/2017    BUN 46 (H) 12/18/2017    CREATININE 5.4 (H) 12/18/2017    CALCIUM 8.7  12/18/2017    ANIONGAP 10 12/18/2017    ESTGFRAFRICA 10 (A) 12/18/2017    EGFRNONAA 8 (A) 12/18/2017       Lab Results   Component Value Date    CALCIUM 8.7 12/18/2017    PHOS 4.4 12/18/2017       No results for input(s): WBC, RBC, HGB, HCT, PLT, MCV, MCH, MCHC in the last 24 hours.    I have personally reviewed pertinent radiological imaging and reports.    Amairani Young MD MPH  [facility]  [address] [place] [id number]  [phone] (p)  [phone] (f)

## 2017-12-20 NOTE — PLAN OF CARE
Problem: SLP Goal  Goal: SLP Goal  Short term goals:  1. Pt will complete complex reasoning tasks (math, time, money mgmt) with 90% acc independently   2. Pt will complete complex sequencing tasks (pill mgmt/box, recall of transfer, etc.) with 90% acc independently   3. Pt will demonstrate adequate attention (sustained/alternating/selective) to complete functional tasks, as listed above, in 9/10 attempts independently      Long Term Goals:  1. Pt will demonstrate adequate cognitive function for safe and efficient function in home, social and medical environments.     Outcome: Ongoing (interventions implemented as appropriate)  Following instruction and demonstration of association strategies to enhance recall of visually presented items, Pt required minimal assistance to formulate appropriate associations between pictured objects and people. She recalled 5/5 objects and 5/5 people immediately and at 5 minute and 15 minute delays with 100% acc independently. She completed 4 item mental manipulations by ranking items with 65% acc independently increasing to 75% acc with repetition of items. Given assistance with sequence of items, accuracy was improved to 95%. Indepdently, Pt sequenced 4 steps of various ADL tasks with 85% acc increasing to 100% acc with minimal verbal cueing.

## 2017-12-20 NOTE — PT/OT/SLP PROGRESS
"Speech Language Pathology Treatment          Juliane Ramos   MRN: [id number]     Diet recommendations: Solid Diet Level: Regular  Liquid Diet Level: Thin     SLP Treatment Date: 12/20/17  Speech Start Time: 1425     Speech Stop Time: 1500     Speech Total (min): 35 min       TREATMENT BILLABLE MINUTES:  Speech Therapy Individual 35 and Total Time 35    General Precautions: Standard, fall  Current Respiratory Status: nasal cannula       Subjective:  Pt alert, pleasant and participatory  "My mom said I sound a lot better. I told her I can get my thoughts together easier"    Objective:    Patient found with: oxygen and bed alarm. Performance this date is as follows:   Following instruction and demonstration of association strategies to enhance recall of visually presented items, Pt required minimal assistance to formulate appropriate associations between pictured objects and people. She recalled 5/5 objects and 5/5 people immediately and at 5 minute and 15 minute delays with 100% acc independently. She completed 4 item mental manipulations by ranking items with 65% acc independently increasing to 75% acc with repetition of items. Given assistance with sequence of items, accuracy was improved to 95%. Indepdently, Pt sequenced 4 steps of various ADL tasks with 85% acc increasing to 100% acc with minimal verbal cueing.      Assessment:  Juliane Ramos is a 57 y.o. female with a SLP diagnosis of mild Cognitive-Linguistic Impairment characterized by deficits in memory and organization. She presents this date with improved performance in memory tasks. Minimal assistance required for Pt to enhance performance across organization/sequencing tasks.     Diet recommendations:        Liquid Diet Level: Thin  -see above     Discharge recommendations: Discharge Facility/Level Of Care Needs: home     Goals:    SLP Goals        Problem: SLP Goal    Goal Priority Disciplines Outcome   SLP Goal     SLP Ongoing (interventions implemented " as appropriate)   Description:  Short term goals:  1. Pt will complete complex reasoning tasks (math, time, money mgmt) with 90% acc independently   2. Pt will complete complex sequencing tasks (pill mgmt/box, recall of transfer, etc.) with 90% acc independently   3. Pt will demonstrate adequate attention (sustained/alternating/selective) to complete functional tasks, as listed above, in 9/10 attempts independently      Long Term Goals:  1. Pt will demonstrate adequate cognitive function for safe and efficient function in home, social and medical environments.                       Plan:   Patient to be seen Therapy Frequency: 5 x/week   Plan of Care Expires:    Plan of Care reviewed with: patient  SLP Follow-up?: Yes  SLP - Next Visit Date: 12/21/17           ST Kristi 12/20/2017

## 2017-12-20 NOTE — PT/OT/SLP PROGRESS
"Occupational Therapy  Treatment    Juliane Ramos   MRN: [id number]   Admitting Diagnosis: SAH    OT Date of Treatment: 12/20/17   Total Time (min): 110 min    Billable Minutes:  Self Care/Home Management 75 and Therapeutic Exercise 35  Total Minutes: 110    General Precautions: Standard, fall, vision impaired  Orthopedic Precautions: LLE non weight bearing  Braces:  (LLE CAM boot)    Do you have any cultural, spiritual, Voodoo conflicts, given your current situation?: None    Subjective:  Communicated with nurse prior to session.  "My  isn't being very nice. I don't think he really wants me to come home because he knows I won't be able to do what I could before. Im glad he has to come in to talk to yall before I go home."    Pain/Comfort  Pain Rating 1: 0/10    Objective:  Patient found with: PICC line, oxygen (Dialysis port)    Functional Mobility:  Transfer Training:   Sit to stand:Minimal Assistance with grab bar once standing verbal cues to lean forward through sit > stand - performed 4+ times throughout session  Patient tub bench transfer Stand Pivot with Moderate Assistance with grab bar once standing with verbal cues for NWB LLE    Grooming:  Supervision sitting sink side    Bathing:  Patient performed bathing with Moderate Assistance with grab bar, Handheld shower head and tub bench at Shower. - PICC line, HD port, and L LE with CAM boot covered and kept dry throughout bathing and covers removed after bathing complete    UE Dressing:  Patient performed UE Dressing with Supervision or Set-up Assistance with no AE at wheelchair    LE Dressing:  Pt don/doffed adult pull up and long pants with Mod (A) with use of dressing stick - (A) to manage clothing up over hips in standing; Gorge ROUSE, present to apply cream to reddened areas (buttocks, rafiq, under skin folds) prior to pulling clothing up  Pt don/doffed R shoe with SBA    Additional Treatment:  - OTR reinforcing proper transfer techniques " (specifically leaning forward & NWB of LLE), use of adaptive equipment to increase independence with self care tasks, etc  - OTR providing education regarding purpose of family training, etc as pt expressing some concerns regarding her , etc  - UBE x 5 minutes with moderate resistance with no rest breaks required  - Resistance pulleys with 25# for 2 sets x 15 reps each of lat pull downs and anterior chest press with short rest breaks between sets     Patient left up in chair with all lines intact, call button in reach, chair alarm on and nurse notified    ASSESSMENT:  Juliane Ramos is a 57 y.o. female with a medical diagnosis of SAH and presents with progress towards OT goals as pt performing LB dressing with Mod (A) and bathing from shower with Mod (A). Patient expressing some concerns regarding her  when she goes home. Patient should continue to benefit from skilled OT services per est POC to increase functional independence, mobility, safety, and increase strength and endurance.     Rehab potential is good    Activity tolerance: Good    Discharge recommendations: home     Equipment recommendations:  (TBD)     GOALS:    Occupational Therapy Goals        Problem: Occupational Therapy Goal    Goal Priority Disciplines Outcome Interventions   Occupational Therapy Goal     OT, PT/OT Ongoing (interventions implemented as appropriate)    Description:  Goals to be met by: 12/29/17     Patient will increase functional independence with ADLs by performing:    Feeding with [place].  UE Dressing with [place].  LE Dressing with Supervision.  Grooming while seated with [place].  Toileting from toilet with bedside commode positioned over Minimal Assistance for hygiene and clothing management.   Bathing from shower chair/bench with Minimal Assistance with DME/AE as needed.  Shower transfer with Minimal Assistance with DME/AE as needed .  Toilet transfer to toilet with Minimal Assistance with DME as  needed.                      Plan:  Patient to be seen 6 x/week to address the above listed problems via self-care/home management, community/work re-entry, therapeutic activities, therapeutic exercises, therapeutic groups, neuromuscular re-education, cognitive retraining, wheelchair management/training  Plan of Care expires: 12/29/17  Plan of Care reviewed with: patient    Chloe SCOTT Guerrier LOTR  12/20/2017

## 2017-12-20 NOTE — PLAN OF CARE
Problem: Patient Care Overview  Goal: Plan of Care Review  Outcome: Ongoing (interventions implemented as appropriate)  Pt alert and oriented. Cont of bowels. Mostly anuric. Did have urine today. Mod assist with transfers. Good appetite. Plan of care continued.

## 2017-12-20 NOTE — PROGRESS NOTES
Afebrile    Left leg has less edema and less erythema and that which persists is consistent with venous stasis erythema. The graft over the achilles wound is dusky but seems to be adhering. No purulence.   No side effects from cephalexin but I believe this can be stopped.  Has been on flagyl for a few days, Cdiff is neg. Flagyl end date appears to be 12/22.   Diflucan for intertriginous candida.    Will be available prn, thanks

## 2017-12-21 LAB
POCT GLUCOSE: 160 MG/DL (ref 70–110)
POCT GLUCOSE: 189 MG/DL (ref 70–110)
POCT GLUCOSE: 194 MG/DL (ref 70–110)
POCT GLUCOSE: 214 MG/DL (ref 70–110)
POCT GLUCOSE: 247 MG/DL (ref 70–110)

## 2017-12-21 PROCEDURE — 63600175 PHARM REV CODE 636 W HCPCS: Performed by: PHYSICAL MEDICINE & REHABILITATION

## 2017-12-21 PROCEDURE — 97530 THERAPEUTIC ACTIVITIES: CPT

## 2017-12-21 PROCEDURE — 97532 *HC SP COG SKL DEV EA 15 MIN: CPT

## 2017-12-21 PROCEDURE — 94761 N-INVAS EAR/PLS OXIMETRY MLT: CPT

## 2017-12-21 PROCEDURE — 94660 CPAP INITIATION&MGMT: CPT

## 2017-12-21 PROCEDURE — 92507 TX SP LANG VOICE COMM INDIV: CPT

## 2017-12-21 PROCEDURE — 99232 SBSQ HOSP IP/OBS MODERATE 35: CPT | Mod: ,,, | Performed by: INTERNAL MEDICINE

## 2017-12-21 PROCEDURE — 99900035 HC TECH TIME PER 15 MIN (STAT)

## 2017-12-21 PROCEDURE — 63600175 PHARM REV CODE 636 W HCPCS: Performed by: INTERNAL MEDICINE

## 2017-12-21 PROCEDURE — 97110 THERAPEUTIC EXERCISES: CPT

## 2017-12-21 PROCEDURE — A4216 STERILE WATER/SALINE, 10 ML: HCPCS | Performed by: INTERNAL MEDICINE

## 2017-12-21 PROCEDURE — 25000003 PHARM REV CODE 250: Performed by: PHYSICAL MEDICINE & REHABILITATION

## 2017-12-21 PROCEDURE — 25000003 PHARM REV CODE 250: Performed by: INTERNAL MEDICINE

## 2017-12-21 PROCEDURE — 27000221 HC OXYGEN, UP TO 24 HOURS

## 2017-12-21 PROCEDURE — 12800000 HC REHAB SEMI-PRIVATE ROOM

## 2017-12-21 PROCEDURE — 80100016 HC MAINTENANCE HEMODIALYSIS

## 2017-12-21 RX ADMIN — SODIUM CHLORIDE, PRESERVATIVE FREE 3 ML: 5 INJECTION INTRAVENOUS at 09:12

## 2017-12-21 RX ADMIN — GABAPENTIN 100 MG: 100 CAPSULE ORAL at 09:12

## 2017-12-21 RX ADMIN — ONDANSETRON 4 MG: 4 TABLET, ORALLY DISINTEGRATING ORAL at 05:12

## 2017-12-21 RX ADMIN — INSULIN ASPART 2 UNITS: 100 INJECTION, SOLUTION INTRAVENOUS; SUBCUTANEOUS at 07:12

## 2017-12-21 RX ADMIN — APIXABAN 5 MG: 2.5 TABLET, FILM COATED ORAL at 09:12

## 2017-12-21 RX ADMIN — METRONIDAZOLE 250 MG: 250 TABLET ORAL at 02:12

## 2017-12-21 RX ADMIN — GABAPENTIN 100 MG: 100 CAPSULE ORAL at 06:12

## 2017-12-21 RX ADMIN — MICONAZOLE NITRATE: 20 OINTMENT TOPICAL at 09:12

## 2017-12-21 RX ADMIN — SODIUM CHLORIDE, PRESERVATIVE FREE 3 ML: 5 INJECTION INTRAVENOUS at 02:12

## 2017-12-21 RX ADMIN — ERYTHROPOIETIN 10000 UNITS: 10000 INJECTION, SOLUTION INTRAVENOUS; SUBCUTANEOUS at 04:12

## 2017-12-21 RX ADMIN — HEPARIN SODIUM 4000 UNITS: 1000 INJECTION, SOLUTION INTRAVENOUS; SUBCUTANEOUS at 07:12

## 2017-12-21 RX ADMIN — ROSUVASTATIN CALCIUM 10 MG: 5 TABLET ORAL at 09:12

## 2017-12-21 RX ADMIN — SODIUM CHLORIDE, PRESERVATIVE FREE 3 ML: 5 INJECTION INTRAVENOUS at 06:12

## 2017-12-21 RX ADMIN — GABAPENTIN 100 MG: 100 CAPSULE ORAL at 02:12

## 2017-12-21 RX ADMIN — HEPARIN SODIUM 5000 UNITS: 5000 INJECTION, SOLUTION INTRAVENOUS; SUBCUTANEOUS at 09:12

## 2017-12-21 RX ADMIN — METRONIDAZOLE 250 MG: 250 TABLET ORAL at 06:12

## 2017-12-21 RX ADMIN — FLUCONAZOLE 100 MG: 100 TABLET ORAL at 09:12

## 2017-12-21 RX ADMIN — LEVOTHYROXINE SODIUM 50 MCG: 25 TABLET ORAL at 06:12

## 2017-12-21 RX ADMIN — INSULIN ASPART 4 UNITS: 100 INJECTION, SOLUTION INTRAVENOUS; SUBCUTANEOUS at 11:12

## 2017-12-21 RX ADMIN — INSULIN ASPART 2 UNITS: 100 INJECTION, SOLUTION INTRAVENOUS; SUBCUTANEOUS at 05:12

## 2017-12-21 RX ADMIN — METRONIDAZOLE 250 MG: 250 TABLET ORAL at 09:12

## 2017-12-21 NOTE — PROGRESS NOTES
INPATIENT NEPHROLOGY PROGRESS NOTE  [facility] NEPHROLOGY    Patient Name: Juliane Ramos  Date: 12/21/2017    Reason for consultation: MC     Chief Complaint: Diab foot infection    History of Present Illness:  Patient with stage III CKD a/w diab foot infx, hospital course c/b MC requiring dialysis.        · Interval History/Subjective:    - seen on HD, aiming for 4L  - BP is stable  - c/w edema  - no cp, dyspnea, abd pain    · Review of Systems: All 14 systems reviewed and negative, except as noted in HPI    Plan of Care:    Assessment:  Oliguric MC 2/2 multifactorial ATN, dialysis dependent  HTN/Edema  SHPT  Anemia of CKD     Plan:     - remains dialysis dependent; continue HD TTS  - BP is better; keep coreg on hold  - aim for 3-4L UF today to help with edema  - once edema is resolved, if BP remains high will add back coreg  - prior phos at goal; no needs for binders  - prior Hb stable; getting PRN blood transfusions; continue EPO with HD  - ordered renal panel, CBC for Monday    Thank you for allowing us to participate in this patient's care. We will continue to follow.    Medications:  No current facility-administered medications on file prior to encounter.      Current Outpatient Prescriptions on File Prior to Encounter   Medication Sig Dispense Refill    albuterol (ACCUNEB) 1.25 mg/3 mL Nebu Take 1.25 mg by nebulization every 6 (six) hours as needed. Rescue      carvedilol (COREG) 12.5 MG tablet Take 1 tablet (12.5 mg total) by mouth 2 (two) times daily. 60 tablet 11    cefepime in dextrose 5 % (MAXIPIME) 1 gram/50 mL PgBk Inject 50 mLs (1 g total) into the vein every 24 hours as needed.      gabapentin (NEURONTIN) 100 MG capsule Take 1 capsule (100 mg total) by mouth 3 (three) times daily. 90 capsule 11    levetiracetam oral soln 500 mg/5 mL (5 mL) Soln 2.5 mLs (250 mg total) by Per NG tube route 2 (two) times daily. 150 mL 11    levothyroxine (SYNTHROID) 50 MCG tablet Take 50 mcg by mouth once  daily.        metronidazole (FLAGYL) 500 mg/100 mL IVPB Inject 100 mLs (500 mg total) into the vein every 8 (eight) hours.      rosuvastatin (CRESTOR) 10 MG tablet Take 1 tablet (10 mg total) by mouth once daily. 30 tablet 0    VANCOMYCIN HCL (VANCOMYCIN IN 5 % DEXTROSE) 1.75 gram/500 mL Soln Inject 500 mLs (1,750 mg total) into the vein once daily.       Scheduled Meds:   epoetin donita (PROCRIT) injection  10,000 Units Intravenous Every Tues, Thurs, Sat    fluconazole  100 mg Oral Daily    gabapentin  100 mg Oral TID    heparin (porcine)  5,000 Units Subcutaneous Q12H    insulin aspart  1-10 Units Subcutaneous TIDWM    levothyroxine  50 mcg Oral Before breakfast    metroNIDAZOLE  250 mg Oral Q8H    miconazole nitrate 2%   Topical (Top) BID    rosuvastatin  10 mg Oral Daily    sodium chloride 0.9%  3 mL Intravenous Q8H     Continuous Infusions:  PRN Meds:.sodium chloride 0.9%, acetaminophen, albuterol sulfate, dextrose 50%, diphenoxylate-atropine 2.5-0.025 mg, glucagon (human recombinant), glucose, glucose, heparin (porcine), influenza, labetalol, levetiracetam oral soln, magnesium oxide, magnesium oxide, ondansetron, potassium phosphate IVPB, simethicone, traMADol    Allergies:  Patient has no known allergies.    Vital Signs:  Temp Readings from Last 3 Encounters:   12/21/17 97 °F (36.1 °C)   12/11/17 97.4 °F (36.3 °C) (Oral)   11/27/17 97 °F (36.1 °C) (Oral)       Pulse Readings from Last 3 Encounters:   12/21/17 76   12/11/17 69   11/27/17 67       BP Readings from Last 3 Encounters:   12/21/17 (!) 142/88   12/11/17 (!) 125/59   11/27/17 123/67       Weight:  Wt Readings from Last 3 Encounters:   12/17/17 106.9 kg (235 lb 11.2 oz)   12/08/17 117.9 kg (260 lb)   11/27/17 117 kg (257 lb 15 oz)       Physical Exam:  Constitutional: nad, aao x 3   Heart: rrr, no m/r/g, + bilateral LE edema  Lungs: ant ausc clear, no w/r/r/c   Abdomen: s/nt/nd, +BS     Results:  Lab Results   Component Value Date      (L) 12/18/2017    K 4.3 12/18/2017    CL 99 12/18/2017    CO2 23 12/18/2017    BUN 46 (H) 12/18/2017    CREATININE 5.4 (H) 12/18/2017    CALCIUM 8.7 12/18/2017    ANIONGAP 10 12/18/2017    ESTGFRAFRICA 10 (A) 12/18/2017    EGFRNONAA 8 (A) 12/18/2017       Lab Results   Component Value Date    CALCIUM 8.7 12/18/2017    PHOS 4.4 12/18/2017       No results for input(s): WBC, RBC, HGB, HCT, PLT, MCV, MCH, MCHC in the last 24 hours.    I have personally reviewed pertinent radiological imaging and reports.    Amairani Young MD MPH  [facility]  [address] [place] [id number]  [phone] (p)  [phone] (f)

## 2017-12-21 NOTE — PT/OT/SLP PROGRESS
Occupational Therapy  Treatment    Juliane Ramos   MRN: [id number]   Admitting Diagnosis:  SAH    OT Date of Treatment: 12/21/17   Total Time (min): 75 min      Billable Minutes:  Therapeutic Activity 45 and Therapeutic Exercise 30  Total Minutes: 75    General Precautions: Standard, fall  Do you have any cultural, spiritual, Islam conflicts, given your current situation?: None    Subjective:  Communicated with nursing prior to session.  Cooperative.    Pain/Comfort  Pain Rating 1: 0/10    Objective:  Patient found with: oxygen, central line, found seated in her wheelchair.  Educated patient on table top activities to advance BUE use/endurance and strengthening: she was directed to search through a large cut of yellow (soft)  thera putty with hidden beans and marbles for locating and removal,  90 % found with extended time and SOTO assisting for the last 3 beans.   Bilat Crispin done for 4 sets of five mins each with 8 lbs used and rest breaks given between sets.  Patient did self propel herself to and from the therapy gym with escort/SBA for doors.    Patient left up in chair with all lines intact, call button in reach, chair alarm on and nursing notified    ASSESSMENT:  Juliane Ramos is a 57 y.o. female with a medical diagnosis of SAH & s/p debridement of wound left achillis.  Patient stated some minor shortness of breathing during mid session, but when checked, her pulse ox was 97-98% on 3 lpm of O2 with a heart rate of 80.  Reported to nursing.  Despite her SOB, her participation was not overly affected.  By end of session, patient stated no breathing issues.    Rehab potential is good    Activity tolerance: Good      GOALS:    Occupational Therapy Goals        Problem: Occupational Therapy Goal    Goal Priority Disciplines Outcome Interventions   Occupational Therapy Goal     OT, PT/OT Ongoing (interventions implemented as appropriate)    Description:  Goals to be met by: 12/29/17     Patient will increase  functional independence with ADLs by performing:    Feeding with [place].  UE Dressing with [place].  LE Dressing with Supervision.  Grooming while seated with [place].  Toileting from toilet with bedside commode positioned over Minimal Assistance for hygiene and clothing management.   Bathing from shower chair/bench with Minimal Assistance with DME/AE as needed.  Shower transfer with Minimal Assistance with DME/AE as needed .  Toilet transfer to toilet with Minimal Assistance with DME as needed.                      Plan:  Cont POC.  Patient to be seen 6 x/week to address the above listed problems via self-care/home management, community/work re-entry, therapeutic activities, therapeutic exercises, therapeutic groups, neuromuscular re-education, cognitive retraining, wheelchair management/training  Plan of Care expires: 12/29/17  Plan of Care reviewed with: patient         CHRISTIANO Crawford  12/21/2017

## 2017-12-21 NOTE — PLAN OF CARE
Problem: Mobility, Physical Impaired (Adult)  Goal: Enhanced Mobility Skills  Patient will demonstrate the desired outcomes by discharge/transition of care.   Outcome: Ongoing (interventions implemented as appropriate)  Pt alert and oriented. Mod assist with transfers. Feeds self. Call light in reach

## 2017-12-21 NOTE — PLAN OF CARE
Problem: Patient Care Overview  Goal: Plan of Care Review  Outcome: Ongoing (interventions implemented as appropriate)  Patient awake/alert. No c/o pain noted this shift. Generalized edema noted. SOB noted with exertion. 02 in use. Pulse ox  WDL. Generalized weakness. Free from falls. Plan of care continued.

## 2017-12-21 NOTE — PT/OT/SLP PROGRESS
"Speech Language Pathology Treatment          Juliane Ramos   MRN: [id number]     Diet recommendations: Solid Diet Level: Regular  Liquid Diet Level: Thin     SLP Treatment Date: 12/21/17  Speech Start Time: 1240     Speech Stop Time: 1310     Speech Total (min): 30 min       TREATMENT BILLABLE MINUTES:  Speech Therapy Individual 30 and Total Time 30    General Precautions: Standard, fall  Current Respiratory Status: nasal cannula       Subjective:  Pt alert, pleasant and participatory  "I still remember those pictures you showed me yesterday"    Objective:    Patient found with: CPAP; Communicated w/ NSG who removed CPAP for Pt to participate in therapy.  Nasal cannula placed following removal.    Pt completed time management word problems with 80% acc independently increasing to 100% given minimal assistance. She recalled items presented on 12/20 with 70% acc increasing to 100% acc with categorical cue.      Assessment:  Juliane Ramos is a 57 y.o. female with a SLP diagnosis of mild cognitive impairment. She presents with progress towards goals in the domains of memory, planning, organization and functional problem solving.     Diet recommendations:        Liquid Diet Level: Thin  -see above     Discharge recommendations: Discharge Facility/Level Of Care Needs: home     Goals:    SLP Goals        Problem: SLP Goal    Goal Priority Disciplines Outcome   SLP Goal     SLP Ongoing (interventions implemented as appropriate)   Description:  Short term goals:  1. Pt will complete complex reasoning tasks (math, time, money mgmt) with 90% acc independently   2. Pt will complete complex sequencing tasks (pill mgmt/box, recall of transfer, etc.) with 90% acc independently   3. Pt will demonstrate adequate attention (sustained/alternating/selective) to complete functional tasks, as listed above, in 9/10 attempts independently      Long Term Goals:  1. Pt will demonstrate adequate cognitive function for safe and efficient " function in home, social and medical environments.                       Plan:   Patient to be seen Therapy Frequency: 5 x/week   Plan of Care Expires:    Plan of Care reviewed with: patient  SLP Follow-up?: Yes  SLP - Next Visit Date: 12/22/17           ST Kristi 12/21/2017

## 2017-12-21 NOTE — PLAN OF CARE
Problem: SLP Goal  Goal: SLP Goal  Short term goals:  1. Pt will complete complex reasoning tasks (math, time, money mgmt) with 90% acc independently   2. Pt will complete complex sequencing tasks (pill mgmt/box, recall of transfer, etc.) with 90% acc independently   3. Pt will demonstrate adequate attention (sustained/alternating/selective) to complete functional tasks, as listed above, in 9/10 attempts independently      Long Term Goals:  1. Pt will demonstrate adequate cognitive function for safe and efficient function in home, social and medical environments.     Outcome: Ongoing (interventions implemented as appropriate)  Juliane Ramos is a 57 y.o. female with a SLP diagnosis of mild cognitive impairment. She presents with progress towards goals in the domains of memory, planning, organization and functional problem solving.

## 2017-12-21 NOTE — PT/OT/SLP PROGRESS
Physical Therapy         Treatment        Juliane Ramos   MRN: [id number]     PT Received On: 17  Total Time (min): 75        Billable Minutes:  Therapeutic Activity 30 and Therapeutic Exercise 45  Total Minutes: 75    Treatment Type: Treatment  PT/PTA: PT     PTA Visit Number: 0       General Precautions: Standard, fall  Orthopedic Precautions: Orthopedic Precautions : LLE non weight bearing     Subjective:    Pain/Comfort  Pain Rating 1: 0/10  Pain Rating Post-Intervention 1: 0/10    Objective:  Patient found seated in w/c, cooperative. Patient found with: oxygen (L ankle/foot boot)    Functional Mobility:    Transfer Training:  Sit to stand:Minimal Assistance and Moderate Assistance with Grab bars  x 6    Wheelchair Trainin' with min assist and cues    Gait Training: unable to advance LE while maintaining NWB LLE    Additional Treatment:  SEATED: hip adduction with ball, hip abduction with black tband, (R only) knee flexion with green tband, LAQ and hip flexion (R with 5#), x 30 reps each (abd x  2 sets)  STANDING: in // bars (LLE only, NWB LLE): sidekicks, SLR,hip flexion, knee flexion x 30 reps x 2 sets each  STATIC STAND BALANCE TRAINING: in // bars (NWB LLE): x 5 min x 5 trials with SBA and cues     Activity Tolerance:  Patient tolerated treatment well    Patient left up in chair with call button in reach, chair alarm on and O2 to wall 3 L/min via nc.    Assessment:  Juliane Ramos is a 57 y.o. female.    Rehab potential is good.    Activity tolerance: Good    GOALS:    Physical Therapy Goals        Problem: Physical Therapy Goal    Goal Priority Disciplines Outcome Goal Variances Interventions   Physical Therapy Goal     PT/OT, PT Ongoing (interventions implemented as appropriate)     Description:  Goals to be met by: 2018     Patient will increase functional independence with mobility by performin). Supine to sit with Modified [place]  2). Sit to supine with Modified  [place]  3). Sit to stand transfer with Stand-by Assistance  4). Bed to chair transfer with Stand-by Assistance   5). Wheelchair propulsion x > 250 feet with Modified [place]  6). Stand for  > 7 minutes with Stand-by Assistance NWB LLE                       PLAN:    Patient to be seen daily  to address the above listed problems via therapeutic activities, therapeutic exercises, neuromuscular re-education, wheelchair management/training  Plan of Care expires: 12/29/17  Plan of Care reviewed with: patient         Albert VAZQUEZ Benedicto, PT 12/21/2017

## 2017-12-21 NOTE — PLAN OF CARE
Problem: Patient Care Overview  Goal: Plan of Care Review  Outcome: Ongoing (interventions implemented as appropriate)  Pt is on Oxygen. Pt requested PRN Tx, tolerated well.

## 2017-12-22 PROBLEM — N28.9 MALNUTRITION DUE TO RENAL DISEASE: Status: ACTIVE | Noted: 2017-12-22

## 2017-12-22 PROBLEM — E46 MALNUTRITION DUE TO RENAL DISEASE: Status: ACTIVE | Noted: 2017-12-22

## 2017-12-22 LAB
POCT GLUCOSE: 170 MG/DL (ref 70–110)
POCT GLUCOSE: 186 MG/DL (ref 70–110)
POCT GLUCOSE: 208 MG/DL (ref 70–110)
POCT GLUCOSE: 297 MG/DL (ref 70–110)

## 2017-12-22 PROCEDURE — 97110 THERAPEUTIC EXERCISES: CPT

## 2017-12-22 PROCEDURE — 92507 TX SP LANG VOICE COMM INDIV: CPT

## 2017-12-22 PROCEDURE — 94660 CPAP INITIATION&MGMT: CPT

## 2017-12-22 PROCEDURE — 12800000 HC REHAB SEMI-PRIVATE ROOM

## 2017-12-22 PROCEDURE — 25000003 PHARM REV CODE 250: Performed by: PHYSICAL MEDICINE & REHABILITATION

## 2017-12-22 PROCEDURE — 25000003 PHARM REV CODE 250: Performed by: INTERNAL MEDICINE

## 2017-12-22 PROCEDURE — 97542 WHEELCHAIR MNGMENT TRAINING: CPT

## 2017-12-22 PROCEDURE — 97606 NEG PRS WND THER DME>50 SQCM: CPT

## 2017-12-22 PROCEDURE — 97607 NEG PRS WND THR NDME<=50SQCM: CPT

## 2017-12-22 PROCEDURE — 97530 THERAPEUTIC ACTIVITIES: CPT

## 2017-12-22 PROCEDURE — A4216 STERILE WATER/SALINE, 10 ML: HCPCS | Performed by: INTERNAL MEDICINE

## 2017-12-22 PROCEDURE — 94761 N-INVAS EAR/PLS OXIMETRY MLT: CPT

## 2017-12-22 PROCEDURE — 27000221 HC OXYGEN, UP TO 24 HOURS

## 2017-12-22 PROCEDURE — 63600175 PHARM REV CODE 636 W HCPCS: Performed by: INTERNAL MEDICINE

## 2017-12-22 PROCEDURE — 97532 *HC SP COG SKL DEV EA 15 MIN: CPT

## 2017-12-22 PROCEDURE — 97803 MED NUTRITION INDIV SUBSEQ: CPT

## 2017-12-22 PROCEDURE — 99900035 HC TECH TIME PER 15 MIN (STAT)

## 2017-12-22 RX ORDER — BENZONATATE 100 MG/1
200 CAPSULE ORAL 3 TIMES DAILY
Status: DISCONTINUED | OUTPATIENT
Start: 2017-12-22 | End: 2017-12-29 | Stop reason: HOSPADM

## 2017-12-22 RX ADMIN — APIXABAN 5 MG: 2.5 TABLET, FILM COATED ORAL at 09:12

## 2017-12-22 RX ADMIN — GABAPENTIN 100 MG: 100 CAPSULE ORAL at 09:12

## 2017-12-22 RX ADMIN — SODIUM CHLORIDE, PRESERVATIVE FREE 3 ML: 5 INJECTION INTRAVENOUS at 01:12

## 2017-12-22 RX ADMIN — GABAPENTIN 100 MG: 100 CAPSULE ORAL at 05:12

## 2017-12-22 RX ADMIN — SODIUM CHLORIDE, PRESERVATIVE FREE 3 ML: 5 INJECTION INTRAVENOUS at 09:12

## 2017-12-22 RX ADMIN — BENZONATATE 200 MG: 100 CAPSULE ORAL at 09:12

## 2017-12-22 RX ADMIN — INSULIN ASPART 6 UNITS: 100 INJECTION, SOLUTION INTRAVENOUS; SUBCUTANEOUS at 12:12

## 2017-12-22 RX ADMIN — GABAPENTIN 100 MG: 100 CAPSULE ORAL at 01:12

## 2017-12-22 RX ADMIN — SODIUM CHLORIDE, PRESERVATIVE FREE 3 ML: 5 INJECTION INTRAVENOUS at 05:12

## 2017-12-22 RX ADMIN — FLUCONAZOLE 100 MG: 100 TABLET ORAL at 09:12

## 2017-12-22 RX ADMIN — MICONAZOLE NITRATE: 20 OINTMENT TOPICAL at 09:12

## 2017-12-22 RX ADMIN — LEVOTHYROXINE SODIUM 50 MCG: 25 TABLET ORAL at 05:12

## 2017-12-22 RX ADMIN — INSULIN ASPART 2 UNITS: 100 INJECTION, SOLUTION INTRAVENOUS; SUBCUTANEOUS at 07:12

## 2017-12-22 RX ADMIN — METRONIDAZOLE 250 MG: 250 TABLET ORAL at 05:12

## 2017-12-22 RX ADMIN — ROSUVASTATIN CALCIUM 10 MG: 5 TABLET ORAL at 09:12

## 2017-12-22 RX ADMIN — METRONIDAZOLE 250 MG: 250 TABLET ORAL at 01:12

## 2017-12-22 RX ADMIN — INSULIN ASPART 2 UNITS: 100 INJECTION, SOLUTION INTRAVENOUS; SUBCUTANEOUS at 04:12

## 2017-12-22 NOTE — PT/OT/SLP PROGRESS
Physical Therapy         Treatment        Juliane Ramos   MRN: [id number]     PT Received On: 17  Total Time (min): 75        Billable Minutes:  Therapeutic Activity 20, Therapeutic Exercise 45 and Train/Wheelchair Management 10  Total Minutes: 75 (8:12 - 9:50)    Treatment Type: Treatment  PT/PTA: PT     PTA Visit Number: 0       General Precautions: Standard, fall  Orthopedic Precautions: Orthopedic Precautions : LLE non weight bearing   Braces: Braces:  (L ankle/foot boot applied at begining of session)       Subjective:  Communicated with RN prior to session.    Pain/Comfort  Pain Rating 1: 0/10  Pain Rating Post-Intervention 1: 0/10    Objective:  Patient found seated in w/c.  Patient found with: oxygen (3 L/min via nc throughout session)    Functional Mobility:    Transfer Training:  Sit to stand:Minimal Assistance with Grab bars  x 5    Wheelchair Trainin' (slow) with min assist and cues      Additional Treatment:  SEATED: hip adduction with ball, hip abduction with black tband, (R only) knee flexion with green tband, LAQ and hip flexion (R with 5#), x 30 reps each (abd x  2 sets)  STANDING: in // bars (LLE only, NWB LLE): sidekicks, SLR, hip flexion, knee flexion x 30 reps x 2 sets each  STATIC STAND BALANCE TRAINING: in // bars (NWB LLE): x 5 min x 5 trials with SBA and cues      Activity Tolerance:  Patient tolerated treatment well    Patient left up in chair with call button in reach, chair alarm on and O2 to wall 3 L/min via nc.    Assessment:  Juliane Ramos is a 57 y.o. female.    Rehab potential is good.    Activity tolerance: Good    GOALS:    Physical Therapy Goals        Problem: Physical Therapy Goal    Goal Priority Disciplines Outcome Goal Variances Interventions   Physical Therapy Goal     PT/OT, PT Ongoing (interventions implemented as appropriate)     Description:  Goals to be met by: 2018     Patient will increase functional independence with mobility by performin).  Supine to sit with Modified [place]  2). Sit to supine with Modified [place]  3). Sit to stand transfer with Stand-by Assistance  4). Bed to chair transfer with Stand-by Assistance   5). Wheelchair propulsion x > 250 feet with Modified [place]  6). Stand for  > 7 minutes with Stand-by Assistance NWB LLE                       PLAN:    Patient to be seen daily  to address the above listed problems via therapeutic activities, therapeutic exercises, neuromuscular re-education, wheelchair management/training  Plan of Care expires: 12/29/17  Plan of Care reviewed with: patient         Albert VAZQUEZ Benedicto, PT 12/22/2017

## 2017-12-22 NOTE — PROGRESS NOTES
[facility]  Physical Medicine & Rehab  Progress Note    Patient Name: Juliane Ramos  MRN: [id number]  Patient Class: IP- Rehab   Admission Date: 12/11/2017  Length of Stay: 10 days  Attending Physician: Rishi Ness MD  Primary Care Provider: JOS Dumont    Subjective:     Principal Problem: SAH   Interval History: 12/21/2017:  Patient is seen for follow-up rehab evaluation and recommendations: pt participating with therapy     HPI, Past Medical, Family, and Social History remains the same as documented in the initial encounter.    Scheduled Medications:    epoetin donita (PROCRIT) injection  10,000 Units Intravenous Every Tues, Thurs, Sat    fluconazole  100 mg Oral Daily    gabapentin  100 mg Oral TID    heparin (porcine)  5,000 Units Subcutaneous Q12H    insulin aspart  1-10 Units Subcutaneous TIDWM    levothyroxine  50 mcg Oral Before breakfast    metroNIDAZOLE  250 mg Oral Q8H    miconazole nitrate 2%   Topical (Top) BID    rosuvastatin  10 mg Oral Daily    sodium chloride 0.9%  3 mL Intravenous Q8H       PRN Medications: sodium chloride 0.9%, acetaminophen, albuterol sulfate, dextrose 50%, diphenoxylate-atropine 2.5-0.025 mg, glucagon (human recombinant), glucose, glucose, heparin (porcine), influenza, labetalol, levetiracetam oral soln, magnesium oxide, magnesium oxide, ondansetron, potassium phosphate IVPB, simethicone, traMADol    Review of Systems   Constitutional: Negative.    HENT: Negative.    Eyes: Negative.    Respiratory: Negative.    Cardiovascular: Negative.    Gastrointestinal: Negative.    Endocrine: Negative.    Genitourinary: Negative.    Musculoskeletal: Negative.    Skin: Negative.    Allergic/Immunologic: Negative.    Neurological: Negative.    Hematological: Negative.    Psychiatric/Behavioral: Negative.      Objective:     Vital Signs (Most Recent):  Temp: 97 °F (36.1 °C) (12/21/17 1630)  Pulse: 79 (12/21/17 1800)  Resp: 16 (12/21/17 1630)  BP: (!)  148/101 (12/21/17 1800)  SpO2: 98 % (12/21/17 1722)    Vital Signs (24h Range):  Temp:  [97 °F (36.1 °C)-98.6 °F (37 °C)] 97 °F (36.1 °C)  Pulse:  [73-79] 79  Resp:  [16-20] 16  SpO2:  [96 %-99 %] 98 %  BP: (141-192)/() 148/101     Physical Exam   Constitutional: She is oriented to person, place, and time. She appears well-developed and well-nourished. No distress.   HENT:   Head: Normocephalic and atraumatic.   Right Ear: External ear normal.   Left Ear: External ear normal.   Nose: Nose normal.   Mouth/Throat: No oropharyngeal exudate.   Eyes: Conjunctivae and EOM are normal. Pupils are equal, round, and reactive to light. Right eye exhibits no discharge. Left eye exhibits no discharge. No scleral icterus.   Neck: Normal range of motion. Neck supple. No JVD present. No tracheal deviation present. No thyromegaly present.   Cardiovascular: Normal rate, regular rhythm, normal heart sounds and intact distal pulses.  Exam reveals no gallop and no friction rub.    No murmur heard.  Pulmonary/Chest: Effort normal and breath sounds normal. No stridor. No respiratory distress. She has no wheezes. She has no rales. She exhibits no tenderness.   Abdominal: Soft. Bowel sounds are normal. She exhibits no distension and no mass. There is no tenderness. There is no rebound and no guarding. No hernia.   obese   Genitourinary:   Genitourinary Comments: deferred   Musculoskeletal: Normal range of motion. She exhibits no edema, tenderness or deformity.   Lymphadenopathy:     She has no cervical adenopathy.   Neurological: She is alert and oriented to person, place, and time. She displays normal reflexes. No cranial nerve deficit or sensory deficit. She exhibits normal muscle tone. Coordination normal.   Skin: No rash noted. She is not diaphoretic. No erythema. No pallor.   Psychiatric: She has a normal mood and affect. Her behavior is normal. Judgment and thought content normal.     NEUROLOGICAL EXAMINATION:     MENTAL STATUS    Oriented to person, place, and time.     CRANIAL NERVES     CN III, IV, VI   Pupils are equal, round, and reactive to light.  Extraocular motions are normal.       Assessment/Plan:      Juliane Ramos is a 57 y.o. female admitted to inpatient rehabilitation on 12/11/2017 for <principal problem not specified> with impaired mobility and ADLs. Patient remains appropriate for PT, OT, and as required Speech therapy. Patient continues to require 24 hour nursing care as well as daily Physician assessment.    Active Diagnoses:    Diagnosis Date Noted POA    Wound healing, delayed [T14.8XXD] 12/18/2017 Yes    Wounds, multiple [T07.XXXA] 12/13/2017 Yes    SAH (subarachnoid hemorrhage) [I60.9] 12/11/2017 Yes    Anasarca [R60.1] 12/04/2017 Yes    Acute respiratory failure with hypoxia and hypercapnia [J96.01, J96.02] 12/02/2017 Yes    Pulmonary embolus [I26.99] 11/27/2017 Yes    Traumatic subarachnoid hemorrhage [S06.6X9A] 11/24/2017 Yes    Acute on chronic congestive heart failure [I50.9] 06/24/2017 Yes      Problems Resolved During this Admission:    Diagnosis Date Noted Date Resolved POA     SAH cont PT, OT, ST  PE, + IVC, spoke with Dr garcia , he had discussed case with Dr nielsen & plan to start anticoagulation  DVT prophylaxes, cont sq heparin  Renal insuff, + dialysis  DM, accu check noted, cont current meds    DISCHARGE PLANNING:  Tentative Discharge Date:     No future appointments.    Rishi Ness MD  Department of Physical Medicine & Rehab  [facility]

## 2017-12-22 NOTE — PROGRESS NOTES
Progress Note  PULMONARY    Admit Date: 12/11/2017 12/21/2017      SUBJECTIVE:     Dec 21, on niv/bpap, sob at rest.      PFSH and Allergies reviewed.    OBJECTIVE:     Vitals (Most recent):  Vitals:    12/21/17 1800   BP: (!) 148/101   Pulse: 79   Resp:    Temp:        Vitals (24 hour range):  Temp:  [97 °F (36.1 °C)-98.6 °F (37 °C)]   Pulse:  [73-79]   Resp:  [16-20]   BP: (141-192)/()   SpO2:  [96 %-99 %]       Intake/Output Summary (Last 24 hours) at 12/21/17 1940  Last data filed at 12/21/17 1300   Gross per 24 hour   Intake              960 ml   Output                0 ml   Net              960 ml          Physical Exam:  The patient's neuro status (alertness,orientation,cognitive function,motor skills,), pharyngeal exam (oral lesions, hygiene, abn dentition,), Neck (jvd,mass,thyroid,nodes in neck and above/below clavicle),RESPIRATORY(symmetry,effort,fremitus,percussion,auscultation),  Cor(rhythm,heart tones including gallops,perfusion,edema)ABD(distention,hepatic&splenomegaly,tenderness,masses), Skin(rash,cyanosis),Psyc(affect,judgement,).  Exam negative except for these pertinent findings:    Alert, cpap mask in place, no distress, chest symmetric, nl fremitus/percussion anterior, good bs,mild flank edema    Radiographs reviewed: view by direct vision  Looks like chf.  Results for orders placed during the hospital encounter of 11/27/17   X-Ray Chest 1 View    Narrative Comparison study: The 11/24/2017  One view chest    Right jugular venous catheter remains in place.  The cardiac mediastinal silhouette is stable.  There is improved aeration the right lung with decrease of the opacification the right lung compared to prior exam.  Left parahilar markings also appear better defined.  There is no confluent infiltrate in the left and there is no pleural effusion on the left    Impression Improvement in the appearance of the chest compared to the prior exam with partial clearing of the right lung, and the  left lung appearing clear      Electronically signed by: MEG VILLALBA MD  Date:     12/11/17  Time:    08:32    ]    Labs     No results for input(s): WBC, HGB, HCT, PLT, BAND, METAMYELOCYT, MYELOPCT, HGBA1C in the last 24 hours.No results for input(s): NA, K, CL, CO2, BUN, CREATININE, GLU, CALCIUM, CAION, MG, PHOS, AST, ALT, ALKPHOS, BILITOT, BILIDIR, PROT, ALBUMIN, PREALBUMIN, AMYLASE, LIPASE, CRP, HSCRP, SEDRATE, PROCAL, INR, PTT, LABHEPA, LACTATE, TROPONINI, CPK, CPKMB, MB, BNP in the last 24 hours.No results for input(s): PH, PCO2, PO2, HCO3 in the last 24 hours.  Microbiology Results (last 7 days)     Procedure Component Value Units Date/Time    Clostridium difficile EIA [[id number]] Collected:  12/15/17 2000    Order Status:  Completed Specimen:  Stool from Stool Updated:  12/16/17 1104     C. diff Antigen Negative     C difficile Toxins A+B, EIA Negative     Comment: Testing not recommended for children <24 months old.             Impression:  Active Hospital Problems    Diagnosis  POA    Wound healing, delayed [T14.8XXD]  Yes    Wounds, multiple [T07.XXXA]  Yes    SAH (subarachnoid hemorrhage) [I60.9]  Yes    Anasarca [R60.1]  Yes    Acute respiratory failure with hypoxia and hypercapnia [J96.01, J96.02]  Yes    Pulmonary embolus [I26.99]  Yes    Traumatic subarachnoid hemorrhage [S06.6X9A]  Yes    Acute on chronic congestive heart failure [I50.9]  Yes      Resolved Hospital Problems    Diagnosis Date Resolved POA   No resolved problems to display.               Plan:   Pt just had 4 liters removed via dialysis.  Case discussed with Dr Wise and Dr Ness- will start eliquis.  Hope breathing will improve soon.  Call Dr Lombardi if needed.                                      .

## 2017-12-22 NOTE — PROGRESS NOTES
12/22/17 0737   Patient Assessment/Suction   Level of Consciousness (AVPU) alert   Respiratory Effort Normal;Unlabored   PRE-TX-O2-ETCO2   O2 Device (Oxygen Therapy) nasal cannula   $ Is the patient on Low Flow Oxygen? Yes   Flow (L/min) 3   Oxygen Concentration (%) 32   SpO2 97 %   Pulse Oximetry Type Intermittent   $ Pulse Oximetry - Multiple Charge Pulse Oximetry - Multiple   Pulse 81   Resp 16   Aerosol Therapy   $ Aerosol Therapy Charges PRN treatment not required   Respiratory Treatment Status PRN treatment not required   Ready to Wean/Extubation Screen   FIO2<=50 (chart decimal) 0.32

## 2017-12-22 NOTE — PLAN OF CARE
12/21/17 2142   Patient Assessment/Suction   Level of Consciousness (AVPU) alert   PRE-TX-O2-ETCO2   O2 Device (Oxygen Therapy) nasal cannula   Flow (L/min) 3   Oxygen Concentration (%) 32   SpO2 (!) 94 %   Pulse Oximetry Type Intermittent   Pulse 91   Resp 20   Temp 99.8 °F (37.7 °C)   /71   Aerosol Therapy   $ Aerosol Therapy Charges PRN treatment not required   Respiratory Treatment Status PRN treatment not required   Ready to Wean/Extubation Screen   FIO2<=50 (chart decimal) 0.32

## 2017-12-22 NOTE — ASSESSMENT & PLAN NOTE
Related to (etiology):   Increased physiological needs 2/2 renal disease and wound    Signs and Symptoms (as evidenced by):   1) stage 3 wound on left foot 2) mild fluid accumulation     Interventions/Recommendations (treatment strategy):  1) continue diabetic, renal diet 2) continue protein supplement    Nutrition Diagnosis Status:   New

## 2017-12-22 NOTE — PT/OT/SLP PROGRESS
"Speech Language Pathology Treatment          Juliane Ramos   MRN: [id number]     Diet recommendations: Solid Diet Level: Regular  Liquid Diet Level: Thin     SLP Treatment Date: 12/22/17  Speech Start Time: 1005     Speech Stop Time: 1035     Speech Total (min): 30 min       TREATMENT BILLABLE MINUTES:  Speech Therapy Individual 30 and Total Time 30    General Precautions: Standard, fall  Current Respiratory Status: nasal cannula       Subjective:  Pt alert, pleasant and participatory  "We're having a special holiday dinner. That's what Kasey said"   "They got out at least 4 L of fluid which is almost 20Lbs during dialysis yesterday"    Objective:    Patient found upright in wheelchair with alarm and belt    Pt demonstrates excellent recall of functional information such as upcoming holiday menu items and results dialysis occurring 12/21. Mrs. Ramos was able to independenetly recall association strategies that were introduced and used during 12/21 session and generalize into chaining list task. She required minimal assistance in formulating appropriate associations between related words and recalled items, in order, at 100% success immediately and at 10 minute delay. She participated in convergent and divergent word organization tasks, requiring moderate assistance to apply semantic feature analysis principles in order to enhance success in divergent task/item description. Minimal assistance was required throughout convergent naming task.     Assessment:  Juliane Ramos is a 57 y.o. female with a SLP diagnosis of mild cognitive impairment. She presents with progress towards goals in the domains of memory, planning, and organization.    Diet recommendations:        Liquid Diet Level: Thin  -see above     Discharge recommendations: Discharge Facility/Level Of Care Needs: home     Goals:    SLP Goals        Problem: SLP Goal    Goal Priority Disciplines Outcome   SLP Goal     SLP Ongoing (interventions implemented " as appropriate)   Description:  Short term goals:  1. Pt will complete complex reasoning tasks (math, time, money mgmt) with 90% acc independently   2. Pt will complete complex sequencing tasks (pill mgmt/box, recall of transfer, etc.) with 90% acc independently   3. Pt will demonstrate adequate attention (sustained/alternating/selective) to complete functional tasks, as listed above, in 9/10 attempts independently      Long Term Goals:  1. Pt will demonstrate adequate cognitive function for safe and efficient function in home, social and medical environments.                       Plan:   Patient to be seen Therapy Frequency: 5 x/week   Plan of Care Expires:    Plan of Care reviewed with: patient  SLP Follow-up?: Yes  SLP - Next Visit Date: 12/22/17           ST Kristi 12/22/2017

## 2017-12-22 NOTE — PT/OT/SLP PROGRESS
Occupational Therapy  Treatment    Juliane Ramos   MRN: [id number]   Admitting Diagnosis: SAH    OT Date of Treatment: 12/22/17   Total Time (min): 75 min      Billable Minutes:  Therapeutic Exercise 75  Total Minutes: 75    General Precautions: Standard, fall  Orthopedic Precautions:    Braces:  (L ankle/foot boot applied at begining of session)    Do you have any cultural, spiritual, Mandaen conflicts, given your current situation?: None    Subjective:  Communicated with nursing prior to session.    Pain/Comfort  Pain Rating 1: 0/10    Objective:  Patient found with: central line, oxygen, seated in her wheelchair watching TV.  She did self propel herself to and from the therapy gym with escort.  She was positioned at table side seated for BUE exercising: Bilat Crispin completed for 4 sets of 5 mins with 8 lbs in box and rest breaks between sets for recovery.  UBE completed for 3 sets of 5 mins each, rest periods given.  With 1 lbs wrist weights in place, she was challenged to move all colors (graded clothes pins) from main box to a vertical tree, then back, for the blue/black pins, she needed to use both hands for grasping and placement.    Feeding:  Patient set up for lunch at end of session, in her room.  Nursing made aware.    Patient left up in chair with all lines intact, call button in reach, chair alarm on, nursing notified and O2 placed on wall flow.    ASSESSMENT:  Juliane Ramos is a 57 y.o. female with a medical diagnosis of SAH.    Rehab potential is good    Activity tolerance: Good      GOALS:    Occupational Therapy Goals        Problem: Occupational Therapy Goal    Goal Priority Disciplines Outcome Interventions   Occupational Therapy Goal     OT, PT/OT Ongoing (interventions implemented as appropriate)    Description:  Goals to be met by: 12/29/17     Patient will increase functional independence with ADLs by performing:    Feeding with [place].  UE Dressing with [place].  LE Dressing  with Supervision.  Grooming while seated with [place].  Toileting from toilet with bedside commode positioned over Minimal Assistance for hygiene and clothing management.   Bathing from shower chair/bench with Minimal Assistance with DME/AE as needed.  Shower transfer with Minimal Assistance with DME/AE as needed .  Toilet transfer to toilet with Minimal Assistance with DME as needed.                      Plan:  Cont POC.  Patient to be seen 6 x/week to address the above listed problems via self-care/home management, community/work re-entry, therapeutic activities, therapeutic exercises, therapeutic groups, neuromuscular re-education, cognitive retraining, wheelchair management/training  Plan of Care expires: 12/29/17  Plan of Care reviewed with: patient         Dale CHRISTIANO Singer  12/22/2017

## 2017-12-22 NOTE — PROGRESS NOTES
INPATIENT NEPHROLOGY PROGRESS NOTE  [facility] NEPHROLOGY    Patient Name: Juliane Ramos  Date: 12/22/2017    Reason for consultation: MC     Chief Complaint: Diab foot infection    History of Present Illness:  Patient with stage III CKD a/w diab foot infx, hospital course c/b MC requiring dialysis.     · Interval History/Subjective:    - VSS (BP is better)  - no UOP recorded  - got off 4L with HD yesterday  - no cp, dyspnea, abd pain; edema is better; worked with PT    · Review of Systems: All 14 systems reviewed and negative, except as noted in HPI    Plan of Care:    Assessment:  Oliguric MC 2/2 multifactorial ATN, dialysis dependent  HTN/Edema  SHPT  Anemia of CKD     Plan:     - remains dialysis dependent; continue HD TTS  - BP is better; keep coreg on hold and continue UF for edema  - recheck phos and Hb with weekly labs on Monday; continue renal diet and EPO with HD    Thank you for allowing us to participate in this patient's care. We will continue to follow.    Medications:  No current facility-administered medications on file prior to encounter.      Current Outpatient Prescriptions on File Prior to Encounter   Medication Sig Dispense Refill    albuterol (ACCUNEB) 1.25 mg/3 mL Nebu Take 1.25 mg by nebulization every 6 (six) hours as needed. Rescue      carvedilol (COREG) 12.5 MG tablet Take 1 tablet (12.5 mg total) by mouth 2 (two) times daily. 60 tablet 11    cefepime in dextrose 5 % (MAXIPIME) 1 gram/50 mL PgBk Inject 50 mLs (1 g total) into the vein every 24 hours as needed.      gabapentin (NEURONTIN) 100 MG capsule Take 1 capsule (100 mg total) by mouth 3 (three) times daily. 90 capsule 11    levetiracetam oral soln 500 mg/5 mL (5 mL) Soln 2.5 mLs (250 mg total) by Per NG tube route 2 (two) times daily. 150 mL 11    levothyroxine (SYNTHROID) 50 MCG tablet Take 50 mcg by mouth once daily.        metronidazole (FLAGYL) 500 mg/100 mL IVPB Inject 100 mLs (500 mg total) into the vein every 8  (eight) hours.      rosuvastatin (CRESTOR) 10 MG tablet Take 1 tablet (10 mg total) by mouth once daily. 30 tablet 0    VANCOMYCIN HCL (VANCOMYCIN IN 5 % DEXTROSE) 1.75 gram/500 mL Soln Inject 500 mLs (1,750 mg total) into the vein once daily.       Scheduled Meds:   apixaban  5 mg Oral BID    epoetin donita (PROCRIT) injection  10,000 Units Intravenous Every Tues, Thurs, Sat    fluconazole  100 mg Oral Daily    gabapentin  100 mg Oral TID    insulin aspart  1-10 Units Subcutaneous TIDWM    levothyroxine  50 mcg Oral Before breakfast    metroNIDAZOLE  250 mg Oral Q8H    miconazole nitrate 2%   Topical (Top) BID    rosuvastatin  10 mg Oral Daily    sodium chloride 0.9%  3 mL Intravenous Q8H     Continuous Infusions:  PRN Meds:.sodium chloride 0.9%, acetaminophen, albuterol sulfate, dextrose 50%, diphenoxylate-atropine 2.5-0.025 mg, glucagon (human recombinant), glucose, glucose, heparin (porcine), influenza, labetalol, levetiracetam oral soln, magnesium oxide, magnesium oxide, ondansetron, potassium phosphate IVPB, simethicone, traMADol    Allergies:  Patient has no known allergies.    Vital Signs:  Temp Readings from Last 3 Encounters:   12/22/17 99.3 °F (37.4 °C) (Oral)   12/11/17 97.4 °F (36.3 °C) (Oral)   11/27/17 97 °F (36.1 °C) (Oral)       Pulse Readings from Last 3 Encounters:   12/22/17 81   12/11/17 69   11/27/17 67       BP Readings from Last 3 Encounters:   12/22/17 138/69   12/11/17 (!) 125/59   11/27/17 123/67       Weight:  Wt Readings from Last 3 Encounters:   12/17/17 106.9 kg (235 lb 11.2 oz)   12/08/17 117.9 kg (260 lb)   11/27/17 117 kg (257 lb 15 oz)       Physical Exam:  Constitutional: nad, aao x 3  Heart: rrr, no m/r/g, wwp, + edema  Lungs: ctab, no w/r/r/c, no lb  Abdomen: s/nt/nd, +BS    Results:  Lab Results   Component Value Date     (L) 12/18/2017    K 4.3 12/18/2017    CL 99 12/18/2017    CO2 23 12/18/2017    BUN 46 (H) 12/18/2017    CREATININE 5.4 (H) 12/18/2017     CALCIUM 8.7 12/18/2017    ANIONGAP 10 12/18/2017    ESTGFRAFRICA 10 (A) 12/18/2017    EGFRNONAA 8 (A) 12/18/2017       Lab Results   Component Value Date    CALCIUM 8.7 12/18/2017    PHOS 4.4 12/18/2017       No results for input(s): WBC, RBC, HGB, HCT, PLT, MCV, MCH, MCHC in the last 24 hours.    I have personally reviewed pertinent radiological imaging and reports.    Amairani Young MD MPH  [facility] [place]  [address] [place] [id number]  [phone] (p)  [phone] (f)

## 2017-12-22 NOTE — CONSULTS
[facility]  Adult Nutrition  Consult Note    SUMMARY     Recommendations    Recommendation/Intervention: 1) Increase calories in diabetic diet to 2000 and continue renal diet 2) continue Beneprotein, 2 packets, tid 3) consider MVI for renal patients  Goals: 1) Pt will consume at least 85% of EEN/EPN daily   2) Pt will use ONS as directed  Nutrition Goal Status:1) met/ongoing 2) new  Communication of RD Recs:  (discussed on rounds)    Discharge Plan     diabetic, renal diet with protein supplement until wound heals    Reason for Assessment    Reason for Assessment: RD follow up      1. SAH (subarachnoid hemorrhage)      Past Medical History:   Diagnosis Date    Anemia in stage 3 chronic kidney disease 11/26/2017    CHF (congestive heart failure)     COPD (chronic obstructive pulmonary disease)     Coronary artery disease     Diabetes mellitus     Encounter for blood transfusion     Essential hypertension 6/24/2017    Hypertension     MI (myocardial infarction) 2010    Segmental and subsegmental pulmonary emboli of RLL without acute cor pulmonale 11/25/2017    Stroke     July 2005    Thyroid disease     Traumatic subarachnoid hemorrhage 11/24/2017    Type 2 diabetes mellitus with stage 3 chronic kidney disease, without long-term current use of insulin 6/24/2017        Interdisciplinary Rounds: attended     General Information Comments: Admitted for rehab s/p debridement of stage 3 diabetic foot ulcer (left). Is on wound vac. Pt reports good appetite and has gained wt.  Wt 6/27/17 was 240 lbs, 1.3 oz. Pt states  and is now 252 lbs, 1.6 oz. On HD.   Fluid vs actual wt gain?  12/15/17 Fair po intake with 67% meal intake x last 6 meals. Pt continues to receive Novasource Renal.   12/19/17 Good po intake. Wt loss of 16.5 lbs x 4 days ?? Fluid loss? Pt on HD.   12/21/17 Pt reports appetite fluctuates. No new weights. Edema noted from nephrology note. Pt continues with stage 3 wound  on left foot.Pt notes she has been using the protein packets in her apple juice.  Discussed other foods to mix with the protein powder.     Wt Readings from Last 1 Encounters:   12/17/17 0449 106.9 kg (235 lb 11.2 oz)   12/13/17 1732 114.4 kg (252 lb 3.3 oz)   12/11/17 1737 114.4 kg (252 lb 1.6 oz)         Nutrition Prescription Ordered    Current Diet Order: 1800 calorie diabetic diet   Nutrition Order Comments: Note Novasource Renal has 475 calories, 21.6 gms protein and 43.5 gms CHO per carton.    Oral Nutrition Supplement: Beneprotein, 2 packets, tid    Evaluation of Received Nutrients/Fluid Intake    Intake/Output Summary (Last 24 hours) at 12/22/17 1025  Last data filed at 12/22/17 0200   Gross per 24 hour   Intake             1040 ml   Output             4500 ml   Net            -3460 ml     Fluid Required:  (per primary team or UOP + 1000 mls)        % Intake of Estimated Energy Needs: 50-75 %  % Meal Intake:71% average for last 7 meals    Nutrition Risk Screen     Nutrition Risk Screen: large or nonhealing wound, burn or pressure ulcer    Nutrition/Diet History    Patient Reported Diet/Restrictions/Preferences: diabetic diet  Typical Food/Fluid Intake: Pt uses Boost at home. NKFA  Food Preferences: no cultural or religous food preferences identified.      Labs/Tests/Procedures/Meds    Diagnostic Test/Procedure Review:  (on HD)  Pertinent Labs Reviewed: reviewed, pertinent   BMP  Lab Results   Component Value Date     (L) 12/18/2017    K 4.3 12/18/2017    CL 99 12/18/2017    CO2 23 12/18/2017    BUN 46 (H) 12/18/2017    CREATININE 5.4 (H) 12/18/2017    CALCIUM 8.7 12/18/2017    ANIONGAP 10 12/18/2017    ESTGFRAFRICA 10 (A) 12/18/2017    EGFRNONAA 8 (A) 12/18/2017     Lab Results   Component Value Date    CALCIUM 8.7 12/18/2017    PHOS 4.4 12/18/2017     Lab Results   Component Value Date    HGBA1C 8.0 (H) 11/25/2017       Recent Labs  Lab 12/22/17  0733   POCTGLUCOSE 170*     Lab Results   Component  "Value Date    ALBUMIN 2.3 (L) 2017     Lab Results   Component Value Date    CRP 83.9 (H) 2017     Lab Results   Component Value Date    CHOL 75 (L) 2017    CHOL 115 (L) 2017     Lab Results   Component Value Date    HDL 19 (L) 2017    HDL 25 (L) 2017     Lab Results   Component Value Date    LDLCALC 42.8 (L) 2017    LDLCALC 72.4 2017     Lab Results   Component Value Date    TRIG 66 2017    TRIG 88 2017     Lab Results   Component Value Date    CHOLHDL 25.3 2017    CHOLHDL 21.7 2017      Pertinent Medications Reviewed: reviewed, pertinent      Scheduled Meds:   apixaban  5 mg Oral BID    epoetin donita (PROCRIT) injection  10,000 Units Intravenous Every Tues, Th, Sat    fluconazole  100 mg Oral Daily    gabapentin  100 mg Oral TID    insulin aspart  1-10 Units Subcutaneous TIDWM    levothyroxine  50 mcg Oral Before breakfast    metroNIDAZOLE  250 mg Oral Q8H    miconazole nitrate 2%   Topical (Top) BID    rosuvastatin  10 mg Oral Daily    sodium chloride 0.9%  3 mL Intravenous Q8H     Continuous Infusions:        Physical Findings    Overall Physical Appearance: obese     Oral/Mouth Cavity: tooth/teeth missing  Skin:  (Donny score 16; no edema noted in MD's  progress note)    Anthropometrics    Temp: 99.3 °F (37.4 °C)     Height: 5' 6"  Weight Method: Bed Scale  Weight: 106.9 kg (235 lb 11.2 oz) (weighed by Liliam Driver RN)     Ideal Body Weight (IBW), Female: 130 lb     % Ideal Body Weight, Female (lb): 194.01 lb  BMI (Calculated): 40.8  BMI Grade: greater than 40 - morbid obesity     Usual Body Weight (UBW), k.9 kg (per chart review 17)     % Usual Body Weight: 105.27  % Weight Change From Usual Weight: 5.05 %     Estimated/Assessed Needs    Weight Used For Calorie Calculations: 82 kg (180 lb 12.4 oz) (per NHANES SBW for renal pts)   Height (cm): 167.6 cm   Energy Need Method: Kcal/kg   25 kcal/kg (kcal):  and " 30 kcal/kg (kcal): 2460   RMR ([place]. Jeor Equation): 1431.75   Weight Used For Protein Calculations: 82 kg (180 lb 12.4 oz)   1.2 gm Protein (gm): 98.61 and 1.5 gm Protein (gm): 123.26  Fluid Requirements (mL):  (per primary team 2/2 renal issues)       CHO Requirement: 45-50% of EEN (225 gms)    Assessment and Plan    Wounds, multiple    Nutrition Diagnosis  Increase proteinl needs     Related to (etiology):   Wound healing    Signs and Symptoms (as evidenced by):   Presence of stage 3 wound on left foot    Interventions/Recommendations (treatment strategy):  1) Continue diabetic, renal diet.  2)  [facility] due to pt's excessive calorie intake and pt receiving a protein supplement that is lower in calories    Nutrition Diagnosis Status:   Progressing                  Malnutrition due to renal disease  [x]  Unprioritized   Change Dx Resolve      Details  Code: E46 ...  Noted: 12/22/2017  Updated: Today   Dianna Cruz RD       Overview    Current Assessment & Plan Note  Edited:  Dianna Cruz RD  Today  Related to (etiology):   Increased physiological needs 2/2 renal disease and wound     Signs and Symptoms (as evidenced by):   1) stage 3 wound on left foot 2) mild fluid accumulation      Interventions/Recommendations (treatment strategy):  1) continue diabetic, renal diet 2) continue protein supplement     Nutrition Diagnosis Status:   New              Malnutrition due to renal disease  [x]  Unprioritized   Change Dx Resolve      Details  Code: E46 ...  Noted: 12/22/2017  Updated: Today   Dianna Cruz RD       Overview    Current Assessment & Plan Note  Edited:  Dianna Cruz RD  Today  Related to (etiology):   Increased physiological needs 2/2 renal disease and wound     Signs and Symptoms (as evidenced by):   1) stage 3 wound on left foot 2) mild fluid accumulation      Interventions/Recommendations (treatment strategy):  1) continue diabetic, renal diet 2) continue protein  supplement     Nutrition Diagnosis Status:   New                  Monitor and Evaluation    Food and Nutrient Intake: energy intake  Food and Nutrient Adminstration: diet order   Physical Activity and Function: nutrition-related ADLs and IADLs  Anthropometric Measurements: weight, weight change  Biochemical Data, Medical Tests and Procedures: electrolyte and renal panel, glucose/endocrine profile, inflammatory profile, lipid profile  Nutrition-Focused Physical Findings: overall appearance, skin    Nutrition Risk    1 x weekly     Nutrition Follow-Up    RD Follow-up?: Yes

## 2017-12-22 NOTE — PLAN OF CARE
Problem: SLP Goal  Goal: SLP Goal  Short term goals:  1. Pt will complete complex reasoning tasks (math, time, money mgmt) with 90% acc independently   2. Pt will complete complex sequencing tasks (pill mgmt/box, recall of transfer, etc.) with 90% acc independently   3. Pt will demonstrate adequate attention (sustained/alternating/selective) to complete functional tasks, as listed above, in 9/10 attempts independently      Long Term Goals:  1. Pt will demonstrate adequate cognitive function for safe and efficient function in home, social and medical environments.     Outcome: Ongoing (interventions implemented as appropriate)  Pt demonstrates excellent recall of functional information such as upcoming holiday menu items and results dialysis occurring 12/21. Mrs. Ramos was able to independenetly recall association strategies that were introduced and used during 12/21 session and generalize into chaining list task. She required minimal assistance in formulating appropriate associations between related words and recalled items, in order, at 100% success immediately and at 10 minute delay. She participated in convergent and divergent word organization tasks, requiring moderate assistance to apply semantic feature analysis principles in order to enhance success in divergent task/item description. Minimal assistance was required throughout convergent naming task.

## 2017-12-22 NOTE — PLAN OF CARE
Problem: Nutrition, Imbalanced: Inadequate Oral Intake (Adult)  Intervention: Promote/Optimize Nutrition  Recommendation/Intervention: 1) Increase calories in diabetic diet to 2000 and continue renal diet 2) continue Beneprotein, 2 packets, tid 3) consider MVI for renal patients  Goals: 1) Pt will consume at least 85% of EEN/EPN daily   2) Pt will use ONS as directed  Nutrition Goal Status:1) met/ongoing 2) new  Communication of RD Recs:  (discussed on rounds)

## 2017-12-22 NOTE — PLAN OF CARE
Problem: Patient Care Overview  Goal: Plan of Care Review  Outcome: Ongoing (interventions implemented as appropriate)  Pain is well controlled, slept well, safety maintained. No new or overnight issues.

## 2017-12-22 NOTE — PLAN OF CARE
Problem: Fall Risk (Adult)  Goal: Absence of Falls  Patient will demonstrate the desired outcomes by discharge/transition of care.   Outcome: Ongoing (interventions implemented as appropriate)  No incident of injury or fall.

## 2017-12-23 LAB
POCT GLUCOSE: 152 MG/DL (ref 70–110)
POCT GLUCOSE: 161 MG/DL (ref 70–110)
POCT GLUCOSE: 209 MG/DL (ref 70–110)
POCT GLUCOSE: 219 MG/DL (ref 70–110)
POCT GLUCOSE: 240 MG/DL (ref 70–110)

## 2017-12-23 PROCEDURE — 25000242 PHARM REV CODE 250 ALT 637 W/ HCPCS: Performed by: INTERNAL MEDICINE

## 2017-12-23 PROCEDURE — 12800000 HC REHAB SEMI-PRIVATE ROOM

## 2017-12-23 PROCEDURE — 27000221 HC OXYGEN, UP TO 24 HOURS

## 2017-12-23 PROCEDURE — 97530 THERAPEUTIC ACTIVITIES: CPT

## 2017-12-23 PROCEDURE — 86704 HEP B CORE ANTIBODY TOTAL: CPT

## 2017-12-23 PROCEDURE — 97110 THERAPEUTIC EXERCISES: CPT

## 2017-12-23 PROCEDURE — 63600175 PHARM REV CODE 636 W HCPCS: Performed by: PHYSICAL MEDICINE & REHABILITATION

## 2017-12-23 PROCEDURE — 86706 HEP B SURFACE ANTIBODY: CPT

## 2017-12-23 PROCEDURE — 25000003 PHARM REV CODE 250: Performed by: INTERNAL MEDICINE

## 2017-12-23 PROCEDURE — 87340 HEPATITIS B SURFACE AG IA: CPT

## 2017-12-23 PROCEDURE — 94761 N-INVAS EAR/PLS OXIMETRY MLT: CPT

## 2017-12-23 PROCEDURE — 94640 AIRWAY INHALATION TREATMENT: CPT

## 2017-12-23 PROCEDURE — 80100016 HC MAINTENANCE HEMODIALYSIS

## 2017-12-23 PROCEDURE — 36415 COLL VENOUS BLD VENIPUNCTURE: CPT

## 2017-12-23 PROCEDURE — 25000003 PHARM REV CODE 250: Performed by: PHYSICAL MEDICINE & REHABILITATION

## 2017-12-23 PROCEDURE — 63600175 PHARM REV CODE 636 W HCPCS: Performed by: INTERNAL MEDICINE

## 2017-12-23 PROCEDURE — 99900035 HC TECH TIME PER 15 MIN (STAT)

## 2017-12-23 PROCEDURE — A4216 STERILE WATER/SALINE, 10 ML: HCPCS | Performed by: INTERNAL MEDICINE

## 2017-12-23 RX ORDER — AMLODIPINE BESYLATE 5 MG/1
5 TABLET ORAL DAILY
Status: DISCONTINUED | OUTPATIENT
Start: 2017-12-23 | End: 2017-12-25

## 2017-12-23 RX ADMIN — ERYTHROPOIETIN 10000 UNITS: 10000 INJECTION, SOLUTION INTRAVENOUS; SUBCUTANEOUS at 02:12

## 2017-12-23 RX ADMIN — BENZONATATE 200 MG: 100 CAPSULE ORAL at 01:12

## 2017-12-23 RX ADMIN — INSULIN ASPART 4 UNITS: 100 INJECTION, SOLUTION INTRAVENOUS; SUBCUTANEOUS at 11:12

## 2017-12-23 RX ADMIN — GABAPENTIN 100 MG: 100 CAPSULE ORAL at 06:12

## 2017-12-23 RX ADMIN — FLUCONAZOLE 100 MG: 100 TABLET ORAL at 08:12

## 2017-12-23 RX ADMIN — ALBUTEROL SULFATE 2.5 MG: 2.5 SOLUTION RESPIRATORY (INHALATION) at 11:12

## 2017-12-23 RX ADMIN — LEVOTHYROXINE SODIUM 50 MCG: 25 TABLET ORAL at 06:12

## 2017-12-23 RX ADMIN — SODIUM CHLORIDE, PRESERVATIVE FREE 3 ML: 5 INJECTION INTRAVENOUS at 09:12

## 2017-12-23 RX ADMIN — BENZONATATE 200 MG: 100 CAPSULE ORAL at 06:12

## 2017-12-23 RX ADMIN — ROSUVASTATIN CALCIUM 10 MG: 5 TABLET ORAL at 08:12

## 2017-12-23 RX ADMIN — GABAPENTIN 100 MG: 100 CAPSULE ORAL at 09:12

## 2017-12-23 RX ADMIN — APIXABAN 5 MG: 2.5 TABLET, FILM COATED ORAL at 09:12

## 2017-12-23 RX ADMIN — APIXABAN 5 MG: 2.5 TABLET, FILM COATED ORAL at 08:12

## 2017-12-23 RX ADMIN — SODIUM CHLORIDE, PRESERVATIVE FREE 3 ML: 5 INJECTION INTRAVENOUS at 05:12

## 2017-12-23 RX ADMIN — GABAPENTIN 100 MG: 100 CAPSULE ORAL at 05:12

## 2017-12-23 RX ADMIN — BENZONATATE 200 MG: 100 CAPSULE ORAL at 09:12

## 2017-12-23 RX ADMIN — INSULIN ASPART 2 UNITS: 100 INJECTION, SOLUTION INTRAVENOUS; SUBCUTANEOUS at 08:12

## 2017-12-23 RX ADMIN — INSULIN ASPART 2 UNITS: 100 INJECTION, SOLUTION INTRAVENOUS; SUBCUTANEOUS at 05:12

## 2017-12-23 RX ADMIN — SODIUM CHLORIDE, PRESERVATIVE FREE 3 ML: 5 INJECTION INTRAVENOUS at 06:12

## 2017-12-23 RX ADMIN — MICONAZOLE NITRATE: 20 OINTMENT TOPICAL at 08:12

## 2017-12-23 RX ADMIN — HEPARIN SODIUM 4000 UNITS: 1000 INJECTION, SOLUTION INTRAVENOUS; SUBCUTANEOUS at 04:12

## 2017-12-23 NOTE — PLAN OF CARE
Problem: Occupational Therapy Goal  Goal: Occupational Therapy Goal  Goals to be met by: 12/29/17     Patient will increase functional independence with ADLs by performing:    Feeding with [place].  UE Dressing with [place].  LE Dressing with Supervision.  Grooming while seated with [place].  Toileting from toilet with bedside commode positioned over Minimal Assistance for hygiene and clothing management.   Bathing from shower chair/bench with Minimal Assistance with DME/AE as needed.  Shower transfer with Minimal Assistance with DME/AE as needed .  Toilet transfer to toilet with Minimal Assistance with DME as needed.     Outcome: Ongoing (interventions implemented as appropriate)  OT tx for strengthening and endurance

## 2017-12-23 NOTE — PROGRESS NOTES
INPATIENT NEPHROLOGY PROGRESS NOTE  [facility] NEPHROLOGY    Patient Name: Juliane Ramos  Date: 12/23/2017    Reason for consultation: MC     Chief Complaint: Diab foot infection    History of Present Illness:  Patient with stage III CKD a/w diab foot infx, hospital course c/b MC requiring dialysis.     Seen on HD machine today, tolerating well.   Hoping for discharge by next Friday.    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 Next HD on Tue.  - BP is better; keep coreg on hold and continue UF for edema  - recheck phos and Hb with weekly labs on Monday; continue renal diet and EPO with HD  - SW will need to confirm with HD unit on Tuesday, that outpatient HD slot for discharge is available.    Thank you for allowing us to participate in this patient's care. We will continue to follow.    Medications:  No current facility-administered medications on file prior to encounter.      Current Outpatient Prescriptions on File Prior to Encounter   Medication Sig Dispense Refill    albuterol (ACCUNEB) 1.25 mg/3 mL Nebu Take 1.25 mg by nebulization every 6 (six) hours as needed. Rescue      carvedilol (COREG) 12.5 MG tablet Take 1 tablet (12.5 mg total) by mouth 2 (two) times daily. 60 tablet 11    cefepime in dextrose 5 % (MAXIPIME) 1 gram/50 mL PgBk Inject 50 mLs (1 g total) into the vein every 24 hours as needed.      gabapentin (NEURONTIN) 100 MG capsule Take 1 capsule (100 mg total) by mouth 3 (three) times daily. 90 capsule 11    levetiracetam oral soln 500 mg/5 mL (5 mL) Soln 2.5 mLs (250 mg total) by Per NG tube route 2 (two) times daily. 150 mL 11    levothyroxine (SYNTHROID) 50 MCG tablet Take 50 mcg by mouth once daily.        metronidazole (FLAGYL) 500 mg/100 mL IVPB Inject 100 mLs (500 mg total) into the vein every 8 (eight) hours.      rosuvastatin (CRESTOR) 10 MG  tablet Take 1 tablet (10 mg total) by mouth once daily. 30 tablet 0    VANCOMYCIN HCL (VANCOMYCIN IN 5 % DEXTROSE) 1.75 gram/500 mL Soln Inject 500 mLs (1,750 mg total) into the vein once daily.       Scheduled Meds:   amLODIPine  5 mg Oral Daily    apixaban  5 mg Oral BID    benzonatate  200 mg Oral TID    epoetin donita (PROCRIT) injection  10,000 Units Intravenous Every Tues, Thurs, Sat    fluconazole  100 mg Oral Daily    gabapentin  100 mg Oral TID    insulin aspart  1-10 Units Subcutaneous TIDWM    levothyroxine  50 mcg Oral Before breakfast    miconazole nitrate 2%   Topical (Top) BID    rosuvastatin  10 mg Oral Daily    sodium chloride 0.9%  3 mL Intravenous Q8H     Continuous Infusions:  PRN Meds:.sodium chloride 0.9%, acetaminophen, albuterol sulfate, dextrose 50%, diphenoxylate-atropine 2.5-0.025 mg, glucagon (human recombinant), glucose, glucose, heparin (porcine), influenza, labetalol, levetiracetam oral soln, magnesium oxide, magnesium oxide, ondansetron, potassium phosphate IVPB, simethicone, traMADol    Allergies:  Patient has no known allergies.    Vital Signs:  Temp Readings from Last 3 Encounters:   12/23/17 98.8 °F (37.1 °C) (Tympanic)   12/11/17 97.4 °F (36.3 °C) (Oral)   11/27/17 97 °F (36.1 °C) (Oral)       Pulse Readings from Last 3 Encounters:   12/23/17 84   12/11/17 69   11/27/17 67       BP Readings from Last 3 Encounters:   12/23/17 126/66   12/11/17 (!) 125/59   11/27/17 123/67       Weight:  Wt Readings from Last 3 Encounters:   12/17/17 106.9 kg (235 lb 11.2 oz)   12/08/17 117.9 kg (260 lb)   11/27/17 117 kg (257 lb 15 oz)       Physical Exam:  Constitutional: nad, aao x 3  Heart: rrr, no m/r/g, wwp, + edema  Lungs: ctab, no w/r/r/c, no lb  Abdomen: s/nt/nd, +BS    Results:  Lab Results   Component Value Date     (L) 12/18/2017    K 4.3 12/18/2017    CL 99 12/18/2017    CO2 23 12/18/2017    BUN 46 (H) 12/18/2017    CREATININE 5.4 (H) 12/18/2017    CALCIUM 8.7  12/18/2017    ANIONGAP 10 12/18/2017    ESTGFRAFRICA 10 (A) 12/18/2017    EGFRNONAA 8 (A) 12/18/2017       Lab Results   Component Value Date    CALCIUM 8.7 12/18/2017    PHOS 4.4 12/18/2017       No results for input(s): WBC, RBC, HGB, HCT, PLT, MCV, MCH, MCHC in the last 24 hours.    I have personally reviewed pertinent radiological imaging and reports.    Edson Crystal MD  [facility] [place]  [address] [place] [id number]  [phone] (p)  [phone] (f)

## 2017-12-23 NOTE — PT/OT/SLP PROGRESS
"  Occupational Therapy  Treatment    Juliane Ramos   MRN: [id number]   Admitting Diagnosis:   OT Date of Treatment: 12/23/17   Total Time (min): 45 min      Billable Minutes:  Therapeutic Exercise 45  Total Minutes: 45    General Precautions: Standard, fall  Orthopedic Precautions:    Braces: N/A    Do you have any cultural, spiritual, Jew conflicts, given your current situation?: None    Subjective:  Communicated with nurse prior to session.    Pain Rating 1: 0/10                   Objective:       Functional Mobility:      Additional Treatment:  Pt seen for general UE endurance activity and strengthening.  Pt completed Romel serena: 4sets/10reps/8lbs in 2 planes, Pt completed 2sets, 5minutes, minimalresistance on UBE.  Pt completed rickshaw:  10 repetitions,[id number].          Patient left in bed  with    dialysis nurse present.     ASSESSMENT:  Juliane Ramos was able to tolerate all exercises well.  She did not want to work on self care today stating "I do all of that well with Chloe", her primary OT.    Rehab potential is good    Activity tolerance: Good    Discharge recommendations: home     Equipment recommendations:  (TBD)     GOALS:    Occupational Therapy Goals        Problem: Occupational Therapy Goal    Goal Priority Disciplines Outcome Interventions   Occupational Therapy Goal     OT, PT/OT Ongoing (interventions implemented as appropriate)    Description:  Goals to be met by: 12/29/17     Patient will increase functional independence with ADLs by performing:    Feeding with [place].  UE Dressing with [place].  LE Dressing with Supervision.  Grooming while seated with [place].  Toileting from toilet with bedside commode positioned over Minimal Assistance for hygiene and clothing management.   Bathing from shower chair/bench with Minimal Assistance with DME/AE as needed.  Shower transfer with Minimal Assistance with DME/AE as needed .  Toilet transfer to toilet with Minimal " Assistance with DME as needed.                      Plan:  Patient to be seen 6 x/week to address the above listed problems via self-care/home management, community/work re-entry, therapeutic activities, therapeutic exercises, therapeutic groups, neuromuscular re-education, cognitive retraining, sensory integration, wheelchair management/training  Plan of Care expires: 12/29/17  Plan of Care reviewed with: patient         Abigail Umu, OT  12/23/2017

## 2017-12-23 NOTE — PROGRESS NOTES
[facility]  Physical Medicine & Rehab  Progress Note    Patient Name: Juliane Ramos  MRN: [id number]  Patient Class: IP- Rehab   Admission Date: 12/11/2017  Length of Stay: 11 days  Attending Physician: Rishi Ness MD  Primary Care Provider: JOS Dumont    Subjective:     Principal Problem: SAH    Interval History: 12/22/2017:  Patient is seen for follow-up rehab evaluation and recommendations:  Participating with  therapy  HPI, Past Medical, Family, and Social History remains the same as documented in the initial encounter.    Scheduled Medications:    apixaban  5 mg Oral BID    benzonatate  200 mg Oral TID    epoetin donita (PROCRIT) injection  10,000 Units Intravenous Every Tues, Thurs, Sat    fluconazole  100 mg Oral Daily    gabapentin  100 mg Oral TID    insulin aspart  1-10 Units Subcutaneous TIDWM    levothyroxine  50 mcg Oral Before breakfast    miconazole nitrate 2%   Topical (Top) BID    rosuvastatin  10 mg Oral Daily    sodium chloride 0.9%  3 mL Intravenous Q8H       PRN Medications: sodium chloride 0.9%, acetaminophen, albuterol sulfate, dextrose 50%, diphenoxylate-atropine 2.5-0.025 mg, glucagon (human recombinant), glucose, glucose, heparin (porcine), influenza, labetalol, levetiracetam oral soln, magnesium oxide, magnesium oxide, ondansetron, potassium phosphate IVPB, simethicone, traMADol    Review of Systems   Constitutional: Negative.    HENT: Negative.    Eyes: Negative.    Respiratory: Negative.    Cardiovascular: Negative.    Gastrointestinal: Negative.    Endocrine: Negative.    Genitourinary: Negative.    Musculoskeletal: Negative.    Skin: Negative.    Allergic/Immunologic: Negative.    Neurological: Negative.    Hematological: Negative.    Psychiatric/Behavioral: Negative.      Objective:     Vital Signs (Most Recent):  Temp: 99 °F (37.2 °C) (12/22/17 2104)  Pulse: 87 (12/22/17 2104)  Resp: 18 (12/22/17 2104)  BP: (!) 142/80 (12/22/17 2104)  SpO2:  96 % (12/22/17 2104)    Vital Signs (24h Range):  Temp:  [99 °F (37.2 °C)-99.8 °F (37.7 °C)] 99 °F (37.2 °C)  Pulse:  [81-91] 87  Resp:  [16-20] 18  SpO2:  [94 %-97 %] 96 %  BP: (126-142)/(66-80) 142/80     Physical Exam   Constitutional: She is oriented to person, place, and time. She appears well-developed and well-nourished. No distress.   HENT:   Head: Normocephalic and atraumatic.   Right Ear: External ear normal.   Left Ear: External ear normal.   Nose: Nose normal.   Mouth/Throat: Oropharynx is clear and moist. No oropharyngeal exudate.   Eyes: Conjunctivae and EOM are normal. Pupils are equal, round, and reactive to light. Right eye exhibits no discharge. Left eye exhibits no discharge. No scleral icterus.   Neck: Normal range of motion. Neck supple. No JVD present. No tracheal deviation present. No thyromegaly present.   Cardiovascular: Normal rate, regular rhythm, normal heart sounds and intact distal pulses.  Exam reveals no gallop and no friction rub.    No murmur heard.  Pulmonary/Chest: Effort normal and breath sounds normal. No stridor. No respiratory distress. She has no wheezes. She has no rales. She exhibits no tenderness.   Abdominal: Soft. Bowel sounds are normal. She exhibits no distension and no mass. There is no tenderness. There is no rebound and no guarding. No hernia.   Genitourinary:   Genitourinary Comments: deferred   Musculoskeletal: Normal range of motion. She exhibits no edema, tenderness or deformity.   Lymphadenopathy:     She has no cervical adenopathy.   Neurological: She is alert and oriented to person, place, and time. No cranial nerve deficit. She exhibits normal muscle tone.   Skin: No rash noted. She is not diaphoretic. No erythema. No pallor.   Psychiatric: She has a normal mood and affect. Her behavior is normal.     NEUROLOGICAL EXAMINATION:     MENTAL STATUS   Oriented to person, place, and time.     CRANIAL NERVES     CN III, IV, VI   Pupils are equal, round, and  reactive to light.  Extraocular motions are normal.       Assessment/Plan:      Juliane Ramos is a 57 y.o. female admitted to inpatient rehabilitation on 12/11/2017 for <principal problem not specified> with impaired mobility and ADLs. Patient remains appropriate for PT, OT, and as required Speech therapy. Patient continues to require 24 hour nursing care as well as daily Physician assessment.    Active Diagnoses:    Diagnosis Date Noted POA    Malnutrition due to renal disease [E46, N28.9] 12/22/2017 Unknown    Wound healing, delayed [T14.8XXD] 12/18/2017 Yes    Wounds, multiple [T07.XXXA] 12/13/2017 Yes    SAH (subarachnoid hemorrhage) [I60.9] 12/11/2017 Yes    Anasarca [R60.1] 12/04/2017 Yes    Acute respiratory failure with hypoxia and hypercapnia [J96.01, J96.02] 12/02/2017 Yes    Pulmonary embolus [I26.99] 11/27/2017 Yes    Traumatic subarachnoid hemorrhage [S06.6X9A] 11/24/2017 Yes    Acute on chronic congestive heart failure [I50.9] 06/24/2017 Yes      Problems Resolved During this Admission:    Diagnosis Date Noted Date Resolved POA     SAH, cont PT, OT, ST  Renal insuff, cont dialysis, & f/u by nephro  PE, now on eliquis per pulm. DM, accu check noted, cont current meds      DISCHARGE PLANNING:  Tentative Discharge Date:     No future appointments.    Rishi Ness MD  Department of Physical Medicine & Rehab  [facility]

## 2017-12-23 NOTE — PROGRESS NOTES
HD:  Pt stable, tx complete, lines reinfused, catheter capped and clamped.  Pt tolerated tx well.    NET UF:  3000 mL

## 2017-12-23 NOTE — PROGRESS NOTES
"   12/23/17 1106   Patient Assessment/Suction   Level of Consciousness (AVPU) alert   All Lung Fields Breath Sounds clear;diminished   Cough Type none   PRE-TX-O2-ETCO2   O2 Device (Oxygen Therapy) nasal cannula   $ Is the patient on Low Flow Oxygen? Yes   Flow (L/min) 3   SpO2 100 %   Pulse Oximetry Type Intermittent   Pulse 86   Resp 15   Aerosol Therapy   $ Aerosol Therapy Charges Aerosol Treatment   Respiratory Treatment Status given   SVN/Inhaler Treatment Route mask   Position During Treatment Sitting in chair   Patient Tolerance good   Post-Treatment   Post-treatment Heart Rate (beats/min) 81   Post-treatment Resp Rate (breaths/min) 15   All Fields Breath Sounds unchanged     Pt requested tx. Upon entering room pt sleeping in chair. Pt asking if RT thought it was a good idea for her to have a tx. Pt states "I/m coughing my head off. I don't know why I called for tx I just felt like having one". Tx was administered at this time.  "

## 2017-12-23 NOTE — PLAN OF CARE
12/22/17 1940   Patient Assessment/Suction   Level of Consciousness (AVPU) alert   Respiratory Effort Normal;Unlabored   Expansion/Accessory Muscles/Retractions expansion symmetric;no retractions;no use of accessory muscles   All Lung Fields Breath Sounds clear;diminished   PRE-TX-O2-ETCO2   O2 Device (Oxygen Therapy) nasal cannula   Flow (L/min) 3   Oxygen Concentration (%) 32   SpO2 95 %   Pulse Oximetry Type Intermittent   Pulse 90   Resp 16   Aerosol Therapy   $ Aerosol Therapy Charges PRN treatment not required   Respiratory Treatment Status PRN treatment not required   Ready to Wean/Extubation Screen   FIO2<=50 (chart decimal) 0.32

## 2017-12-23 NOTE — PLAN OF CARE
Problem: Patient Care Overview  Goal: Plan of Care Review  Outcome: Ongoing (interventions implemented as appropriate)  Pain is well controlled, slept well, safety maintained. No overnight issues or events.

## 2017-12-23 NOTE — PROGRESS NOTES
[facility]  Physical Medicine & Rehab  Progress Note    Patient Name: Juliane Ramos  MRN: [id number]  Patient Class: IP- Rehab   Admission Date: 12/11/2017  Length of Stay: 12 days  Attending Physician: Rishi Ness MD  Primary Care Provider: JOS Dumont    Subjective:     Principal Problem: SAH    Interval History: 12/23/2017:  Patient is seen for follow-up rehab evaluation and recommendations: particiapting with therapy & making progress  HPI, Past Medical, Family, and Social History remains the same as documented in the initial encounter.    Scheduled Medications:    amLODIPine  5 mg Oral Daily    apixaban  5 mg Oral BID    benzonatate  200 mg Oral TID    epoetin donita (PROCRIT) injection  10,000 Units Intravenous Every Tues, Thurs, Sat    fluconazole  100 mg Oral Daily    gabapentin  100 mg Oral TID    insulin aspart  1-10 Units Subcutaneous TIDWM    levothyroxine  50 mcg Oral Before breakfast    miconazole nitrate 2%   Topical (Top) BID    rosuvastatin  10 mg Oral Daily    sodium chloride 0.9%  3 mL Intravenous Q8H       PRN Medications: sodium chloride 0.9%, acetaminophen, albuterol sulfate, dextrose 50%, diphenoxylate-atropine 2.5-0.025 mg, glucagon (human recombinant), glucose, glucose, heparin (porcine), influenza, labetalol, levetiracetam oral soln, magnesium oxide, magnesium oxide, ondansetron, potassium phosphate IVPB, simethicone, traMADol    Review of Systems   Constitutional: Negative.    HENT: Negative.    Eyes: Negative.    Respiratory: Negative.    Cardiovascular: Negative.    Gastrointestinal: Negative.    Endocrine: Negative.    Genitourinary: Negative.    Musculoskeletal: Negative.    Skin: Negative.    Allergic/Immunologic: Negative.    Neurological: Negative.    Hematological: Negative.    Psychiatric/Behavioral: Negative.      Objective:     Vital Signs (Most Recent):  Temp: 98.1 °F (36.7 °C) (12/23/17 0800)  Pulse: 86 (12/23/17 1105)  Resp: 15  (12/23/17 1105)  BP: (!) 144/93 (12/23/17 0800)  SpO2: 100 % (12/23/17 1105)    Vital Signs (24h Range):  Temp:  [98.1 °F (36.7 °C)-99 °F (37.2 °C)] 98.1 °F (36.7 °C)  Pulse:  [80-90] 86  Resp:  [15-20] 15  SpO2:  [95 %-100 %] 100 %  BP: (140-144)/(80-93) 144/93     Physical Exam   Constitutional: She is oriented to person, place, and time. She appears well-developed and well-nourished. No distress.   HENT:   Head: Normocephalic and atraumatic.   Right Ear: External ear normal.   Left Ear: External ear normal.   Nose: Nose normal.   Mouth/Throat: Oropharynx is clear and moist. No oropharyngeal exudate.   Eyes: Conjunctivae and EOM are normal. Pupils are equal, round, and reactive to light. Right eye exhibits no discharge. Left eye exhibits no discharge. No scleral icterus.   Neck: Normal range of motion. Neck supple. No JVD present. No tracheal deviation present. No thyromegaly present.   Cardiovascular: Normal rate, regular rhythm, normal heart sounds and intact distal pulses.  Exam reveals no gallop and no friction rub.    No murmur heard.  Pulmonary/Chest: Effort normal and breath sounds normal. No stridor. No respiratory distress. She has no wheezes. She has no rales. She exhibits no tenderness.   Abdominal: Soft. Bowel sounds are normal. She exhibits no distension and no mass. There is no tenderness. There is no rebound and no guarding. No hernia.   Obese    Genitourinary:   Genitourinary Comments: deferred   Musculoskeletal: Normal range of motion. She exhibits no edema, tenderness or deformity.   Lymphadenopathy:     She has no cervical adenopathy.   Neurological: She is alert and oriented to person, place, and time. She exhibits normal muscle tone.   Skin: No rash noted. She is not diaphoretic. No erythema. No pallor.   Psychiatric: She has a normal mood and affect. Her behavior is normal.     NEUROLOGICAL EXAMINATION:     MENTAL STATUS   Oriented to person, place, and time.     CRANIAL NERVES     CN III,  IV, VI   Pupils are equal, round, and reactive to light.  Extraocular motions are normal.       Assessment/Plan:      Juliane Ramos is a 57 y.o. female admitted to inpatient rehabilitation on 12/11/2017 for <principal problem not specified> with impaired mobility and ADLs. Patient remains appropriate for PT, OT, and as required Speech therapy. Patient continues to require 24 hour nursing care as well as daily Physician assessment.    Active Diagnoses:    Diagnosis Date Noted POA    Malnutrition due to renal disease [E46, N28.9] 12/22/2017 Unknown    Wound healing, delayed [T14.8XXD] 12/18/2017 Yes    Wounds, multiple [T07.XXXA] 12/13/2017 Yes    SAH (subarachnoid hemorrhage) [I60.9] 12/11/2017 Yes    Anasarca [R60.1] 12/04/2017 Yes    Acute respiratory failure with hypoxia and hypercapnia [J96.01, J96.02] 12/02/2017 Yes    Pulmonary embolus [I26.99] 11/27/2017 Yes    Traumatic subarachnoid hemorrhage [S06.6X9A] 11/24/2017 Yes    Acute on chronic congestive heart failure [I50.9] 06/24/2017 Yes      Problems Resolved During this Admission:    Diagnosis Date Noted Date Resolved POA     SAH, cont PT, OT, ST  Renal insuff,  F/u by nephro , + dialysis  PE, on apixaban   HTN, vitals noted, add amlodipine   DM, accu check note, cont current  Med    DISCHARGE PLANNING:  Tentative Discharge Date:     No future appointments.    Rishi Ness MD  Department of Physical Medicine & Rehab  [facility]

## 2017-12-23 NOTE — PLAN OF CARE
Problem: Fall Risk (Adult)  Goal: Absence of Falls  Patient will demonstrate the desired outcomes by discharge/transition of care.   Outcome: Ongoing (interventions implemented as appropriate)  No incident of injury or falls.

## 2017-12-23 NOTE — PT/OT/SLP PROGRESS
Physical Therapy         Treatment        Juliane Ramos   MRN: [id number]     PT Received On: 17  Total Time (min): 30        Billable Minutes:  Therapeutic Activity 10 and Therapeutic Exercise 20  Total Minutes: 30    Treatment Type: Treatment  PT/PTA: PT     PTA Visit Number: 0       General Precautions: Standard, fall  Orthopedic Precautions: Orthopedic Precautions : LLE non weight bearing     Subjective:  Communicated with RN prior to session.    Pain/Comfort  Pain Rating 1: 0/10  Pain Rating Post-Intervention 1: 0/10    Objective:  Patient found supine in bed, cooperative.Patient found with: oxygen (L ankle/foot boot)    Functional Mobility:  Bed Mobility:   Supine to sit: Minimal Assistance and use of siderail of bed    Transfer Training:  Sit to stand:Minimal Assistance with Grab bars  x 4  Bed <> Chair:  Squat Pivot with Minimal Assistance with No Assistive Device to R    Wheelchair Trainin' with min assist and cues    Additional Treatment:  SUPINE:  SLR (L with assist), ankle DF (R only), x 10 reps x 3 sets each  SEATED: LAQ left x 30 reps  STANDING: in // bars: ( LLE only) sidekicks, SLR, hip flexion, knee flexion, x 30 reps each (sidekicks x 2 sets)     STATIC STAND BALANCE TRAINING: in // bars: NWB LLE x 5 min x 2 with SBA    Activity Tolerance:  Patient tolerated treatment well    Patient left up in chair with call button in reach and O2 to wall 3 L/min via nc.    Assessment:  Juliane Ramos is a 57 y.o. female.    Rehab potential is good.    Activity tolerance: Good    GOALS:    Physical Therapy Goals        Problem: Physical Therapy Goal    Goal Priority Disciplines Outcome Goal Variances Interventions   Physical Therapy Goal     PT/OT, PT Ongoing (interventions implemented as appropriate)     Description:  Goals to be met by: 2018     Patient will increase functional independence with mobility by performin). Supine to sit with Modified [place]  2). Sit to supine with  Modified [place]  3). Sit to stand transfer with Stand-by Assistance  4). Bed to chair transfer with Stand-by Assistance   5). Wheelchair propulsion x > 250 feet with Modified [place]  6). Stand for  > 7 minutes with Stand-by Assistance NWB LLE                       PLAN:    Patient to be seen daily  to address the above listed problems via therapeutic activities, therapeutic exercises, neuromuscular re-education, wheelchair management/training  Plan of Care expires: 12/29/17  Plan of Care reviewed with: patient         Albert VAZQUEZ Benedicto, PT 12/23/2017

## 2017-12-23 NOTE — PROGRESS NOTES
12/23/17 0820   Patient Assessment/Suction   Level of Consciousness (AVPU) alert   All Lung Fields Breath Sounds clear;diminished   PRE-TX-O2-ETCO2   O2 Device (Oxygen Therapy) nasal cannula   $ Is the patient on Low Flow Oxygen? Yes   Flow (L/min) 3   SpO2 98 %   Pulse Oximetry Type Intermittent   $ Pulse Oximetry - Multiple Charge Pulse Oximetry - Multiple   Aerosol Therapy   $ Aerosol Therapy Charges PRN treatment not required   Respiratory Treatment Status PRN treatment not required

## 2017-12-24 LAB
HBV CORE AB SERPL QL IA: NEGATIVE
HBV SURFACE AB SER-ACNC: NEGATIVE M[IU]/ML
HBV SURFACE AG SERPL QL IA: NEGATIVE
POCT GLUCOSE: 186 MG/DL (ref 70–110)
POCT GLUCOSE: 204 MG/DL (ref 70–110)
POCT GLUCOSE: 230 MG/DL (ref 70–110)

## 2017-12-24 PROCEDURE — 94640 AIRWAY INHALATION TREATMENT: CPT

## 2017-12-24 PROCEDURE — 97110 THERAPEUTIC EXERCISES: CPT

## 2017-12-24 PROCEDURE — 12800000 HC REHAB SEMI-PRIVATE ROOM

## 2017-12-24 PROCEDURE — 25000003 PHARM REV CODE 250: Performed by: INTERNAL MEDICINE

## 2017-12-24 PROCEDURE — 25000003 PHARM REV CODE 250: Performed by: PHYSICAL MEDICINE & REHABILITATION

## 2017-12-24 PROCEDURE — 99900035 HC TECH TIME PER 15 MIN (STAT)

## 2017-12-24 PROCEDURE — A4216 STERILE WATER/SALINE, 10 ML: HCPCS | Performed by: INTERNAL MEDICINE

## 2017-12-24 PROCEDURE — 25000242 PHARM REV CODE 250 ALT 637 W/ HCPCS: Performed by: INTERNAL MEDICINE

## 2017-12-24 PROCEDURE — 94761 N-INVAS EAR/PLS OXIMETRY MLT: CPT

## 2017-12-24 PROCEDURE — 27000221 HC OXYGEN, UP TO 24 HOURS

## 2017-12-24 PROCEDURE — 97530 THERAPEUTIC ACTIVITIES: CPT

## 2017-12-24 RX ADMIN — ALBUTEROL SULFATE 2.5 MG: 2.5 SOLUTION RESPIRATORY (INHALATION) at 02:12

## 2017-12-24 RX ADMIN — SODIUM CHLORIDE, PRESERVATIVE FREE 3 ML: 5 INJECTION INTRAVENOUS at 06:12

## 2017-12-24 RX ADMIN — APIXABAN 5 MG: 2.5 TABLET, FILM COATED ORAL at 09:12

## 2017-12-24 RX ADMIN — SODIUM CHLORIDE, PRESERVATIVE FREE 3 ML: 5 INJECTION INTRAVENOUS at 01:12

## 2017-12-24 RX ADMIN — ROSUVASTATIN CALCIUM 10 MG: 5 TABLET ORAL at 08:12

## 2017-12-24 RX ADMIN — INSULIN ASPART 4 UNITS: 100 INJECTION, SOLUTION INTRAVENOUS; SUBCUTANEOUS at 05:12

## 2017-12-24 RX ADMIN — LEVOTHYROXINE SODIUM 50 MCG: 25 TABLET ORAL at 06:12

## 2017-12-24 RX ADMIN — BENZONATATE 200 MG: 100 CAPSULE ORAL at 06:12

## 2017-12-24 RX ADMIN — INSULIN ASPART 2 UNITS: 100 INJECTION, SOLUTION INTRAVENOUS; SUBCUTANEOUS at 08:12

## 2017-12-24 RX ADMIN — AMLODIPINE BESYLATE 5 MG: 5 TABLET ORAL at 08:12

## 2017-12-24 RX ADMIN — SODIUM CHLORIDE, PRESERVATIVE FREE 3 ML: 5 INJECTION INTRAVENOUS at 09:12

## 2017-12-24 RX ADMIN — GABAPENTIN 100 MG: 100 CAPSULE ORAL at 01:12

## 2017-12-24 RX ADMIN — GABAPENTIN 100 MG: 100 CAPSULE ORAL at 06:12

## 2017-12-24 RX ADMIN — APIXABAN 5 MG: 2.5 TABLET, FILM COATED ORAL at 08:12

## 2017-12-24 RX ADMIN — BENZONATATE 200 MG: 100 CAPSULE ORAL at 09:12

## 2017-12-24 RX ADMIN — INSULIN ASPART 4 UNITS: 100 INJECTION, SOLUTION INTRAVENOUS; SUBCUTANEOUS at 12:12

## 2017-12-24 RX ADMIN — FLUCONAZOLE 100 MG: 100 TABLET ORAL at 08:12

## 2017-12-24 RX ADMIN — BENZONATATE 200 MG: 100 CAPSULE ORAL at 01:12

## 2017-12-24 RX ADMIN — GABAPENTIN 100 MG: 100 CAPSULE ORAL at 09:12

## 2017-12-24 NOTE — PLAN OF CARE
Problem: Patient Care Overview  Goal: Plan of Care Review  Outcome: Ongoing (interventions implemented as appropriate)  PRN albuterol treatment given. BS diminished bennie with HR 87 bpm and 97% sats on 3lpm nc.

## 2017-12-24 NOTE — NURSING
Dialysis complete, report received from OANH Mari reported 3L was removed, v/s stable, no c/o from patient; assisted patient to w/c for dinner in dining room

## 2017-12-24 NOTE — PT/OT/SLP PROGRESS
Physical Therapy         Treatment        Juliane Ramos   MRN: [id number]     PT Received On: 17  Total Time (min): 30        Billable Minutes:  Therapeutic Activity 10 and Therapeutic Exercise 20  Total Minutes: 30    Treatment Type: Treatment  PT/PTA: PT     PTA Visit Number: 0       General Precautions: Standard, fall  Orthopedic Precautions: Orthopedic Precautions : LLE non weight bearing     Subjective:  Communicated with RN prior to session.    Pain/Comfort  Pain Rating 1: 0/10  Pain Rating Post-Intervention 1: 0/10    Objective:  Patient found seated in w/c, in NAD.  Patient found with: oxygen (left ankle/foot boot)    Functional Mobility:    Transfer Training:  Sit to stand:Minimal Assistance with Grab bars  x 2    Wheelchair Trainin' (slow) with min assist and cues    Gait Training: unable due to weakness, NWB LLE    Additional Treatment:  SEATED: hip abduction with black tband, x 30 reps each  STANDING: in // bars: ( LLE only) sidekicks, SLR, hip flexion, knee flexion, x 30 reps each     STATIC STAND BALANCE TRAINING: in // bars: NWB LLE x 5 min x 2 with SBA    Reassessed function and LE strength.    Activity Tolerance:  Patient tolerated treatment well    Patient left up in chair with chair alarm on and O2 3 L/min via nc to wall.    Assessment:  Juliane Ramos is a 57 y.o. female.    Rehab potential is good.    Activity tolerance: Good    GOALS:    Physical Therapy Goals        Problem: Physical Therapy Goal    Goal Priority Disciplines Outcome Goal Variances Interventions   Physical Therapy Goal     PT/OT, PT Ongoing (interventions implemented as appropriate)     Description:  Goals to be met by: 2018     Patient will increase functional independence with mobility by performin). Supine to sit with Modified [place]  2). Sit to supine with Modified [place]  3). Sit to stand transfer with Stand-by Assistance  4). Bed to chair transfer with Stand-by Assistance   5).  Wheelchair propulsion x > 250 feet with Modified [place]  6). Stand for  > 7 minutes with Stand-by Assistance NWB LLE                       PLAN:    Patient to be seen daily  to address the above listed problems via therapeutic activities, gait training, therapeutic exercises, neuromuscular re-education, wheelchair management/training  Plan of Care expires: 12/29/17  Plan of Care reviewed with: patient         Albert VAZQUEZ Benedicto, PT 12/24/2017

## 2017-12-24 NOTE — PLAN OF CARE
Problem: Fall Risk (Adult)  Intervention: Safety Promotion/Fall Prevention  Safety maintained no injury or falls, fall precautions reviewed and reinforced

## 2017-12-24 NOTE — PLAN OF CARE
Problem: Patient Care Overview  Goal: Plan of Care Review  Outcome: Ongoing (interventions implemented as appropriate)  Pain is well controlled, slept well, no overnight issues or events. Safety maintained.

## 2017-12-24 NOTE — PROGRESS NOTES
INPATIENT NEPHROLOGY PROGRESS NOTE  [facility] NEPHROLOGY    Patient Name: Juliane Ramos  Date: 12/24/2017    Reason for consultation: MC     Chief Complaint: Diab foot infection    History of Present Illness:  Patient with stage III CKD a/w diab foot infx, hospital course c/b MC requiring dialysis.     VSS, doing OK, no new complains. Next HD tentatively Tue.  Hoping for discharge by next Friday.    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 Next HD on Tue.  - BP is better; keep coreg on hold and continue UF for edema.  - recheck phos and Hb with weekly labs on Monday; continue renal diet and EPO with HD.  - SW will need to confirm with HD unit on Tuesday, that outpatient HD slot for discharge is available.    Thank you for allowing us to participate in this patient's care. We will continue to follow.    Medications:  No current facility-administered medications on file prior to encounter.      Current Outpatient Prescriptions on File Prior to Encounter   Medication Sig Dispense Refill    albuterol (ACCUNEB) 1.25 mg/3 mL Nebu Take 1.25 mg by nebulization every 6 (six) hours as needed. Rescue      carvedilol (COREG) 12.5 MG tablet Take 1 tablet (12.5 mg total) by mouth 2 (two) times daily. 60 tablet 11    cefepime in dextrose 5 % (MAXIPIME) 1 gram/50 mL PgBk Inject 50 mLs (1 g total) into the vein every 24 hours as needed.      gabapentin (NEURONTIN) 100 MG capsule Take 1 capsule (100 mg total) by mouth 3 (three) times daily. 90 capsule 11    levetiracetam oral soln 500 mg/5 mL (5 mL) Soln 2.5 mLs (250 mg total) by Per NG tube route 2 (two) times daily. 150 mL 11    levothyroxine (SYNTHROID) 50 MCG tablet Take 50 mcg by mouth once daily.        metronidazole (FLAGYL) 500 mg/100 mL IVPB Inject 100 mLs (500 mg total) into the vein every 8 (eight) hours.      rosuvastatin  (CRESTOR) 10 MG tablet Take 1 tablet (10 mg total) by mouth once daily. 30 tablet 0    VANCOMYCIN HCL (VANCOMYCIN IN 5 % DEXTROSE) 1.75 gram/500 mL Soln Inject 500 mLs (1,750 mg total) into the vein once daily.       Scheduled Meds:   amLODIPine  5 mg Oral Daily    apixaban  5 mg Oral BID    benzonatate  200 mg Oral TID    epoetin donita (PROCRIT) injection  10,000 Units Intravenous Every Tues, Thurs, Sat    fluconazole  100 mg Oral Daily    gabapentin  100 mg Oral TID    insulin aspart  1-10 Units Subcutaneous TIDWM    levothyroxine  50 mcg Oral Before breakfast    miconazole nitrate 2%   Topical (Top) BID    rosuvastatin  10 mg Oral Daily    sodium chloride 0.9%  3 mL Intravenous Q8H     Continuous Infusions:  PRN Meds:.sodium chloride 0.9%, acetaminophen, albuterol sulfate, dextrose 50%, diphenoxylate-atropine 2.5-0.025 mg, glucagon (human recombinant), glucose, glucose, heparin (porcine), influenza, labetalol, levetiracetam oral soln, magnesium oxide, magnesium oxide, ondansetron, potassium phosphate IVPB, simethicone, traMADol    Allergies:  Patient has no known allergies.    Vital Signs:  Temp Readings from Last 3 Encounters:   12/24/17 98.6 °F (37 °C) (Oral)   12/11/17 97.4 °F (36.3 °C) (Oral)   11/27/17 97 °F (36.1 °C) (Oral)       Pulse Readings from Last 3 Encounters:   12/24/17 87   12/11/17 69   11/27/17 67       BP Readings from Last 3 Encounters:   12/24/17 137/71   12/11/17 (!) 125/59   11/27/17 123/67       Weight:  Wt Readings from Last 3 Encounters:   12/17/17 106.9 kg (235 lb 11.2 oz)   12/08/17 117.9 kg (260 lb)   11/27/17 117 kg (257 lb 15 oz)       Physical Exam:  Constitutional: nad, aao x 3  Heart: rrr, no m/r/g, wwp, + edema  Lungs: ctab, no w/r/r/c, no lb  Abdomen: s/nt/nd, +BS    Results:  Lab Results   Component Value Date     (L) 12/18/2017    K 4.3 12/18/2017    CL 99 12/18/2017    CO2 23 12/18/2017    BUN 46 (H) 12/18/2017    CREATININE 5.4 (H) 12/18/2017    CALCIUM 8.7  12/18/2017    ANIONGAP 10 12/18/2017    ESTGFRAFRICA 10 (A) 12/18/2017    EGFRNONAA 8 (A) 12/18/2017       Lab Results   Component Value Date    CALCIUM 8.7 12/18/2017    PHOS 4.4 12/18/2017       No results for input(s): WBC, RBC, HGB, HCT, PLT, MCV, MCH, MCHC in the last 24 hours.    I have personally reviewed pertinent radiological imaging and reports.    Edson Crystal MD  [facility] [place]  [address] [place] [id number]  [phone] (p)  [phone] (f)

## 2017-12-24 NOTE — PLAN OF CARE
12/23/17 1930   Patient Assessment/Suction   Level of Consciousness (AVPU) alert   PRE-TX-O2-ETCO2   O2 Device (Oxygen Therapy) nasal cannula   Flow (L/min) 3   Oxygen Concentration (%) 32   SpO2 95 %   Pulse Oximetry Type Intermittent   Ready to Wean/Extubation Screen   FIO2<=50 (chart decimal) 0.32

## 2017-12-24 NOTE — PLAN OF CARE
Problem: Patient Care Overview  Goal: Plan of Care Review  Outcome: Ongoing (interventions implemented as appropriate)  Alert and oriented, denies pain, oxygen 3L NC in use, Left leg wound dressing change provided per orders, blood sugar monitored ac/hs, appetite good, right TLC and dialysis hemasplit clamped, dialysis today tolerated with no distress

## 2017-12-25 LAB
ALBUMIN SERPL BCP-MCNC: 2.5 G/DL
ANION GAP SERPL CALC-SCNC: 8 MMOL/L
BUN SERPL-MCNC: 28 MG/DL
CALCIUM SERPL-MCNC: 8.6 MG/DL
CHLORIDE SERPL-SCNC: 100 MMOL/L
CO2 SERPL-SCNC: 25 MMOL/L
CREAT SERPL-MCNC: 3.3 MG/DL
ERYTHROCYTE [DISTWIDTH] IN BLOOD BY AUTOMATED COUNT: 23.1 %
EST. GFR  (AFRICAN AMERICAN): 17 ML/MIN/1.73 M^2
EST. GFR  (NON AFRICAN AMERICAN): 15 ML/MIN/1.73 M^2
GLUCOSE SERPL-MCNC: 227 MG/DL
HCT VFR BLD AUTO: 28.5 %
HGB BLD-MCNC: 9 G/DL
MCH RBC QN AUTO: 24.6 PG
MCHC RBC AUTO-ENTMCNC: 31.6 G/DL
MCV RBC AUTO: 78 FL
PHOSPHATE SERPL-MCNC: 3.2 MG/DL
PLATELET # BLD AUTO: 185 K/UL
PMV BLD AUTO: 8 FL
POCT GLUCOSE: 186 MG/DL (ref 70–110)
POCT GLUCOSE: 201 MG/DL (ref 70–110)
POCT GLUCOSE: 218 MG/DL (ref 70–110)
POCT GLUCOSE: 222 MG/DL (ref 70–110)
POCT GLUCOSE: 248 MG/DL (ref 70–110)
POCT GLUCOSE: 267 MG/DL (ref 70–110)
POTASSIUM SERPL-SCNC: 4 MMOL/L
RBC # BLD AUTO: 3.65 M/UL
SODIUM SERPL-SCNC: 133 MMOL/L
WBC # BLD AUTO: 6.8 K/UL

## 2017-12-25 PROCEDURE — 80069 RENAL FUNCTION PANEL: CPT

## 2017-12-25 PROCEDURE — 94761 N-INVAS EAR/PLS OXIMETRY MLT: CPT

## 2017-12-25 PROCEDURE — 99900035 HC TECH TIME PER 15 MIN (STAT)

## 2017-12-25 PROCEDURE — 85027 COMPLETE CBC AUTOMATED: CPT

## 2017-12-25 PROCEDURE — A4216 STERILE WATER/SALINE, 10 ML: HCPCS | Performed by: INTERNAL MEDICINE

## 2017-12-25 PROCEDURE — 27000221 HC OXYGEN, UP TO 24 HOURS

## 2017-12-25 PROCEDURE — 25000003 PHARM REV CODE 250: Performed by: PHYSICAL MEDICINE & REHABILITATION

## 2017-12-25 PROCEDURE — 12800000 HC REHAB SEMI-PRIVATE ROOM

## 2017-12-25 PROCEDURE — 25000003 PHARM REV CODE 250: Performed by: INTERNAL MEDICINE

## 2017-12-25 RX ORDER — AMLODIPINE BESYLATE 5 MG/1
10 TABLET ORAL DAILY
Status: DISCONTINUED | OUTPATIENT
Start: 2017-12-26 | End: 2017-12-29 | Stop reason: HOSPADM

## 2017-12-25 RX ADMIN — APIXABAN 5 MG: 2.5 TABLET, FILM COATED ORAL at 08:12

## 2017-12-25 RX ADMIN — ROSUVASTATIN CALCIUM 10 MG: 5 TABLET ORAL at 08:12

## 2017-12-25 RX ADMIN — INSULIN ASPART 6 UNITS: 100 INJECTION, SOLUTION INTRAVENOUS; SUBCUTANEOUS at 11:12

## 2017-12-25 RX ADMIN — SODIUM CHLORIDE, PRESERVATIVE FREE 3 ML: 5 INJECTION INTRAVENOUS at 09:12

## 2017-12-25 RX ADMIN — BENZONATATE 200 MG: 100 CAPSULE ORAL at 01:12

## 2017-12-25 RX ADMIN — GABAPENTIN 100 MG: 100 CAPSULE ORAL at 09:12

## 2017-12-25 RX ADMIN — FLUCONAZOLE 100 MG: 100 TABLET ORAL at 08:12

## 2017-12-25 RX ADMIN — GABAPENTIN 100 MG: 100 CAPSULE ORAL at 05:12

## 2017-12-25 RX ADMIN — BENZONATATE 200 MG: 100 CAPSULE ORAL at 05:12

## 2017-12-25 RX ADMIN — MICONAZOLE NITRATE: 20 OINTMENT TOPICAL at 08:12

## 2017-12-25 RX ADMIN — SODIUM CHLORIDE, PRESERVATIVE FREE 3 ML: 5 INJECTION INTRAVENOUS at 02:12

## 2017-12-25 RX ADMIN — GABAPENTIN 100 MG: 100 CAPSULE ORAL at 01:12

## 2017-12-25 RX ADMIN — LEVOTHYROXINE SODIUM 50 MCG: 25 TABLET ORAL at 05:12

## 2017-12-25 RX ADMIN — INSULIN ASPART 4 UNITS: 100 INJECTION, SOLUTION INTRAVENOUS; SUBCUTANEOUS at 04:12

## 2017-12-25 RX ADMIN — BENZONATATE 200 MG: 100 CAPSULE ORAL at 09:12

## 2017-12-25 RX ADMIN — SODIUM CHLORIDE, PRESERVATIVE FREE 3 ML: 5 INJECTION INTRAVENOUS at 06:12

## 2017-12-25 RX ADMIN — INSULIN ASPART 4 UNITS: 100 INJECTION, SOLUTION INTRAVENOUS; SUBCUTANEOUS at 07:12

## 2017-12-25 RX ADMIN — AMLODIPINE BESYLATE 5 MG: 5 TABLET ORAL at 08:12

## 2017-12-25 NOTE — PROGRESS NOTES
12/25/17 0750   Patient Assessment/Suction   Level of Consciousness (AVPU) alert   All Lung Fields Breath Sounds clear;diminished   PRE-TX-O2-ETCO2   O2 Device (Oxygen Therapy) nasal cannula   $ Is the patient on Low Flow Oxygen? Yes   Flow (L/min) 3   SpO2 100 %   Pulse Oximetry Type Intermittent   $ Pulse Oximetry - Multiple Charge Pulse Oximetry - Multiple   Aerosol Therapy   $ Aerosol Therapy Charges PRN treatment not required   Respiratory Treatment Status PRN treatment not required

## 2017-12-25 NOTE — PROGRESS NOTES
[facility]  Physical Medicine & Rehab  Progress Note    Patient Name: Juliane Ramos  MRN: [id number]  Patient Class: IP- Rehab   Admission Date: 12/11/2017  Length of Stay: 13 days  Attending Physician: Rishi Ness MD  Primary Care Provider: JOS Dumont    Subjective:     Principal Problem:  SAH  Interval History: 12/24/2017:  Patient is seen for follow-up rehab evaluation and recommendations: pt is 57 y.o female with dx of SAH, participating with therapy   HPI, Past Medical, Family, and Social History remains the same as documented in the initial encounter.    Scheduled Medications:    amLODIPine  5 mg Oral Daily    apixaban  5 mg Oral BID    benzonatate  200 mg Oral TID    epoetin donita (PROCRIT) injection  10,000 Units Intravenous Every Tues, Thurs, Sat    fluconazole  100 mg Oral Daily    gabapentin  100 mg Oral TID    insulin aspart  1-10 Units Subcutaneous TIDWM    levothyroxine  50 mcg Oral Before breakfast    miconazole nitrate 2%   Topical (Top) BID    rosuvastatin  10 mg Oral Daily    sodium chloride 0.9%  3 mL Intravenous Q8H       PRN Medications: sodium chloride 0.9%, acetaminophen, albuterol sulfate, dextrose 50%, diphenoxylate-atropine 2.5-0.025 mg, glucagon (human recombinant), glucose, glucose, heparin (porcine), influenza, labetalol, levetiracetam oral soln, magnesium oxide, magnesium oxide, ondansetron, potassium phosphate IVPB, simethicone, traMADol    Review of Systems   Constitutional: Negative.    HENT: Negative.    Eyes: Negative.    Respiratory: Negative.    Cardiovascular: Negative.    Gastrointestinal: Negative.    Endocrine: Negative.    Genitourinary: Negative.    Musculoskeletal: Negative.    Skin: Negative.    Allergic/Immunologic: Negative.    Neurological: Negative.    Hematological: Negative.    Psychiatric/Behavioral: Negative.      Objective:     Vital Signs (Most Recent):  Temp: 99.9 °F (37.7 °C) (12/24/17 2113)  Pulse: 87 (12/24/17  2113)  Resp: 18 (12/24/17 2113)  BP: 128/71 (12/24/17 2113)  SpO2: (!) 94 % (12/24/17 2113)    Vital Signs (24h Range):  Temp:  [98.6 °F (37 °C)-99.9 °F (37.7 °C)] 99.9 °F (37.7 °C)  Pulse:  [83-87] 87  Resp:  [18-20] 18  SpO2:  [94 %-97 %] 94 %  BP: (128-160)/(71-87) 128/71     Physical Exam   Constitutional: She is oriented to person, place, and time. She appears well-developed and well-nourished. No distress.   HENT:   Head: Normocephalic and atraumatic.   Right Ear: External ear normal.   Left Ear: External ear normal.   Nose: Nose normal.   Mouth/Throat: Oropharynx is clear and moist. No oropharyngeal exudate.   Eyes: Conjunctivae and EOM are normal. Pupils are equal, round, and reactive to light. Right eye exhibits no discharge. Left eye exhibits no discharge. No scleral icterus.   Neck: Normal range of motion. Neck supple. No JVD present. No tracheal deviation present. No thyromegaly present.   Cardiovascular: Normal rate, regular rhythm, normal heart sounds and intact distal pulses.  Exam reveals no gallop and no friction rub.    No murmur heard.  Pulmonary/Chest: Effort normal and breath sounds normal. No stridor. No respiratory distress. She has no wheezes. She has no rales. She exhibits no tenderness.   Abdominal: Soft. Bowel sounds are normal. She exhibits no distension and no mass. There is no tenderness. There is no rebound and no guarding. No hernia.   obese   Genitourinary:   Genitourinary Comments: deferred   Musculoskeletal: Normal range of motion. She exhibits no edema, tenderness or deformity.   Lymphadenopathy:     She has no cervical adenopathy.   Neurological: She is alert and oriented to person, place, and time. She exhibits normal muscle tone.   Skin: No rash noted. She is not diaphoretic. No erythema. No pallor.   Achillis tendon wound / stable    Psychiatric: She has a normal mood and affect. Her behavior is normal. Judgment and thought content normal.     NEUROLOGICAL EXAMINATION:      MENTAL STATUS   Oriented to person, place, and time.     CRANIAL NERVES     CN III, IV, VI   Pupils are equal, round, and reactive to light.  Extraocular motions are normal.       Assessment/Plan:      Juliane Ramos is a 57 y.o. female admitted to inpatient rehabilitation on 12/11/2017 for <principal problem not specified> with impaired mobility and ADLs. Patient remains appropriate for PT, OT, and as required Speech therapy. Patient continues to require 24 hour nursing care as well as daily Physician assessment.    Active Diagnoses:    Diagnosis Date Noted POA    Malnutrition due to renal disease [E46, N28.9] 12/22/2017 Unknown    Wound healing, delayed [T14.8XXD] 12/18/2017 Yes    Wounds, multiple [T07.XXXA] 12/13/2017 Yes    SAH (subarachnoid hemorrhage) [I60.9] 12/11/2017 Yes    Anasarca [R60.1] 12/04/2017 Yes    Acute respiratory failure with hypoxia and hypercapnia [J96.01, J96.02] 12/02/2017 Yes    Pulmonary embolus [I26.99] 11/27/2017 Yes    Traumatic subarachnoid hemorrhage [S06.6X9A] 11/24/2017 Yes    Acute on chronic congestive heart failure [I50.9] 06/24/2017 Yes      Problems Resolved During this Admission:    Diagnosis Date Noted Date Resolved POA     SAH, cont PT, OT, ST  PE, cont apixabn  HTN, vitals noted, cont current meds  Renal insuff, under care of nephro & remain on dialysis  DM, accu check noted, cont current med    DISCHARGE PLANNING:  Tentative Discharge Date:     No future appointments.    Rishi Ness MD  Department of Physical Medicine & Rehab  [facility]

## 2017-12-25 NOTE — PLAN OF CARE
Problem: Patient Care Overview  Goal: Plan of Care Review  Outcome: Ongoing (interventions implemented as appropriate)  Alert and oriented x 4, denies pain, O2 at 3L NC in use, wound care performed to LLE, blood sugar ac/hs, hemodialysis TTHS

## 2017-12-25 NOTE — PROGRESS NOTES
INPATIENT NEPHROLOGY PROGRESS NOTE  [facility] NEPHROLOGY    Patient Name: Juliane Ramos  Date: 12/25/2017    Reason for consultation: MC     Chief Complaint: Diab foot infection    History of Present Illness:  Patient with stage III CKD a/w diab foot infx, hospital course c/b MC requiring dialysis.     VSS, doing OK, no new complains.  Discussed with family and patient - she is telling me she urinates quite a bit and wonders if her function is recovering.    Will do a 24 h urine collection starting Wed, 8:00 am till Thursday 8:00 am.   Next HD on Tue.  Patient hoping for discharge by next Friday.    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 Next HD on Tue.   - 24h urine collection on Wednesday.  - BP is better; keep coreg on hold and continue UF for edema.  - recheck phos and Hb with weekly labs on Mondays; continue renal diet and EPO with HD.  - SW will need to confirm with HD unit that outpatient HD slot is available.    Thank you for allowing us to participate in this patient's care. We will continue to follow.    Medications:  No current facility-administered medications on file prior to encounter.      Current Outpatient Prescriptions on File Prior to Encounter   Medication Sig Dispense Refill    albuterol (ACCUNEB) 1.25 mg/3 mL Nebu Take 1.25 mg by nebulization every 6 (six) hours as needed. Rescue      carvedilol (COREG) 12.5 MG tablet Take 1 tablet (12.5 mg total) by mouth 2 (two) times daily. 60 tablet 11    cefepime in dextrose 5 % (MAXIPIME) 1 gram/50 mL PgBk Inject 50 mLs (1 g total) into the vein every 24 hours as needed.      gabapentin (NEURONTIN) 100 MG capsule Take 1 capsule (100 mg total) by mouth 3 (three) times daily. 90 capsule 11    levetiracetam oral soln 500 mg/5 mL (5 mL) Soln 2.5 mLs (250 mg total) by Per NG tube route 2 (two) times daily. 150 mL  11    levothyroxine (SYNTHROID) 50 MCG tablet Take 50 mcg by mouth once daily.        metronidazole (FLAGYL) 500 mg/100 mL IVPB Inject 100 mLs (500 mg total) into the vein every 8 (eight) hours.      rosuvastatin (CRESTOR) 10 MG tablet Take 1 tablet (10 mg total) by mouth once daily. 30 tablet 0    VANCOMYCIN HCL (VANCOMYCIN IN 5 % DEXTROSE) 1.75 gram/500 mL Soln Inject 500 mLs (1,750 mg total) into the vein once daily.       Scheduled Meds:   amLODIPine  5 mg Oral Daily    apixaban  5 mg Oral BID    benzonatate  200 mg Oral TID    epoetin donita (PROCRIT) injection  10,000 Units Intravenous Every Tues, Thurs, Sat    fluconazole  100 mg Oral Daily    gabapentin  100 mg Oral TID    insulin aspart  1-10 Units Subcutaneous TIDWM    levothyroxine  50 mcg Oral Before breakfast    miconazole nitrate 2%   Topical (Top) BID    rosuvastatin  10 mg Oral Daily    sodium chloride 0.9%  3 mL Intravenous Q8H     Continuous Infusions:  PRN Meds:.sodium chloride 0.9%, acetaminophen, albuterol sulfate, dextrose 50%, diphenoxylate-atropine 2.5-0.025 mg, glucagon (human recombinant), glucose, glucose, heparin (porcine), influenza, labetalol, levetiracetam oral soln, magnesium oxide, magnesium oxide, ondansetron, potassium phosphate IVPB, simethicone, traMADol    Allergies:  Patient has no known allergies.    Vital Signs:  Temp Readings from Last 3 Encounters:   12/25/17 98 °F (36.7 °C) (Axillary)   12/11/17 97.4 °F (36.3 °C) (Oral)   11/27/17 97 °F (36.1 °C) (Oral)       Pulse Readings from Last 3 Encounters:   12/25/17 91   12/11/17 69   11/27/17 67       BP Readings from Last 3 Encounters:   12/25/17 (!) 140/94   12/11/17 (!) 125/59   11/27/17 123/67       Weight:  Wt Readings from Last 3 Encounters:   12/24/17 104.1 kg (229 lb 6.4 oz)   12/08/17 117.9 kg (260 lb)   11/27/17 117 kg (257 lb 15 oz)       Physical Exam:  Constitutional: nad, aao x 3  Heart: rrr, no m/r/g, wwp, + edema  Lungs: ctab, no w/r/r/c, no  lb  Abdomen: s/nt/nd, +BS    Results:  Lab Results   Component Value Date     (L) 12/25/2017    K 4.0 12/25/2017     12/25/2017    CO2 25 12/25/2017    BUN 28 (H) 12/25/2017    CREATININE 3.3 (H) 12/25/2017    CALCIUM 8.6 (L) 12/25/2017    ANIONGAP 8 12/25/2017    ESTGFRAFRICA 17 (A) 12/25/2017    EGFRNONAA 15 (A) 12/25/2017       Lab Results   Component Value Date    CALCIUM 8.6 (L) 12/25/2017    PHOS 3.2 12/25/2017         Recent Labs  Lab 12/25/17  0611   WBC 6.80   RBC 3.65*   HGB 9.0*   HCT 28.5*      MCV 78*   MCH 24.6*   MCHC 31.6*       I have personally reviewed pertinent radiological imaging and reports.    Edson Crystal MD  [facility] [address] [address]  [phone] (p)  [phone] (f)

## 2017-12-25 NOTE — PLAN OF CARE
Problem: Patient Care Overview  Goal: Plan of Care Review  Outcome: Ongoing (interventions implemented as appropriate)  Pain is well controlled. Safety maintained. No overnight issues or events. Slept well

## 2017-12-26 LAB
POCT GLUCOSE: 175 MG/DL (ref 70–110)
POCT GLUCOSE: 190 MG/DL (ref 70–110)
POCT GLUCOSE: 197 MG/DL (ref 70–110)
POCT GLUCOSE: 244 MG/DL (ref 70–110)

## 2017-12-26 PROCEDURE — 97110 THERAPEUTIC EXERCISES: CPT

## 2017-12-26 PROCEDURE — 80100016 HC MAINTENANCE HEMODIALYSIS

## 2017-12-26 PROCEDURE — 99900035 HC TECH TIME PER 15 MIN (STAT)

## 2017-12-26 PROCEDURE — 63600175 PHARM REV CODE 636 W HCPCS: Performed by: INTERNAL MEDICINE

## 2017-12-26 PROCEDURE — 92507 TX SP LANG VOICE COMM INDIV: CPT

## 2017-12-26 PROCEDURE — 25000003 PHARM REV CODE 250: Performed by: PHYSICAL MEDICINE & REHABILITATION

## 2017-12-26 PROCEDURE — 94660 CPAP INITIATION&MGMT: CPT

## 2017-12-26 PROCEDURE — 25000003 PHARM REV CODE 250: Performed by: INTERNAL MEDICINE

## 2017-12-26 PROCEDURE — 97530 THERAPEUTIC ACTIVITIES: CPT

## 2017-12-26 PROCEDURE — A4216 STERILE WATER/SALINE, 10 ML: HCPCS | Performed by: INTERNAL MEDICINE

## 2017-12-26 PROCEDURE — 97532 *HC SP COG SKL DEV EA 15 MIN: CPT

## 2017-12-26 PROCEDURE — 94761 N-INVAS EAR/PLS OXIMETRY MLT: CPT

## 2017-12-26 PROCEDURE — 12800000 HC REHAB SEMI-PRIVATE ROOM

## 2017-12-26 PROCEDURE — 63600175 PHARM REV CODE 636 W HCPCS: Performed by: PHYSICAL MEDICINE & REHABILITATION

## 2017-12-26 RX ORDER — CETIRIZINE HYDROCHLORIDE 10 MG/1
10 TABLET ORAL NIGHTLY
Status: DISCONTINUED | OUTPATIENT
Start: 2017-12-26 | End: 2017-12-29 | Stop reason: HOSPADM

## 2017-12-26 RX ADMIN — BENZONATATE 200 MG: 100 CAPSULE ORAL at 01:12

## 2017-12-26 RX ADMIN — CETIRIZINE HYDROCHLORIDE 10 MG: 10 TABLET, FILM COATED ORAL at 09:12

## 2017-12-26 RX ADMIN — LEVOTHYROXINE SODIUM 50 MCG: 25 TABLET ORAL at 06:12

## 2017-12-26 RX ADMIN — FLUCONAZOLE 100 MG: 100 TABLET ORAL at 09:12

## 2017-12-26 RX ADMIN — BENZONATATE 200 MG: 100 CAPSULE ORAL at 06:12

## 2017-12-26 RX ADMIN — INSULIN ASPART 2 UNITS: 100 INJECTION, SOLUTION INTRAVENOUS; SUBCUTANEOUS at 05:12

## 2017-12-26 RX ADMIN — GABAPENTIN 100 MG: 100 CAPSULE ORAL at 09:12

## 2017-12-26 RX ADMIN — ROSUVASTATIN CALCIUM 10 MG: 5 TABLET ORAL at 09:12

## 2017-12-26 RX ADMIN — AMLODIPINE BESYLATE 10 MG: 5 TABLET ORAL at 09:12

## 2017-12-26 RX ADMIN — SODIUM CHLORIDE, PRESERVATIVE FREE 3 ML: 5 INJECTION INTRAVENOUS at 09:12

## 2017-12-26 RX ADMIN — ERYTHROPOIETIN 10000 UNITS: 10000 INJECTION, SOLUTION INTRAVENOUS; SUBCUTANEOUS at 03:12

## 2017-12-26 RX ADMIN — MICONAZOLE NITRATE: 20 OINTMENT TOPICAL at 09:12

## 2017-12-26 RX ADMIN — HEPARIN SODIUM 4000 UNITS: 1000 INJECTION, SOLUTION INTRAVENOUS; SUBCUTANEOUS at 05:12

## 2017-12-26 RX ADMIN — INSULIN ASPART 2 UNITS: 100 INJECTION, SOLUTION INTRAVENOUS; SUBCUTANEOUS at 11:12

## 2017-12-26 RX ADMIN — BENZONATATE 200 MG: 100 CAPSULE ORAL at 09:12

## 2017-12-26 RX ADMIN — APIXABAN 5 MG: 2.5 TABLET, FILM COATED ORAL at 09:12

## 2017-12-26 RX ADMIN — INSULIN ASPART 2 UNITS: 100 INJECTION, SOLUTION INTRAVENOUS; SUBCUTANEOUS at 06:12

## 2017-12-26 RX ADMIN — GABAPENTIN 100 MG: 100 CAPSULE ORAL at 01:12

## 2017-12-26 RX ADMIN — SODIUM CHLORIDE, PRESERVATIVE FREE 3 ML: 5 INJECTION INTRAVENOUS at 01:12

## 2017-12-26 RX ADMIN — GABAPENTIN 100 MG: 100 CAPSULE ORAL at 06:12

## 2017-12-26 NOTE — PT/OT/SLP PROGRESS
Physical Therapy         Treatment        Juliane Ramos   MRN: [id number]     PT Received On: 12/26/17  Total Time (min): 75        Billable Minutes:  Therapeutic Activity 25 and Therapeutic Exercise 50  Total Minutes: 75    Treatment Type: Treatment  PT/PTA: PT     PTA Visit Number: 0       General Precautions: Standard, fall  Orthopedic Precautions: Orthopedic Precautions : LLE non weight bearing     Subjective:  Communicated with nurse prior to session.    Pain/Comfort  Pain Rating 1: 0/10  Pain Rating Post-Intervention 1: 0/10    Objective:  Patient found seated in w/c, pleasant and cooperative. Patient found with: oxygen (L ankle/foot boot)    Functional Mobility:  Bed Mobility:   Supine to sit: Minimal Assistance   Sit to supine: Minimal Assistance   Rolling: Minimal Assistance     Transfer Training:  Sit to stand:Contact Guard Assistance with Grab bars  x 7  Chair <> Mat: Squat Pivot with Minimal Assistance and Moderate Assistance with  No Assistive Device to/from mat    Gait Training: unable due to unable to maintain NWB LLE due to weakness    Additional Treatment:  SUPINE:  SLR (L with assist), TKE,  x 10 reps x 3 sets each  R SIDELYING: left hip abd x 30 reps (part rom)  SEATED: LAQ and hip flexion with 5# right, hip adduction with ball, hip abduction with black tband,  x 30 reps each (abd x 2 sets)  STANDING: in // bars: ( LLE only) sidekicks, SLR, hip flexion, knee flexion, x 30 reps  X 2 sets each (sidekicks x 3 sets)     STATIC STAND BALANCE TRAINING: in // bars: NWB LLE x 6 min x 4 with SBA    Activity Tolerance:  Patient tolerated treatment well    Patient left up in chair with all lines intact, chair alarm on and OT present.    Assessment:  Juliane Ramos is a 57 y.o. female.    Rehab potential is good.    Activity tolerance: Good    Equipment recommendations:   wheelchair    GOALS:    Physical Therapy Goals        Problem: Physical Therapy Goal    Goal Priority Disciplines Outcome Goal Variances  Interventions   Physical Therapy Goal     PT/OT, PT Ongoing (interventions implemented as appropriate)     Description:  Goals to be met by: 2018     Patient will increase functional independence with mobility by performin). Supine to sit with Modified [place]  2). Sit to supine with Modified [place]  3). Sit to stand transfer with Stand-by Assistance  4). Bed to chair transfer with Stand-by Assistance   5). Wheelchair propulsion x > 250 feet with Modified [place]  6). Stand for  > 7 minutes with Stand-by Assistance NWB LLE                       PLAN:    Patient to be seen daily  to address the above listed problems via therapeutic activities, therapeutic exercises, neuromuscular re-education, wheelchair management/training  Plan of Care expires: 17  Plan of Care reviewed with: patient         Albert Diane, PT 2017

## 2017-12-26 NOTE — PROGRESS NOTES
Team conference attended and patient discussed.  Spoke with patient to discuss treatment plan, goals and ELOS.  Spoke with patient's  to provide an update and discuss discharge plans.  Contact made with [facility] to start the process of setting up OP HD.  Records and completed for faxed back to [facility] as requested.  SW will follow and assist with discharge planning as needed.

## 2017-12-26 NOTE — PROGRESS NOTES
[facility]  Physical Medicine & Rehab  Progress Note    Patient Name: Juliane Ramos  MRN: [id number]  Patient Class: IP- Rehab   Admission Date: 12/11/2017  Length of Stay: 14 days  Attending Physician: Rishi Ness MD  Primary Care Provider: JOS Dumont    Subjective:     Principal Problem:  SAH    Interval History: 12/25/2017:  Patient is seen for follow-up rehab evaluation and recommendations: pt is 57 y.o female with dx of SAH, participating with therapy & making progress    HPI, Past Medical, Family, and Social History remains the same as documented in the initial encounter.    Scheduled Medications:    [START ON 12/26/2017] amLODIPine  10 mg Oral Daily    apixaban  5 mg Oral BID    benzonatate  200 mg Oral TID    epoetin donita (PROCRIT) injection  10,000 Units Intravenous Every Tues, Thurs, Sat    fluconazole  100 mg Oral Daily    gabapentin  100 mg Oral TID    insulin aspart  1-10 Units Subcutaneous TIDWM    levothyroxine  50 mcg Oral Before breakfast    miconazole nitrate 2%   Topical (Top) BID    rosuvastatin  10 mg Oral Daily    sodium chloride 0.9%  3 mL Intravenous Q8H       PRN Medications: sodium chloride 0.9%, acetaminophen, albuterol sulfate, dextrose 50%, diphenoxylate-atropine 2.5-0.025 mg, glucagon (human recombinant), glucose, glucose, heparin (porcine), influenza, labetalol, levetiracetam oral soln, magnesium oxide, magnesium oxide, ondansetron, potassium phosphate IVPB, simethicone, traMADol    Review of Systems   Constitutional: Negative.    HENT: Negative.    Eyes: Negative.    Respiratory: Negative.    Cardiovascular: Negative.    Gastrointestinal: Negative.    Endocrine: Negative.    Genitourinary: Negative.    Musculoskeletal: Negative.    Skin: Negative.    Allergic/Immunologic: Negative.    Neurological: Negative.    Hematological: Negative.    Psychiatric/Behavioral: Negative.      Objective:     Vital Signs (Most Recent):  Temp: 98 °F (36.7 °C)  (12/25/17 0800)  Pulse: 91 (12/25/17 0544)  Resp: 20 (12/25/17 0800)  BP: (!) 140/94 (12/25/17 0800)  SpO2: 100 % (12/25/17 0800)    Vital Signs (24h Range):  Temp:  [97.6 °F (36.4 °C)-99.9 °F (37.7 °C)] 98 °F (36.7 °C)  Pulse:  [87-91] 91  Resp:  [18-20] 20  SpO2:  [94 %-100 %] 100 %  BP: (128-159)/(71-94) 140/94     Physical Exam   Constitutional: She is oriented to person, place, and time. She appears well-developed and well-nourished. No distress.   HENT:   Head: Normocephalic and atraumatic.   Right Ear: External ear normal.   Left Ear: External ear normal.   Nose: Nose normal.   Mouth/Throat: Oropharynx is clear and moist. No oropharyngeal exudate.   Eyes: Conjunctivae and EOM are normal. Pupils are equal, round, and reactive to light. Right eye exhibits no discharge. Left eye exhibits no discharge. No scleral icterus.   Neck: Normal range of motion. Neck supple. No JVD present. No tracheal deviation present. No thyromegaly present.   Cardiovascular: Normal rate, regular rhythm, normal heart sounds and intact distal pulses.  Exam reveals no gallop and no friction rub.    No murmur heard.  Pulmonary/Chest: Effort normal and breath sounds normal. No stridor. No respiratory distress. She has no wheezes. She has no rales. She exhibits no tenderness.   Abdominal: Soft. Bowel sounds are normal. She exhibits no distension and no mass. There is no tenderness. There is no rebound and no guarding. No hernia.   Genitourinary:   Genitourinary Comments: deferred   Musculoskeletal: Normal range of motion. She exhibits no edema, tenderness or deformity.   Lymphadenopathy:     She has no cervical adenopathy.   Neurological: She is alert and oriented to person, place, and time. She displays normal reflexes. No cranial nerve deficit or sensory deficit. She exhibits normal muscle tone. Coordination normal.   Skin: No rash noted. She is not diaphoretic. No erythema. No pallor.   Psychiatric: She has a normal mood and affect. Her  behavior is normal. Judgment and thought content normal.     NEUROLOGICAL EXAMINATION:     MENTAL STATUS   Oriented to person, place, and time.     CRANIAL NERVES     CN III, IV, VI   Pupils are equal, round, and reactive to light.  Extraocular motions are normal.       Assessment/Plan:      Juliane Ramos is a 57 y.o. female admitted to inpatient rehabilitation on 12/11/2017 for <principal problem not specified> with impaired mobility and ADLs. Patient remains appropriate for PT, OT, and as required Speech therapy. Patient continues to require 24 hour nursing care as well as daily Physician assessment.    Active Diagnoses:    Diagnosis Date Noted POA    Malnutrition due to renal disease [E46, N28.9] 12/22/2017 Unknown    Wound healing, delayed [T14.8XXD] 12/18/2017 Yes    Wounds, multiple [T07.XXXA] 12/13/2017 Yes    SAH (subarachnoid hemorrhage) [I60.9] 12/11/2017 Yes    Anasarca [R60.1] 12/04/2017 Yes    Acute respiratory failure with hypoxia and hypercapnia [J96.01, J96.02] 12/02/2017 Yes    Pulmonary embolus [I26.99] 11/27/2017 Yes    Traumatic subarachnoid hemorrhage [S06.6X9A] 11/24/2017 Yes    Acute on chronic congestive heart failure [I50.9] 06/24/2017 Yes      Problems Resolved During this Admission:    Diagnosis Date Noted Date Resolved POA     SAH, cont PT, OT, ST  HTN, inc amlodipine dose   Renal insuff, f/u by nephro & remain on dialysis  DM, accu check noted, cont current meds  PE, on apixaban    DISCHARGE PLANNING:  Tentative Discharge Date:     No future appointments.    Rishi Ness MD  Department of Physical Medicine & Rehab  [facility]

## 2017-12-26 NOTE — PLAN OF CARE
Problem: Patient Care Overview  Goal: Plan of Care Review  Outcome: Ongoing (interventions implemented as appropriate)  Patient awake/alert. No c/o pain noted this shift. No urine output noted during the night. Genaralized weakness. Free from falls. Plan of care continued

## 2017-12-26 NOTE — PROGRESS NOTES
INPATIENT NEPHROLOGY PROGRESS NOTE  [facility] NEPHROLOGY    Patient Name: Juliane Ramos  Date: 12/26/2017    Reason for consultation: MC     Chief Complaint: Diab foot infection    History of Present Illness:  Patient with stage III CKD a/w diab foot infx, hospital course c/b MC requiring dialysis.     VSS, doing OK, no new complains.  Discussed with family and patient - she is telling me she urinates quite a bit and wonders if her function is recovering.    Will do a 24 h urine collection starting tomorrow, 8:00 am till Thursday 8:00 am.   Dialysis today.  Patient hoping for discharge by next Friday.    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  .   - 24h urine collection on Wednesday.  - BP is better; keep coreg on hold and continue UF for edema.  - recheck phos and Hb with weekly labs on Mondays; continue renal diet and EPO with HD.  - SW will need to confirm with HD unit that outpatient HD slot is available.    Thank you for allowing us to participate in this patient's care. We will continue to follow.    Medications:  No current facility-administered medications on file prior to encounter.      Current Outpatient Prescriptions on File Prior to Encounter   Medication Sig Dispense Refill    albuterol (ACCUNEB) 1.25 mg/3 mL Nebu Take 1.25 mg by nebulization every 6 (six) hours as needed. Rescue      carvedilol (COREG) 12.5 MG tablet Take 1 tablet (12.5 mg total) by mouth 2 (two) times daily. 60 tablet 11    cefepime in dextrose 5 % (MAXIPIME) 1 gram/50 mL PgBk Inject 50 mLs (1 g total) into the vein every 24 hours as needed.      gabapentin (NEURONTIN) 100 MG capsule Take 1 capsule (100 mg total) by mouth 3 (three) times daily. 90 capsule 11    levetiracetam oral soln 500 mg/5 mL (5 mL) Soln 2.5 mLs (250 mg total) by Per NG tube route 2 (two) times daily. 150 mL 11     levothyroxine (SYNTHROID) 50 MCG tablet Take 50 mcg by mouth once daily.        metronidazole (FLAGYL) 500 mg/100 mL IVPB Inject 100 mLs (500 mg total) into the vein every 8 (eight) hours.      rosuvastatin (CRESTOR) 10 MG tablet Take 1 tablet (10 mg total) by mouth once daily. 30 tablet 0    VANCOMYCIN HCL (VANCOMYCIN IN 5 % DEXTROSE) 1.75 gram/500 mL Soln Inject 500 mLs (1,750 mg total) into the vein once daily.       Scheduled Meds:   amLODIPine  10 mg Oral Daily    apixaban  5 mg Oral BID    benzonatate  200 mg Oral TID    cetirizine  10 mg Oral QHS    epoetin donita (PROCRIT) injection  10,000 Units Intravenous Every Tues, Thurs, Sat    fluconazole  100 mg Oral Daily    gabapentin  100 mg Oral TID    insulin aspart  1-10 Units Subcutaneous TIDWM    levothyroxine  50 mcg Oral Before breakfast    miconazole nitrate 2%   Topical (Top) BID    rosuvastatin  10 mg Oral Daily    sodium chloride 0.9%  3 mL Intravenous Q8H     Continuous Infusions:  PRN Meds:.sodium chloride 0.9%, acetaminophen, albuterol sulfate, dextrose 50%, diphenoxylate-atropine 2.5-0.025 mg, glucagon (human recombinant), glucose, glucose, heparin (porcine), influenza, labetalol, levetiracetam oral soln, magnesium oxide, magnesium oxide, ondansetron, potassium phosphate IVPB, simethicone, traMADol    Allergies:  Patient has no known allergies.    Vital Signs:  Temp Readings from Last 3 Encounters:   12/26/17 98.2 °F (36.8 °C) (Oral)   12/11/17 97.4 °F (36.3 °C) (Oral)   11/27/17 97 °F (36.1 °C) (Oral)       Pulse Readings from Last 3 Encounters:   12/26/17 92   12/11/17 69   11/27/17 67       BP Readings from Last 3 Encounters:   12/26/17 (!) 151/94   12/11/17 (!) 125/59   11/27/17 123/67       Weight:  Wt Readings from Last 3 Encounters:   12/24/17 104.1 kg (229 lb 6.4 oz)   12/08/17 117.9 kg (260 lb)   11/27/17 117 kg (257 lb 15 oz)       Physical Exam:  Constitutional: nad, aao x 3  Heart: rrr, no m/r/g, wwp, + edema  Lungs: ctab,  no w/r/r/c, no lb  Abdomen: s/nt/nd, +BS    Results:  Lab Results   Component Value Date     (L) 12/25/2017    K 4.0 12/25/2017     12/25/2017    CO2 25 12/25/2017    BUN 28 (H) 12/25/2017    CREATININE 3.3 (H) 12/25/2017    CALCIUM 8.6 (L) 12/25/2017    ANIONGAP 8 12/25/2017    ESTGFRAFRICA 17 (A) 12/25/2017    EGFRNONAA 15 (A) 12/25/2017       Lab Results   Component Value Date    CALCIUM 8.6 (L) 12/25/2017    PHOS 3.2 12/25/2017       No results for input(s): WBC, RBC, HGB, HCT, PLT, MCV, MCH, MCHC in the last 24 hours.    I have personally reviewed pertinent radiological imaging and reports.    Elijah Gonzalez MD  [facility] [place]  [address] [place] [id number]  [phone] (p)  [phone] (f)

## 2017-12-26 NOTE — PLAN OF CARE
12/25/17 1925   Patient Assessment/Suction   Level of Consciousness (AVPU) alert   Respiratory Effort Normal;Unlabored   All Lung Fields Breath Sounds clear;equal bilaterally   PRE-TX-O2-ETCO2   O2 Device (Oxygen Therapy) nasal cannula   Flow (L/min) 3   SpO2 100 %   Pulse 93   Resp 20   Aerosol Therapy   $ Aerosol Therapy Charges PRN treatment not required   Preset CPAP/BiPAP Settings   Mode Of Delivery Standby   Pt assessed for PRN neb tx, none needed at this time.

## 2017-12-26 NOTE — PATIENT CARE CONFERENCE
Weekly Staffing Report      Date Admitted: 12/11/2017 :   Staffing Date: 12/26/2017     Patient Active Problem List   Diagnosis    Acute on chronic congestive heart failure    Essential hypertension    CKD (chronic kidney disease) stage 3, GFR 30-59 ml/min    Type 2 diabetes mellitus with stage 5 chronic kidney disease not on chronic dialysis, without long-term current use of insulin    Coronary artery disease involving native coronary artery of native heart without angina pectoris    Acquired hypothyroidism    Recurrent right pleural effusion    Hyperkalemia    Diabetic leg ulcer    Traumatic subarachnoid hemorrhage    Segmental and subsegmental pulmonary emboli of RLL without acute cor pulmonale    Acute renal failure superimposed on stage 3 chronic kidney disease    Anemia in stage 3 chronic kidney disease    Subarachnoid hemorrhage following injury, no loss of consciousness    Staph aureus infection    Pulmonary embolus    Obesity    Acute respiratory failure with hypoxia and hypercapnia    Anasarca    SAH (subarachnoid hemorrhage)    Wounds, multiple    Wound healing, delayed    Malnutrition due to renal disease          Team Members Present:  Physician Team Member: Dr Ness  Nursing Team Member: Johan Palacios RN  Case Management Team Member: Valentina Ordonez CM/SW  PT Team Member: Albert Diane PT  OT Team Member: Chloe uGerrier OT  SLP Team Member: Barrett BATEMAN    Nursing  Skin: Intact, surgical site achillis tendon stable   Bowel: Continent  Bladder:oligouric   Last BM: 12-24-17  Diet: renal & diabetic   Appetite: good   Pain Level: 0/10    Physical Therapy   Supine to Sit:  minimal assist to mod a   Sit to Stand: minimal assist  Gait: na due to NWB on left lower ext  N/A   Wheelchair Mobility: 125-150 feet minimal assist  Slow   rom : left ankle not tested due to imobilization, otherwise wfl   Stairs:na N/A  Strength: left ankle not tested, left hip 3-/5, left knee 4/5, right lower ext  4- to 4/5     Occupational Therapy  Feeding: set up  Grooming:supervision  UED: supervision  LED: minimal assist to mod a   Bathing:minimal assist   Toileting:moderate assist  Toilet Transfer:  moderate assist  Tub Transfer:  moderate assist  Strength: right 4/5, left 3+ to 4-/5     Speech Therapy  Swallow: wfl  MMS: 27/30  Memory: modified independent  Receptive Language: modified independent  Expressive Language: modified independent  Problem solving: modified independent            Summary of Progress:  good    Barriers to Progress/Discharge: dialysis & NWB left lower ext & o2 nc     Tolerates 3 hours of therapy: Yes    Comments:     Estimated Length of Stay: 12-29-17

## 2017-12-26 NOTE — PROGRESS NOTES
[facility]  Physical Medicine & Rehab  Progress Note    Patient Name: Juliane Ramos  MRN: [id number]  Patient Class: IP- Rehab   Admission Date: 12/11/2017  Length of Stay: 15 days  Attending Physician: Rishi Ness MD  Primary Care Provider: JOS Dumont    Subjective:     Principal Problem:  SAH  Interval History: 12/26/2017:  Patient is seen for follow-up rehab evaluation and recommendations: pt is 57 y.o female s/p SAH, PE, hemodialysis & wound achillis tendon. participating with therapy .  HPI, Past Medical, Family, and Social History remains the same as documented in the initial encounter.    Scheduled Medications:    amLODIPine  10 mg Oral Daily    apixaban  5 mg Oral BID    benzonatate  200 mg Oral TID    cetirizine  10 mg Oral QHS    epoetin donita (PROCRIT) injection  10,000 Units Intravenous Every Tues, Thurs, Sat    fluconazole  100 mg Oral Daily    gabapentin  100 mg Oral TID    insulin aspart  1-10 Units Subcutaneous TIDWM    levothyroxine  50 mcg Oral Before breakfast    miconazole nitrate 2%   Topical (Top) BID    rosuvastatin  10 mg Oral Daily    sodium chloride 0.9%  3 mL Intravenous Q8H       PRN Medications: sodium chloride 0.9%, acetaminophen, albuterol sulfate, dextrose 50%, diphenoxylate-atropine 2.5-0.025 mg, glucagon (human recombinant), glucose, glucose, heparin (porcine), influenza, labetalol, levetiracetam oral soln, magnesium oxide, magnesium oxide, ondansetron, potassium phosphate IVPB, simethicone, traMADol    Review of Systems   Constitutional: Negative.    HENT: Negative.    Eyes: Negative.    Respiratory: Negative.    Cardiovascular: Negative.    Gastrointestinal: Negative.    Endocrine: Negative.    Genitourinary: Negative.    Musculoskeletal: Negative.    Skin: Negative.    Allergic/Immunologic: Negative.    Neurological: Negative.    Hematological: Negative.    Psychiatric/Behavioral: Negative.      Objective:     Vital Signs (Most  Recent):  Temp: 98 °F (36.7 °C) (12/26/17 0432)  Pulse: 83 (12/26/17 0432)  Resp: 20 (12/26/17 0432)  BP: (!) 148/92 (12/26/17 0432)  SpO2: 99 % (12/26/17 0432)    Vital Signs (24h Range):  Temp:  [98 °F (36.7 °C)-98.7 °F (37.1 °C)] 98 °F (36.7 °C)  Pulse:  [80-93] 83  Resp:  [20-23] 20  SpO2:  [99 %-100 %] 99 %  BP: (128-148)/(78-92) 148/92     Physical Exam   Constitutional: She is oriented to person, place, and time. She appears well-developed and well-nourished. No distress.   HENT:   Head: Normocephalic and atraumatic.   Right Ear: External ear normal.   Left Ear: External ear normal.   Nose: Nose normal.   Mouth/Throat: Oropharynx is clear and moist. No oropharyngeal exudate.   Eyes: Conjunctivae and EOM are normal. Pupils are equal, round, and reactive to light. Right eye exhibits no discharge. Left eye exhibits no discharge. No scleral icterus.   Neck: Normal range of motion. Neck supple. No JVD present. No tracheal deviation present. No thyromegaly present.   Cardiovascular: Normal rate, regular rhythm, normal heart sounds and intact distal pulses.  Exam reveals no gallop and no friction rub.    No murmur heard.  Pulmonary/Chest: Effort normal and breath sounds normal. No stridor. No respiratory distress. She has no wheezes. She has no rales. She exhibits no tenderness.   Abdominal: Soft. Bowel sounds are normal. She exhibits no distension and no mass. There is no tenderness. There is no rebound and no guarding. No hernia.   Obese    Genitourinary:   Genitourinary Comments: deferred   Musculoskeletal: Normal range of motion. She exhibits no edema, tenderness or deformity.   Lymphadenopathy:     She has no cervical adenopathy.   Neurological: She is alert and oriented to person, place, and time. She exhibits normal muscle tone.   Skin: No rash noted. She is not diaphoretic. No erythema. No pallor.   archils tendon wound stable    Psychiatric: She has a normal mood and affect. Her behavior is normal.      NEUROLOGICAL EXAMINATION:     MENTAL STATUS   Oriented to person, place, and time.     CRANIAL NERVES     CN III, IV, VI   Pupils are equal, round, and reactive to light.  Extraocular motions are normal.       Assessment/Plan:      Juliane Ramos is a 57 y.o. female admitted to inpatient rehabilitation on 12/11/2017 for <principal problem not specified> with impaired mobility and ADLs. Patient remains appropriate for PT, OT, and as required Speech therapy. Patient continues to require 24 hour nursing care as well as daily Physician assessment.    Active Diagnoses:    Diagnosis Date Noted POA    Malnutrition due to renal disease [E46, N28.9] 12/22/2017 Unknown    Wound healing, delayed [T14.8XXD] 12/18/2017 Yes    Wounds, multiple [T07.XXXA] 12/13/2017 Yes    SAH (subarachnoid hemorrhage) [I60.9] 12/11/2017 Yes    Anasarca [R60.1] 12/04/2017 Yes    Acute respiratory failure with hypoxia and hypercapnia [J96.01, J96.02] 12/02/2017 Yes    Pulmonary embolus [I26.99] 11/27/2017 Yes    Traumatic subarachnoid hemorrhage [S06.6X9A] 11/24/2017 Yes    Acute on chronic congestive heart failure [I50.9] 06/24/2017 Yes      Problems Resolved During this Admission:    Diagnosis Date Noted Date Resolved POA     SAH, cont PT, OT, ST  Renal insuff, remain under care of nephro & cont with dialysis  PE, cont apixaban  HTN, vitals noted, cont current meds  DM, accu check noted, cont current meds  Post nasal drip, add zyrtec   DISCHARGE PLANNING:  Tentative Discharge Date:     No future appointments.    Rishi Ness MD  Department of Physical Medicine & Rehab  [facility]

## 2017-12-26 NOTE — PT/OT/SLP PROGRESS
Speech Language Pathology Treatment          Juliane Ramos   MRN: [id number]     Diet recommendations: Solid Diet Level: Regular  Liquid Diet Level: Thin     SLP Treatment Date: 12/26/17  Speech Start Time: 1305     Speech Stop Time: 1335     Speech Total (min): 30 min       TREATMENT BILLABLE MINUTES:  Speech Therapy Individual 30 and Total Time 30    General Precautions: Standard, fall  Current Respiratory Status: nasal cannula       Subjective:  Pt alert, pleasant and participatory    Objective:    Patient found upright in wheelchair with alarm and belt    Following instruction and model of task expectations, Pt required moderate cueing to demonstrate adequate understanding however, she demonstrated significant improvements in the areas of deductive reasoning and attention to complete time management task with 100% acc given minimal assistance. It was noted within task, that Pt is demonstrating generalization of strategies to enhance attention and organization, therefore, she presents with enhanced success and efficiency. She completed word problems re: time management and use of schedule with 90% acc increasing to 100% acc given attention to error.     Assessment:  Juliane Ramos is a 57 y.o. female with a SLP diagnosis of mild cognitive impairment. She presents with progress towards goals in the domains of memory, planning, and organization.    Diet recommendations:        Liquid Diet Level: Thin  -see above     Discharge recommendations: Discharge Facility/Level Of Care Needs: home     Goals:    SLP Goals        Problem: SLP Goal    Goal Priority Disciplines Outcome   SLP Goal     SLP Ongoing (interventions implemented as appropriate)   Description:  Short term goals:  1. Pt will complete complex reasoning tasks (math, time, money mgmt) with 90% acc independently   2. Pt will complete complex sequencing tasks (pill mgmt/box, recall of transfer, etc.) with 90% acc independently   3. Pt will demonstrate adequate  attention (sustained/alternating/selective) to complete functional tasks, as listed above, in 9/10 attempts independently      Long Term Goals:  1. Pt will demonstrate adequate cognitive function for safe and efficient function in home, social and medical environments.                       Plan:   Patient to be seen Therapy Frequency: 5 x/week   Plan of Care Expires: 12/29/17  Plan of Care reviewed with: patient  SLP Follow-up?: Yes  SLP - Next Visit Date: 12/27/17           ST Kristi 12/26/2017

## 2017-12-26 NOTE — PLAN OF CARE
Problem: SLP Goal  Goal: SLP Goal  Short term goals:  1. Pt will complete complex reasoning tasks (math, time, money mgmt) with 90% acc independently   2. Pt will complete complex sequencing tasks (pill mgmt/box, recall of transfer, etc.) with 90% acc independently   3. Pt will demonstrate adequate attention (sustained/alternating/selective) to complete functional tasks, as listed above, in 9/10 attempts independently      Long Term Goals:  1. Pt will demonstrate adequate cognitive function for safe and efficient function in home, social and medical environments.     Outcome: Ongoing (interventions implemented as appropriate)  Following instruction and model of task expectations, Pt required moderate cueing to demonstrate adequate understanding however, she demonstrated significant improvements in the areas of deductive reasoning and attention to complete time management task with 100% acc given minimal assistance. It was noted within task, that Pt is demonstrating generalization of strategies to enhance attention and organization, therefore, she presents with enhanced success and efficiency. She completed word problems re: time management and use of schedule with 90% acc increasing to 100% acc given attention to error.

## 2017-12-27 LAB
ALBUMIN SERPL BCP-MCNC: 2.6 G/DL
ANION GAP SERPL CALC-SCNC: 9 MMOL/L
BUN SERPL-MCNC: 22 MG/DL
CALCIUM SERPL-MCNC: 8.5 MG/DL
CHLORIDE SERPL-SCNC: 102 MMOL/L
CO2 SERPL-SCNC: 26 MMOL/L
CREAT SERPL-MCNC: 2.4 MG/DL
EST. GFR  (AFRICAN AMERICAN): 25 ML/MIN/1.73 M^2
EST. GFR  (NON AFRICAN AMERICAN): 22 ML/MIN/1.73 M^2
GLUCOSE SERPL-MCNC: 203 MG/DL
PHOSPHATE SERPL-MCNC: 2.9 MG/DL
POCT GLUCOSE: 193 MG/DL (ref 70–110)
POCT GLUCOSE: 234 MG/DL (ref 70–110)
POCT GLUCOSE: 241 MG/DL (ref 70–110)
POCT GLUCOSE: 253 MG/DL (ref 70–110)
POTASSIUM SERPL-SCNC: 3.9 MMOL/L
SODIUM SERPL-SCNC: 137 MMOL/L

## 2017-12-27 PROCEDURE — 94761 N-INVAS EAR/PLS OXIMETRY MLT: CPT

## 2017-12-27 PROCEDURE — 92507 TX SP LANG VOICE COMM INDIV: CPT

## 2017-12-27 PROCEDURE — A4216 STERILE WATER/SALINE, 10 ML: HCPCS | Performed by: INTERNAL MEDICINE

## 2017-12-27 PROCEDURE — 97530 THERAPEUTIC ACTIVITIES: CPT

## 2017-12-27 PROCEDURE — 97110 THERAPEUTIC EXERCISES: CPT

## 2017-12-27 PROCEDURE — 97532 *HC SP COG SKL DEV EA 15 MIN: CPT

## 2017-12-27 PROCEDURE — 25000003 PHARM REV CODE 250: Performed by: INTERNAL MEDICINE

## 2017-12-27 PROCEDURE — 94660 CPAP INITIATION&MGMT: CPT

## 2017-12-27 PROCEDURE — 25000003 PHARM REV CODE 250: Performed by: PHYSICAL MEDICINE & REHABILITATION

## 2017-12-27 PROCEDURE — 27000221 HC OXYGEN, UP TO 24 HOURS

## 2017-12-27 PROCEDURE — 80069 RENAL FUNCTION PANEL: CPT

## 2017-12-27 PROCEDURE — 12800000 HC REHAB SEMI-PRIVATE ROOM

## 2017-12-27 PROCEDURE — 99900035 HC TECH TIME PER 15 MIN (STAT)

## 2017-12-27 RX ADMIN — FLUCONAZOLE 100 MG: 100 TABLET ORAL at 09:12

## 2017-12-27 RX ADMIN — BENZONATATE 200 MG: 100 CAPSULE ORAL at 09:12

## 2017-12-27 RX ADMIN — SODIUM CHLORIDE, PRESERVATIVE FREE 3 ML: 5 INJECTION INTRAVENOUS at 07:12

## 2017-12-27 RX ADMIN — SODIUM CHLORIDE, PRESERVATIVE FREE 3 ML: 5 INJECTION INTRAVENOUS at 12:12

## 2017-12-27 RX ADMIN — APIXABAN 5 MG: 2.5 TABLET, FILM COATED ORAL at 09:12

## 2017-12-27 RX ADMIN — AMLODIPINE BESYLATE 10 MG: 5 TABLET ORAL at 09:12

## 2017-12-27 RX ADMIN — BENZONATATE 200 MG: 100 CAPSULE ORAL at 05:12

## 2017-12-27 RX ADMIN — MICONAZOLE NITRATE: 20 OINTMENT TOPICAL at 09:12

## 2017-12-27 RX ADMIN — INSULIN ASPART 4 UNITS: 100 INJECTION, SOLUTION INTRAVENOUS; SUBCUTANEOUS at 12:12

## 2017-12-27 RX ADMIN — INSULIN ASPART 6 UNITS: 100 INJECTION, SOLUTION INTRAVENOUS; SUBCUTANEOUS at 05:12

## 2017-12-27 RX ADMIN — LEVOTHYROXINE SODIUM 50 MCG: 25 TABLET ORAL at 05:12

## 2017-12-27 RX ADMIN — INSULIN ASPART 4 UNITS: 100 INJECTION, SOLUTION INTRAVENOUS; SUBCUTANEOUS at 06:12

## 2017-12-27 RX ADMIN — BENZONATATE 200 MG: 100 CAPSULE ORAL at 02:12

## 2017-12-27 RX ADMIN — GABAPENTIN 100 MG: 100 CAPSULE ORAL at 09:12

## 2017-12-27 RX ADMIN — SODIUM CHLORIDE, PRESERVATIVE FREE 3 ML: 5 INJECTION INTRAVENOUS at 09:12

## 2017-12-27 RX ADMIN — ROSUVASTATIN CALCIUM 10 MG: 5 TABLET ORAL at 09:12

## 2017-12-27 RX ADMIN — GABAPENTIN 100 MG: 100 CAPSULE ORAL at 02:12

## 2017-12-27 RX ADMIN — CETIRIZINE HYDROCHLORIDE 10 MG: 10 TABLET, FILM COATED ORAL at 09:12

## 2017-12-27 RX ADMIN — GABAPENTIN 100 MG: 100 CAPSULE ORAL at 05:12

## 2017-12-27 NOTE — PLAN OF CARE
"Assisted to toilet patient reported "it felt like I voided a little. Patient instructed the need to collect all void after this for a 24 hour urine. Patient verbalized understanding.   "

## 2017-12-27 NOTE — PLAN OF CARE
Problem: Occupational Therapy Goal  Goal: Occupational Therapy Goal  Goals to be met by: 12/29/17     Patient will increase functional independence with ADLs by performing:    Feeding with [place].  UE Dressing with [place].  LE Dressing with Supervision.  Grooming while seated with [place].  Toileting from toilet with bedside commode positioned over Minimal Assistance for hygiene and clothing management.   Bathing from shower chair/bench with Minimal Assistance with DME/AE as needed.  Shower transfer with Minimal Assistance with DME/AE as needed .  Toilet transfer to toilet with Minimal Assistance with DME as needed.     Outcome: Ongoing (interventions implemented as appropriate)  OT goals remain appropriate.

## 2017-12-27 NOTE — PT/OT/SLP PROGRESS
Speech Language Pathology Treatment          Juliane Ramos   MRN: [id number]     Diet recommendations: Solid Diet Level: Regular  Liquid Diet Level: Thin     SLP Treatment Date: 12/27/17  Speech Start Time: 1315     Speech Stop Time: 1345     Speech Total (min): 30 min       TREATMENT BILLABLE MINUTES:  Speech Therapy Individual 30 and Total Time 30    General Precautions: Standard, fall  Current Respiratory Status: nasal cannula       Subjective:  Pt alert, pleasant and participatory    Objective:    Patient found upright in wheelchair with alarm and belt    Following instruction and model of task expectations, Pt completed word problems re: money management given visual stimuli (chart/menu) with 90% acc increasing to 100% acc given attention to error. She completed 3 item mental manipulations with 100% acc and 4 item mental manipulations with 50% acc indenpendently increasing to 100% acc given repetition or awareness of sequence error.     Assessment:  Juliane Ramos is a 57 y.o. female with a SLP diagnosis of mild cognitive impairment. She presents with progress towards goals.     Diet recommendations:        Liquid Diet Level: Thin  -see above     Discharge recommendations: Discharge Facility/Level Of Care Needs: home     Goals:    SLP Goals        Problem: SLP Goal    Goal Priority Disciplines Outcome   SLP Goal     SLP Ongoing (interventions implemented as appropriate)   Description:  Short term goals:  1. Pt will complete complex reasoning tasks (math, time, money mgmt) with 90% acc independently   2. Pt will complete complex sequencing tasks (pill mgmt/box, recall of transfer, etc.) with 90% acc independently   3. Pt will demonstrate adequate attention (sustained/alternating/selective) to complete functional tasks, as listed above, in 9/10 attempts independently      Long Term Goals:  1. Pt will demonstrate adequate cognitive function for safe and efficient function in home, social and medical environments.                        Plan:   Patient to be seen Therapy Frequency: 5 x/week   Plan of Care Expires: 12/29/17  Plan of Care reviewed with: patient  SLP Follow-up?: Yes  SLP - Next Visit Date: 12/28/17           ST Kristi 12/27/2017

## 2017-12-27 NOTE — PROGRESS NOTES
[facility]  Physical Medicine & Rehab  Progress Note    Patient Name: Juliane Ramos  MRN: [id number]  Patient Class: IP- Rehab   Admission Date: 12/11/2017  Length of Stay: 16 days  Attending Physician: Rishi Ness MD  Primary Care Provider: JOS Dumont    Subjective:     Principal Problem:  SAH   Interval History: 12/27/2017:  Patient is seen for follow-up rehab evaluation and recommendations: pt is  57 y.o female here with dx of SAH  & PE, participating with therapy. Pt remain dialysis dep .     HPI, Past Medical, Family, and Social History remains the same as documented in the initial encounter.    Scheduled Medications:    amLODIPine  10 mg Oral Daily    apixaban  5 mg Oral BID    benzonatate  200 mg Oral TID    cetirizine  10 mg Oral QHS    epoetin donita (PROCRIT) injection  10,000 Units Intravenous Every Tues, Thurs, Sat    fluconazole  100 mg Oral Daily    gabapentin  100 mg Oral TID    insulin aspart  1-10 Units Subcutaneous TIDWM    levothyroxine  50 mcg Oral Before breakfast    miconazole nitrate 2%   Topical (Top) BID    rosuvastatin  10 mg Oral Daily    sodium chloride 0.9%  3 mL Intravenous Q8H       PRN Medications: sodium chloride 0.9%, acetaminophen, albuterol sulfate, dextrose 50%, diphenoxylate-atropine 2.5-0.025 mg, glucagon (human recombinant), glucose, glucose, heparin (porcine), influenza, labetalol, levetiracetam oral soln, magnesium oxide, magnesium oxide, ondansetron, potassium phosphate IVPB, simethicone, traMADol    Review of Systems   Constitutional: Negative.    HENT: Negative.    Eyes: Negative.    Respiratory: Negative.    Cardiovascular: Negative.    Gastrointestinal: Negative.    Endocrine: Negative.    Genitourinary: Negative.    Musculoskeletal: Negative.    Skin: Negative.    Allergic/Immunologic: Negative.    Neurological: Negative.    Hematological: Negative.    Psychiatric/Behavioral: Negative.      Objective:     Vital Signs (Most  Recent):  Temp: 98.6 °F (37 °C) (12/27/17 0732)  Pulse: 89 (12/27/17 0732)  Resp: 17 (12/27/17 0732)  BP: (!) 146/81 (12/27/17 0732)  SpO2: (!) 92 % (12/27/17 0732)    Vital Signs (24h Range):  Temp:  [98.2 °F (36.8 °C)-99.6 °F (37.6 °C)] 98.6 °F (37 °C)  Pulse:  [] 89  Resp:  [17-20] 17  SpO2:  [92 %-100 %] 92 %  BP: (111-151)/(66-94) 146/81     Physical Exam   Constitutional: She appears well-developed and well-nourished. No distress.   HENT:   Head: Normocephalic and atraumatic.   Right Ear: External ear normal.   Left Ear: External ear normal.   Nose: Nose normal.   Mouth/Throat: Oropharynx is clear and moist. No oropharyngeal exudate.   Eyes: Conjunctivae and EOM are normal. Pupils are equal, round, and reactive to light. Right eye exhibits no discharge. Left eye exhibits no discharge. No scleral icterus.   Neck: Normal range of motion. Neck supple. No JVD present. No tracheal deviation present. No thyromegaly present.   Cardiovascular: Normal rate, regular rhythm, normal heart sounds and intact distal pulses.  Exam reveals no gallop and no friction rub.    No murmur heard.  Pulmonary/Chest: Effort normal and breath sounds normal. No stridor. No respiratory distress. She has no wheezes. She has no rales. She exhibits no tenderness.   Abdominal: Soft. Bowel sounds are normal. She exhibits no distension and no mass. There is no tenderness. There is no rebound and no guarding. No hernia.   Obese    Genitourinary:   Genitourinary Comments: deferred   Musculoskeletal: Normal range of motion. She exhibits no edema, tenderness or deformity.   Lymphadenopathy:     She has no cervical adenopathy.   Neurological: She is alert. She exhibits normal muscle tone.   Skin: No rash noted. She is not diaphoretic. No erythema. No pallor.   Achillis tendon wound / surgical site stable    Psychiatric: She has a normal mood and affect. Her behavior is normal.     NEUROLOGICAL EXAMINATION:     CRANIAL NERVES     CN III, IV, VI    Pupils are equal, round, and reactive to light.  Extraocular motions are normal.       Assessment/Plan:      Juliane Ramos is a 57 y.o. female admitted to inpatient rehabilitation on 12/11/2017 for <principal problem not specified> with impaired mobility and ADLs. Patient remains appropriate for PT, OT, and as required Speech therapy. Patient continues to require 24 hour nursing care as well as daily Physician assessment.    Active Diagnoses:    Diagnosis Date Noted POA    Malnutrition due to renal disease [E46, N28.9] 12/22/2017 Unknown    Wound healing, delayed [T14.8XXD] 12/18/2017 Yes    Wounds, multiple [T07.XXXA] 12/13/2017 Yes    SAH (subarachnoid hemorrhage) [I60.9] 12/11/2017 Yes    Anasarca [R60.1] 12/04/2017 Yes    Acute respiratory failure with hypoxia and hypercapnia [J96.01, J96.02] 12/02/2017 Yes    Pulmonary embolus [I26.99] 11/27/2017 Yes    Traumatic subarachnoid hemorrhage [S06.6X9A] 11/24/2017 Yes    Acute on chronic congestive heart failure [I50.9] 06/24/2017 Yes      Problems Resolved During this Admission:    Diagnosis Date Noted Date Resolved POA     SAH, cont PT, OT, ST  PE,  Cont apixaban   HTN, vitals noted, cont current meds    DISCHARGE PLANNING:  Tentative Discharge Date:     No future appointments.    Rishi Ness MD  Department of Physical Medicine & Rehab  [facility]

## 2017-12-27 NOTE — PLAN OF CARE
Problem: Patient Care Overview  Goal: Plan of Care Review  Outcome: Ongoing (interventions implemented as appropriate)  Patient awake/alert. No c/o pain noted this shift.Remains on o2. No s/s of distress noted this shift. Free from falls. Plan of care continued.

## 2017-12-27 NOTE — PT/OT/SLP PROGRESS
Physical Therapy         Treatment        Juliane Ramos   MRN: [id number]     PT Received On: 12/27/17  Total Time (min): 70        Billable Minutes:  Therapeutic Activity 30 and Therapeutic Exercise 40  Total Minutes: 70    Treatment Type: Treatment  PT/PTA: PT     PTA Visit Number: 0       General Precautions: Standard, fall  Orthopedic Precautions: Orthopedic Precautions : LLE non weight bearing   Braces:  left CAM walker    Subjective:  Communicated with nurse prior to session.    Pain/Comfort  Pain Rating 1: 0/10  Pain Rating Post-Intervention 1: 0/10    Objective:  Patient found  , with Patient found with: oxygen (Left CAM walker )    Functional Mobility:  Bed Mobility:   Supine to sit: Standby Assistance   Sit to supine: Minimal Assistance     Transfer Training:  Sit to stand:Minimal Assistance with No Assistive Device and Grab bars  x 8  Bed <> Chair:  Stand Pivot with Minimal Assistance with No Assistive Device to/from bed (dgtr return demo'd)  Car Transfer:  Stand Pivot with Minimal Assistance with No Assistive Device ( return demo'd)    Additional Treatment:  SEATED: LAQ and hip flexion with 5# right, hip adduction with ball, hip abduction with black tband,  x 30 reps each  STANDING: in // bars: ( LLE only) sidekicks, SLR, hip flexion, knee flexion, x 30 reps  X 2 sets each (sidekicks x 3 sets)     STATIC STAND BALANCE TRAINING: in // bars: NWB LLE x 7 min x 3 with SBA    Family instructed assist techniques, safety precautions emphasized, NWB LLE at all times until see Podiatrist, minimize bedrest, elevate LEs as needed, home exercise program bid.    Activity Tolerance:  Patient tolerated treatment well    Patient left up in chair with OT notified, family present and O2 to wall 3 L/min via nc.    Assessment:  Juliane Ramos is a 57 y.o. female. Contacted Podiatrist office ([facility]) re current function bruce by NWB LLE (no call back yet).    Rehab potential is good.    Activity tolerance:  Fair    GOALS:    Physical Therapy Goals        Problem: Physical Therapy Goal    Goal Priority Disciplines Outcome Goal Variances Interventions   Physical Therapy Goal     PT/OT, PT Ongoing (interventions implemented as appropriate)     Description:  Goals to be met by: 2018     Patient will increase functional independence with mobility by performin). Supine to sit with Modified [place]  2). Sit to supine with Modified [place]  3). Sit to stand transfer with Stand-by Assistance  4). Bed to chair transfer with Stand-by Assistance   5). Wheelchair propulsion x > 250 feet with Modified [place]  6). Stand for  > 7 minutes with Stand-by Assistance NWB LLE                       PLAN:    Patient to be seen daily  to address the above listed problems via therapeutic activities, therapeutic exercises, neuromuscular re-education, wheelchair management/training  Plan of Care expires: 17  Plan of Care reviewed with: patient         Albert Diane, PT 2017

## 2017-12-27 NOTE — PT/OT/SLP PROGRESS
Occupational Therapy  Treatment    Juliane Ramos   MRN: [id number]   Admitting Diagnosis: SAH    OT Date of Treatment: 12/27/17   Total Time (min): 15 min    Billable Minutes:  Therapeutic Activity 15  Total Minutes: 15    General Precautions: Standard, fall  Orthopedic Precautions: LLE non weight bearing   Braces:  (L LE CAM boot)    Do you have any cultural, spiritual, Pent[employer]al conflicts, given your current situation?: None    Subjective:  Communicated with nurse prior to session.    Pain/Comfort  Pain Rating 1: 0/10    Objective:  Patient found with: oxygen, PICC line (HD port, w/c alarm ) - pt's  and daughter present and participating in family training session    Functional Mobility:  Transfer Training:  Sit<>Stand: Stand pivot with Min (A) with DME  Toilet transfer: Stand pivot with Min (A) with DME - OTR providing step by step verbal instruction for patient's /daughter to perform correct transfer sequence/techniques while (A) pt to maintain weight bearing status - pt's  and daughter verbalize & demonstrate understanding as needed    Additional Treatment:  - OTR instructing pt and her family to perform bathing sink side until non weight bearing status has been lifted - pt & pt's family verbalize/demonstrate understanding and in agreement and denying the want for transfer tub bench at this time; OTR reinforcing importance of adhering to weight bearing status and that as long as pt remains NWB, it is unsafe for pt to ambulate as she is unable to maintain NWB - family verbalizes understanding  - OTR instructing patient's  and daughter on correct transfer techniques with use of bedside commode and walker - instructed on correct/safe set up of BSC when not over toilet to prevent tilting of commode - family verbalizes/demonstrates understanding and in agreement; OTR providing instruction regarding dressing tasks, use of adaptive equipment to increase safety & independence with self care  tasks - Min (A) to CGA to manage clothing over hips while pt alternating UE support   Patient, family verbalize/demonstrate understanding and in agreement with all education    Patient left up in chair with all lines intact, call button in reach, chair alarm on, nurse notified and family present    ASSESSMENT:  Juliane Ramos is a 57 y.o. female with a medical diagnosis of SAH and presents with daily improvements towards OT goals. Patient and family present and participating in family training session with emphasis on safe adherence to non weight bearing status, fall prevention, and safety. See above for details.    Rehab potential is good    Activity tolerance: Good    Discharge recommendations: home with home health     Equipment recommendations: bedside commode, walker, rolling, wheelchair     GOALS:    Occupational Therapy Goals        Problem: Occupational Therapy Goal    Goal Priority Disciplines Outcome Interventions   Occupational Therapy Goal     OT, PT/OT Ongoing (interventions implemented as appropriate)    Description:  Goals to be met by: 12/29/17     Patient will increase functional independence with ADLs by performing:    Feeding with [place].  UE Dressing with [place].  LE Dressing with [place].  Grooming while seated with [place].  Toileting from toilet with bedside commode positioned over Minimal Assistance for hygiene and clothing management.   Bathing from shower chair/bench with Minimal Assistance with DME/AE as needed.  Shower transfer with Minimal Assistance with DME/AE as needed .  Toilet transfer to toilet with Minimal Assistance with DME as needed.                      Plan:  Patient to be seen 6 x/week to address the above listed problems via self-care/home management, community/work re-entry, therapeutic activities, therapeutic groups, therapeutic exercises, sensory integration, wheelchair management/training  Plan of Care expires: 12/29/17  Plan of Care reviewed with:  patient    Chloe Chey, SCOTT, LOTR  12/27/2017

## 2017-12-27 NOTE — PLAN OF CARE
Problem: Patient Care Overview  Goal: Plan of Care Review  Alert, oriented fall prevention ongoing. Family training in progress. Routine med teaching ongoing. Central line right neck with dressing dry and intact. jovanny spit cath right chest wall with dressing dry and intact. Transfers one person max assist.

## 2017-12-27 NOTE — PLAN OF CARE
12/27/17 1625   PRE-TX-O2-ETCO2   O2 Device (Oxygen Therapy) nasal cannula   $ Is the patient on Low Flow Oxygen? Yes   Flow (L/min) 3   Oxygen Concentration (%) 32   SpO2 (!) 94 %   Pulse Oximetry Type Intermittent   $ Pulse Oximetry - Multiple Charge Pulse Oximetry - Multiple   Ready to Wean/Extubation Screen   FIO2<=50 (chart decimal) 0.32

## 2017-12-27 NOTE — PLAN OF CARE
Problem: SLP Goal  Goal: SLP Goal  Short term goals:  1. Pt will complete complex reasoning tasks (math, time, money mgmt) with 90% acc independently   2. Pt will complete complex sequencing tasks (pill mgmt/box, recall of transfer, etc.) with 90% acc independently   3. Pt will demonstrate adequate attention (sustained/alternating/selective) to complete functional tasks, as listed above, in 9/10 attempts independently      Long Term Goals:  1. Pt will demonstrate adequate cognitive function for safe and efficient function in home, social and medical environments.     Outcome: Ongoing (interventions implemented as appropriate)  Following instruction and model of task expectations, Pt completed word problems re: money management given visual stimuli (chart/menu) with 90% acc increasing to 100% acc given attention to error. She completed 3 item mental manipulations with 100% acc and 4 item mental manipulations with 50% acc indenpendently increasing to 100% acc given repetition or awareness of sequence error.

## 2017-12-27 NOTE — PROGRESS NOTES
INPATIENT NEPHROLOGY PROGRESS NOTE  [facility] NEPHROLOGY    Patient Name: Juliane Ramos  Date: 12/27/2017    Reason for consultation: MC     Chief Complaint: Diab foot infection    History of Present Illness:  Patient with stage III CKD a/w diab foot infx, hospital course c/b MC requiring dialysis.        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  - BP is better; keep coreg on hold and continue UF for edema.  - recheck phos and Hb with weekly labs on Mondays; continue renal diet and EPO with HD.  - SW will need to confirm with HD unit that outpatient HD slot is available.    Thank you for allowing us to participate in this patient's care. We will continue to follow.    Medications:  No current facility-administered medications on file prior to encounter.      Current Outpatient Prescriptions on File Prior to Encounter   Medication Sig Dispense Refill    albuterol (ACCUNEB) 1.25 mg/3 mL Nebu Take 1.25 mg by nebulization every 6 (six) hours as needed. Rescue      carvedilol (COREG) 12.5 MG tablet Take 1 tablet (12.5 mg total) by mouth 2 (two) times daily. 60 tablet 11    cefepime in dextrose 5 % (MAXIPIME) 1 gram/50 mL PgBk Inject 50 mLs (1 g total) into the vein every 24 hours as needed.      gabapentin (NEURONTIN) 100 MG capsule Take 1 capsule (100 mg total) by mouth 3 (three) times daily. 90 capsule 11    levetiracetam oral soln 500 mg/5 mL (5 mL) Soln 2.5 mLs (250 mg total) by Per NG tube route 2 (two) times daily. 150 mL 11    levothyroxine (SYNTHROID) 50 MCG tablet Take 50 mcg by mouth once daily.        metronidazole (FLAGYL) 500 mg/100 mL IVPB Inject 100 mLs (500 mg total) into the vein every 8 (eight) hours.      rosuvastatin (CRESTOR) 10 MG tablet Take 1 tablet (10 mg total) by mouth once daily. 30  tablet 0    VANCOMYCIN HCL (VANCOMYCIN IN 5 % DEXTROSE) 1.75 gram/500 mL Soln Inject 500 mLs (1,750 mg total) into the vein once daily.       Scheduled Meds:   amLODIPine  10 mg Oral Daily    apixaban  5 mg Oral BID    benzonatate  200 mg Oral TID    cetirizine  10 mg Oral QHS    epoetin donita (PROCRIT) injection  10,000 Units Intravenous Every Tues, Thurs, Sat    fluconazole  100 mg Oral Daily    gabapentin  100 mg Oral TID    insulin aspart  1-10 Units Subcutaneous TIDWM    levothyroxine  50 mcg Oral Before breakfast    miconazole nitrate 2%   Topical (Top) BID    rosuvastatin  10 mg Oral Daily    sodium chloride 0.9%  3 mL Intravenous Q8H     Continuous Infusions:  PRN Meds:.sodium chloride 0.9%, acetaminophen, albuterol sulfate, dextrose 50%, diphenoxylate-atropine 2.5-0.025 mg, glucagon (human recombinant), glucose, glucose, heparin (porcine), influenza, labetalol, levetiracetam oral soln, magnesium oxide, magnesium oxide, ondansetron, potassium phosphate IVPB, simethicone, traMADol    Allergies:  Patient has no known allergies.    Vital Signs:  Temp Readings from Last 3 Encounters:   12/27/17 98.6 °F (37 °C)   12/11/17 97.4 °F (36.3 °C) (Oral)   11/27/17 97 °F (36.1 °C) (Oral)       Pulse Readings from Last 3 Encounters:   12/27/17 89   12/11/17 69   11/27/17 67       BP Readings from Last 3 Encounters:   12/27/17 (!) 146/81   12/11/17 (!) 125/59   11/27/17 123/67       Weight:  Wt Readings from Last 3 Encounters:   12/24/17 104.1 kg (229 lb 6.4 oz)   12/08/17 117.9 kg (260 lb)   11/27/17 117 kg (257 lb 15 oz)       Physical Exam:  Constitutional: nad,   Heart: rrr, no m/r/g, wwp, + edema  Lungs: ctab, no w/r/r/c, no lb  Abdomen: s/nt/nd, +BS    Results:  Lab Results   Component Value Date     12/27/2017    K 3.9 12/27/2017     12/27/2017    CO2 26 12/27/2017    BUN 22 (H) 12/27/2017    CREATININE 2.4 (H) 12/27/2017    CALCIUM 8.5 (L) 12/27/2017    ANIONGAP 9 12/27/2017    ESTGFRAFRICA  25 (A) 12/27/2017    EGFRNONAA 22 (A) 12/27/2017       Lab Results   Component Value Date    CALCIUM 8.5 (L) 12/27/2017    PHOS 2.9 12/27/2017       No results for input(s): WBC, RBC, HGB, HCT, PLT, MCV, MCH, MCHC in the last 24 hours.    I have personally reviewed pertinent radiological imaging and reports.    Elijah Gonzalez MD  [facility] [address] [address]  [phone] (p)  [phone] (f)

## 2017-12-28 LAB
ALBUMIN SERPL BCP-MCNC: 2.5 G/DL
ANION GAP SERPL CALC-SCNC: 9 MMOL/L
BUN SERPL-MCNC: 23 MG/DL
CALCIUM SERPL-MCNC: 8.5 MG/DL
CHLORIDE SERPL-SCNC: 102 MMOL/L
CO2 SERPL-SCNC: 26 MMOL/L
CREAT 24H UR-MRATE: 16 MG/HR
CREAT SERPL-MCNC: 2.4 MG/DL
CREAT UR-MCNC: 117.8 MG/DL
CREATININE, URINE (MG/SPEC): 382.9 MG/SPEC
EST. GFR  (AFRICAN AMERICAN): 25 ML/MIN/1.73 M^2
EST. GFR  (NON AFRICAN AMERICAN): 22 ML/MIN/1.73 M^2
GLUCOSE SERPL-MCNC: 180 MG/DL
PHOSPHATE SERPL-MCNC: 3.9 MG/DL
POCT GLUCOSE: 146 MG/DL (ref 70–110)
POCT GLUCOSE: 192 MG/DL (ref 70–110)
POCT GLUCOSE: 244 MG/DL (ref 70–110)
POTASSIUM SERPL-SCNC: 3.8 MMOL/L
SODIUM SERPL-SCNC: 137 MMOL/L
URINE COLLECTION DURATION: 24 HR
URINE VOLUME: 325 ML

## 2017-12-28 PROCEDURE — 97542 WHEELCHAIR MNGMENT TRAINING: CPT

## 2017-12-28 PROCEDURE — 80100016 HC MAINTENANCE HEMODIALYSIS

## 2017-12-28 PROCEDURE — 80069 RENAL FUNCTION PANEL: CPT

## 2017-12-28 PROCEDURE — 97116 GAIT TRAINING THERAPY: CPT

## 2017-12-28 PROCEDURE — 94761 N-INVAS EAR/PLS OXIMETRY MLT: CPT

## 2017-12-28 PROCEDURE — 25000003 PHARM REV CODE 250: Performed by: PHYSICAL MEDICINE & REHABILITATION

## 2017-12-28 PROCEDURE — 36415 COLL VENOUS BLD VENIPUNCTURE: CPT

## 2017-12-28 PROCEDURE — 63600175 PHARM REV CODE 636 W HCPCS: Performed by: INTERNAL MEDICINE

## 2017-12-28 PROCEDURE — A4216 STERILE WATER/SALINE, 10 ML: HCPCS | Performed by: INTERNAL MEDICINE

## 2017-12-28 PROCEDURE — 97535 SELF CARE MNGMENT TRAINING: CPT

## 2017-12-28 PROCEDURE — 25000003 PHARM REV CODE 250: Performed by: INTERNAL MEDICINE

## 2017-12-28 PROCEDURE — 92507 TX SP LANG VOICE COMM INDIV: CPT

## 2017-12-28 PROCEDURE — 97530 THERAPEUTIC ACTIVITIES: CPT

## 2017-12-28 PROCEDURE — 82570 ASSAY OF URINE CREATININE: CPT

## 2017-12-28 PROCEDURE — 99900035 HC TECH TIME PER 15 MIN (STAT)

## 2017-12-28 PROCEDURE — 27000221 HC OXYGEN, UP TO 24 HOURS

## 2017-12-28 PROCEDURE — 12800000 HC REHAB SEMI-PRIVATE ROOM

## 2017-12-28 PROCEDURE — 97110 THERAPEUTIC EXERCISES: CPT

## 2017-12-28 PROCEDURE — 5A1D70Z PERFORMANCE OF URINARY FILTRATION, INTERMITTENT, LESS THAN 6 HOURS PER DAY: ICD-10-PCS | Performed by: INTERNAL MEDICINE

## 2017-12-28 RX ADMIN — ROSUVASTATIN CALCIUM 10 MG: 5 TABLET ORAL at 09:12

## 2017-12-28 RX ADMIN — ERYTHROPOIETIN 10000 UNITS: 10000 INJECTION, SOLUTION INTRAVENOUS; SUBCUTANEOUS at 05:12

## 2017-12-28 RX ADMIN — LEVOTHYROXINE SODIUM 50 MCG: 25 TABLET ORAL at 05:12

## 2017-12-28 RX ADMIN — FLUCONAZOLE 100 MG: 100 TABLET ORAL at 09:12

## 2017-12-28 RX ADMIN — BENZONATATE 200 MG: 100 CAPSULE ORAL at 05:12

## 2017-12-28 RX ADMIN — GABAPENTIN 100 MG: 100 CAPSULE ORAL at 09:12

## 2017-12-28 RX ADMIN — SODIUM CHLORIDE, PRESERVATIVE FREE 3 ML: 5 INJECTION INTRAVENOUS at 09:12

## 2017-12-28 RX ADMIN — APIXABAN 5 MG: 2.5 TABLET, FILM COATED ORAL at 09:12

## 2017-12-28 RX ADMIN — GABAPENTIN 100 MG: 100 CAPSULE ORAL at 03:12

## 2017-12-28 RX ADMIN — CETIRIZINE HYDROCHLORIDE 10 MG: 10 TABLET, FILM COATED ORAL at 09:12

## 2017-12-28 RX ADMIN — BENZONATATE 200 MG: 100 CAPSULE ORAL at 03:12

## 2017-12-28 RX ADMIN — SODIUM CHLORIDE, PRESERVATIVE FREE 3 ML: 5 INJECTION INTRAVENOUS at 05:12

## 2017-12-28 RX ADMIN — SODIUM CHLORIDE, PRESERVATIVE FREE 3 ML: 5 INJECTION INTRAVENOUS at 02:12

## 2017-12-28 RX ADMIN — BENZONATATE 200 MG: 100 CAPSULE ORAL at 09:12

## 2017-12-28 RX ADMIN — INSULIN ASPART 4 UNITS: 100 INJECTION, SOLUTION INTRAVENOUS; SUBCUTANEOUS at 12:12

## 2017-12-28 RX ADMIN — MICONAZOLE NITRATE: 20 OINTMENT TOPICAL at 09:12

## 2017-12-28 RX ADMIN — GABAPENTIN 100 MG: 100 CAPSULE ORAL at 05:12

## 2017-12-28 NOTE — PLAN OF CARE
Dressing to left lower emy changed, cleaned with wound cleanser and safe gel and adaptic applied with sterile 4x4 and kerlix then ace wrap. glory well. Pictures made

## 2017-12-28 NOTE — PROGRESS NOTES
INPATIENT NEPHROLOGY PROGRESS NOTE  [facility] NEPHROLOGY    Patient Name: Juliane Ramos  Date: 12/28/2017    Reason for consultation: MC     Chief Complaint: Diab foot infection    History of Present Illness:  Patient with stage III CKD a/w diab foot infx, hospital course c/b MC requiring dialysis.        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  - BP is better; keep coreg on hold and continue UF for edema.  - recheck phos and Hb with weekly labs on Mondays; continue renal diet and EPO with HD.  - SW will need to confirm with HD unit that outpatient HD slot is available.    Thank you for allowing us to participate in this patient's care. We will continue to follow.    Medications:  No current facility-administered medications on file prior to encounter.      Current Outpatient Prescriptions on File Prior to Encounter   Medication Sig Dispense Refill    albuterol (ACCUNEB) 1.25 mg/3 mL Nebu Take 1.25 mg by nebulization every 6 (six) hours as needed. Rescue      carvedilol (COREG) 12.5 MG tablet Take 1 tablet (12.5 mg total) by mouth 2 (two) times daily. 60 tablet 11    cefepime in dextrose 5 % (MAXIPIME) 1 gram/50 mL PgBk Inject 50 mLs (1 g total) into the vein every 24 hours as needed.      gabapentin (NEURONTIN) 100 MG capsule Take 1 capsule (100 mg total) by mouth 3 (three) times daily. 90 capsule 11    levetiracetam oral soln 500 mg/5 mL (5 mL) Soln 2.5 mLs (250 mg total) by Per NG tube route 2 (two) times daily. 150 mL 11    levothyroxine (SYNTHROID) 50 MCG tablet Take 50 mcg by mouth once daily.        metronidazole (FLAGYL) 500 mg/100 mL IVPB Inject 100 mLs (500 mg total) into the vein every 8 (eight) hours.      rosuvastatin (CRESTOR) 10 MG tablet Take 1 tablet (10 mg total) by  mouth once daily. 30 tablet 0    VANCOMYCIN HCL (VANCOMYCIN IN 5 % DEXTROSE) 1.75 gram/500 mL Soln Inject 500 mLs (1,750 mg total) into the vein once daily.       Scheduled Meds:   amLODIPine  10 mg Oral Daily    apixaban  5 mg Oral BID    benzonatate  200 mg Oral TID    cetirizine  10 mg Oral QHS    epoetin donita (PROCRIT) injection  10,000 Units Intravenous Every Tues, Thurs, Sat    fluconazole  100 mg Oral Daily    gabapentin  100 mg Oral TID    insulin aspart  1-10 Units Subcutaneous TIDWM    levothyroxine  50 mcg Oral Before breakfast    miconazole nitrate 2%   Topical (Top) BID    rosuvastatin  10 mg Oral Daily    sodium chloride 0.9%  3 mL Intravenous Q8H     Continuous Infusions:  PRN Meds:.sodium chloride 0.9%, acetaminophen, albuterol sulfate, dextrose 50%, diphenoxylate-atropine 2.5-0.025 mg, glucagon (human recombinant), glucose, glucose, heparin (porcine), influenza, labetalol, levetiracetam oral soln, magnesium oxide, magnesium oxide, ondansetron, potassium phosphate IVPB, simethicone, traMADol    Allergies:  Patient has no known allergies.    Vital Signs:  Temp Readings from Last 3 Encounters:   12/28/17 98.4 °F (36.9 °C) (Oral)   12/11/17 97.4 °F (36.3 °C) (Oral)   11/27/17 97 °F (36.1 °C) (Oral)       Pulse Readings from Last 3 Encounters:   12/28/17 94   12/11/17 69   11/27/17 67       BP Readings from Last 3 Encounters:   12/28/17 (!) 142/86   12/11/17 (!) 125/59   11/27/17 123/67       Weight:  Wt Readings from Last 3 Encounters:   12/24/17 104.1 kg (229 lb 6.4 oz)   12/08/17 117.9 kg (260 lb)   11/27/17 117 kg (257 lb 15 oz)      Results:  Lab Results   Component Value Date     12/28/2017    K 3.8 12/28/2017     12/28/2017    CO2 26 12/28/2017    BUN 23 (H) 12/28/2017    CREATININE 2.4 (H) 12/28/2017    CALCIUM 8.5 (L) 12/28/2017    ANIONGAP 9 12/28/2017    ESTGFRAFRICA 25 (A) 12/28/2017    EGFRNONAA 22 (A) 12/28/2017       Lab Results   Component Value Date    CALCIUM  8.5 (L) 12/28/2017    PHOS 3.9 12/28/2017       No results for input(s): WBC, RBC, HGB, HCT, PLT, MCV, MCH, MCHC in the last 24 hours.    I have personally reviewed pertinent radiological imaging and reports.    Elijah Gonzalez MD  [facility]  [address] [place] [id number]  [phone] (p)  [phone] (f)

## 2017-12-28 NOTE — PLAN OF CARE
Problem: Patient Care Overview  Goal: Plan of Care Review  Outcome: Ongoing (interventions implemented as appropriate)  Patient awake/alert. O2 remains in use. No c/o pain noted this shift.Generalized weakness present.24 Hr urine in progress.Free from falls. Plan of care continued

## 2017-12-28 NOTE — PROGRESS NOTES
Spoke with patient to confirm dialysis scheduled at [facility] on Tues,Thu, Sat @ 11:15.

## 2017-12-28 NOTE — PT/OT/SLP PROGRESS
Physical Therapy         Treatment        Juliane Ramos   MRN: [id number]     PT Received On: 17  Total Time (min): 100        Billable Minutes:  Therapeutic Activity 20, Therapeutic Exercise 45, Gait Training 15, and Train/Wheelchair Management 10  Total Minutes: 90    Treatment Type: Treatment  PT/PTA: PT     PTA Visit Number: 0       General Precautions: Standard, fall  Orthopedic Precautions: Orthopedic Precautions : LLE non weight bearing     Subjective:    Pain/Comfort  Pain Rating 1: 0/10  Pain Rating Post-Intervention 1: 0/10    Objective:  Patient found seated in w/c. Patient found with: oxygen (left CAM walker)    Functional Mobility:  Bed Mobility:   Supine to sit: Minimal Assistance   Sit to supine: Minimal Assistance   Rolling: Standby Assistance     Transfer Training:  Sit to stand:Minimal Assistance with No Assistive Device and Grab bars  x 8  Chair <> Mat: Squat Pivot with Minimal Assistance with  No Assistive Device to/from mat    Wheelchair Trainin' with SBA    Gait Training: with CAM walker:  8/ x 1 in // bars with CGA and cues; 135' x 1 with RW with CGA and cues    Additional Treatment:  SUPINE:  SLR (L with assist), TKE,  x 10 reps x 3 sets each  R SIDELYING: left hip abd x 30 reps (part rom)  SEATED: LAQ and hip flexion with 5# right, hip adduction with ball, hip abduction with black tband,  x 30 reps each (abd x 2 sets)  STANDING: in // bars: ( LLE only) sidekicks, SLR, hip flexion, knee flexion, x 30 reps  X 2 sets each (sidekicks x 3 sets)     STATIC STAND BALANCE TRAINING: in // bars: NWB LLE x 6 min x 4 with SBA    Activity Tolerance:  Patient tolerated treatment well    Patient left up in chair with call button in reach and O2 to wall 3 L/min via nc.    Assessment:  Juliane Ramos is a 57 y.o. female.    Rehab potential is good.    Activity tolerance: Fair    GOALS:    Physical Therapy Goals        Problem: Physical Therapy Goal    Goal Priority Disciplines Outcome Goal  Variances Interventions   Physical Therapy Goal     PT/OT, PT Ongoing (interventions implemented as appropriate)     Description:  Goals to be met by: 2018     Patient will increase functional independence with mobility by performin). Supine to sit with SBA  2). Sit to supine with SBA  3). Sit to stand transfer with Stand-by Assistance  4). Bed to chair transfer with Stand-by Assistance   5). Wheelchair propulsion x > 150 with SBA  6). Ambulates > 150' with RW and CAM walker with CGA                Problem: Physical Therapy Goal    Goal Priority Disciplines Outcome Goal Variances Interventions   Physical Therapy Goal     PT/OT, PT                      PLAN:  Gait training, review HEP, safety, then D/C PT due to D/C from facility       Albert Diaen, PT 2017

## 2017-12-28 NOTE — PLAN OF CARE
Problem: Physical Therapy Goal  Goal: Physical Therapy Goal  Goals to be met by: 2018     Patient will increase functional independence with mobility by performin). Supine to sit with SBA  2). Sit to supine with SBA  3). Sit to stand transfer with Stand-by Assistance  4). Bed to chair transfer with Stand-by Assistance   5). Wheelchair propulsion x > 150 with SBA  6). Ambulates > 150' with RW and CAM walker with CGA       Outcome: Ongoing (interventions implemented as appropriate)  Initiated gait training due to new Podiatrist order for WBAT LLE.

## 2017-12-28 NOTE — DISCHARGE SUMMARY
[facility]  Physical Medicine & Rehab  Progress Note    Patient Name: Juliane Ramos  MRN: [id number]  Patient Class: IP- Rehab   Admission Date: 12/11/2017  Length of Stay: 17 days  Attending Physician: Rishi Ness MD  Primary Care Provider: JOS Dumont    Subjective:     Principal Problem:  SAH    Interval History: 12/28/2017:  Patient is seen for follow-up rehab evaluation and recommendations: pt is 57 y.o female with dx of SAH & PE, participating with therapy & making progress    HPI, Past Medical, Family, and Social History remains the same as documented in the initial encounter.    Scheduled Medications:    amLODIPine  10 mg Oral Daily    apixaban  5 mg Oral BID    benzonatate  200 mg Oral TID    cetirizine  10 mg Oral QHS    epoetin donita (PROCRIT) injection  10,000 Units Intravenous Every Tues, Thurs, Sat    fluconazole  100 mg Oral Daily    gabapentin  100 mg Oral TID    insulin aspart  1-10 Units Subcutaneous TIDWM    levothyroxine  50 mcg Oral Before breakfast    miconazole nitrate 2%   Topical (Top) BID    rosuvastatin  10 mg Oral Daily    sodium chloride 0.9%  3 mL Intravenous Q8H       PRN Medications: sodium chloride 0.9%, acetaminophen, albuterol sulfate, dextrose 50%, diphenoxylate-atropine 2.5-0.025 mg, glucagon (human recombinant), glucose, glucose, heparin (porcine), influenza, labetalol, levetiracetam oral soln, magnesium oxide, magnesium oxide, ondansetron, potassium phosphate IVPB, simethicone, traMADol    Review of Systems   Constitutional: Negative.    HENT: Negative.    Eyes: Negative.    Respiratory: Negative.    Cardiovascular: Negative.    Gastrointestinal: Negative.    Endocrine: Negative.    Genitourinary: Negative.    Musculoskeletal: Negative.    Skin: Negative.    Allergic/Immunologic: Negative.    Neurological: Negative.    Hematological: Negative.    Psychiatric/Behavioral: Negative.      Objective:     Vital Signs (Most Recent):  Temp:  99 °F (37.2 °C) (12/28/17 0344)  Pulse: 95 (12/28/17 0344)  Resp: 18 (12/28/17 0344)  BP: (!) 145/88 (12/28/17 0344)  SpO2: 99 % (12/28/17 0344)    Vital Signs (24h Range):  Temp:  [98.8 °F (37.1 °C)-99 °F (37.2 °C)] 99 °F (37.2 °C)  Pulse:  [89-95] 95  Resp:  [16-18] 18  SpO2:  [94 %-100 %] 99 %  BP: (140-145)/(88-92) 145/88     Physical Exam   Constitutional: She is oriented to person, place, and time. She appears well-developed and well-nourished. No distress.   HENT:   Head: Normocephalic and atraumatic.   Right Ear: External ear normal.   Left Ear: External ear normal.   Nose: Nose normal.   Mouth/Throat: Oropharynx is clear and moist. No oropharyngeal exudate.   Eyes: Conjunctivae and EOM are normal. Pupils are equal, round, and reactive to light. Right eye exhibits no discharge. Left eye exhibits no discharge. No scleral icterus.   Neck: Normal range of motion. Neck supple. No JVD present. No tracheal deviation present. No thyromegaly present.   Cardiovascular: Normal rate, regular rhythm, normal heart sounds and intact distal pulses.  Exam reveals no gallop and no friction rub.    No murmur heard.  Pulmonary/Chest: Effort normal and breath sounds normal. No stridor. No respiratory distress. She has no wheezes. She has no rales. She exhibits no tenderness.   Abdominal: Soft. Bowel sounds are normal. She exhibits no distension and no mass. There is no tenderness. There is no rebound and no guarding. No hernia.   Obese    Genitourinary:   Genitourinary Comments: deferred   Musculoskeletal: Normal range of motion. She exhibits no edema, tenderness or deformity.   Lymphadenopathy:     She has no cervical adenopathy.   Neurological: She is alert and oriented to person, place, and time. She exhibits normal muscle tone.   Skin: No rash noted. She is not diaphoretic. No erythema. No pallor.   achillis tendon wound/surgical site stable    Psychiatric: She has a normal mood and affect. Her behavior is normal.      NEUROLOGICAL EXAMINATION:     MENTAL STATUS   Oriented to person, place, and time.     CRANIAL NERVES     CN III, IV, VI   Pupils are equal, round, and reactive to light.  Extraocular motions are normal.       Assessment/Plan:      Juliane Ramos is a 57 y.o. female admitted to inpatient rehabilitation on 12/11/2017 for <principal problem not specified> with impaired mobility and ADLs. Patient remains appropriate for PT, OT, and as required Speech therapy. Patient continues to require 24 hour nursing care as well as daily Physician assessment.    Active Diagnoses:    Diagnosis Date Noted POA    Malnutrition due to renal disease [E46, N28.9] 12/22/2017 Unknown    Wound healing, delayed [T14.8XXD] 12/18/2017 Yes    Wounds, multiple [T07.XXXA] 12/13/2017 Yes    SAH (subarachnoid hemorrhage) [I60.9] 12/11/2017 Yes    Anasarca [R60.1] 12/04/2017 Yes    Acute respiratory failure with hypoxia and hypercapnia [J96.01, J96.02] 12/02/2017 Yes    Pulmonary embolus [I26.99] 11/27/2017 Yes    Traumatic subarachnoid hemorrhage [S06.6X9A] 11/24/2017 Yes    Acute on chronic congestive heart failure [I50.9] 06/24/2017 Yes      Problems Resolved During this Admission:    Diagnosis Date Noted Date Resolved POA     SAH, cont PT, OT, ST  PE, remain on apixaban   HTN, vitals noted, cont current meds  DM, accu check noted, cont current meds  Renal insuff, remain on dialysis     DISCHARGE PLANNING:  Tentative Discharge Date:     No future appointments.    Rishi Ness MD  Department of Physical Medicine & Rehab  [facility]

## 2017-12-28 NOTE — PLAN OF CARE
12/27/17 2003   Patient Assessment/Suction   Level of Consciousness (AVPU) alert   All Lung Fields Breath Sounds clear   PRE-TX-O2-ETCO2   O2 Device (Oxygen Therapy) nasal cannula   Flow (L/min) 3   Oxygen Concentration (%) 32   SpO2 100 %   Pulse Oximetry Type Intermittent   Pulse 89   Resp 16   Aerosol Therapy   $ Aerosol Therapy Charges PRN treatment not required   Respiratory Treatment Status PRN treatment not required   Ready to Wean/Extubation Screen   FIO2<=50 (chart decimal) 0.32

## 2017-12-28 NOTE — PT/OT/SLP PROGRESS
Occupational Therapy  Treatment    Juliane Ramos   MRN: [id number]   Admitting Diagnosis: SAH    OT Date of Treatment: 12/28/17   Total Time (min): 75 min    Billable Minutes:  Self Care/Home Management 60 and Therapeutic Activity 15  Total Minutes: 75    General Precautions: Standard, fall  Orthopedic Precautions: LLE non weight bearing  Braces:  (LLE CAM boot)    Do you have any cultural, spiritual, Taoist conflicts, given your current situation?: None    Subjective:  Communicated with nurse prior to session.    Pain/Comfort  Pain Rating 1: 0/10    Objective:  Patient found with: oxygen, PICC line (HD port)    Functional Mobility:  Transfer Training:   Sit to stand:Minimal Assistance and Moderate Assistance with Rolling Walker    Toilet Transfer:  Pt Stand Pivot with Minimal Assistance with grab bar and BSC positioned over toilet    Patient tub bench transfer Stand Pivot with Minimal Assistance with Grab bars.    Grooming:  Modified Independent sitting sink side    Bathing:  Patient performed bathing with Minimal Assistance with grab bar, Handheld shower head and tub bench at Shower.    UE Dressing:  Patient performed UE Dressing with Supervision or Set-up Assistance with no AE at Wheelchair.    LE Dressing:  Pt don/doffed adult pull up and long pants with Minimal Assistance using adaptive equipment (dressing stick, grab bar)    Toilet Training:    Additional Treatment:  - OTR reinforcing education regarding safety awareness, correct transfer techniques, and use of adaptive equipment to increase independence with self care tasks    Patient left up in chair with all lines intact, call button in reach, chair alarm on and nurse notified    ASSESSMENT:  Juliane Ramos is a 57 y.o. female with a medical diagnosis of SAH and presents with progress towards OT goals.    Rehab potential is good    Activity tolerance: Good    Discharge recommendations: home, home health OT     Equipment recommendations: bedside commode,  walker, rolling, wheelchair     GOALS:    Occupational Therapy Goals        Problem: Occupational Therapy Goal    Goal Priority Disciplines Outcome Interventions   Occupational Therapy Goal     OT, PT/OT Ongoing (interventions implemented as appropriate)    Description:  Goals to be met by: 12/29/17     Patient will increase functional independence with ADLs by performing:    Feeding with [place].  UE Dressing with [place].  LE Dressing with Supervision.  Grooming while seated with [place].  Toileting from toilet with bedside commode positioned over Minimal Assistance for hygiene and clothing management.   Bathing from shower chair/bench with Minimal Assistance with DME/AE as needed.  Shower transfer with Minimal Assistance with DME/AE as needed .  Toilet transfer to toilet with Minimal Assistance with DME as needed.                      Plan:  Patient to be seen 6 x/week to address the above listed problems via self-care/home management, community/work re-entry, therapeutic activities, therapeutic exercises, therapeutic groups, neuromuscular re-education, wheelchair management/training  Plan of Care expires: 12/29/17  Plan of Care reviewed with: patient    SCOTT Antunez LOTR  12/28/2017

## 2017-12-29 VITALS
RESPIRATION RATE: 20 BRPM | HEIGHT: 66 IN | SYSTOLIC BLOOD PRESSURE: 127 MMHG | TEMPERATURE: 99 F | HEART RATE: 96 BPM | OXYGEN SATURATION: 97 % | BODY MASS INDEX: 36.86 KG/M2 | DIASTOLIC BLOOD PRESSURE: 75 MMHG | WEIGHT: 229.38 LBS

## 2017-12-29 LAB
BASOPHILS # BLD AUTO: 0.1 K/UL
BASOPHILS NFR BLD: 1.1 %
DIFFERENTIAL METHOD: ABNORMAL
EOSINOPHIL # BLD AUTO: 0.3 K/UL
EOSINOPHIL NFR BLD: 4.7 %
ERYTHROCYTE [DISTWIDTH] IN BLOOD BY AUTOMATED COUNT: 22.3 %
HCT VFR BLD AUTO: 27.6 %
HGB BLD-MCNC: 8.7 G/DL
LYMPHOCYTES # BLD AUTO: 1.1 K/UL
LYMPHOCYTES NFR BLD: 18.9 %
MCH RBC QN AUTO: 24.2 PG
MCHC RBC AUTO-ENTMCNC: 31.5 G/DL
MCV RBC AUTO: 77 FL
MONOCYTES # BLD AUTO: 0.6 K/UL
MONOCYTES NFR BLD: 10.2 %
NEUTROPHILS # BLD AUTO: 3.9 K/UL
NEUTROPHILS NFR BLD: 65.1 %
PLATELET # BLD AUTO: 165 K/UL
PMV BLD AUTO: 8.6 FL
POCT GLUCOSE: 179 MG/DL (ref 70–110)
POCT GLUCOSE: 273 MG/DL (ref 70–110)
RBC # BLD AUTO: 3.6 M/UL
WBC # BLD AUTO: 5.9 K/UL

## 2017-12-29 PROCEDURE — 97803 MED NUTRITION INDIV SUBSEQ: CPT

## 2017-12-29 PROCEDURE — 99900035 HC TECH TIME PER 15 MIN (STAT)

## 2017-12-29 PROCEDURE — 36415 COLL VENOUS BLD VENIPUNCTURE: CPT

## 2017-12-29 PROCEDURE — 97116 GAIT TRAINING THERAPY: CPT

## 2017-12-29 PROCEDURE — 25000003 PHARM REV CODE 250: Performed by: INTERNAL MEDICINE

## 2017-12-29 PROCEDURE — A4216 STERILE WATER/SALINE, 10 ML: HCPCS | Performed by: INTERNAL MEDICINE

## 2017-12-29 PROCEDURE — 25000003 PHARM REV CODE 250: Performed by: PHYSICAL MEDICINE & REHABILITATION

## 2017-12-29 PROCEDURE — 85025 COMPLETE CBC W/AUTO DIFF WBC: CPT

## 2017-12-29 PROCEDURE — 94761 N-INVAS EAR/PLS OXIMETRY MLT: CPT

## 2017-12-29 RX ORDER — ROSUVASTATIN CALCIUM 10 MG/1
10 TABLET, COATED ORAL DAILY
Qty: 30 TABLET | Refills: 1 | Status: SHIPPED | OUTPATIENT
Start: 2017-12-29 | End: 2019-01-07

## 2017-12-29 RX ORDER — GABAPENTIN 100 MG/1
100 CAPSULE ORAL 3 TIMES DAILY
Qty: 90 CAPSULE | Refills: 1 | Status: SHIPPED | OUTPATIENT
Start: 2017-12-29 | End: 2019-01-01

## 2017-12-29 RX ORDER — LEVOTHYROXINE SODIUM 50 UG/1
50 TABLET ORAL
Qty: 30 TABLET | Refills: 1 | Status: SHIPPED | OUTPATIENT
Start: 2017-12-30 | End: 2019-01-07

## 2017-12-29 RX ORDER — BENZONATATE 200 MG/1
200 CAPSULE ORAL 3 TIMES DAILY
Qty: 30 CAPSULE | Refills: 0 | Status: SHIPPED | OUTPATIENT
Start: 2017-12-29 | End: 2018-01-08

## 2017-12-29 RX ORDER — INSULIN ASPART 100 [IU]/ML
1-10 INJECTION, SOLUTION INTRAVENOUS; SUBCUTANEOUS 3 TIMES DAILY
Qty: 3 ML | Refills: 1 | Status: ON HOLD | OUTPATIENT
Start: 2017-12-29 | End: 2020-01-01 | Stop reason: HOSPADM

## 2017-12-29 RX ORDER — CETIRIZINE HYDROCHLORIDE 10 MG/1
10 TABLET ORAL NIGHTLY
Qty: 20 TABLET | Refills: 0 | Status: SHIPPED | OUTPATIENT
Start: 2017-12-29 | End: 2020-01-01 | Stop reason: ALTCHOICE

## 2017-12-29 RX ORDER — AMLODIPINE BESYLATE 10 MG/1
10 TABLET ORAL DAILY
Qty: 30 TABLET | Refills: 1 | Status: SHIPPED | OUTPATIENT
Start: 2017-12-29 | End: 2019-01-07

## 2017-12-29 RX ADMIN — MICONAZOLE NITRATE: 20 OINTMENT TOPICAL at 09:12

## 2017-12-29 RX ADMIN — INSULIN ASPART 6 UNITS: 100 INJECTION, SOLUTION INTRAVENOUS; SUBCUTANEOUS at 12:12

## 2017-12-29 RX ADMIN — GABAPENTIN 100 MG: 100 CAPSULE ORAL at 01:12

## 2017-12-29 RX ADMIN — SODIUM CHLORIDE, PRESERVATIVE FREE 3 ML: 5 INJECTION INTRAVENOUS at 06:12

## 2017-12-29 RX ADMIN — BENZONATATE 200 MG: 100 CAPSULE ORAL at 01:12

## 2017-12-29 RX ADMIN — GABAPENTIN 100 MG: 100 CAPSULE ORAL at 06:12

## 2017-12-29 RX ADMIN — FLUCONAZOLE 100 MG: 100 TABLET ORAL at 09:12

## 2017-12-29 RX ADMIN — LEVOTHYROXINE SODIUM 50 MCG: 25 TABLET ORAL at 06:12

## 2017-12-29 RX ADMIN — ROSUVASTATIN CALCIUM 10 MG: 5 TABLET ORAL at 09:12

## 2017-12-29 RX ADMIN — APIXABAN 5 MG: 2.5 TABLET, FILM COATED ORAL at 09:12

## 2017-12-29 RX ADMIN — BENZONATATE 200 MG: 100 CAPSULE ORAL at 06:12

## 2017-12-29 NOTE — NURSING
Right SCL TLC CVAD D/C'd w/ pt flat & performing valsalva; tolerated well. Pressure drsg applied & no bleeding noted.  D/C instructions given w/ verbalized understanding received. Pt to be driven home by  & daughter.

## 2017-12-29 NOTE — PT/OT/SLP PROGRESS
"Physical Therapy         Treatment        Juliane Ramos   MRN: [id number]     PT Received On: 17  Total Time (min): 30        Billable Minutes:  Gait [id number]  Total Minutes: 30    Treatment Type: Treatment  PT/PTA: PT     PTA Visit Number: 0       General Precautions: Standard, fall  Orthopedic Precautions: Orthopedic Precautions : LLE weight bearing as tolerated     Subjective:  Pt reports some L hip soreness  Communicated with MD prior to session.    Pain/Comfort  Pain Rating 1: 0/10  Pain Rating Post-Intervention 1: 0/10    Objective:  Patient found seated in w/c.  Patient found with: oxygen (Left CAM walker)    Functional Mobility:    Transfer Training:  Sit to stand:Minimal Assistance with Rolling Walker  x 4  Bed <> Chair:  Stand Pivot with Contact Guard Assistance with Rolling Walker to/from bed    Gait Trainin' with RW with CGA and cues    Stair Training: ascend/descend 6 4" steps with rails with CGA and cues    Activity Tolerance:  Patient limited by fatigue    Patient left up in chair with call button in reach and O2 to wall 3 L/min via nc.    Assessment:  Juliane Ramos is a 57 y.o. female.    GOALS:    Physical Therapy Goals        Problem: Physical Therapy Goal    Goal Priority Disciplines Outcome Goal Variances Interventions   Physical Therapy Goal     PT/OT, PT Ongoing (interventions implemented as appropriate)     Description:  Goals to be met by: 2018     Patient will increase functional independence with mobility by performin). Supine to sit with SBA  2). Sit to supine with SBA  3). Sit to stand transfer with Stand-by Assistance  4). Bed to chair transfer with Stand-by Assistance   5). Wheelchair propulsion x > 150 with SBA  6). Ambulates > 150' with RW and CAM walker with CGA                Problem: Physical Therapy Goal    Goal Priority Disciplines Outcome Goal Variances Interventions   Physical Therapy Goal     PT/OT, PT                      PLAN:  D/C PT due to D/C " from facility       Albert Diane, PT 12/29/2017

## 2017-12-29 NOTE — PLAN OF CARE
Problem: Nutrition, Imbalanced: Inadequate Oral Intake (Adult)  Intervention: Promote/Optimize Nutrition  Recommendation/Intervention: 1) Increase calories in diabetic diet to 2000 and continue renal diet 2) recommend discontinuing Novasource Renal 2/2 excessive calorie and cho intake and order Beneprotein, 2 packets TID to reduce calories and continue with protein supplementation (1 carton of Novasource Renal contains 475 calories, 21.6 gms protein, 43.5 gms CHO) 3) consider MVI for renal patients  Goals: 1) Pt will consume at least 85% of EEN/EPN daily   Nutrition Goal Status:met/ongoing  Communication of RD Recs:  (reviewed with RN)

## 2017-12-29 NOTE — PT/OT/SLP PROGRESS
Speech Language Pathology Treatment          Juliane Ramos   MRN: [id number]     Diet recommendations: Solid Diet Level: Regular  Liquid Diet Level: Thin     SLP Treatment Date: 12/28/17  Speech Start Time: 1200     Speech Stop Time: 1230     Speech Total (min): 30 min       TREATMENT BILLABLE MINUTES:  Speech Therapy Individual 30 and Total Time 30    General Precautions: Standard, fall  Current Respiratory Status: nasal cannula       Subjective:  Pt alert, pleasant and participatory     Objective:   Patient found with: oxygen. Performance this date is as follows:   Given minimal cueing, Pt recalled 2/3 strategies to enhance memory and recall (association and visualization).  She selected appropriate strategies throughout various memory tasks involving visually and verbally presented material, recalling items at 90% acc up to a 20 minute delay increasing to 100% acc given min cueing. She demonstrated increased efficiency with organization and planning given a hypothetical scenario such as creating an event in planner/calendar, with 100% acc given intermittent verbal cueing. Pt completed time management word problems with 90% acc independently increasing to 100% given minimal assistance.      Assessment:  Juilane Ramos is a 57 y.o. female with a SLP diagnosis of near baseline cognitive-linguistic skills. No further skilled ST services warranted upon discharge     Diet recommendations:        Liquid Diet Level: Thin    Discharge recommendations: Discharge Facility/Level Of Care Needs: home     Goals:    SLP Goals        Problem: SLP Goal    Goal Priority Disciplines Outcome   SLP Goal     SLP Ongoing (interventions implemented as appropriate)   Description:  Short term goals:  1. Pt will complete complex reasoning tasks (math, time, money mgmt) with 90% acc independently   2. Pt will complete complex sequencing tasks (pill mgmt/box, recall of transfer, etc.) with 90% acc independently   3. Pt will demonstrate  adequate attention (sustained/alternating/selective) to complete functional tasks, as listed above, in 9/10 attempts independently      Long Term Goals:  1. Pt will demonstrate adequate cognitive function for safe and efficient function in home, social and medical environments.                       Plan:   Patient to be seen Therapy Frequency: 5 x/week   Plan of Care Expires: 12/29/17  Plan of Care reviewed with: patient  SLP Follow-up?: No  SLP - Next Visit Date: 12/28/17           ST Kristi 12/29/2017

## 2017-12-29 NOTE — PROGRESS NOTES
INPATIENT NEPHROLOGY PROGRESS NOTE  [facility] NEPHROLOGY    Patient Name: Juliane Ramos  Date: 12/29/2017    Reason for consultation: MC     Chief Complaint: Diab foot infection    History of Present Illness:  Patient with stage III CKD a/w diab foot infx, hospital course c/b MC requiring dialysis.        12/27  Sleeping  12/28 getting a bath  12/29  Doing physical therapy.  No complaints.  Going home today      Plan of Care:    Assessment:  Oliguric MC 2/2 multifactorial ATN, dialysis dependent  HTN/Edema  SHPT  Anemia of CKD     Plan:     - remains dialysis dependent; continue HD TTS for now  - 24h urine oliguric.    - BP is better; keep coreg on hold and continue UF for edema.       Thank you for allowing us to participate in this patient's care. We will continue to follow.    Medications:  No current facility-administered medications on file prior to encounter.      Current Outpatient Prescriptions on File Prior to Encounter   Medication Sig Dispense Refill    [DISCONTINUED] albuterol (ACCUNEB) 1.25 mg/3 mL Nebu Take 1.25 mg by nebulization every 6 (six) hours as needed. Rescue      [DISCONTINUED] carvedilol (COREG) 12.5 MG tablet Take 1 tablet (12.5 mg total) by mouth 2 (two) times daily. 60 tablet 11    [DISCONTINUED] cefepime in dextrose 5 % (MAXIPIME) 1 gram/50 mL PgBk Inject 50 mLs (1 g total) into the vein every 24 hours as needed.      [DISCONTINUED] gabapentin (NEURONTIN) 100 MG capsule Take 1 capsule (100 mg total) by mouth 3 (three) times daily. 90 capsule 11    [DISCONTINUED] levetiracetam oral soln 500 mg/5 mL (5 mL) Soln 2.5 mLs (250 mg total) by Per NG tube route 2 (two) times daily. 150 mL 11    [DISCONTINUED] levothyroxine (SYNTHROID) 50 MCG tablet Take 50 mcg by mouth once daily.        [DISCONTINUED] metronidazole (FLAGYL) 500 mg/100 mL IVPB Inject 100 mLs (500 mg total) into the vein every 8 (eight) hours.      [DISCONTINUED] rosuvastatin (CRESTOR) 10 MG tablet Take 1 tablet (10  mg total) by mouth once daily. 30 tablet 0    [DISCONTINUED] VANCOMYCIN HCL (VANCOMYCIN IN 5 % DEXTROSE) 1.75 gram/500 mL Soln Inject 500 mLs (1,750 mg total) into the vein once daily.       Scheduled Meds:   amLODIPine  10 mg Oral Daily    apixaban  5 mg Oral BID    benzonatate  200 mg Oral TID    cetirizine  10 mg Oral QHS    epoetin donita (PROCRIT) injection  10,000 Units Intravenous Every Tues, Thurs, Sat    fluconazole  100 mg Oral Daily    gabapentin  100 mg Oral TID    insulin aspart  1-10 Units Subcutaneous TIDWM    levothyroxine  50 mcg Oral Before breakfast    miconazole nitrate 2%   Topical (Top) BID    rosuvastatin  10 mg Oral Daily    sodium chloride 0.9%  3 mL Intravenous Q8H     Continuous Infusions:  PRN Meds:.    Allergies:  Patient has no known allergies.    Vital Signs:  Temp Readings from Last 3 Encounters:   12/29/17 98.4 °F (36.9 °C)   12/11/17 97.4 °F (36.3 °C) (Oral)   11/27/17 97 °F (36.1 °C) (Oral)       Pulse Readings from Last 3 Encounters:   12/29/17 93   12/11/17 69   11/27/17 67       BP Readings from Last 3 Encounters:   12/29/17 136/81   12/11/17 (!) 125/59   11/27/17 123/67       Weight:  Wt Readings from Last 3 Encounters:   12/24/17 104.1 kg (229 lb 6.4 oz)   12/08/17 117.9 kg (260 lb)   11/27/17 117 kg (257 lb 15 oz)      Results:  Lab Results   Component Value Date     12/28/2017    K 3.8 12/28/2017     12/28/2017    CO2 26 12/28/2017    BUN 23 (H) 12/28/2017    CREATININE 2.4 (H) 12/28/2017    CALCIUM 8.5 (L) 12/28/2017    ANIONGAP 9 12/28/2017    ESTGFRAFRICA 25 (A) 12/28/2017    EGFRNONAA 22 (A) 12/28/2017       Lab Results   Component Value Date    CALCIUM 8.5 (L) 12/28/2017    PHOS 3.9 12/28/2017       No results for input(s): WBC, RBC, HGB, HCT, PLT, MCV, MCH, MCHC in the last 24 hours.    I have personally reviewed pertinent radiological imaging and reports.    Elijah Gonzalez MD  [facility]  [address] [place] [address]  [place]  [address]  [phone] (p)  [phone] (f)

## 2017-12-29 NOTE — PLAN OF CARE
12/28/17 2111   Patient Assessment/Suction   Level of Consciousness (AVPU) alert   All Lung Fields Breath Sounds clear   PRE-TX-O2-ETCO2   O2 Device (Oxygen Therapy) nasal cannula   Flow (L/min) 3   Oxygen Concentration (%) 32   SpO2 98 %   Pulse Oximetry Type Intermittent   Pulse 95   Resp 16   Aerosol Therapy   $ Aerosol Therapy Charges PRN treatment not required   Respiratory Treatment Status PRN treatment not required   Ready to Wean/Extubation Screen   FIO2<=50 (chart decimal) 0.32

## 2017-12-29 NOTE — PT/OT/SLP DISCHARGE
Occupational Therapy Discharge Summary    Juliane Ramos  MRN: [id number]   Principal Problem: SAH      Patient Discharged from inpatient rehab Occupational Therapy on 12/29/17.  Please refer to prior OT note dated 12/28/17 for functional status.    Assessment:      Goals partially met. - pt continues to require Min/Mod (A) with toileting tasks and Min (A) with LE dressing tasks    Objective:     GOALS:    Occupational Therapy Goals     Not on file          Multidisciplinary Problems (Resolved)        Problem: Occupational Therapy Goal    Goal Priority Disciplines Outcome Interventions   Occupational Therapy Goal   (Resolved)     OT, PT/OT Outcome(s) achieved    Description:  Goals to be met by: 12/29/17     Patient will increase functional independence with ADLs by performing:    Feeding with [place].  UE Dressing with [place].  LE Dressing with Supervision.  Grooming while seated with [place].  Toileting from toilet with bedside commode positioned over Minimal Assistance for hygiene and clothing management.   Bathing from shower chair/bench with Minimal Assistance with DME/AE as needed.  Shower transfer with Minimal Assistance with DME/AE as needed .  Toilet transfer to toilet with Minimal Assistance with DME as needed.                      Reasons for Discontinuation of Therapy Services  Satisfactory goal achievement.      Plan:     Patient Discharged to: Home with Home Health Service    SCOTT Antunez LOTR  12/29/2017

## 2017-12-29 NOTE — PT/OT/SLP DISCHARGE
Speech Language Pathology Discharge Summary    Juliane Ramos  MRN: [id number]   <principal problem not specified>     Date of Last Treatment Session: 12/28/17    Past Medical History:   Diagnosis Date    Anemia in stage 3 chronic kidney disease 11/26/2017    CHF (congestive heart failure)     COPD (chronic obstructive pulmonary disease)     Coronary artery disease     Diabetes mellitus     Encounter for blood transfusion     Essential hypertension 6/24/2017    Hypertension     MI (myocardial infarction) 2010    Segmental and subsegmental pulmonary emboli of RLL without acute cor pulmonale 11/25/2017    Stroke     July 2005    Thyroid disease     Traumatic subarachnoid hemorrhage 11/24/2017    Type 2 diabetes mellitus with stage 3 chronic kidney disease, without long-term current use of insulin 6/24/2017       Status at initiation of therapy: mild cognitive impairment     Treatment Area(s):  Cognition    Goals:    SLP Goals        Problem: SLP Goal    Goal Priority Disciplines Outcome   SLP Goal     SLP Ongoing (interventions implemented as appropriate)   Description:  Short term goals:  1. Pt will complete complex reasoning tasks (math, time, money mgmt) with 90% acc independently   2. Pt will complete complex sequencing tasks (pill mgmt/box, recall of transfer, etc.) with 90% acc independently   3. Pt will demonstrate adequate attention (sustained/alternating/selective) to complete functional tasks, as listed above, in 9/10 attempts independently      Long Term Goals:  1. Pt will demonstrate adequate cognitive function for safe and efficient function in home, social and medical environments.                      Participation in Treatment (at discharge):  Cooperative    Functional Status at time of Discharge:    Cognition: Patient demonstrates minimal cognitive deficits.    Communication: Patient demonstrates no communication deficits.    Language: Patient demonstrates no language deficits.    Motor  Speech: Patient demonstrates no motor speech deficits.    Swallow: Patient demonstrates no dysphagia.                     Clinical Bedside assessment was completed on 11/28                       Instrumental assessment was not completed                                Swallowing Guidelines: Patient tolerating  thin liquids and regular diet              Patient is discharged to Home

## 2017-12-29 NOTE — PLAN OF CARE
Problem: Patient Care Overview  Goal: Plan of Care Review  Outcome: Ongoing (interventions implemented as appropriate)  Pain is well controlled, safety maintained. Slept well. No overnight issues or events.

## 2017-12-29 NOTE — DISCHARGE INSTRUCTIONS
Referral made to [facility] for nurse, PT and OT to follow at home.  Their phone # is [phone].  They will visit you at home starting tomorrow.      Wheelchair, bedside commode and rolling walker ordered from [facility] for home use.   Their phone # is [phone].    You will continue to have hemodialysis at [facility] on Tuesday/Thursday/Saturday @ 11:15.    Their phone # is [phone]. Please arrive 30 minutes before your appt time.    Be cautious with walking, transfers as you are at risk for falls    Follow up with your MDs

## 2017-12-29 NOTE — CONSULTS
[facility]  Adult Nutrition  Consult Note    SUMMARY     Recommendations    Recommendation/Intervention: 1) Increase calories in diabetic diet to 2000 and continue renal diet 2) recommend discontinuing Novasource Renal 2/2 excessive calorie and cho intake and order Beneprotein, 2 packets TID to reduce calories and continue with protein supplementation (1 carton of Novasource Renal contains 475 calories, 21.6 gms protein, 43.5 gms CHO) 3) consider MVI for renal patients  Goals: 1) Pt will consume at least 85% of EEN/EPN daily   Nutrition Goal Status:met/ongoing  Communication of RD Recs:  (reviewed with RN)    Discharge Plan     diabetic, renal diet with protein supplement until wound heals    Reason for Assessment    Reason for Assessment: RD follow up      1. SAH (subarachnoid hemorrhage)      Past Medical History:   Diagnosis Date    Anemia in stage 3 chronic kidney disease 11/26/2017    CHF (congestive heart failure)     COPD (chronic obstructive pulmonary disease)     Coronary artery disease     Diabetes mellitus     Encounter for blood transfusion     Essential hypertension 6/24/2017    Hypertension     MI (myocardial infarction) 2010    Segmental and subsegmental pulmonary emboli of RLL without acute cor pulmonale 11/25/2017    Stroke     July 2005    Thyroid disease     Traumatic subarachnoid hemorrhage 11/24/2017    Type 2 diabetes mellitus with stage 3 chronic kidney disease, without long-term current use of insulin 6/24/2017         General Information Comments: Admitted for rehab s/p debridement of stage 3 diabetic foot ulcer (left). Is on wound vac. Pt reports good appetite and has gained wt.  Wt 6/27/17 was 240 lbs, 1.3 oz. Pt states  and is now 252 lbs, 1.6 oz. On HD.   Fluid vs actual wt gain?  12/15/17 Fair po intake with 67% meal intake x last 6 meals. Pt continues to receive Novasource Renal.   12/19/17 Good po intake. Wt loss of 16.5 lbs x 4 days ?? Fluid  loss? Pt on HD.   12/29/17 Good po intake. RIDDHI wt loss 2/2 pt admitted with edema and has fluid shifts 2/2 to renal disease, HD. RN reports wound healing progressing and discharge planned for today. Pt not available for protein use follow up. Spoke with RN about instructing pt about protein supplement use at home.   Wt Readings from Last 1 Encounters:   12/24/17 1900 104.1 kg (229 lb 6.4 oz)   12/17/17 0449 106.9 kg (235 lb 11.2 oz)   12/13/17 1732 114.4 kg (252 lb 3.3 oz)   12/11/17 1737 114.4 kg (252 lb 1.6 oz)         Nutrition Prescription Ordered    Current Diet Order: 1800 calorie diabetic diet   Nutrition Order Comments: Note Novasource Renal has 475 calories, 21.6 gms protein and 43.5 gms CHO per carton.    Oral Nutrition Supplement: Novasource Renal, tid     Evaluation of Received Nutrients/Fluid Intake    Intake/Output Summary (Last 24 hours) at 12/29/17 1402  Last data filed at 12/28/17 2000   Gross per 24 hour   Intake              500 ml   Output             3500 ml   Net            -3000 ml     Fluid Required:  (per primary team or UOP + 1000 mls)        % Intake of Estimated Energy Needs:  %  % Meal Intake:90% average for last 5 meals    Nutrition Risk Screen     Nutrition Risk Screen: large or nonhealing wound, burn or pressure ulcer    Nutrition/Diet History    Patient Reported Diet/Restrictions/Preferences: diabetic diet  Typical Food/Fluid Intake: Pt uses Boost at home. NKFA  Food Preferences: no cultural or religous food preferences identified.      Labs/Tests/Procedures/Meds    Diagnostic Test/Procedure Review:  (on HD)  Pertinent Labs Reviewed: reviewed, pertinent   BMP  Lab Results   Component Value Date     12/28/2017    K 3.8 12/28/2017     12/28/2017    CO2 26 12/28/2017    BUN 23 (H) 12/28/2017    CREATININE 2.4 (H) 12/28/2017    CALCIUM 8.5 (L) 12/28/2017    ANIONGAP 9 12/28/2017    ESTGFRAFRICA 25 (A) 12/28/2017    EGFRNONAA 22 (A) 12/28/2017     Lab Results   Component  "Value Date    CALCIUM 8.5 (L) 2017    PHOS 3.9 2017     Lab Results   Component Value Date    HGBA1C 8.0 (H) 2017       Recent Labs  Lab 17  1212   POCTGLUCOSE 273*     Lab Results   Component Value Date    ALBUMIN 2.5 (L) 2017     Lab Results   Component Value Date    CRP 83.9 (H) 2017     Lab Results   Component Value Date    CHOL 75 (L) 2017    CHOL 115 (L) 2017     Lab Results   Component Value Date    HDL 19 (L) 2017    HDL 25 (L) 2017     Lab Results   Component Value Date    LDLCALC 42.8 (L) 2017    LDLCALC 72.4 2017     Lab Results   Component Value Date    TRIG 66 2017    TRIG 88 2017     Lab Results   Component Value Date    CHOLHDL 25.3 2017    CHOLHDL 21.7 2017      Pertinent Medications Reviewed: reviewed, pertinent      Scheduled Meds:   amLODIPine  10 mg Oral Daily    apixaban  5 mg Oral BID    benzonatate  200 mg Oral TID    cetirizine  10 mg Oral QHS    epoetin donita (PROCRIT) injection  10,000 Units Intravenous Every Tues, Th, Sat    fluconazole  100 mg Oral Daily    gabapentin  100 mg Oral TID    insulin aspart  1-10 Units Subcutaneous TIDWM    levothyroxine  50 mcg Oral Before breakfast    miconazole nitrate 2%   Topical (Top) BID    rosuvastatin  10 mg Oral Daily    sodium chloride 0.9%  3 mL Intravenous Q8H     Continuous Infusions:        Physical Findings    Overall Physical Appearance: obese     Oral/Mouth Cavity: tooth/teeth missing  Skin:  (Donny score 16; no edema noted in MD's  progress note)    Anthropometrics    Temp: 98.8 °F (37.1 °C)     Height: 5' 6"  Weight Method: Bed Scale  Weight: 104.1 kg (229 lb 6.4 oz)     Ideal Body Weight (IBW), Female: 130 lb     % Ideal Body Weight, Female (lb): 194.01 lb  BMI (Calculated): 40.8  BMI Grade: greater than 40 - morbid obesity     Usual Body Weight (UBW), k.9 kg (per chart review 17)     % Usual Body Weight: 105.27  % " Weight Change From Usual Weight: 5.05 %     Estimated/Assessed Needs    Weight Used For Calorie Calculations: 82 kg (180 lb 12.4 oz) (per NHANES SBW for renal pts)   Height (cm): 167.6 cm   Energy Need Method: Kcal/kg   25 kcal/kg (kcal): 2050 and 30 kcal/kg (kcal): 2460   RMR (C[place]ova-St. Jeor Equation): 1431.75   Weight Used For Protein Calculations: 82 kg (180 lb 12.4 oz)   1.2 gm Protein (gm): 98.61 and 1.5 gm Protein (gm): 123.26  Fluid Requirements (mL):  (per primary team 2/2 renal issues)       CHO Requirement: 45-50% of EEN (225 gms)    Assessment and Plan    Malnutrition due to renal disease    Related to (etiology):   Increased physiological needs 2/2 renal disease and wound    Signs and Symptoms (as evidenced by):   1) stage 3 wound on left foot 2) mild fluid accumulation     Interventions/Recommendations (treatment strategy):  1) continue diabetic, renal diet 2) continue protein supplement    Nutrition Diagnosis Status:   New          Wounds, multiple    Nutrition Diagnosis  Increase proteinl needs     Related to (etiology):   Wound healing    Signs and Symptoms (as evidenced by):   Presence of stage 3 wound on left foot    Interventions/Recommendations (treatment strategy):  1) Continue diabetic, renal diet.  2)  Novasource Renal has been d/c's due to pt's excessive calorie intake and pt receiving a protein supplement that is lower in calories    Nutrition Diagnosis Status:   Progressing                    Monitor and Evaluation    Food and Nutrient Intake: energy intake  Food and Nutrient Adminstration: diet order   Physical Activity and Function: nutrition-related ADLs and IADLs  Anthropometric Measurements: weight, weight change  Biochemical Data, Medical Tests and Procedures: electrolyte and renal panel, glucose/endocrine profile, inflammatory profile, lipid profile  Nutrition-Focused Physical Findings: overall appearance, skin    Nutrition Risk    1 x weekly     Nutrition Follow-Up    RD  Follow-up?: Yes

## 2018-01-04 NOTE — DISCHARGE SUMMARY
DATE OF ADMISSION:  12/11/2017    DATE OF DISCHARGE:  12/29/2017    ADMISSION DIAGNOSES:  1.  History of traumatic subarachnoid hemorrhage, nonsurgical.  2.  History of recent seizure, but seizure free during the entire rehab   inpatient program.  3.  History of pulmonary emboli.  4.  History of renal insufficiency, dialysis dependent.  5.  Status post debridement of the diabetic foot ulcer involving left Achilles   tendon and application of wound VAC, which is now removed and covered with   dressing.  6.  History of hypertension.  7.  History of neuropathy.  8.  History of hypothyroidism.  9.  History of hypercholesterolemia.    DISCHARGE DIAGNOSES:  1.  History of traumatic subarachnoid hemorrhage, nonsurgical.  2.  History of recent seizure, but seizure free during the entire rehab   inpatient program.  3.  History of pulmonary emboli.  4.  History of renal insufficiency, dialysis dependent.  5.  Status post debridement of the diabetic foot ulcer involving left Achilles   tendon and application of wound VAC, which is now removed and covered with   dressing.  6.  History of hypertension.  7.  History of neuropathy.  8.  History of hypothyroidism.  9.  History of hypercholesterolemia.  10.  Weightbearing is now changed to weightbearing as tolerated to left lower   extremity with use of a Cam walker.    HOSPITAL COURSE:  Mrs. Ramos is a pleasant 57-year-old female whom during the   course of this hospitalization has remained motivated, has participated in all   aspects of her therapy and has made significant progress.  As of now, the   patient's surgical incision is dry, intact and is healing well.  The patient's   weightbearing status is changed to weightbearing as tolerated involving left   lower extremity with use of a Cam walker.  She is continent of bowel.  She is   oliguric due to dialysis and her diet is renal diabetic.  Her appetite is good.    She is not complaining of any pain.  She is at min assist to  mod assist for   supine to sit.  She is at min assist for sit to stand.  She is ambulating   approximately 150 feet, contact guard assist with use of Cam walker on left   lower extremity and weightbearing as tolerated.  She is able to propel her   wheelchair for approximately 150 feet at min assist, but is slow.  Her range of   motion of her left ankle is not tested due to immobilization, otherwise within   functional limits.  She is also able to negotiate six 4 inches steps at contact   guard assist, again with use of Cam walker to left lower extremity.  Her   strength for left ankle is not tested and left hip is 3-/5, left knee is 4/5 and   right lower extremity overall is 4- to 4/5.  She is at setup for feeding,   supervision for grooming, supervision for upper body dressing, min assist to mod   assist for lower body dressing, min assist for bathing, mod assist for   toileting, mod assist for toilet transfer, mod assist for tub transfer and   strength for right upper extremity is 4/5 and left upper extremity is 3+ to   4-/5.   Swallowing is within functional limits.  Her Mini-Mental status is   27/30.  She is at modified independent for memory, modified independent   receptive language and modified independent for expressive language.  The   patient continues to benefit from further therapy.  With that in mind,   arrangement for home health therapy is being made.    FOLLOWUP:  1.  Primary care MD.  2.  Neurology.  3.  Podiatry.    MEDICATIONS:  Amlodipine 10 mg 1 p.o. daily, apixaban 5 mg one p.o. b.i.d.,   benzonatate 200 mg 1 p.o. t.i.d. p.r.n., Zyrtec 10 mg 1 p.o. at bedtime for   additional 10 days, Epogen 10,000 units IV q.  Tuesdays, Thursdays and Saturday,   which is administrated during dialysis, gabapentin 100 mg 1 p.o. t.i.d.,   insulin aspart sliding scale t.i.d. with meals, levothyroxine 50 mcg 1 p.o.   before breakfast, 10 mg 1 p.o. daily.      AV/HN  dd: 12/29/2017 08:57:58 (CST)  td: 12/30/2017  00:26:05 ([facility])  Doc ID   #[id number]  Job ID #[id number]    CC:

## 2018-01-10 LAB — FUNGUS SPEC CULT: NORMAL

## 2018-02-09 LAB
ACID FAST MOD KINY STN SPEC: NORMAL
MYCOBACTERIUM SPEC QL CULT: NORMAL

## 2018-02-21 ENCOUNTER — HOSPITAL ENCOUNTER (INPATIENT)
Facility: HOSPITAL | Age: 58
LOS: 2 days | Discharge: HOME HEALTH | DRG: 189 | End: 2018-02-24
Attending: EMERGENCY MEDICINE | Admitting: INTERNAL MEDICINE
Payer: MEDICARE

## 2018-02-21 DIAGNOSIS — N18.30 CKD (CHRONIC KIDNEY DISEASE) STAGE 3, GFR 30-59 ML/MIN: ICD-10-CM

## 2018-02-21 DIAGNOSIS — D63.1 ANEMIA IN STAGE 3 CHRONIC KIDNEY DISEASE: ICD-10-CM

## 2018-02-21 DIAGNOSIS — I50.23 ACUTE ON CHRONIC SYSTOLIC CONGESTIVE HEART FAILURE: ICD-10-CM

## 2018-02-21 DIAGNOSIS — R06.02 SHORTNESS OF BREATH: Primary | ICD-10-CM

## 2018-02-21 DIAGNOSIS — I50.9 CHF (CONGESTIVE HEART FAILURE): ICD-10-CM

## 2018-02-21 DIAGNOSIS — N18.6 TYPE 2 DIABETES MELLITUS WITH CHRONIC KIDNEY DISEASE ON CHRONIC DIALYSIS, UNSPECIFIED LONG TERM INSULIN USE STATUS: Chronic | ICD-10-CM

## 2018-02-21 DIAGNOSIS — N17.9 ACUTE RENAL FAILURE SUPERIMPOSED ON STAGE 3 CHRONIC KIDNEY DISEASE, UNSPECIFIED ACUTE RENAL FAILURE TYPE: ICD-10-CM

## 2018-02-21 DIAGNOSIS — N18.3 ACUTE RENAL FAILURE SUPERIMPOSED ON STAGE 3 CHRONIC KIDNEY DISEASE, UNSPECIFIED ACUTE RENAL FAILURE TYPE: ICD-10-CM

## 2018-02-21 DIAGNOSIS — J96.21 ACUTE ON CHRONIC RESPIRATORY FAILURE WITH HYPOXIA: ICD-10-CM

## 2018-02-21 DIAGNOSIS — Z91.158 NONCOMPLIANCE WITH RENAL DIALYSIS: ICD-10-CM

## 2018-02-21 DIAGNOSIS — Z99.2 TYPE 2 DIABETES MELLITUS WITH CHRONIC KIDNEY DISEASE ON CHRONIC DIALYSIS, UNSPECIFIED LONG TERM INSULIN USE STATUS: Chronic | ICD-10-CM

## 2018-02-21 DIAGNOSIS — N18.6 CKD (CHRONIC KIDNEY DISEASE) REQUIRING CHRONIC DIALYSIS: Chronic | ICD-10-CM

## 2018-02-21 DIAGNOSIS — E03.9 ACQUIRED HYPOTHYROIDISM: ICD-10-CM

## 2018-02-21 DIAGNOSIS — E11.22 TYPE 2 DIABETES MELLITUS WITH CHRONIC KIDNEY DISEASE ON CHRONIC DIALYSIS, UNSPECIFIED LONG TERM INSULIN USE STATUS: Chronic | ICD-10-CM

## 2018-02-21 DIAGNOSIS — N18.30 ANEMIA IN STAGE 3 CHRONIC KIDNEY DISEASE: ICD-10-CM

## 2018-02-21 DIAGNOSIS — E87.70 HYPERVOLEMIA, UNSPECIFIED HYPERVOLEMIA TYPE: ICD-10-CM

## 2018-02-21 DIAGNOSIS — I25.10 CORONARY ARTERY DISEASE INVOLVING NATIVE CORONARY ARTERY OF NATIVE HEART WITHOUT ANGINA PECTORIS: Chronic | ICD-10-CM

## 2018-02-21 DIAGNOSIS — I10 ESSENTIAL HYPERTENSION: Chronic | ICD-10-CM

## 2018-02-21 DIAGNOSIS — Z99.2 CKD (CHRONIC KIDNEY DISEASE) REQUIRING CHRONIC DIALYSIS: Chronic | ICD-10-CM

## 2018-02-21 LAB
ANION GAP SERPL CALC-SCNC: 10 MMOL/L
BASOPHILS # BLD AUTO: 0 K/UL
BASOPHILS NFR BLD: 0 %
BUN SERPL-MCNC: 54 MG/DL
CALCIUM SERPL-MCNC: 8.7 MG/DL
CHLORIDE SERPL-SCNC: 98 MMOL/L
CO2 SERPL-SCNC: 26 MMOL/L
CREAT SERPL-MCNC: 3.4 MG/DL
DIFFERENTIAL METHOD: ABNORMAL
EOSINOPHIL # BLD AUTO: 0.5 K/UL
EOSINOPHIL NFR BLD: 6.3 %
ERYTHROCYTE [DISTWIDTH] IN BLOOD BY AUTOMATED COUNT: 19.9 %
EST. GFR  (AFRICAN AMERICAN): 17 ML/MIN/1.73 M^2
EST. GFR  (NON AFRICAN AMERICAN): 14 ML/MIN/1.73 M^2
GLUCOSE SERPL-MCNC: 245 MG/DL
HCT VFR BLD AUTO: 27.8 %
HGB BLD-MCNC: 8.5 G/DL
LYMPHOCYTES # BLD AUTO: 0.9 K/UL
LYMPHOCYTES NFR BLD: 11.4 %
MCH RBC QN AUTO: 24.2 PG
MCHC RBC AUTO-ENTMCNC: 30.7 G/DL
MCV RBC AUTO: 79 FL
MONOCYTES # BLD AUTO: 0.6 K/UL
MONOCYTES NFR BLD: 7.2 %
NEUTROPHILS # BLD AUTO: 5.8 K/UL
NEUTROPHILS NFR BLD: 75.1 %
PLATELET # BLD AUTO: 214 K/UL
PMV BLD AUTO: 8.7 FL
POTASSIUM SERPL-SCNC: 4.8 MMOL/L
RBC # BLD AUTO: 3.52 M/UL
SODIUM SERPL-SCNC: 134 MMOL/L
WBC # BLD AUTO: 7.7 K/UL

## 2018-02-21 PROCEDURE — G0378 HOSPITAL OBSERVATION PER HR: HCPCS

## 2018-02-21 PROCEDURE — 36415 COLL VENOUS BLD VENIPUNCTURE: CPT

## 2018-02-21 PROCEDURE — 85025 COMPLETE CBC W/AUTO DIFF WBC: CPT

## 2018-02-21 PROCEDURE — 80048 BASIC METABOLIC PNL TOTAL CA: CPT

## 2018-02-21 PROCEDURE — 83036 HEMOGLOBIN GLYCOSYLATED A1C: CPT

## 2018-02-21 PROCEDURE — 99223 1ST HOSP IP/OBS HIGH 75: CPT | Mod: AI,,, | Performed by: INTERNAL MEDICINE

## 2018-02-21 PROCEDURE — 93005 ELECTROCARDIOGRAM TRACING: CPT

## 2018-02-21 PROCEDURE — 99285 EMERGENCY DEPT VISIT HI MDM: CPT | Mod: 25

## 2018-02-22 PROBLEM — Z99.2 CKD (CHRONIC KIDNEY DISEASE) REQUIRING CHRONIC DIALYSIS: Chronic | Status: ACTIVE | Noted: 2018-02-22

## 2018-02-22 PROBLEM — Z99.2 TYPE 2 DIABETES MELLITUS WITH CHRONIC KIDNEY DISEASE ON CHRONIC DIALYSIS: Chronic | Status: ACTIVE | Noted: 2017-06-24

## 2018-02-22 PROBLEM — N18.6 CKD (CHRONIC KIDNEY DISEASE) REQUIRING CHRONIC DIALYSIS: Chronic | Status: ACTIVE | Noted: 2018-02-22

## 2018-02-22 PROBLEM — N18.6 TYPE 2 DIABETES MELLITUS WITH CHRONIC KIDNEY DISEASE ON CHRONIC DIALYSIS: Chronic | Status: ACTIVE | Noted: 2017-06-24

## 2018-02-22 PROBLEM — J96.21 ACUTE ON CHRONIC RESPIRATORY FAILURE WITH HYPOXIA: Status: ACTIVE | Noted: 2017-12-02

## 2018-02-22 PROBLEM — S91.302A OPEN WOUND OF LEFT HEEL: Status: ACTIVE | Noted: 2018-02-22

## 2018-02-22 LAB
ALBUMIN SERPL BCP-MCNC: 3 G/DL
ALP SERPL-CCNC: 50 U/L
ALT SERPL W/O P-5'-P-CCNC: 8 U/L
ANION GAP SERPL CALC-SCNC: 10 MMOL/L
AST SERPL-CCNC: 11 U/L
BASOPHILS # BLD AUTO: 0 K/UL
BASOPHILS # BLD AUTO: 0 K/UL
BASOPHILS NFR BLD: 0.1 %
BASOPHILS NFR BLD: 0.1 %
BILIRUB SERPL-MCNC: 0.5 MG/DL
BUN SERPL-MCNC: 56 MG/DL
CALCIUM SERPL-MCNC: 8.6 MG/DL
CHLORIDE SERPL-SCNC: 99 MMOL/L
CO2 SERPL-SCNC: 24 MMOL/L
CREAT SERPL-MCNC: 3.4 MG/DL
DIFFERENTIAL METHOD: ABNORMAL
DIFFERENTIAL METHOD: ABNORMAL
EOSINOPHIL # BLD AUTO: 0.3 K/UL
EOSINOPHIL # BLD AUTO: 0.3 K/UL
EOSINOPHIL NFR BLD: 4.7 %
EOSINOPHIL NFR BLD: 4.7 %
ERYTHROCYTE [DISTWIDTH] IN BLOOD BY AUTOMATED COUNT: 19.7 %
ERYTHROCYTE [DISTWIDTH] IN BLOOD BY AUTOMATED COUNT: 19.7 %
EST. GFR  (AFRICAN AMERICAN): 17 ML/MIN/1.73 M^2
EST. GFR  (NON AFRICAN AMERICAN): 14 ML/MIN/1.73 M^2
ESTIMATED AVG GLUCOSE: 128 MG/DL
GLUCOSE SERPL-MCNC: 186 MG/DL
HBA1C MFR BLD HPLC: 6.1 %
HCT VFR BLD AUTO: 25.9 %
HCT VFR BLD AUTO: 25.9 %
HGB BLD-MCNC: 8 G/DL
HGB BLD-MCNC: 8 G/DL
LYMPHOCYTES # BLD AUTO: 0.9 K/UL
LYMPHOCYTES # BLD AUTO: 0.9 K/UL
LYMPHOCYTES NFR BLD: 13.6 %
LYMPHOCYTES NFR BLD: 13.6 %
MAGNESIUM SERPL-MCNC: 3.1 MG/DL
MCH RBC QN AUTO: 24.1 PG
MCH RBC QN AUTO: 24.1 PG
MCHC RBC AUTO-ENTMCNC: 30.9 G/DL
MCHC RBC AUTO-ENTMCNC: 30.9 G/DL
MCV RBC AUTO: 78 FL
MCV RBC AUTO: 78 FL
MONOCYTES # BLD AUTO: 0.6 K/UL
MONOCYTES # BLD AUTO: 0.6 K/UL
MONOCYTES NFR BLD: 9.5 %
MONOCYTES NFR BLD: 9.5 %
NEUTROPHILS # BLD AUTO: 4.8 K/UL
NEUTROPHILS # BLD AUTO: 4.8 K/UL
NEUTROPHILS NFR BLD: 72.1 %
NEUTROPHILS NFR BLD: 72.1 %
OB PNL STL: POSITIVE
PHOSPHATE SERPL-MCNC: 6 MG/DL
PLATELET # BLD AUTO: 204 K/UL
PLATELET # BLD AUTO: 204 K/UL
PMV BLD AUTO: 8.8 FL
PMV BLD AUTO: 8.8 FL
POCT GLUCOSE: 179 MG/DL (ref 70–110)
POCT GLUCOSE: 216 MG/DL (ref 70–110)
POCT GLUCOSE: 292 MG/DL (ref 70–110)
POTASSIUM SERPL-SCNC: 4.4 MMOL/L
PROT SERPL-MCNC: 6.9 G/DL
RBC # BLD AUTO: 3.32 M/UL
RBC # BLD AUTO: 3.32 M/UL
SODIUM SERPL-SCNC: 133 MMOL/L
WBC # BLD AUTO: 6.7 K/UL
WBC # BLD AUTO: 6.7 K/UL

## 2018-02-22 PROCEDURE — 99232 SBSQ HOSP IP/OBS MODERATE 35: CPT | Mod: ,,, | Performed by: INTERNAL MEDICINE

## 2018-02-22 PROCEDURE — 63600175 PHARM REV CODE 636 W HCPCS: Performed by: NURSE PRACTITIONER

## 2018-02-22 PROCEDURE — 94640 AIRWAY INHALATION TREATMENT: CPT

## 2018-02-22 PROCEDURE — 80100016 HC MAINTENANCE HEMODIALYSIS

## 2018-02-22 PROCEDURE — G8988 SELF CARE GOAL STATUS: HCPCS | Mod: CJ

## 2018-02-22 PROCEDURE — P9047 ALBUMIN (HUMAN), 25%, 50ML: HCPCS | Performed by: INTERNAL MEDICINE

## 2018-02-22 PROCEDURE — 82272 OCCULT BLD FECES 1-3 TESTS: CPT

## 2018-02-22 PROCEDURE — 36415 COLL VENOUS BLD VENIPUNCTURE: CPT

## 2018-02-22 PROCEDURE — 97530 THERAPEUTIC ACTIVITIES: CPT

## 2018-02-22 PROCEDURE — 85025 COMPLETE CBC W/AUTO DIFF WBC: CPT

## 2018-02-22 PROCEDURE — 97165 OT EVAL LOW COMPLEX 30 MIN: CPT

## 2018-02-22 PROCEDURE — 83735 ASSAY OF MAGNESIUM: CPT

## 2018-02-22 PROCEDURE — 82962 GLUCOSE BLOOD TEST: CPT

## 2018-02-22 PROCEDURE — 63600175 PHARM REV CODE 636 W HCPCS: Performed by: INTERNAL MEDICINE

## 2018-02-22 PROCEDURE — 25000003 PHARM REV CODE 250: Performed by: NURSE PRACTITIONER

## 2018-02-22 PROCEDURE — 97535 SELF CARE MNGMENT TRAINING: CPT

## 2018-02-22 PROCEDURE — 12000002 HC ACUTE/MED SURGE SEMI-PRIVATE ROOM

## 2018-02-22 PROCEDURE — 94660 CPAP INITIATION&MGMT: CPT

## 2018-02-22 PROCEDURE — 25000003 PHARM REV CODE 250: Performed by: INTERNAL MEDICINE

## 2018-02-22 PROCEDURE — 99900035 HC TECH TIME PER 15 MIN (STAT)

## 2018-02-22 PROCEDURE — 85260 CLOT FACTOR X STUART-POWER: CPT

## 2018-02-22 PROCEDURE — 27000221 HC OXYGEN, UP TO 24 HOURS

## 2018-02-22 PROCEDURE — 80053 COMPREHEN METABOLIC PANEL: CPT

## 2018-02-22 PROCEDURE — 96376 TX/PRO/DX INJ SAME DRUG ADON: CPT

## 2018-02-22 PROCEDURE — 27000190 HC CPAP FULL FACE MASK W/VALVE

## 2018-02-22 PROCEDURE — 84100 ASSAY OF PHOSPHORUS: CPT

## 2018-02-22 PROCEDURE — 25000242 PHARM REV CODE 250 ALT 637 W/ HCPCS: Performed by: INTERNAL MEDICINE

## 2018-02-22 PROCEDURE — C9113 INJ PANTOPRAZOLE SODIUM, VIA: HCPCS | Performed by: NURSE PRACTITIONER

## 2018-02-22 PROCEDURE — 27100171 HC OXYGEN HIGH FLOW UP TO 24 HOURS

## 2018-02-22 PROCEDURE — G8987 SELF CARE CURRENT STATUS: HCPCS | Mod: CK

## 2018-02-22 RX ORDER — IBUPROFEN 200 MG
16 TABLET ORAL
Status: DISCONTINUED | OUTPATIENT
Start: 2018-02-22 | End: 2018-02-22

## 2018-02-22 RX ORDER — GABAPENTIN 300 MG/1
300 CAPSULE ORAL 3 TIMES DAILY
Status: DISCONTINUED | OUTPATIENT
Start: 2018-02-22 | End: 2018-02-22

## 2018-02-22 RX ORDER — ALBUMIN HUMAN 250 G/1000ML
25 SOLUTION INTRAVENOUS
Status: DISCONTINUED | OUTPATIENT
Start: 2018-02-22 | End: 2018-02-24 | Stop reason: HOSPADM

## 2018-02-22 RX ORDER — HEPARIN SODIUM 1000 [USP'U]/ML
1000 INJECTION, SOLUTION INTRAVENOUS; SUBCUTANEOUS
Status: DISCONTINUED | OUTPATIENT
Start: 2018-02-22 | End: 2018-02-24 | Stop reason: HOSPADM

## 2018-02-22 RX ORDER — ACETAMINOPHEN 325 MG/1
650 TABLET ORAL EVERY 4 HOURS PRN
Status: DISCONTINUED | OUTPATIENT
Start: 2018-02-22 | End: 2018-02-22

## 2018-02-22 RX ORDER — ROSUVASTATIN CALCIUM 5 MG/1
10 TABLET, COATED ORAL DAILY
Status: DISCONTINUED | OUTPATIENT
Start: 2018-02-22 | End: 2018-02-22

## 2018-02-22 RX ORDER — IBUPROFEN 200 MG
24 TABLET ORAL
Status: DISCONTINUED | OUTPATIENT
Start: 2018-02-22 | End: 2018-02-24 | Stop reason: HOSPADM

## 2018-02-22 RX ORDER — ROSUVASTATIN CALCIUM 5 MG/1
10 TABLET, COATED ORAL DAILY
Status: DISCONTINUED | OUTPATIENT
Start: 2018-02-22 | End: 2018-02-24 | Stop reason: HOSPADM

## 2018-02-22 RX ORDER — GLUCAGON 1 MG
1 KIT INJECTION
Status: DISCONTINUED | OUTPATIENT
Start: 2018-02-22 | End: 2018-02-24 | Stop reason: HOSPADM

## 2018-02-22 RX ORDER — ACETAMINOPHEN 325 MG/1
650 TABLET ORAL EVERY 6 HOURS PRN
Status: DISCONTINUED | OUTPATIENT
Start: 2018-02-22 | End: 2018-02-22

## 2018-02-22 RX ORDER — SODIUM CHLORIDE 0.9 % (FLUSH) 0.9 %
5 SYRINGE (ML) INJECTION
Status: DISCONTINUED | OUTPATIENT
Start: 2018-02-22 | End: 2018-02-24 | Stop reason: HOSPADM

## 2018-02-22 RX ORDER — IPRATROPIUM BROMIDE AND ALBUTEROL SULFATE 2.5; .5 MG/3ML; MG/3ML
3 SOLUTION RESPIRATORY (INHALATION) EVERY 6 HOURS PRN
Status: DISCONTINUED | OUTPATIENT
Start: 2018-02-22 | End: 2018-02-24 | Stop reason: HOSPADM

## 2018-02-22 RX ORDER — SODIUM CHLORIDE 0.9 % (FLUSH) 0.9 %
5 SYRINGE (ML) INJECTION
Status: DISCONTINUED | OUTPATIENT
Start: 2018-02-22 | End: 2018-02-22

## 2018-02-22 RX ORDER — IPRATROPIUM BROMIDE AND ALBUTEROL SULFATE 2.5; .5 MG/3ML; MG/3ML
3 SOLUTION RESPIRATORY (INHALATION) EVERY 4 HOURS
Status: DISCONTINUED | OUTPATIENT
Start: 2018-02-22 | End: 2018-02-22

## 2018-02-22 RX ORDER — RAMELTEON 8 MG/1
8 TABLET ORAL NIGHTLY PRN
Status: DISCONTINUED | OUTPATIENT
Start: 2018-02-22 | End: 2018-02-24 | Stop reason: HOSPADM

## 2018-02-22 RX ORDER — INSULIN ASPART 100 [IU]/ML
0-5 INJECTION, SOLUTION INTRAVENOUS; SUBCUTANEOUS
Status: DISCONTINUED | OUTPATIENT
Start: 2018-02-22 | End: 2018-02-24 | Stop reason: HOSPADM

## 2018-02-22 RX ORDER — GABAPENTIN 100 MG/1
100 CAPSULE ORAL 3 TIMES DAILY
Status: DISCONTINUED | OUTPATIENT
Start: 2018-02-22 | End: 2018-02-24 | Stop reason: HOSPADM

## 2018-02-22 RX ORDER — CETIRIZINE HYDROCHLORIDE 10 MG/1
10 TABLET ORAL NIGHTLY
Status: DISCONTINUED | OUTPATIENT
Start: 2018-02-22 | End: 2018-02-24 | Stop reason: HOSPADM

## 2018-02-22 RX ORDER — HYDROCODONE BITARTRATE AND ACETAMINOPHEN 10; 325 MG/1; MG/1
1 TABLET ORAL EVERY 6 HOURS PRN
Status: DISCONTINUED | OUTPATIENT
Start: 2018-02-22 | End: 2018-02-22

## 2018-02-22 RX ORDER — ACETAMINOPHEN 500 MG
1000 TABLET ORAL EVERY 6 HOURS PRN
Status: DISCONTINUED | OUTPATIENT
Start: 2018-02-22 | End: 2018-02-24 | Stop reason: HOSPADM

## 2018-02-22 RX ORDER — CARVEDILOL 25 MG/1
25 TABLET ORAL 2 TIMES DAILY
Status: DISCONTINUED | OUTPATIENT
Start: 2018-02-22 | End: 2018-02-22

## 2018-02-22 RX ORDER — FUROSEMIDE 10 MG/ML
40 INJECTION INTRAMUSCULAR; INTRAVENOUS ONCE
Status: COMPLETED | OUTPATIENT
Start: 2018-02-22 | End: 2018-02-22

## 2018-02-22 RX ORDER — IBUPROFEN 200 MG
24 TABLET ORAL
Status: DISCONTINUED | OUTPATIENT
Start: 2018-02-22 | End: 2018-02-22

## 2018-02-22 RX ORDER — ONDANSETRON 2 MG/ML
8 INJECTION INTRAMUSCULAR; INTRAVENOUS EVERY 8 HOURS PRN
Status: DISCONTINUED | OUTPATIENT
Start: 2018-02-22 | End: 2018-02-24 | Stop reason: HOSPADM

## 2018-02-22 RX ORDER — LEVOTHYROXINE SODIUM 50 UG/1
50 TABLET ORAL
Status: DISCONTINUED | OUTPATIENT
Start: 2018-02-22 | End: 2018-02-24 | Stop reason: HOSPADM

## 2018-02-22 RX ORDER — AMOXICILLIN 250 MG
1 CAPSULE ORAL 2 TIMES DAILY
Status: DISCONTINUED | OUTPATIENT
Start: 2018-02-22 | End: 2018-02-24 | Stop reason: HOSPADM

## 2018-02-22 RX ORDER — IBUPROFEN 200 MG
16 TABLET ORAL
Status: DISCONTINUED | OUTPATIENT
Start: 2018-02-22 | End: 2018-02-24 | Stop reason: HOSPADM

## 2018-02-22 RX ORDER — PANTOPRAZOLE SODIUM 40 MG/10ML
40 INJECTION, POWDER, LYOPHILIZED, FOR SOLUTION INTRAVENOUS DAILY
Status: DISCONTINUED | OUTPATIENT
Start: 2018-02-22 | End: 2018-02-24 | Stop reason: HOSPADM

## 2018-02-22 RX ORDER — GLUCAGON 1 MG
1 KIT INJECTION
Status: DISCONTINUED | OUTPATIENT
Start: 2018-02-22 | End: 2018-02-22

## 2018-02-22 RX ORDER — IPRATROPIUM BROMIDE AND ALBUTEROL SULFATE 2.5; .5 MG/3ML; MG/3ML
3 SOLUTION RESPIRATORY (INHALATION) EVERY 6 HOURS PRN
Status: DISCONTINUED | OUTPATIENT
Start: 2018-02-22 | End: 2018-02-22

## 2018-02-22 RX ORDER — INSULIN ASPART 100 [IU]/ML
0-5 INJECTION, SOLUTION INTRAVENOUS; SUBCUTANEOUS
Status: DISCONTINUED | OUTPATIENT
Start: 2018-02-22 | End: 2018-02-22

## 2018-02-22 RX ORDER — CITALOPRAM 10 MG/1
20 TABLET ORAL DAILY
Status: DISCONTINUED | OUTPATIENT
Start: 2018-02-22 | End: 2018-02-22

## 2018-02-22 RX ORDER — PANTOPRAZOLE SODIUM 40 MG/1
40 TABLET, DELAYED RELEASE ORAL DAILY
Status: DISCONTINUED | OUTPATIENT
Start: 2018-02-22 | End: 2018-02-22

## 2018-02-22 RX ORDER — LEVOTHYROXINE SODIUM 50 UG/1
50 TABLET ORAL DAILY
Status: DISCONTINUED | OUTPATIENT
Start: 2018-02-22 | End: 2018-02-22

## 2018-02-22 RX ORDER — ONDANSETRON 2 MG/ML
8 INJECTION INTRAMUSCULAR; INTRAVENOUS EVERY 8 HOURS PRN
Status: DISCONTINUED | OUTPATIENT
Start: 2018-02-22 | End: 2018-02-22

## 2018-02-22 RX ADMIN — APIXABAN 5 MG: 2.5 TABLET, FILM COATED ORAL at 09:02

## 2018-02-22 RX ADMIN — ROSUVASTATIN CALCIUM 10 MG: 5 TABLET, FILM COATED ORAL at 09:02

## 2018-02-22 RX ADMIN — COLLAGENASE SANTYL: 250 OINTMENT TOPICAL at 10:02

## 2018-02-22 RX ADMIN — EPOETIN ALFA 9400 UNITS: 20000 SOLUTION INTRAVENOUS; SUBCUTANEOUS at 02:02

## 2018-02-22 RX ADMIN — IPRATROPIUM BROMIDE AND ALBUTEROL SULFATE 3 ML: .5; 3 SOLUTION RESPIRATORY (INHALATION) at 12:02

## 2018-02-22 RX ADMIN — LEVOTHYROXINE SODIUM 50 MCG: 50 TABLET ORAL at 05:02

## 2018-02-22 RX ADMIN — HEPARIN SODIUM 1000 UNITS: 1000 INJECTION, SOLUTION INTRAVENOUS; SUBCUTANEOUS at 02:02

## 2018-02-22 RX ADMIN — INSULIN ASPART 1 UNITS: 100 INJECTION, SOLUTION INTRAVENOUS; SUBCUTANEOUS at 09:02

## 2018-02-22 RX ADMIN — FUROSEMIDE 40 MG: 10 INJECTION, SOLUTION INTRAMUSCULAR; INTRAVENOUS at 01:02

## 2018-02-22 RX ADMIN — ALBUMIN (HUMAN) 25 G: 12.5 SOLUTION INTRAVENOUS at 02:02

## 2018-02-22 RX ADMIN — STANDARDIZED SENNA CONCENTRATE AND DOCUSATE SODIUM 1 TABLET: 8.6; 5 TABLET, FILM COATED ORAL at 09:02

## 2018-02-22 RX ADMIN — PANTOPRAZOLE SODIUM 40 MG: 40 INJECTION, POWDER, FOR SOLUTION INTRAVENOUS at 09:02

## 2018-02-22 RX ADMIN — GABAPENTIN 100 MG: 100 CAPSULE ORAL at 05:02

## 2018-02-22 RX ADMIN — CETIRIZINE HYDROCHLORIDE 10 MG: 10 TABLET, FILM COATED ORAL at 09:02

## 2018-02-22 RX ADMIN — GABAPENTIN 100 MG: 100 CAPSULE ORAL at 09:02

## 2018-02-22 NOTE — PLAN OF CARE
Problem: Patient Care Overview  Goal: Plan of Care Review  Patient place on BIPAP setting 12/6 5lpm O2 bleed in/sats 90%. Aerosol Q4 given with air inline with BIPAP tolerated well

## 2018-02-22 NOTE — ASSESSMENT & PLAN NOTE
Chronic problem, stable - at baseline.  Monitor H/H, transfuse if becomes hemodynamically unstable or H/H < 7/21

## 2018-02-22 NOTE — ASSESSMENT & PLAN NOTE
Body mass index is 33.63 kg/m². Obesity complicates all aspects of disease management from diagnostic modalities to treatment. Weight loss encouraged and health benefits explained to patient.

## 2018-02-22 NOTE — PLAN OF CARE
Patient had [facility] prior.  Discharge plan is home with spouse; possibly with HH.       02/22/18 2817   Discharge Assessment   Assessment Type Discharge Planning Assessment   Assessment information obtained from? Medical Record  (out of room)   Prior to hospitilization cognitive status: Alert/Oriented   Prior to hospitalization functional status: Assistive Equipment;Needs Assistance   Lives With spouse   Able to Return to Prior Arrangements yes   Is patient able to care for self after discharge? No   Patient's perception of discharge disposition home or selfcare   Readmission Within The Last 30 Days no previous admission in last 30 days   Patient currently being followed by outpatient case management? No   Equipment Currently Used at Home rollator;wheelchair;bedside commode;shower chair;hospital bed;lift device;oxygen;glucometer   Do you have any problems affording any of your prescribed medications? No   Is the patient taking medications as prescribed? yes   Does the patient have transportation home? Yes   Transportation Available family or friend will provide   Dialysis Name and Scheduled days [facility]   Does the patient receive services at the [facility] Clinic? No   Discharge Plan A Home   Discharge Plan B Home Health

## 2018-02-22 NOTE — HPI
Juliane Ramos is a 57 y.o. female with PMHx significant for DM, HTN, CAD, CHF, CKD on dialysis (TuTh&Sat), COPD on home oxygen, PE, and prior CVA.  She is admitted to the service of hospital medicine with a diagnosis of acute on chronic respiratory failure.  She presented to the ED via EMS with an onset of worsening SOB since this morning. Per EMS, the patient was hypoxic on arrival and a face mask was placed. She reported being at baseline yesterday. Per , the patient's baseline O2 saturation is in the high 70's - 80's at home on continuous oxygen at 3L. She also endorses urinary frequency for the last few days. The patient fell 2 days ago when she was reaching for oxygen while in a chair and again today while trying to get dressed. She reports skipping dialysis yesterday secondary to being nauseated. Her last dialysis session was on Saturday (4 days ago). The patient was recently admitted for a SAH and PE and is on Apixaban. She denied cough, chest pain, abdominal pain, vomiting, diarrhea, hematochezia, or any other symptoms at this time.

## 2018-02-22 NOTE — PROGRESS NOTES
Progress Note  Hospital Medicine  Patient Name:Juliane Ramos  MRN:  [id number]  Patient Class: OP- Observation  Admit Date: 2/21/2018  Length of Stay: 0 days  Expected Discharge Date:   Attending Physician: Reg Devine MD  Primary Care Provider:  JOS Dumont    SUBJECTIVE:     Principal Problem: Acute on chronic respiratory failure with hypoxia  Initial history of present illness: Juliane Ramos is a 57 y.o. female with PMHx significant for DM, HTN, CAD, CHF, CKD on dialysis (TuTh&Sat), COPD on home oxygen, PE, and prior CVA.  She is admitted to the service of [facility] medicine with a diagnosis of acute on chronic respiratory failure.  She presented to the ED via EMS with an onset of worsening SOB since this morning. Per EMS, the patient was hypoxic on arrival and a face mask was placed. She reported being at baseline yesterday. Per , the patient's baseline O2 saturation is in the high 70's - 80's at home on continuous oxygen at 3L. She also endorses urinary frequency for the last few days. The patient fell 2 days ago when she was reaching for oxygen while in a chair and again today while trying to get dressed. She reports skipping dialysis yesterday secondary to being nauseated. Her last dialysis session was on Saturday (4 days ago). The patient was recently admitted for a SAH and PE and is on Apixaban. She denied cough, chest pain, abdominal pain, vomiting, diarrhea, hematochezia, or any other symptoms at this time.     PMH/PSH/SH/FH/Meds: reviewed.    Symptoms/Review of Systems: Getting HD, feeling better. No shortness of breath, cough, chest pain or headache, fever or abdominal pain.     Diet:  Adequate intake.    Activity level: Up with assistance  Pain:  Patient reports no pain.       OBJECTIVE:   Vital Signs (Most Recent):      Temp: 96.7 °F (35.9 °C) (02/22/18 0809)  Pulse: 62 (02/22/18 0809)  Resp: 16 (02/22/18 0809)  BP: 98/61 (02/22/18 0809)  SpO2: (!) 94 % (02/22/18 1018)       Vital  Signs Range (Last 24H):  Temp:  [96.7 °F (35.9 °C)-97.9 °F (36.6 °C)]   Pulse:  [62-73]   Resp:  [16-29]   BP: ()/(52-71)   SpO2:  [65 %-98 %]     Weight: 94.5 kg (208 lb 5.4 oz)  Body mass index is 33.63 kg/m².    Intake/Output Summary (Last 24 hours) at 02/22/18 1026  Last data filed at 02/22/18 0900   Gross per 24 hour   Intake              480 ml   Output                0 ml   Net              480 ml     Physical Examination:  Constitutional: She is oriented to person, place, and time. She appears well-developed and well-nourished. No distress.   HENT:   Head: Normocephalic and atraumatic.   Eyes: Conjunctivae and EOM are normal. Pupils are equal, round, and reactive to light. No scleral icterus.   Neck: Normal range of motion. Neck supple. No JVD present. No tracheal deviation present. No thyromegaly present.   Cardiovascular: Normal rate, regular rhythm, normal heart sounds and intact distal pulses.  Exam reveals no gallop and no friction rub.    No murmur heard.  Pulses:       Dorsalis pedis pulses are 2+ on the right side, and 2+ on the left side.   Right upper chest dialysis catheter dressing dry/intact   Pulmonary/Chest: Accessory muscle usage (Mild) present. No respiratory distress. She has no wheezes. She has rhonchi. She has rales.   Abdominal: Soft. Bowel sounds are normal. She exhibits no distension and no mass. There is no tenderness. There is no guarding.   Musculoskeletal: Normal range of motion. She exhibits edema (BLE pitting 1+). She exhibits no tenderness or deformity.   Dolly boot to left foot   Neurological: She is alert and oriented to person, place, and time. She has normal strength. She exhibits normal muscle tone. Coordination normal.   Skin: Skin is warm, dry and intact. Capillary refill takes less than 2 seconds. No rash noted. She is not diaphoretic. No erythema.   Psychiatric: She has a normal mood and affect. Her speech is normal and behavior is normal. Judgment and thought  content normal.   Vitals reviewed.    CBC:    Recent Labs  Lab 02/21/18 2120 02/22/18  0447   WBC 7.70 6.70  6.70   RBC 3.52* 3.32*  3.32*   HGB 8.5* 8.0*  8.0*   HCT 27.8* 25.9*  25.9*    204  204   MCV 79* 78*  78*   MCH 24.2* 24.1*  24.1*   MCHC 30.7* 30.9*  30.9*   BMP    Recent Labs  Lab 02/21/18 2120 02/22/18  0447   * 186*   * 133*   K 4.8 4.4   CL 98 99   CO2 26 24   BUN 54* 56*   CREATININE 3.4* 3.4*   CALCIUM 8.7 8.6*   MG  --  3.1*      Diagnostic Results:  Microbiology Results (last 7 days)     ** No results found for the last 168 hours. **         CXR: Increasing right pleural effusion and right base atelectasis. Cardiomegaly. Prior sternotomy. Double-lumen central line in position.    Assessment/Plan:     * Acute on chronic respiratory failure with hypoxia     Multiple etiologies - most likely 2/2 to volume overload from missed dialysis.  Supplemental O2 via nasal canula; titrate O2 saturation to >92%.   CPAP at night (patient states that she is supposed to use her machine, but does not).  Iniate beta 2 agonist bronchodilator treatments.   Lasix IV  Consult Nephrology - follow recommendations for dialysis          CKD (chronic kidney disease) requiring chronic dialysis     Follow nephrology recommendations. HD compliance discussed with patient.  Avoid nonessential nephrotoxins  Adjust medications for Estimated Creatinine Clearance: 21.7 mL/min (A) (based on SCr of 3.4 mg/dL (H)).       Acute on chronic congestive heart failure     Likely 2/2 to missed dialysis - volume overload  Supplemental O2 via nasal canula; titrate O2 saturation to >92%.  IV lasix  Recent ECHO 11/22/2017 - EF 37%  Daily weights.  Strict I/Os.  Fluid restriction -800 ml  Na restriction <2g/d.         Obesity     Body mass index is 33.63 kg/m². Obesity complicates all aspects of disease management from diagnostic modalities to treatment. Weight loss encouraged and health benefits explained to patient.        Anemia in stage 3 chronic kidney disease     Chronic problem, stable - at baseline.  Monitor H/H, transfuse if becomes hemodynamically unstable or H/H < 7/21          Acquired hypothyroidism     Chronic problem, continue home Levothyroxine.          Type 2 diabetes mellitus with chronic kidney disease on chronic dialysis     Chronic problem, uncontrolled.  Check blood glucose level q AC/HS.  Use Novolog Insulin Sliding Scale as needed.   Renal/cardiac/diabetic diet      VTE Risk Mitigation         Ordered     heparin (porcine) injection 1,000 Units  As needed (PRN)     Route:  Intravenous        02/22/18 1010     apixaban tablet 5 mg  2 times daily     Route:  Oral        02/22/18 0001     Reason for No Pharmacological VTE Prophylaxis  Once      02/22/18 0001        Reg Devine MD  Department of Hospital Medicine   [facility]

## 2018-02-22 NOTE — CONSULTS
INPATIENT NEPHROLOGY CONSULT   [facility] NEPHROLOGY    Patient Name: Juliane Ramos  Date: 02/22/2018    Reason for consultation: ESRD    Chief Complaint:   Chief Complaint   Patient presents with    Shortness of Breath     oxygen concentrator not working right; EMS found pt hypoxic with O2 Sats of 66% and blue lips; placed on NRB       History of Present Illness:  58 y/o F with CAD, CHF (EF 35-40%), HTN, DM, COPD on home O2, SAH, PE on AC and AoCKI (prior baseline stage III CKD with eGFR 50s, MC 2/2 contrast/vancomycin/infection) who is dialysis dependent (going on 3 months of HD now so effectively ESRD) who p/w dyspnea x 1 day. She missed HD on Tuesday due to nausea. Her last HD was on Sat. She has been urinating. She denies fevers, chills, chest pain or worsening edema. Her symptoms were persistent and severe despite home O2. She denies any exac/reliev factors. When EMS arrived, she was hypoxic. She was placed on a NRB and given IV lasix in the ER. CXR was wet. We have been consulted for dialysis.     Plan of Care:    Assessment:  ESRD  Volume overload/Chronic hypotension with hx of systolic CHF (baseline EF 35-40%)   Hyponatremia  SHPT  Anemia of CKD  Hypoalbuminemia    Plan:    1. ESRD- Continue HD TTS.     2. Volume/Blood Pressure- Ordered 3-4L UF with albumin. Will reassess tomorrow for fluid removal needs- may need IUF.     3. Electrolytes/Acid Base- Suspect hypervolemic hyponatremia- will f/u after dialysis/UF.     4. Bone Mineral Metabolism- Phos is elevated. F/U post HD- most likely won't need phos binder. Continue a renal diet.      5. Anemia of CKD- Hb 8s- ordered higher dose of EPO with HD.     6. Low albumin- Ordered nepro.       7. Access- TDC; has to schedule appt with Dr. Deal for AVF placement- missed last appt due to snow/bad weather.    Spent a long time discussing patient's renal prognosis with patient and . She is effectively ESRD but will f/u next 24 hour collection. In January,  "CrCl 6 based on 24 hour urine collection. Encouraged transplant evaluation. Discussed home dialysis options.     Thank you for allowing us to participate in this patient's care. We will continue to follow.    Vital Signs:  Temp Readings from Last 3 Encounters:   02/22/18 96.7 °F (35.9 °C) (Axillary)   12/29/17 98.8 °F (37.1 °C)   12/11/17 97.4 °F (36.3 °C) (Oral)       Pulse Readings from Last 3 Encounters:   02/22/18 62   12/29/17 96   12/11/17 69       BP Readings from Last 3 Encounters:   02/22/18 98/61   12/29/17 127/75   12/11/17 (!) 125/59       Weight:  Wt Readings from Last 3 Encounters:   02/21/18 94.5 kg (208 lb 5.4 oz)   12/24/17 104.1 kg (229 lb 6.4 oz)   12/08/17 117.9 kg (260 lb)       Past Medical & Surgical History:  Past Medical History:   Diagnosis Date    Anemia in stage 3 chronic kidney disease 11/26/2017    CHF (congestive heart failure)     COPD (chronic obstructive pulmonary disease)     Coronary artery disease     Diabetes mellitus     Encounter for blood transfusion     Essential hypertension 6/24/2017    Hypertension     MI (myocardial infarction) 2010    Segmental and subsegmental pulmonary emboli of RLL without acute cor pulmonale 11/25/2017    Stroke     July 2005    Thyroid disease     Traumatic subarachnoid hemorrhage 11/24/2017    Type 2 diabetes mellitus with stage 3 chronic kidney disease, without long-term current use of insulin 6/24/2017       Past Surgical History:   Procedure Laterality Date    ABCESS DRAINAGE Right     Between "little toe" and the one next to it    BREAST SURGERY      Reduction [id number]    CARDIAC SURGERY  01/2011    CABG 4vessel ring in one valve mitral valve prolapse    CORONARY ARTERY BYPASS GRAFT      hyperlipidemia      TUBAL LIGATION  03/1986       Past Social History:  Social History     Social History    Marital status:      Spouse name: N/A    Number of children: N/A    Years of education: N/A     Social History Main Topics "    Smoking status: Never Smoker    Smokeless tobacco: Never Used    Alcohol use No    Drug use: No    Sexual activity: Yes     Partners: Male     Birth control/ protection: None     Other Topics Concern    None     Social History Narrative    None       Medications:  No current facility-administered medications on file prior to encounter.      Current Outpatient Prescriptions on File Prior to Encounter   Medication Sig Dispense Refill    amLODIPine (NORVASC) 10 MG tablet Take 1 tablet (10 mg total) by mouth once daily. 30 tablet 1    apixaban 5 mg Tab Take 1 tablet (5 mg total) by mouth 2 (two) times daily. 60 tablet 1    cetirizine (ZYRTEC) 10 MG tablet Take 1 tablet (10 mg total) by mouth every evening. 20 tablet 0    epoetin donita (PROCRIT) 10,000 unit/mL injection Inject 1 mL (10,000 Units total) into the skin every Tues, Thurs, Sat. 1 mL 5    gabapentin (NEURONTIN) 100 MG capsule Take 1 capsule (100 mg total) by mouth 3 (three) times daily. 90 capsule 1    insulin aspart (NOVOLOG) 100 unit/mL InPn pen Inject 1-10 Units into the skin 3 (three) times daily. 3 mL 1    levothyroxine (SYNTHROID) 50 MCG tablet Take 1 tablet (50 mcg total) by mouth before breakfast. 30 tablet 1    rosuvastatin (CRESTOR) 10 MG tablet Take 1 tablet (10 mg total) by mouth once daily. 30 tablet 1    [DISCONTINUED] miconazole nitrate 2% (MICOTIN) 2 % Oint Apply topically 2 (two) times daily. 56 g 1     Scheduled Meds:   apixaban  5 mg Oral BID    cetirizine  10 mg Oral QHS    epoetin donita (PROCRIT) injection  100 Units/kg Intravenous Every Tues, Thurs, Sat    gabapentin  100 mg Oral TID    levothyroxine  50 mcg Oral Before breakfast    pantoprazole  40 mg Intravenous Daily    rosuvastatin  10 mg Oral Daily    senna-docusate 8.6-50 mg  1 tablet Oral BID     Continuous Infusions:  PRN Meds:.acetaminophen, albumin human 25%, albuterol-ipratropium 2.5mg-0.5mg/3mL, dextrose 50%, dextrose 50%, glucagon (human recombinant),  glucose, glucose, heparin (porcine), insulin aspart U-100, ondansetron, ramelteon, sodium chloride 0.9%    Allergies:  Patient has no known allergies.    Past Family History:  Reviewed; refer to Hospitalist Admission Note    Review of Systems:  Review of Systems - All 14 systems reviewed and negative, except as noted in HPI    Physical Exam:  General Appearance:    NAD, AAO x 3, cooperative, appears older than stated age   Head:    Normocephalic, atraumatic   Eyes:    PER, EOMI, and conjunctiva/sclera clear bilaterally       Throat:   Moist mucus membranes   Lungs:     Coarse to auscultation bilaterally, no wheezes, crackles, rales or rhonchi, symmetric air movement, respirations unlabored   Chest wall:    No tenderness or deformity   Heart:    Regular rate and rhythm, S1 and S2 normal, no murmur, rub   or gallop   Abdomen:     Soft, non-tender, non-distended, bowel sounds active all four   quadrants, no RT or guarding, no masses, no organomegaly   Extremities:   Warm and well perfused, distal pulses are intact, no             cyanosis, trace peripheral edema   MSK:   No joint or muscle swelling, tenderness or deformity   Skin:   Skin color, texture, turgor normal, no rashes or lesions   Neurologic/Psychiatric:   CNII-XII intact, normal strength and sensation       throughout, no asterixis; normal affect, memory, judgement     and insight      Results:  Lab Results   Component Value Date     (L) 02/22/2018    K 4.4 02/22/2018    CL 99 02/22/2018    CO2 24 02/22/2018    BUN 56 (H) 02/22/2018    CREATININE 3.4 (H) 02/22/2018    CALCIUM 8.6 (L) 02/22/2018    ANIONGAP 10 02/22/2018    ESTGFRAFRICA 17 (A) 02/22/2018    EGFRNONAA 14 (A) 02/22/2018       Lab Results   Component Value Date    CALCIUM 8.6 (L) 02/22/2018    PHOS 6.0 (H) 02/22/2018         Recent Labs  Lab 02/22/18  0447   WBC 6.70  6.70   RBC 3.32*  3.32*   HGB 8.0*  8.0*   HCT 25.9*  25.9*     204   MCV 78*  78*   MCH 24.1*  24.1*   MCHC  30.9*  30.9*       I have personally reviewed pertinent radiological imaging and reports.    Amairani Young MD MPH  [facility] [address] [address]  [phone] (p)  [phone] (f)

## 2018-02-22 NOTE — SUBJECTIVE & OBJECTIVE
"Past Medical History:   Diagnosis Date    Anemia in stage 3 chronic kidney disease 11/26/2017    CHF (congestive heart failure)     COPD (chronic obstructive pulmonary disease)     Coronary artery disease     Diabetes mellitus     Encounter for blood transfusion     Essential hypertension 6/24/2017    Hypertension     MI (myocardial infarction) 2010    Segmental and subsegmental pulmonary emboli of RLL without acute cor pulmonale 11/25/2017    Stroke     July 2005    Thyroid disease     Traumatic subarachnoid hemorrhage 11/24/2017    Type 2 diabetes mellitus with stage 3 chronic kidney disease, without long-term current use of insulin 6/24/2017       Past Surgical History:   Procedure Laterality Date    ABCESS DRAINAGE Right     Between "little toe" and the one next to it    BREAST SURGERY      Reduction [id number]    CARDIAC SURGERY  01/2011    CABG 4vessel ring in one valve mitral valve prolapse    CORONARY ARTERY BYPASS GRAFT      hyperlipidemia      TUBAL LIGATION  03/1986       Review of patient's allergies indicates:  No Known Allergies    No current facility-administered medications on file prior to encounter.      Current Outpatient Prescriptions on File Prior to Encounter   Medication Sig    amLODIPine (NORVASC) 10 MG tablet Take 1 tablet (10 mg total) by mouth once daily.    apixaban 5 mg Tab Take 1 tablet (5 mg total) by mouth 2 (two) times daily.    cetirizine (ZYRTEC) 10 MG tablet Take 1 tablet (10 mg total) by mouth every evening.    epoetin donita (PROCRIT) 10,000 unit/mL injection Inject 1 mL (10,000 Units total) into the skin every Tues, Thurs, Sat.    gabapentin (NEURONTIN) 100 MG capsule Take 1 capsule (100 mg total) by mouth 3 (three) times daily.    insulin aspart (NOVOLOG) 100 unit/mL InPn pen Inject 1-10 Units into the skin 3 (three) times daily.    levothyroxine (SYNTHROID) 50 MCG tablet Take 1 tablet (50 mcg total) by mouth before breakfast.    rosuvastatin (CRESTOR) 10 " MG tablet Take 1 tablet (10 mg total) by mouth once daily.    [DISCONTINUED] miconazole nitrate 2% (MICOTIN) 2 % Oint Apply topically 2 (two) times daily.     Family History     Problem Relation (Age of Onset)    Arthritis Mother    Cancer Father (68)    Depression Sister    Diabetes Father, Maternal Grandmother    Heart disease Father    Hypertension Father, Son    Kidney disease Paternal Grandfather    Stroke Paternal Grandfather        Social History Main Topics    Smoking status: Never Smoker    Smokeless tobacco: Never Used    Alcohol use No    Drug use: No    Sexual activity: Yes     Partners: Male     Birth control/ protection: None     Review of Systems   Constitutional: Negative for appetite change, chills, fatigue and fever.   HENT: Negative for congestion, hearing loss, postnasal drip, sinus pain, sinus pressure, sore throat and trouble swallowing.    Eyes: Negative for photophobia and visual disturbance.   Respiratory: Positive for shortness of breath. Negative for cough and wheezing.    Cardiovascular: Positive for leg swelling. Negative for chest pain and palpitations.   Gastrointestinal: Positive for nausea. Negative for abdominal pain, diarrhea and vomiting.   Endocrine: Negative for polydipsia, polyphagia and polyuria.   Genitourinary: Positive for frequency. Negative for dysuria and urgency.   Musculoskeletal: Negative for gait problem, neck pain and neck stiffness.   Skin: Positive for wound (Left foot surgery (recent)). Negative for rash.   Neurological: Negative for dizziness, speech difficulty, weakness, numbness and headaches.   Hematological: Does not bruise/bleed easily.   Psychiatric/Behavioral: Negative for confusion. The patient is not nervous/anxious.      Objective:     Vital Signs (Most Recent):  Temp: 97.8 °F (36.6 °C) (02/21/18 2348)  Pulse: 70 (02/22/18 0046)  Resp: 20 (02/22/18 0046)  BP: (!) 107/58 (02/21/18 2348)  SpO2: (!) 94 % (02/22/18 0119) Vital Signs (24h  Range):  Temp:  [97.8 °F (36.6 °C)-97.9 °F (36.6 °C)] 97.8 °F (36.6 °C)  Pulse:  [68-73] 70  Resp:  [18-29] 20  SpO2:  [65 %-98 %] 94 %  BP: ()/(52-71) 107/58     Weight: 94.5 kg (208 lb 5.4 oz)  Body mass index is 33.63 kg/m².    Physical Exam   Constitutional: She is oriented to person, place, and time. She appears well-developed and well-nourished. No distress.   HENT:   Head: Normocephalic and atraumatic.   Eyes: Conjunctivae and EOM are normal. Pupils are equal, round, and reactive to light. No scleral icterus.   Neck: Normal range of motion. Neck supple. No JVD present. No tracheal deviation present. No thyromegaly present.   Cardiovascular: Normal rate, regular rhythm, normal heart sounds and intact distal pulses.  Exam reveals no gallop and no friction rub.    No murmur heard.  Pulses:       Dorsalis pedis pulses are 2+ on the right side, and 2+ on the left side.   Right upper chest dialysis catheter dressing dry/intact   Pulmonary/Chest: Accessory muscle usage (Mild) present. No respiratory distress. She has no wheezes. She has rhonchi. She has rales.   Abdominal: Soft. Bowel sounds are normal. She exhibits no distension and no mass. There is no tenderness. There is no guarding.   Musculoskeletal: Normal range of motion. She exhibits edema (BLE pitting 2+). She exhibits no tenderness or deformity.   Dolly boot to left foot   Neurological: She is alert and oriented to person, place, and time. She has normal strength. She exhibits normal muscle tone. Coordination normal.   Skin: Skin is warm, dry and intact. Capillary refill takes less than 2 seconds. No rash noted. She is not diaphoretic. No erythema.   Psychiatric: She has a normal mood and affect. Her speech is normal and behavior is normal. Judgment and thought content normal.   Vitals reviewed.        CRANIAL NERVES     CN III, IV, VI   Pupils are equal, round, and reactive to light.  Extraocular motions are normal.        Significant Labs:     CBC:    Recent Labs  Lab 02/21/18 2120   WBC 7.70   HGB 8.5*   HCT 27.8*        CMP:   Recent Labs  Lab 02/21/18 2120   *   K 4.8   CL 98   CO2 26   *   BUN 54*   CREATININE 3.4*   CALCIUM 8.7   ANIONGAP 10   EGFRNONAA 14*     TSH:   Recent Labs  Lab 11/24/17  1502   TSH 9.496*       Significant Imaging: I have reviewed and interpreted all pertinent imaging results/findings within the past 24 hours.   CXR: my read - mildly increased bilateral opacities (from prior exam), small right pleural effusion (unchanged from prior exam).

## 2018-02-22 NOTE — PLAN OF CARE
02/22/18 1018   PRE-TX-O2-ETCO2   O2 Device (Oxygen Therapy) High Flow nasal Cannula   $ Is the patient on Low Flow Oxygen? Yes   Flow (L/min) 10   SpO2 (!) 94 %   Pulse Oximetry Type Intermittent

## 2018-02-22 NOTE — PT/OT/SLP PROGRESS
Physical Therapy      Patient Name:  Juliane Ramos   MRN:  [id number]    Patient not seen today secondary to Dialysis. Will follow-up 02-.    Karen Denney, PT

## 2018-02-22 NOTE — H&P
[facility] Hospital Medicine  History & Physical    Patient Name: Juliane Ramos  MRN: [id number]  Admission Date: 2/21/2018  Attending Physician: Reg Devine MD   Primary Care Provider: JOS Dumont         Patient information was obtained from patient, spouse/SO and ER records.     Subjective:     Principal Problem:Acute on chronic respiratory failure with hypoxia    Chief Complaint:   Chief Complaint   Patient presents with    Shortness of Breath     oxygen concentrator not working right; EMS found pt hypoxic with O2 Sats of 66% and blue lips; placed on NRB        HPI: Juliane Ramos is a 57 y.o. female with PMHx significant for DM, HTN, CAD, CHF, CKD on dialysis (TuTh&Sat), COPD on home oxygen, PE, and prior CVA.  She is admitted to the service of hospital medicine with a diagnosis of acute on chronic respiratory failure.  She presented to the ED via EMS with an onset of worsening SOB since this morning. Per EMS, the patient was hypoxic on arrival and a face mask was placed. She reported being at baseline yesterday. Per , the patient's baseline O2 saturation is in the high 70's - 80's at home on continuous oxygen at 3L. She also endorses urinary frequency for the last few days. The patient fell 2 days ago when she was reaching for oxygen while in a chair and again today while trying to get dressed. She reports skipping dialysis yesterday secondary to being nauseated. Her last dialysis session was on Saturday (4 days ago). The patient was recently admitted for a SAH and PE and is on Apixaban. She denied cough, chest pain, abdominal pain, vomiting, diarrhea, hematochezia, or any other symptoms at this time.       Past Medical History:   Diagnosis Date    Anemia in stage 3 chronic kidney disease 11/26/2017    CHF (congestive heart failure)     COPD (chronic obstructive pulmonary disease)     Coronary artery disease     Diabetes mellitus     Encounter for blood  "transfusion     Essential hypertension 6/24/2017    Hypertension     MI (myocardial infarction) 2010    Segmental and subsegmental pulmonary emboli of RLL without acute cor pulmonale 11/25/2017    Stroke     July 2005    Thyroid disease     Traumatic subarachnoid hemorrhage 11/24/2017    Type 2 diabetes mellitus with stage 3 chronic kidney disease, without long-term current use of insulin 6/24/2017       Past Surgical History:   Procedure Laterality Date    ABCESS DRAINAGE Right     Between "little toe" and the one next to it    BREAST SURGERY      Reduction [id number]    CARDIAC SURGERY  01/2011    CABG 4vessel ring in one valve mitral valve prolapse    CORONARY ARTERY BYPASS GRAFT      hyperlipidemia      TUBAL LIGATION  03/1986       Review of patient's allergies indicates:  No Known Allergies    No current facility-administered medications on file prior to encounter.      Current Outpatient Prescriptions on File Prior to Encounter   Medication Sig    amLODIPine (NORVASC) 10 MG tablet Take 1 tablet (10 mg total) by mouth once daily.    apixaban 5 mg Tab Take 1 tablet (5 mg total) by mouth 2 (two) times daily.    cetirizine (ZYRTEC) 10 MG tablet Take 1 tablet (10 mg total) by mouth every evening.    epoetin donita (PROCRIT) 10,000 unit/mL injection Inject 1 mL (10,000 Units total) into the skin every Tues, Thurs, Sat.    gabapentin (NEURONTIN) 100 MG capsule Take 1 capsule (100 mg total) by mouth 3 (three) times daily.    insulin aspart (NOVOLOG) 100 unit/mL InPn pen Inject 1-10 Units into the skin 3 (three) times daily.    levothyroxine (SYNTHROID) 50 MCG tablet Take 1 tablet (50 mcg total) by mouth before breakfast.    rosuvastatin (CRESTOR) 10 MG tablet Take 1 tablet (10 mg total) by mouth once daily.    [DISCONTINUED] miconazole nitrate 2% (MICOTIN) 2 % Oint Apply topically 2 (two) times daily.     Family History     Problem Relation (Age of Onset)    Arthritis Mother    Cancer Father (68)    " Depression Sister    Diabetes Father, Maternal Grandmother    Heart disease Father    Hypertension Father, Son    Kidney disease Paternal Grandfather    Stroke Paternal Grandfather        Social History Main Topics    Smoking status: Never Smoker    Smokeless tobacco: Never Used    Alcohol use No    Drug use: No    Sexual activity: Yes     Partners: Male     Birth control/ protection: None     Review of Systems   Constitutional: Negative for appetite change, chills, fatigue and fever.   HENT: Negative for congestion, hearing loss, postnasal drip, sinus pain, sinus pressure, sore throat and trouble swallowing.    Eyes: Negative for photophobia and visual disturbance.   Respiratory: Positive for shortness of breath. Negative for cough and wheezing.    Cardiovascular: Positive for leg swelling. Negative for chest pain and palpitations.   Gastrointestinal: Positive for nausea. Negative for abdominal pain, diarrhea and vomiting.   Endocrine: Negative for polydipsia, polyphagia and polyuria.   Genitourinary: Positive for frequency. Negative for dysuria and urgency.   Musculoskeletal: Negative for gait problem, neck pain and neck stiffness.   Skin: Positive for wound (Left foot surgery (recent)). Negative for rash.   Neurological: Negative for dizziness, speech difficulty, weakness, numbness and headaches.   Hematological: Does not bruise/bleed easily.   Psychiatric/Behavioral: Negative for confusion. The patient is not nervous/anxious.      Objective:     Vital Signs (Most Recent):  Temp: 97.8 °F (36.6 °C) (02/21/18 2348)  Pulse: 70 (02/22/18 0046)  Resp: 20 (02/22/18 0046)  BP: (!) 107/58 (02/21/18 2348)  SpO2: (!) 94 % (02/22/18 0119) Vital Signs (24h Range):  Temp:  [97.8 °F (36.6 °C)-97.9 °F (36.6 °C)] 97.8 °F (36.6 °C)  Pulse:  [68-73] 70  Resp:  [18-29] 20  SpO2:  [65 %-98 %] 94 %  BP: ()/(52-71) 107/58     Weight: 94.5 kg (208 lb 5.4 oz)  Body mass index is 33.63 kg/m².    Physical Exam   Constitutional:  She is oriented to person, place, and time. She appears well-developed and well-nourished. No distress.   HENT:   Head: Normocephalic and atraumatic.   Eyes: Conjunctivae and EOM are normal. Pupils are equal, round, and reactive to light. No scleral icterus.   Neck: Normal range of motion. Neck supple. No JVD present. No tracheal deviation present. No thyromegaly present.   Cardiovascular: Normal rate, regular rhythm, normal heart sounds and intact distal pulses.  Exam reveals no gallop and no friction rub.    No murmur heard.  Pulses:       Dorsalis pedis pulses are 2+ on the right side, and 2+ on the left side.   Right upper chest dialysis catheter dressing dry/intact   Pulmonary/Chest: Accessory muscle usage (Mild) present. No respiratory distress. She has no wheezes. She has rhonchi. She has rales.   Abdominal: Soft. Bowel sounds are normal. She exhibits no distension and no mass. There is no tenderness. There is no guarding.   Musculoskeletal: Normal range of motion. She exhibits edema (BLE pitting 2+). She exhibits no tenderness or deformity.   Dolly boot to left foot   Neurological: She is alert and oriented to person, place, and time. She has normal strength. She exhibits normal muscle tone. Coordination normal.   Skin: Skin is warm, dry and intact. Capillary refill takes less than 2 seconds. No rash noted. She is not diaphoretic. No erythema.   Psychiatric: She has a normal mood and affect. Her speech is normal and behavior is normal. Judgment and thought content normal.   Vitals reviewed.        CRANIAL NERVES     CN III, IV, VI   Pupils are equal, round, and reactive to light.  Extraocular motions are normal.        Significant Labs:     CBC:   Recent Labs  Lab 02/21/18 2120   WBC 7.70   HGB 8.5*   HCT 27.8*        CMP:   Recent Labs  Lab 02/21/18 2120   *   K 4.8   CL 98   CO2 26   *   BUN 54*   CREATININE 3.4*   CALCIUM 8.7   ANIONGAP 10   EGFRNONAA 14*     TSH:   Recent Labs  Lab  11/24/17  1502   TSH 9.496*       Significant Imaging: I have reviewed and interpreted all pertinent imaging results/findings within the past 24 hours.   CXR: my read - mildly increased bilateral opacities (from prior exam), small right pleural effusion (unchanged from prior exam).     Assessment/Plan:     * Acute on chronic respiratory failure with hypoxia    Multiple etiologies - most likely 2/2 to volume overload from missed dialysis.  Supplemental O2 via nasal canula; titrate O2 saturation to >92%.   CPAP at night (patient states that she is supposed to use her machine, but does not).  Iniate beta 2 agonist bronchodilator treatments.   Lasix IV  Consult Nephrology - follow recommendations for dialysis                 CKD (chronic kidney disease) requiring chronic dialysis    Missed Tueday dialysis.  Consult nephrology and follow recommendations for dialysis  Avoid nonessential nephrotoxins  Adjust medications for Estimated Creatinine Clearance: 21.7 mL/min (A) (based on SCr of 3.4 mg/dL (H)).          Acute on chronic congestive heart failure    Likely 2/2 to missed dialysis - volume overload  Supplemental O2 via nasal canula; titrate O2 saturation to >92%.  IV lasix  Recent ECHO 11/22/2017 - EF 37%  Daily weights.  Strict I/Os.  Fluid restriction -800 ml  Na restriction <2g/d.                Obesity    Body mass index is 33.63 kg/m². Obesity complicates all aspects of disease management from diagnostic modalities to treatment. Weight loss encouraged and health benefits explained to patient.            Anemia in stage 3 chronic kidney disease    Chronic problem, stable - at baseline.  Monitor H/H, transfuse if becomes hemodynamically unstable or H/H < 7/21           Acquired hypothyroidism    Chronic problem, continue home Levothyroxine.            Type 2 diabetes mellitus with chronic kidney disease on chronic dialysis    Chronic problem, uncontrolled.  Check blood glucose level q AC/HS.  Use Novolog Insulin  Sliding Scale as needed.   Renal/cardiac/diabetic diet               VTE Risk Mitigation         Ordered     apixaban tablet 5 mg  2 times daily     Route:  Oral        02/22/18 0001     Reason for No Pharmacological VTE Prophylaxis  Once      02/22/18 0001             Luiza Aragon NP  Department of Hospital Medicine   [facility]

## 2018-02-22 NOTE — PROGRESS NOTES
PT TOLERATED HD WELL. TOTAL UF 4 LITERS. PT RECEIVED EPOGEN 9,400 UNITS IV WITH HD AND ALBUMIN 25 GM IV. CHANGED CATHETER HD DRESSING. GAVE REPORT TO OANH DAMON.

## 2018-02-22 NOTE — ASSESSMENT & PLAN NOTE
Missed Tueday dialysis.  Consult nephrology and follow recommendations for dialysis  Avoid nonessential nephrotoxins  Adjust medications for Estimated Creatinine Clearance: 21.7 mL/min (A) (based on SCr of 3.4 mg/dL (H)).

## 2018-02-22 NOTE — ASSESSMENT & PLAN NOTE
Likely 2/2 to missed dialysis - volume overload  Supplemental O2 via nasal canula; titrate O2 saturation to >92%.  IV lasix  Recent ECHO 11/22/2017 - EF 37%  Daily weights.  Strict I/Os.  Fluid restriction -800 ml  Na restriction <2g/d.

## 2018-02-22 NOTE — ED PROVIDER NOTES
Encounter Date: 2/21/2018    SCRIBE #1 NOTE: I, Gayle Sheppard, am scribing for, and in the presence of, Dr. Barker.       History     Chief Complaint   Patient presents with    Shortness of Breath     oxygen concentrator not working right; EMS found pt hypoxic with O2 Sats of 66% and blue lips; placed on NRB       02/21/2018 8:57 PM     Chief complaint: Shortness of breath      Juliane Ramos is a 57 y.o. female with DM, HTN, CAD, CHF, CKD on dialysis (TuTh&Sat), COPD on home oxygen, and prior CVA who presents to the ED via EMS with an onset of worsening SOB since this morning. Per EMS, the patient was hypoxic on arrival and a face mask was place. She reports being at baseline yesterday. Per , the patient's baseline O2 saturation is in the high 70's. She also endorses urinary frequency for the last few days. The patient fell 2 days ago when she was reaching for oxygen while in a chair. She reports skipping dialysis yesterday secondary to being nauseous. Her last dialysis session was on Saturday (4 days ago). The patient was recently admitted for a SAH and PE and is on Apixaban. She denies history of liver complications, cough, vomiting, blood in stool, dark stool, pain, or any other symptoms at this time. She has a SHx including a CABG.       The history is provided by the patient and the spouse ().     Review of patient's allergies indicates:  No Known Allergies  Past Medical History:   Diagnosis Date    Anemia in stage 3 chronic kidney disease 11/26/2017    CHF (congestive heart failure)     COPD (chronic obstructive pulmonary disease)     Coronary artery disease     Diabetes mellitus     Encounter for blood transfusion     Essential hypertension 6/24/2017    Hypertension     MI (myocardial infarction) 2010    Segmental and subsegmental pulmonary emboli of RLL without acute cor pulmonale 11/25/2017    Stroke     July 2005    Thyroid disease     Traumatic subarachnoid hemorrhage  "2017    Type 2 diabetes mellitus with stage 3 chronic kidney disease, without long-term current use of insulin 2017     Past Surgical History:   Procedure Laterality Date    ABCESS DRAINAGE Right     Between "little toe" and the one next to it    BREAST SURGERY      Reduction [id number]    CARDIAC SURGERY  2011    CABG 4vessel ring in one valve mitral valve prolapse    CORONARY ARTERY BYPASS GRAFT      hyperlipidemia      TUBAL LIGATION  1986     Family History   Problem Relation Age of Onset    Arthritis Mother     Heart disease Father     Cancer Father 68     prostae and bladder ca  in     Diabetes Father     Hypertension Father     Depression Sister     Hypertension Son     Diabetes Maternal Grandmother      lost leg    Kidney disease Paternal Grandfather      had kidney removed    Stroke Paternal Grandfather      Social History   Substance Use Topics    Smoking status: Never Smoker    Smokeless tobacco: Never Used    Alcohol use No     Review of Systems   Constitutional: Negative for chills and fever.   HENT: Negative for nosebleeds.    Eyes: Negative for visual disturbance.   Respiratory: Positive for shortness of breath. Negative for cough.    Cardiovascular: Negative for chest pain and palpitations.   Gastrointestinal: Positive for nausea (resolved). Negative for abdominal pain, blood in stool, diarrhea and vomiting.        Negative for melena.    Genitourinary: Negative for dysuria and hematuria.   Musculoskeletal: Negative for back pain and neck pain.   Skin: Negative for rash.   Neurological: Negative for seizures, syncope and headaches.     Physical Exam     Initial Vitals   BP Pulse Resp Temp SpO2   183 18   (!) 91/52 72 (!) 29 97.9 °F (36.6 °C) 98 %      MAP       18       65         Physical Exam    Nursing note and vitals reviewed.  Constitutional: She appears well-developed and " well-nourished. She is not diaphoretic. No distress.   HENT:   Head: Normocephalic and atraumatic.   Mouth/Throat: Oropharynx is clear and moist.   Eyes: Conjunctivae are normal.   Neck: Neck supple.   Cardiovascular: Normal rate, regular rhythm, normal heart sounds and intact distal pulses. Exam reveals no gallop and no friction rub.    No murmur heard.  Pulses:       Radial pulses are 2+ on the right side, and 2+ on the left side.        Dorsalis pedis pulses are 2+ on the right side, and 2+ on the left side.        Posterior tibial pulses are 2+ on the right side, and 2+ on the left side.   Pulmonary/Chest: No accessory muscle usage. Tachypnea noted. She has no wheezes. She has no rhonchi. She has rales.   Rales at bilateral bases. Minimal tachypnea. No retractions.    Abdominal: Soft. She exhibits distension. There is no tenderness.   Musculoskeletal: Normal range of motion. She exhibits edema. She exhibits no tenderness.   1+ pitting edema in LE's. Symmetry and non-tender.    Neurological: She is alert and oriented to person, place, and time.   Skin: No rash noted. No erythema.   Psychiatric: She has a normal mood and affect. Her speech is normal and behavior is normal. Judgment and thought content normal.       ED Course   Procedures  Labs Reviewed   CBC W/ AUTO DIFFERENTIAL - Abnormal; Notable for the following:        Result Value    RBC 3.52 (*)     Hemoglobin 8.5 (*)     Hematocrit 27.8 (*)     MCV 79 (*)     MCH 24.2 (*)     MCHC 30.7 (*)     RDW 19.9 (*)     MPV 8.7 (*)     Lymph # 0.9 (*)     Gran% 75.1 (*)     Lymph% 11.4 (*)     All other components within normal limits   BASIC METABOLIC PANEL - Abnormal; Notable for the following:     Sodium 134 (*)     Glucose 245 (*)     BUN, Bld 54 (*)     Creatinine 3.4 (*)     eGFR if  17 (*)     eGFR if non  14 (*)     All other components within normal limits      Imaging Results          X-Ray Chest 1 View (In process)                      Medical Decision Making:   History:   Old Medical Records: I decided to obtain old medical records.  Clinical Tests:   Lab Tests: Reviewed and Ordered  Radiological Study: Ordered and Reviewed  Medical Tests: Reviewed and Ordered            Scribe Attestation:   Scribe #1: I performed the above scribed service and the documentation accurately describes the services I performed. I attest to the accuracy of the note.    I, Dr. Ernesto Barker, personally performed the services described in this documentation. All medical record entries made by the scribe were at my direction and in my presence.  I have reviewed the chart and agree that the record reflects my personal performance and is accurate and complete. Ernesto Barker MD.  11:07 PM 02/21/2018    Juliane Ramos is a 57 y.o. female presenting with increasing oxygen requirement with dyspnea in the setting of dialysis noncompliance consistent with volume overload.  I suspect increased volume secondary to missing dialysis.  Chest x-ray is consistent with exam showing increased edema as well as right pleural effusion.  I doubt pneumonia or empyema.  Patient is having adequate oxygen saturation and increased nasal cannula.  She is appropriate for monitored bed.  I do not think she requires emergent dialysis or positive pressure ventilation to assist with ventilation at this point.  We will observe for any sign of worsening implant on dialysis in the morning.  I have spoken with hospitalist service will assume care.  Every low suspicion for other emergent process such as recurrent PE.  Laboratories reviewed.  EKG was reviewed with no sign of acute ischemia or infarction.  I doubt ACS.  I do not think further troponin enzyme assay or provocative testing is indicated.          ED Course as of Feb 21 2246 Wed Feb 21, 2018 2127 CXR:  Increased R pleural effusion with perihilar interstitial markings suggestive of volume overload. (my read)  [MR]    2131 CXR:  Old inferior and lateral T-wave inversions similar to last prior.  No new ST changes.  NSR with rate of 69, normal intervals and axis.  There are no acute ST or T wave changes suggestive of acute ischemia or infarction.    [MR]      ED Course User Index  [MR] Ernesto Barker MD     Clinical Impression:     1. Shortness of breath    2. Noncompliance with renal dialysis    3. Hypervolemia, unspecified hypervolemia type          Disposition:   Disposition: Admitted                        Ernesto Barker MD  02/21/18 8119

## 2018-02-22 NOTE — ED NOTES
Pt off NRB, O2 chance NC applied. Sats maintained at 90-91% on 4 LMP O2 per NC. Pt repositioned and encourage to breath in through nose for maximum benefit of NC.

## 2018-02-22 NOTE — PLAN OF CARE
Problem: Occupational Therapy Goal  Goal: Occupational Therapy Goal  Goals to be met by: 3/8/2018     Patient will increase functional independence with ADLs by performing:    Grooming while standing at sink with Supervision.  Toileting from toilet with Supervision for hygiene and clothing management.   Stand pivot transfers with Supervision.  Toilet transfer to toilet with Supervision.    Outcome: Ongoing (interventions implemented as appropriate)  OT evaluation completed today. Goals & care plan established.    SANJU Jones  2/22/2018

## 2018-02-22 NOTE — PT/OT/SLP EVAL
"Occupational Therapy   Evaluation    Name: Juliane Ramos  MRN: [id number]  Admitting Diagnosis:  Acute on chronic respiratory failure with hypoxia      Recommendations:     Discharge Recommendations: home health PT  Discharge Equipment Recommendations:  none  Barriers to discharge:  None    History:     Occupational Profile:  Living Environment: Pt lives with  in a mobile home with ramp entrance.   Previous level of function: Pt was mod I with mobility, using RW. Pt required assistance ADLs.  Equipment Owned:  raised toilet, walker, rolling, shower chair, grab bar, rollator, bedside commode  Assistance upon Discharge: Pt will receive assistance from .    Past Medical History:   Diagnosis Date    Anemia in stage 3 chronic kidney disease 11/26/2017    CHF (congestive heart failure)     COPD (chronic obstructive pulmonary disease)     Coronary artery disease     Diabetes mellitus     Encounter for blood transfusion     Essential hypertension 6/24/2017    Hypertension     MI (myocardial infarction) 2010    Segmental and subsegmental pulmonary emboli of RLL without acute cor pulmonale 11/25/2017    Stroke     July 2005    Thyroid disease     Traumatic subarachnoid hemorrhage 11/24/2017    Type 2 diabetes mellitus with stage 3 chronic kidney disease, without long-term current use of insulin 6/24/2017       Past Surgical History:   Procedure Laterality Date    ABCESS DRAINAGE Right     Between "little toe" and the one next to it    BREAST SURGERY      Reduction [id number]    CARDIAC SURGERY  01/2011    CABG 4vessel ring in one valve mitral valve prolapse    CORONARY ARTERY BYPASS GRAFT      hyperlipidemia      TUBAL LIGATION  03/1986       Subjective   Pt was supine in bed and awake.  at bedside.  Pt was agreeable to OT evaluation.    Chief Complaint: LLE weakness  Patient/Family stated goals:  "my left leg is a little weak."  Communicated with: nurse Rothman prior to " session.  Pain/Comfort:  · Pain Rating 1: 0/10  · Pain Rating Post-Intervention 1: 0/10    Patients cultural, spiritual, Yazidi conflicts given the current situation:      Objective:     Patient found with: telemetry, oxygen    General Precautions: Standard, fall   Orthopedic Precautions:N/A   Braces: N/A     Occupational Performance:    Bed Mobility:    · Patient completed Scooting/Bridging with moderate assistance  · Patient completed Supine to Sit with moderate assistance  · Patient completed Sit to Supine with moderate assistance    Functional Mobility/Transfers:  · Patient completed Sit <> Stand Transfer with minimum assistance  with  rolling walker   · Functional Mobility: Pt stood with min A and RW and took ~5 side steps to right, then took ~6 steps forwards and backwards. Pt loss balance walking backwards to EOB, requiring min A to regain balance. Pt c/o of LLE weakness. Pt has a soft cast on L heel from recent surgery. Pt took side steps back to HOB with CGA.    Activities of Daily Living:  · Grooming: stand by assistance with oral & facial hygiene and hair grooming  · LB Dressing: supervision and AD to don/doff R sock.    Cognitive/Visual Perceptual:  Cognitive/Psychosocial Skills:     -       Oriented to: x4   -       Follows Commands/attention:Follows multistep  commands  -       Communication: clear/fluent  -       Safety awareness/insight to disability: impaired   -       Mood/Affect/Coping skills/emotional control: Appropriate to situation  Visual/Perceptual:      -Intact    Physical Exam:  Balance:    -       SBA with static & dynamic sit; CGA > min A with static & dynamic stand.    Patient left HOB elevated with all lines intact, call button in reach, nurse  notified and  present    AMPA 6 Click:  AMPA Total Score: 19    Treatment & Education:  OT ed patient on safety with walker use for functional mobility with cues for hand placement & sequencing.   Pt was on 10L of O2 upon arrival.  "  Pt remained in the upper 90s with her Spo2 at rest and after activity.     Education:    Assessment:     Juliane Ramos is a 57 y.o. female with a medical diagnosis of Acute on chronic respiratory failure with hypoxia.  She presents with some LLE weakness and requires a significant amount of O2 with ADLs and ambulation.  Performance deficits affecting function are weakness, impaired endurance, impaired self care skills, gait instability, impaired functional mobilty, impaired balance, impaired cardiopulmonary response to activity, decreased safety awareness, decreased lower extremity function.      Rehab Prognosis:  fair; patient would benefit from acute skilled OT services to address these deficits and reach maximum level of function.         Clinical Decision Makin.  OT Low:  "Pt evaluation falls under low complexity for evaluation coding due to performance deficits noted in 1-3 areas as stated above and 0 co-morbities affecting current functional status. Data obtained from problem focused assessments. No modifications or assistance was required for completion of evaluation. Only brief occupational profile and history review completed."     Plan:     Patient to be seen 3 x/week to address the above listed problems via self-care/home management, therapeutic activities, therapeutic exercises  · Plan of Care Expires: 18  · Plan of Care Reviewed with: patient, spouse    This Plan of care has been discussed with the patient who was involved in its development and understands and is in agreement with the identified goals and treatment plan    GOALS:    Occupational Therapy Goals        Problem: Occupational Therapy Goal    Goal Priority Disciplines Outcome Interventions   Occupational Therapy Goal     OT, PT/OT Ongoing (interventions implemented as appropriate)    Description:  Goals to be met by: 3/8/2018     Patient will increase functional independence with ADLs by performing:    Grooming while " standing at sink with Supervision.  Toileting from toilet with Supervision for hygiene and clothing management.   Stand pivot transfers with Supervision.  Toilet transfer to toilet with Supervision.                      Time Tracking:     OT Date of Treatment: 02/22/18  OT Start Time: 0916  OT Stop Time: 0956  OT Total Time (min): 40 min    Billable Minutes:Evaluation 10  Self Care/Home Management 20  Therapeutic Activity 10    Anthony Castrejon, OT  2/22/2018

## 2018-02-22 NOTE — ASSESSMENT & PLAN NOTE
Multiple etiologies - most likely 2/2 to volume overload from missed dialysis.  Supplemental O2 via nasal canula; titrate O2 saturation to >92%.   CPAP at night (patient states that she is supposed to use her machine, but does not).  Iniate beta 2 agonist bronchodilator treatments.   Lasix IV  Consult Nephrology - follow recommendations for dialysis

## 2018-02-22 NOTE — ED NOTES
Pt in room 5 via ambulance from home. Family reports patient oxygen concentrator not working properly and pt became increasing sob at home. Pt placed on NRB in ED for oxygenation. Pt awake, follows verbal commands. Pt is tachypenic, decreased breath sounds with rales noted. Pt has dialysis catheter to right chest wall. Pt abd soft, distended, non tender. Pt has edema to lower extremities. Family at bedside.

## 2018-02-22 NOTE — NURSING
Pt's occult stool positive. Primary MD called and made aware  MD to review chart. Also made aware of drop in H/H

## 2018-02-22 NOTE — PLAN OF CARE
Problem: Patient Care Overview  Goal: Plan of Care Review  Outcome: Ongoing (interventions implemented as appropriate)  Pt remains in HD. Told pt earlier when stool was positive that she would probably be changed to in-patient.

## 2018-02-22 NOTE — ASSESSMENT & PLAN NOTE
Chronic problem, uncontrolled.  Check blood glucose level q AC/HS.  Use Novolog Insulin Sliding Scale as needed.   Renal/cardiac/diabetic diet

## 2018-02-22 NOTE — PLAN OF CARE
Problem: Patient Care Overview  Goal: Plan of Care Review  Patient order was change/clarify via Margo NP to CPAP, settings as patient tolerates. Patient place on CPAP setting [id number] with 7lpm O2 bleed in/sats 94%.

## 2018-02-23 LAB
ALBUMIN SERPL BCP-MCNC: 3.2 G/DL
ALP SERPL-CCNC: 47 U/L
ALT SERPL W/O P-5'-P-CCNC: 9 U/L
ANION GAP SERPL CALC-SCNC: 8 MMOL/L
AST SERPL-CCNC: 11 U/L
BASOPHILS # BLD AUTO: 0 K/UL
BASOPHILS # BLD AUTO: 0 K/UL
BASOPHILS NFR BLD: 0.4 %
BASOPHILS NFR BLD: 0.4 %
BILIRUB SERPL-MCNC: 0.5 MG/DL
BUN SERPL-MCNC: 36 MG/DL
CALCIUM SERPL-MCNC: 8.7 MG/DL
CHLORIDE SERPL-SCNC: 103 MMOL/L
CO2 SERPL-SCNC: 26 MMOL/L
CREAT SERPL-MCNC: 2.8 MG/DL
DIFFERENTIAL METHOD: ABNORMAL
DIFFERENTIAL METHOD: ABNORMAL
EOSINOPHIL # BLD AUTO: 0.5 K/UL
EOSINOPHIL # BLD AUTO: 0.5 K/UL
EOSINOPHIL NFR BLD: 9.7 %
EOSINOPHIL NFR BLD: 9.7 %
ERYTHROCYTE [DISTWIDTH] IN BLOOD BY AUTOMATED COUNT: 20.2 %
ERYTHROCYTE [DISTWIDTH] IN BLOOD BY AUTOMATED COUNT: 20.2 %
EST. GFR  (AFRICAN AMERICAN): 21 ML/MIN/1.73 M^2
EST. GFR  (NON AFRICAN AMERICAN): 18 ML/MIN/1.73 M^2
GLUCOSE SERPL-MCNC: 144 MG/DL
HCT VFR BLD AUTO: 26.5 %
HCT VFR BLD AUTO: 26.5 %
HGB BLD-MCNC: 8.1 G/DL
HGB BLD-MCNC: 8.1 G/DL
LYMPHOCYTES # BLD AUTO: 0.8 K/UL
LYMPHOCYTES # BLD AUTO: 0.8 K/UL
LYMPHOCYTES NFR BLD: 13.9 %
LYMPHOCYTES NFR BLD: 13.9 %
MAGNESIUM SERPL-MCNC: 2.9 MG/DL
MCH RBC QN AUTO: 24.5 PG
MCH RBC QN AUTO: 24.5 PG
MCHC RBC AUTO-ENTMCNC: 30.8 G/DL
MCHC RBC AUTO-ENTMCNC: 30.8 G/DL
MCV RBC AUTO: 80 FL
MCV RBC AUTO: 80 FL
MONOCYTES # BLD AUTO: 0.6 K/UL
MONOCYTES # BLD AUTO: 0.6 K/UL
MONOCYTES NFR BLD: 10.4 %
MONOCYTES NFR BLD: 10.4 %
NEUTROPHILS # BLD AUTO: 3.7 K/UL
NEUTROPHILS # BLD AUTO: 3.7 K/UL
NEUTROPHILS NFR BLD: 65.6 %
NEUTROPHILS NFR BLD: 65.6 %
PHOSPHATE SERPL-MCNC: 4.8 MG/DL
PLATELET # BLD AUTO: 166 K/UL
PLATELET # BLD AUTO: 166 K/UL
PMV BLD AUTO: 8.9 FL
PMV BLD AUTO: 8.9 FL
POCT GLUCOSE: 177 MG/DL (ref 70–110)
POCT GLUCOSE: 193 MG/DL (ref 70–110)
POCT GLUCOSE: 232 MG/DL (ref 70–110)
POTASSIUM SERPL-SCNC: 3.9 MMOL/L
PROT SERPL-MCNC: 7 G/DL
RBC # BLD AUTO: 3.33 M/UL
RBC # BLD AUTO: 3.33 M/UL
SODIUM SERPL-SCNC: 137 MMOL/L
WBC # BLD AUTO: 5.6 K/UL
WBC # BLD AUTO: 5.6 K/UL

## 2018-02-23 PROCEDURE — 97116 GAIT TRAINING THERAPY: CPT

## 2018-02-23 PROCEDURE — 99232 SBSQ HOSP IP/OBS MODERATE 35: CPT | Mod: ,,, | Performed by: INTERNAL MEDICINE

## 2018-02-23 PROCEDURE — 84100 ASSAY OF PHOSPHORUS: CPT

## 2018-02-23 PROCEDURE — 97162 PT EVAL MOD COMPLEX 30 MIN: CPT

## 2018-02-23 PROCEDURE — 63600175 PHARM REV CODE 636 W HCPCS: Performed by: NURSE PRACTITIONER

## 2018-02-23 PROCEDURE — 83735 ASSAY OF MAGNESIUM: CPT

## 2018-02-23 PROCEDURE — 80053 COMPREHEN METABOLIC PANEL: CPT

## 2018-02-23 PROCEDURE — 12000002 HC ACUTE/MED SURGE SEMI-PRIVATE ROOM

## 2018-02-23 PROCEDURE — 25000003 PHARM REV CODE 250: Performed by: NURSE PRACTITIONER

## 2018-02-23 PROCEDURE — 36415 COLL VENOUS BLD VENIPUNCTURE: CPT

## 2018-02-23 PROCEDURE — 85025 COMPLETE CBC W/AUTO DIFF WBC: CPT

## 2018-02-23 PROCEDURE — 99900035 HC TECH TIME PER 15 MIN (STAT)

## 2018-02-23 PROCEDURE — 94761 N-INVAS EAR/PLS OXIMETRY MLT: CPT

## 2018-02-23 PROCEDURE — 27000221 HC OXYGEN, UP TO 24 HOURS

## 2018-02-23 PROCEDURE — 97110 THERAPEUTIC EXERCISES: CPT

## 2018-02-23 PROCEDURE — C9113 INJ PANTOPRAZOLE SODIUM, VIA: HCPCS | Performed by: NURSE PRACTITIONER

## 2018-02-23 RX ADMIN — GABAPENTIN 100 MG: 100 CAPSULE ORAL at 09:02

## 2018-02-23 RX ADMIN — CETIRIZINE HYDROCHLORIDE 10 MG: 10 TABLET, FILM COATED ORAL at 09:02

## 2018-02-23 RX ADMIN — GABAPENTIN 100 MG: 100 CAPSULE ORAL at 05:02

## 2018-02-23 RX ADMIN — APIXABAN 5 MG: 2.5 TABLET, FILM COATED ORAL at 10:02

## 2018-02-23 RX ADMIN — COLLAGENASE SANTYL: 250 OINTMENT TOPICAL at 10:02

## 2018-02-23 RX ADMIN — APIXABAN 5 MG: 2.5 TABLET, FILM COATED ORAL at 09:02

## 2018-02-23 RX ADMIN — ROSUVASTATIN CALCIUM 10 MG: 5 TABLET, FILM COATED ORAL at 10:02

## 2018-02-23 RX ADMIN — PANTOPRAZOLE SODIUM 40 MG: 40 INJECTION, POWDER, FOR SOLUTION INTRAVENOUS at 10:02

## 2018-02-23 RX ADMIN — GABAPENTIN 100 MG: 100 CAPSULE ORAL at 01:02

## 2018-02-23 RX ADMIN — LEVOTHYROXINE SODIUM 50 MCG: 50 TABLET ORAL at 05:02

## 2018-02-23 NOTE — PT/OT/SLP EVAL
Physical Therapy Evaluation    Patient Name:  Juliane Ramos   MRN:  [id number]    Recommendations:     Discharge Recommendations:  home with home health, home health PT   Discharge Equipment Recommendations: none   Barriers to discharge: None    Assessment:     Juliane Ramos is a 57 y.o. female admitted with a medical diagnosis of Acute on chronic respiratory failure with hypoxia.  She presents with the following impairments/functional limitations:  weakness, impaired endurance, impaired self care skills, impaired functional mobilty, impaired balance, gait instability, decreased lower extremity function, impaired cardiopulmonary response to activity . Pt tolerated brief gait training in room with RW with early fatigue and weakness. Pt to benefit from continued HH PT at discharge.    Rehab Prognosis:  fair; patient would benefit from acute skilled PT services to address these deficits and reach maximum level of function.      Recent Surgery: * No surgery found *      Plan:     During this hospitalization, patient to be seen 6 x/week to address the above listed problems via gait training, therapeutic activities, therapeutic exercises  · Plan of Care Expires:  03/09/18   Plan of Care Reviewed with: patient, spouse    Subjective     Communicated with nurse Aguilera prior to session.  Patient found supine upon PT entry to room, agreeable to evaluation.    Pt stated of weakness but willing to try ambulation  Pt stated of fall history at home    Chief Complaint: weakness/tired  Patient comments/goals: home today  Pain/Comfort:  · Pain Rating 1: 0/10    Patients cultural, spiritual, Anabaptist conflicts given the current situation:      Living Environment:  Home with spouse, has ramp  Pt has HHPT services  Prior to admission, patients level of function was ambulatory with RW household distance, limited to doctor's office or dialysis.  Patient has the following equipment: wheelchair, walker, rolling, oxygen.  DME owned  (not currently used): .  Upon discharge, patient will have assistance from spouse.    Objective:     Patient found with: oxygen, telemetry     General Precautions: Standard, fall, respiratory   Orthopedic Precautions:N/A   Braces: N/A     Exams:  · RLE ROM: WFL  · RLE Strength: WFL  · LLE ROM: WFL  · LLE Strength: WFL    Functional Mobility:  · Bed Mobility:     · Rolling Left:  minimum assistance  · Supine to Sit: minimum assistance  · Transfers:     · Sit to Stand:  minimum assistance with rolling walker  · Bed to Chair: minimum assistance with  rolling walker  using  Stand Pivot  · Gait: 30ft with RW, slow estefania, O2 at 5 liters, SAT 90%    AM-PAC 6 CLICK MOBILITY  Total Score:16       Therapeutic Activities and Exercises:   gait with RW in room  OOB to chair  thera ex to LE's x 10 reps with LAQ,marches, abd/add and ankle pumps  SAT on O2 90-93%     Patient left up in chair with all lines intact, call button in reach, nurse Ale notified and spouse present.    GOALS:    Physical Therapy Goals        Problem: Physical Therapy Goal    Goal Priority Disciplines Outcome Goal Variances Interventions   Physical Therapy Goal     PT/OT, PT Ongoing (interventions implemented as appropriate)     Description:  Goals to be met by: 2018     Patient will increase functional independence with mobility by performin. Sit to stand transfer with Contact Guard Assistance  2. Bed to chair transfer with Contact Guard Assistance using Rolling Walker  3. Gait  x 150 feet with minimal Assistance using Rolling Walker.   4. Lower extremity exercise program x20 reps per handout, with assistance as needed                      History:     Past Medical History:   Diagnosis Date    Anemia in stage 3 chronic kidney disease 2017    CHF (congestive heart failure)     COPD (chronic obstructive pulmonary disease)     Coronary artery disease     Diabetes mellitus     Encounter for blood transfusion     Essential  "hypertension 6/24/2017    Hypertension     MI (myocardial infarction) 2010    Segmental and subsegmental pulmonary emboli of RLL without acute cor pulmonale 11/25/2017    Stroke     July 2005    Thyroid disease     Traumatic subarachnoid hemorrhage 11/24/2017    Type 2 diabetes mellitus with stage 3 chronic kidney disease, without long-term current use of insulin 6/24/2017       Past Surgical History:   Procedure Laterality Date    ABCESS DRAINAGE Right     Between "little toe" and the one next to it    BREAST SURGERY      Reduction [id number]    CARDIAC SURGERY  01/2011    CABG 4vessel ring in one valve mitral valve prolapse    CORONARY ARTERY BYPASS GRAFT      hyperlipidemia      TUBAL LIGATION  03/1986       Clinical Decision Making:     History  Co-morbidities and personal factors that may impact the plan of care Examination  Body Structures and Functions, activity limitations and participation restrictions that may impact the plan of care Clinical Presentation   Decision Making/ Complexity Score   Co-morbidities:   [] Time since onset of injury / illness / exacerbation  [] Status of current condition  []Patient's cognitive status and safety concerns    [] Multiple Medical Problems (see med hx)  Personal Factors:   [] Patient's age  [] Prior Level of function   [] Patient's home situation (environment and family support)  [] Patient's level of motivation  [] Expected progression of patient      HISTORY:(criteria)    [] 25759 - no personal factors/history    [] 06660 - has 1-2 personal factor/comorbidity     [] 67644 - has >3 personal factor/comorbidity     Body Regions:  [] Objective examination findings  [] Head     []  Neck  [] Trunk   [] Upper Extremity  [] Lower Extremity    Body Systems:  [] For communication ability, affect, cognition, language, and learning style: the assessment of the ability to make needs known, consciousness, orientation (person, place, and time), expected emotional " /behavioral responses, and learning preferences (eg, learning barriers, education  needs)  [] For the neuromuscular system: a general assessment of gross coordinated movement (eg, balance, gait, locomotion, transfers, and transitions) and motor function  (motor control and motor learning)  [] For the musculoskeletal system: the assessment of gross symmetry, gross range of motion, gross strength, height, and weight  [] For the integumentary system: the assessment of pliability(texture), presence of scar formation, skin color, and skin integrity  [] For cardiovascular/pulmonary system: the assessment of heart rate, respiratory rate, blood pressure, and edema     Activity limitations:    [] Patient's cognitive status and saf ety concerns          [] Status of current condition      [] Weight bearing restriction  [] Cardiopulmunary Restriction    Participation Restrictions:   [] Goals and goal agreement with the patient     [] Rehab potential (prognosis) and probable outcome      Examination of Body System: (criteria)    [] 66814 - addressing 1-2 elements    [] 44010 - addressing a total of 3 or more elements     [] 97129 -  Addressing a total of 4 or more elements         Clinical Presentation: (criteria)  Choose one     On examination of body system using standardized tests and measures patient presents with (CHOOSE ONE) elements from any of the following: body structures and functions, activity limitations, and/or participation restrictions.  Leading to a clinical presentation that is considered (CHOOSE ONE)                              Clinical Decision Making  (Eval Complexity):  Choose One     Time Tracking:     PT Received On: 02/23/18  PT Start Time: 0932     PT Stop Time: 1007  PT Total Time (min): 35 min     Billable Minutes: Evaluation 10, Gait Training 10 and Therapeutic Activity 15      Karen Denney, PT  02/23/2018

## 2018-02-23 NOTE — PLAN OF CARE
Problem: Patient Care Overview  Goal: Plan of Care Review  Outcome: Ongoing (interventions implemented as appropriate)  Patient AAOx4.  VSS.  Has remained afebrile this shift.  Would care completed to lower left ankle. Picture uploaded to EPIC.  POC reviewed with patient.  Open discussion was facilitated.  Patient verbalized understanding.  Bed in lowest position, side rails up x2, call light within reach, and safety measures maintained throughout shift.  Patient has remained free of falls, trauma, and injury this shift. Patient denies any needs at this time.  Will continue to monitor.

## 2018-02-23 NOTE — PROGRESS NOTES
INPATIENT NEPHROLOGY PROGRESS NOTE  [facility] NEPHROLOGY    Patient Name: Juliane Ramos  Date: 02/23/2018    Reason for consultation: ESRD    Chief Complaint:   Chief Complaint   Patient presents with    Shortness of Breath     oxygen concentrator not working right; EMS found pt hypoxic with O2 Sats of 66% and blue lips; placed on NRB       History of Present Illness:  56 y/o F with CAD, CHF (EF 35-40%), HTN, DM, COPD on home O2, SAH, PE on AC and AoCKI (prior baseline stage III CKD with eGFR 50s, MC 2/2 contrast/vancomycin/infection) who is dialysis dependent (going on 3 months of HD now so effectively ESRD) who p/w dyspnea x 1 day. She missed HD on Tuesday due to nausea. Her last HD was on Sat. When EMS arrived, she was hypoxic. She was placed on a NRB and given IV lasix in the ER. CXR was wet. We have been consulted for dialysis.     · Interval History/Subjective:    - tolerated 4L UF yesterday  - no cp, dyspnea, abd pain, worsening edema    · Review of Systems: All 14 systems reviewed and negative, except as noted in HPI    Plan of Care:    Assessment:  ESRD  Volume overload/Chronic hypotension with hx of systolic CHF (baseline EF 35-40%)   Hyponatremia  SHPT  Anemia of CKD  Hypoalbuminemia     Plan:     1. ESRD- Continue HD TTS.      2. Volume/Blood Pressure- She did well with UF yesterday. There are no acute volume removal needs today.      3. Electrolytes/Acid Base- Hyponatremia is resolved after UF.       4. Bone Mineral Metabolism- Phos is improved. There is no indication for a binder. Continue a renal diet.      5. Anemia of CKD- Hb is stable. Continue EPO 10K with HD. Stool + blood- needs outpatient GI f/u.      6. Low albumin- Improved; continue nepro.        7. Access- TDC; Dr. Deal will see patient today and discuss scheduling outpatient surgery for AVF placement.    Ok with d/c from a renal standpoint.     Thank you for allowing us to participate in this patient's care. We will continue to  follow.    Medications:  No current facility-administered medications on file prior to encounter.      Current Outpatient Prescriptions on File Prior to Encounter   Medication Sig Dispense Refill    amLODIPine (NORVASC) 10 MG tablet Take 1 tablet (10 mg total) by mouth once daily. 30 tablet 1    apixaban 5 mg Tab Take 1 tablet (5 mg total) by mouth 2 (two) times daily. 60 tablet 1    cetirizine (ZYRTEC) 10 MG tablet Take 1 tablet (10 mg total) by mouth every evening. 20 tablet 0    epoetin donita (PROCRIT) 10,000 unit/mL injection Inject 1 mL (10,000 Units total) into the skin every Tues, Thurs, Sat. 1 mL 5    gabapentin (NEURONTIN) 100 MG capsule Take 1 capsule (100 mg total) by mouth 3 (three) times daily. 90 capsule 1    insulin aspart (NOVOLOG) 100 unit/mL InPn pen Inject 1-10 Units into the skin 3 (three) times daily. 3 mL 1    levothyroxine (SYNTHROID) 50 MCG tablet Take 1 tablet (50 mcg total) by mouth before breakfast. 30 tablet 1    rosuvastatin (CRESTOR) 10 MG tablet Take 1 tablet (10 mg total) by mouth once daily. 30 tablet 1     Scheduled Meds:   apixaban  5 mg Oral BID    cetirizine  10 mg Oral QHS    collagenase   Topical (Top) Daily    epoetin donita (PROCRIT) injection  100 Units/kg Intravenous Every Tues, Thurs, Sat    gabapentin  100 mg Oral TID    levothyroxine  50 mcg Oral Before breakfast    pantoprazole  40 mg Intravenous Daily    rosuvastatin  10 mg Oral Daily    senna-docusate 8.6-50 mg  1 tablet Oral BID     Continuous Infusions:  PRN Meds:.acetaminophen, albumin human 25%, albuterol-ipratropium 2.5mg-0.5mg/3mL, dextrose 50%, dextrose 50%, glucagon (human recombinant), glucose, glucose, heparin (porcine), insulin aspart U-100, ondansetron, ramelteon, sodium chloride 0.9%    Allergies:  Patient has no known allergies.    Vital Signs:  Temp Readings from Last 3 Encounters:   02/23/18 98.5 °F (36.9 °C) (Oral)   12/29/17 98.8 °F (37.1 °C)   12/11/17 97.4 °F (36.3 °C) (Oral)        Pulse Readings from Last 3 Encounters:   02/23/18 60   12/29/17 96   12/11/17 69       BP Readings from Last 3 Encounters:   02/23/18 (!) 98/56   12/29/17 127/75   12/11/17 (!) 125/59       Weight:  Wt Readings from Last 3 Encounters:   02/21/18 94.5 kg (208 lb 5.4 oz)   12/24/17 104.1 kg (229 lb 6.4 oz)   12/08/17 117.9 kg (260 lb)       Physical Exam:  Constitutional: nad, aao x 3  Heart: rrr, no m/r/g, wwp, edema is better  Lungs: ctab, no w/r/r/c, no lb  Abdomen: s/nt/nd, +BS    Results:  Lab Results   Component Value Date     02/23/2018    K 3.9 02/23/2018     02/23/2018    CO2 26 02/23/2018    BUN 36 (H) 02/23/2018    CREATININE 2.8 (H) 02/23/2018    CALCIUM 8.7 02/23/2018    ANIONGAP 8 02/23/2018    ESTGFRAFRICA 21 (A) 02/23/2018    EGFRNONAA 18 (A) 02/23/2018       Lab Results   Component Value Date    CALCIUM 8.7 02/23/2018    PHOS 4.8 (H) 02/23/2018         Recent Labs  Lab 02/23/18  0425   WBC 5.60  5.60   RBC 3.33*  3.33*   HGB 8.1*  8.1*   HCT 26.5*  26.5*     166   MCV 80*  80*   MCH 24.5*  24.5*   MCHC 30.8*  30.8*       I have personally reviewed pertinent radiological imaging and reports.    Amairani Young MD MPH  [facility] [place]  [address] [place] [address]  [place] [id number]  [phone] (p)  [phone] (f)

## 2018-02-23 NOTE — PROGRESS NOTES
Progress Note  Hospital Medicine  Patient Name:Juliane Ramos  MRN:  [id number]  Patient Class: IP- Inpatient  Admit Date: 2/21/2018  Length of Stay: 1 days  Expected Discharge Date:   Attending Physician: Reg Devine MD  Primary Care Provider:  JOS Dumont    SUBJECTIVE:     Principal Problem: Acute on chronic respiratory failure with hypoxia  Initial history of present illness: Juliane Ramos is a 57 y.o. female with PMHx significant for DM, HTN, CAD, CHF, CKD on dialysis (TuTh&Sat), COPD on home oxygen, PE, and prior CVA.  She is admitted to the service of [facility] medicine with a diagnosis of acute on chronic respiratory failure.  She presented to the ED via EMS with an onset of worsening SOB since this morning. Per EMS, the patient was hypoxic on arrival and a face mask was placed. She reported being at baseline yesterday. Per , the patient's baseline O2 saturation is in the high 70's - 80's at home on continuous oxygen at 3L. She also endorses urinary frequency for the last few days. The patient fell 2 days ago when she was reaching for oxygen while in a chair and again today while trying to get dressed. She reports skipping dialysis yesterday secondary to being nauseated. Her last dialysis session was on Saturday (4 days ago). The patient was recently admitted for a SAH and PE and is on Apixaban. She denied cough, chest pain, abdominal pain, vomiting, diarrhea, hematochezia, or any other symptoms at this time.     PMH/PSH/SH/FH/Meds: reviewed.    Symptoms/Review of Systems: Feeling better but weak. Feels exhausted. No shortness of breath, cough, chest pain or headache, fever or abdominal pain.     Diet:  Adequate intake.    Activity level: Up with assistance  Pain:  Patient reports no pain.       OBJECTIVE:   Vital Signs (Most Recent):      Temp: 98.5 °F (36.9 °C) (02/23/18 0344)  Pulse: 60 (02/23/18 0709)  Resp: 18 (02/23/18 0709)  BP: (!) 98/56 (02/23/18 0344)  SpO2: 97 % (02/23/18  0709)       Vital Signs Range (Last 24H):  Temp:  [96.2 °F (35.7 °C)-98.5 °F (36.9 °C)]   Pulse:  [55-66]   Resp:  [16-21]   BP: ()/(56-77)   SpO2:  [90 %-100 %]     Weight: 94.5 kg (208 lb 5.4 oz)  Body mass index is 33.63 kg/m².    Intake/Output Summary (Last 24 hours) at 02/23/18 1030  Last data filed at 02/22/18 1800   Gross per 24 hour   Intake              720 ml   Output             4600 ml   Net            -3880 ml     Physical Examination:  Constitutional: She is oriented to person, place, and time. She appears well-developed and well-nourished. No distress.   HENT:   Head: Normocephalic and atraumatic.   Eyes: Conjunctivae and EOM are normal. Pupils are equal, round, and reactive to light. No scleral icterus.   Neck: Normal range of motion. Neck supple. No JVD present. No tracheal deviation present. No thyromegaly present.   Cardiovascular: Normal rate, regular rhythm, normal heart sounds and intact distal pulses.  Exam reveals no gallop and no friction rub.    No murmur heard.  Pulses:       Dorsalis pedis pulses are 2+ on the right side, and 2+ on the left side.   Right upper chest dialysis catheter dressing dry/intact   Pulmonary/Chest: Accessory muscle usage (Mild) present. No respiratory distress. She has no wheezes. She has rhonchi. She has rales.   Abdominal: Soft. Bowel sounds are normal. She exhibits no distension and no mass. There is no tenderness. There is no guarding.   Musculoskeletal: Normal range of motion. She exhibits trace pedal edema. She exhibits no tenderness or deformity.   Dolly boot to left foot   Neurological: She is alert and oriented to person, place, and time. She has normal strength. She exhibits normal muscle tone. Coordination normal.   Skin: Skin is warm, dry and intact. Capillary refill takes less than 2 seconds. No rash noted. She is not diaphoretic. No erythema.   Psychiatric: She has a normal mood and affect. Her speech is normal and behavior is normal. Judgment and  thought content normal.   Vitals reviewed.    CBC:    Recent Labs  Lab 02/21/18 2120 02/22/18 0447 02/23/18  0425   WBC 7.70 6.70  6.70 5.60  5.60   RBC 3.52* 3.32*  3.32* 3.33*  3.33*   HGB 8.5* 8.0*  8.0* 8.1*  8.1*   HCT 27.8* 25.9*  25.9* 26.5*  26.5*    204  204 166  166   MCV 79* 78*  78* 80*  80*   MCH 24.2* 24.1*  24.1* 24.5*  24.5*   MCHC 30.7* 30.9*  30.9* 30.8*  30.8*   BMP    Recent Labs  Lab 02/21/18 2120 02/22/18 0447 02/23/18  0425   * 186* 144*   * 133* 137   K 4.8 4.4 3.9   CL 98 99 103   CO2 26 24 26   BUN 54* 56* 36*   CREATININE 3.4* 3.4* 2.8*   CALCIUM 8.7 8.6* 8.7   MG  --  3.1* 2.9*      Diagnostic Results:  Microbiology Results (last 7 days)     ** No results found for the last 168 hours. **         CXR: Increasing right pleural effusion and right base atelectasis. Cardiomegaly. Prior sternotomy. Double-lumen central line in position.    Assessment/Plan:     * Acute on chronic respiratory failure with hypoxia     Multiple etiologies - most likely 2/2 to volume overload from missed dialysis.  Supplemental O2 via nasal canula; titrate O2 saturation to >92%.   CPAP at night (patient states that she is supposed to use her machine, but does not).  Iniate beta 2 agonist bronchodilator treatments.   Lasix IV  Consult Nephrology - follow recommendations for dialysis          CKD (chronic kidney disease) requiring chronic dialysis     Follow nephrology recommendations. HD compliance discussed with patient.  Avoid nonessential nephrotoxins  Adjust medications for Estimated Creatinine Clearance: 21.7 mL/min (A) (based on SCr of 3.4 mg/dL (H)).       Acute on chronic congestive heart failure     Likely 2/2 to missed dialysis - volume overload  Supplemental O2 via nasal canula; titrate O2 saturation to >92%.  IV lasix  Recent ECHO 11/22/2017 - EF 37%  Daily weights.  Strict I/Os.  Fluid restriction -800 ml  Na restriction <2g/d.         Obesity     Body mass index  is 33.63 kg/m². Obesity complicates all aspects of disease management from diagnostic modalities to treatment. Weight loss encouraged and health benefits explained to patient.       Anemia in stage 3 chronic kidney disease     Chronic problem, stable - at baseline.  Monitor H/H, transfuse if becomes hemodynamically unstable or H/H < 7/21          Acquired hypothyroidism     Chronic problem, continue home Levothyroxine.          Type 2 diabetes mellitus with chronic kidney disease on chronic dialysis     Chronic problem, uncontrolled.  Check blood glucose level q AC/HS.  Use Novolog Insulin Sliding Scale as needed.   Renal/cardiac/diabetic diet      DC home in AM after HD.     VTE Risk Mitigation         Ordered     heparin (porcine) injection 1,000 Units  As needed (PRN)     Route:  Intravenous        02/22/18 1010     apixaban tablet 5 mg  2 times daily     Route:  Oral        02/22/18 0001     Reason for No Pharmacological VTE Prophylaxis  Once      02/22/18 0001        Reg Devine MD  Department of Hospital Medicine   [facility]

## 2018-02-23 NOTE — PLAN OF CARE
Problem: Physical Therapy Goal  Goal: Physical Therapy Goal  Goals to be met by: 2018     Patient will increase functional independence with mobility by performin. Sit to stand transfer with Contact Guard Assistance  2. Bed to chair transfer with Contact Guard Assistance using Rolling Walker  3. Gait  x 150 feet with minimal Assistance using Rolling Walker.   4. Lower extremity exercise program x20 reps per handout, with assistance as needed    Outcome: Ongoing (interventions implemented as appropriate)  PT eval and treat completed. Gait training 30ft with RW, OOB to chair completed LE thera ex x 10

## 2018-02-23 NOTE — PLAN OF CARE
02/23/18 0709   Patient Assessment/Suction   Level of Consciousness (AVPU) alert   All Lung Fields Breath Sounds clear   PRE-TX-O2-ETCO2   O2 Device (Oxygen Therapy) nasal cannula   $ Is the patient on Low Flow Oxygen? Yes   Flow (L/min) 5   Oxygen Concentration (%) 40   SpO2 97 %   Pulse Oximetry Type Intermittent   $ Pulse Oximetry - Multiple Charge Pulse Oximetry - Multiple   Pulse 60   Resp 18   Aerosol Therapy   $ Aerosol Therapy Charges PRN treatment not required   Ready to Wean/Extubation Screen   FIO2<=50 (chart decimal) 0.4

## 2018-02-24 VITALS
DIASTOLIC BLOOD PRESSURE: 79 MMHG | TEMPERATURE: 98 F | BODY MASS INDEX: 31.82 KG/M2 | OXYGEN SATURATION: 95 % | HEART RATE: 74 BPM | RESPIRATION RATE: 18 BRPM | SYSTOLIC BLOOD PRESSURE: 136 MMHG | HEIGHT: 66 IN | WEIGHT: 198 LBS

## 2018-02-24 LAB
ALBUMIN SERPL BCP-MCNC: 3.2 G/DL
ALP SERPL-CCNC: 59 U/L
ALT SERPL W/O P-5'-P-CCNC: 9 U/L
ANION GAP SERPL CALC-SCNC: 9 MMOL/L
AST SERPL-CCNC: 12 U/L
BASOPHILS # BLD AUTO: 0 K/UL
BASOPHILS # BLD AUTO: 0 K/UL
BASOPHILS NFR BLD: 0.7 %
BASOPHILS NFR BLD: 0.7 %
BILIRUB SERPL-MCNC: 0.5 MG/DL
BUN SERPL-MCNC: 50 MG/DL
CALCIUM SERPL-MCNC: 8.8 MG/DL
CHLORIDE SERPL-SCNC: 100 MMOL/L
CO2 SERPL-SCNC: 24 MMOL/L
CREAT SERPL-MCNC: 3.2 MG/DL
DIFFERENTIAL METHOD: ABNORMAL
DIFFERENTIAL METHOD: ABNORMAL
EOSINOPHIL # BLD AUTO: 0.8 K/UL
EOSINOPHIL # BLD AUTO: 0.8 K/UL
EOSINOPHIL NFR BLD: 10.4 %
EOSINOPHIL NFR BLD: 10.4 %
ERYTHROCYTE [DISTWIDTH] IN BLOOD BY AUTOMATED COUNT: 19.7 %
ERYTHROCYTE [DISTWIDTH] IN BLOOD BY AUTOMATED COUNT: 19.7 %
EST. GFR  (AFRICAN AMERICAN): 18 ML/MIN/1.73 M^2
EST. GFR  (NON AFRICAN AMERICAN): 16 ML/MIN/1.73 M^2
GLUCOSE SERPL-MCNC: 180 MG/DL
HCT VFR BLD AUTO: 26 %
HCT VFR BLD AUTO: 26 %
HGB BLD-MCNC: 8 G/DL
HGB BLD-MCNC: 8 G/DL
LYMPHOCYTES # BLD AUTO: 0.9 K/UL
LYMPHOCYTES # BLD AUTO: 0.9 K/UL
LYMPHOCYTES NFR BLD: 11.6 %
LYMPHOCYTES NFR BLD: 11.6 %
MAGNESIUM SERPL-MCNC: 3 MG/DL
MCH RBC QN AUTO: 24.2 PG
MCH RBC QN AUTO: 24.2 PG
MCHC RBC AUTO-ENTMCNC: 30.8 G/DL
MCHC RBC AUTO-ENTMCNC: 30.8 G/DL
MCV RBC AUTO: 79 FL
MCV RBC AUTO: 79 FL
MONOCYTES # BLD AUTO: 0.7 K/UL
MONOCYTES # BLD AUTO: 0.7 K/UL
MONOCYTES NFR BLD: 9.1 %
MONOCYTES NFR BLD: 9.1 %
NEUTROPHILS # BLD AUTO: 5.1 K/UL
NEUTROPHILS # BLD AUTO: 5.1 K/UL
NEUTROPHILS NFR BLD: 68.2 %
NEUTROPHILS NFR BLD: 68.2 %
PHOSPHATE SERPL-MCNC: 4.6 MG/DL
PLATELET # BLD AUTO: 177 K/UL
PLATELET # BLD AUTO: 177 K/UL
PMV BLD AUTO: 8.7 FL
PMV BLD AUTO: 8.7 FL
POCT GLUCOSE: 186 MG/DL (ref 70–110)
POCT GLUCOSE: 188 MG/DL (ref 70–110)
POCT GLUCOSE: 223 MG/DL (ref 70–110)
POTASSIUM SERPL-SCNC: 4.4 MMOL/L
PROT SERPL-MCNC: 7.2 G/DL
RBC # BLD AUTO: 3.31 M/UL
RBC # BLD AUTO: 3.31 M/UL
SODIUM SERPL-SCNC: 133 MMOL/L
WBC # BLD AUTO: 7.4 K/UL
WBC # BLD AUTO: 7.4 K/UL

## 2018-02-24 PROCEDURE — 83735 ASSAY OF MAGNESIUM: CPT

## 2018-02-24 PROCEDURE — 25000003 PHARM REV CODE 250: Performed by: NURSE PRACTITIONER

## 2018-02-24 PROCEDURE — 63600175 PHARM REV CODE 636 W HCPCS: Performed by: INTERNAL MEDICINE

## 2018-02-24 PROCEDURE — 80053 COMPREHEN METABOLIC PANEL: CPT

## 2018-02-24 PROCEDURE — 94761 N-INVAS EAR/PLS OXIMETRY MLT: CPT

## 2018-02-24 PROCEDURE — 63600175 PHARM REV CODE 636 W HCPCS: Performed by: NURSE PRACTITIONER

## 2018-02-24 PROCEDURE — 80100016 HC MAINTENANCE HEMODIALYSIS

## 2018-02-24 PROCEDURE — 84100 ASSAY OF PHOSPHORUS: CPT

## 2018-02-24 PROCEDURE — 99900035 HC TECH TIME PER 15 MIN (STAT)

## 2018-02-24 PROCEDURE — 36415 COLL VENOUS BLD VENIPUNCTURE: CPT

## 2018-02-24 PROCEDURE — 85025 COMPLETE CBC W/AUTO DIFF WBC: CPT

## 2018-02-24 PROCEDURE — C9113 INJ PANTOPRAZOLE SODIUM, VIA: HCPCS | Performed by: NURSE PRACTITIONER

## 2018-02-24 RX ADMIN — EPOETIN ALFA 9400 UNITS: 20000 SOLUTION INTRAVENOUS; SUBCUTANEOUS at 11:02

## 2018-02-24 RX ADMIN — APIXABAN 5 MG: 2.5 TABLET, FILM COATED ORAL at 08:02

## 2018-02-24 RX ADMIN — COLLAGENASE SANTYL: 250 OINTMENT TOPICAL at 08:02

## 2018-02-24 RX ADMIN — ROSUVASTATIN CALCIUM 10 MG: 5 TABLET, FILM COATED ORAL at 08:02

## 2018-02-24 RX ADMIN — GABAPENTIN 100 MG: 100 CAPSULE ORAL at 02:02

## 2018-02-24 RX ADMIN — INSULIN ASPART 2 UNITS: 100 INJECTION, SOLUTION INTRAVENOUS; SUBCUTANEOUS at 10:02

## 2018-02-24 RX ADMIN — LEVOTHYROXINE SODIUM 50 MCG: 50 TABLET ORAL at 05:02

## 2018-02-24 RX ADMIN — GABAPENTIN 100 MG: 100 CAPSULE ORAL at 05:02

## 2018-02-24 RX ADMIN — PANTOPRAZOLE SODIUM 40 MG: 40 INJECTION, POWDER, FOR SOLUTION INTRAVENOUS at 08:02

## 2018-02-24 NOTE — PLAN OF CARE
Problem: Fall Risk (Adult)  Intervention: Patient Rounds  Pt resting quietly with no signs of distress. Left heel wound care completed. Pt refused Cpap over night. Bed low locked, side rail up x 2, call light in reach.

## 2018-02-24 NOTE — PROGRESS NOTES
INPATIENT NEPHROLOGY PROGRESS NOTE  [facility] NEPHROLOGY    Patient Name: Juliane Ramos  Date: 02/24/2018    Reason for consultation: ESRD    Chief Complaint:   Chief Complaint   Patient presents with    Shortness of Breath     oxygen concentrator not working right; EMS found pt hypoxic with O2 Sats of 66% and blue lips; placed on NRB       History of Present Illness:  58 y/o F with CAD, CHF (EF 35-40%), HTN, DM, COPD on home O2, SAH, PE on AC and AoCKI (prior baseline stage III CKD with eGFR 50s, MC 2/2 contrast/vancomycin/infection) who is dialysis dependent (going on 3 months of HD now so effectively ESRD) who p/w dyspnea x 1 day. She missed HD on Tuesday due to nausea. Her last HD was on Sat. When EMS arrived, she was hypoxic. She was placed on a NRB and given IV lasix in the ER. CXR was wet. We have been consulted for dialysis.     · Interval History/Subjective:    - will have HD today, can be dc after HD if stable.    · Review of Systems: All 14 systems reviewed and negative, except as noted in HPI    Plan of Care:    Assessment:  ESRD  Volume overload/Chronic hypotension with hx of systolic CHF (baseline EF 35-40%)   Hyponatremia  SHPT  Anemia of CKD  Hypoalbuminemia     Plan:     1. ESRD- Continue HD TTS.      2. Volume/Blood Pressure- She did well with UF on Thursday.     3. Electrolytes/Acid Base- Hyponatremia is resolved after UF.       4. Bone Mineral Metabolism- Phos is improved. There is no indication for a binder. Continue a renal diet.      5. Anemia of CKD- Hb is stable. Continue EPO 10K with HD. Stool + blood- needs outpatient GI f/u.      6. Low albumin- Improved; continue nepro.        7. Access- TDC; Dr. Deal will arrange scheduling for outpatient surgery for AVF placement.    Ok with d/c from a renal standpoint.     Thank you for allowing us to participate in this patient's care. We will continue to follow.    Medications:  No current facility-administered medications on file prior to  encounter.      Current Outpatient Prescriptions on File Prior to Encounter   Medication Sig Dispense Refill    amLODIPine (NORVASC) 10 MG tablet Take 1 tablet (10 mg total) by mouth once daily. 30 tablet 1    apixaban 5 mg Tab Take 1 tablet (5 mg total) by mouth 2 (two) times daily. 60 tablet 1    cetirizine (ZYRTEC) 10 MG tablet Take 1 tablet (10 mg total) by mouth every evening. 20 tablet 0    epoetin donita (PROCRIT) 10,000 unit/mL injection Inject 1 mL (10,000 Units total) into the skin every Tues, Thurs, Sat. 1 mL 5    gabapentin (NEURONTIN) 100 MG capsule Take 1 capsule (100 mg total) by mouth 3 (three) times daily. 90 capsule 1    insulin aspart (NOVOLOG) 100 unit/mL InPn pen Inject 1-10 Units into the skin 3 (three) times daily. 3 mL 1    levothyroxine (SYNTHROID) 50 MCG tablet Take 1 tablet (50 mcg total) by mouth before breakfast. 30 tablet 1    rosuvastatin (CRESTOR) 10 MG tablet Take 1 tablet (10 mg total) by mouth once daily. 30 tablet 1     Scheduled Meds:   apixaban  5 mg Oral BID    cetirizine  10 mg Oral QHS    collagenase   Topical (Top) Daily    epoetin donita (PROCRIT) injection  100 Units/kg Intravenous Every Tues, Thurs, Sat    gabapentin  100 mg Oral TID    levothyroxine  50 mcg Oral Before breakfast    pantoprazole  40 mg Intravenous Daily    rosuvastatin  10 mg Oral Daily    senna-docusate 8.6-50 mg  1 tablet Oral BID     Continuous Infusions:  PRN Meds:.acetaminophen, albumin human 25%, albuterol-ipratropium 2.5mg-0.5mg/3mL, dextrose 50%, dextrose 50%, glucagon (human recombinant), glucose, glucose, heparin (porcine), insulin aspart U-100, ondansetron, ramelteon, sodium chloride 0.9%    Allergies:  Patient has no known allergies.    Vital Signs:  Temp Readings from Last 3 Encounters:   02/24/18 98 °F (36.7 °C)   12/29/17 98.8 °F (37.1 °C)   12/11/17 97.4 °F (36.3 °C) (Oral)       Pulse Readings from Last 3 Encounters:   02/24/18 64   12/29/17 96   12/11/17 69       BP Readings  from Last 3 Encounters:   02/24/18 113/64   12/29/17 127/75   12/11/17 (!) 125/59       Weight:  Wt Readings from Last 3 Encounters:   02/24/18 89.8 kg (197 lb 15.6 oz)   12/24/17 104.1 kg (229 lb 6.4 oz)   12/08/17 117.9 kg (260 lb)       Physical Exam:  Constitutional: nad, aao x 3  Heart: rrr, no m/r/g, wwp, edema is better  Lungs: ctab, no w/r/r/c, no lb  Abdomen: s/nt/nd, +BS    Results:  Lab Results   Component Value Date     (L) 02/24/2018    K 4.4 02/24/2018     02/24/2018    CO2 24 02/24/2018    BUN 50 (H) 02/24/2018    CREATININE 3.2 (H) 02/24/2018    CALCIUM 8.8 02/24/2018    ANIONGAP 9 02/24/2018    ESTGFRAFRICA 18 (A) 02/24/2018    EGFRNONAA 16 (A) 02/24/2018       Lab Results   Component Value Date    CALCIUM 8.8 02/24/2018    PHOS 4.6 (H) 02/24/2018         Recent Labs  Lab 02/24/18  0455   WBC 7.40  7.40   RBC 3.31*  3.31*   HGB 8.0*  8.0*   HCT 26.0*  26.0*     177   MCV 79*  79*   MCH 24.2*  24.2*   MCHC 30.8*  30.8*       I have personally reviewed pertinent radiological imaging and reports.    Edson Crystal MD  [facility]  [address] [place] [address] [place] [address]  [phone] (p)  [phone] (f)

## 2018-02-24 NOTE — PROGRESS NOTES
Progress Note  Hospital Medicine  Patient Name:Juliane Ramos  MRN:  [id number]  Patient Class: IP- Inpatient  Admit Date: 2/21/2018  Length of Stay: 2 days  Expected Discharge Date:   Attending Physician: Reg Devine MD  Primary Care Provider:  JOS Dumont    SUBJECTIVE:     Principal Problem: Acute on chronic respiratory failure with hypoxia  Initial history of present illness: Juliane Ramos is a 57 y.o. female with PMHx significant for DM, HTN, CAD, CHF, CKD on dialysis (TuTh&Sat), COPD on home oxygen, PE, and prior CVA.  She is admitted to the service of [facility] medicine with a diagnosis of acute on chronic respiratory failure.  She presented to the ED via EMS with an onset of worsening SOB since this morning. Per EMS, the patient was hypoxic on arrival and a face mask was placed. She reported being at baseline yesterday. Per , the patient's baseline O2 saturation is in the high 70's - 80's at home on continuous oxygen at 3L. She also endorses urinary frequency for the last few days. The patient fell 2 days ago when she was reaching for oxygen while in a chair and again today while trying to get dressed. She reports skipping dialysis yesterday secondary to being nauseated. Her last dialysis session was on Saturday (4 days ago). The patient was recently admitted for a SAH and PE and is on Apixaban. She denied cough, chest pain, abdominal pain, vomiting, diarrhea, hematochezia, or any other symptoms at this time.     Interval history  Patient seen and examined. Patient is doing well. Awaiting HD. Plan to discharge after HD.   PMH/PSH/SH/FH/Meds: reviewed.    Symptoms/Review of Systems: Feeling better but weak. Feels exhausted. No shortness of breath, cough, chest pain or headache, fever or abdominal pain.     Diet:  Adequate intake.    Activity level: Up with assistance  Pain:  Patient reports no pain.       OBJECTIVE:   Vital Signs (Most Recent):      Temp: 97.9 °F (36.6 °C) (02/24/18  0730)  Pulse: 70 (02/24/18 0730)  Resp: 18 (02/24/18 0730)  BP: (!) 104/55 (02/24/18 0730)  SpO2: (!) 92 % (02/24/18 0730)       Vital Signs Range (Last 24H):  Temp:  [96.9 °F (36.1 °C)-97.9 °F (36.6 °C)]   Pulse:  [66-73]   Resp:  [18]   BP: (104-111)/(55-66)   SpO2:  [90 %-94 %]     Weight: 89.8 kg (197 lb 15.6 oz)  Body mass index is 31.95 kg/m².    Intake/Output Summary (Last 24 hours) at 02/24/18 1055  Last data filed at 02/24/18 0700   Gross per 24 hour   Intake              940 ml   Output                0 ml   Net              940 ml     Physical Examination:  Constitutional: She is oriented to person, place, and time. She appears well-developed and well-nourished. No distress.   HENT:   Head: Normocephalic and atraumatic.   Eyes: Conjunctivae and EOM are normal. Pupils are equal, round, and reactive to light. No scleral icterus.   Neck: Normal range of motion. Neck supple. No JVD present. No tracheal deviation present. No thyromegaly present.   Cardiovascular: Normal rate, regular rhythm, normal heart sounds and intact distal pulses.  Exam reveals no gallop and no friction rub.    No murmur heard.  Pulses:       Dorsalis pedis pulses are 2+ on the right side, and 2+ on the left side.   Right upper chest dialysis catheter dressing dry/intact   Pulmonary/Chest: Accessory muscle usage (Mild) present. No respiratory distress. She has no wheezes. She has rhonchi. She has rales.   Abdominal: Soft. Bowel sounds are normal. She exhibits no distension and no mass. There is no tenderness. There is no guarding.   Musculoskeletal: Normal range of motion. She exhibits trace pedal edema. She exhibits no tenderness or deformity.   Dolly boot to left foot   Neurological: She is alert and oriented to person, place, and time. She has normal strength. She exhibits normal muscle tone. Coordination normal.   Skin: Skin is warm, dry and intact. Capillary refill takes less than 2 seconds. No rash noted. She is not diaphoretic. No  erythema.   Psychiatric: She has a normal mood and affect. Her speech is normal and behavior is normal. Judgment and thought content normal.   Vitals reviewed.    CBC:    Recent Labs  Lab 02/22/18 0447 02/23/18 0425 02/24/18 0455   WBC 6.70  6.70 5.60  5.60 7.40  7.40   RBC 3.32*  3.32* 3.33*  3.33* 3.31*  3.31*   HGB 8.0*  8.0* 8.1*  8.1* 8.0*  8.0*   HCT 25.9*  25.9* 26.5*  26.5* 26.0*  26.0*     204 166  166 177  177   MCV 78*  78* 80*  80* 79*  79*   MCH 24.1*  24.1* 24.5*  24.5* 24.2*  24.2*   MCHC 30.9*  30.9* 30.8*  30.8* 30.8*  30.8*   BMP    Recent Labs  Lab 02/22/18 0447 02/23/18 0425 02/24/18 0455   * 144* 180*   * 137 133*   K 4.4 3.9 4.4   CL 99 103 100   CO2 24 26 24   BUN 56* 36* 50*   CREATININE 3.4* 2.8* 3.2*   CALCIUM 8.6* 8.7 8.8   MG 3.1* 2.9* 3.0*      Diagnostic Results:  Microbiology Results (last 7 days)     ** No results found for the last 168 hours. **         CXR: Increasing right pleural effusion and right base atelectasis. Cardiomegaly. Prior sternotomy. Double-lumen central line in position.    Assessment/Plan:     * Acute on chronic respiratory failure with hypoxia     Multiple etiologies - most likely 2/2 to volume overload from missed dialysis.  Supplemental O2 via nasal canula; titrate O2 saturation to >92%.   CPAP at night (patient states that she is supposed to use her machine, but does not).  Iniate beta 2 agonist bronchodilator treatments.   Lasix IV  Consult Nephrology - follow recommendations for dialysis          CKD (chronic kidney disease) requiring chronic dialysis     Follow nephrology recommendations. HD compliance discussed with patient.  Avoid nonessential nephrotoxins  Adjust medications for Estimated Creatinine Clearance: 21.7 mL/min (A) (based on SCr of 3.4 mg/dL (H)).       Acute on chronic congestive heart failure     Likely 2/2 to missed dialysis - volume overload  Supplemental O2 via nasal canula; titrate O2  saturation to >92%.  IV lasix  Recent ECHO 11/22/2017 - EF 37%  Daily weights.  Strict I/Os.  Fluid restriction -800 ml  Na restriction <2g/d.         Obesity     Body mass index is 33.63 kg/m². Obesity complicates all aspects of disease management from diagnostic modalities to treatment. Weight loss encouraged and health benefits explained to patient.       Anemia in stage 3 chronic kidney disease     Chronic problem, stable - at baseline.  Monitor H/H, transfuse if becomes hemodynamically unstable or H/H < 7/21          Acquired hypothyroidism     Chronic problem, continue home Levothyroxine.          Type 2 diabetes mellitus with chronic kidney disease on chronic dialysis     Chronic problem, uncontrolled.  Check blood glucose level q AC/HS.  Use Novolog Insulin Sliding Scale as needed.   Renal/cardiac/diabetic diet      DC home in AM after HD.     VTE Risk Mitigation         Ordered     heparin (porcine) injection 1,000 Units  As needed (PRN)     Route:  Intravenous        02/22/18 1010     apixaban tablet 5 mg  2 times daily     Route:  Oral        02/22/18 0001     Reason for No Pharmacological VTE Prophylaxis  Once      02/22/18 0001        Magdi Singh MD  Department of Hospital Medicine   [facility]

## 2018-02-24 NOTE — PT/OT/SLP PROGRESS
Physical Therapy      Patient Name:  Juliane Ramos   MRN:  [id number]    Patient not seen today secondary to Dialysis. Will follow-up .    Gladys Calderon, PTA

## 2018-02-24 NOTE — PLAN OF CARE
02/24/18 1607   Final Note   Assessment Type Final Discharge Note   Discharge Disposition Home-Health  (Janet BLACK)

## 2018-02-24 NOTE — PLAN OF CARE
02/24/18 1602   Discharge Assessment   Assessment Type Discharge Planning Reassessment   CM sent referral for HH to [facility]  via [employer], faxed FS, HH order and AVS to [phone] and called [facility]  @ [phone] to inform. CM received an answering machine and left message regarding patient discharge.   406pm CM received called back from [facility] with [facility], patient discharge confirmed.

## 2018-02-24 NOTE — PROGRESS NOTES
02/23/18 2204   Patient Assessment/Suction   Level of Consciousness (AVPU) alert   Respiratory Effort Normal;Unlabored   All Lung Fields Breath Sounds clear;diminished   PRE-TX-O2-ETCO2   O2 Device (Oxygen Therapy) nasal cannula   Flow (L/min) 5   Oxygen Concentration (%) 40   SpO2 (!) 93 %   Pulse Oximetry Type Intermittent   Pulse 73   Resp 18   Aerosol Therapy   $ Aerosol Therapy Charges PRN treatment not required   Respiratory Treatment Status PRN treatment not required   Ready to Wean/Extubation Screen   FIO2<=50 (chart decimal) 0.4

## 2018-02-24 NOTE — NURSING
Pt discharged per MD orders. IV site discontinued with no bleeding. Pt verbalized understanding of discharge instructions. Pt in NAD.

## 2018-02-24 NOTE — NURSING
Pt returned to unit from dialysis at approximately 1425 via WC, VSS, see flowsheets. O2 at 5 L NC. Pt sitting in chair at side of bed eating lunch. NAD noted.

## 2018-02-24 NOTE — PROGRESS NOTES
02/24/18 1405   Handoff Report   Given To Flower   Vital Signs   Temp 98 °F (36.7 °C)   Pulse 65   Resp 18   /65   Post-Hemodialysis Assessment   Rinseback Volume (mL) 250 mL   Blood Volume Processed (Liters) 54 L   Dialyzer Clearance Lightly streaked   Duration of Treatment (minutes) 180 minutes   Hemodialysis Intake (mL) 500 mL   Total UF (mL) 4000 mL   Net Fluid Removal 3500   Patient Response to Treatment tolerated well   Post-Treatment Weight 92 kg (202 lb 13.2 oz)   Treatment Weight Change -3.5   Post-Hemodialysis Comments cath flushed, capped and clamped

## 2018-02-26 LAB — FACT X ACT/NOR PPP: 54 %

## 2018-03-13 NOTE — DISCHARGE SUMMARY
Discharge Summary      Admit Date: 2/21/2018    Discharge Date and Time:2/24/2018     Attending Physician:     Discharge Physician: Magdi Singh    Principal Diagnoses: Acute on chronic respiratory failure with hypoxia  The primary encounter diagnosis was Shortness of breath. Diagnoses of Noncompliance with renal dialysis, Hypervolemia, unspecified hypervolemia type, CHF (congestive heart failure), Acute on chronic respiratory failure with hypoxia, Acute on chronic systolic congestive heart failure, Type 2 diabetes mellitus with chronic kidney disease on chronic dialysis, unspecified long term insulin use status, Acquired hypothyroidism, Anemia in stage 3 chronic kidney disease, CKD (chronic kidney disease) requiring chronic dialysis, Essential hypertension, CKD (chronic kidney disease) stage 3, GFR 30-59 ml/min, Coronary artery disease involving native coronary artery of native heart without angina pectoris, and Acute renal failure superimposed on stage 3 chronic kidney disease, unspecified acute renal failure type were also pertinent to this visit.    Discharged Condition: Stable     Hospital Course: Juliane Ramos is a 57 y.o. female with pmh  has a past medical history of Anemia in stage 3 chronic kidney disease (11/26/2017); CHF (congestive heart failure); COPD (chronic obstructive pulmonary disease); Coronary artery disease; Diabetes mellitus; Encounter for blood transfusion; Essential hypertension (6/24/2017); Hypertension; MI (myocardial infarction) (2010); Segmental and subsegmental pulmonary emboli of RLL without acute cor pulmonale (11/25/2017); Stroke; Thyroid disease; Traumatic subarachnoid hemorrhage (11/24/2017); and Type 2 diabetes mellitus with stage 3 chronic kidney disease, without long-term current use of insulin (6/24/2017). who presented with Acute on chronic respiratory failure with hypoxia.  Patient was admitted for acute on chronic respiratory failure as well as acute heart  failure exacerbation.  Both were secondary to missed hemodialysis.  Once patient received hemodialysis patient responded appropriately.  Once stable patient was ready for discharge.    Consults: IP CONSULT TO NEPHROLOGY      Disposition: Home or Self Care    Patient Instructions:   Discharge Medication List as of 2/24/2018  4:16 PM      CONTINUE these medications which have NOT CHANGED    Details   amLODIPine (NORVASC) 10 MG tablet Take 1 tablet (10 mg total) by mouth once daily., Starting Fri 12/29/2017, Until Sat 12/29/2018, Print      apixaban 5 mg Tab Take 1 tablet (5 mg total) by mouth 2 (two) times daily., Starting Fri 12/29/2017, Print      cetirizine (ZYRTEC) 10 MG tablet Take 1 tablet (10 mg total) by mouth every evening., Starting Fri 12/29/2017, Until Sat 12/29/2018, Print      collagenase ointment Apply topically once daily., Historical Med      epoetin odnita (PROCRIT) 10,000 unit/mL injection Inject 1 mL (10,000 Units total) into the skin every Tues, Thurs, Sat., Starting Sat 12/30/2017, Print      gabapentin (NEURONTIN) 100 MG capsule Take 1 capsule (100 mg total) by mouth 3 (three) times daily., Starting Fri 12/29/2017, Until Sat 12/29/2018, Print      insulin aspart (NOVOLOG) 100 unit/mL InPn pen Inject 1-10 Units into the skin 3 (three) times daily., Starting Fri 12/29/2017, Until Sat 12/29/2018, Print      levothyroxine (SYNTHROID) 50 MCG tablet Take 1 tablet (50 mcg total) by mouth before breakfast., Starting Sat 12/30/2017, Until Sun 12/30/2018, Print      rosuvastatin (CRESTOR) 10 MG tablet Take 1 tablet (10 mg total) by mouth once daily., Starting Fri 12/29/2017, Until Sat 12/29/2018, Print         STOP taking these medications       miconazole nitrate 2% (MICOTIN) 2 % Oint Comments:   Reason for Stopping:                 Discharge Procedure Orders  Ambulatory referral to Home Health   Referral Priority: Routine Referral Type: Home Health   Referral Reason: Specialty Services Required     Requested Specialty: Home Health Services    Number of Visits Requested: 1      Activity as tolerated     Notify your health care provider if you experience any of the following:  temperature >100.4     Notify your health care provider if you experience any of the following:  persistent nausea and vomiting or diarrhea     Notify your health care provider if you experience any of the following:  severe uncontrolled pain       More then 30 mins were spent in discharging this patient   Magdi Singh  03/13/2018  10:46 AM

## 2018-06-24 ENCOUNTER — HISTORICAL (OUTPATIENT)
Dept: ADMINISTRATIVE | Facility: HOSPITAL | Age: 58
End: 2018-06-24

## 2018-06-24 LAB
ALBUMIN SERPL-MCNC: 3.2 G/DL (ref 3.1–4.7)
ALP SERPL-CCNC: 44 IU/L (ref 40–104)
ALT (SGPT): 18 IU/L (ref 3–33)
AST SERPL-CCNC: 55 IU/L (ref 10–40)
BILIRUB SERPL-MCNC: 1.2 MG/DL (ref 0.3–1)
BUN SERPL-MCNC: 39 MG/DL (ref 8–20)
CALCIUM SERPL-MCNC: 9.3 MG/DL (ref 7.7–10.4)
CHLORIDE: 95 MMOL/L (ref 98–110)
CO2 SERPL-SCNC: 26.8 MMOL/L (ref 22.8–31.6)
CREATININE: 3.73 MG/DL (ref 0.6–1.4)
GLUCOSE: 158 MG/DL (ref 70–99)
HCT VFR BLD AUTO: 31.3 % (ref 36–48)
HGB BLD-MCNC: 9.7 G/DL (ref 12–15)
MCH RBC QN AUTO: 26.4 PG (ref 25–35)
MCHC RBC AUTO-ENTMCNC: 31 G/DL (ref 31–36)
MCV RBC AUTO: 85.3 FL (ref 79–98)
NUCLEATED RBCS: 0 %
PLATELET # BLD AUTO: 119 K/UL (ref 140–440)
POTASSIUM SERPL-SCNC: 4.9 MMOL/L (ref 3.5–5)
PROT SERPL-MCNC: 6.6 G/DL (ref 6–8.2)
RBC # BLD AUTO: 3.67 M/UL (ref 3.5–5.5)
SODIUM: 132 MMOL/L (ref 134–144)
WBC # BLD AUTO: 11.8 K/UL (ref 5–10)

## 2018-06-25 LAB
ALBUMIN SERPL-MCNC: 3.1 G/DL (ref 3.1–4.7)
ALP SERPL-CCNC: 44 IU/L (ref 40–104)
ALT (SGPT): 25 IU/L (ref 3–33)
AST SERPL-CCNC: 48 IU/L (ref 10–40)
BASOPHILS NFR BLD: 0.1 K/UL (ref 0–0.2)
BASOPHILS NFR BLD: 1 %
BILIRUB SERPL-MCNC: 0.9 MG/DL (ref 0.3–1)
BUN SERPL-MCNC: 62 MG/DL (ref 8–20)
CALCIUM SERPL-MCNC: 8.2 MG/DL (ref 7.7–10.4)
CHLORIDE: 92 MMOL/L (ref 98–110)
CO2 SERPL-SCNC: 25.2 MMOL/L (ref 22.8–31.6)
CREATININE: 4.86 MG/DL (ref 0.6–1.4)
EOSINOPHIL NFR BLD: 1 K/UL (ref 0–0.7)
EOSINOPHIL NFR BLD: 11.5 %
ERYTHROCYTE [DISTWIDTH] IN BLOOD BY AUTOMATED COUNT: 19.8 % (ref 11.7–14.9)
GLUCOSE: 131 MG/DL (ref 70–99)
GRAN #: 6 K/UL (ref 1.4–6.5)
GRAN%: 69.3 %
HCT VFR BLD AUTO: 28.2 % (ref 36–48)
HGB BLD-MCNC: 8.8 G/DL (ref 12–15)
IMMATURE GRANS (ABS): 0.1 K/UL (ref 0–1)
IMMATURE GRANULOCYTES: 0.6 %
LYMPH #: 0.8 K/UL (ref 1.2–3.4)
LYMPH%: 8.9 %
MCH RBC QN AUTO: 26.7 PG (ref 25–35)
MCHC RBC AUTO-ENTMCNC: 31.2 G/DL (ref 31–36)
MCV RBC AUTO: 85.7 FL (ref 79–98)
MONO #: 0.8 K/UL (ref 0.1–0.6)
MONO%: 8.7 %
NUCLEATED RBCS: 0 %
PHOSPHATE FLD-MCNC: 8 MG/DL (ref 2.5–4.9)
PLATELET # BLD AUTO: 120 K/UL (ref 140–440)
PMV BLD AUTO: 11.1 FL (ref 8.8–12.7)
POTASSIUM SERPL-SCNC: 4.9 MMOL/L (ref 3.5–5)
PROT SERPL-MCNC: 6.6 G/DL (ref 6–8.2)
RBC # BLD AUTO: 3.29 M/UL (ref 3.5–5.5)
SODIUM: 129 MMOL/L (ref 134–144)
WBC # BLD AUTO: 8.7 K/UL (ref 5–10)

## 2018-07-17 ENCOUNTER — HOSPITAL ENCOUNTER (EMERGENCY)
Facility: HOSPITAL | Age: 58
Discharge: NURSING, REHAB OR LONG-TERM CARE | End: 2018-07-17
Attending: EMERGENCY MEDICINE
Payer: MEDICARE

## 2018-07-17 VITALS — DIASTOLIC BLOOD PRESSURE: 61 MMHG | SYSTOLIC BLOOD PRESSURE: 117 MMHG | OXYGEN SATURATION: 99 % | HEART RATE: 67 BPM

## 2018-07-17 DIAGNOSIS — R55 SYNCOPE, UNSPECIFIED SYNCOPE TYPE: Primary | ICD-10-CM

## 2018-07-17 DIAGNOSIS — R55 SYNCOPE: ICD-10-CM

## 2018-07-17 LAB
ABO + RH BLD: NORMAL
ALBUMIN SERPL BCP-MCNC: 2.7 G/DL
ALP SERPL-CCNC: 75 U/L
ALT SERPL W/O P-5'-P-CCNC: 12 U/L
ANION GAP SERPL CALC-SCNC: 12 MMOL/L
AST SERPL-CCNC: 32 U/L
BASOPHILS # BLD AUTO: 0 K/UL
BASOPHILS NFR BLD: 0.4 %
BILIRUB SERPL-MCNC: 0.8 MG/DL
BLD GP AB SCN CELLS X3 SERPL QL: NORMAL
BUN SERPL-MCNC: 35 MG/DL
CALCIUM SERPL-MCNC: 8.3 MG/DL
CHLORIDE SERPL-SCNC: 97 MMOL/L
CO2 SERPL-SCNC: 24 MMOL/L
CREAT SERPL-MCNC: 3.8 MG/DL
DIFFERENTIAL METHOD: ABNORMAL
EOSINOPHIL # BLD AUTO: 0.4 K/UL
EOSINOPHIL NFR BLD: 6.4 %
ERYTHROCYTE [DISTWIDTH] IN BLOOD BY AUTOMATED COUNT: 21.1 %
EST. GFR  (AFRICAN AMERICAN): 14 ML/MIN/1.73 M^2
EST. GFR  (NON AFRICAN AMERICAN): 13 ML/MIN/1.73 M^2
GLUCOSE SERPL-MCNC: 90 MG/DL
HCT VFR BLD AUTO: 28.6 %
HGB BLD-MCNC: 9 G/DL
LYMPHOCYTES # BLD AUTO: 0.7 K/UL
LYMPHOCYTES NFR BLD: 10.4 %
MCH RBC QN AUTO: 25.9 PG
MCHC RBC AUTO-ENTMCNC: 31.5 G/DL
MCV RBC AUTO: 82 FL
MONOCYTES # BLD AUTO: 0.4 K/UL
MONOCYTES NFR BLD: 6.4 %
NEUTROPHILS # BLD AUTO: 5.2 K/UL
NEUTROPHILS NFR BLD: 76.4 %
PLATELET # BLD AUTO: 174 K/UL
PMV BLD AUTO: 8.3 FL
POTASSIUM SERPL-SCNC: 4.8 MMOL/L
PROT SERPL-MCNC: 6.9 G/DL
RBC # BLD AUTO: 3.48 M/UL
SODIUM SERPL-SCNC: 133 MMOL/L
WBC # BLD AUTO: 6.9 K/UL

## 2018-07-17 PROCEDURE — 99284 EMERGENCY DEPT VISIT MOD MDM: CPT | Mod: 25

## 2018-07-17 PROCEDURE — 80053 COMPREHEN METABOLIC PANEL: CPT

## 2018-07-17 PROCEDURE — 86901 BLOOD TYPING SEROLOGIC RH(D): CPT

## 2018-07-17 PROCEDURE — 85025 COMPLETE CBC W/AUTO DIFF WBC: CPT

## 2018-07-17 PROCEDURE — 93005 ELECTROCARDIOGRAM TRACING: CPT

## 2018-07-17 NOTE — ED NOTES
Pt denies any symtoms of hypotension at this time. Family at bedside. Denies any concerns/complaints.

## 2018-07-17 NOTE — ED NOTES
Report called to janel at [facility]. Pt and family updated on plan of care. Dara called and instructed [facility] ER to call tomorrow morning for her dialysis time. Janel at [facility] was informed.

## 2018-07-17 NOTE — ED PROVIDER NOTES
"Encounter Date: 7/17/2018    SCRIBE #1 NOTE: IMaggy, sheryl scribing for, and in the presence of, Dr. Wilson .       History     Chief Complaint   Patient presents with    Loss of Consciousness     Pt had a possible syncopal episode at dialysis.  Pty had been on machine for about 20 minutes and her B/P dropped.  Previous episodes over the past couple of weeks.        Time seen by provider: 11:52 AM on 07/17/2018    Juliane Ramos is a 58 y.o. female with a hx of Anemia, CKD (dialysis pt), HTN, DM, and MI who presents to the ED after losing consciousness while receiving dialysis. She received 20 minutes of dialysis today before losing consciousness. She notes a previous episode of LOC for which she was seen at [facility]. She had a gastric scope and was found to have a bleeding ulcer which was cauterized. Pt endorsed blood in stool at the time. Anemia is suspected to be the reason for the LOC episode. She lives at [facility] and received her morning medications before receiving dialysis. She is unsure if she was given her HTN medication. Pt does not take her HTN medication on days she receives dialysis. She started receiving dialysis this month and only makes "little urine." Pt denies vomiting, abd pain and fever. NKDA noted.       The history is provided by the patient.     Review of patient's allergies indicates:  No Known Allergies  Past Medical History:   Diagnosis Date    Anemia in stage 3 chronic kidney disease 11/26/2017    CHF (congestive heart failure)     COPD (chronic obstructive pulmonary disease)     Coronary artery disease     Diabetes mellitus     Encounter for blood transfusion     Essential hypertension 6/24/2017    Hypertension     MI (myocardial infarction) 2010    Segmental and subsegmental pulmonary emboli of RLL without acute cor pulmonale 11/25/2017    Stroke     July 2005    Thyroid disease     Traumatic subarachnoid hemorrhage 11/24/2017    Type 2 diabetes mellitus with " "stage 3 chronic kidney disease, without long-term current use of insulin 2017     Past Surgical History:   Procedure Laterality Date    ABCESS DRAINAGE Right     Between "little toe" and the one next to it    BREAST SURGERY      Reduction [id number]    CARDIAC SURGERY  2011    CABG 4vessel ring in one valve mitral valve prolapse    CORONARY ARTERY BYPASS GRAFT      hyperlipidemia      TUBAL LIGATION  1986     Family History   Problem Relation Age of Onset    Arthritis Mother     Heart disease Father     Cancer Father 68        prostae and bladder ca  in     Diabetes Father     Hypertension Father     Depression Sister     Hypertension Son     Diabetes Maternal Grandmother         lost leg    Kidney disease Paternal Grandfather         had kidney removed    Stroke Paternal Grandfather      Social History   Substance Use Topics    Smoking status: Never Smoker    Smokeless tobacco: Never Used    Alcohol use No     Review of Systems   Constitutional: Negative for fever.   HENT: Negative for sore throat.    Eyes: Negative for redness.   Respiratory: Negative for shortness of breath.    Cardiovascular: Negative for chest pain.   Gastrointestinal: Negative for nausea.   Genitourinary: Negative for dysuria.   Musculoskeletal: Negative for back pain.   Skin: Negative for rash.   Neurological: Positive for syncope. Negative for weakness.   Hematological: Does not bruise/bleed easily.       Physical Exam     Vitals:    18 1332   BP: 117/61   Pulse: 67       Physical Exam    Nursing note and vitals reviewed.  Constitutional: She appears well-developed and well-nourished. She is not diaphoretic. No distress.   HENT:   Head: Normocephalic and atraumatic.   Mouth/Throat: Mucous membranes are dry.   Eyes: EOM are normal. Pupils are equal, round, and reactive to light.   Neck: Normal range of motion. Neck supple.   Cardiovascular: Normal rate, regular rhythm, normal heart sounds and intact " distal pulses. Exam reveals no gallop and no friction rub.    No murmur heard.  Pulmonary/Chest: Breath sounds normal. No respiratory distress. She has no wheezes. She has no rhonchi. She has no rales.   Abdominal: Soft. Bowel sounds are normal. There is no tenderness. There is no rebound and no guarding.   Musculoskeletal: Normal range of motion.   Dialysis clamp on dialysis access to left arm. Boot noted to LLE.    Neurological: She is alert and oriented to person, place, and time.   Skin: Skin is warm and dry.   Psychiatric: She has a normal mood and affect. Her behavior is normal. Judgment and thought content normal.         ED Course   Procedures  Labs Reviewed   COMPREHENSIVE METABOLIC PANEL - Abnormal; Notable for the following:        Result Value    Sodium 133 (*)     BUN, Bld 35 (*)     Creatinine 3.8 (*)     Calcium 8.3 (*)     Albumin 2.7 (*)     eGFR if  14 (*)     eGFR if non  13 (*)     All other components within normal limits   CBC W/ AUTO DIFFERENTIAL - Abnormal; Notable for the following:     RBC 3.48 (*)     Hemoglobin 9.0 (*)     Hematocrit 28.6 (*)     MCH 25.9 (*)     MCHC 31.5 (*)     RDW 21.1 (*)     MPV 8.3 (*)     Lymph # 0.7 (*)     Gran% 76.4 (*)     Lymph% 10.4 (*)     All other components within normal limits   TYPE & SCREEN          Imaging Results    None          Medical Decision Making:   History:   Old Medical Records: I decided to obtain old medical records.  Initial Assessment:   58-year-old female presented with a chief complaint of a syncopal episode.  Differential Diagnosis:   My differential diagnosis includes cardiac arrhythmia, severe anemia, electrolyte abnormality, and dehydration.     Clinical Tests:   Lab Tests: Ordered and Reviewed  Medical Tests: Ordered and Reviewed  ED Management:  The patient was emergently evaluated in the emergency department, her evaluation was significant for a middle-age female with a normal neurologic exam.   The patient's EKG showed no acute abnormalities per my independent interpretation.  The Patient's labs showed no acute abnormalities and were significant only for the patient's chronic end-stage renal disease.  The patient did receive her blood pressure medication prior to her dialysis this morning as reported by her nursing home staff.  This could be the etiology of her syncopal episode.  The patient's blood pressure has been stable in the emergency department and she is stable for discharge to home.  She will follow up with her PCP for further care.            Scribe Attestation:   Scribe #1: I performed the above scribed service and the documentation accurately describes the services I performed. I attest to the accuracy of the note.    I, Dr. Jonathan Wilson, personally performed the services described in this documentation. All medical record entries made by the scribe were at my direction and in my presence.  I have reviewed the chart and agree that the record reflects my personal performance and is accurate and complete. Jonathan Wilson MD.  3:46 PM 07/17/2018             Clinical Impression:     1. Syncope, unspecified syncope type    2. Syncope          Disposition:   Disposition: Discharged  Condition: Stable                        Jonathan Wilson MD  07/17/18 [id number]

## 2018-07-17 NOTE — ED NOTES
Spoke to Sarika at [facility]. Nurse states AM medications consist of Coreg 25 mg, gabapentin, glipizide, synthroid, ASA, and Celexa.

## 2018-07-17 NOTE — ED TRIAGE NOTES
Pt with hypotension and syncope while 20 min in on dialysis. Reports hx of hypotension and syncope on dialysis and has been seen at [facility] for it. Pt reports feeling back to baseline. Per EMS pt is on 4 L NC of home o2 and resides at [facility] for SNF.

## 2018-07-30 ENCOUNTER — HOSPITAL ENCOUNTER (OUTPATIENT)
Dept: CARDIOLOGY | Facility: HOSPITAL | Age: 58
Discharge: HOME | End: 2018-07-30
Attending: SPECIALIST
Payer: MEDICARE

## 2018-07-30 DIAGNOSIS — R00.2 PALPITATIONS: ICD-10-CM

## 2018-07-30 DIAGNOSIS — R00.2 PALPITATIONS: Primary | ICD-10-CM

## 2018-07-30 PROCEDURE — 93271 ECG/MONITORING AND ANALYSIS: CPT

## 2018-09-07 LAB
OHS CV HOLTER LENGTH DECIMAL HOURS: 503.05
OHS CV HOLTER LENGTH HOURS: 503
OHS CV HOLTER LENGTH MINUTES: 3

## 2018-09-11 ENCOUNTER — HOSPITAL ENCOUNTER (INPATIENT)
Facility: HOSPITAL | Age: 58
LOS: 7 days | Discharge: HOME HEALTH | DRG: 871 | End: 2018-09-18
Attending: EMERGENCY MEDICINE | Admitting: INTERNAL MEDICINE
Payer: MEDICARE

## 2018-09-11 DIAGNOSIS — J18.9 HCAP (HEALTHCARE-ASSOCIATED PNEUMONIA): Primary | ICD-10-CM

## 2018-09-11 DIAGNOSIS — Z99.2 TYPE 2 DIABETES MELLITUS WITH CHRONIC KIDNEY DISEASE ON CHRONIC DIALYSIS, UNSPECIFIED WHETHER LONG TERM INSULIN USE: Chronic | ICD-10-CM

## 2018-09-11 DIAGNOSIS — I25.10 CORONARY ARTERY DISEASE INVOLVING NATIVE CORONARY ARTERY OF NATIVE HEART WITHOUT ANGINA PECTORIS: Chronic | ICD-10-CM

## 2018-09-11 DIAGNOSIS — I50.20 HFREF (HEART FAILURE WITH REDUCED EJECTION FRACTION): ICD-10-CM

## 2018-09-11 DIAGNOSIS — I50.9 ACUTE CONGESTIVE HEART FAILURE, UNSPECIFIED HEART FAILURE TYPE: ICD-10-CM

## 2018-09-11 DIAGNOSIS — I50.43 ACUTE ON CHRONIC COMBINED SYSTOLIC AND DIASTOLIC HEART FAILURE: ICD-10-CM

## 2018-09-11 DIAGNOSIS — N18.6 TYPE 2 DIABETES MELLITUS WITH CHRONIC KIDNEY DISEASE ON CHRONIC DIALYSIS, UNSPECIFIED WHETHER LONG TERM INSULIN USE: Chronic | ICD-10-CM

## 2018-09-11 DIAGNOSIS — E66.9 OBESITY, UNSPECIFIED CLASSIFICATION, UNSPECIFIED OBESITY TYPE, UNSPECIFIED WHETHER SERIOUS COMORBIDITY PRESENT: ICD-10-CM

## 2018-09-11 DIAGNOSIS — T14.8XXD WOUND HEALING, DELAYED: ICD-10-CM

## 2018-09-11 DIAGNOSIS — R00.0 TACHYCARDIA: ICD-10-CM

## 2018-09-11 DIAGNOSIS — N18.6 ESRD (END STAGE RENAL DISEASE): ICD-10-CM

## 2018-09-11 DIAGNOSIS — A41.9 SEPSIS: ICD-10-CM

## 2018-09-11 DIAGNOSIS — E11.22 TYPE 2 DIABETES MELLITUS WITH CHRONIC KIDNEY DISEASE ON CHRONIC DIALYSIS, UNSPECIFIED WHETHER LONG TERM INSULIN USE: Chronic | ICD-10-CM

## 2018-09-11 LAB
ALBUMIN SERPL BCP-MCNC: 3.3 G/DL
ALLENS TEST: ABNORMAL
ALP SERPL-CCNC: 69 U/L
ALT SERPL W/O P-5'-P-CCNC: 9 U/L
ANION GAP SERPL CALC-SCNC: 13 MMOL/L
AORTIC ROOT ANNULUS: 3.01 CM
AORTIC VALVE CUSP SEPERATION: 1.73 CM
AST SERPL-CCNC: 13 U/L
AV MEAN GRADIENT: 5.69 MMHG
AV PEAK GRADIENT: 9.99 MMHG
AV VALVE AREA: 2.5 CM2
BACTERIA #/AREA URNS HPF: ABNORMAL /HPF
BASOPHILS # BLD AUTO: 0 K/UL
BASOPHILS NFR BLD: 0 %
BILIRUB SERPL-MCNC: 0.6 MG/DL
BILIRUB UR QL STRIP: NEGATIVE
BNP SERPL-MCNC: 2802 PG/ML
BSA FOR ECHO PROCEDURE: 2.06 M2
BUN SERPL-MCNC: 37 MG/DL
CALCIUM SERPL-MCNC: 8.7 MG/DL
CHLORIDE SERPL-SCNC: 100 MMOL/L
CLARITY UR: ABNORMAL
CO2 SERPL-SCNC: 22 MMOL/L
COLOR UR: YELLOW
CREAT SERPL-MCNC: 3 MG/DL
CV ECHO LV RWT: 0.33 CM
DELSYS: ABNORMAL
DIFFERENTIAL METHOD: ABNORMAL
DOP CALC AO PEAK VEL: 1.58 M/S
DOP CALC AO VTI: 27.21 CM
DOP CALC LVOT AREA: 3.3 CM2
DOP CALC LVOT DIAMETER: 2.05 CM
DOP CALC LVOT STROKE VOLUME: 67.96 CM3
DOP CALCLVOT PEAK VEL VTI: 20.6 CM
E WAVE DECELERATION TIME: 193.91 MSEC
E/A RATIO: 1.34
E/E' RATIO: 44.57
ECHO LV POSTERIOR WALL: 0.9 CM (ref 0.6–1.1)
EOSINOPHIL # BLD AUTO: 0.3 K/UL
EOSINOPHIL NFR BLD: 2.4 %
ERYTHROCYTE [DISTWIDTH] IN BLOOD BY AUTOMATED COUNT: 20.2 %
ERYTHROCYTE [SEDIMENTATION RATE] IN BLOOD BY WESTERGREN METHOD: 20 MM/H
EST. GFR  (AFRICAN AMERICAN): 19 ML/MIN/1.73 M^2
EST. GFR  (NON AFRICAN AMERICAN): 17 ML/MIN/1.73 M^2
FLOW: 4
FLUAV AG SPEC QL IA: NEGATIVE
FLUBV AG SPEC QL IA: NEGATIVE
FRACTIONAL SHORTENING: 18 % (ref 28–44)
GLUCOSE SERPL-MCNC: 213 MG/DL
GLUCOSE UR QL STRIP: NEGATIVE
HCO3 UR-SCNC: 27.1 MMOL/L (ref 24–28)
HCT VFR BLD AUTO: 32.6 %
HGB BLD-MCNC: 10.1 G/DL
HGB UR QL STRIP: ABNORMAL
HYALINE CASTS #/AREA URNS LPF: 0 /LPF
INTERVENTRICULAR SEPTUM: 0.93 CM (ref 0.6–1.1)
IVRT: 0.1 MSEC
KETONES UR QL STRIP: NEGATIVE
LACTATE SERPL-SCNC: 1.1 MMOL/L
LACTATE SERPL-SCNC: 1.4 MMOL/L
LEFT ATRIUM SIZE: 4 CM
LEFT INTERNAL DIMENSION IN SYSTOLE: 4.49 CM (ref 2.1–4)
LEFT VENTRICLE MASS INDEX: 91.3 G/M2
LEFT VENTRICULAR INTERNAL DIMENSION IN DIASTOLE: 5.47 CM (ref 3.5–6)
LEFT VENTRICULAR MASS: 188.09 G
LEUKOCYTE ESTERASE UR QL STRIP: ABNORMAL
LV LATERAL E/E' RATIO: 39
LV SEPTAL E/E' RATIO: 52
LYMPHOCYTES # BLD AUTO: 0.6 K/UL
LYMPHOCYTES NFR BLD: 4.7 %
MCH RBC QN AUTO: 26.1 PG
MCHC RBC AUTO-ENTMCNC: 31.1 G/DL
MCV RBC AUTO: 84 FL
MICROSCOPIC COMMENT: ABNORMAL
MODE: ABNORMAL
MONOCYTES # BLD AUTO: 0.7 K/UL
MONOCYTES NFR BLD: 5.1 %
MV PEAK A VEL: 1.16 M/S
MV PEAK E VEL: 1.56 M/S
MV STENOSIS PRESSURE HALF TIME: 56.23 MS
MV VALVE AREA P 1/2 METHOD: 3.91 CM2
NEUTROPHILS # BLD AUTO: 11.6 K/UL
NEUTROPHILS NFR BLD: 87.8 %
NITRITE UR QL STRIP: NEGATIVE
PCO2 BLDA: 46.4 MMHG (ref 35–45)
PH SMN: 7.38 [PH] (ref 7.35–7.45)
PH UR STRIP: 5 [PH] (ref 5–8)
PISA TR MAX VEL: 3.58 M/S
PLATELET # BLD AUTO: 167 K/UL
PMV BLD AUTO: 8.3 FL
PO2 BLDA: 61 MMHG (ref 80–100)
POC BE: 2 MMOL/L
POC SATURATED O2: 90 % (ref 95–100)
POC TCO2: 29 MMOL/L (ref 23–27)
POCT GLUCOSE: 135 MG/DL (ref 70–110)
POCT GLUCOSE: 249 MG/DL (ref 70–110)
POTASSIUM SERPL-SCNC: 4.1 MMOL/L
PROCALCITONIN SERPL IA-MCNC: 0.75 NG/ML
PROT SERPL-MCNC: 7.1 G/DL
PROT UR QL STRIP: ABNORMAL
PULM VEIN A" WAVE DURATION": 55 MSEC
PV PEAK GRADIENT: 4.53 MMHG
PV PEAK VELOCITY: 1.06 CM/S
RA PRESSURE: 15 MMHG
RBC # BLD AUTO: 3.88 M/UL
RBC #/AREA URNS HPF: 5 /HPF (ref 0–4)
RIGHT VENTRICULAR END-DIASTOLIC DIMENSION: 3.48 CM
SAMPLE: ABNORMAL
SITE: ABNORMAL
SODIUM SERPL-SCNC: 135 MMOL/L
SP GR UR STRIP: >=1.03 (ref 1–1.03)
SP02: 90
SPECIMEN SOURCE: NORMAL
SQUAMOUS #/AREA URNS HPF: 5 /HPF
TDI LATERAL: 0.04
TDI SEPTAL: 0.03
TDI: 0.04
TR MAX PG: 51.27 MMHG
TROPONIN I SERPL DL<=0.01 NG/ML-MCNC: 0.04 NG/ML
TV REST PULMONARY ARTERY PRESSURE: 66.27 MMHG
URN SPEC COLLECT METH UR: ABNORMAL
UROBILINOGEN UR STRIP-ACNC: NEGATIVE EU/DL
WBC # BLD AUTO: 13.2 K/UL
WBC #/AREA URNS HPF: >100 /HPF (ref 0–5)
YEAST URNS QL MICRO: ABNORMAL

## 2018-09-11 PROCEDURE — 63600175 PHARM REV CODE 636 W HCPCS: Performed by: EMERGENCY MEDICINE

## 2018-09-11 PROCEDURE — 99900035 HC TECH TIME PER 15 MIN (STAT)

## 2018-09-11 PROCEDURE — 25000003 PHARM REV CODE 250: Performed by: EMERGENCY MEDICINE

## 2018-09-11 PROCEDURE — 84145 PROCALCITONIN (PCT): CPT

## 2018-09-11 PROCEDURE — 36415 COLL VENOUS BLD VENIPUNCTURE: CPT

## 2018-09-11 PROCEDURE — 94761 N-INVAS EAR/PLS OXIMETRY MLT: CPT

## 2018-09-11 PROCEDURE — 99291 CRITICAL CARE FIRST HOUR: CPT | Mod: 25

## 2018-09-11 PROCEDURE — 83880 ASSAY OF NATRIURETIC PEPTIDE: CPT

## 2018-09-11 PROCEDURE — 85025 COMPLETE CBC W/AUTO DIFF WBC: CPT

## 2018-09-11 PROCEDURE — 83605 ASSAY OF LACTIC ACID: CPT | Mod: 91

## 2018-09-11 PROCEDURE — 80100016 HC MAINTENANCE HEMODIALYSIS

## 2018-09-11 PROCEDURE — 96365 THER/PROPH/DIAG IV INF INIT: CPT

## 2018-09-11 PROCEDURE — 93005 ELECTROCARDIOGRAM TRACING: CPT

## 2018-09-11 PROCEDURE — 27000221 HC OXYGEN, UP TO 24 HOURS

## 2018-09-11 PROCEDURE — 36600 WITHDRAWAL OF ARTERIAL BLOOD: CPT

## 2018-09-11 PROCEDURE — 81000 URINALYSIS NONAUTO W/SCOPE: CPT

## 2018-09-11 PROCEDURE — 63600175 PHARM REV CODE 636 W HCPCS: Performed by: INTERNAL MEDICINE

## 2018-09-11 PROCEDURE — 80053 COMPREHEN METABOLIC PANEL: CPT

## 2018-09-11 PROCEDURE — 25000003 PHARM REV CODE 250: Performed by: INTERNAL MEDICINE

## 2018-09-11 PROCEDURE — 25000003 PHARM REV CODE 250: Performed by: HOSPITALIST

## 2018-09-11 PROCEDURE — 84484 ASSAY OF TROPONIN QUANT: CPT

## 2018-09-11 PROCEDURE — 82803 BLOOD GASES ANY COMBINATION: CPT

## 2018-09-11 PROCEDURE — 96375 TX/PRO/DX INJ NEW DRUG ADDON: CPT

## 2018-09-11 PROCEDURE — 87040 BLOOD CULTURE FOR BACTERIA: CPT | Mod: 59

## 2018-09-11 PROCEDURE — 11000001 HC ACUTE MED/SURG PRIVATE ROOM

## 2018-09-11 PROCEDURE — 63600175 PHARM REV CODE 636 W HCPCS: Performed by: HOSPITALIST

## 2018-09-11 PROCEDURE — 99223 1ST HOSP IP/OBS HIGH 75: CPT | Mod: AI,,, | Performed by: INTERNAL MEDICINE

## 2018-09-11 PROCEDURE — 87086 URINE CULTURE/COLONY COUNT: CPT

## 2018-09-11 PROCEDURE — 87400 INFLUENZA A/B EACH AG IA: CPT

## 2018-09-11 PROCEDURE — 63600175 PHARM REV CODE 636 W HCPCS

## 2018-09-11 RX ORDER — ROSUVASTATIN CALCIUM 5 MG/1
10 TABLET, COATED ORAL DAILY
Status: DISCONTINUED | OUTPATIENT
Start: 2018-09-11 | End: 2018-09-18 | Stop reason: HOSPADM

## 2018-09-11 RX ORDER — MIDODRINE HYDROCHLORIDE 5 MG/1
5 TABLET ORAL
Status: ON HOLD | COMMUNITY
End: 2020-01-01 | Stop reason: HOSPADM

## 2018-09-11 RX ORDER — HEPARIN SODIUM 1000 [USP'U]/ML
4000 INJECTION INTRAVENOUS; SUBCUTANEOUS ONCE
Status: CANCELLED | OUTPATIENT
Start: 2018-09-11 | End: 2018-09-11

## 2018-09-11 RX ORDER — VANCOMYCIN HCL IN 5 % DEXTROSE 1G/250ML
1000 PLASTIC BAG, INJECTION (ML) INTRAVENOUS
Status: DISCONTINUED | OUTPATIENT
Start: 2018-09-12 | End: 2018-09-11

## 2018-09-11 RX ORDER — CEFTAZIDIME 1 G/1
500 INJECTION, POWDER, FOR SOLUTION INTRAMUSCULAR; INTRAVENOUS
Status: DISCONTINUED | OUTPATIENT
Start: 2018-09-11 | End: 2018-09-11

## 2018-09-11 RX ORDER — LEVOTHYROXINE SODIUM 50 UG/1
50 TABLET ORAL
Status: DISCONTINUED | OUTPATIENT
Start: 2018-09-11 | End: 2018-09-18 | Stop reason: HOSPADM

## 2018-09-11 RX ORDER — VANCOMYCIN HYDROCHLORIDE 500 MG/100ML
500 INJECTION, SOLUTION INTRAVENOUS
Status: DISCONTINUED | OUTPATIENT
Start: 2018-09-11 | End: 2018-09-11

## 2018-09-11 RX ORDER — GABAPENTIN 100 MG/1
100 CAPSULE ORAL 3 TIMES DAILY
Status: DISCONTINUED | OUTPATIENT
Start: 2018-09-11 | End: 2018-09-18 | Stop reason: HOSPADM

## 2018-09-11 RX ORDER — ACETAMINOPHEN 500 MG
1000 TABLET ORAL
Status: COMPLETED | OUTPATIENT
Start: 2018-09-11 | End: 2018-09-11

## 2018-09-11 RX ORDER — SODIUM CHLORIDE 9 MG/ML
INJECTION, SOLUTION INTRAVENOUS ONCE
Status: DISCONTINUED | OUTPATIENT
Start: 2018-09-11 | End: 2018-09-13

## 2018-09-11 RX ORDER — ALBUMIN HUMAN 250 G/1000ML
25 SOLUTION INTRAVENOUS ONCE
Status: DISCONTINUED | OUTPATIENT
Start: 2018-09-11 | End: 2018-09-18 | Stop reason: HOSPADM

## 2018-09-11 RX ORDER — CITALOPRAM 20 MG/1
20 TABLET, FILM COATED ORAL DAILY
Status: ON HOLD | COMMUNITY
End: 2018-09-18 | Stop reason: SDUPTHER

## 2018-09-11 RX ORDER — SEVELAMER CARBONATE 800 MG/1
1600 TABLET, FILM COATED ORAL
Status: DISCONTINUED | OUTPATIENT
Start: 2018-09-11 | End: 2018-09-18 | Stop reason: HOSPADM

## 2018-09-11 RX ORDER — HEPARIN SODIUM 1000 [USP'U]/ML
4000 INJECTION, SOLUTION INTRAVENOUS; SUBCUTANEOUS
Status: DISCONTINUED | OUTPATIENT
Start: 2018-09-11 | End: 2018-09-18 | Stop reason: HOSPADM

## 2018-09-11 RX ORDER — SODIUM CHLORIDE 9 MG/ML
INJECTION, SOLUTION INTRAVENOUS
Status: DISCONTINUED | OUTPATIENT
Start: 2018-09-11 | End: 2018-09-18 | Stop reason: HOSPADM

## 2018-09-11 RX ORDER — MIDODRINE HYDROCHLORIDE 2.5 MG/1
2.5 TABLET ORAL
Status: DISCONTINUED | OUTPATIENT
Start: 2018-09-11 | End: 2018-09-18 | Stop reason: HOSPADM

## 2018-09-11 RX ORDER — SEVELAMER CARBONATE 800 MG/1
1600 TABLET, FILM COATED ORAL
Status: ON HOLD | COMMUNITY
End: 2020-01-01 | Stop reason: HOSPADM

## 2018-09-11 RX ORDER — HYDROCODONE BITARTRATE AND ACETAMINOPHEN 5; 325 MG/1; MG/1
1 TABLET ORAL EVERY 6 HOURS PRN
COMMUNITY
End: 2020-01-01

## 2018-09-11 RX ORDER — CITALOPRAM 10 MG/1
20 TABLET ORAL DAILY
Status: DISCONTINUED | OUTPATIENT
Start: 2018-09-12 | End: 2018-09-18 | Stop reason: HOSPADM

## 2018-09-11 RX ORDER — VANCOMYCIN HCL IN 5 % DEXTROSE 1G/250ML
1000 PLASTIC BAG, INJECTION (ML) INTRAVENOUS
Status: COMPLETED | OUTPATIENT
Start: 2018-09-11 | End: 2018-09-11

## 2018-09-11 RX ADMIN — LEVOTHYROXINE SODIUM 50 MCG: 50 TABLET ORAL at 06:09

## 2018-09-11 RX ADMIN — GABAPENTIN 100 MG: 100 CAPSULE ORAL at 03:09

## 2018-09-11 RX ADMIN — ACETAMINOPHEN 1000 MG: 500 TABLET, FILM COATED ORAL at 02:09

## 2018-09-11 RX ADMIN — EPOETIN ALFA 5000 UNITS: 20000 SOLUTION INTRAVENOUS; SUBCUTANEOUS at 10:09

## 2018-09-11 RX ADMIN — VANCOMYCIN HYDROCHLORIDE 1000 MG: 1 INJECTION, POWDER, LYOPHILIZED, FOR SOLUTION INTRAVENOUS at 02:09

## 2018-09-11 RX ADMIN — PIPERACILLIN SODIUM AND TAZOBACTAM SODIUM 4.5 G: 4; .5 INJECTION, POWDER, FOR SOLUTION INTRAVENOUS at 02:09

## 2018-09-11 RX ADMIN — APIXABAN 5 MG: 2.5 TABLET, FILM COATED ORAL at 08:09

## 2018-09-11 RX ADMIN — GABAPENTIN 100 MG: 100 CAPSULE ORAL at 10:09

## 2018-09-11 RX ADMIN — PIPERACILLIN AND TAZOBACTAM 3.38 G: 3; .375 INJECTION, POWDER, LYOPHILIZED, FOR SOLUTION INTRAVENOUS; PARENTERAL at 12:09

## 2018-09-11 RX ADMIN — MIDODRINE HYDROCHLORIDE 2.5 MG: 2.5 TABLET ORAL at 06:09

## 2018-09-11 RX ADMIN — SEVELAMER CARBONATE 1600 MG: 800 TABLET, FILM COATED ORAL at 06:09

## 2018-09-11 RX ADMIN — VANCOMYCIN HYDROCHLORIDE 500 MG: 500 INJECTION, SOLUTION INTRAVENOUS at 06:09

## 2018-09-11 RX ADMIN — APIXABAN 5 MG: 2.5 TABLET, FILM COATED ORAL at 10:09

## 2018-09-11 RX ADMIN — ROSUVASTATIN CALCIUM 10 MG: 5 TABLET, FILM COATED ORAL at 08:09

## 2018-09-11 RX ADMIN — PIPERACILLIN SODIUM AND TAZOBACTAM SODIUM 2.25 G: 2; .25 INJECTION, POWDER, FOR SOLUTION INTRAVENOUS at 10:09

## 2018-09-11 RX ADMIN — HEPARIN SODIUM 1000 UNITS: 1000 INJECTION, SOLUTION INTRAVENOUS; SUBCUTANEOUS at 12:09

## 2018-09-11 RX ADMIN — CEFTAZIDIME 500 MG: 1 INJECTION, POWDER, FOR SOLUTION INTRAMUSCULAR; INTRAVENOUS at 10:09

## 2018-09-11 NOTE — HPI
Juliane Ramos is a 57 y.o. female with a PMHx significant for DM, HTN, CAD, HFrEF with an EF of 35-40%, ESRD on dialysis (TuTh&Sat), COPD on home oxygen, PE, and prior CVA.  She is admitted to the service of hospital medicine with a diagnosis of acute on chronic respiratory failure. She reports worsening of her respiratory status last evening. Noted to be hypoxic on her home pulseox. She reports eating a [employer] hamburger earlier in the day. In the ED she had evidence of volume overload and responded well to I/v diuresis. At baseline she has NYHA Class III symptoms. She reports chills at home but no cough. Denied any sick contacts. Noted to be febrile in the ED for which she got Vancomycin and Zosyn. She has a diabetic wound on the left leg that has been treated with skin grafts.   DNR/DNI status was verified with the patient in the presence of the .

## 2018-09-11 NOTE — CONSULTS
Date: 9/11/2018   Juliane Ramos [id number] is a 58 y.o. female who has been consulted for vancomycin dosing.    The patient has the following labs:     Creatinine (mg/dl)    WBC Count   Serum creatinine: 3 mg/dL (H) 09/11/18 0201  Estimated creatinine clearance: 23.8 mL/min (A) Lab Results   Component Value Date    WBC 13.20 (H) 09/11/2018        Current weight is 91.2 kg (201 lb)    Patient scheduled for HD today.Vancomycin 500mg x1 will be given post HD today and random level will be ordered prior to next HD session (tentative 9/13).    Pharmacy will follow Vancomycin daily.  Vancomycin post HD doses will be adjusted if needed.    Patient will be followed by pharmacy for changes in renal function, toxicity, and efficacy.    Thank you for allowing us to participate in this patient's care.     Iona Sheppard, PharmD

## 2018-09-11 NOTE — ASSESSMENT & PLAN NOTE
- Patient had evidence of volume overload at presentation and responded well to I/v diuresis.  - Likely precipitated by the salt intake with the fast food.  - Will continue I/v diuresis and renal consult today for HD for additional fluid removal.  - Will check Echo to reassess LV function.  - Cardiology consult.

## 2018-09-11 NOTE — ED NOTES
Pt transported to room 514 via stretcher. Skin pink warm and dry. Nadn. Pt denies pain or discomfort

## 2018-09-11 NOTE — PROGRESS NOTES
Pharmacist Renal Dose Adjustment Note    Juliane Ramos is a 58 y.o. female being treated with the medication piperacillin/tazobactam    Patient Data:    Vital Signs (Most Recent):  Temp: 98.9 °F (37.2 °C) (09/11/18 1240)  Pulse: 89 (09/11/18 1400)  Resp: (!) 35 (09/11/18 1400)  BP: 110/68 (09/11/18 1400)  SpO2: 99 % (09/11/18 1400)   Vital Signs (72h Range):  Temp:  [98.6 °F (37 °C)-102.8 °F (39.3 °C)]   Pulse:  []   Resp:  [8-35]   BP: ()/(51-97)   SpO2:  [87 %-100 %]      Recent Labs   Lab  09/11/18   0201   CREATININE  3.0*     Serum creatinine: 3 mg/dL (H) 09/11/18 0201  Estimated creatinine clearance: 23.8 mL/min (A)    Medication:piperacillin/tazobactam 3.375gm q8h will be changed to medication:piperacillin/tazobactam 2.25gm q8h    Pharmacist's Name: Iona Sheppard

## 2018-09-11 NOTE — PROGRESS NOTES
Pt transferred to PCU room 214 via w/c, NAD noted.  at BS. Report given to Melva HUGO.    Misael Lake RN

## 2018-09-11 NOTE — CONSULTS
Juliane Ramos [id number] is a 58 y.o. female who has been consulted for vancomycin dosing.    The patient has the following labs:     Date Creatinine (mg/dl)    BUN WBC Count   9/11/2018 Estimated Creatinine Clearance: 23.8 mL/min (A) (based on SCr of 3 mg/dL (H)). Lab Results   Component Value Date    BUN 37 (H) 09/11/2018     Lab Results   Component Value Date    WBC 13.20 (H) 09/11/2018        Current weight is 91.3 kg (201 lb 4.5 oz)      The patient received  1000 mg on 9/11 at 0242.    Vancomycin will be dosed by levels.    Vancomycin level has been ordered with AM labs on 9/12.    Patient will be followed by pharmacy for changes in renal function, toxicity, and efficacy.   Thank you for allowing us to participate in this patient's care.     Wandy Castañeda

## 2018-09-11 NOTE — ED PROVIDER NOTES
"Encounter Date: 9/11/2018    SCRIBE #1 NOTE: I, Avila Dc, am scribing for, and in the presence of, Dr. Mcpherson.       History     Chief Complaint   Patient presents with    Fever    Shortness of Breath     09/11/2018 1:32 AM    The pt is a 58 y.o. female arriving via EMS with a past medical history of CKD, COPD, CAD, DM, HTN, MI, PE, CVA, and thyroid disease presenting to the ED with a sudden onset of acute SOB beginning 8.5hrs ago. EMS reported O2 sats in the 80s PTA. The pt was transferred from [facility] for further evaluation of symptoms. She noted she is on current dialysis and is due tomorrow. Associated symptoms of fever and chills. She stated she is on 3L O2 at home. The pt endorsed she takes Eloquis. She stated symptoms have improved PTA. The pt has no history of cigarette smoking. She has a past surgical history of breast surgery and CABG.       The history is provided by the patient and the EMS personnel.     Review of patient's allergies indicates:  No Known Allergies  Past Medical History:   Diagnosis Date    Anemia in stage 3 chronic kidney disease 11/26/2017    CHF (congestive heart failure)     COPD (chronic obstructive pulmonary disease)     Coronary artery disease     Diabetes mellitus     Encounter for blood transfusion     Essential hypertension 6/24/2017    Hypertension     MI (myocardial infarction) 2010    Segmental and subsegmental pulmonary emboli of RLL without acute cor pulmonale 11/25/2017    Stroke     July 2005    Thyroid disease     Traumatic subarachnoid hemorrhage 11/24/2017    Type 2 diabetes mellitus with stage 3 chronic kidney disease, without long-term current use of insulin 6/24/2017     Past Surgical History:   Procedure Laterality Date    ABCESS DRAINAGE Right     Between "little toe" and the one next to it    BREAST SURGERY      Reduction [id number]    CARDIAC SURGERY  01/2011    CABG 4vessel ring in one valve mitral valve prolapse    CORONARY " ARTERY BYPASS GRAFT      DEBRIDEMENT-TENDON-ACHILLES Left 2017    Per[facility] by Dennis Wick [facility] at [facility]    hyperlipidemia      TUBAL LIGATION  1986     Family History   Problem Relation Age of Onset    Arthritis Mother     Heart disease Father     Cancer Father 68        prostae and bladder ca  in     Diabetes Father     Hypertension Father     Depression Sister     Hypertension Son     Diabetes Maternal Grandmother         lost leg    Kidney disease Paternal Grandfather         had kidney removed    Stroke Paternal Grandfather      Social History     Tobacco Use    Smoking status: Never Smoker    Smokeless tobacco: Never Used   Substance Use Topics    Alcohol use: No     Alcohol/week: 0.6 - 1.2 oz     Types: 1 - 2 Glasses of wine per week    Drug use: No     Review of Systems   Constitutional: Positive for chills and fever.   HENT: Negative for congestion.    Eyes: Negative for visual disturbance.   Respiratory: Positive for shortness of breath. Negative for wheezing.    Cardiovascular: Negative for chest pain.   Gastrointestinal: Negative for abdominal pain.   Genitourinary: Negative for dysuria.   Musculoskeletal: Negative for joint swelling.   Skin: Negative for rash.   Neurological: Negative for syncope.   Hematological: Does not bruise/bleed easily.   Psychiatric/Behavioral: Negative for confusion.       Physical Exam     Initial Vitals [18 0129]   BP Pulse Resp Temp SpO2   (!) 106/56 104 16 (!) 102.8 °F (39.3 °C) 97 %      MAP       --         Physical Exam    Nursing note and vitals reviewed.  Constitutional: She appears well-nourished. She appears ill (chronic).   HENT:   Head: Normocephalic and atraumatic.   Eyes: Conjunctivae and EOM are normal.   Neck: Normal range of motion. Neck supple. No thyroid mass present.   Cardiovascular: Regular rhythm and normal heart sounds. Tachycardia present.  Exam reveals no gallop and no friction rub.    No murmur  heard.  Pulmonary/Chest: Breath sounds normal. She has no wheezes. She has no rhonchi. She has no rales.   Abdominal: Soft. Normal appearance and bowel sounds are normal. There is no tenderness.   Musculoskeletal: Normal range of motion.   Neurological: She is alert and oriented to person, place, and time. She has normal strength. No cranial nerve deficit or sensory deficit.   Skin: Skin is warm and dry. No rash noted. No erythema.   Bilateral lower extremity chronic venous stasis dermatitis area wrapped cellulitis around the ankle   R upper chest dialysis catheter without site infection   Psychiatric: She has a normal mood and affect. Her speech is normal. Cognition and memory are normal.         ED Course   Procedures  Labs Reviewed   ISTAT PROCEDURE - Abnormal; Notable for the following components:       Result Value    POC PCO2 46.4 (*)     POC PO2 61 (*)     POC SATURATED O2 90 (*)     POC TCO2 29 (*)     All other components within normal limits   CULTURE, BLOOD   CULTURE, BLOOD   CBC W/ AUTO DIFFERENTIAL   COMPREHENSIVE METABOLIC PANEL   LACTIC ACID, PLASMA   LACTIC ACID, PLASMA   URINALYSIS, REFLEX TO URINE CULTURE   PROCALCITONIN   INFLUENZA A AND B ANTIGEN   B-TYPE NATRIURETIC PEPTIDE   TROPONIN I     EKG Readings: (Independently Interpreted)   Sinus tachycardia, 102 beats per minute, RSR or QR pattern in V1 suggests right ventricular conduction delay, septal infarct, age undetermined, possible lateral infarct, age undetermined, ST and T-wave abnormality, consider inferior ischemia, normal axis, normal intervals, no STEMI       Imaging Results          X-Ray Chest AP Portable (In process)                  Medical Decision Making:   History:   Old Medical Records: I decided to obtain old medical records.  Clinical Tests:   Lab Tests: Reviewed and Ordered  Radiological Study: Reviewed and Ordered  ED Management:  This patient was interviewed and assessed immediately upon arrival.  Patient is noted to be in  no acute distress.  Accompanying reports reports concerning for evidence progressing healthcare associated pneumonia with sepsis (with a normal lactate, stable pressures, no evidence of acute end-organ dysfunction) and acute exacerbation congestive heart failure.  Chi is in place and the patient is diuresing.  Antibiotic spectrum was broadened and with vancomycin and Zosyn considering her multiple healthcare exposures and ongoing left lower extremity cellulitis.  Patient had a stable period of ED observation but does warrant close monitoring of her respiratory status in the ICU.  Case was discussed with Dr. Doherty who agreed to admit the patient.  Patient in agreement with the knee with close monitoring.  She reports she is a DNR/DNI.            Scribe Attestation:   Scribe #1: I performed the above scribed service and the documentation accurately describes the services I performed. I attest to the accuracy of the note.    Attending Attestation:         Attending Critical Care:   Critical Care Times:   Direct Patient Care (initial evaluation, reassessments, and time considering the case)................................................................15 minutes.   Additional History from reviewing old medical records or taking additional history from the family, EMS, PCP, etc.......................10 minutes.   Ordering, Reviewing, and Interpreting Diagnostic Studies...............................................................................................................5 minutes.   Documentation..................................................................................................................................................................................5 minutes.   Consultation with other Physicians. .................................................................................................................................................10 minutes.   Consultation with the patient's  family directly relating to the patient's condition, care, and DNR status (when patient unable)......5 minutes.   Other..................................................................................................................................................................................................5 minutes.   ==============================================================  · Total Critical Care Time - exclusive of procedural time: 55 minutes.  ==============================================================  Critical care was necessary to treat or prevent imminent or life-threatening deterioration of the following conditions: congestive heart failure and sepsis.   The following critical care procedures were done by me (see procedure notes): pulse oximetry.   Critical care was time spent personally by me on the following activities: obtaining history from patient or relative, examination of patient, review of old charts, ordering lab, x-rays, and/or EKG, development of treatment plan with patient or relative, ordering and performing treatments and interventions, review of x-rays / CT sent with the patient, evaluation of patient's response to treatment, discussions with primary provider, interpretation of cardiac measurements, re-evaluation of patient's conition and end of life discussions.   Critical Care Condition: life-threatening       I, Dr. Reymundo Mcpherson, personally performed the services described in this documentation. All medical record entries made by the scribe were at my direction and in my presence.  I have reviewed the chart and agree that the record reflects my personal performance and is accurate and complete. Reymundo Mcpherson MD.  6:53 AM 09/11/2018             Clinical Impression:   The primary encounter diagnosis was HCAP (healthcare-associated pneumonia). Diagnoses of Tachycardia, Sepsis, HFrEF (heart failure with reduced ejection fraction), and Acute congestive heart failure, unspecified  heart failure type were also pertinent to this visit.      Disposition:   Disposition: Admitted  Condition: Critical                        Reymundo Mcpherson MD  09/11/18 0653

## 2018-09-11 NOTE — SUBJECTIVE & OBJECTIVE
"Past Medical History:   Diagnosis Date    Anemia in stage 3 chronic kidney disease 11/26/2017    Anticoagulant long-term use     CHF (congestive heart failure)     COPD (chronic obstructive pulmonary disease)     Coronary artery disease     Diabetes mellitus     Encounter for blood transfusion     Essential hypertension 6/24/2017    Hypertension     MI (myocardial infarction) 2010    Segmental and subsegmental pulmonary emboli of RLL without acute cor pulmonale 11/25/2017    Stroke     July 2005    Thyroid disease     Traumatic subarachnoid hemorrhage 11/24/2017    Type 2 diabetes mellitus with stage 3 chronic kidney disease, without long-term current use of insulin 6/24/2017       Past Surgical History:   Procedure Laterality Date    ABCESS DRAINAGE Right     Between "little toe" and the one next to it    BREAST SURGERY      Reduction [id number]    CARDIAC SURGERY  01/2011    CABG 4vessel ring in one valve mitral valve prolapse    CORONARY ARTERY BYPASS GRAFT      DEBRIDEMENT-TENDON-ACHILLES Left 12/8/2017    [facility]formed [facility]    hyperlipidemia      TUBAL LIGATION  03/1986       Review of patient's allergies indicates:  No Known Allergies    No current facility-administered medications on file prior to encounter.      Current Outpatient Medications on File Prior to Encounter   Medication Sig    amLODIPine (NORVASC) 10 MG tablet Take 1 tablet (10 mg total) by mouth once daily.    apixaban 5 mg Tab Take 1 tablet (5 mg total) by mouth 2 (two) times daily.    cetirizine (ZYRTEC) 10 MG tablet Take 1 tablet (10 mg total) by mouth every evening.    collagenase ointment Apply topically once daily.    epoetin donita (PROCRIT) 10,000 unit/mL injection Inject 1 mL (10,000 Units total) into the skin every Tues, Thurs, Sat.    gabapentin (NEURONTIN) 100 MG capsule Take 1 capsule (100 mg total) by mouth 3 (three) times daily.    insulin aspart (NOVOLOG) 100 unit/mL InPn pen " "Inject 1-10 Units into the skin 3 (three) times daily.    levothyroxine (SYNTHROID) 50 MCG tablet Take 1 tablet (50 mcg total) by mouth before breakfast.    rosuvastatin (CRESTOR) 10 MG tablet Take 1 tablet (10 mg total) by mouth once daily.     Family History     Problem Relation (Age of Onset)    Arthritis Mother    Cancer Father (68)    Depression Sister    Diabetes Father, Maternal Grandmother    Heart disease Father    Hypertension Father, Son    Kidney disease Paternal Grandfather    Stroke Paternal Grandfather        Tobacco Use    Smoking status: Never Smoker    Smokeless tobacco: Never Used   Substance and Sexual Activity    Alcohol use: Yes     Alcohol/week: 0.6 - 1.2 oz     Types: 1 - 2 Glasses of wine per week     Comment: "socially" not in awhile    Drug use: No    Sexual activity: Yes     Partners: Male     Birth control/protection: None     Review of Systems   Constitutional: Negative for activity change and appetite change.   HENT: Negative for congestion and dental problem.    Eyes: Negative for discharge and itching.   Respiratory: Positive for shortness of breath.    Cardiovascular: Negative for chest pain and palpitations.   Gastrointestinal: Negative for abdominal distention and abdominal pain.   Endocrine: Negative for cold intolerance and heat intolerance.   Musculoskeletal: Negative for back pain.   Skin: Negative for color change.   Allergic/Immunologic: Negative for environmental allergies.   Neurological: Negative for dizziness and facial asymmetry.   Psychiatric/Behavioral: Negative for agitation and behavioral problems.     Objective:     Vital Signs (Most Recent):  Temp: 99.2 °F (37.3 °C) (09/11/18 0348)  Pulse: 100 (09/11/18 0348)  Resp: (!) 22 (09/11/18 0348)  BP: 121/69 (09/11/18 0348)  SpO2: (!) 87 % (09/11/18 0348) Vital Signs (24h Range):  Temp:  [99.2 °F (37.3 °C)-102.8 °F (39.3 °C)] 99.2 °F (37.3 °C)  Pulse:  [] 100  Resp:  [16-25] 22  SpO2:  [87 %-97 %] 87 %  BP: " ()/(51-97) 121/69     Weight: 91.3 kg (201 lb 4.5 oz)  Body mass index is 32.49 kg/m².    Physical Exam   Constitutional: She is oriented to person, place, and time. She appears well-developed and well-nourished. No distress.   HENT:   Head: Normocephalic and atraumatic.   Mouth/Throat: No oropharyngeal exudate.   Eyes: EOM are normal. Pupils are equal, round, and reactive to light. Right eye exhibits no discharge. Left eye exhibits no discharge. No scleral icterus.   Neck: Normal range of motion. Neck supple. JVD present. No thyromegaly present.   Cardiovascular: Normal rate, regular rhythm and normal heart sounds. Exam reveals no friction rub.   No murmur heard.  Well healed sternal scar   Pulmonary/Chest: Effort normal. No stridor. No respiratory distress.   Crackles at the bases bilaterally   Abdominal: Soft. Bowel sounds are normal. She exhibits no distension. There is no tenderness. There is no guarding.   Musculoskeletal: She exhibits no edema.   Neurological: She is alert and oriented to person, place, and time.   Skin: Skin is warm. She is not diaphoretic.   Left foot bandaged with some surrounding erythema.   Psychiatric: She has a normal mood and affect.         CRANIAL NERVES     CN III, IV, VI   Pupils are equal, round, and reactive to light.  Extraocular motions are normal.     Significant Labs:   A1C: No results for input(s): HGBA1C in the last 4320 hours.  ABGs:   Recent Labs   Lab  09/11/18 0209   PH  7.375   PCO2  46.4*   HCO3  27.1   POCSATURATED  90*   BE  2     Bilirubin:   Recent Labs   Lab  09/11/18 0201   BILITOT  0.6     Blood Culture: No results for input(s): LABBLOO in the last 48 hours.  BMP:   Recent Labs   Lab  09/11/18 0201   GLU  213*   NA  135*   K  4.1   CL  100   CO2  22*   BUN  37*   CREATININE  3.0*   CALCIUM  8.7     CBC:   Recent Labs   Lab  09/11/18 0201   WBC  13.20*   HGB  10.1*   HCT  32.6*   PLT  167     CMP:   Recent Labs   Lab  09/11/18 0201   NA  135*    K  4.1   CL  100   CO2  22*   GLU  213*   BUN  37*   CREATININE  3.0*   CALCIUM  8.7   PROT  7.1   ALBUMIN  3.3*   BILITOT  0.6   ALKPHOS  69   AST  13   ALT  9*   ANIONGAP  13   EGFRNONAA  17*     Cardiac Markers:   Recent Labs   Lab  09/11/18   0217   BNP  2,802*     Coagulation: No results for input(s): PT, INR, APTT in the last 48 hours.  Lactic Acid:   Recent Labs   Lab  09/11/18   0201   LACTATE  1.4     Lipase: No results for input(s): LIPASE in the last 48 hours.  Lipid Panel: No results for input(s): CHOL, HDL, LDLCALC, TRIG, CHOLHDL in the last 48 hours.  Magnesium: No results for input(s): MG in the last 48 hours.  Pathology Results  (Last 10 years)    None        POCT Glucose: No results for input(s): POCTGLUCOSE in the last 48 hours.  Prealbumin: No results for input(s): PREALBUMIN in the last 48 hours.  Respiratory Culture: No results for input(s): GSRESP, RESPIRATORYC in the last 48 hours.  Troponin:   Recent Labs   Lab  09/11/18 0217   TROPONINI  0.042*     TSH: No results for input(s): TSH in the last 4320 hours.  Urine Culture: No results for input(s): LABURIN in the last 48 hours.  Urine Studies:   Recent Labs   Lab  09/11/18   0246   COLORU  Yellow   APPEARANCEUA  Hazy*   PHUR  5.0   SPECGRAV  >=1.030*   PROTEINUA  2+*   GLUCUA  Negative   KETONESU  Negative   BILIRUBINUA  Negative   OCCULTUA  1+*   NITRITE  Negative   UROBILINOGEN  Negative   LEUKOCYTESUR  1+*   RBCUA  5*   WBCUA  >100*   BACTERIA  Moderate*   SQUAMEPITHEL  5   HYALINECASTS  0     Significant Imaging: I have reviewed and interpreted all pertinent imaging results/findings within the past 24 hours.

## 2018-09-11 NOTE — H&P
[facility] Medicine  History & Physical    Patient Name: Juliane Ramos  MRN: [id number]  Admission Date: 9/11/2018  Attending Physician: Reg Devine MD   Primary Care Provider: JOS Dumont    Patient information was obtained from patient, spouse/SO and ER records.     Subjective:     Principal Problem:<principal problem not specified>    Chief Complaint:   Chief Complaint   Patient presents with    Fever    Shortness of Breath        HPI: Juliane Ramos is a 57 y.o. female with a PMHx significant for DM, HTN, CAD, HFrEF with an EF of 35-40%, ESRD on dialysis (TuTh&Sat), COPD on home oxygen, PE, and prior CVA.  She is admitted to the service of hospital medicine with a diagnosis of acute on chronic respiratory failure. She reports worsening of her respiratory status last evening. Noted to be hypoxic on her home pulseox. She reports eating a [employer] hamburger earlier in the day. In the ED she had evidence of volume overload and responded well to I/v diuresis. At baseline she has NYHA Class III symptoms. She reports chills at home but no cough. Denied any sick contacts. Noted to be febrile in the ED for which she got Vancomycin and Zosyn. She has a diabetic wound on the left leg that has been treated with skin grafts.   DNR/DNI status was verified with the patient in the presence of the .    Past Medical History:   Diagnosis Date    Anemia in stage 3 chronic kidney disease 11/26/2017    Anticoagulant long-term use     CHF (congestive heart failure)     COPD (chronic obstructive pulmonary disease)     Coronary artery disease     Diabetes mellitus     Encounter for blood transfusion     Essential hypertension 6/24/2017    Hypertension     MI (myocardial infarction) 2010    Segmental and subsegmental pulmonary emboli of RLL without acute cor pulmonale 11/25/2017    Stroke     July 2005    Thyroid disease     Traumatic subarachnoid hemorrhage 11/24/2017    Type 2  "diabetes mellitus with stage 3 chronic kidney disease, without long-term current use of insulin 6/24/2017       Past Surgical History:   Procedure Laterality Date    ABCESS DRAINAGE Right     Between "little toe" and the one next to it    BREAST SURGERY      Reduction [id number]    CARDIAC SURGERY  01/2011    CABG 4vessel ring in one valve mitral valve prolapse    CORONARY ARTERY BYPASS GRAFT      DEBRIDEMENT-TENDON-ACHILLES Left 12/8/2017    Performed by [facility] D[facility] at [facility]    hyperlipidemia      TUBAL LIGATION  03/1986       Review of patient's allergies indicates:  No Known Allergies    No current facility-administered medications on file prior to encounter.      Current Outpatient Medications on File Prior to Encounter   Medication Sig    amLODIPine (NORVASC) 10 MG tablet Take 1 tablet (10 mg total) by mouth once daily.    apixaban 5 mg Tab Take 1 tablet (5 mg total) by mouth 2 (two) times daily.    cetirizine (ZYRTEC) 10 MG tablet Take 1 tablet (10 mg total) by mouth every evening.    collagenase ointment Apply topically once daily.    epoetin donita (PROCRIT) 10,000 unit/mL injection Inject 1 mL (10,000 Units total) into the skin every Tues, Thurs, Sat.    gabapentin (NEURONTIN) 100 MG capsule Take 1 capsule (100 mg total) by mouth 3 (three) times daily.    insulin aspart (NOVOLOG) 100 unit/mL InPn pen Inject 1-10 Units into the skin 3 (three) times daily.    levothyroxine (SYNTHROID) 50 MCG tablet Take 1 tablet (50 mcg total) by mouth before breakfast.    rosuvastatin (CRESTOR) 10 MG tablet Take 1 tablet (10 mg total) by mouth once daily.     Family History     Problem Relation (Age of Onset)    Arthritis Mother    Cancer Father (68)    Depression Sister    Diabetes Father, Maternal Grandmother    Heart disease Father    Hypertension Father, Son    Kidney disease Paternal Grandfather    Stroke Paternal Grandfather        Tobacco Use    Smoking status: Never Smoker    Smokeless " "tobacco: Never Used   Substance and Sexual Activity    Alcohol use: Yes     Alcohol/week: 0.6 - 1.2 oz     Types: 1 - 2 Glasses of wine per week     Comment: "socially" not in awhile    Drug use: No    Sexual activity: Yes     Partners: Male     Birth control/protection: None     Review of Systems   Constitutional: Negative for activity change and appetite change.   HENT: Negative for congestion and dental problem.    Eyes: Negative for discharge and itching.   Respiratory: Positive for shortness of breath.    Cardiovascular: Negative for chest pain and palpitations.   Gastrointestinal: Negative for abdominal distention and abdominal pain.   Endocrine: Negative for cold intolerance and heat intolerance.   Musculoskeletal: Negative for back pain.   Skin: Negative for color change.   Allergic/Immunologic: Negative for environmental allergies.   Neurological: Negative for dizziness and facial asymmetry.   Psychiatric/Behavioral: Negative for agitation and behavioral problems.     Objective:     Vital Signs (Most Recent):  Temp: 99.2 °F (37.3 °C) (09/11/18 0348)  Pulse: 100 (09/11/18 0348)  Resp: (!) 22 (09/11/18 0348)  BP: 121/69 (09/11/18 0348)  SpO2: (!) 87 % (09/11/18 0348) Vital Signs (24h Range):  Temp:  [99.2 °F (37.3 °C)-102.8 °F (39.3 °C)] 99.2 °F (37.3 °C)  Pulse:  [] 100  Resp:  [16-25] 22  SpO2:  [87 %-97 %] 87 %  BP: ()/(51-97) 121/69     Weight: 91.3 kg (201 lb 4.5 oz)  Body mass index is 32.49 kg/m².    Physical Exam   Constitutional: She is oriented to person, place, and time. She appears well-developed and well-nourished. No distress.   HENT:   Head: Normocephalic and atraumatic.   Mouth/Throat: No oropharyngeal exudate.   Eyes: EOM are normal. Pupils are equal, round, and reactive to light. Right eye exhibits no discharge. Left eye exhibits no discharge. No scleral icterus.   Neck: Normal range of motion. Neck supple. JVD present. No thyromegaly present.   Cardiovascular: Normal rate, " regular rhythm and normal heart sounds. Exam reveals no friction rub.   No murmur heard.  Well healed sternal scar   Pulmonary/Chest: Effort normal. No stridor. No respiratory distress.   Crackles at the bases bilaterally   Abdominal: Soft. Bowel sounds are normal. She exhibits no distension. There is no tenderness. There is no guarding.   Musculoskeletal: She exhibits no edema.   Neurological: She is alert and oriented to person, place, and time.   Skin: Skin is warm. She is not diaphoretic.   Left foot bandaged with some surrounding erythema.   Psychiatric: She has a normal mood and affect.         CRANIAL NERVES     CN III, IV, VI   Pupils are equal, round, and reactive to light.  Extraocular motions are normal.     Significant Labs:   A1C: No results for input(s): HGBA1C in the last 4320 hours.  ABGs:   Recent Labs   Lab  09/11/18   0209   PH  7.375   PCO2  46.4*   HCO3  27.1   POCSATURATED  90*   BE  2     Bilirubin:   Recent Labs   Lab  09/11/18   0201   BILITOT  0.6     Blood Culture: No results for input(s): LABBLOO in the last 48 hours.  BMP:   Recent Labs   Lab  09/11/18   0201   GLU  213*   NA  135*   K  4.1   CL  100   CO2  22*   BUN  37*   CREATININE  3.0*   CALCIUM  8.7     CBC:   Recent Labs   Lab  09/11/18   0201   WBC  13.20*   HGB  10.1*   HCT  32.6*   PLT  167     CMP:   Recent Labs   Lab  09/11/18   0201   NA  135*   K  4.1   CL  100   CO2  22*   GLU  213*   BUN  37*   CREATININE  3.0*   CALCIUM  8.7   PROT  7.1   ALBUMIN  3.3*   BILITOT  0.6   ALKPHOS  69   AST  13   ALT  9*   ANIONGAP  13   EGFRNONAA  17*     Cardiac Markers:   Recent Labs   Lab  09/11/18   0217   BNP  2,802*     Coagulation: No results for input(s): PT, INR, APTT in the last 48 hours.  Lactic Acid:   Recent Labs   Lab  09/11/18   0201   LACTATE  1.4     Lipase: No results for input(s): LIPASE in the last 48 hours.  Lipid Panel: No results for input(s): CHOL, HDL, LDLCALC, TRIG, CHOLHDL in the last 48 hours.  Magnesium: No  results for input(s): MG in the last 48 hours.  Pathology Results  (Last 10 years)    None        POCT Glucose: No results for input(s): POCTGLUCOSE in the last 48 hours.  Prealbumin: No results for input(s): PREALBUMIN in the last 48 hours.  Respiratory Culture: No results for input(s): GSRESP, RESPIRATORYC in the last 48 hours.  Troponin:   Recent Labs   Lab  09/11/18   0217   TROPONINI  0.042*     TSH: No results for input(s): TSH in the last 4320 hours.  Urine Culture: No results for input(s): LABURIN in the last 48 hours.  Urine Studies:   Recent Labs   Lab  09/11/18   0246   COLORU  Yellow   APPEARANCEUA  Hazy*   PHUR  5.0   SPECGRAV  >=1.030*   PROTEINUA  2+*   GLUCUA  Negative   KETONESU  Negative   BILIRUBINUA  Negative   OCCULTUA  1+*   NITRITE  Negative   UROBILINOGEN  Negative   LEUKOCYTESUR  1+*   RBCUA  5*   WBCUA  >100*   BACTERIA  Moderate*   SQUAMEPITHEL  5   HYALINECASTS  0     Significant Imaging: I have reviewed and interpreted all pertinent imaging results/findings within the past 24 hours.    Assessment/Plan:     Acute on chronic combined systolic and diastolic heart failure    - Patient had evidence of volume overload at presentation and responded well to I/v diuresis.  - Likely precipitated by the salt intake with the fast food.  - Will continue I/v diuresis and renal consult today for HD for additional fluid removal.  - Will check Echo to reassess LV function.  - Cardiology consult.          ESRD (end stage renal disease)    - Renal consult for HD today.          Sepsis    - Blood cultures and urine culture drawn.  - Will continue broad spectrum antibiotics till cultures are back.  - Wound care consult for left leg.  - Lactate of 1.4    Antibiotics given-   Antibiotics (From admission, onward)    Start     Stop Route Frequency Ordered    09/11/18 0630  piperacillin-tazobactam 3.375 g in sodium chloride 0.9 % 50 mL IVPB (ready to mix system)      -- IV Every 8 hours (non-standard times)  09/11/18 0519    09/11/18 0630  vancomycin in dextrose 5 % 1 gram/250 mL IVPB 1,000 mg  (Vancomycin IVPB with levels panel)      -- IV Every 24 hours (non-standard times) 09/11/18 0519                Segmental and subsegmental pulmonary emboli of RLL without acute cor pulmonale    - Continue home dose of Apixaban.          Coronary artery disease involving native coronary artery of native heart without angina pectoris    - s/p CABG  - Continue statin.  - Cardiology consult.          Type 2 diabetes mellitus with chronic kidney disease on chronic dialysis    - Sliding scale insulin as an inpatient.            VTE Risk Mitigation (From admission, onward)        Ordered     apixaban tablet 5 mg  2 times daily      09/11/18 0456        Critical care time spent on the evaluation and treatment of severe organ dysfunction, review of pertinent labs and imaging studies, discussions with consulting providers and discussions with patient/family: 40 minutes.     Ac Doherty MD  Department of Hospital Medicine   [facility]

## 2018-09-11 NOTE — PLAN OF CARE
09/11/18 1040   Discharge Assessment   Assessment Type Discharge Planning Assessment   Confirmed/corrected address and phone number on facesheet? Yes   Assessment information obtained from? Other  (, [facility] )   Expected Length of Stay (days) 4   Communicated expected length of stay with patient/caregiver yes   Prior to hospitilization cognitive status: Alert/Oriented   Prior to hospitalization functional status: Independent   Current cognitive status: Unable to Assess  (pt sleeping )   Lives With spouse   Able to Return to Prior Arrangements unable to determine at this time (comments)   Is patient able to care for self after discharge? Unable to determine at this time (comments)   Patient's perception of discharge disposition admitted as an inpatient   Readmission Within The Last 30 Days no previous admission in last 30 days   Patient currently being followed by outpatient case management? No   Is it the patient/care giver preference to resume care with the current outside agency? Yes   Equipment Currently Used at Home oxygen;glucometer;walker, rolling;wheelchair  ([facility] [place] provides the oxygen )   Do you have any problems affording any of your prescribed medications? No  (pharm: [facility] )   Is the patient taking medications as prescribed? yes   Does the patient have transportation home? Yes   Transportation Available family or friend will provide   Does the patient receive services at the [facility]? No   Discharge Plan A Home Health  ([facility] )   Patient/Family In Agreement With Plan yes     Outpt dialysis at [address] on [address]TS @6:30am....OANH Viveros CM

## 2018-09-11 NOTE — PLAN OF CARE
Chronic left achilles area wound, states being treated by Dr Wick with [facility].  Today wound measures 6 x 1 cm  Wound care orders written for duration of hospital stay; to return to home health regime on discharge.  Mepilex border Ag dressing applied.

## 2018-09-11 NOTE — ASSESSMENT & PLAN NOTE
- Blood cultures and urine culture drawn.  - Will continue broad spectrum antibiotics till cultures are back.  - Wound care consult for left leg.  - Lactate of 1.4    Antibiotics given-   Antibiotics (From admission, onward)    Start     Stop Route Frequency Ordered    09/11/18 0630  piperacillin-tazobactam 3.375 g in sodium chloride 0.9 % 50 mL IVPB (ready to mix system)      -- IV Every 8 hours (non-standard times) 09/11/18 0519 09/11/18 0630  vancomycin in dextrose 5 % 1 gram/250 mL IVPB 1,000 mg  (Vancomycin IVPB with levels panel)      -- IV Every 24 hours (non-standard times) 09/11/18 0519

## 2018-09-11 NOTE — UM SECONDARY REVIEW
Physician Advisor External    Level of Care Issue    Approved Inpatient for admit 9/11/2018 per Dr. Torres at EHR

## 2018-09-11 NOTE — CONSULTS
INPATIENT NEPHROLOGY CONSULT   [facility] NEPHROLOGY    Juliane Ramos  09/11/2018    Reason for consultation:    esrd    Chief Complaint:   Chief Complaint   Patient presents with    Fever    Shortness of Breath          History of Present Illness:    Juliane Ramos is a 57 y.o. female with a PMHx significant for DM, HTN, CAD, HFrEF with an EF of 35-40%, ESRD on dialysis (TuTh&Sat), COPD on home oxygen, PE, and prior CVA.  She is admitted to the service of hospital medicine with a diagnosis of acute on chronic respiratory failure. She reports worsening of her respiratory status last evening. Noted to be hypoxic on her home pulseox. She reports eating a [employer] hamburger earlier in the day. In the ED she had evidence of volume overload and responded well to I/v diuresis. At baseline she has NYHA Class III symptoms. She reports chills at home but no cough. Denied any sick contacts. Noted to be febrile in the ED for which she got Vancomycin and Zosyn. She has a diabetic wound on the left leg that has been treated with skin grafts.   DNR/DNI status was verified with the patient in the presence of the .            Plan of Care:       Assessment:    esrd  Pulmonary edema  Hypertension  Anemia  fever    Plan:    Dialysis today  uf as tolerated  jesús with hd  F/u cultures  abx per primary         Thank you for allowing us to participate in this patient's care. We will continue to follow.    Vital Signs:  Temp Readings from Last 3 Encounters:   09/11/18 98.6 °F (37 °C) (Oral)   09/10/18 100.2 °F (37.9 °C)   02/24/18 97.9 °F (36.6 °C) (Oral)       Pulse Readings from Last 3 Encounters:   09/11/18 79   09/10/18 (!) 122   07/17/18 67       BP Readings from Last 3 Encounters:   09/11/18 117/68   09/10/18 (!) 128/97   07/17/18 117/61       Weight:  Wt Readings from Last 3 Encounters:   09/11/18 91.2 kg (201 lb)   02/24/18 89.8 kg (197 lb 15.6 oz)   12/24/17 104.1 kg (229 lb 6.4 oz)       Past Medical & Surgical  "History:  Past Medical History:   Diagnosis Date    Anemia in stage 3 chronic kidney disease 11/26/2017    Anticoagulant long-term use     CHF (congestive heart failure)     COPD (chronic obstructive pulmonary disease)     Coronary artery disease     Diabetes mellitus     Encounter for blood transfusion     Essential hypertension 6/24/2017    Hypertension     MI (myocardial infarction) 2010    Segmental and subsegmental pulmonary emboli of RLL without acute cor pulmonale 11/25/2017    Stroke     July 2005    Thyroid disease     Traumatic subarachnoid hemorrhage 11/24/2017    Type 2 diabetes mellitus with stage 3 chronic kidney disease, without long-term current use of insulin 6/24/2017       Past Surgical History:   Procedure Laterality Date    ABCESS DRAINAGE Right     Between "little toe" and the one next to it    BREAST SURGERY      Reduction [id number]    CARDIAC SURGERY  01/2011    CABG 4vessel ring in one valve mitral valve prolapse    CORONARY ARTERY BYPASS GRAFT      DEBRIDEMENT-TENDON-ACHILLES Left 12/8/2017    [facility]formed by [facility] D[facility] at [facility] OR    hyperlipidemia      TUBAL LIGATION  03/1986       Past Social History:  Social History     Socioeconomic History    Marital status:      Spouse name: None    Number of children: None    Years of education: None    Highest education level: None   Social Needs    Financial resource strain: None    Food insecurity - worry: None    Food insecurity - inability: None    Transportation needs - medical: None    Transportation needs - non-medical: None   Occupational History    None   Tobacco Use    Smoking status: Never Smoker    Smokeless tobacco: Never Used   Substance and Sexual Activity    Alcohol use: Yes     Alcohol/week: 0.6 - 1.2 oz     Types: 1 - 2 Glasses of wine per week     Comment: "socially" not in awhile    Drug use: No    Sexual activity: Yes     Partners: Male     Birth control/protection: None   Other " "Topics Concern    None   Social History Narrative    None       Medications:  No current facility-administered medications on file prior to encounter.      Current Outpatient Medications on File Prior to Encounter   Medication Sig Dispense Refill    amLODIPine (NORVASC) 10 MG tablet Take 1 tablet (10 mg total) by mouth once daily. 30 tablet 1    apixaban 5 mg Tab Take 1 tablet (5 mg total) by mouth 2 (two) times daily. 60 tablet 1    cetirizine (ZYRTEC) 10 MG tablet Take 1 tablet (10 mg total) by mouth every evening. 20 tablet 0    collagenase ointment Apply topically once daily.      epoetin donita (PROCRIT) 10,000 unit/mL injection Inject 1 mL (10,000 Units total) into the skin every Tues, Thurs, Sat. 1 mL 5    gabapentin (NEURONTIN) 100 MG capsule Take 1 capsule (100 mg total) by mouth 3 (three) times daily. 90 capsule 1    insulin aspart (NOVOLOG) 100 unit/mL InPn pen Inject 1-10 Units into the skin 3 (three) times daily. 3 mL 1    levothyroxine (SYNTHROID) 50 MCG tablet Take 1 tablet (50 mcg total) by mouth before breakfast. 30 tablet 1    rosuvastatin (CRESTOR) 10 MG tablet Take 1 tablet (10 mg total) by mouth once daily. 30 tablet 1     Scheduled Meds:   sodium chloride 0.9%   Intravenous Once    albumin human 25%  25 g Intravenous Once    apixaban  5 mg Oral BID    gabapentin  100 mg Oral TID    levothyroxine  50 mcg Oral Before breakfast    piperacillin-tazobactam (ZOSYN) IVPB  3.375 g Intravenous Q8H    rosuvastatin  10 mg Oral Daily     Continuous Infusions:  PRN Meds:.sodium chloride 0.9%, heparin (porcine), sulfur hexafluoride microspheres    Allergies:  Patient has no known allergies.    Past Family History:  Reviewed; refer to Hospitalist Admission Note    Review of Systems:  Review of Systems - All 14 systems reviewed and negative, except as noted in HPI    Physical Exam:    /68   Pulse 79   Temp 98.6 °F (37 °C) (Oral)   Resp (!) 22   Ht 5' 6" (1.676 m)   Wt 91.2 kg (201 " lb)   LMP 08/29/2016 (Approximate)   SpO2 100%   Breastfeeding? No   BMI 32.44 kg/m²     General Appearance:    Alert, cooperative, no distress, appears stated age   Head:    Normocephalic, without obvious abnormality, atraumatic   Eyes:    PER, conjunctiva/corneas clear, EOM's intact in both eyes        Throat:   Lips, mucosa, and tongue normal; teeth and gums normal   Back:     Symmetric, no curvature, ROM normal, no CVA tenderness   Lungs:     Clear to auscultation bilaterally, respirations unlabored   Chest wall:    No tenderness or deformity   Heart:    Regular rate and rhythm, S1 and S2 normal, no murmur, rub   or gallop   Abdomen:     Soft, non-tender, bowel sounds active all four quadrants,     no masses, no organomegaly   Extremities:   Extremities normal, atraumatic, no cyanosis or edema   Pulses:   2+ and symmetric all extremities   MSK:   No joint or muscle swelling, tenderness or deformity   Skin:   Skin color, texture, turgor normal, no rashes or lesions   Neurologic:   CNII-XII intact, normal strength and sensation       Throughout.  No flap     Results:  Lab Results   Component Value Date     (L) 09/11/2018    K 4.1 09/11/2018     09/11/2018    CO2 22 (L) 09/11/2018    BUN 37 (H) 09/11/2018    CREATININE 3.0 (H) 09/11/2018    CALCIUM 8.7 09/11/2018    ANIONGAP 13 09/11/2018    ESTGFRAFRICA 19 (A) 09/11/2018    EGFRNONAA 17 (A) 09/11/2018       Lab Results   Component Value Date    CALCIUM 8.7 09/11/2018    PHOS 8.0 (H) 06/25/2018       Recent Labs   Lab  09/11/18   0201   WBC  13.20*   RBC  3.88*   HGB  10.1*   HCT  32.6*   PLT  167   MCV  84   MCH  26.1*   MCHC  31.1*       I have personally reviewed pertinent radiological imaging and reports.       Elijah Gonzalez MD  Nephrology  [facility]  [phone]

## 2018-09-12 LAB
ALBUMIN SERPL BCP-MCNC: 3.2 G/DL
ALP SERPL-CCNC: 61 U/L
ALT SERPL W/O P-5'-P-CCNC: 10 U/L
ANION GAP SERPL CALC-SCNC: 12 MMOL/L
AST SERPL-CCNC: 15 U/L
BACTERIA UR CULT: NORMAL
BACTERIA UR CULT: NORMAL
BASOPHILS # BLD AUTO: 0 K/UL
BASOPHILS NFR BLD: 0.2 %
BILIRUB SERPL-MCNC: 0.6 MG/DL
BUN SERPL-MCNC: 28 MG/DL
CALCIUM SERPL-MCNC: 9.1 MG/DL
CHLORIDE SERPL-SCNC: 97 MMOL/L
CO2 SERPL-SCNC: 23 MMOL/L
CREAT SERPL-MCNC: 3.1 MG/DL
DIFFERENTIAL METHOD: ABNORMAL
EOSINOPHIL # BLD AUTO: 0.5 K/UL
EOSINOPHIL NFR BLD: 6.8 %
ERYTHROCYTE [DISTWIDTH] IN BLOOD BY AUTOMATED COUNT: 20.7 %
EST. GFR  (AFRICAN AMERICAN): 19 ML/MIN/1.73 M^2
EST. GFR  (NON AFRICAN AMERICAN): 16 ML/MIN/1.73 M^2
ESTIMATED AVG GLUCOSE: 103 MG/DL
GLUCOSE SERPL-MCNC: 209 MG/DL
HBA1C MFR BLD HPLC: 5.2 %
HCT VFR BLD AUTO: 35 %
HGB BLD-MCNC: 10.4 G/DL
LYMPHOCYTES # BLD AUTO: 0.8 K/UL
LYMPHOCYTES NFR BLD: 10.6 %
MAGNESIUM SERPL-MCNC: 2.3 MG/DL
MCH RBC QN AUTO: 24.8 PG
MCHC RBC AUTO-ENTMCNC: 29.6 G/DL
MCV RBC AUTO: 84 FL
MONOCYTES # BLD AUTO: 0.6 K/UL
MONOCYTES NFR BLD: 8.1 %
NEUTROPHILS # BLD AUTO: 5.8 K/UL
NEUTROPHILS NFR BLD: 74.3 %
PHOSPHATE SERPL-MCNC: 4.9 MG/DL
PLATELET # BLD AUTO: 157 K/UL
PMV BLD AUTO: 9.1 FL
POCT GLUCOSE: 136 MG/DL (ref 70–110)
POCT GLUCOSE: 175 MG/DL (ref 70–110)
POCT GLUCOSE: 190 MG/DL (ref 70–110)
POCT GLUCOSE: 194 MG/DL (ref 70–110)
POTASSIUM SERPL-SCNC: 3.9 MMOL/L
PROT SERPL-MCNC: 7.1 G/DL
RBC # BLD AUTO: 4.18 M/UL
SODIUM SERPL-SCNC: 132 MMOL/L
WBC # BLD AUTO: 7.9 K/UL

## 2018-09-12 PROCEDURE — 94761 N-INVAS EAR/PLS OXIMETRY MLT: CPT

## 2018-09-12 PROCEDURE — 80053 COMPREHEN METABOLIC PANEL: CPT

## 2018-09-12 PROCEDURE — 97802 MEDICAL NUTRITION INDIV IN: CPT

## 2018-09-12 PROCEDURE — 99232 SBSQ HOSP IP/OBS MODERATE 35: CPT | Mod: ,,, | Performed by: INTERNAL MEDICINE

## 2018-09-12 PROCEDURE — 83735 ASSAY OF MAGNESIUM: CPT

## 2018-09-12 PROCEDURE — 85025 COMPLETE CBC W/AUTO DIFF WBC: CPT

## 2018-09-12 PROCEDURE — 11000001 HC ACUTE MED/SURG PRIVATE ROOM

## 2018-09-12 PROCEDURE — 63600175 PHARM REV CODE 636 W HCPCS: Performed by: INTERNAL MEDICINE

## 2018-09-12 PROCEDURE — 83036 HEMOGLOBIN GLYCOSYLATED A1C: CPT

## 2018-09-12 PROCEDURE — 27000221 HC OXYGEN, UP TO 24 HOURS

## 2018-09-12 PROCEDURE — 36415 COLL VENOUS BLD VENIPUNCTURE: CPT

## 2018-09-12 PROCEDURE — 25000003 PHARM REV CODE 250: Performed by: INTERNAL MEDICINE

## 2018-09-12 PROCEDURE — 84100 ASSAY OF PHOSPHORUS: CPT

## 2018-09-12 PROCEDURE — 25000003 PHARM REV CODE 250: Performed by: HOSPITALIST

## 2018-09-12 RX ORDER — IBUPROFEN 200 MG
16 TABLET ORAL
Status: DISCONTINUED | OUTPATIENT
Start: 2018-09-12 | End: 2018-09-18 | Stop reason: HOSPADM

## 2018-09-12 RX ORDER — IBUPROFEN 200 MG
24 TABLET ORAL
Status: DISCONTINUED | OUTPATIENT
Start: 2018-09-12 | End: 2018-09-18 | Stop reason: HOSPADM

## 2018-09-12 RX ORDER — GLUCAGON 1 MG
1 KIT INJECTION
Status: DISCONTINUED | OUTPATIENT
Start: 2018-09-12 | End: 2018-09-18 | Stop reason: HOSPADM

## 2018-09-12 RX ORDER — IBUPROFEN 200 MG
24 TABLET ORAL
Status: DISCONTINUED | OUTPATIENT
Start: 2018-09-12 | End: 2018-09-12

## 2018-09-12 RX ORDER — IBUPROFEN 200 MG
16 TABLET ORAL
Status: DISCONTINUED | OUTPATIENT
Start: 2018-09-12 | End: 2018-09-12

## 2018-09-12 RX ORDER — INSULIN ASPART 100 [IU]/ML
0-5 INJECTION, SOLUTION INTRAVENOUS; SUBCUTANEOUS
Status: DISCONTINUED | OUTPATIENT
Start: 2018-09-12 | End: 2018-09-12

## 2018-09-12 RX ORDER — VANCOMYCIN HYDROCHLORIDE 500 MG/100ML
500 INJECTION, SOLUTION INTRAVENOUS
Status: DISCONTINUED | OUTPATIENT
Start: 2018-09-13 | End: 2018-09-13 | Stop reason: SDUPTHER

## 2018-09-12 RX ORDER — INSULIN ASPART 100 [IU]/ML
0-5 INJECTION, SOLUTION INTRAVENOUS; SUBCUTANEOUS
Status: DISCONTINUED | OUTPATIENT
Start: 2018-09-12 | End: 2018-09-18 | Stop reason: HOSPADM

## 2018-09-12 RX ADMIN — APIXABAN 5 MG: 2.5 TABLET, FILM COATED ORAL at 09:09

## 2018-09-12 RX ADMIN — SEVELAMER CARBONATE 1600 MG: 800 TABLET, FILM COATED ORAL at 12:09

## 2018-09-12 RX ADMIN — GABAPENTIN 100 MG: 100 CAPSULE ORAL at 04:09

## 2018-09-12 RX ADMIN — MIDODRINE HYDROCHLORIDE 2.5 MG: 2.5 TABLET ORAL at 05:09

## 2018-09-12 RX ADMIN — LEVOTHYROXINE SODIUM 50 MCG: 50 TABLET ORAL at 05:09

## 2018-09-12 RX ADMIN — GABAPENTIN 100 MG: 100 CAPSULE ORAL at 08:09

## 2018-09-12 RX ADMIN — SEVELAMER CARBONATE 1600 MG: 800 TABLET, FILM COATED ORAL at 05:09

## 2018-09-12 RX ADMIN — CEFTAZIDIME 500 MG: 1 INJECTION, POWDER, FOR SOLUTION INTRAMUSCULAR; INTRAVENOUS at 11:09

## 2018-09-12 RX ADMIN — MIDODRINE HYDROCHLORIDE 2.5 MG: 2.5 TABLET ORAL at 08:09

## 2018-09-12 RX ADMIN — GABAPENTIN 100 MG: 100 CAPSULE ORAL at 09:09

## 2018-09-12 RX ADMIN — SEVELAMER CARBONATE 1600 MG: 800 TABLET, FILM COATED ORAL at 08:09

## 2018-09-12 RX ADMIN — MIDODRINE HYDROCHLORIDE 2.5 MG: 2.5 TABLET ORAL at 12:09

## 2018-09-12 RX ADMIN — CITALOPRAM HYDROBROMIDE 20 MG: 10 TABLET ORAL at 08:09

## 2018-09-12 RX ADMIN — APIXABAN 5 MG: 2.5 TABLET, FILM COATED ORAL at 08:09

## 2018-09-12 RX ADMIN — ROSUVASTATIN CALCIUM 10 MG: 5 TABLET, FILM COATED ORAL at 08:09

## 2018-09-12 NOTE — PLAN OF CARE
Problem: Patient Care Overview  Goal: Plan of Care Review  Outcome: Ongoing (interventions implemented as appropriate)  POC reviewed with pt's, verbalized understanding, side rail x 2. Call light within reach, up to bedside commode with minimal assistance. Skin care provided. Denies any SOB or pain. Blood sugar monitor as order. Will continue to monitor

## 2018-09-12 NOTE — CONSULTS
"Consult Note  Infectious Disease    Reason for Consult:      HPI:  Juliane Ramos is a 57 y.o. female with a PMHx significant for DM, HTN, CAD, HFrEF with an EF of 35-40%, ESRD on dialysis (TuTh&Sat), COPD on home oxygen, PE, and prior CVA. Patient presented to emergency room at [place] [facility] with complaints of shortness of breath. She was diagnosed and treated for CHF acute exacerbation secondary to noncompliance with low salt diet. in addition she was also noted to be febrile and has been treated for sepsis. . Blood cultures are showed gram-negative figueroa in stain.     Patient has a diabetic wound infection over the left leg. Which is not healing well. She was not told anything about blood supply in her lower extremities.  Denied any sick contacts.        Review of patient's allergies indicates:No Known Allergies    Past Medical History:   Diagnosis Date    Anemia in stage 3 chronic kidney disease 11/26/2017    Anticoagulant long-term use     CHF (congestive heart failure)     COPD (chronic obstructive pulmonary disease)     Coronary artery disease     Diabetes mellitus     Encounter for blood transfusion     Essential hypertension 6/24/2017    Hypertension     MI (myocardial infarction) 2010    Segmental and subsegmental pulmonary emboli of RLL without acute cor pulmonale 11/25/2017    Stroke     July 2005    Thyroid disease     Traumatic subarachnoid hemorrhage 11/24/2017    Type 2 diabetes mellitus with stage 3 chronic kidney disease, without long-term current use of insulin 6/24/2017     Past Surgical History:   Procedure Laterality Date    ABCESS DRAINAGE Right     Between "little toe" and the one next to it    BREAST SURGERY      Reduction [id number]    CARDIAC SURGERY  01/2011    CABG 4vessel ring in one valve mitral valve prolapse    CORONARY ARTERY BYPASS GRAFT      DEBRIDEMENT-TENDON-ACHILLES Left 12/8/2017    Performed by Dennis Wick D[facility] at [facility]    hyperlipidemia      TUBAL " "LIGATION  1986     Social History     Socioeconomic History    Marital status:      Spouse name: None    Number of children: None    Years of education: None    Highest education level: None   Social Needs    Financial resource strain: None    Food insecurity - worry: None    Food insecurity - inability: None    Transportation needs - medical: None    Transportation needs - non-medical: None   Occupational History    None   Tobacco Use    Smoking status: Never Smoker    Smokeless tobacco: Never Used   Substance and Sexual Activity    Alcohol use: Yes     Alcohol/week: 0.6 - 1.2 oz     Types: 1 - 2 Glasses of wine per week     Comment: "socially" not in awhile    Drug use: No    Sexual activity: Yes     Partners: Male     Birth control/protection: None   Other Topics Concern    None   Social History Narrative    None     Family History   Problem Relation Age of Onset    Arthritis Mother     Heart disease Father     Cancer Father 68        prostae and bladder ca  in     Diabetes Father     Hypertension Father     Depression Sister     Hypertension Son     Diabetes Maternal Grandmother         lost leg    Kidney disease Paternal Grandfather         had kidney removed    Stroke Paternal Grandfather          Review of Systems:   No chills, fever, sweats  No change in vision, loss of vision or diplopia  No sinus congestion, purulent nasal discharge, post nasal drip or facial pain  No pain in mouth or throat. No problems with teeth, gums  No chest pain, palpitations, syncope  No cough, sputum production, pleurisy, hemoptysis, shortness of breath, dyspnea on exertion, or snoring/apnea  No nausea, vomiting, diarrhea, constipation, blood in stool, or focal abd pain  No dysuria, hematuria, strangury, retention,  nocturia, prostatism,   No vaginal discharge, perineal pain, dysfunctional uterine bleeding or risk factor for STD  No swelling of joints, redness of joints, injuries, or " new focal pain  No unusual headaches, dizziness, vertigo, numbness, paresthesias, neuropathy, falls  No anxiety, depression, substance abuse, sleep disturbance  No diabetes, thyroid, hypogonadal conditions  No bleeding, lymphadenopathy, anemia, malignancy, unusual bruising    EXAM & DIAGNOSTICS REVIEWED:   Vitals:     Temp:  [97.7 °F (36.5 °C)-98.9 °F (37.2 °C)]   Temp: 97.7 °F (36.5 °C) (09/12/18 0812)  Pulse: 81 (09/12/18 0812)  Resp: 18 (09/12/18 0812)  BP: 127/73 (09/12/18 0812)  SpO2: (!) 91 % (09/12/18 0812)    Intake/Output Summary (Last 24 hours) at 9/12/2018 0848  Last data filed at 9/12/2018 0624  Gross per 24 hour   Intake 1260 ml   Output 3700 ml   Net -2440 ml       General:  In NAD. Looks comfortable but looks thin and chronically ill                           Alert and attentive, cooperative  Eyes:  Anicteric, PERRL, EOMI  ENT:  Mouth w/ pink MMM, no lesions/exudate, some dental caries  Neck:  Trachea midline, supple, no adenopathy appreciated  Lungs: Decreased at bases especially on the right. Course in the middle..  Heart:  RRR, no gallop/murmur noted  Abd:  soft, NT, ND, normal BS, no masses/organomegaly appreciated.  :  Voids/Chi draining yellow urine, clear  Musc:  Joints without effusion, swelling,  erythema, synovitis,   Skin:  Generally warm, dry, normal for color. No rashes. No palmar or plantar    lesions. No subungual petechiae  Wound: left  Neuro: AAOx3, speech clear, moves all extrems equally  Extrem: No edema, erythema, phlebitis, cellulitis,   VAD:      General Labs reviewed:  No results for input(s): WBC, RBC, HGB, HCT, PLT, MCV, MCH, MCHC in the last 24 hours.  No results for input(s): GLUCOSE, CALCIUM, PROT, NA, K, CO2, CL, BUN, CREATININE, ALKPHOS, ALT, AST, BILITOT in the last 24 hours.    Invalid input(s):  ALBUMIN        Micro:  Microbiology Results (last 7 days)     Procedure Component Value Units Date/Time    Urine culture [[id number]] Collected:  09/11/18 0246    Order  Status:  Completed Specimen:  Urine Updated:  09/12/18 0816     Urine Culture, Routine Multiple organisms isolated. None in predominance.  Repeat if     Urine Culture, Routine clinically necessary.    Narrative:       Preferred Collection Type->Urine, Clean Catch    Blood culture x two cultures. Draw prior to antibiotics. [[id number]] Collected:  09/11/18 0217    Order Status:  Completed Specimen:  Blood Updated:  09/11/18 1715     Blood Culture, Routine No Growth to date    Narrative:       Aerobic and anaerobic    Blood culture x two cultures. Draw prior to antibiotics. [[id number]] Collected:  09/11/18 0200    Order Status:  Completed Specimen:  Blood Updated:  09/11/18 1715     Blood Culture, Routine No Growth to date    Narrative:       Aerobic and anaerobic        Imaging Reviewed:   CXR No new airspace disease. Unchanged and appropriate central line position. Unchanged cardiomegaly. Right pleural effusion is decreased in size since prior.  · ECHO  · The left ventricle cavity is mildly dilated in systole.  · Left ventricle ejection fraction is moderately decreased at 37%  · Grade II (moderate) left ventricular diastolic dysfunction consistent with pseudonormalization.  · LA pressure is elevated.  · RV systolic function is low normal.  · Left atrium is mildly dilated.  · Right atrium is moderately dilated.  · Mitral valve shows trace regurgitation. MV annuloplasty ring.  · Tricuspid valve shows moderate to severe regurgitation.  · Severely elevated central venous pressure (15 mm Hg).  · The estimated PA systolic pressure is 66.27 mm Hg         IMPRESSION & PLAN   1. GN bacteremia   2. Chronic left achilles area wound,  [facility]  3. cxr = RLL opacity/possible pneumonia?effusion secondary to CHF?  4.   Leukocutosis 13.2 Procalcitonin 0.75. tmax 102.7Lactate 1.1  5. History of obesity, DM, COPD on 3 L oxygen at home, PE, CVA, thyroid disease, CAD, HFrEF 37 % , Pulm HTN, CABG. HTN, MI,    breast surgery, Esrd, Anemia, anxiety      Recommendation:   -Agree with ceftaz  -Will add VAnc  As  Cultures are not finalized and the GN in stain may end up not being GN  -needs vascular intervention for left leg -------start with CTA LLE  I called [facility] Re: Cultures. No new information at this time

## 2018-09-12 NOTE — ASSESSMENT & PLAN NOTE
Contributing Nutrition Diagnosis  Altered nutrition related lab values    Related to (etiology):   Altered absorption of nutrients     Signs and Symptoms (as evidenced by):   Elevated glucose, BUN, and Crea    Interventions/Recommendations (treatment strategy):  1) DM, renal diet    Nutrition Diagnosis Status:   New

## 2018-09-12 NOTE — CONSULTS
"  [facility]  Adult Nutrition  Consult Note    SUMMARY     Recommendations    Recommendation/Intervention:   1) Continue DM, renal diet   2) Consider decreasing DM diet to 1800 if glucose remains elevated   3) Boost Glucose control daily (comparable in K and Phos to novasource)   4) Obtain new height if able    Goals: 1) PO intakes > 75% of meals and supplements  Nutrition Goal Status: new  Communication of RD Recs: reviewed with RN    Reason for Assessment    Reason for Assessment: identified at risk by screening criteria  Diagnosis: other (see comments)(Fever/SOB)  Relevant Medical History: ESRD on HD, COPD, CAD, DM, CABG, HTN, MI, PE, CVA  Interdisciplinary Rounds: attended    General Information Comments: 57 y/o female admitted with fever/SOB and volume overload. Noted to be non-compliant with sodium x 3-4 days PTA, pt states they are usually complaint with a DM/renal/2 gm Na diet and she has been educated by a dietitian multiple times at dialysis. Patient consumes 3 oz of meat daily per usual recall and drinks Boost high protein shakes daily and eats Perfect protein bars during dialysis treatment. Noted to have non-healing DM ulcer. Reviewed diet education with patient and left educational materials.     Nutrition Discharge Planning: To be determined    Nutrition Risk Screen    Nutrition Risk Screen: large or nonhealing wound, burn or pressure ulcer(DM ulcer on ankle)    Nutrition/Diet History    Patient Reported Diet/Restrictions/Preferences: diabetic diet, renal  Do you have any cultural, spiritual, Alevism conflicts, given your current situation?: no  Food Allergies: NKFA(or intolerances)    Anthropometrics    Temp: 97.4 °F (36.3 °C)  Height Method: Stated  Height: 5' 6"  Height (inches): 66 in  Weight Method: Bed Scale  Weight: 91.2 kg (201 lb 1 oz)  Weight (lb): 201.06 lb  Ideal Body Weight (IBW), Female: 130 lb  % Ideal Body Weight, Female (lb): 154.66 lb  BMI (Calculated): 32.5  BMI " Grade: 30 - 34.9- obesity - grade I  Weight Change Amount: (usual weight fluctuates 103 kg-89 kg)       Lab/Procedures/Meds    Pertinent Labs Reviewed: reviewed  CMP  Sodium   Date Value Ref Range Status   09/12/2018 132 (L) 136 - 145 mmol/L Final   06/25/2018 129 (L) 134 - 144 mmol/L      Potassium   Date Value Ref Range Status   09/12/2018 3.9 3.5 - 5.1 mmol/L Final     Chloride   Date Value Ref Range Status   09/12/2018 97 95 - 110 mmol/L Final   06/25/2018 92 (L) 98 - 110 mmol/L      CO2   Date Value Ref Range Status   09/12/2018 23 23 - 29 mmol/L Final     Glucose   Date Value Ref Range Status   09/12/2018 209 (H) 70 - 110 mg/dL Final   06/25/2018 131 (H) 70 - 99 mg/dL      BUN, Bld   Date Value Ref Range Status   09/12/2018 28 (H) 6 - 20 mg/dL Final     Creatinine   Date Value Ref Range Status   09/12/2018 3.1 (H) 0.5 - 1.4 mg/dL Final   06/25/2018 4.86 (H) 0.60 - 1.40 mg/dL    02/24/2012 1.0 0.2 - 1.4 mg/dl Final     Calcium   Date Value Ref Range Status   09/12/2018 9.1 8.7 - 10.5 mg/dL Final   02/24/2012 9.6 8.6 - 10.2 mg/dl Final     Total Protein   Date Value Ref Range Status   09/12/2018 7.1 6.0 - 8.4 g/dL Final     Albumin   Date Value Ref Range Status   09/12/2018 3.2 (L) 3.5 - 5.2 g/dL Final   06/25/2018 3.1 3.1 - 4.7 g/dL      Total Bilirubin   Date Value Ref Range Status   09/12/2018 0.6 0.1 - 1.0 mg/dL Final     Comment:     For infants and newborns, interpretation of results should be based  on gestational age, weight and in agreement with clinical  observations.  Premature Infant recommended reference ranges:  Up to 24 hours.............<8.0 mg/dL  Up to 48 hours............<12.0 mg/dL  3-5 days..................<15.0 mg/dL  6-29 days.................<15.0 mg/dL       Alkaline Phosphatase   Date Value Ref Range Status   09/12/2018 61 55 - 135 U/L Final   02/24/2012 91 23 - 119 UNIT/L Final     AST   Date Value Ref Range Status   09/12/2018 15 10 - 40 U/L Final   02/24/2012 30 10 - 30 UNIT/L Final      ALT   Date Value Ref Range Status   09/12/2018 10 10 - 44 U/L Final     Anion Gap   Date Value Ref Range Status   09/12/2018 12 8 - 16 mmol/L Final   02/24/2012 12 5 - 15 meq/L Final     eGFR if    Date Value Ref Range Status   09/12/2018 19 (A) >60 mL/min/1.73 m^2 Final     eGFR if non    Date Value Ref Range Status   09/12/2018 16 (A) >60 mL/min/1.73 m^2 Final     Comment:     Calculation used to obtain the estimated glomerular filtration  rate (eGFR) is the CKD-EPI equation.        Pertinent Medications Reviewed: reviewed  Pertinent Medications Comments: NaCl @100 ml/hr on HD days, albumin, epoetin    Physical Findings/Assessment    Overall Physical Appearance: obese  Oral/Mouth Cavity: WDL  Skin: non-healing wound(s)(DM ulcer ankle)    Estimated/Assessed Needs    Weight Used For Calorie Calculations: 91.2 kg (201 lb 1 oz)  Energy Calorie Requirements (kcal): 1518 (x 1.2 sedentary) = 1820 kcal   Energy Need Method: [place]  Protein Requirements: 1.5-1.6 g protein r/t non-healing wound= 88-94 g protein  Weight Used For Protein Calculations: 59 kg (130 lb 1.1 oz)(IBW)  Fluid Requirements (mL): 1820 ml  Fluid Need Method: RDA Method  RDA Method (mL): 1518  CHO Requirement: 50%      Nutrition Prescription Ordered    Current Diet Order: Renal/ DM 2000 kcal diet    Evaluation of Received Nutrient/Fluid Intake    Energy Calories Required: meeting needs  Protein Required: not meeting needs  Fluid Required: meeting needs  Total Fluid Intake (mL/kg): NS @100 ml/hr  Tolerance: tolerating  % Intake of Estimated Energy Needs: 60-75%  % Meal Intake: 50-75%    Nutrition Risk    Level of Risk/Frequency of Follow-up: moderate(1 x weekly)     Assessment and Plan    Obesity    Contributing Nutrition Diagnosis  Obesity stage 1    Related to (etiology):   Excessive energy intake and fluid retention    Signs and Symptoms (as evidenced by):   BMI 32 kg/m2    Interventions/Recommendations  (treatment strategy):  1) Decreased diet to DM 1800 if glucose remains elevated     Nutrition Diagnosis Status:   New          Type 2 diabetes mellitus with chronic kidney disease on chronic dialysis    Contributing Nutrition Diagnosis  Altered nutrition related lab values    Related to (etiology):   Altered absorption of nutrients     Signs and Symptoms (as evidenced by):   Elevated glucose, BUN, and Crea    Interventions/Recommendations (treatment strategy):  1) DM, renal diet    Nutrition Diagnosis Status:   New               Monitor and Evaluation    Food and Nutrient Intake: energy intake  Food and Nutrient Adminstration: diet order  Anthropometric Measurements: weight, height/length  Nutrition-Focused Physical Findings: overall appearance     Nutrition Follow-Up    RD Follow-up?: Yes     Shabana Conde RDN, LDN

## 2018-09-12 NOTE — CONSULTS
Date: 9/12/2018   Juliane Ramos [id number] is a 58 y.o. female who has been consulted for vancomycin dosing.    The patient has the following labs:     Creatinine (mg/dl)    WBC Count   Serum creatinine: 3.1 mg/dL (H) 09/12/18 1035  Estimated creatinine clearance: 23.1 mL/min (A) Lab Results   Component Value Date    WBC 7.90 09/12/2018        Current weight is 91.2 kg (201 lb)    Reconsultation. Last vancomycin dose given  post HD on 9/11. Will resume prior vancomycin regimen of 500mg post HD T, TH, SAT pending random preHD levels. Next random level will be obtained 9/13 with AM labs.     Target goal is 15-20 mg/dL.     Pharmacy will follow Vanc daily.  Vanc post HD doses will be adjusted if needed.    Patient will be followed by pharmacy for changes in renal function, toxicity, and efficacy.    Thank you for allowing us to participate in this patient's care.     Iona Sheppard, PharmD

## 2018-09-12 NOTE — ASSESSMENT & PLAN NOTE
Contributing Nutrition Diagnosis  Obesity stage 1    Related to (etiology):   Excessive energy intake and fluid retention    Signs and Symptoms (as evidenced by):   BMI 32 kg/m2    Interventions/Recommendations (treatment strategy):  1) Decreased diet to DM 1800 if glucose remains elevated     Nutrition Diagnosis Status:   New

## 2018-09-12 NOTE — CONSULTS
Juliane Ramos [id number] is a 58 y.o. female who had been consulted for vancomycin dosing.    Vancomycin has been discontinued.  Pharmacy consult for vancomycin dosing in no longer required.      Thank you for allowing us to participate in this patient's care.     Wandy Castañeda

## 2018-09-12 NOTE — PLAN OF CARE
Problem: Patient Care Overview  Goal: Plan of Care Review  Outcome: Ongoing (interventions implemented as appropriate)  Nc 4 lpm in use

## 2018-09-12 NOTE — PROGRESS NOTES
Call received from [facility] re: blood culture drawn 9/10/18 gram negative bacilli positive. Notified Ale Carbone NP.

## 2018-09-12 NOTE — PROGRESS NOTES
INPATIENT NEPHROLOGY PROGRESS NOTE  [facility] [place] NEPHROLOGY    Juliane Ramos  09/12/2018    Reason for consultation:         esrd    Chief Complaint:   Chief Complaint   Patient presents with    Fever    Shortness of Breath        History of Present Illness:       Juliane Ramos is a 57 y.o. female with a PMHx significant for DM, HTN, CAD, HFrEF with an EF of 35-40%, ESRD on dialysis (TuTh&Sat), COPD on home oxygen, PE, and prior CVA.  She is admitted to the service of hospital medicine with a diagnosis of acute on chronic respiratory failure. She reports worsening of her respiratory status last evening. Noted to be hypoxic on her home pulseox. She reports eating a [employer] hamburger earlier in the day. In the ED she had evidence of volume overload and responded well to I/v diuresis. At baseline she has NYHA Class III symptoms. She reports chills at home but no cough. Denied any sick contacts. Noted to be febrile in the ED for which she got Vancomycin and Zosyn. She has a diabetic wound on the left leg that has been treated with skin grafts.   DNR/DNI status was verified with the patient in the presence of the .    9/12 afebrile.  No n,v,chest pain or sob          Plan of Care:     Assessment:    esrd  Pulmonary edema resolved  Fever resolved  Anemia  hypertension    Plan:     T,th,sat hd  uf as tolerated  F/u cultures  jesús with hd  Continue outpt medication    Thank you for allowing us to participate in this patient's care. We will continue to follow.    Medications:  No current facility-administered medications on file prior to encounter.      Current Outpatient Medications on File Prior to Encounter   Medication Sig Dispense Refill    citalopram (CELEXA) 20 MG tablet Take 20 mg by mouth once daily.      FOLIC ACID-B CBNK-A-QCFAQ-ZINC 3-70-15 MG-MCG-MG ORAL TAB Take 1 tablet by mouth every other day.      HYDROcodone-acetaminophen (NORCO) 5-325 mg per tablet Take 1 tablet by mouth every 6 (six) hours as  needed for Pain.      midodrine (PROAMATINE) 5 MG Tab Take 2.5 mg by mouth 3 (three) times daily with meals.      sevelamer carbonate (RENVELA) 800 mg Tab Take 1,600 mg by mouth 3 (three) times daily with meals.      amLODIPine (NORVASC) 10 MG tablet Take 1 tablet (10 mg total) by mouth once daily. 30 tablet 1    apixaban 5 mg Tab Take 1 tablet (5 mg total) by mouth 2 (two) times daily. 60 tablet 1    cetirizine (ZYRTEC) 10 MG tablet Take 1 tablet (10 mg total) by mouth every evening. 20 tablet 0    collagenase ointment Apply topically once daily.      epoetin donita (PROCRIT) 10,000 unit/mL injection Inject 1 mL (10,000 Units total) into the skin every Tues, Thurs, Sat. 1 mL 5    gabapentin (NEURONTIN) 100 MG capsule Take 1 capsule (100 mg total) by mouth 3 (three) times daily. 90 capsule 1    insulin aspart (NOVOLOG) 100 unit/mL InPn pen Inject 1-10 Units into the skin 3 (three) times daily. 3 mL 1    levothyroxine (SYNTHROID) 50 MCG tablet Take 1 tablet (50 mcg total) by mouth before breakfast. 30 tablet 1    rosuvastatin (CRESTOR) 10 MG tablet Take 1 tablet (10 mg total) by mouth once daily. 30 tablet 1     Scheduled Meds:   sodium chloride 0.9%   Intravenous Once    albumin human 25%  25 g Intravenous Once    apixaban  5 mg Oral BID    ceftAZIDime (FORTAZ) IVPB  500 mg Intravenous Q24H    citalopram  20 mg Oral Daily    gabapentin  100 mg Oral TID    levothyroxine  50 mcg Oral Before breakfast    midodrine  2.5 mg Oral TID WM    rosuvastatin  10 mg Oral Daily    sevelamer carbonate  1,600 mg Oral TID WM    [START ON 9/13/2018] vancomycin (VANCOCIN) IVPB  500 mg Intravenous Every Tues, Thurs, Sat     Continuous Infusions:  PRN Meds:.sodium chloride 0.9%, dextrose 50%, dextrose 50%, glucagon (human recombinant), glucagon (human recombinant), glucose, glucose, heparin (porcine), influenza, insulin aspart U-100, sulfur hexafluoride microspheres    Allergies:  Patient has no known  allergies.    Vital Signs:  Temp Readings from Last 3 Encounters:   09/12/18 97.4 °F (36.3 °C) (Oral)   09/10/18 100.2 °F (37.9 °C)   02/24/18 97.9 °F (36.6 °C) (Oral)       Pulse Readings from Last 3 Encounters:   09/12/18 79   09/10/18 (!) 122   07/17/18 67       BP Readings from Last 3 Encounters:   09/12/18 124/70   09/10/18 (!) 128/97   07/17/18 117/61       Weight:  Wt Readings from Last 3 Encounters:   09/11/18 91.2 kg (201 lb)   02/24/18 89.8 kg (197 lb 15.6 oz)   12/24/17 104.1 kg (229 lb 6.4 oz)       Review of Systems:  Review of Systems - All 14 systems reviewed and negative, except as noted in HPI    Physical Exam:    Constitutional: NAD  Neuro: No asterixis  Psych: Calm  EENT: NCAT, anicteric sclera   Neck:  supple  Cards: No rub  Lungs: clear to ausculation  GI:  Pos bowel sounds, no hsm, NTND   MSK:  WNWD  Skin: no purpura, rashes       Results:  Lab Results   Component Value Date     (L) 09/12/2018    K 3.9 09/12/2018    CL 97 09/12/2018    CO2 23 09/12/2018    BUN 28 (H) 09/12/2018    CREATININE 3.1 (H) 09/12/2018    CALCIUM 9.1 09/12/2018    ANIONGAP 12 09/12/2018    ESTGFRAFRICA 19 (A) 09/12/2018    EGFRNONAA 16 (A) 09/12/2018       Lab Results   Component Value Date    CALCIUM 9.1 09/12/2018    PHOS 4.9 (H) 09/12/2018       Recent Labs   Lab  09/12/18   1035   WBC  7.90   RBC  4.18   HGB  10.4*   HCT  35.0*   PLT  157   MCV  84   MCH  24.8*   MCHC  29.6*       I have personally reviewed pertinent radiological imaging and reports.

## 2018-09-12 NOTE — SIGNIFICANT EVENT
Nursing called to report that pt had a positive blood culture which was drawn on 9/10- growing out gram negative bacilli.     DC Vancomycin   DC Zosyn  Start Fortaz as monotherapy at renal dose.   Will consult Dr. Turner in AM.

## 2018-09-12 NOTE — NURSING
"Called in the room, pt's stated " I want to change my code status, I don't want to be DNR anymore, I want  FULL code ". Witnessed by OANH Arguelles. Informed Dr. Devine.   "

## 2018-09-13 LAB
ALBUMIN SERPL BCP-MCNC: 3.2 G/DL
ALP SERPL-CCNC: 55 U/L
ALT SERPL W/O P-5'-P-CCNC: 11 U/L
ANION GAP SERPL CALC-SCNC: 12 MMOL/L
AST SERPL-CCNC: 13 U/L
BASOPHILS # BLD AUTO: 0 K/UL
BASOPHILS NFR BLD: 0.2 %
BILIRUB SERPL-MCNC: 0.5 MG/DL
BUN SERPL-MCNC: 36 MG/DL
CALCIUM SERPL-MCNC: 9 MG/DL
CHLORIDE SERPL-SCNC: 98 MMOL/L
CO2 SERPL-SCNC: 24 MMOL/L
CREAT SERPL-MCNC: 4.3 MG/DL
DIFFERENTIAL METHOD: ABNORMAL
EOSINOPHIL # BLD AUTO: 1 K/UL
EOSINOPHIL NFR BLD: 15.8 %
ERYTHROCYTE [DISTWIDTH] IN BLOOD BY AUTOMATED COUNT: 21.5 %
EST. GFR  (AFRICAN AMERICAN): 12 ML/MIN/1.73 M^2
EST. GFR  (NON AFRICAN AMERICAN): 11 ML/MIN/1.73 M^2
GLUCOSE SERPL-MCNC: 118 MG/DL
HCT VFR BLD AUTO: 31.8 %
HGB BLD-MCNC: 9.9 G/DL
LYMPHOCYTES # BLD AUTO: 0.9 K/UL
LYMPHOCYTES NFR BLD: 14.1 %
MAGNESIUM SERPL-MCNC: 2.6 MG/DL
MCH RBC QN AUTO: 26.3 PG
MCHC RBC AUTO-ENTMCNC: 31.1 G/DL
MCV RBC AUTO: 84 FL
MONOCYTES # BLD AUTO: 0.9 K/UL
MONOCYTES NFR BLD: 13.3 %
NEUTROPHILS # BLD AUTO: 3.7 K/UL
NEUTROPHILS NFR BLD: 56.6 %
PHOSPHATE SERPL-MCNC: 5.8 MG/DL
PLATELET # BLD AUTO: 159 K/UL
PMV BLD AUTO: 8.8 FL
POCT GLUCOSE: 121 MG/DL (ref 70–110)
POCT GLUCOSE: 158 MG/DL (ref 70–110)
POTASSIUM SERPL-SCNC: 4.1 MMOL/L
PROT SERPL-MCNC: 6.9 G/DL
RBC # BLD AUTO: 3.77 M/UL
SODIUM SERPL-SCNC: 134 MMOL/L
VANCOMYCIN SERPL-MCNC: 18.6 UG/ML
WBC # BLD AUTO: 6.5 K/UL

## 2018-09-13 PROCEDURE — 36415 COLL VENOUS BLD VENIPUNCTURE: CPT

## 2018-09-13 PROCEDURE — 63600175 PHARM REV CODE 636 W HCPCS: Performed by: INTERNAL MEDICINE

## 2018-09-13 PROCEDURE — 99232 SBSQ HOSP IP/OBS MODERATE 35: CPT | Mod: ,,, | Performed by: INTERNAL MEDICINE

## 2018-09-13 PROCEDURE — 80100016 HC MAINTENANCE HEMODIALYSIS

## 2018-09-13 PROCEDURE — 25000003 PHARM REV CODE 250: Performed by: INTERNAL MEDICINE

## 2018-09-13 PROCEDURE — 84100 ASSAY OF PHOSPHORUS: CPT

## 2018-09-13 PROCEDURE — 11000001 HC ACUTE MED/SURG PRIVATE ROOM

## 2018-09-13 PROCEDURE — 80053 COMPREHEN METABOLIC PANEL: CPT

## 2018-09-13 PROCEDURE — 94761 N-INVAS EAR/PLS OXIMETRY MLT: CPT

## 2018-09-13 PROCEDURE — 85025 COMPLETE CBC W/AUTO DIFF WBC: CPT

## 2018-09-13 PROCEDURE — 80202 ASSAY OF VANCOMYCIN: CPT

## 2018-09-13 PROCEDURE — 25500020 PHARM REV CODE 255

## 2018-09-13 PROCEDURE — 25000003 PHARM REV CODE 250: Performed by: HOSPITALIST

## 2018-09-13 PROCEDURE — 83735 ASSAY OF MAGNESIUM: CPT

## 2018-09-13 PROCEDURE — 27000221 HC OXYGEN, UP TO 24 HOURS

## 2018-09-13 RX ORDER — SODIUM CHLORIDE 9 MG/ML
INJECTION, SOLUTION INTRAVENOUS
Status: DISPENSED
Start: 2018-09-13 | End: 2018-09-14

## 2018-09-13 RX ADMIN — MIDODRINE HYDROCHLORIDE 2.5 MG: 2.5 TABLET ORAL at 04:09

## 2018-09-13 RX ADMIN — ROSUVASTATIN CALCIUM 10 MG: 5 TABLET, FILM COATED ORAL at 04:09

## 2018-09-13 RX ADMIN — GABAPENTIN 100 MG: 100 CAPSULE ORAL at 09:09

## 2018-09-13 RX ADMIN — SEVELAMER CARBONATE 1600 MG: 800 TABLET, FILM COATED ORAL at 08:09

## 2018-09-13 RX ADMIN — CITALOPRAM HYDROBROMIDE 20 MG: 10 TABLET ORAL at 04:09

## 2018-09-13 RX ADMIN — MIDODRINE HYDROCHLORIDE 2.5 MG: 2.5 TABLET ORAL at 08:09

## 2018-09-13 RX ADMIN — IOHEXOL 130 ML: 350 INJECTION, SOLUTION INTRAVENOUS at 02:09

## 2018-09-13 RX ADMIN — HEPARIN SODIUM 4000 UNITS: 1000 INJECTION, SOLUTION INTRAVENOUS; SUBCUTANEOUS at 12:09

## 2018-09-13 RX ADMIN — CEFTAZIDIME 500 MG: 1 INJECTION, POWDER, FOR SOLUTION INTRAMUSCULAR; INTRAVENOUS at 10:09

## 2018-09-13 RX ADMIN — LEVOTHYROXINE SODIUM 50 MCG: 50 TABLET ORAL at 05:09

## 2018-09-13 RX ADMIN — APIXABAN 5 MG: 2.5 TABLET, FILM COATED ORAL at 09:09

## 2018-09-13 RX ADMIN — GABAPENTIN 100 MG: 100 CAPSULE ORAL at 04:09

## 2018-09-13 RX ADMIN — SEVELAMER CARBONATE 1600 MG: 800 TABLET, FILM COATED ORAL at 04:09

## 2018-09-13 NOTE — PT/OT/SLP PROGRESS
Physical Therapy      Patient Name:  Juliane Ramos   MRN:  [id number]    Patient not seen today secondary to CT/MRI(PT reattempted this pm). Will follow-up 09-.    Karen Denney, PT

## 2018-09-13 NOTE — PLAN OF CARE
Problem: Patient Care Overview  Goal: Plan of Care Review  Outcome: Ongoing (interventions implemented as appropriate)  Pt is on Oxygen.

## 2018-09-13 NOTE — PROGRESS NOTES
Progress Note  Infectious Disease    Follow-up For:    SUBJECTIVE:   09/13/2018 no new complaints. She just finished CTA of lower extremities. Leukocytosis resolved. Afebrile.    Antibiotics (From admission, onward)    Start     Stop Route Frequency Ordered    09/13/18 1700  vancomycin 500 mg/100 mL IVPB 500 mg      -- IV Every Tues, Thurs, Sat 09/12/18 1221    09/11/18 2300  ceftAZIDime (FORTAZ) 500 mg in dextrose 5 % 50 mL IVPB      -- IV Every 24 hours (non-standard times) 09/11/18 2235          Pertinent Medications noted:    EXAM & DIAGNOSTICS REVIEWED:   Vitals:     Temp:  [96.2 °F (35.7 °C)-97.4 °F (36.3 °C)]   Temp: 97.3 °F (36.3 °C) (09/13/18 0355)  Pulse: 83 (09/13/18 0355)  Resp: 16 (09/12/18 2045)  BP: 138/64 (09/13/18 0355)  SpO2: (!) 91 % (09/13/18 0355)    Intake/Output Summary (Last 24 hours) at 9/13/2018 0919  Last data filed at 9/13/2018 0612  Gross per 24 hour   Intake 830 ml   Output 1 ml   Net 829 ml       General:  In NAD. Looks chronically of. Comfortable.  Eyes:  Anicteric, PERRL, EOMI  ENT:  Mouth w/ pink MMM, no lesions/exudate,   Neck:  Trachea midline, supple, no adenopathy appreciated  Lungs: Decreased bilaterally especially on the right   Heart:  RRR, no gallop/murmur noted  Abd:  soft, NT, ND, normal BS, no masses/organomegaly appreciated.  :  Voids/Chi draining yellow urine, clear  Musc:  Joints without effusion, erythema, synovitis,   Skin:  Generally warm, dry, normal for color. No rashes  Wound: Left posterior leg wound looks the same  Neuro: AAOx3, speech clear, moves all extrems equally. Weak in general  Extrem: No edema, erythema, phlebitis, cellulitis, synovitis     Lines/Tubes/Drains:    General Labs reviewed:  Recent Labs   Lab  09/13/18   0508   WBC  6.50   RBC  3.77*   HGB  9.9*   HCT  31.8*   PLT  159   MCV  84   MCH  26.3*   MCHC  31.1*     Recent Labs   Lab  09/13/18   0508   CALCIUM  9.0   PROT  6.9   NA  134*   K  4.1   CO2  24   CL  98   BUN  36*   CREATININE   4.3*   ALKPHOS  55   ALT  11   AST  13   BILITOT  0.5       Micro:    Imaging Reviewed:    ASSESSMENT & PLAN      Patient Active Problem List   Diagnosis    Acute on chronic congestive heart failure    Essential hypertension    CKD (chronic kidney disease) stage 3, GFR 30-59 ml/min    Type 2 diabetes mellitus with chronic kidney disease on chronic dialysis    Coronary artery disease involving native coronary artery of native heart without angina pectoris    Acquired hypothyroidism    Recurrent right pleural effusion    Hyperkalemia    Diabetic leg ulcer    Traumatic subarachnoid hemorrhage    Segmental and subsegmental pulmonary emboli of RLL without acute cor pulmonale    Acute renal failure superimposed on stage 3 chronic kidney disease    Anemia in stage 3 chronic kidney disease    Subarachnoid hemorrhage following injury, no loss of consciousness    Staph aureus infection    Pulmonary embolus    Obesity    Acute on chronic respiratory failure with hypoxia    Anasarca    SAH (subarachnoid hemorrhage)    Wounds, multiple    Wound healing, delayed    Malnutrition due to renal disease    Shortness of breath    CKD (chronic kidney disease) requiring chronic dialysis    Open wound of left heel    Sepsis    Acute on chronic combined systolic and diastolic heart failure    ESRD (end stage renal disease)    HCAP (healthcare-associated pneumonia)       Recommendation:   1. GN bacteremia   2. Chronic left achilles area wound,  treated by Dr Wick with [facility].  3. cxr = RLL opacity/possible pneumonia?effusion secondary to CHF?  4.  History of obesity, DM, COPD on 3 L oxygen at home, PE, CVA, thyroid disease, CAD, HFrEF 37 % , Pulm HTN, CABG. HTN, MI,   breast surgery, Esrd, Anemia, anxiety       Recommendation:      -continue ceftazidime and vancomycin until I know for sure that blood cultures are growing gram-negativeare not  -follow results of CTA   I called [facility]: Cultures.  No new information at this time yet

## 2018-09-13 NOTE — PROGRESS NOTES
09/13/18 1145   Patient Assessment/Suction   Level of Consciousness (AVPU) alert   Respiratory Effort Normal;Unlabored   PRE-TX-O2-ETCO2   O2 Device (Oxygen Therapy) nasal cannula w/ humidification   $ Is the patient on Low Flow Oxygen? Yes   Flow (L/min) 3   Oxygen Concentration (%) 32   SpO2 (!) 94 %   Pulse Oximetry Type Intermittent   $ Pulse Oximetry - Multiple Charge Pulse Oximetry - Multiple   Pulse 83   Resp 18

## 2018-09-13 NOTE — PROGRESS NOTES
Progress Note  Hospital Medicine  Patient Name:Juliane Ramos  MRN:  [id number]  Patient Class: IP- Inpatient  Admit Date: 9/11/2018  Length of Stay: 2 days  Expected Discharge Date:   Attending Physician: Reg Devine MD  Primary Care Provider:  JOS Dumont    SUBJECTIVE:     Principal Problem: Dyspnea  Initial history of present illness: Juliane Ramos is a 57 y.o. female with a PMHx significant for DM, HTN, CAD, HFrEF with an EF of 35-40%, ESRD on dialysis (TuTh&Sat), COPD on home oxygen, PE, and prior CVA.  She is admitted to the service of hospital medicine with a diagnosis of acute on chronic respiratory failure. She reports worsening of her respiratory status last evening. Noted to be hypoxic on her home pulseox. She reports eating a [employer] hamburger earlier in the day. In the ED she had evidence of volume overload and responded well to I/v diuresis. At baseline she has NYHA Class III symptoms. She reports chills at home but no cough. Denied any sick contacts. Noted to be febrile in the ED for which she got Vancomycin and Zosyn. She has a diabetic wound on the left leg that has been treated with skin grafts. DNR/DNI status was verified with the patient in the presence of the .    PMH/PSH/SH/FH/Meds: reviewed.    Symptoms/Review of Systems: Looking and feeling better. No chest pain or headache, fever or abdominal pain. Blood culture from [facility] is reportedly positive for GNR. Getting CTA runoff of aorta.  Diet:  Adequate intake.    Activity level: Normal.    Pain:  Patient reports no pain.       OBJECTIVE:   Vital Signs (Most Recent):      Temp: 97.1 °F (36.2 °C) (09/13/18 0900)  Pulse: 69 (09/13/18 0900)  Resp: 16 (09/12/18 2045)  BP: (!) 135/90 (09/13/18 0900)  SpO2: (!) 91 % (09/13/18 0355)       Vital Signs Range (Last 24H):  Temp:  [96.2 °F (35.7 °C)-97.4 °F (36.3 °C)]   Pulse:  [69-87]   Resp:  [16-20]   BP: (124-138)/(64-90)   SpO2:  [91 %-100 %]     Weight: 91.2 kg (201 lb 1 oz)  Body mass  index is 32.45 kg/m².    Intake/Output Summary (Last 24 hours) at 9/13/2018 0944  Last data filed at 9/13/2018 0612  Gross per 24 hour   Intake 830 ml   Output 1 ml   Net 829 ml     Physical Examination:  Constitutional: She is oriented to person, place, and time. She appears well-developed and well-nourished. No distress.   HENT:   Head: Normocephalic and atraumatic.   Mouth/Throat: No oropharyngeal exudate.   Eyes: EOM are normal. Pupils are equal, round, and reactive to light. Right eye exhibits no discharge. Left eye exhibits no discharge. No scleral icterus.   Neck: Normal range of motion. Neck supple.  No thyromegaly present.   Cardiovascular: Normal rate, regular rhythm and normal heart sounds. Exam reveals no friction rub.   No murmur heard.  Well healed sternal scar   Pulmonary/Chest: Effort normal. No stridor. No respiratory distress.   Crackles at the bases bilaterally   Abdominal: Soft. Bowel sounds are normal. She exhibits no distension. There is no tenderness. There is no guarding.   Musculoskeletal: She exhibits no edema.   Neurological: She is alert and oriented to person, place, and time.   Skin: Skin is warm. She is not diaphoretic.   Left foot bandaged with some surrounding erythema.   Psychiatric: She has a normal mood and affect.   CRANIAL NERVES   CN III, IV, VI   Pupils are equal, round, and reactive to light.  Extraocular motions are normal.    CBC:  Recent Labs   Lab  09/11/18   0201 09/12/18   1035  09/13/18   0508   WBC  13.20*  7.90  6.50   RBC  3.88*  4.18  3.77*   HGB  10.1*  10.4*  9.9*   HCT  32.6*  35.0*  31.8*   PLT  167  157  159   MCV  84  84  84   MCH  26.1*  24.8*  26.3*   MCHC  31.1*  29.6*  31.1*   BMP  Recent Labs   Lab  09/11/18   0201  09/12/18   1035  09/13/18   0508   GLU  213*  209*  118*   NA  135*  132*  134*   K  4.1  3.9  4.1   CL  100  97  98   CO2  22*  23  24   BUN  37*  28*  36*   CREATININE  3.0*  3.1*  4.3*   CALCIUM  8.7  9.1  9.0   MG   --   2.3  2.6       Diagnostic Results:  Microbiology Results (last 7 days)     Procedure Component Value Units Date/Time    Blood culture x two cultures. Draw prior to antibiotics. [[id number]] Collected:  09/11/18 0200    Order Status:  Completed Specimen:  Blood Updated:  09/12/18 1212     Blood Culture, Routine No Growth to date     Blood Culture, Routine No Growth to date    Narrative:       Aerobic and anaerobic    Blood culture x two cultures. Draw prior to antibiotics. [[id number]] Collected:  09/11/18 0217    Order Status:  Completed Specimen:  Blood Updated:  09/12/18 1212     Blood Culture, Routine No Growth to date     Blood Culture, Routine No Growth to date    Narrative:       Aerobic and anaerobic    Urine culture [[id number]] Collected:  09/11/18 0246    Order Status:  Completed Specimen:  Urine Updated:  09/12/18 0816     Urine Culture, Routine Multiple organisms isolated. None in predominance.  Repeat if     Urine Culture, Routine clinically necessary.    Narrative:       Preferred Collection Type->Urine, Clean Catch         CXR:   1. No new airspace disease  2. Unchanged and appropriate central line position.  3. Unchanged cardiomegaly.  4. Right pleural effusion is decreased in size since prior.    ECHO:  · The left ventricle cavity is mildly dilated in systole.  · Left ventricle ejection fraction is moderately decreased at 37%  · Grade II (moderate) left ventricular diastolic dysfunction consistent with pseudonormalization.  · LA pressure is elevated.  · RV systolic function is low normal.  · Left atrium is mildly dilated.  · Right atrium is moderately dilated.  · Mitral valve shows trace regurgitation. MV annuloplasty ring.  · Tricuspid valve shows moderate to severe regurgitation.  · Severely elevated central venous pressure (15 mm Hg).  · The estimated PA systolic pressure is 66.27 mm Hg      Assessment/Plan:     Acute on chronic combined systolic and diastolic heart failure     - Patient had evidence of volume  overload at presentation and responded well to I/v diuresis.  - Likely precipitated by the salt intake with the fast food.  - Will continue I/v diuresis and renal consult today for HD for additional fluid removal.  - ECHO reviewed, follow Dr. STERLING Palomo recommendations.       ESRD (end stage renal disease)     - Renal consult for HD today.       Sepsis - GNR sp  Left ankle wound     - Blood cultures and urine culture drawn.  - Will continue broad spectrum antibiotics till cultures are back.  - Continue IV Fortaz. ID specialist to see the patient.  Follow CTA run-off.       Segmental and subsegmental pulmonary emboli of RLL without acute cor pulmonale     - Continue home dose of Apixaban.       Coronary artery disease involving native coronary artery of native heart without angina pectoris     - s/p CABG  - Continue statin.  - Follow Dr. Palomo recommendations.       Type 2 diabetes mellitus with chronic kidney disease on chronic dialysis     - Sliding scale insulin as an inpatient.     Discussed with patient's , answered all the questions.    Code status: Full code    VTE Risk Mitigation (From admission, onward)        Ordered     heparin (porcine) injection 4,000 Units  As needed (PRN)      09/11/18 1205     apixaban tablet 5 mg  2 times daily      09/11/18 7493        Reg Devine MD  Department of Hospital Medicine   [facility]

## 2018-09-13 NOTE — PLAN OF CARE
Problem: Patient Care Overview  Goal: Plan of Care Review  Outcome: Ongoing (interventions implemented as appropriate)  POC reviewed with patient, verbalized understanding. Pt up with assist to bedside commode. Fall and safety precautions maintained. Evening medications tolerated well. Bed in lowest position, call light in reach. Will continue to monitor.

## 2018-09-13 NOTE — PROGRESS NOTES
INPATIENT NEPHROLOGY PROGRESS NOTE  [facility] NEPHROLOGY    Juliane Ramos  09/13/2018    Reason for consultation:         esrd    Chief Complaint:   Chief Complaint   Patient presents with    Fever    Shortness of Breath        History of Present Illness:       Juliane Ramos is a 57 y.o. female with a PMHx significant for DM, HTN, CAD, HFrEF with an EF of 35-40%, ESRD on dialysis (TuTh&Sat), COPD on home oxygen, PE, and prior CVA.  She is admitted to the service of hospital medicine with a diagnosis of acute on chronic respiratory failure. She reports worsening of her respiratory status last evening. Noted to be hypoxic on her home pulseox. She reports eating a [employer] hamburger earlier in the day. In the ED she had evidence of volume overload and responded well to I/v diuresis. At baseline she has NYHA Class III symptoms. She reports chills at home but no cough. Denied any sick contacts. Noted to be febrile in the ED for which she got Vancomycin and Zosyn. She has a diabetic wound on the left leg that has been treated with skin grafts.   DNR/DNI status was verified with the patient in the presence of the .    9/12 afebrile.  No n,v,chest pain or sob    9/13  Seen on dialysis.  Afebrile.  No sob, nausea, vomiting          Plan of Care:     Assessment:    esrd  Pulmonary edema resolved  Fever resolved  Anemia  hypertension    Plan:     T,th,sat hd--seen on dialysis  uf as tolerated  F/u cultures--neg so far  jesús with hd  Continue outpt medication    Thank you for allowing us to participate in this patient's care. We will continue to follow.    Medications:  No current facility-administered medications on file prior to encounter.      Current Outpatient Medications on File Prior to Encounter   Medication Sig Dispense Refill    citalopram (CELEXA) 20 MG tablet Take 20 mg by mouth once daily.      FOLIC ACID-B LJNV-G-UNUUC-ZINC 3-70-15 MG-MCG-MG ORAL TAB Take 1 tablet by mouth every other day.       HYDROcodone-acetaminophen (NORCO) 5-325 mg per tablet Take 1 tablet by mouth every 6 (six) hours as needed for Pain.      midodrine (PROAMATINE) 5 MG Tab Take 2.5 mg by mouth 3 (three) times daily with meals.      sevelamer carbonate (RENVELA) 800 mg Tab Take 1,600 mg by mouth 3 (three) times daily with meals.      amLODIPine (NORVASC) 10 MG tablet Take 1 tablet (10 mg total) by mouth once daily. 30 tablet 1    apixaban 5 mg Tab Take 1 tablet (5 mg total) by mouth 2 (two) times daily. 60 tablet 1    cetirizine (ZYRTEC) 10 MG tablet Take 1 tablet (10 mg total) by mouth every evening. 20 tablet 0    collagenase ointment Apply topically once daily.      epoetin donita (PROCRIT) 10,000 unit/mL injection Inject 1 mL (10,000 Units total) into the skin every Tues, Thurs, Sat. 1 mL 5    gabapentin (NEURONTIN) 100 MG capsule Take 1 capsule (100 mg total) by mouth 3 (three) times daily. 90 capsule 1    insulin aspart (NOVOLOG) 100 unit/mL InPn pen Inject 1-10 Units into the skin 3 (three) times daily. 3 mL 1    levothyroxine (SYNTHROID) 50 MCG tablet Take 1 tablet (50 mcg total) by mouth before breakfast. 30 tablet 1    rosuvastatin (CRESTOR) 10 MG tablet Take 1 tablet (10 mg total) by mouth once daily. 30 tablet 1     Scheduled Meds:   sodium chloride 0.9%   Intravenous Once    albumin human 25%  25 g Intravenous Once    apixaban  5 mg Oral BID    ceftAZIDime (FORTAZ) IVPB  500 mg Intravenous Q24H    citalopram  20 mg Oral Daily    gabapentin  100 mg Oral TID    levothyroxine  50 mcg Oral Before breakfast    midodrine  2.5 mg Oral TID WM    rosuvastatin  10 mg Oral Daily    sevelamer carbonate  1,600 mg Oral TID WM    vancomycin (VANCOCIN) IVPB  500 mg Intravenous Every Tues, Thurs, Sat     Continuous Infusions:  PRN Meds:.sodium chloride 0.9%, dextrose 50%, dextrose 50%, glucagon (human recombinant), glucagon (human recombinant), glucose, glucose, heparin (porcine), influenza, insulin aspart U-100,  sulfur hexafluoride microspheres    Allergies:  Patient has no known allergies.    Vital Signs:  Temp Readings from Last 3 Encounters:   09/13/18 97.1 °F (36.2 °C)   09/10/18 100.2 °F (37.9 °C)   02/24/18 97.9 °F (36.6 °C) (Oral)       Pulse Readings from Last 3 Encounters:   09/13/18 69   09/10/18 (!) 122   07/17/18 67       BP Readings from Last 3 Encounters:   09/13/18 (!) 135/90   09/10/18 (!) 128/97   07/17/18 117/61       Weight:  Wt Readings from Last 3 Encounters:   09/12/18 91.2 kg (201 lb 1 oz)   02/24/18 89.8 kg (197 lb 15.6 oz)   12/24/17 104.1 kg (229 lb 6.4 oz)       Review of Systems:  Review of Systems - All 14 systems reviewed and negative, except as noted in HPI    Physical Exam:    Constitutional: NAD  Neuro: No asterixis  Psych: Calm  EENT: NCAT, anicteric sclera   Neck:  supple  Cards: No rub  Lungs: clear to ausculation  GI:  Pos bowel sounds, no hsm, NTND   MSK:  WNWD  Skin: no purpura, rashes       Results:  Lab Results   Component Value Date     (L) 09/13/2018    K 4.1 09/13/2018    CL 98 09/13/2018    CO2 24 09/13/2018    BUN 36 (H) 09/13/2018    CREATININE 4.3 (H) 09/13/2018    CALCIUM 9.0 09/13/2018    ANIONGAP 12 09/13/2018    ESTGFRAFRICA 12 (A) 09/13/2018    EGFRNONAA 11 (A) 09/13/2018       Lab Results   Component Value Date    CALCIUM 9.0 09/13/2018    PHOS 5.8 (H) 09/13/2018       Recent Labs   Lab  09/13/18   0508   WBC  6.50   RBC  3.77*   HGB  9.9*   HCT  31.8*   PLT  159   MCV  84   MCH  26.3*   MCHC  31.1*       I have personally reviewed pertinent radiological imaging and reports.

## 2018-09-13 NOTE — PT/OT/SLP PROGRESS
Physical Therapy      Patient Name:  Juliane Ramos   MRN:  [id number]    Patient not seen today secondary to Dialysis. Will follow-up later today..    Karen Denney, PT

## 2018-09-13 NOTE — PLAN OF CARE
Problem: Patient Care Overview  Goal: Plan of Care Review  Medications reviewed and administered  2000 ADA  Telemetry  Accuchecks  IV placement  Call light within reach  SR up x 2  Bed low/ wheels locked  Safety maintained

## 2018-09-13 NOTE — PLAN OF CARE
SSC met with patient at bedside regarding home health.  Patient signed patient choice disclosure choosing to resume home health with [facility] health.  TREVON Burks

## 2018-09-14 LAB
ALBUMIN SERPL BCP-MCNC: 3.1 G/DL
ALP SERPL-CCNC: 65 U/L
ALT SERPL W/O P-5'-P-CCNC: 7 U/L
ANION GAP SERPL CALC-SCNC: 11 MMOL/L
AST SERPL-CCNC: 12 U/L
BASOPHILS # BLD AUTO: 0 K/UL
BASOPHILS NFR BLD: 0.7 %
BILIRUB SERPL-MCNC: 0.5 MG/DL
BUN SERPL-MCNC: 23 MG/DL
CALCIUM SERPL-MCNC: 8.7 MG/DL
CHLORIDE SERPL-SCNC: 100 MMOL/L
CO2 SERPL-SCNC: 23 MMOL/L
CREAT SERPL-MCNC: 4 MG/DL
DIFFERENTIAL METHOD: ABNORMAL
EOSINOPHIL # BLD AUTO: 0.7 K/UL
EOSINOPHIL NFR BLD: 11.9 %
ERYTHROCYTE [DISTWIDTH] IN BLOOD BY AUTOMATED COUNT: 20.9 %
EST. GFR  (AFRICAN AMERICAN): 14 ML/MIN/1.73 M^2
EST. GFR  (NON AFRICAN AMERICAN): 12 ML/MIN/1.73 M^2
GLUCOSE SERPL-MCNC: 117 MG/DL
HCT VFR BLD AUTO: 31.9 %
HGB BLD-MCNC: 9.9 G/DL
LYMPHOCYTES # BLD AUTO: 0.8 K/UL
LYMPHOCYTES NFR BLD: 13.5 %
MAGNESIUM SERPL-MCNC: 2.5 MG/DL
MCH RBC QN AUTO: 26.1 PG
MCHC RBC AUTO-ENTMCNC: 31.2 G/DL
MCV RBC AUTO: 84 FL
MONOCYTES # BLD AUTO: 0.9 K/UL
MONOCYTES NFR BLD: 15.1 %
NEUTROPHILS # BLD AUTO: 3.4 K/UL
NEUTROPHILS NFR BLD: 58.8 %
PHOSPHATE SERPL-MCNC: 4.5 MG/DL
PLATELET # BLD AUTO: 140 K/UL
PMV BLD AUTO: 8.9 FL
POCT GLUCOSE: 123 MG/DL (ref 70–110)
POCT GLUCOSE: 145 MG/DL (ref 70–110)
POCT GLUCOSE: 155 MG/DL (ref 70–110)
POCT GLUCOSE: 178 MG/DL (ref 70–110)
POTASSIUM SERPL-SCNC: 4.1 MMOL/L
PROT SERPL-MCNC: 6.8 G/DL
RBC # BLD AUTO: 3.81 M/UL
SODIUM SERPL-SCNC: 134 MMOL/L
WBC # BLD AUTO: 5.8 K/UL

## 2018-09-14 PROCEDURE — 97162 PT EVAL MOD COMPLEX 30 MIN: CPT

## 2018-09-14 PROCEDURE — 25000003 PHARM REV CODE 250: Performed by: HOSPITALIST

## 2018-09-14 PROCEDURE — 80053 COMPREHEN METABOLIC PANEL: CPT

## 2018-09-14 PROCEDURE — 85025 COMPLETE CBC W/AUTO DIFF WBC: CPT

## 2018-09-14 PROCEDURE — 11000001 HC ACUTE MED/SURG PRIVATE ROOM

## 2018-09-14 PROCEDURE — G8978 MOBILITY CURRENT STATUS: HCPCS | Mod: CK

## 2018-09-14 PROCEDURE — 36415 COLL VENOUS BLD VENIPUNCTURE: CPT

## 2018-09-14 PROCEDURE — 83735 ASSAY OF MAGNESIUM: CPT

## 2018-09-14 PROCEDURE — 97116 GAIT TRAINING THERAPY: CPT

## 2018-09-14 PROCEDURE — 97110 THERAPEUTIC EXERCISES: CPT

## 2018-09-14 PROCEDURE — 63600175 PHARM REV CODE 636 W HCPCS: Performed by: INTERNAL MEDICINE

## 2018-09-14 PROCEDURE — 25000003 PHARM REV CODE 250: Performed by: INTERNAL MEDICINE

## 2018-09-14 PROCEDURE — G8979 MOBILITY GOAL STATUS: HCPCS | Mod: CJ

## 2018-09-14 PROCEDURE — 99232 SBSQ HOSP IP/OBS MODERATE 35: CPT | Mod: ,,, | Performed by: INTERNAL MEDICINE

## 2018-09-14 PROCEDURE — 84100 ASSAY OF PHOSPHORUS: CPT

## 2018-09-14 RX ORDER — VANCOMYCIN HYDROCHLORIDE 500 MG/100ML
500 INJECTION, SOLUTION INTRAVENOUS ONCE
Status: DISCONTINUED | OUTPATIENT
Start: 2018-09-14 | End: 2018-09-14 | Stop reason: SDUPTHER

## 2018-09-14 RX ADMIN — SEVELAMER CARBONATE 1600 MG: 800 TABLET, FILM COATED ORAL at 08:09

## 2018-09-14 RX ADMIN — CEFTAZIDIME 500 MG: 1 INJECTION, POWDER, FOR SOLUTION INTRAMUSCULAR; INTRAVENOUS at 10:09

## 2018-09-14 RX ADMIN — SEVELAMER CARBONATE 1600 MG: 800 TABLET, FILM COATED ORAL at 11:09

## 2018-09-14 RX ADMIN — APIXABAN 5 MG: 2.5 TABLET, FILM COATED ORAL at 09:09

## 2018-09-14 RX ADMIN — MIDODRINE HYDROCHLORIDE 2.5 MG: 2.5 TABLET ORAL at 08:09

## 2018-09-14 RX ADMIN — APIXABAN 5 MG: 2.5 TABLET, FILM COATED ORAL at 08:09

## 2018-09-14 RX ADMIN — SEVELAMER CARBONATE 1600 MG: 800 TABLET, FILM COATED ORAL at 04:09

## 2018-09-14 RX ADMIN — CITALOPRAM HYDROBROMIDE 20 MG: 10 TABLET ORAL at 09:09

## 2018-09-14 RX ADMIN — GABAPENTIN 100 MG: 100 CAPSULE ORAL at 08:09

## 2018-09-14 RX ADMIN — LEVOTHYROXINE SODIUM 50 MCG: 50 TABLET ORAL at 05:09

## 2018-09-14 RX ADMIN — ROSUVASTATIN CALCIUM 10 MG: 5 TABLET, FILM COATED ORAL at 09:09

## 2018-09-14 RX ADMIN — GABAPENTIN 100 MG: 100 CAPSULE ORAL at 09:09

## 2018-09-14 RX ADMIN — MIDODRINE HYDROCHLORIDE 2.5 MG: 2.5 TABLET ORAL at 11:09

## 2018-09-14 RX ADMIN — MIDODRINE HYDROCHLORIDE 2.5 MG: 2.5 TABLET ORAL at 04:09

## 2018-09-14 RX ADMIN — GABAPENTIN 100 MG: 100 CAPSULE ORAL at 02:09

## 2018-09-14 RX ADMIN — VANCOMYCIN HYDROCHLORIDE 500 MG: 500 INJECTION, POWDER, LYOPHILIZED, FOR SOLUTION INTRAVENOUS at 05:09

## 2018-09-14 NOTE — PROGRESS NOTES
Progress Note  Infectious Disease    Follow-up For:    SUBJECTIVE:   09/13/2018 no new complaints. She just finished CTA of lower extremities. Leukocytosis resolved. Afebrile.  09/14/2018 No new events  Antimicrobials (From admission, onward)    Ordered     Dose Route Frequency Start Stop    09/15/18 0708  ceFEPIme injection 1 g     INDICATION:  Bacteremia, Skin & soft tissue        1 g IM Every 24 hours (non-standard times) 09/15/18 0708 --          Pertinent Medications noted:    EXAM & DIAGNOSTICS REVIEWED:   Vitals:     Temp:  [96.4 °F (35.8 °C)-98.4 °F (36.9 °C)]   Temp: 96.4 °F (35.8 °C) (09/14/18 0812)  Pulse: 82 (09/14/18 0812)  Resp: 17 (09/14/18 0436)  BP: 131/74 (09/14/18 0812)  SpO2: (!) 93 % (09/14/18 0812)    Intake/Output Summary (Last 24 hours) at 9/14/2018 1014  Last data filed at 9/13/2018 1700  Gross per 24 hour   Intake 800 ml   Output 3502 ml   Net -2702 ml       General:  In NAD. Looks chronically sick. Comfortable.  Eyes:  Anicteric, PERRL, EOMI  ENT:  Mouth w/ pink MMM, no lesions/exudate,   Neck:  Trachea midline, supple, no adenopathy appreciated  Lungs: Decreased bilaterally especially on the right   Heart:  RRR, no gallop/murmur noted  Abd:  soft, NT, ND, normal BS, no masses/organomegaly appreciated.  :  Voids/Chi draining yellow urine, clear  Musc:  Joints without effusion, erythema, synovitis,   Skin:  Generally warm, dry, normal for color. No rashes  Wound: Left posterior leg wound looks the same  Neuro: AAOx3, speech clear, moves all extrems equally. Weak in general  Extrem: No edema, erythema, phlebitis, cellulitis, synovitis     Lines/Tubes/Drains:    General Labs reviewed:  Recent Labs   Lab  09/14/18   0525   WBC  5.80   RBC  3.81*   HGB  9.9*   HCT  31.9*   PLT  140*   MCV  84   MCH  26.1*   MCHC  31.2*     Recent Labs   Lab  09/14/18   0525   CALCIUM  8.7   PROT  6.8   NA  134*   K  4.1   CO2  23   CL  100   BUN  23*   CREATININE  4.0*   ALKPHOS  65   ALT  7*   AST  12    BILITOT  0.5       Micro:  Microbiology Results (last 7 days)     Procedure Component Value Units Date/Time    Blood culture x two cultures. Draw prior to antibiotics. [[id number]] Collected:  09/11/18 0217    Order Status:  Completed Specimen:  Blood Updated:  09/14/18 1212     Blood Culture, Routine No Growth to date     Blood Culture, Routine No Growth to date     Blood Culture, Routine No Growth to date     Blood Culture, Routine No Growth to date    Narrative:       Aerobic and anaerobic    Blood culture x two cultures. Draw prior to antibiotics. [[id number]] Collected:  09/11/18 0200    Order Status:  Completed Specimen:  Blood Updated:  09/14/18 1212     Blood Culture, Routine No Growth to date     Blood Culture, Routine No Growth to date     Blood Culture, Routine No Growth to date     Blood Culture, Routine No Growth to date    Narrative:       Aerobic and anaerobic    Urine culture [[id number]] Collected:  09/11/18 0246    Order Status:  Completed Specimen:  Urine Updated:  09/12/18 0816     Urine Culture, Routine Multiple organisms isolated. None in predominance.  Repeat if     Urine Culture, Routine clinically necessary.    Narrative:       Preferred Collection Type->Urine, Clean Catch        Imaging Reviewed:    CTA:   Atherosclerosis, with distal peripheral arterial disease resulting in moderately reduced flow to the left foot, without high-grade proximal stenosis.    High-grade stenosis of the right main renal artery and moderate stenosis of the left main renal artery.    Features of cellulitis of the left leg.  No abscess identified.    IVC filter.    Moderate consolidation of the right lower lobe and small right pleural effusion.  May represent pneumonia in the appropriate clinical setting.    Mild splenomegaly.    Cholelithiasis.        ASSESSMENT & PLAN      Patient Active Problem List   Diagnosis    Acute on chronic congestive heart failure    Essential hypertension    CKD (chronic kidney  disease) stage 3, GFR 30-59 ml/min    Type 2 diabetes mellitus with chronic kidney disease on chronic dialysis    Coronary artery disease involving native coronary artery of native heart without angina pectoris    Acquired hypothyroidism    Recurrent right pleural effusion    Hyperkalemia    Diabetic leg ulcer    Traumatic subarachnoid hemorrhage    Segmental and subsegmental pulmonary emboli of RLL without acute cor pulmonale    Acute renal failure superimposed on stage 3 chronic kidney disease    Anemia in stage 3 chronic kidney disease    Subarachnoid hemorrhage following injury, no loss of consciousness    Staph aureus infection    Pulmonary embolus    Obesity    Acute on chronic respiratory failure with hypoxia    Anasarca    SAH (subarachnoid hemorrhage)    Wounds, multiple    Wound healing, delayed    Malnutrition due to renal disease    Shortness of breath    CKD (chronic kidney disease) requiring chronic dialysis    Open wound of left heel    Sepsis    Acute on chronic combined systolic and diastolic heart failure    ESRD (end stage renal disease)    HCAP (healthcare-associated pneumonia)       Recommendation:   1. Pseudomonas bacteremia   2. Chronic left achilles area wound,  treated by Dr Newsome with [facility].  3. cxr = RLL opacity/possible pneumonia?effusion secondary to CHF?  4.  History of obesity, DM, COPD on 3 L oxygen at home, PE, CVA, thyroid disease, CAD, HFrEF 37 % , Pulm HTN, CABG. HTN, MI,   breast surgery, Esrd, Anemia, anxiety       Recommendation:      I called [facility] [phone] Re:  Cultures==Pseudomonas(S to  Cefepime, cipro, gent, levo, guera, tobra. Inducible beta lactamase to aztreonam, ceftaz, zosyn)    3 more days iv then cipro 500 mg po q24 hours (take it at the same time every day time that falls after HD )  Duration 14-21 days    Follow with Dr Newsome  Follow with Dr Alberto, vascular

## 2018-09-14 NOTE — PLAN OF CARE
Problem: Physical Therapy Goal  Goal: Physical Therapy Goal  Goals to be met by: 2018     Patient will increase functional independence with mobility by performin. Supine to sit with Contact Guard Assistance.  2. Sit to stand transfer with Contact Guard Assistance.  3. Gait  x 250 feet with Contact Guard Assistance using Rolling Walker.   4. Lower extremity exercise program x20 reps per handout, with supervision.    Outcome: Ongoing (interventions implemented as appropriate)  PT eval and treat completed, pt performed supine to sit, sit to stand with RW, pt mobilized to hallway with RW, pt performed stand to sit in chair, performed seated LE exercises.

## 2018-09-14 NOTE — PLAN OF CARE
Problem: Patient Care Overview  Goal: Plan of Care Review  Outcome: Ongoing (interventions implemented as appropriate)  Pt resting quietly with no signs of distress. Pt free from falls during shift. Will continue to monitor.

## 2018-09-14 NOTE — PT/OT/SLP EVAL
"Physical Therapy Evaluation    Patient Name:  Juliane Ramos   MRN:  [id number]    Recommendations:     Discharge Recommendations:  home health PT   Discharge Equipment Recommendations: none   Barriers to discharge: None    Assessment:     Juliane Ramos is a 58 y.o. female admitted with a medical diagnosis of <principal problem not specified>.  She presents with the following impairments/functional limitations:  weakness, impaired functional mobilty, decreased safety awareness, impaired cardiopulmonary response to activity, impaired endurance, impaired self care skills, gait instability, decreased lower extremity function. Pt in good spirits, and motivated for therapy. Pt mobilized to hallway with RW.     Rehab Prognosis:  good; patient would benefit from acute skilled PT services to address these deficits and reach maximum level of function.      Recent Surgery: * No surgery found *      Plan:     During this hospitalization, patient to be seen daily to address the above listed problems via gait training, therapeutic activities, therapeutic exercises  · Plan of Care Expires:  09/28/18   Plan of Care Reviewed with: patient    Subjective     Communicated with nurse Garcia prior to session.  Patient found supine upon PT entry to room, agreeable to evaluation. Pt indicated she had an ulcer on the L foot, but was still ambulatory. Pt indicated a friend built a ramp over the steps into her home. Pt stated "it feels good to get moving".     Chief Complaint: none stated  Patient comments/goals: go home  Pain/Comfort:  · Pain Rating 1: 0/10    Patients cultural, spiritual, [employer] conflicts given the current situation:      Living Environment:  At home with spouse, ramp to enter the home  Prior to admission, patients level of function was amb with RW.  Patient has the following equipment: oxygen, walker, rolling.  DME owned (not currently used): none.  Upon discharge, patient will have assistance from " spouse.    Objective:     Patient found with: telemetry, oxygen     General Precautions: Standard, fall   Orthopedic Precautions:N/A   Braces: N/A     Exams:  · Postural Exam:  Patient presented with the following abnormalities:    · -       Rounded shoulders  · -       Forward head  · RLE ROM: WFL  · RLE Strength: 4/5  · LLE ROM: WFL  · LLE Strength: 4/5    Functional Mobility:  · Bed Mobility:     · Rolling Left:  minimum assistance  · Transfers:     · Sit to Stand:  minimum assistance with rolling walker  · Gait: 250 ft with RW, portable oxygen, steady gait, 2 brief stops to catch breath    AM-PAC 6 CLICK MOBILITY  Total Score:16       Therapeutic Activities and Exercises:   pt performed seated AP, LAQ, marches  Pt performed HS, required PT assistance on LLE due to foot ulcer  Pt encouraged to perform exercises throughout the day.    Patient left up in chair with all lines intact, call button in reach and nurse Radha notified.    GOALS:   Multidisciplinary Problems     Physical Therapy Goals        Problem: Physical Therapy Goal    Goal Priority Disciplines Outcome Goal Variances Interventions   Physical Therapy Goal     PT, PT/OT Ongoing (interventions implemented as appropriate)     Description:  Goals to be met by: 2018     Patient will increase functional independence with mobility by performin. Supine to sit with Contact Guard Assistance.  2. Sit to stand transfer with Contact Guard Assistance.  3. Gait  x 250 feet with Contact Guard Assistance using Rolling Walker.   4. Lower extremity exercise program x20 reps per handout, with supervision.                      History:     Past Medical History:   Diagnosis Date    Anemia in stage 3 chronic kidney disease 2017    Anticoagulant long-term use     CHF (congestive heart failure)     COPD (chronic obstructive pulmonary disease)     Coronary artery disease     Diabetes mellitus     Encounter for blood transfusion     Essential  "hypertension 6/24/2017    Hypertension     MI (myocardial infarction) 2010    Segmental and subsegmental pulmonary emboli of RLL without acute cor pulmonale 11/25/2017    Stroke     July 2005    Thyroid disease     Traumatic subarachnoid hemorrhage 11/24/2017    Type 2 diabetes mellitus with stage 3 chronic kidney disease, without long-term current use of insulin 6/24/2017       Past Surgical History:   Procedure Laterality Date    ABCESS DRAINAGE Right     Between "little toe" and the one next to it    BREAST SURGERY      Reduction [id number]    CARDIAC SURGERY  01/2011    CABG 4vessel ring in one valve mitral valve prolapse    CORONARY ARTERY BYPASS GRAFT      DEBRIDEMENT-TENDON-ACHILLES Left 12/8/2017    Performed by Dennis Wick DPM at [facility]    hyperlipidemia      TUBAL LIGATION  03/1986       Clinical Decision Making:     History  Co-morbidities and personal factors that may impact the plan of care Examination  Body Structures and Functions, activity limitations and participation restrictions that may impact the plan of care Clinical Presentation   Decision Making/ Complexity Score   Co-morbidities:   [] Time since onset of injury / illness / exacerbation  [] Status of current condition  []Patient's cognitive status and safety concerns    [] Multiple Medical Problems (see med hx)  Personal Factors:   [] Patient's age  [] Prior Level of function   [] Patient's home situation (environment and family support)  [] Patient's level of motivation  [] Expected progression of patient      HISTORY:(criteria)    [] 52469 - no personal factors/history    [] 68859 - has 1-2 personal factor/comorbidity     [] 53821 - has >3 personal factor/comorbidity     Body Regions:  [] Objective examination findings  [] Head     []  Neck  [] Trunk   [] Upper Extremity  [] Lower Extremity    Body Systems:  [] For communication ability, affect, cognition, language, and learning style: the assessment of the ability to " make needs known, consciousness, orientation (person, place, and time), expected emotional /behavioral responses, and learning preferences (eg, learning barriers, education  needs)  [] For the neuromuscular system: a general assessment of gross coordinated movement (eg, balance, gait, locomotion, transfers, and transitions) and motor function  (motor control and motor learning)  [] For the musculoskeletal system: the assessment of gross symmetry, gross range of motion, gross strength, height, and weight  [] For the integumentary system: the assessment of pliability(texture), presence of scar formation, skin color, and skin integrity  [] For cardiovascular/pulmonary system: the assessment of heart rate, respiratory rate, blood pressure, and edema     Activity limitations:    [] Patient's cognitive status and saf ety concerns          [] Status of current condition      [] Weight bearing restriction  [] Cardiopulmunary Restriction    Participation Restrictions:   [] Goals and goal agreement with the patient     [] Rehab potential (prognosis) and probable outcome      Examination of Body System: (criteria)    [] 61975 - addressing 1-2 elements    [] 66870 - addressing a total of 3 or more elements     [] 48418 -  Addressing a total of 4 or more elements         Clinical Presentation: (criteria)  Choose one     On examination of body system using standardized tests and measures patient presents with (CHOOSE ONE) elements from any of the following: body structures and functions, activity limitations, and/or participation restrictions.  Leading to a clinical presentation that is considered (CHOOSE ONE)                              Clinical Decision Making  (Eval Complexity):  Choose One     Time Tracking:     PT Received On: 09/14/18  PT Start Time: 0949     PT Stop Time: 1019  PT Total Time (min): 30 min     Billable Minutes: Evaluation 10, Gait Training 10 and Therapeutic Exercise 10      Karen Denney  PT  09/14/2018

## 2018-09-14 NOTE — PROGRESS NOTES
Progress Note  Hospital Medicine  Patient Name:Juliane Ramos  MRN:  [id number]  Patient Class: IP- Inpatient  Admit Date: 9/11/2018  Length of Stay: 3 days  Expected Discharge Date:   Attending Physician: Reg Devine MD  Primary Care Provider:  JOS Dumont    SUBJECTIVE:     Principal Problem: Dyspnea  Initial history of present illness: Juliane Ramos is a 57 y.o. female with a PMHx significant for DM, HTN, CAD, HFrEF with an EF of 35-40%, ESRD on dialysis (TuTh&Sat), COPD on home oxygen, PE, and prior CVA.  She is admitted to the service of hospital medicine with a diagnosis of acute on chronic respiratory failure. She reports worsening of her respiratory status last evening. Noted to be hypoxic on her home pulseox. She reports eating a [employer] hamburger earlier in the day. In the ED she had evidence of volume overload and responded well to I/v diuresis. At baseline she has NYHA Class III symptoms. She reports chills at home but no cough. Denied any sick contacts. Noted to be febrile in the ED for which she got Vancomycin and Zosyn. She has a diabetic wound on the left leg that has been treated with skin grafts. DNR/DNI status was verified with the patient in the presence of the .    PMH/PSH/SH/FH/Meds: reviewed.    Symptoms/Review of Systems: Looking and feeling better. No chest pain or headache, fever or abdominal pain. Blood culture from [facility] is reportedly positive for GNR.   Diet:  Adequate intake.    Activity level: Normal.    Pain:  Patient reports no pain.       OBJECTIVE:   Vital Signs (Most Recent):      Temp: 96.4 °F (35.8 °C) (09/14/18 0812)  Pulse: 82 (09/14/18 0812)  Resp: 17 (09/14/18 0436)  BP: 131/74 (09/14/18 0812)  SpO2: (!) 93 % (09/14/18 0812)       Vital Signs Range (Last 24H):  Temp:  [96.4 °F (35.8 °C)-98.4 °F (36.9 °C)]   Pulse:  [79-99]   Resp:  [17-20]   BP: ()/(63-85)   SpO2:  [93 %-98 %]     Weight: 91.2 kg (201 lb 1 oz)  Body mass index is 32.45  kg/m².    Intake/Output Summary (Last 24 hours) at 9/14/2018 0930  Last data filed at 9/13/2018 1700  Gross per 24 hour   Intake 800 ml   Output 3502 ml   Net -2702 ml     Physical Examination:  Constitutional: She is oriented to person, place, and time. She appears well-developed and well-nourished. No distress.   HENT:   Head: Normocephalic and atraumatic.   Mouth/Throat: No oropharyngeal exudate.   Eyes: EOM are normal. Pupils are equal, round, and reactive to light. Right eye exhibits no discharge. Left eye exhibits no discharge. No scleral icterus.   Neck: Normal range of motion. Neck supple.  No thyromegaly present.   Cardiovascular: Normal rate, regular rhythm and normal heart sounds. Exam reveals no friction rub.   No murmur heard.  Well healed sternal scar   Pulmonary/Chest: Effort normal. No stridor. No respiratory distress.   Crackles at the bases bilaterally   Abdominal: Soft. Bowel sounds are normal. She exhibits no distension. There is no tenderness. There is no guarding.   Musculoskeletal: She exhibits no edema.   Neurological: She is alert and oriented to person, place, and time.   Skin: Skin is warm. She is not diaphoretic.   Left foot bandaged with some surrounding erythema.   Psychiatric: She has a normal mood and affect.   CRANIAL NERVES   CN III, IV, VI   Pupils are equal, round, and reactive to light.  Extraocular motions are normal.    CBC:  Recent Labs   Lab  09/12/18   1035  09/13/18   0508  09/14/18   0525   WBC  7.90  6.50  5.80   RBC  4.18  3.77*  3.81*   HGB  10.4*  9.9*  9.9*   HCT  35.0*  31.8*  31.9*   PLT  157  159  140*   MCV  84  84  84   MCH  24.8*  26.3*  26.1*   MCHC  29.6*  31.1*  31.2*   BMP  Recent Labs   Lab  09/12/18   1035  09/13/18   0508  09/14/18   0525   GLU  209*  118*  117*   NA  132*  134*  134*   K  3.9  4.1  4.1   CL  97  98  100   CO2  23  24  23   BUN  28*  36*  23*   CREATININE  3.1*  4.3*  4.0*   CALCIUM  9.1  9.0  8.7   MG  2.3  2.6  2.5      Diagnostic  Results:  Microbiology Results (last 7 days)     Procedure Component Value Units Date/Time    Blood culture x two cultures. Draw prior to antibiotics. [[id number]] Collected:  09/11/18 0217    Order Status:  Completed Specimen:  Blood Updated:  09/13/18 1212     Blood Culture, Routine No Growth to date     Blood Culture, Routine No Growth to date     Blood Culture, Routine No Growth to date    Narrative:       Aerobic and anaerobic    Blood culture x two cultures. Draw prior to antibiotics. [[id number]] Collected:  09/11/18 0200    Order Status:  Completed Specimen:  Blood Updated:  09/13/18 1212     Blood Culture, Routine No Growth to date     Blood Culture, Routine No Growth to date     Blood Culture, Routine No Growth to date    Narrative:       Aerobic and anaerobic    Urine culture [[id number]] Collected:  09/11/18 0246    Order Status:  Completed Specimen:  Urine Updated:  09/12/18 0816     Urine Culture, Routine Multiple organisms isolated. None in predominance.  Repeat if     Urine Culture, Routine clinically necessary.    Narrative:       Preferred Collection Type->Urine, Clean Catch         CXR:   1. No new airspace disease  2. Unchanged and appropriate central line position.  3. Unchanged cardiomegaly.  4. Right pleural effusion is decreased in size since prior.    ECHO:  · The left ventricle cavity is mildly dilated in systole.  · Left ventricle ejection fraction is moderately decreased at 37%  · Grade II (moderate) left ventricular diastolic dysfunction consistent with pseudonormalization.  · LA pressure is elevated.  · RV systolic function is low normal.  · Left atrium is mildly dilated.  · Right atrium is moderately dilated.  · Mitral valve shows trace regurgitation. MV annuloplasty ring.  · Tricuspid valve shows moderate to severe regurgitation.  · Severely elevated central venous pressure (15 mm Hg).  · The estimated PA systolic pressure is 66.27 mm Hg    Abdominal aorta run-off:  Atherosclerosis, with  distal peripheral arterial disease resulting in moderately reduced flow to the left foot, without high-grade proximal stenosis.  High-grade stenosis of the right main renal artery and moderate stenosis of the left main renal artery.  Features of cellulitis of the left leg.  No abscess identified.  IVC filter.  Moderate consolidation of the right lower lobe and small right pleural effusion.  May represent pneumonia in the appropriate clinical setting.  Mild splenomegaly.  Cholelithiasis.    Assessment/Plan:     Acute on chronic combined systolic and diastolic heart failure     - Patient had evidence of volume overload at presentation and responded well to I/v diuresis.  - Likely precipitated by the salt intake with the fast food.  - Will continue I/v diuresis and renal consult today for HD for additional fluid removal.  - ECHO reviewed, follow Dr. STERLING Palomo recommendations.       ESRD (end stage renal disease)     - Renal consult for HD today.       Sepsis - GNR sp  Left ankle wound  PAD     - Blood cultures and urine culture drawn are negative in our hospital  - Await final blood culture results from [facility].  - Continue IV Fortaz + Vancomycin. ID specialist to see the patient.  - CTA run-off results reviewed.       Segmental and subsegmental pulmonary emboli of RLL without acute cor pulmonale     - Continue home dose of Apixaban.       Coronary artery disease involving native coronary artery of native heart without angina pectoris     - s/p CABG  - Continue statin.  - Follow Dr. Palomo recommendations.       Type 2 diabetes mellitus with chronic kidney disease on chronic dialysis     - Sliding scale insulin as an inpatient.     Discussed with patient's , answered all the questions.    Code status: Full code    Disposition: DC home once approved by ID specialist.    VTE Risk Mitigation (From admission, onward)        Ordered     heparin (porcine) injection 4,000 Units  As needed (PRN)       09/11/18 1205     apixaban tablet 5 mg  2 times daily      09/11/18 0456        Reg Devine MD  Department of Hospital Medicine   [facility]

## 2018-09-14 NOTE — PROGRESS NOTES
INPATIENT NEPHROLOGY PROGRESS NOTE  [facility] NEPHROLOGY    Juliane Ramos  09/14/2018    Reason for consultation:         esrd    Chief Complaint:   Chief Complaint   Patient presents with    Fever    Shortness of Breath        History of Present Illness:       Juliane Ramos is a 57 y.o. female with a PMHx significant for DM, HTN, CAD, HFrEF with an EF of 35-40%, ESRD on dialysis (TuTh&Sat), COPD on home oxygen, PE, and prior CVA.  She is admitted to the service of hospital medicine with a diagnosis of acute on chronic respiratory failure. She reports worsening of her respiratory status last evening. Noted to be hypoxic on her home pulseox. She reports eating a [employer] hamburger earlier in the day. In the ED she had evidence of volume overload and responded well to I/v diuresis. At baseline she has NYHA Class III symptoms. She reports chills at home but no cough. Denied any sick contacts. Noted to be febrile in the ED for which she got Vancomycin and Zosyn. She has a diabetic wound on the left leg that has been treated with skin grafts.   DNR/DNI status was verified with the patient in the presence of the .    9/12 afebrile.  No n,v,chest pain or sob    9/13  Seen on dialysis.  Afebrile.  No sob, nausea, vomiting  9/14  Feels good.  Ambulated today.  Appetite is good.          Plan of Care:     Assessment:    esrd  Pulmonary edema resolved  Fever resolved  Anemia  hypertension    Plan:     T,th,sat hd--seen on dialysis yesterday  uf as tolerated  F/u cultures--neg so far  jesús with hd  Continue outpt medication    Thank you for allowing us to participate in this patient's care. We will continue to follow.    Medications:  No current facility-administered medications on file prior to encounter.      Current Outpatient Medications on File Prior to Encounter   Medication Sig Dispense Refill    citalopram (CELEXA) 20 MG tablet Take 20 mg by mouth once daily.      FOLIC ACID-B YCUV-L-HRIZB-ZINC 3-70-15  MG-MCG-MG ORAL TAB Take 1 tablet by mouth every other day.      HYDROcodone-acetaminophen (NORCO) 5-325 mg per tablet Take 1 tablet by mouth every 6 (six) hours as needed for Pain.      midodrine (PROAMATINE) 5 MG Tab Take 2.5 mg by mouth 3 (three) times daily with meals.      sevelamer carbonate (RENVELA) 800 mg Tab Take 1,600 mg by mouth 3 (three) times daily with meals.      amLODIPine (NORVASC) 10 MG tablet Take 1 tablet (10 mg total) by mouth once daily. 30 tablet 1    apixaban 5 mg Tab Take 1 tablet (5 mg total) by mouth 2 (two) times daily. 60 tablet 1    cetirizine (ZYRTEC) 10 MG tablet Take 1 tablet (10 mg total) by mouth every evening. 20 tablet 0    collagenase ointment Apply topically once daily.      epoetin donita (PROCRIT) 10,000 unit/mL injection Inject 1 mL (10,000 Units total) into the skin every Tues, Thurs, Sat. 1 mL 5    gabapentin (NEURONTIN) 100 MG capsule Take 1 capsule (100 mg total) by mouth 3 (three) times daily. 90 capsule 1    insulin aspart (NOVOLOG) 100 unit/mL InPn pen Inject 1-10 Units into the skin 3 (three) times daily. 3 mL 1    levothyroxine (SYNTHROID) 50 MCG tablet Take 1 tablet (50 mcg total) by mouth before breakfast. 30 tablet 1    rosuvastatin (CRESTOR) 10 MG tablet Take 1 tablet (10 mg total) by mouth once daily. 30 tablet 1     Scheduled Meds:   albumin human 25%  25 g Intravenous Once    apixaban  5 mg Oral BID    ceftAZIDime (FORTAZ) IVPB  500 mg Intravenous Q24H    citalopram  20 mg Oral Daily    gabapentin  100 mg Oral TID    levothyroxine  50 mcg Oral Before breakfast    midodrine  2.5 mg Oral TID WM    rosuvastatin  10 mg Oral Daily    sevelamer carbonate  1,600 mg Oral TID WM    vancomycin (VANCOCIN) IVPB  500 mg Intravenous Every Tues, Thurs, Sat    vancomycin (VANCOCIN) IVPB (custom)  500 mg Intravenous Once     Continuous Infusions:  PRN Meds:.sodium chloride 0.9%, dextrose 50%, dextrose 50%, glucagon (human recombinant), glucagon (human  recombinant), glucose, glucose, heparin (porcine), influenza, insulin aspart U-100, sulfur hexafluoride microspheres    Allergies:  Patient has no known allergies.    Vital Signs:  Temp Readings from Last 3 Encounters:   09/14/18 96.4 °F (35.8 °C) (Oral)   09/10/18 100.2 °F (37.9 °C)   02/24/18 97.9 °F (36.6 °C) (Oral)       Pulse Readings from Last 3 Encounters:   09/14/18 82   09/10/18 (!) 122   07/17/18 67       BP Readings from Last 3 Encounters:   09/14/18 131/74   09/10/18 (!) 128/97   07/17/18 117/61       Weight:  Wt Readings from Last 3 Encounters:   09/12/18 91.2 kg (201 lb 1 oz)   02/24/18 89.8 kg (197 lb 15.6 oz)   12/24/17 104.1 kg (229 lb 6.4 oz)       Review of Systems:  Review of Systems - All 14 systems reviewed and negative, except as noted in HPI    Physical Exam:    Constitutional: NAD  Neuro: No asterixis  Psych: Calm  EENT: NCAT, anicteric sclera   Neck:  supple  Cards: No rub  Lungs: clear to ausculation  GI:  Pos bowel sounds, no hsm, NTND   MSK:  WNWD  Skin: no purpura, rashes       Results:  Lab Results   Component Value Date     (L) 09/14/2018    K 4.1 09/14/2018     09/14/2018    CO2 23 09/14/2018    BUN 23 (H) 09/14/2018    CREATININE 4.0 (H) 09/14/2018    CALCIUM 8.7 09/14/2018    ANIONGAP 11 09/14/2018    ESTGFRAFRICA 14 (A) 09/14/2018    EGFRNONAA 12 (A) 09/14/2018       Lab Results   Component Value Date    CALCIUM 8.7 09/14/2018    PHOS 4.5 09/14/2018       Recent Labs   Lab  09/14/18   0525   WBC  5.80   RBC  3.81*   HGB  9.9*   HCT  31.9*   PLT  140*   MCV  84   MCH  26.1*   MCHC  31.2*       I have personally reviewed pertinent radiological imaging and reports.

## 2018-09-14 NOTE — PLAN OF CARE
Problem: Patient Care Overview  Goal: Plan of Care Review  Medications reviewed and administered  Call light within reach  Bed low/wheels locked  SR up x 2  Up with assist  Renal diet  Telemetry  Accuchecks

## 2018-09-14 NOTE — CONSULTS
Date: 9/13/2018   Juliane Ramos [id number] is a 58 y.o. female who has been consulted for vancomycin dosing.    The patient has the following labs:     Creatinine (mg/dl)    WBC Count   Serum creatinine: 4.3 mg/dL (H) 09/13/18 0508  Estimated creatinine clearance: 16.6 mL/min (A) Lab Results   Component Value Date    WBC 6.50 09/13/2018        Current weight is 91.2 kg (201 lb 1 oz)    Patient scheduled for HD today.Vancomycin level from 9/13/2018  with AM labs ( pre-HD level) was 18.6 mg/dL. Vanc dose will be   500 mg at 1700 on postHD days tues, thurs, saturday.  Todays dose was delayed. Patient was in CT.  Vancomycin level ordered with AM labs before next HD 09/15.  Target goal is 15-20 mg/dL.     Pharmacy will follow Vanc daily.  Vanc post HD doses will be adjusted if needed.    Patient will be followed by pharmacy for changes in renal function, toxicity, and efficacy.    Thank you for allowing us to participate in this patient's care.     Lisa Saldana, PharmD

## 2018-09-14 NOTE — PLAN OF CARE
09/13/18 1935   Patient Assessment/Suction   Level of Consciousness (AVPU) alert   PRE-TX-O2-ETCO2   O2 Device (Oxygen Therapy) nasal cannula   Flow (L/min) 3   Oxygen Concentration (%) 32   SpO2 97 %   Pulse Oximetry Type Intermittent   Ready to Wean/Extubation Screen   FIO2<=50 (chart decimal) 0.32

## 2018-09-15 LAB
ALBUMIN SERPL BCP-MCNC: 3.2 G/DL
ALP SERPL-CCNC: 64 U/L
ALT SERPL W/O P-5'-P-CCNC: 9 U/L
ANION GAP SERPL CALC-SCNC: 9 MMOL/L
AST SERPL-CCNC: 13 U/L
BASOPHILS # BLD AUTO: 0 K/UL
BASOPHILS NFR BLD: 0.4 %
BILIRUB SERPL-MCNC: 0.4 MG/DL
BUN SERPL-MCNC: 31 MG/DL
CALCIUM SERPL-MCNC: 8.7 MG/DL
CHLORIDE SERPL-SCNC: 99 MMOL/L
CO2 SERPL-SCNC: 21 MMOL/L
CREAT SERPL-MCNC: 5 MG/DL
DIFFERENTIAL METHOD: ABNORMAL
EOSINOPHIL # BLD AUTO: 0.9 K/UL
EOSINOPHIL NFR BLD: 13.4 %
ERYTHROCYTE [DISTWIDTH] IN BLOOD BY AUTOMATED COUNT: 20.5 %
EST. GFR  (AFRICAN AMERICAN): 10 ML/MIN/1.73 M^2
EST. GFR  (NON AFRICAN AMERICAN): 9 ML/MIN/1.73 M^2
GLUCOSE SERPL-MCNC: 86 MG/DL
HCT VFR BLD AUTO: 32 %
HGB BLD-MCNC: 10 G/DL
LYMPHOCYTES # BLD AUTO: 1 K/UL
LYMPHOCYTES NFR BLD: 14.9 %
MAGNESIUM SERPL-MCNC: 2.8 MG/DL
MCH RBC QN AUTO: 26 PG
MCHC RBC AUTO-ENTMCNC: 31.2 G/DL
MCV RBC AUTO: 83 FL
MONOCYTES # BLD AUTO: 0.8 K/UL
MONOCYTES NFR BLD: 11.6 %
NEUTROPHILS # BLD AUTO: 3.9 K/UL
NEUTROPHILS NFR BLD: 59.7 %
PHOSPHATE SERPL-MCNC: 5.8 MG/DL
PLATELET # BLD AUTO: 158 K/UL
PMV BLD AUTO: 8.6 FL
POCT GLUCOSE: 136 MG/DL (ref 70–110)
POCT GLUCOSE: 167 MG/DL (ref 70–110)
POCT GLUCOSE: 174 MG/DL (ref 70–110)
POTASSIUM SERPL-SCNC: 4.6 MMOL/L
PROT SERPL-MCNC: 6.9 G/DL
RBC # BLD AUTO: 3.84 M/UL
SODIUM SERPL-SCNC: 129 MMOL/L
VANCOMYCIN SERPL-MCNC: 21.9 UG/ML
WBC # BLD AUTO: 6.6 K/UL

## 2018-09-15 PROCEDURE — 80202 ASSAY OF VANCOMYCIN: CPT

## 2018-09-15 PROCEDURE — 83735 ASSAY OF MAGNESIUM: CPT

## 2018-09-15 PROCEDURE — 25000003 PHARM REV CODE 250: Performed by: INTERNAL MEDICINE

## 2018-09-15 PROCEDURE — 27000221 HC OXYGEN, UP TO 24 HOURS

## 2018-09-15 PROCEDURE — 80100016 HC MAINTENANCE HEMODIALYSIS

## 2018-09-15 PROCEDURE — 25000003 PHARM REV CODE 250: Performed by: HOSPITALIST

## 2018-09-15 PROCEDURE — 84100 ASSAY OF PHOSPHORUS: CPT

## 2018-09-15 PROCEDURE — 11000001 HC ACUTE MED/SURG PRIVATE ROOM

## 2018-09-15 PROCEDURE — 85025 COMPLETE CBC W/AUTO DIFF WBC: CPT

## 2018-09-15 PROCEDURE — 36415 COLL VENOUS BLD VENIPUNCTURE: CPT

## 2018-09-15 PROCEDURE — 63600175 PHARM REV CODE 636 W HCPCS: Performed by: INTERNAL MEDICINE

## 2018-09-15 PROCEDURE — 97116 GAIT TRAINING THERAPY: CPT

## 2018-09-15 PROCEDURE — 80053 COMPREHEN METABOLIC PANEL: CPT

## 2018-09-15 RX ORDER — DIPHENHYDRAMINE HCL 25 MG
25 CAPSULE ORAL EVERY 6 HOURS PRN
Status: DISCONTINUED | OUTPATIENT
Start: 2018-09-16 | End: 2018-09-18 | Stop reason: HOSPADM

## 2018-09-15 RX ORDER — CEFEPIME HYDROCHLORIDE 1 G/50ML
1 INJECTION, SOLUTION INTRAVENOUS
Status: DISCONTINUED | OUTPATIENT
Start: 2018-09-15 | End: 2018-09-18 | Stop reason: HOSPADM

## 2018-09-15 RX ADMIN — LEVOTHYROXINE SODIUM 50 MCG: 50 TABLET ORAL at 05:09

## 2018-09-15 RX ADMIN — APIXABAN 5 MG: 2.5 TABLET, FILM COATED ORAL at 08:09

## 2018-09-15 RX ADMIN — SEVELAMER CARBONATE 1600 MG: 800 TABLET, FILM COATED ORAL at 12:09

## 2018-09-15 RX ADMIN — MIDODRINE HYDROCHLORIDE 2.5 MG: 2.5 TABLET ORAL at 12:09

## 2018-09-15 RX ADMIN — MIDODRINE HYDROCHLORIDE 2.5 MG: 2.5 TABLET ORAL at 08:09

## 2018-09-15 RX ADMIN — GABAPENTIN 100 MG: 100 CAPSULE ORAL at 08:09

## 2018-09-15 RX ADMIN — CITALOPRAM HYDROBROMIDE 20 MG: 10 TABLET ORAL at 08:09

## 2018-09-15 RX ADMIN — MIDODRINE HYDROCHLORIDE 2.5 MG: 2.5 TABLET ORAL at 05:09

## 2018-09-15 RX ADMIN — DIPHENHYDRAMINE HYDROCHLORIDE 25 MG: 25 CAPSULE ORAL at 11:09

## 2018-09-15 RX ADMIN — SEVELAMER CARBONATE 1600 MG: 800 TABLET, FILM COATED ORAL at 05:09

## 2018-09-15 RX ADMIN — ROSUVASTATIN CALCIUM 10 MG: 5 TABLET, FILM COATED ORAL at 08:09

## 2018-09-15 RX ADMIN — HEPARIN SODIUM 4000 UNITS: 1000 INJECTION, SOLUTION INTRAVENOUS; SUBCUTANEOUS at 04:09

## 2018-09-15 RX ADMIN — CEFEPIME HYDROCHLORIDE 1 G: 1 INJECTION, SOLUTION INTRAVENOUS at 08:09

## 2018-09-15 RX ADMIN — SEVELAMER CARBONATE 1600 MG: 800 TABLET, FILM COATED ORAL at 08:09

## 2018-09-15 NOTE — PROGRESS NOTES
09/15/18 0810   PRE-TX-O2-ETCO2   O2 Device (Oxygen Therapy) nasal cannula   $ Is the patient on Low Flow Oxygen? Yes   Flow (L/min) 3   SpO2 99 %   Pulse Oximetry Type Intermittent

## 2018-09-15 NOTE — CONSULTS
The patient was seen on 09/11/2018.  The patient was made a DNR, but DNR   apparently has been persistent since.    This 58-year-old white lady is admitted with worsening shortness of breath and   nonhealing left leg ulceration.    The patient is on hemodialysis and has chronic renal failure; she also has   congestive heart failure.  Since discharge from [facility]   approximately 2-3 weeks ago to home, it appears that the patient has been   somewhat noncompliant with her diet.  She apparently was eating a [employer] burger   earlier in the day.  She developed some shortness of breath on the night/evening   prior to admit, which gradually worsened through the day.  She is on dialysis 3   days a week.  She has had some leg edema.  In the ED, the patient was given   diuretic with good response.  She has since been dialyzed and her shortness of   breath is much improved.  The patient has had chills, but no fever at home --   the patient did not check her temperature.  In the ED, the patient was febrile;   she was given vancomycin and Zosyn -- nonhealing diabetic wound in the left leg.    The patient was admitted to [facility] on 07/24/2018 with fever   and unresponsiveness; temperature was elevated to 102 degrees at that time and   the patient had lactic acidosis and septic shock.  She was intubated and given   intravenous fluids -- sepsis protocol.  She subsequently improved and was   discharged.  She was admitted to [facility] from [facility]   where she was for several weeks.    The patient had a large anterior wall myocardial infarction of indeterminate   age, first noted in 2011.  She had congestive heart failure at that time.    Echocardiogram revealed dilated left ventricle 69/61 mm with severe   anterolateral and septal hypokinesia with depressed LVEF.  There is   mild-to-moderate mitral regurgitation and PA systolic pressure was 43 mmHg with   the EF of 24%.  The  patient subsequently underwent coronary artery bypass graft   surgery with SVG to totally occluded LAD, SVG to OMB which had 80 - 90% ostial   stenosis, SVG to the OM2, and SVG to the second diagonal branch.  Mitral valve   repair was performed with a 28 mm Bk-Wahl ring.  The patient was lost   to follow up in  until 2017.  The patient was on home oxygen .    PAST HISTORY:  Diabetes mellitus , hypertension .  Breast reduction   surgery in , CVA with left hemiparesis, 2005.  Drainage of abscess of the   right fifth toe.  Tubal ligation in .  Traumatic subarachnoid hemorrhage.    Segmental and subsegmental pulmonary emboli of the right lower lobe.  Prior   blood transfusion, hypothyroidism.  Previous debridement of the Achilles tendon,   left leg.    SOCIAL HISTORY:  The patient has never been a smoker.  She has an occasional   drink.  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    FAMILY HISTORY:  Father  at 67 of prostate cancer; he had CHF and   diabetes.  The patient has 2 brothers and a sister.  Mother 75 has had bilateral   knee replacements.    REVIEW OF SYSTEMS:  The patient states that she has been losing weight lately.    The patient denies cough.  She has occasional wheezing.  There is no sputum   expectoration.  She does have paroxysmal nocturnal dyspnea.  There is no history   of hematochezia or melena.    MEDICATIONS:  1.  Amlodipine 10 mg daily.  2.  Apixaban 5 mg b.i.d.  3.  Cetirizine 10 mg q.p.m.  4.  Collagenase ointment.  5.  Procrit 10,000 units subQ two or three times weekly.  6.  Gabapentin 100 mg t.i.d.  7.  NovoLog sliding scale 1-10 units.  8.  Synthroid 50 mcg daily.  9.  Rosuvastatin 10 mg daily.    PHYSICAL EXAMINATION:  GENERAL:  This is a well-built white lady in no acute distress.  EYES:  No conjunctival pallor or scleral icterus.  NECK:  Jugular venous pressure is mildly elevated.  Positive hepatojugular    reflex.  Carotids are equal without bruits.  CHEST:  Air entry is diminished in the right base.  There were no rales or   rhonchi.  HEART:  S1, S2 are muffled.  There were no murmurs, gallops or rubs.  ABDOMEN:  Soft.  Liver edge is palpable 1 to 2 feet and fingerbreadths below the   costal margin.  No tenderness.  EXTREMITIES:  There is no clubbing or cyanosis.  There is mild bilateral pitting   edema.  A dressing is noted over the left lower calf all the way down to the   calcaneum.    LABORATORY DATA:  Hemoglobin 10.1, hematocrit 32.6, WBC count is 13,200,   platelet count 167,000, granulocytes 88%.  Sodium 135, potassium 4.1, BUN 37,   creatinine 3.0, glucose 213, total protein 7.1, albumin 3.3, troponin 0.042.    BNP 2802.  Urinalysis revealed hazy urine, greater than 100 wbc's and 5 rbc's   last HPF.  There was moderate bacteriuria and yeast.  Arterial blood gas revealed pH of 7.375, pCO2 of 46, pO2 of 61, bicarbonate of   27, O2 sats 90% on 4 liters O2.  Chest x-ray revealed haziness in the right lung base - possible effusion and/or   atelectasis.  A dialysis catheter is noted in the right internal jugular and   superior vena cava.  There appears to be elevation of the right hemidiaphragm.  EKG reveals sinus rhythm at 102 BPM.  Incomplete right bundle-branch block.    Probable anteroseptal scar and lateral wall scar.  Nonspecific ST depression.    IMPRESSION:  1.  Mild congestive heart failure; ischemic cardiomyopathy; heart failure with   reduced ejection fraction  2.  Chronic renal failure, on dialysis.  3.  Remote anterior wall myocardial infarctions; 4-vessel coronary artery bypass   graft surgery in 2011; mitral valve annuloplasty ring for mitral regurgitation.  4.  Hypertension; diabetes mellitus; dyslipidemia; history of morbid obesity.  5.  History of left hemiparesis 07/2005; pulmonary emboli.    The patient will be continued on dialysis as per Nephrology.  She is on   antibiotics for probable  sepsis -- possibly infected left leg wound.  CHF is   probably due to volume overload secondary to dietary indiscretion.  Further   management will depend on the patient's clinical course.      MT/ANGELICA  dd: 09/14/2018 11:51:53 (CDT)  td: 09/15/2018 00:58:48 (CDT)  Doc ID   #[id number]  Job ID #[id number]    CC:

## 2018-09-15 NOTE — CONSULTS
Juliane Ramos [id number] is a 58 y.o. female who had been consulted for vancomycin dosing.    Vancomycin has been discontinued.  Pharmacy consult for vancomycin dosing in no longer required.      Thank you for allowing us to participate in this patient's care.     Karan Jaimes, PharmD

## 2018-09-15 NOTE — PROGRESS NOTES
INPATIENT NEPHROLOGY PROGRESS NOTE  [facility] NEPHROLOGY    Juliane Ramos  09/15/2018    Reason for consultation:         esrd    Chief Complaint:   Chief Complaint   Patient presents with    Fever    Shortness of Breath        History of Present Illness:       57F with DM2, HTN, CAD, HFrEF with an EF of 35-40%, ESRD on HD TTS, COPD on home oxygen, PE, and prior CVA, admitted with acute on chronic respiratory failure, hypoxic, PNA, Pseudomonas bacteremia. Has chronic leg wounds, treated with grafts.    9/12 afebrile.  No n,v,chest pain or sob  9/13  Seen on dialysis.  Afebrile.  No sob, nausea, vomiting  9/14  Feels good.  Ambulated today.  Appetite is good.  9/15 VSS, awaiting HD.    Plan of Care:     Assessment:    ESRD on HD TTS  Pulmonary edema resolved  Fever resolved  Anemia  Hypertension    Plan:     TTS HD per schedule.  UF as tolerated.  YASHIRA with HD as needed.  Continue outpt medications.  Abx per primary team.    Thank you for allowing us to participate in this patient's care. We will continue to follow.    Medications:  No current facility-administered medications on file prior to encounter.      Current Outpatient Medications on File Prior to Encounter   Medication Sig Dispense Refill    citalopram (CELEXA) 20 MG tablet Take 20 mg by mouth once daily.      FOLIC ACID-B EAGQ-P-HCJBV-ZINC 3-70-15 MG-MCG-MG ORAL TAB Take 1 tablet by mouth every other day.      HYDROcodone-acetaminophen (NORCO) 5-325 mg per tablet Take 1 tablet by mouth every 6 (six) hours as needed for Pain.      midodrine (PROAMATINE) 5 MG Tab Take 2.5 mg by mouth 3 (three) times daily with meals.      sevelamer carbonate (RENVELA) 800 mg Tab Take 1,600 mg by mouth 3 (three) times daily with meals.      amLODIPine (NORVASC) 10 MG tablet Take 1 tablet (10 mg total) by mouth once daily. 30 tablet 1    apixaban 5 mg Tab Take 1 tablet (5 mg total) by mouth 2 (two) times daily. 60 tablet 1    cetirizine (ZYRTEC) 10 MG tablet Take 1  tablet (10 mg total) by mouth every evening. 20 tablet 0    collagenase ointment Apply topically once daily.      epoetin donita (PROCRIT) 10,000 unit/mL injection Inject 1 mL (10,000 Units total) into the skin every Tues, Thurs, Sat. 1 mL 5    gabapentin (NEURONTIN) 100 MG capsule Take 1 capsule (100 mg total) by mouth 3 (three) times daily. 90 capsule 1    insulin aspart (NOVOLOG) 100 unit/mL InPn pen Inject 1-10 Units into the skin 3 (three) times daily. 3 mL 1    levothyroxine (SYNTHROID) 50 MCG tablet Take 1 tablet (50 mcg total) by mouth before breakfast. 30 tablet 1    rosuvastatin (CRESTOR) 10 MG tablet Take 1 tablet (10 mg total) by mouth once daily. 30 tablet 1     Scheduled Meds:   albumin human 25%  25 g Intravenous Once    apixaban  5 mg Oral BID    cefepime in dextrose 5 %  1 g Intravenous Q24H    citalopram  20 mg Oral Daily    gabapentin  100 mg Oral TID    levothyroxine  50 mcg Oral Before breakfast    midodrine  2.5 mg Oral TID WM    rosuvastatin  10 mg Oral Daily    sevelamer carbonate  1,600 mg Oral TID WM     Continuous Infusions:  PRN Meds:.sodium chloride 0.9%, dextrose 50%, dextrose 50%, glucagon (human recombinant), glucagon (human recombinant), glucose, glucose, heparin (porcine), influenza, insulin aspart U-100, sulfur hexafluoride microspheres    Allergies:  Patient has no known allergies.    Vital Signs:  Temp Readings from Last 3 Encounters:   09/15/18 96.2 °F (35.7 °C) (Oral)   09/10/18 100.2 °F (37.9 °C)   02/24/18 97.9 °F (36.6 °C) (Oral)       Pulse Readings from Last 3 Encounters:   09/15/18 76   09/10/18 (!) 122   07/17/18 67       BP Readings from Last 3 Encounters:   09/15/18 133/78   09/10/18 (!) 128/97   07/17/18 117/61       Weight:  Wt Readings from Last 3 Encounters:   09/12/18 91.2 kg (201 lb 1 oz)   02/24/18 89.8 kg (197 lb 15.6 oz)   12/24/17 104.1 kg (229 lb 6.4 oz)       Review of Systems:  Review of Systems - All 14 systems reviewed and negative, except  as noted in HPI    Physical Exam:    Constitutional: NAD  Neuro: No asterixis  Psych: Calm  EENT: NCAT, anicteric sclera   Neck:  supple  Cards: No rub  Lungs: clear to ausculation  GI:  Pos bowel sounds, no hsm, NTND   MSK:  WNWD  Skin: no purpura, rashes       Results:  Lab Results   Component Value Date     (L) 09/15/2018    K 4.6 09/15/2018    CL 99 09/15/2018    CO2 21 (L) 09/15/2018    BUN 31 (H) 09/15/2018    CREATININE 5.0 (H) 09/15/2018    CALCIUM 8.7 09/15/2018    ANIONGAP 9 09/15/2018    ESTGFRAFRICA 10 (A) 09/15/2018    EGFRNONAA 9 (A) 09/15/2018       Lab Results   Component Value Date    CALCIUM 8.7 09/15/2018    PHOS 5.8 (H) 09/15/2018       Recent Labs   Lab  09/15/18   0453   WBC  6.60   RBC  3.84*   HGB  10.0*   HCT  32.0*   PLT  158   MCV  83   MCH  26.0*   MCHC  31.2*       I have personally reviewed pertinent radiological imaging and reports.     Edson Crystal MD

## 2018-09-15 NOTE — PROGRESS NOTES
Progress Note  Hospital Medicine  Patient Name:Juliane Ramos  MRN:  [id number]  Patient Class: IP- Inpatient  Admit Date: 9/11/2018  Length of Stay: 4 days  Expected Discharge Date:   Attending Physician: Reg Devine MD  Primary Care Provider:  JOS Dumont    SUBJECTIVE:     Principal Problem: Dyspnea  Initial history of present illness: Juliane Ramos is a 57 y.o. female with a PMHx significant for DM, HTN, CAD, HFrEF with an EF of 35-40%, ESRD on dialysis (TuTh&Sat), COPD on home oxygen, PE, and prior CVA.  She is admitted to the service of hospital medicine with a diagnosis of acute on chronic respiratory failure. She reports worsening of her respiratory status last evening. Noted to be hypoxic on her home pulseox. She reports eating a [employer] hamburger earlier in the day. In the ED she had evidence of volume overload and responded well to I/v diuresis. At baseline she has NYHA Class III symptoms. She reports chills at home but no cough. Denied any sick contacts. Noted to be febrile in the ED for which she got Vancomycin and Zosyn. She has a diabetic wound on the left leg that has been treated with skin grafts. DNR/DNI status was verified with the patient in the presence of the .    PMH/PSH/SH/FH/Meds: reviewed.    Symptoms/Review of Systems: Patient seen and examiend. No acute events. Cultures at [facility] positive for Pseudomonas pan-sensitive.  No other new complaints.   Diet:  Adequate intake.    Activity level: Normal.    Pain:  Patient reports no pain.       OBJECTIVE:   Vital Signs (Most Recent):      Temp: 96.1 °F (35.6 °C) (09/15/18 0801)  Pulse: 76 (09/15/18 0801)  Resp: 18 (09/15/18 0801)  BP: 135/84 (09/15/18 0801)  SpO2: 99 % (09/15/18 0810)       Vital Signs Range (Last 24H):  Temp:  [96.1 °F (35.6 °C)-97.6 °F (36.4 °C)]   Pulse:  [68-76]   Resp:  [16-18]   BP: (125-135)/(69-84)   SpO2:  [93 %-100 %]     Weight: 91.2 kg (201 lb 1 oz)  Body mass index is 32.45 kg/m².    Intake/Output Summary  (Last 24 hours) at 9/15/2018 1029  Last data filed at 9/15/2018 0900  Gross per 24 hour   Intake 640 ml   Output --   Net 640 ml     Physical Examination:  Constitutional: She is oriented to person, place, and time. She appears well-developed and well-nourished. No distress.   HENT:   Head: Normocephalic and atraumatic.   Mouth/Throat: No oropharyngeal exudate.   Eyes: EOM are normal. Pupils are equal, round, and reactive to light. Right eye exhibits no discharge. Left eye exhibits no discharge. No scleral icterus.   Neck: Normal range of motion. Neck supple.  No thyromegaly present.   Cardiovascular: Normal rate, regular rhythm and normal heart sounds. Exam reveals no friction rub.   No murmur heard.  Well healed sternal scar   Pulmonary/Chest: Effort normal. No stridor. No respiratory distress.   Crackles at the bases bilaterally   Abdominal: Soft. Bowel sounds are normal. She exhibits no distension. There is no tenderness. There is no guarding.   Musculoskeletal: She exhibits no edema.   Neurological: She is alert and oriented to person, place, and time.   Skin: Skin is warm. She is not diaphoretic.   Left foot bandaged with some surrounding erythema.   Psychiatric: She has a normal mood and affect.   CRANIAL NERVES   CN III, IV, VI   Pupils are equal, round, and reactive to light.  Extraocular motions are normal.    CBC:  Recent Labs   Lab  09/13/18   0508 09/14/18   0525  09/15/18   0453   WBC  6.50  5.80  6.60   RBC  3.77*  3.81*  3.84*   HGB  9.9*  9.9*  10.0*   HCT  31.8*  31.9*  32.0*   PLT  159  140*  158   MCV  84  84  83   MCH  26.3*  26.1*  26.0*   MCHC  31.1*  31.2*  31.2*   BMP  Recent Labs   Lab  09/13/18   0508  09/14/18   0525  09/15/18   0453   GLU  118*  117*  86   NA  134*  134*  129*   K  4.1  4.1  4.6   CL  98  100  99   CO2  24  23  21*   BUN  36*  23*  31*   CREATININE  4.3*  4.0*  5.0*   CALCIUM  9.0  8.7  8.7   MG  2.6  2.5  2.8*      Diagnostic Results:  Microbiology Results (last 7  days)     Procedure Component Value Units Date/Time    Blood culture x two cultures. Draw prior to antibiotics. [[id number]] Collected:  09/11/18 0217    Order Status:  Completed Specimen:  Blood Updated:  09/14/18 1212     Blood Culture, Routine No Growth to date     Blood Culture, Routine No Growth to date     Blood Culture, Routine No Growth to date     Blood Culture, Routine No Growth to date    Narrative:       Aerobic and anaerobic    Blood culture x two cultures. Draw prior to antibiotics. [[id number]] Collected:  09/11/18 0200    Order Status:  Completed Specimen:  Blood Updated:  09/14/18 1212     Blood Culture, Routine No Growth to date     Blood Culture, Routine No Growth to date     Blood Culture, Routine No Growth to date     Blood Culture, Routine No Growth to date    Narrative:       Aerobic and anaerobic    Urine culture [[id number]] Collected:  09/11/18 0246    Order Status:  Completed Specimen:  Urine Updated:  09/12/18 0816     Urine Culture, Routine Multiple organisms isolated. None in predominance.  Repeat if     Urine Culture, Routine clinically necessary.    Narrative:       Preferred Collection Type->Urine, Clean Catch         CXR:   1. No new airspace disease  2. Unchanged and appropriate central line position.  3. Unchanged cardiomegaly.  4. Right pleural effusion is decreased in size since prior.    ECHO:  · The left ventricle cavity is mildly dilated in systole.  · Left ventricle ejection fraction is moderately decreased at 37%  · Grade II (moderate) left ventricular diastolic dysfunction consistent with pseudonormalization.  · LA pressure is elevated.  · RV systolic function is low normal.  · Left atrium is mildly dilated.  · Right atrium is moderately dilated.  · Mitral valve shows trace regurgitation. MV annuloplasty ring.  · Tricuspid valve shows moderate to severe regurgitation.  · Severely elevated central venous pressure (15 mm Hg).  · The estimated PA systolic pressure is 66.27 mm  Hg    Abdominal aorta run-off:  Atherosclerosis, with distal peripheral arterial disease resulting in moderately reduced flow to the left foot, without high-grade proximal stenosis.  High-grade stenosis of the right main renal artery and moderate stenosis of the left main renal artery.  Features of cellulitis of the left leg.  No abscess identified.  IVC filter.  Moderate consolidation of the right lower lobe and small right pleural effusion.  May represent pneumonia in the appropriate clinical setting.  Mild splenomegaly.  Cholelithiasis.    Assessment/Plan:     Acute on chronic combined systolic and diastolic heart failure     - Appears at baseline at this time.        ESRD (end stage renal disease)     - Renal consult for HD today.       Sepsis - GNR sp  Left ankle wound  PAD     - Cultures grew pseudomonas. Will follow ID recs for three more days of IV Abx       Segmental and subsegmental pulmonary emboli of RLL without acute cor pulmonale     - Continue home dose of Apixaban.       Coronary artery disease involving native coronary artery of native heart without angina pectoris     - s/p CABG  - Continue statin.  - Follow Dr. Palomo recommendations.       Type 2 diabetes mellitus with chronic kidney disease on chronic dialysis     - Sliding scale insulin as an inpatient.         Code status: Full code    Disposition: DC home once approved by ID specialist.    VTE Risk Mitigation (From admission, onward)        Ordered     heparin (porcine) injection 4,000 Units  As needed (PRN)      09/11/18 1205     apixaban tablet 5 mg  2 times daily      09/11/18 1504        Dano Casillas MD  Department of Hospital Medicine   [facility]

## 2018-09-15 NOTE — PT/OT/SLP PROGRESS
Physical Therapy Treatment    Patient Name:  Juliane Ramos   MRN:  [id number]    Recommendations:     Discharge Recommendations:          Assessment:     Juliane Ramos is a 58 y.o. female admitted with a medical diagnosis of <principal problem not specified>.  She presents with the following impairments/functional limitations:  weakness, impaired endurance, impaired self care skills, impaired functional mobilty, impaired cardiopulmonary response to activity .  No LOB with ambulation.  Decreased heel strike RLE.    Rehab Prognosis:  ; patient would benefit from acute skilled PT services to address these deficits and reach maximum level of function.      Recent Surgery: * No surgery found *      Plan:     During this hospitalization, patient to be seen daily to address the above listed problems via gait training, therapeutic activities, therapeutic exercises  · Plan of Care Expires:  09/28/18   Plan of Care Reviewed with: patient    Subjective     Communicated with nurse Lopez prior to session.  Patient found supine upon PT entry to room, agreeable to treatment.      Chief Complaint:   Patient comments/goals: agreeable to tx, waiting on dialysis  Pain/Comfort:  · Pain Rating 1: 0/10    Patients cultural, spiritual, Mosque conflicts given the current situation:      Objective:     Patient found with: peripheral IV, telemetry, oxygen     General Precautions: Standard, fall   Orthopedic Precautions:N/A   Braces:       Functional Mobility:  · Bed Mobility:     · Rolling Left:  supervision  · Supine to Sit: minimum assistance  · Transfers:     · Sit to Stand:  contact guard assistance with rolling walker  · Gait: 250' w/rw, cga, 2LO2      AM-PAC 6 CLICK MOBILITY          Therapeutic Activities and Exercises:   seated te: alec dick x 10 B each    Patient left up in chair with all lines intact, call button in reach and nurse notified..    GOALS:   Multidisciplinary Problems     Physical Therapy Goals        Problem:  Physical Therapy Goal    Goal Priority Disciplines Outcome Goal Variances Interventions   Physical Therapy Goal     PT, PT/OT Ongoing (interventions implemented as appropriate)     Description:  Goals to be met by: 2018     Patient will increase functional independence with mobility by performin. Supine to sit with Contact Guard Assistance.  2. Sit to stand transfer with Contact Guard Assistance.  3. Gait  x 250 feet with Contact Guard Assistance using Rolling Walker.   4. Lower extremity exercise program x20 reps per handout, with supervision.                      Time Tracking:     PT Received On: 09/15/18  PT Start Time: 946     PT Stop Time: 1000  PT Total Time (min): 14 min     Billable Minutes: Gait Training 14    Treatment Type: Treatment  PT/PTA: PTA     PTA Visit Number: 1     Sarika Pérez PTA  09/15/2018

## 2018-09-15 NOTE — PLAN OF CARE
09/14/18 2045   Patient Assessment/Suction   Level of Consciousness (AVPU) alert   PRE-TX-O2-ETCO2   O2 Device (Oxygen Therapy) nasal cannula   Flow (L/min) 3   Oxygen Concentration (%) 32   SpO2 98 %   Pulse 75   Resp 16   Ready to Wean/Extubation Screen   FIO2<=50 (chart decimal) 0.32

## 2018-09-15 NOTE — PLAN OF CARE
Problem: Patient Care Overview  Goal: Plan of Care Review  Outcome: Ongoing (interventions implemented as appropriate)  Pt alert and oriented. Vitals stable. Wound care left foot completed. Blood glucose monitored.Dialysis today, removed 2 1/2 liters. Tele 8700 SR. Up with assistance. 3L/NC. Free from falls. Bed locked in low position. Call light in reach. Will continue to monitor.

## 2018-09-15 NOTE — PLAN OF CARE
Problem: Physical Therapy Goal  Goal: Physical Therapy Goal  Goals to be met by: 2018     Patient will increase functional independence with mobility by performin. Supine to sit with Contact Guard Assistance.  2. Sit to stand transfer with Contact Guard Assistance.  3. Gait  x 250 feet with Contact Guard Assistance using Rolling Walker.   4. Lower extremity exercise program x20 reps per handout, with supervision.     Outcome: Ongoing (interventions implemented as appropriate)  Bed mobility min a.  Ambulated 250' w/rw, cga, 2LO2.

## 2018-09-16 LAB
ALBUMIN SERPL BCP-MCNC: 3.2 G/DL
ALP SERPL-CCNC: 71 U/L
ALT SERPL W/O P-5'-P-CCNC: 10 U/L
ANION GAP SERPL CALC-SCNC: 8 MMOL/L
AST SERPL-CCNC: 14 U/L
BACTERIA BLD CULT: NORMAL
BACTERIA BLD CULT: NORMAL
BASOPHILS # BLD AUTO: 0 K/UL
BASOPHILS NFR BLD: 0.7 %
BILIRUB SERPL-MCNC: 0.5 MG/DL
BUN SERPL-MCNC: 21 MG/DL
CALCIUM SERPL-MCNC: 8.7 MG/DL
CHLORIDE SERPL-SCNC: 100 MMOL/L
CO2 SERPL-SCNC: 26 MMOL/L
CREAT SERPL-MCNC: 4 MG/DL
DIFFERENTIAL METHOD: ABNORMAL
EOSINOPHIL # BLD AUTO: 0.6 K/UL
EOSINOPHIL NFR BLD: 9.7 %
ERYTHROCYTE [DISTWIDTH] IN BLOOD BY AUTOMATED COUNT: 20.5 %
EST. GFR  (AFRICAN AMERICAN): 14 ML/MIN/1.73 M^2
EST. GFR  (NON AFRICAN AMERICAN): 12 ML/MIN/1.73 M^2
GLUCOSE SERPL-MCNC: 88 MG/DL
HCT VFR BLD AUTO: 30.7 %
HGB BLD-MCNC: 9.7 G/DL
LYMPHOCYTES # BLD AUTO: 0.8 K/UL
LYMPHOCYTES NFR BLD: 13.1 %
MAGNESIUM SERPL-MCNC: 2.5 MG/DL
MCH RBC QN AUTO: 26 PG
MCHC RBC AUTO-ENTMCNC: 31.5 G/DL
MCV RBC AUTO: 83 FL
MONOCYTES # BLD AUTO: 0.7 K/UL
MONOCYTES NFR BLD: 11.7 %
NEUTROPHILS # BLD AUTO: 4 K/UL
NEUTROPHILS NFR BLD: 64.8 %
PHOSPHATE SERPL-MCNC: 4.2 MG/DL
PLATELET # BLD AUTO: 152 K/UL
PMV BLD AUTO: 8.8 FL
POCT GLUCOSE: 141 MG/DL (ref 70–110)
POCT GLUCOSE: 142 MG/DL (ref 70–110)
POCT GLUCOSE: 148 MG/DL (ref 70–110)
POTASSIUM SERPL-SCNC: 4.1 MMOL/L
PROT SERPL-MCNC: 6.9 G/DL
RBC # BLD AUTO: 3.72 M/UL
SODIUM SERPL-SCNC: 134 MMOL/L
WBC # BLD AUTO: 6.2 K/UL

## 2018-09-16 PROCEDURE — 36415 COLL VENOUS BLD VENIPUNCTURE: CPT

## 2018-09-16 PROCEDURE — 80053 COMPREHEN METABOLIC PANEL: CPT

## 2018-09-16 PROCEDURE — 27000221 HC OXYGEN, UP TO 24 HOURS

## 2018-09-16 PROCEDURE — 11000001 HC ACUTE MED/SURG PRIVATE ROOM

## 2018-09-16 PROCEDURE — 63600175 PHARM REV CODE 636 W HCPCS: Performed by: INTERNAL MEDICINE

## 2018-09-16 PROCEDURE — 25000003 PHARM REV CODE 250: Performed by: NURSE PRACTITIONER

## 2018-09-16 PROCEDURE — 83735 ASSAY OF MAGNESIUM: CPT

## 2018-09-16 PROCEDURE — 25000003 PHARM REV CODE 250: Performed by: INTERNAL MEDICINE

## 2018-09-16 PROCEDURE — 84100 ASSAY OF PHOSPHORUS: CPT

## 2018-09-16 PROCEDURE — 94761 N-INVAS EAR/PLS OXIMETRY MLT: CPT

## 2018-09-16 PROCEDURE — 85025 COMPLETE CBC W/AUTO DIFF WBC: CPT

## 2018-09-16 PROCEDURE — 25000003 PHARM REV CODE 250: Performed by: HOSPITALIST

## 2018-09-16 RX ADMIN — MIDODRINE HYDROCHLORIDE 2.5 MG: 2.5 TABLET ORAL at 07:09

## 2018-09-16 RX ADMIN — CITALOPRAM HYDROBROMIDE 20 MG: 10 TABLET ORAL at 08:09

## 2018-09-16 RX ADMIN — SEVELAMER CARBONATE 1600 MG: 800 TABLET, FILM COATED ORAL at 12:09

## 2018-09-16 RX ADMIN — APIXABAN 5 MG: 2.5 TABLET, FILM COATED ORAL at 08:09

## 2018-09-16 RX ADMIN — GABAPENTIN 100 MG: 100 CAPSULE ORAL at 09:09

## 2018-09-16 RX ADMIN — APIXABAN 5 MG: 2.5 TABLET, FILM COATED ORAL at 09:09

## 2018-09-16 RX ADMIN — SEVELAMER CARBONATE 1600 MG: 800 TABLET, FILM COATED ORAL at 05:09

## 2018-09-16 RX ADMIN — GABAPENTIN 100 MG: 100 CAPSULE ORAL at 08:09

## 2018-09-16 RX ADMIN — SEVELAMER CARBONATE 1600 MG: 800 TABLET, FILM COATED ORAL at 07:09

## 2018-09-16 RX ADMIN — LEVOTHYROXINE SODIUM 50 MCG: 50 TABLET ORAL at 05:09

## 2018-09-16 RX ADMIN — CEFEPIME HYDROCHLORIDE 1 G: 1 INJECTION, SOLUTION INTRAVENOUS at 08:09

## 2018-09-16 RX ADMIN — ROSUVASTATIN CALCIUM 10 MG: 5 TABLET, FILM COATED ORAL at 08:09

## 2018-09-16 RX ADMIN — MIDODRINE HYDROCHLORIDE 2.5 MG: 2.5 TABLET ORAL at 12:09

## 2018-09-16 RX ADMIN — GABAPENTIN 100 MG: 100 CAPSULE ORAL at 04:09

## 2018-09-16 RX ADMIN — MIDODRINE HYDROCHLORIDE 2.5 MG: 2.5 TABLET ORAL at 05:09

## 2018-09-16 NOTE — PROGRESS NOTES
Progress Note  Hospital Medicine  Patient Name:Juliane Ramos  MRN:  [id number]  Patient Class: IP- Inpatient  Admit Date: 9/11/2018  Length of Stay: 5 days  Expected Discharge Date:   Attending Physician: Reg Devine MD  Primary Care Provider:  JOS Dumont    SUBJECTIVE:     Principal Problem: Dyspnea  Initial history of present illness: Juliane Ramos is a 57 y.o. female with a PMHx significant for DM, HTN, CAD, HFrEF with an EF of 35-40%, ESRD on dialysis (TuTh&Sat), COPD on home oxygen, PE, and prior CVA.  She is admitted to the service of hospital medicine with a diagnosis of acute on chronic respiratory failure. She reports worsening of her respiratory status last evening. Noted to be hypoxic on her home pulseox. She reports eating a [employer] hamburger earlier in the day. In the ED she had evidence of volume overload and responded well to I/v diuresis. At baseline she has NYHA Class III symptoms. She reports chills at home but no cough. Denied any sick contacts. Noted to be febrile in the ED for which she got Vancomycin and Zosyn. She has a diabetic wound on the left leg that has been treated with skin grafts. DNR/DNI status was verified with the patient in the presence of the .    PMH/PSH/SH/FH/Meds: reviewed.    Symptoms/Review of Systems: Patient seen and examiend. No acute events. Cultures at [facility] positive for Pseudomonas pan-sensitive.  No other new complaints and patient doing well. Asking about removal of hemosplit  Diet:  Adequate intake.    Activity level: Normal.    Pain:  Patient reports no pain.       OBJECTIVE:   Vital Signs (Most Recent):      Temp: 97.9 °F (36.6 °C) (09/16/18 0744)  Pulse: 73 (09/16/18 0744)  Resp: 16 (09/16/18 0744)  BP: 130/76 (09/16/18 0744)  SpO2: 97 % (09/16/18 0819)       Vital Signs Range (Last 24H):  Temp:  [96.2 °F (35.7 °C)-97.9 °F (36.6 °C)]   Pulse:  [73-83]   Resp:  [16-20]   BP: (111-140)/(67-79)   SpO2:  [95 %-98 %]     Weight: 86.6 kg (190 lb 14.7  oz)  Body mass index is 30.81 kg/m².    Intake/Output Summary (Last 24 hours) at 9/16/2018 1000  Last data filed at 9/16/2018 0600  Gross per 24 hour   Intake 1100 ml   Output 3000 ml   Net -1900 ml     Physical Examination:  Constitutional: She is oriented to person, place, and time. She appears well-developed and well-nourished. No distress.   HENT:   Head: Normocephalic and atraumatic.   Mouth/Throat: No oropharyngeal exudate.   Eyes: EOM are normal. Pupils are equal, round, and reactive to light. Right eye exhibits no discharge. Left eye exhibits no discharge. No scleral icterus.   Neck: Normal range of motion. Neck supple.  No thyromegaly present.   Cardiovascular: Normal rate, regular rhythm and normal heart sounds. Exam reveals no friction rub.   No murmur heard.  Well healed sternal scar   Pulmonary/Chest: Effort normal. No stridor. No respiratory distress.   Crackles at the bases bilaterally   Abdominal: Soft. Bowel sounds are normal. She exhibits no distension. There is no tenderness. There is no guarding.   Musculoskeletal: She exhibits no edema.   Neurological: She is alert and oriented to person, place, and time.   Skin: Skin is warm. She is not diaphoretic.   Left foot bandaged with some surrounding erythema.   Psychiatric: She has a normal mood and affect.   CRANIAL NERVES   CN III, IV, VI   Pupils are equal, round, and reactive to light.  Extraocular motions are normal.    CBC:  Recent Labs   Lab  09/14/18   0525  09/15/18   0453  09/16/18   0429   WBC  5.80  6.60  6.20   RBC  3.81*  3.84*  3.72*   HGB  9.9*  10.0*  9.7*   HCT  31.9*  32.0*  30.7*   PLT  140*  158  152   MCV  84  83  83   MCH  26.1*  26.0*  26.0*   MCHC  31.2*  31.2*  31.5*   BMP  Recent Labs   Lab  09/14/18   0525  09/15/18   0453  09/16/18   0429   GLU  117*  86  88   NA  134*  129*  134*   K  4.1  4.6  4.1   CL  100  99  100   CO2  23  21*  26   BUN  23*  31*  21*   CREATININE  4.0*  5.0*  4.0*   CALCIUM  8.7  8.7  8.7   MG  2.5   2.8*  2.5      Diagnostic Results:  Microbiology Results (last 7 days)     Procedure Component Value Units Date/Time    Blood culture x two cultures. Draw prior to antibiotics. [[id number]] Collected:  09/11/18 0217    Order Status:  Completed Specimen:  Blood Updated:  09/15/18 1212     Blood Culture, Routine No Growth to date     Blood Culture, Routine No Growth to date     Blood Culture, Routine No Growth to date     Blood Culture, Routine No Growth to date     Blood Culture, Routine No Growth to date    Narrative:       Aerobic and anaerobic    Blood culture x two cultures. Draw prior to antibiotics. [[id number]] Collected:  09/11/18 0200    Order Status:  Completed Specimen:  Blood Updated:  09/15/18 1212     Blood Culture, Routine No Growth to date     Blood Culture, Routine No Growth to date     Blood Culture, Routine No Growth to date     Blood Culture, Routine No Growth to date     Blood Culture, Routine No Growth to date    Narrative:       Aerobic and anaerobic    Urine culture [[id number]] Collected:  09/11/18 0246    Order Status:  Completed Specimen:  Urine Updated:  09/12/18 0816     Urine Culture, Routine Multiple organisms isolated. None in predominance.  Repeat if     Urine Culture, Routine clinically necessary.    Narrative:       Preferred Collection Type->Urine, Clean Catch         CXR:   1. No new airspace disease  2. Unchanged and appropriate central line position.  3. Unchanged cardiomegaly.  4. Right pleural effusion is decreased in size since prior.    ECHO:  · The left ventricle cavity is mildly dilated in systole.  · Left ventricle ejection fraction is moderately decreased at 37%  · Grade II (moderate) left ventricular diastolic dysfunction consistent with pseudonormalization.  · LA pressure is elevated.  · RV systolic function is low normal.  · Left atrium is mildly dilated.  · Right atrium is moderately dilated.  · Mitral valve shows trace regurgitation. MV annuloplasty ring.  · Tricuspid  valve shows moderate to severe regurgitation.  · Severely elevated central venous pressure (15 mm Hg).  · The estimated PA systolic pressure is 66.27 mm Hg    Abdominal aorta run-off:  Atherosclerosis, with distal peripheral arterial disease resulting in moderately reduced flow to the left foot, without high-grade proximal stenosis.  High-grade stenosis of the right main renal artery and moderate stenosis of the left main renal artery.  Features of cellulitis of the left leg.  No abscess identified.  IVC filter.  Moderate consolidation of the right lower lobe and small right pleural effusion.  May represent pneumonia in the appropriate clinical setting.  Mild splenomegaly.  Cholelithiasis.    Assessment/Plan:     Acute on chronic combined systolic and diastolic heart failure     - Appears at baseline at this time.        ESRD (end stage renal disease)     - Renal consult for HD today.       Sepsis - GNR sp  Left ankle wound  PAD     - Cultures grew pseudomonas. Will follow ID recs for two more days of IV Abx       Segmental and subsegmental pulmonary emboli of RLL without acute cor pulmonale     - Continue home dose of Apixaban.       Coronary artery disease involving native coronary artery of native heart without angina pectoris     - s/p CABG  - Continue statin.  - Follow Dr. Palomo recommendations.       Type 2 diabetes mellitus with chronic kidney disease on chronic dialysis     - Sliding scale insulin as an inpatient.         Code status: Full code    Disposition: DC home once approved by ID specialist.    VTE Risk Mitigation (From admission, onward)        Ordered     heparin (porcine) injection 4,000 Units  As needed (PRN)      09/11/18 8275     apixaban tablet 5 mg  2 times daily      09/11/18 9815        Dano Casillas MD  Department of Hospital Medicine   [facility]

## 2018-09-16 NOTE — PROGRESS NOTES
INPATIENT NEPHROLOGY PROGRESS NOTE  [facility] NEPHROLOGY    Juliane Ramos  09/16/2018    Reason for consultation:         esrd    Chief Complaint:   Chief Complaint   Patient presents with    Fever    Shortness of Breath        History of Present Illness:       57F with DM2, HTN, CAD, HFrEF with an EF of 35-40%, ESRD on HD TTS, COPD on home oxygen, PE, and prior CVA, admitted with acute on chronic respiratory failure, hypoxic, PNA, Pseudomonas bacteremia. Has chronic leg wounds, treated with grafts.    9/12 afebrile.  No n,v,chest pain or sob  9/13  Seen on dialysis.  Afebrile.  No sob, nausea, vomiting  9/14  Feels good.  Ambulated today.  Appetite is good.  9/15 VSS, awaiting HD.  9/16 VSS, no new complains, had dialysis yesterday, feeling well, appreciate ID input.    Plan of Care:     Assessment:    ESRD on HD TTS  Pulmonary edema resolved  Fever resolved  Anemia  Hypertension    Plan:     TTS HD per schedule. On next treatment on Tuesday, will try AVG, if working well, we will take the tunneled cath out as outpatient - Dr. Deal.  YASHIRA with HD as needed.  Continue outpt medications.  Abx per primary team and ID. Appreciate input.  OK to dc from renal stand point.    Thank you for allowing us to participate in this patient's care. We will continue to follow.    Medications:  No current facility-administered medications on file prior to encounter.      Current Outpatient Medications on File Prior to Encounter   Medication Sig Dispense Refill    citalopram (CELEXA) 20 MG tablet Take 20 mg by mouth once daily.      FOLIC ACID-B STXV-E-MVIIC-ZINC 3-70-15 MG-MCG-MG ORAL TAB Take 1 tablet by mouth every other day.      HYDROcodone-acetaminophen (NORCO) 5-325 mg per tablet Take 1 tablet by mouth every 6 (six) hours as needed for Pain.      midodrine (PROAMATINE) 5 MG Tab Take 2.5 mg by mouth 3 (three) times daily with meals.      sevelamer carbonate (RENVELA) 800 mg Tab Take 1,600 mg by mouth 3 (three) times  daily with meals.      amLODIPine (NORVASC) 10 MG tablet Take 1 tablet (10 mg total) by mouth once daily. 30 tablet 1    apixaban 5 mg Tab Take 1 tablet (5 mg total) by mouth 2 (two) times daily. 60 tablet 1    cetirizine (ZYRTEC) 10 MG tablet Take 1 tablet (10 mg total) by mouth every evening. 20 tablet 0    collagenase ointment Apply topically once daily.      epoetin donita (PROCRIT) 10,000 unit/mL injection Inject 1 mL (10,000 Units total) into the skin every Tues, Thurs, Sat. 1 mL 5    gabapentin (NEURONTIN) 100 MG capsule Take 1 capsule (100 mg total) by mouth 3 (three) times daily. 90 capsule 1    insulin aspart (NOVOLOG) 100 unit/mL InPn pen Inject 1-10 Units into the skin 3 (three) times daily. 3 mL 1    levothyroxine (SYNTHROID) 50 MCG tablet Take 1 tablet (50 mcg total) by mouth before breakfast. 30 tablet 1    rosuvastatin (CRESTOR) 10 MG tablet Take 1 tablet (10 mg total) by mouth once daily. 30 tablet 1     Scheduled Meds:   albumin human 25%  25 g Intravenous Once    apixaban  5 mg Oral BID    cefepime in dextrose 5 %  1 g Intravenous Q24H    citalopram  20 mg Oral Daily    gabapentin  100 mg Oral TID    levothyroxine  50 mcg Oral Before breakfast    midodrine  2.5 mg Oral TID WM    rosuvastatin  10 mg Oral Daily    sevelamer carbonate  1,600 mg Oral TID WM     Continuous Infusions:  PRN Meds:.sodium chloride 0.9%, dextrose 50%, dextrose 50%, diphenhydrAMINE, glucagon (human recombinant), glucagon (human recombinant), glucose, glucose, heparin (porcine), influenza, insulin aspart U-100, sulfur hexafluoride microspheres    Allergies:  Patient has no known allergies.    Vital Signs:  Temp Readings from Last 3 Encounters:   09/16/18 97.4 °F (36.3 °C) (Oral)   09/10/18 100.2 °F (37.9 °C)   02/24/18 97.9 °F (36.6 °C) (Oral)       Pulse Readings from Last 3 Encounters:   09/16/18 77   09/10/18 (!) 122   07/17/18 67       BP Readings from Last 3 Encounters:   09/16/18 133/80   09/10/18 (!)  128/97   07/17/18 117/61       Weight:  Wt Readings from Last 3 Encounters:   09/16/18 86.6 kg (190 lb 14.7 oz)   02/24/18 89.8 kg (197 lb 15.6 oz)   12/24/17 104.1 kg (229 lb 6.4 oz)       Review of Systems:  Review of Systems - All 14 systems reviewed and negative, except as noted in HPI    Physical Exam:    Constitutional: NAD  Neuro: No asterixis  Psych: Calm  EENT: NCAT, anicteric sclera   Neck:  supple  Cards: No rub  Lungs: clear to ausculation  GI:  Pos bowel sounds, no hsm, NTND   MSK:  WNWD  Skin: no purpura, rashes       Results:  Lab Results   Component Value Date     (L) 09/16/2018    K 4.1 09/16/2018     09/16/2018    CO2 26 09/16/2018    BUN 21 (H) 09/16/2018    CREATININE 4.0 (H) 09/16/2018    CALCIUM 8.7 09/16/2018    ANIONGAP 8 09/16/2018    ESTGFRAFRICA 14 (A) 09/16/2018    EGFRNONAA 12 (A) 09/16/2018       Lab Results   Component Value Date    CALCIUM 8.7 09/16/2018    PHOS 4.2 09/16/2018       Recent Labs   Lab  09/16/18   0429   WBC  6.20   RBC  3.72*   HGB  9.7*   HCT  30.7*   PLT  152   MCV  83   MCH  26.0*   MCHC  31.5*       I have personally reviewed pertinent radiological imaging and reports.     Edson Crystal MD

## 2018-09-16 NOTE — PLAN OF CARE
09/16/18 0819   Patient Assessment/Suction   Level of Consciousness (AVPU) alert   Respiratory Effort Unlabored;Normal   Expansion/Accessory Muscles/Retractions no retractions;no use of accessory muscles   All Lung Fields Breath Sounds clear   Rhythm/Pattern, Respiratory unlabored;pattern regular;depth regular   PRE-TX-O2-ETCO2   O2 Device (Oxygen Therapy) nasal cannula   $ Is the patient on Low Flow Oxygen? Yes   Flow (L/min) 3   Oxygen Concentration (%) 32   SpO2 97 %   Pulse Oximetry Type Intermittent   $ Pulse Oximetry - Multiple Charge Pulse Oximetry - Multiple   Ready to Wean/Extubation Screen   FIO2<=50 (chart decimal) 0.32

## 2018-09-16 NOTE — PLAN OF CARE
Problem: Patient Care Overview  Goal: Plan of Care Review  Outcome: Ongoing (interventions implemented as appropriate)  Glucose 88 per lab.  Pt denies needs or discomforts this morning.  Call light within reach.

## 2018-09-16 NOTE — PROGRESS NOTES
Progress Note  Infectious Disease    Follow-up For: bacteremia due to pseudomonas    SUBJECTIVE:   9/16: per patient, Say gave permission to outpt unit to use AVG on 9/11. Has not been attempted during this admit. Up in the chair, doing fairly well. Still on 4 liters.     Antimicrobials (From admission, onward)    Ordered     Dose Route Frequency Start Stop    09/15/18 0708  ceFEPIme injection 1 g     INDICATION:  Bacteremia, Skin & soft tissue        1 g IM Every 24 hours (non-standard times) 09/15/18 0708 --          Pertinent Medications noted:    EXAM & DIAGNOSTICS REVIEWED:   Vitals:     Temp:  [97.3 °F (36.3 °C)-97.9 °F (36.6 °C)]   Temp: 97.9 °F (36.6 °C) (09/16/18 1124)  Pulse: 76 (09/16/18 1124)  Resp: 18 (09/16/18 1124)  BP: 130/77 (09/16/18 1124)  SpO2: 95 % (09/16/18 1124)    Intake/Output Summary (Last 24 hours) at 9/16/2018 1508  Last data filed at 9/16/2018 0600  Gross per 24 hour   Intake 860 ml   Output 3000 ml   Net -2140 ml       General:  In NAD. Looks chronically sick. Comfortable.  Eyes:  Anicteric, PERRL, EOMI  ENT:  Mouth w/ pink MMM, no lesions/exudate,   Neck:  Trachea midline, supple, no adenopathy appreciated  Lungs: Decreased Bs RLL  Heart:  RRR, no gallop/murmur noted  Abd:  soft, NT, ND, normal BS, no masses/organomegaly appreciated.  :  Voids  Musc:  Joints without effusion, erythema, synovitis,   Skin:  Generally warm, dry, normal for color. No rashes  Wound: Left posterior leg wound is full thickness, not clinically ifnected  Neuro: AAOx3, speech clear, moves all extrems equally. Weak in general  Extrem: No edema, erythema, phlebitis, cellulitis, synovitis     Lines/Tubes/Drains: right sc HD access. Left arm upper arm graft    General Labs reviewed:  Recent Labs   Lab  09/16/18   0429   WBC  6.20   RBC  3.72*   HGB  9.7*   HCT  30.7*   PLT  152   MCV  83   MCH  26.0*   MCHC  31.5*     Recent Labs   Lab  09/16/18   0429   CALCIUM  8.7   PROT  6.9   NA  134*   K  4.1   CO2  26    CL  100   BUN  21*   CREATININE  4.0*   ALKPHOS  71   ALT  10   AST  14   BILITOT  0.5       Micro:  Microbiology Results (last 7 days)     Procedure Component Value Units Date/Time    Blood culture x two cultures. Draw prior to antibiotics. [[id number]] Collected:  09/11/18 0217    Order Status:  Completed Specimen:  Blood Updated:  09/16/18 1212     Blood Culture, Routine No growth after 5 days.    Narrative:       Aerobic and anaerobic    Blood culture x two cultures. Draw prior to antibiotics. [[id number]] Collected:  09/11/18 0200    Order Status:  Completed Specimen:  Blood Updated:  09/16/18 1212     Blood Culture, Routine No growth after 5 days.    Narrative:       Aerobic and anaerobic    Urine culture [[id number]] Collected:  09/11/18 0246    Order Status:  Completed Specimen:  Urine Updated:  09/12/18 0816     Urine Culture, Routine Multiple organisms isolated. None in predominance.  Repeat if     Urine Culture, Routine clinically necessary.    Narrative:       Preferred Collection Type->Urine, Clean Catch        Imaging Reviewed:    CTA:   Atherosclerosis, with distal peripheral arterial disease resulting in moderately reduced flow to the left foot, without high-grade proximal stenosis.  High-grade stenosis of the right main renal artery and moderate stenosis of the left main renal artery  Features of cellulitis of the left leg.  No abscess identified.  IVC filter.  Moderate consolidation of the right lower lobe and small right pleural effusion.  May represent pneumonia in the appropriate clinical setting.  Mild splenomegaly.  Cholelithiasis.        ASSESSMENT & PLAN      Patient Active Problem List   Diagnosis    Acute on chronic congestive heart failure    Essential hypertension    CKD (chronic kidney disease) stage 3, GFR 30-59 ml/min    Type 2 diabetes mellitus with chronic kidney disease on chronic dialysis    Coronary artery disease involving native coronary artery of native heart without  angina pectoris    Acquired hypothyroidism    Recurrent right pleural effusion    Hyperkalemia    Diabetic leg ulcer    Traumatic subarachnoid hemorrhage    Segmental and subsegmental pulmonary emboli of RLL without acute cor pulmonale    Acute renal failure superimposed on stage 3 chronic kidney disease    Anemia in stage 3 chronic kidney disease    Subarachnoid hemorrhage following injury, no loss of consciousness    Staph aureus infection    Pulmonary embolus    Obesity    Acute on chronic respiratory failure with hypoxia    Anasarca    SAH (subarachnoid hemorrhage)    Wounds, multiple    Wound healing, delayed    Malnutrition due to renal disease    Shortness of breath    CKD (chronic kidney disease) requiring chronic dialysis    Open wound of left heel    Sepsis    Acute on chronic combined systolic and diastolic heart failure    ESRD (end stage renal disease)    HCAP (healthcare-associated pneumonia)       Recommendation:   1. Pseudomonas bacteremia , source unclear  2. Chronic left achilles area wound,  treated by Dr Newsome with [facility].  3. cxr = RLL opacity/possible pneumonia?effusion secondary to CHF? Actually improved from past  4.  History of obesity, DM, COPD on 3 L oxygen at home, PE, CVA, thyroid disease, CAD, HFrEF 37 % , Pulm HTN, CABG. HTN, MI,   breast surgery, Esrd, Anemia, anxiety       Recommendation:      Cx from  [facility] [phone] Re:  Pseudomonas(S to  Cefepime, cipro, gent, levo, guera, tobra. Inducible beta lactamase to aztreonam, ceftaz, zosyn)    2 more days(sun and mon) iv then cipro 500 mg po q24 hours (take it at the same time every day time that falls after HD )  Duration 14-21 days  Ok to discharge tomorrow  If graft works, could have tunneled catheter removed before end of antibiotic therapy    Follow with Dr Newsome, podiatrist  Follow with Dr Alberto for pVD  Dr Deal is managing the left arm fistula

## 2018-09-16 NOTE — PLAN OF CARE
09/15/18 2018   Patient Assessment/Suction   Level of Consciousness (AVPU) alert   PRE-TX-O2-ETCO2   O2 Device (Oxygen Therapy) nasal cannula   Flow (L/min) 3   Oxygen Concentration (%) 32   SpO2 96 %   Pulse Oximetry Type Intermittent   Ready to Wean/Extubation Screen   FIO2<=50 (chart decimal) 0.32

## 2018-09-16 NOTE — PROGRESS NOTES
Progress Note  Infectious Disease    Follow-up For: bacteremia due to pseudomonas    SUBJECTIVE:   09/13/2018 no new complaints. She just finished CTA of lower extremities. Leukocytosis resolved. Afebrile.  09/14/2018 No new events  09/15/2018 No new events. No sob, no fever. She tells me she can not use right arm graft until allowed to do so by dr Deal    Antimicrobials (From admission, onward)    Ordered     Dose Route Frequency Start Stop    09/15/18 0708  ceFEPIme injection 1 g     INDICATION:  Bacteremia, Skin & soft tissue        1 g IM Every 24 hours (non-standard times) 09/15/18 0708 --          Pertinent Medications noted:    EXAM & DIAGNOSTICS REVIEWED:   Vitals:     Temp:  [96.1 °F (35.6 °C)-97.6 °F (36.4 °C)]   Temp: 97.6 °F (36.4 °C) (09/15/18 1615)  Pulse: 78 (09/15/18 1615)  Resp: 18 (09/15/18 1615)  BP: 124/79 (09/15/18 1615)  SpO2: 98 % (09/15/18 1119)    Intake/Output Summary (Last 24 hours) at 9/15/2018 1925  Last data filed at 9/15/2018 1700  Gross per 24 hour   Intake 1670 ml   Output 3000 ml   Net -1330 ml       General:  In NAD. Looks chronically sick. Comfortable.  Eyes:  Anicteric, PERRL, EOMI  ENT:  Mouth w/ pink MMM, no lesions/exudate,   Neck:  Trachea midline, supple, no adenopathy appreciated  Lungs: Better air entry today?   Heart:  RRR, no gallop/murmur noted  Abd:  soft, NT, ND, normal BS, no masses/organomegaly appreciated.  :  Voids/Chi draining yellow urine, clear  Musc:  Joints without effusion, erythema, synovitis,   Skin:  Generally warm, dry, normal for color. No rashes  Wound: Left posterior leg wound looks the same  Neuro: AAOx3, speech clear, moves all extrems equally. Weak in general  Extrem: No edema, erythema, phlebitis, cellulitis, synovitis     Lines/Tubes/Drains: right sc HD access. Left arm upper arm graft    General Labs reviewed:  Recent Labs   Lab  09/15/18   0453   WBC  6.60   RBC  3.84*   HGB  10.0*   HCT  32.0*   PLT  158   MCV  83   MCH  26.0*   MCHC   31.2*     Recent Labs   Lab  09/15/18   0453   CALCIUM  8.7   PROT  6.9   NA  129*   K  4.6   CO2  21*   CL  99   BUN  31*   CREATININE  5.0*   ALKPHOS  64   ALT  9*   AST  13   BILITOT  0.4       Micro:  Microbiology Results (last 7 days)     Procedure Component Value Units Date/Time    Blood culture x two cultures. Draw prior to antibiotics. [[id number]] Collected:  09/11/18 0217    Order Status:  Completed Specimen:  Blood Updated:  09/15/18 1212     Blood Culture, Routine No Growth to date     Blood Culture, Routine No Growth to date     Blood Culture, Routine No Growth to date     Blood Culture, Routine No Growth to date     Blood Culture, Routine No Growth to date    Narrative:       Aerobic and anaerobic    Blood culture x two cultures. Draw prior to antibiotics. [[id number]] Collected:  09/11/18 0200    Order Status:  Completed Specimen:  Blood Updated:  09/15/18 1212     Blood Culture, Routine No Growth to date     Blood Culture, Routine No Growth to date     Blood Culture, Routine No Growth to date     Blood Culture, Routine No Growth to date     Blood Culture, Routine No Growth to date    Narrative:       Aerobic and anaerobic    Urine culture [[id number]] Collected:  09/11/18 0246    Order Status:  Completed Specimen:  Urine Updated:  09/12/18 0816     Urine Culture, Routine Multiple organisms isolated. None in predominance.  Repeat if     Urine Culture, Routine clinically necessary.    Narrative:       Preferred Collection Type->Urine, Clean Catch        Imaging Reviewed:    CTA:   Atherosclerosis, with distal peripheral arterial disease resulting in moderately reduced flow to the left foot, without high-grade proximal stenosis.  High-grade stenosis of the right main renal artery and moderate stenosis of the left main renal artery  Features of cellulitis of the left leg.  No abscess identified.  IVC filter.  Moderate consolidation of the right lower lobe and small right pleural effusion.  May represent  pneumonia in the appropriate clinical setting.  Mild splenomegaly.  Cholelithiasis.        ASSESSMENT & PLAN      Patient Active Problem List   Diagnosis    Acute on chronic congestive heart failure    Essential hypertension    CKD (chronic kidney disease) stage 3, GFR 30-59 ml/min    Type 2 diabetes mellitus with chronic kidney disease on chronic dialysis    Coronary artery disease involving native coronary artery of native heart without angina pectoris    Acquired hypothyroidism    Recurrent right pleural effusion    Hyperkalemia    Diabetic leg ulcer    Traumatic subarachnoid hemorrhage    Segmental and subsegmental pulmonary emboli of RLL without acute cor pulmonale    Acute renal failure superimposed on stage 3 chronic kidney disease    Anemia in stage 3 chronic kidney disease    Subarachnoid hemorrhage following injury, no loss of consciousness    Staph aureus infection    Pulmonary embolus    Obesity    Acute on chronic respiratory failure with hypoxia    Anasarca    SAH (subarachnoid hemorrhage)    Wounds, multiple    Wound healing, delayed    Malnutrition due to renal disease    Shortness of breath    CKD (chronic kidney disease) requiring chronic dialysis    Open wound of left heel    Sepsis    Acute on chronic combined systolic and diastolic heart failure    ESRD (end stage renal disease)    HCAP (healthcare-associated pneumonia)       Recommendation:   1. Pseudomonas bacteremia   2. Chronic left achilles area wound,  treated by Dr Wick with [facility].  3. cxr = RLL opacity/possible pneumonia?effusion secondary to CHF?  4.  History of obesity, DM, COPD on 3 L oxygen at home, PE, CVA, thyroid disease, CAD, HFrEF 37 % , Pulm HTN, CABG. HTN, MI,   breast surgery, Esrd, Anemia, anxiety       Recommendation:      Cx from  [facility] [phone] Re:  Pseudomonas(S to  Cefepime, cipro, gent, levo, guera, tobra. Inducible beta lactamase to aztreonam, ceftaz, zosyn)    3 more  days iv then cipro 500 mg po q24 hours (take it at the same time every day time that falls after HD )  Duration 14-21 days    Follow with Dr Newsome, podiatrist  Follow with Dr Alberto for pVD  Dr Deal is managing the left arm fistula

## 2018-09-17 LAB
ALBUMIN SERPL BCP-MCNC: 3.3 G/DL
ALP SERPL-CCNC: 72 U/L
ALT SERPL W/O P-5'-P-CCNC: 10 U/L
ANION GAP SERPL CALC-SCNC: 14 MMOL/L
AST SERPL-CCNC: 13 U/L
BASOPHILS # BLD AUTO: 0 K/UL
BASOPHILS NFR BLD: 0.7 %
BILIRUB SERPL-MCNC: 0.5 MG/DL
BUN SERPL-MCNC: 31 MG/DL
CALCIUM SERPL-MCNC: 8.5 MG/DL
CHLORIDE SERPL-SCNC: 100 MMOL/L
CO2 SERPL-SCNC: 21 MMOL/L
CREAT SERPL-MCNC: 5.1 MG/DL
DIFFERENTIAL METHOD: ABNORMAL
EOSINOPHIL # BLD AUTO: 0.6 K/UL
EOSINOPHIL NFR BLD: 10.5 %
ERYTHROCYTE [DISTWIDTH] IN BLOOD BY AUTOMATED COUNT: 20.1 %
EST. GFR  (AFRICAN AMERICAN): 10 ML/MIN/1.73 M^2
EST. GFR  (NON AFRICAN AMERICAN): 9 ML/MIN/1.73 M^2
GLUCOSE SERPL-MCNC: 86 MG/DL
HCT VFR BLD AUTO: 31.7 %
HGB BLD-MCNC: 9.9 G/DL
LYMPHOCYTES # BLD AUTO: 1 K/UL
LYMPHOCYTES NFR BLD: 17 %
MAGNESIUM SERPL-MCNC: 2.8 MG/DL
MCH RBC QN AUTO: 26.1 PG
MCHC RBC AUTO-ENTMCNC: 31.4 G/DL
MCV RBC AUTO: 83 FL
MONOCYTES # BLD AUTO: 0.6 K/UL
MONOCYTES NFR BLD: 10.2 %
NEUTROPHILS # BLD AUTO: 3.8 K/UL
NEUTROPHILS NFR BLD: 61.6 %
PHOSPHATE SERPL-MCNC: 5.4 MG/DL
PLATELET # BLD AUTO: 161 K/UL
PMV BLD AUTO: 8.4 FL
POCT GLUCOSE: 167 MG/DL (ref 70–110)
POCT GLUCOSE: 169 MG/DL (ref 70–110)
POCT GLUCOSE: 180 MG/DL (ref 70–110)
POTASSIUM SERPL-SCNC: 4.7 MMOL/L
PROT SERPL-MCNC: 7 G/DL
RBC # BLD AUTO: 3.82 M/UL
SODIUM SERPL-SCNC: 135 MMOL/L
WBC # BLD AUTO: 6.2 K/UL

## 2018-09-17 PROCEDURE — 80053 COMPREHEN METABOLIC PANEL: CPT

## 2018-09-17 PROCEDURE — 63600175 PHARM REV CODE 636 W HCPCS: Performed by: INTERNAL MEDICINE

## 2018-09-17 PROCEDURE — 25000003 PHARM REV CODE 250: Performed by: INTERNAL MEDICINE

## 2018-09-17 PROCEDURE — 84100 ASSAY OF PHOSPHORUS: CPT

## 2018-09-17 PROCEDURE — 97116 GAIT TRAINING THERAPY: CPT

## 2018-09-17 PROCEDURE — 25000003 PHARM REV CODE 250: Performed by: HOSPITALIST

## 2018-09-17 PROCEDURE — 85025 COMPLETE CBC W/AUTO DIFF WBC: CPT

## 2018-09-17 PROCEDURE — 11000001 HC ACUTE MED/SURG PRIVATE ROOM

## 2018-09-17 PROCEDURE — 36415 COLL VENOUS BLD VENIPUNCTURE: CPT

## 2018-09-17 PROCEDURE — 83735 ASSAY OF MAGNESIUM: CPT

## 2018-09-17 PROCEDURE — 25000003 PHARM REV CODE 250: Performed by: NURSE PRACTITIONER

## 2018-09-17 PROCEDURE — 27000221 HC OXYGEN, UP TO 24 HOURS

## 2018-09-17 PROCEDURE — 94761 N-INVAS EAR/PLS OXIMETRY MLT: CPT

## 2018-09-17 PROCEDURE — 99232 SBSQ HOSP IP/OBS MODERATE 35: CPT | Mod: ,,, | Performed by: INTERNAL MEDICINE

## 2018-09-17 RX ADMIN — SEVELAMER CARBONATE 1600 MG: 800 TABLET, FILM COATED ORAL at 01:09

## 2018-09-17 RX ADMIN — MIDODRINE HYDROCHLORIDE 2.5 MG: 2.5 TABLET ORAL at 05:09

## 2018-09-17 RX ADMIN — MIDODRINE HYDROCHLORIDE 2.5 MG: 2.5 TABLET ORAL at 01:09

## 2018-09-17 RX ADMIN — ROSUVASTATIN CALCIUM 10 MG: 5 TABLET, FILM COATED ORAL at 08:09

## 2018-09-17 RX ADMIN — GABAPENTIN 100 MG: 100 CAPSULE ORAL at 03:09

## 2018-09-17 RX ADMIN — CEFEPIME HYDROCHLORIDE 1 G: 1 INJECTION, SOLUTION INTRAVENOUS at 08:09

## 2018-09-17 RX ADMIN — GABAPENTIN 100 MG: 100 CAPSULE ORAL at 08:09

## 2018-09-17 RX ADMIN — SEVELAMER CARBONATE 1600 MG: 800 TABLET, FILM COATED ORAL at 08:09

## 2018-09-17 RX ADMIN — CITALOPRAM HYDROBROMIDE 20 MG: 10 TABLET ORAL at 08:09

## 2018-09-17 RX ADMIN — APIXABAN 5 MG: 2.5 TABLET, FILM COATED ORAL at 08:09

## 2018-09-17 RX ADMIN — MIDODRINE HYDROCHLORIDE 2.5 MG: 2.5 TABLET ORAL at 08:09

## 2018-09-17 RX ADMIN — SEVELAMER CARBONATE 1600 MG: 800 TABLET, FILM COATED ORAL at 05:09

## 2018-09-17 RX ADMIN — DIPHENHYDRAMINE HYDROCHLORIDE 25 MG: 25 CAPSULE ORAL at 08:09

## 2018-09-17 RX ADMIN — LEVOTHYROXINE SODIUM 50 MCG: 50 TABLET ORAL at 05:09

## 2018-09-17 NOTE — PLAN OF CARE
Problem: Physical Therapy Goal  Goal: Physical Therapy Goal  Goals to be met by: 2018     Patient will increase functional independence with mobility by performin. Supine to sit with Contact Guard Assistance.  2. Sit to stand transfer with Contact Guard Assistance.  3. Gait  x 250 feet with Contact Guard Assistance using Rolling Walker.   4. Lower extremity exercise program x20 reps per handout, with supervision.     Outcome: Ongoing (interventions implemented as appropriate)  Ambulated in hallway with rw and A for safety.

## 2018-09-17 NOTE — PLAN OF CARE
09/16/18 1924   Patient Assessment/Suction   Level of Consciousness (AVPU) alert   PRE-TX-O2-ETCO2   O2 Device (Oxygen Therapy) nasal cannula   Flow (L/min) 3   Oxygen Concentration (%) 32   SpO2 98 %   Ready to Wean/Extubation Screen   FIO2<=50 (chart decimal) 0.32

## 2018-09-17 NOTE — PT/OT/SLP PROGRESS
Physical Therapy Treatment    Patient Name:  Juliane Ramos   MRN:  [id number]    Recommendations:     Discharge Recommendations:      Discharge Equipment Recommendations: none   Barriers to discharge: None    Assessment:     Juliane Ramos is a 58 y.o. female admitted with a medical diagnosis of <principal problem not specified>.  She presents with the following impairments/functional limitations:  weakness, impaired endurance, impaired functional mobilty, gait instability, impaired balance, pain, impaired cardiopulmonary response to activity .    Rehab Prognosis:  good; patient would benefit from acute skilled PT services to address these deficits and reach maximum level of function.      Recent Surgery: * No surgery found *      Plan:     During this hospitalization, patient to be seen daily to address the above listed problems via gait training, therapeutic activities, therapeutic exercises  · Plan of Care Expires:  09/28/18   Plan of Care Reviewed with: patient    Subjective     Communicated with nurse Segundo prior to session.  Patient found seated in chair upon PT entry to room, agreeable to treatment.      Chief Complaint: L LE stiffness  Patient comments/goals: to feel better.  Pain/Comfort:  · Pain Rating 1: other (see comments)(did not rate)  · Location - Side 1: Left  · Location - Orientation 1: generalized  · Location 1: knee  · Pain Addressed 1: Reposition, Cessation of Activity    Patients cultural, spiritual, Oriental orthodox conflicts given the current situation:      Objective:     Patient found with: telemetry, oxygen, peripheral IV     General Precautions: Standard, fall   Orthopedic Precautions:N/A   Braces:       Functional Mobility:  · Transfers:     · Sit to Stand:  contact guard assistance with rolling walker  · Gait: 250' with rw and CGA with O2 attached and IV in tow      AM-PAC 6 CLICK MOBILITY          Therapeutic Activities and Exercises:   Ambulated on hallway slowly with O2 attached and  IV in tow., 3 standing rests due to LLE weakness and fatigue.     Patient left up in chair with all lines intact, call button in reach, nurse Ratna notified and CNA to assist to shower present..    GOALS:   Multidisciplinary Problems     Physical Therapy Goals        Problem: Physical Therapy Goal    Goal Priority Disciplines Outcome Goal Variances Interventions   Physical Therapy Goal     PT, PT/OT Ongoing (interventions implemented as appropriate)     Description:  Goals to be met by: 2018     Patient will increase functional independence with mobility by performin. Supine to sit with Contact Guard Assistance.  2. Sit to stand transfer with Contact Guard Assistance.  3. Gait  x 250 feet with Contact Guard Assistance using Rolling Walker.   4. Lower extremity exercise program x20 reps per handout, with supervision.                      Time Tracking:     PT Received On: 18  PT Start Time: 935     PT Stop Time: 945  PT Total Time (min): 10 min     Billable Minutes: Gait Training 10min    Treatment Type: Treatment  PT/PTA: PTA     PTA Visit Number: 2     Jenn Trujillo, KEVIN  2018

## 2018-09-17 NOTE — PLAN OF CARE
Problem: Patient Care Overview  Goal: Plan of Care Review  Outcome: Ongoing (interventions implemented as appropriate)  Fall and safety precautions maintained.  Patient alert and oriented, resting in bed.  Denies pain or SOB. VSS, Pt in NAD.  Plan of care reviewed with patient. Verbalizes understanding.  Call light in reach, bed in low position and wheels locked. Will continue to monitor.  Wound dressing changed, Pt tolerated well

## 2018-09-17 NOTE — PLAN OF CARE
Problem: Patient Care Overview  Goal: Plan of Care Review  Outcome: Ongoing (interventions implemented as appropriate)  Pt alert and oriented x 4. Position supine, HOB elevated. NAD. Plan of care reviewed with pt. Acknowledged and understood. Cardiac monitor reading SR. Vital signs stable, Glucose monitoring. Safety  And comfort measures in place. Call light in reach, Will continue to monitor.

## 2018-09-17 NOTE — PLAN OF CARE
09/17/18 0804   Patient Assessment/Suction   Level of Consciousness (AVPU) alert   Respiratory Effort Unlabored;Normal   Expansion/Accessory Muscles/Retractions no retractions;no use of accessory muscles   All Lung Fields Breath Sounds clear   Rhythm/Pattern, Respiratory unlabored;pattern regular;depth regular   PRE-TX-O2-ETCO2   O2 Device (Oxygen Therapy) nasal cannula   $ Is the patient on Low Flow Oxygen? Yes   Flow (L/min) 3   Oxygen Concentration (%) 32   SpO2 96 %   Pulse Oximetry Type Intermittent   $ Pulse Oximetry - Multiple Charge Pulse Oximetry - Multiple   Ready to Wean/Extubation Screen   FIO2<=50 (chart decimal) 0.32

## 2018-09-17 NOTE — PROGRESS NOTES
Progress Note  Hospital Medicine  Patient Name:Juliane Ramos  MRN:  [id number]  Patient Class: IP- Inpatient  Admit Date: 9/11/2018  Length of Stay: 6 days  Expected Discharge Date:   Attending Physician: Reg Devine MD  Primary Care Provider:  JOS Dumont    SUBJECTIVE:     Principal Problem: Dyspnea  Initial history of present illness: Juliane Ramos is a 57 y.o. female with a PMHx significant for DM, HTN, CAD, HFrEF with an EF of 35-40%, ESRD on dialysis (TuTh&Sat), COPD on home oxygen, PE, and prior CVA.  She is admitted to the service of hospital medicine with a diagnosis of acute on chronic respiratory failure. She reports worsening of her respiratory status last evening. Noted to be hypoxic on her home pulseox. She reports eating a [employer] hamburger earlier in the day. In the ED she had evidence of volume overload and responded well to I/v diuresis. At baseline she has NYHA Class III symptoms. She reports chills at home but no cough. Denied any sick contacts. Noted to be febrile in the ED for which she got Vancomycin and Zosyn. She has a diabetic wound on the left leg that has been treated with skin grafts. DNR/DNI status was verified with the patient in the presence of the .    PMH/PSH/SH/FH/Meds: reviewed.    Symptoms/Review of Systems: Patient seen and examiend. No acute events. Cultures at [facility] positive for Pseudomonas pan-sensitive.  No other new complaints and patient doing well. Asking about removal of hemosplit  Diet:  Adequate intake.    Activity level: Normal.    Pain:  Patient reports no pain.       OBJECTIVE:   Vital Signs (Most Recent):      Temp: 97.5 °F (36.4 °C) (09/17/18 0748)  Pulse: 81 (09/17/18 0748)  Resp: 18 (09/17/18 0748)  BP: 135/75 (09/17/18 0748)  SpO2: 96 % (09/17/18 0804)       Vital Signs Range (Last 24H):  Temp:  [97.4 °F (36.3 °C)-97.9 °F (36.6 °C)]   Pulse:  [74-88]   Resp:  [17-20]   BP: (119-146)/(72-80)   SpO2:  [94 %-98 %]     Weight: 86.6 kg (190 lb 14.7  oz)  Body mass index is 30.81 kg/m².  No intake or output data in the 24 hours ending 09/17/18 0954  Physical Examination:  Constitutional: She is oriented to person, place, and time. She appears well-developed and well-nourished. No distress.   HENT:   Head: Normocephalic and atraumatic.   Mouth/Throat: No oropharyngeal exudate.   Eyes: EOM are normal. Pupils are equal, round, and reactive to light. Right eye exhibits no discharge. Left eye exhibits no discharge. No scleral icterus.   Neck: Normal range of motion. Neck supple.  No thyromegaly present.   Cardiovascular: Normal rate, regular rhythm and normal heart sounds. Exam reveals no friction rub.   No murmur heard.  Well healed sternal scar   Pulmonary/Chest: Effort normal. No stridor. No respiratory distress.   Crackles at the bases bilaterally   Abdominal: Soft. Bowel sounds are normal. She exhibits no distension. There is no tenderness. There is no guarding.   Musculoskeletal: She exhibits no edema.   Neurological: She is alert and oriented to person, place, and time.   Skin: Skin is warm. She is not diaphoretic.   Left foot bandaged with some surrounding erythema.   Psychiatric: She has a normal mood and affect.   CRANIAL NERVES   CN III, IV, VI   Pupils are equal, round, and reactive to light.  Extraocular motions are normal.    CBC:  Recent Labs   Lab  09/15/18   0453  09/16/18   0429  09/17/18   0509   WBC  6.60  6.20  6.20   RBC  3.84*  3.72*  3.82*   HGB  10.0*  9.7*  9.9*   HCT  32.0*  30.7*  31.7*   PLT  158  152  161   MCV  83  83  83   MCH  26.0*  26.0*  26.1*   MCHC  31.2*  31.5*  31.4*   BMP  Recent Labs   Lab  09/15/18   0453  09/16/18   0429  09/17/18   0509   GLU  86  88  86   NA  129*  134*  135*   K  4.6  4.1  4.7   CL  99  100  100   CO2  21*  26  21*   BUN  31*  21*  31*   CREATININE  5.0*  4.0*  5.1*   CALCIUM  8.7  8.7  8.5*   MG  2.8*  2.5  2.8*      Diagnostic Results:  Microbiology Results (last 7 days)     Procedure Component Value  Units Date/Time    Blood culture x two cultures. Draw prior to antibiotics. [[id number]] Collected:  09/11/18 0217    Order Status:  Completed Specimen:  Blood Updated:  09/16/18 1212     Blood Culture, Routine No growth after 5 days.    Narrative:       Aerobic and anaerobic    Blood culture x two cultures. Draw prior to antibiotics. [[id number]] Collected:  09/11/18 0200    Order Status:  Completed Specimen:  Blood Updated:  09/16/18 1212     Blood Culture, Routine No growth after 5 days.    Narrative:       Aerobic and anaerobic    Urine culture [[id number]] Collected:  09/11/18 0246    Order Status:  Completed Specimen:  Urine Updated:  09/12/18 0816     Urine Culture, Routine Multiple organisms isolated. None in predominance.  Repeat if     Urine Culture, Routine clinically necessary.    Narrative:       Preferred Collection Type->Urine, Clean Catch         CXR:   1. No new airspace disease  2. Unchanged and appropriate central line position.  3. Unchanged cardiomegaly.  4. Right pleural effusion is decreased in size since prior.    ECHO:  · The left ventricle cavity is mildly dilated in systole.  · Left ventricle ejection fraction is moderately decreased at 37%  · Grade II (moderate) left ventricular diastolic dysfunction consistent with pseudonormalization.  · LA pressure is elevated.  · RV systolic function is low normal.  · Left atrium is mildly dilated.  · Right atrium is moderately dilated.  · Mitral valve shows trace regurgitation. MV annuloplasty ring.  · Tricuspid valve shows moderate to severe regurgitation.  · Severely elevated central venous pressure (15 mm Hg).  · The estimated PA systolic pressure is 66.27 mm Hg    Abdominal aorta run-off:  Atherosclerosis, with distal peripheral arterial disease resulting in moderately reduced flow to the left foot, without high-grade proximal stenosis.  High-grade stenosis of the right main renal artery and moderate stenosis of the left main renal artery.  Features  of cellulitis of the left leg.  No abscess identified.  IVC filter.  Moderate consolidation of the right lower lobe and small right pleural effusion.  May represent pneumonia in the appropriate clinical setting.  Mild splenomegaly.  Cholelithiasis.    Assessment/Plan:     Acute on chronic combined systolic and diastolic heart failure     - Appears at baseline at this time.        ESRD (end stage renal disease)     - HD from AVG in AM.       Sepsis - GNR sp  Left ankle wound  PAD     - Cultures grew pseudomonas. Will follow ID recs for two more days of IV Abx       Segmental and subsegmental pulmonary emboli of RLL without acute cor pulmonale     - Continue home dose of Apixaban.       Coronary artery disease involving native coronary artery of native heart without angina pectoris     - s/p CABG  - Continue statin.  - Follow Dr. Palomo recommendations.       Type 2 diabetes mellitus with chronic kidney disease on chronic dialysis     - Sliding scale insulin as an inpatient.     Code status: Full code    Discussed with patient's .    VTE Risk Mitigation (From admission, onward)        Ordered     heparin (porcine) injection 4,000 Units  As needed (PRN)      09/11/18 1205     apixaban tablet 5 mg  2 times daily      09/11/18 2322        Reg Devine MD  Department of Hospital Medicine   [facility]

## 2018-09-17 NOTE — PROGRESS NOTES
INPATIENT NEPHROLOGY PROGRESS NOTE  [facility] NEPHROLOGY    Patient Name: Juliane Ramos  Date: 09/17/2018    Reason for consultation: ESRD    Chief Complaint:   Chief Complaint   Patient presents with    Fever    Shortness of Breath       History of Present Illness:  57F with DM2, HTN, CAD, HFrEF with an EF of 35-40%, ESRD on HD TTS, COPD on home oxygen, PE, and prior CVA, admitted with acute on chronic respiratory failure, hypoxic, PNA, Pseudomonas bacteremia. Has chronic leg wounds, treated with grafts. Consulted for dialysis.     · Interval History/Subjective:    - no cp, dyspnea, abd pain or edema    · Review of Systems: All 14 systems reviewed and negative, except as noted in HPI    Plan of Care:    Assessment:  ESRD  Chronic hypotension  SHPT  Anemia of CKD    Plan:    1. ESRD- Continue HD MWF.     2. Volume/Blood Pressure- BP is stable. She has some edema. Continue midodrine. We will UF with next HD.     3. Electrolytes/Acid Base- Parameters are stable.     4. Bone Mineral Metabolism-  Parameters are stable.     5. Anemia of CKD-  Parameters are stable.     6. Access- Will use AVG tomorrow. We will arrange for TDC removal as outpatient.       7. Medications-    Thank you for allowing us to participate in this patient's care. We will continue to follow.    Medications:  No current facility-administered medications on file prior to encounter.      Current Outpatient Medications on File Prior to Encounter   Medication Sig Dispense Refill    citalopram (CELEXA) 20 MG tablet Take 20 mg by mouth once daily.      FOLIC ACID-B SLMI-A-TLCJJ-ZINC 3-70-15 MG-MCG-MG ORAL TAB Take 1 tablet by mouth every other day.      HYDROcodone-acetaminophen (NORCO) 5-325 mg per tablet Take 1 tablet by mouth every 6 (six) hours as needed for Pain.      midodrine (PROAMATINE) 5 MG Tab Take 2.5 mg by mouth 3 (three) times daily with meals.      sevelamer carbonate (RENVELA) 800 mg Tab Take 1,600 mg by mouth 3 (three) times  daily with meals.      amLODIPine (NORVASC) 10 MG tablet Take 1 tablet (10 mg total) by mouth once daily. 30 tablet 1    apixaban 5 mg Tab Take 1 tablet (5 mg total) by mouth 2 (two) times daily. 60 tablet 1    cetirizine (ZYRTEC) 10 MG tablet Take 1 tablet (10 mg total) by mouth every evening. 20 tablet 0    collagenase ointment Apply topically once daily.      epoetin donita (PROCRIT) 10,000 unit/mL injection Inject 1 mL (10,000 Units total) into the skin every Tues, Thurs, Sat. 1 mL 5    gabapentin (NEURONTIN) 100 MG capsule Take 1 capsule (100 mg total) by mouth 3 (three) times daily. 90 capsule 1    insulin aspart (NOVOLOG) 100 unit/mL InPn pen Inject 1-10 Units into the skin 3 (three) times daily. 3 mL 1    levothyroxine (SYNTHROID) 50 MCG tablet Take 1 tablet (50 mcg total) by mouth before breakfast. 30 tablet 1    rosuvastatin (CRESTOR) 10 MG tablet Take 1 tablet (10 mg total) by mouth once daily. 30 tablet 1     Scheduled Meds:   albumin human 25%  25 g Intravenous Once    apixaban  5 mg Oral BID    cefepime in dextrose 5 %  1 g Intravenous Q24H    citalopram  20 mg Oral Daily    gabapentin  100 mg Oral TID    levothyroxine  50 mcg Oral Before breakfast    midodrine  2.5 mg Oral TID WM    rosuvastatin  10 mg Oral Daily    sevelamer carbonate  1,600 mg Oral TID WM     Continuous Infusions:  PRN Meds:.sodium chloride 0.9%, dextrose 50%, dextrose 50%, diphenhydrAMINE, glucagon (human recombinant), glucagon (human recombinant), glucose, glucose, heparin (porcine), influenza, insulin aspart U-100, sulfur hexafluoride microspheres    Allergies:  Patient has no known allergies.    Vital Signs:  Temp Readings from Last 3 Encounters:   09/17/18 97.5 °F (36.4 °C) (Oral)   09/10/18 100.2 °F (37.9 °C)   02/24/18 97.9 °F (36.6 °C) (Oral)       Pulse Readings from Last 3 Encounters:   09/17/18 81   09/10/18 (!) 122   07/17/18 67       BP Readings from Last 3 Encounters:   09/17/18 135/75   09/10/18 (!)  128/97   07/17/18 117/61       Weight:  Wt Readings from Last 3 Encounters:   09/16/18 86.6 kg (190 lb 14.7 oz)   02/24/18 89.8 kg (197 lb 15.6 oz)   12/24/17 104.1 kg (229 lb 6.4 oz)       Physical Exam:  Constitutional: nad, aao x 3  Heart: rrr, no m/r/g, wwp, + edema  Lungs: ant ausc clear, no w/r/r/c, no lb  Abdomen: s/nt/nd, +BS    Results:  Lab Results   Component Value Date     (L) 09/17/2018    K 4.7 09/17/2018     09/17/2018    CO2 21 (L) 09/17/2018    BUN 31 (H) 09/17/2018    CREATININE 5.1 (H) 09/17/2018    CALCIUM 8.5 (L) 09/17/2018    ANIONGAP 14 09/17/2018    ESTGFRAFRICA 10 (A) 09/17/2018    EGFRNONAA 9 (A) 09/17/2018       Lab Results   Component Value Date    CALCIUM 8.5 (L) 09/17/2018    PHOS 5.4 (H) 09/17/2018       Recent Labs   Lab  09/17/18   0509   WBC  6.20   RBC  3.82*   HGB  9.9*   HCT  31.7*   PLT  161   MCV  83   MCH  26.1*   MCHC  31.4*       I have personally reviewed pertinent radiological imaging and reports.    Amairani Young MD MPH  [facility]  [address] [place] [address]  [place] [address]  [phone] (p)  [phone] (f)

## 2018-09-18 VITALS
HEIGHT: 66 IN | TEMPERATURE: 98 F | RESPIRATION RATE: 16 BRPM | DIASTOLIC BLOOD PRESSURE: 73 MMHG | BODY MASS INDEX: 30.69 KG/M2 | WEIGHT: 190.94 LBS | HEART RATE: 83 BPM | OXYGEN SATURATION: 99 % | SYSTOLIC BLOOD PRESSURE: 121 MMHG

## 2018-09-18 LAB
ALBUMIN SERPL BCP-MCNC: 3.3 G/DL
ALP SERPL-CCNC: 84 U/L
ALT SERPL W/O P-5'-P-CCNC: 11 U/L
ANION GAP SERPL CALC-SCNC: 8 MMOL/L
AST SERPL-CCNC: 15 U/L
BASOPHILS # BLD AUTO: 0 K/UL
BASOPHILS NFR BLD: 0 %
BILIRUB SERPL-MCNC: 0.6 MG/DL
BUN SERPL-MCNC: 39 MG/DL
CALCIUM SERPL-MCNC: 8.7 MG/DL
CHLORIDE SERPL-SCNC: 98 MMOL/L
CO2 SERPL-SCNC: 27 MMOL/L
CREAT SERPL-MCNC: 5.8 MG/DL
DIFFERENTIAL METHOD: ABNORMAL
EOSINOPHIL # BLD AUTO: 0.6 K/UL
EOSINOPHIL NFR BLD: 8.6 %
ERYTHROCYTE [DISTWIDTH] IN BLOOD BY AUTOMATED COUNT: 20 %
EST. GFR  (AFRICAN AMERICAN): 9 ML/MIN/1.73 M^2
EST. GFR  (NON AFRICAN AMERICAN): 8 ML/MIN/1.73 M^2
GLUCOSE SERPL-MCNC: 90 MG/DL
HCT VFR BLD AUTO: 31.9 %
HGB BLD-MCNC: 10.1 G/DL
LYMPHOCYTES # BLD AUTO: 0.9 K/UL
LYMPHOCYTES NFR BLD: 12.7 %
MAGNESIUM SERPL-MCNC: 3.1 MG/DL
MCH RBC QN AUTO: 26 PG
MCHC RBC AUTO-ENTMCNC: 31.5 G/DL
MCV RBC AUTO: 82 FL
MONOCYTES # BLD AUTO: 0.6 K/UL
MONOCYTES NFR BLD: 8.5 %
NEUTROPHILS # BLD AUTO: 5.1 K/UL
NEUTROPHILS NFR BLD: 70.2 %
PHOSPHATE SERPL-MCNC: 5.5 MG/DL
PLATELET # BLD AUTO: 174 K/UL
PMV BLD AUTO: 8.7 FL
POCT GLUCOSE: 157 MG/DL (ref 70–110)
POTASSIUM SERPL-SCNC: 5 MMOL/L
PROT SERPL-MCNC: 7.2 G/DL
RBC # BLD AUTO: 3.87 M/UL
SODIUM SERPL-SCNC: 133 MMOL/L
WBC # BLD AUTO: 7.3 K/UL

## 2018-09-18 PROCEDURE — 84100 ASSAY OF PHOSPHORUS: CPT

## 2018-09-18 PROCEDURE — 27000221 HC OXYGEN, UP TO 24 HOURS

## 2018-09-18 PROCEDURE — 83735 ASSAY OF MAGNESIUM: CPT

## 2018-09-18 PROCEDURE — 80100016 HC MAINTENANCE HEMODIALYSIS

## 2018-09-18 PROCEDURE — 25000003 PHARM REV CODE 250: Performed by: INTERNAL MEDICINE

## 2018-09-18 PROCEDURE — 25000003 PHARM REV CODE 250: Performed by: HOSPITALIST

## 2018-09-18 PROCEDURE — 97803 MED NUTRITION INDIV SUBSEQ: CPT

## 2018-09-18 PROCEDURE — 80053 COMPREHEN METABOLIC PANEL: CPT

## 2018-09-18 PROCEDURE — 99239 HOSP IP/OBS DSCHRG MGMT >30: CPT | Mod: ,,, | Performed by: INTERNAL MEDICINE

## 2018-09-18 PROCEDURE — 63600175 PHARM REV CODE 636 W HCPCS: Performed by: INTERNAL MEDICINE

## 2018-09-18 PROCEDURE — 36415 COLL VENOUS BLD VENIPUNCTURE: CPT

## 2018-09-18 PROCEDURE — 94760 N-INVAS EAR/PLS OXIMETRY 1: CPT

## 2018-09-18 PROCEDURE — 85025 COMPLETE CBC W/AUTO DIFF WBC: CPT

## 2018-09-18 PROCEDURE — 97116 GAIT TRAINING THERAPY: CPT

## 2018-09-18 RX ORDER — CIPROFLOXACIN 500 MG/1
500 TABLET ORAL DAILY
Qty: 21 TABLET | Refills: 0 | Status: SHIPPED | OUTPATIENT
Start: 2018-09-18 | End: 2018-10-09

## 2018-09-18 RX ORDER — CITALOPRAM 20 MG/1
20 TABLET, FILM COATED ORAL DAILY
Start: 2018-10-09 | End: 2020-01-01

## 2018-09-18 RX ADMIN — CEFEPIME HYDROCHLORIDE 1 G: 1 INJECTION, SOLUTION INTRAVENOUS at 11:09

## 2018-09-18 RX ADMIN — ROSUVASTATIN CALCIUM 10 MG: 5 TABLET, FILM COATED ORAL at 10:09

## 2018-09-18 RX ADMIN — MIDODRINE HYDROCHLORIDE 2.5 MG: 2.5 TABLET ORAL at 02:09

## 2018-09-18 RX ADMIN — SEVELAMER CARBONATE 1600 MG: 800 TABLET, FILM COATED ORAL at 10:09

## 2018-09-18 RX ADMIN — CITALOPRAM HYDROBROMIDE 20 MG: 10 TABLET ORAL at 11:09

## 2018-09-18 RX ADMIN — GABAPENTIN 100 MG: 100 CAPSULE ORAL at 02:09

## 2018-09-18 RX ADMIN — MIDODRINE HYDROCHLORIDE 2.5 MG: 2.5 TABLET ORAL at 10:09

## 2018-09-18 RX ADMIN — GABAPENTIN 100 MG: 100 CAPSULE ORAL at 11:09

## 2018-09-18 RX ADMIN — SEVELAMER CARBONATE 1600 MG: 800 TABLET, FILM COATED ORAL at 02:09

## 2018-09-18 RX ADMIN — APIXABAN 5 MG: 2.5 TABLET, FILM COATED ORAL at 11:09

## 2018-09-18 NOTE — ASSESSMENT & PLAN NOTE
Contributing Nutrition Diagnosis  Altered nutrition related lab values    Related to (etiology):   Altered absorption of nutrients     Signs and Symptoms (as evidenced by):   Elevated glucose, BUN, and Crea    Interventions/Recommendations (treatment strategy):  1) DM, renal diet 2) Diet Education provided    Nutrition Diagnosis Status:   Continues

## 2018-09-18 NOTE — PLAN OF CARE
Problem: Physical Therapy Goal  Goal: Physical Therapy Goal  Goals to be met by: 2018     Patient will increase functional independence with mobility by performin. Supine to sit with Contact Guard Assistance.  2. Sit to stand transfer with Contact Guard Assistance.  3. Gait  x 250 feet with Contact Guard Assistance using Rolling Walker.   4. Lower extremity exercise program x20 reps per handout, with supervision.     Outcome: Ongoing (interventions implemented as appropriate)  Ambulated with rw and CGA with O2 attached.

## 2018-09-18 NOTE — DISCHARGE SUMMARY
Discharge Summary  Hospital Medicine    Admit Date: 9/11/2018    Date and Time: 9/18/20183:04 PM    Discharge Attending Physician: Reg Devine MD    Primary Care Physician: JOS Dumont    Diagnoses:  Active Hospital Problems    Diagnosis  POA    *Acute on chronic combined systolic and diastolic heart failure [I50.43]  Yes    Sepsis with Pseudomonas sp  [A41.9]  Yes    ESRD (end stage renal disease) [N18.6]  Yes    HCAP (healthcare-associated pneumonia) [J18.9]  Yes    Obesity [E66.9]  Yes    Segmental and subsegmental pulmonary emboli of RLL without acute cor pulmonale [I26.99]  Yes    Coronary artery disease involving native coronary artery of native heart without angina pectoris [I25.10]  Yes     Chronic    Type 2 diabetes mellitus with chronic kidney disease on chronic dialysis [E11.22, N18.6, Z99.2]  Not Applicable     Chronic    Acute congestive heart failure [I50.9]  Unknown        Discharged Condition: Good    Hospital Course:   Juliane Ramos is a 57 y.o. female with a PMHx significant for DM, HTN, CAD, HFrEF with an EF of 35-40%, ESRD on dialysis (TuTh&Sat), COPD on home oxygen, PE, and prior CVA.  She is admitted to the service of hospital medicine with a diagnosis of acute on chronic respiratory failure. She reported worsening of her respiratory status last evening. Noted to be hypoxic on her home pulseox. She reported eating a [employer] hamburger earlier in the day. In the ED she had evidence of volume overload and responded well to I/v diuresis. At baseline she has NYHA Class III symptoms. She reported chills at home but no cough. Denied any sick contacts. Noted to be febrile in the ED for which she got Vancomycin and Zosyn. She has a diabetic wound on the left leg that has been treated with skin grafts. Patient was admitted to Hospitalist medicine service. Patient was evaluated by Dr. STERLING Palomo. Patient had serial cardiac enzymes. Patient was given IV lasix and intake and output closely  monitored. Patient was evaluated by cardiologist and underwent ECHO. Patient's renal panel and electrolytes closely monitored. Patient had daily weights. Patient was educated on monitoring daily weight, following low salt diet and in case if gain more than 3 pounds in 24 hours, to call their doctor for further advice. Patient received IV antibiotics, wound care provided. Blood culture obtained at [facility] grew Pseudomonas sp. Patient received routine HD. Last HD performed via AVG. In near future, patient will have HD catheter taken out by Dr. Deal. Patient will follow-up with Dr. Alberto for PAD management and will resume foot wound care and management by Dr. Wick. Patient to continue PO Cipro for another 21 days. Patient was discharged home in stable condition with following discharge plan of care. Total time with the patient was 30 minutes and greater than 50% was spent in counseling and coordination of care. The assessment and plan have been discussed at length. Physicians' notes reviewed. Labs and procedure reviewed.     Consults: Dr. Blum, Dr. Turner, Dr. Young, Dr. STERLING Palomo    Significant Diagnostic Studies:   CXR:   1. No new airspace disease  2. Unchanged and appropriate central line position.  3. Unchanged cardiomegaly.  4. Right pleural effusion is decreased in size since prior.     ECHO:  · The left ventricle cavity is mildly dilated in systole.  · Left ventricle ejection fraction is moderately decreased at 37%  · Grade II (moderate) left ventricular diastolic dysfunction consistent with pseudonormalization.  · LA pressure is elevated.  · RV systolic function is low normal.  · Left atrium is mildly dilated.  · Right atrium is moderately dilated.  · Mitral valve shows trace regurgitation. MV annuloplasty ring.  · Tricuspid valve shows moderate to severe regurgitation.  · Severely elevated central venous pressure (15 mm Hg).  · The estimated PA systolic pressure is 66.27 mm  Hg     Abdominal aorta run-off:  Atherosclerosis, with distal peripheral arterial disease resulting in moderately reduced flow to the left foot, without high-grade proximal stenosis.  High-grade stenosis of the right main renal artery and moderate stenosis of the left main renal artery.  Features of cellulitis of the left leg.  No abscess identified.  IVC filter.  Moderate consolidation of the right lower lobe and small right pleural effusion.  May represent pneumonia in the appropriate clinical setting.  Mild splenomegaly.  Cholelithiasis.    Microbiology Results (last 7 days)     Procedure Component Value Units Date/Time    Blood culture x two cultures. Draw prior to antibiotics. [[id number]] Collected:  09/11/18 0217    Order Status:  Completed Specimen:  Blood Updated:  09/16/18 1212     Blood Culture, Routine No growth after 5 days.    Narrative:       Aerobic and anaerobic    Blood culture x two cultures. Draw prior to antibiotics. [[id number]] Collected:  09/11/18 0200    Order Status:  Completed Specimen:  Blood Updated:  09/16/18 1212     Blood Culture, Routine No growth after 5 days.    Narrative:       Aerobic and anaerobic    Urine culture [[id number]] Collected:  09/11/18 0246    Order Status:  Completed Specimen:  Urine Updated:  09/12/18 0816     Urine Culture, Routine Multiple organisms isolated. None in predominance.  Repeat if     Urine Culture, Routine clinically necessary.    Narrative:       Preferred Collection Type->Urine, Clean Catch        Special Treatments/Procedures: None  Disposition: Home or Self Care    Medications:  Reconciled Home Medications:   Current Discharge Medication List      START taking these medications    Details   ciprofloxacin HCl (CIPRO) 500 MG tablet Take 1 tablet (500 mg total) by mouth once daily. for 21 days  Qty: 21 tablet, Refills: 0         CONTINUE these medications which have CHANGED    Details   citalopram (CELEXA) 20 MG tablet Take 1 tablet (20 mg total) by  mouth once daily.         CONTINUE these medications which have NOT CHANGED    Details   FOLIC ACID-B RONT-W-CSARA-ZINC 3-70-15 MG-MCG-MG ORAL TAB Take 1 tablet by mouth every other day.      HYDROcodone-acetaminophen (NORCO) 5-325 mg per tablet Take 1 tablet by mouth every 6 (six) hours as needed for Pain.      midodrine (PROAMATINE) 5 MG Tab Take 2.5 mg by mouth 3 (three) times daily with meals.      sevelamer carbonate (RENVELA) 800 mg Tab Take 1,600 mg by mouth 3 (three) times daily with meals.      amLODIPine (NORVASC) 10 MG tablet Take 1 tablet (10 mg total) by mouth once daily.  Qty: 30 tablet, Refills: 1      apixaban 5 mg Tab Take 1 tablet (5 mg total) by mouth 2 (two) times daily.  Qty: 60 tablet, Refills: 1      cetirizine (ZYRTEC) 10 MG tablet Take 1 tablet (10 mg total) by mouth every evening.  Qty: 20 tablet, Refills: 0      epoetin donita (PROCRIT) 10,000 unit/mL injection Inject 1 mL (10,000 Units total) into the skin every Tues, Thurs, Sat.  Qty: 1 mL, Refills: 5      gabapentin (NEURONTIN) 100 MG capsule Take 1 capsule (100 mg total) by mouth 3 (three) times daily.  Qty: 90 capsule, Refills: 1      insulin aspart (NOVOLOG) 100 unit/mL InPn pen Inject 1-10 Units into the skin 3 (three) times daily.  Qty: 3 mL, Refills: 1      levothyroxine (SYNTHROID) 50 MCG tablet Take 1 tablet (50 mcg total) by mouth before breakfast.  Qty: 30 tablet, Refills: 1      rosuvastatin (CRESTOR) 10 MG tablet Take 1 tablet (10 mg total) by mouth once daily.  Qty: 30 tablet, Refills: 1         STOP taking these medications       collagenase ointment Comments:   Reason for Stopping:             Discharge Procedure Orders   Ambulatory referral to Home Health   Referral Priority: Routine Referral Type: Home Health   Referral Reason: Specialty Services Required   Requested Specialty: Home Health Services   Number of Visits Requested: 1     Diet diabetic     Diet Cardiac     Diet renal     Other restrictions (specify):   Order  Comments: PLEASE OBSERVE FALL PRECAUTIONS     Call MD for:   Order Comments: For worsening symptoms, chest pain, shortness of breath, increased abdominal pain, high grade fever, stroke or stroke like symptoms, immediately go to the nearest Emergency Room or call 911 as soon as possible.     Change dressing (specify)   Order Comments: Dressing change: Wash heel /ankle wound with wound cleanser, cover with Mepilex Ag dressing     Follow-up Information     [facility] [place].    Specialties:  Hospice Services, Home Health Services  Why:  Home Health  Contact information:  [address]  [place] [address]  [phone]             JOS Dumont On 9/21/2018.    Specialties:  Emergency Medicine, Family Medicine  Why:  @9:40am   Contact information:  [address]  [address]  [place] [address]  [phone]             Dennis Wick DPM In 1 week.    Specialty:  Podiatry  Contact information:  [address]  [place] [address]  [phone]             Misael Alberto MD In 1 week.    Specialty:  Vascular Surgery  Contact information:  [address] [facility] [address]  [place] [address]  [phone]             Bharat Deal MD.    Specialty:  Cardiothoracic Surgery  Why:  Regarding HD catheter removal.  Contact information:  [address] [facility]  [place] [address]  [phone]             Dale Palomo MD In 2 weeks.    Specialty:  Cardiology  Contact information:  [address]  [address]  [place] [address]  [phone]

## 2018-09-18 NOTE — ASSESSMENT & PLAN NOTE
Contributing Nutrition Diagnosis  Obesity stage 1    Related to (etiology):   Excessive energy intake and fluid retention    Signs and Symptoms (as evidenced by):   BMI 32 kg/m2    Interventions/Recommendations (treatment strategy):  1) Decreased diet to DM 1800 if glucose remains elevated     Nutrition Diagnosis Status:   Continues

## 2018-09-18 NOTE — PROGRESS NOTES
HD x 3 hours tolerated well. Net ultrafiltration 2 liters. Used arterial side of CVC and venous return on AVF.

## 2018-09-18 NOTE — CONSULTS
[facility]  Adult Nutrition  Consult Note    SUMMARY     Recommendations    Recommendation/Intervention:   1) Change to BY2317, renal diet   2) Continue Boost Glucose control daily (comparable in K and Phos to novasource)   3) Correct Phos as able  Goals: 1) PO intakes > 75% of meals and supplements    Nutrition Goal Status: Met  Communication of RD Recs: reviewed with RN    Reason for Assessment    Reason for Assessment: identified at risk by screening criteria  Diagnosis: other (see comments)(Fever/SOB)  Relevant Medical History: ESRD on HD, COPD, CAD, DM, CABG, HTN, MI, PE, CVA  Interdisciplinary Rounds: attended    General Information Comments:     59 y/o female admitted with fever/SOB and volume overload. Noted to be non-compliant with sodium x 3-4 days PTA, pt states they are usually complaint with a DM/renal/2 gm Na diet and she has been educated by a dietitian multiple times at dialysis. Patient consumes 3 oz of meat daily per usual recall and drinks Boost high protein shakes daily and eats Perfect protein bars during dialysis treatment. Noted to have non-healing DM ulcer. Reviewed diet education with patient and left educational materials.     9/18 Pt eating 75% of meals. Phos elevated and glucose borderline elevated at times. Receiving dialysis today. Educated patient further on low phosphorus foods r/t and discussed low potassium choices as pt states she often eats potatoes and banana.    Nutrition Discharge Planning: Possibly home today    Nutrition Risk Screen    Nutrition Risk Screen: large or nonhealing wound, burn or pressure ulcer(DM ulcer on ankle)    Nutrition/Diet History    Patient Reported Diet/Restrictions/Preferences: diabetic diet, renal  Do you have any cultural, spiritual, Evangelical conflicts, given your current situation?: no  Food Allergies: NKFA(or intolerances)    Anthropometrics    Temp: 98 °F (36.7 °C)  Height Method: Measured, office visit 3/7/18  Height: 5'  "6"  Height (inches): 66 in  Weight Method: Standard Scale  Weight: 86.6 kg (190 lb 14.7 oz) 9/16 stable  Weight (lb): 190.92 lb  Ideal Body Weight (IBW), Female: 130 lb  % Ideal Body Weight, Female (lb): 154.66 lb  BMI (Calculated): 32.5  BMI Grade: 30 - 34.9- obesity - grade I  Weight Change Amount: (usual weight fluctuates 103 kg-89 kg)       Lab/Procedures/Meds    Pertinent Labs Reviewed: reviewed  BMP  Lab Results   Component Value Date     (L) 09/18/2018    K 5.0 09/18/2018    CL 98 09/18/2018    CO2 27 09/18/2018    BUN 39 (H) 09/18/2018    CREATININE 5.8 (H) 09/18/2018    CALCIUM 8.7 09/18/2018    ANIONGAP 8 09/18/2018    ESTGFRAFRICA 9 (A) 09/18/2018    EGFRNONAA 8 (A) 09/18/2018     POCT Glucose   Date Value Ref Range Status   09/18/2018 157 (H) 70 - 110 mg/dL Final   09/17/2018 180 (H) 70 - 110 mg/dL Final   09/17/2018 169 (H) 70 - 110 mg/dL Final   09/17/2018 167 (H) 70 - 110 mg/dL Final   09/16/2018 141 (H) 70 - 110 mg/dL Final   09/16/2018 142 (H) 70 - 110 mg/dL Final   09/16/2018 148 (H) 70 - 110 mg/dL Final   09/15/2018 167 (H) 70 - 110 mg/dL Final   09/15/2018 136 (H) 70 - 110 mg/dL Final       Pertinent Medications Reviewed: reviewed  Pertinent Medications Comments: NaCl @100 ml/hr on HD days, albumin, epoetin    Physical Findings/Assessment    Overall Physical Appearance: obese  Oral/Mouth Cavity: WDL  Skin: Donny = 20, non-healing wound(s)(DM ulcer ankle)    Estimated/Assessed Needs    Weight Used For Calorie Calculations: 91.2 kg (201 lb 1 oz)  Energy Calorie Requirements (kcal): 1518 (x 1.2 sedentary) = 1820 kcal   Energy Need Method: Lisa Dozier  Protein Requirements: 1.5-1.6 g protein r/t non-healing wound= 88-94 g protein  Weight Used For Protein Calculations: 59 kg (130 lb 1.1 oz)(IBW)  Fluid Requirements (mL): 1820 ml  Fluid Need Method: RDA Method  RDA Method (mL): 1518  CHO Requirement: 50%      Nutrition Prescription Ordered    Current Diet Order: Renal/ DM 2000 kcal " diet    Evaluation of Received Nutrient/Fluid Intake    Energy Calories Required: meeting needs  Protein Required: meeting needs  Fluid Required: meeting needs  Tolerance: tolerating  % Intake of Estimated Energy Needs: 75%  % Meal Intake: 75%    Nutrition Risk    Level of Risk/Frequency of Follow-up: moderate(1 x weekly)     Assessment and Plan    Obesity    Contributing Nutrition Diagnosis  Obesity stage 1    Related to (etiology):   Excessive energy intake and fluid retention    Signs and Symptoms (as evidenced by):   BMI 32 kg/m2    Interventions/Recommendations (treatment strategy):  1) Decreased diet to DM 1800 if glucose remains elevated     Nutrition Diagnosis Status:   Continues          Type 2 diabetes mellitus with chronic kidney disease on chronic dialysis    Contributing Nutrition Diagnosis  Altered nutrition related lab values    Related to (etiology):   Altered absorption of nutrients     Signs and Symptoms (as evidenced by):   Elevated glucose, BUN, and Crea    Interventions/Recommendations (treatment strategy):  1) DM, renal diet 2) Diet Education provided    Nutrition Diagnosis Status:   Continue               Monitor and Evaluation    Food and Nutrient Intake: energy intake  Food and Nutrient Adminstration: diet order  Anthropometric Measurements: weight, height/length  Nutrition-Focused Physical Findings: overall appearance     Nutrition Follow-Up    RD Follow-up?: Yes

## 2018-09-18 NOTE — PROGRESS NOTES
INPATIENT NEPHROLOGY PROGRESS NOTE  [facility] NEPHROLOGY    Patient Name: Juliane Ramos  Date: 09/18/2018    Reason for consultation: ESRD    Chief Complaint:   Chief Complaint   Patient presents with    Fever    Shortness of Breath       History of Present Illness:  57F with DM2, HTN, CAD, HFrEF with an EF of 35-40%, ESRD on HD TTS, COPD on home oxygen, PE, and prior CVA, admitted with acute on chronic respiratory failure, hypoxic, PNA, Pseudomonas bacteremia. Has chronic leg wounds, treated with grafts. Consulted for dialysis.     · Interval History/Subjective:    - seen on HD- no cp, dyspnea, abd pain, worsening edema  - adjusted needle size to access AVG- will continue to work on this as outpatient    · Review of Systems: All 14 systems reviewed and negative, except as noted in HPI    Plan of Care:    Assessment:  ESRD  Chronic hypotension  SHPT  Anemia of CKD     Plan:     1. ESRD- Continue HD TTS.      2. Volume/Blood Pressure- BP is stable. She has some edema. Continue midodrine and albumin with HD PRN. We will UF today as tolerated.      3. Electrolytes/Acid Base- Parameters are stable.      4. Bone Mineral Metabolism-  Parameters are stable.      5. Anemia of CKD-  Parameters are stable. Ordered EPO 50cc/kg x 1 with HD today.     6. Access- We are using AVG. We will arrange for TDC removal as outpatient.       D/C pending HD today from a renal standpoint.     Thank you for allowing us to participate in this patient's care. We will continue to follow.    Medications:  No current facility-administered medications on file prior to encounter.      Current Outpatient Medications on File Prior to Encounter   Medication Sig Dispense Refill    citalopram (CELEXA) 20 MG tablet Take 20 mg by mouth once daily.      FOLIC ACID-B UKXA-Y-WZMAR-ZINC 3-70-15 MG-MCG-MG ORAL TAB Take 1 tablet by mouth every other day.      HYDROcodone-acetaminophen (NORCO) 5-325 mg per tablet Take 1 tablet by mouth every 6 (six) hours  as needed for Pain.      midodrine (PROAMATINE) 5 MG Tab Take 2.5 mg by mouth 3 (three) times daily with meals.      sevelamer carbonate (RENVELA) 800 mg Tab Take 1,600 mg by mouth 3 (three) times daily with meals.      amLODIPine (NORVASC) 10 MG tablet Take 1 tablet (10 mg total) by mouth once daily. 30 tablet 1    apixaban 5 mg Tab Take 1 tablet (5 mg total) by mouth 2 (two) times daily. 60 tablet 1    cetirizine (ZYRTEC) 10 MG tablet Take 1 tablet (10 mg total) by mouth every evening. 20 tablet 0    collagenase ointment Apply topically once daily.      epoetin donita (PROCRIT) 10,000 unit/mL injection Inject 1 mL (10,000 Units total) into the skin every Tues, Thurs, Sat. 1 mL 5    gabapentin (NEURONTIN) 100 MG capsule Take 1 capsule (100 mg total) by mouth 3 (three) times daily. 90 capsule 1    insulin aspart (NOVOLOG) 100 unit/mL InPn pen Inject 1-10 Units into the skin 3 (three) times daily. 3 mL 1    levothyroxine (SYNTHROID) 50 MCG tablet Take 1 tablet (50 mcg total) by mouth before breakfast. 30 tablet 1    rosuvastatin (CRESTOR) 10 MG tablet Take 1 tablet (10 mg total) by mouth once daily. 30 tablet 1     Scheduled Meds:   albumin human 25%  25 g Intravenous Once    apixaban  5 mg Oral BID    cefepime in dextrose 5 %  1 g Intravenous Q24H    citalopram  20 mg Oral Daily    gabapentin  100 mg Oral TID    levothyroxine  50 mcg Oral Before breakfast    midodrine  2.5 mg Oral TID WM    rosuvastatin  10 mg Oral Daily    sevelamer carbonate  1,600 mg Oral TID WM     Continuous Infusions:  PRN Meds:.sodium chloride 0.9%, dextrose 50%, dextrose 50%, diphenhydrAMINE, glucagon (human recombinant), glucagon (human recombinant), glucose, glucose, heparin (porcine), influenza, insulin aspart U-100, sulfur hexafluoride microspheres    Allergies:  Patient has no known allergies.    Vital Signs:  Temp Readings from Last 3 Encounters:   09/18/18 97.9 °F (36.6 °C) (Tympanic)   09/10/18 100.2 °F (37.9 °C)    02/24/18 97.9 °F (36.6 °C) (Oral)       Pulse Readings from Last 3 Encounters:   09/18/18 81   09/10/18 (!) 122   07/17/18 67       BP Readings from Last 3 Encounters:   09/18/18 (!) 98/58   09/10/18 (!) 128/97   07/17/18 117/61       Weight:  Wt Readings from Last 3 Encounters:   09/16/18 86.6 kg (190 lb 14.7 oz)   02/24/18 89.8 kg (197 lb 15.6 oz)   12/24/17 104.1 kg (229 lb 6.4 oz)       Physical Exam:  Constitutional: nad, aao x 3  Heart: rrr, no m/r/g, wwp, trace edema  Lungs: ctab, no w/r/r/c, no lb  Abdomen: s/nt/nd, +BS    Results:  Lab Results   Component Value Date     (L) 09/18/2018    K 5.0 09/18/2018    CL 98 09/18/2018    CO2 27 09/18/2018    BUN 39 (H) 09/18/2018    CREATININE 5.8 (H) 09/18/2018    CALCIUM 8.7 09/18/2018    ANIONGAP 8 09/18/2018    ESTGFRAFRICA 9 (A) 09/18/2018    EGFRNONAA 8 (A) 09/18/2018       Lab Results   Component Value Date    CALCIUM 8.7 09/18/2018    PHOS 5.5 (H) 09/18/2018       Recent Labs   Lab  09/18/18   0513   WBC  7.30   RBC  3.87*   HGB  10.1*   HCT  31.9*   PLT  174   MCV  82   MCH  26.0*   MCHC  31.5*       I have personally reviewed pertinent radiological imaging and reports.    Amairani Young MD MPH  [facility]  [address] [place] [address]  [place] [id number]  [phone] (p)  [phone] (f)

## 2018-09-18 NOTE — PT/OT/SLP PROGRESS
Physical Therapy Treatment    Patient Name:  Juliane Ramos   MRN:  [id number]    Recommendations:     Discharge Recommendations:      Discharge Equipment Recommendations: none   Barriers to discharge: None    Assessment:     Juliane Ramos is a 58 y.o. female admitted with a medical diagnosis of <principal problem not specified>.  She presents with the following impairments/functional limitations:  weakness, impaired endurance, impaired functional mobilty, gait instability, impaired balance, impaired cardiopulmonary response to activity .    Rehab Prognosis: good; patient would benefit from acute skilled PT services to address these deficits and reach maximum level of function.      Recent Surgery: * No surgery found *      Plan:     During this hospitalization, patient to be seen daily to address the above listed problems via gait training, therapeutic activities, therapeutic exercises  · Plan of Care Expires:  09/28/18   Plan of Care Reviewed with: patient    Subjective     Communicated with nurse Magallon prior to session.  Patient found supine in bed upon PT entry to room, agreeable to treatment.      Chief Complaint: awaiting dialysis  Patient comments/goals: to return home following dialysis  Pain/Comfort:  · Pain Rating 1: other (see comments)(did not rate)  · Location - Side 1: Left  · Location - Orientation 1: generalized  · Location 1: knee  · Pain Addressed 1: Reposition, Cessation of Activity    Patients cultural, spiritual, Druze conflicts given the current situation:      Objective:     Patient found with: telemetry, oxygen     General Precautions: Standard, fall   Orthopedic Precautions:N/A   Braces:       Functional Mobility:  · Bed Mobility:     · Rolling Right: contact guard assistance  · Supine to Sit: contact guard assistance  · Transfers:     · Sit to Stand:  contact guard assistance with rolling walker  · Gait: 250' with rw and CGA with O2 attached. Several standing rests taken.      AM-PAC  6 CLICK MOBILITY          Therapeutic Activities and Exercises:   Sat EOB following with nurse present.    Patient left seated EOB with all lines intact, call button in reach and nurse Sherita present..    GOALS:   Multidisciplinary Problems     Physical Therapy Goals        Problem: Physical Therapy Goal    Goal Priority Disciplines Outcome Goal Variances Interventions   Physical Therapy Goal     PT, PT/OT Ongoing (interventions implemented as appropriate)     Description:  Goals to be met by: 2018     Patient will increase functional independence with mobility by performin. Supine to sit with Contact Guard Assistance.  2. Sit to stand transfer with Contact Guard Assistance.  3. Gait  x 250 feet with Contact Guard Assistance using Rolling Walker.   4. Lower extremity exercise program x20 reps per handout, with supervision.                      Time Tracking:     PT Received On: 18  PT Start Time: 1100     PT Stop Time: 1110  PT Total Time (min): 10 min     Billable Minutes: Gait Training 10min    Treatment Type: Treatment  PT/PTA: PTA     PTA Visit Number: 3     Jenn Trujillo, PTA  2018

## 2018-09-18 NOTE — PLAN OF CARE
09/18/18 1125   PRE-TX-O2-ETCO2   O2 Device (Oxygen Therapy) nasal cannula   $ Is the patient on Low Flow Oxygen? Yes   Flow (L/min) 3   Oxygen Concentration (%) 32   SpO2 99 %   Pulse Oximetry Type Intermittent   $ Pulse Oximetry - Single Charge Pulse Oximetry - Single   Ready to Wean/Extubation Screen   FIO2<=50 (chart decimal) 0.32

## 2018-09-18 NOTE — PROGRESS NOTES
Discharge instructions and education reviewed with pt and spouse at bedside.  Pt to be discharged post hemodialysis treatment; 2L removed.  Peripheral IVs removed with catheters intact.  Cardiac monitoring and leads removed.  Pt encouraged to see PCP and specialty doctors regarding medical needs.  Pt to be escorted home by spouse.

## 2018-09-18 NOTE — PROGRESS NOTES
09/17/18 1952   Patient Assessment/Suction   Level of Consciousness (AVPU) alert   PRE-TX-O2-ETCO2   O2 Device (Oxygen Therapy) nasal cannula w/ humidification   Flow (L/min) 3   Oxygen Concentration (%) 32   SpO2 99 %   Pulse Oximetry Type Intermittent   Pulse 75   Resp 16   Ready to Wean/Extubation Screen   FIO2<=50 (chart decimal) 0.32

## 2018-09-19 NOTE — PLAN OF CARE
09/19/18 0811   Final Note   Assessment Type Final Discharge Note   Discharge Disposition Home-Health

## 2018-09-21 NOTE — PHYSICIAN QUERY
PT Name: Juliane Ramos  MR #: [id number]    Physician Query Form - CardioPulmonary Clarification      CDS/: Anh Babb               Contact information: [email or web address]      This form is a permanent document in the medical record.    Query Date: September 21, 2018    By submitting this query, we are merely seeking further clarification of documentation. Please utilize your independent clinical judgment when addressing the question(s) below.    The Medical record contains the following:   Indicators   Supporting Clinical Findings Location in Medical Record   x Pulmonary Hypertension documented History of obesity, DM, COPD on 3 L oxygen at home, PE, CVA, thyroid disease, CAD, HFrEF 37 % , Pulm HTN, CABG. HTN, MI,   breast surgery, Esrd, Anemia, anxiety     ID notes    x Acute/Chronic Illness PMHx significant for DM, HTN, CAD, HFrEF with an EF of 35-40%, ESRD on dialysis (TuTh&Sat), COPD on home oxygen, PE, and prior CVA     HM notes 9/16    x Echo and/or Heart Cath Findings   ECHO:  · The left ventricle cavity is mildly dilated in systole.  · Left ventricle ejection fraction is moderately decreased at 37%  · Grade II (moderate) left ventricular diastolic dysfunction consistent with pseudonormalization.  · LA pressure is elevated.  · RV systolic function is low normal.  · Left atrium is mildly dilated.  · Right atrium is moderately dilated.  · Mitral valve shows trace regurgitation. MV annuloplasty ring.  · Tricuspid valve shows moderate to severe regurgitation.  · Severely elevated central venous pressure (15 mm Hg).  · The estimated PA systolic pressure is 66.27 mm Hg      notes 9/16     BiPAP/Intubation/Supplemental O2      SOB, MONAE, Fatigue, Dizziness, LE Edema, Cyanosis, Chest Pain, Respiratory Distress, Hypoxia, etc.      Treatment         Medication      Other     Provider, please specify the type of pulmonary hypertension:      [   ]  Group 2:  Pulmonary Hypertension due to Left Heart  Disease, including left heart failure and/or left heart valve disease    [   ]  Group 3:  Pulmonary Hypertension due to Lung Disease    [  x ]  Pulmonary Hypertension, unspecified    [   ]  Other Cardiopulmonary Condition (please specify):  _____________________________________    [   ]  Clinically Undetermined    Please document in your progress notes daily for the duration of treatment, until resolved, and include in your discharge summary.

## 2018-09-21 NOTE — PHYSICIAN QUERY
PT Name: Juliane Ramos  MR #: [id number]    Physician Query Form - Non-Pressure Ulcer Clarification      CDS/: Anh Babb               Contact information: [email or web address]      This form is a permanent document in the medical record.     Query Date: September 21, 2018    By submitting this query, we are merely seeking further clarification of documentation. Please utilize your independent clinical judgment when addressing the question(s) below.    The Medical Record contains the following:   Indicator   Supporting Clinical Findings Location in Medical Record   x Non-pressure Ulcer   Wound:           Left posterior leg wound is full thickness, not clinically ifnected    She has a diabetic wound on the left leg that has been treated with skin grafts      Sepsis - GNR sp  Left ankle wound  PAD    - Cultures grew pseudomonas. Will follow ID recs for two more days of IV Abx     ID progress note 9/16       HM progress note 9/16          progress note 9/16     Wound Care Consult      Radiology Findings      Acute/Chronic Illness      Treatment:      Other:        Provider, Please specify the diagnosis or diagnoses associated with above clinical findings.    [ x  ]  Skin breakdown only  [ x  ]  Exposed fat layer  [   ]  Muscle involvement without evidence of necrosis  [   ]  Other depth (please specify): __________________  [   ]  Other Integumentary Diagnosis (please specify): ________________________________  [   ]  Clinically Undetermined    Please document in your progress notes daily for the duration of treatment, until resolved, and include in your discharge summary.

## 2018-09-21 NOTE — PHYSICIAN QUERY
"PT Name: Juliane Ramos  MR #: [id number]    Physician Query Form - Nutrition Clarification     CDS/: Anh Babb               Contact information: [email or web address]      This form is a permanent document in the medical record.     Query Date: September 21, 2018    By submitting this query, we are merely seeking further clarification of documentation.. Please utilize your independent clinical judgment when addressing the question(s) below.    The Medical record contains the following:   Indicators  Supporting Clinical Findings Location in Medical Record    % of Estimated Energy Intake over a time frame from p.o., TF, or TPN % Intake of Estimated Energy Needs: 60-75%   RD consult    Weight Status over a time frame Weight Change Amount: (usual weight fluctuates 103 kg-89 kg)   RD consult    Subcutaneous Fat and/or Muscle Loss      Fluid Accumulation or Edema      Reduced  Strength      Wt / BMI / Usual Body Weight Height: 5' 6"  Weight: 91.2 kg (201 lb 1 oz)  BMI (Calculated): 32.5   RD consult    Delayed Wound Healing / Failure to Thrive Skin: non-healing wound(s)(DM ulcer ankle)   RD consult    Acute or Chronic Illness ESRD on HD, COPD, CAD, DM, CABG, HTN, MI, PE, CVA   RD consult    Medication      Treatment   Recommendation/Intervention:   1) Continue DM, renal diet   2) Consider decreasing DM diet to 1800 if glucose remains elevated   3) Boost Glucose control daily (comparable in K and Phos to novasource)   4) Obtain new height if able     RD consult    Other Malnutrition due to renal disease ID progress note 9/13      AND / ASPEN Clinical Characteristics (October 2011)  A minimum of two characteristics is recommended for diagnosing either moderate or severe malnutrition   Mild Malnutrition Moderate Malnutrition Severe Malnutrition   Energy Intake from p.o., TF or TPN. < 75% intake of estimated energy needs for less than 7 days < 75% intake of estimated energy needs for greater than 7 days < 50% " intake of estimated energy needs for > 5 days   Weight Loss 1-2% in 1 month  5% in 3 months  7.5% in 6 months  10% in 1 year 1-2 % in 1 week  5% in 1 month  7.5% in 3 months  10% in 6 months  20% in 1 year > 2% in 1 week  > 5% in 1 month  > 7.5% in 3 months  > 10% in 6 months  > 20% in 1 year   Physical Findings     None *Mild subcutaneous fat and/or muscle loss  *Mild fluid accumulation  *Stage II decubitus  *Surgical wound or non-healing wound *Mod/severe subcutaneous fat and/or muscle loss  *Mod/severe fluid accumulation  *Stage III or IV decubitus  *Non-healing surgical wound     Provider, please specify diagnosis or diagnoses associated with above clinical findings.    [ x] Mild Protein-Calorie Malnutrition  [ ] Other Nutritional Diagnosis (please specify): ____________________________________  [ ] Other: ________________________________  [ ] Clinically Undetermined    Please document in your progress notes daily for the duration of treatment until resolved and include in your discharge summary.

## 2018-11-10 ENCOUNTER — OUTSIDE PLACE OF SERVICE (OUTPATIENT)
Dept: PULMONOLOGY | Facility: CLINIC | Age: 58
End: 2018-11-10
Payer: MEDICARE

## 2018-11-10 PROCEDURE — 99231 PR SUBSEQUENT HOSPITAL CARE,LEVL I: ICD-10-PCS | Mod: ,,, | Performed by: INTERNAL MEDICINE

## 2018-11-10 PROCEDURE — 99231 SBSQ HOSP IP/OBS SF/LOW 25: CPT | Mod: ,,, | Performed by: INTERNAL MEDICINE

## 2018-11-11 ENCOUNTER — OUTSIDE PLACE OF SERVICE (OUTPATIENT)
Dept: PULMONOLOGY | Facility: CLINIC | Age: 58
End: 2018-11-11
Payer: MEDICARE

## 2018-11-11 PROCEDURE — 99232 PR SUBSEQUENT HOSPITAL CARE,LEVL II: ICD-10-PCS | Mod: ,,, | Performed by: INTERNAL MEDICINE

## 2018-11-11 PROCEDURE — 99232 SBSQ HOSP IP/OBS MODERATE 35: CPT | Mod: ,,, | Performed by: INTERNAL MEDICINE

## 2019-01-01 ENCOUNTER — OFFICE VISIT (OUTPATIENT)
Dept: PODIATRY | Facility: CLINIC | Age: 59
End: 2019-01-01
Payer: MEDICARE

## 2019-01-01 VITALS
WEIGHT: 187 LBS | DIASTOLIC BLOOD PRESSURE: 78 MMHG | BODY MASS INDEX: 30.05 KG/M2 | HEIGHT: 66 IN | HEART RATE: 84 BPM | SYSTOLIC BLOOD PRESSURE: 122 MMHG

## 2019-01-01 VITALS
SYSTOLIC BLOOD PRESSURE: 118 MMHG | DIASTOLIC BLOOD PRESSURE: 67 MMHG | BODY MASS INDEX: 30.05 KG/M2 | WEIGHT: 187 LBS | HEART RATE: 84 BPM | HEIGHT: 66 IN

## 2019-01-01 VITALS
HEIGHT: 66 IN | BODY MASS INDEX: 30.05 KG/M2 | SYSTOLIC BLOOD PRESSURE: 118 MMHG | WEIGHT: 187 LBS | DIASTOLIC BLOOD PRESSURE: 67 MMHG | HEART RATE: 84 BPM

## 2019-01-01 DIAGNOSIS — N18.6 TYPE 2 DIABETES MELLITUS WITH CHRONIC KIDNEY DISEASE ON CHRONIC DIALYSIS, WITH LONG-TERM CURRENT USE OF INSULIN: ICD-10-CM

## 2019-01-01 DIAGNOSIS — E11.51 TYPE II DIABETES MELLITUS WITH PERIPHERAL CIRCULATORY DISORDER: Primary | ICD-10-CM

## 2019-01-01 DIAGNOSIS — E11.22 TYPE 2 DIABETES MELLITUS WITH CHRONIC KIDNEY DISEASE ON CHRONIC DIALYSIS, WITH LONG-TERM CURRENT USE OF INSULIN: ICD-10-CM

## 2019-01-01 DIAGNOSIS — Z99.2 TYPE 2 DIABETES MELLITUS WITH CHRONIC KIDNEY DISEASE ON CHRONIC DIALYSIS, WITH LONG-TERM CURRENT USE OF INSULIN: ICD-10-CM

## 2019-01-01 DIAGNOSIS — L97.521 SKIN ULCER OF LEFT FOOT, LIMITED TO BREAKDOWN OF SKIN: ICD-10-CM

## 2019-01-01 DIAGNOSIS — Z79.4 TYPE 2 DIABETES MELLITUS WITH CHRONIC KIDNEY DISEASE ON CHRONIC DIALYSIS, WITH LONG-TERM CURRENT USE OF INSULIN: ICD-10-CM

## 2019-01-01 DIAGNOSIS — L97.522 SKIN ULCER OF LEFT FOOT WITH FAT LAYER EXPOSED: ICD-10-CM

## 2019-01-01 DIAGNOSIS — E11.49 TYPE II DIABETES MELLITUS WITH NEUROLOGICAL MANIFESTATIONS: ICD-10-CM

## 2019-01-01 DIAGNOSIS — E11.51 TYPE II DIABETES MELLITUS WITH PERIPHERAL CIRCULATORY DISORDER: ICD-10-CM

## 2019-01-01 DIAGNOSIS — T14.8XXD WOUND HEALING, DELAYED: Primary | ICD-10-CM

## 2019-01-01 PROCEDURE — 11042 WOUND DEBRIDEMENT: ICD-10-PCS | Mod: S$PBB,,, | Performed by: PODIATRIST

## 2019-01-01 PROCEDURE — 99213 OFFICE O/P EST LOW 20 MIN: CPT | Mod: 25 | Performed by: PODIATRIST

## 2019-01-01 PROCEDURE — 99214 OFFICE O/P EST MOD 30 MIN: CPT | Performed by: PODIATRIST

## 2019-01-01 PROCEDURE — 99213 OFFICE O/P EST LOW 20 MIN: CPT | Mod: S$PBB,,, | Performed by: PODIATRIST

## 2019-01-01 PROCEDURE — 99213 PR OFFICE/OUTPT VISIT, EST, LEVL III, 20-29 MIN: ICD-10-PCS | Mod: 25,S$PBB,, | Performed by: PODIATRIST

## 2019-01-01 PROCEDURE — 99213 PR OFFICE/OUTPT VISIT, EST, LEVL III, 20-29 MIN: ICD-10-PCS | Mod: S$PBB,,, | Performed by: PODIATRIST

## 2019-01-01 PROCEDURE — 99213 OFFICE O/P EST LOW 20 MIN: CPT | Mod: 25,S$PBB,, | Performed by: PODIATRIST

## 2019-01-01 PROCEDURE — 11042 DBRDMT SUBQ TIS 1ST 20SQCM/<: CPT | Mod: PBBFAC | Performed by: PODIATRIST

## 2019-01-01 RX ORDER — AMOXICILLIN AND CLAVULANATE POTASSIUM 500; 125 MG/1; MG/1
TABLET, FILM COATED ORAL
Refills: 0 | COMMUNITY
Start: 2019-01-01 | End: 2020-01-01

## 2019-01-01 RX ORDER — LORATADINE AND PSEUDOEPHEDRINE SULFATE 5; 120 MG/1; MG/1
1 TABLET, EXTENDED RELEASE ORAL DAILY
Refills: 0 | Status: ON HOLD | COMMUNITY
Start: 2019-01-01 | End: 2020-01-01 | Stop reason: HOSPADM

## 2019-01-01 RX ORDER — GABAPENTIN 100 MG/1
100 CAPSULE ORAL 3 TIMES DAILY
Refills: 3 | Status: ON HOLD | COMMUNITY
Start: 2019-01-01 | End: 2020-01-01 | Stop reason: HOSPADM

## 2019-01-01 RX ORDER — CODEINE PHOSPHATE AND GUAIFENESIN 10; 100 MG/5ML; MG/5ML
SOLUTION ORAL
Refills: 0 | COMMUNITY
Start: 2019-01-01 | End: 2020-01-01

## 2019-01-07 ENCOUNTER — OFFICE VISIT (OUTPATIENT)
Dept: PODIATRY | Facility: CLINIC | Age: 59
End: 2019-01-07
Payer: MEDICARE

## 2019-01-07 VITALS
SYSTOLIC BLOOD PRESSURE: 123 MMHG | TEMPERATURE: 97 F | BODY MASS INDEX: 30.81 KG/M2 | HEIGHT: 66 IN | DIASTOLIC BLOOD PRESSURE: 75 MMHG | HEART RATE: 85 BPM

## 2019-01-07 DIAGNOSIS — L97.521 SKIN ULCER OF LEFT FOOT, LIMITED TO BREAKDOWN OF SKIN: Primary | ICD-10-CM

## 2019-01-07 DIAGNOSIS — Z99.2 TYPE 2 DIABETES MELLITUS WITH CHRONIC KIDNEY DISEASE ON CHRONIC DIALYSIS, WITH LONG-TERM CURRENT USE OF INSULIN: Chronic | ICD-10-CM

## 2019-01-07 DIAGNOSIS — N18.6 TYPE 2 DIABETES MELLITUS WITH CHRONIC KIDNEY DISEASE ON CHRONIC DIALYSIS, WITH LONG-TERM CURRENT USE OF INSULIN: Chronic | ICD-10-CM

## 2019-01-07 DIAGNOSIS — E11.22 TYPE 2 DIABETES MELLITUS WITH CHRONIC KIDNEY DISEASE ON CHRONIC DIALYSIS, WITH LONG-TERM CURRENT USE OF INSULIN: Chronic | ICD-10-CM

## 2019-01-07 DIAGNOSIS — Z79.4 TYPE 2 DIABETES MELLITUS WITH CHRONIC KIDNEY DISEASE ON CHRONIC DIALYSIS, WITH LONG-TERM CURRENT USE OF INSULIN: Chronic | ICD-10-CM

## 2019-01-07 PROCEDURE — 99213 OFFICE O/P EST LOW 20 MIN: CPT | Mod: ,,, | Performed by: PODIATRIST

## 2019-01-07 PROCEDURE — 99213 PR OFFICE/OUTPT VISIT, EST, LEVL III, 20-29 MIN: ICD-10-PCS | Mod: ,,, | Performed by: PODIATRIST

## 2019-01-07 RX ORDER — PEN NEEDLE, DIABETIC 30 GX3/16"
NEEDLE, DISPOSABLE MISCELLANEOUS
Status: ON HOLD | COMMUNITY
Start: 2017-07-05 | End: 2020-01-01 | Stop reason: HOSPADM

## 2019-01-07 RX ORDER — ONDANSETRON 4 MG/1
TABLET, ORALLY DISINTEGRATING ORAL
Refills: 0 | COMMUNITY
Start: 2018-11-13 | End: 2020-01-01

## 2019-01-07 RX ORDER — ALBUTEROL SULFATE 1.25 MG/3ML
1.25 SOLUTION RESPIRATORY (INHALATION) 3 TIMES DAILY PRN
Refills: 1 | COMMUNITY
Start: 2018-12-28 | End: 2020-01-01

## 2019-01-07 RX ORDER — INSULIN ASPART 100 [IU]/ML
INJECTION, SUSPENSION SUBCUTANEOUS
COMMUNITY
End: 2020-01-01 | Stop reason: ALTCHOICE

## 2019-01-07 RX ORDER — PROMETHAZINE HYDROCHLORIDE 12.5 MG/1
TABLET ORAL
Refills: 0 | COMMUNITY
Start: 2018-11-13 | End: 2019-07-16

## 2019-01-07 RX ORDER — FLUTICASONE PROPIONATE 50 MCG
1 SPRAY, SUSPENSION (ML) NASAL DAILY
Refills: 12 | Status: ON HOLD | COMMUNITY
Start: 2018-11-30 | End: 2020-01-01 | Stop reason: HOSPADM

## 2019-01-07 RX ORDER — CARVEDILOL 3.12 MG/1
3.12 TABLET ORAL 2 TIMES DAILY
Refills: 0 | COMMUNITY
Start: 2018-11-13 | End: 2019-07-22

## 2019-01-07 NOTE — PROGRESS NOTES
"  [address] [facility] [address]  [place] [address]  Phone: [phone]   [phone]    Patient's PCP:JOS Dumont    Subjective:      Chief Complaint:: Foot Ulcer (left foot)    HPI    Patient ID: Juliane Ramos is a 58 y.o. female who presents with a complaint of  Left foot ulcer lasting for over a year.  Current symptoms include none.  Aggravating factors are none. Treatment to date have included home health. Patients rates pain 0/10 on pain scale.    Vitals:    19 1112   BP: 123/75   Pulse: 85   Temp: 96.9 °F (36.1 °C)     Shoe Size: 8.5 / 9    Past Surgical History:   Procedure Laterality Date    ABCESS DRAINAGE Right     Between "little toe" and the one next to it    BREAST SURGERY      Reduction [id number]    CARDIAC SURGERY  2011    CABG 4vessel ring in one valve mitral valve prolapse    CORONARY ARTERY BYPASS GRAFT      DEBRIDEMENT-TENDON-ACHILLES Left 2017    Performed by [facility]    hyperlipidemia      TUBAL LIGATION  1986     Past Medical History:   Diagnosis Date    Anemia in stage 3 chronic kidney disease 2017    Anticoagulant long-term use     CHF (congestive heart failure)     COPD (chronic obstructive pulmonary disease)     Coronary artery disease     Diabetes mellitus     Encounter for blood transfusion     Essential hypertension 2017    Hypertension     MI (myocardial infarction)     Segmental and subsegmental pulmonary emboli of RLL without acute cor pulmonale 2017    Stroke     2005    Thyroid disease     Traumatic subarachnoid hemorrhage 2017    Type 2 diabetes mellitus with stage 3 chronic kidney disease, without long-term current use of insulin 2017     Family History   Problem Relation Age of Onset    Arthritis Mother     Heart disease Father     Cancer Father 68        prostae and bladder ca  in     Diabetes Father     Hypertension Father     Depression Sister     " Hypertension Son     Diabetes Maternal Grandmother         lost leg    Kidney disease Paternal Grandfather         had kidney removed    Stroke Paternal Grandfather         Social History:   Marital Status:   Alcohol History:  reports that she drinks about 0.6 - 1.2 oz of alcohol per week.  Tobacco History:  reports that  has never smoked. she has never used smokeless tobacco.  Drug History:  reports that she does not use drugs.    Review of patient's allergies indicates:  No Known Allergies    Current Outpatient Medications   Medication Sig Dispense Refill    albuterol (ACCUNEB) 1.25 mg/3 mL Nebu USE 1 VIAL EVERY 4 HOURS AS NEEDED FOR SHORTNESS OF BREATH/WHEEZING  1    apixaban 5 mg Tab Take 1 tablet (5 mg total) by mouth 2 (two) times daily. 60 tablet 1    blood sugar diagnostic Strp Test 3-4 times daily PRN      carvedilol (COREG) 3.125 MG tablet Take 3.125 mg by mouth 2 (two) times daily.  0    cetirizine (ZYRTEC) 10 MG tablet Take 1 tablet (10 mg total) by mouth every evening. 20 tablet 0    citalopram (CELEXA) 20 MG tablet Take 1 tablet (20 mg total) by mouth once daily.      fluticasone (FLONASE) 50 mcg/actuation nasal spray SPRAY 1 SPRAY IN EACH NOSTRIL DAILY  12    FOLIC ACID-B [id number]-S-RIRAQ-ZINC 3-70-15 MG-MCG-MG ORAL TAB Take 1 tablet by mouth every other day.      gabapentin (NEURONTIN) 100 MG capsule Take 1 capsule (100 mg total) by mouth 3 (three) times daily. 90 capsule 1    HYDROcodone-acetaminophen (NORCO) 5-325 mg per tablet Take 1 tablet by mouth every 6 (six) hours as needed for Pain.      insulin aspart (NOVOLOG) 100 unit/mL InPn pen Inject 1-10 Units into the skin 3 (three) times daily. 3 mL 1    insulin aspart protamine-insulin aspart (NOVOLOG 70/30) 100 unit/mL (70-30) InPn pen Inject into the skin.      levothyroxine (SYNTHROID) 50 MCG tablet Take 1 tablet (50 mcg total) by mouth before breakfast. 30 tablet 1    midodrine (PROAMATINE) 5 MG Tab Take 2.5 mg by mouth 3  "(three) times daily with meals.      ondansetron (ZOFRAN-ODT) 4 MG TbDL LET 1 TABLET DISSOLVE ON TONGUE 4 TIMES A DAY AS NEEDED  0    pen needle, diabetic (BD ULTRA-FINE OBDULIA PEN NEEDLE) 32 gauge x 5/32" Ndle       promethazine (PHENERGAN) 12.5 MG Tab TAKE 1/2 TABLET EVERY 6 HOURS AS NEEDED  0    rosuvastatin (CRESTOR) 10 MG tablet Take 1 tablet (10 mg total) by mouth once daily. 30 tablet 1    sevelamer carbonate (RENVELA) 800 mg Tab Take 1,600 mg by mouth 3 (three) times daily with meals.      honey (MEDIHONEY, HONEY,) 80 % Gel Apply 1 Tube topically once daily. 1 Tube 0     No current facility-administered medications for this visit.        Review of Systems   Constitutional: Positive for fatigue. Negative for activity change, chills and fever.   HENT: Negative for hearing loss and sore throat.    Eyes: Negative for photophobia and visual disturbance.   Respiratory: Positive for cough and wheezing. Negative for shortness of breath.    Cardiovascular: Positive for leg swelling. Negative for palpitations.   Musculoskeletal: Negative for back pain and joint swelling.   Skin: Positive for wound. Negative for rash.   Neurological: Negative for weakness, numbness and headaches.   Hematological: Bruises/bleeds easily.         Objective:        Physical Exam:           Right Ankle/Foot Exam   Right ankle exam is normal.    Left Ankle/Foot Exam     Range of Motion   Ankle Joint  Dorsiflexion:  5 abnormal   Plantar flexion:  5 abnormal   Sears Test:  positive  First MTP Joint: normal    Comments:  Dermatologic:  Ulcer location: posterior left lower leg stage 2  Measurements: 12.8cm X 1.7 cm X .2cm  Signs of infection: no signs of infection  Drainage: serous drainage  Periwound: no redness  Base: pink granulation tissue                 Muscle Strength   Left Lower Extremity   Anterior tibial:  2/5/5   Gastrocsoleus:  2/5/5    Reflexes     Left Side  Achilles:  0    Vascular Exam       Left Pulses  Dorsalis Pedis:   "    1+  Posterior Tibial:      1+          Physical Exam   Cardiovascular:   Pulses:       Dorsalis pedis pulses are 1+ on the left side.        Posterior tibial pulses are 1+ on the left side.   Musculoskeletal:        Left ankle: Achilles tendon exhibits abnormal Sears's test results.   Neurological:   Reflex Scores:       Achilles reflexes are 0 on the left side.      Imaging:   No results found.         Assessment and Plan:           Skin ulcer of left foot, limited to breakdown of skin - Left Foot    Type 2 diabetes mellitus with chronic kidney disease on chronic dialysis, with long-term current use of insulin    Other orders  -     honey (MEDIHONEY, HONEY,) 80 % Gel; Apply 1 Tube topically once daily.  Dispense: 1 Tube; Refill: 0    Follow-up in about 6 weeks (around 2/18/2019), or if symptoms worsen or fail to improve.  Continue home health wound care  Procedures   none    Counseling:   I provided patient education verbally regarding:   Patient diagnosis, treatment options, as well as alternatives, risks, and benefits.     This note was created using Dragon voice recognition software that occasionally misinterpreted phrases or words.

## 2019-02-18 ENCOUNTER — OFFICE VISIT (OUTPATIENT)
Dept: PODIATRY | Facility: CLINIC | Age: 59
End: 2019-02-18
Payer: MEDICARE

## 2019-02-18 VITALS
SYSTOLIC BLOOD PRESSURE: 134 MMHG | HEART RATE: 93 BPM | WEIGHT: 190 LBS | DIASTOLIC BLOOD PRESSURE: 84 MMHG | BODY MASS INDEX: 30.53 KG/M2 | HEIGHT: 66 IN

## 2019-02-18 DIAGNOSIS — L97.522 SKIN ULCER OF LEFT FOOT WITH FAT LAYER EXPOSED: Primary | ICD-10-CM

## 2019-02-18 PROCEDURE — 99213 PR OFFICE/OUTPT VISIT, EST, LEVL III, 20-29 MIN: ICD-10-PCS | Mod: ,,, | Performed by: PODIATRIST

## 2019-02-18 PROCEDURE — 99213 OFFICE O/P EST LOW 20 MIN: CPT | Mod: ,,, | Performed by: PODIATRIST

## 2019-02-18 NOTE — PROGRESS NOTES
"  [address] [facility] [address]  [place] [address]  Phone: [phone]   [phone]    Patient's PCP:JOS Dumont  Referring Provider: No ref. provider found    Subjective:      Chief Complaint:: Foot Ulcer (left lower leg)    LUCIO Ramos is a 58 y.o. female who presents with follow up ulcer left lower leg. Treatment to date have included daily dressing. Patients rates pain 0/10 on pain scale.    Systemic Doctor: JOS Mckinney  Date Last Seen:   Blood Sugar: 200  Hemoglobin A1c: 6.2    Vitals:    19 0951   BP: 134/84   Pulse: 93     Shoe Size:     Past Surgical History:   Procedure Laterality Date    ABCESS DRAINAGE Right     Between "little toe" and the one next to it    BREAST SURGERY      Reduction [id number]    CARDIAC SURGERY  2011    CABG 4vessel ring in one valve mitral valve prolapse    CORONARY ARTERY BYPASS GRAFT      DEBRIDEMENT-TENDON-ACHILLES Left 2017    [facility]    hyperlipidemia      TUBAL LIGATION  1986     Past Medical History:   Diagnosis Date    Anemia in stage 3 chronic kidney disease 2017    Anticoagulant long-term use     CHF (congestive heart failure)     COPD (chronic obstructive pulmonary disease)     Coronary artery disease     Diabetes mellitus     Encounter for blood transfusion     Essential hypertension 2017    Hypertension     MI (myocardial infarction)     Segmental and subsegmental pulmonary emboli of RLL without acute cor pulmonale 2017    Stroke     2005    Thyroid disease     Traumatic subarachnoid hemorrhage 2017    Type 2 diabetes mellitus with stage 3 chronic kidney disease, without long-term current use of insulin 2017     Family History   Problem Relation Age of Onset    Arthritis Mother     Heart disease Father     Cancer Father 68        prostae and bladder ca  in     Diabetes Father     Hypertension Father     " Depression Sister     Hypertension Son     Diabetes Maternal Grandmother         lost leg    Kidney disease Paternal Grandfather         had kidney removed    Stroke Paternal Grandfather         Social History:   Marital Status:   Alcohol History:  reports that she drinks about 0.6 - 1.2 oz of alcohol per week.  Tobacco History:  reports that  has never smoked. she has never used smokeless tobacco.  Drug History:  reports that she does not use drugs.    Review of patient's allergies indicates:  No Known Allergies    Current Outpatient Medications   Medication Sig Dispense Refill    albuterol (ACCUNEB) 1.25 mg/3 mL Nebu USE 1 VIAL EVERY 4 HOURS AS NEEDED FOR SHORTNESS OF BREATH/WHEEZING  1    apixaban 5 mg Tab Take 1 tablet (5 mg total) by mouth 2 (two) times daily. 60 tablet 1    blood sugar diagnostic Strp Test 3-4 times daily PRN      carvedilol (COREG) 3.125 MG tablet Take 3.125 mg by mouth 2 (two) times daily.  0    cetirizine (ZYRTEC) 10 MG tablet Take 1 tablet (10 mg total) by mouth every evening. 20 tablet 0    citalopram (CELEXA) 20 MG tablet Take 1 tablet (20 mg total) by mouth once daily.      fluticasone (FLONASE) 50 mcg/actuation nasal spray SPRAY 1 SPRAY IN EACH NOSTRIL DAILY  12    FOLIC ACID-B KNGY-Q-ISENO-ZINC 3-70-15 MG-MCG-MG ORAL TAB Take 1 tablet by mouth every other day.      gabapentin (NEURONTIN) 100 MG capsule Take 1 capsule (100 mg total) by mouth 3 (three) times daily. 90 capsule 1    honey (MEDIHONEY, HONEY,) 80 % Gel Apply 1 Tube topically once daily. 1 Tube 0    HYDROcodone-acetaminophen (NORCO) 5-325 mg per tablet Take 1 tablet by mouth every 6 (six) hours as needed for Pain.      insulin aspart (NOVOLOG) 100 unit/mL InPn pen Inject 1-10 Units into the skin 3 (three) times daily. 3 mL 1    insulin aspart protamine-insulin aspart (NOVOLOG 70/30) 100 unit/mL (70-30) InPn pen Inject into the skin.      levothyroxine (SYNTHROID) 50 MCG tablet Take 1 tablet (50 mcg  "total) by mouth before breakfast. 30 tablet 1    midodrine (PROAMATINE) 5 MG Tab Take 2.5 mg by mouth 3 (three) times daily with meals.      ondansetron (ZOFRAN-ODT) 4 MG TbDL LET 1 TABLET DISSOLVE ON TONGUE 4 TIMES A DAY AS NEEDED  0    pen needle, diabetic (BD ULTRA-FINE OBDULIA PEN NEEDLE) 32 gauge x 5/32" Ndle       promethazine (PHENERGAN) 12.5 MG Tab TAKE 1/2 TABLET EVERY 6 HOURS AS NEEDED  0    rosuvastatin (CRESTOR) 10 MG tablet Take 1 tablet (10 mg total) by mouth once daily. 30 tablet 1    sevelamer carbonate (RENVELA) 800 mg Tab Take 1,600 mg by mouth 3 (three) times daily with meals.       No current facility-administered medications for this visit.        Review of Systems      Objective:        Physical Exam:   Foot Exam    Left Foot/Ankle      Comments  6 cm x 2 cm x 3 mm deep grade 2 ulcer with pink granulation tissue some epithelialization. No signs of infection.    Physical Exam    Imaging:            Assessment:       1. Skin ulcer of left foot with fat layer exposed      Plan:   Skin ulcer of left foot with fat layer exposed  -     Ambulatory referral to Home Health    Patient will continue home health wound care 2-3 times a week. In the interim her  [place] and applying medi honey to the area. She will return to see me in 4 weeks. May consider biological membrane.  Follow-up in about 4 weeks (around 3/18/2019).    Procedures - None    Counseling:     I provided patient education verbally regarding:   Patient diagnosis, treatment options, as well as alternatives, risks, and benefits.   proper ulcer care and the possible need for serial debridements, topical medications, specific dressings and biological engineered skin substitutes if indicated.  This note was created using Dragon voice recognition software that occasionally misinterpreted phrases or words.               "

## 2019-03-18 ENCOUNTER — OFFICE VISIT (OUTPATIENT)
Dept: PODIATRY | Facility: CLINIC | Age: 59
End: 2019-03-18
Payer: MEDICARE

## 2019-03-18 VITALS
HEIGHT: 66 IN | HEART RATE: 78 BPM | SYSTOLIC BLOOD PRESSURE: 134 MMHG | DIASTOLIC BLOOD PRESSURE: 84 MMHG | WEIGHT: 190 LBS | BODY MASS INDEX: 30.53 KG/M2 | TEMPERATURE: 98 F

## 2019-03-18 DIAGNOSIS — N18.6 TYPE 2 DIABETES MELLITUS WITH CHRONIC KIDNEY DISEASE ON CHRONIC DIALYSIS, WITH LONG-TERM CURRENT USE OF INSULIN: ICD-10-CM

## 2019-03-18 DIAGNOSIS — E11.22 TYPE 2 DIABETES MELLITUS WITH CHRONIC KIDNEY DISEASE ON CHRONIC DIALYSIS, WITH LONG-TERM CURRENT USE OF INSULIN: ICD-10-CM

## 2019-03-18 DIAGNOSIS — L97.522 SKIN ULCER OF LEFT FOOT WITH FAT LAYER EXPOSED: Primary | ICD-10-CM

## 2019-03-18 DIAGNOSIS — Z99.2 TYPE 2 DIABETES MELLITUS WITH CHRONIC KIDNEY DISEASE ON CHRONIC DIALYSIS, WITH LONG-TERM CURRENT USE OF INSULIN: ICD-10-CM

## 2019-03-18 DIAGNOSIS — Z79.4 TYPE 2 DIABETES MELLITUS WITH CHRONIC KIDNEY DISEASE ON CHRONIC DIALYSIS, WITH LONG-TERM CURRENT USE OF INSULIN: ICD-10-CM

## 2019-03-18 PROCEDURE — 99213 PR OFFICE/OUTPT VISIT, EST, LEVL III, 20-29 MIN: ICD-10-PCS | Mod: ,,, | Performed by: PODIATRIST

## 2019-03-18 PROCEDURE — 99213 OFFICE O/P EST LOW 20 MIN: CPT | Mod: ,,, | Performed by: PODIATRIST

## 2019-03-18 NOTE — PROGRESS NOTES
"  [address] [facility] [address]  [place] [address]  Phone: [phone]   [phone]    Patient's PCP:JOS Dumont  Referring Provider: No ref. provider found    Subjective:      Chief Complaint:: Foot Ulcer (Left)    LUCIO Ramos is a 58 y.o. female who presents with a complaint of F/u foot ulcer left foot. Current symptoms include none.Treatment to date have included Home health every Wednesday,applies medihoney,dressing,Ace wrap, changes on Mondays and Fridays. Patients rates pain 0/10 on pain scale.    Systemic Doctor: JOS Mckinney  Date Last Seen: 12/18  Blood Sugar: 140-200  Hemoglobin A1c: 6.2      Vitals:    03/18/19 1016   BP: 134/84   Pulse: 78   Temp: 98.3 °F (36.8 °C)     Shoe Size: 8.5-9    Past Surgical History:   Procedure Laterality Date    ABCESS DRAINAGE Right     Between "little toe" and the one next to it    BREAST SURGERY      Reduction [id number]    CARDIAC SURGERY  01/2011    CABG 4vessel ring in one valve mitral valve prolapse    CORONARY ARTERY BYPASS GRAFT      DEBRIDEMENT-TENDON-ACHILLES Left 12/8/2017    [facility]formed by [facility] at [facility] OR    hyperlipidemia      TUBAL LIGATION  03/1986     Past Medical History:   Diagnosis Date    Anemia in stage 3 chronic kidney disease 11/26/2017    Anticoagulant long-term use     CHF (congestive heart failure)     COPD (chronic obstructive pulmonary disease)     Coronary artery disease     Diabetes mellitus     Encounter for blood transfusion     Essential hypertension 6/24/2017    Hypertension     MI (myocardial infarction) 2010    Segmental and subsegmental pulmonary emboli of RLL without acute cor pulmonale 11/25/2017    Stroke     July 2005    Thyroid disease     Traumatic subarachnoid hemorrhage 11/24/2017    Type 2 diabetes mellitus with stage 3 chronic kidney disease, without long-term current use of insulin 6/24/2017     Family History   Problem Relation Age of Onset    Arthritis " Mother     Heart disease Father     Cancer Father 68        prostae and bladder ca  in     Diabetes Father     Hypertension Father     Depression Sister     Hypertension Son     Diabetes Maternal Grandmother         lost leg    Kidney disease Paternal Grandfather         had kidney removed    Stroke Paternal Grandfather         Social History:   Marital Status:   Alcohol History:  reports that she drinks about 0.6 - 1.2 oz of alcohol per week.  Tobacco History:  reports that  has never smoked. she has never used smokeless tobacco.  Drug History:  reports that she does not use drugs.    Review of patient's allergies indicates:  No Known Allergies    Current Outpatient Medications   Medication Sig Dispense Refill    albuterol (ACCUNEB) 1.25 mg/3 mL Nebu USE 1 VIAL EVERY 4 HOURS AS NEEDED FOR SHORTNESS OF BREATH/WHEEZING  1    apixaban 5 mg Tab Take 1 tablet (5 mg total) by mouth 2 (two) times daily. 60 tablet 1    blood sugar diagnostic Strp Test 3-4 times daily PRN      carvedilol (COREG) 3.125 MG tablet Take 3.125 mg by mouth 2 (two) times daily.  0    citalopram (CELEXA) 20 MG tablet Take 1 tablet (20 mg total) by mouth once daily.      fluticasone (FLONASE) 50 mcg/actuation nasal spray SPRAY 1 SPRAY IN EACH NOSTRIL DAILY  12    FOLIC ACID-B OAYJ-V-WGBTG-ZINC 3-70-15 MG-MCG-MG ORAL TAB Take 1 tablet by mouth every other day.      honey (MEDIHONEY, HONEY,) 80 % Gel Apply 1 Tube topically once daily. 1 Tube 0    HYDROcodone-acetaminophen (NORCO) 5-325 mg per tablet Take 1 tablet by mouth every 6 (six) hours as needed for Pain.      insulin aspart protamine-insulin aspart (NOVOLOG 70/30) 100 unit/mL (70-30) InPn pen Inject into the skin.      midodrine (PROAMATINE) 5 MG Tab Take 2.5 mg by mouth 3 (three) times daily with meals.      ondansetron (ZOFRAN-ODT) 4 MG TbDL LET 1 TABLET DISSOLVE ON TONGUE 4 TIMES A DAY AS NEEDED  0    pen needle, diabetic (BD ULTRA-FINE OBDULIA PEN NEEDLE)  "32 gauge x 5/32" Ndle       promethazine (PHENERGAN) 12.5 MG Tab TAKE 1/2 TABLET EVERY 6 HOURS AS NEEDED  0    sevelamer carbonate (RENVELA) 800 mg Tab Take 1,600 mg by mouth 3 (three) times daily with meals.      cetirizine (ZYRTEC) 10 MG tablet Take 1 tablet (10 mg total) by mouth every evening. 20 tablet 0    gabapentin (NEURONTIN) 100 MG capsule Take 1 capsule (100 mg total) by mouth 3 (three) times daily. 90 capsule 1    insulin aspart (NOVOLOG) 100 unit/mL InPn pen Inject 1-10 Units into the skin 3 (three) times daily. 3 mL 1    levothyroxine (SYNTHROID) 50 MCG tablet Take 1 tablet (50 mcg total) by mouth before breakfast. 30 tablet 1    rosuvastatin (CRESTOR) 10 MG tablet Take 1 tablet (10 mg total) by mouth once daily. 30 tablet 1     No current facility-administered medications for this visit.        Review of Systems      Objective:        Physical Exam:   Foot Exam  Physical Exam   Musculoskeletal:        Feet:        Imaging:            Assessment:       1. Skin ulcer of left foot with fat layer exposed    2. Type 2 diabetes mellitus with chronic kidney disease on chronic dialysis, with long-term current use of insulin      Plan:   Skin ulcer of left foot with fat layer exposed  -     Ambulatory referral to Home Health    Type 2 diabetes mellitus with chronic kidney disease on chronic dialysis, with long-term current use of insulin  -     Ambulatory referral to Home Health    Continue local wound care with Medi honey nonadherent dressing and return to see me in 2 months for possible application of bioengineered skin substitute  Follow-up in about 2 months (around 5/18/2019).    Procedures - None    Counseling:     I provided patient education verbally regarding:   Patient diagnosis, treatment options, as well as alternatives, risks, and benefits.     This note was created using Dragon voice recognition software that occasionally misinterpreted phrases or words.               "

## 2019-04-10 ENCOUNTER — TELEPHONE (OUTPATIENT)
Dept: ADMINISTRATIVE | Facility: CLINIC | Age: 59
End: 2019-04-10

## 2019-04-10 NOTE — TELEPHONE ENCOUNTER
[facility] Health Rec[facility] 03/28/2019 [facility]. Dao Trujilol. Patient received  services.

## 2019-04-29 ENCOUNTER — OFFICE VISIT (OUTPATIENT)
Dept: PODIATRY | Facility: CLINIC | Age: 59
End: 2019-04-29
Payer: MEDICARE

## 2019-04-29 VITALS
SYSTOLIC BLOOD PRESSURE: 114 MMHG | BODY MASS INDEX: 30.53 KG/M2 | HEART RATE: 89 BPM | HEIGHT: 66 IN | DIASTOLIC BLOOD PRESSURE: 75 MMHG | WEIGHT: 190 LBS

## 2019-04-29 DIAGNOSIS — S91.302D OPEN WOUND OF LEFT HEEL, SUBSEQUENT ENCOUNTER: ICD-10-CM

## 2019-04-29 DIAGNOSIS — T14.8XXD WOUND HEALING, DELAYED: Primary | ICD-10-CM

## 2019-04-29 PROCEDURE — 99213 OFFICE O/P EST LOW 20 MIN: CPT | Mod: 25,,, | Performed by: PODIATRIST

## 2019-04-29 PROCEDURE — 99213 PR OFFICE/OUTPT VISIT, EST, LEVL III, 20-29 MIN: ICD-10-PCS | Mod: 25,,, | Performed by: PODIATRIST

## 2019-04-29 PROCEDURE — 97597 WOUND DEBRIDEMENT: ICD-10-PCS | Mod: ,,, | Performed by: PODIATRIST

## 2019-04-29 PROCEDURE — 97597 DBRDMT OPN WND 1ST 20 CM/<: CPT | Mod: ,,, | Performed by: PODIATRIST

## 2019-04-29 RX ORDER — BENZONATATE 100 MG/1
100 CAPSULE ORAL 3 TIMES DAILY PRN
COMMUNITY
Start: 2019-04-10 | End: 2020-01-01

## 2019-04-29 NOTE — PROGRESS NOTES
"  [address] [facility] [address]  [place] [address]  Phone: [phone]   [phone]    Patient's PCP:JOS Dumont  Referring Provider: No ref. provider found    Subjective:      Chief Complaint:: Foot Ulcer and Routine Foot Care    HPI  Juliane Ramos is a 59 y.o. female who presents with a follow up ulcer left heel.I would like my toenails trimmed also.    Systemic Doctor: JOS Dumont  Date Last Seen:   Blood Sugar:  140  Hemoglobin A1c: 6.2    There were no vitals filed for this visit.  Shoe Size:     Past Surgical History:   Procedure Laterality Date    ABCESS DRAINAGE Right     Between "little toe" and the one next to it    BREAST SURGERY      Reduction [id number]    CARDIAC SURGERY  2011    CABG 4vessel ring in one valve mitral valve prolapse    CORONARY ARTERY BYPASS GRAFT      DEBRIDEMENT-TENDON-ACHILLES Left 2017    Performed by Dennis Wick DPM at [facility] OR    hyperlipidemia      TUBAL LIGATION  1986     Past Medical History:   Diagnosis Date    Anemia in stage 3 chronic kidney disease 2017    Anticoagulant long-term use     CHF (congestive heart failure)     COPD (chronic obstructive pulmonary disease)     Coronary artery disease     Diabetes mellitus     Encounter for blood transfusion     Essential hypertension 2017    Hypertension     MI (myocardial infarction)     Segmental and subsegmental pulmonary emboli of RLL without acute cor pulmonale 2017    Stroke     2005    Thyroid disease     Traumatic subarachnoid hemorrhage 2017    Type 2 diabetes mellitus with stage 3 chronic kidney disease, without long-term current use of insulin 2017     Family History   Problem Relation Age of Onset    Arthritis Mother     Heart disease Father     Cancer Father 68        prostae and bladder ca  in     Diabetes Father     Hypertension Father     Depression Sister     Hypertension Son     Diabetes Maternal " Grandmother         lost leg    Kidney disease Paternal Grandfather         had kidney removed    Stroke Paternal Grandfather         Social History:   Marital Status:   Alcohol History:  reports that she drinks about 0.6 - 1.2 oz of alcohol per week.  Tobacco History:  reports that she has never smoked. She has never used smokeless tobacco.  Drug History:  reports that she does not use drugs.    Review of patient's allergies indicates:  No Known Allergies    Current Outpatient Medications   Medication Sig Dispense Refill    albuterol (ACCUNEB) 1.25 mg/3 mL Nebu USE 1 VIAL EVERY 4 HOURS AS NEEDED FOR SHORTNESS OF BREATH/WHEEZING  1    apixaban 5 mg Tab Take 1 tablet (5 mg total) by mouth 2 (two) times daily. 60 tablet 1    blood sugar diagnostic Strp Test 3-4 times daily PRN      carvedilol (COREG) 3.125 MG tablet Take 3.125 mg by mouth 2 (two) times daily.  0    cetirizine (ZYRTEC) 10 MG tablet Take 1 tablet (10 mg total) by mouth every evening. 20 tablet 0    citalopram (CELEXA) 20 MG tablet Take 1 tablet (20 mg total) by mouth once daily.      fluticasone (FLONASE) 50 mcg/actuation nasal spray SPRAY 1 SPRAY IN EACH NOSTRIL DAILY  12    FOLIC ACID-B XTHY-U-RGRMY-ZINC 3-70-15 MG-MCG-MG ORAL TAB Take 1 tablet by mouth every other day.      gabapentin (NEURONTIN) 100 MG capsule Take 1 capsule (100 mg total) by mouth 3 (three) times daily. 90 capsule 1    honey (MEDIHONEY, HONEY,) 80 % Gel Apply 1 Tube topically once daily. 1 Tube 0    HYDROcodone-acetaminophen (NORCO) 5-325 mg per tablet Take 1 tablet by mouth every 6 (six) hours as needed for Pain.      insulin aspart (NOVOLOG) 100 unit/mL InPn pen Inject 1-10 Units into the skin 3 (three) times daily. 3 mL 1    insulin aspart protamine-insulin aspart (NOVOLOG 70/30) 100 unit/mL (70-30) InPn pen Inject into the skin.      levothyroxine (SYNTHROID) 50 MCG tablet Take 1 tablet (50 mcg total) by mouth before breakfast. 30 tablet 1    midodrine  "(PROAMATINE) 5 MG Tab Take 2.5 mg by mouth 3 (three) times daily with meals.      ondansetron (ZOFRAN-ODT) 4 MG TbDL LET 1 TABLET DISSOLVE ON TONGUE 4 TIMES A DAY AS NEEDED  0    pen needle, diabetic (BD ULTRA-FINE OBDULIA PEN NEEDLE) 32 gauge x 5/32" Ndle       promethazine (PHENERGAN) 12.5 MG Tab TAKE 1/2 TABLET EVERY 6 HOURS AS NEEDED  0    rosuvastatin (CRESTOR) 10 MG tablet Take 1 tablet (10 mg total) by mouth once daily. 30 tablet 1    sevelamer carbonate (RENVELA) 800 mg Tab Take 1,600 mg by mouth 3 (three) times daily with meals.       No current facility-administered medications for this visit.        Review of Systems      Objective:        Physical Exam:   Foot Exam  Physical Exam   Musculoskeletal:        Legs:      Imaging:            Assessment:       No diagnosis found.  Plan:   There are no diagnoses linked to this encounter.  No follow-ups on file.    Wound Debridement  Date/Time: 4/29/2019 9:34 AM  Performed by: Dennis Wick DPM  Authorized by: Dennis Wick DPM     Time out: Immediately prior to procedure a "time out" was called to verify the correct patient, procedure, equipment, support staff and site/side marked as required.    Consent Done?:  Yes (Verbal)    Preparation: Patient was prepped and draped in usual sterile fashion    Local anesthesia used?: No      Wound Details:    Location:  Left leg    Type of Debridement:  Excisional       Length (cm):  5       Area (sq cm):  5       Width (cm):  1       Percent Debrided (%):  100       Depth (cm):  0.1       Total Area Debrided (sq cm):  5    Depth of debridement:  Epidermis/Dermis    Tissue debrided:  Epidermis and Dermis    Devitalized tissue debrided:  Biofilm and Slough    Instruments:  Curette    Bleeding:  Minimal  Method Used:  Pressure  Patient tolerance:  Patient tolerated the procedure well with no immediate complications     I will order home health to continue seeing the patient.     - None    Counseling:     I " provided patient education verbally regarding:   Patient diagnosis, treatment options, as well as alternatives, risks, and benefits.     This note was created using Dragon voice recognition software that occasionally misinterpreted phrases or words.

## 2019-04-29 NOTE — PROGRESS NOTES
"  [address] [facility] [address]  [place] [address]  Phone: [phone]   [phone]    Patient's PCP:JOS Dumont  Referring Provider: No ref. provider found    Subjective:      Chief Complaint:: Foot Ulcer and Routine Foot Care    HPI  Juliane Ramos is a 59 y.o. female who presents with a follow up ulcer left heel.I would like my toenails trimmed also.    Systemic Doctor: JOS Dumont  Date Last Seen:   Blood Sugar:  140  Hemoglobin A1c: 6.2    Vitals:    19 0907   BP: 114/75   Pulse: 89     Shoe Size:     Past Surgical History:   Procedure Laterality Date    ABCESS DRAINAGE Right     Between "little toe" and the one next to it    BREAST SURGERY      Reduction [id number]    CARDIAC SURGERY  2011    CABG 4vessel ring in one valve mitral valve prolapse    CORONARY ARTERY BYPASS GRAFT      DEBRIDEMENT-TENDON-ACHILLES Left 2017    Performed by Dennis Wick D[facility] at [facility] OR    hyperlipidemia      TUBAL LIGATION  1986     Past Medical History:   Diagnosis Date    Anemia in stage 3 chronic kidney disease 2017    Anticoagulant long-term use     CHF (congestive heart failure)     COPD (chronic obstructive pulmonary disease)     Coronary artery disease     Diabetes mellitus     Encounter for blood transfusion     Essential hypertension 2017    Hypertension     MI (myocardial infarction)     Segmental and subsegmental pulmonary emboli of RLL without acute cor pulmonale 2017    Stroke     2005    Thyroid disease     Traumatic subarachnoid hemorrhage 2017    Type 2 diabetes mellitus with stage 3 chronic kidney disease, without long-term current use of insulin 2017     Family History   Problem Relation Age of Onset    Arthritis Mother     Heart disease Father     Cancer Father 68        prostae and bladder ca  in     Diabetes Father     Hypertension Father     Depression Sister     Hypertension Son     Diabetes " Maternal Grandmother         lost leg    Kidney disease Paternal Grandfather         had kidney removed    Stroke Paternal Grandfather         Social History:   Marital Status:   Alcohol History:  reports that she drinks about 0.6 - 1.2 oz of alcohol per week.  Tobacco History:  reports that she has never smoked. She has never used smokeless tobacco.  Drug History:  reports that she does not use drugs.    Review of patient's allergies indicates:  No Known Allergies    Current Outpatient Medications   Medication Sig Dispense Refill    benzonatate (TESSALON PERLES) 100 MG capsule Take 100 mg by mouth 3 (three) times daily as needed.      albuterol (ACCUNEB) 1.25 mg/3 mL Nebu USE 1 VIAL EVERY 4 HOURS AS NEEDED FOR SHORTNESS OF BREATH/WHEEZING  1    apixaban 5 mg Tab Take 1 tablet (5 mg total) by mouth 2 (two) times daily. 60 tablet 1    blood sugar diagnostic Strp Test 3-4 times daily PRN      carvedilol (COREG) 3.125 MG tablet Take 3.125 mg by mouth 2 (two) times daily.  0    cetirizine (ZYRTEC) 10 MG tablet Take 1 tablet (10 mg total) by mouth every evening. 20 tablet 0    citalopram (CELEXA) 20 MG tablet Take 1 tablet (20 mg total) by mouth once daily.      fluticasone (FLONASE) 50 mcg/actuation nasal spray SPRAY 1 SPRAY IN EACH NOSTRIL DAILY  12    FOLIC ACID-B NFTP-K-HNPNC-ZINC 3-70-15 MG-MCG-MG ORAL TAB Take 1 tablet by mouth every other day.      gabapentin (NEURONTIN) 100 MG capsule Take 1 capsule (100 mg total) by mouth 3 (three) times daily. 90 capsule 1    honey (MEDIHONEY, HONEY,) 80 % Gel Apply 1 Tube topically once daily. 1 Tube 0    HYDROcodone-acetaminophen (NORCO) 5-325 mg per tablet Take 1 tablet by mouth every 6 (six) hours as needed for Pain.      insulin aspart (NOVOLOG) 100 unit/mL InPn pen Inject 1-10 Units into the skin 3 (three) times daily. 3 mL 1    insulin aspart protamine-insulin aspart (NOVOLOG 70/30) 100 unit/mL (70-30) InPn pen Inject into the skin.       "levothyroxine (SYNTHROID) 50 MCG tablet Take 1 tablet (50 mcg total) by mouth before breakfast. 30 tablet 1    midodrine (PROAMATINE) 5 MG Tab Take 2.5 mg by mouth 3 (three) times daily with meals.      ondansetron (ZOFRAN-ODT) 4 MG TbDL LET 1 TABLET DISSOLVE ON TONGUE 4 TIMES A DAY AS NEEDED  0    pen needle, diabetic (BD ULTRA-FINE OBDULIA PEN NEEDLE) 32 gauge x 5/32" Ndle       promethazine (PHENERGAN) 12.5 MG Tab TAKE 1/2 TABLET EVERY 6 HOURS AS NEEDED  0    rosuvastatin (CRESTOR) 10 MG tablet Take 1 tablet (10 mg total) by mouth once daily. 30 tablet 1    sevelamer carbonate (RENVELA) 800 mg Tab Take 1,600 mg by mouth 3 (three) times daily with meals.       No current facility-administered medications for this visit.        Review of Systems      Objective:        Physical Exam:   Foot Exam  Physical Exam   Musculoskeletal:        Legs:      Imaging:            Assessment:       1. Wound healing, delayed    2. Open wound of left heel, subsequent encounter      Plan:   Wound healing, delayed  -     Wound Debridement  -     Ambulatory referral to Home Health    Open wound of left heel, subsequent encounter  -     Wound Debridement  -     Ambulatory referral to Home Health      Follow up in about 2 months (around 6/29/2019).    Wound Debridement  Date/Time: 4/29/2019 9:34 AM  Performed by: Dennis Wick DPM  Authorized by: Dennis Wick DPM     Time out: Immediately prior to procedure a "time out" was called to verify the correct patient, procedure, equipment, support staff and site/side marked as required.    Consent Done?:  Yes (Verbal)    Preparation: Patient was prepped and draped in usual sterile fashion    Local anesthesia used?: No      Wound Details:    Location:  Left leg    Type of Debridement:  Excisional       Length (cm):  5       Area (sq cm):  5       Width (cm):  1       Percent Debrided (%):  100       Depth (cm):  0.1       Total Area Debrided (sq cm):  5    Depth of debridement:  " Epidermis/Dermis    Tissue debrided:  Epidermis and Dermis    Devitalized tissue debrided:  Biofilm and Slough    Instruments:  Curette    Bleeding:  Minimal  Method Used:  Pressure  Patient tolerance:  Patient tolerated the procedure well with no immediate complications     I will order home health to continue seeing the patient.     - None    Counseling:     I provided patient education verbally regarding:   Patient diagnosis, treatment options, as well as alternatives, risks, and benefits.     This note was created using Dragon voice recognition software that occasionally misinterpreted phrases or words.

## 2019-06-24 ENCOUNTER — OFFICE VISIT (OUTPATIENT)
Dept: PODIATRY | Facility: CLINIC | Age: 59
End: 2019-06-24
Payer: MEDICARE

## 2019-06-24 ENCOUNTER — TELEPHONE (OUTPATIENT)
Dept: PODIATRY | Facility: CLINIC | Age: 59
End: 2019-06-24

## 2019-06-24 VITALS
HEIGHT: 66 IN | SYSTOLIC BLOOD PRESSURE: 103 MMHG | DIASTOLIC BLOOD PRESSURE: 72 MMHG | HEART RATE: 97 BPM | WEIGHT: 190 LBS | BODY MASS INDEX: 30.53 KG/M2

## 2019-06-24 DIAGNOSIS — T14.8XXD WOUND HEALING, DELAYED: ICD-10-CM

## 2019-06-24 DIAGNOSIS — L97.521 SKIN ULCER OF LEFT FOOT, LIMITED TO BREAKDOWN OF SKIN: Primary | ICD-10-CM

## 2019-06-24 DIAGNOSIS — E11.49 TYPE II DIABETES MELLITUS WITH NEUROLOGICAL MANIFESTATIONS: ICD-10-CM

## 2019-06-24 DIAGNOSIS — S91.302D OPEN WOUND OF LEFT HEEL, SUBSEQUENT ENCOUNTER: Primary | ICD-10-CM

## 2019-06-24 PROCEDURE — 99213 PR OFFICE/OUTPT VISIT, EST, LEVL III, 20-29 MIN: ICD-10-PCS | Mod: 25,,, | Performed by: PODIATRIST

## 2019-06-24 PROCEDURE — 11042 WOUND DEBRIDEMENT: ICD-10-PCS | Mod: ,,, | Performed by: PODIATRIST

## 2019-06-24 PROCEDURE — 99213 OFFICE O/P EST LOW 20 MIN: CPT | Mod: 25,,, | Performed by: PODIATRIST

## 2019-06-24 PROCEDURE — 11042 DBRDMT SUBQ TIS 1ST 20SQCM/<: CPT | Mod: ,,, | Performed by: PODIATRIST

## 2019-06-24 NOTE — PROGRESS NOTES
"  [address] [facility] [address]  [place] [address]  Phone: [phone]   [phone]    Patient's PCP:JOS Dumont  Referring Provider: No ref. provider found    Subjective:      Chief Complaint:: Foot Ulcer (left foot healing )    LUCIO Ramos is a 59 y.o. female who presents with a complaint of foot ulcer left foot l include        Systemic Doctor: JOS Dumont  Date Last Seen:   Blood Sugar:  140  Hemoglobin A1c: 6.2    Vitals:    19 0858   BP: 103/72   Pulse: 97     Shoe Size: 8.5 / 9    Past Surgical History:   Procedure Laterality Date    ABCESS DRAINAGE Right     Between "little toe" and the one next to it    BREAST SURGERY      Reduction [id number]    CARDIAC SURGERY  2011    CABG 4vessel ring in one valve mitral valve prolapse    CORONARY ARTERY BYPASS GRAFT      DEBRIDEMENT-TENDON-ACHILLES Left 2017    Performed by Dennis [facility] D[facility] at [facility] OR    hyperlipidemia      TUBAL LIGATION  1986     Past Medical History:   Diagnosis Date    Anemia in stage 3 chronic kidney disease 2017    Anticoagulant long-term use     CHF (congestive heart failure)     COPD (chronic obstructive pulmonary disease)     Coronary artery disease     Diabetes mellitus     Encounter for blood transfusion     Essential hypertension 2017    Hypertension     MI (myocardial infarction)     Segmental and subsegmental pulmonary emboli of RLL without acute cor pulmonale 2017    Stroke     2005    Thyroid disease     Traumatic subarachnoid hemorrhage 2017    Type 2 diabetes mellitus with stage 3 chronic kidney disease, without long-term current use of insulin 2017     Family History   Problem Relation Age of Onset    Arthritis Mother     Heart disease Father     Cancer Father 68        prostae and bladder ca  in     Diabetes Father     Hypertension Father     Depression Sister     Hypertension Son     Diabetes Maternal " Grandmother         lost leg    Kidney disease Paternal Grandfather         had kidney removed    Stroke Paternal Grandfather         Social History:   Marital Status:   Alcohol History:  reports that she drinks about 0.6 - 1.2 oz of alcohol per week.  Tobacco History:  reports that she has never smoked. She has never used smokeless tobacco.  Drug History:  reports that she does not use drugs.    Review of patient's allergies indicates:  No Known Allergies    Current Outpatient Medications   Medication Sig Dispense Refill    albuterol (ACCUNEB) 1.25 mg/3 mL Nebu USE 1 VIAL EVERY 4 HOURS AS NEEDED FOR SHORTNESS OF BREATH/WHEEZING  1    apixaban 5 mg Tab Take 1 tablet (5 mg total) by mouth 2 (two) times daily. 60 tablet 1    benzonatate (TESSALON PERLES) 100 MG capsule Take 100 mg by mouth 3 (three) times daily as needed.      blood sugar diagnostic Strp Test 3-4 times daily PRN      carvedilol (COREG) 3.125 MG tablet Take 3.125 mg by mouth 2 (two) times daily.  0    cetirizine (ZYRTEC) 10 MG tablet Take 1 tablet (10 mg total) by mouth every evening. 20 tablet 0    citalopram (CELEXA) 20 MG tablet Take 1 tablet (20 mg total) by mouth once daily.      fluticasone (FLONASE) 50 mcg/actuation nasal spray SPRAY 1 SPRAY IN EACH NOSTRIL DAILY  12    FOLIC ACID-B KQMI-C-FYMFO-ZINC 3-70-15 MG-MCG-MG ORAL TAB Take 1 tablet by mouth every other day.      gabapentin (NEURONTIN) 100 MG capsule Take 1 capsule (100 mg total) by mouth 3 (three) times daily. 90 capsule 1    honey (MEDIHONEY, HONEY,) 80 % Gel Apply 1 Tube topically once daily. 1 Tube 0    HYDROcodone-acetaminophen (NORCO) 5-325 mg per tablet Take 1 tablet by mouth every 6 (six) hours as needed for Pain.      insulin aspart (NOVOLOG) 100 unit/mL InPn pen Inject 1-10 Units into the skin 3 (three) times daily. 3 mL 1    insulin aspart protamine-insulin aspart (NOVOLOG 70/30) 100 unit/mL (70-30) InPn pen Inject into the skin.      levothyroxine  "(SYNTHROID) 50 MCG tablet Take 1 tablet (50 mcg total) by mouth before breakfast. 30 tablet 1    midodrine (PROAMATINE) 5 MG Tab Take 2.5 mg by mouth 3 (three) times daily with meals.      ondansetron (ZOFRAN-ODT) 4 MG TbDL LET 1 TABLET DISSOLVE ON TONGUE 4 TIMES A DAY AS NEEDED  0    pen needle, diabetic (BD ULTRA-FINE OBDULIA PEN NEEDLE) 32 gauge x 5/32" Ndle       promethazine (PHENERGAN) 12.5 MG Tab TAKE 1/2 TABLET EVERY 6 HOURS AS NEEDED  0    rosuvastatin (CRESTOR) 10 MG tablet Take 1 tablet (10 mg total) by mouth once daily. 30 tablet 1    sevelamer carbonate (RENVELA) 800 mg Tab Take 1,600 mg by mouth 3 (three) times daily with meals.       No current facility-administered medications for this visit.        Review of Systems      Objective:        Physical Exam:   Foot Exam    Left Foot/Ankle      Neurovascular  Dorsalis pedis: 2+  Posterior tibial: 2+  Saphenous nerve sensation: absent  Tibial nerve sensation: absent  Superficial peroneal nerve sensation: absent  Deep peroneal nerve sensation: absent  Sural nerve sensation: absent    Muscle Strength  Ankle dorsiflexion: 4  Ankle plantar flexion: 3          Physical Exam   Cardiovascular:   Pulses:       Dorsalis pedis pulses are 2+ on the left side.        Posterior tibial pulses are 2+ on the left side.   Musculoskeletal:        Feet:        Imaging:            Assessment:       1. Wound healing, delayed    2. Skin ulcer of left foot, limited to breakdown of skin    3. Type II diabetes mellitus with neurological manifestations      Plan:   Wound healing, delayed    Skin ulcer of left foot, limited to breakdown of skin    Type II diabetes mellitus with neurological manifestations    Other orders  -     Wound Debridement    Patient will continue home health will local wound care. We're going to check with one of the companies that makes the biological membranes a see which one is covered by Medicare to be applied in the office to help stimulate healing. " "Been treating this ulcer for months as become a little stagnant and healing along tried stimulate healing with some sort of biological membrane. We'll contact the person on no one we are able to do this in the office. If not covered the office will be done outpatient hospital.  No follow-ups on file.    Wound Debridement  Date/Time: 6/24/2019 10:28 AM  Performed by: Dennis Wick DPM  Authorized by: Dennis Wick DPM     Time out: Immediately prior to procedure a "time out" was called to verify the correct patient, procedure, equipment, support staff and site/side marked as required.    Consent Done?:  Yes (Verbal)    Preparation: Patient was prepped and draped in usual sterile fashion    Local anesthesia used?: No      Wound Details:    Location:  Left foot    Location:  Left Ankle    Type of Debridement:  Excisional       Length (cm):  4.2       Area (sq cm):  8.4       Width (cm):  2       Percent Debrided (%):  100       Depth (cm):  0.2       Total Area Debrided (sq cm):  8.4    Depth of debridement:  Subcutaneous tissue    Tissue debrided:  Dermis, Epidermis and Subcutaneous    Devitalized tissue debrided:  Biofilm, Exudate, Fibrin, Necrotic/Eschar and Slough    Instruments:  Curette and Nippers    Bleeding:  Minimal  Hemostasis Achieved: Yes    Method Used:  Pressure and Alginate  Patient tolerance:  Patient tolerated the procedure well with no immediate complications     - None    Counseling:     I provided patient education verbally regarding:   Patient diagnosis, treatment options, as well as alternatives, risks, and benefits.     This note was created using Dragon voice recognition software that occasionally misinterpreted phrases or words.               "

## 2019-06-24 NOTE — TELEPHONE ENCOUNTER
Home health order is attached.  I copied the previous home health order from 04/29/19 office visit. Please sign.      Additional: I s/w Jerod regarding a biological membrane that medicare approves that we could put on the ulcer in office.    Jerod said we can use a trials and he recommended [employer] which is a dehydrated amniotic membrane.  Said that this works pretty good.    All he would have to do is get dr gudino to sign a form stating he would like to participate in the trial and then get the trial for us.      I told Jerod I would get with you and let him know what your thoughts were.  Please advise.

## 2019-06-24 NOTE — TELEPHONE ENCOUNTER
----- Message from Vale Hinojosa sent at 6/24/2019  4:39 PM CDT -----  Contact: self  Patient needs another order for home health to continue. Please fax to [facility].  Thank you,  Vale

## 2019-07-03 ENCOUNTER — TELEPHONE (OUTPATIENT)
Dept: PODIATRY | Facility: CLINIC | Age: 59
End: 2019-07-03

## 2019-07-03 DIAGNOSIS — L97.522 SKIN ULCER OF LEFT FOOT WITH FAT LAYER EXPOSED: Primary | ICD-10-CM

## 2019-07-03 DIAGNOSIS — S91.302D OPEN WOUND OF LEFT HEEL, SUBSEQUENT ENCOUNTER: ICD-10-CM

## 2019-07-03 DIAGNOSIS — E11.49 TYPE II DIABETES MELLITUS WITH NEUROLOGICAL MANIFESTATIONS: ICD-10-CM

## 2019-07-10 LAB
ALBUMIN SERPL-MCNC: 3.6 G/DL (ref 3.1–4.7)
ALP SERPL-CCNC: 74 IU/L (ref 40–104)
ALT (SGPT): 18 IU/L (ref 3–33)
AST SERPL-CCNC: 18 IU/L (ref 10–40)
BILIRUB SERPL-MCNC: 0.3 MG/DL (ref 0.3–1)
BUN SERPL-MCNC: 30 MG/DL (ref 8–20)
CALCIUM SERPL-MCNC: 8.9 MG/DL (ref 7.7–10.4)
CHLORIDE: 93 MMOL/L (ref 98–110)
CO2 SERPL-SCNC: 31.2 MMOL/L (ref 22.8–31.6)
CREATININE: 3.12 MG/DL (ref 0.6–1.4)
GLUCOSE: 187 MG/DL (ref 70–99)
HCT VFR BLD AUTO: 40 % (ref 36–48)
HGB BLD-MCNC: 12.4 G/DL (ref 12–15)
MCH RBC QN AUTO: 29.1 PG (ref 25–35)
MCHC RBC AUTO-ENTMCNC: 31 G/DL (ref 31–36)
MCV RBC AUTO: 93.9 FL (ref 79–98)
NUCLEATED RBCS: 0 %
PLATELET # BLD AUTO: 188 K/UL (ref 140–440)
POTASSIUM SERPL-SCNC: 3.7 MMOL/L (ref 3.5–5)
PROT SERPL-MCNC: 7 G/DL (ref 6–8.2)
RBC # BLD AUTO: 4.26 M/UL (ref 3.5–5.5)
SODIUM: 135 MMOL/L (ref 134–144)
WBC # BLD AUTO: 6.9 K/UL (ref 5–10)

## 2019-07-11 ENCOUNTER — TELEPHONE (OUTPATIENT)
Dept: PODIATRY | Facility: CLINIC | Age: 59
End: 2019-07-11

## 2019-07-11 NOTE — TELEPHONE ENCOUNTER
Yany in Pre-Admit called to let us know that we ordered a U/A for sx tomorrow but pt is a dialysis pt and was unable to void.    Do you need the U/A or is it still okay to proceed with the sx tomorrow?

## 2019-07-12 LAB
BUN SERPL-MCNC: 29 MG/DL (ref 8–20)
CALCIUM SERPL-MCNC: 8.7 MG/DL (ref 7.7–10.4)
CHLORIDE: 95 MMOL/L (ref 98–110)
CO2 SERPL-SCNC: 25.8 MMOL/L (ref 22.8–31.6)
CREATININE: 3.18 MG/DL (ref 0.6–1.4)
GLUCOSE: 141 MG/DL (ref 70–99)
POTASSIUM SERPL-SCNC: 3.1 MMOL/L (ref 3.5–5)
SODIUM: 133 MMOL/L (ref 134–144)

## 2019-07-16 ENCOUNTER — OFFICE VISIT (OUTPATIENT)
Dept: PODIATRY | Facility: CLINIC | Age: 59
End: 2019-07-16
Payer: MEDICARE

## 2019-07-16 VITALS
BODY MASS INDEX: 30.53 KG/M2 | HEIGHT: 66 IN | SYSTOLIC BLOOD PRESSURE: 102 MMHG | WEIGHT: 190 LBS | HEART RATE: 99 BPM | DIASTOLIC BLOOD PRESSURE: 60 MMHG

## 2019-07-16 DIAGNOSIS — E11.49 TYPE II DIABETES MELLITUS WITH NEUROLOGICAL MANIFESTATIONS: ICD-10-CM

## 2019-07-16 DIAGNOSIS — S91.302D OPEN WOUND OF LEFT HEEL, SUBSEQUENT ENCOUNTER: Primary | ICD-10-CM

## 2019-07-16 DIAGNOSIS — T14.8XXD WOUND HEALING, DELAYED: ICD-10-CM

## 2019-07-16 PROCEDURE — 99212 OFFICE O/P EST SF 10 MIN: CPT | Mod: ,,, | Performed by: PODIATRIST

## 2019-07-16 PROCEDURE — 99212 PR OFFICE/OUTPT VISIT, EST, LEVL II, 10-19 MIN: ICD-10-PCS | Mod: ,,, | Performed by: PODIATRIST

## 2019-07-16 RX ORDER — CEPHALEXIN 500 MG/1
500 CAPSULE ORAL 2 TIMES DAILY
Refills: 0 | COMMUNITY
Start: 2019-07-12 | End: 2019-07-22

## 2019-07-16 RX ORDER — ASCORBIC ACID, THIAMINE, RIBOFLAVIN, NIACINAMIDE, PYRIDOXINE, FOLIC ACID, COBALAMIN, BIOTIN, PANTOTHENIC ACID 100; 1.5; 1.7; 20; 10; 1; 6; 300; 1 MG/1; MG/1; MG/1; MG/1; MG/1; MG/1; UG/1; UG/1; MG/1
1 TABLET, COATED ORAL 2 TIMES DAILY
Refills: 1 | COMMUNITY
Start: 2019-05-10 | End: 2020-01-01

## 2019-07-16 RX ORDER — LORATADINE PSEUDOEPHEDRINE SULFATE 10; 240 MG/1; MG/1
1 TABLET, EXTENDED RELEASE ORAL 2 TIMES DAILY
Refills: 0 | COMMUNITY
Start: 2019-07-10 | End: 2019-01-01

## 2019-07-16 NOTE — PROGRESS NOTES
"  [address] [facility] [address]  [place] [address]  Phone: [phone]   [phone]    Patient's PCP:JOS Dumont  Referring Provider: No ref. provider found    Subjective:      Chief Complaint:: Follow-up (tereso of aplifraph left)    LUCIO Ramos is a 59 y.o. female who presents with a follow up from application of apligraph left lower leg on 19.  Systemic Doctor:JOS Dumont  Date Last Seen:  Blood Sugar: 130-140  Hemoglobin A1c: 6.2    Vitals:    19 1135   BP: 102/60   Pulse: 99     Shoe Size:     Past Surgical History:   Procedure Laterality Date    ABCESS DRAINAGE Right     Between "little toe" and the one next to it    BREAST SURGERY      Reduction [id number]    CARDIAC SURGERY  2011    CABG 4vessel ring in one valve mitral valve prolapse    CORONARY ARTERY BYPASS GRAFT      DEBRIDEMENT-TENDON-ACHILLES Left 2017    Performed by Dennis [facility] D[facility] at [facility] OR    hyperlipidemia      TUBAL LIGATION  1986     Past Medical History:   Diagnosis Date    Anemia in stage 3 chronic kidney disease 2017    Anticoagulant long-term use     CHF (congestive heart failure)     COPD (chronic obstructive pulmonary disease)     Coronary artery disease     Diabetes mellitus     Encounter for blood transfusion     Essential hypertension 2017    Hypertension     MI (myocardial infarction)     Segmental and subsegmental pulmonary emboli of RLL without acute cor pulmonale 2017    Stroke     2005    Thyroid disease     Traumatic subarachnoid hemorrhage 2017    Type 2 diabetes mellitus with stage 3 chronic kidney disease, without long-term current use of insulin 2017     Family History   Problem Relation Age of Onset    Arthritis Mother     Heart disease Father     Cancer Father 68        prostae and bladder ca  in     Diabetes Father     Hypertension Father     Depression Sister     Hypertension Son     " Diabetes Maternal Grandmother         lost leg    Kidney disease Paternal Grandfather         had kidney removed    Stroke Paternal Grandfather         Social History:   Marital Status:   Alcohol History:  reports that she drinks about 0.6 - 1.2 oz of alcohol per week.  Tobacco History:  reports that she has never smoked. She has never used smokeless tobacco.  Drug History:  reports that she does not use drugs.    Review of patient's allergies indicates:  No Known Allergies    Current Outpatient Medications   Medication Sig Dispense Refill    albuterol (ACCUNEB) 1.25 mg/3 mL Nebu USE 1 VIAL EVERY 4 HOURS AS NEEDED FOR SHORTNESS OF BREATH/WHEEZING  1    apixaban 5 mg Tab Take 1 tablet (5 mg total) by mouth 2 (two) times daily. 60 tablet 1    benzonatate (TESSALON PERLES) 100 MG capsule Take 100 mg by mouth 3 (three) times daily as needed.      blood sugar diagnostic Strp Test 3-4 times daily PRN      carvedilol (COREG) 3.125 MG tablet Take 3.125 mg by mouth 2 (two) times daily.  0    cephALEXin (KEFLEX) 500 MG capsule Take 500 mg by mouth 2 (two) times daily.  0    cetirizine (ZYRTEC) 10 MG tablet Take 1 tablet (10 mg total) by mouth every evening. 20 tablet 0    citalopram (CELEXA) 20 MG tablet Take 1 tablet (20 mg total) by mouth once daily.      CLARITIN-D 24 HOUR  mg per 24 hr tablet Take 1 tablet by mouth 2 (two) times daily.  0    DIALYVITE 100-1 mg Tab Take 1 tablet by mouth once daily.  1    fluticasone (FLONASE) 50 mcg/actuation nasal spray SPRAY 1 SPRAY IN EACH NOSTRIL DAILY  12    FOLIC ACID-B [id number] 3-70-15 MG-MCG-MG ORAL TAB Take 1 tablet by mouth every other day.      gabapentin (NEURONTIN) 100 MG capsule Take 1 capsule (100 mg total) by mouth 3 (three) times daily. 90 capsule 1    honey (MEDIHONEY, HONEY,) 80 % Gel Apply 1 Tube topically once daily. 1 Tube 0    HYDROcodone-acetaminophen (NORCO) 5-325 mg per tablet Take 1 tablet by mouth every 6 (six) hours as  "needed for Pain.      insulin aspart (NOVOLOG) 100 unit/mL InPn pen Inject 1-10 Units into the skin 3 (three) times daily. 3 mL 1    insulin aspart protamine-insulin aspart (NOVOLOG 70/30) 100 unit/mL (70-30) InPn pen Inject into the skin.      levothyroxine (SYNTHROID) 50 MCG tablet Take 1 tablet (50 mcg total) by mouth before breakfast. 30 tablet 1    midodrine (PROAMATINE) 5 MG Tab Take 2.5 mg by mouth 3 (three) times daily with meals.      ondansetron (ZOFRAN-ODT) 4 MG TbDL LET 1 TABLET DISSOLVE ON TONGUE 4 TIMES A DAY AS NEEDED  0    pen needle, diabetic (BD ULTRA-FINE OBDULIA PEN NEEDLE) 32 gauge x 5/32" Ndle       promethazine (PHENERGAN) 25 MG tablet Take 1 tablet (25 mg total) by mouth every 6 (six) hours as needed for Nausea. 30 tablet 0    rosuvastatin (CRESTOR) 10 MG tablet Take 1 tablet (10 mg total) by mouth once daily. 30 tablet 1    sevelamer carbonate (RENVELA) 800 mg Tab Take 1,600 mg by mouth 3 (three) times daily with meals.       No current facility-administered medications for this visit.        Review of Systems      Objective:        Physical Exam:   Foot Exam  Physical Exam   Musculoskeletal:        Feet:        Imaging:            Assessment:       1. Open wound of left heel, subsequent encounter    2. Type II diabetes mellitus with neurological manifestations    3. Wound healing, delayed      Plan:   Open wound of left heel, subsequent encounter    Type II diabetes mellitus with neurological manifestations    Wound healing, delayed    The area was redressed and dressing will remain intact and she sees me in one week. Return visit one week  Follow up in about 1 week (around 7/23/2019) for f/u tereso of apligraph 7-12-19.    Procedures - None    Counseling:     I provided patient education verbally regarding:   Patient diagnosis, treatment options, as well as alternatives, risks, and benefits.     This note was created using Dragon voice recognition software that occasionally " misinterpreted phrases or words.

## 2019-07-22 ENCOUNTER — OFFICE VISIT (OUTPATIENT)
Dept: PODIATRY | Facility: CLINIC | Age: 59
End: 2019-07-22
Payer: MEDICARE

## 2019-07-22 VITALS
HEART RATE: 93 BPM | HEIGHT: 66 IN | WEIGHT: 187 LBS | SYSTOLIC BLOOD PRESSURE: 124 MMHG | RESPIRATION RATE: 16 BRPM | BODY MASS INDEX: 30.05 KG/M2 | DIASTOLIC BLOOD PRESSURE: 84 MMHG

## 2019-07-22 DIAGNOSIS — E11.51 TYPE II DIABETES MELLITUS WITH PERIPHERAL CIRCULATORY DISORDER: ICD-10-CM

## 2019-07-22 DIAGNOSIS — T14.8XXD WOUND HEALING, DELAYED: Primary | ICD-10-CM

## 2019-07-22 DIAGNOSIS — L97.521 SKIN ULCER OF LEFT FOOT, LIMITED TO BREAKDOWN OF SKIN: ICD-10-CM

## 2019-07-22 PROCEDURE — 99213 PR OFFICE/OUTPT VISIT, EST, LEVL III, 20-29 MIN: ICD-10-PCS | Mod: ,,, | Performed by: PODIATRIST

## 2019-07-22 PROCEDURE — 99213 OFFICE O/P EST LOW 20 MIN: CPT | Mod: ,,, | Performed by: PODIATRIST

## 2019-07-22 RX ORDER — LEVOTHYROXINE SODIUM 50 UG/1
50 TABLET ORAL
Status: ON HOLD | COMMUNITY
Start: 2019-07-20 | End: 2020-01-01 | Stop reason: HOSPADM

## 2019-07-22 RX ORDER — ROSUVASTATIN CALCIUM 10 MG/1
10 TABLET, COATED ORAL NIGHTLY
Status: ON HOLD | COMMUNITY
Start: 2019-07-20 | End: 2020-01-01 | Stop reason: HOSPADM

## 2019-07-22 NOTE — PROGRESS NOTES
"  [address] [facility] [address]  [place] [address]  Phone: [phone]   [phone]    Patient's PCP:JOS Dumont  Referring Provider: No ref. provider found    Subjective:      Chief Complaint:: Follow-up (graft lt foot )    LUCIO Ramos is a 59 y.o. female who presents for follow up of graft on left foot. Symptoms have improve.  Patients rates pain 0/10 on pain scale.    Systemic Doctor: Dr. Escalante  Date Last Seen: 4/10/2019  Blood Sugar:  145  Hemoglobin A1c: 6.2    Vitals:    19 0936   BP: 124/84   Pulse: 93   Resp: 16     Shoe Size:     Past Surgical History:   Procedure Laterality Date    ABCESS DRAINAGE Right     Between "little toe" and the one next to it    BREAST SURGERY      Reduction [id number]    CARDIAC SURGERY  2011    CABG 4vessel ring in one valve mitral valve prolapse    CORONARY ARTERY BYPASS GRAFT      DEBRIDEMENT-TENDON-ACHILLES Left 2017    [facility]formed [facility] OR    hyperlipidemia      TUBAL LIGATION  1986     Past Medical History:   Diagnosis Date    Anemia in stage 3 chronic kidney disease 2017    Anticoagulant long-term use     CHF (congestive heart failure)     COPD (chronic obstructive pulmonary disease)     Coronary artery disease     Diabetes mellitus     Encounter for blood transfusion     Essential hypertension 2017    Hypertension     MI (myocardial infarction)     Segmental and subsegmental pulmonary emboli of RLL without acute cor pulmonale 2017    Stroke     2005    Thyroid disease     Traumatic subarachnoid hemorrhage 2017    Type 2 diabetes mellitus with stage 3 chronic kidney disease, without long-term current use of insulin 2017     Family History   Problem Relation Age of Onset    Arthritis Mother     Heart disease Father     Cancer Father 68        prostae and bladder ca  in     Diabetes Father     Hypertension Father     Depression Sister     " "Hypertension Son     Diabetes Maternal Grandmother         lost leg    Kidney disease Paternal Grandfather         had kidney removed    Stroke Paternal Grandfather         Social History:   Marital Status:   Alcohol History:  reports that she drinks about 0.6 - 1.2 oz of alcohol per week.  Tobacco History:  reports that she has never smoked. She has never used smokeless tobacco.  Drug History:  reports that she does not use drugs.    Review of patient's allergies indicates:  No Known Allergies    Current Outpatient Medications   Medication Sig Dispense Refill    albuterol (ACCUNEB) 1.25 mg/3 mL Nebu USE 1 VIAL EVERY 4 HOURS AS NEEDED FOR SHORTNESS OF BREATH/WHEEZING  1    apixaban 5 mg Tab Take 1 tablet (5 mg total) by mouth 2 (two) times daily. 60 tablet 1    benzonatate (TESSALON PERLES) 100 MG capsule Take 100 mg by mouth 3 (three) times daily as needed.      blood sugar diagnostic Strp Test 3-4 times daily PRN      citalopram (CELEXA) 20 MG tablet Take 1 tablet (20 mg total) by mouth once daily.      CLARITIN-D 24 HOUR  mg per 24 hr tablet Take 1 tablet by mouth 2 (two) times daily.  0    DIALYVITE 100-1 mg Tab Take 1 tablet by mouth once daily.  1    fluticasone (FLONASE) 50 mcg/actuation nasal spray SPRAY 1 SPRAY IN EACH NOSTRIL DAILY  12    honey (MEDIHONEY, HONEY,) 80 % Gel Apply 1 Tube topically once daily. 1 Tube 0    HYDROcodone-acetaminophen (NORCO) 5-325 mg per tablet Take 1 tablet by mouth every 6 (six) hours as needed for Pain.      insulin aspart protamine-insulin aspart (NOVOLOG 70/30) 100 unit/mL (70-30) InPn pen Inject into the skin.      levothyroxine (SYNTHROID) 50 MCG tablet       midodrine (PROAMATINE) 5 MG Tab Take 2.5 mg by mouth 3 (three) times daily with meals.      ondansetron (ZOFRAN-ODT) 4 MG TbDL LET 1 TABLET DISSOLVE ON TONGUE 4 TIMES A DAY AS NEEDED  0    pen needle, diabetic (BD ULTRA-FINE OBDULIA PEN NEEDLE) 32 gauge x 5/32" Ndle       promethazine " (PHENERGAN) 25 MG tablet Take 1 tablet (25 mg total) by mouth every 6 (six) hours as needed for Nausea. 30 tablet 0    rosuvastatin (CRESTOR) 10 MG tablet Take 1 tablet by mouth once daily.      sevelamer carbonate (RENVELA) 800 mg Tab Take 1,600 mg by mouth 3 (three) times daily with meals.      cetirizine (ZYRTEC) 10 MG tablet Take 1 tablet (10 mg total) by mouth every evening. 20 tablet 0    gabapentin (NEURONTIN) 100 MG capsule Take 1 capsule (100 mg total) by mouth 3 (three) times daily. 90 capsule 1    insulin aspart (NOVOLOG) 100 unit/mL InPn pen Inject 1-10 Units into the skin 3 (three) times daily. 3 mL 1     No current facility-administered medications for this visit.        Review of Systems      Objective:        Physical Exam:   Foot Exam  Physical Exam   Musculoskeletal:        Feet:        Imaging:            Assessment:       1. Wound healing, delayed    2. Skin ulcer of left foot, limited to breakdown of skin    3. Type II diabetes mellitus with peripheral circulatory disorder      Plan:   Wound healing, delayed    Skin ulcer of left foot, limited to breakdown of skin    Type II diabetes mellitus with peripheral circulatory disorder    Remove the Steri-Strips and Adaptic today. She'll restart local wound care with medi honey daily and redress. She is return to see me in 4 weeks.  No follow-ups on file.    Procedures - None    Counseling:     I provided patient education verbally regarding:   Patient diagnosis, treatment options, as well as alternatives, risks, and benefits.     This note was created using Dragon voice recognition software that occasionally misinterpreted phrases or words.

## 2019-08-19 ENCOUNTER — OFFICE VISIT (OUTPATIENT)
Dept: PODIATRY | Facility: CLINIC | Age: 59
End: 2019-08-19
Payer: MEDICARE

## 2019-08-19 VITALS
SYSTOLIC BLOOD PRESSURE: 98 MMHG | BODY MASS INDEX: 30.05 KG/M2 | WEIGHT: 187 LBS | HEART RATE: 98 BPM | DIASTOLIC BLOOD PRESSURE: 62 MMHG | HEIGHT: 66 IN

## 2019-08-19 DIAGNOSIS — L97.521 SKIN ULCER OF LEFT FOOT, LIMITED TO BREAKDOWN OF SKIN: ICD-10-CM

## 2019-08-19 DIAGNOSIS — E11.51 TYPE II DIABETES MELLITUS WITH PERIPHERAL CIRCULATORY DISORDER: Primary | ICD-10-CM

## 2019-08-19 DIAGNOSIS — T14.8XXD WOUND HEALING, DELAYED: ICD-10-CM

## 2019-08-19 PROCEDURE — 99213 PR OFFICE/OUTPT VISIT, EST, LEVL III, 20-29 MIN: ICD-10-PCS | Mod: S$PBB,,, | Performed by: PODIATRIST

## 2019-08-19 PROCEDURE — 99999 PR PBB SHADOW E&M-EST. PATIENT-LVL III: ICD-10-PCS | Mod: PBBFAC,,, | Performed by: PODIATRIST

## 2019-08-19 PROCEDURE — 99213 OFFICE O/P EST LOW 20 MIN: CPT | Mod: PBBFAC | Performed by: PODIATRIST

## 2019-08-19 PROCEDURE — 99213 OFFICE O/P EST LOW 20 MIN: CPT | Mod: S$PBB,,, | Performed by: PODIATRIST

## 2019-08-19 PROCEDURE — 99999 PR PBB SHADOW E&M-EST. PATIENT-LVL III: CPT | Mod: PBBFAC,,, | Performed by: PODIATRIST

## 2019-08-19 NOTE — PROGRESS NOTES
"  [address] [facility] [address]  [place] [address]  Phone: [phone]   [phone]    Patient's PCP:JOS Dumont  Referring Provider: No ref. provider found    Subjective:      Chief Complaint:: Follow-up (Graft left foot)    LUCIO Ramos is a 59 y.o. female who presents with a ulcer left lower leg and graft.Daily dressing with home health.    Systemic Doctor: JOS Dumont  Date Last Seen: 4-10  Blood Sugar: 150  Hemoglobin A1c: 6.2    Vitals:    19 0853   BP: 98/62   Pulse: 98     Shoe Size:     Past Surgical History:   Procedure Laterality Date    ABCESS DRAINAGE Right     Between "little toe" and the one next to it    BREAST SURGERY      Reduction [id number]    CARDIAC SURGERY  2011    CABG 4vessel ring in one valve mitral valve prolapse    CORONARY ARTERY BYPASS GRAFT      DEBRIDEMENT-TENDON-ACHILLES Left 2017    Performed by eDnnis Wick D[facility] at [facility] OR    hyperlipidemia      TUBAL LIGATION  1986     Past Medical History:   Diagnosis Date    Anemia in stage 3 chronic kidney disease 2017    Anticoagulant long-term use     CHF (congestive heart failure)     COPD (chronic obstructive pulmonary disease)     Coronary artery disease     Diabetes mellitus     Encounter for blood transfusion     Essential hypertension 2017    Hypertension     MI (myocardial infarction)     Segmental and subsegmental pulmonary emboli of RLL without acute cor pulmonale 2017    Stroke     2005    Thyroid disease     Traumatic subarachnoid hemorrhage 2017    Type 2 diabetes mellitus with stage 3 chronic kidney disease, without long-term current use of insulin 2017     Family History   Problem Relation Age of Onset    Arthritis Mother     Heart disease Father     Cancer Father 68        prostae and bladder ca  in     Diabetes Father     Hypertension Father     Depression Sister     Hypertension Son     Diabetes Maternal " Grandmother         lost leg    Kidney disease Paternal Grandfather         had kidney removed    Stroke Paternal Grandfather         Social History:   Marital Status:   Alcohol History:  reports that she drinks about 0.6 - 1.2 oz of alcohol per week.  Tobacco History:  reports that she has never smoked. She has never used smokeless tobacco.  Drug History:  reports that she does not use drugs.    Review of patient's allergies indicates:  No Known Allergies    Current Outpatient Medications   Medication Sig Dispense Refill    albuterol (ACCUNEB) 1.25 mg/3 mL Nebu USE 1 VIAL EVERY 4 HOURS AS NEEDED FOR SHORTNESS OF BREATH/WHEEZING  1    apixaban 5 mg Tab Take 1 tablet (5 mg total) by mouth 2 (two) times daily. 60 tablet 1    benzonatate (TESSALON PERLES) 100 MG capsule Take 100 mg by mouth 3 (three) times daily as needed.      blood sugar diagnostic Strp Test 3-4 times daily PRN      cetirizine (ZYRTEC) 10 MG tablet Take 1 tablet (10 mg total) by mouth every evening. 20 tablet 0    citalopram (CELEXA) 20 MG tablet Take 1 tablet (20 mg total) by mouth once daily.      CLARITIN-D 24 HOUR  mg per 24 hr tablet Take 1 tablet by mouth 2 (two) times daily.  0    DIALYVITE 100-1 mg Tab Take 1 tablet by mouth once daily.  1    fluticasone (FLONASE) 50 mcg/actuation nasal spray SPRAY 1 SPRAY IN EACH NOSTRIL DAILY  12    gabapentin (NEURONTIN) 100 MG capsule Take 1 capsule (100 mg total) by mouth 3 (three) times daily. 90 capsule 1    honey (MEDIHONEY, HONEY,) 80 % Gel Apply 1 Tube topically once daily. 1 Tube 0    HYDROcodone-acetaminophen (NORCO) 5-325 mg per tablet Take 1 tablet by mouth every 6 (six) hours as needed for Pain.      insulin aspart (NOVOLOG) 100 unit/mL InPn pen Inject 1-10 Units into the skin 3 (three) times daily. 3 mL 1    insulin aspart protamine-insulin aspart (NOVOLOG 70/30) 100 unit/mL (70-30) InPn pen Inject into the skin.      levothyroxine (SYNTHROID) 50 MCG tablet        "midodrine (PROAMATINE) 5 MG Tab Take 2.5 mg by mouth 3 (three) times daily with meals.      ondansetron (ZOFRAN-ODT) 4 MG TbDL LET 1 TABLET DISSOLVE ON TONGUE 4 TIMES A DAY AS NEEDED  0    pen needle, diabetic (BD ULTRA-FINE OBDULIA PEN NEEDLE) 32 gauge x 5/32" Ndle       promethazine (PHENERGAN) 25 MG tablet Take 1 tablet (25 mg total) by mouth every 6 (six) hours as needed for Nausea. 30 tablet 0    rosuvastatin (CRESTOR) 10 MG tablet Take 1 tablet by mouth once daily.      sevelamer carbonate (RENVELA) 800 mg Tab Take 1,600 mg by mouth 3 (three) times daily with meals.       No current facility-administered medications for this visit.        Review of Systems      Objective:            Physical Exam:   Foot Exam  Physical Exam   Musculoskeletal:        Legs:      Imaging:            Assessment:       1. Type II diabetes mellitus with peripheral circulatory disorder    2. Skin ulcer of left foot, limited to breakdown of skin    3. Wound healing, delayed      Plan:   Type II diabetes mellitus with peripheral circulatory disorder    Skin ulcer of left foot, limited to breakdown of skin    Wound healing, delayed     Continue local wound care with [facility] return to see me in 4 weeks.  No procedures done today.  No follow-ups on file.    Procedures - None    Counseling:     I provided patient education verbally regarding:   Patient diagnosis, treatment options, as well as alternatives, risks, and benefits.     This note was created using Dragon voice recognition software that occasionally misinterpreted phrases or words.               "

## 2019-09-16 ENCOUNTER — OFFICE VISIT (OUTPATIENT)
Dept: PODIATRY | Facility: CLINIC | Age: 59
End: 2019-09-16
Payer: MEDICARE

## 2019-09-16 VITALS
DIASTOLIC BLOOD PRESSURE: 62 MMHG | OXYGEN SATURATION: 96 % | BODY MASS INDEX: 30.05 KG/M2 | WEIGHT: 187 LBS | SYSTOLIC BLOOD PRESSURE: 98 MMHG | HEIGHT: 66 IN

## 2019-09-16 DIAGNOSIS — E11.51 TYPE II DIABETES MELLITUS WITH PERIPHERAL CIRCULATORY DISORDER: Primary | ICD-10-CM

## 2019-09-16 DIAGNOSIS — T14.8XXD WOUND HEALING, DELAYED: ICD-10-CM

## 2019-09-16 DIAGNOSIS — L97.521 SKIN ULCER OF LEFT FOOT, LIMITED TO BREAKDOWN OF SKIN: ICD-10-CM

## 2019-09-16 PROCEDURE — 99213 PR OFFICE/OUTPT VISIT, EST, LEVL III, 20-29 MIN: ICD-10-PCS | Mod: S$PBB,,, | Performed by: PODIATRIST

## 2019-09-16 PROCEDURE — 99999 PR PBB SHADOW E&M-EST. PATIENT-LVL III: CPT | Mod: PBBFAC,,, | Performed by: PODIATRIST

## 2019-09-16 PROCEDURE — 99213 OFFICE O/P EST LOW 20 MIN: CPT | Mod: PBBFAC | Performed by: PODIATRIST

## 2019-09-16 PROCEDURE — 99999 PR PBB SHADOW E&M-EST. PATIENT-LVL III: ICD-10-PCS | Mod: PBBFAC,,, | Performed by: PODIATRIST

## 2019-09-16 PROCEDURE — 99213 OFFICE O/P EST LOW 20 MIN: CPT | Mod: S$PBB,,, | Performed by: PODIATRIST

## 2019-09-16 NOTE — PROGRESS NOTES
"  [address] [facility] [address]  [place] [address]  Phone: [phone]   [phone]    Patient's PCP:JOS Dumont  Referring Provider: No ref. provider found    Subjective:      Chief Complaint:: No chief complaint on file.    LUCIO Ramos is a 59 y.o. female who presents with a follow up on ulcer and graft left lower leg. Home health still doing dressing changes.    Systemic Doctor: JOS Dumont  Date Last Seen:  Blood Sugar: 150  Hemoglobin A1c: 6.2    Vitals:    19 0855   BP: 98/62     Shoe Size:     Past Surgical History:   Procedure Laterality Date    ABCESS DRAINAGE Right     Between "little toe" and the one next to it    BREAST SURGERY      Reduction [id number]    CARDIAC SURGERY  2011    CABG 4vessel ring in one valve mitral valve prolapse    CORONARY ARTERY BYPASS GRAFT      DEBRIDEMENT-TENDON-ACHILLES Left 2017    Performed by Dennis [facility] D[facility] at [facility] OR    hyperlipidemia      TUBAL LIGATION  1986     Past Medical History:   Diagnosis Date    Anemia in stage 3 chronic kidney disease 2017    Anticoagulant long-term use     CHF (congestive heart failure)     COPD (chronic obstructive pulmonary disease)     Coronary artery disease     Diabetes mellitus     Encounter for blood transfusion     Essential hypertension 2017    Hypertension     MI (myocardial infarction)     Segmental and subsegmental pulmonary emboli of RLL without acute cor pulmonale 2017    Stroke     2005    Thyroid disease     Traumatic subarachnoid hemorrhage 2017    Type 2 diabetes mellitus with stage 3 chronic kidney disease, without long-term current use of insulin 2017     Family History   Problem Relation Age of Onset    Arthritis Mother     Heart disease Father     Cancer Father 68        prostae and bladder ca  in     Diabetes Father     Hypertension Father     Depression Sister     Hypertension Son     " Diabetes Maternal Grandmother         lost leg    Kidney disease Paternal Grandfather         had kidney removed    Stroke Paternal Grandfather         Social History:   Marital Status:   Alcohol History:  reports that she drinks about 0.6 - 1.2 oz of alcohol per week.  Tobacco History:  reports that she has never smoked. She has never used smokeless tobacco.  Drug History:  reports that she does not use drugs.    Review of patient's allergies indicates:  No Known Allergies    Current Outpatient Medications   Medication Sig Dispense Refill    albuterol (ACCUNEB) 1.25 mg/3 mL Nebu USE 1 VIAL EVERY 4 HOURS AS NEEDED FOR SHORTNESS OF BREATH/WHEEZING  1    apixaban 5 mg Tab Take 1 tablet (5 mg total) by mouth 2 (two) times daily. 60 tablet 1    benzonatate (TESSALON PERLES) 100 MG capsule Take 100 mg by mouth 3 (three) times daily as needed.      blood sugar diagnostic Strp Test 3-4 times daily PRN      cetirizine (ZYRTEC) 10 MG tablet Take 1 tablet (10 mg total) by mouth every evening. 20 tablet 0    citalopram (CELEXA) 20 MG tablet Take 1 tablet (20 mg total) by mouth once daily.      CLARITIN-D 24 HOUR  mg per 24 hr tablet Take 1 tablet by mouth 2 (two) times daily.  0    DIALYVITE 100-1 mg Tab Take 1 tablet by mouth once daily.  1    fluticasone (FLONASE) 50 mcg/actuation nasal spray SPRAY 1 SPRAY IN EACH NOSTRIL DAILY  12    gabapentin (NEURONTIN) 100 MG capsule Take 1 capsule (100 mg total) by mouth 3 (three) times daily. 90 capsule 1    honey (MEDIHONEY, HONEY,) 80 % Gel Apply 1 Tube topically once daily. 1 Tube 0    HYDROcodone-acetaminophen (NORCO) 5-325 mg per tablet Take 1 tablet by mouth every 6 (six) hours as needed for Pain.      insulin aspart (NOVOLOG) 100 unit/mL InPn pen Inject 1-10 Units into the skin 3 (three) times daily. 3 mL 1    insulin aspart protamine-insulin aspart (NOVOLOG 70/30) 100 unit/mL (70-30) InPn pen Inject into the skin.      levothyroxine (SYNTHROID) 50  "MCG tablet       midodrine (PROAMATINE) 5 MG Tab Take 2.5 mg by mouth 3 (three) times daily with meals.      ondansetron (ZOFRAN-ODT) 4 MG TbDL LET 1 TABLET DISSOLVE ON TONGUE 4 TIMES A DAY AS NEEDED  0    pen needle, diabetic (BD ULTRA-FINE OBDULIA PEN NEEDLE) 32 gauge x 5/32" Ndle       promethazine (PHENERGAN) 25 MG tablet Take 1 tablet (25 mg total) by mouth every 6 (six) hours as needed for Nausea. 30 tablet 0    rosuvastatin (CRESTOR) 10 MG tablet Take 1 tablet by mouth once daily.      sevelamer carbonate (RENVELA) 800 mg Tab Take 1,600 mg by mouth 3 (three) times daily with meals.       No current facility-administered medications for this visit.        Review of Systems      Objective:            Physical Exam:   Foot Exam  Physical Exam   Musculoskeletal:        Legs:      Imaging:            Assessment:       1. Type II diabetes mellitus with peripheral circulatory disorder    2. Skin ulcer of left foot, limited to breakdown of skin    3. Wound healing, delayed      Plan:   Type II diabetes mellitus with peripheral circulatory disorder    Skin ulcer of left foot, limited to breakdown of skin    Wound healing, delayed     Home health will continue see the patient and so will her  do daily dressing changes 4 weeks for possible re-application of biological membrane.  No follow-ups on file.    Procedures - None    Counseling:     I provided patient education verbally regarding:   Patient diagnosis, treatment options, as well as alternatives, risks, and benefits.     This note was created using Dragon voice recognition software that occasionally misinterpreted phrases or words.               "

## 2019-10-14 NOTE — PROGRESS NOTES
"  [address] [facility] [address]  [place] [address]  Phone: [phone]   [phone]    Patient's PCP:JOS Dumont  Referring Provider: No ref. provider found    Subjective:      Chief Complaint::  Delayed healing ulcer left lower leg    HPI left lower leg  Juliane Ramos is a 59 y.o. female who presents for follow up on ulcer . Home health still doing dressing changes once weekly. " changes dressing twice weekly.    Systemic Doctor: JOS Dumont  Date Last Seen: 2019  Blood Sugar: 154  Hemoglobin A1c: 5.5     Vitals:    10/14/19 0904   BP: 118/67   Pulse: 84     Shoe Size: 8.5    Past Surgical History:   Procedure Laterality Date    ABCESS DRAINAGE Right     Between "little toe" and the one next to it    BREAST SURGERY      Reduction [id number]    CARDIAC SURGERY  2011    CABG 4vessel ring in one valve mitral valve prolapse    CORONARY ARTERY BYPASS GRAFT      hyperlipidemia      TUBAL LIGATION  1986     Past Medical History:   Diagnosis Date    Anemia in stage 3 chronic kidney disease 2017    Anticoagulant long-term use     CHF (congestive heart failure)     COPD (chronic obstructive pulmonary disease)     Coronary artery disease     Diabetes mellitus     Encounter for blood transfusion     Essential hypertension 2017    Hypertension     MI (myocardial infarction)     Segmental and subsegmental pulmonary emboli of RLL without acute cor pulmonale 2017    Stroke     2005    Thyroid disease     Traumatic subarachnoid hemorrhage 2017    Type 2 diabetes mellitus with stage 3 chronic kidney disease, without long-term current use of insulin 2017     Family History   Problem Relation Age of Onset    Arthritis Mother     Heart disease Father     Cancer Father 68        prostae and bladder ca  in     Diabetes Father     Hypertension Father     Depression Sister     Hypertension Son     Diabetes Maternal Grandmother  "        lost leg    Kidney disease Paternal Grandfather         had kidney removed    Stroke Paternal Grandfather         Social History:   Marital Status:   Alcohol History:  reports that she drinks about 1.0 - 2.0 standard drinks of alcohol per week.  Tobacco History:  reports that she has never smoked. She has never used smokeless tobacco.  Drug History:  reports that she does not use drugs.    Review of patient's allergies indicates:  No Known Allergies    Current Outpatient Medications   Medication Sig Dispense Refill    albuterol (ACCUNEB) 1.25 mg/3 mL Nebu USE 1 VIAL EVERY 4 HOURS AS NEEDED FOR SHORTNESS OF BREATH/WHEEZING  1    apixaban 5 mg Tab Take 1 tablet (5 mg total) by mouth 2 (two) times daily. 60 tablet 1    benzonatate (TESSALON PERLES) 100 MG capsule Take 100 mg by mouth 3 (three) times daily as needed.      blood sugar diagnostic Strp Test 3-4 times daily PRN      cetirizine (ZYRTEC) 10 MG tablet Take 1 tablet (10 mg total) by mouth every evening. 20 tablet 0    citalopram (CELEXA) 20 MG tablet Take 1 tablet (20 mg total) by mouth once daily.      CLARITIN-D 24 HOUR  mg per 24 hr tablet Take 1 tablet by mouth 2 (two) times daily.  0    DIALYVITE 100-1 mg Tab Take 1 tablet by mouth once daily.  1    fluticasone (FLONASE) 50 mcg/actuation nasal spray SPRAY 1 SPRAY IN EACH NOSTRIL DAILY  12    gabapentin (NEURONTIN) 100 MG capsule Take 1 capsule (100 mg total) by mouth 3 (three) times daily. 90 capsule 1    honey (MEDIHONEY, HONEY,) 80 % Gel Apply 1 Tube topically once daily. 1 Tube 0    HYDROcodone-acetaminophen (NORCO) 5-325 mg per tablet Take 1 tablet by mouth every 6 (six) hours as needed for Pain.      insulin aspart (NOVOLOG) 100 unit/mL InPn pen Inject 1-10 Units into the skin 3 (three) times daily. 3 mL 1    insulin aspart protamine-insulin aspart (NOVOLOG 70/30) 100 unit/mL (70-30) InPn pen Inject into the skin.      levothyroxine (SYNTHROID) 50 MCG tablet        "midodrine (PROAMATINE) 5 MG Tab Take 2.5 mg by mouth 3 (three) times daily with meals.      ondansetron (ZOFRAN-ODT) 4 MG TbDL LET 1 TABLET DISSOLVE ON TONGUE 4 TIMES A DAY AS NEEDED  0    pen needle, diabetic (BD ULTRA-FINE OBDULIA PEN NEEDLE) 32 gauge x 5/32" Ndle       promethazine (PHENERGAN) 25 MG tablet Take 1 tablet (25 mg total) by mouth every 6 (six) hours as needed for Nausea. 30 tablet 0    rosuvastatin (CRESTOR) 10 MG tablet Take 1 tablet by mouth once daily.      sevelamer carbonate (RENVELA) 800 mg Tab Take 1,600 mg by mouth 3 (three) times daily with meals.       No current facility-administered medications for this visit.        Review of Systems      Objective:            Physical Exam:   Foot Exam  Physical Exam   Musculoskeletal:        Legs:      Imaging:            Assessment:       1. Wound healing, delayed - Left Foot    2. Type II diabetes mellitus with peripheral circulatory disorder - Left Foot    3. Skin ulcer of left foot, limited to breakdown of skin - Left Foot      Plan:   Wound healing, delayed - Left Foot  -     Wound Debridement    Type II diabetes mellitus with peripheral circulatory disorder - Left Foot  -     Wound Debridement    Skin ulcer of left foot, limited to breakdown of skin - Left Foot      No follow-ups on file.    Wound Debridement  Date/Time: 10/14/2019 8:50 AM  Performed by: Dennis Wick DPM  Authorized by: Dennis Wick DPM     Time out: Immediately prior to procedure a "time out" was called to verify the correct patient, procedure, equipment, support staff and site/side marked as required.    Consent Done?:  Yes (Verbal)    Preparation: Patient was prepped and draped in usual sterile fashion    Local anesthesia used?: No      Wound Details:    Location:  Left leg    Type of Debridement:  Excisional       Length (cm):  4       Area (sq cm):  8       Width (cm):  2       Percent Debrided (%):  100       Depth (cm):  0.8       Total Area Debrided (sq " cm):  8    Depth of debridement:  Subcutaneous tissue    Tissue debrided:  Dermis, Epidermis, Hypergranulation and Subcutaneous    Devitalized tissue debrided:  Biofilm, Fibrin and Slough    Instruments:  Curette    Bleeding:  Minimal  Hemostasis Achieved: Yes    Method Used:  Pressure  Patient tolerance:  Patient tolerated the procedure well with no immediate complications     Today I applied a piece of puraply to the ulcer and the dressing will remain dry and intact for 7 days.  Resume local wound care with home health and 7 days.  Return to see me in 4 weeks.     - None    Counseling:     I provided patient education verbally regarding:   Patient diagnosis, treatment options, as well as alternatives, risks, and benefits.     This note was created using Dragon voice recognition software that occasionally misinterpreted phrases or words.

## 2019-10-14 NOTE — LETTER
October 14, 2019      [facility]  [address] [facility] [address]  [facility] [address]  Phone: [phone]  Fax: [phone]       Patient: Juliane Ramos   YOB: 1960  Date of Visit: 10/14/2019    To Whom It May Concern:    Ileana Ramos  was at [facility] on 10/14/2019. Patient is not to be seen by home health this week, as she does not need a dressing change. She may return to weekly dressing changes in one week, 10/21/2019. If you have any questions or concerns, or if I can be of further assistance, please do not hesitate to contact me.    Sincerely,    Electronically Signed by: DAVE Campos RN

## 2019-11-11 NOTE — PROGRESS NOTES
"  [address] [facility] [address]  [place] [address]  Phone: [phone]   [phone]    Patient's PCP:JOS Dumont  Referring Provider: No ref. provider found    Subjective:      Chief Complaint:: Follow-up (ulcer left lower leg)    LUCIO Ramos is a 59 y.o. female who presents with a follow up ulcer left lower leg, doing good. Home health coming is coming once a week and we do it our selves. Applyiing medi honey.    Systemic Doctor: JOS Dumont  Date Last Seen:   Blood Sugar: 154  Hemoglobin A1c: 5.5    Vitals:    19 0854   BP: 118/67   Pulse: 84     Shoe Size:     Past Surgical History:   Procedure Laterality Date    ABCESS DRAINAGE Right     Between "little toe" and the one next to it    BREAST SURGERY      Reduction [id number]    CARDIAC SURGERY  2011    CABG 4vessel ring in one valve mitral valve prolapse    CORONARY ARTERY BYPASS GRAFT      hyperlipidemia      TUBAL LIGATION  1986     Past Medical History:   Diagnosis Date    Anemia in stage 3 chronic kidney disease 2017    Anticoagulant long-term use     CHF (congestive heart failure)     COPD (chronic obstructive pulmonary disease)     Coronary artery disease     Diabetes mellitus     Encounter for blood transfusion     Essential hypertension 2017    Hypertension     MI (myocardial infarction)     Segmental and subsegmental pulmonary emboli of RLL without acute cor pulmonale 2017    Stroke     2005    Thyroid disease     Traumatic subarachnoid hemorrhage 2017    Type 2 diabetes mellitus with stage 3 chronic kidney disease, without long-term current use of insulin 2017     Family History   Problem Relation Age of Onset    Arthritis Mother     Heart disease Father     Cancer Father 68        prostae and bladder ca  in     Diabetes Father     Hypertension Father     Depression Sister     Hypertension Son     Diabetes Maternal Grandmother         lost " leg    Kidney disease Paternal Grandfather         had kidney removed    Stroke Paternal Grandfather         Social History:   Marital Status:   Alcohol History:  reports that she drinks about 1.0 - 2.0 standard drinks of alcohol per week.  Tobacco History:  reports that she has never smoked. She has never used smokeless tobacco.  Drug History:  reports that she does not use drugs.    Review of patient's allergies indicates:  No Known Allergies    Current Outpatient Medications   Medication Sig Dispense Refill    albuterol (ACCUNEB) 1.25 mg/3 mL Nebu USE 1 VIAL EVERY 4 HOURS AS NEEDED FOR SHORTNESS OF BREATH/WHEEZING  1    apixaban 5 mg Tab Take 1 tablet (5 mg total) by mouth 2 (two) times daily. 60 tablet 1    benzonatate (TESSALON PERLES) 100 MG capsule Take 100 mg by mouth 3 (three) times daily as needed.      blood sugar diagnostic Strp Test 3-4 times daily PRN      cetirizine (ZYRTEC) 10 MG tablet Take 1 tablet (10 mg total) by mouth every evening. 20 tablet 0    citalopram (CELEXA) 20 MG tablet Take 1 tablet (20 mg total) by mouth once daily.      CLARITIN-D 24 HOUR  mg per 24 hr tablet Take 1 tablet by mouth 2 (two) times daily.  0    DIALYVITE 100-1 mg Tab Take 1 tablet by mouth once daily.  1    fluticasone (FLONASE) 50 mcg/actuation nasal spray SPRAY 1 SPRAY IN EACH NOSTRIL DAILY  12    gabapentin (NEURONTIN) 100 MG capsule Take 1 capsule (100 mg total) by mouth 3 (three) times daily. 90 capsule 1    honey (MEDIHONEY, HONEY,) 80 % Gel Apply 1 Tube topically once daily. 1 Tube 0    HYDROcodone-acetaminophen (NORCO) 5-325 mg per tablet Take 1 tablet by mouth every 6 (six) hours as needed for Pain.      insulin aspart (NOVOLOG) 100 unit/mL InPn pen Inject 1-10 Units into the skin 3 (three) times daily. 3 mL 1    insulin aspart protamine-insulin aspart (NOVOLOG 70/30) 100 unit/mL (70-30) InPn pen Inject into the skin.      levothyroxine (SYNTHROID) 50 MCG tablet       midodrine  "(PROAMATINE) 5 MG Tab Take 2.5 mg by mouth 3 (three) times daily with meals.      ondansetron (ZOFRAN-ODT) 4 MG TbDL LET 1 TABLET DISSOLVE ON TONGUE 4 TIMES A DAY AS NEEDED  0    pen needle, diabetic (BD ULTRA-FINE OBDULIA PEN NEEDLE) 32 gauge x 5/32" Ndle       promethazine (PHENERGAN) 25 MG tablet Take 1 tablet (25 mg total) by mouth every 6 (six) hours as needed for Nausea. 30 tablet 0    rosuvastatin (CRESTOR) 10 MG tablet Take 1 tablet by mouth once daily.      sevelamer carbonate (RENVELA) 800 mg Tab Take 1,600 mg by mouth 3 (three) times daily with meals.       No current facility-administered medications for this visit.        Review of Systems      Objective:        Physical Exam:   Foot Exam    General  General Appearance: appears stated age and healthy   Orientation: alert and oriented to person, place, and time   Affect: appropriate   Gait: unimpaired   Assistance: wheelchair use           Physical Exam   Musculoskeletal:        Legs:          Imaging:            Assessment:       1. Type II diabetes mellitus with peripheral circulatory disorder - Left Foot    2. Skin ulcer of left foot with fat layer exposed    3. Type 2 diabetes mellitus with chronic kidney disease on chronic dialysis, with long-term current use of insulin      Plan:   Type II diabetes mellitus with peripheral circulatory disorder - Left Foot    Skin ulcer of left foot with fat layer exposed    Type 2 diabetes mellitus with chronic kidney disease on chronic dialysis, with long-term current use of insulin     I evaluated the ulcer today and writing wound care orders for home health to continue seeing her weekly and to apply biological membrane if covered by her insurance and re-dress every 5-7 days.  She is return to see me in 4 weeks.  If the biological membranes are not covered by her insurance she is to let me know and I will see but ordering some.  Follow up in about 4 weeks (around 12/9/2019), or ulcer left lower " leg.    Procedures - None    Counseling:     I provided patient education verbally regarding:   Patient diagnosis, treatment options, as well as alternatives, risks, and benefits.     This note was created using Dragon voice recognition software that occasionally misinterpreted phrases or words.

## 2019-12-09 NOTE — PROGRESS NOTES
"  [address] [facility] [address]  [place] [address]  Phone: [phone]   [phone]    Patient's PCP:JOS Dumont  Referring Provider: No ref. provider found    Subjective:      Chief Complaint:: Follow-up (ulcer left lower leg)    LUCIO Ramos is a 59 y.o. female who presents with a follow up ulcer left lower leg. Daily dressing and home health coming three times a week and applying puracol.    Systemic Doctor: JOS Dumont  Date Last Seen:   Blood Sugar: 155  Hemoglobin A1c: 5.5    Vitals:    19 0859   BP: 122/78   Pulse: 84     Shoe Size:     Past Surgical History:   Procedure Laterality Date    ABCESS DRAINAGE Right     Between "little toe" and the one next to it    BREAST SURGERY      Reduction [id number]    CARDIAC SURGERY  2011    CABG 4vessel ring in one valve mitral valve prolapse    CORONARY ARTERY BYPASS GRAFT      hyperlipidemia      TUBAL LIGATION  1986     Past Medical History:   Diagnosis Date    Anemia in stage 3 chronic kidney disease 2017    Anticoagulant long-term use     CHF (congestive heart failure)     COPD (chronic obstructive pulmonary disease)     Coronary artery disease     Diabetes mellitus     Encounter for blood transfusion     Essential hypertension 2017    Hypertension     MI (myocardial infarction)     Segmental and subsegmental pulmonary emboli of RLL without acute cor pulmonale 2017    Stroke     2005    Thyroid disease     Traumatic subarachnoid hemorrhage 2017    Type 2 diabetes mellitus with stage 3 chronic kidney disease, without long-term current use of insulin 2017     Family History   Problem Relation Age of Onset    Arthritis Mother     Heart disease Father     Cancer Father 68        prostae and bladder ca  in     Diabetes Father     Hypertension Father     Depression Sister     Hypertension Son     Diabetes Maternal Grandmother         lost leg    Kidney disease " Paternal Grandfather         had kidney removed    Stroke Paternal Grandfather         Social History:   Marital Status:   Alcohol History:  reports that she drinks about 1.0 - 2.0 standard drinks of alcohol per week.  Tobacco History:  reports that she has never smoked. She has never used smokeless tobacco.  Drug History:  reports that she does not use drugs.    Review of patient's allergies indicates:  No Known Allergies    Current Outpatient Medications   Medication Sig Dispense Refill    albuterol (ACCUNEB) 1.25 mg/3 mL Nebu USE 1 VIAL EVERY 4 HOURS AS NEEDED FOR SHORTNESS OF BREATH/WHEEZING  1    amoxicillin-clavulanate 500-125mg (AUGMENTIN) 500-125 mg Tab TK 1 T PO D FOR 7 DAYS  0    apixaban 5 mg Tab Take 1 tablet (5 mg total) by mouth 2 (two) times daily. 60 tablet 1    benzonatate (TESSALON PERLES) 100 MG capsule Take 100 mg by mouth 3 (three) times daily as needed.      blood sugar diagnostic Strp Test 3-4 times daily PRN      cetirizine (ZYRTEC) 10 MG tablet Take 1 tablet (10 mg total) by mouth every evening. 20 tablet 0    citalopram (CELEXA) 20 MG tablet Take 1 tablet (20 mg total) by mouth once daily.      CLARITIN-D 12 HOUR 5-120 mg per tablet Take 1 tablet by mouth 2 (two) times daily.  0    DIALYVITE 100-1 mg Tab Take 1 tablet by mouth once daily.  1    fluticasone (FLONASE) 50 mcg/actuation nasal spray SPRAY 1 SPRAY IN EACH NOSTRIL DAILY  12    gabapentin (NEURONTIN) 100 MG capsule Take 100 mg by mouth 3 (three) times daily.  3    guaifenesin-codeine 100-10 mg/5 ml (TUSSI-ORGANIDIN NR)  mg/5 mL syrup TAKE 5 MLS BY MOUTH 3 (THREE) TIMES DAILY AS NEEDED FOR COUGH FOR UP TO 10 DAYS.  0    honey (MEDIHONEY, HONEY,) 80 % Gel Apply 1 Tube topically once daily. 1 Tube 0    HYDROcodone-acetaminophen (NORCO) 5-325 mg per tablet Take 1 tablet by mouth every 6 (six) hours as needed for Pain.      insulin aspart (NOVOLOG) 100 unit/mL InPn pen Inject 1-10 Units into the skin 3  "(three) times daily. 3 mL 1    insulin aspart protamine-insulin aspart (NOVOLOG 70/30) 100 unit/mL (70-30) InPn pen Inject into the skin.      levothyroxine (SYNTHROID) 50 MCG tablet       midodrine (PROAMATINE) 5 MG Tab Take 2.5 mg by mouth 3 (three) times daily with meals.      ondansetron (ZOFRAN-ODT) 4 MG TbDL LET 1 TABLET DISSOLVE ON TONGUE 4 TIMES A DAY AS NEEDED  0    pen needle, diabetic (BD ULTRA-FINE OBDULIA PEN NEEDLE) 32 gauge x 5/32" Ndle       promethazine (PHENERGAN) 25 MG tablet Take 1 tablet (25 mg total) by mouth every 6 (six) hours as needed for Nausea. 30 tablet 0    rosuvastatin (CRESTOR) 10 MG tablet Take 1 tablet by mouth once daily.      sevelamer carbonate (RENVELA) 800 mg Tab Take 1,600 mg by mouth 3 (three) times daily with meals.       No current facility-administered medications for this visit.        Review of Systems      Objective:        Physical Exam:   Foot Exam    General  General Appearance: appears stated age and healthy   Orientation: alert and oriented to person, place, and time   Affect: appropriate   Gait: antalgic           Physical Exam   Musculoskeletal:        Legs:          Imaging:            Assessment:       1. Type II diabetes mellitus with peripheral circulatory disorder - Left Foot    2. Type 2 diabetes mellitus with chronic kidney disease on chronic dialysis, with long-term current use of insulin    3. Type II diabetes mellitus with neurological manifestations    4. Skin ulcer of left foot with fat layer exposed      Plan:   Type II diabetes mellitus with peripheral circulatory disorder - Left Foot  -     Ambulatory referral to Home Health    Type 2 diabetes mellitus with chronic kidney disease on chronic dialysis, with long-term current use of insulin  -     Ambulatory referral to Home Health    Type II diabetes mellitus with neurological manifestations    Skin ulcer of left foot with fat layer exposed  -     Ambulatory referral to Home Health     Patient " will have continued home health to times weeks supplement by her  change in the dressing and applying Santyl.  The wound is slowly healing they are going to come back to see me in 6 weeks or as needed.  At that time all make decisions whether we did and apply another biological membrane.  Follow up in about 6 weeks (around 1/20/2020) for f/up left lower leg & graft.    Procedures - None    Counseling:   proper ulcer care and the possible need for serial debridements, topical medications, specific dressings and biological engineered skin substitutes if indicated.  I provided patient education verbally regarding:   Patient diagnosis, treatment options, as well as alternatives, risks, and benefits.     This note was created using Dragon voice recognition software that occasionally misinterpreted phrases or words.

## 2020-01-01 ENCOUNTER — OFFICE VISIT (OUTPATIENT)
Dept: PODIATRY | Facility: CLINIC | Age: 60
End: 2020-01-01
Payer: MEDICARE

## 2020-01-01 ENCOUNTER — ANESTHESIA EVENT (OUTPATIENT)
Dept: INTENSIVE CARE | Facility: HOSPITAL | Age: 60
DRG: 308 | End: 2020-01-01
Payer: MEDICARE

## 2020-01-01 ENCOUNTER — HOSPITAL ENCOUNTER (OUTPATIENT)
Dept: PREADMISSION TESTING | Facility: HOSPITAL | Age: 60
Discharge: HOME | End: 2020-09-02
Payer: MEDICARE

## 2020-01-01 ENCOUNTER — DOCUMENT SCAN (OUTPATIENT)
Dept: HOME HEALTH SERVICES | Facility: HOSPITAL | Age: 60
End: 2020-01-01
Payer: MEDICARE

## 2020-01-01 ENCOUNTER — ANESTHESIA (OUTPATIENT)
Dept: INTENSIVE CARE | Facility: HOSPITAL | Age: 60
DRG: 308 | End: 2020-01-01
Payer: MEDICARE

## 2020-01-01 ENCOUNTER — HOSPITAL ENCOUNTER (EMERGENCY)
Facility: HOSPITAL | Age: 60
Discharge: HOME | End: 2020-08-01
Attending: EMERGENCY MEDICINE
Payer: MEDICARE

## 2020-01-01 ENCOUNTER — HOSPITAL ENCOUNTER (OUTPATIENT)
Facility: HOSPITAL | Age: 60
Discharge: HOME | End: 2020-09-04
Attending: PODIATRIST | Admitting: PODIATRIST
Payer: MEDICARE

## 2020-01-01 ENCOUNTER — HOSPITAL ENCOUNTER (INPATIENT)
Facility: HOSPITAL | Age: 60
LOS: 19 days | DRG: 308 | End: 2020-10-13
Attending: EMERGENCY MEDICINE | Admitting: HOSPITALIST
Payer: MEDICARE

## 2020-01-01 ENCOUNTER — TELEPHONE (OUTPATIENT)
Dept: PODIATRY | Facility: CLINIC | Age: 60
End: 2020-01-01

## 2020-01-01 ENCOUNTER — HOSPITAL ENCOUNTER (OUTPATIENT)
Dept: RADIOLOGY | Facility: HOSPITAL | Age: 60
Discharge: HOME | End: 2020-05-11
Attending: PODIATRIST
Payer: MEDICARE

## 2020-01-01 ENCOUNTER — HOSPITAL ENCOUNTER (INPATIENT)
Facility: HOSPITAL | Age: 60
LOS: 2 days | Discharge: HOME | DRG: 190 | End: 2020-03-12
Attending: EMERGENCY MEDICINE | Admitting: INTERNAL MEDICINE
Payer: MEDICARE

## 2020-01-01 ENCOUNTER — HOSPITAL ENCOUNTER (OUTPATIENT)
Dept: PREADMISSION TESTING | Facility: HOSPITAL | Age: 60
Discharge: HOME | End: 2020-08-24
Attending: PODIATRIST
Payer: MEDICARE

## 2020-01-01 ENCOUNTER — TELEPHONE (OUTPATIENT)
Dept: TRANSPLANT | Facility: CLINIC | Age: 60
End: 2020-01-01

## 2020-01-01 ENCOUNTER — CLINICAL SUPPORT (OUTPATIENT)
Dept: CARDIOLOGY | Facility: HOSPITAL | Age: 60
DRG: 308 | End: 2020-01-01
Attending: HOSPITALIST
Payer: MEDICARE

## 2020-01-01 ENCOUNTER — HOSPITAL ENCOUNTER (OUTPATIENT)
Dept: PREADMISSION TESTING | Facility: HOSPITAL | Age: 60
Discharge: HOME | End: 2020-09-02
Attending: PODIATRIST
Payer: MEDICARE

## 2020-01-01 ENCOUNTER — ANESTHESIA (OUTPATIENT)
Dept: INTENSIVE CARE | Facility: HOSPITAL | Age: 60
End: 2020-01-01

## 2020-01-01 ENCOUNTER — HOSPITAL ENCOUNTER (OUTPATIENT)
Dept: PREADMISSION TESTING | Facility: HOSPITAL | Age: 60
Discharge: HOME | End: 2020-05-11
Attending: PODIATRIST
Payer: MEDICARE

## 2020-01-01 ENCOUNTER — ANESTHESIA EVENT (OUTPATIENT)
Dept: INTENSIVE CARE | Facility: HOSPITAL | Age: 60
End: 2020-01-01

## 2020-01-01 ENCOUNTER — HOSPITAL ENCOUNTER (OUTPATIENT)
Facility: HOSPITAL | Age: 60
Discharge: HOME | End: 2020-05-13
Attending: PODIATRIST | Admitting: PODIATRIST
Payer: MEDICARE

## 2020-01-01 VITALS
TEMPERATURE: 98 F | DIASTOLIC BLOOD PRESSURE: 74 MMHG | WEIGHT: 178.56 LBS | OXYGEN SATURATION: 100 % | HEIGHT: 66 IN | SYSTOLIC BLOOD PRESSURE: 115 MMHG | RESPIRATION RATE: 20 BRPM | BODY MASS INDEX: 28.7 KG/M2 | HEART RATE: 84 BPM

## 2020-01-01 VITALS
HEART RATE: 84 BPM | BODY MASS INDEX: 28.34 KG/M2 | OXYGEN SATURATION: 98 % | TEMPERATURE: 98 F | WEIGHT: 176.38 LBS | SYSTOLIC BLOOD PRESSURE: 140 MMHG | SYSTOLIC BLOOD PRESSURE: 116 MMHG | DIASTOLIC BLOOD PRESSURE: 74 MMHG | HEIGHT: 66 IN | WEIGHT: 176 LBS | TEMPERATURE: 98 F | HEIGHT: 66 IN | RESPIRATION RATE: 16 BRPM | DIASTOLIC BLOOD PRESSURE: 78 MMHG | HEART RATE: 93 BPM | BODY MASS INDEX: 28.28 KG/M2

## 2020-01-01 VITALS
RESPIRATION RATE: 16 BRPM | DIASTOLIC BLOOD PRESSURE: 69 MMHG | SYSTOLIC BLOOD PRESSURE: 116 MMHG | BODY MASS INDEX: 28.61 KG/M2 | WEIGHT: 178.56 LBS | HEART RATE: 93 BPM | DIASTOLIC BLOOD PRESSURE: 76 MMHG | HEIGHT: 66 IN | TEMPERATURE: 99 F | SYSTOLIC BLOOD PRESSURE: 110 MMHG | BODY MASS INDEX: 28.7 KG/M2 | HEIGHT: 66 IN | OXYGEN SATURATION: 93 % | TEMPERATURE: 98 F | WEIGHT: 178 LBS | HEART RATE: 91 BPM

## 2020-01-01 VITALS
OXYGEN SATURATION: 98 % | RESPIRATION RATE: 20 BRPM | HEIGHT: 66 IN | HEART RATE: 89 BPM | WEIGHT: 181 LBS | DIASTOLIC BLOOD PRESSURE: 72 MMHG | TEMPERATURE: 98 F | SYSTOLIC BLOOD PRESSURE: 133 MMHG | BODY MASS INDEX: 29.09 KG/M2

## 2020-01-01 VITALS
TEMPERATURE: 98 F | RESPIRATION RATE: 16 BRPM | HEART RATE: 78 BPM | BODY MASS INDEX: 29.37 KG/M2 | HEIGHT: 66 IN | DIASTOLIC BLOOD PRESSURE: 49 MMHG | SYSTOLIC BLOOD PRESSURE: 108 MMHG | OXYGEN SATURATION: 96 % | WEIGHT: 182.75 LBS

## 2020-01-01 VITALS
WEIGHT: 187 LBS | DIASTOLIC BLOOD PRESSURE: 83 MMHG | HEIGHT: 66 IN | BODY MASS INDEX: 30.05 KG/M2 | SYSTOLIC BLOOD PRESSURE: 131 MMHG | HEART RATE: 87 BPM

## 2020-01-01 VITALS
BODY MASS INDEX: 29.25 KG/M2 | HEIGHT: 66 IN | SYSTOLIC BLOOD PRESSURE: 120 MMHG | HEART RATE: 80 BPM | DIASTOLIC BLOOD PRESSURE: 74 MMHG | WEIGHT: 182 LBS

## 2020-01-01 VITALS
SYSTOLIC BLOOD PRESSURE: 119 MMHG | WEIGHT: 184.5 LBS | TEMPERATURE: 97 F | OXYGEN SATURATION: 61 % | HEIGHT: 66 IN | DIASTOLIC BLOOD PRESSURE: 56 MMHG | BODY MASS INDEX: 29.65 KG/M2

## 2020-01-01 VITALS
HEIGHT: 66 IN | DIASTOLIC BLOOD PRESSURE: 83 MMHG | TEMPERATURE: 98 F | RESPIRATION RATE: 16 BRPM | HEART RATE: 91 BPM | BODY MASS INDEX: 29.21 KG/M2 | SYSTOLIC BLOOD PRESSURE: 147 MMHG

## 2020-01-01 VITALS
DIASTOLIC BLOOD PRESSURE: 73 MMHG | RESPIRATION RATE: 20 BRPM | HEART RATE: 87 BPM | BODY MASS INDEX: 28.28 KG/M2 | WEIGHT: 176 LBS | HEIGHT: 66 IN | TEMPERATURE: 98 F | SYSTOLIC BLOOD PRESSURE: 122 MMHG

## 2020-01-01 VITALS
SYSTOLIC BLOOD PRESSURE: 128 MMHG | RESPIRATION RATE: 18 BRPM | DIASTOLIC BLOOD PRESSURE: 86 MMHG | OXYGEN SATURATION: 100 % | TEMPERATURE: 98 F | HEIGHT: 66 IN | BODY MASS INDEX: 28.34 KG/M2 | HEART RATE: 90 BPM | WEIGHT: 176.38 LBS

## 2020-01-01 VITALS — WEIGHT: 178 LBS | BODY MASS INDEX: 28.61 KG/M2 | TEMPERATURE: 98 F | HEIGHT: 66 IN

## 2020-01-01 VITALS
TEMPERATURE: 97 F | WEIGHT: 178 LBS | HEIGHT: 66 IN | RESPIRATION RATE: 18 BRPM | DIASTOLIC BLOOD PRESSURE: 70 MMHG | SYSTOLIC BLOOD PRESSURE: 116 MMHG | HEART RATE: 97 BPM | BODY MASS INDEX: 28.61 KG/M2

## 2020-01-01 VITALS — HEIGHT: 66 IN | WEIGHT: 177.94 LBS | BODY MASS INDEX: 28.6 KG/M2

## 2020-01-01 DIAGNOSIS — E11.49 TYPE II DIABETES MELLITUS WITH NEUROLOGICAL MANIFESTATIONS: ICD-10-CM

## 2020-01-01 DIAGNOSIS — I48.92 ATRIAL FIBRILLATION AND FLUTTER: ICD-10-CM

## 2020-01-01 DIAGNOSIS — T14.8XXD DELAYED WOUND HEALING: Primary | ICD-10-CM

## 2020-01-01 DIAGNOSIS — L97.522 SKIN ULCER OF LEFT FOOT WITH FAT LAYER EXPOSED: Primary | ICD-10-CM

## 2020-01-01 DIAGNOSIS — R00.0 TACHYCARDIA: ICD-10-CM

## 2020-01-01 DIAGNOSIS — T14.8XXD WOUND HEALING, DELAYED: Primary | ICD-10-CM

## 2020-01-01 DIAGNOSIS — L97.521 SKIN ULCER OF LEFT FOOT, LIMITED TO BREAKDOWN OF SKIN: ICD-10-CM

## 2020-01-01 DIAGNOSIS — Z99.2 CHRONIC KIDNEY DISEASE WITH END STAGE RENAL FAILURE ON DIALYSIS: ICD-10-CM

## 2020-01-01 DIAGNOSIS — L97.522 SKIN ULCER OF LEFT FOOT WITH FAT LAYER EXPOSED: ICD-10-CM

## 2020-01-01 DIAGNOSIS — S91.302A OPEN WOUND OF LEFT HEEL, INITIAL ENCOUNTER: ICD-10-CM

## 2020-01-01 DIAGNOSIS — R78.81 ENTEROCOCCAL BACTEREMIA: ICD-10-CM

## 2020-01-01 DIAGNOSIS — I50.9 ACUTE ON CHRONIC HEART FAILURE, UNSPECIFIED HEART FAILURE TYPE: ICD-10-CM

## 2020-01-01 DIAGNOSIS — E11.51 TYPE II DIABETES MELLITUS WITH PERIPHERAL CIRCULATORY DISORDER: ICD-10-CM

## 2020-01-01 DIAGNOSIS — I48.91 ATRIAL FIBRILLATION AND FLUTTER: ICD-10-CM

## 2020-01-01 DIAGNOSIS — D63.8 ANEMIA, CHRONIC DISEASE: ICD-10-CM

## 2020-01-01 DIAGNOSIS — T14.8XXD WOUND HEALING, DELAYED: ICD-10-CM

## 2020-01-01 DIAGNOSIS — E11.51 TYPE II DIABETES MELLITUS WITH PERIPHERAL CIRCULATORY DISORDER: Primary | ICD-10-CM

## 2020-01-01 DIAGNOSIS — I95.9 LOW BP: Primary | ICD-10-CM

## 2020-01-01 DIAGNOSIS — I50.9 CONGESTIVE HEART FAILURE, UNSPECIFIED HF CHRONICITY, UNSPECIFIED HEART FAILURE TYPE: ICD-10-CM

## 2020-01-01 DIAGNOSIS — S91.302S OPEN WOUND OF LEFT HEEL, SEQUELA: ICD-10-CM

## 2020-01-01 DIAGNOSIS — R40.4 UNRESPONSIVE EPISODE: ICD-10-CM

## 2020-01-01 DIAGNOSIS — J96.22 ACUTE ON CHRONIC RESPIRATORY FAILURE WITH HYPOXIA AND HYPERCAPNIA: ICD-10-CM

## 2020-01-01 DIAGNOSIS — Z01.818 PRE-OP TESTING: ICD-10-CM

## 2020-01-01 DIAGNOSIS — N18.6 STAGE 5 CHRONIC KIDNEY DISEASE ON CHRONIC DIALYSIS: Primary | ICD-10-CM

## 2020-01-01 DIAGNOSIS — J98.11 ATELECTASIS: ICD-10-CM

## 2020-01-01 DIAGNOSIS — R53.1 WEAKNESS GENERALIZED: ICD-10-CM

## 2020-01-01 DIAGNOSIS — J44.9 CHRONIC OBSTRUCTIVE PULMONARY DISEASE, UNSPECIFIED COPD TYPE: ICD-10-CM

## 2020-01-01 DIAGNOSIS — Z01.818 PRE-OP TESTING: Primary | ICD-10-CM

## 2020-01-01 DIAGNOSIS — I48.92 ATRIAL FIBRILLATION/FLUTTER: ICD-10-CM

## 2020-01-01 DIAGNOSIS — I48.92 ATRIAL FLUTTER: ICD-10-CM

## 2020-01-01 DIAGNOSIS — E11.51 TYPE II DIABETES MELLITUS WITH PERIPHERAL CIRCULATORY DISORDER: Chronic | ICD-10-CM

## 2020-01-01 DIAGNOSIS — S91.302D OPEN WOUND OF LEFT HEEL, SUBSEQUENT ENCOUNTER: Primary | ICD-10-CM

## 2020-01-01 DIAGNOSIS — J96.21 ACUTE ON CHRONIC RESPIRATORY FAILURE WITH HYPOXIA AND HYPERCAPNIA: ICD-10-CM

## 2020-01-01 DIAGNOSIS — B95.2 ENTEROCOCCAL BACTEREMIA: ICD-10-CM

## 2020-01-01 DIAGNOSIS — R07.9 CHEST PAIN: ICD-10-CM

## 2020-01-01 DIAGNOSIS — J98.6 DIAPHRAGM DYSFUNCTION: ICD-10-CM

## 2020-01-01 DIAGNOSIS — E87.1 HYPONATREMIA: ICD-10-CM

## 2020-01-01 DIAGNOSIS — E03.9 HYPOTHYROIDISM, UNSPECIFIED TYPE: ICD-10-CM

## 2020-01-01 DIAGNOSIS — I95.9 HYPOTENSION: ICD-10-CM

## 2020-01-01 DIAGNOSIS — T14.8XXD DELAYED WOUND HEALING: ICD-10-CM

## 2020-01-01 DIAGNOSIS — Z99.2 STAGE 5 CHRONIC KIDNEY DISEASE ON CHRONIC DIALYSIS: Primary | ICD-10-CM

## 2020-01-01 DIAGNOSIS — R06.02 SOB (SHORTNESS OF BREATH): ICD-10-CM

## 2020-01-01 DIAGNOSIS — I48.91 ATRIAL FIBRILLATION/FLUTTER: ICD-10-CM

## 2020-01-01 DIAGNOSIS — J44.1 COPD EXACERBATION: ICD-10-CM

## 2020-01-01 DIAGNOSIS — I50.9 CHF (CONGESTIVE HEART FAILURE): ICD-10-CM

## 2020-01-01 DIAGNOSIS — J15.1 PNEUMONIA OF BOTH LUNGS DUE TO PSEUDOMONAS SPECIES, UNSPECIFIED PART OF LUNG: ICD-10-CM

## 2020-01-01 DIAGNOSIS — R06.02 SOB (SHORTNESS OF BREATH): Primary | ICD-10-CM

## 2020-01-01 DIAGNOSIS — R57.9 SHOCK: ICD-10-CM

## 2020-01-01 DIAGNOSIS — R06.02 SHORTNESS OF BREATH: ICD-10-CM

## 2020-01-01 DIAGNOSIS — N18.6 CHRONIC KIDNEY DISEASE WITH END STAGE RENAL FAILURE ON DIALYSIS: ICD-10-CM

## 2020-01-01 LAB
ALBUMIN SERPL BCP-MCNC: 1.3 G/DL (ref 3.5–5.2)
ALBUMIN SERPL BCP-MCNC: 1.4 G/DL (ref 3.5–5.2)
ALBUMIN SERPL BCP-MCNC: 1.4 G/DL (ref 3.5–5.2)
ALBUMIN SERPL BCP-MCNC: 1.5 G/DL (ref 3.5–5.2)
ALBUMIN SERPL BCP-MCNC: 1.6 G/DL (ref 3.5–5.2)
ALBUMIN SERPL BCP-MCNC: 1.6 G/DL (ref 3.5–5.2)
ALBUMIN SERPL BCP-MCNC: 1.7 G/DL (ref 3.5–5.2)
ALBUMIN SERPL BCP-MCNC: 2.2 G/DL (ref 3.5–5.2)
ALBUMIN SERPL BCP-MCNC: 2.5 G/DL (ref 3.5–5.2)
ALBUMIN SERPL BCP-MCNC: 2.6 G/DL (ref 3.5–5.2)
ALBUMIN SERPL BCP-MCNC: 2.7 G/DL (ref 3.5–5.2)
ALBUMIN SERPL BCP-MCNC: 3 G/DL (ref 3.5–5.2)
ALBUMIN SERPL BCP-MCNC: 3.1 G/DL (ref 3.5–5.2)
ALLENS TEST: ABNORMAL
ALP SERPL-CCNC: 101 U/L (ref 55–135)
ALP SERPL-CCNC: 140 U/L (ref 55–135)
ALP SERPL-CCNC: 147 U/L (ref 55–135)
ALP SERPL-CCNC: 152 U/L (ref 55–135)
ALP SERPL-CCNC: 156 U/L (ref 55–135)
ALP SERPL-CCNC: 48 U/L (ref 55–135)
ALP SERPL-CCNC: 61 U/L (ref 55–135)
ALP SERPL-CCNC: 69 U/L (ref 55–135)
ALP SERPL-CCNC: 72 U/L (ref 55–135)
ALP SERPL-CCNC: 72 U/L (ref 55–135)
ALP SERPL-CCNC: 78 U/L (ref 55–135)
ALP SERPL-CCNC: 85 U/L (ref 55–135)
ALP SERPL-CCNC: 99 U/L (ref 55–135)
ALT SERPL W/O P-5'-P-CCNC: 15 U/L (ref 10–44)
ALT SERPL W/O P-5'-P-CCNC: 15 U/L (ref 10–44)
ALT SERPL W/O P-5'-P-CCNC: 17 U/L (ref 10–44)
ALT SERPL W/O P-5'-P-CCNC: 17 U/L (ref 10–44)
ALT SERPL W/O P-5'-P-CCNC: 18 U/L (ref 10–44)
ALT SERPL W/O P-5'-P-CCNC: 18 U/L (ref 10–44)
ALT SERPL W/O P-5'-P-CCNC: 19 U/L (ref 10–44)
ALT SERPL W/O P-5'-P-CCNC: 20 U/L (ref 10–44)
ALT SERPL W/O P-5'-P-CCNC: 21 U/L (ref 10–44)
ALT SERPL W/O P-5'-P-CCNC: 22 U/L (ref 10–44)
ALT SERPL W/O P-5'-P-CCNC: 24 U/L (ref 10–44)
AMMONIA PLAS-SCNC: 19 UMOL/L (ref 10–50)
ANION GAP SERPL CALC-SCNC: 10 MMOL/L (ref 8–16)
ANION GAP SERPL CALC-SCNC: 11 MMOL/L (ref 8–16)
ANION GAP SERPL CALC-SCNC: 12 MMOL/L (ref 8–16)
ANION GAP SERPL CALC-SCNC: 13 MMOL/L (ref 8–16)
ANION GAP SERPL CALC-SCNC: 14 MMOL/L (ref 8–16)
ANION GAP SERPL CALC-SCNC: 16 MMOL/L (ref 8–16)
ANION GAP SERPL CALC-SCNC: 17 MMOL/L (ref 8–16)
ANION GAP SERPL CALC-SCNC: 17 MMOL/L (ref 8–16)
ANION GAP SERPL CALC-SCNC: 19 MMOL/L (ref 8–16)
ANION GAP SERPL CALC-SCNC: 6 MMOL/L (ref 8–16)
ANION GAP SERPL CALC-SCNC: 9 MMOL/L (ref 8–16)
ANION GAP SERPL CALC-SCNC: 9 MMOL/L (ref 8–16)
APTT PPP: 39.6 SEC (ref 23.6–33.3)
AST SERPL-CCNC: 16 U/L (ref 10–40)
AST SERPL-CCNC: 17 U/L (ref 10–40)
AST SERPL-CCNC: 19 U/L (ref 10–40)
AST SERPL-CCNC: 20 U/L (ref 10–40)
AST SERPL-CCNC: 21 U/L (ref 10–40)
AST SERPL-CCNC: 23 U/L (ref 10–40)
AST SERPL-CCNC: 25 U/L (ref 10–40)
AST SERPL-CCNC: 25 U/L (ref 10–40)
AST SERPL-CCNC: 26 U/L (ref 10–40)
AST SERPL-CCNC: 29 U/L (ref 10–40)
AST SERPL-CCNC: 32 U/L (ref 10–40)
BACTERIA #/AREA URNS HPF: ABNORMAL /HPF
BACTERIA BLD CULT: ABNORMAL
BACTERIA BLD CULT: NORMAL
BACTERIA SPEC AEROBE CULT: ABNORMAL
BACTERIA SPEC AEROBE CULT: ABNORMAL
BACTERIA SPEC AEROBE CULT: NORMAL
BACTERIA UR CULT: ABNORMAL
BASOPHILS # BLD AUTO: 0.05 K/UL (ref 0–0.2)
BASOPHILS # BLD AUTO: 0.06 K/UL (ref 0–0.2)
BASOPHILS # BLD AUTO: 0.07 K/UL (ref 0–0.2)
BASOPHILS # BLD AUTO: 0.08 K/UL (ref 0–0.2)
BASOPHILS # BLD AUTO: 0.11 K/UL (ref 0–0.2)
BASOPHILS # BLD AUTO: 0.11 K/UL (ref 0–0.2)
BASOPHILS NFR BLD: 0.6 % (ref 0–1.9)
BASOPHILS NFR BLD: 0.6 % (ref 0–1.9)
BASOPHILS NFR BLD: 0.7 % (ref 0–1.9)
BASOPHILS NFR BLD: 0.8 % (ref 0–1.9)
BASOPHILS NFR BLD: 0.9 % (ref 0–1.9)
BASOPHILS NFR BLD: 1.2 % (ref 0–1.9)
BILIRUB SERPL-MCNC: 0.4 MG/DL (ref 0.1–1)
BILIRUB SERPL-MCNC: 0.6 MG/DL (ref 0.1–1)
BILIRUB SERPL-MCNC: 0.7 MG/DL (ref 0.1–1)
BILIRUB SERPL-MCNC: 0.8 MG/DL (ref 0.1–1)
BILIRUB SERPL-MCNC: 0.9 MG/DL (ref 0.1–1)
BILIRUB SERPL-MCNC: 1 MG/DL (ref 0.1–1)
BILIRUB SERPL-MCNC: 1 MG/DL (ref 0.1–1)
BILIRUB SERPL-MCNC: 1.1 MG/DL (ref 0.1–1)
BILIRUB UR QL STRIP: ABNORMAL
BNP SERPL-MCNC: 2866 PG/ML (ref 0–99)
BNP SERPL-MCNC: 570 PG/ML (ref 0–99)
BNP SERPL-MCNC: 887 PG/ML (ref 0–99)
BNP SERPL-MCNC: 946 PG/ML (ref 0–99)
BNP SERPL-MCNC: >4500 PG/ML (ref 0–99)
BSA FOR ECHO PROCEDURE: 1.94 M2
BSA FOR ECHO PROCEDURE: 1.94 M2
BUN SERPL-MCNC: 22 MG/DL (ref 6–20)
BUN SERPL-MCNC: 24 MG/DL (ref 6–20)
BUN SERPL-MCNC: 26 MG/DL (ref 6–20)
BUN SERPL-MCNC: 27 MG/DL (ref 6–20)
BUN SERPL-MCNC: 29 MG/DL (ref 6–20)
BUN SERPL-MCNC: 30 MG/DL (ref 6–20)
BUN SERPL-MCNC: 31 MG/DL (ref 6–20)
BUN SERPL-MCNC: 32 MG/DL (ref 6–20)
BUN SERPL-MCNC: 32 MG/DL (ref 6–20)
BUN SERPL-MCNC: 33 MG/DL (ref 6–20)
BUN SERPL-MCNC: 35 MG/DL (ref 6–20)
BUN SERPL-MCNC: 37 MG/DL (ref 6–20)
BUN SERPL-MCNC: 41 MG/DL (ref 6–20)
BUN SERPL-MCNC: 42 MG/DL (ref 6–20)
BUN SERPL-MCNC: 43 MG/DL (ref 6–20)
BUN SERPL-MCNC: 44 MG/DL (ref 6–20)
BUN SERPL-MCNC: 46 MG/DL (ref 6–20)
BUN SERPL-MCNC: 46 MG/DL (ref 6–20)
BUN SERPL-MCNC: 48 MG/DL (ref 6–20)
BUN SERPL-MCNC: 50 MG/DL (ref 6–20)
BUN SERPL-MCNC: 51 MG/DL (ref 6–20)
BUN SERPL-MCNC: 55 MG/DL (ref 6–20)
BUN SERPL-MCNC: 55 MG/DL (ref 6–20)
BUN SERPL-MCNC: 62 MG/DL (ref 6–20)
BUN SERPL-MCNC: 62 MG/DL (ref 6–20)
C DIFF GDH STL QL: POSITIVE
C DIFF TOX A+B STL QL IA: NEGATIVE
C DIFF TOX GENS STL QL NAA+PROBE: POSITIVE
CALCIUM SERPL-MCNC: 7.2 MG/DL (ref 8.7–10.5)
CALCIUM SERPL-MCNC: 7.4 MG/DL (ref 8.7–10.5)
CALCIUM SERPL-MCNC: 7.5 MG/DL (ref 8.7–10.5)
CALCIUM SERPL-MCNC: 7.5 MG/DL (ref 8.7–10.5)
CALCIUM SERPL-MCNC: 7.6 MG/DL (ref 8.7–10.5)
CALCIUM SERPL-MCNC: 7.8 MG/DL (ref 8.7–10.5)
CALCIUM SERPL-MCNC: 7.9 MG/DL (ref 8.7–10.5)
CALCIUM SERPL-MCNC: 7.9 MG/DL (ref 8.7–10.5)
CALCIUM SERPL-MCNC: 8 MG/DL (ref 8.7–10.5)
CALCIUM SERPL-MCNC: 8 MG/DL (ref 8.7–10.5)
CALCIUM SERPL-MCNC: 8.1 MG/DL (ref 8.7–10.5)
CALCIUM SERPL-MCNC: 8.1 MG/DL (ref 8.7–10.5)
CALCIUM SERPL-MCNC: 8.2 MG/DL (ref 8.7–10.5)
CALCIUM SERPL-MCNC: 8.2 MG/DL (ref 8.7–10.5)
CALCIUM SERPL-MCNC: 8.4 MG/DL (ref 8.7–10.5)
CALCIUM SERPL-MCNC: 8.4 MG/DL (ref 8.7–10.5)
CALCIUM SERPL-MCNC: 8.5 MG/DL (ref 8.7–10.5)
CALCIUM SERPL-MCNC: 8.5 MG/DL (ref 8.7–10.5)
CALCIUM SERPL-MCNC: 8.7 MG/DL (ref 8.7–10.5)
CHLORIDE SERPL-SCNC: 100 MMOL/L (ref 95–110)
CHLORIDE SERPL-SCNC: 101 MMOL/L (ref 95–110)
CHLORIDE SERPL-SCNC: 102 MMOL/L (ref 95–110)
CHLORIDE SERPL-SCNC: 88 MMOL/L (ref 95–110)
CHLORIDE SERPL-SCNC: 89 MMOL/L (ref 95–110)
CHLORIDE SERPL-SCNC: 91 MMOL/L (ref 95–110)
CHLORIDE SERPL-SCNC: 92 MMOL/L (ref 95–110)
CHLORIDE SERPL-SCNC: 93 MMOL/L (ref 95–110)
CHLORIDE SERPL-SCNC: 93 MMOL/L (ref 95–110)
CHLORIDE SERPL-SCNC: 94 MMOL/L (ref 95–110)
CHLORIDE SERPL-SCNC: 94 MMOL/L (ref 95–110)
CHLORIDE SERPL-SCNC: 95 MMOL/L (ref 95–110)
CHLORIDE SERPL-SCNC: 96 MMOL/L (ref 95–110)
CHLORIDE SERPL-SCNC: 96 MMOL/L (ref 95–110)
CHLORIDE SERPL-SCNC: 97 MMOL/L (ref 95–110)
CHLORIDE SERPL-SCNC: 98 MMOL/L (ref 95–110)
CHLORIDE SERPL-SCNC: 98 MMOL/L (ref 95–110)
CLARITY UR: ABNORMAL
CO2 SERPL-SCNC: 19 MMOL/L (ref 23–29)
CO2 SERPL-SCNC: 20 MMOL/L (ref 23–29)
CO2 SERPL-SCNC: 21 MMOL/L (ref 23–29)
CO2 SERPL-SCNC: 22 MMOL/L (ref 23–29)
CO2 SERPL-SCNC: 23 MMOL/L (ref 23–29)
CO2 SERPL-SCNC: 24 MMOL/L (ref 23–29)
CO2 SERPL-SCNC: 25 MMOL/L (ref 23–29)
CO2 SERPL-SCNC: 26 MMOL/L (ref 23–29)
CO2 SERPL-SCNC: 27 MMOL/L (ref 23–29)
CO2 SERPL-SCNC: 27 MMOL/L (ref 23–29)
CO2 SERPL-SCNC: 28 MMOL/L (ref 23–29)
CO2 SERPL-SCNC: 29 MMOL/L (ref 23–29)
COLOR UR: YELLOW
CREAT SERPL-MCNC: 2.2 MG/DL (ref 0.5–1.4)
CREAT SERPL-MCNC: 2.6 MG/DL (ref 0.5–1.4)
CREAT SERPL-MCNC: 3.1 MG/DL (ref 0.5–1.4)
CREAT SERPL-MCNC: 3.5 MG/DL (ref 0.5–1.4)
CREAT SERPL-MCNC: 3.7 MG/DL (ref 0.5–1.4)
CREAT SERPL-MCNC: 3.8 MG/DL (ref 0.5–1.4)
CREAT SERPL-MCNC: 3.9 MG/DL (ref 0.5–1.4)
CREAT SERPL-MCNC: 4 MG/DL (ref 0.5–1.4)
CREAT SERPL-MCNC: 4 MG/DL (ref 0.5–1.4)
CREAT SERPL-MCNC: 4.1 MG/DL (ref 0.5–1.4)
CREAT SERPL-MCNC: 4.2 MG/DL (ref 0.5–1.4)
CREAT SERPL-MCNC: 4.3 MG/DL (ref 0.5–1.4)
CREAT SERPL-MCNC: 4.6 MG/DL (ref 0.5–1.4)
CREAT SERPL-MCNC: 4.7 MG/DL (ref 0.5–1.4)
CREAT SERPL-MCNC: 4.7 MG/DL (ref 0.5–1.4)
CREAT SERPL-MCNC: 4.8 MG/DL (ref 0.5–1.4)
CREAT SERPL-MCNC: 5 MG/DL (ref 0.5–1.4)
CREAT SERPL-MCNC: 5.1 MG/DL (ref 0.5–1.4)
CREAT SERPL-MCNC: 5.2 MG/DL (ref 0.5–1.4)
CREAT SERPL-MCNC: 5.3 MG/DL (ref 0.5–1.4)
CREAT SERPL-MCNC: 5.5 MG/DL (ref 0.5–1.4)
CV ECHO LV RWT: 0.31 CM
DELSYS: ABNORMAL
DIFFERENTIAL METHOD: ABNORMAL
ECHO LV POSTERIOR WALL: 0.82 CM (ref 0.6–1.1)
EOSINOPHIL # BLD AUTO: 0.3 K/UL (ref 0–0.5)
EOSINOPHIL # BLD AUTO: 0.4 K/UL (ref 0–0.5)
EOSINOPHIL # BLD AUTO: 0.5 K/UL (ref 0–0.5)
EOSINOPHIL # BLD AUTO: 0.5 K/UL (ref 0–0.5)
EOSINOPHIL NFR BLD: 2.7 % (ref 0–8)
EOSINOPHIL NFR BLD: 2.7 % (ref 0–8)
EOSINOPHIL NFR BLD: 3.5 % (ref 0–8)
EOSINOPHIL NFR BLD: 4.1 % (ref 0–8)
EOSINOPHIL NFR BLD: 4.3 % (ref 0–8)
EOSINOPHIL NFR BLD: 6.8 % (ref 0–8)
EP: 10
EP: 10
ERYTHROCYTE [DISTWIDTH] IN BLOOD BY AUTOMATED COUNT: 15 % (ref 11.5–14.5)
ERYTHROCYTE [DISTWIDTH] IN BLOOD BY AUTOMATED COUNT: 15.1 % (ref 11.5–14.5)
ERYTHROCYTE [DISTWIDTH] IN BLOOD BY AUTOMATED COUNT: 15.5 % (ref 11.5–14.5)
ERYTHROCYTE [DISTWIDTH] IN BLOOD BY AUTOMATED COUNT: 15.9 % (ref 11.5–14.5)
ERYTHROCYTE [DISTWIDTH] IN BLOOD BY AUTOMATED COUNT: 16.1 % (ref 11.5–14.5)
ERYTHROCYTE [DISTWIDTH] IN BLOOD BY AUTOMATED COUNT: 16.1 % (ref 11.5–14.5)
ERYTHROCYTE [DISTWIDTH] IN BLOOD BY AUTOMATED COUNT: 16.3 % (ref 11.5–14.5)
ERYTHROCYTE [DISTWIDTH] IN BLOOD BY AUTOMATED COUNT: 16.4 % (ref 11.5–14.5)
ERYTHROCYTE [DISTWIDTH] IN BLOOD BY AUTOMATED COUNT: 16.4 % (ref 11.5–14.5)
ERYTHROCYTE [DISTWIDTH] IN BLOOD BY AUTOMATED COUNT: 16.5 % (ref 11.5–14.5)
ERYTHROCYTE [DISTWIDTH] IN BLOOD BY AUTOMATED COUNT: 16.8 % (ref 11.5–14.5)
ERYTHROCYTE [DISTWIDTH] IN BLOOD BY AUTOMATED COUNT: 17.1 % (ref 11.5–14.5)
ERYTHROCYTE [DISTWIDTH] IN BLOOD BY AUTOMATED COUNT: 17.4 % (ref 11.5–14.5)
ERYTHROCYTE [SEDIMENTATION RATE] IN BLOOD BY WESTERGREN METHOD: 0 MM/H
ERYTHROCYTE [SEDIMENTATION RATE] IN BLOOD BY WESTERGREN METHOD: 10 MM/H
ERYTHROCYTE [SEDIMENTATION RATE] IN BLOOD BY WESTERGREN METHOD: 16 MM/H
ERYTHROCYTE [SEDIMENTATION RATE] IN BLOOD BY WESTERGREN METHOD: 16 MM/H
ERYTHROCYTE [SEDIMENTATION RATE] IN BLOOD BY WESTERGREN METHOD: 24 MM/H
ERYTHROCYTE [SEDIMENTATION RATE] IN BLOOD BY WESTERGREN METHOD: 28 MM/H
ERYTHROCYTE [SEDIMENTATION RATE] IN BLOOD BY WESTERGREN METHOD: 30 MM/H
EST. GFR  (AFRICAN AMERICAN): 10 ML/MIN/1.73 M^2
EST. GFR  (AFRICAN AMERICAN): 10.3 ML/MIN/1.73 M^2
EST. GFR  (AFRICAN AMERICAN): 10.8 ML/MIN/1.73 M^2
EST. GFR  (AFRICAN AMERICAN): 11 ML/MIN/1.73 M^2
EST. GFR  (AFRICAN AMERICAN): 11 ML/MIN/1.73 M^2
EST. GFR  (AFRICAN AMERICAN): 11.3 ML/MIN/1.73 M^2
EST. GFR  (AFRICAN AMERICAN): 11.3 ML/MIN/1.73 M^2
EST. GFR  (AFRICAN AMERICAN): 11.4 ML/MIN/1.73 M^2
EST. GFR  (AFRICAN AMERICAN): 12.3 ML/MIN/1.73 M^2
EST. GFR  (AFRICAN AMERICAN): 12.7 ML/MIN/1.73 M^2
EST. GFR  (AFRICAN AMERICAN): 13 ML/MIN/1.73 M^2
EST. GFR  (AFRICAN AMERICAN): 13.4 ML/MIN/1.73 M^2
EST. GFR  (AFRICAN AMERICAN): 13.4 ML/MIN/1.73 M^2
EST. GFR  (AFRICAN AMERICAN): 13.8 ML/MIN/1.73 M^2
EST. GFR  (AFRICAN AMERICAN): 14.3 ML/MIN/1.73 M^2
EST. GFR  (AFRICAN AMERICAN): 14.8 ML/MIN/1.73 M^2
EST. GFR  (AFRICAN AMERICAN): 15.9 ML/MIN/1.73 M^2
EST. GFR  (AFRICAN AMERICAN): 18.3 ML/MIN/1.73 M^2
EST. GFR  (AFRICAN AMERICAN): 22.6 ML/MIN/1.73 M^2
EST. GFR  (AFRICAN AMERICAN): 27.7 ML/MIN/1.73 M^2
EST. GFR  (AFRICAN AMERICAN): 9.1 ML/MIN/1.73 M^2
EST. GFR  (AFRICAN AMERICAN): 9.6 ML/MIN/1.73 M^2
EST. GFR  (AFRICAN AMERICAN): 9.8 ML/MIN/1.73 M^2
EST. GFR  (NON AFRICAN AMERICAN): 10.7 ML/MIN/1.73 M^2
EST. GFR  (NON AFRICAN AMERICAN): 11 ML/MIN/1.73 M^2
EST. GFR  (NON AFRICAN AMERICAN): 11.3 ML/MIN/1.73 M^2
EST. GFR  (NON AFRICAN AMERICAN): 11.6 ML/MIN/1.73 M^2
EST. GFR  (NON AFRICAN AMERICAN): 11.6 ML/MIN/1.73 M^2
EST. GFR  (NON AFRICAN AMERICAN): 12 ML/MIN/1.73 M^2
EST. GFR  (NON AFRICAN AMERICAN): 12.4 ML/MIN/1.73 M^2
EST. GFR  (NON AFRICAN AMERICAN): 12.8 ML/MIN/1.73 M^2
EST. GFR  (NON AFRICAN AMERICAN): 13.8 ML/MIN/1.73 M^2
EST. GFR  (NON AFRICAN AMERICAN): 15.9 ML/MIN/1.73 M^2
EST. GFR  (NON AFRICAN AMERICAN): 19.6 ML/MIN/1.73 M^2
EST. GFR  (NON AFRICAN AMERICAN): 24 ML/MIN/1.73 M^2
EST. GFR  (NON AFRICAN AMERICAN): 7.9 ML/MIN/1.73 M^2
EST. GFR  (NON AFRICAN AMERICAN): 8.3 ML/MIN/1.73 M^2
EST. GFR  (NON AFRICAN AMERICAN): 8.5 ML/MIN/1.73 M^2
EST. GFR  (NON AFRICAN AMERICAN): 8.7 ML/MIN/1.73 M^2
EST. GFR  (NON AFRICAN AMERICAN): 8.9 ML/MIN/1.73 M^2
EST. GFR  (NON AFRICAN AMERICAN): 9.3 ML/MIN/1.73 M^2
EST. GFR  (NON AFRICAN AMERICAN): 9.6 ML/MIN/1.73 M^2
EST. GFR  (NON AFRICAN AMERICAN): 9.6 ML/MIN/1.73 M^2
EST. GFR  (NON AFRICAN AMERICAN): 9.8 ML/MIN/1.73 M^2
EST. GFR  (NON AFRICAN AMERICAN): 9.8 ML/MIN/1.73 M^2
EST. GFR  (NON AFRICAN AMERICAN): 9.9 ML/MIN/1.73 M^2
ESTIMATED AVG GLUCOSE: 134 MG/DL (ref 68–131)
FIO2: 0.4
FIO2: 100
FIO2: 30
FIO2: 30
FIO2: 35
FIO2: 40
FIO2: 45
FIO2: 50
FIO2: 55
FIO2: 60
FIO2: 70
FLOW: 75
FRACTIONAL SHORTENING: 19 % (ref 28–44)
GLUCOSE SERPL-MCNC: 102 MG/DL (ref 70–110)
GLUCOSE SERPL-MCNC: 102 MG/DL (ref 70–110)
GLUCOSE SERPL-MCNC: 104 MG/DL (ref 70–110)
GLUCOSE SERPL-MCNC: 106 MG/DL (ref 70–110)
GLUCOSE SERPL-MCNC: 106 MG/DL (ref 70–110)
GLUCOSE SERPL-MCNC: 108 MG/DL (ref 70–110)
GLUCOSE SERPL-MCNC: 114 MG/DL (ref 70–110)
GLUCOSE SERPL-MCNC: 117 MG/DL (ref 70–110)
GLUCOSE SERPL-MCNC: 118 MG/DL (ref 70–110)
GLUCOSE SERPL-MCNC: 118 MG/DL (ref 70–110)
GLUCOSE SERPL-MCNC: 122 MG/DL (ref 70–110)
GLUCOSE SERPL-MCNC: 122 MG/DL (ref 70–110)
GLUCOSE SERPL-MCNC: 123 MG/DL (ref 70–110)
GLUCOSE SERPL-MCNC: 124 MG/DL (ref 70–110)
GLUCOSE SERPL-MCNC: 125 MG/DL (ref 70–110)
GLUCOSE SERPL-MCNC: 129 MG/DL (ref 70–110)
GLUCOSE SERPL-MCNC: 130 MG/DL (ref 70–110)
GLUCOSE SERPL-MCNC: 131 MG/DL (ref 70–110)
GLUCOSE SERPL-MCNC: 133 MG/DL (ref 70–110)
GLUCOSE SERPL-MCNC: 133 MG/DL (ref 70–110)
GLUCOSE SERPL-MCNC: 134 MG/DL (ref 70–110)
GLUCOSE SERPL-MCNC: 138 MG/DL (ref 70–110)
GLUCOSE SERPL-MCNC: 141 MG/DL (ref 70–110)
GLUCOSE SERPL-MCNC: 142 MG/DL (ref 70–110)
GLUCOSE SERPL-MCNC: 144 MG/DL (ref 70–110)
GLUCOSE SERPL-MCNC: 145 MG/DL (ref 70–110)
GLUCOSE SERPL-MCNC: 145 MG/DL (ref 70–110)
GLUCOSE SERPL-MCNC: 147 MG/DL (ref 70–110)
GLUCOSE SERPL-MCNC: 148 MG/DL (ref 70–110)
GLUCOSE SERPL-MCNC: 149 MG/DL (ref 70–110)
GLUCOSE SERPL-MCNC: 152 MG/DL (ref 70–110)
GLUCOSE SERPL-MCNC: 153 MG/DL (ref 70–110)
GLUCOSE SERPL-MCNC: 154 MG/DL (ref 70–110)
GLUCOSE SERPL-MCNC: 156 MG/DL (ref 70–110)
GLUCOSE SERPL-MCNC: 158 MG/DL (ref 70–110)
GLUCOSE SERPL-MCNC: 159 MG/DL (ref 70–110)
GLUCOSE SERPL-MCNC: 159 MG/DL (ref 70–110)
GLUCOSE SERPL-MCNC: 160 MG/DL (ref 70–110)
GLUCOSE SERPL-MCNC: 160 MG/DL (ref 70–110)
GLUCOSE SERPL-MCNC: 161 MG/DL (ref 70–110)
GLUCOSE SERPL-MCNC: 171 MG/DL (ref 70–110)
GLUCOSE SERPL-MCNC: 172 MG/DL (ref 70–110)
GLUCOSE SERPL-MCNC: 173 MG/DL (ref 70–110)
GLUCOSE SERPL-MCNC: 174 MG/DL (ref 70–110)
GLUCOSE SERPL-MCNC: 176 MG/DL (ref 70–110)
GLUCOSE SERPL-MCNC: 179 MG/DL (ref 70–110)
GLUCOSE SERPL-MCNC: 180 MG/DL (ref 70–110)
GLUCOSE SERPL-MCNC: 180 MG/DL (ref 70–110)
GLUCOSE SERPL-MCNC: 186 MG/DL (ref 70–110)
GLUCOSE SERPL-MCNC: 187 MG/DL (ref 70–110)
GLUCOSE SERPL-MCNC: 187 MG/DL (ref 70–110)
GLUCOSE SERPL-MCNC: 189 MG/DL (ref 70–110)
GLUCOSE SERPL-MCNC: 190 MG/DL (ref 70–110)
GLUCOSE SERPL-MCNC: 191 MG/DL (ref 70–110)
GLUCOSE SERPL-MCNC: 192 MG/DL (ref 70–110)
GLUCOSE SERPL-MCNC: 193 MG/DL (ref 70–110)
GLUCOSE SERPL-MCNC: 195 MG/DL (ref 70–110)
GLUCOSE SERPL-MCNC: 196 MG/DL (ref 70–110)
GLUCOSE SERPL-MCNC: 197 MG/DL (ref 70–110)
GLUCOSE SERPL-MCNC: 202 MG/DL (ref 70–110)
GLUCOSE SERPL-MCNC: 204 MG/DL (ref 70–110)
GLUCOSE SERPL-MCNC: 208 MG/DL (ref 70–110)
GLUCOSE SERPL-MCNC: 212 MG/DL (ref 70–110)
GLUCOSE SERPL-MCNC: 215 MG/DL (ref 70–110)
GLUCOSE SERPL-MCNC: 219 MG/DL (ref 70–110)
GLUCOSE SERPL-MCNC: 230 MG/DL (ref 70–110)
GLUCOSE SERPL-MCNC: 234 MG/DL (ref 70–110)
GLUCOSE SERPL-MCNC: 237 MG/DL (ref 70–110)
GLUCOSE SERPL-MCNC: 248 MG/DL (ref 70–110)
GLUCOSE SERPL-MCNC: 248 MG/DL (ref 70–110)
GLUCOSE SERPL-MCNC: 286 MG/DL (ref 70–110)
GLUCOSE SERPL-MCNC: 291 MG/DL (ref 70–110)
GLUCOSE SERPL-MCNC: 298 MG/DL (ref 70–110)
GLUCOSE SERPL-MCNC: 67 MG/DL (ref 70–110)
GLUCOSE SERPL-MCNC: 68 MG/DL (ref 70–110)
GLUCOSE SERPL-MCNC: 68 MG/DL (ref 70–110)
GLUCOSE SERPL-MCNC: 73 MG/DL (ref 70–110)
GLUCOSE SERPL-MCNC: 74 MG/DL (ref 70–110)
GLUCOSE SERPL-MCNC: 74 MG/DL (ref 70–110)
GLUCOSE SERPL-MCNC: 76 MG/DL (ref 70–110)
GLUCOSE SERPL-MCNC: 77 MG/DL (ref 70–110)
GLUCOSE SERPL-MCNC: 82 MG/DL (ref 70–110)
GLUCOSE SERPL-MCNC: 86 MG/DL (ref 70–110)
GLUCOSE SERPL-MCNC: 93 MG/DL (ref 70–110)
GLUCOSE SERPL-MCNC: 93 MG/DL (ref 70–110)
GLUCOSE SERPL-MCNC: 96 MG/DL (ref 70–110)
GLUCOSE SERPL-MCNC: 99 MG/DL (ref 70–110)
GLUCOSE UR QL STRIP: NEGATIVE
GRAM STN SPEC: ABNORMAL
GRAM STN SPEC: NORMAL
HBA1C MFR BLD HPLC: 6.3 % (ref 4.5–6.2)
HBV SURFACE AG SERPL QL IA: NEGATIVE
HCO3 UR-SCNC: 19.9 MMOL/L (ref 24–28)
HCO3 UR-SCNC: 20.8 MMOL/L (ref 24–28)
HCO3 UR-SCNC: 20.9 MMOL/L (ref 24–28)
HCO3 UR-SCNC: 21.5 MMOL/L (ref 24–28)
HCO3 UR-SCNC: 23.5 MMOL/L (ref 24–28)
HCO3 UR-SCNC: 24.4 MMOL/L (ref 24–28)
HCO3 UR-SCNC: 25.8 MMOL/L (ref 24–28)
HCO3 UR-SCNC: 25.9 MMOL/L (ref 24–28)
HCO3 UR-SCNC: 26.7 MMOL/L (ref 24–28)
HCO3 UR-SCNC: 26.8 MMOL/L (ref 24–28)
HCO3 UR-SCNC: 27.1 MMOL/L (ref 24–28)
HCO3 UR-SCNC: 27.2 MMOL/L (ref 24–28)
HCO3 UR-SCNC: 27.6 MMOL/L (ref 24–28)
HCO3 UR-SCNC: 28.4 MMOL/L (ref 24–28)
HCO3 UR-SCNC: 28.4 MMOL/L (ref 24–28)
HCO3 UR-SCNC: 28.5 MMOL/L (ref 24–28)
HCO3 UR-SCNC: 28.9 MMOL/L (ref 24–28)
HCO3 UR-SCNC: 29.7 MMOL/L (ref 24–28)
HCO3 UR-SCNC: 30.1 MMOL/L (ref 24–28)
HCT VFR BLD AUTO: 33.4 % (ref 37–48.5)
HCT VFR BLD AUTO: 33.4 % (ref 37–48.5)
HCT VFR BLD AUTO: 34.5 % (ref 37–48.5)
HCT VFR BLD AUTO: 34.6 % (ref 37–48.5)
HCT VFR BLD AUTO: 35.8 % (ref 37–48.5)
HCT VFR BLD AUTO: 36.5 % (ref 37–48.5)
HCT VFR BLD AUTO: 37.5 % (ref 37–48.5)
HCT VFR BLD AUTO: 37.8 % (ref 37–48.5)
HCT VFR BLD AUTO: 38.2 % (ref 37–48.5)
HCT VFR BLD AUTO: 39.4 % (ref 37–48.5)
HCT VFR BLD AUTO: 39.4 % (ref 37–48.5)
HCT VFR BLD AUTO: 39.9 % (ref 37–48.5)
HCT VFR BLD AUTO: 40.6 % (ref 37–48.5)
HCT VFR BLD AUTO: 41.4 % (ref 37–48.5)
HCT VFR BLD AUTO: 41.9 % (ref 37–48.5)
HCT VFR BLD AUTO: 43 % (ref 37–48.5)
HCT VFR BLD CALC: 32 %PCV (ref 36–54)
HCT VFR BLD CALC: 33 %PCV (ref 36–54)
HCT VFR BLD CALC: 35 %PCV (ref 36–54)
HCT VFR BLD CALC: 37 %PCV (ref 36–54)
HCT VFR BLD CALC: 37 %PCV (ref 36–54)
HCT VFR BLD CALC: 38 %PCV (ref 36–54)
HCT VFR BLD CALC: 38 %PCV (ref 36–54)
HCT VFR BLD CALC: 40 %PCV (ref 36–54)
HCT VFR BLD CALC: 43 %PCV (ref 36–54)
HGB BLD-MCNC: 10.6 G/DL (ref 12–16)
HGB BLD-MCNC: 10.7 G/DL (ref 12–16)
HGB BLD-MCNC: 10.9 G/DL (ref 12–16)
HGB BLD-MCNC: 11.1 G/DL (ref 12–16)
HGB BLD-MCNC: 11.4 G/DL (ref 12–16)
HGB BLD-MCNC: 11.6 G/DL (ref 12–16)
HGB BLD-MCNC: 12 G/DL (ref 12–16)
HGB BLD-MCNC: 12.1 G/DL (ref 12–16)
HGB BLD-MCNC: 12.2 G/DL (ref 12–16)
HGB BLD-MCNC: 12.4 G/DL (ref 12–16)
HGB BLD-MCNC: 12.4 G/DL (ref 12–16)
HGB BLD-MCNC: 12.5 G/DL (ref 12–16)
HGB BLD-MCNC: 12.8 G/DL (ref 12–16)
HGB BLD-MCNC: 12.9 G/DL (ref 12–16)
HGB UR QL STRIP: ABNORMAL
HYALINE CASTS #/AREA URNS LPF: 1785 /LPF
IMM GRANULOCYTES # BLD AUTO: 0.02 K/UL (ref 0–0.04)
IMM GRANULOCYTES # BLD AUTO: 0.03 K/UL (ref 0–0.04)
IMM GRANULOCYTES # BLD AUTO: 0.04 K/UL (ref 0–0.04)
IMM GRANULOCYTES # BLD AUTO: 0.04 K/UL (ref 0–0.04)
IMM GRANULOCYTES # BLD AUTO: 0.07 K/UL (ref 0–0.04)
IMM GRANULOCYTES # BLD AUTO: 0.2 K/UL (ref 0–0.04)
IMM GRANULOCYTES NFR BLD AUTO: 0.3 % (ref 0–0.5)
IMM GRANULOCYTES NFR BLD AUTO: 0.3 % (ref 0–0.5)
IMM GRANULOCYTES NFR BLD AUTO: 0.5 % (ref 0–0.5)
IMM GRANULOCYTES NFR BLD AUTO: 0.5 % (ref 0–0.5)
IMM GRANULOCYTES NFR BLD AUTO: 0.7 % (ref 0–0.5)
IMM GRANULOCYTES NFR BLD AUTO: 1.1 % (ref 0–0.5)
INR PPP: 1.3
INR PPP: 1.8
INTERVENTRICULAR SEPTUM: 0.82 CM (ref 0.6–1.1)
IP: 16
IP: 20
KETONES UR QL STRIP: NEGATIVE
LACTATE SERPL-SCNC: 1.1 MMOL/L (ref 0.5–1.9)
LEFT INTERNAL DIMENSION IN SYSTOLE: 4.37 CM (ref 2.1–4)
LEFT VENTRICLE DIASTOLIC VOLUME INDEX: 61.43 ML/M2
LEFT VENTRICLE DIASTOLIC VOLUME: 116.9 ML
LEFT VENTRICLE MASS INDEX: 83 G/M2
LEFT VENTRICLE SYSTOLIC VOLUME INDEX: 32.8 ML/M2
LEFT VENTRICLE SYSTOLIC VOLUME: 62.5 ML
LEFT VENTRICULAR INTERNAL DIMENSION IN DIASTOLE: 5.37 CM (ref 3.5–6)
LEFT VENTRICULAR MASS: 158.36 G
LEUKOCYTE ESTERASE UR QL STRIP: ABNORMAL
LYMPHOCYTES # BLD AUTO: 0.6 K/UL (ref 1–4.8)
LYMPHOCYTES # BLD AUTO: 0.7 K/UL (ref 1–4.8)
LYMPHOCYTES # BLD AUTO: 0.8 K/UL (ref 1–4.8)
LYMPHOCYTES # BLD AUTO: 0.8 K/UL (ref 1–4.8)
LYMPHOCYTES # BLD AUTO: 1 K/UL (ref 1–4.8)
LYMPHOCYTES # BLD AUTO: 1.2 K/UL (ref 1–4.8)
LYMPHOCYTES NFR BLD: 14.4 % (ref 18–48)
LYMPHOCYTES NFR BLD: 4.2 % (ref 18–48)
LYMPHOCYTES NFR BLD: 7 % (ref 18–48)
LYMPHOCYTES NFR BLD: 8.4 % (ref 18–48)
LYMPHOCYTES NFR BLD: 8.6 % (ref 18–48)
LYMPHOCYTES NFR BLD: 9.3 % (ref 18–48)
MAGNESIUM SERPL-MCNC: 1.9 MG/DL (ref 1.6–2.6)
MAGNESIUM SERPL-MCNC: 2 MG/DL (ref 1.6–2.6)
MAGNESIUM SERPL-MCNC: 2 MG/DL (ref 1.6–2.6)
MAGNESIUM SERPL-MCNC: 2.1 MG/DL (ref 1.6–2.6)
MAGNESIUM SERPL-MCNC: 2.2 MG/DL (ref 1.6–2.6)
MAGNESIUM SERPL-MCNC: 2.3 MG/DL (ref 1.6–2.6)
MAGNESIUM SERPL-MCNC: 2.6 MG/DL (ref 1.6–2.6)
MAGNESIUM SERPL-MCNC: 2.7 MG/DL (ref 1.6–2.6)
MAGNESIUM SERPL-MCNC: 2.9 MG/DL (ref 1.6–2.6)
MCH RBC QN AUTO: 27.5 PG (ref 27–31)
MCH RBC QN AUTO: 27.7 PG (ref 27–31)
MCH RBC QN AUTO: 27.7 PG (ref 27–31)
MCH RBC QN AUTO: 27.9 PG (ref 27–31)
MCH RBC QN AUTO: 28 PG (ref 27–31)
MCH RBC QN AUTO: 28.1 PG (ref 27–31)
MCH RBC QN AUTO: 28.3 PG (ref 27–31)
MCH RBC QN AUTO: 28.4 PG (ref 27–31)
MCH RBC QN AUTO: 28.7 PG (ref 27–31)
MCH RBC QN AUTO: 28.8 PG (ref 27–31)
MCH RBC QN AUTO: 29.1 PG (ref 27–31)
MCH RBC QN AUTO: 29.2 PG (ref 27–31)
MCHC RBC AUTO-ENTMCNC: 30 G/DL (ref 32–36)
MCHC RBC AUTO-ENTMCNC: 30 G/DL (ref 32–36)
MCHC RBC AUTO-ENTMCNC: 30.3 G/DL (ref 32–36)
MCHC RBC AUTO-ENTMCNC: 30.5 G/DL (ref 32–36)
MCHC RBC AUTO-ENTMCNC: 31 G/DL (ref 32–36)
MCHC RBC AUTO-ENTMCNC: 31.7 G/DL (ref 32–36)
MCHC RBC AUTO-ENTMCNC: 31.7 G/DL (ref 32–36)
MCHC RBC AUTO-ENTMCNC: 31.8 G/DL (ref 32–36)
MCHC RBC AUTO-ENTMCNC: 31.8 G/DL (ref 32–36)
MCHC RBC AUTO-ENTMCNC: 31.9 G/DL (ref 32–36)
MCHC RBC AUTO-ENTMCNC: 32 G/DL (ref 32–36)
MCHC RBC AUTO-ENTMCNC: 32.1 G/DL (ref 32–36)
MCHC RBC AUTO-ENTMCNC: 32.3 G/DL (ref 32–36)
MCHC RBC AUTO-ENTMCNC: 32.6 G/DL (ref 32–36)
MCV RBC AUTO: 86 FL (ref 82–98)
MCV RBC AUTO: 87 FL (ref 82–98)
MCV RBC AUTO: 87 FL (ref 82–98)
MCV RBC AUTO: 88 FL (ref 82–98)
MCV RBC AUTO: 89 FL (ref 82–98)
MCV RBC AUTO: 92 FL (ref 82–98)
MCV RBC AUTO: 93 FL (ref 82–98)
MCV RBC AUTO: 94 FL (ref 82–98)
MCV RBC AUTO: 96 FL (ref 82–98)
MICROSCOPIC COMMENT: ABNORMAL
MIN VOL: 10
MIN VOL: 10.1
MIN VOL: 11
MIN VOL: 11.2
MIN VOL: 12
MIN VOL: 13.2
MIN VOL: 6.2
MIN VOL: 8.33
MIN VOL: 9.8
MIN VOL: 9.81
MODE: ABNORMAL
MONOCYTES # BLD AUTO: 0.4 K/UL (ref 0.3–1)
MONOCYTES # BLD AUTO: 0.8 K/UL (ref 0.3–1)
MONOCYTES # BLD AUTO: 0.8 K/UL (ref 0.3–1)
MONOCYTES # BLD AUTO: 0.9 K/UL (ref 0.3–1)
MONOCYTES # BLD AUTO: 1.4 K/UL (ref 0.3–1)
MONOCYTES # BLD AUTO: 1.4 K/UL (ref 0.3–1)
MONOCYTES NFR BLD: 10.4 % (ref 4–15)
MONOCYTES NFR BLD: 10.5 % (ref 4–15)
MONOCYTES NFR BLD: 11.9 % (ref 4–15)
MONOCYTES NFR BLD: 6.4 % (ref 4–15)
MONOCYTES NFR BLD: 8 % (ref 4–15)
MONOCYTES NFR BLD: 8.4 % (ref 4–15)
NEUTROPHILS # BLD AUTO: 14.8 K/UL (ref 1.8–7.7)
NEUTROPHILS # BLD AUTO: 5.2 K/UL (ref 1.8–7.7)
NEUTROPHILS # BLD AUTO: 5.4 K/UL (ref 1.8–7.7)
NEUTROPHILS # BLD AUTO: 5.9 K/UL (ref 1.8–7.7)
NEUTROPHILS # BLD AUTO: 8.4 K/UL (ref 1.8–7.7)
NEUTROPHILS # BLD AUTO: 8.9 K/UL (ref 1.8–7.7)
NEUTROPHILS NFR BLD: 67.1 % (ref 38–73)
NEUTROPHILS NFR BLD: 75.6 % (ref 38–73)
NEUTROPHILS NFR BLD: 75.8 % (ref 38–73)
NEUTROPHILS NFR BLD: 79.1 % (ref 38–73)
NEUTROPHILS NFR BLD: 79.2 % (ref 38–73)
NEUTROPHILS NFR BLD: 83.4 % (ref 38–73)
NITRITE UR QL STRIP: NEGATIVE
NRBC BLD-RTO: 0 /100 WBC
PCO2 BLDA: 32.9 MMHG (ref 35–45)
PCO2 BLDA: 35.6 MMHG (ref 35–45)
PCO2 BLDA: 40 MMHG (ref 35–45)
PCO2 BLDA: 42.9 MMHG (ref 35–45)
PCO2 BLDA: 44.1 MMHG (ref 35–45)
PCO2 BLDA: 44.7 MMHG (ref 35–45)
PCO2 BLDA: 49.7 MMHG (ref 35–45)
PCO2 BLDA: 50.5 MMHG (ref 35–45)
PCO2 BLDA: 51 MMHG (ref 35–45)
PCO2 BLDA: 51.2 MMHG (ref 35–45)
PCO2 BLDA: 52.5 MMHG (ref 35–45)
PCO2 BLDA: 55.7 MMHG (ref 35–45)
PCO2 BLDA: 56.2 MMHG (ref 35–45)
PCO2 BLDA: 60.2 MMHG (ref 35–45)
PCO2 BLDA: 61.1 MMHG (ref 35–45)
PCO2 BLDA: 61.5 MMHG (ref 35–45)
PCO2 BLDA: 67.2 MMHG (ref 35–45)
PCO2 BLDA: 72.5 MMHG (ref 35–45)
PCO2 BLDA: 78.9 MMHG (ref 35–45)
PEEP: 5
PH SMN: 7.14 [PH] (ref 7.35–7.45)
PH SMN: 7.19 [PH] (ref 7.35–7.45)
PH SMN: 7.22 [PH] (ref 7.35–7.45)
PH SMN: 7.24 [PH] (ref 7.35–7.45)
PH SMN: 7.28 [PH] (ref 7.35–7.45)
PH SMN: 7.28 [PH] (ref 7.35–7.45)
PH SMN: 7.3 [PH] (ref 7.35–7.45)
PH SMN: 7.31 [PH] (ref 7.35–7.45)
PH SMN: 7.31 [PH] (ref 7.35–7.45)
PH SMN: 7.32 [PH] (ref 7.35–7.45)
PH SMN: 7.33 [PH] (ref 7.35–7.45)
PH SMN: 7.34 [PH] (ref 7.35–7.45)
PH SMN: 7.34 [PH] (ref 7.35–7.45)
PH SMN: 7.35 [PH] (ref 7.35–7.45)
PH SMN: 7.36 [PH] (ref 7.35–7.45)
PH SMN: 7.41 [PH] (ref 7.35–7.45)
PH SMN: 7.42 [PH] (ref 7.35–7.45)
PH UR STRIP: 6 [PH] (ref 5–8)
PHOSPHATE SERPL-MCNC: 1.2 MG/DL (ref 2.7–4.5)
PHOSPHATE SERPL-MCNC: 1.8 MG/DL (ref 2.7–4.5)
PHOSPHATE SERPL-MCNC: 2 MG/DL (ref 2.7–4.5)
PHOSPHATE SERPL-MCNC: 5.2 MG/DL (ref 2.7–4.5)
PHOSPHATE SERPL-MCNC: 5.6 MG/DL (ref 2.7–4.5)
PHOSPHATE SERPL-MCNC: 6 MG/DL (ref 2.7–4.5)
PHOSPHATE SERPL-MCNC: 6.5 MG/DL (ref 2.7–4.5)
PIP: 27
PIP: 32
PIP: 32
PIP: 34
PIP: 35
PIP: 35
PIP: 37
PIP: 40
PIP: 43
PISA TR MAX VEL: 3.73 M/S
PLATELET # BLD AUTO: 104 K/UL (ref 150–350)
PLATELET # BLD AUTO: 116 K/UL (ref 150–350)
PLATELET # BLD AUTO: 116 K/UL (ref 150–350)
PLATELET # BLD AUTO: 118 K/UL (ref 150–350)
PLATELET # BLD AUTO: 119 K/UL (ref 150–350)
PLATELET # BLD AUTO: 122 K/UL (ref 150–350)
PLATELET # BLD AUTO: 131 K/UL (ref 150–350)
PLATELET # BLD AUTO: 147 K/UL (ref 150–350)
PLATELET # BLD AUTO: 155 K/UL (ref 150–350)
PLATELET # BLD AUTO: 157 K/UL (ref 150–350)
PLATELET # BLD AUTO: 162 K/UL (ref 150–350)
PLATELET # BLD AUTO: 163 K/UL (ref 150–350)
PLATELET # BLD AUTO: 176 K/UL (ref 150–350)
PLATELET # BLD AUTO: 199 K/UL (ref 150–350)
PLATELET # BLD AUTO: 217 K/UL (ref 150–350)
PLATELET # BLD AUTO: 97 K/UL (ref 150–350)
PMV BLD AUTO: 10 FL (ref 9.2–12.9)
PMV BLD AUTO: 10 FL (ref 9.2–12.9)
PMV BLD AUTO: 10.2 FL (ref 9.2–12.9)
PMV BLD AUTO: 10.4 FL (ref 9.2–12.9)
PMV BLD AUTO: 10.5 FL (ref 9.2–12.9)
PMV BLD AUTO: 10.8 FL (ref 9.2–12.9)
PMV BLD AUTO: 10.8 FL (ref 9.2–12.9)
PMV BLD AUTO: 10.9 FL (ref 9.2–12.9)
PMV BLD AUTO: 10.9 FL (ref 9.2–12.9)
PMV BLD AUTO: 11.3 FL (ref 9.2–12.9)
PMV BLD AUTO: 11.5 FL (ref 9.2–12.9)
PMV BLD AUTO: 11.5 FL (ref 9.2–12.9)
PMV BLD AUTO: 9.6 FL (ref 9.2–12.9)
PMV BLD AUTO: 9.7 FL (ref 9.2–12.9)
PMV BLD AUTO: 9.9 FL (ref 9.2–12.9)
PMV BLD AUTO: 9.9 FL (ref 9.2–12.9)
PO2 BLDA: 111 MMHG (ref 80–100)
PO2 BLDA: 115 MMHG (ref 80–100)
PO2 BLDA: 119 MMHG (ref 80–100)
PO2 BLDA: 121 MMHG (ref 80–100)
PO2 BLDA: 129 MMHG (ref 80–100)
PO2 BLDA: 130 MMHG (ref 80–100)
PO2 BLDA: 135 MMHG (ref 80–100)
PO2 BLDA: 192 MMHG (ref 80–100)
PO2 BLDA: 241 MMHG (ref 80–100)
PO2 BLDA: 243 MMHG (ref 80–100)
PO2 BLDA: 68 MMHG (ref 80–100)
PO2 BLDA: 71 MMHG (ref 80–100)
PO2 BLDA: 79 MMHG (ref 80–100)
PO2 BLDA: 79 MMHG (ref 80–100)
PO2 BLDA: 84 MMHG (ref 80–100)
PO2 BLDA: 91 MMHG (ref 80–100)
PO2 BLDA: 94 MMHG (ref 80–100)
PO2 BLDA: 97 MMHG (ref 80–100)
PO2 BLDA: 97 MMHG (ref 80–100)
POC BE: -1 MMOL/L
POC BE: -2 MMOL/L
POC BE: -2 MMOL/L
POC BE: -3 MMOL/L
POC BE: -4 MMOL/L
POC BE: -5 MMOL/L
POC BE: -5 MMOL/L
POC BE: -6 MMOL/L
POC BE: 0 MMOL/L
POC BE: 1 MMOL/L
POC BE: 1 MMOL/L
POC BE: 2 MMOL/L
POC BE: 2 MMOL/L
POC BE: 3 MMOL/L
POC BE: 4 MMOL/L
POC IONIZED CALCIUM: 1.03 MMOL/L (ref 1.06–1.42)
POC IONIZED CALCIUM: 1.04 MMOL/L (ref 1.06–1.42)
POC IONIZED CALCIUM: 1.06 MMOL/L (ref 1.06–1.42)
POC IONIZED CALCIUM: 1.08 MMOL/L (ref 1.06–1.42)
POC IONIZED CALCIUM: 1.11 MMOL/L (ref 1.06–1.42)
POC IONIZED CALCIUM: 1.12 MMOL/L (ref 1.06–1.42)
POC IONIZED CALCIUM: 1.14 MMOL/L (ref 1.06–1.42)
POC IONIZED CALCIUM: 1.14 MMOL/L (ref 1.06–1.42)
POC IONIZED CALCIUM: 1.15 MMOL/L (ref 1.06–1.42)
POC SATURATED O2: 100 % (ref 95–100)
POC SATURATED O2: 88 % (ref 95–100)
POC SATURATED O2: 93 % (ref 95–100)
POC SATURATED O2: 94 % (ref 95–100)
POC SATURATED O2: 94 % (ref 95–100)
POC SATURATED O2: 95 % (ref 95–100)
POC SATURATED O2: 96 % (ref 95–100)
POC SATURATED O2: 97 % (ref 95–100)
POC SATURATED O2: 97 % (ref 95–100)
POC SATURATED O2: 98 % (ref 95–100)
POC SATURATED O2: 99 % (ref 95–100)
POC SATURATED O2: 99 % (ref 95–100)
POC TCO2: 21 MMOL/L (ref 23–27)
POC TCO2: 22 MMOL/L (ref 23–27)
POC TCO2: 22 MMOL/L (ref 23–27)
POC TCO2: 23 MMOL/L (ref 23–27)
POC TCO2: 25 MMOL/L (ref 23–27)
POC TCO2: 26 MMOL/L (ref 23–27)
POC TCO2: 27 MMOL/L (ref 23–27)
POC TCO2: 28 MMOL/L (ref 23–27)
POC TCO2: 29 MMOL/L (ref 23–27)
POC TCO2: 29 MMOL/L (ref 23–27)
POC TCO2: 30 MMOL/L (ref 23–27)
POC TCO2: 31 MMOL/L (ref 23–27)
POC TCO2: 32 MMOL/L (ref 23–27)
POTASSIUM BLD-SCNC: 3.3 MMOL/L (ref 3.5–5.1)
POTASSIUM BLD-SCNC: 3.6 MMOL/L (ref 3.5–5.1)
POTASSIUM BLD-SCNC: 3.8 MMOL/L (ref 3.5–5.1)
POTASSIUM BLD-SCNC: 3.9 MMOL/L (ref 3.5–5.1)
POTASSIUM BLD-SCNC: 4.3 MMOL/L (ref 3.5–5.1)
POTASSIUM BLD-SCNC: 4.3 MMOL/L (ref 3.5–5.1)
POTASSIUM BLD-SCNC: 4.4 MMOL/L (ref 3.5–5.1)
POTASSIUM SERPL-SCNC: 3.3 MMOL/L (ref 3.5–5.1)
POTASSIUM SERPL-SCNC: 3.3 MMOL/L (ref 3.5–5.1)
POTASSIUM SERPL-SCNC: 3.4 MMOL/L (ref 3.5–5.1)
POTASSIUM SERPL-SCNC: 3.4 MMOL/L (ref 3.5–5.1)
POTASSIUM SERPL-SCNC: 3.5 MMOL/L (ref 3.5–5.1)
POTASSIUM SERPL-SCNC: 3.7 MMOL/L (ref 3.5–5.1)
POTASSIUM SERPL-SCNC: 3.7 MMOL/L (ref 3.5–5.1)
POTASSIUM SERPL-SCNC: 3.8 MMOL/L (ref 3.5–5.1)
POTASSIUM SERPL-SCNC: 3.9 MMOL/L (ref 3.5–5.1)
POTASSIUM SERPL-SCNC: 4.2 MMOL/L (ref 3.5–5.1)
POTASSIUM SERPL-SCNC: 4.3 MMOL/L (ref 3.5–5.1)
POTASSIUM SERPL-SCNC: 4.4 MMOL/L (ref 3.5–5.1)
POTASSIUM SERPL-SCNC: 4.5 MMOL/L (ref 3.5–5.1)
POTASSIUM SERPL-SCNC: 4.6 MMOL/L (ref 3.5–5.1)
POTASSIUM SERPL-SCNC: 4.6 MMOL/L (ref 3.5–5.1)
POTASSIUM SERPL-SCNC: 4.7 MMOL/L (ref 3.5–5.1)
POTASSIUM SERPL-SCNC: 5.2 MMOL/L (ref 3.5–5.1)
POTASSIUM SERPL-SCNC: 5.7 MMOL/L (ref 3.5–5.1)
PREALB SERPL-MCNC: 3 MG/DL (ref 20–43)
PROCALCITONIN SERPL IA-MCNC: 21.57 NG/ML (ref 0–0.5)
PROCALCITONIN SERPL IA-MCNC: 63.77 NG/ML (ref 0–0.5)
PROT SERPL-MCNC: 4.4 G/DL (ref 6–8.4)
PROT SERPL-MCNC: 4.5 G/DL (ref 6–8.4)
PROT SERPL-MCNC: 4.5 G/DL (ref 6–8.4)
PROT SERPL-MCNC: 4.7 G/DL (ref 6–8.4)
PROT SERPL-MCNC: 4.9 G/DL (ref 6–8.4)
PROT SERPL-MCNC: 5 G/DL (ref 6–8.4)
PROT SERPL-MCNC: 5.2 G/DL (ref 6–8.4)
PROT SERPL-MCNC: 5.3 G/DL (ref 6–8.4)
PROT SERPL-MCNC: 5.3 G/DL (ref 6–8.4)
PROT SERPL-MCNC: 5.8 G/DL (ref 6–8.4)
PROT SERPL-MCNC: 5.8 G/DL (ref 6–8.4)
PROT SERPL-MCNC: 6 G/DL (ref 6–8.4)
PROT SERPL-MCNC: 6.3 G/DL (ref 6–8.4)
PROT UR QL STRIP: ABNORMAL
PROTHROMBIN TIME: 15.2 SEC (ref 10.6–14.8)
PROTHROMBIN TIME: 20.3 SEC (ref 10.6–14.8)
PS: 10
RA PRESSURE: 15 MMHG
RA PRESSURE: 3 MMHG
RBC # BLD AUTO: 3.66 M/UL (ref 4–5.4)
RBC # BLD AUTO: 3.75 M/UL (ref 4–5.4)
RBC # BLD AUTO: 3.83 M/UL (ref 4–5.4)
RBC # BLD AUTO: 4.04 M/UL (ref 4–5.4)
RBC # BLD AUTO: 4.07 M/UL (ref 4–5.4)
RBC # BLD AUTO: 4.14 M/UL (ref 4–5.4)
RBC # BLD AUTO: 4.21 M/UL (ref 4–5.4)
RBC # BLD AUTO: 4.23 M/UL (ref 4–5.4)
RBC # BLD AUTO: 4.27 M/UL (ref 4–5.4)
RBC # BLD AUTO: 4.32 M/UL (ref 4–5.4)
RBC # BLD AUTO: 4.37 M/UL (ref 4–5.4)
RBC # BLD AUTO: 4.38 M/UL (ref 4–5.4)
RBC # BLD AUTO: 4.45 M/UL (ref 4–5.4)
RBC # BLD AUTO: 4.48 M/UL (ref 4–5.4)
RBC # BLD AUTO: 4.48 M/UL (ref 4–5.4)
RBC # BLD AUTO: 4.53 M/UL (ref 4–5.4)
RBC #/AREA URNS HPF: 83 /HPF (ref 0–4)
SAMPLE: ABNORMAL
SARS-COV-2 RDRP RESP QL NAA+PROBE: NEGATIVE
SARS-COV-2 RDRP RESP QL NAA+PROBE: NEGATIVE
SARS-COV-2 RNA RESP QL NAA+PROBE: NOT DETECTED
SITE: ABNORMAL
SODIUM BLD-SCNC: 129 MMOL/L (ref 136–145)
SODIUM BLD-SCNC: 129 MMOL/L (ref 136–145)
SODIUM BLD-SCNC: 130 MMOL/L (ref 136–145)
SODIUM BLD-SCNC: 130 MMOL/L (ref 136–145)
SODIUM BLD-SCNC: 131 MMOL/L (ref 136–145)
SODIUM BLD-SCNC: 134 MMOL/L (ref 136–145)
SODIUM BLD-SCNC: 134 MMOL/L (ref 136–145)
SODIUM BLD-SCNC: 135 MMOL/L (ref 136–145)
SODIUM BLD-SCNC: 135 MMOL/L (ref 136–145)
SODIUM SERPL-SCNC: 126 MMOL/L (ref 136–145)
SODIUM SERPL-SCNC: 128 MMOL/L (ref 136–145)
SODIUM SERPL-SCNC: 128 MMOL/L (ref 136–145)
SODIUM SERPL-SCNC: 129 MMOL/L (ref 136–145)
SODIUM SERPL-SCNC: 130 MMOL/L (ref 136–145)
SODIUM SERPL-SCNC: 131 MMOL/L (ref 136–145)
SODIUM SERPL-SCNC: 132 MMOL/L (ref 136–145)
SODIUM SERPL-SCNC: 132 MMOL/L (ref 136–145)
SODIUM SERPL-SCNC: 133 MMOL/L (ref 136–145)
SODIUM SERPL-SCNC: 135 MMOL/L (ref 136–145)
SODIUM SERPL-SCNC: 135 MMOL/L (ref 136–145)
SODIUM SERPL-SCNC: 136 MMOL/L (ref 136–145)
SODIUM SERPL-SCNC: 136 MMOL/L (ref 136–145)
SODIUM SERPL-SCNC: 137 MMOL/L (ref 136–145)
SP GR UR STRIP: 1.02 (ref 1–1.03)
SP02: 100
SP02: 91
SP02: 95
SP02: 95
SP02: 96
SP02: 97
SP02: 98
SP02: 98
SP02: 99
SPONT RATE: 28
SPONT RATE: 31
SPONT RATE: 38
SQUAMOUS #/AREA URNS HPF: 13 /HPF
T4 FREE SERPL-MCNC: 1.26 NG/DL (ref 0.71–1.51)
TR MAX PG: 56 MMHG
TROPONIN I SERPL DL<=0.01 NG/ML-MCNC: 0.04 NG/ML
TROPONIN I SERPL DL<=0.01 NG/ML-MCNC: 0.04 NG/ML
TROPONIN I SERPL DL<=0.01 NG/ML-MCNC: 0.28 NG/ML
TROPONIN I SERPL DL<=0.01 NG/ML-MCNC: 0.34 NG/ML
TROPONIN I SERPL DL<=0.01 NG/ML-MCNC: 0.34 NG/ML
TROPONIN I SERPL DL<=0.01 NG/ML-MCNC: 0.36 NG/ML
TROPONIN I SERPL DL<=0.01 NG/ML-MCNC: 0.37 NG/ML
TSH SERPL DL<=0.005 MIU/L-ACNC: 10.62 UIU/ML (ref 0.34–5.6)
TV REST PULMONARY ARTERY PRESSURE: 71 MMHG
URN SPEC COLLECT METH UR: ABNORMAL
UROBILINOGEN UR STRIP-ACNC: NEGATIVE EU/DL
VANCOMYCIN SERPL-MCNC: 14.6 UG/ML
VANCOMYCIN SERPL-MCNC: 16.6 UG/ML
VANCOMYCIN SERPL-MCNC: 17.6 UG/ML
VANCOMYCIN SERPL-MCNC: 17.8 UG/ML
VANCOMYCIN SERPL-MCNC: 17.8 UG/ML
VANCOMYCIN SERPL-MCNC: 21.5 UG/ML
VANCOMYCIN SERPL-MCNC: 24.7 UG/ML
VANCOMYCIN SERPL-MCNC: 29.9 UG/ML
VT: 390
VT: 450
VT: 450
WBC # BLD AUTO: 10.66 K/UL (ref 3.9–12.7)
WBC # BLD AUTO: 11.32 K/UL (ref 3.9–12.7)
WBC # BLD AUTO: 11.51 K/UL (ref 3.9–12.7)
WBC # BLD AUTO: 11.72 K/UL (ref 3.9–12.7)
WBC # BLD AUTO: 11.8 K/UL (ref 3.9–12.7)
WBC # BLD AUTO: 12.08 K/UL (ref 3.9–12.7)
WBC # BLD AUTO: 14.41 K/UL (ref 3.9–12.7)
WBC # BLD AUTO: 14.7 K/UL (ref 3.9–12.7)
WBC # BLD AUTO: 14.8 K/UL (ref 3.9–12.7)
WBC # BLD AUTO: 15.2 K/UL (ref 3.9–12.7)
WBC # BLD AUTO: 15.77 K/UL (ref 3.9–12.7)
WBC # BLD AUTO: 17.45 K/UL (ref 3.9–12.7)
WBC # BLD AUTO: 17.77 K/UL (ref 3.9–12.7)
WBC # BLD AUTO: 6.53 K/UL (ref 3.9–12.7)
WBC # BLD AUTO: 7.75 K/UL (ref 3.9–12.7)
WBC # BLD AUTO: 7.99 K/UL (ref 3.9–12.7)
WBC #/AREA URNS HPF: >100 /HPF (ref 0–5)

## 2020-01-01 PROCEDURE — 27000221 HC OXYGEN, UP TO 24 HOURS

## 2020-01-01 PROCEDURE — 99214 OFFICE O/P EST MOD 30 MIN: CPT | Performed by: PODIATRIST

## 2020-01-01 PROCEDURE — 85730 THROMBOPLASTIN TIME PARTIAL: CPT

## 2020-01-01 PROCEDURE — 94003 VENT MGMT INPAT SUBQ DAY: CPT

## 2020-01-01 PROCEDURE — 25000003 PHARM REV CODE 250: Performed by: STUDENT IN AN ORGANIZED HEALTH CARE EDUCATION/TRAINING PROGRAM

## 2020-01-01 PROCEDURE — 93010 EKG 12-LEAD: ICD-10-PCS | Mod: ,,, | Performed by: INTERNAL MEDICINE

## 2020-01-01 PROCEDURE — 94761 N-INVAS EAR/PLS OXIMETRY MLT: CPT

## 2020-01-01 PROCEDURE — 84132 ASSAY OF SERUM POTASSIUM: CPT

## 2020-01-01 PROCEDURE — 25000003 PHARM REV CODE 250: Performed by: HOSPITALIST

## 2020-01-01 PROCEDURE — U0002 COVID-19 LAB TEST NON-CDC: HCPCS

## 2020-01-01 PROCEDURE — 85610 PROTHROMBIN TIME: CPT

## 2020-01-01 PROCEDURE — 27000080 OPTIME MED/SURG SUP & DEVICES GENERAL CLASSIFICATION: Performed by: PODIATRIST

## 2020-01-01 PROCEDURE — 99291 PR CRITICAL CARE, E/M 30-74 MINUTES: ICD-10-PCS | Mod: ,,, | Performed by: INTERNAL MEDICINE

## 2020-01-01 PROCEDURE — 25000003 PHARM REV CODE 250: Performed by: INTERNAL MEDICINE

## 2020-01-01 PROCEDURE — 85027 COMPLETE CBC AUTOMATED: CPT

## 2020-01-01 PROCEDURE — 83735 ASSAY OF MAGNESIUM: CPT

## 2020-01-01 PROCEDURE — 80048 BASIC METABOLIC PNL TOTAL CA: CPT

## 2020-01-01 PROCEDURE — 84484 ASSAY OF TROPONIN QUANT: CPT

## 2020-01-01 PROCEDURE — 83880 ASSAY OF NATRIURETIC PEPTIDE: CPT

## 2020-01-01 PROCEDURE — 36600 WITHDRAWAL OF ARTERIAL BLOOD: CPT

## 2020-01-01 PROCEDURE — 85014 HEMATOCRIT: CPT

## 2020-01-01 PROCEDURE — 36415 COLL VENOUS BLD VENIPUNCTURE: CPT

## 2020-01-01 PROCEDURE — 63600175 PHARM REV CODE 636 W HCPCS: Performed by: INTERNAL MEDICINE

## 2020-01-01 PROCEDURE — 99285 EMERGENCY DEPT VISIT HI MDM: CPT | Mod: 25

## 2020-01-01 PROCEDURE — 82803 BLOOD GASES ANY COMBINATION: CPT

## 2020-01-01 PROCEDURE — 99291 CRITICAL CARE FIRST HOUR: CPT | Mod: ,,, | Performed by: INTERNAL MEDICINE

## 2020-01-01 PROCEDURE — 99900026 HC AIRWAY MAINTENANCE (STAT)

## 2020-01-01 PROCEDURE — 93010 ELECTROCARDIOGRAM REPORT: CPT | Mod: ,,, | Performed by: INTERNAL MEDICINE

## 2020-01-01 PROCEDURE — 87324 CLOSTRIDIUM AG IA: CPT

## 2020-01-01 PROCEDURE — 87340 HEPATITIS B SURFACE AG IA: CPT

## 2020-01-01 PROCEDURE — 63600175 PHARM REV CODE 636 W HCPCS: Performed by: HOSPITALIST

## 2020-01-01 PROCEDURE — 82962 GLUCOSE BLOOD TEST: CPT

## 2020-01-01 PROCEDURE — 99900035 HC TECH TIME PER 15 MIN (STAT)

## 2020-01-01 PROCEDURE — S0028 INJECTION, FAMOTIDINE, 20 MG: HCPCS | Performed by: INTERNAL MEDICINE

## 2020-01-01 PROCEDURE — 63600175 PHARM REV CODE 636 W HCPCS: Performed by: EMERGENCY MEDICINE

## 2020-01-01 PROCEDURE — 99024 POSTOP FOLLOW-UP VISIT: CPT | Mod: POP,,, | Performed by: PODIATRIST

## 2020-01-01 PROCEDURE — 84100 ASSAY OF PHOSPHORUS: CPT | Mod: 91

## 2020-01-01 PROCEDURE — 87077 CULTURE AEROBIC IDENTIFY: CPT

## 2020-01-01 PROCEDURE — 94640 AIRWAY INHALATION TREATMENT: CPT

## 2020-01-01 PROCEDURE — 95819 EEG AWAKE AND ASLEEP: CPT

## 2020-01-01 PROCEDURE — 63700000 PHARM REV CODE 250 ALT 637 W/O HCPCS: Performed by: STUDENT IN AN ORGANIZED HEALTH CARE EDUCATION/TRAINING PROGRAM

## 2020-01-01 PROCEDURE — 83036 HEMOGLOBIN GLYCOSYLATED A1C: CPT

## 2020-01-01 PROCEDURE — 20000000 HC ICU ROOM

## 2020-01-01 PROCEDURE — 25000003 PHARM REV CODE 250

## 2020-01-01 PROCEDURE — 84100 ASSAY OF PHOSPHORUS: CPT

## 2020-01-01 PROCEDURE — 21400001 HC TELEMETRY ROOM

## 2020-01-01 PROCEDURE — 36556 INSERT NON-TUNNEL CV CATH: CPT

## 2020-01-01 PROCEDURE — 93005 ELECTROCARDIOGRAM TRACING: CPT | Performed by: INTERNAL MEDICINE

## 2020-01-01 PROCEDURE — 25000242 PHARM REV CODE 250 ALT 637 W/ HCPCS: Performed by: STUDENT IN AN ORGANIZED HEALTH CARE EDUCATION/TRAINING PROGRAM

## 2020-01-01 PROCEDURE — 99213 PR OFFICE/OUTPT VISIT, EST, LEVL III, 20-29 MIN: ICD-10-PCS | Mod: S$PBB,,, | Performed by: PODIATRIST

## 2020-01-01 PROCEDURE — 99215 OFFICE O/P EST HI 40 MIN: CPT | Performed by: PODIATRIST

## 2020-01-01 PROCEDURE — 80053 COMPREHEN METABOLIC PANEL: CPT

## 2020-01-01 PROCEDURE — 96360 HYDRATION IV INFUSION INIT: CPT

## 2020-01-01 PROCEDURE — 85025 COMPLETE CBC W/AUTO DIFF WBC: CPT

## 2020-01-01 PROCEDURE — 80202 ASSAY OF VANCOMYCIN: CPT

## 2020-01-01 PROCEDURE — 84145 PROCALCITONIN (PCT): CPT

## 2020-01-01 PROCEDURE — 25000003 PHARM REV CODE 250: Performed by: NURSE PRACTITIONER

## 2020-01-01 PROCEDURE — 84295 ASSAY OF SERUM SODIUM: CPT

## 2020-01-01 PROCEDURE — 84484 ASSAY OF TROPONIN QUANT: CPT | Mod: 91

## 2020-01-01 PROCEDURE — 31500 INSERT EMERGENCY AIRWAY: CPT

## 2020-01-01 PROCEDURE — 90935 HEMODIALYSIS ONE EVALUATION: CPT

## 2020-01-01 PROCEDURE — 82140 ASSAY OF AMMONIA: CPT

## 2020-01-01 PROCEDURE — 36000706: Performed by: PODIATRIST

## 2020-01-01 PROCEDURE — 37799 UNLISTED PX VASCULAR SURGERY: CPT

## 2020-01-01 PROCEDURE — 82330 ASSAY OF CALCIUM: CPT

## 2020-01-01 PROCEDURE — 25000003 PHARM REV CODE 250: Performed by: SPECIALIST

## 2020-01-01 PROCEDURE — 96361 HYDRATE IV INFUSION ADD-ON: CPT

## 2020-01-01 PROCEDURE — 99900031 HC PATIENT EDUCATION (STAT)

## 2020-01-01 PROCEDURE — 97162 PT EVAL MOD COMPLEX 30 MIN: CPT

## 2020-01-01 PROCEDURE — 37000009 HC ANESTHESIA EA ADD 15 MINS: Performed by: PODIATRIST

## 2020-01-01 PROCEDURE — 99213 OFFICE O/P EST LOW 20 MIN: CPT | Mod: S$PBB,,, | Performed by: PODIATRIST

## 2020-01-01 PROCEDURE — 25000003 PHARM REV CODE 250: Performed by: PODIATRIST

## 2020-01-01 PROCEDURE — 87040 BLOOD CULTURE FOR BACTERIA: CPT | Mod: 59

## 2020-01-01 PROCEDURE — U0003 INFECTIOUS AGENT DETECTION BY NUCLEIC ACID (DNA OR RNA); SEVERE ACUTE RESPIRATORY SYNDROME CORONAVIRUS 2 (SARS-COV-2) (CORONAVIRUS DISEASE [COVID-19]), AMPLIFIED PROBE TECHNIQUE, MAKING USE OF HIGH THROUGHPUT TECHNOLOGIES AS DESCRIBED BY CMS-2020-01-R: HCPCS

## 2020-01-01 PROCEDURE — 15271 PR SKIN SUB GRAFT TRNK/ARM/LEG: ICD-10-PCS | Mod: GC,,, | Performed by: PODIATRIST

## 2020-01-01 PROCEDURE — 99213 OFFICE O/P EST LOW 20 MIN: CPT | Performed by: PODIATRIST

## 2020-01-01 PROCEDURE — 99233 SBSQ HOSP IP/OBS HIGH 50: CPT | Mod: ,,, | Performed by: INTERNAL MEDICINE

## 2020-01-01 PROCEDURE — 25500020 PHARM REV CODE 255: Performed by: HOSPITALIST

## 2020-01-01 PROCEDURE — 63600175 PHARM REV CODE 636 W HCPCS: Performed by: NURSE ANESTHETIST, CERTIFIED REGISTERED

## 2020-01-01 PROCEDURE — 97116 GAIT TRAINING THERAPY: CPT

## 2020-01-01 PROCEDURE — 63600175 PHARM REV CODE 636 W HCPCS: Performed by: SPECIALIST

## 2020-01-01 PROCEDURE — 25000242 PHARM REV CODE 250 ALT 637 W/ HCPCS: Performed by: HOSPITALIST

## 2020-01-01 PROCEDURE — 25000242 PHARM REV CODE 250 ALT 637 W/ HCPCS: Performed by: NURSE PRACTITIONER

## 2020-01-01 PROCEDURE — 94150 VITAL CAPACITY TEST: CPT

## 2020-01-01 PROCEDURE — 63700000 PHARM REV CODE 250 ALT 637 W/O HCPCS: Performed by: HOSPITALIST

## 2020-01-01 PROCEDURE — 87040 BLOOD CULTURE FOR BACTERIA: CPT

## 2020-01-01 PROCEDURE — 99233 PR SUBSEQUENT HOSPITAL CARE,LEVL III: ICD-10-PCS | Mod: ,,, | Performed by: INTERNAL MEDICINE

## 2020-01-01 PROCEDURE — 84439 ASSAY OF FREE THYROXINE: CPT

## 2020-01-01 PROCEDURE — 83735 ASSAY OF MAGNESIUM: CPT | Mod: 91

## 2020-01-01 PROCEDURE — 71000015 HC POSTOP RECOV 1ST HR: Performed by: PODIATRIST

## 2020-01-01 PROCEDURE — 15271 SKIN SUB GRAFT TRNK/ARM/LEG: CPT | Mod: GC,,, | Performed by: PODIATRIST

## 2020-01-01 PROCEDURE — 93010 ELECTROCARDIOGRAM REPORT: CPT | Mod: 76,,, | Performed by: INTERNAL MEDICINE

## 2020-01-01 PROCEDURE — 63600175 PHARM REV CODE 636 W HCPCS: Performed by: NURSE PRACTITIONER

## 2020-01-01 PROCEDURE — 83605 ASSAY OF LACTIC ACID: CPT

## 2020-01-01 PROCEDURE — 93308 TTE F-UP OR LMTD: CPT

## 2020-01-01 PROCEDURE — 36620 INSERTION CATHETER ARTERY: CPT

## 2020-01-01 PROCEDURE — 12000002 HC ACUTE/MED SURGE SEMI-PRIVATE ROOM

## 2020-01-01 PROCEDURE — 94010 BREATHING CAPACITY TEST: CPT

## 2020-01-01 PROCEDURE — 15271 PR SKIN SUB GRAFT TRNK/ARM/LEG: ICD-10-PCS | Mod: ,,, | Performed by: PODIATRIST

## 2020-01-01 PROCEDURE — S0020 INJECTION, BUPIVICAINE HYDRO: HCPCS | Performed by: PODIATRIST

## 2020-01-01 PROCEDURE — 87449 NOS EACH ORGANISM AG IA: CPT

## 2020-01-01 PROCEDURE — 87205 SMEAR GRAM STAIN: CPT

## 2020-01-01 PROCEDURE — 25000003 PHARM REV CODE 250: Performed by: EMERGENCY MEDICINE

## 2020-01-01 PROCEDURE — 99024 PR POST-OP FOLLOW-UP VISIT: ICD-10-PCS | Mod: POP,,, | Performed by: PODIATRIST

## 2020-01-01 PROCEDURE — 99222 1ST HOSP IP/OBS MODERATE 55: CPT | Mod: ,,, | Performed by: PODIATRIST

## 2020-01-01 PROCEDURE — 84134 ASSAY OF PREALBUMIN: CPT

## 2020-01-01 PROCEDURE — 36000704 HC OR TIME LEV I 1ST 15 MIN: Performed by: PODIATRIST

## 2020-01-01 PROCEDURE — 63600175 PHARM REV CODE 636 W HCPCS

## 2020-01-01 PROCEDURE — 84443 ASSAY THYROID STIM HORMONE: CPT

## 2020-01-01 PROCEDURE — 87186 SC STD MICRODIL/AGAR DIL: CPT

## 2020-01-01 PROCEDURE — 36000705 HC OR TIME LEV I EA ADD 15 MIN: Performed by: PODIATRIST

## 2020-01-01 PROCEDURE — 81001 URINALYSIS AUTO W/SCOPE: CPT

## 2020-01-01 PROCEDURE — 87070 CULTURE OTHR SPECIMN AEROBIC: CPT

## 2020-01-01 PROCEDURE — 37000008 HC ANESTHESIA 1ST 15 MINUTES: Performed by: PODIATRIST

## 2020-01-01 PROCEDURE — 85027 COMPLETE CBC AUTOMATED: CPT | Mod: 91

## 2020-01-01 PROCEDURE — 15271 SKIN SUB GRAFT TRNK/ARM/LEG: CPT | Mod: ,,, | Performed by: PODIATRIST

## 2020-01-01 PROCEDURE — 87493 C DIFF AMPLIFIED PROBE: CPT

## 2020-01-01 PROCEDURE — 99222 PR INITIAL HOSPITAL CARE,LEVL II: ICD-10-PCS | Mod: ,,, | Performed by: PODIATRIST

## 2020-01-01 PROCEDURE — 63600175 PHARM REV CODE 636 W HCPCS: Performed by: STUDENT IN AN ORGANIZED HEALTH CARE EDUCATION/TRAINING PROGRAM

## 2020-01-01 PROCEDURE — 94002 VENT MGMT INPAT INIT DAY: CPT

## 2020-01-01 PROCEDURE — 87086 URINE CULTURE/COLONY COUNT: CPT

## 2020-01-01 PROCEDURE — 36000707: Performed by: PODIATRIST

## 2020-01-01 PROCEDURE — 92960 CARDIOVERSION ELECTRIC EXT: CPT

## 2020-01-01 DEVICE — IMPLANTABLE DEVICE: Type: IMPLANTABLE DEVICE | Site: FOOT | Status: FUNCTIONAL

## 2020-01-01 DEVICE — IMPLANTABLE DEVICE: Type: IMPLANTABLE DEVICE | Site: LEG | Status: FUNCTIONAL

## 2020-01-01 RX ORDER — MUPIROCIN 20 MG/G
OINTMENT TOPICAL 2 TIMES DAILY
Status: DISCONTINUED | OUTPATIENT
Start: 2020-01-01 | End: 2020-01-01

## 2020-01-01 RX ORDER — GUAIFENESIN 600 MG/1
600 TABLET, EXTENDED RELEASE ORAL 2 TIMES DAILY
Status: DISCONTINUED | OUTPATIENT
Start: 2020-01-01 | End: 2020-01-01 | Stop reason: HOSPADM

## 2020-01-01 RX ORDER — ROSUVASTATIN CALCIUM 10 MG/1
10 TABLET, COATED ORAL DAILY
Status: DISCONTINUED | OUTPATIENT
Start: 2020-01-01 | End: 2020-01-01

## 2020-01-01 RX ORDER — IPRATROPIUM BROMIDE AND ALBUTEROL SULFATE 2.5; .5 MG/3ML; MG/3ML
3 SOLUTION RESPIRATORY (INHALATION) EVERY 6 HOURS
Status: DISCONTINUED | OUTPATIENT
Start: 2020-01-01 | End: 2020-01-01

## 2020-01-01 RX ORDER — SODIUM CHLORIDE 9 MG/ML
INJECTION, SOLUTION INTRAVENOUS CONTINUOUS
Status: DISCONTINUED | OUTPATIENT
Start: 2020-01-01 | End: 2020-01-01

## 2020-01-01 RX ORDER — LEVOTHYROXINE SODIUM 25 UG/1
75 TABLET ORAL
Status: DISCONTINUED | OUTPATIENT
Start: 2020-01-01 | End: 2020-01-01

## 2020-01-01 RX ORDER — BROMFENAC 0.76 MG/ML
SOLUTION/ DROPS OPHTHALMIC
COMMUNITY
Start: 2020-01-01 | End: 2020-01-01 | Stop reason: ALTCHOICE

## 2020-01-01 RX ORDER — SODIUM CHLORIDE 9 MG/ML
1000 INJECTION, SOLUTION INTRAVENOUS ONCE
Status: COMPLETED | OUTPATIENT
Start: 2020-01-01 | End: 2020-01-01

## 2020-01-01 RX ORDER — AMIODARONE HYDROCHLORIDE 150 MG/3ML
150 INJECTION, SOLUTION INTRAVENOUS
Status: COMPLETED | OUTPATIENT
Start: 2020-01-01 | End: 2020-01-01

## 2020-01-01 RX ORDER — SODIUM,POTASSIUM PHOSPHATES 280-250MG
2 POWDER IN PACKET (EA) ORAL
Status: DISCONTINUED | OUTPATIENT
Start: 2020-01-01 | End: 2020-01-01

## 2020-01-01 RX ORDER — LEVOTHYROXINE SODIUM 25 UG/1
50 TABLET ORAL
Status: DISCONTINUED | OUTPATIENT
Start: 2020-01-01 | End: 2020-01-01 | Stop reason: HOSPADM

## 2020-01-01 RX ORDER — SODIUM CHLORIDE 9 MG/ML
INJECTION, SOLUTION INTRAVENOUS ONCE
Status: DISCONTINUED | OUTPATIENT
Start: 2020-01-01 | End: 2020-01-01 | Stop reason: HOSPADM

## 2020-01-01 RX ORDER — ACETAMINOPHEN 325 MG/1
650 TABLET ORAL EVERY 4 HOURS PRN
Status: DISCONTINUED | OUTPATIENT
Start: 2020-01-01 | End: 2020-01-01 | Stop reason: HOSPADM

## 2020-01-01 RX ORDER — IBUPROFEN 200 MG
24 TABLET ORAL
Status: DISCONTINUED | OUTPATIENT
Start: 2020-01-01 | End: 2020-01-01

## 2020-01-01 RX ORDER — UMECLIDINIUM 62.5 UG/1
62.5 AEROSOL, POWDER ORAL DAILY
Status: ON HOLD | COMMUNITY
End: 2020-01-01 | Stop reason: HOSPADM

## 2020-01-01 RX ORDER — LORAZEPAM 2 MG/ML
0.5 INJECTION INTRAMUSCULAR EVERY 30 MIN PRN
Status: DISCONTINUED | OUTPATIENT
Start: 2020-01-01 | End: 2020-01-01 | Stop reason: HOSPADM

## 2020-01-01 RX ORDER — AMIODARONE HYDROCHLORIDE 200 MG/1
200 TABLET ORAL 2 TIMES DAILY
Status: DISCONTINUED | OUTPATIENT
Start: 2020-01-01 | End: 2020-01-01

## 2020-01-01 RX ORDER — MOXIFLOXACIN 5 MG/ML
1 SOLUTION/ DROPS OPHTHALMIC 3 TIMES DAILY
Status: DISCONTINUED | OUTPATIENT
Start: 2020-01-01 | End: 2020-01-01 | Stop reason: HOSPADM

## 2020-01-01 RX ORDER — PREDNISOLONE ACETATE 10 MG/ML
1 SUSPENSION/ DROPS OPHTHALMIC 2 TIMES DAILY
Status: DISCONTINUED | OUTPATIENT
Start: 2020-01-01 | End: 2020-01-01 | Stop reason: HOSPADM

## 2020-01-01 RX ORDER — PREDNISOLONE ACETATE 10 MG/ML
SUSPENSION/ DROPS OPHTHALMIC
COMMUNITY
Start: 2020-01-01 | End: 2020-01-01 | Stop reason: ALTCHOICE

## 2020-01-01 RX ORDER — NOREPINEPHRINE BITARTRATE/D5W 8 MG/250ML
0.04 PLASTIC BAG, INJECTION (ML) INTRAVENOUS CONTINUOUS
Status: DISCONTINUED | OUTPATIENT
Start: 2020-01-01 | End: 2020-01-01

## 2020-01-01 RX ORDER — SODIUM CHLORIDE 9 MG/ML
INJECTION, SOLUTION INTRAVENOUS ONCE
Status: DISCONTINUED | OUTPATIENT
Start: 2020-01-01 | End: 2020-01-01

## 2020-01-01 RX ORDER — SODIUM BICARBONATE 1 MEQ/ML
SYRINGE (ML) INTRAVENOUS
Status: DISPENSED
Start: 2020-01-01 | End: 2020-01-01

## 2020-01-01 RX ORDER — PREDNISONE 20 MG/1
40 TABLET ORAL DAILY
Status: DISCONTINUED | OUTPATIENT
Start: 2020-01-01 | End: 2020-01-01 | Stop reason: HOSPADM

## 2020-01-01 RX ORDER — IPRATROPIUM BROMIDE AND ALBUTEROL SULFATE 2.5; .5 MG/3ML; MG/3ML
3 SOLUTION RESPIRATORY (INHALATION) EVERY 6 HOURS
Qty: 1 BOX | Refills: 11 | Status: ON HOLD | OUTPATIENT
Start: 2020-01-01 | End: 2020-01-01 | Stop reason: HOSPADM

## 2020-01-01 RX ORDER — METOPROLOL SUCCINATE 25 MG/1
25 TABLET, EXTENDED RELEASE ORAL DAILY
Status: DISCONTINUED | OUTPATIENT
Start: 2020-01-01 | End: 2020-01-01

## 2020-01-01 RX ORDER — VANCOMYCIN HCL 50 MG/ML
125 SOLUTION, RECONSTITUTED, ORAL ORAL EVERY 6 HOURS
Status: DISCONTINUED | OUTPATIENT
Start: 2020-01-01 | End: 2020-01-01

## 2020-01-01 RX ORDER — SODIUM CHLORIDE 9 MG/ML
INJECTION, SOLUTION INTRAVENOUS
Status: DISCONTINUED | OUTPATIENT
Start: 2020-01-01 | End: 2020-01-01

## 2020-01-01 RX ORDER — BUPIVACAINE HYDROCHLORIDE 5 MG/ML
INJECTION, SOLUTION EPIDURAL; INTRACAUDAL
Status: DISCONTINUED | OUTPATIENT
Start: 2020-01-01 | End: 2020-01-01 | Stop reason: HOSPADM

## 2020-01-01 RX ORDER — BESIFLOXACIN 6 MG/ML
SUSPENSION OPHTHALMIC
COMMUNITY
Start: 2020-01-01 | End: 2020-01-01 | Stop reason: ALTCHOICE

## 2020-01-01 RX ORDER — AZITHROMYCIN 250 MG/1
500 TABLET, FILM COATED ORAL ONCE
Status: DISCONTINUED | OUTPATIENT
Start: 2020-01-01 | End: 2020-01-01 | Stop reason: HOSPADM

## 2020-01-01 RX ORDER — DIGOXIN 0.25 MG/ML
250 INJECTION INTRAMUSCULAR; INTRAVENOUS ONCE
Status: COMPLETED | OUTPATIENT
Start: 2020-01-01 | End: 2020-01-01

## 2020-01-01 RX ORDER — PROPOFOL 10 MG/ML
5 INJECTION, EMULSION INTRAVENOUS CONTINUOUS
Status: DISCONTINUED | OUTPATIENT
Start: 2020-01-01 | End: 2020-01-01

## 2020-01-01 RX ORDER — LEVOFLOXACIN 5 MG/ML
500 INJECTION, SOLUTION INTRAVENOUS ONCE
Status: COMPLETED | OUTPATIENT
Start: 2020-01-01 | End: 2020-01-01

## 2020-01-01 RX ORDER — ALBUTEROL SULFATE 90 UG/1
2 AEROSOL, METERED RESPIRATORY (INHALATION) DAILY
COMMUNITY
End: 2020-01-01

## 2020-01-01 RX ORDER — ACETAMINOPHEN 10 MG/ML
1000 INJECTION, SOLUTION INTRAVENOUS EVERY 8 HOURS
Status: COMPLETED | OUTPATIENT
Start: 2020-01-01 | End: 2020-01-01

## 2020-01-01 RX ORDER — CETIRIZINE HYDROCHLORIDE 10 MG/1
10 TABLET ORAL NIGHTLY
Status: DISCONTINUED | OUTPATIENT
Start: 2020-01-01 | End: 2020-01-01 | Stop reason: HOSPADM

## 2020-01-01 RX ORDER — ALBUTEROL SULFATE 0.83 MG/ML
2.5 SOLUTION RESPIRATORY (INHALATION) EVERY 4 HOURS PRN
Status: DISCONTINUED | OUTPATIENT
Start: 2020-01-01 | End: 2020-01-01

## 2020-01-01 RX ORDER — CHLORHEXIDINE GLUCONATE ORAL RINSE 1.2 MG/ML
15 SOLUTION DENTAL 2 TIMES DAILY
Status: DISCONTINUED | OUTPATIENT
Start: 2020-01-01 | End: 2020-01-01

## 2020-01-01 RX ORDER — GABAPENTIN 100 MG/1
100 CAPSULE ORAL 3 TIMES DAILY
Status: DISCONTINUED | OUTPATIENT
Start: 2020-01-01 | End: 2020-01-01 | Stop reason: HOSPADM

## 2020-01-01 RX ORDER — SCOLOPAMINE TRANSDERMAL SYSTEM 1 MG/1
1 PATCH, EXTENDED RELEASE TRANSDERMAL
Status: DISCONTINUED | OUTPATIENT
Start: 2020-01-01 | End: 2020-01-01 | Stop reason: HOSPADM

## 2020-01-01 RX ORDER — ADENOSINE 3 MG/ML
6 INJECTION, SOLUTION INTRAVENOUS
Status: COMPLETED | OUTPATIENT
Start: 2020-01-01 | End: 2020-01-01

## 2020-01-01 RX ORDER — PROPOFOL 10 MG/ML
10 INJECTION, EMULSION INTRAVENOUS CONTINUOUS
Status: DISCONTINUED | OUTPATIENT
Start: 2020-01-01 | End: 2020-01-01

## 2020-01-01 RX ORDER — SEVELAMER CARBONATE 800 MG/1
1600 TABLET, FILM COATED ORAL
Status: DISCONTINUED | OUTPATIENT
Start: 2020-01-01 | End: 2020-01-01

## 2020-01-01 RX ORDER — POTASSIUM CHLORIDE 7.45 MG/ML
60 INJECTION INTRAVENOUS
Status: DISCONTINUED | OUTPATIENT
Start: 2020-01-01 | End: 2020-01-01

## 2020-01-01 RX ORDER — INSULIN ASPART 100 [IU]/ML
1-10 INJECTION, SOLUTION INTRAVENOUS; SUBCUTANEOUS
Status: DISCONTINUED | OUTPATIENT
Start: 2020-01-01 | End: 2020-01-01

## 2020-01-01 RX ORDER — MIDODRINE HYDROCHLORIDE 2.5 MG/1
2.5 TABLET ORAL 3 TIMES DAILY
Status: DISCONTINUED | OUTPATIENT
Start: 2020-01-01 | End: 2020-01-01

## 2020-01-01 RX ORDER — CITALOPRAM 20 MG/1
20 TABLET, FILM COATED ORAL DAILY
Status: DISCONTINUED | OUTPATIENT
Start: 2020-01-01 | End: 2020-01-01 | Stop reason: HOSPADM

## 2020-01-01 RX ORDER — MAGNESIUM SULFATE HEPTAHYDRATE 40 MG/ML
4 INJECTION, SOLUTION INTRAVENOUS
Status: DISCONTINUED | OUTPATIENT
Start: 2020-01-01 | End: 2020-01-01

## 2020-01-01 RX ORDER — ACETAMINOPHEN 325 MG/1
650 TABLET ORAL EVERY 6 HOURS PRN
Status: DISCONTINUED | OUTPATIENT
Start: 2020-01-01 | End: 2020-01-01

## 2020-01-01 RX ORDER — SODIUM CHLORIDE 0.9 % (FLUSH) 0.9 %
10 SYRINGE (ML) INJECTION
Status: CANCELLED | OUTPATIENT
Start: 2020-01-01

## 2020-01-01 RX ORDER — POTASSIUM CHLORIDE 1.5 G/1.58G
40 POWDER, FOR SOLUTION ORAL
Status: DISCONTINUED | OUTPATIENT
Start: 2020-01-01 | End: 2020-01-01

## 2020-01-01 RX ORDER — IPRATROPIUM BROMIDE AND ALBUTEROL SULFATE 2.5; .5 MG/3ML; MG/3ML
3 SOLUTION RESPIRATORY (INHALATION) 2 TIMES DAILY
Status: DISCONTINUED | OUTPATIENT
Start: 2020-01-01 | End: 2020-01-01 | Stop reason: HOSPADM

## 2020-01-01 RX ORDER — SODIUM CHLORIDE 0.9 % (FLUSH) 0.9 %
10 SYRINGE (ML) INJECTION
Status: DISCONTINUED | OUTPATIENT
Start: 2020-01-01 | End: 2020-01-01 | Stop reason: HOSPADM

## 2020-01-01 RX ORDER — POTASSIUM CHLORIDE 7.45 MG/ML
80 INJECTION INTRAVENOUS
Status: DISCONTINUED | OUTPATIENT
Start: 2020-01-01 | End: 2020-01-01

## 2020-01-01 RX ORDER — PREDNISONE 20 MG/1
TABLET ORAL
Qty: 30 TABLET | Refills: 0 | Status: SHIPPED | OUTPATIENT
Start: 2020-01-01 | End: 2020-01-01 | Stop reason: ALTCHOICE

## 2020-01-01 RX ORDER — CITALOPRAM 20 MG/1
20 TABLET, FILM COATED ORAL DAILY
Status: DISCONTINUED | OUTPATIENT
Start: 2020-01-01 | End: 2020-01-01

## 2020-01-01 RX ORDER — INSULIN ASPART 100 [IU]/ML
0-5 INJECTION, SOLUTION INTRAVENOUS; SUBCUTANEOUS
Status: DISCONTINUED | OUTPATIENT
Start: 2020-01-01 | End: 2020-01-01 | Stop reason: HOSPADM

## 2020-01-01 RX ORDER — TIOTROPIUM BROMIDE 18 UG/1
18 CAPSULE ORAL; RESPIRATORY (INHALATION) DAILY
Qty: 30 CAPSULE | Refills: 11 | Status: ON HOLD | OUTPATIENT
Start: 2020-01-01 | End: 2020-01-01 | Stop reason: HOSPADM

## 2020-01-01 RX ORDER — ADENOSINE 3 MG/ML
12 INJECTION, SOLUTION INTRAVENOUS
Status: COMPLETED | OUTPATIENT
Start: 2020-01-01 | End: 2020-01-01

## 2020-01-01 RX ORDER — RAMELTEON 8 MG/1
8 TABLET ORAL NIGHTLY
Status: ON HOLD | COMMUNITY
End: 2020-01-01 | Stop reason: HOSPADM

## 2020-01-01 RX ORDER — MIDODRINE HYDROCHLORIDE 2.5 MG/1
5 TABLET ORAL
Status: DISCONTINUED | OUTPATIENT
Start: 2020-01-01 | End: 2020-01-01

## 2020-01-01 RX ORDER — LANOLIN ALCOHOL/MO/W.PET/CERES
800 CREAM (GRAM) TOPICAL
Status: DISCONTINUED | OUTPATIENT
Start: 2020-01-01 | End: 2020-01-01

## 2020-01-01 RX ORDER — MIDODRINE HYDROCHLORIDE 2.5 MG/1
2.5 TABLET ORAL
Status: DISCONTINUED | OUTPATIENT
Start: 2020-01-01 | End: 2020-01-01 | Stop reason: HOSPADM

## 2020-01-01 RX ORDER — FLUCONAZOLE 100 MG/1
100 TABLET ORAL DAILY
Status: DISCONTINUED | OUTPATIENT
Start: 2020-01-01 | End: 2020-01-01

## 2020-01-01 RX ORDER — POTASSIUM CHLORIDE 7.45 MG/ML
40 INJECTION INTRAVENOUS
Status: DISCONTINUED | OUTPATIENT
Start: 2020-01-01 | End: 2020-01-01

## 2020-01-01 RX ORDER — PROPOFOL 10 MG/ML
VIAL (ML) INTRAVENOUS
Status: DISCONTINUED | OUTPATIENT
Start: 2020-01-01 | End: 2020-01-01

## 2020-01-01 RX ORDER — METOPROLOL TARTRATE 1 MG/ML
2.5 INJECTION, SOLUTION INTRAVENOUS ONCE
Status: COMPLETED | OUTPATIENT
Start: 2020-01-01 | End: 2020-01-01

## 2020-01-01 RX ORDER — IBUPROFEN 200 MG
16 TABLET ORAL
Status: DISCONTINUED | OUTPATIENT
Start: 2020-01-01 | End: 2020-01-01

## 2020-01-01 RX ORDER — PROPOFOL 10 MG/ML
INJECTION, EMULSION INTRAVENOUS
Status: DISPENSED
Start: 2020-01-01 | End: 2020-01-01

## 2020-01-01 RX ORDER — IODIXANOL 320 MG/ML
100 INJECTION, SOLUTION INTRAVASCULAR
Status: COMPLETED | OUTPATIENT
Start: 2020-01-01 | End: 2020-01-01

## 2020-01-01 RX ORDER — LORAZEPAM 2 MG/ML
2 INJECTION INTRAMUSCULAR
Status: DISCONTINUED | OUTPATIENT
Start: 2020-01-01 | End: 2020-01-01 | Stop reason: HOSPADM

## 2020-01-01 RX ORDER — NOREPINEPHRINE BITARTRATE 1 MG/ML
INJECTION, SOLUTION INTRAVENOUS
Status: COMPLETED
Start: 2020-01-01 | End: 2020-01-01

## 2020-01-01 RX ORDER — LEVOTHYROXINE SODIUM 25 UG/1
50 TABLET ORAL
Status: DISCONTINUED | OUTPATIENT
Start: 2020-01-01 | End: 2020-01-01

## 2020-01-01 RX ORDER — TIOTROPIUM BROMIDE 18 UG/1
18 CAPSULE ORAL; RESPIRATORY (INHALATION) DAILY
Status: DISCONTINUED | OUTPATIENT
Start: 2020-01-01 | End: 2020-01-01

## 2020-01-01 RX ORDER — MAGNESIUM SULFATE HEPTAHYDRATE 40 MG/ML
2 INJECTION, SOLUTION INTRAVENOUS
Status: DISCONTINUED | OUTPATIENT
Start: 2020-01-01 | End: 2020-01-01

## 2020-01-01 RX ORDER — MUPIROCIN 20 MG/G
OINTMENT TOPICAL 2 TIMES DAILY
Status: DISCONTINUED | OUTPATIENT
Start: 2020-01-01 | End: 2020-01-01 | Stop reason: HOSPADM

## 2020-01-01 RX ORDER — IBUPROFEN 200 MG
16 TABLET ORAL
Status: DISCONTINUED | OUTPATIENT
Start: 2020-01-01 | End: 2020-01-01 | Stop reason: HOSPADM

## 2020-01-01 RX ORDER — AZITHROMYCIN 250 MG/1
250 TABLET, FILM COATED ORAL DAILY
Status: DISCONTINUED | OUTPATIENT
Start: 2020-01-01 | End: 2020-01-01 | Stop reason: HOSPADM

## 2020-01-01 RX ORDER — MICONAZOLE NITRATE 2 %
POWDER (GRAM) TOPICAL 2 TIMES DAILY
Status: DISCONTINUED | OUTPATIENT
Start: 2020-01-01 | End: 2020-01-01

## 2020-01-01 RX ORDER — AZITHROMYCIN 250 MG/1
250 TABLET, FILM COATED ORAL DAILY
Qty: 3 TABLET | Refills: 0 | Status: SHIPPED | OUTPATIENT
Start: 2020-01-01 | End: 2020-01-01

## 2020-01-01 RX ORDER — FLUTICASONE PROPIONATE 50 MCG
1 SPRAY, SUSPENSION (ML) NASAL DAILY
Status: DISCONTINUED | OUTPATIENT
Start: 2020-01-01 | End: 2020-01-01 | Stop reason: HOSPADM

## 2020-01-01 RX ORDER — DICLOFENAC SODIUM 1 MG/ML
1 SOLUTION/ DROPS OPHTHALMIC 4 TIMES DAILY
Status: DISCONTINUED | OUTPATIENT
Start: 2020-01-01 | End: 2020-01-01 | Stop reason: HOSPADM

## 2020-01-01 RX ORDER — ONDANSETRON 2 MG/ML
4 INJECTION INTRAMUSCULAR; INTRAVENOUS EVERY 6 HOURS PRN
Status: DISCONTINUED | OUTPATIENT
Start: 2020-01-01 | End: 2020-01-01 | Stop reason: HOSPADM

## 2020-01-01 RX ORDER — NOREPINEPHRINE BITARTRATE/D5W 8 MG/250ML
0.02 PLASTIC BAG, INJECTION (ML) INTRAVENOUS CONTINUOUS
Status: DISCONTINUED | OUTPATIENT
Start: 2020-01-01 | End: 2020-01-01

## 2020-01-01 RX ORDER — NOREPINEPHRINE BITARTRATE 1 MG/ML
INJECTION, SOLUTION INTRAVENOUS
Status: DISPENSED
Start: 2020-01-01 | End: 2020-01-01

## 2020-01-01 RX ORDER — GLUCAGON 1 MG
1 KIT INJECTION
Status: DISCONTINUED | OUTPATIENT
Start: 2020-01-01 | End: 2020-01-01

## 2020-01-01 RX ORDER — FAMOTIDINE 10 MG/ML
20 INJECTION INTRAVENOUS DAILY
Status: DISCONTINUED | OUTPATIENT
Start: 2020-01-01 | End: 2020-01-01

## 2020-01-01 RX ORDER — ONDANSETRON 2 MG/ML
4 INJECTION INTRAMUSCULAR; INTRAVENOUS EVERY 6 HOURS PRN
Status: DISCONTINUED | OUTPATIENT
Start: 2020-01-01 | End: 2020-01-01

## 2020-01-01 RX ORDER — MORPHINE SULFATE 4 MG/ML
4 INJECTION, SOLUTION INTRAMUSCULAR; INTRAVENOUS EVERY 5 MIN PRN
Status: DISCONTINUED | OUTPATIENT
Start: 2020-01-01 | End: 2020-01-01 | Stop reason: HOSPADM

## 2020-01-01 RX ORDER — ROSUVASTATIN CALCIUM 10 MG/1
10 TABLET, COATED ORAL DAILY
Status: DISCONTINUED | OUTPATIENT
Start: 2020-01-01 | End: 2020-01-01 | Stop reason: HOSPADM

## 2020-01-01 RX ORDER — PROPOFOL 10 MG/ML
INJECTION, EMULSION INTRAVENOUS
Status: COMPLETED
Start: 2020-01-01 | End: 2020-01-01

## 2020-01-01 RX ORDER — IBUPROFEN 200 MG
24 TABLET ORAL
Status: DISCONTINUED | OUTPATIENT
Start: 2020-01-01 | End: 2020-01-01 | Stop reason: HOSPADM

## 2020-01-01 RX ORDER — CEPHALEXIN 500 MG/1
CAPSULE ORAL
COMMUNITY
Start: 2020-01-01 | End: 2020-01-01 | Stop reason: ALTCHOICE

## 2020-01-01 RX ORDER — SEVELAMER HYDROCHLORIDE 800 MG/1
1600 TABLET, FILM COATED ORAL
Status: DISCONTINUED | OUTPATIENT
Start: 2020-01-01 | End: 2020-01-01 | Stop reason: HOSPADM

## 2020-01-01 RX ORDER — LEVOFLOXACIN 5 MG/ML
250 INJECTION, SOLUTION INTRAVENOUS
Status: DISCONTINUED | OUTPATIENT
Start: 2020-01-01 | End: 2020-01-01

## 2020-01-01 RX ORDER — GLUCAGON 1 MG
1 KIT INJECTION
Status: DISCONTINUED | OUTPATIENT
Start: 2020-01-01 | End: 2020-01-01 | Stop reason: HOSPADM

## 2020-01-01 RX ORDER — UMECLIDINIUM 62.5 UG/1
1 AEROSOL, POWDER ORAL DAILY
Status: ON HOLD | COMMUNITY
Start: 2020-01-01 | End: 2020-01-01 | Stop reason: ALTCHOICE

## 2020-01-01 RX ADMIN — MUPIROCIN: 20 OINTMENT TOPICAL at 09:10

## 2020-01-01 RX ADMIN — APIXABAN 2.5 MG: 2.5 TABLET, FILM COATED ORAL at 04:09

## 2020-01-01 RX ADMIN — ROSUVASTATIN CALCIUM 10 MG: 10 TABLET, FILM COATED ORAL at 08:09

## 2020-01-01 RX ADMIN — GABAPENTIN 100 MG: 100 CAPSULE ORAL at 08:03

## 2020-01-01 RX ADMIN — ROSUVASTATIN CALCIUM 10 MG: 10 TABLET, FILM COATED ORAL at 09:10

## 2020-01-01 RX ADMIN — INSULIN ASPART 1 UNITS: 100 INJECTION, SOLUTION INTRAVENOUS; SUBCUTANEOUS at 09:10

## 2020-01-01 RX ADMIN — MORPHINE SULFATE 4 MG: 4 INJECTION INTRAVENOUS at 09:10

## 2020-01-01 RX ADMIN — PHENYLEPHRINE HYDROCHLORIDE 0.5 MCG/KG/MIN: 10 INJECTION INTRAVENOUS at 08:09

## 2020-01-01 RX ADMIN — SEVELAMER CARBONATE 1600 MG: 800 TABLET, FILM COATED ORAL at 01:09

## 2020-01-01 RX ADMIN — IPRATROPIUM BROMIDE AND ALBUTEROL SULFATE 3 ML: .5; 3 SOLUTION RESPIRATORY (INHALATION) at 07:03

## 2020-01-01 RX ADMIN — APIXABAN 2.5 MG: 2.5 TABLET, FILM COATED ORAL at 09:09

## 2020-01-01 RX ADMIN — CITALOPRAM HYDROBROMIDE 20 MG: 20 TABLET ORAL at 08:09

## 2020-01-01 RX ADMIN — LEVOTHYROXINE SODIUM 50 MCG: 25 TABLET ORAL at 06:09

## 2020-01-01 RX ADMIN — SEVELAMER CARBONATE 1600 MG: 800 TABLET, FILM COATED ORAL at 05:10

## 2020-01-01 RX ADMIN — AMIODARONE HYDROCHLORIDE 200 MG: 200 TABLET ORAL at 10:09

## 2020-01-01 RX ADMIN — INSULIN ASPART 2 UNITS: 100 INJECTION, SOLUTION INTRAVENOUS; SUBCUTANEOUS at 11:10

## 2020-01-01 RX ADMIN — SODIUM ZIRCONIUM CYCLOSILICATE 5 G: 5 POWDER, FOR SUSPENSION ORAL at 08:09

## 2020-01-01 RX ADMIN — Medication 0.02 MCG/KG/MIN: at 10:09

## 2020-01-01 RX ADMIN — METHYLPREDNISOLONE SODIUM SUCCINATE 40 MG: 40 INJECTION, POWDER, FOR SOLUTION INTRAMUSCULAR; INTRAVENOUS at 10:03

## 2020-01-01 RX ADMIN — INSULIN ASPART 2 UNITS: 100 INJECTION, SOLUTION INTRAVENOUS; SUBCUTANEOUS at 12:10

## 2020-01-01 RX ADMIN — FLUCONAZOLE 100 MG: 100 TABLET ORAL at 02:09

## 2020-01-01 RX ADMIN — FAMOTIDINE 20 MG: 10 INJECTION INTRAVENOUS at 09:09

## 2020-01-01 RX ADMIN — APIXABAN 2.5 MG: 2.5 TABLET, FILM COATED ORAL at 09:10

## 2020-01-01 RX ADMIN — LEVOTHYROXINE SODIUM 75 MCG: 25 TABLET ORAL at 05:10

## 2020-01-01 RX ADMIN — MICONAZOLE NITRATE 2 % TOPICAL POWDER: at 09:10

## 2020-01-01 RX ADMIN — FAMOTIDINE 20 MG: 10 INJECTION INTRAVENOUS at 09:10

## 2020-01-01 RX ADMIN — INSULIN ASPART 2 UNITS: 100 INJECTION, SOLUTION INTRAVENOUS; SUBCUTANEOUS at 01:10

## 2020-01-01 RX ADMIN — MIDODRINE HYDROCHLORIDE 2.5 MG: 2.5 TABLET ORAL at 12:03

## 2020-01-01 RX ADMIN — LEVOTHYROXINE SODIUM 50 MCG: 25 TABLET ORAL at 08:09

## 2020-01-01 RX ADMIN — PROPOFOL 35 MCG/KG/MIN: 10 INJECTION, EMULSION INTRAVENOUS at 01:10

## 2020-01-01 RX ADMIN — CHLORHEXIDINE GLUCONATE 15 ML: 1.2 RINSE ORAL at 09:09

## 2020-01-01 RX ADMIN — COLLAGENASE SANTYL: 250 OINTMENT TOPICAL at 08:10

## 2020-01-01 RX ADMIN — POTASSIUM CHLORIDE 40 MEQ: 1.5 POWDER, FOR SOLUTION ORAL at 06:10

## 2020-01-01 RX ADMIN — CITALOPRAM HYDROBROMIDE 20 MG: 20 TABLET ORAL at 08:10

## 2020-01-01 RX ADMIN — Medication 0.02 MCG/KG/MIN: at 05:10

## 2020-01-01 RX ADMIN — COLLAGENASE SANTYL: 250 OINTMENT TOPICAL at 09:10

## 2020-01-01 RX ADMIN — COLLAGENASE SANTYL: 250 OINTMENT TOPICAL at 08:09

## 2020-01-01 RX ADMIN — AMIODARONE HYDROCHLORIDE 200 MG: 200 TABLET ORAL at 08:10

## 2020-01-01 RX ADMIN — AZITHROMYCIN 250 MG: 250 TABLET, FILM COATED ORAL at 10:03

## 2020-01-01 RX ADMIN — MUPIROCIN: 20 OINTMENT TOPICAL at 08:10

## 2020-01-01 RX ADMIN — FLUCONAZOLE 100 MG: 100 TABLET ORAL at 09:10

## 2020-01-01 RX ADMIN — SEVELAMER CARBONATE 1600 MG: 800 TABLET, FILM COATED ORAL at 11:10

## 2020-01-01 RX ADMIN — APIXABAN 5 MG: 5 TABLET, FILM COATED ORAL at 10:03

## 2020-01-01 RX ADMIN — ROSUVASTATIN CALCIUM 10 MG: 10 TABLET, FILM COATED ORAL at 08:10

## 2020-01-01 RX ADMIN — INSULIN ASPART 1 UNITS: 100 INJECTION, SOLUTION INTRAVENOUS; SUBCUTANEOUS at 08:03

## 2020-01-01 RX ADMIN — PROPOFOL 20 MCG/KG/MIN: 10 INJECTION, EMULSION INTRAVENOUS at 08:10

## 2020-01-01 RX ADMIN — FLUCONAZOLE 100 MG: 100 TABLET ORAL at 08:10

## 2020-01-01 RX ADMIN — ALBUTEROL SULFATE 2.5 MG: 2.5 SOLUTION RESPIRATORY (INHALATION) at 12:10

## 2020-01-01 RX ADMIN — SEVELAMER CARBONATE 1600 MG: 800 TABLET, FILM COATED ORAL at 09:10

## 2020-01-01 RX ADMIN — PIPERACILLIN SODIUM AND TAZOBACTAM SODIUM 3.38 G: 3; .375 INJECTION, POWDER, LYOPHILIZED, FOR SOLUTION INTRAVENOUS at 03:10

## 2020-01-01 RX ADMIN — AMIODARONE HYDROCHLORIDE 1 MG/MIN: 1.8 INJECTION, SOLUTION INTRAVENOUS at 02:09

## 2020-01-01 RX ADMIN — ALBUTEROL SULFATE 2.5 MG: 2.5 SOLUTION RESPIRATORY (INHALATION) at 07:10

## 2020-01-01 RX ADMIN — DEXTROSE MONOHYDRATE 12.5 G: 25 INJECTION, SOLUTION INTRAVENOUS at 05:09

## 2020-01-01 RX ADMIN — Medication 0.12 MCG/KG/MIN: at 11:10

## 2020-01-01 RX ADMIN — METOPROLOL SUCCINATE 25 MG: 25 TABLET, FILM COATED, EXTENDED RELEASE ORAL at 09:10

## 2020-01-01 RX ADMIN — ADENOSINE 12 MG: 3 INJECTION, SOLUTION INTRAVENOUS at 01:09

## 2020-01-01 RX ADMIN — ACETAMINOPHEN 1000 MG: 10 INJECTION, SOLUTION INTRAVENOUS at 01:10

## 2020-01-01 RX ADMIN — MUPIROCIN 1 G: 20 OINTMENT TOPICAL at 09:09

## 2020-01-01 RX ADMIN — MICONAZOLE NITRATE 2 % TOPICAL POWDER: at 10:09

## 2020-01-01 RX ADMIN — PROPOFOL 5 MCG/KG/MIN: 10 INJECTION, EMULSION INTRAVENOUS at 05:10

## 2020-01-01 RX ADMIN — SEVELAMER CARBONATE 1600 MG: 800 TABLET, FILM COATED ORAL at 12:09

## 2020-01-01 RX ADMIN — ACETAMINOPHEN 650 MG: 325 TABLET ORAL at 03:10

## 2020-01-01 RX ADMIN — SEVELAMER HYDROCHLORIDE 1600 MG: 800 TABLET, FILM COATED PARENTERAL at 12:03

## 2020-01-01 RX ADMIN — MUPIROCIN: 20 OINTMENT TOPICAL at 08:09

## 2020-01-01 RX ADMIN — INSULIN ASPART 2 UNITS: 100 INJECTION, SOLUTION INTRAVENOUS; SUBCUTANEOUS at 05:10

## 2020-01-01 RX ADMIN — PIPERACILLIN SODIUM AND TAZOBACTAM SODIUM 3.38 G: 3; .375 INJECTION, POWDER, LYOPHILIZED, FOR SOLUTION INTRAVENOUS at 05:10

## 2020-01-01 RX ADMIN — VANCOMYCIN HYDROCHLORIDE 125 MG: KIT at 06:10

## 2020-01-01 RX ADMIN — FLUCONAZOLE 100 MG: 100 TABLET ORAL at 09:09

## 2020-01-01 RX ADMIN — PROPOFOL 35 MCG/KG/MIN: 10 INJECTION, EMULSION INTRAVENOUS at 04:10

## 2020-01-01 RX ADMIN — Medication 0.12 MCG/KG/MIN: at 08:10

## 2020-01-01 RX ADMIN — ACETAMINOPHEN 650 MG: 325 TABLET ORAL at 05:10

## 2020-01-01 RX ADMIN — MICONAZOLE NITRATE 2 % TOPICAL POWDER: at 01:10

## 2020-01-01 RX ADMIN — VANCOMYCIN HYDROCHLORIDE 125 MG: KIT at 12:10

## 2020-01-01 RX ADMIN — DIGOXIN 250 MCG: 0.25 INJECTION INTRAMUSCULAR; INTRAVENOUS at 04:09

## 2020-01-01 RX ADMIN — CHLORHEXIDINE GLUCONATE 15 ML: 1.2 RINSE ORAL at 12:09

## 2020-01-01 RX ADMIN — LEVOFLOXACIN 500 MG: 500 INJECTION, SOLUTION INTRAVENOUS at 01:10

## 2020-01-01 RX ADMIN — VANCOMYCIN HYDROCHLORIDE 125 MG: KIT at 01:10

## 2020-01-01 RX ADMIN — LORAZEPAM 2 MG: 2 INJECTION INTRAMUSCULAR; INTRAVENOUS at 09:10

## 2020-01-01 RX ADMIN — LEVOTHYROXINE SODIUM 50 MCG: 25 TABLET ORAL at 05:03

## 2020-01-01 RX ADMIN — AMIODARONE HYDROCHLORIDE 200 MG: 200 TABLET ORAL at 09:10

## 2020-01-01 RX ADMIN — CHLORHEXIDINE GLUCONATE 15 ML: 1.2 RINSE ORAL at 08:09

## 2020-01-01 RX ADMIN — INSULIN ASPART 1 UNITS: 100 INJECTION, SOLUTION INTRAVENOUS; SUBCUTANEOUS at 12:10

## 2020-01-01 RX ADMIN — APIXABAN 2.5 MG: 2.5 TABLET, FILM COATED ORAL at 08:10

## 2020-01-01 RX ADMIN — MEROPENEM 500 MG: 1 INJECTION, POWDER, FOR SOLUTION INTRAVENOUS at 01:10

## 2020-01-01 RX ADMIN — MICONAZOLE NITRATE 2 % TOPICAL POWDER: at 08:10

## 2020-01-01 RX ADMIN — SEVELAMER CARBONATE 1600 MG: 800 TABLET, FILM COATED ORAL at 08:09

## 2020-01-01 RX ADMIN — CITALOPRAM HYDROBROMIDE 20 MG: 20 TABLET ORAL at 09:10

## 2020-01-01 RX ADMIN — MIDODRINE HYDROCHLORIDE 2.5 MG: 2.5 TABLET ORAL at 05:03

## 2020-01-01 RX ADMIN — AMIODARONE HYDROCHLORIDE 200 MG: 200 TABLET ORAL at 09:09

## 2020-01-01 RX ADMIN — VANCOMYCIN HYDROCHLORIDE 125 MG: KIT at 05:10

## 2020-01-01 RX ADMIN — APIXABAN 2.5 MG: 2.5 TABLET, FILM COATED ORAL at 08:09

## 2020-01-01 RX ADMIN — SEVELAMER CARBONATE 1600 MG: 800 TABLET, FILM COATED ORAL at 04:09

## 2020-01-01 RX ADMIN — AMIODARONE HYDROCHLORIDE 200 MG: 200 TABLET ORAL at 08:09

## 2020-01-01 RX ADMIN — ACETAMINOPHEN 650 MG: 325 TABLET ORAL at 07:10

## 2020-01-01 RX ADMIN — VANCOMYCIN HYDROCHLORIDE 125 MG: KIT at 04:10

## 2020-01-01 RX ADMIN — POTASSIUM, SODIUM PHOSPHATES 280 MG-160 MG-250 MG ORAL POWDER PACKET 2 PACKET: POWDER IN PACKET at 11:10

## 2020-01-01 RX ADMIN — SEVELAMER CARBONATE 1600 MG: 800 TABLET, FILM COATED ORAL at 12:10

## 2020-01-01 RX ADMIN — INSULIN ASPART 4 UNITS: 100 INJECTION, SOLUTION INTRAVENOUS; SUBCUTANEOUS at 04:10

## 2020-01-01 RX ADMIN — METOPROLOL SUCCINATE 25 MG: 25 TABLET, FILM COATED, EXTENDED RELEASE ORAL at 01:10

## 2020-01-01 RX ADMIN — MICONAZOLE NITRATE 2 % TOPICAL POWDER: at 09:09

## 2020-01-01 RX ADMIN — MORPHINE SULFATE 4 MG: 4 INJECTION INTRAVENOUS at 10:10

## 2020-01-01 RX ADMIN — MIDODRINE HYDROCHLORIDE 2.5 MG: 2.5 TABLET ORAL at 08:03

## 2020-01-01 RX ADMIN — ROSUVASTATIN CALCIUM 10 MG: 10 TABLET, FILM COATED ORAL at 10:09

## 2020-01-01 RX ADMIN — CHLORHEXIDINE GLUCONATE 15 ML: 1.2 RINSE ORAL at 11:09

## 2020-01-01 RX ADMIN — ROSUVASTATIN CALCIUM 10 MG: 10 TABLET, FILM COATED ORAL at 09:09

## 2020-01-01 RX ADMIN — ACETAMINOPHEN 650 MG: 325 TABLET ORAL at 11:10

## 2020-01-01 RX ADMIN — INSULIN ASPART 3 UNITS: 100 INJECTION, SOLUTION INTRAVENOUS; SUBCUTANEOUS at 06:03

## 2020-01-01 RX ADMIN — METOPROLOL SUCCINATE 25 MG: 25 TABLET, FILM COATED, EXTENDED RELEASE ORAL at 09:09

## 2020-01-01 RX ADMIN — SODIUM ZIRCONIUM CYCLOSILICATE 5 G: 5 POWDER, FOR SUSPENSION ORAL at 01:10

## 2020-01-01 RX ADMIN — GUAIFENESIN 600 MG: 600 TABLET, EXTENDED RELEASE ORAL at 10:03

## 2020-01-01 RX ADMIN — MUPIROCIN: 20 OINTMENT TOPICAL at 09:09

## 2020-01-01 RX ADMIN — ONDANSETRON 4 MG: 2 INJECTION INTRAMUSCULAR; INTRAVENOUS at 11:09

## 2020-01-01 RX ADMIN — PROPOFOL 10 MCG/KG/MIN: 10 INJECTION, EMULSION INTRAVENOUS at 01:10

## 2020-01-01 RX ADMIN — SODIUM CHLORIDE: 0.9 INJECTION, SOLUTION INTRAVENOUS at 09:10

## 2020-01-01 RX ADMIN — APIXABAN 2.5 MG: 2.5 TABLET, FILM COATED ORAL at 10:09

## 2020-01-01 RX ADMIN — LEVOTHYROXINE SODIUM 50 MCG: 25 TABLET ORAL at 05:10

## 2020-01-01 RX ADMIN — METOPROLOL SUCCINATE 25 MG: 25 TABLET, FILM COATED, EXTENDED RELEASE ORAL at 08:10

## 2020-01-01 RX ADMIN — LEVOTHYROXINE SODIUM 50 MCG: 25 TABLET ORAL at 06:10

## 2020-01-01 RX ADMIN — SODIUM CHLORIDE 1000 ML: 0.9 INJECTION, SOLUTION INTRAVENOUS at 08:09

## 2020-01-01 RX ADMIN — INSULIN ASPART 2 UNITS: 100 INJECTION, SOLUTION INTRAVENOUS; SUBCUTANEOUS at 07:10

## 2020-01-01 RX ADMIN — INSULIN ASPART 4 UNITS: 100 INJECTION, SOLUTION INTRAVENOUS; SUBCUTANEOUS at 05:10

## 2020-01-01 RX ADMIN — CITALOPRAM HYDROBROMIDE 20 MG: 20 TABLET ORAL at 04:09

## 2020-01-01 RX ADMIN — Medication 0.03 MCG/KG/MIN: at 09:10

## 2020-01-01 RX ADMIN — MICONAZOLE NITRATE 2 % TOPICAL POWDER: at 08:09

## 2020-01-01 RX ADMIN — SODIUM ZIRCONIUM CYCLOSILICATE 5 G: 5 POWDER, FOR SUSPENSION ORAL at 09:10

## 2020-01-01 RX ADMIN — COLLAGENASE SANTYL: 250 OINTMENT TOPICAL at 01:10

## 2020-01-01 RX ADMIN — PIPERACILLIN SODIUM AND TAZOBACTAM SODIUM 3.38 G: 3; .375 INJECTION, POWDER, LYOPHILIZED, FOR SOLUTION INTRAVENOUS at 04:10

## 2020-01-01 RX ADMIN — FAMOTIDINE 20 MG: 10 INJECTION INTRAVENOUS at 08:10

## 2020-01-01 RX ADMIN — INSULIN ASPART 1 UNITS: 100 INJECTION, SOLUTION INTRAVENOUS; SUBCUTANEOUS at 11:10

## 2020-01-01 RX ADMIN — SODIUM ZIRCONIUM CYCLOSILICATE 5 G: 5 POWDER, FOR SUSPENSION ORAL at 09:09

## 2020-01-01 RX ADMIN — PIPERACILLIN SODIUM AND TAZOBACTAM SODIUM 3.38 G: 3; .375 INJECTION, POWDER, LYOPHILIZED, FOR SOLUTION INTRAVENOUS at 02:10

## 2020-01-01 RX ADMIN — PROPOFOL 25 MCG/KG/MIN: 10 INJECTION, EMULSION INTRAVENOUS at 04:09

## 2020-01-01 RX ADMIN — PIPERACILLIN SODIUM AND TAZOBACTAM SODIUM 3.38 G: 3; .375 INJECTION, POWDER, LYOPHILIZED, FOR SOLUTION INTRAVENOUS at 01:10

## 2020-01-01 RX ADMIN — IPRATROPIUM BROMIDE AND ALBUTEROL SULFATE 3 ML: .5; 3 SOLUTION RESPIRATORY (INHALATION) at 01:03

## 2020-01-01 RX ADMIN — ACETAMINOPHEN 1000 MG: 10 INJECTION, SOLUTION INTRAVENOUS at 09:10

## 2020-01-01 RX ADMIN — LORAZEPAM 2 MG: 2 INJECTION INTRAMUSCULAR; INTRAVENOUS at 10:10

## 2020-01-01 RX ADMIN — MUPIROCIN: 20 OINTMENT TOPICAL at 08:03

## 2020-01-01 RX ADMIN — COLLAGENASE SANTYL: 250 OINTMENT TOPICAL at 10:09

## 2020-01-01 RX ADMIN — EPOETIN ALFA-EPBX 5000 UNITS: 10000 INJECTION, SOLUTION INTRAVENOUS; SUBCUTANEOUS at 05:03

## 2020-01-01 RX ADMIN — SODIUM CHLORIDE 500 ML: 0.9 INJECTION, SOLUTION INTRAVENOUS at 11:09

## 2020-01-01 RX ADMIN — Medication: at 08:10

## 2020-01-01 RX ADMIN — COLLAGENASE SANTYL: 250 OINTMENT TOPICAL at 09:09

## 2020-01-01 RX ADMIN — TIOTROPIUM BROMIDE 18 MCG: 18 CAPSULE ORAL; RESPIRATORY (INHALATION) at 09:09

## 2020-01-01 RX ADMIN — ALBUTEROL SULFATE 2.5 MG: 2.5 SOLUTION RESPIRATORY (INHALATION) at 06:10

## 2020-01-01 RX ADMIN — APIXABAN 5 MG: 5 TABLET, FILM COATED ORAL at 08:03

## 2020-01-01 RX ADMIN — Medication 0.06 MCG/KG/MIN: at 10:10

## 2020-01-01 RX ADMIN — AMIODARONE HYDROCHLORIDE 150 MG: 50 INJECTION, SOLUTION INTRAVENOUS at 01:09

## 2020-01-01 RX ADMIN — PIPERACILLIN SODIUM AND TAZOBACTAM SODIUM 3.38 G: 3; .375 INJECTION, POWDER, LYOPHILIZED, FOR SOLUTION INTRAVENOUS at 12:10

## 2020-01-01 RX ADMIN — NOREPINEPHRINE BITARTRATE 8000 MCG: 1 INJECTION INTRAVENOUS at 09:09

## 2020-01-01 RX ADMIN — MEROPENEM 500 MG: 1 INJECTION, POWDER, FOR SOLUTION INTRAVENOUS at 11:10

## 2020-01-01 RX ADMIN — VANCOMYCIN HYDROCHLORIDE 125 MG: KIT at 11:10

## 2020-01-01 RX ADMIN — SEVELAMER CARBONATE 1600 MG: 800 TABLET, FILM COATED ORAL at 08:10

## 2020-01-01 RX ADMIN — FAMOTIDINE 20 MG: 10 INJECTION INTRAVENOUS at 08:09

## 2020-01-01 RX ADMIN — PROPOFOL 20 MCG/KG/MIN: 10 INJECTION, EMULSION INTRAVENOUS at 01:09

## 2020-01-01 RX ADMIN — METOPROLOL TARTRATE 2.5 MG: 5 INJECTION, SOLUTION INTRAVENOUS at 06:09

## 2020-01-01 RX ADMIN — LEVOTHYROXINE SODIUM 50 MCG: 25 TABLET ORAL at 05:09

## 2020-01-01 RX ADMIN — DEXTROSE MONOHYDRATE 12.5 G: 25 INJECTION, SOLUTION INTRAVENOUS at 04:09

## 2020-01-01 RX ADMIN — ROSUVASTATIN 10 MG: 10 TABLET, FILM COATED ORAL at 10:03

## 2020-01-01 RX ADMIN — MORPHINE SULFATE 4 MG: 4 INJECTION INTRAVENOUS at 08:10

## 2020-01-01 RX ADMIN — SEVELAMER HYDROCHLORIDE 800 MG: 800 TABLET, FILM COATED PARENTERAL at 08:03

## 2020-01-01 RX ADMIN — MIDODRINE HYDROCHLORIDE 2.5 MG: 2.5 TABLET ORAL at 01:03

## 2020-01-01 RX ADMIN — SEVELAMER HYDROCHLORIDE 1600 MG: 800 TABLET, FILM COATED PARENTERAL at 05:03

## 2020-01-01 RX ADMIN — METOPROLOL SUCCINATE 25 MG: 25 TABLET, FILM COATED, EXTENDED RELEASE ORAL at 02:09

## 2020-01-01 RX ADMIN — VANCOMYCIN HYDROCHLORIDE 1500 MG: 1 INJECTION, POWDER, LYOPHILIZED, FOR SOLUTION INTRAVENOUS at 05:10

## 2020-01-01 RX ADMIN — LEVOFLOXACIN 250 MG: 5 INJECTION, SOLUTION INTRAVENOUS at 12:10

## 2020-01-01 RX ADMIN — ACETAMINOPHEN 650 MG: 325 TABLET ORAL at 04:10

## 2020-01-01 RX ADMIN — FLUTICASONE PROPIONATE 50 MCG: 50 SPRAY, METERED NASAL at 11:03

## 2020-01-01 RX ADMIN — AMIODARONE HYDROCHLORIDE 0.5 MG/MIN: 1.8 INJECTION, SOLUTION INTRAVENOUS at 03:09

## 2020-01-01 RX ADMIN — CITALOPRAM HYDROBROMIDE 20 MG: 20 TABLET ORAL at 10:09

## 2020-01-01 RX ADMIN — SEVELAMER HYDROCHLORIDE 1600 MG: 800 TABLET, FILM COATED PARENTERAL at 10:03

## 2020-01-01 RX ADMIN — CITALOPRAM HYDROBROMIDE 20 MG: 20 TABLET ORAL at 10:03

## 2020-01-01 RX ADMIN — TIOTROPIUM BROMIDE 18 MCG: 18 CAPSULE ORAL; RESPIRATORY (INHALATION) at 07:09

## 2020-01-01 RX ADMIN — SEVELAMER CARBONATE 1600 MG: 800 TABLET, FILM COATED ORAL at 01:10

## 2020-01-01 RX ADMIN — ADENOSINE 6 MG: 3 INJECTION, SOLUTION INTRAVENOUS at 01:09

## 2020-01-01 RX ADMIN — LORAZEPAM 2 MG: 2 INJECTION INTRAMUSCULAR; INTRAVENOUS at 08:10

## 2020-01-01 RX ADMIN — LEVOTHYROXINE SODIUM 50 MCG: 25 TABLET ORAL at 06:03

## 2020-01-01 RX ADMIN — DEXTROSE MONOHYDRATE 25 G: 25 INJECTION, SOLUTION INTRAVENOUS at 12:09

## 2020-01-01 RX ADMIN — CEFTRIAXONE SODIUM 1 G: 1 INJECTION, POWDER, FOR SOLUTION INTRAMUSCULAR; INTRAVENOUS at 04:09

## 2020-01-01 RX ADMIN — SODIUM ZIRCONIUM CYCLOSILICATE 5 G: 5 POWDER, FOR SUSPENSION ORAL at 05:09

## 2020-01-01 RX ADMIN — CEFTRIAXONE SODIUM 1 G: 1 INJECTION, POWDER, FOR SOLUTION INTRAMUSCULAR; INTRAVENOUS at 03:09

## 2020-01-01 RX ADMIN — INSULIN ASPART 2 UNITS: 100 INJECTION, SOLUTION INTRAVENOUS; SUBCUTANEOUS at 12:03

## 2020-01-01 RX ADMIN — PROPOFOL 50 MG: 10 INJECTION, EMULSION INTRAVENOUS at 01:09

## 2020-01-01 RX ADMIN — GUAIFENESIN 600 MG: 600 TABLET, EXTENDED RELEASE ORAL at 08:03

## 2020-01-01 RX ADMIN — FLUTICASONE PROPIONATE 50 MCG: 50 SPRAY, METERED NASAL at 10:03

## 2020-01-01 RX ADMIN — INSULIN ASPART 2 UNITS: 100 INJECTION, SOLUTION INTRAVENOUS; SUBCUTANEOUS at 08:03

## 2020-01-01 RX ADMIN — Medication: at 09:10

## 2020-01-01 RX ADMIN — INSULIN ASPART 4 UNITS: 100 INJECTION, SOLUTION INTRAVENOUS; SUBCUTANEOUS at 11:10

## 2020-01-01 RX ADMIN — PROPOFOL 20 MCG/KG/MIN: 10 INJECTION, EMULSION INTRAVENOUS at 02:09

## 2020-01-01 RX ADMIN — SODIUM ZIRCONIUM CYCLOSILICATE 5 G: 5 POWDER, FOR SUSPENSION ORAL at 10:09

## 2020-01-01 RX ADMIN — ROSUVASTATIN CALCIUM 10 MG: 10 TABLET, FILM COATED ORAL at 04:09

## 2020-01-01 RX ADMIN — AMIODARONE HYDROCHLORIDE 1 MG/MIN: 1.8 INJECTION, SOLUTION INTRAVENOUS at 03:09

## 2020-01-01 RX ADMIN — MIDODRINE HYDROCHLORIDE 2.5 MG: 2.5 TABLET ORAL at 10:03

## 2020-01-01 RX ADMIN — METHYLPREDNISOLONE SODIUM SUCCINATE 40 MG: 40 INJECTION, POWDER, FOR SOLUTION INTRAMUSCULAR; INTRAVENOUS at 05:03

## 2020-01-01 RX ADMIN — CITALOPRAM HYDROBROMIDE 20 MG: 20 TABLET ORAL at 09:09

## 2020-01-01 RX ADMIN — PROPOFOL 35 MCG/KG/MIN: 10 INJECTION, EMULSION INTRAVENOUS at 10:10

## 2020-01-01 RX ADMIN — APIXABAN 5 MG: 5 TABLET, FILM COATED ORAL at 09:03

## 2020-01-01 RX ADMIN — GABAPENTIN 100 MG: 100 CAPSULE ORAL at 10:03

## 2020-01-01 RX ADMIN — ACETAMINOPHEN 1000 MG: 10 INJECTION, SOLUTION INTRAVENOUS at 05:10

## 2020-01-01 RX ADMIN — TIOTROPIUM BROMIDE 18 MCG: 18 CAPSULE ORAL; RESPIRATORY (INHALATION) at 08:09

## 2020-01-01 RX ADMIN — DIGOXIN 250 MCG: 0.25 INJECTION INTRAMUSCULAR; INTRAVENOUS at 11:09

## 2020-01-01 RX ADMIN — VANCOMYCIN HYDROCHLORIDE 1500 MG: 1 INJECTION, POWDER, LYOPHILIZED, FOR SOLUTION INTRAVENOUS at 01:10

## 2020-01-01 RX ADMIN — SEVELAMER HYDROCHLORIDE 1600 MG: 800 TABLET, FILM COATED PARENTERAL at 08:03

## 2020-01-01 RX ADMIN — COLLAGENASE SANTYL: 250 OINTMENT TOPICAL at 04:09

## 2020-01-01 RX ADMIN — PIPERACILLIN SODIUM AND TAZOBACTAM SODIUM 3.38 G: 3; .375 INJECTION, POWDER, LYOPHILIZED, FOR SOLUTION INTRAVENOUS at 06:10

## 2020-01-01 RX ADMIN — GABAPENTIN 100 MG: 100 CAPSULE ORAL at 02:03

## 2020-01-01 RX ADMIN — PROPOFOL 35 MCG/KG/MIN: 10 INJECTION, EMULSION INTRAVENOUS at 08:10

## 2020-01-01 RX ADMIN — SEVELAMER HYDROCHLORIDE 1600 MG: 800 TABLET, FILM COATED PARENTERAL at 01:03

## 2020-01-01 RX ADMIN — POTASSIUM, SODIUM PHOSPHATES 280 MG-160 MG-250 MG ORAL POWDER PACKET 2 PACKET: POWDER IN PACKET at 06:10

## 2020-01-01 RX ADMIN — MORPHINE SULFATE 2 MG/HR: 10 INJECTION INTRAVENOUS at 09:10

## 2020-01-01 RX ADMIN — Medication 0.04 MCG/KG/MIN: at 07:10

## 2020-01-01 RX ADMIN — MIDODRINE HYDROCHLORIDE 2.5 MG: 2.5 TABLET ORAL at 08:09

## 2020-01-01 RX ADMIN — MUPIROCIN: 20 OINTMENT TOPICAL at 10:03

## 2020-01-01 RX ADMIN — IODIXANOL 100 ML: 320 INJECTION, SOLUTION INTRAVASCULAR at 02:10

## 2020-01-01 RX ADMIN — SEVELAMER CARBONATE 1600 MG: 800 TABLET, FILM COATED ORAL at 04:10

## 2020-01-01 RX ADMIN — SEVELAMER CARBONATE 1600 MG: 800 TABLET, FILM COATED ORAL at 05:09

## 2020-01-01 RX ADMIN — FAMOTIDINE 20 MG: 10 INJECTION INTRAVENOUS at 10:09

## 2020-01-01 RX ADMIN — PROPOFOL 20 MCG/KG/MIN: 10 INJECTION, EMULSION INTRAVENOUS at 10:09

## 2020-01-20 NOTE — PROGRESS NOTES
"  [address] [facility] [address]  [place] [address]  Phone: [phone]   [phone]    Patient's PCP:JOS Dumont  Referring Provider: No ref. provider found    Subjective:      Chief Complaint:: Follow-up (ulcer left lower leg)    HPI  Chronic nonhealing ulcer left lower leg chronic nonhealing ulcer left lower leg    Systemic Doctor: JOS Dumont  Date Last Seen:   Blood Sugar: 100  Hemoglobin A1c: 5.2    Vitals:    20 0857   BP: 131/83   Pulse: 87     Shoe Size:     Past Surgical History:   Procedure Laterality Date    ABCESS DRAINAGE Right     Between "little toe" and the one next to it    BREAST SURGERY      Reduction [id number]    CARDIAC SURGERY  2011    CABG 4vessel ring in one valve mitral valve prolapse    CORONARY ARTERY BYPASS GRAFT      hyperlipidemia      TUBAL LIGATION  1986     Past Medical History:   Diagnosis Date    Anemia in stage 3 chronic kidney disease 2017    Anticoagulant long-term use     CHF (congestive heart failure)     COPD (chronic obstructive pulmonary disease)     Coronary artery disease     Diabetes mellitus     Encounter for blood transfusion     Essential hypertension 2017    Hypertension     MI (myocardial infarction)     Segmental and subsegmental pulmonary emboli of RLL without acute cor pulmonale 2017    Stroke     2005    Thyroid disease     Traumatic subarachnoid hemorrhage 2017    Type 2 diabetes mellitus with stage 3 chronic kidney disease, without long-term current use of insulin 2017     Family History   Problem Relation Age of Onset    Arthritis Mother     Heart disease Father     Cancer Father 68        prostae and bladder ca  in     Diabetes Father     Hypertension Father     Depression Sister     Hypertension Son     Diabetes Maternal Grandmother         lost leg    Kidney disease Paternal Grandfather         had kidney removed    Stroke Paternal Grandfather  "        Social History:   Marital Status:   Alcohol History:  reports that she drinks about 1.0 - 2.0 standard drinks of alcohol per week.  Tobacco History:  reports that she has never smoked. She has never used smokeless tobacco.  Drug History:  reports that she does not use drugs.    Review of patient's allergies indicates:  No Known Allergies    Current Outpatient Medications   Medication Sig Dispense Refill    albuterol (ACCUNEB) 1.25 mg/3 mL Nebu USE 1 VIAL EVERY 4 HOURS AS NEEDED FOR SHORTNESS OF BREATH/WHEEZING  1    apixaban 5 mg Tab Take 1 tablet (5 mg total) by mouth 2 (two) times daily. 60 tablet 1    benzonatate (TESSALON PERLES) 100 MG capsule Take 100 mg by mouth 3 (three) times daily as needed.      BESIVANCE 0.6 % DrpS       blood sugar diagnostic Strp Test 3-4 times daily PRN      cetirizine (ZYRTEC) 10 MG tablet Take 1 tablet (10 mg total) by mouth every evening. 20 tablet 0    citalopram (CELEXA) 20 MG tablet Take 1 tablet (20 mg total) by mouth once daily.      CLARITIN-D 12 HOUR 5-120 mg per tablet Take 1 tablet by mouth 2 (two) times daily.  0    DIALYVITE 100-1 mg Tab Take 1 tablet by mouth once daily.  1    fluticasone (FLONASE) 50 mcg/actuation nasal spray SPRAY 1 SPRAY IN EACH NOSTRIL DAILY  12    gabapentin (NEURONTIN) 100 MG capsule Take 100 mg by mouth 3 (three) times daily.  3    honey (MEDIHONEY, HONEY,) 80 % Gel Apply 1 Tube topically once daily. 1 Tube 0    HYDROcodone-acetaminophen (NORCO) 5-325 mg per tablet Take 1 tablet by mouth every 6 (six) hours as needed for Pain.      insulin aspart (NOVOLOG) 100 unit/mL InPn pen Inject 1-10 Units into the skin 3 (three) times daily. 3 mL 1    insulin aspart protamine-insulin aspart (NOVOLOG 70/30) 100 unit/mL (70-30) InPn pen Inject into the skin.      levothyroxine (SYNTHROID) 50 MCG tablet       midodrine (PROAMATINE) 5 MG Tab Take 2.5 mg by mouth 3 (three) times daily with meals.      ondansetron (ZOFRAN-ODT) 4 MG  "TbDL LET 1 TABLET DISSOLVE ON TONGUE 4 TIMES A DAY AS NEEDED  0    pen needle, diabetic (BD ULTRA-FINE OBDULIA PEN NEEDLE) 32 gauge x 5/32" Ndle       prednisoLONE acetate (PRED FORTE) 1 % DrpS       promethazine (PHENERGAN) 25 MG tablet Take 1 tablet (25 mg total) by mouth every 6 (six) hours as needed for Nausea. 30 tablet 0    rosuvastatin (CRESTOR) 10 MG tablet Take 1 tablet by mouth once daily.      sevelamer carbonate (RENVELA) 800 mg Tab Take 1,600 mg by mouth 3 (three) times daily with meals.       No current facility-administered medications for this visit.        Review of Systems      Objective:        Physical Exam:   Foot Exam    General  General Appearance: appears stated age and healthy   Orientation: alert and oriented to person, place, and time   Affect: appropriate   Gait: antalgic   Assistance: wheelchair use           Physical Exam   Musculoskeletal:        Legs:          Imaging:            Assessment:       1. Type II diabetes mellitus with peripheral circulatory disorder - Left Foot    2. Type II diabetes mellitus with neurological manifestations    3. Skin ulcer of left foot with fat layer exposed      Plan:   Type II diabetes mellitus with peripheral circulatory disorder - Left Foot  -     Ambulatory referral to Home Health    Type II diabetes mellitus with neurological manifestations  -     Ambulatory referral to Home Health    Skin ulcer of left foot with fat layer exposed  -     Ambulatory referral to Home Health     Patient will continue local wound care with home health applying alginate dressing and Santyl.  She is return to see me in 4 weeks for possible outpatient debridement application of biological membrane.  Follow up in about 4 weeks (around 2/17/2020) for f/u ulcer left lower leg.    Procedures - None    Counseling:     I provided patient education verbally regarding:   Patient diagnosis, treatment options, as well as alternatives, risks, and benefits.     This note was " created using Dragon voice recognition software that occasionally misinterpreted phrases or words.

## 2020-01-23 NOTE — TELEPHONE ENCOUNTER
----- Message from Refugio Carrillo Jr., MD sent at 1/23/2020  2:11 PM CST -----  CT looks favorable  ----- Message -----  From: Stephanie Lr  Sent: 1/16/2020   4:51 PM CST  To: MD Dr. Lorena Charles Jr.,    This is a new referral who happens to have a CT scan done 9/13/2018. Can you view the films and advise on kidney transplant candidacy? FYI - She has a plethora of comorbidities but want to address the surgical candidacy if possible. Thanks.

## 2020-02-17 NOTE — TELEPHONE ENCOUNTER
Left for Mrs. Dunn to return call regarding which [facility] health location she would prefer to use.  She may speak with any clinical staff member

## 2020-02-17 NOTE — TELEPHONE ENCOUNTER
Patient called stating that she would like to receive care through [facility] in [place]    Please sign attached order.

## 2020-03-10 PROBLEM — E87.5 HYPERKALEMIA: Status: RESOLVED | Noted: 2017-06-29 | Resolved: 2020-01-01

## 2020-03-10 PROBLEM — R55 SYNCOPE: Status: ACTIVE | Noted: 2020-01-01

## 2020-03-10 PROBLEM — I50.9 ACUTE CONGESTIVE HEART FAILURE: Status: RESOLVED | Noted: 2017-06-24 | Resolved: 2020-01-01

## 2020-03-10 PROBLEM — J44.1 COPD EXACERBATION: Status: ACTIVE | Noted: 2020-01-01

## 2020-03-10 PROBLEM — E87.1 HYPONATREMIA: Status: ACTIVE | Noted: 2020-01-01

## 2020-03-10 PROBLEM — J18.9 HCAP (HEALTHCARE-ASSOCIATED PNEUMONIA): Status: RESOLVED | Noted: 2018-09-11 | Resolved: 2020-01-01

## 2020-03-10 PROBLEM — A41.9 SEPSIS: Status: RESOLVED | Noted: 2018-09-11 | Resolved: 2020-01-01

## 2020-03-10 NOTE — ED NOTES
Patient seen by buzz Ayala given pillow and informed of nurse and dr with critical patient currently

## 2020-03-10 NOTE — HPI
Ms. Ramos presents today with complaints of SOB. It is moderate. It is associated with wheezing, productive cough with yellow sputum, hypoxia yesterday, dizziness, syncope, and weakness. She denies fever, chills, N/V/D, or recent travel. She was discharged from [facility] yesterday after an admission for syncope. Per her , she was given lisinopril and possibly other antihypertensives by mistake which caused her to be emergently transferred to the ICU for fluid resuscitation and eventually this resolved. While in the ICU, she developed a low grade temp of 100 as well. She was diagnosed with a viral URI and possible UTI - but was culture negative and was treated with rocephin. Apparently yesterday, on the way home from the hospital, she became unresponsive and her O2 sat was 60%, but improved when they got home. Pt has no recollection of this and her  gives me a brief history, but both are poor historians. She has a history of ESRD on HD T/TH/S, chronic hypoxic resp failure on Home O2@3L, COPD, left nonhealing foot ulcer at the heel, CAD s/p CABG 2011, IDDM, PE on Eliquis, BETH - but told she no longer needs a cpap, and orthostatic hypotension.

## 2020-03-10 NOTE — SUBJECTIVE & OBJECTIVE
"Past Medical History:   Diagnosis Date    Anemia in stage 3 chronic kidney disease 11/26/2017    Anticoagulant long-term use     CHF (congestive heart failure)     COPD (chronic obstructive pulmonary disease)     COPD exacerbation 3/10/2020    Coronary artery disease     Diabetes mellitus     Encounter for blood transfusion     Essential hypertension 6/24/2017    Hypertension     MI (myocardial infarction) 2010    Segmental and subsegmental pulmonary emboli of RLL without acute cor pulmonale 11/25/2017    Stroke     July 2005    Thyroid disease     Traumatic subarachnoid hemorrhage 11/24/2017    Type 2 diabetes mellitus with stage 3 chronic kidney disease, without long-term current use of insulin 6/24/2017       Past Surgical History:   Procedure Laterality Date    ABCESS DRAINAGE Right     Between "little toe" and the one next to it    BREAST SURGERY      Reduction [id number]    CARDIAC SURGERY  01/2011    CABG 4vessel ring in one valve mitral valve prolapse    CORONARY ARTERY BYPASS GRAFT      hyperlipidemia      TUBAL LIGATION  03/1986       Review of patient's allergies indicates:  No Known Allergies    No current facility-administered medications on file prior to encounter.      Current Outpatient Medications on File Prior to Encounter   Medication Sig    albuterol (ACCUNEB) 1.25 mg/3 mL Nebu USE 1 VIAL EVERY 4 HOURS AS NEEDED FOR SHORTNESS OF BREATH/WHEEZING    albuterol (PROAIR HFA) 90 mcg/actuation inhaler Inhale 2 puffs into the lungs once daily. Rescue    apixaban 5 mg Tab Take 1 tablet (5 mg total) by mouth 2 (two) times daily.    citalopram (CELEXA) 20 MG tablet Take 1 tablet (20 mg total) by mouth once daily.    DIALYVITE 100-1 mg Tab Take 1 tablet by mouth 2 (two) times daily.     gabapentin (NEURONTIN) 100 MG capsule Take 100 mg by mouth 3 (three) times daily.    levothyroxine (SYNTHROID) 50 MCG tablet     midodrine (PROAMATINE) 5 MG Tab Take 2.5 mg by mouth 3 (three) times " "daily with meals.    promethazine (PHENERGAN) 25 MG tablet Take 1 tablet (25 mg total) by mouth every 6 (six) hours as needed for Nausea.    rosuvastatin (CRESTOR) 10 MG tablet Take 1 tablet by mouth once daily.    benzonatate (TESSALON PERLES) 100 MG capsule Take 100 mg by mouth 3 (three) times daily as needed.    BESIVANCE 0.6 % DrpS     blood sugar diagnostic Strp Test 3-4 times daily PRN    BROMSITE 0.075 % Drop     cetirizine (ZYRTEC) 10 MG tablet Take 1 tablet (10 mg total) by mouth every evening.    CLARITIN-D 12 HOUR 5-120 mg per tablet Take 1 tablet by mouth 2 (two) times daily.    fluticasone (FLONASE) 50 mcg/actuation nasal spray SPRAY 1 SPRAY IN EACH NOSTRIL DAILY    honey (MEDIHONEY, HONEY,) 80 % Gel Apply 1 Tube topically once daily.    HYDROcodone-acetaminophen (NORCO) 5-325 mg per tablet Take 1 tablet by mouth every 6 (six) hours as needed for Pain.    insulin aspart (NOVOLOG) 100 unit/mL InPn pen Inject 1-10 Units into the skin 3 (three) times daily.    insulin aspart protamine-insulin aspart (NOVOLOG 70/30) 100 unit/mL (70-30) InPn pen Inject into the skin.    ondansetron (ZOFRAN-ODT) 4 MG TbDL LET 1 TABLET DISSOLVE ON TONGUE 4 TIMES A DAY AS NEEDED    pen needle, diabetic (BD ULTRA-FINE OBDULIA PEN NEEDLE) 32 gauge x 5/32" Ndle     prednisoLONE acetate (PRED FORTE) 1 % DrpS     sevelamer carbonate (RENVELA) 800 mg Tab Take 1,600 mg by mouth 3 (three) times daily with meals.     Family History     Problem Relation (Age of Onset)    Arthritis Mother    Cancer Father (68)    Depression Sister    Diabetes Father, Maternal Grandmother    Heart disease Father    Hypertension Father, Son    Kidney disease Paternal Grandfather    Stroke Paternal Grandfather        Tobacco Use    Smoking status: Never Smoker    Smokeless tobacco: Never Used   Substance and Sexual Activity    Alcohol use: Yes     Alcohol/week: 1.0 - 2.0 standard drinks     Types: 1 - 2 Glasses of wine per week     Comment: " ""socially" not in awhile    Drug use: No    Sexual activity: Yes     Partners: Male     Birth control/protection: None     Review of Systems   Constitutional: Positive for fatigue. Negative for activity change, appetite change, chills, diaphoresis, fever and unexpected weight change.   HENT: Positive for dental problem. Negative for congestion, ear pain, facial swelling, hearing loss, sore throat and trouble swallowing.    Eyes: Negative for pain and discharge.   Respiratory: Positive for cough, shortness of breath and wheezing. Negative for chest tightness.    Cardiovascular: Negative for chest pain, palpitations and leg swelling.   Gastrointestinal: Negative for abdominal pain, blood in stool, diarrhea, nausea and vomiting.   Endocrine: Negative for polydipsia, polyphagia and polyuria.   Genitourinary: Negative for difficulty urinating, dysuria, flank pain, frequency and urgency.   Musculoskeletal: Negative for arthralgias, back pain, joint swelling, neck pain and neck stiffness.   Skin: Negative for rash and wound.   Allergic/Immunologic: Negative for environmental allergies and immunocompromised state.   Neurological: Positive for syncope and weakness. Negative for dizziness, seizures, speech difficulty, light-headedness, numbness and headaches.   Hematological: Negative for adenopathy.   Psychiatric/Behavioral: Negative for sleep disturbance and suicidal ideas. The patient is not nervous/anxious.    All other systems reviewed and are negative.    Objective:     Vital Signs (Most Recent):  Temp: 98.1 °F (36.7 °C) (03/10/20 0845)  Pulse: 91 (03/10/20 1230)  Resp: (!) 22 (03/10/20 1230)  BP: (!) 103/58 (03/10/20 1230)  SpO2: 98 % (03/10/20 1230) Vital Signs (24h Range):  Temp:  [97.9 °F (36.6 °C)-98.1 °F (36.7 °C)] 98.1 °F (36.7 °C)  Pulse:  [87-92] 91  Resp:  [16-26] 22  SpO2:  [97 %-100 %] 98 %  BP: ()/(52-82) 103/58     Weight: 82.1 kg (181 lb)  Body mass index is 29.21 kg/m².    Physical Exam "   Constitutional: She is oriented to person, place, and time. She appears well-developed and well-nourished.   HENT:   Head: Normocephalic and atraumatic.   Eyes: Pupils are equal, round, and reactive to light. EOM are normal.   Neck: Normal range of motion. Neck supple.   Cardiovascular: Normal rate, regular rhythm, normal heart sounds and intact distal pulses.   No murmur heard.  Pulmonary/Chest: Effort normal. No stridor. No respiratory distress. She has wheezes.   Rhonchi in bilat upper lobes   Abdominal: Soft. Bowel sounds are normal. She exhibits no distension. There is no tenderness.   Musculoskeletal: Normal range of motion.   Neurological: She is alert and oriented to person, place, and time.   Skin: Skin is warm and dry. Capillary refill takes less than 2 seconds.   Wound to left heel- POA    Psychiatric: She has a normal mood and affect.   Nursing note and vitals reviewed.        CRANIAL NERVES     CN III, IV, VI   Pupils are equal, round, and reactive to light.  Extraocular motions are normal.        Significant Labs:   BMP:   Recent Labs   Lab 03/10/20  0710   GLU 86   *   K 3.9   CL 94*   CO2 23   BUN 41*   CREATININE 5.3*   CALCIUM 7.8*     CBC:   Recent Labs   Lab 03/10/20  0710   WBC 7.99   HGB 10.7*   HCT 34.5*        Cardiac Markers:   Recent Labs   Lab 03/10/20  0710   *       Significant Imaging: CXR: I have reviewed all pertinent results/findings within the past 24 hours and my personal findings are:  no acute consolidation noted  I have reviewed all pertinent imaging results/findings within the past 24 hours.

## 2020-03-10 NOTE — ED PROVIDER NOTES
"Encounter Date: 3/10/2020       History     Chief Complaint   Patient presents with    Shortness of Breath     pt was discharged yesterday from [facility] pt family members report her oxygen levels became low and she stopped breathing for a short while pt is on O2 2-3L [place]     59-year-old female who has a history of type 2 diabetes mellitus chronic kidney disease on hemodialysis, and who has orthostatic hypotension, presents emergency with complaints of some shortness of breath since the last 5 days.  Patient apparently and had difficulty 5 days ago at which time [facility] [place] was [facility] for dialysis.  The patient was discharged from that facility 4 days ago.  Since that time she has had again worsening or shortness of breath.  She is due for dialysis today.  She has had a cough is nonproductive.  No chest pain or palpitations.  She has had some nausea with no vomiting and took Phenergan.  She admits that her dry weight is 84.5 kilos.        Review of patient's allergies indicates:  No Known Allergies  Past Medical History:   Diagnosis Date    Anemia in stage 3 chronic kidney disease 11/26/2017    Anticoagulant long-term use     CHF (congestive heart failure)     COPD (chronic obstructive pulmonary disease)     Coronary artery disease     Diabetes mellitus     Encounter for blood transfusion     Essential hypertension 6/24/2017    Hypertension     MI (myocardial infarction) 2010    Segmental and subsegmental pulmonary emboli of RLL without acute cor pulmonale 11/25/2017    Stroke     July 2005    Thyroid disease     Traumatic subarachnoid hemorrhage 11/24/2017    Type 2 diabetes mellitus with stage 3 chronic kidney disease, without long-term current use of insulin 6/24/2017     Past Surgical History:   Procedure Laterality Date    ABCESS DRAINAGE Right     Between "little toe" and the one next to it    BREAST SURGERY      Reduction [id number]    CARDIAC " "SURGERY  2011    CABG 4vessel ring in one valve mitral valve prolapse    CORONARY ARTERY BYPASS GRAFT      hyperlipidemia      TUBAL LIGATION  1986     Family History   Problem Relation Age of Onset    Arthritis Mother     Heart disease Father     Cancer Father 68        prostae and bladder ca  in     Diabetes Father     Hypertension Father     Depression Sister     Hypertension Son     Diabetes Maternal Grandmother         lost leg    Kidney disease Paternal Grandfather         had kidney removed    Stroke Paternal Grandfather      Social History     Tobacco Use    Smoking status: Never Smoker    Smokeless tobacco: Never Used   Substance Use Topics    Alcohol use: Yes     Alcohol/week: 1.0 - 2.0 standard drinks     Types: 1 - 2 Glasses of wine per week     Comment: "socially" not in awhile    Drug use: No     Review of Systems   Constitutional: Negative for activity change, appetite change, chills, diaphoresis and fever.   HENT: Negative for congestion, facial swelling, sore throat and trouble swallowing.    Eyes: Negative.    Respiratory: Positive for cough and shortness of breath. Negative for choking, chest tightness, wheezing and stridor.    Cardiovascular: Negative for chest pain and palpitations.   Gastrointestinal: Positive for nausea and vomiting. Negative for abdominal pain.   Genitourinary: Positive for decreased urine volume. Negative for dysuria.   Musculoskeletal: Negative for back pain.   Skin: Negative for pallor and rash.   Neurological: Negative for seizures, syncope, weakness and headaches.   Hematological: Does not bruise/bleed easily.   All other systems reviewed and are negative.      Physical Exam     Initial Vitals [03/10/20 0623]   BP Pulse Resp Temp SpO2   (!) 85/52 92 16 97.9 °F (36.6 °C) 97 %      MAP       --         Physical Exam    Vitals reviewed.  Constitutional: She appears well-developed and well-nourished. She is not diaphoretic. No distress.   HENT: "   Head: Normocephalic and atraumatic.   Nose: Nose normal.   Mouth/Throat: Oropharynx is clear and moist. No oropharyngeal exudate.   Eyes: Conjunctivae are normal. Pupils are equal, round, and reactive to light. Right eye exhibits no discharge. Left eye exhibits no discharge.   Neck: Normal range of motion. Neck supple. No JVD present.   Cardiovascular: Normal rate, regular rhythm, normal heart sounds and intact distal pulses. Exam reveals no gallop and no friction rub.    No murmur heard.  Pulmonary/Chest: Breath sounds normal. No respiratory distress. She has no wheezes. She has no rhonchi. She has no rales.   Abdominal: Soft. Bowel sounds are normal. She exhibits no distension. There is no tenderness. There is no rebound and no guarding.   Musculoskeletal: Normal range of motion. She exhibits no edema or tenderness.   Lymphadenopathy:     She has no cervical adenopathy.   Neurological: She is alert and oriented to person, place, and time. She has normal strength. GCS score is 15. GCS eye subscore is 4. GCS verbal subscore is 5. GCS motor subscore is 6.   Skin: Skin is warm and dry. Capillary refill takes less than 2 seconds. No rash noted. No erythema. No pallor.   Psychiatric: She has a normal mood and affect. Her behavior is normal. Judgment and thought content normal.         ED Course   Procedures  Labs Reviewed   CBC W/ AUTO DIFFERENTIAL   COMPREHENSIVE METABOLIC PANEL   TROPONIN I   B-TYPE NATRIURETIC PEPTIDE   PROTIME-INR          Imaging Results          X-Ray Chest AP Portable (Final result)  Result time 03/10/20 07:44:56    Final result by Jeff Conde MD (03/10/20 07:44:56)                 Impression:      New minor linear atelectasis in lateral left lung base.      Electronically signed by: Jeff Conde MD  Date:    03/10/2020  Time:    07:44             Narrative:    EXAMINATION:  XR CHEST AP PORTABLE    CLINICAL HISTORY:  shortness of breath;    FINDINGS:  Portable chest at 720 compared with  07/10/2019 shows normal cardiomediastinal silhouette with postsurgical changes of CABG.    New linear lateral left lung base opacities suggest atelectasis.  Right lung is clear.  Lung volumes remain low.  Pulmonary vasculature is normal. No acute osseous abnormality.                                              Attending Attestation:             Attending ED Notes:   This 59-year-old female who has a history of type 2 diabetes mellitus and chronic kidney disease on hemodialysis presented with complaints of shortness of breath, is due for dialysis today.  The patient had no fever but has had some cough as well as nausea and vomiting.  Labs at this time showed a normal blood sugar and sodium of 129.  White count is normal at 7.99 and H&H is 10.7 and 34.5.  BNP is high at 946 and troponin is normal.  Chest x-ray shows some atelectasis in the left lung base.  During the ED course I did speak to Hospital Medicine as well as Dr. Young and the patient will be admitted for dialysis.                        Clinical Impression:       ICD-10-CM ICD-9-CM   1. SOB (shortness of breath) R06.02 786.05   2. Shortness of breath R06.02 786.05   3. Congestive heart failure, unspecified HF chronicity, unspecified heart failure type I50.9 428.0                                Mitul Guallpa Jr., MD  03/10/20 1240

## 2020-03-10 NOTE — ASSESSMENT & PLAN NOTE
Per family happened yesterday - troponin is negative   Full work up done at [facility] last week   Etiology? - Orthostasis?       Carotid US at [facility]:  Result Date: 3/6/2020  Left ICA stenosis, 50-69%. Further evaluation with CTA recommended. Left ECA stenosis also suspected. This report was signed by Reymundo Villanueva MD on 3/6/2020 8:49 AM on the following workstation: [employer]. \    Echo at [facility]:   Echo 3/6/2020  1.No hemodynamically significant valvular disease                                                                                               2.The estimated LV ejection fraction is 50 %                                                                                                    3.There is apical anterior, apical septal, apical hypokinesis.                                                                                  4.Estimated RVSP is 40 mmHg                                             There was no previous for comparison.             CT head:  FINDINGS:   CT brain without IV contrast.    This CT examination was performed using one or more of the following dose reduction techniques: Automated exposure control, adjustment of the mA and or kv according to patient size, use of iterative reconstruction techniques.    Minimal atrophy. Chronic microvascular changes periventricular white matter. Prominent hypodensity in the right frontal periventricular white matter with frontal horn enlargement suggesting old focal infarction. There is no evidence of acute hemorrhage, mass, acute infarction, hydrocephalus or shift of midline structures.  Osseous structures are intact.        IMPRESSION:     Chronic findings as above.    No evidence of acute intracranial process.

## 2020-03-10 NOTE — ED NOTES
C/o shortness of breath and cough,dialysis on tues,thur,sat, shunt for dialysis left upper arm, has pink sleeve on arm,no bleeding noted,per  at [facility] recently for same and for low blood pressure

## 2020-03-10 NOTE — ASSESSMENT & PLAN NOTE
Should be corrected with HD  Repeat level in AM       Labs from recent admission at [facility]:   Lab 03/07/20  0352 03/06/20  0441 03/05/20  1636 03/05/20  1211   * 137 141 136*   K 4.1 4.5 4.7 4.1   CL 97* 94* 98 95*   CO2 22 28 30 32*   BUN 43* 30* 18 14   CREATS 4.43* 3.15* 2.71* 2.01*   CA -- -- -- 8.2   PHOS 6.4* 4.8* 3.8 --   ALB 3.1*  3.1* 3.2* 3.3* 3.5   CACORR 8.7 8.7 8.9 --

## 2020-03-10 NOTE — ED NOTES
Awaiting dialysis and admission   Weekend Cm to call [employer] [facility] in the morning to receive determination from facility  Facility was unable to provide Cm with determination until tamiflu is complete  Family is not comfortable transporting patient themselves to inpatient rehab facility and cannot afford [facility] transport  Cm received permission from supervisor to set up transportation through [facility] for tomorrow to be covered by hospital   Rolene Virgilio to inform Cm of time that would work for tomorrow afternoon  Cm to inform patient, facility, and patient's mother once facility provides patient with determination and once [facility] provides available time for transport  Cm to follow and continue working on patient's discharge plan

## 2020-03-10 NOTE — H&P
[facility]  History & Physical    DOS: 03/10/2020  1:06 PM    Patient Name: Juliane Ramos  MRN: [id number]  Admission Date: 3/10/2020  Attending Physician: Dr. Cade  Primary Care Provider: JOS Dumont         Patient information was obtained from patient, spouse/SO, relative(s) and ER records.     Subjective:     Principal Problem:COPD exacerbation    Chief Complaint:   Chief Complaint   Patient presents with    Shortness of Breath     pt was discharged yesterday from [facility] pt family members report her oxygen levels became low and she stopped breathing for a short while pt is on O2 2-3L NC        HPI: Ms. Ramos presents today with complaints of SOB. It is moderate. It is associated with wheezing, productive cough with yellow sputum, hypoxia yesterday, dizziness, syncope, and weakness. She denies fever, chills, N/V/D, or recent travel. She was discharged from [facility] yesterday after an admission for syncope. Per her , she was given lisinopril and possibly other antihypertensives by mistake which caused her to be emergently transferred to the ICU for fluid resuscitation and eventually this resolved. While in the ICU, she developed a low grade temp of 100 as well. She was diagnosed with a viral URI and possible UTI - but was culture negative and was treated with rocephin. Apparently yesterday, on the way home from the hospital, she became unresponsive and her O2 sat was 60%, but improved when they got home. Pt has no recollection of this and her  gives me a brief history, but both are poor historians. She has a history of ESRD on HD T/TH/S, chronic hypoxic resp failure on Home O2@3L, COPD, left nonhealing foot ulcer at the heel, CAD s/p CABG 2011, IDDM, PE on Eliquis, BETH - but told she no longer needs a cpap, and orthostatic hypotension.        Past Medical History:   Diagnosis Date    Anemia in stage 3 chronic kidney disease 11/26/2017  "   Anticoagulant long-term use     CHF (congestive heart failure)     COPD (chronic obstructive pulmonary disease)     COPD exacerbation 3/10/2020    Coronary artery disease     Diabetes mellitus     Encounter for blood transfusion     Essential hypertension 6/24/2017    Hypertension     MI (myocardial infarction) 2010    Segmental and subsegmental pulmonary emboli of RLL without acute cor pulmonale 11/25/2017    Stroke     July 2005    Thyroid disease     Traumatic subarachnoid hemorrhage 11/24/2017    Type 2 diabetes mellitus with stage 3 chronic kidney disease, without long-term current use of insulin 6/24/2017       Past Surgical History:   Procedure Laterality Date    ABCESS DRAINAGE Right     Between "little toe" and the one next to it    BREAST SURGERY      Reduction [id number]    CARDIAC SURGERY  01/2011    CABG 4vessel ring in one valve mitral valve prolapse    CORONARY ARTERY BYPASS GRAFT      hyperlipidemia      TUBAL LIGATION  03/1986       Review of patient's allergies indicates:  No Known Allergies    No current facility-administered medications on file prior to encounter.      Current Outpatient Medications on File Prior to Encounter   Medication Sig    albuterol (ACCUNEB) 1.25 mg/3 mL Nebu USE 1 VIAL EVERY 4 HOURS AS NEEDED FOR SHORTNESS OF BREATH/WHEEZING    albuterol (PROAIR HFA) 90 mcg/actuation inhaler Inhale 2 puffs into the lungs once daily. Rescue    apixaban 5 mg Tab Take 1 tablet (5 mg total) by mouth 2 (two) times daily.    citalopram (CELEXA) 20 MG tablet Take 1 tablet (20 mg total) by mouth once daily.    DIALYVITE 100-1 mg Tab Take 1 tablet by mouth 2 (two) times daily.     gabapentin (NEURONTIN) 100 MG capsule Take 100 mg by mouth 3 (three) times daily.    levothyroxine (SYNTHROID) 50 MCG tablet     midodrine (PROAMATINE) 5 MG Tab Take 2.5 mg by mouth 3 (three) times daily with meals.    promethazine (PHENERGAN) 25 MG tablet Take 1 tablet (25 mg total) by mouth " "every 6 (six) hours as needed for Nausea.    rosuvastatin (CRESTOR) 10 MG tablet Take 1 tablet by mouth once daily.    benzonatate (TESSALON PERLES) 100 MG capsule Take 100 mg by mouth 3 (three) times daily as needed.    BESIVANCE 0.6 % DrpS     blood sugar diagnostic Strp Test 3-4 times daily PRN    BROMSITE 0.075 % Drop     cetirizine (ZYRTEC) 10 MG tablet Take 1 tablet (10 mg total) by mouth every evening.    CLARITIN-D 12 HOUR 5-120 mg per tablet Take 1 tablet by mouth 2 (two) times daily.    fluticasone (FLONASE) 50 mcg/actuation nasal spray SPRAY 1 SPRAY IN EACH NOSTRIL DAILY    honey (MEDIHONEY, HONEY,) 80 % Gel Apply 1 Tube topically once daily.    HYDROcodone-acetaminophen (NORCO) 5-325 mg per tablet Take 1 tablet by mouth every 6 (six) hours as needed for Pain.    insulin aspart (NOVOLOG) 100 unit/mL InPn pen Inject 1-10 Units into the skin 3 (three) times daily.    insulin aspart protamine-insulin aspart (NOVOLOG 70/30) 100 unit/mL (70-30) InPn pen Inject into the skin.    ondansetron (ZOFRAN-ODT) 4 MG TbDL LET 1 TABLET DISSOLVE ON TONGUE 4 TIMES A DAY AS NEEDED    pen needle, diabetic (BD ULTRA-FINE OBDULIA PEN NEEDLE) 32 gauge x 5/32" Ndle     prednisoLONE acetate (PRED FORTE) 1 % DrpS     sevelamer carbonate (RENVELA) 800 mg Tab Take 1,600 mg by mouth 3 (three) times daily with meals.     Family History     Problem Relation (Age of Onset)    Arthritis Mother    Cancer Father (68)    Depression Sister    Diabetes Father, Maternal Grandmother    Heart disease Father    Hypertension Father, Son    Kidney disease Paternal Grandfather    Stroke Paternal Grandfather        Tobacco Use    Smoking status: Never Smoker    Smokeless tobacco: Never Used   Substance and Sexual Activity    Alcohol use: Yes     Alcohol/week: 1.0 - 2.0 standard drinks     Types: 1 - 2 Glasses of wine per week     Comment: "socially" not in awhile    Drug use: No    Sexual activity: Yes     Partners: Male     Birth " control/protection: None     Review of Systems   Constitutional: Positive for fatigue. Negative for activity change, appetite change, chills, diaphoresis, fever and unexpected weight change.   HENT: Positive for dental problem. Negative for congestion, ear pain, facial swelling, hearing loss, sore throat and trouble swallowing.    Eyes: Negative for pain and discharge.   Respiratory: Positive for cough, shortness of breath and wheezing. Negative for chest tightness.    Cardiovascular: Negative for chest pain, palpitations and leg swelling.   Gastrointestinal: Negative for abdominal pain, blood in stool, diarrhea, nausea and vomiting.   Endocrine: Negative for polydipsia, polyphagia and polyuria.   Genitourinary: Negative for difficulty urinating, dysuria, flank pain, frequency and urgency.   Musculoskeletal: Negative for arthralgias, back pain, joint swelling, neck pain and neck stiffness.   Skin: Negative for rash and wound.   Allergic/Immunologic: Negative for environmental allergies and immunocompromised state.   Neurological: Positive for syncope and weakness. Negative for dizziness, seizures, speech difficulty, light-headedness, numbness and headaches.   Hematological: Negative for adenopathy.   Psychiatric/Behavioral: Negative for sleep disturbance and suicidal ideas. The patient is not nervous/anxious.    All other systems reviewed and are negative.    Objective:     Vital Signs (Most Recent):  Temp: 98.1 °F (36.7 °C) (03/10/20 0845)  Pulse: 91 (03/10/20 1230)  Resp: (!) 22 (03/10/20 1230)  BP: (!) 103/58 (03/10/20 1230)  SpO2: 98 % (03/10/20 1230) Vital Signs (24h Range):  Temp:  [97.9 °F (36.6 °C)-98.1 °F (36.7 °C)] 98.1 °F (36.7 °C)  Pulse:  [87-92] 91  Resp:  [16-26] 22  SpO2:  [97 %-100 %] 98 %  BP: ()/(52-82) 103/58     Weight: 82.1 kg (181 lb)  Body mass index is 29.21 kg/m².    Physical Exam   Constitutional: She is oriented to person, place, and time. She appears well-developed and  well-nourished.   HENT:   Head: Normocephalic and atraumatic.   Eyes: Pupils are equal, round, and reactive to light. EOM are normal.   Neck: Normal range of motion. Neck supple.   Cardiovascular: Normal rate, regular rhythm, normal heart sounds and intact distal pulses.   No murmur heard.  Pulmonary/Chest: Effort normal. No stridor. No respiratory distress. She has wheezes.   Rhonchi in bilat upper lobes   Abdominal: Soft. Bowel sounds are normal. She exhibits no distension. There is no tenderness.   Musculoskeletal: Normal range of motion.   Neurological: She is alert and oriented to person, place, and time.   Skin: Skin is warm and dry. Capillary refill takes less than 2 seconds.   Wound to left heel- POA    Psychiatric: She has a normal mood and affect.   Nursing note and vitals reviewed.        CRANIAL NERVES     CN III, IV, VI   Pupils are equal, round, and reactive to light.  Extraocular motions are normal.        Significant Labs:   BMP:   Recent Labs   Lab 03/10/20  0710   GLU 86   *   K 3.9   CL 94*   CO2 23   BUN 41*   CREATININE 5.3*   CALCIUM 7.8*     CBC:   Recent Labs   Lab 03/10/20  0710   WBC 7.99   HGB 10.7*   HCT 34.5*        Cardiac Markers:   Recent Labs   Lab 03/10/20  0710   *       Significant Imaging: CXR: I have reviewed all pertinent results/findings within the past 24 hours and my personal findings are:  no acute consolidation noted  I have reviewed all pertinent imaging results/findings within the past 24 hours.    Assessment/Plan:     * COPD exacerbation  Admit to med/surg tele   IV steroids x24 hours  Azithromycin   Nebs q6h        Syncope  Per family happened yesterday - troponin is negative   Full work up done at [facility] last week   Etiology? - Orthostasis?       Carotid US at [facility]:  Result Date: 3/6/2020  Left ICA stenosis, 50-69%. Further evaluation with CTA recommended. Left ECA stenosis also suspected. This report was signed by Reymundo Villanueva  MD on 3/6/2020 8:49 AM on the following workstation: [id number]. \    Echo at [facility]:   Echo 3/6/2020  1.No hemodynamically significant valvular disease                                                                                               2.The estimated LV ejection fraction is 50 %                                                                                                    3.There is apical anterior, apical septal, apical hypokinesis.                                                                                  4.Estimated RVSP is 40 mmHg                                             There was no previous for comparison.             CT head:  FINDINGS:   CT brain without IV contrast.    This CT examination was performed using one or more of the following dose reduction techniques: Automated exposure control, adjustment of the mA and or kv according to patient size, use of iterative reconstruction techniques.    Minimal atrophy. Chronic microvascular changes periventricular white matter. Prominent hypodensity in the right frontal periventricular white matter with frontal horn enlargement suggesting old focal infarction. There is no evidence of acute hemorrhage, mass, acute infarction, hydrocephalus or shift of midline structures.  Osseous structures are intact.        IMPRESSION:     Chronic findings as above.    No evidence of acute intracranial process.      Hyponatremia  Should be corrected with HD  Repeat level in AM       Labs from recent admission at [facility]:   Lab 03/07/20  0352 03/06/20  0441 03/05/20  1636 03/05/20  1211   * 137 141 136*   K 4.1 4.5 4.7 4.1   CL 97* 94* 98 95*   CO2 22 28 30 32*   BUN 43* 30* 18 14   CREATS 4.43* 3.15* 2.71* 2.01*   CA -- -- -- 8.2   PHOS 6.4* 4.8* 3.8 --   ALB 3.1*  3.1* 3.2* 3.3* 3.5   CACORR 8.7 8.7 8.9 --          CKD (chronic kidney disease) requiring chronic dialysis  Consult Dr. Gonzalez  HD today  Repeat BMP, mag, and phos in  AM   Continue midodrine       Skin ulcer of left foot, limited to breakdown of skin  Consult wound care   Currently under the care of Dr. Wick outpatient      Type II diabetes mellitus with peripheral circulatory disorder  accuchecks ACHS with low dose ISS           VTE Risk Mitigation (From admission, onward)         Ordered     apixaban tablet 5 mg  2 times daily      03/10/20 1331     Reason for No Pharmacological VTE Prophylaxis  Once     Question:  Reasons:  Answer:  Already adequately anticoagulated on oral Anticoagulants    03/10/20 1331     IP VTE HIGH RISK PATIENT  Once      03/10/20 1331                   Amanda Alvarenga NP  Department of Hospital Medicine   [facility]

## 2020-03-10 NOTE — CONSULTS
"Consult Note  Nephrology    Consult Requested By: Mitul Guallpa Jr., *    Reason for Consult: ESRD, dependent on dialysis    SUBJECTIVE:     History of Present Illness:  60 y/o female patient known to us, has outpatient dialysis 3x wk on TTS schedule.  She presented to ER w/SOB.  Renal is consulted for dialysis orders.    3/10  Pt seen and examined in ER.  Has dry cough, sore throat, hoarse.  D/w HD nurse, pt is below dry wt, will keep even today.    Assessment/plan:    1.  ESRD--Continue with established dialysis schedule Tues, Thurs, Sat  2.  SHPT--continue sevelmer w/meals  3.  Anemia of chronic dz--no need for epo at present time, will order w/HD if Hgb drops below 10  4.  HTN--actually is hypotensive.  Continue midodrine  5.  DM 2--Blood glucose control per primary team.      Past Medical History:   Diagnosis Date    Anemia in stage 3 chronic kidney disease 11/26/2017    Anticoagulant long-term use     CHF (congestive heart failure)     COPD (chronic obstructive pulmonary disease)     Coronary artery disease     Diabetes mellitus     Encounter for blood transfusion     Essential hypertension 6/24/2017    Hypertension     MI (myocardial infarction) 2010    Segmental and subsegmental pulmonary emboli of RLL without acute cor pulmonale 11/25/2017    Stroke     July 2005    Thyroid disease     Traumatic subarachnoid hemorrhage 11/24/2017    Type 2 diabetes mellitus with stage 3 chronic kidney disease, without long-term current use of insulin 6/24/2017     Past Surgical History:   Procedure Laterality Date    ABCESS DRAINAGE Right     Between "little toe" and the one next to it    BREAST SURGERY      Reduction [id number]    CARDIAC SURGERY  01/2011    CABG 4vessel ring in one valve mitral valve prolapse    CORONARY ARTERY BYPASS GRAFT      hyperlipidemia      TUBAL LIGATION  03/1986     Family History   Problem Relation Age of Onset    Arthritis Mother     Heart disease Father     Cancer " "Father 68        prostae and bladder ca  in     Diabetes Father     Hypertension Father     Depression Sister     Hypertension Son     Diabetes Maternal Grandmother         lost leg    Kidney disease Paternal Grandfather         had kidney removed    Stroke Paternal Grandfather      Social History     Tobacco Use    Smoking status: Never Smoker    Smokeless tobacco: Never Used   Substance Use Topics    Alcohol use: Yes     Alcohol/week: 1.0 - 2.0 standard drinks     Types: 1 - 2 Glasses of wine per week     Comment: "socially" not in awhile    Drug use: No       Review of patient's allergies indicates:  No Known Allergies     Review of Systems:  General ROS: negative for - fever or night sweats  Psychological ROS: negative for - behavioral disorder or depression  ENT ROS: sore throat, hoarse  Hematological and Lymphatic ROS: negative for - bleeding problems or bruising  Endocrine ROS: negative for - temperature intolerance or unexpected weight changes  Respiratory ROS: +cough  Cardiovascular ROS: no chest pain, +SOB  Gastrointestinal ROS: no abdominal pain, no n/v/c/d  Genito-Urinary ROS: no dysuria or trouble voiding  Musculoskeletal ROS: negative for - joint pain or joint swelling  Neurological ROS: no TIA or stroke symptoms  Dermatological ROS: negative for rash and skin lesion changes    OBJECTIVE:     Vital Signs Range (Last 24H):  Temp:  [97.9 °F (36.6 °C)-98.1 °F (36.7 °C)]   Pulse:  [87-92]   Resp:  [16-23]   BP: ()/(52-82)   SpO2:  [97 %-100 %]     Physical Exam:  General- NAD noted  HEENT- WNL  Neck- supple  CV- Regular rate and rhythm  Resp- Lungs CTA Bilaterally, No increased WOB  GI- Non tender/non-distended, BS normoactive x4 quads  Extrem- No cyanosis, clubbing, edema.  Derm- skin w/d  Neuro-  No flap.     Body mass index is 29.21 kg/m².    Laboratory:  CBC:   Recent Labs   Lab 03/10/20  0710   WBC 7.99   RBC 3.66*   HGB 10.7*   HCT 34.5*      MCV 94   MCH 29.2   MCHC " 31.0*     CMP:   Recent Labs   Lab 03/10/20  0710   GLU 86   CALCIUM 7.8*   ALBUMIN 3.0*   PROT 6.0   *   K 3.9   CO2 23   CL 94*   BUN 41*   CREATININE 5.3*   ALKPHOS 48*   ALT 15   AST 17   BILITOT 1.0       Diagnostic Results:  Labs: Reviewed      ASSESSMENT/PLAN:     There are no hospital problems to display for this patient.        Thank you for allowing us to participate in the care of your patient. We will follow the patient and provide recommendations as needed.      Time spent seeing patient( greater than 1/2 spent in direct contact) :

## 2020-03-11 NOTE — SUBJECTIVE & OBJECTIVE
Interval History:  Patient reports breathing improved today.  Currently 86% on 3 L nasal cannula.  Denies any chest pain, shortness of breath.    Review of Systems   Constitutional: Negative for chills, fatigue, fever and unexpected weight change.   HENT: Negative for sore throat.    Eyes: Negative for visual disturbance.   Respiratory: Negative for cough, chest tightness and shortness of breath.    Cardiovascular: Negative for chest pain.   Gastrointestinal: Negative for abdominal pain, constipation, diarrhea, nausea and vomiting.   Endocrine: Negative for polyuria.   Genitourinary: Negative for dysuria and hematuria.   Neurological: Negative for headaches.     Objective:     Vital Signs (Most Recent):  Temp: 98.4 °F (36.9 °C) (03/11/20 1215)  Pulse: 80 (03/11/20 1330)  Resp: 18 (03/11/20 1330)  BP: 112/71 (03/11/20 1215)  SpO2: 96 % (03/11/20 1330) Vital Signs (24h Range):  Temp:  [97 °F (36.1 °C)-99.1 °F (37.3 °C)] 98.4 °F (36.9 °C)  Pulse:  [] 80  Resp:  [16-20] 18  SpO2:  [93 %-96 %] 96 %  BP: ()/(46-72) 112/71     Weight: 82.9 kg (182 lb 12.2 oz)  Body mass index is 29.5 kg/m².    Intake/Output Summary (Last 24 hours) at 3/11/2020 1419  Last data filed at 3/11/2020 0500  Gross per 24 hour   Intake 850 ml   Output 0 ml   Net 850 ml      Physical Exam   Constitutional: She is oriented to person, place, and time. She appears well-developed and well-nourished. No distress.   HENT:   Head: Normocephalic.   Right Ear: External ear normal.   Left Ear: External ear normal.   Eyes: Conjunctivae and EOM are normal. Right eye exhibits no discharge. Left eye exhibits no discharge.   Neck: Normal range of motion. Neck supple.   Cardiovascular: Normal rate, regular rhythm, normal heart sounds and intact distal pulses.   No murmur heard.  Pulmonary/Chest: Effort normal. No stridor. No respiratory distress.   Diminished breath sounds with end expiratory wheeze   Musculoskeletal: Normal range of motion.    Neurological: She is alert and oriented to person, place, and time.   Skin: Skin is warm. Capillary refill takes less than 2 seconds. She is not diaphoretic.   Wound to left heel- POA    Psychiatric: She has a normal mood and affect. Her behavior is normal. Judgment and thought content normal.   Nursing note and vitals reviewed.      Significant Labs:   CBC:   Recent Labs   Lab 03/10/20  0710   WBC 7.99   HGB 10.7*   HCT 34.5*        CMP:   Recent Labs   Lab 03/10/20  0710 03/11/20  0613   * 135*   K 3.9 4.3   CL 94* 98   CO2 23 23   GLU 86 180*   BUN 41* 26*   CREATININE 5.3* 3.5*   CALCIUM 7.8* 8.4*   PROT 6.0  --    ALBUMIN 3.0*  --    BILITOT 1.0  --    ALKPHOS 48*  --    AST 17  --    ALT 15  --    ANIONGAP 12 14   EGFRNONAA 8.3* 13.8*

## 2020-03-11 NOTE — PROGRESS NOTES
03/11/20 1000        Wound 03/11/20 1030 Ulceration posterior Achilles   Date First Assessed/Time First Assessed: 03/11/20 1030   Pre-existing: Yes  Primary Wound Type: Ulceration  Side: Left  Orientation: posterior  Location: Achilles   Wound Image    Dressing Appearance Intact   Drainage Amount None   Appearance Yellow   Periwound Area Scar tissue;Pink   Wound Length (cm) 4.2 cm   Wound Width (cm) 1 cm   Wound Depth (cm) 0.3 cm   Wound Volume (cm^3) 1.26 cm^3   Wound Surface Area (cm^2) 4.2 cm^2   Care Cleansed with:;Wound cleanser   Dressing Hydrogel;Non-adherent;Gauze;Abd pad;Rolled gauze   Patient uses a collagen dressing that she gets from Dr. Bledsoe office, instructed to bring in and we can get an order for it.

## 2020-03-11 NOTE — PLAN OF CARE
Problem: Wound  Goal: Optimal Wound Healing  Outcome: Ongoing, Progressing     Problem: Device-Related Complication Risk (Hemodialysis)  Goal: Safe, Effective Therapy Delivery  Outcome: Ongoing, Progressing     Problem: Hemodynamic Instability (Hemodialysis)  Goal: Vital Signs Remain in Desired Range  Outcome: Ongoing, Progressing     Problem: Infection (Hemodialysis)  Goal: Absence of Infection Signs/Symptoms  Outcome: Ongoing, Progressing     Problem: Adult Inpatient Plan of Care  Goal: Plan of Care Review  Outcome: Ongoing, Progressing  Goal: Patient-Specific Goal (Individualization)  Outcome: Ongoing, Progressing  Goal: Absence of Hospital-Acquired Illness or Injury  Outcome: Ongoing, Progressing  Goal: Optimal Comfort and Wellbeing  Outcome: Ongoing, Progressing  Goal: Readiness for Transition of Care  Outcome: Ongoing, Progressing  Goal: Rounds/Family Conference  Outcome: Ongoing, Progressing     Problem: Diabetes Comorbidity  Goal: Blood Glucose Level Within Desired Range  Outcome: Ongoing, Progressing     Problem: Infection  Goal: Infection Symptom Resolution  Outcome: Ongoing, Progressing

## 2020-03-11 NOTE — CARE UPDATE
03/11/20 0704   Patient Assessment/Suction   Level of Consciousness (AVPU) alert   Respiratory Effort Normal   All Lung Fields Breath Sounds coarse   Cough Frequency infrequent   Cough Type loose;nonproductive   PRE-TX-O2   O2 Device (Oxygen Therapy) nasal cannula   $ Is the patient on Low Flow Oxygen? Yes   Flow (L/min) 3   SpO2 96 %   Pulse 66   Resp 20

## 2020-03-11 NOTE — PROGRESS NOTES
[facility] Medicine  Progress Note    Patient Name: Juliane Ramos  MRN: [id number]  Patient Class: IP- Inpatient   Admission Date: 3/10/2020  Length of Stay: 1 days  Attending Physician: Bryan Griggs MD  Primary Care Provider: JOS Dumont        Subjective:     Principal Problem:COPD exacerbation      Interval History:  Patient reports breathing improved today.  Currently 86% on 3 L nasal cannula.  Denies any chest pain, shortness of breath.    Review of Systems   Constitutional: Negative for chills, fatigue, fever and unexpected weight change.   HENT: Negative for sore throat.    Eyes: Negative for visual disturbance.   Respiratory: Negative for cough, chest tightness and shortness of breath.    Cardiovascular: Negative for chest pain.   Gastrointestinal: Negative for abdominal pain, constipation, diarrhea, nausea and vomiting.   Endocrine: Negative for polyuria.   Genitourinary: Negative for dysuria and hematuria.   Neurological: Negative for headaches.     Objective:     Vital Signs (Most Recent):  Temp: 98.4 °F (36.9 °C) (03/11/20 1215)  Pulse: 80 (03/11/20 1330)  Resp: 18 (03/11/20 1330)  BP: 112/71 (03/11/20 1215)  SpO2: 96 % (03/11/20 1330) Vital Signs (24h Range):  Temp:  [97 °F (36.1 °C)-99.1 °F (37.3 °C)] 98.4 °F (36.9 °C)  Pulse:  [] 80  Resp:  [16-20] 18  SpO2:  [93 %-96 %] 96 %  BP: ()/(46-72) 112/71     Weight: 82.9 kg (182 lb 12.2 oz)  Body mass index is 29.5 kg/m².    Intake/Output Summary (Last 24 hours) at 3/11/2020 1419  Last data filed at 3/11/2020 0500  Gross per 24 hour   Intake 850 ml   Output 0 ml   Net 850 ml      Physical Exam   Constitutional: She is oriented to person, place, and time. She appears well-developed and well-nourished. No distress.   HENT:   Head: Normocephalic.   Right Ear: External ear normal.   Left Ear: External ear normal.   Eyes: Conjunctivae and EOM are normal. Right eye exhibits no discharge. Left eye exhibits no  discharge.   Neck: Normal range of motion. Neck supple.   Cardiovascular: Normal rate, regular rhythm, normal heart sounds and intact distal pulses.   No murmur heard.  Pulmonary/Chest: Effort normal. No stridor. No respiratory distress.   Diminished breath sounds with end expiratory wheeze   Musculoskeletal: Normal range of motion.   Neurological: She is alert and oriented to person, place, and time.   Skin: Skin is warm. Capillary refill takes less than 2 seconds. She is not diaphoretic.   Wound to left heel- POA    Psychiatric: She has a normal mood and affect. Her behavior is normal. Judgment and thought content normal.   Nursing note and vitals reviewed.      Significant Labs:   CBC:   Recent Labs   Lab 03/10/20  0710   WBC 7.99   HGB 10.7*   HCT 34.5*        CMP:   Recent Labs   Lab 03/10/20  0710 03/11/20  0613   * 135*   K 3.9 4.3   CL 94* 98   CO2 23 23   GLU 86 180*   BUN 41* 26*   CREATININE 5.3* 3.5*   CALCIUM 7.8* 8.4*   PROT 6.0  --    ALBUMIN 3.0*  --    BILITOT 1.0  --    ALKPHOS 48*  --    AST 17  --    ALT 15  --    ANIONGAP 12 14   EGFRNONAA 8.3* 13.8*           Assessment/Plan:      Active Hospital Problems    Diagnosis    *COPD exacerbation    Hyponatremia    Syncope    CKD (chronic kidney disease) requiring chronic dialysis    Skin ulcer of left foot, limited to breakdown of skin    Type II diabetes mellitus with peripheral circulatory disorder       * COPD exacerbation  Azithromycin  oral steroids  Bronchodilators  Wean O2 sat 88-92%      Syncope  Per family happened yesterday - troponin is negative   Full work up done at [facility] last week   Etiology? - Orthostasis?       Carotid US at [facility]:  Result Date: 3/6/2020  Left ICA stenosis, 50-69%. Further evaluation with CTA recommended. Left ECA stenosis also suspected. This report was signed by Reymundo Villanueva MD on 3/6/2020 8:49 AM on the following workstation: [id number]. \    Echo at [facility]:    Echo 3/6/2020  1.No hemodynamically significant valvular disease                                                                                               2.The estimated LV ejection fraction is 50 %                                                                                                    3.There is apical anterior, apical septal, apical hypokinesis.                                                                                  4.Estimated RVSP is 40 mmHg                                             There was no previous for comparison.             CT head:  FINDINGS:   CT brain without IV contrast.    This CT examination was performed using one or more of the following dose reduction techniques: Automated exposure control, adjustment of the mA and or kv according to patient size, use of iterative reconstruction techniques.    Minimal atrophy. Chronic microvascular changes periventricular white matter. Prominent hypodensity in the right frontal periventricular white matter with frontal horn enlargement suggesting old focal infarction. There is no evidence of acute hemorrhage, mass, acute infarction, hydrocephalus or shift of midline structures.  Osseous structures are intact.        IMPRESSION:     Chronic findings as above.    No evidence of acute intracranial process.      Hyponatremia  Should be corrected with HD  Repeat level in AM       Labs from recent admission at [facility]:   Lab 03/07/20  0352 03/06/20  0441 03/05/20  1636 03/05/20  1211   * 137 141 136*   K 4.1 4.5 4.7 4.1   CL 97* 94* 98 95*   CO2 22 28 30 32*   BUN 43* 30* 18 14   CREATS 4.43* 3.15* 2.71* 2.01*   CA -- -- -- 8.2   PHOS 6.4* 4.8* 3.8 --   ALB 3.1*  3.1* 3.2* 3.3* 3.5   CACORR 8.7 8.7 8.9 --          CKD (chronic kidney disease) requiring chronic dialysis  Consult Dr. Gonzalez  HD today  Repeat BMP, mag, and phos in AM   Continue midodrine       Skin ulcer of left foot, limited to breakdown of skin  Consult wound  care   Currently under the care of Dr. Wick outpatient      Type II diabetes mellitus with peripheral circulatory disorder  accuchecks ACHS with low dose ISS         VTE Risk Mitigation (From admission, onward)         Ordered     apixaban tablet 5 mg  2 times daily      03/10/20 1331     Reason for No Pharmacological VTE Prophylaxis  Once     Question:  Reasons:  Answer:  Already adequately anticoagulated on oral Anticoagulants    03/10/20 1331     IP VTE HIGH RISK PATIENT  Once      03/10/20 1331                      Bryan Griggs MD  Department of Hospital Medicine   [facility]  Date of service: 03/11/2020

## 2020-03-11 NOTE — PLAN OF CARE
03/11/20 0116   Patient Assessment/Suction   Level of Consciousness (AVPU) alert   Respiratory Effort Normal;Unlabored   Expansion/Accessory Muscles/Retractions no use of accessory muscles   All Lung Fields Breath Sounds coarse;rhonchi   Rhythm/Pattern, Respiratory pattern regular   Cough Frequency infrequent   Cough Type loose;nonproductive   PRE-TX-O2   O2 Device (Oxygen Therapy) nasal cannula   Flow (L/min) 3   SpO2 95 %   Pulse 103   Resp 20   Aerosol Therapy   $ Aerosol Therapy Charges Aerosol Treatment   Daily Review of Necessity (SVN) completed   Respiratory Treatment Status (SVN) given   Treatment Route (SVN) mask;oxygen   Patient Position (SVN) HOB elevated   Post Treatment Assessment (SVN) breath sounds unchanged   Signs of Intolerance (SVN) none   Breath Sounds Post-Respiratory Treatment   Post-treatment Heart Rate (beats/min) 102   Post-treatment Resp Rate (breaths/min) 20   Respiratory Evaluation   $ Care Plan Tech Time 15 min   Evaluation For New Orders  (care plan)

## 2020-03-11 NOTE — PROGRESS NOTES
"Progress Note  Nephrology    Consult Requested By: Bryan Griggs MD    Reason for Consult: ESRD, dependent on dialysis    SUBJECTIVE:     History of Present Illness:  60 y/o female patient known to us, has outpatient dialysis 3x wk on TTS schedule.  She presented to ER w/SOB.  Renal is consulted for dialysis orders.    3/10  Pt seen and examined in ER.  Has dry cough, sore throat, hoarse.  D/w HD nurse, pt is below dry wt, will keep even today.  3/11  Na+ better after HD.  PO4 at goal.    Assessment/plan:    1.  ESRD--Continue with established dialysis schedule Tues, Thurs, Sat  2.  SHPT--continue sevelmer w/meals  3.  Anemia of chronic dz--no need for epo at present time, will order w/HD if Hgb drops below 10  4.  HTN--actually is hypotensive.  Continue midodrine  5.  DM 2--Blood glucose control per primary team.      Past Medical History:   Diagnosis Date    Anemia in stage 3 chronic kidney disease 11/26/2017    Anticoagulant long-term use     CHF (congestive heart failure)     COPD (chronic obstructive pulmonary disease)     COPD exacerbation 3/10/2020    Coronary artery disease     Diabetes mellitus     Encounter for blood transfusion     Essential hypertension 6/24/2017    Hypertension     MI (myocardial infarction) 2010    Segmental and subsegmental pulmonary emboli of RLL without acute cor pulmonale 11/25/2017    Stroke     July 2005    Thyroid disease     Traumatic subarachnoid hemorrhage 11/24/2017    Type 2 diabetes mellitus with stage 3 chronic kidney disease, without long-term current use of insulin 6/24/2017     Past Surgical History:   Procedure Laterality Date    ABCESS DRAINAGE Right     Between "little toe" and the one next to it    BREAST SURGERY      Reduction [id number]    CARDIAC SURGERY  01/2011    CABG 4vessel ring in one valve mitral valve prolapse    CORONARY ARTERY BYPASS GRAFT      hyperlipidemia      TUBAL LIGATION  03/1986     Family History   Problem Relation Age " "of Onset    Arthritis Mother     Heart disease Father     Cancer Father 68        prostae and bladder ca  in     Diabetes Father     Hypertension Father     Depression Sister     Hypertension Son     Diabetes Maternal Grandmother         lost leg    Kidney disease Paternal Grandfather         had kidney removed    Stroke Paternal Grandfather      Social History     Tobacco Use    Smoking status: Never Smoker    Smokeless tobacco: Never Used   Substance Use Topics    Alcohol use: Yes     Alcohol/week: 1.0 - 2.0 standard drinks     Types: 1 - 2 Glasses of wine per week     Comment: "socially" not in awhile    Drug use: No       Review of patient's allergies indicates:  No Known Allergies     Review of Systems:  General ROS: negative for - fever or night sweats  Psychological ROS: negative for - behavioral disorder or depression  ENT ROS: sore throat, hoarse  Hematological and Lymphatic ROS: negative for - bleeding problems or bruising  Endocrine ROS: negative for - temperature intolerance or unexpected weight changes  Respiratory ROS: +cough  Cardiovascular ROS: no chest pain, +SOB  Gastrointestinal ROS: no abdominal pain, no n/v/c/d  Genito-Urinary ROS: no dysuria or trouble voiding  Musculoskeletal ROS: negative for - joint pain or joint swelling  Neurological ROS: no TIA or stroke symptoms  Dermatological ROS: negative for rash and skin lesion changes    OBJECTIVE:     Vital Signs Range (Last 24H):  Temp:  [97 °F (36.1 °C)-99.1 °F (37.3 °C)]   Pulse:  []   Resp:  [16-26]   BP: ()/(46-72)   SpO2:  [93 %-100 %]     Physical Exam:  General- NAD noted  HEENT- WNL  Neck- supple  CV- Regular rate and rhythm  Resp- Lungs CTA Bilaterally, No increased WOB  GI- Non tender/non-distended, BS normoactive x4 quads  Extrem- No cyanosis, clubbing, edema.  Derm- skin w/d  Neuro-  No flap.     Body mass index is 29.5 kg/m².    Laboratory:  CBC:   Recent Labs   Lab 03/10/20  0710   WBC 7.99   RBC " 3.66*   HGB 10.7*   HCT 34.5*      MCV 94   MCH 29.2   MCHC 31.0*     CMP:   Recent Labs   Lab 03/10/20  0710 03/11/20  0613   GLU 86 180*   CALCIUM 7.8* 8.4*   ALBUMIN 3.0*  --    PROT 6.0  --    * 135*   K 3.9 4.3   CO2 23 23   CL 94* 98   BUN 41* 26*   CREATININE 5.3* 3.5*   ALKPHOS 48*  --    ALT 15  --    AST 17  --    BILITOT 1.0  --        Diagnostic Results:  Labs: Reviewed      ASSESSMENT/PLAN:     Active Hospital Problems    Diagnosis  POA    *COPD exacerbation [J44.1]  Yes    Hyponatremia [E87.1]  Yes    Syncope [R55]  Yes    CKD (chronic kidney disease) requiring chronic dialysis [N18.6, Z99.2]  Not Applicable     Chronic    Skin ulcer of left foot, limited to breakdown of skin [L97.521]  Yes    Type II diabetes mellitus with peripheral circulatory disorder [E11.51]  Yes     Chronic      Resolved Hospital Problems   No resolved problems to display.         Thank you for allowing us to participate in the care of your patient. We will follow the patient and provide recommendations as needed.      Time spent seeing patient( greater than 1/2 spent in direct contact) :

## 2020-03-12 NOTE — DISCHARGE SUMMARY
[facility] Medicine  Discharge Summary      Patient Name: Juliane Ramos  MRN: [id number]  Admission Date: 3/10/2020  Hospital Length of Stay: 2 days  Discharge Date and Time:  03/12/2020 4:26 PM  Attending Physician: Bryan Griggs MD   Discharging Provider: Bryan Griggs MD  Primary Care Provider: JOS Dumont      HPI:   Ms. Ramos presents today with complaints of SOB. It is moderate. It is associated with wheezing, productive cough with yellow sputum, hypoxia yesterday, dizziness, syncope, and weakness. She denies fever, chills, N/V/D, or recent travel. She was discharged from [facility] yesterday after an admission for syncope. Per her , she was given lisinopril and possibly other antihypertensives by mistake which caused her to be emergently transferred to the ICU for fluid resuscitation and eventually this resolved. While in the ICU, she developed a low grade temp of 100 as well. She was diagnosed with a viral URI and possible UTI - but was culture negative and was treated with rocephin. Apparently yesterday, on the way home from the hospital, she became unresponsive and her O2 sat was 60%, but improved when they got home. Pt has no recollection of this and her  gives me a brief history, but both are poor historians. She has a history of ESRD on HD T/TH/S, chronic hypoxic resp failure on Home O2@3L, COPD, left nonhealing foot ulcer at the heel, CAD s/p CABG 2011, IDDM, PE on Eliquis, BETH - but told she no longer needs a cpap, and orthostatic hypotension.        * No surgery found *      Hospital Course:   Patient was admitted for acute COPD exacerbation.  Patient was started on bronchodilators, steroids, antibiotics.  Patient's symptoms improved during hospitalization and patient was back on home 3 L oxygen.  Patient was started on daily bronchodilator.  Patient was stable discharge to home with instructions follow-up with Nephrology,  pulmonology.    General: Patient resting comfortably in no acute distress. Appears as stated age. Calm  Eyes: EOM intact. No conjunctivae injection. No scleral icterus.  ENT: Hearing grossly intact. No discharge from ears. No nasal discharge.   CVS: RRR. No LE edema BL.  Lungs:  Faint end expiratory wheezing. Good breath sounds. No accessory muscle use. No acute respiratory distress  Neuro: AOx3. GCS 15. Cranial nerves grossly intact. Moves all extremities equally. Follows commands. Responds appropriately      Consults:   Consults (From admission, onward)        Status Ordering Provider     IP consult to case management  Once     Provider:  (Not yet assigned)    Acknowledged GINA GLASS          No new Assessment & Plan notes have been filed under this hospital service since the last note was generated.  Service: Hospital Medicine    Final Active Diagnoses:    Diagnosis Date Noted POA    PRINCIPAL PROBLEM:  COPD exacerbation [J44.1] 03/10/2020 Yes    Hyponatremia [E87.1] 03/10/2020 Yes    Syncope [R55] 03/10/2020 Yes    CKD (chronic kidney disease) requiring chronic dialysis [N18.6, Z99.2] 02/22/2018 Not Applicable     Chronic    Skin ulcer of left foot, limited to breakdown of skin [L97.521] 11/18/2017 Yes    Type II diabetes mellitus with peripheral circulatory disorder [E11.51] 06/24/2017 Yes     Chronic      Problems Resolved During this Admission:       Discharged Condition: stable    Disposition: Home or Self Care    Follow Up:  Follow-up Information     JOS Dumont In 1 week.    Specialties:  Emergency Medicine, Family Medicine  Why:  For check up s/p hospital discharge  Contact information:  [address]  [address]  [place] [address]  [phone]             Nephrologist.    Why:  Continue HD           Adriane Lombardi MD.    Specialties:  Pulmonary Disease, Sleep Medicine  Why:  COPD  Contact information:  [address]  [address]  [facility] [address] [phone]                  Patient Instructions:      Diet renal     Notify your health care provider if you experience any of the following:  temperature >100.4     Notify your health care provider if you experience any of the following:  persistent nausea and vomiting or diarrhea     Notify your health care provider if you experience any of the following:  severe uncontrolled pain     Notify your health care provider if you experience any of the following:  redness, tenderness, or signs of infection (pain, swelling, redness, odor or green/yellow discharge around incision site)     Notify your health care provider if you experience any of the following:  difficulty breathing or increased cough     Notify your health care provider if you experience any of the following:  severe persistent headache     Notify your health care provider if you experience any of the following:  worsening rash     Notify your health care provider if you experience any of the following:  persistent dizziness, light-headedness, or visual disturbances     Notify your health care provider if you experience any of the following:  increased confusion or weakness     Activity as tolerated       Significant Diagnostic Studies: Labs:   CMP   Recent Labs   Lab 03/11/20  0613 03/12/20  0501   * 131*   K 4.3 4.6   CL 98 98   CO2 23 24   * 248*   BUN 26* 50*   CREATININE 3.5* 4.6*   CALCIUM 8.4* 8.5*   ANIONGAP 14 9   ESTGFRAFRICA 15.9* 11.4*   EGFRNONAA 13.8* 9.9*    and CBC No results for input(s): WBC, HGB, HCT, PLT in the last 48 hours.    Pending Diagnostic Studies:     None         Medications:  Reconciled Home Medications:      Medication List      START taking these medications    albuterol-ipratropium 2.5 mg-0.5 mg/3 mL nebulizer solution  Commonly known as:  DUO-NEB  Take 3 mLs by nebulization every 6 (six) hours. Rescue     azithromycin 250 MG tablet  Commonly known as:  Z-DUNCAN  Take 1 tablet (250 mg total) by mouth once daily. for 3 days    "  predniSONE 20 MG tablet  Commonly known as:  DELTASONE  Take 30 mg (3 tabs) x5 days, take 20 mg (2 tabs) x5 days, take 10 mg (1 tab) x5 days     tiotropium 18 mcg inhalation capsule  Commonly known as:  SPIRIVA  Inhale 1 capsule (18 mcg total) into the lungs once daily. Controller        CONTINUE taking these medications    * PROAIR HFA 90 mcg/actuation inhaler  Generic drug:  albuterol  Inhale 2 puffs into the lungs once daily. Rescue     * albuterol 1.25 mg/3 mL Nebu  Commonly known as:  ACCUNEB  USE 1 VIAL EVERY 4 HOURS AS NEEDED FOR SHORTNESS OF BREATH/WHEEZING     apixaban 5 mg Tab  Commonly known as:  ELIQUIS  Take 1 tablet (5 mg total) by mouth 2 (two) times daily.     BD ULTRA-FINE OBDULIA PEN NEEDLE 32 gauge x 5/32" Ndle  Generic drug:  pen needle, diabetic     BESIVANCE 0.6 % Drps  Generic drug:  besifloxacin     blood sugar diagnostic Strp  Test 3-4 times daily PRN     BROMSITE 0.075 % Drop  Generic drug:  bromfenac     cetirizine 10 MG tablet  Commonly known as:  ZYRTEC  Take 1 tablet (10 mg total) by mouth every evening.     citalopram 20 MG tablet  Commonly known as:  CELEXA  Take 1 tablet (20 mg total) by mouth once daily.     CLARITIN-D 12 HOUR 5-120 mg per tablet  Generic drug:  loratadine-pseudoephedrine 5-120 mg  Take 1 tablet by mouth 2 (two) times daily.     DIALYVITE 100-1 mg Tab  Generic drug:  B complex-vitamin C-folic acid  Take 1 tablet by mouth 2 (two) times daily.     fluticasone propionate 50 mcg/actuation nasal spray  Commonly known as:  FLONASE  SPRAY 1 SPRAY IN EACH NOSTRIL DAILY     gabapentin 100 MG capsule  Commonly known as:  NEURONTIN  Take 100 mg by mouth 3 (three) times daily.     honey 80 % Gel  Commonly known as:  MEDIHONEY (HONEY)  Apply 1 Tube topically once daily.     HYDROcodone-acetaminophen 5-325 mg per tablet  Commonly known as:  NORCO  Take 1 tablet by mouth every 6 (six) hours as needed for Pain.     insulin aspart protamine-insulin aspart 100 unit/mL (70-30) Inpn " pen  Commonly known as:  NovoLOG 70/30  Inject into the skin.     insulin aspart U-100 100 unit/mL (3 mL) Inpn pen  Commonly known as:  NovoLOG  Inject 1-10 Units into the skin 3 (three) times daily.     levothyroxine 50 MCG tablet  Commonly known as:  SYNTHROID     midodrine 5 MG Tab  Commonly known as:  PROAMATINE  Take 2.5 mg by mouth 3 (three) times daily with meals.     ondansetron 4 MG Tbdl  Commonly known as:  ZOFRAN-ODT  LET 1 TABLET DISSOLVE ON TONGUE 4 TIMES A DAY AS NEEDED     prednisoLONE acetate 1 % Drps  Commonly known as:  PRED FORTE     promethazine 25 MG tablet  Commonly known as:  PHENERGAN  Take 1 tablet (25 mg total) by mouth every 6 (six) hours as needed for Nausea.     rosuvastatin 10 MG tablet  Commonly known as:  CRESTOR  Take 1 tablet by mouth once daily.     sevelamer carbonate 800 mg Tab  Commonly known as:  RENVELA  Take 1,600 mg by mouth 3 (three) times daily with meals.     TESSALON PERLES 100 MG capsule  Generic drug:  benzonatate  Take 100 mg by mouth 3 (three) times daily as needed.         * This list has 2 medication(s) that are the same as other medications prescribed for you. Read the directions carefully, and ask your doctor or other care provider to review them with you.                Indwelling Lines/Drains at time of discharge:   Lines/Drains/Airways     Central Venous Catheter Line                 Hemodialysis Catheter 12/07/17 1200 right subclavian 825 days                Time spent on the discharge of patient: 35 minutes  Patient was seen and examined on the date of discharge and determined to be suitable for discharge.         Bryan Griggs MD  Department of Hospital Medicine  [facility]  Date of service: 03/12/2020

## 2020-03-12 NOTE — TELEPHONE ENCOUNTER
Returned Patient's voicemail regarding PuraPly graft.  Information that at his time we have not received a new PuraPly delivery.

## 2020-03-12 NOTE — HOSPITAL COURSE
Patient was admitted for acute COPD exacerbation.  Patient was started on bronchodilators, steroids, antibiotics.  Patient's symptoms improved during hospitalization and patient was back on home 3 L oxygen.  Patient was started on daily bronchodilator.  Patient was stable discharge to home with instructions follow-up with Nephrology, pulmonology.    General: Patient resting comfortably in no acute distress. Appears as stated age. Calm  Eyes: EOM intact. No conjunctivae injection. No scleral icterus.  ENT: Hearing grossly intact. No discharge from ears. No nasal discharge.   CVS: RRR. No LE edema BL.  Lungs:  Faint end expiratory wheezing. Good breath sounds. No accessory muscle use. No acute respiratory distress  Neuro: AOx3. GCS 15. Cranial nerves grossly intact. Moves all extremities equally. Follows commands. Responds appropriately

## 2020-03-12 NOTE — PLAN OF CARE
03/12/20 1127   Final Note   Assessment Type Final Discharge Note   Anticipated Discharge Disposition Home     Pt discharged home this date and discharge orders reviewed.  No SS / CM orders noted at this time.

## 2020-03-12 NOTE — PLAN OF CARE
03/11/20 2081   PRE-TX-O2   O2 Device (Oxygen Therapy) nasal cannula   Flow (L/min) 3   SpO2 95 %   Pulse 85   Resp 18   Temp 97.8 °F (36.6 °C)   /67

## 2020-03-12 NOTE — PROGRESS NOTES
PREOPERATIVE DIAGNOSIS(ES):    1. Cervical Stenosis (M48.02).  2. Cervical Disc Displacement (M50.22).    POSTOPERATIVE DIAGNOSIS(ES):    1. Cervical Stenosis.  2. Cervical displacement.    PROCEDURE(S):    1. C4-5 cervical epidural steroid injection using interlaminar approach.  2. Fluoroscopic guidance.  3. Intravenous sedation.    SURGEON:  Jami Ricci MD      TECHNIQUE:  After informed consent was obtained with detailed risks and the benefits of the procedure,  the site of the procedure was pre-marked in the holding area.  The patient was brought into the procedure room and the time out was taken to reconfirm the proposed procedure.  Then the patient was  positioned in sitting with the C-arm going around the patient's head.  Continuous monitoring was obtained using noninvasive blood pressure cuff,  EKG and pulse oximetry as well as end-tidal carbon dioxide.  Supplemental oxygen was provided for the patient at 4 L/minute.  Total intravenous sedation consisted of 3 mg midazolam and 50 mcg of fentanyl.  The patient has been NPO at at least 6 hours.  The total fluoro time for this procedure was 8 seconds.  The sedation time was 8:11 AM to 8:19 AM.    A trained dependent observer, a registered nurse, monitored the patient's sedation throughout the procedure another personnel circulated the room.    The patient's neck was prepped and draped in the usual sterile fashion.  At the appropriate cervical interspace was identified, and the patient was injected with a 22-gauge epidural Tuohy needle.  This needle was advanced in a continuous fashion using sudden loss of resistance to normal saline technique.  After loss of resistance was obtained, injection of 0.3 mL of nonionic Isovue dye produced a satisfactory epidurogram.  The patient was then  injected with 10 mg of Decadron after negative aspiration for CSF and heme.  Stylet was replaced and needle was removed intact.  The patient tolerated procedure well.    There  Progress Note  Cardiology    Admit Date: 6/23/2017   LOS: 2 days     Follow-up For: CHF; CABG; lost to follow up since 2012. H/o CHF; 4 V CABG with SVG with Y to LAD and D2, SVG to OM1 and SVG to OM3.    Scheduled Meds:   albuterol sulfate  1.25 mg Nebulization Q6H    aspirin  81 mg Oral Daily    carvedilol  25 mg Oral BID    citalopram  20 mg Oral Daily    enoxaparin  40 mg Subcutaneous Daily    furosemide  40 mg Intravenous BID    gabapentin  300 mg Oral TID    levothyroxine  50 mcg Oral Before breakfast    lisinopril  10 mg Oral Daily    regadenoson        rosuvastatin  10 mg Oral Daily    sodium chloride 0.9%  3 mL Intravenous Q8H     Continuous Infusions:   PRN Meds:acetaminophen, dextrose 50%, dextrose 50%, glucagon (human recombinant), glucose, glucose, insulin aspart, magnesium oxide, magnesium oxide, ondansetron, potassium chloride 10%, potassium chloride 10%, potassium chloride 10%, potassium, sodium phosphates, potassium, sodium phosphates, potassium, sodium phosphates, zolpidem    Review of patient's allergies indicates:  No Known Allergies    SUBJECTIVE:     Interval History: Patient has complaints of sob and weight gain.    Review of Systems  Respiratory: positive for cough, sputum and wheezing, negative for hemoptysis  Cardiovascular: positive for dyspnea, orthopnea and paroxysmal nocturnal dyspnea, negative for chest pressure/discomfort and palpitations    OBJECTIVE:     Vital Signs (Most Recent)  Temp:  (out of romm test) (06/26/17 1100)  Pulse: 73 (06/26/17 0800)  Resp: 20 (06/26/17 0800)  BP: (!) 171/83 (06/26/17 0800)  SpO2: (!) 93 % (06/26/17 0800)    Vital Signs Range (Last 24H):  Temp:  [97.8 °F (36.6 °C)-99.1 °F (37.3 °C)]   Pulse:  [72-79]   Resp:  [18-20]   BP: (136-171)/(75-83)   SpO2:  [87 %-96 %]       Physical Exam:  Neck: JVD - 1 cm above sternal notch, no carotid bruit and supple, symmetrical, trachea midline  Lungs: clear to auscultation bilaterally, normal respiratory  effort  Heart: regular rate and rhythm, S1, S2 normal, no murmur, click, rub or gallop  Abdomen: soft, non-tender; bowel sounds normal; no masses,  no organomegaly  Extremities: Positive for: edema Trace and pitting bilaterally    Recent Results (from the past 24 hour(s))   POCT glucose    Collection Time: 06/25/17  4:14 PM   Result Value Ref Range    POCT Glucose 178 (H) 70 - 110 mg/dL   POCT glucose    Collection Time: 06/25/17  8:39 PM   Result Value Ref Range    POCT Glucose 254 (H) 70 - 110 mg/dL   CBC auto differential    Collection Time: 06/26/17  4:27 AM   Result Value Ref Range    WBC 5.50 3.90 - 12.70 K/uL    RBC 4.80 4.00 - 5.40 M/uL    Hemoglobin 11.9 (L) 12.0 - 16.0 g/dL    Hematocrit 38.0 37.0 - 48.5 %    MCV 79 (L) 82 - 98 fL    MCH 24.8 (L) 27.0 - 31.0 pg    MCHC 31.4 (L) 32.0 - 36.0 %    RDW 17.8 (H) 11.5 - 14.5 %    Platelets 207 150 - 350 K/uL    MPV 9.3 9.2 - 12.9 fL    Gran # 3.7 1.8 - 7.7 K/uL    Lymph # 0.9 (L) 1.0 - 4.8 K/uL    Mono # 0.6 0.3 - 1.0 K/uL    Eos # 0.2 0.0 - 0.5 K/uL    Baso # 0.00 0.00 - 0.20 K/uL    Gran% 67.5 38.0 - 73.0 %    Lymph% 17.0 (L) 18.0 - 48.0 %    Mono% 11.3 4.0 - 15.0 %    Eosinophil% 3.7 0.0 - 8.0 %    Basophil% 0.5 0.0 - 1.9 %    Differential Method Automated    Basic metabolic panel     Collection Time: 06/26/17  4:27 AM   Result Value Ref Range    Sodium 138 136 - 145 mmol/L    Potassium 4.5 3.5 - 5.1 mmol/L    Chloride 97 95 - 110 mmol/L    CO2 31 (H) 23 - 29 mmol/L    Glucose 115 (H) 70 - 110 mg/dL    BUN, Bld 24 (H) 6 - 20 mg/dL    Creatinine 1.2 0.5 - 1.4 mg/dL    Calcium 9.0 8.7 - 10.5 mg/dL    Anion Gap 10 8 - 16 mmol/L    eGFR if African American 59 (A) >60 mL/min/1.73 m^2    eGFR if non African American 51 (A) >60 mL/min/1.73 m^2   Magnesium    Collection Time: 06/26/17  4:27 AM   Result Value Ref Range    Magnesium 1.6 1.6 - 2.6 mg/dL   POCT glucose    Collection Time: 06/26/17 11:58 AM   Result Value Ref Range    POCT Glucose 120 (H) 70 - 110 mg/dL  were no immediate complications to note from the procedure.  No assistant was required for this procedure.  EBL was nil.  No implants were necessary for this type of procedure. There were no specimens taken from this procedure.     The patient was turned supine onto gurney and then taken to the recovery room in good condition.  Appropriate discharge instructions given to the patient which included follow up in Pain Management in 3 weeks' time.    Thank you very much.        ________________________________________  NU ANDERS MD     0119    The procedure end time was 8:19 AM.  The patient's airways patent.  The patient's pain is controlled.  The patient hydration status is adequate.  There is no problems with nausea and vomiting.  No reversal agent was given.  I verified the orders for medications, dosages, routes, frequencies, interventions documented during the procedure and review of postprocedure vital signs including oxygenation and end-tidal carbon dioxide assessment.         Diagnostic Results:  Labs: Reviewed  ECG: Reviewed  X-Ray: Reviewed    ASSESSMENT/PLAN:     CHF better. Lexiscan Reji stress [SPECT report] pending. Increase lisinopril.

## 2020-03-12 NOTE — PLAN OF CARE
Problem: Wound  Goal: Optimal Wound Healing  Outcome: Ongoing, Progressing     Problem: Adult Inpatient Plan of Care  Goal: Plan of Care Review  Outcome: Ongoing, Progressing  Goal: Patient-Specific Goal (Individualization)  Outcome: Ongoing, Progressing  Goal: Absence of Hospital-Acquired Illness or Injury  Outcome: Ongoing, Progressing  Goal: Optimal Comfort and Wellbeing  Outcome: Ongoing, Progressing  Goal: Readiness for Transition of Care  Outcome: Ongoing, Progressing  Goal: Rounds/Family Conference  Outcome: Ongoing, Progressing     Problem: Diabetes Comorbidity  Goal: Blood Glucose Level Within Desired Range  Outcome: Ongoing, Progressing     Problem: Infection  Goal: Infection Symptom Resolution  Outcome: Ongoing, Progressing     Problem: Fall Injury Risk  Goal: Absence of Fall and Fall-Related Injury  Outcome: Ongoing, Progressing

## 2020-03-12 NOTE — PROGRESS NOTES
"Progress Note  Nephrology    Consult Requested By: Bryan Griggs MD    Reason for Consult: ESRD, dependent on dialysis    SUBJECTIVE:     History of Present Illness:  60 y/o female patient known to us, has outpatient dialysis 3x wk on TTS schedule.  She presented to ER w/SOB.  Renal is consulted for dialysis orders.    3/10  Pt seen and examined in ER.  Has dry cough, sore throat, hoarse.  D/w HD nurse, pt is below dry wt, will keep even today.  3/11  Na+ better after HD.  PO4 at goal.  3/12  Lungs CTA, denies SOB.  HD due today, orders are in.    Assessment/plan:    1.  ESRD--Continue with established dialysis schedule Tues, Thurs, Sat  2.  SHPT--continue sevelmer w/meals  3.  Anemia of chronic dz--no need for epo at present time, will order w/HD if Hgb drops below 10  4.  HTN--actually is hypotensive.  Continue midodrine  5.  DM 2--Blood glucose control per primary team.      Past Medical History:   Diagnosis Date    Anemia in stage 3 chronic kidney disease 11/26/2017    Anticoagulant long-term use     CHF (congestive heart failure)     COPD (chronic obstructive pulmonary disease)     COPD exacerbation 3/10/2020    Coronary artery disease     Diabetes mellitus     Encounter for blood transfusion     Essential hypertension 6/24/2017    Hypertension     MI (myocardial infarction) 2010    Segmental and subsegmental pulmonary emboli of RLL without acute cor pulmonale 11/25/2017    Stroke     July 2005    Thyroid disease     Traumatic subarachnoid hemorrhage 11/24/2017    Type 2 diabetes mellitus with stage 3 chronic kidney disease, without long-term current use of insulin 6/24/2017     Past Surgical History:   Procedure Laterality Date    ABCESS DRAINAGE Right     Between "little toe" and the one next to it    BREAST SURGERY      Reduction [id number]    CARDIAC SURGERY  01/2011    CABG 4vessel ring in one valve mitral valve prolapse    CORONARY ARTERY BYPASS GRAFT      hyperlipidemia      TUBAL " "LIGATION  1986     Family History   Problem Relation Age of Onset    Arthritis Mother     Heart disease Father     Cancer Father 68        prostae and bladder ca  in     Diabetes Father     Hypertension Father     Depression Sister     Hypertension Son     Diabetes Maternal Grandmother         lost leg    Kidney disease Paternal Grandfather         had kidney removed    Stroke Paternal Grandfather      Social History     Tobacco Use    Smoking status: Never Smoker    Smokeless tobacco: Never Used   Substance Use Topics    Alcohol use: Yes     Alcohol/week: 1.0 - 2.0 standard drinks     Types: 1 - 2 Glasses of wine per week     Comment: "socially" not in awhile    Drug use: No       Review of patient's allergies indicates:  No Known Allergies     Review of Systems:  General ROS: negative for - fever or night sweats  Psychological ROS: negative for - behavioral disorder or depression  ENT ROS: sore throat, hoarse  Hematological and Lymphatic ROS: negative for - bleeding problems or bruising  Endocrine ROS: negative for - temperature intolerance or unexpected weight changes  Respiratory ROS: +cough  Cardiovascular ROS: no chest pain, +SOB  Gastrointestinal ROS: no abdominal pain, no n/v/c/d  Genito-Urinary ROS: no dysuria or trouble voiding  Musculoskeletal ROS: negative for - joint pain or joint swelling  Neurological ROS: no TIA or stroke symptoms  Dermatological ROS: negative for rash and skin lesion changes    OBJECTIVE:     Vital Signs Range (Last 24H):  Temp:  [97.5 °F (36.4 °C)-98.4 °F (36.9 °C)]   Pulse:  [80-97]   Resp:  [16-18]   BP: ()/(63-85)   SpO2:  [93 %-97 %]     Physical Exam:  General- NAD noted  HEENT- WNL  Neck- supple  CV- Regular rate and rhythm  Resp- Lungs CTA Bilaterally, No increased WOB  GI- Non tender/non-distended, BS normoactive x4 quads  Extrem- No cyanosis, clubbing, edema.  Derm- skin w/d  Neuro-  No flap.     Body mass index is 29.5 " kg/m².    Laboratory:  CBC:   Recent Labs   Lab 03/10/20  0710   WBC 7.99   RBC 3.66*   HGB 10.7*   HCT 34.5*      MCV 94   MCH 29.2   MCHC 31.0*     CMP:   Recent Labs   Lab 03/10/20  0710  03/12/20  0501   GLU 86   < > 248*   CALCIUM 7.8*   < > 8.5*   ALBUMIN 3.0*  --   --    PROT 6.0  --   --    *   < > 131*   K 3.9   < > 4.6   CO2 23   < > 24   CL 94*   < > 98   BUN 41*   < > 50*   CREATININE 5.3*   < > 4.6*   ALKPHOS 48*  --   --    ALT 15  --   --    AST 17  --   --    BILITOT 1.0  --   --     < > = values in this interval not displayed.       Diagnostic Results:  Labs: Reviewed      ASSESSMENT/PLAN:     Active Hospital Problems    Diagnosis  POA    *COPD exacerbation [J44.1]  Yes    Hyponatremia [E87.1]  Yes    Syncope [R55]  Yes    CKD (chronic kidney disease) requiring chronic dialysis [N18.6, Z99.2]  Not Applicable     Chronic    Skin ulcer of left foot, limited to breakdown of skin [L97.521]  Yes    Type II diabetes mellitus with peripheral circulatory disorder [E11.51]  Yes     Chronic      Resolved Hospital Problems   No resolved problems to display.         Thank you for allowing us to participate in the care of your patient. We will follow the patient and provide recommendations as needed.      Time spent seeing patient( greater than 1/2 spent in direct contact) :

## 2020-03-12 NOTE — PLAN OF CARE
03/12/20 0738   Patient Assessment/Suction   Level of Consciousness (AVPU) alert   Respiratory Effort Normal;Unlabored   All Lung Fields Breath Sounds coarse   PRE-TX-O2   O2 Device (Oxygen Therapy) nasal cannula   Flow (L/min) 3   SpO2 96 %   Pulse Oximetry Type Intermittent   $ Pulse Oximetry - Multiple Charge Pulse Oximetry - Multiple   Pulse 84   Resp 16   Aerosol Therapy   $ Aerosol Therapy Charges Aerosol Treatment   Daily Review of Necessity (SVN) completed   Respiratory Treatment Status (SVN) given   Treatment Route (SVN) mask   Patient Position (SVN) HOB elevated   Post Treatment Assessment (SVN) breath sounds improved   Signs of Intolerance (SVN) none   Breath Sounds Post-Respiratory Treatment   Throughout All Fields Post-Treatment All Fields   Throughout All Fields Post-Treatment aeration increased   Post-treatment Heart Rate (beats/min) 89   Post-treatment Resp Rate (breaths/min) 18   Respiratory Evaluation   $ Care Plan Tech Time 15 min

## 2020-03-12 NOTE — PLAN OF CARE
03/12/20 0138   Patient Assessment/Suction   Respiratory Effort Unlabored   All Lung Fields Breath Sounds coarse;wheezes, expiratory   Rhythm/Pattern, Respiratory pattern regular   PRE-TX-O2   O2 Device (Oxygen Therapy) nasal cannula   Flow (L/min) 3   SpO2 96 %   Pulse 86   Resp 16   Aerosol Therapy   $ Aerosol Therapy Charges Aerosol Treatment   Respiratory Treatment Status (SVN) given   Treatment Route (SVN) mask   Patient Position (SVN) HOB elevated   Post Treatment Assessment (SVN) breath sounds unchanged   Signs of Intolerance (SVN) none   Breath Sounds Post-Respiratory Treatment   Throughout All Fields Post-Treatment All Fields   Throughout All Fields Post-Treatment no change   Post-treatment Heart Rate (beats/min) 86   Post-treatment Resp Rate (breaths/min) 16   Respiratory Evaluation   $ Care Plan Tech Time 15 min   Evaluation For New Orders   Admitting Diagnosis copd exac.

## 2020-03-13 NOTE — PROGRESS NOTES
Total UF: 3500 mL  Net UF: 3000 mL       03/12/20 1805   Vital Signs   Temp 97.8 °F (36.6 °C)   Pulse 78   Heart Rate Source Monitor   Resp 16   Flow (L/min) 2   O2 Device (Oxygen Therapy) nasal cannula   BP (!) 108/49   BP Location Right arm   BP Method Automatic   Patient Position Sitting   Post-Hemodialysis Assessment   Rinseback Volume (mL) 250 mL   Blood Volume Processed (Liters) 53.2 L   Dialyzer Clearance Lightly streaked   Duration of Treatment (minutes) 180 minutes   Hemodialysis Intake (mL) 500 mL   Total UF (mL) 3500 mL   Net Fluid Removal 3000   Patient Response to Treatment [place] well   Post-Treatment Weight 88 kg (194 lb 0.1 oz)   Treatment Weight Change -3   Arterial bleeding stop time (min) 5 min   Venous bleeding stop time (min) 5 min   Post-Hemodialysis Comments Tx complete. Pt stable.

## 2020-04-24 NOTE — PROGRESS NOTES
"  [address] [facility] [address]  [place] [address]  Phone: [phone]   [phone]    Patient's PCP:JOS Dumont  Referring Provider: No ref. provider found    Subjective:      Chief Complaint:: Follow-up (ulcer left lower extermty )    LUCIO Ramos is a 60 y.o. female who returns to clinic for follow-up for a complaint of ulcer left lower leg. Daily dressing and home health coming twice a week and applying puracol.    Systemic Doctor: Doctor: JOS Dumont  Date Last Seen: 2020  Blood Sugar: 152  Hemoglobin A1c: 6.2    Vitals:    20 1343   BP: 122/73   Pulse: 87   Resp: 20   Temp: 97.8 °F (36.6 °C)     Shoe Size:     Past Surgical History:   Procedure Laterality Date    ABCESS DRAINAGE Right     Between "little toe" and the one next to it    BREAST SURGERY      Reduction [id number]    CARDIAC SURGERY  2011    CABG 4vessel ring in one valve mitral valve prolapse    CORONARY ARTERY BYPASS GRAFT      hyperlipidemia      TUBAL LIGATION  1986     Past Medical History:   Diagnosis Date    Anemia in stage 3 chronic kidney disease 2017    Anticoagulant long-term use     CHF (congestive heart failure)     COPD (chronic obstructive pulmonary disease)     COPD exacerbation 3/10/2020    Coronary artery disease     Diabetes mellitus     Encounter for blood transfusion     Essential hypertension 2017    Hypertension     MI (myocardial infarction)     Segmental and subsegmental pulmonary emboli of RLL without acute cor pulmonale 2017    Stroke     2005    Thyroid disease     Traumatic subarachnoid hemorrhage 2017    Type 2 diabetes mellitus with stage 3 chronic kidney disease, without long-term current use of insulin 2017     Family History   Problem Relation Age of Onset    Arthritis Mother     Heart disease Father     Cancer Father 68        prostae and bladder ca  in     Diabetes Father     Hypertension Father "     Depression Sister     Hypertension Son     Diabetes Maternal Grandmother         lost leg    Kidney disease Paternal Grandfather         had kidney removed    Stroke Paternal Grandfather         Social History:   Marital Status:   Alcohol History:  reports that she drinks about 1.0 - 2.0 standard drinks of alcohol per week.  Tobacco History:  reports that she has never smoked. She has never used smokeless tobacco.  Drug History:  reports that she does not use drugs.    Review of patient's allergies indicates:  No Known Allergies    Current Outpatient Medications   Medication Sig Dispense Refill    albuterol (ACCUNEB) 1.25 mg/3 mL Nebu USE 1 VIAL EVERY 4 HOURS AS NEEDED FOR SHORTNESS OF BREATH/WHEEZING  1    albuterol (PROAIR HFA) 90 mcg/actuation inhaler Inhale 2 puffs into the lungs once daily. Rescue      albuterol-ipratropium (DUO-NEB) 2.5 mg-0.5 mg/3 mL nebulizer solution Take 3 mLs by nebulization every 6 (six) hours. Rescue 1 Box 11    apixaban 5 mg Tab Take 1 tablet (5 mg total) by mouth 2 (two) times daily. 60 tablet 1    BESIVANCE 0.6 % DrpS       blood sugar diagnostic Strp Test 3-4 times daily PRN      BROMSITE 0.075 % Drop       cetirizine (ZYRTEC) 10 MG tablet Take 1 tablet (10 mg total) by mouth every evening. 20 tablet 0    citalopram (CELEXA) 20 MG tablet Take 1 tablet (20 mg total) by mouth once daily.      CLARITIN-D 12 HOUR 5-120 mg per tablet Take 1 tablet by mouth 2 (two) times daily.  0    DIALYVITE 100-1 mg Tab Take 1 tablet by mouth 2 (two) times daily.   1    fluticasone (FLONASE) 50 mcg/actuation nasal spray SPRAY 1 SPRAY IN EACH NOSTRIL DAILY  12    gabapentin (NEURONTIN) 100 MG capsule Take 100 mg by mouth 3 (three) times daily.  3    honey (MEDIHONEY, HONEY,) 80 % Gel Apply 1 Tube topically once daily. 1 Tube 0    HYDROcodone-acetaminophen (NORCO) 5-325 mg per tablet Take 1 tablet by mouth every 6 (six) hours as needed for Pain.      insulin aspart  "(NOVOLOG) 100 unit/mL InPn pen Inject 1-10 Units into the skin 3 (three) times daily. 3 mL 1    insulin aspart protamine-insulin aspart (NOVOLOG 70/30) 100 unit/mL (70-30) InPn pen Inject into the skin.      levothyroxine (SYNTHROID) 50 MCG tablet       midodrine (PROAMATINE) 5 MG Tab Take 2.5 mg by mouth 3 (three) times daily with meals.      ondansetron (ZOFRAN-ODT) 4 MG TbDL LET 1 TABLET DISSOLVE ON TONGUE 4 TIMES A DAY AS NEEDED  0    pen needle, diabetic (BD ULTRA-FINE OBDULIA PEN NEEDLE) 32 gauge x 5/32" Ndle       prednisoLONE acetate (PRED FORTE) 1 % DrpS       predniSONE (DELTASONE) 20 MG tablet Take 30 mg (3 tabs) x5 days, take 20 mg (2 tabs) x5 days, take 10 mg (1 tab) x5 days 30 tablet 0    promethazine (PHENERGAN) 25 MG tablet Take 1 tablet (25 mg total) by mouth every 6 (six) hours as needed for Nausea. 30 tablet 0    rosuvastatin (CRESTOR) 10 MG tablet Take 1 tablet by mouth once daily.      sevelamer carbonate (RENVELA) 800 mg Tab Take 1,600 mg by mouth 3 (three) times daily with meals.      tiotropium (SPIRIVA) 18 mcg inhalation capsule Inhale 1 capsule (18 mcg total) into the lungs once daily. Controller 30 capsule 11     No current facility-administered medications for this visit.        Review of Systems      Objective:        Physical Exam:   Foot Exam    General  General Appearance: appears stated age and healthy   Orientation: alert and oriented to person, place, and time   Affect: appropriate   Gait: antalgic   Assistance: walker use       Left Foot/Ankle      Inspection and Palpation  Skin Exam: ulcer (Grade 2 ulcer posterior left lower leg approximately 6 cm x 3 cm x 0.1 cm deep with a mixture of pink granulation tissue and some white fibrous tissue no signs of infection.);     Neurovascular  Dorsalis pedis: 1+  Posterior tibial: 1+  Saphenous nerve sensation: diminished  Tibial nerve sensation: diminished  Superficial peroneal nerve sensation: diminished  Deep peroneal nerve " sensation: diminished  Sural nerve sensation: diminished          Physical Exam   Cardiovascular:   Pulses:       Dorsalis pedis pulses are 1+ on the left side.        Posterior tibial pulses are 1+ on the left side.   Musculoskeletal:        Legs:  Feet:   Left Foot:   Skin Integrity: Positive for ulcer (Grade 2 ulcer posterior left lower leg approximately 6 cm x 3 cm x 0.1 cm deep with a mixture of pink granulation tissue and some white fibrous tissue no signs of infection.).           Imaging:            Assessment:       1. Type II diabetes mellitus with peripheral circulatory disorder - Left Foot    2. Type II diabetes mellitus with neurological manifestations    3. Skin ulcer of left foot, limited to breakdown of skin - Left Foot      Plan:   Type II diabetes mellitus with peripheral circulatory disorder - Left Foot  -     Ambulatory referral/consult to Home Health; Future; Expected date: 05/01/2020    Type II diabetes mellitus with neurological manifestations  -     Ambulatory referral/consult to Home Health; Future; Expected date: 05/01/2020    Skin ulcer of left foot, limited to breakdown of skin - Left Foot  -     Ambulatory referral/consult to Home Health; Future; Expected date: 05/01/2020     Today I evaluated the ulcer.  I redressed the area.  Home health to continue see her.  We are going to schedule her for outpatient more thorough debridement with application of Apligraf within the next 2 weeks if approved with the pandemic COVID-19.  I explained to her that there it loosening up some of the restrictions and she is considered to have more of a limb-threatening problem that is not strictly elective but is also not so urgent that she would it will lose the leg or die.  My office will contact her.  Home health will continue to see her.  No follow-ups on file.    Procedures - None    Counseling:     I provided patient education verbally regarding:   Patient diagnosis, treatment options, as well as  alternatives, risks, and benefits.     This note was created using Dragon voice recognition software that occasionally misinterpreted phrases or words.

## 2020-05-11 NOTE — PLAN OF CARE
Review of Systems   Respiratory:        PT ARRIVED WITH PORTABLE OXYGEN FROM HOME, STATES USES 3-3.5L/NC BUT INCREASES TO 4-4.5 WHEN AWAY FROM HOME.   Cardiovascular:        LEFT ARM AV SHUNT APPARENT-PINK SLEEVE GIVEN

## 2020-05-11 NOTE — DISCHARGE INSTRUCTIONS
To confirm, Your doctor has instructed you that surgery is scheduled for:   Wednesday. May 13, 2020    Pre-Op will call the afternoon prior to surgery between 4:00 and 6:00 PM with the final arrival time.   Tuesday, May 12, 2020      Please arrive thru Main Entrance on [facility].    Do not eat or drink anything after midnight the night before your surgery - THIS INCLUDES  WATER, GUM, MINTS AND CANDY.  YOU MAY BRUSH YOUR TEETH BUT DO NOT SWALLOW     TAKE ONLY THESE MEDICATIONS WITH A SMALL SIP OF WATER THE MORNING OF YOUR PROCEDURE:  SYNTHROID-INHALER-MIDODRINE-GABAPENTIN-PAIN PILL (IF NEEDED)      ONLY if you are diabetic, check your sugar in the morning before your procedure.       Do not take any diabetic medicines or insulin the morning of surgery .     PLEASE NOTE:  The surgery schedule has many variables which may affect the time of your surgery case.  Family members should be available if your surgery time changes.  Plan to be here the day of your procedure between 4-6 hours.      DO NOT TAKE THESE MEDICATIONS 5-7 DAYS PRIOR to your procedure or per your surgeon's request: ASPIRIN, ALEVE, ADVIL, IBUPROFEN,  BILLY SELTZER, BC , FISH OIL , VITAMIN E, HERBALS  (May take Tylenol)      ONLY if you are prescribed any types of blood thinners such as:  Aspirin, Coumadin, Plavix, Pradaxa, Xarelto, Aggrenox, Effient, Eliquis, Savasya, Brilinta, or any other, ask your surgeon whether you should stop taking them and how long before surgery you should stop.  You may also need to verify with the prescribing physician if it is ok to stop your medication.                                                        IMPORTANT INSTRUCTIONS      · Do not smoke, vape or drink alcoholic beverages 24 hours prior to your procedure.  · Shower the night before AND the morning of your procedure with a Chlorhexidine wash such as Hibiclens or Dial antibacterial soap from the neck down.   ·  Do not get it on your face or in your eyes.  You may  use your own shampoo and face wash. This helps your skin to be as bacteria free as possible.   ·  DO NOT remove hair from the surgery site.  Do not shave the incision site unless you are given specific instructions to do so.    · Sleep in a bed with clean sheets.  Do not sleep with a pet in the bed.   · If you wear contact lenses, dentures, hearing aids or glasses, bring a container to put them in during surgery and give to a family member for safe keeping.    · Please leave all jewelry, piercing's and valuables at home.     · Make arrangements in advance for transportation home by a responsible adult.      · You must make arrangements for transportation, TAXI'S, UBER'S OR LYFTS ARE NOT ALLOWED.        If you have any questions about these instructions, call Pre-Op Admit  Nursing at [phone] or the Pre-Op Day Surgery Unit at [phone].

## 2020-05-13 NOTE — DISCHARGE INSTRUCTIONS
Discharge Instructions: Caring for Your Wound  Taking proper care of your wound will help it heal. Your healthcare provider or nurse may show you specifically how to clean and dress the wound and how to tell if the wound is healing normally. This sheet will help you remember those guidelines when you are at home.  Getting ready  · Put pets and children in another room, away from your work area.  · Wash your hands before touching any of your supplies:  ¨ Turn on the water.  ¨ Wet your hands and wrists.  ¨ Use liquid soap from a pump dispenser. Work up a lather.  ¨ Scrub your hands thoroughly.  ¨ Rinse your hands with your fingers pointing toward the drain.  ¨ Dry your hands with a clean cloth or paper towel. Use this towel to turn off the faucet.  ¨ Remember, once you have washed your hands, dont touch anything other than your supplies. Wash your hands again if you touch anything, like furniture or your clothes.  · Clean your work area:  ¨ Clean washable surfaces with soap and water, and dry with a clean cloth or paper towel.  ¨ Wipe surfaces that are not washable (like fabric or wood) so that they are free of dust. Spread a clean cloth or paper towel over your work surface.  ¨ Move away from the clean surface, if you need to cough or sneeze.  · Gather your bandage supplies:  ¨ Gauze dressing or other bandage material  ¨ Medical tape  ¨ Disposable gloves  · Wash your hands again.   Dressing the wound  · Remove the old dressing:  ¨ Put on disposable gloves if youre dressing a wound for someone else or if your wound is infected.  ¨ Pull gently toward the wound to loosen the tape.  ¨ One layer at a time, gently remove the dressing.  ¨ If you have a drain or tube, be careful not to pull on it.  ¨ Look at the dressing. Make sure that you are seeing a decreasing amount of blood, and that the blood is turning into a clear, david fluid.  ¨ If your wound has stitches, look for loose ones.  ¨ Put the dressing in a plastic  bag. Then remove your gloves.  · Inspect the wound. Look for signs that it isn't healing normally. A wound that isnt healing properly may be dark in color or have white streaks.  · Dress the wound:  ¨ Wash your hands again.  ¨ Clean and dress the wound as you were shown by your healthcare provider or nurse.  ¨ If youre dressing a wound for someone else or if your wound is infected, put on a new pair of disposable gloves .  ¨ If you have a drain or tube, be careful not to pull on it.  · Discard any used materials or trash in a plastic bag before placing in a trash can.  Follow-up  Make a follow-up appointment as directed by our staff.  When to call your healthcare provider  Call your healthcare provider right away if you have any of the following:  · Shaking chills or fever above 100.4°F (38°C)  · Bleeding that soaks the dressing  · Stitches that are pulling away from the wound or pulling apart  · Pink fluid weeping from the wound  · Increased drainage from the wound or drainage that is yellow, yellow-green, or smelly  · Increased swelling, pain, or redness in the skin around the wound  · A change in the color or size of the wound  · Increased fatigue  · Loss of appetite   Date Last Reviewed: 8/5/2015  © 7266-5756 [employer] [address] [place] [address], [place] [address]. All rights reserved. This information is not intended as a substitute for professional medical care. Always follow your healthcare professional's instructions.        Keep left foot elevated on pillows  Ice to site  Keep dressing clean and dry

## 2020-05-13 NOTE — DISCHARGE SUMMARY
Identifying data:  60-year-old female    Admitting diagnosis:  Delayed healing full-thickness grade 2 ulceration posterior left lower leg    Discharge diagnosis:  Same as the above    Procedure performed 1.  Excisional debridement of necrotic epidermis dermis and subcutaneous tissue posterior left leg with application of Apligraf.    Disposition:  Discharged home    Instructions:  1.  Dressing remain dry and intact until she sees me in the office in 5 days.  2.  Call the office for appointment.  And call if she has any concerns or problems with dressing.  3.  No narcotics or any antibiotics dispense.

## 2020-05-13 NOTE — OP NOTE
Operative Report     Patient name: Juliane Ramos   MRN: [id number]  Date of surgery: 5/13/2020    Surgeon: Dennis Wick DPM   Assistant:  None    Preoperative diagnosis:  Delayed healing ulcer posterior left lower leg full-thickness  Postoperative diagnosis:  Same as the above  Procedure:  Excisional debridement of necrotic epidermis dermis subcutaneous tissue posterior left lower leg with application of biological membrane  Anesthesia:  No anesthesia  Hemostasis:  No tourniquet  Estimated blood loss:  1 cc   Specimen:  No specimens  Complications: None  Condition upon discharge: Stable          Procedure in detail:  Patient brought the operating room placed on the operating table supine position.  No anesthesia was used.  The left lower leg was prepped and draped usual aseptic manner.  I used a curette and debrided all the necrotic epidermis and dermis tissue on the perimeter and I curetted the necrotic subcutaneous tissue and fibrin tissue from the posterior lower leg.  Previous surgery had been done to remove the necrotic Achilles tendon.  I debrided down to bleeding tissue and then irrigated the area.  I applied an Apligraf and secured this in place with Adaptic and Steri-Strips.  I then placed 4x4s over the 1st Adaptic.  I placed another Adaptic over this 4 x 4.  I then placed more 4x4s and a Tej wrap on the leg and an Ace wrap.  Surgical shoe applied to the foot patient left the operating with stable vitals.    1. Keep dressings, clean, dry, and intact to surgical extremity.  2. Rest, ice, and elevate the surgical extremity.  3.  Surgical shoe to surgical extremity in operating room  4. Take all medication as discussed at discharge.  5. Contact the clinic with any postoperative concerns or complications.  6. Follow up in one week for 1st post op.

## 2020-05-13 NOTE — HISTORY AND PHYSICAL ADDENDUM
Formerly [facility], [facility]  Anesthesia  History and Physical    SUMMARY     Date of Procedure: 5/13/2020     Procedure: Procedure(s) (LRB):  APPLICATION GRAFT (APLIGRAFT) (Left)         Diagnosis: Type II diabetes mellitus with peripheral circulatory disorder [E11.51]  Skin ulcer of left foot, limited to breakdown of skin [L97.521]  Skin ulcer of left foot with fat layer exposed [L97.522]     Review of Systems  Anesthesia Hx:  No problems with previous Anesthesia  Neg history of prior surgery. Denies Family Hx of Anesthesia complications.   Denies Personal Hx of Anesthesia complications.   Social:  Non-Smoker, Alcohol Use    Cardiovascular:   Hypertension Past MI CAD   CHF    Pulmonary:   COPD, moderate Asthma Shortness of breath Home O2 2lnc   Renal/:   Chronic Renal Disease, ESRD, Dialysis no BPH    Neurological:   CVA, no residual symptoms    Endocrine:   Diabetes, type 2, using insulin Hypothyroidism    Patient Active Problem List   Diagnosis    Essential hypertension    CKD (chronic kidney disease) stage 3, GFR 30-59 ml/min    Type II diabetes mellitus with peripheral circulatory disorder    Coronary artery disease involving native coronary artery of native heart without angina pectoris    Acquired hypothyroidism    Recurrent right pleural effusion    Skin ulcer of left foot, limited to breakdown of skin    Traumatic subarachnoid hemorrhage    Segmental and subsegmental pulmonary emboli of RLL without acute cor pulmonale    Acute renal failure superimposed on stage 3 chronic kidney disease    Anemia in stage 3 chronic kidney disease    Subarachnoid hemorrhage following injury, no loss of consciousness    Staph aureus infection    Pulmonary embolus    Obesity    Acute on chronic respiratory failure with hypoxia    Anasarca    SAH (subarachnoid hemorrhage)    Wounds, multiple    Wound healing, delayed    Malnutrition due to renal disease    Shortness of breath    CKD (chronic kidney  "disease) requiring chronic dialysis    Open wound of left heel    Acute on chronic combined systolic and diastolic heart failure    ESRD (end stage renal disease)    COPD exacerbation    Hyponatremia    Syncope               Past Surgical History:   Procedure Laterality Date    ABCESS DRAINAGE Right       Between "little toe" and the one next to it    BREAST SURGERY         Reduction [id number]    CARDIAC SURGERY   01/2011     CABG 4vessel ring in one valve mitral valve prolapse    CORONARY ARTERY BYPASS GRAFT        hyperlipidemia        TUBAL LIGATION   03/1986         Tobacco Use:  The patient  reports that she has never smoked. She has never used smokeless tobacco.          Results for orders placed or performed during the hospital encounter of 03/10/20   EKG 12-lead     Collection Time: 03/10/20  6:25 AM     Narrative     Test Reason : R06.02,     Vent. Rate : 090 BPM     Atrial Rate : 090 BPM     P-R Int : 158 ms          QRS Dur : 102 ms      QT Int : 398 ms       P-R-T Axes : -12 113 -61 degrees     QTc Int : 486 ms     Normal sinus rhythm  Incomplete right bundle branch block  Anterolateral infarct (cited on or before 11-SEP-2018)  T wave abnormality, consider inferior ischemia  Abnormal ECG  When compared with ECG of 11-SEP-2018 03:31,  Questionable change in initial forces of Anterior leads  ST no longer depressed in Lateral leads  Confirmed by Taz Waite MD ([id number]) on 3/11/2020 7:06:34 PM     Referred By: JOSH   SELF           Confirmed By:Taz Waite MD                     Lab Results   Component Value Date     WBC 8.59 05/11/2020     HGB 10.5 (L) 05/11/2020     HCT 35.2 (L) 05/11/2020     MCV 96 05/11/2020      05/11/2020      BMP        Lab Results   Component Value Date      (L) 05/11/2020     K 4.2 05/11/2020     CL 92 (L) 05/11/2020     CO2 30 (H) 05/11/2020     BUN 37 (H) 05/11/2020     CREATININE 4.9 (H) 05/11/2020     CALCIUM 8.6 (L) 05/11/2020     ANIONGAP 10 " 05/11/2020     ESTGFRAFRICA 10.5 (A) 05/11/2020     EGFRNONAA 9.1 (A) 05/11/2020                Physical Exam  General:  Obesity    Airway/Jaw/Neck:  Airway Findings: Mouth Opening: Normal Tongue: Normal  General Airway Assessment: Adult  Mallampati: II  Improves to II with phonation.  TM Distance: Normal, at least 6 cm  Jaw/Neck Findings:  Neck ROM: Normal ROM       Chest/Lungs:  Chest/Lungs Findings: Clear to auscultation, Normal Respiratory Rate, Decreased Breath Sounds Bilateral     Heart/Vascular:  Heart Findings: Rate: Normal  Rhythm: Regular Rhythm  Sounds: Normal        Mental Status:  Mental Status Findings:  Cooperative, Alert and Oriented

## 2020-05-18 NOTE — PROGRESS NOTES
"  [address] [facility] [address]  [place] [address]  Phone: [phone]   [phone]    Patient's PCP:JOS Dumont  Referring Provider: No ref. provider found    Subjective:      Chief Complaint:: Follow-up (ulcer left lower leg -application of apligraph)    LUCIO Ramos is a 60 y.o. female who presents with a follow up on ulcer left lower leg and application of apligraph on 20 .Dressing intact. Mild pain when up. Pain scale 5/10.  Systemic Doctor: JOS Dumont  Date Last Seen:   Blood Sugar: 101  Hemoglobin A1c: 6.2    There were no vitals filed for this visit.  Shoe Size:     Past Surgical History:   Procedure Laterality Date    ABCESS DRAINAGE Right     Between "little toe" and the one next to it    BREAST SURGERY      Reduction [id number]    CARDIAC SURGERY  2011    CABG 4vessel ring in one valve mitral valve prolapse    CORONARY ARTERY BYPASS GRAFT      hyperlipidemia      TUBAL LIGATION  1986     Past Medical History:   Diagnosis Date    Anemia in stage 3 chronic kidney disease 2017    Anticoagulant long-term use     CHF (congestive heart failure)     COPD (chronic obstructive pulmonary disease)     COPD exacerbation 3/10/2020    Coronary artery disease     Diabetes mellitus     Encounter for blood transfusion     Essential hypertension 2017    Hypertension     MI (myocardial infarction)     Segmental and subsegmental pulmonary emboli of RLL without acute cor pulmonale 2017    Stroke     2005    Thyroid disease     Traumatic subarachnoid hemorrhage 2017    Type 2 diabetes mellitus with stage 3 chronic kidney disease, without long-term current use of insulin 2017     Family History   Problem Relation Age of Onset    Arthritis Mother     Heart disease Father     Cancer Father 68        prostae and bladder ca  in     Diabetes Father     Hypertension Father     Depression Sister     Hypertension Son  "    Diabetes Maternal Grandmother         lost leg    Kidney disease Paternal Grandfather         had kidney removed    Stroke Paternal Grandfather         Social History:   Marital Status:   Alcohol History:  reports that she drinks about 1.0 - 2.0 standard drinks of alcohol per week.  Tobacco History:  reports that she has never smoked. She has never used smokeless tobacco.  Drug History:  reports that she does not use drugs.    Review of patient's allergies indicates:  No Known Allergies    Current Outpatient Medications   Medication Sig Dispense Refill    albuterol (ACCUNEB) 1.25 mg/3 mL Nebu Take 1.25 mg by nebulization 3 (three) times daily as needed.   1    albuterol (PROAIR HFA) 90 mcg/actuation inhaler Inhale 2 puffs into the lungs once daily. Rescue      albuterol-ipratropium (DUO-NEB) 2.5 mg-0.5 mg/3 mL nebulizer solution Take 3 mLs by nebulization every 6 (six) hours. Rescue 1 Box 11    apixaban 5 mg Tab Take 1 tablet (5 mg total) by mouth 2 (two) times daily. (Patient taking differently: Take 2.5 mg by mouth 2 (two) times daily. ) 60 tablet 1    blood sugar diagnostic Strp Test 3-4 times daily PRN      citalopram (CELEXA) 20 MG tablet Take 1 tablet (20 mg total) by mouth once daily.      CLARITIN-D 12 HOUR 5-120 mg per tablet Take 1 tablet by mouth 2 (two) times daily.  0    DIALYVITE 100-1 mg Tab Take 1 tablet by mouth 2 (two) times daily.   1    fluticasone (FLONASE) 50 mcg/actuation nasal spray 1 spray by Each Nostril route once daily.   12    gabapentin (NEURONTIN) 100 MG capsule Take 100 mg by mouth 3 (three) times daily.  3    honey (MEDIHONEY, HONEY,) 80 % Gel Apply 1 Tube topically once daily. 1 Tube 0    HYDROcodone-acetaminophen (NORCO) 5-325 mg per tablet Take 1 tablet by mouth every 6 (six) hours as needed for Pain.      insulin aspart (NOVOLOG) 100 unit/mL InPn pen Inject 1-10 Units into the skin 3 (three) times daily. 3 mL 1    levothyroxine (SYNTHROID) 50 MCG tablet  "Take 50 mcg by mouth before breakfast.       midodrine (PROAMATINE) 5 MG Tab Take 5 mg by mouth 3 (three) times daily with meals.       ondansetron (ZOFRAN-ODT) 4 MG TbDL LET 1 TABLET DISSOLVE ON TONGUE 4 TIMES A DAY AS NEEDED  0    pen needle, diabetic (BD ULTRA-FINE OBDULIA PEN NEEDLE) 32 gauge x 5/32" Ndle       promethazine (PHENERGAN) 25 MG tablet Take 1 tablet (25 mg total) by mouth every 6 (six) hours as needed for Nausea. 30 tablet 0    rosuvastatin (CRESTOR) 10 MG tablet Take 10 mg by mouth once daily.       sevelamer carbonate (RENVELA) 800 mg Tab Take 1,600 mg by mouth 3 (three) times daily with meals.      tiotropium (SPIRIVA) 18 mcg inhalation capsule Inhale 1 capsule (18 mcg total) into the lungs once daily. Controller 30 capsule 11     No current facility-administered medications for this visit.        Review of Systems      Objective:        Physical Exam:   Foot Exam    General  General Appearance: appears stated age and healthy   Orientation: alert and oriented to person, place, and time   Gait: antalgic   Assistance: wheelchair use       Left Foot/Ankle      Inspection and Palpation  Skin Exam: ulcer (Grade 2 ulcer posterior left leg by previous site of excision Achilles tendon.  The biological membrane was undisturbed today.);           Physical Exam   Feet:   Left Foot:   Skin Integrity: Positive for ulcer (Grade 2 ulcer posterior left leg by previous site of excision Achilles tendon.  The biological membrane was undisturbed today.).       Imaging:            Assessment:       1. Type II diabetes mellitus with peripheral circulatory disorder    2. Type II diabetes mellitus with neurological manifestations    3. Skin ulcer of left foot, limited to breakdown of skin - Left Foot      Plan:   Type II diabetes mellitus with peripheral circulatory disorder    Type II diabetes mellitus with neurological manifestations    Skin ulcer of left foot, limited to breakdown of skin - Left Foot     1. The " surgical dressing is removed today and the areas redressed and will remain in place for 1 week.  The Adaptic covering the biological membrane was on disturb and graft was still in place.  2.  Will discontinue home health for 1 week  3.  Return to see me in 1 week.  No follow-ups on file.    Procedures - None    Counseling:     I provided patient education verbally regarding:   Patient diagnosis, treatment options, as well as alternatives, risks, and benefits.     This note was created using Dragon voice recognition software that occasionally misinterpreted phrases or words.

## 2020-05-26 NOTE — PROGRESS NOTES
"  [address] [facility] [address]  [place] [address]  Phone: [phone]   [phone]    Patient's PCP:JOS Dumont  Referring Provider: No ref. provider found    Subjective:      Chief Complaint:: Follow-up    HPI  Juliane Ramos is a 60 y.o. female who presents with a follow up on ulcer left lower leg and application of apligraph on 20 .Dressing intact with drainage and mild odor. No pain.    Systemic Doctor: JOS Dumont  Date Last Seen: 2020  Blood Sugar: 110 (2 days ago)  Hemoglobin A1c: 6.2    Vitals:    20 1035   Temp: 98.3 °F (36.8 °C)     Shoe Size:     Past Surgical History:   Procedure Laterality Date    ABCESS DRAINAGE Right     Between "little toe" and the one next to it    BREAST SURGERY      Reduction [id number]    CARDIAC SURGERY  2011    CABG 4vessel ring in one valve mitral valve prolapse    CORONARY ARTERY BYPASS GRAFT      hyperlipidemia      TUBAL LIGATION  1986     Past Medical History:   Diagnosis Date    Anemia in stage 3 chronic kidney disease 2017    Anticoagulant long-term use     CHF (congestive heart failure)     COPD (chronic obstructive pulmonary disease)     COPD exacerbation 3/10/2020    Coronary artery disease     Diabetes mellitus     Encounter for blood transfusion     Essential hypertension 2017    Hypertension     MI (myocardial infarction)     Segmental and subsegmental pulmonary emboli of RLL without acute cor pulmonale 2017    Stroke     2005    Thyroid disease     Traumatic subarachnoid hemorrhage 2017    Type 2 diabetes mellitus with stage 3 chronic kidney disease, without long-term current use of insulin 2017     Family History   Problem Relation Age of Onset    Arthritis Mother     Heart disease Father     Cancer Father 68        prostae and bladder ca  in     Diabetes Father     Hypertension Father     Depression Sister     Hypertension Son     Diabetes " Maternal Grandmother         lost leg    Kidney disease Paternal Grandfather         had kidney removed    Stroke Paternal Grandfather         Social History:   Marital Status:   Alcohol History:  reports that she drinks about 1.0 - 2.0 standard drinks of alcohol per week.  Tobacco History:  reports that she has never smoked. She has never used smokeless tobacco.  Drug History:  reports that she does not use drugs.    Review of patient's allergies indicates:  No Known Allergies    Current Outpatient Medications   Medication Sig Dispense Refill    albuterol (ACCUNEB) 1.25 mg/3 mL Nebu Take 1.25 mg by nebulization 3 (three) times daily as needed.   1    albuterol (PROAIR HFA) 90 mcg/actuation inhaler Inhale 2 puffs into the lungs once daily. Rescue      albuterol-ipratropium (DUO-NEB) 2.5 mg-0.5 mg/3 mL nebulizer solution Take 3 mLs by nebulization every 6 (six) hours. Rescue 1 Box 11    apixaban 5 mg Tab Take 1 tablet (5 mg total) by mouth 2 (two) times daily. (Patient taking differently: Take 2.5 mg by mouth 2 (two) times daily. ) 60 tablet 1    blood sugar diagnostic Strp Test 3-4 times daily PRN      citalopram (CELEXA) 20 MG tablet Take 1 tablet (20 mg total) by mouth once daily.      CLARITIN-D 12 HOUR 5-120 mg per tablet Take 1 tablet by mouth 2 (two) times daily.  0    DIALYVITE 100-1 mg Tab Take 1 tablet by mouth 2 (two) times daily.   1    fluticasone (FLONASE) 50 mcg/actuation nasal spray 1 spray by Each Nostril route once daily.   12    gabapentin (NEURONTIN) 100 MG capsule Take 100 mg by mouth 3 (three) times daily.  3    honey (MEDIHONEY, HONEY,) 80 % Gel Apply 1 Tube topically once daily. 1 Tube 0    HYDROcodone-acetaminophen (NORCO) 5-325 mg per tablet Take 1 tablet by mouth every 6 (six) hours as needed for Pain.      levothyroxine (SYNTHROID) 50 MCG tablet Take 50 mcg by mouth before breakfast.       midodrine (PROAMATINE) 5 MG Tab Take 5 mg by mouth 3 (three) times daily with  "meals.       ondansetron (ZOFRAN-ODT) 4 MG TbDL LET 1 TABLET DISSOLVE ON TONGUE 4 TIMES A DAY AS NEEDED  0    pen needle, diabetic (BD ULTRA-FINE OBDULIA PEN NEEDLE) 32 gauge x 5/32" Ndle       promethazine (PHENERGAN) 25 MG tablet Take 1 tablet (25 mg total) by mouth every 6 (six) hours as needed for Nausea. 30 tablet 0    rosuvastatin (CRESTOR) 10 MG tablet Take 10 mg by mouth once daily.       sevelamer carbonate (RENVELA) 800 mg Tab Take 1,600 mg by mouth 3 (three) times daily with meals.      tiotropium (SPIRIVA) 18 mcg inhalation capsule Inhale 1 capsule (18 mcg total) into the lungs once daily. Controller 30 capsule 11    insulin aspart (NOVOLOG) 100 unit/mL InPn pen Inject 1-10 Units into the skin 3 (three) times daily. 3 mL 1     No current facility-administered medications for this visit.        Review of Systems      Objective:        Physical Exam:   Foot Exam    General  General Appearance: appears stated age and healthy   Orientation: alert and oriented to person, place, and time   Affect: appropriate   Gait: antalgic   Assistance: wheelchair use           Physical Exam   Musculoskeletal:        Legs:          Imaging:            Assessment:       1. Skin ulcer of left foot with fat layer exposed    2. Type II diabetes mellitus with peripheral circulatory disorder    3. Type II diabetes mellitus with neurological manifestations      Plan:   Skin ulcer of left foot with fat layer exposed    Type II diabetes mellitus with peripheral circulatory disorder    Type II diabetes mellitus with neurological manifestations     1.  Today I removed the dressing and evaluated the ulceration.  2.  Home health to continue see the patient cleanse the area apply meta honey and any biological membrane is they have.  3.  She is return to see me in 4 weeks.  No follow-ups on file.    Procedures - None    Counseling:     I provided patient education verbally regarding:   Patient diagnosis, treatment options, as well as " alternatives, risks, and benefits.     This note was created using Dragon voice recognition software that occasionally misinterpreted phrases or words.

## 2020-06-03 NOTE — TELEPHONE ENCOUNTER
Returned call, spoke with , Rossi, who states she obtained the information she needed.    ----- Message from Raul Stock RN sent at 6/2/2020  2:27 PM CDT -----  Contact: [facility] [place] [facility] [place]      ----- Message -----  From: Leann KAUFMAN August  Sent: 6/2/2020  12:58 PM CDT  To: [facility] Waitlisted Coordinator    Reason: Calling to get status of pt     Communication: [phone]

## 2020-06-22 NOTE — PROGRESS NOTES
"  [address] [facility] [address]  [place] [address]  Phone: [phone]   [phone]    Patient's PCP:JOS Dumont  Referring Provider: No ref. provider found    Subjective:      Chief Complaint:: Follow-up (Apligragh application left lower leg)    LUCIO Ramos is a 60 y.o. female who returns for follow up on ulcer left lower leg and application of apligraph on 20 .Dressing intact changes every three days by home health with medihoney. No pain.     Systemic Doctor: JOS Dumont  Date Last Seen: 2020  Blood Sugar: 124  Hemoglobin A1c: 6.2    Vitals:    20 0838   BP: 116/70   Pulse: 97   Resp: 18   Temp: 97.3 °F (36.3 °C)     Shoe Size:     Past Surgical History:   Procedure Laterality Date    ABCESS DRAINAGE Right     Between "little toe" and the one next to it    BREAST SURGERY      Reduction [id number]    CARDIAC SURGERY  2011    CABG 4vessel ring in one valve mitral valve prolapse    CORONARY ARTERY BYPASS GRAFT      hyperlipidemia      TUBAL LIGATION  1986     Past Medical History:   Diagnosis Date    Anemia in stage 3 chronic kidney disease 2017    Anticoagulant long-term use     CHF (congestive heart failure)     COPD (chronic obstructive pulmonary disease)     COPD exacerbation 3/10/2020    Coronary artery disease     Diabetes mellitus     Encounter for blood transfusion     Essential hypertension 2017    Hypertension     MI (myocardial infarction)     Segmental and subsegmental pulmonary emboli of RLL without acute cor pulmonale 2017    Stroke     2005    Thyroid disease     Traumatic subarachnoid hemorrhage 2017    Type 2 diabetes mellitus with stage 3 chronic kidney disease, without long-term current use of insulin 2017     Family History   Problem Relation Age of Onset    Arthritis Mother     Heart disease Father     Cancer Father 68        prostae and bladder ca  in     Diabetes Father     " Hypertension Father     Depression Sister     Hypertension Son     Diabetes Maternal Grandmother         lost leg    Kidney disease Paternal Grandfather         had kidney removed    Stroke Paternal Grandfather         Social History:   Marital Status:   Alcohol History:  reports current alcohol use of about 1.0 - 2.0 standard drinks of alcohol per week.  Tobacco History:  reports that she has never smoked. She has never used smokeless tobacco.  Drug History:  reports no history of drug use.    Review of patient's allergies indicates:  No Known Allergies    Current Outpatient Medications   Medication Sig Dispense Refill    albuterol (ACCUNEB) 1.25 mg/3 mL Nebu Take 1.25 mg by nebulization 3 (three) times daily as needed.   1    albuterol (PROAIR HFA) 90 mcg/actuation inhaler Inhale 2 puffs into the lungs once daily. Rescue      albuterol-ipratropium (DUO-NEB) 2.5 mg-0.5 mg/3 mL nebulizer solution Take 3 mLs by nebulization every 6 (six) hours. Rescue 1 Box 11    apixaban 5 mg Tab Take 1 tablet (5 mg total) by mouth 2 (two) times daily. (Patient taking differently: Take 2.5 mg by mouth 2 (two) times daily. ) 60 tablet 1    blood sugar diagnostic Strp Test 3-4 times daily PRN      citalopram (CELEXA) 20 MG tablet Take 1 tablet (20 mg total) by mouth once daily.      CLARITIN-D 12 HOUR 5-120 mg per tablet Take 1 tablet by mouth 2 (two) times daily.  0    DIALYVITE 100-1 mg Tab Take 1 tablet by mouth 2 (two) times daily.   1    fluticasone (FLONASE) 50 mcg/actuation nasal spray 1 spray by Each Nostril route once daily.   12    gabapentin (NEURONTIN) 100 MG capsule Take 100 mg by mouth 3 (three) times daily.  3    honey (MEDIHONEY, HONEY,) 80 % Gel Apply 1 Tube topically once daily. 1 Tube 0    HYDROcodone-acetaminophen (NORCO) 5-325 mg per tablet Take 1 tablet by mouth every 6 (six) hours as needed for Pain.      INCRUSE ELLIPTA 62.5 mcg/actuation inhalation capsule       insulin aspart  "(NOVOLOG) 100 unit/mL InPn pen Inject 1-10 Units into the skin 3 (three) times daily. 3 mL 1    levothyroxine (SYNTHROID) 50 MCG tablet Take 50 mcg by mouth before breakfast.       midodrine (PROAMATINE) 5 MG Tab Take 5 mg by mouth 3 (three) times daily with meals.       ondansetron (ZOFRAN-ODT) 4 MG TbDL LET 1 TABLET DISSOLVE ON TONGUE 4 TIMES A DAY AS NEEDED  0    pen needle, diabetic (BD ULTRA-FINE OBDULIA PEN NEEDLE) 32 gauge x 5/32" Ndle       promethazine (PHENERGAN) 25 MG tablet Take 1 tablet (25 mg total) by mouth every 6 (six) hours as needed for Nausea. 30 tablet 0    rosuvastatin (CRESTOR) 10 MG tablet Take 10 mg by mouth once daily.       sevelamer carbonate (RENVELA) 800 mg Tab Take 1,600 mg by mouth 3 (three) times daily with meals.      tiotropium (SPIRIVA) 18 mcg inhalation capsule Inhale 1 capsule (18 mcg total) into the lungs once daily. Controller 30 capsule 11     No current facility-administered medications for this visit.        Review of Systems      Objective:        Physical Exam:   Foot Exam    General  General Appearance: appears stated age and healthy   Orientation: alert and oriented to person, place, and time   Affect: appropriate   Gait: antalgic   Assistance: wheelchair use           Physical Exam   Musculoskeletal:        Legs:            Imaging:            Assessment:       1. Skin ulcer of left foot with fat layer exposed    2. Wound healing, delayed - Left Foot    3. Type II diabetes mellitus with peripheral circulatory disorder      Plan:   Skin ulcer of left foot with fat layer exposed    Wound healing, delayed - Left Foot    Type II diabetes mellitus with peripheral circulatory disorder     1.  I evaluated the patient today and examined the ulceration posterior aspect of left lower extremity which shows healing since last visit but still not completely heal.  2.  I recommend continue home health and family changing dressings applying Medihoney daily.  3.  She is return to " see me in 4 weeks.  She may need another Apligraf application.  No follow-ups on file.    Procedures - None    Counseling:     I provided patient education verbally regarding:   Patient diagnosis, treatment options, as well as alternatives, risks, and benefits.     This note was created using Dragon voice recognition software that occasionally misinterpreted phrases or words.

## 2020-07-28 NOTE — TELEPHONE ENCOUNTER
[facility] regarding surgery notes, as well as last office visit. I have faxed paperwork. Confirmation has been scanned into the media.

## 2020-08-01 NOTE — ED PROVIDER NOTES
Encounter Date: 8/1/2020       History     Chief Complaint   Patient presents with    LOW BP     TODAY DURING DIALYSIS     60-year-old female with history of end-stage renal disease on dialysis Tuesday Thursday Saturday who was sent from dialysis without having completed her treatment due to hypotension patient's blood pressure was 78/68 she had felt generalized weakness and was reportedly diaphoretic and pale.  She also had nausea and was given 2 mg of Zofran.  She reports all symptoms are resolved currently.  She has history of hypotension and takes midodrine, she has had history of MI and coronary artery disease hypertension in the past but now hypotension, type 2 diabetes, CHF, COPD on 3 L of home oxygen continuously, prior CVA, prior right lower lobe PE and traumatic subarachnoid hemorrhage, anemia.   She reports are symptoms are resolved currently her blood pressure was 127/70 on arrival.  She reports she has felt this way in the past her blood pressure gets low.  Goal of dialysis was removed 4 L today but she only completed 2 hr and 50 min of her treatment rather 3 hr and 50 min of her treatment I am unsure of the amount of volume they removed.  She denies any chest pain or change her shortness of breath she has no cough or URI symptoms she has no fevers.  Her  reports that he had a low-grade fever of 100.3 last night and was not feeling well but he drank a hot toddy and he feels fine today.  He Was afebrile today no resp sx.        Review of patient's allergies indicates:  No Known Allergies  Past Medical History:   Diagnosis Date    Anemia in stage 3 chronic kidney disease 11/26/2017    Anticoagulant long-term use     CHF (congestive heart failure)     COPD (chronic obstructive pulmonary disease)     COPD exacerbation 3/10/2020    Coronary artery disease     Diabetes mellitus     Encounter for blood transfusion     Essential hypertension 6/24/2017    Hypertension     Low blood pressure   "   MI (myocardial infarction)     Segmental and subsegmental pulmonary emboli of RLL without acute cor pulmonale 2017    Stroke     2005    Thyroid disease     Traumatic subarachnoid hemorrhage 2017    Type 2 diabetes mellitus with stage 3 chronic kidney disease, without long-term current use of insulin 2017     Past Surgical History:   Procedure Laterality Date    ABCESS DRAINAGE Right     Between "little toe" and the one next to it    BREAST SURGERY      Reduction [id number]    CARDIAC SURGERY  2011    CABG 4vessel ring in one valve mitral valve prolapse    CORONARY ARTERY BYPASS GRAFT      hyperlipidemia      TUBAL LIGATION  1986     Family History   Problem Relation Age of Onset    Arthritis Mother     Heart disease Father     Cancer Father 68        prostae and bladder ca  in     Diabetes Father     Hypertension Father     Depression Sister     Hypertension Son     Diabetes Maternal Grandmother         lost leg    Kidney disease Paternal Grandfather         had kidney removed    Stroke Paternal Grandfather      Social History     Tobacco Use    Smoking status: Never Smoker    Smokeless tobacco: Never Used   Substance Use Topics    Alcohol use: Yes     Alcohol/week: 1.0 - 2.0 standard drinks     Types: 1 - 2 Glasses of wine per week     Comment: "socially" not in awhile    Drug use: No     Review of Systems   Constitutional: Positive for diaphoresis. Negative for activity change, appetite change, chills, fatigue and fever.   HENT: Negative for congestion, postnasal drip, rhinorrhea and sore throat.    Respiratory: Negative for cough, chest tightness, shortness of breath, wheezing and stridor.    Cardiovascular: Negative for chest pain and palpitations.   Gastrointestinal: Negative for abdominal distention, abdominal pain, blood in stool, constipation, diarrhea, nausea and vomiting.   Genitourinary: Negative for dysuria, flank pain, frequency, " hematuria and urgency.   Musculoskeletal: Negative for arthralgias, back pain, myalgias, neck pain and neck stiffness.   Skin: Positive for pallor. Negative for rash.   Neurological: Positive for weakness. Negative for dizziness, syncope, light-headedness and headaches.   Hematological: Does not bruise/bleed easily.   Psychiatric/Behavioral: Negative for confusion. The patient is not nervous/anxious.    All other systems reviewed and are negative.      Physical Exam     Initial Vitals   BP Pulse Resp Temp SpO2   08/01/20 0908 08/01/20 0908 08/01/20 0910 08/01/20 0910 08/01/20 0908   123/77 (!) 112 20 98.2 °F (36.8 °C) 95 %      MAP       --                Physical Exam    Nursing note and vitals reviewed.  Constitutional: She appears well-developed and well-nourished. She is not diaphoretic. No distress.   HENT:   Head: Normocephalic and atraumatic.   Right Ear: External ear normal.   Left Ear: External ear normal.   Nose: Nose normal.   Mouth/Throat: Oropharynx is clear and moist.   Eyes: Conjunctivae and EOM are normal. Pupils are equal, round, and reactive to light.   Neck: Normal range of motion. Neck supple. No tracheal deviation present.   Cardiovascular: Regular rhythm, normal heart sounds and intact distal pulses. Exam reveals no gallop and no friction rub.    No murmur heard.      Blood pressure 127/70   Pulmonary/Chest: Breath sounds normal. No stridor. No respiratory distress. She has no wheezes. She has no rhonchi. She has no rales. She exhibits no tenderness.   Sats 91-93% on 3 L home oxygen she reports the face mask is making her feel more short of breath.  Clear breath sounds bilaterally   Abdominal: Soft. Bowel sounds are normal. She exhibits no distension and no mass. There is no abdominal tenderness. There is no rebound and no guarding.   Musculoskeletal: Normal range of motion. Edema present. No tenderness.   Neurological: She is alert and oriented to person, place, and time. She has normal  strength. No cranial nerve deficit or sensory deficit.   Skin: Skin is warm and dry. No rash noted. No erythema. No pallor.   Psychiatric: She has a normal mood and affect. Her behavior is normal. Judgment and thought content normal.         ED Course   Procedures  Labs Reviewed   CBC W/ AUTO DIFFERENTIAL - Abnormal; Notable for the following components:       Result Value    Mean Corpuscular Hemoglobin Conc 30.0 (*)     RDW 15.1 (*)     Lymph # 0.6 (*)     Gran% 79.2 (*)     Lymph% 8.4 (*)     All other components within normal limits   COMPREHENSIVE METABOLIC PANEL - Abnormal; Notable for the following components:    Glucose 192 (*)     BUN, Bld 24 (*)     Creatinine 2.6 (*)     Calcium 8.2 (*)     Albumin 3.1 (*)     Anion Gap 6 (*)     eGFR if  22.6 (*)     eGFR if non  19.6 (*)     All other components within normal limits   TROPONIN I - Abnormal; Notable for the following components:    Troponin I 0.042 (*)     All other components within normal limits   MAGNESIUM   SARS-COV-2 RNA AMPLIFICATION, QUAL   B-TYPE NATRIURETIC PEPTIDE   POCT GLUCOSE MONITORING CONTINUOUS     EKG Readings: (Independently Interpreted)   Initial Reading: No STEMI. Previous EKG: Compared with most recent EKG Previous EKG Date: 3/10/20. Rhythm: Sinus Tachycardia. Heart Rate: 111. Ectopy: No Ectopy. Conduction: Normal.   Left artrial enlargement      ECG Results          EKG 12-lead (In process)  Result time 08/01/20 09:29:36    In process by Interface, Lab In [facility] (08/01/20 09:29:36)                 Narrative:    Test Reason : I95.9,    Vent. Rate : 111 BPM     Atrial Rate : 111 BPM     P-R Int : 122 ms          QRS Dur : 098 ms      QT Int : 368 ms       P-R-T Axes : 029 055 -03 degrees     QTc Int : 500 ms    Sinus tachycardia  Possible Left atrial enlargement  Septal infarct (cited on or before 11-SEP-2018)  Possible Lateral infarct (cited on or before 11-SEP-2018)  Abnormal ECG  When compared with  ECG of 10-MAR-2020 06:25,  The axis Shifted left  Questionable change in initial forces of Anterior leads  Nonspecific T wave abnormality no longer evident in Lateral leads    Referred By: [email or web address]   SELF           Confirmed By:                             Imaging Results          X-Ray Chest AP Portable (Final result)  Result time 08/01/20 10:05:43    Final result by Yordan Alvarenga MD (08/01/20 10:05:43)                 Impression:      Stable cardiomegaly and right basilar airspace opacities compared to 03/10/2020, with no definite evidence of acute cardiopulmonary disease.      Electronically signed by: Yordan Alvarenga MD  Date:    08/01/2020  Time:    10:05             Narrative:    EXAMINATION:  XR CHEST AP PORTABLE    CLINICAL HISTORY:  R 0 6.02 shortness of breath    FINDINGS:  Portable chest radiograph at 947 hours compared to 03/10/2020 shows median sternotomy wires and mediastinal surgical clips from prior CABG, with stable enlarged cardiac silhouette and normal pulmonary vascularity.    There is unchanged mild asymmetric elevation of the right hemidiaphragm, with right infrahilar and right basilar airspace opacities, not significantly changed.  The right upper lung and left lung are normally expanded, with minor linear subsegmental atelectasis at the left lung base.  No large pleural effusion or pneumothorax. There are no acute osseous abnormalities.                              X-Rays:   Independently Interpreted Readings:   Chest X-Ray: No infiltrates.  No acute abnormalities. Cardiomegaly present. Chronically widened appearing mediastinum elevated right hemidiaphragm no change compared to prior chest x-ray from March 2020     Medical Decision Making:   History:   Old Medical Records: I decided to obtain old medical records.  Independently Interpreted Test(s):   I have ordered and independently interpreted X-rays - see prior notes.  I have ordered and independently interpreted EKG Reading(s) - see prior  notes  Clinical Tests:   Lab Tests: Ordered and Reviewed       <> Summary of Lab: Mag 2.2    COVID negative    Troponin 0.042    Glucose 192 BUN 24 creatinine 2.6 calcium 8.2  Radiological Study: Ordered and Reviewed  Medical Tests: Ordered and Reviewed  ED Management:  60-year-old female with multiple medical problems history of hypotension he was having dialysis today had an episode of hypotension at 78/68 patient was given Zofran for mild nausea she had also been pale and sweaty blood pressure had improved prior to being sent to the ER in the emergency department blood pressure is normal 127/70 pulse is 1 0 7-112.  Patient denied any complaints sats are 91-93% on 3 L of oxygen patient reports this is chronic and she uses home oxygen also feel short of breath while wearing a face mask but feels improved when not using the mask.  Her breath sounds were clear.  Chest x-ray revealed no acute abnormalities EKG showed no acute abnormalities lab work was done she has normal CBC CMP reveals chronic renal insufficiency.  COVID was negative magnesium was 2.2 troponin is minimally elevated 0.042 but this is likely due to the chronic kidney disease patient had no chest pain or shortness of breath I do not believe this is ACS.  She is feeling back to her baseline she will be discharged home she is to follow up with her primary care physician on Monday she can turn to the ER for new or worsening symptoms  Katina Xavier M.D.  11:12 AM 8/1/2020                                   Clinical Impression:       ICD-10-CM ICD-9-CM   1. Low BP  I95.9 458.9   2. SOB (shortness of breath)  R06.02 786.05   3. Weakness generalized  R53.1 780.79             ED Disposition Condition    Discharge Stable        ED Prescriptions     None        Follow-up Information     Follow up With Specialties Details Why Contact Info Additional Information    [facility] Schedule an appointment as soon as  possible for a visit in 2 days  [address]  [address]  [place] [address]  [phone]       [facility] Emergency [facility] Go to  If symptoms worsen [address] [place] [address]  [phone] 1st floor                                       Katina Xavier MD  08/01/20 1112

## 2020-08-10 NOTE — PROGRESS NOTES
"  [address]  [place] [address]  Phone: [phone]   [phone]    Patient's PCP:[facility]  Referring Provider: No ref. provider found    Subjective:      Chief Complaint:: Follow-up (ulcer left lower leg)    LUCIO Ramos is a 60 y.o. female who returns for follow up on ulcer left lower leg. Dressing intact. Home health has been changing dressing every three days with medihoney. No pain.     Systemic Doctor: [facility]  Date Last Seen: 2020  Blood Sugar: did not check  Hemoglobin A1c: 6.2    Vitals:    08/10/20 0928   BP: (!) 147/83   Pulse: 91   Resp: 16   Temp: 98 °F (36.7 °C)     Shoe Size:     Past Surgical History:   Procedure Laterality Date    ABCESS DRAINAGE Right     Between "little toe" and the one next to it    BREAST SURGERY      Reduction [id number]    CARDIAC SURGERY  2011    CABG 4vessel ring in one valve mitral valve prolapse    CORONARY ARTERY BYPASS GRAFT      hyperlipidemia      TUBAL LIGATION  1986     Past Medical History:   Diagnosis Date    Anemia in stage 3 chronic kidney disease 2017    Anticoagulant long-term use     CHF (congestive heart failure)     COPD (chronic obstructive pulmonary disease)     COPD exacerbation 3/10/2020    Coronary artery disease     Diabetes mellitus     Encounter for blood transfusion     Essential hypertension 2017    Hypertension     Low blood pressure     MI (myocardial infarction)     Segmental and subsegmental pulmonary emboli of RLL without acute cor pulmonale 2017    Stroke     2005    Thyroid disease     Traumatic subarachnoid hemorrhage 2017    Type 2 diabetes mellitus with stage 3 chronic kidney disease, without long-term current use of insulin 2017     Family History   Problem Relation Age of Onset    Arthritis Mother     Heart disease Father     Cancer Father 68        prostae and bladder ca  in     Diabetes Father  "    Hypertension Father     Depression Sister     Hypertension Son     Diabetes Maternal Grandmother         lost leg    Kidney disease Paternal Grandfather         had kidney removed    Stroke Paternal Grandfather         Social History:   Marital Status:   Alcohol History:  reports current alcohol use of about 1.0 - 2.0 standard drinks of alcohol per week.  Tobacco History:  reports that she has never smoked. She has never used smokeless tobacco.  Drug History:  reports no history of drug use.    Review of patient's allergies indicates:  No Known Allergies    Current Outpatient Medications   Medication Sig Dispense Refill    albuterol (ACCUNEB) 1.25 mg/3 mL Nebu Take 1.25 mg by nebulization 3 (three) times daily as needed.   1    albuterol (PROAIR HFA) 90 mcg/actuation inhaler Inhale 2 puffs into the lungs once daily. Rescue      albuterol-ipratropium (DUO-NEB) 2.5 mg-0.5 mg/3 mL nebulizer solution Take 3 mLs by nebulization every 6 (six) hours. Rescue 1 Box 11    apixaban 5 mg Tab Take 1 tablet (5 mg total) by mouth 2 (two) times daily. 60 tablet 1    blood sugar diagnostic Strp Test 3-4 times daily PRN      citalopram (CELEXA) 20 MG tablet Take 1 tablet (20 mg total) by mouth once daily.      CLARITIN-D 12 HOUR 5-120 mg per tablet Take 1 tablet by mouth once daily.   0    DIALYVITE 100-1 mg Tab Take 1 tablet by mouth 2 (two) times daily.   1    fluticasone (FLONASE) 50 mcg/actuation nasal spray 1 spray by Each Nostril route once daily.   12    gabapentin (NEURONTIN) 100 MG capsule Take 100 mg by mouth 3 (three) times daily.  3    honey (MEDIHONEY, HONEY,) 80 % Gel Apply 1 Tube topically once daily. 1 Tube 0    HYDROcodone-acetaminophen (NORCO) 5-325 mg per tablet Take 1 tablet by mouth every 6 (six) hours as needed for Pain.      INCRUSE ELLIPTA 62.5 mcg/actuation inhalation capsule Inhale 1 capsule into the lungs once daily.       levothyroxine (SYNTHROID) 50 MCG tablet Take 50 mcg by  "mouth before breakfast.       midodrine (PROAMATINE) 5 MG Tab Take 5 mg by mouth 3 (three) times daily with meals.       ondansetron (ZOFRAN-ODT) 4 MG TbDL LET 1 TABLET DISSOLVE ON TONGUE 4 TIMES A DAY AS NEEDED  0    pen needle, diabetic (BD ULTRA-FINE OBDULIA PEN NEEDLE) 32 gauge x 5/32" Ndle       promethazine (PHENERGAN) 25 MG tablet Take 1 tablet (25 mg total) by mouth every 6 (six) hours as needed for Nausea. 30 tablet 0    ramelteon (ROZEREM) 8 mg tablet Take 8 mg by mouth every evening.      rosuvastatin (CRESTOR) 10 MG tablet Take 10 mg by mouth once daily.       sevelamer carbonate (RENVELA) 800 mg Tab Take 1,600 mg by mouth 3 (three) times daily with meals.      tiotropium (SPIRIVA) 18 mcg inhalation capsule Inhale 1 capsule (18 mcg total) into the lungs once daily. Controller 30 capsule 11    insulin aspart (NOVOLOG) 100 unit/mL InPn pen Inject 1-10 Units into the skin 3 (three) times daily. 3 mL 1     No current facility-administered medications for this visit.        Review of Systems      Objective:        Physical Exam:   Foot Exam    General  General Appearance: appears stated age and healthy   Orientation: alert and oriented to person, place, and time   Affect: appropriate   Gait: antalgic   Assistance: wheelchair use       Left Foot/Ankle      Inspection and Palpation  Skin Exam: abnormal color (The forefoot is rubor in color with sluggish capillary fill) and ulcer (Grade 2 ulcer posterior lower leg approximately 5 cm long by 2 cm wide by 0.8 cm deep with pink granulation tissue minimal fibrotic tissue no signs of infection.  Minimal improvement since last visit.);     Neurovascular  Dorsalis pedis: doppler  Posterior tibial: doppler  Saphenous nerve sensation: absent  Tibial nerve sensation: absent  Superficial peroneal nerve sensation: absent  Deep peroneal nerve sensation: absent  Sural nerve sensation: absent          Physical Exam   Cardiovascular:   Pulses:       Dorsalis pedis pulses " are doppler on the left side.        Posterior tibial pulses are doppler on the left side.   Musculoskeletal:        Legs:    Feet:   Left Foot:   Skin Integrity: Positive for ulcer (Grade 2 ulcer posterior lower leg approximately 5 cm long by 2 cm wide by 0.8 cm deep with pink granulation tissue minimal fibrotic tissue no signs of infection.  Minimal improvement since last visit.).           Imaging:            Assessment:       1. Wound healing, delayed - Left Foot    2. Skin ulcer of left foot with fat layer exposed    3. Type II diabetes mellitus with peripheral circulatory disorder      Plan:   Wound healing, delayed - Left Foot  -     Ambulatory referral/consult to Home Health; Future; Expected date: 08/13/2020    Skin ulcer of left foot with fat layer exposed  -     Ambulatory referral/consult to Home Health; Future; Expected date: 08/13/2020    Type II diabetes mellitus with peripheral circulatory disorder  -     Ambulatory referral/consult to Home Health; Future; Expected date: 08/13/2020     1.  Today evaluated the ulcer and described to the patient and her  clinical findings which shows some improvement in healing but minimal and not is quicker that like it to be.  I am recommending that home health continue see her treated with Medihoney and offloading and I am having my office schedule for an outpatient debridement and application of Apligraf be done in approximately 3 weeks.  I will see them in hospital the morning of the debridement application.  No follow-ups on file.    Procedures - None    Counseling:   proper ulcer care and the possible need for serial debridements, topical medications, specific dressings and biological engineered skin substitutes if indicated.  I provided patient education verbally regarding:   Patient diagnosis, treatment options, as well as alternatives, risks, and benefits.     This note was created using Dragon voice recognition software that occasionally  misinterpreted phrases or words.

## 2020-08-10 NOTE — H&P (VIEW-ONLY)
"  [address] [facility] [address]  [place] [address]  Phone: [phone]   [phone]    Patient's PCP:JOS Dumont  Referring Provider: No ref. provider found    Subjective:      Chief Complaint:: Follow-up (ulcer left lower leg)    LUCIO Ramos is a 60 y.o. female who returns for follow up on ulcer left lower leg. Dressing intact. Home health has been changing dressing every three days with medihoney. No pain.     Systemic Doctor: JOS Montanez  Date Last Seen: 2020  Blood Sugar: did not check  Hemoglobin A1c: 6.2    Vitals:    08/10/20 0928   BP: (!) 147/83   Pulse: 91   Resp: 16   Temp: 98 °F (36.7 °C)     Shoe Size:     Past Surgical History:   Procedure Laterality Date    ABCESS DRAINAGE Right     Between "little toe" and the one next to it    BREAST SURGERY      Reduction [id number]    CARDIAC SURGERY  2011    CABG 4vessel ring in one valve mitral valve prolapse    CORONARY ARTERY BYPASS GRAFT      hyperlipidemia      TUBAL LIGATION  1986     Past Medical History:   Diagnosis Date    Anemia in stage 3 chronic kidney disease 2017    Anticoagulant long-term use     CHF (congestive heart failure)     COPD (chronic obstructive pulmonary disease)     COPD exacerbation 3/10/2020    Coronary artery disease     Diabetes mellitus     Encounter for blood transfusion     Essential hypertension 2017    Hypertension     Low blood pressure     MI (myocardial infarction)     Segmental and subsegmental pulmonary emboli of RLL without acute cor pulmonale 2017    Stroke     2005    Thyroid disease     Traumatic subarachnoid hemorrhage 2017    Type 2 diabetes mellitus with stage 3 chronic kidney disease, without long-term current use of insulin 2017     Family History   Problem Relation Age of Onset    Arthritis Mother     Heart disease Father     Cancer Father 68        prostae and bladder ca  in     Diabetes Father  "    Hypertension Father     Depression Sister     Hypertension Son     Diabetes Maternal Grandmother         lost leg    Kidney disease Paternal Grandfather         had kidney removed    Stroke Paternal Grandfather         Social History:   Marital Status:   Alcohol History:  reports current alcohol use of about 1.0 - 2.0 standard drinks of alcohol per week.  Tobacco History:  reports that she has never smoked. She has never used smokeless tobacco.  Drug History:  reports no history of drug use.    Review of patient's allergies indicates:  No Known Allergies    Current Outpatient Medications   Medication Sig Dispense Refill    albuterol (ACCUNEB) 1.25 mg/3 mL Nebu Take 1.25 mg by nebulization 3 (three) times daily as needed.   1    albuterol (PROAIR HFA) 90 mcg/actuation inhaler Inhale 2 puffs into the lungs once daily. Rescue      albuterol-ipratropium (DUO-NEB) 2.5 mg-0.5 mg/3 mL nebulizer solution Take 3 mLs by nebulization every 6 (six) hours. Rescue 1 Box 11    apixaban 5 mg Tab Take 1 tablet (5 mg total) by mouth 2 (two) times daily. 60 tablet 1    blood sugar diagnostic Strp Test 3-4 times daily PRN      citalopram (CELEXA) 20 MG tablet Take 1 tablet (20 mg total) by mouth once daily.      CLARITIN-D 12 HOUR 5-120 mg per tablet Take 1 tablet by mouth once daily.   0    DIALYVITE 100-1 mg Tab Take 1 tablet by mouth 2 (two) times daily.   1    fluticasone (FLONASE) 50 mcg/actuation nasal spray 1 spray by Each Nostril route once daily.   12    gabapentin (NEURONTIN) 100 MG capsule Take 100 mg by mouth 3 (three) times daily.  3    honey (MEDIHONEY, HONEY,) 80 % Gel Apply 1 Tube topically once daily. 1 Tube 0    HYDROcodone-acetaminophen (NORCO) 5-325 mg per tablet Take 1 tablet by mouth every 6 (six) hours as needed for Pain.      INCRUSE ELLIPTA 62.5 mcg/actuation inhalation capsule Inhale 1 capsule into the lungs once daily.       levothyroxine (SYNTHROID) 50 MCG tablet Take 50 mcg by  "mouth before breakfast.       midodrine (PROAMATINE) 5 MG Tab Take 5 mg by mouth 3 (three) times daily with meals.       ondansetron (ZOFRAN-ODT) 4 MG TbDL LET 1 TABLET DISSOLVE ON TONGUE 4 TIMES A DAY AS NEEDED  0    pen needle, diabetic (BD ULTRA-FINE OBDULIA PEN NEEDLE) 32 gauge x 5/32" Ndle       promethazine (PHENERGAN) 25 MG tablet Take 1 tablet (25 mg total) by mouth every 6 (six) hours as needed for Nausea. 30 tablet 0    ramelteon (ROZEREM) 8 mg tablet Take 8 mg by mouth every evening.      rosuvastatin (CRESTOR) 10 MG tablet Take 10 mg by mouth once daily.       sevelamer carbonate (RENVELA) 800 mg Tab Take 1,600 mg by mouth 3 (three) times daily with meals.      tiotropium (SPIRIVA) 18 mcg inhalation capsule Inhale 1 capsule (18 mcg total) into the lungs once daily. Controller 30 capsule 11    insulin aspart (NOVOLOG) 100 unit/mL InPn pen Inject 1-10 Units into the skin 3 (three) times daily. 3 mL 1     No current facility-administered medications for this visit.        Review of Systems      Objective:        Physical Exam:   Foot Exam    General  General Appearance: appears stated age and healthy   Orientation: alert and oriented to person, place, and time   Affect: appropriate   Gait: antalgic   Assistance: wheelchair use       Left Foot/Ankle      Inspection and Palpation  Skin Exam: abnormal color (The forefoot is rubor in color with sluggish capillary fill) and ulcer (Grade 2 ulcer posterior lower leg approximately 5 cm long by 2 cm wide by 0.8 cm deep with pink granulation tissue minimal fibrotic tissue no signs of infection.  Minimal improvement since last visit.);     Neurovascular  Dorsalis pedis: doppler  Posterior tibial: doppler  Saphenous nerve sensation: absent  Tibial nerve sensation: absent  Superficial peroneal nerve sensation: absent  Deep peroneal nerve sensation: absent  Sural nerve sensation: absent          Physical Exam   Cardiovascular:   Pulses:       Dorsalis pedis pulses " are doppler on the left side.        Posterior tibial pulses are doppler on the left side.   Musculoskeletal:        Legs:    Feet:   Left Foot:   Skin Integrity: Positive for ulcer (Grade 2 ulcer posterior lower leg approximately 5 cm long by 2 cm wide by 0.8 cm deep with pink granulation tissue minimal fibrotic tissue no signs of infection.  Minimal improvement since last visit.).           Imaging:            Assessment:       1. Wound healing, delayed - Left Foot    2. Skin ulcer of left foot with fat layer exposed    3. Type II diabetes mellitus with peripheral circulatory disorder      Plan:   Wound healing, delayed - Left Foot  -     Ambulatory referral/consult to Home Health; Future; Expected date: 08/13/2020    Skin ulcer of left foot with fat layer exposed  -     Ambulatory referral/consult to Home Health; Future; Expected date: 08/13/2020    Type II diabetes mellitus with peripheral circulatory disorder  -     Ambulatory referral/consult to Home Health; Future; Expected date: 08/13/2020     1.  Today evaluated the ulcer and described to the patient and her  clinical findings which shows some improvement in healing but minimal and not is quicker that like it to be.  I am recommending that home health continue see her treated with Medihoney and offloading and I am having my office schedule for an outpatient debridement and application of Apligraf be done in approximately 3 weeks.  I will see them in hospital the morning of the debridement application.  No follow-ups on file.    Procedures - None    Counseling:   proper ulcer care and the possible need for serial debridements, topical medications, specific dressings and biological engineered skin substitutes if indicated.  I provided patient education verbally regarding:   Patient diagnosis, treatment options, as well as alternatives, risks, and benefits.     This note was created using Dragon voice recognition software that occasionally  misinterpreted phrases or words.

## 2020-09-02 NOTE — PRE-PROCEDURE INSTRUCTIONS
Spoke with ronak, scheduled to be a local, however she would like cbc,cmp and a u/a. Pt does not urinate so unable to get a urine sample.  Chest and ekg not needed   covid pending

## 2020-09-04 PROBLEM — T14.8XXD DELAYED WOUND HEALING: Status: ACTIVE | Noted: 2020-01-01

## 2020-09-04 NOTE — OP NOTE
Operative Report     Patient name: Juliane Ramos   MRN: [id number]  Date of surgery: 9/4/2020    Surgeon: Dennis Wick DPM   Assistant:  Laura Alcantara DPM, PGY1    Preoperative diagnosis:  Delayed healing wound posterior lower leg left  Postoperative diagnosis:  Same as the above  Procedure:  Excisional debridement of subcutaneous tissue grade 2 ulcer posterior left leg with application of biological membrane Apligraf  Anesthesia:  No anesthesia  Hemostasis:  No tourniquet  Estimated blood loss:  1 cc   Specimen:  No specimen  Complications: None  Condition upon discharge: Stable    Procedure in detail:  Patient brought the operating room left on the bed.  The left leg was prepped and draped usual aseptic manner the ulcer was debrided with curette full-thickness through subcutaneous tissue to bleeding tissue.  Area was irrigated.  No purulent drainage encountered.  Apligraf cut put in place and then Steri-Stripped with Adaptic over.  Dressed with 4x4s Tej Ace wrap.  Patient left the operating room with stable vitals.  No anesthesia was used.    1. Keep dressings, clean, dry, and intact to surgical extremity.  2. Rest, ice, and elevate the surgical extremity.  3.  Surgical shoe to surgical extremity in the operating room  4. Take all medication as discussed at discharge.  5. Contact the clinic with any postoperative concerns or complications.  6. Follow up in one week for 1st post op.

## 2020-09-04 NOTE — INTERVAL H&P NOTE
The patient has been examined and the H&P has been reviewed:    I concur with the findings and no changes have occurred since H&P was written.    Anesthesia risks, benefits and alternative options discussed and understood by patient/family.          Active Hospital Problems    Diagnosis  POA    Delayed wound healing [T14.8XXD]  Yes      Resolved Hospital Problems   No resolved problems to display.

## 2020-09-04 NOTE — DISCHARGE INSTRUCTIONS
SURGICAL FOOT ABOVE BED 30 DEGREES.  PILLOW ELEVATION.   NEUROVASCULAR CHECKS EVERY  4 HOURS.  WEIGHT BEARING AS TOLERATED PER DR LARA.  
do not touch or scrub site, don't apply ointments to site

## 2020-09-08 NOTE — PROGRESS NOTES
"  [address] [facility] [address]  [place] [address]  Phone: [phone]   [phone]    Patient's PCP:JOS Dumont  Referring Provider:No ref. provider found    Subjective:      Chief Complaint: Post-Op    Systemic Doctor:JOS Dumont  Date Last Seen: 7-29-20  Blood Sugar: 128  Hemoglobin A1c: 6.2    Date of Surgery: 9-4-20  Procedure: Application of apligraph left foot    HPI:   Juliane Ramos is a 60 y.o. female who returns to the clinic today for her post-operative visit. Juliane Ramos rates pain a  0/10 on a pain scale. Compliance of Care:  Dressing intact, surgery shoe.    There were no vitals filed for this visit.    Past Surgical History:   Procedure Laterality Date    ABCESS DRAINAGE Right     Between "little toe" and the one next to it    BREAST SURGERY      Reduction [id number]    CARDIAC SURGERY  01/2011    CABG 4vessel ring in one valve mitral valve prolapse    CORONARY ARTERY BYPASS GRAFT      DEBRIDEMENT OF FOOT Left 9/4/2020    Procedure: DEBRIDEMENT, FOOT;  Surgeon: Dennis Wick DPM;  Location: [facility];  Service: Podiatry;  Laterality: Left;    FOOT SURGERY      4 grafts to left foot    hyperlipidemia      TUBAL LIGATION  03/1986     Past Medical History:   Diagnosis Date    Anemia     Anemia in stage 3 chronic kidney disease 11/26/2017    Anticoagulant long-term use     CHF (congestive heart failure)     COPD (chronic obstructive pulmonary disease)     COPD exacerbation 3/10/2020    Coronary artery disease     Diabetes mellitus     Digestive disorder     Encounter for blood transfusion     Essential hypertension 6/24/2017    Hypertension     Low blood pressure     MI (myocardial infarction) 2010    Segmental and subsegmental pulmonary emboli of RLL without acute cor pulmonale 11/25/2017    Stroke     July 2005    Thyroid disease     Traumatic subarachnoid hemorrhage 11/24/2017    Type 2 diabetes mellitus with stage 3 chronic kidney disease, " without long-term current use of insulin 2017     Family History   Problem Relation Age of Onset    Arthritis Mother     Heart disease Father     Cancer Father 68        prostae and bladder ca  in     Diabetes Father     Hypertension Father     Depression Sister     Hypertension Son     Diabetes Maternal Grandmother         lost leg    Kidney disease Paternal Grandfather         had kidney removed    Stroke Paternal Grandfather         Social History:   Marital Status:   Alcohol History:  reports current alcohol use of about 1.0 - 2.0 standard drinks of alcohol per week.  Tobacco History:  reports that she has never smoked. She has never used smokeless tobacco.  Drug History:  reports no history of drug use.    Review of patient's allergies indicates:  No Known Allergies    Current Outpatient Medications   Medication Sig Dispense Refill    albuterol (ACCUNEB) 1.25 mg/3 mL Nebu Take 1.25 mg by nebulization 3 (three) times daily as needed.   1    albuterol (PROAIR HFA) 90 mcg/actuation inhaler Inhale 2 puffs into the lungs once daily. Rescue      albuterol-ipratropium (DUO-NEB) 2.5 mg-0.5 mg/3 mL nebulizer solution Take 3 mLs by nebulization every 6 (six) hours. Rescue 1 Box 11    apixaban 5 mg Tab Take 1 tablet (5 mg total) by mouth 2 (two) times daily. (Patient taking differently: Take 2.5 mg by mouth 2 (two) times daily. ) 60 tablet 1    blood sugar diagnostic Strp Test 3-4 times daily PRN      cephALEXin (KEFLEX) 500 MG capsule TAKE 1 CAPSULE BY MOUTH EVERY 12 HOURS FOR 7 DAYS      citalopram (CELEXA) 20 MG tablet Take 1 tablet (20 mg total) by mouth once daily.      CLARITIN-D 12 HOUR 5-120 mg per tablet Take 1 tablet by mouth once daily.   0    DIALYVITE 100-1 mg Tab Take 1 tablet by mouth 2 (two) times daily.   1    fluticasone (FLONASE) 50 mcg/actuation nasal spray 1 spray by Each Nostril route once daily.   12    gabapentin (NEURONTIN) 100 MG capsule Take 100 mg by  "mouth 3 (three) times daily.  3    honey (MEDIHONEY, HONEY,) 80 % Gel Apply 1 Tube topically once daily. 1 Tube 0    HYDROcodone-acetaminophen (NORCO) 5-325 mg per tablet Take 1 tablet by mouth every 6 (six) hours as needed for Pain.      insulin aspart (NOVOLOG) 100 unit/mL InPn pen Inject 1-10 Units into the skin 3 (three) times daily. 3 mL 1    levothyroxine (SYNTHROID) 50 MCG tablet Take 50 mcg by mouth before breakfast.       midodrine (PROAMATINE) 5 MG Tab Take 5 mg by mouth 3 (three) times daily with meals.       ondansetron (ZOFRAN-ODT) 4 MG TbDL LET 1 TABLET DISSOLVE ON TONGUE 4 TIMES A DAY AS NEEDED  0    pen needle, diabetic (BD ULTRA-FINE OBDULIA PEN NEEDLE) 32 gauge x 5/32" Ndle       promethazine (PHENERGAN) 25 MG tablet Take 1 tablet (25 mg total) by mouth every 6 (six) hours as needed for Nausea. 30 tablet 0    ramelteon (ROZEREM) 8 mg tablet Take 8 mg by mouth every evening.      rosuvastatin (CRESTOR) 10 MG tablet Take 10 mg by mouth once daily.       sevelamer carbonate (RENVELA) 800 mg Tab Take 1,600 mg by mouth 3 (three) times daily with meals.      tiotropium (SPIRIVA) 18 mcg inhalation capsule Inhale 1 capsule (18 mcg total) into the lungs once daily. Controller 30 capsule 11     No current facility-administered medications for this visit.        Review of Systems      Objective:        Post-op surgery of the excisional debridement of ulcer posterior left leg with application of biological membrane with normal healing, no signs of infection or dehiscence of wound. Hardware in place. Normal post op exam today. No redness, no drainage, no increase in local temperature, no significant swelling, sutures.steri-strips are intact.     Imaging:     Physical Exam:   Foot Exam    General  General Appearance: appears stated age and healthy   Orientation: alert and oriented to person, place, and time   Affect: appropriate   Gait: antalgic   Assistance: wheelchair use           Physical Exam "   Musculoskeletal:        Legs:           Assessment:       1. Wound healing, delayed - Left Foot    2. Skin ulcer of left foot with fat layer exposed    3. Type II diabetes mellitus with peripheral circulatory disorder      Plan:   Wound healing, delayed - Left Foot    Skin ulcer of left foot with fat layer exposed    Type II diabetes mellitus with peripheral circulatory disorder     1.  I evaluated the area today and redressed it.  Dressing will remain intact until home health sees her in 5 days.  At that time there will remove the outer dressing and leave the biological membrane intact.  In 2 weeks then may remove the dressing over the biological membrane and resume Medi honey.  Patient is return to see me in 6 weeks.  No follow-ups on file.    Procedures - None    The surgical dressing was removed showing no signs of infection, excess edema or malalignment. A new dry dressing was applied and patient was instructed to leave dressing intact until next visit or until instructed to remove.     This note was created using Dragon voice recognition software that occasionally misinterpreted phrases or words.

## 2020-09-24 PROBLEM — N18.6 STAGE 5 CHRONIC KIDNEY DISEASE ON CHRONIC DIALYSIS: Status: ACTIVE | Noted: 2020-01-01

## 2020-09-24 PROBLEM — R00.0 TACHYCARDIA: Status: ACTIVE | Noted: 2020-01-01

## 2020-09-24 PROBLEM — Z99.2 STAGE 5 CHRONIC KIDNEY DISEASE ON CHRONIC DIALYSIS: Status: ACTIVE | Noted: 2020-01-01

## 2020-09-24 NOTE — SUBJECTIVE & OBJECTIVE
"Past Medical History:   Diagnosis Date    Anemia     Anemia in stage 3 chronic kidney disease 11/26/2017    Anticoagulant long-term use     CHF (congestive heart failure)     COPD (chronic obstructive pulmonary disease)     COPD exacerbation 3/10/2020    Coronary artery disease     Diabetes mellitus     Digestive disorder     Encounter for blood transfusion     Essential hypertension 6/24/2017    Hypertension     Low blood pressure     MI (myocardial infarction) 2010    Segmental and subsegmental pulmonary emboli of RLL without acute cor pulmonale 11/25/2017    Stroke     July 2005    Thyroid disease     Traumatic subarachnoid hemorrhage 11/24/2017    Type 2 diabetes mellitus with stage 3 chronic kidney disease, without long-term current use of insulin 6/24/2017       Past Surgical History:   Procedure Laterality Date    ABCESS DRAINAGE Right     Between "little toe" and the one next to it    BREAST SURGERY      Reduction [id number]    CARDIAC SURGERY  01/2011    CABG 4vessel ring in one valve mitral valve prolapse    CORONARY ARTERY BYPASS GRAFT      DEBRIDEMENT OF FOOT Left 9/4/2020    Procedure: DEBRIDEMENT, FOOT;  Surgeon: Dennis Wick DPM;  Location: [facility];  Service: Podiatry;  Laterality: Left;    FOOT SURGERY      4 grafts to left foot    hyperlipidemia      TUBAL LIGATION  03/1986       Review of patient's allergies indicates:  No Known Allergies    No current facility-administered medications on file prior to encounter.      Current Outpatient Medications on File Prior to Encounter   Medication Sig    albuterol (ACCUNEB) 1.25 mg/3 mL Nebu Take 1.25 mg by nebulization 3 (three) times daily as needed.     albuterol (PROAIR HFA) 90 mcg/actuation inhaler Inhale 2 puffs into the lungs once daily. Rescue    albuterol-ipratropium (DUO-NEB) 2.5 mg-0.5 mg/3 mL nebulizer solution Take 3 mLs by nebulization every 6 (six) hours. Rescue    apixaban 5 mg Tab Take 1 tablet (5 mg total) " "by mouth 2 (two) times daily. (Patient taking differently: Take 2.5 mg by mouth 2 (two) times daily. )    blood sugar diagnostic Strp Test 3-4 times daily PRN    cephALEXin (KEFLEX) 500 MG capsule TAKE 1 CAPSULE BY MOUTH EVERY 12 HOURS FOR 7 DAYS    citalopram (CELEXA) 20 MG tablet Take 1 tablet (20 mg total) by mouth once daily.    CLARITIN-D 12 HOUR 5-120 mg per tablet Take 1 tablet by mouth once daily.     DIALYVITE 100-1 mg Tab Take 1 tablet by mouth 2 (two) times daily.     fluticasone (FLONASE) 50 mcg/actuation nasal spray 1 spray by Each Nostril route once daily.     gabapentin (NEURONTIN) 100 MG capsule Take 100 mg by mouth 3 (three) times daily.    honey (MEDIHONEY, HONEY,) 80 % Gel Apply 1 Tube topically once daily.    HYDROcodone-acetaminophen (NORCO) 5-325 mg per tablet Take 1 tablet by mouth every 6 (six) hours as needed for Pain.    insulin aspart (NOVOLOG) 100 unit/mL InPn pen Inject 1-10 Units into the skin 3 (three) times daily.    levothyroxine (SYNTHROID) 50 MCG tablet Take 50 mcg by mouth before breakfast.     midodrine (PROAMATINE) 5 MG Tab Take 5 mg by mouth 3 (three) times daily with meals.     ondansetron (ZOFRAN-ODT) 4 MG TbDL LET 1 TABLET DISSOLVE ON TONGUE 4 TIMES A DAY AS NEEDED    pen needle, diabetic (BD ULTRA-FINE OBDULIA PEN NEEDLE) 32 gauge x 5/32" Ndle     promethazine (PHENERGAN) 25 MG tablet Take 1 tablet (25 mg total) by mouth every 6 (six) hours as needed for Nausea.    ramelteon (ROZEREM) 8 mg tablet Take 8 mg by mouth every evening.    rosuvastatin (CRESTOR) 10 MG tablet Take 10 mg by mouth once daily.     sevelamer carbonate (RENVELA) 800 mg Tab Take 1,600 mg by mouth 3 (three) times daily with meals.    tiotropium (SPIRIVA) 18 mcg inhalation capsule Inhale 1 capsule (18 mcg total) into the lungs once daily. Controller     Family History     Problem Relation (Age of Onset)    Arthritis Mother    Cancer Father (68)    Depression Sister    Diabetes Father, " "Maternal Grandmother    Heart disease Father    Hypertension Father, Son    Kidney disease Paternal Grandfather    Stroke Paternal Grandfather        Tobacco Use    Smoking status: Never Smoker    Smokeless tobacco: Never Used   Substance and Sexual Activity    Alcohol use: Yes     Alcohol/week: 1.0 - 2.0 standard drinks     Types: 1 - 2 Glasses of wine per week     Comment: "socially" not in awhile    Drug use: No    Sexual activity: Yes     Partners: Male     Birth control/protection: None     Review of Systems   Constitutional: Positive for fatigue. Negative for chills, diaphoresis and fever.   HENT: Negative for sinus pressure, sneezing and sore throat.    Eyes: Negative for discharge.   Respiratory: Negative for cough, chest tightness, shortness of breath and wheezing.    Gastrointestinal: Negative for abdominal pain, constipation, diarrhea, nausea and vomiting.   Genitourinary: Negative for dysuria, flank pain and urgency.   Musculoskeletal: Negative for back pain and neck stiffness.   Skin: Negative for rash.   Neurological:        Generalized weakness, near-syncope   Psychiatric/Behavioral: Negative for confusion and hallucinations.     Objective:     Vital Signs (Most Recent):  Temp: 97.7 °F (36.5 °C) (09/24/20 0552)  Pulse: (!) 128 (09/24/20 1000)  Resp: 20 (09/24/20 1000)  BP: 100/62 (09/24/20 1000)  SpO2: 100 % (09/24/20 1000) Vital Signs (24h Range):  Temp:  [97.7 °F (36.5 °C)] 97.7 °F (36.5 °C)  Pulse:  [128-131] 128  Resp:  [16-21] 20  SpO2:  [98 %-100 %] 100 %  BP: (100-112)/(60-72) 100/62     Weight: 80.8 kg (178 lb 2.1 oz)  Body mass index is 28.75 kg/m².    Physical Exam    patient appears no acute distress  is at bedside  Patient lying on a [facility] emergency department  HEENT sclerae nonicteric  Neck is supple nontender  Lungs are clear to auscultation on the coarse breath sounds  Heart regular rhythm tachycardic in the 120s  Abdomen is soft nontender  Extremities no edema  Skin left " posterior distal lower extremity with open wound recent set skin graft see photo  Neuro patient is alert oriented x3 motor exam is nonfocal   exam no Chi  Psych patient is pleasant, cooperative       Significant Labs:   BMP:   Recent Labs   Lab 09/24/20  0630   *   *   K 5.2*   CL 91*   CO2 24   BUN 46*   CREATININE 5.0*   CALCIUM 8.7     CBC:   Recent Labs   Lab 09/24/20  0630   WBC 11.72   HGB 12.8   HCT 41.9        CMP:   Recent Labs   Lab 09/24/20  0630   *   K 5.2*   CL 91*   CO2 24   *   BUN 46*   CREATININE 5.0*   CALCIUM 8.7   PROT 5.8*   ALBUMIN 2.7*   BILITOT 1.0   ALKPHOS 69   AST 26   ALT 19   ANIONGAP 14   EGFRNONAA 8.9*     SARS-CoV-2 negative  Significant Imaging:   Chest x-ray read by me shows no acute cardiopulmonary process right elevated diaphragm  Agree with report  ECG read by me shows sinus rhythm incomplete right bundle-branch block no acute ST segment changes

## 2020-09-24 NOTE — HPI
Patient is a 60-year-old female hx ESRD HD (T,Th, Sat)  CAD/CABG ,EF20%?, COPD, DM ,chronic hypotension  presents ED with complaints of having low blood pressure and rapid heart rate.  In the emergency department patient had a heart rate in the 130s regular and a blood pressure systolic of approximately 100  Patient denies shortness of breath chest pain fever chills abdominal pain nausea vomiting.  She states her last dialysis on Tuesday she did not have any volume removed.  Patient has been given approximately 500 cc of IV fluids in the emergency department at remains tachycardic in the 120s.

## 2020-09-24 NOTE — ED PROVIDER NOTES
"Encounter Date: 9/24/2020       History     Chief Complaint   Patient presents with    Tachycardia    Hypotension     60-year-old female who has a history of chronic kidney disease on dialysis Tuesdays Thursdays and Saturdays, CHF, COPD, coronary artery disease, diabetes, hypertension as well as orthostatic hypotension, hypothyroidism, and a CVA and 0 5, presents emergency room with complaints of having low blood pressure and rapid heart rate.  Patient denies any unusual shortness of breath nor she have any complaints of chest pain.  No history of fever.  The patient when last dialyzed did not have any fluid taken off.  She states that her dry weight is 83.3 kilos.  She does rarely make urine.  She had been eating less than usual but still taking fluids.  She does complain of having some exertional dyspnea but no unusual coughing or wheezing.  No history of any arrhythmias previously.        Review of patient's allergies indicates:  No Known Allergies  Past Medical History:   Diagnosis Date    Anemia     Anemia in stage 3 chronic kidney disease 11/26/2017    Anticoagulant long-term use     CHF (congestive heart failure)     COPD (chronic obstructive pulmonary disease)     COPD exacerbation 3/10/2020    Coronary artery disease     Diabetes mellitus     Digestive disorder     Encounter for blood transfusion     Essential hypertension 6/24/2017    Hypertension     Low blood pressure     MI (myocardial infarction) 2010    Segmental and subsegmental pulmonary emboli of RLL without acute cor pulmonale 11/25/2017    Stroke     July 2005    Thyroid disease     Traumatic subarachnoid hemorrhage 11/24/2017    Type 2 diabetes mellitus with stage 3 chronic kidney disease, without long-term current use of insulin 6/24/2017     Past Surgical History:   Procedure Laterality Date    ABCESS DRAINAGE Right     Between "little toe" and the one next to it    BREAST SURGERY      Reduction [id number]    CARDIAC " "SURGERY  2011    CABG 4vessel ring in one valve mitral valve prolapse    CORONARY ARTERY BYPASS GRAFT      DEBRIDEMENT OF FOOT Left 2020    Procedure: DEBRIDEMENT, FOOT;  Surgeon: Dennis Wick DPM;  Location: [facility];  Service: Podiatry;  Laterality: Left;    FOOT SURGERY      4 grafts to left foot    hyperlipidemia      TUBAL LIGATION  1986     Family History   Problem Relation Age of Onset    Arthritis Mother     Heart disease Father     Cancer Father 68        prostae and bladder ca  in     Diabetes Father     Hypertension Father     Depression Sister     Hypertension Son     Diabetes Maternal Grandmother         lost leg    Kidney disease Paternal Grandfather         had kidney removed    Stroke Paternal Grandfather      Social History     Tobacco Use    Smoking status: Never Smoker    Smokeless tobacco: Never Used   Substance Use Topics    Alcohol use: Yes     Alcohol/week: 1.0 - 2.0 standard drinks     Types: 1 - 2 Glasses of wine per week     Comment: "socially" not in awhile    Drug use: No     Review of Systems   Constitutional: Positive for appetite change. Negative for activity change, chills, diaphoresis and fever.   HENT: Negative for rhinorrhea, sore throat and trouble swallowing.    Respiratory: Negative for cough, chest tightness, shortness of breath, wheezing and stridor.    Cardiovascular: Positive for palpitations. Negative for chest pain and leg swelling.   Gastrointestinal: Negative for abdominal pain, constipation, diarrhea, nausea and vomiting.   Genitourinary: Positive for decreased urine volume. Negative for dysuria.   Musculoskeletal: Negative for arthralgias, back pain and myalgias.   Skin: Negative for pallor and rash.   Neurological: Negative for dizziness, syncope, weakness and headaches.   Hematological: Does not bruise/bleed easily.   All other systems reviewed and are negative.      Physical Exam     Initial Vitals [20 0552]   BP " Pulse Resp Temp SpO2   107/60 (!) 131 16 97.7 °F (36.5 °C) 98 %      MAP       --         Physical Exam    Vitals reviewed.  Constitutional: She appears well-developed and well-nourished. She is not diaphoretic. No distress.   HENT:   Head: Normocephalic and atraumatic.   Nose: Nose normal.   Mouth/Throat: Oropharynx is clear and moist. No oropharyngeal exudate.   Eyes: Conjunctivae are normal. Pupils are equal, round, and reactive to light. Right eye exhibits no discharge. Left eye exhibits no discharge.   Neck: Normal range of motion. Neck supple. No JVD present.   Cardiovascular: Normal rate, normal heart sounds and intact distal pulses. Exam reveals no gallop and no friction rub.    No murmur heard.  Tachycardia   Pulmonary/Chest: Breath sounds normal. No respiratory distress. She has no wheezes. She has no rhonchi. She has no rales. She exhibits no tenderness.   Abdominal: Soft. Bowel sounds are normal. She exhibits no distension. There is no abdominal tenderness. There is no rebound and no guarding.   Musculoskeletal: Normal range of motion. No tenderness or edema.      Comments: Decubitus over the left Achilles area   Lymphadenopathy:     She has no cervical adenopathy.   Neurological: She is alert and oriented to person, place, and time. She has normal strength. GCS score is 15. GCS eye subscore is 4. GCS verbal subscore is 5. GCS motor subscore is 6.   Skin: Skin is warm and dry. Capillary refill takes less than 2 seconds. No rash noted. No erythema. No pallor.   Psychiatric: She has a normal mood and affect. Her behavior is normal. Judgment and thought content normal.         ED Course   Procedures  Labs Reviewed   B-TYPE NATRIURETIC PEPTIDE - Abnormal; Notable for the following components:       Result Value     (*)     All other components within normal limits   CBC W/ AUTO DIFFERENTIAL - Abnormal; Notable for the following components:    Mean Corpuscular Hemoglobin Conc 30.5 (*)     RDW 15.9 (*)      Gran # (ANC) 8.9 (*)     Mono # 1.4 (*)     Gran% 75.6 (*)     Lymph% 8.6 (*)     All other components within normal limits   COMPREHENSIVE METABOLIC PANEL - Abnormal; Notable for the following components:    Sodium 129 (*)     Potassium 5.2 (*)     Chloride 91 (*)     Glucose 124 (*)     BUN, Bld 46 (*)     Creatinine 5.0 (*)     Total Protein 5.8 (*)     Albumin 2.7 (*)     eGFR if  10.3 (*)     eGFR if non  8.9 (*)     All other components within normal limits   TSH - Abnormal; Notable for the following components:    TSH 10.620 (*)     All other components within normal limits   T4, FREE   SARS-COV-2 RNA AMPLIFICATION, QUAL   TROPONIN I   PROTIME-INR   APTT   TROPONIN I   URINALYSIS, REFLEX TO URINE CULTURE   TROPONIN I        ECG Results          EKG 12-lead (In process)  Result time 09/24/20 08:29:28    In process by Interface, Lab In [facility] (09/24/20 08:29:28)                 Narrative:    Test Reason : R00.0,    Vent. Rate : 130 BPM     Atrial Rate : 130 BPM     P-R Int : 094 ms          QRS Dur : 092 ms      QT Int : 314 ms       P-R-T Axes : 067 075 -12 degrees     QTc Int : 462 ms    Sinus tachycardia with short MT  Incomplete right bundle branch block  Anteroseptal infarct (cited on or before 11-SEP-2018)  Abnormal ECG  When compared with ECG of 01-AUG-2020 09:13,  Questionable change in initial forces of Anterior-lateral leads  T wave inversion less evident in Inferior leads    Referred By: [email or web address]   SELF           Confirmed By:                             Imaging Results          X-Ray Chest AP Portable (Final result)  Result time 09/24/20 07:07:58    Final result by Dionna Blevins MD (09/24/20 07:07:58)                 Narrative:    PROCEDURE:   XR CHEST AP PORTABLE  dated  9/24/2020 6:53 AM    CLINICAL HISTORY:   Female 60 years of age.   Tachycardia, hypotension    TECHNIQUE: AP view of the chest obtained portably at 6:53 AM.    PREVIOUS STUDIES:  August 1,  2020    FINDINGS:    There are median sternotomy wires and mediastinal surgical clips. Mild  cardiomegaly is stable. Right hemidiaphragm elevation is unchanged.  There is no acute pulmonary infiltrate. There is no pleural effusion  or pneumothorax.    IMPRESSION:    No acute findings. Elevated right hemidiaphragm.  Unchanged compared with the prior study.  Prior CABG. Stable mild cardiomegaly.    Electronically Signed by Dionna Blevins on 9/24/2020 7:32 AM                                            Attending Attestation:             Attending ED Notes:   60-year-old female who had presented with complaints of low blood pressure and rapid heartbeat, has had a persistent tachycardia with a ventricular rate between 128 and 130.  During the ED course the patient received a total of 750 cc of saline without any changes in her ventricular rate.  EKG showed a sinus tachycardia rate of 130 with a short p.r.n. interval is not any definite evidence of atrial flutter.  There is evidence of incomplete right bundle.  The baseline EKG is unchanged from prior with only a rate change.  Labs showed an elevated TSH of 10.6.  BNP is 570.  Sodium was 129 potassium 0.2.  BUN and creatinine is 46 and 5 respectively.  Blood sugar was 124.  During the ED course I did speak to Dr. Crystal, nephrology as well as Hospital Medicine, Dr. Ruth will be admitting the patient.  Patient is already on anticoagulants and is unlikely that she has a PE.  There is no fever that would account for her tachycardia either.  Orthostatic vital signs were unremarkable.  She did not respond to hydration to improve her ventricular rate.  Dr. Ruth, [facility]\"" medicine will be the admitting physician.                    Clinical Impression:     ICD-10-CM ICD-9-CM   1. Stage 5 chronic kidney disease on chronic dialysis  N18.6 585.6    Z99.2 V45.11   2. Tachycardia  R00.0 785.0   3. Hypotension  I95.9 458.9   4. Hypothyroidism, unspecified type  E03.9 244.9                           ED Disposition Condition    Observation                             Mitul Guallpa Jr., MD  09/24/20 1038

## 2020-09-24 NOTE — H&P
[facility]  History & Physical    Patient Name: Juliane Ramos  MRN: [id number]  Admission Date: 9/24/2020  Attending Physician: Mitul Guallpa Jr., *   Primary Care Provider: JOS Dumont         Patient information was obtained from patient and ER records.  ED physician and       Subjective:     Principal Problem:<principal problem not specified>    Chief Complaint:   Chief Complaint   Patient presents with    Tachycardia    Hypotension        HPI: Patient is a 60-year-old female hx ESRD HD (T,Th, Sat)  CAD/CABG ,EF20%?, COPD, DM ,chronic hypotension  presents ED with complaints of having low blood pressure and rapid heart rate.  In the emergency department patient had a heart rate in the 130s regular and a blood pressure systolic of approximately 100  Patient denies shortness of breath chest pain fever chills abdominal pain nausea vomiting.  She states her last dialysis on Tuesday she did not have any volume removed.  Patient has been given approximately 500 cc of IV fluids in the emergency department at remains tachycardic in the 120s.      Past Medical History:   Diagnosis Date    Anemia     Anemia in stage 3 chronic kidney disease 11/26/2017    Anticoagulant long-term use     CHF (congestive heart failure)     COPD (chronic obstructive pulmonary disease)     COPD exacerbation 3/10/2020    Coronary artery disease     Diabetes mellitus     Digestive disorder     Encounter for blood transfusion     Essential hypertension 6/24/2017    Hypertension     Low blood pressure     MI (myocardial infarction) 2010    Segmental and subsegmental pulmonary emboli of RLL without acute cor pulmonale 11/25/2017    Stroke     July 2005    Thyroid disease     Traumatic subarachnoid hemorrhage 11/24/2017    Type 2 diabetes mellitus with stage 3 chronic kidney disease, without long-term current use of insulin 6/24/2017       Past Surgical History:   Procedure  "Laterality Date    ABCESS DRAINAGE Right     Between "little toe" and the one next to it    BREAST SURGERY      Reduction [id number]    CARDIAC SURGERY  01/2011    CABG 4vessel ring in one valve mitral valve prolapse    CORONARY ARTERY BYPASS GRAFT      DEBRIDEMENT OF FOOT Left 9/4/2020    Procedure: DEBRIDEMENT, FOOT;  Surgeon: Dennis Wick DPM;  Location: [facility];  Service: Podiatry;  Laterality: Left;    FOOT SURGERY      4 grafts to left foot    hyperlipidemia      TUBAL LIGATION  03/1986       Review of patient's allergies indicates:  No Known Allergies    No current facility-administered medications on file prior to encounter.      Current Outpatient Medications on File Prior to Encounter   Medication Sig    albuterol (ACCUNEB) 1.25 mg/3 mL Nebu Take 1.25 mg by nebulization 3 (three) times daily as needed.     albuterol (PROAIR HFA) 90 mcg/actuation inhaler Inhale 2 puffs into the lungs once daily. Rescue    albuterol-ipratropium (DUO-NEB) 2.5 mg-0.5 mg/3 mL nebulizer solution Take 3 mLs by nebulization every 6 (six) hours. Rescue    apixaban 5 mg Tab Take 1 tablet (5 mg total) by mouth 2 (two) times daily. (Patient taking differently: Take 2.5 mg by mouth 2 (two) times daily. )    blood sugar diagnostic Strp Test 3-4 times daily PRN    cephALEXin (KEFLEX) 500 MG capsule TAKE 1 CAPSULE BY MOUTH EVERY 12 HOURS FOR 7 DAYS    citalopram (CELEXA) 20 MG tablet Take 1 tablet (20 mg total) by mouth once daily.    CLARITIN-D 12 HOUR 5-120 mg per tablet Take 1 tablet by mouth once daily.     DIALYVITE 100-1 mg Tab Take 1 tablet by mouth 2 (two) times daily.     fluticasone (FLONASE) 50 mcg/actuation nasal spray 1 spray by Each Nostril route once daily.     gabapentin (NEURONTIN) 100 MG capsule Take 100 mg by mouth 3 (three) times daily.    honey (MEDIHONEY, HONEY,) 80 % Gel Apply 1 Tube topically once daily.    HYDROcodone-acetaminophen (NORCO) 5-325 mg per tablet Take 1 tablet by mouth every 6 " "(six) hours as needed for Pain.    insulin aspart (NOVOLOG) 100 unit/mL InPn pen Inject 1-10 Units into the skin 3 (three) times daily.    levothyroxine (SYNTHROID) 50 MCG tablet Take 50 mcg by mouth before breakfast.     midodrine (PROAMATINE) 5 MG Tab Take 5 mg by mouth 3 (three) times daily with meals.     ondansetron (ZOFRAN-ODT) 4 MG TbDL LET 1 TABLET DISSOLVE ON TONGUE 4 TIMES A DAY AS NEEDED    pen needle, diabetic (BD ULTRA-FINE OBDULIA PEN NEEDLE) 32 gauge x 5/32" Ndle     promethazine (PHENERGAN) 25 MG tablet Take 1 tablet (25 mg total) by mouth every 6 (six) hours as needed for Nausea.    ramelteon (ROZEREM) 8 mg tablet Take 8 mg by mouth every evening.    rosuvastatin (CRESTOR) 10 MG tablet Take 10 mg by mouth once daily.     sevelamer carbonate (RENVELA) 800 mg Tab Take 1,600 mg by mouth 3 (three) times daily with meals.    tiotropium (SPIRIVA) 18 mcg inhalation capsule Inhale 1 capsule (18 mcg total) into the lungs once daily. Controller     Family History     Problem Relation (Age of Onset)    Arthritis Mother    Cancer Father (68)    Depression Sister    Diabetes Father, Maternal Grandmother    Heart disease Father    Hypertension Father, Son    Kidney disease Paternal Grandfather    Stroke Paternal Grandfather        Tobacco Use    Smoking status: Never Smoker    Smokeless tobacco: Never Used   Substance and Sexual Activity    Alcohol use: Yes     Alcohol/week: 1.0 - 2.0 standard drinks     Types: 1 - 2 Glasses of wine per week     Comment: "socially" not in awhile    Drug use: No    Sexual activity: Yes     Partners: Male     Birth control/protection: None     Review of Systems   Constitutional: Positive for fatigue. Negative for chills, diaphoresis and fever.   HENT: Negative for sinus pressure, sneezing and sore throat.    Eyes: Negative for discharge.   Respiratory: Negative for cough, chest tightness, shortness of breath and wheezing.    Gastrointestinal: Negative for abdominal " pain, constipation, diarrhea, nausea and vomiting.   Genitourinary: Negative for dysuria, flank pain and urgency.   Musculoskeletal: Negative for back pain and neck stiffness.   Skin: Negative for rash.   Neurological:        Generalized weakness, near-syncope   Psychiatric/Behavioral: Negative for confusion and hallucinations.     Objective:     Vital Signs (Most Recent):  Temp: 97.7 °F (36.5 °C) (09/24/20 0552)  Pulse: (!) 128 (09/24/20 1000)  Resp: 20 (09/24/20 1000)  BP: 100/62 (09/24/20 1000)  SpO2: 100 % (09/24/20 1000) Vital Signs (24h Range):  Temp:  [97.7 °F (36.5 °C)] 97.7 °F (36.5 °C)  Pulse:  [128-131] 128  Resp:  [16-21] 20  SpO2:  [98 %-100 %] 100 %  BP: (100-112)/(60-72) 100/62     Weight: 80.8 kg (178 lb 2.1 oz)  Body mass index is 28.75 kg/m².    Physical Exam    patient appears no acute distress  is at bedside  Patient lying on a [facility] emergency department  HEENT sclerae nonicteric  Neck is supple nontender  Lungs are clear to auscultation on the coarse breath sounds  Heart regular rhythm tachycardic in the 120s  Abdomen is soft nontender  Extremities no edema  Skin left posterior distal lower extremity with open wound recent set skin graft see photo  Neuro patient is alert oriented x3 motor exam is nonfocal   exam no Chi  Psych patient is pleasant, cooperative       Significant Labs:   BMP:   Recent Labs   Lab 09/24/20  0630   *   *   K 5.2*   CL 91*   CO2 24   BUN 46*   CREATININE 5.0*   CALCIUM 8.7     CBC:   Recent Labs   Lab 09/24/20  0630   WBC 11.72   HGB 12.8   HCT 41.9        CMP:   Recent Labs   Lab 09/24/20  0630   *   K 5.2*   CL 91*   CO2 24   *   BUN 46*   CREATININE 5.0*   CALCIUM 8.7   PROT 5.8*   ALBUMIN 2.7*   BILITOT 1.0   ALKPHOS 69   AST 26   ALT 19   ANIONGAP 14   EGFRNONAA 8.9*     SARS-CoV-2 negative  Significant Imaging:   Chest x-ray read by me shows no acute cardiopulmonary process right elevated diaphragm  Agree with  report  ECG read by me shows sinus rhythm incomplete right bundle-branch block no acute ST segment changes   ECG may be atrial flutter Will discussed with Cardiology  Assessment/Plan:     #1 Near syncope-rule out cardiac etiology  May be secondary to hypotension  #2 Hx orthostatic hypertension patient takes midodrine  #3 Probable volume depletion-getting IV fluids in the emergency department.  Will monitor BP and heart rate was serial exams  #4 Tachycardia-will give trial of IV fluids.  Sinus tach versus atrial flutter  Cardiology evaluation is pending  Check serial cardiac enzymes  #5 ESRD HD Tuesday Thursday Saturday  Consult renal  #6 CAD/CABG-/EF20%?check serial cardiac enzymes  Cardiac monitoring.  Cardiology eval pending  Echocardiogram is pending  #7 Chronic wound left distal posterior lower extremity-  Wound care eval.  Followed by Dr. Wick  #8  Hyponatremia-most likely secondary to volume depletion.  Check labs in a.m.  #9 Hyperkalemia-dialysis today  #10 DM2-follow sliding scale check hemoglobin A1c      Addendum to above 1620    Patient is still hypotensive with heart rate systolic at approximately 85.  She does have chronic hypotension and takes midodrine.  However her rate is still poorly controlled at approximately 1:20 a.m..  Cardiology felt that patient may have atrial flutter and amiodarone IV protocol was started and ED  Transferring to ICU for higher level  Of care.  Patient has high risk of life-threatening deterioration /decline    1645   I spoke with Dr Palomo   Will give dig 0.25mg now and repeat at 2200   Echo pending     VTE Risk Mitigation (From admission, onward)         Ordered     apixaban tablet 2.5 mg  2 times daily      09/24/20 1018     Place MARY ELLEN hose  Until discontinued      09/24/20 1018     IP VTE HIGH RISK PATIENT  Once      09/24/20 1018     Place sequential compression device  Until discontinued      09/24/20 1018                   Oscar Ruth DO  Department of Hospital  Medicine   [facility]

## 2020-09-24 NOTE — CONSULTS
"Nephrology Consult Note        Patient Name: Juliane Ramos  MRN: [id number]    Patient Class: IP- Inpatient   Admission Date: 9/24/2020  Length of Stay: 0 days  Date of Service: 9/24/2020    Attending Physician: Mitul Guallpa Jr., *  Primary Care Provider: JOS Dumont    Reason for Consult: esrd/anemia/hyperkalemia/hypotension/shpt/tachycardia    SUBJECTIVE:     HPI: 60F hx ESRD HD (T,Th, Sat)  CAD/CABG ,EF20%?, COPD, DM ,chronic hypotension on midodrine daily presents to ED with complaints of having low blood pressure and rapid heart rate. In the emergency department patient had a heart rate in the 130s regular and SBP in 100s  Patient denies shortness of breath, chest pain, fever, chills, abdominal pain, nausea, vomiting. Last dialysis on Tuesday and she did not have any volume removed.  Patient has been given approximately 500 cc of IV fluids in the emergency department at remains tachycardic in the 120s. Cards eval is pending, Amiodarone was started.    Past Medical History:   Diagnosis Date    Anemia     Anemia in stage 3 chronic kidney disease 11/26/2017    Anticoagulant long-term use     CHF (congestive heart failure)     COPD (chronic obstructive pulmonary disease)     COPD exacerbation 3/10/2020    Coronary artery disease     Diabetes mellitus     Digestive disorder     Encounter for blood transfusion     Essential hypertension 6/24/2017    Hypertension     Low blood pressure     MI (myocardial infarction) 2010    Segmental and subsegmental pulmonary emboli of RLL without acute cor pulmonale 11/25/2017    Stroke     July 2005    Thyroid disease     Traumatic subarachnoid hemorrhage 11/24/2017    Type 2 diabetes mellitus with stage 3 chronic kidney disease, without long-term current use of insulin 6/24/2017     Past Surgical History:   Procedure Laterality Date    ABCESS DRAINAGE Right     Between "little toe" and the one next to it    BREAST SURGERY      Reduction [id number] " "   CARDIAC SURGERY  2011    CABG 4vessel ring in one valve mitral valve prolapse    CORONARY ARTERY BYPASS GRAFT      DEBRIDEMENT OF FOOT Left 2020    Procedure: DEBRIDEMENT, FOOT;  Surgeon: Dennis Wick DPM;  Location: [facility];  Service: Podiatry;  Laterality: Left;    FOOT SURGERY      4 grafts to left foot    hyperlipidemia      TUBAL LIGATION  1986     Family History   Problem Relation Age of Onset    Arthritis Mother     Heart disease Father     Cancer Father 68        prostae and bladder ca  in     Diabetes Father     Hypertension Father     Depression Sister     Hypertension Son     Diabetes Maternal Grandmother         lost leg    Kidney disease Paternal Grandfather         had kidney removed    Stroke Paternal Grandfather      Social History     Tobacco Use    Smoking status: Never Smoker    Smokeless tobacco: Never Used   Substance Use Topics    Alcohol use: Yes     Alcohol/week: 1.0 - 2.0 standard drinks     Types: 1 - 2 Glasses of wine per week     Comment: "socially" not in awhile    Drug use: No       Review of patient's allergies indicates:  No Known Allergies    Outpatient meds:  No current facility-administered medications on file prior to encounter.      Current Outpatient Medications on File Prior to Encounter   Medication Sig Dispense Refill    gabapentin (NEURONTIN) 100 MG capsule Take 100 mg by mouth 3 (three) times daily.  3    midodrine (PROAMATINE) 5 MG Tab Take 5 mg by mouth 3 (three) times daily with meals.       rosuvastatin (CRESTOR) 10 MG tablet Take 10 mg by mouth every evening.       albuterol-ipratropium (DUO-NEB) 2.5 mg-0.5 mg/3 mL nebulizer solution Take 3 mLs by nebulization every 6 (six) hours. Rescue 1 Box 11    apixaban 5 mg Tab Take 1 tablet (5 mg total) by mouth 2 (two) times daily. (Patient taking differently: Take 5 mg by mouth 2 (two) times daily. ) 60 tablet 1    blood sugar diagnostic Strp Test 3-4 times daily PRN      " "CLARITIN-D 12 HOUR 5-120 mg per tablet Take 1 tablet by mouth once daily.   0    fluticasone (FLONASE) 50 mcg/actuation nasal spray 1 spray by Each Nostril route once daily.   12    insulin aspart (NOVOLOG) 100 unit/mL InPn pen Inject 1-10 Units into the skin 3 (three) times daily. (Patient taking differently: Inject 1-10 Units into the skin 3 (three) times daily. If sugar >150 on sliding scale) 3 mL 1    levothyroxine (SYNTHROID) 50 MCG tablet Take 50 mcg by mouth before breakfast.       pen needle, diabetic (BD ULTRA-FINE OBDULIA PEN NEEDLE) 32 gauge x 5/32" Ndle       ramelteon (ROZEREM) 8 mg tablet Take 8 mg by mouth every evening.      sevelamer carbonate (RENVELA) 800 mg Tab Take 1,600 mg by mouth 3 (three) times daily with meals.      tiotropium (SPIRIVA) 18 mcg inhalation capsule Inhale 1 capsule (18 mcg total) into the lungs once daily. Controller 30 capsule 11    umeclidinium (INCRUSE ELLIPTA) 62.5 mcg/actuation inhalation capsule Inhale 62.5 mcg into the lungs once daily. Controller      [DISCONTINUED] albuterol (ACCUNEB) 1.25 mg/3 mL Nebu Take 1.25 mg by nebulization 3 (three) times daily as needed.   1    [DISCONTINUED] albuterol (PROAIR HFA) 90 mcg/actuation inhaler Inhale 2 puffs into the lungs once daily. Rescue      [DISCONTINUED] cephALEXin (KEFLEX) 500 MG capsule TAKE 1 CAPSULE BY MOUTH EVERY 12 HOURS FOR 7 DAYS      [DISCONTINUED] citalopram (CELEXA) 20 MG tablet Take 1 tablet (20 mg total) by mouth once daily.      [DISCONTINUED] DIALYVITE 100-1 mg Tab Take 1 tablet by mouth 2 (two) times daily.   1    [DISCONTINUED] honey (MEDIHONEY, HONEY,) 80 % Gel Apply 1 Tube topically once daily. 1 Tube 0    [DISCONTINUED] HYDROcodone-acetaminophen (NORCO) 5-325 mg per tablet Take 1 tablet by mouth every 6 (six) hours as needed for Pain.      [DISCONTINUED] ondansetron (ZOFRAN-ODT) 4 MG TbDL LET 1 TABLET DISSOLVE ON TONGUE 4 TIMES A DAY AS NEEDED  0    [DISCONTINUED] promethazine (PHENERGAN) " 25 MG tablet Take 1 tablet (25 mg total) by mouth every 6 (six) hours as needed for Nausea. 30 tablet 0       Scheduled meds:   albuterol-ipratropium  3 mL Nebulization Q6H    apixaban  2.5 mg Oral BID    citalopram  20 mg Oral Daily    [START ON 9/25/2020] levothyroxine  50 mcg Oral Before breakfast    rosuvastatin  10 mg Oral Daily    sevelamer carbonate  1,600 mg Oral TID WM    tiotropium  18 mcg Inhalation Daily       Infusions:   amiodarone in dextrose 5%         PRN meds:  acetaminophen, dextrose 50%, dextrose 50%, glucagon (human recombinant), glucose, glucose, insulin aspart U-100, ondansetron    Review of Systems:  Review of Systems   Constitutional: Negative for chills, fever, malaise/fatigue and weight loss.   HENT: Negative for hearing loss and nosebleeds.    Eyes: Negative for blurred vision, double vision and photophobia.   Respiratory: Negative for cough, shortness of breath and wheezing.    Cardiovascular: Negative for chest pain, palpitations and leg swelling.   Gastrointestinal: Negative for abdominal pain, constipation, diarrhea, heartburn, nausea and vomiting.   Genitourinary: Negative for dysuria, frequency and urgency.   Musculoskeletal: Negative for falls, joint pain and myalgias.   Skin: Negative for itching and rash.   Neurological: Negative for dizziness, speech change, focal weakness, loss of consciousness and headaches.   Endo/Heme/Allergies: Does not bruise/bleed easily.   Psychiatric/Behavioral: Negative for depression and substance abuse. The patient is not nervous/anxious.        OBJECTIVE:     Vital Signs and IO (Last 24H):  Temp:  [97.7 °F (36.5 °C)]   Pulse:  [128-131]   Resp:  [16-24]   BP: (100-112)/(57-72)   SpO2:  [96 %-100 %]   No intake/output data recorded.    Wt Readings from Last 5 Encounters:   09/24/20 80.8 kg (178 lb 2.1 oz)   09/08/20 79.8 kg (176 lb)   09/04/20 80 kg (176 lb 5.9 oz)   09/02/20 80 kg (176 lb 5.9 oz)   08/01/20 82.1 kg (181 lb)         Physical  Exam:  Physical Exam  Constitutional:       Appearance: She is well-developed. She is not diaphoretic.   HENT:      Head: Normocephalic and atraumatic.   Eyes:      General: No scleral icterus.     Pupils: Pupils are equal, round, and reactive to light.   Neck:      Musculoskeletal: Neck supple.   Cardiovascular:      Rate and Rhythm: Normal rate and regular rhythm.   Pulmonary:      Effort: Pulmonary effort is normal. No respiratory distress.      Breath sounds: No stridor.   Abdominal:      General: There is no distension.      Palpations: Abdomen is soft.   Musculoskeletal: Normal range of motion.         General: No deformity.   Skin:     General: Skin is warm and dry.      Findings: No erythema or rash.   Neurological:      Mental Status: She is alert and oriented to person, place, and time.      Cranial Nerves: No cranial nerve deficit.   Psychiatric:         Behavior: Behavior normal.         Body mass index is 28.75 kg/m².    Laboratory:  Recent Labs   Lab 09/24/20  0630   *   K 5.2*   CL 91*   CO2 24   BUN 46*   CREATININE 5.0*   ESTGFRAFRICA 10.3*   EGFRNONAA 8.9*   *       Recent Labs   Lab 09/24/20  0630   CALCIUM 8.7   ALBUMIN 2.7*             No results for input(s): POCTGLUCOSE in the last 168 hours.    Recent Labs   Lab 02/21/18  2120 09/12/18  1715 09/24/20  0630   Hemoglobin A1C 6.1 H 5.2 6.3 H       Recent Labs   Lab 09/24/20  0630   WBC 11.72   HGB 12.8   HCT 41.9      MCV 93   MCHC 30.5*   MONO 11.9  1.4*       Recent Labs   Lab 09/24/20  0630   BILITOT 1.0   PROT 5.8*   ALBUMIN 2.7*   ALKPHOS 69   ALT 19   AST 26       Recent Labs   Lab 11/23/17  1252 11/24/17  1837 09/11/18  0246   Color, UA Yellow Red A Yellow   Appearance, UA Clear Cloudy A Hazy A   pH, UA 5.0 5.0 5.0   Specific Gravity, UA 1.015 >1.030 A >=1.030 A   Protein, UA Trace A 2+ A 2+ A   Glucose, UA Negative 1+ A Negative   Ketones, UA Negative Negative Negative   Urobilinogen, UA Negative Negative Negative    Bilirubin (UA) Negative Negative Negative   Occult Blood UA Trace A 3+ A 1+ A   Nitrite, UA Negative Negative Negative   RBC, UA  --  >100 H 5 H   WBC, UA  --  0 >100 H   Bacteria  --  Rare Moderate A   Hyaline Casts, UA  --  0 0       Recent Labs   Lab 11/30/17  2236 12/11/17  0410 09/11/18  0209   POC PH 7.354 7.350 7.375   POC PCO2 44.8 41.6 46.4 H   POC HCO3 25.0 23.0 L 27.1   POC PO2 112 H 129 H 61 L   POC SATURATED O2 98 99 90 L   POC BE -1 -3 2   Sample ARTERIAL ARTERIAL ARTERIAL       Microbiology Results (last 7 days)     ** No results found for the last 168 hours. **          ASSESSMENT/PLAN:     Active Hospital Problems    Diagnosis  POA    Tachycardia [R00.0]  Yes    Stage 5 chronic kidney disease on chronic dialysis [N18.6, Z99.2]  Not Applicable      Resolved Hospital Problems   No resolved problems to display.     ESRD on HD TTS via AVF  Hyperkalemia, mild  Hypotension  Tachycardia  High TSH with normal T4, ? uncontrolled hypothyroidism  Continue current dialysis prescription.  Next HD PRN, hold today.  Renal diet - low K, low phos. Start Lokelma.  No IVs or BP checks on access and/or non-dominant arm.  Awaiting cards eval. Can give more IVF. Etiology of tachycardia is unclear. Holding midodrine for now. Hold HD for now.    Anemia of CKD  Hgb and HCT are acceptable. Monitor.  Will provide YASHIRA and/or IV iron PRN.    MBD / Secondary HPT  Ca, phos, PTH and vitamin D levels are acceptable.   Phos binders, vitamin D analogues and calcimimetics as needed.    Thank you for allowing us to participate in the care of your patient!   We will follow the patient and provide recommendations as needed.    Edson Crystal MD    [facility]  [address] [place] [address]  CECE Kamara [id number] [phone] - tel  [phone] - fax    9/24/2020 1:53 PM

## 2020-09-24 NOTE — CONSULTS
Oscar Ruth,    [facility] Medicine   H&P   Addendum                       [facility]  Cardiology consultation  09/24/2020     Patient Name: Juliane Ramos  MRN: [id number]  Admission Date: 9/24/2020  Attending Physician: Mitul Guallpa Jr., *   Primary Care Provider: JOS Dumont           Patient information was obtained from patient and ER records.  ED physician and .  Patient was seen in the emergency room.        Subjective:      Principal Problem:<principal problem not specified>     Chief Complaint:       Chief Complaint   Patient presents with    Tachycardia    Hypotension         HPI: Patient is a 60-year-old female hx ESRD HD (T,Th, Sat)  CAD/CABG ,EF20%?, COPD, DM ,chronic hypotension  presents ED with complaints of having low blood pressure and rapid heart rate.  In the emergency department patient had a heart rate in the 130s regular and a blood pressure systolic of approximately 100 the patient's heart rate did not slow after bilateral carotid sinus massage.  She was given adenosine 6 and then 12 mg rapid IV; she had a pause with the latter dose and clear flutter waves were visible.  Patient denies shortness of breath chest pain fever chills abdominal pain nausea vomiting.  She states her last dialysis on Tuesday she did not have any volume removed.  However, she states that during a dialysis session on Saturday, her weight dropped by 3 kilos or greater.   Patient has been given approximately 500 cc of IV fluids in the emergency department at remains tachycardic in the 120s.     Angiography in 2011 revealed multivessel CAD with total occlusion of the proximal LAD.  There was high-grade stenosis in the obtuse marginal branch of the circumflex, high-grade stenosis in the mid circumflex artery and disease in the PDA.  The patient then underwent 4 vessel coronary artery bypass graft surgery.  She had SVG to the LAD and 2nd diagonal SVG to the OMB, SVG to  "the OM 2.  She had her mitral valve repair with a 28 mm Bk Edward ring.  The patient has orthostatic hypotension and has been on a midodrine.  The patient has been lost to follow-up for the last 2 years       Past Medical History:   Diagnosis Date    Anemia      Anemia in stage 3 chronic kidney disease 11/26/2017    Anticoagulant long-term use      CHF (congestive heart failure)      COPD (chronic obstructive pulmonary disease)      COPD exacerbation 3/10/2020    Coronary artery disease      Diabetes mellitus since 2005      Digestive disorder      Encounter for blood transfusion      Essential hypertension 6/24/2017    Hypertension since 2000      Low blood pressure      MI (myocardial infarction) 2010    Segmental and subsegmental pulmonary emboli of RLL without acute cor pulmonale; IVC filter. 11/25/2017    Stroke       July 2005    Thyroid disease      Traumatic subarachnoid hemorrhage 11/24/2017    Type 2 diabetes mellitus with stage 3 chronic kidney disease, without long-term current use of insulin 6/24/2017    CVA with left hemiparesis July 2005.             Past Surgical History:   Procedure Laterality Date    ABCESS DRAINAGE Right       Between "little toe" and the one next to it    BREAST SURGERY         Reduction [id number]    CARDIAC SURGERY   01/2011     CABG 4vessel ring in one valve mitral valve prolapse    CORONARY ARTERY BYPASS GRAFT        DEBRIDEMENT OF FOOT Left 9/4/2020     Procedure: DEBRIDEMENT, FOOT;  Surgeon: Dennis Wick DPM;  [facility];  Service: Podiatry;  Laterality: Left;    FOOT SURGERY         4 grafts to left foot    hyperlipidemia        TUBAL LIGATION   03/1986         Review of patient's allergies indicates:  No Known Allergies     No current facility-administered medications on file prior to encounter.            Current Outpatient Medications on File Prior to Encounter   Medication Sig    albuterol (ACCUNEB) 1.25 mg/3 mL Nebu Take " "1.25 mg by nebulization 3 (three) times daily as needed.     albuterol (PROAIR HFA) 90 mcg/actuation inhaler Inhale 2 puffs into the lungs once daily. Rescue    albuterol-ipratropium (DUO-NEB) 2.5 mg-0.5 mg/3 mL nebulizer solution Take 3 mLs by nebulization every 6 (six) hours. Rescue    apixaban 5 mg Tab Take 1 tablet (5 mg total) by mouth 2 (two) times daily. (Patient taking differently: Take 2.5 mg by mouth 2 (two) times daily. )    blood sugar diagnostic Strp Test 3-4 times daily PRN    cephALEXin (KEFLEX) 500 MG capsule TAKE 1 CAPSULE BY MOUTH EVERY 12 HOURS FOR 7 DAYS    citalopram (CELEXA) 20 MG tablet Take 1 tablet (20 mg total) by mouth once daily.    CLARITIN-D 12 HOUR 5-120 mg per tablet Take 1 tablet by mouth once daily.     DIALYVITE 100-1 mg Tab Take 1 tablet by mouth 2 (two) times daily.     fluticasone (FLONASE) 50 mcg/actuation nasal spray 1 spray by Each Nostril route once daily.     gabapentin (NEURONTIN) 100 MG capsule Take 100 mg by mouth 3 (three) times daily.    honey (MEDIHONEY, HONEY,) 80 % Gel Apply 1 Tube topically once daily.    HYDROcodone-acetaminophen (NORCO) 5-325 mg per tablet Take 1 tablet by mouth every 6 (six) hours as needed for Pain.    insulin aspart (NOVOLOG) 100 unit/mL InPn pen Inject 1-10 Units into the skin 3 (three) times daily.    levothyroxine (SYNTHROID) 50 MCG tablet Take 50 mcg by mouth before breakfast.     midodrine (PROAMATINE) 5 MG Tab Take 5 mg by mouth 3 (three) times daily with meals.     ondansetron (ZOFRAN-ODT) 4 MG TbDL LET 1 TABLET DISSOLVE ON TONGUE 4 TIMES A DAY AS NEEDED    pen needle, diabetic (BD ULTRA-FINE OBDULIA PEN NEEDLE) 32 gauge x 5/32" Ndle      promethazine (PHENERGAN) 25 MG tablet Take 1 tablet (25 mg total) by mouth every 6 (six) hours as needed for Nausea.    ramelteon (ROZEREM) 8 mg tablet Take 8 mg by mouth every evening.    rosuvastatin (CRESTOR) 10 MG tablet Take 10 mg by mouth once daily.     sevelamer carbonate " "(RENVELA) 800 mg Tab Take 1,600 mg by mouth 3 (three) times daily with meals.    tiotropium (SPIRIVA) 18 mcg inhalation capsule Inhale 1 capsule (18 mcg total) into the lungs once daily. Controller           Family History      Problem Relation (Age of Onset)     Arthritis Mother     Cancer Father (68)     Depression Sister     Diabetes Father, Maternal Grandmother     Heart disease Father     Hypertension Father, Son     Kidney disease Paternal Grandfather     Stroke Paternal Grandfather                Tobacco Use    Smoking status: Never Smoker    Smokeless tobacco: Never Used   Substance and Sexual Activity    Alcohol use: Yes       Alcohol/week: 1.0 - 2.0 standard drinks       Types: 1 - 2 Glasses of wine per week       Comment: "socially" not in awhile    Drug use: No    Sexual activity: Yes       Partners: Male       Birth control/protection: None      Review of Systems   Constitutional: Positive for fatigue. Negative for chills, diaphoresis and fever.   HENT: Negative for sinus pressure, sneezing and sore throat.    Eyes: Negative for discharge.   Respiratory: Negative for cough, chest tightness, shortness of breath and wheezing.    Gastrointestinal: Negative for abdominal pain, constipation, diarrhea, nausea and vomiting.   Genitourinary: Negative for dysuria, flank pain and urgency.   Musculoskeletal: Negative for back pain and neck stiffness.   Skin: Negative for rash.   Neurological:        Generalized weakness, near-syncope   Psychiatric/Behavioral: Negative for confusion and hallucinations.      Objective:      Vital Signs (Most Recent):  Temp: 97.7 °F (36.5 °C) (09/24/20 0552)  Pulse: (!) 128 (09/24/20 1000)  Resp: 20 (09/24/20 1000)  BP: 100/62 (09/24/20 1000)  SpO2: 100 % (09/24/20 1000) Vital Signs (24h Range):  Temp:  [97.7 °F (36.5 °C)] 97.7 °F (36.5 °C)  Pulse:  [128-131] 128  Resp:  [16-21] 20  SpO2:  [98 %-100 %] 100 %  BP: (100-112)/(60-72) 100/62      Weight: 80.8 kg (178 lb 2.1 " oz)  Body mass index is 28.75 kg/m².     Physical Exam    patient appears no acute distress  is at bedside  Patient lying on a [facility] emergency department  HEENT sclerae nonicteric  Neck is supple nontender  Lungs are clear to auscultation on the coarse breath sounds  Heart regular rhythm tachycardic in the 120s  Abdomen is soft nontender  Extremities no edema  Skin left posterior distal lower extremity with open wound recent set skin graft see photo  Neuro patient is alert oriented x3 motor exam is nonfocal   exam no Chi  Psych patient is pleasant, cooperative        Significant Labs:   BMP:       Recent Labs   Lab 09/24/20  0630   *   *   K 5.2*   CL 91*   CO2 24   BUN 46*   CREATININE 5.0*   CALCIUM 8.7      CBC:       Recent Labs   Lab 09/24/20  0630   WBC 11.72   HGB 12.8   HCT 41.9         CMP:       Recent Labs   Lab 09/24/20  0630   *   K 5.2*   CL 91*   CO2 24   *   BUN 46*   CREATININE 5.0*   CALCIUM 8.7   PROT 5.8*   ALBUMIN 2.7*   BILITOT 1.0   ALKPHOS 69   AST 26   ALT 19   ANIONGAP 14   EGFRNONAA 8.9*      SARS-CoV-2 negative  Significant Imaging:   Chest x-ray read by me shows no acute cardiopulmonary process right elevated diaphragm    ECG read by me -2:1 atrial flutter with RVR; conformed after intravenous adenosine bolus.    Assessment/Plan:      #1 Near syncope-rule out cardiac etiology  May be secondary to hypotension  #2 Hx orthostatic hypotension patient takes midodrine  #3 Probable volume depletion-getting IV fluids in the emergency department.  Will monitor BP and heart rate was serial exams  #4 Tachycardia-will give trial of IV fluids.  Atrial flutter    Check serial cardiac enzymes  #5 ESRD HD Tuesday Thursday Saturday  Consult renal  #6 CAD/CABG-/EF20%?check serial cardiac enzymes  Cardiac monitoring.   Echocardiogram in a.m.  #7 Chronic wound left distal posterior lower extremity-  Wound care eval.  Followed by Dr. Wick  #8  Hyponatremia-most  likely secondary to volume depletion.  Check labs in a.m.  #9 Hyperkalemia-dialysis today  #10 DM2-follow sliding scale check hemoglobin A1c        Addendum to above 1620     Patient is still hypotensive with heart rate systolic at approximately 85.  She does have chronic hypotension and takes midodrine.  However her rate is still poorly controlled at approximately 1:20pm  Cardiology felt that patient may have atrial flutter and amiodarone IV protocol was started and ED  Transferring to ICU for higher level  Of care.  Patient has high risk of life-threatening deterioration /decline     1645   I spoke with Dr Palomo   Will give dig 0.25mg now and repeat at 2200   Echo pending          VTE Risk Mitigation (From admission, onward)                  Ordered        apixaban tablet 2.5 mg  2 times daily      09/24/20 1018        Place MARY ELLEN hose  Until discontinued      09/24/20 1018        IP VTE HIGH RISK PATIENT  Once      09/24/20 1018        Place sequential compression device  Until discontinued      09/24/20 1018                Patient continues to be hypotensive.  She was given metoprolol 2.5 mg IV.  Ventricular rate is approximately 114 B p.m..  Start Jasper-Synephrine to maintain blood pressure.  Will hold the patient NPO and plan electrical cardioversion in the morning, atrial  flutter persists.  The patient has been on Eliquis 2.5 mg b.i.d. no skipped doses.        SHAE Palomo MD [facility]  Cardiology  Formerly [facility], [facility]      Revision History

## 2020-09-25 NOTE — HPI
Juliane Ramos is a 60 y.o. female with an ulcer of her left lower leg. Overlying her Achilles tendon  for over 2 years.  patient has been under the treatment Dr. Wick for wound care.  recent application of Apligraf was done it in the OR by Dr. Wick on September 4th.   patient presented to the [facility] emergency room with complaints of  rapid heart beat.  Patient was admitted and  podiatry was consulted for  her left lower leg chronic ulceration.

## 2020-09-25 NOTE — NURSING
Dr swift  Sitting  In chair  At bedside  adressing  Pt concerns / k5.7   Dr dickerson  Called   Cardioversion on hold  Till after  Dialysis  Notified  Dr larson   Of  delay

## 2020-09-25 NOTE — CONSULTS
[facility]  Podiatry  Consult Note    Patient Name: Juliane Ramos  MRN: [id number]  Admission Date: 9/24/2020  Hospital Length of Stay: 1 days  Attending Physician: Oscar Ruth DO  Primary Care Provider: JOS Dumont     Consults  Subjective:     History of Present Illness:  Juliane Ramos is a 60 y.o. female with an ulcer of her left lower leg. Overlying her Achilles tendon  for over 2 years.  patient has been under the treatment Dr. Wick for wound care.  recent application of Apligraf was done it in the OR by Dr. Wick on September 4th.   patient presented to the [facility] emergency room with complaints of  rapid heart beat.  Patient was admitted and  podiatry was consulted for  her left lower leg chronic ulceration.                     Scheduled Meds:   apixaban  2.5 mg Oral BID    chlorhexidine  15 mL Mouth/Throat BID    citalopram  20 mg Oral Daily    collagenase   Topical (Top) Daily    levothyroxine  50 mcg Oral Before breakfast    miconazole NITRATE 2 %   Topical (Top) BID    mupirocin   Nasal BID    norepinephrine        rosuvastatin  10 mg Oral Daily    sevelamer carbonate  1,600 mg Oral TID WM    sodium zirconium cyclosilicate  5 g Oral Daily    tiotropium  18 mcg Inhalation Daily     Continuous Infusions:   amiodarone in dextrose 5% 0.498 mg/min (09/25/20 0701)    phenylephrine 0.005 mcg/kg/min (09/25/20 0701)     PRN Meds:acetaminophen, dextrose 50%, dextrose 50%, glucagon (human recombinant), glucose, glucose, insulin aspart U-100, ondansetron    Review of patient's allergies indicates:  No Known Allergies     Past Medical History:   Diagnosis Date    Anemia     Anemia in stage 3 chronic kidney disease 11/26/2017    Anticoagulant long-term use     CHF (congestive heart failure)     COPD (chronic obstructive pulmonary disease)     COPD exacerbation 3/10/2020    Coronary artery disease     Diabetes mellitus     Digestive disorder   "   Encounter for blood transfusion     Essential hypertension 6/24/2017    Hypertension     Low blood pressure     MI (myocardial infarction) 2010    Segmental and subsegmental pulmonary emboli of RLL without acute cor pulmonale 11/25/2017    Stroke     July 2005    Thyroid disease     Traumatic subarachnoid hemorrhage 11/24/2017    Type 2 diabetes mellitus with stage 3 chronic kidney disease, without long-term current use of insulin 6/24/2017     Past Surgical History:   Procedure Laterality Date    ABCESS DRAINAGE Right     Between "little toe" and the one next to it    BREAST SURGERY      Reduction [id number]    CARDIAC SURGERY  01/2011    CABG 4vessel ring in one valve mitral valve prolapse    CORONARY ARTERY BYPASS GRAFT      DEBRIDEMENT OF FOOT Left 9/4/2020    Procedure: DEBRIDEMENT, FOOT;  Surgeon: Dennis Wick DPM;  Location: [facility];  Service: Podiatry;  Laterality: Left;    FOOT SURGERY      4 grafts to left foot    hyperlipidemia      TUBAL LIGATION  03/1986       Family History     Problem Relation (Age of Onset)    Arthritis Mother    Cancer Father (68)    Depression Sister    Diabetes Father, Maternal Grandmother    Heart disease Father    Hypertension Father, Son    Kidney disease Paternal Grandfather    Stroke Paternal Grandfather        Tobacco Use    Smoking status: Never Smoker    Smokeless tobacco: Never Used   Substance and Sexual Activity    Alcohol use: Yes     Alcohol/week: 1.0 - 2.0 standard drinks     Types: 1 - 2 Glasses of wine per week     Comment: "socially" not in awhile    Drug use: No    Sexual activity: Yes     Partners: Male     Birth control/protection: None     Review of Systems  Objective:     Vital Signs (Most Recent):  Temp: 97 °F (36.1 °C) (09/25/20 1145)  Pulse: 86 (09/25/20 1401)  Resp: (!) 31 (09/25/20 1401)  BP: (!) 95/54 (09/25/20 1401)  SpO2: (!) 88 % (09/25/20 1401) Vital Signs (24h Range):  Temp:  [97 °F (36.1 °C)-98.5 °F (36.9 °C)] 97 °F " (36.1 °C)  Pulse:  [] 86  Resp:  [14-31] 31  SpO2:  [88 %-100 %] 88 %  BP: ()/(46-64) 95/54     Weight: 80.7 kg (178 lb)  Body mass index is 28.73 kg/m².    Foot Exam    Laboratory:  All pertinent labs reviewed within the last 24 hours.    Diagnostic Results:  I have reviewed all pertinent imaging results/findings within the past 24 hours.        Assessment/Plan:     No new Assessment & Plan notes have been filed under this hospital service since the last note was generated.  Service: Podiatry      Wale daily dressing changes to wound will delay healing.    Recommend pillow boots to be worn at all times while lying or sitting as pressure to the area will only delayed wound healing    The patient to follow up with Dr. Wick following hospital discharge.  Nail care can be done at outpatient follow-up visit.    Thank you for your consult. I will sign off. Please contact us if you have any additional questions.    Danuta Montiel DPM  Podiatry  [facility]

## 2020-09-25 NOTE — ASSESSMENT & PLAN NOTE
Wale daily dressing changes to wound will delay healing.    Recommend pillow boots to be worn at all times while lying or sitting as pressure to the area will only delayed wound healing    The patient to follow up with Dr. Wick following hospital discharge.  Nail care can be done at outpatient follow-up visit.

## 2020-09-25 NOTE — CONSULTS
Nephrology Consult Note        Patient Name: Juliane Ramos  MRN: [id number]    Patient Class: IP- Inpatient   Admission Date: 9/24/2020  Length of Stay: 1 days  Date of Service: 9/25/2020    Attending Physician: Oscar Ruth DO  Primary Care Provider: JOS Dumont    Reason for Consult: esrd/anemia/hyperkalemia/hypotension/shpt/tachycardia    SUBJECTIVE:     HPI: 60F hx ESRD HD (T,Th, Sat)  CAD/CABG ,EF20%?, COPD, DM ,chronic hypotension on midodrine daily presents to ED with complaints of having low blood pressure and rapid heart rate. In the emergency department patient had a heart rate in the 130s regular and SBP in 100s  Patient denies shortness of breath, chest pain, fever, chills, abdominal pain, nausea, vomiting. Last dialysis on Tuesday and she did not have any volume removed.  Patient has been given approximately 500 cc of IV fluids in the emergency department at remains tachycardic in the 120s. Cards eval is pending, Amiodarone was started.    9/25 Getting HD for hyperkalemia today, before cardioversion. Continue HD per TTS schedule.    Past Medical History:   Diagnosis Date    Anemia     Anemia in stage 3 chronic kidney disease 11/26/2017    Anticoagulant long-term use     CHF (congestive heart failure)     COPD (chronic obstructive pulmonary disease)     COPD exacerbation 3/10/2020    Coronary artery disease     Diabetes mellitus     Digestive disorder     Encounter for blood transfusion     Essential hypertension 6/24/2017    Hypertension     Low blood pressure     MI (myocardial infarction) 2010    Segmental and subsegmental pulmonary emboli of RLL without acute cor pulmonale 11/25/2017    Stroke     July 2005    Thyroid disease     Traumatic subarachnoid hemorrhage 11/24/2017    Type 2 diabetes mellitus with stage 3 chronic kidney disease, without long-term current use of insulin 6/24/2017     Past Surgical History:   Procedure Laterality Date    ABCESS DRAINAGE Right   "   Between "little toe" and the one next to it    BREAST SURGERY      Reduction [id number]    CARDIAC SURGERY  2011    CABG 4vessel ring in one valve mitral valve prolapse    CORONARY ARTERY BYPASS GRAFT      DEBRIDEMENT OF FOOT Left 2020    Procedure: DEBRIDEMENT, FOOT;  Surgeon: Dennis Wick DPM;  Location: [facility];  Service: Podiatry;  Laterality: Left;    FOOT SURGERY      4 grafts to left foot    hyperlipidemia      TUBAL LIGATION  1986     Family History   Problem Relation Age of Onset    Arthritis Mother     Heart disease Father     Cancer Father 68        prostae and bladder ca  in     Diabetes Father     Hypertension Father     Depression Sister     Hypertension Son     Diabetes Maternal Grandmother         lost leg    Kidney disease Paternal Grandfather         had kidney removed    Stroke Paternal Grandfather      Social History     Tobacco Use    Smoking status: Never Smoker    Smokeless tobacco: Never Used   Substance Use Topics    Alcohol use: Yes     Alcohol/week: 1.0 - 2.0 standard drinks     Types: 1 - 2 Glasses of wine per week     Comment: "socially" not in awhile    Drug use: No       Review of patient's allergies indicates:  No Known Allergies    Outpatient meds:  No current facility-administered medications on file prior to encounter.      Current Outpatient Medications on File Prior to Encounter   Medication Sig Dispense Refill    gabapentin (NEURONTIN) 100 MG capsule Take 100 mg by mouth 3 (three) times daily.  3    midodrine (PROAMATINE) 5 MG Tab Take 5 mg by mouth 3 (three) times daily with meals.       rosuvastatin (CRESTOR) 10 MG tablet Take 10 mg by mouth every evening.       albuterol-ipratropium (DUO-NEB) 2.5 mg-0.5 mg/3 mL nebulizer solution Take 3 mLs by nebulization every 6 (six) hours. Rescue 1 Box 11    apixaban 5 mg Tab Take 1 tablet (5 mg total) by mouth 2 (two) times daily. (Patient taking differently: Take 5 mg by mouth 2 (two) " "times daily. ) 60 tablet 1    blood sugar diagnostic Strp Test 3-4 times daily PRN      CLARITIN-D 12 HOUR 5-120 mg per tablet Take 1 tablet by mouth once daily.   0    fluticasone (FLONASE) 50 mcg/actuation nasal spray 1 spray by Each Nostril route once daily.   12    insulin aspart (NOVOLOG) 100 unit/mL InPn pen Inject 1-10 Units into the skin 3 (three) times daily. (Patient taking differently: Inject 1-10 Units into the skin 3 (three) times daily. If sugar >150 on sliding scale) 3 mL 1    levothyroxine (SYNTHROID) 50 MCG tablet Take 50 mcg by mouth before breakfast.       pen needle, diabetic (BD ULTRA-FINE OBDULIA PEN NEEDLE) 32 gauge x 5/32" Ndle       ramelteon (ROZEREM) 8 mg tablet Take 8 mg by mouth every evening.      sevelamer carbonate (RENVELA) 800 mg Tab Take 1,600 mg by mouth 3 (three) times daily with meals.      tiotropium (SPIRIVA) 18 mcg inhalation capsule Inhale 1 capsule (18 mcg total) into the lungs once daily. Controller 30 capsule 11    umeclidinium (INCRUSE ELLIPTA) 62.5 mcg/actuation inhalation capsule Inhale 62.5 mcg into the lungs once daily. Controller         Scheduled meds:   apixaban  2.5 mg Oral BID    citalopram  20 mg Oral Daily    levothyroxine  50 mcg Oral Before breakfast    mupirocin   Nasal BID    norepinephrine        rosuvastatin  10 mg Oral Daily    sevelamer carbonate  1,600 mg Oral TID WM    sodium zirconium cyclosilicate  5 g Oral Daily    tiotropium  18 mcg Inhalation Daily       Infusions:   amiodarone in dextrose 5% 0.498 mg/min (09/25/20 0701)    phenylephrine 0.005 mcg/kg/min (09/25/20 0701)       PRN meds:  acetaminophen, dextrose 50%, dextrose 50%, glucagon (human recombinant), glucose, glucose, insulin aspart U-100, ondansetron    Review of Systems:  ROS    OBJECTIVE:     Vital Signs and IO (Last 24H):  Temp:  [97 °F (36.1 °C)-98.5 °F (36.9 °C)]   Pulse:  [113-140]   Resp:  [14-29]   BP: ()/(46-64)   SpO2:  [88 %-100 %]   I/O last 3 " completed shifts:  In: 1896 [I.V.:1396; IV Piggyback:500]  Out: -     Wt Readings from Last 5 Encounters:   09/24/20 80.7 kg (178 lb)   09/08/20 79.8 kg (176 lb)   09/04/20 80 kg (176 lb 5.9 oz)   09/02/20 80 kg (176 lb 5.9 oz)   08/01/20 82.1 kg (181 lb)         Physical Exam:  Physical Exam  Constitutional:       Appearance: She is well-developed. She is not diaphoretic.   HENT:      Head: Normocephalic and atraumatic.   Eyes:      General: No scleral icterus.     Pupils: Pupils are equal, round, and reactive to light.   Neck:      Musculoskeletal: Neck supple.   Cardiovascular:      Rate and Rhythm: Normal rate and regular rhythm.   Pulmonary:      Effort: Pulmonary effort is normal. No respiratory distress.      Breath sounds: No stridor.   Abdominal:      General: There is no distension.      Palpations: Abdomen is soft.   Musculoskeletal: Normal range of motion.         General: No deformity.   Skin:     General: Skin is warm and dry.      Findings: No erythema or rash.   Neurological:      Mental Status: She is alert and oriented to person, place, and time.      Cranial Nerves: No cranial nerve deficit.   Psychiatric:         Behavior: Behavior normal.         Body mass index is 28.73 kg/m².    Laboratory:  Recent Labs   Lab 09/24/20 0630 09/25/20  0636 09/25/20  1143   * 129*  --    K 5.2* 5.7* 3.5   CL 91* 93*  --    CO2 24 19*  --    BUN 46* 55*  --    CREATININE 5.0* 5.5*  --    ESTGFRAFRICA 10.3* 9.1*  --    EGFRNONAA 8.9* 7.9*  --    * 99  --        Recent Labs   Lab 09/24/20 0630 09/25/20  0636   CALCIUM 8.7 8.4*   ALBUMIN 2.7*  --              No results for input(s): POCTGLUCOSE in the last 168 hours.    Recent Labs   Lab 02/21/18  2120 09/12/18  1715 09/24/20  0630   Hemoglobin A1C 6.1 H 5.2 6.3 H       Recent Labs   Lab 09/24/20 0630 09/25/20  0636   WBC 11.72 14.70*   HGB 12.8 12.9   HCT 41.9 43.0    199   MCV 93 96   MCHC 30.5* 30.0*   MONO 11.9  1.4*  --        Recent  Labs   Lab 09/24/20  0630   BILITOT 1.0   PROT 5.8*   ALBUMIN 2.7*   ALKPHOS 69   ALT 19   AST 26       Recent Labs   Lab 11/23/17  1252 11/24/17  1837 09/11/18  0246   Color, UA Yellow Red A Yellow   Appearance, UA Clear Cloudy A Hazy A   pH, UA 5.0 5.0 5.0   Specific Gravity, UA 1.015 >1.030 A >=1.030 A   Protein, UA Trace A 2+ A 2+ A   Glucose, UA Negative 1+ A Negative   Ketones, UA Negative Negative Negative   Urobilinogen, UA Negative Negative Negative   Bilirubin (UA) Negative Negative Negative   Occult Blood UA Trace A 3+ A 1+ A   Nitrite, UA Negative Negative Negative   RBC, UA  --  >100 H 5 H   WBC, UA  --  0 >100 H   Bacteria  --  Rare Moderate A   Hyaline Casts, UA  --  0 0       Recent Labs   Lab 11/30/17  2236 12/11/17  0410 09/11/18  0209   POC PH 7.354 7.350 7.375   POC PCO2 44.8 41.6 46.4 H   POC HCO3 25.0 23.0 L 27.1   POC PO2 112 H 129 H 61 L   POC SATURATED O2 98 99 90 L   POC BE -1 -3 2   Sample ARTERIAL ARTERIAL ARTERIAL       Microbiology Results (last 7 days)     ** No results found for the last 168 hours. **          ASSESSMENT/PLAN:     Active Hospital Problems    Diagnosis  POA    Tachycardia [R00.0]  Yes    Stage 5 chronic kidney disease on chronic dialysis [N18.6, Z99.2]  Not Applicable      Resolved Hospital Problems   No resolved problems to display.     ESRD on HD TTS via AVF  Hyperkalemia, mild  Hypotension  AFlutter  High TSH with normal T4, ? uncontrolled hypothyroidism  Continue current dialysis prescription.  Next HD per schedule. Emergent HD today for hyperkalemia.  Renal diet - low K, low phos. Continue Lokelma.  No IVs or BP checks on access and/or non-dominant arm.  Apprecaite cards eval. Holding midodrine for now. Cardioversion 9/25.    Anemia of CKD  Hgb and HCT are acceptable. Monitor.  Will provide YASHIRA and/or IV iron PRN.    MBD / Secondary HPT  Ca, phos, PTH and vitamin D levels are acceptable.   Phos binders, vitamin D analogues and calcimimetics as needed.    Thank  you for allowing us to participate in the care of your patient!   We will follow the patient and provide recommendations as needed.    Edson Crystal MD    [facility]  [address] [place] [id number]    [phone] - tel  [phone] - fax    9/25/2020 1:53 PM

## 2020-09-25 NOTE — NURSING
Blood cultures obtained  Per lab stick right arm earlier  Urine  Sent  After  In/out cath/1630 santyl 2/2 felecia   Covered  With tegrederm  To left arti

## 2020-09-25 NOTE — SUBJECTIVE & OBJECTIVE
Interval History:  No acute events last 24 hours patient denies chest pain shortness of breath abdominal pain nausea vomiting.      Objective:     Vital Signs (Most Recent):  Temp: 98 °F (36.7 °C) (09/25/20 0701)  Pulse: (!) 123 (09/25/20 0900)  Resp: (!) 22 (09/25/20 0734)  BP: (!) 81/56 (09/25/20 0900)  SpO2: (!) 94 % (09/25/20 0734) Vital Signs (24h Range):  Temp:  [97.6 °F (36.4 °C)-98.5 °F (36.9 °C)] 98 °F (36.7 °C)  Pulse:  [113-140] 123  Resp:  [14-29] 22  SpO2:  [88 %-100 %] 94 %  BP: ()/(50-64) 81/56     Weight: 80.7 kg (178 lb)  Body mass index is 28.73 kg/m².    Intake/Output Summary (Last 24 hours) at 9/25/2020 0915  Last data filed at 9/25/2020 0349  Gross per 24 hour   Intake 1896 ml   Output --   Net 1896 ml      Physical Exam patient appears no acute distress  Lying in bed in the ICU, nurse at bedside  HEENT sclerae nonicteric  Is supple nontender  Lungs are clear to auscultation coarse breath sounds  Abdomen is soft nontender, +BS  Heart regular rate in the 120s no rub  Extremities no edema   exam no Chi  Neuro patient is alert oriented x3  Skin no rash  Psych patient is pleasant cooperative    Significant Labs:   BMP:   Recent Labs   Lab 09/25/20 0636   GLU 99   *   K 5.7*   CL 93*   CO2 19*   BUN 55*   CREATININE 5.5*   CALCIUM 8.4*     CBC:   Recent Labs   Lab 09/24/20 0630 09/25/20 0636   WBC 11.72 14.70*   HGB 12.8 12.9   HCT 41.9 43.0    199     CMP:   Recent Labs   Lab 09/24/20 0630 09/25/20 0636   * 129*   K 5.2* 5.7*   CL 91* 93*   CO2 24 19*   * 99   BUN 46* 55*   CREATININE 5.0* 5.5*   CALCIUM 8.7 8.4*   PROT 5.8*  --    ALBUMIN 2.7*  --    BILITOT 1.0  --    ALKPHOS 69  --    AST 26  --    ALT 19  --    ANIONGAP 14 17*   EGFRNONAA 8.9* 7.9*     Magnesium: No results for input(s): MG in the last 48 hours.  Troponin:   Recent Labs   Lab 09/24/20  1930 09/25/20  0110 09/25/20  0636   TROPONINI 0.344* 0.357* 0.277*       Significant Imaging:

## 2020-09-25 NOTE — ANESTHESIA POSTPROCEDURE EVALUATION
Anesthesia Post Evaluation    Patient: Juliane Ramos    Procedure(s) Performed: * No procedures listed *    Final Anesthesia Type: MAC    Patient location during evaluation: ICU  Patient participation: Yes- Able to Participate  Level of consciousness: awake and alert  Post-procedure vital signs: reviewed and stable  Pain management: adequate  Airway patency: patent    PONV status at discharge: No PONV  Anesthetic complications: no      Cardiovascular status: hemodynamically stable and blood pressure returned to baseline  Respiratory status: unassisted, spontaneous ventilation and room air  Hydration status: euvolemic  Follow-up not needed.          Vitals Value Taken Time   /62 09/25/20 1338   Temp 36.1 °C (97 °F) 09/25/20 1145   Pulse 85 09/25/20 1339   Resp 23 09/25/20 1339   SpO2 93 % 09/25/20 1339   Vitals shown include unvalidated device data.      No case tracking events are documented in the log.      Pain/Tiffanie Score: No data recorded

## 2020-09-25 NOTE — PLAN OF CARE
09/25/20 0734   Patient Assessment/Suction   Level of Consciousness (AVPU) alert   Respiratory Effort Unlabored   All Lung Fields Breath Sounds clear;diminished   Cough Frequency no cough   PRE-TX-O2   O2 Device (Oxygen Therapy) nasal cannula   $ Is the patient on Low Flow Oxygen? Yes   Flow (L/min) 4   SpO2 (!) 94 %   Pulse Oximetry Type Continuous   $ Pulse Oximetry - Multiple Charge Pulse Oximetry - Multiple   Pulse (!) 113   Resp (!) 22   Inhaler   $ Inhaler Charges MDI (Metered Dose Inahler) Treatment   Respiratory Treatment Status (Inhaler) given   Treatment Route (Inhaler) mouthpiece   Patient Position (Inhaler) semi-Wilson's   Signs of Intolerance (Inhaler) none   Respiratory Evaluation   $ Care Plan Tech Time 15 min

## 2020-09-25 NOTE — TRANSFER OF CARE
"Anesthesia Transfer of Care Note    Patient: Juliane Ramos    Procedure(s) Performed: * No procedures listed *    Patient location: ICU    Anesthesia Type: MAC    Transport from OR: Transported from OR on 2-3 L/min O2 by NC with adequate spontaneous ventilation    Post pain: adequate analgesia    Post assessment: no apparent anesthetic complications    Post vital signs: stable    Level of consciousness: responds to stimulation    Nausea/Vomiting: no nausea/vomiting    Complications: none    Transfer of care protocol was followed      Last vitals:   Visit Vitals  BP (!) 90/53   Pulse (!) 129   Temp 36.1 °C (97 °F) (Axillary)   Resp (!) 29   Ht 5' 6" (1.676 m)   Wt 80.7 kg (178 lb)   LMP 08/29/2016 (Approximate)   SpO2 100%   Breastfeeding No   BMI 28.73 kg/m²     "

## 2020-09-25 NOTE — ANESTHESIA PREPROCEDURE EVALUATION
09/25/2020  Juliane Ramos is a 60 y.o., female.    Pre-op Assessment    I have reviewed the Patient Summary Reports.     I have reviewed the Nursing Notes. I have reviewed the NPO Status.   I have reviewed the Medications.     Review of Systems  Anesthesia Hx:  No problems with previous Anesthesia  History of prior surgery of interest to airway management or planning: heart surgery. Denies Family Hx of Anesthesia complications.   Denies Personal Hx of Anesthesia complications.   Social:  Non-Smoker, Alcohol Use    Hematology/Oncology:        Hematology Comments: Anticoagulant long-term use  Apixaban Therapy stopped yesterday   EENT/Dental:   Eyes: Visual Impairment Has Right and S/P Extraction - Left Catarract   Cardiovascular:   Hypertension Valvular problems/Murmurs, MR Past MI CAD asymptomatic CABG/stent Dysrhythmias atrial fibrillation CHF PVD hyperlipidemia ECG has been reviewed. Acute on chronic combined systolic and diastolic heart failure EF 20%  Tricuspid regurgitation  Syncope  Low blood pressure  Orthostatic hypotension  Tachycardia   Pulmonary:   COPD, moderate Asthma moderate Shortness of breath Home O2 2lnc  Segmental and subsegmental pulmonary emboli of RLL without acute cor pulmonale  Recurrent right pleural effusion  Patient with daily and he will use  Patient denies rescue inhaler use  Patient followed by Dr. Mcfadden  Patient is seen by Dr. Mcfadden approximately 10 months ago   Renal/:   Chronic Renal Disease, ESRD, Dialysis CKD (chronic kidney disease) stage 5, GFR 30-59 ml/min  Hyponatremia  Dialysis Tuesday Thursday Saturday  Patient to be receive dialysis prior to LUIS ENRIQUE cardioversion today   Hepatic/GI:  Hepatic/GI Normal No active nausea / vomiting  No intestinal obstruction   Musculoskeletal:   Open wound of left heel  Anasarca   Neurological:   CVA, residual symptoms History of  "Traumatic subarachnoid hemorrhage  Patient knows to decrease cognitive function following CVA 2005   Endocrine:   Diabetes, poorly controlled, type 2, using insulin Hypothyroidism Type II diabetes mellitus with peripheral circulatory disorder   Dermatological:   Open wound of left heel  Multiple bilateral upper extremity bruising     Patient Active Problem List   Diagnosis    Essential hypertension    CKD (chronic kidney disease) stage 3, GFR 30-59 ml/min    Type II diabetes mellitus with peripheral circulatory disorder    Coronary artery disease involving native coronary artery of native heart without angina pectoris    Acquired hypothyroidism    Recurrent right pleural effusion    Skin ulcer of left foot with fat layer exposed    Traumatic subarachnoid hemorrhage    Segmental and subsegmental pulmonary emboli of RLL without acute cor pulmonale    Acute renal failure superimposed on stage 3 chronic kidney disease    Anemia in stage 3 chronic kidney disease    Subarachnoid hemorrhage following injury, no loss of consciousness    Staph aureus infection    Pulmonary embolus    Obesity    Acute on chronic respiratory failure with hypoxia    Anasarca    SAH (subarachnoid hemorrhage)    Wounds, multiple    Wound healing, delayed    Malnutrition due to renal disease    Shortness of breath    CKD (chronic kidney disease) requiring chronic dialysis    Open wound of left heel    Acute on chronic combined systolic and diastolic heart failure    ESRD (end stage renal disease)    COPD exacerbation    Hyponatremia    Syncope    Delayed wound healing    Tachycardia    Stage 5 chronic kidney disease on chronic dialysis       Past Surgical History:   Procedure Laterality Date    ABCESS DRAINAGE Right     Between "little toe" and the one next to it    BREAST SURGERY      Reduction [id number]    CARDIAC SURGERY  01/2011    CABG 4vessel ring in one valve mitral valve prolapse    CORONARY ARTERY BYPASS " GRAFT      DEBRIDEMENT OF FOOT Left 9/4/2020    Procedure: DEBRIDEMENT, FOOT;  Surgeon: Dennis Wick DPM;  Location: [facility];  Service: Podiatry;  Laterality: Left;    FOOT SURGERY      4 grafts to left foot    hyperlipidemia      TUBAL LIGATION  03/1986        Tobacco Use:  The patient  reports that she has never smoked. She has never used smokeless tobacco.     Results for orders placed or performed during the hospital encounter of 09/24/20   EKG 12-lead    Collection Time: 09/24/20  8:17 AM    Narrative    Test Reason : R00.0,    Vent. Rate : 130 BPM     Atrial Rate : 130 BPM     P-R Int : 094 ms          QRS Dur : 092 ms      QT Int : 314 ms       P-R-T Axes : 067 075 -12 degrees     QTc Int : 462 ms    Sinus tachycardia with short PA  Incomplete right bundle branch block  Anteroseptal infarct (cited on or before 11-SEP-2018)  Abnormal ECG  When compared with ECG of 01-AUG-2020 09:13,  Questionable change in initial forces of Anterior-lateral leads  T wave inversion less evident in Inferior leads    Referred By: [email or web address]   SELF           Confirmed By:              Lab Results   Component Value Date    WBC 14.70 (H) 09/25/2020    HGB 12.9 09/25/2020    HCT 43.0 09/25/2020    MCV 96 09/25/2020     09/25/2020     BMP  Lab Results   Component Value Date     (L) 09/25/2020    K 5.7 (H) 09/25/2020    CL 93 (L) 09/25/2020    CO2 19 (L) 09/25/2020    BUN 55 (H) 09/25/2020    CREATININE 5.5 (H) 09/25/2020    CALCIUM 8.4 (L) 09/25/2020    ANIONGAP 17 (H) 09/25/2020    ESTGFRAFRICA 9.1 (A) 09/25/2020    EGFRNONAA 7.9 (A) 09/25/2020     Conclusion    · The left ventricle cavity is mildly dilated in systole.  · Left ventricle ejection fraction is moderately decreased at 37%  · Grade II (moderate) left ventricular diastolic dysfunction consistent with pseudonormalization.  · LA pressure is elevated.  · RV systolic function is low normal.  · Left atrium is mildly dilated.  · Right atrium is moderately  dilated.  · Mitral valve shows trace regurgitation. MV annuloplasty ring..  · Severely elevated central venous pressure (15 mm Hg).  · The estimated PA systolic pressure is 66.27 mm Hg              Physical Exam  General:  Obesity    Airway/Jaw/Neck:  Airway Findings: Mouth Opening: Normal Tongue: Normal  General Airway Assessment: Adult  Mallampati: II  Improves to II with phonation.  TM Distance: Normal, at least 6 cm  Jaw/Neck Findings:  Neck ROM: Normal ROM      Dental:  Dental Findings:    Chest/Lungs:  Chest/Lungs Findings: Clear to auscultation, Normal Respiratory Rate, Decreased Breath Sounds Bilateral     Heart/Vascular:  Heart Findings: Rate: Tachycardia  Rhythm: Irregularly Irregular  Sounds: Normal        Mental Status:  Mental Status Findings:  Cooperative, Alert and Oriented         Anesthesia Plan  Type of Anesthesia, risks & benefits discussed:  Anesthesia Type:  MAC  Patient's Preference:   Intra-op Monitoring Plan: standard ASA monitors  Intra-op Monitoring Plan Comments:   Post Op Pain Control Plan: per primary service following discharge from PACU  Post Op Pain Control Plan Comments:   Induction:   IV  Beta Blocker:  Patient is on a Beta-Blocker and has received one dose within the past 24 hours (No further documentation required).       Informed Consent: Patient understands risks and agrees with Anesthesia plan.  Questions answered. Anesthesia consent signed with patient.  ASA Score: 4     Day of Surgery Review of History & Physical:    H&P update referred to the surgeon.     Anesthesia Plan Notes: MAC  Propofol  P.O.M. Mask        Ready For Surgery From Anesthesia Perspective.

## 2020-09-25 NOTE — PROGRESS NOTES
[facility] Medicine  Progress Note    Patient Name: Juliane Ramos  MRN: [id number]  Patient Class: IP- Inpatient   Admission Date: 9/24/2020  Length of Stay: 1 days  Attending Physician: Oscar Ruth DO  Primary Care Provider: JOS Dumont        Subjective:     Principal Problem:<principal problem not specified>        HPI:  Patient is a 60-year-old female hx ESRD HD (T,Th, Sat)  CAD/CABG ,EF20%?, COPD, DM ,chronic hypotension  presents ED with complaints of having low blood pressure and rapid heart rate.  In the emergency department patient had a heart rate in the 130s regular and a blood pressure systolic of approximately 100  Patient denies shortness of breath chest pain fever chills abdominal pain nausea vomiting.  She states her last dialysis on Tuesday she did not have any volume removed.  Patient has been given approximately 500 cc of IV fluids in the emergency department at remains tachycardic in the 120s.      Overview/Hospital Course:  No notes on file    Interval History:  No acute events last 24 hours patient denies chest pain shortness of breath abdominal pain nausea vomiting.      Objective:     Vital Signs (Most Recent):  Temp: 98 °F (36.7 °C) (09/25/20 0701)  Pulse: (!) 123 (09/25/20 0900)  Resp: (!) 22 (09/25/20 0734)  BP: (!) 81/56 (09/25/20 0900)  SpO2: (!) 94 % (09/25/20 0734) Vital Signs (24h Range):  Temp:  [97.6 °F (36.4 °C)-98.5 °F (36.9 °C)] 98 °F (36.7 °C)  Pulse:  [113-140] 123  Resp:  [14-29] 22  SpO2:  [88 %-100 %] 94 %  BP: ()/(50-64) 81/56     Weight: 80.7 kg (178 lb)  Body mass index is 28.73 kg/m².    Intake/Output Summary (Last 24 hours) at 9/25/2020 0915  Last data filed at 9/25/2020 0349  Gross per 24 hour   Intake 1896 ml   Output --   Net 1896 ml      Physical Exam patient appears no acute distress  Lying in bed in the ICU, nurse at bedside  HEENT sclerae nonicteric  Is supple nontender  Lungs are clear to auscultation coarse breath  sounds  Abdomen is soft nontender, +BS  Heart regular rate in the 120s no rub  Extremities no edema   exam no Chi  Neuro patient is alert oriented x3  Skin no rash  Psych patient is pleasant cooperative    Significant Labs:   BMP:   Recent Labs   Lab 09/25/20  0636   GLU 99   *   K 5.7*   CL 93*   CO2 19*   BUN 55*   CREATININE 5.5*   CALCIUM 8.4*     CBC:   Recent Labs   Lab 09/24/20  0630 09/25/20  0636   WBC 11.72 14.70*   HGB 12.8 12.9   HCT 41.9 43.0    199     CMP:   Recent Labs   Lab 09/24/20  0630 09/25/20  0636   * 129*   K 5.2* 5.7*   CL 91* 93*   CO2 24 19*   * 99   BUN 46* 55*   CREATININE 5.0* 5.5*   CALCIUM 8.7 8.4*   PROT 5.8*  --    ALBUMIN 2.7*  --    BILITOT 1.0  --    ALKPHOS 69  --    AST 26  --    ALT 19  --    ANIONGAP 14 17*   EGFRNONAA 8.9* 7.9*     Magnesium: No results for input(s): MG in the last 48 hours.  Troponin:   Recent Labs   Lab 09/24/20  1930 09/25/20  0110 09/25/20  0636   TROPONINI 0.344* 0.357* 0.277*       Significant Imaging:       Assessment/Plan:   #1 Near syncope-most likely multifactorial  Including hypotension and underlying arrhythmia  #2 Hx orthostatic hypotension/now more hypotensive requiring pressors .  Continue ICU monitoring  #3 Volume depletion-received IV fluids  #4  new onset atrial  Flutter w/RVR   cardiology following  Echocardiogram pending.  Plan for cardioversion today Patient did not take Eliquis today   #5 ESRD HD Tuesday Thursday Saturday  Consult renal  #6 CAD/CABG-/EF20%?check serial cardiac enzymes  Cardiac monitoring.  Cardiology eval pending  Echocardiogram is pending  Slight elevation of troponin most likely secondary to demand ischemia secondary to hypotension  End-stage renal disease contributing  #7 Chronic wound left distal posterior lower extremity-  Wound care eval.  Followed by Dr. Wick  #8  Hyponatremia-dialysis today monitoring labs #9 Hyperkalemia-dialysis scheduled  #10 DM2-follow sliding scale check  hemoglobin A1c  #11 Hx PE -details unknown   #12 Metabolic acidosis-secondary to underlying renal disease.  Nephrology following       Patient is at high risk for life-threatening deterioration.  She is requiring IV medications for blood pressure support and ICU.  Plan for dialysis today and possible cardioversion afterward  VTE Risk Mitigation (From admission, onward)         Ordered     apixaban tablet 2.5 mg  2 times daily      09/24/20 1018     Place MARY ELLEN hose  Until discontinued      09/24/20 1018     IP VTE HIGH RISK PATIENT  Once      09/24/20 1018     Place sequential compression device  Until discontinued      09/24/20 1018                  Oscar Ruth DO  Department of Hospital Medicine   [facility]

## 2020-09-25 NOTE — NURSING
0700 mleft  Achillis  Area  Deep wound neo  Notified  For eval  At 1100 she eval  Request   Dr lau eval dr Leija notified  Orders  For dr wilkins initiated  Unit sec. Called in alsi  Red area to gluteal folds red  Blanchable  mepelex applied

## 2020-09-25 NOTE — PLAN OF CARE
09/24/20 1935   PRE-TX-O2   O2 Device (Oxygen Therapy) nasal cannula   Flow (L/min) 3   SpO2 95 %   Pulse Oximetry Type Continuous   $ Pulse Oximetry - Multiple Charge Pulse Oximetry - Multiple   Pulse (!) 117   Resp (!) 26   Respiratory Evaluation   $ Care Plan Tech Time 15 min   Evaluation For   (careplan)

## 2020-09-25 NOTE — PLAN OF CARE
Information given by  Nba. Pt has [facility] and wants to remain with them. Signed Patient Choice Form and scanned into [employer]. Resume home health when pt goes home for wound care. Cm to follow until discharge home from hospital.   09/25/20 0955   Discharge Assessment   Assessment Type Discharge Planning Assessment   Confirmed/corrected address and phone number on facesheet? Yes   Assessment information obtained from? Other  (Nba Cha  [phone])   Expected Length of Stay (days) 3   Communicated expected length of stay with patient/caregiver yes   Prior to hospitilization cognitive status: Alert/Oriented   Prior to hospitalization functional status: Assistive Equipment  (uses walker)   Current cognitive status: Alert/Oriented   Current Functional Status: Needs Assistance   Lives With spouse   Able to Return to Prior Arrangements yes   Is patient able to care for self after discharge? Yes   Who are your caregiver(s) and their phone number(s)? Nba Cha  [phone]   Patient's perception of discharge disposition home health  (Has [facility])   Readmission Within the Last 30 Days current reason for admission unrelated to previous admission   If yes, most recent facility name: [facility]   Patient currently being followed by outpatient case management? No   Patient currently receives any other outside agency services? No   Equipment Currently Used at Home 3-in-1 commode;cane, straight;oxygen;nebulizer;walker, rolling;rollator;wheelchair   Part D Coverage [employer].   Do you have any problems affording any of your prescribed medications? No   Is the patient taking medications as prescribed? yes   Does the patient have transportation home? Yes   Transportation Anticipated family or friend will provide   Dialysis Name and Scheduled days [facility]   Does the patient receive services at the [facility]? No   Discharge Plan A Home Health    Discharge Plan B Home Health   DME Needed Upon Discharge  none   Patient/Family in Agreement with Plan yes   Readmission Questionnaire   At the time of your discharge, did someone talk to you about what your health problems were? Yes   At the time of discharge, did someone talk to you about what to watch out for regarding worsening of your health problem? Yes   At the time of discharge, did someone talk to you about what to do if you experienced worsening of your health problem? Yes   At the time of discharge, did someone talk to you about which medication to take when you left the hospital and which ones to stop taking? Yes   At the time of discharge, did someone talk to you about when and where to follow up with a doctor after you left the hospital? Yes   What do you believe caused you to be sick enough to be re-admitted? Tacycardia   How often do you need to have someone help you when you read instructions, pamphlets, or other written material from your doctor or pharmacy? Rarely   Do you have problems taking your medications as prescribed? No   Do you have any problems affording any of  your prescribed medications? No   Do you have problems obtaining/receiving your medications? No   Does the patient have transportation to healthcare appointments? Yes   Living Arrangements mobile home   Does the patient have family/friends to help with healtcare needs after discharge? yes   Does your caregiver provide all the help you need? Yes

## 2020-09-25 NOTE — NURSING
Cardioversion complete  1 shock sync 150 joules   sr Jasper titrated   For low bp 2nd  To sedation  eing  Titrated  Done  With improve  bp in/out  Cath urine  Collected  And  Sent to lab  Also lab notified  For blood cultures   Awaiting    Into see pt   Pt somulent  But  awakes

## 2020-09-25 NOTE — PROGRESS NOTES
Progress Note  Cardiology    Admit Date: 9/24/2020   LOS: 1 day     Follow-up For:  Atrial flutter with RVR.    Scheduled Meds:   apixaban  2.5 mg Oral BID    citalopram  20 mg Oral Daily    collagenase   Topical (Top) Daily    levothyroxine  50 mcg Oral Before breakfast    miconazole NITRATE 2 %   Topical (Top) BID    mupirocin   Nasal BID    norepinephrine        rosuvastatin  10 mg Oral Daily    sevelamer carbonate  1,600 mg Oral TID WM    sodium zirconium cyclosilicate  5 g Oral Daily    tiotropium  18 mcg Inhalation Daily     Continuous Infusions:   amiodarone in dextrose 5% 0.498 mg/min (09/25/20 0701)    phenylephrine 0.005 mcg/kg/min (09/25/20 0701)     PRN Meds:acetaminophen, dextrose 50%, dextrose 50%, glucagon (human recombinant), glucose, glucose, insulin aspart U-100, ondansetron    Review of patient's allergies indicates:  No Known Allergies    SUBJECTIVE:     Interval History: Patient has no complaint of sob or cp.    Review of Systems  Respiratory: negative for cough, hemoptysis, sputum and wheezing  Cardiovascular: negative for chest pressure/discomfort, dyspnea, palpitations, orthopnea and paroxysmal nocturnal dyspnea    OBJECTIVE:     Vital Signs (Most Recent)  Temp: 97 °F (36.1 °C) (09/25/20 1145)  Pulse: 86 (09/25/20 1401)  Resp: (!) 31 (09/25/20 1401)  BP: (!) 95/54 (09/25/20 1401)  SpO2: (!) 88 % (09/25/20 1401)    Vital Signs Range (Last 24H):  Temp:  [97 °F (36.1 °C)-98.5 °F (36.9 °C)]   Pulse:  []   Resp:  [14-31]   BP: ()/(46-64)   SpO2:  [88 %-100 %]       Physical Exam:  Neck: no carotid bruit, no JVD and supple, symmetrical, trachea midline  Lungs: clear to auscultation bilaterally, normal respiratory effort  Heart: regular rate and rhythm, S1, S2 normal, no murmur, click, rub or gallop and tachycardia  Abdomen: soft, non-tender; bowel sounds normal; no masses,  no organomegaly  Extremities: Positive for: edema 1+ and non-pitting ulceration over left Achilles  tendon.    Recent Results (from the past 24 hour(s))   Troponin I    Collection Time: 09/24/20  7:30 PM   Result Value Ref Range    Troponin I 0.344 (HH) <=0.040 ng/mL   POCT glucose    Collection Time: 09/24/20  9:38 PM   Result Value Ref Range    POC Glucose 117 (H) 70 - 110   Troponin I    Collection Time: 09/25/20  1:10 AM   Result Value Ref Range    Troponin I 0.357 (HH) <=0.040 ng/mL   Basic Metabolic Panel (BMP)    Collection Time: 09/25/20  6:36 AM   Result Value Ref Range    Sodium 129 (L) 136 - 145 mmol/L    Potassium 5.7 (H) 3.5 - 5.1 mmol/L    Chloride 93 (L) 95 - 110 mmol/L    CO2 19 (L) 23 - 29 mmol/L    Glucose 99 70 - 110 mg/dL    BUN, Bld 55 (H) 6 - 20 mg/dL    Creatinine 5.5 (H) 0.5 - 1.4 mg/dL    Calcium 8.4 (L) 8.7 - 10.5 mg/dL    Anion Gap 17 (H) 8 - 16 mmol/L    eGFR if African American 9.1 (A) >60 mL/min/1.73 m^2    eGFR if non  7.9 (A) >60 mL/min/1.73 m^2   CBC Without Differential    Collection Time: 09/25/20  6:36 AM   Result Value Ref Range    WBC 14.70 (H) 3.90 - 12.70 K/uL    RBC 4.48 4.00 - 5.40 M/uL    Hemoglobin 12.9 12.0 - 16.0 g/dL    Hematocrit 43.0 37.0 - 48.5 %    Mean Corpuscular Volume 96 82 - 98 fL    Mean Corpuscular Hemoglobin 28.8 27.0 - 31.0 pg    Mean Corpuscular Hemoglobin Conc 30.0 (L) 32.0 - 36.0 g/dL    RDW 16.3 (H) 11.5 - 14.5 %    Platelets 199 150 - 350 K/uL    MPV 9.9 9.2 - 12.9 fL   Troponin I    Collection Time: 09/25/20  6:36 AM   Result Value Ref Range    Troponin I 0.277 (HH) <=0.040 ng/mL   Potassium    Collection Time: 09/25/20 11:43 AM   Result Value Ref Range    Potassium 3.5 3.5 - 5.1 mmol/L   POCT glucose    Collection Time: 09/25/20 12:02 PM   Result Value Ref Range    POC Glucose 74 70 - 110   Echo Color Flow Doppler? Yes    Collection Time: 09/25/20  1:09 PM   Result Value Ref Range    BSA 1.94 m2    LVIDd 5.37 3.5 - 6.0 cm    IVS 0.82 0.6 - 1.1 cm    Posterior Wall 0.82 0.6 - 1.1 cm    LVIDs 4.37 (A) 2.1 - 4.0 cm    FS 19 28 - 44 %     LV mass 158.36 g    Left Ventricle Relative Wall Thickness 0.31 cm    LV Mass Index 83 g/m2       Diagnostic Results:  Labs: Reviewed  ECG: Reviewed  X-Ray: Reviewed    ASSESSMENT/PLAN:     K 5.7 - pt had dialysis - K 3.5. DC cardioversion with 120 J syn shock to NSR. Continue amio 0.5 m infusion overnight and transition to oral amio tomorrow. Hypotension worse post cardiversion treated with IV fluids and increased Jasper. ?? Underlying sepsis. Pt has not had an AMI. Had UTI Saturday treated with ?Cipro . Pan cultures.

## 2020-09-26 NOTE — SUBJECTIVE & OBJECTIVE
Interval History   patient cardioverted yesterday   a flutter to sinus rhythm  Patient denies chest pain shortness of breath abdominal pain.  Overall she feels  Much better.  Urine catheter showed pulse appearing urine    Objective:     Vital Signs (Most Recent):  Temp: 98 °F (36.7 °C) (09/26/20 0701)  Pulse: 72 (09/26/20 0818)  Resp: 20 (09/26/20 0818)  BP: 117/65 (09/26/20 0701)  SpO2: 97 % (09/26/20 0818) Vital Signs (24h Range):  Temp:  [97 °F (36.1 °C)-98 °F (36.7 °C)] 98 °F (36.7 °C)  Pulse:  [] 72  Resp:  [13-33] 20  SpO2:  [76 %-100 %] 97 %  BP: ()/(50-65) 117/65     Weight: 80.7 kg (178 lb)  Body mass index is 28.73 kg/m².    Intake/Output Summary (Last 24 hours) at 9/26/2020 1006  Last data filed at 9/26/2020 0701  Gross per 24 hour   Intake 1550 ml   Output 500 ml   Net 1050 ml      Physical Exam patient appears improved today  Lying in bed no acute distress  HEENT sclerae nonicteric  Neck is supple nontender  Lungs are clear to auscultation  Heart is regular rate rhythm no rub number  Abdomen is soft nontender, +BS  Extremities no edema  Neuro patient is alert oriented x3 motor exam is nonfocal  Exam no Chi  Skin see photos from wound care  Psych patient is pleasant cooperative      Significant Labs:   BMP:   Recent Labs   Lab 09/26/20  0404      *   K 4.7   CL 89*   CO2 28   BUN 35*   CREATININE 4.2*   CALCIUM 8.2*     CBC:   Recent Labs   Lab 09/25/20  0636 09/26/20  0404   WBC 14.70* 12.08   HGB 12.9 12.0   HCT 43.0 39.4    176     CMP:   Recent Labs   Lab 09/25/20  0636 09/25/20  1143 09/26/20  0404   *  --  130*   K 5.7* 3.5 4.7   CL 93*  --  89*   CO2 19*  --  28   GLU 99  --  104   BUN 55*  --  35*   CREATININE 5.5*  --  4.2*   CALCIUM 8.4*  --  8.2*   PROT  --   --  4.9*   ALBUMIN  --   --  2.5*   BILITOT  --   --  0.6   ALKPHOS  --   --  72   AST  --   --  29   ALT  --   --  21   ANIONGAP 17*  --  13   EGFRNONAA 7.9*  --  11.0*       Significant Imaging:

## 2020-09-26 NOTE — CONSULTS
Nephrology Consult Note        Patient Name: Juliane Ramos  MRN: [id number]    Patient Class: IP- Inpatient   Admission Date: 9/24/2020  Length of Stay: 2 days  Date of Service: 9/26/2020    Attending Physician: Oscar Ruth DO  Primary Care Provider: JOS Dumont    Reason for Consult: esrd/anemia/hyperkalemia/hypotension/shpt/tachycardia    SUBJECTIVE:     HPI: 60F hx ESRD HD (T,Th, Sat)  CAD/CABG ,EF20%?, COPD, DM ,chronic hypotension on midodrine daily presents to ED with complaints of having low blood pressure and rapid heart rate. In the emergency department patient had a heart rate in the 130s regular and SBP in 100s  Patient denies shortness of breath, chest pain, fever, chills, abdominal pain, nausea, vomiting. Last dialysis on Tuesday and she did not have any volume removed.  Patient has been given approximately 500 cc of IV fluids in the emergency department at remains tachycardic in the 120s. Cards eval is pending, Amiodarone was started.    9/25 Getting HD for hyperkalemia today, before cardioversion. Continue HD per TTS schedule.  9/26 Seen and examined on Hd today, tolerating well. Continue HD per TTS schedule.    Past Medical History:   Diagnosis Date    Anemia     Anemia in stage 3 chronic kidney disease 11/26/2017    Anticoagulant long-term use     CHF (congestive heart failure)     COPD (chronic obstructive pulmonary disease)     COPD exacerbation 3/10/2020    Coronary artery disease     Diabetes mellitus     Digestive disorder     Encounter for blood transfusion     Essential hypertension 6/24/2017    Hypertension     Low blood pressure     MI (myocardial infarction) 2010    Segmental and subsegmental pulmonary emboli of RLL without acute cor pulmonale 11/25/2017    Stroke     July 2005    Thyroid disease     Traumatic subarachnoid hemorrhage 11/24/2017    Type 2 diabetes mellitus with stage 3 chronic kidney disease, without long-term current use of insulin  "2017     Past Surgical History:   Procedure Laterality Date    ABCESS DRAINAGE Right     Between "little toe" and the one next to it    BREAST SURGERY      Reduction [id number]    CARDIAC SURGERY  2011    CABG 4vessel ring in one valve mitral valve prolapse    CORONARY ARTERY BYPASS GRAFT      DEBRIDEMENT OF FOOT Left 2020    Procedure: DEBRIDEMENT, FOOT;  Surgeon: Dennis Wick DPM;  Location: [facility];  Service: Podiatry;  Laterality: Left;    FOOT SURGERY      4 grafts to left foot    hyperlipidemia      TUBAL LIGATION  1986     Family History   Problem Relation Age of Onset    Arthritis Mother     Heart disease Father     Cancer Father 68        prostae and bladder ca  in     Diabetes Father     Hypertension Father     Depression Sister     Hypertension Son     Diabetes Maternal Grandmother         lost leg    Kidney disease Paternal Grandfather         had kidney removed    Stroke Paternal Grandfather      Social History     Tobacco Use    Smoking status: Never Smoker    Smokeless tobacco: Never Used   Substance Use Topics    Alcohol use: Yes     Alcohol/week: 1.0 - 2.0 standard drinks     Types: 1 - 2 Glasses of wine per week     Comment: "socially" not in awhile    Drug use: No       Review of patient's allergies indicates:  No Known Allergies    Outpatient meds:  No current facility-administered medications on file prior to encounter.      Current Outpatient Medications on File Prior to Encounter   Medication Sig Dispense Refill    gabapentin (NEURONTIN) 100 MG capsule Take 100 mg by mouth 3 (three) times daily.  3    midodrine (PROAMATINE) 5 MG Tab Take 5 mg by mouth 3 (three) times daily with meals.       rosuvastatin (CRESTOR) 10 MG tablet Take 10 mg by mouth every evening.       albuterol-ipratropium (DUO-NEB) 2.5 mg-0.5 mg/3 mL nebulizer solution Take 3 mLs by nebulization every 6 (six) hours. Rescue 1 Box 11    apixaban 5 mg Tab Take 1 tablet (5 mg " "total) by mouth 2 (two) times daily. (Patient taking differently: Take 5 mg by mouth 2 (two) times daily. ) 60 tablet 1    blood sugar diagnostic Strp Test 3-4 times daily PRN      CLARITIN-D 12 HOUR 5-120 mg per tablet Take 1 tablet by mouth once daily.   0    fluticasone (FLONASE) 50 mcg/actuation nasal spray 1 spray by Each Nostril route once daily.   12    insulin aspart (NOVOLOG) 100 unit/mL InPn pen Inject 1-10 Units into the skin 3 (three) times daily. (Patient taking differently: Inject 1-10 Units into the skin 3 (three) times daily. If sugar >150 on sliding scale) 3 mL 1    levothyroxine (SYNTHROID) 50 MCG tablet Take 50 mcg by mouth before breakfast.       pen needle, diabetic (BD ULTRA-FINE OBDULIA PEN NEEDLE) 32 gauge x 5/32" Ndle       ramelteon (ROZEREM) 8 mg tablet Take 8 mg by mouth every evening.      sevelamer carbonate (RENVELA) 800 mg Tab Take 1,600 mg by mouth 3 (three) times daily with meals.      tiotropium (SPIRIVA) 18 mcg inhalation capsule Inhale 1 capsule (18 mcg total) into the lungs once daily. Controller 30 capsule 11    umeclidinium (INCRUSE ELLIPTA) 62.5 mcg/actuation inhalation capsule Inhale 62.5 mcg into the lungs once daily. Controller         Scheduled meds:   apixaban  2.5 mg Oral BID    cefTRIAXone (ROCEPHIN) IVPB  1 g Intravenous Q24H    chlorhexidine  15 mL Mouth/Throat BID    citalopram  20 mg Oral Daily    collagenase   Topical (Top) Daily    levothyroxine  50 mcg Oral Before breakfast    miconazole NITRATE 2 %   Topical (Top) BID    mupirocin   Nasal BID    rosuvastatin  10 mg Oral Daily    sevelamer carbonate  1,600 mg Oral TID WM    sodium zirconium cyclosilicate  5 g Oral Daily    tiotropium  18 mcg Inhalation Daily       Infusions:   amiodarone in dextrose 5% 0.5 mg/min (09/26/20 0701)       PRN meds:  acetaminophen, dextrose 50%, dextrose 50%, glucagon (human recombinant), glucose, glucose, insulin aspart U-100, ondansetron    Review of " Systems:  ROS    OBJECTIVE:     Vital Signs and IO (Last 24H):  Temp:  [97 °F (36.1 °C)-98 °F (36.7 °C)]   Pulse:  []   Resp:  [13-33]   BP: ()/(50-65)   SpO2:  [76 %-100 %]   I/O last 3 completed shifts:  In: 1826 [P.O.:580; I.V.:746; Other:500]  Out: 500 [Other:500]    Wt Readings from Last 5 Encounters:   09/24/20 80.7 kg (178 lb)   09/08/20 79.8 kg (176 lb)   09/04/20 80 kg (176 lb 5.9 oz)   09/02/20 80 kg (176 lb 5.9 oz)   08/01/20 82.1 kg (181 lb)         Physical Exam:  Physical Exam  Constitutional:       Appearance: She is well-developed. She is not diaphoretic.   HENT:      Head: Normocephalic and atraumatic.   Eyes:      General: No scleral icterus.     Pupils: Pupils are equal, round, and reactive to light.   Neck:      Musculoskeletal: Neck supple.   Cardiovascular:      Rate and Rhythm: Normal rate and regular rhythm.   Pulmonary:      Effort: Pulmonary effort is normal. No respiratory distress.      Breath sounds: No stridor.   Abdominal:      General: There is no distension.      Palpations: Abdomen is soft.   Musculoskeletal: Normal range of motion.         General: No deformity.   Skin:     General: Skin is warm and dry.      Findings: No erythema or rash.   Neurological:      Mental Status: She is alert and oriented to person, place, and time.      Cranial Nerves: No cranial nerve deficit.   Psychiatric:         Behavior: Behavior normal.         Body mass index is 28.73 kg/m².    Laboratory:  Recent Labs   Lab 09/24/20  0630 09/25/20  0636 09/25/20  1143 09/26/20  0404   * 129*  --  130*   K 5.2* 5.7* 3.5 4.7   CL 91* 93*  --  89*   CO2 24 19*  --  28   BUN 46* 55*  --  35*   CREATININE 5.0* 5.5*  --  4.2*   ESTGFRAFRICA 10.3* 9.1*  --  12.7*   EGFRNONAA 8.9* 7.9*  --  11.0*   * 99  --  104       Recent Labs   Lab 09/24/20  0630 09/25/20  0636 09/26/20  0404   CALCIUM 8.7 8.4* 8.2*   ALBUMIN 2.7*  --  2.5*             No results for input(s): POCTGLUCOSE in the last 168  hours.    Recent Labs   Lab 02/21/18  2120 09/12/18  1715 09/24/20  0630   Hemoglobin A1C 6.1 H 5.2 6.3 H       Recent Labs   Lab 09/24/20  0630 09/25/20  0636 09/26/20  0404   WBC 11.72 14.70* 12.08   HGB 12.8 12.9 12.0   HCT 41.9 43.0 39.4    199 176   MCV 93 96 93   MCHC 30.5* 30.0* 30.5*   MONO 11.9  1.4*  --   --        Recent Labs   Lab 09/24/20  0630 09/26/20  0404   BILITOT 1.0 0.6   PROT 5.8* 4.9*   ALBUMIN 2.7* 2.5*   ALKPHOS 69 72   ALT 19 21   AST 26 29       Recent Labs   Lab 11/24/17  1837 09/11/18  0246 09/25/20  1434   Color, UA Red A Yellow Yellow   Appearance, UA Cloudy A Hazy A Cloudy A   pH, UA 5.0 5.0 6.0   Specific Gravity, UA >1.030 A >=1.030 A 1.020   Protein, UA 2+ A 2+ A 2+ A   Glucose, UA 1+ A Negative Negative   Ketones, UA Negative Negative Negative   Urobilinogen, UA Negative Negative Negative   Bilirubin (UA) Negative Negative 1+ A   Occult Blood UA 3+ A 1+ A 3+ A   Nitrite, UA Negative Negative Negative   RBC, UA >100 H 5 H 83 H   WBC, UA 0 >100 H >100 H   Bacteria Rare Moderate A Occasional   Hyaline Casts, UA 0 0 1785 A       Recent Labs   Lab 11/30/17  2236 12/11/17  0410 09/11/18  0209   POC PH 7.354 7.350 7.375   POC PCO2 44.8 41.6 46.4 H   POC HCO3 25.0 23.0 L 27.1   POC PO2 112 H 129 H 61 L   POC SATURATED O2 98 99 90 L   POC BE -1 -3 2   Sample ARTERIAL ARTERIAL ARTERIAL       Microbiology Results (last 7 days)     Procedure Component Value Units Date/Time    Urine culture [[id number]] Collected: 09/25/20 1434    Order Status: Completed Specimen: Urine Updated: 09/26/20 0733     Urine Culture, Routine No growth to date    Narrative:      Specimen Source->Urine    Blood culture [[id number]] Collected: 09/25/20 8774    Order Status: Completed Specimen: Blood Updated: 09/25/20 2316     Blood Culture, Routine No Growth to date          ASSESSMENT/PLAN:     Active Hospital Problems    Diagnosis  POA    Tachycardia [R00.0]  Yes    Stage 5 chronic kidney disease on chronic  dialysis [N18.6, Z99.2]  Not Applicable    Open wound of left heel [S97.908A]  Yes      Resolved Hospital Problems   No resolved problems to display.     ESRD on HD TTS via AVF  Hyperkalemia, mild  Hypotension  A.Flutter s/p CV 9/25  High TSH with normal T4, ? uncontrolled hypothyroidism  Continue current dialysis prescription.  Next HD per schedule.  Renal diet - low K, low phos. Continue Lokelma.  No IVs or BP checks on access and/or non-dominant arm.  Apprecaite cards eval. Holding midodrine for now. Cardioversion 9/25.    Anemia of CKD  Hgb and HCT are acceptable. Monitor.  Will provide YASHIRA and/or IV iron PRN.    MBD / Secondary HPT  Ca, phos, PTH and vitamin D levels are acceptable.   Phos binders, vitamin D analogues and calcimimetics as needed.    Thank you for allowing us to participate in the care of your patient!   We will follow the patient and provide recommendations as needed.    Edson Crystal MD    [facility]  [address] [place] [address]  [place] [id number]    [phone] - tel  [phone] - fax    9/26/2020 1:53 PM

## 2020-09-26 NOTE — PLAN OF CARE
09/26/20 0818   Patient Assessment/Suction   Level of Consciousness (AVPU) alert   Respiratory Effort Unlabored   All Lung Fields Breath Sounds diminished;clear   Cough Type nonproductive   PRE-TX-O2   O2 Device (Oxygen Therapy) nasal cannula   $ Is the patient on Low Flow Oxygen? Yes   Flow (L/min) 3   SpO2 97 %   Pulse Oximetry Type Continuous   $ Pulse Oximetry - Multiple Charge Pulse Oximetry - Multiple   Pulse 72   Resp 20   Inhaler   $ Inhaler Charges MDI (Metered Dose Inahler) Treatment;Mouth rinsed post treatment   Respiratory Treatment Status (Inhaler) given   Treatment Route (Inhaler) mouthpiece   Patient Position (Inhaler) semi-Wilson's   Signs of Intolerance (Inhaler) none   Respiratory Evaluation   $ Care Plan Tech Time 15 min

## 2020-09-26 NOTE — PROGRESS NOTES
Progress Note  Cardiology    Admit Date: 9/24/2020   LOS: 2 days     Follow-up For:  Atrial flutter with RVR    Scheduled Meds:   apixaban  2.5 mg Oral BID    cefTRIAXone (ROCEPHIN) IVPB  1 g Intravenous Q24H    chlorhexidine  15 mL Mouth/Throat BID    citalopram  20 mg Oral Daily    collagenase   Topical (Top) Daily    levothyroxine  50 mcg Oral Before breakfast    miconazole NITRATE 2 %   Topical (Top) BID    mupirocin   Nasal BID    rosuvastatin  10 mg Oral Daily    sevelamer carbonate  1,600 mg Oral TID WM    sodium zirconium cyclosilicate  5 g Oral Daily    tiotropium  18 mcg Inhalation Daily     Continuous Infusions:   amiodarone in dextrose 5% 0.5 mg/min (09/26/20 0701)     PRN Meds:acetaminophen, dextrose 50%, dextrose 50%, glucagon (human recombinant), glucose, glucose, insulin aspart U-100, ondansetron    Review of patient's allergies indicates:  No Known Allergies    SUBJECTIVE:     Interval History: Patient has no complaint of chest pain tightness shortness of breath.  She feels much improved.    Review of Systems  Respiratory: negative for cough  Cardiovascular: negative for chest pressure/discomfort, dyspnea, orthopnea, palpitations and paroxysmal nocturnal dyspnea    OBJECTIVE:     Vital Signs (Most Recent)  Temp: 96 °F (35.6 °C) (09/26/20 1015)  Pulse: 79 (09/26/20 1215)  Resp: (!) 23 (09/26/20 1030)  BP: (!) 85/42 (09/26/20 1215)  SpO2: 97 % (09/26/20 1030)    Vital Signs Range (Last 24H):  Temp:  [96 °F (35.6 °C)-98 °F (36.7 °C)]   Pulse:  [72-85]   Resp:  [13-33]   BP: ()/(34-76)   SpO2:  [76 %-99 %]       Physical Exam:  Neck: JVD - 2 cm above sternal notch, no carotid bruit and supple, symmetrical, trachea midline  Lungs: clear to auscultation bilaterally, normal respiratory effort  Heart: regular rate and rhythm, S1, S2 normal, no murmur, click, rub or gallop  Abdomen: soft, non-tender; bowel sounds normal; no masses,  no organomegaly  Extremities: Extremities normal,  atraumatic, no cyanosis, clubbing, or edema    Recent Results (from the past 24 hour(s))   Blood culture    Collection Time: 09/25/20  3:23 PM    Specimen: Blood   Result Value Ref Range    Blood Culture, Routine No Growth to date    POCT glucose    Collection Time: 09/25/20  9:27 PM   Result Value Ref Range    POC Glucose 102 70 - 110   CBC Without Differential    Collection Time: 09/26/20  4:04 AM   Result Value Ref Range    WBC 12.08 3.90 - 12.70 K/uL    RBC 4.23 4.00 - 5.40 M/uL    Hemoglobin 12.0 12.0 - 16.0 g/dL    Hematocrit 39.4 37.0 - 48.5 %    Mean Corpuscular Volume 93 82 - 98 fL    Mean Corpuscular Hemoglobin 28.4 27.0 - 31.0 pg    Mean Corpuscular Hemoglobin Conc 30.5 (L) 32.0 - 36.0 g/dL    RDW 16.4 (H) 11.5 - 14.5 %    Platelets 176 150 - 350 K/uL    MPV 9.9 9.2 - 12.9 fL   Comprehensive metabolic panel    Collection Time: 09/26/20  4:04 AM   Result Value Ref Range    Sodium 130 (L) 136 - 145 mmol/L    Potassium 4.7 3.5 - 5.1 mmol/L    Chloride 89 (L) 95 - 110 mmol/L    CO2 28 23 - 29 mmol/L    Glucose 104 70 - 110 mg/dL    BUN, Bld 35 (H) 6 - 20 mg/dL    Creatinine 4.2 (H) 0.5 - 1.4 mg/dL    Calcium 8.2 (L) 8.7 - 10.5 mg/dL    Total Protein 4.9 (L) 6.0 - 8.4 g/dL    Albumin 2.5 (L) 3.5 - 5.2 g/dL    Total Bilirubin 0.6 0.1 - 1.0 mg/dL    Alkaline Phosphatase 72 55 - 135 U/L    AST 29 10 - 40 U/L    ALT 21 10 - 44 U/L    Anion Gap 13 8 - 16 mmol/L    eGFR if African American 12.7 (A) >60 mL/min/1.73 m^2    eGFR if non African American 11.0 (A) >60 mL/min/1.73 m^2   Echo Color Flow Doppler? No; Limited Follow-Up Exam? Yes    Collection Time: 09/26/20  2:34 PM   Result Value Ref Range    BSA 1.94 m2    Right Atrial Pressure (from IVC) 15 mmHg    TV rest pulmonary artery pressure 71 mmHg    TR Max Pawel 3.73 m/s    LV Systolic Volume 62.50 mL    LV Systolic Volume Index 32.8 mL/m2    LV Diastolic Volume 116.90 mL    LV Diastolic Volume Index 61.43 mL/m2    Triscuspid Valve Regurgitation Peak Gradient 56  mmHg       Diagnostic Results:  Labs: Reviewed  ECG: Reviewed  Echo: Reviewed  Mitral valve anuloplasty ring.  Mild mitral regurgitation.  Moderate pulmonary hypertension-PA systolic pressure is elevated at 71 mmHg.  Elevated CVP.  LVEF 46%; severe septal hypokinesia.  LV is mildly dilated in systole and normal dimension in diastole.    ASSESSMENT/PLAN:     The patient is on intravenous amiodarone.  Transition to amiodarone 200 mg b.i.d..  She underwent dialysis today-she is negative 1.6 L. she required Jasper-Synephrine for a short while on dialysis; she is off pressors at this time.

## 2020-09-26 NOTE — PLAN OF CARE
09/25/20 2044   PRE-TX-O2   O2 Device (Oxygen Therapy) nasal cannula   Flow (L/min) 4   SpO2 99 %   Pulse Oximetry Type Continuous   $ Pulse Oximetry - Multiple Charge Pulse Oximetry - Multiple   Pulse 80   Resp 20   Respiratory Evaluation   $ Care Plan Tech Time 15 min

## 2020-09-26 NOTE — PROGRESS NOTES
[facility]  Progress Note    Patient Name: Juliane Ramos  MRN: [id number]  Patient Class: IP- Inpatient   Admission Date: 9/24/2020  Length of Stay: 2 days  Attending Physician: Oscar Ruth DO  Primary Care Provider: JOS Dumont        Subjective:     Principal Problem:<principal problem not specified>        HPI:  Patient is a 60-year-old female hx ESRD HD (T,Th, Sat)  CAD/CABG ,EF20%?, COPD, DM ,chronic hypotension  presents ED with complaints of having low blood pressure and rapid heart rate.  In the emergency department patient had a heart rate in the 130s regular and a blood pressure systolic of approximately 100  Patient denies shortness of breath chest pain fever chills abdominal pain nausea vomiting.  She states her last dialysis on Tuesday she did not have any volume removed.  Patient has been given approximately 500 cc of IV fluids in the emergency department at remains tachycardic in the 120s.      Overview/Hospital Course:  No notes on file    Interval History   patient cardioverted yesterday   a flutter to sinus rhythm  Patient denies chest pain shortness of breath abdominal pain.  Overall she feels  Much better.  Urine catheter showed pulse appearing urine    Objective:     Vital Signs (Most Recent):  Temp: 98 °F (36.7 °C) (09/26/20 0701)  Pulse: 72 (09/26/20 0818)  Resp: 20 (09/26/20 0818)  BP: 117/65 (09/26/20 0701)  SpO2: 97 % (09/26/20 0818) Vital Signs (24h Range):  Temp:  [97 °F (36.1 °C)-98 °F (36.7 °C)] 98 °F (36.7 °C)  Pulse:  [] 72  Resp:  [13-33] 20  SpO2:  [76 %-100 %] 97 %  BP: ()/(50-65) 117/65     Weight: 80.7 kg (178 lb)  Body mass index is 28.73 kg/m².    Intake/Output Summary (Last 24 hours) at 9/26/2020 1006  Last data filed at 9/26/2020 0701  Gross per 24 hour   Intake 1550 ml   Output 500 ml   Net 1050 ml      Physical Exam patient appears improved today  Lying in bed no acute distress  HEENT sclerae nonicteric  Neck is  supple nontender  Lungs are clear to auscultation  Heart is regular rate rhythm no rub number  Abdomen is soft nontender, +BS  Extremities no edema  Neuro patient is alert oriented x3 motor exam is nonfocal  Exam no Chi  Skin see photos from wound care  Psych patient is pleasant cooperative      Significant Labs:   BMP:   Recent Labs   Lab 09/26/20  0404      *   K 4.7   CL 89*   CO2 28   BUN 35*   CREATININE 4.2*   CALCIUM 8.2*     CBC:   Recent Labs   Lab 09/25/20  0636 09/26/20  0404   WBC 14.70* 12.08   HGB 12.9 12.0   HCT 43.0 39.4    176     CMP:   Recent Labs   Lab 09/25/20  0636 09/25/20  1143 09/26/20  0404   *  --  130*   K 5.7* 3.5 4.7   CL 93*  --  89*   CO2 19*  --  28   GLU 99  --  104   BUN 55*  --  35*   CREATININE 5.5*  --  4.2*   CALCIUM 8.4*  --  8.2*   PROT  --   --  4.9*   ALBUMIN  --   --  2.5*   BILITOT  --   --  0.6   ALKPHOS  --   --  72   AST  --   --  29   ALT  --   --  21   ANIONGAP 17*  --  13   EGFRNONAA 7.9*  --  11.0*       Significant Imaging:       Assessment/Plan:      #1 Near syncope-most likely multifactorial  Including hypotension and underlying arrhythmia  #2 Hx orthostatic hypotension/now off pressors.   continue monitoring  #3 Volume depletion-received IV fluids.  Resolved  #4 New onset atrial  Flutter w/RVR   -S/P cardioversion - NSR   cardiology following, change to p.o. amiodarone ,. pharmacy to adjust  Echocardiogram pending.  Continue anticoagulation  #5 ESRD HD Tuesday Thursday Saturday nephrology following   #6 CAD/CABG-/EF30% -Slight elevation of troponin most likely secondary to demand ischemia secondary to hypotension  End-stage renal disease contributing  #7 Chronic wound left distal posterior lower extremity-Wound care follow-up  Followed by Dr. Wick  #8 Hyponatremia-monitoring labs   #9 Hyperkalemia-resolved after dialysis  #10 DM2-follow sliding scale hemoglobin A1c 6.3   #11 Hx PE -details unknown   #12 Metabolic  acidosis-secondary to underlying renal disease.  Nephrology following   #13 Acute UTI POA-continue IV antibiotics.  Cultures pending     Transfer out is ICU  PT eval  Labs in a.m., continuous cardiac monitoring  IV antibiotics  VTE Risk Mitigation (From admission, onward)         Ordered     apixaban tablet 2.5 mg  2 times daily      09/24/20 1018     Place MARY ELLEN hose  Until discontinued      09/24/20 1018     IP VTE HIGH RISK PATIENT  Once      09/24/20 1018     Place sequential compression device  Until discontinued      09/24/20 1018                  Oscar Ruth DO  Department of Hospital Medicine   [facility]

## 2020-09-26 NOTE — PT/OT/SLP PROGRESS
Physical Therapy      Patient Name:  Juliane Ramos   MRN:  [id number]    Patient not seen today secondary to Dialysis. Will follow-up 09/27/2020.    Cherelle Smith PT

## 2020-09-27 NOTE — PROGRESS NOTES
Progress Note  Cardiology    Admit Date: 9/24/2020   LOS: 3 days     Follow-up For:  Atrial flutter with 2:1 AV block.  IV amiodarone and DC cardioversion.  CHF; pulmonary hypertension    Scheduled Meds:   amiodarone  200 mg Oral BID    apixaban  2.5 mg Oral BID    cefTRIAXone (ROCEPHIN) IVPB  1 g Intravenous Q24H    chlorhexidine  15 mL Mouth/Throat BID    citalopram  20 mg Oral Daily    collagenase   Topical (Top) Daily    levothyroxine  50 mcg Oral Before breakfast    miconazole NITRATE 2 %   Topical (Top) BID    mupirocin   Nasal BID    rosuvastatin  10 mg Oral Daily    sevelamer carbonate  1,600 mg Oral TID WM    sodium zirconium cyclosilicate  5 g Oral Daily    tiotropium  18 mcg Inhalation Daily     Continuous Infusions:  PRN Meds:acetaminophen, albuterol sulfate, dextrose 50%, dextrose 50%, glucagon (human recombinant), glucose, glucose, insulin aspart U-100, ondansetron    Review of patient's allergies indicates:  No Known Allergies    SUBJECTIVE:     Interval History: Patient has no complaint of chest pain tightness or shortness of breath.  Mild cough.    Review of Systems  Respiratory: positive for cough, negative for hemoptysis, sputum and wheezing  Cardiovascular: negative for chest pressure/discomfort, dyspnea, orthopnea, palpitations and paroxysmal nocturnal dyspnea    OBJECTIVE:     Vital Signs (Most Recent)  Temp: 97.4 °F (36.3 °C) (09/27/20 1100)  Pulse: 80 (09/27/20 1100)  Resp: 20 (09/27/20 1100)  BP: (!) 102/58 (09/27/20 1100)  SpO2: 98 % (09/27/20 1100)    Vital Signs Range (Last 24H):  Temp:  [97 °F (36.1 °C)-98 °F (36.7 °C)]   Pulse:  [77-89]   Resp:  [18-33]   BP: ()/(51-61)   SpO2:  [95 %-98 %]       Physical Exam:  Neck: JVD - 2 cm above sternal notch, no carotid bruit and supple, symmetrical, trachea midline  Lungs: clear to auscultation bilaterally, normal respiratory effort  Heart: regular rate and rhythm, S1, S2 normal, no murmur, click, rub or gallop  Abdomen: soft,  non-tender; bowel sounds normal; no masses,  no organomegaly  Extremities: Extremities normal, atraumatic, no cyanosis, clubbing, or edema    Recent Results (from the past 24 hour(s))   Echo Color Flow Doppler? No; Limited Follow-Up Exam? Yes    Collection Time: 09/26/20  2:34 PM   Result Value Ref Range    BSA 1.94 m2    Right Atrial Pressure (from IVC) 15 mmHg    TV rest pulmonary artery pressure 71 mmHg    TR Max Pawel 3.73 m/s    LV Systolic Volume 62.50 mL    LV Systolic Volume Index 32.8 mL/m2    LV Diastolic Volume 116.90 mL    LV Diastolic Volume Index 61.43 mL/m2    Triscuspid Valve Regurgitation Peak Gradient 56 mmHg   POCT glucose    Collection Time: 09/26/20  9:27 PM   Result Value Ref Range    POC Glucose 125 (H) 70 - 110   Basic metabolic panel    Collection Time: 09/27/20  4:50 AM   Result Value Ref Range    Sodium 131 (L) 136 - 145 mmol/L    Potassium 4.3 3.5 - 5.1 mmol/L    Chloride 95 95 - 110 mmol/L    CO2 24 23 - 29 mmol/L    Glucose 108 70 - 110 mg/dL    BUN, Bld 27 (H) 6 - 20 mg/dL    Creatinine 4.0 (H) 0.5 - 1.4 mg/dL    Calcium 8.1 (L) 8.7 - 10.5 mg/dL    Anion Gap 12 8 - 16 mmol/L    eGFR if African American 13.4 (A) >60 mL/min/1.73 m^2    eGFR if non African American 11.6 (A) >60 mL/min/1.73 m^2   CBC Without Differential    Collection Time: 09/27/20  4:50 AM   Result Value Ref Range    WBC 11.80 3.90 - 12.70 K/uL    RBC 4.27 4.00 - 5.40 M/uL    Hemoglobin 12.1 12.0 - 16.0 g/dL    Hematocrit 39.9 37.0 - 48.5 %    Mean Corpuscular Volume 93 82 - 98 fL    Mean Corpuscular Hemoglobin 28.3 27.0 - 31.0 pg    Mean Corpuscular Hemoglobin Conc 30.3 (L) 32.0 - 36.0 g/dL    RDW 16.3 (H) 11.5 - 14.5 %    Platelets 155 150 - 350 K/uL    MPV 10.0 9.2 - 12.9 fL   POCT glucose    Collection Time: 09/27/20 11:36 AM   Result Value Ref Range    POC Glucose 159 (H) 70 - 110       Diagnostic Results:  Labs: Reviewed  ECG: Reviewed  X-Ray: Reviewed    ASSESSMENT/PLAN:     Echo on 09/26/2020 after cardioversion  with the patient in NSR reveals LVEF 46%.  Moderate pulmonary hypertension with estimated PA systolic pressure of 71 mmHg.  Continue amiodarone 200 mg b.i.d..  Metoprolol ER 25 mg daily.

## 2020-09-27 NOTE — NURSING TRANSFER
Nursing Transfer Note      9/27/2020     Transfer To: 2507    Transfer via bed    Transfer with nasal canulato O2, cardiac monitoring     Transported by Mary Garcia RN    Medicines sent: yes    Chart send with patient: Yes    Notified: spouse    Patient reassessed at: 9/26/2020 (date, time)    Upon arrival to floor: cardiac monitor applied, patient oriented to room, call bell in reach and bed in lowest position    Jammie Day RN

## 2020-09-27 NOTE — CONSULTS
Nephrology Consult Note        Patient Name: Juliane Ramos  MRN: [id number]    Patient Class: IP- Inpatient   Admission Date: 9/24/2020  Length of Stay: 3 days  Date of Service: 9/27/2020    Attending Physician: Oscar Ruth DO  Primary Care Provider: JOS Dumont    Reason for Consult: esrd/anemia/hyperkalemia/hypotension/shpt/tachycardia    SUBJECTIVE:     HPI: 60F hx ESRD HD (T,Th, Sat)  CAD/CABG ,EF20%?, COPD, DM ,chronic hypotension on midodrine daily presents to ED with complaints of having low blood pressure and rapid heart rate. In the emergency department patient had a heart rate in the 130s regular and SBP in 100s  Patient denies shortness of breath, chest pain, fever, chills, abdominal pain, nausea, vomiting. Last dialysis on Tuesday and she did not have any volume removed.  Patient has been given approximately 500 cc of IV fluids in the emergency department at remains tachycardic in the 120s. Cards eval is pending, Amiodarone was started.    9/25 Getting HD for hyperkalemia today, before cardioversion. Continue HD per TTS schedule.  9/26 Seen and examined on Hd today, tolerating well. Continue HD per TTS schedule.  9/27 VSS, no new complains. Can't give midodrine for now. Continue HD per TTS schedule.    Past Medical History:   Diagnosis Date    Anemia     Anemia in stage 3 chronic kidney disease 11/26/2017    Anticoagulant long-term use     CHF (congestive heart failure)     COPD (chronic obstructive pulmonary disease)     COPD exacerbation 3/10/2020    Coronary artery disease     Diabetes mellitus     Digestive disorder     Encounter for blood transfusion     Essential hypertension 6/24/2017    Hypertension     Low blood pressure     MI (myocardial infarction) 2010    Segmental and subsegmental pulmonary emboli of RLL without acute cor pulmonale 11/25/2017    Stroke     July 2005    Thyroid disease     Traumatic subarachnoid hemorrhage 11/24/2017    Type 2 diabetes  "mellitus with stage 3 chronic kidney disease, without long-term current use of insulin 2017     Past Surgical History:   Procedure Laterality Date    ABCESS DRAINAGE Right     Between "little toe" and the one next to it    BREAST SURGERY      Reduction [id number]    CARDIAC SURGERY  2011    CABG 4vessel ring in one valve mitral valve prolapse    CORONARY ARTERY BYPASS GRAFT      DEBRIDEMENT OF FOOT Left 2020    Procedure: DEBRIDEMENT, FOOT;  Surgeon: Dennis Wick DPM;  Location: [facility];  Service: Podiatry;  Laterality: Left;    FOOT SURGERY      4 grafts to left foot    hyperlipidemia      TUBAL LIGATION  1986     Family History   Problem Relation Age of Onset    Arthritis Mother     Heart disease Father     Cancer Father 68        prostae and bladder ca  in     Diabetes Father     Hypertension Father     Depression Sister     Hypertension Son     Diabetes Maternal Grandmother         lost leg    Kidney disease Paternal Grandfather         had kidney removed    Stroke Paternal Grandfather      Social History     Tobacco Use    Smoking status: Never Smoker    Smokeless tobacco: Never Used   Substance Use Topics    Alcohol use: Yes     Alcohol/week: 1.0 - 2.0 standard drinks     Types: 1 - 2 Glasses of wine per week     Comment: "socially" not in awhile    Drug use: No       Review of patient's allergies indicates:  No Known Allergies    Outpatient meds:  No current facility-administered medications on file prior to encounter.      Current Outpatient Medications on File Prior to Encounter   Medication Sig Dispense Refill    gabapentin (NEURONTIN) 100 MG capsule Take 100 mg by mouth 3 (three) times daily.  3    midodrine (PROAMATINE) 5 MG Tab Take 5 mg by mouth 3 (three) times daily with meals.       rosuvastatin (CRESTOR) 10 MG tablet Take 10 mg by mouth every evening.       albuterol-ipratropium (DUO-NEB) 2.5 mg-0.5 mg/3 mL nebulizer solution Take 3 mLs by " "nebulization every 6 (six) hours. Rescue 1 Box 11    apixaban 5 mg Tab Take 1 tablet (5 mg total) by mouth 2 (two) times daily. (Patient taking differently: Take 5 mg by mouth 2 (two) times daily. ) 60 tablet 1    blood sugar diagnostic Strp Test 3-4 times daily PRN      CLARITIN-D 12 HOUR 5-120 mg per tablet Take 1 tablet by mouth once daily.   0    fluticasone (FLONASE) 50 mcg/actuation nasal spray 1 spray by Each Nostril route once daily.   12    insulin aspart (NOVOLOG) 100 unit/mL InPn pen Inject 1-10 Units into the skin 3 (three) times daily. (Patient taking differently: Inject 1-10 Units into the skin 3 (three) times daily. If sugar >150 on sliding scale) 3 mL 1    levothyroxine (SYNTHROID) 50 MCG tablet Take 50 mcg by mouth before breakfast.       pen needle, diabetic (BD ULTRA-FINE OBDULIA PEN NEEDLE) 32 gauge x 5/32" Ndle       ramelteon (ROZEREM) 8 mg tablet Take 8 mg by mouth every evening.      sevelamer carbonate (RENVELA) 800 mg Tab Take 1,600 mg by mouth 3 (three) times daily with meals.      tiotropium (SPIRIVA) 18 mcg inhalation capsule Inhale 1 capsule (18 mcg total) into the lungs once daily. Controller 30 capsule 11    umeclidinium (INCRUSE ELLIPTA) 62.5 mcg/actuation inhalation capsule Inhale 62.5 mcg into the lungs once daily. Controller         Scheduled meds:   amiodarone  200 mg Oral BID    apixaban  2.5 mg Oral BID    cefTRIAXone (ROCEPHIN) IVPB  1 g Intravenous Q24H    chlorhexidine  15 mL Mouth/Throat BID    citalopram  20 mg Oral Daily    collagenase   Topical (Top) Daily    levothyroxine  50 mcg Oral Before breakfast    miconazole NITRATE 2 %   Topical (Top) BID    midodrine  2.5 mg Oral TID    mupirocin   Nasal BID    rosuvastatin  10 mg Oral Daily    sevelamer carbonate  1,600 mg Oral TID WM    sodium zirconium cyclosilicate  5 g Oral Daily    tiotropium  18 mcg Inhalation Daily       Infusions:      PRN meds:  acetaminophen, dextrose 50%, dextrose 50%, glucagon " (human recombinant), glucose, glucose, insulin aspart U-100, ondansetron    Review of Systems:  ROS    OBJECTIVE:     Vital Signs and IO (Last 24H):  Temp:  [96 °F (35.6 °C)-98 °F (36.7 °C)]   Pulse:  [74-89]   Resp:  [18-33]   BP: ()/(34-76)   SpO2:  [95 %-99 %]   I/O last 3 completed shifts:  In: 2080 [P.O.:1200; I.V.:330; Other:500; IV Piggyback:50]  Out: 1800 [Other:1800]    Wt Readings from Last 5 Encounters:   09/26/20 80.7 kg (177 lb 14.6 oz)   09/26/20 80.7 kg (177 lb 14.6 oz)   09/08/20 79.8 kg (176 lb)   09/04/20 80 kg (176 lb 5.9 oz)   09/02/20 80 kg (176 lb 5.9 oz)         Physical Exam:  Physical Exam  Constitutional:       Appearance: She is well-developed. She is not diaphoretic.   HENT:      Head: Normocephalic and atraumatic.   Eyes:      General: No scleral icterus.     Pupils: Pupils are equal, round, and reactive to light.   Neck:      Musculoskeletal: Neck supple.   Cardiovascular:      Rate and Rhythm: Normal rate and regular rhythm.   Pulmonary:      Effort: Pulmonary effort is normal. No respiratory distress.      Breath sounds: No stridor.   Abdominal:      General: There is no distension.      Palpations: Abdomen is soft.   Musculoskeletal: Normal range of motion.         General: No deformity.   Skin:     General: Skin is warm and dry.      Findings: No erythema or rash.   Neurological:      Mental Status: She is alert and oriented to person, place, and time.      Cranial Nerves: No cranial nerve deficit.   Psychiatric:         Behavior: Behavior normal.         Body mass index is 28.72 kg/m².    Laboratory:  Recent Labs   Lab 09/25/20  0636 09/25/20  1143 09/26/20  0404 09/27/20  0450   *  --  130* 131*   K 5.7* 3.5 4.7 4.3   CL 93*  --  89* 95   CO2 19*  --  28 24   BUN 55*  --  35* 27*   CREATININE 5.5*  --  4.2* 4.0*   ESTGFRAFRICA 9.1*  --  12.7* 13.4*   EGFRNONAA 7.9*  --  11.0* 11.6*   GLU 99  --  104 108       Recent Labs   Lab 09/24/20  0630 09/25/20  0636  09/26/20  0404 09/27/20  0450   CALCIUM 8.7 8.4* 8.2* 8.1*   ALBUMIN 2.7*  --  2.5*  --              No results for input(s): POCTGLUCOSE in the last 168 hours.    Recent Labs   Lab 02/21/18  2120 09/12/18  1715 09/24/20  0630   Hemoglobin A1C 6.1 H 5.2 6.3 H       Recent Labs   Lab 09/24/20  0630 09/25/20  0636 09/26/20  0404 09/27/20  0450   WBC 11.72 14.70* 12.08 11.80   HGB 12.8 12.9 12.0 12.1   HCT 41.9 43.0 39.4 39.9    199 176 155   MCV 93 96 93 93   MCHC 30.5* 30.0* 30.5* 30.3*   MONO 11.9  1.4*  --   --   --        Recent Labs   Lab 09/24/20  0630 09/26/20  0404   BILITOT 1.0 0.6   PROT 5.8* 4.9*   ALBUMIN 2.7* 2.5*   ALKPHOS 69 72   ALT 19 21   AST 26 29       Recent Labs   Lab 11/24/17  1837 09/11/18  0246 09/25/20  1434   Color, UA Red A Yellow Yellow   Appearance, UA Cloudy A Hazy A Cloudy A   pH, UA 5.0 5.0 6.0   Specific Gravity, UA >1.030 A >=1.030 A 1.020   Protein, UA 2+ A 2+ A 2+ A   Glucose, UA 1+ A Negative Negative   Ketones, UA Negative Negative Negative   Urobilinogen, UA Negative Negative Negative   Bilirubin (UA) Negative Negative 1+ A   Occult Blood UA 3+ A 1+ A 3+ A   Nitrite, UA Negative Negative Negative   RBC, UA >100 H 5 H 83 H   WBC, UA 0 >100 H >100 H   Bacteria Rare Moderate A Occasional   Hyaline Casts, UA 0 0 1785 A       Recent Labs   Lab 11/30/17  2236 12/11/17  0410 09/11/18  0209   POC PH 7.354 7.350 7.375   POC PCO2 44.8 41.6 46.4 H   POC HCO3 25.0 23.0 L 27.1   POC PO2 112 H 129 H 61 L   POC SATURATED O2 98 99 90 L   POC BE -1 -3 2   Sample ARTERIAL ARTERIAL ARTERIAL       Microbiology Results (last 7 days)     Procedure Component Value Units Date/Time    Blood culture [[id number]] Collected: 09/25/20 1523    Order Status: Completed Specimen: Blood Updated: 09/26/20 1632     Blood Culture, Routine No Growth to date      No Growth to date    Urine culture [[id number]] Collected: 09/25/20 1434    Order Status: Completed Specimen: Urine Updated: 09/26/20 0733     Urine  Culture, Routine No growth to date    Narrative:      Specimen Source->Urine          ASSESSMENT/PLAN:     Active Hospital Problems    Diagnosis  POA    Tachycardia [R00.0]  Yes    Stage 5 chronic kidney disease on chronic dialysis [N18.6, Z99.2]  Not Applicable    Open wound of left heel [S91.302A]  Yes      Resolved Hospital Problems   No resolved problems to display.     ESRD on HD TTS via AVF  Hyperkalemia, mild  Hypotension  A.Flutter s/p CV 9/25 now on amiodarone, cant give midodrine  High TSH with normal T4, ? uncontrolled hypothyroidism  Continue current dialysis prescription. UF as tolerated.  Next HD per schedule.  Renal diet - low K, low phos. Continue Lokelma.  No IVs or BP checks on access and/or non-dominant arm.  Apprecaite cards eval. Holding midodrine for now.    Anemia of CKD  Hgb and HCT are acceptable. Monitor.  Will provide YASHIRA and/or IV iron PRN.    MBD / Secondary HPT  Ca, phos, PTH and vitamin D levels are acceptable.   Phos binders, vitamin D analogues and calcimimetics as needed.    Thank you for allowing us to participate in the care of your patient!   We will follow the patient and provide recommendations as needed.    Edson Crystal MD    [facility]  [address] [place] [address]  [place] [id number]    [phone] - tel  [phone] - fax    9/27/2020 1:53 PM

## 2020-09-27 NOTE — PT/OT/SLP EVAL
Physical Therapy Evaluation    Patient Name:  Juliane Ramos   MRN:  [id number]    Recommendations:     Discharge Recommendations:   Home Health PT   Discharge Equipment Recommendations:   None  Barriers to discharge: None    Assessment:     Juliane Ramos is a 60 y.o. female admitted with a medical diagnosis of <principal problem not specified>.  She presents with the following impairments/functional limitations:    weakness, impaired functional mobility, decreased balance,  Impaired gait, decreased endurance, and B knee pain with weightbearing.    Pt lives at home with her  and required assistance with functional mobility and ADL's.  She ambulates short distances with rollator and assistance of her .  Pt sleeps in a recliner and uses 3 L 's home 02.    Rehab Prognosis: Fair; patient would benefit from acute skilled PT services to address these deficits and reach maximum level of function.    Recent Surgery: * No surgery found *      Plan:     During this hospitalization, patient to be seen   to address the identified rehab impairments via   and progress toward the following goals:    · Plan of Care Expires:       Subjective     Chief Complaint: weakness  Patient/Family Comments/goals: home with HH  Pain/Comfort:  ·      Patients cultural, spiritual, Adventism conflicts given the current situation:      Living Environment:  Pt lives with her  in a mobile home with ramp entrance.  Prior to admission, patients level of function was  Min to Mod A  for functional mobility, Min A for bathing and dressing.  Equipment used at home:Rollator, RW, W/C, BSC  .  DME owned (not currently used): none.  Upon discharge, patient will have assistance from her .    Objective:     Communicated with nurse prior to session.  Patient found supine with    upon PT entry to room.    General Precautions: Standard,     Orthopedic Precautions:    Braces:       Exams:  · Cognitive Exam:  Patient is oriented to  Person, Place, Time and Situation  · RUE Strength: WFL  · LUE Strength: WFL  · RLE ROM: WFL  · RLE Strength: WFL  · LLE ROM: WFL  · LLE Strength: WFL    Functional Mobility:  · Bed Mobility:     · Supine to Sit: moderate assistance  · Transfers:     · Sit to Stand:  minimum assistance with rolling walker  · Gait: 12 ft RW and Min A  · Balance: good sitting balance, poor standing balance    Therapeutic Activities and Exercises:  Pt was informedthat her doctor had order  PT assessment and pt readily consented.  She presented with 4 L02 in place and 02 sat was  99%.  Pt t/f'ed supine to sit with Mod A, sit to stand with Min A.She ambulated 12 ft RW and  Min A.  Pt began to show decreased B LE stabilty with flexed knees. Pt t/'ed to chair with Min A .    AM-PAC 6 CLICK MOBILITY  Total Score:13     Patient left up in chair with all lines intact, call button in reach and  present.    GOALS:   Multidisciplinary Problems     Physical Therapy Goals        Problem: Physical Therapy Goal    Goal Priority Disciplines Outcome Goal Variances Interventions   Physical Therapy Goal     PT, PT/OT      Description: Goals to be met by: D/C    Patient will increase functional independence with mobility by performin. Supine to sit with MInimal Assistance  2. Sit to stand transfer with Contact Guard Assistance  3. Gait  x 25  feet with Minimal Assistance using Rolling Walker.                      History:     Past Medical History:   Diagnosis Date    Anemia     Anemia in stage 3 chronic kidney disease 2017    Anticoagulant long-term use     CHF (congestive heart failure)     COPD (chronic obstructive pulmonary disease)     COPD exacerbation 3/10/2020    Coronary artery disease     Diabetes mellitus     Digestive disorder     Encounter for blood transfusion     Essential hypertension 2017    Hypertension     Low blood pressure     MI (myocardial infarction) 2010    Segmental and subsegmental  "pulmonary emboli of RLL without acute cor pulmonale 11/25/2017    Stroke     July 2005    Thyroid disease     Traumatic subarachnoid hemorrhage 11/24/2017    Type 2 diabetes mellitus with stage 3 chronic kidney disease, without long-term current use of insulin 6/24/2017       Past Surgical History:   Procedure Laterality Date    ABCESS DRAINAGE Right     Between "little toe" and the one next to it    BREAST SURGERY      Reduction [id number]    CARDIAC SURGERY  01/2011    CABG 4vessel ring in one valve mitral valve prolapse    CORONARY ARTERY BYPASS GRAFT      DEBRIDEMENT OF FOOT Left 9/4/2020    Procedure: DEBRIDEMENT, FOOT;  Surgeon: Dennis Wick DPM;  Location: [facility];  Service: Podiatry;  Laterality: Left;    FOOT SURGERY      4 grafts to left foot    hyperlipidemia      TUBAL LIGATION  03/1986       Time Tracking:     PT Received On: 09/27/20  PT Start Time: 0925     PT Stop Time: 0945  PT Total Time (min): 20 min     Billable Minutes: Evaluation 12 minutes  and Gait Training 8 minutes      Cherelle Smith, PT  09/27/2020  "

## 2020-09-27 NOTE — SIGNIFICANT EVENT
Rapid response called for patient as patient appeared to have syncopal episode.   states he left room when he came back patient was unresponsive with oxygen off.  He states this has happened to her several times in the past and that she usually wakes up once oxygen has been replaced.    Initial findings:    O2 sats mid 80's RA.   's  Telemetry reviewed. Normal sinus 70's no arrhthymias.     Non-rebreather was placed with improvement to SpO2 100%.  Blood pressure 105/52.  Patient became more awake and alert throughout event and Stated that she was feeling okay, not in any pain.  ABG obtained with pH 7.1 and pCO2 70.9.  Patient was transferred to ICU and BiPAP was initiated.  Labs ordered.  Case d/w her attending physician, Dr. Ruth.       ABG  Recent Labs   Lab 09/27/20  1748   PH 7.144*   PO2 241*   PCO2 78.9*   HCO3 27.1   BE -2

## 2020-09-27 NOTE — PLAN OF CARE
09/26/20 2112   PRE-TX-O2   O2 Device (Oxygen Therapy) nasal cannula   Flow (L/min) 3   SpO2 96 %   Pulse Oximetry Type Continuous   $ Pulse Oximetry - Multiple Charge Pulse Oximetry - Multiple   Pulse 87   Resp 19   Respiratory Evaluation   $ Care Plan Tech Time 15 min

## 2020-09-27 NOTE — CODE/ RAPID DOCUMENTATION
Pt transferred via bed to ICU room 2205 by Althea RN House supervisor and OANH Rg ICU charge nurse. Pt on monitor and non rebreather mask. RT at bedside.

## 2020-09-27 NOTE — CARE UPDATE
09/27/20 0937   Patient Assessment/Suction   Level of Consciousness (AVPU) alert   Respiratory Effort Normal;Unlabored   Expansion/Accessory Muscles/Retractions no use of accessory muscles   All Lung Fields Breath Sounds diminished   Rhythm/Pattern, Respiratory unlabored;pattern regular;depth regular   PRE-TX-O2   O2 Device (Oxygen Therapy) nasal cannula   $ Is the patient on Low Flow Oxygen? Yes   Flow (L/min) 3  (HOME O2 SETTING)   SpO2 98 %   Pulse Oximetry Type Intermittent   $ Pulse Oximetry - Multiple Charge Pulse Oximetry - Multiple   Pulse 87   Resp 20   Aerosol Therapy   $ Aerosol Therapy Charges PRN treatment not required   Inhaler   $ Inhaler Charges MDI (Metered Dose Inahler) Treatment   Daily Review of Necessity (Inhaler) completed   Respiratory Treatment Status (Inhaler) given   Treatment Route (Inhaler) mouthpiece   Patient Position (Inhaler) sitting on edge of bed   Post Treatment Assessment (Inhaler) vital signs unchanged   Signs of Intolerance (Inhaler) none   Respiratory Evaluation   $ Care Plan Tech Time 15 min

## 2020-09-27 NOTE — NURSING
Rapid response called  Pt to 2205 s/p resp acidosis   Pt placed on bipap  2010 resp 30 100%  Sat 100% ,pt awake  Follow commmands   bp stable  118/59 sr 71 w/o ectopie  Left  Arm 20 heplock initiated  Blood drawn and sent  To lab pt  Gestures  She feel better  Presently on bipap

## 2020-09-27 NOTE — SUBJECTIVE & OBJECTIVE
Interval History:  Patient with confusion last 24 hours, generalized weakness inability to ambulate by herself.  Patient denies chest pain palpitations shortness of breath abdominal pain nausea vomiting.    Objective:     Vital Signs (Most Recent):  Temp: 97.4 °F (36.3 °C) (09/27/20 1100)  Pulse: 80 (09/27/20 1100)  Resp: 20 (09/27/20 1100)  BP: (!) 102/58 (09/27/20 1100)  SpO2: 98 % (09/27/20 1100) Vital Signs (24h Range):  Temp:  [97 °F (36.1 °C)-98 °F (36.7 °C)] 97.4 °F (36.3 °C)  Pulse:  [77-89] 80  Resp:  [18-33] 20  SpO2:  [95 %-98 %] 98 %  BP: ()/(51-61) 102/58     Weight: 80.7 kg (177 lb 14.6 oz)  Body mass index is 28.72 kg/m².    Intake/Output Summary (Last 24 hours) at 9/27/2020 1431  Last data filed at 9/27/2020 0900  Gross per 24 hour   Intake 770 ml   Output --   Net 770 ml      Physical Exam patient is sitting in a chair  is at bedside  Patient is confused at time today compared to previous exams  however she is answering questions appropriately  HEENT sclerae nonicteric  Neck is supple nontender  Lungs are coarse moderate air movement clear  Heart regular rate and rhythm  Abdomen is soft nontender  Extremities no edema  Neuro patient appears more tired today but awake moves all extremities equally oriented to place and name   exam no Chi  Psych patient is calm cooperative    Significant Labs:   BMP:   Recent Labs   Lab 09/27/20  0450      *   K 4.3   CL 95   CO2 24   BUN 27*   CREATININE 4.0*   CALCIUM 8.1*     CBC:   Recent Labs   Lab 09/26/20  0404 09/27/20  0450   WBC 12.08 11.80   HGB 12.0 12.1   HCT 39.4 39.9    155     CMP:   Recent Labs   Lab 09/26/20  0404 09/27/20  0450   * 131*   K 4.7 4.3   CL 89* 95   CO2 28 24    108   BUN 35* 27*   CREATININE 4.2* 4.0*   CALCIUM 8.2* 8.1*   PROT 4.9*  --    ALBUMIN 2.5*  --    BILITOT 0.6  --    ALKPHOS 72  --    AST 29  --    ALT 21  --    ANIONGAP 13 12   EGFRNONAA 11.0* 11.6*

## 2020-09-27 NOTE — PROGRESS NOTES
[facility]  Progress Note    Patient Name: Juliane Ramos  MRN: [id number]  Patient Class: IP- Inpatient   Admission Date: 9/24/2020  Length of Stay: 3 days  Attending Physician: Oscar Ruth DO  Primary Care Provider: JOS Dumont        Subjective:     Principal Problem:<principal problem not specified>        HPI:  Patient is a 60-year-old female hx ESRD HD (T,Th, Sat)  CAD/CABG ,EF20%?, COPD, DM ,chronic hypotension  presents ED with complaints of having low blood pressure and rapid heart rate.  In the emergency department patient had a heart rate in the 130s regular and a blood pressure systolic of approximately 100  Patient denies shortness of breath chest pain fever chills abdominal pain nausea vomiting.  She states her last dialysis on Tuesday she did not have any volume removed.  Patient has been given approximately 500 cc of IV fluids in the emergency department at remains tachycardic in the 120s.      Overview/Hospital Course:  No notes on file    Interval History:  Patient with confusion last 24 hours, generalized weakness inability to ambulate by herself.  Patient denies chest pain palpitations shortness of breath abdominal pain nausea vomiting.    Objective:     Vital Signs (Most Recent):  Temp: 97.4 °F (36.3 °C) (09/27/20 1100)  Pulse: 80 (09/27/20 1100)  Resp: 20 (09/27/20 1100)  BP: (!) 102/58 (09/27/20 1100)  SpO2: 98 % (09/27/20 1100) Vital Signs (24h Range):  Temp:  [97 °F (36.1 °C)-98 °F (36.7 °C)] 97.4 °F (36.3 °C)  Pulse:  [77-89] 80  Resp:  [18-33] 20  SpO2:  [95 %-98 %] 98 %  BP: ()/(51-61) 102/58     Weight: 80.7 kg (177 lb 14.6 oz)  Body mass index is 28.72 kg/m².    Intake/Output Summary (Last 24 hours) at 9/27/2020 1431  Last data filed at 9/27/2020 0900  Gross per 24 hour   Intake 770 ml   Output --   Net 770 ml      Physical Exam patient is sitting in a chair  is at bedside  Patient is confused at time today compared to previous  exams  however she is answering questions appropriately  HEENT sclerae nonicteric  Neck is supple nontender  Lungs are coarse moderate air movement clear  Heart regular rate and rhythm  Abdomen is soft nontender  Extremities no edema  Neuro patient appears more tired today but awake moves all extremities equally oriented to place and name   exam no Chi  Psych patient is calm cooperative    Significant Labs:   BMP:   Recent Labs   Lab 09/27/20 0450      *   K 4.3   CL 95   CO2 24   BUN 27*   CREATININE 4.0*   CALCIUM 8.1*     CBC:   Recent Labs   Lab 09/26/20  0404 09/27/20 0450   WBC 12.08 11.80   HGB 12.0 12.1   HCT 39.4 39.9    155     CMP:   Recent Labs   Lab 09/26/20 0404 09/27/20  0450   * 131*   K 4.7 4.3   CL 89* 95   CO2 28 24    108   BUN 35* 27*   CREATININE 4.2* 4.0*   CALCIUM 8.2* 8.1*   PROT 4.9*  --    ALBUMIN 2.5*  --    BILITOT 0.6  --    ALKPHOS 72  --    AST 29  --    ALT 21  --    ANIONGAP 13 12   EGFRNONAA 11.0* 11.6*             Assessment/Plan:   #1 Near syncope-most likely multifactorial  Including hypotension and underlying arrhythmia.  Now resolved  #2 Hx orthostatic hypotension/now off pressors.   continue monitoring  #3 Volume depletion-received IV fluids.  Resolved  #4 New onset atrial  Flutter w/RVR   -S/P cardioversion - NSR   cardiology following, change to p.o. amiodarone   EF46 %.  Continue anticoagulation  #5 ESRD HD Tuesday Thursday Saturday nephrology following   #6 CAD/CABG-/EF30% -Slight elevation of troponin most likely secondary to demand ischemia secondary to hypotension  End-stage renal disease contributing  #7 Chronic wound left distal posterior lower extremity-Wound care follow-up  Followed by Dr. Wick  #8 Hyponatremia-monitoring labs   #9 Hyperkalemia-resolved after dialysis  #10 DM2-follow sliding scale hemoglobin A1c 6.3   #11 Hx PE -details unknown   #12 Metabolic acidosis-secondary to underlying renal disease.  Resolved  #13  Acute UTI POA-yeast  .  Repeat culture    PT ongoing  Labs in a.m., continuous cardiac monitoring  Discussed with          VTE Risk Mitigation (From admission, onward)       VTE Risk Mitigation (From admission, onward)         Ordered     apixaban tablet 2.5 mg  2 times daily      09/24/20 1018     Place MARY ELLEN hose  Until discontinued      09/24/20 1018     IP VTE HIGH RISK PATIENT  Once      09/24/20 1018     Place sequential compression device  Until discontinued      09/24/20 1018                  Oscar Ruth DO  Department of Hospital Medicine   [facility]

## 2020-09-28 PROBLEM — Z99.2 CHRONIC KIDNEY DISEASE WITH END STAGE RENAL FAILURE ON DIALYSIS: Status: ACTIVE | Noted: 2018-02-22

## 2020-09-28 PROBLEM — N18.6 CHRONIC KIDNEY DISEASE WITH END STAGE RENAL FAILURE ON DIALYSIS: Status: ACTIVE | Noted: 2018-02-22

## 2020-09-28 NOTE — ANESTHESIA PROCEDURE NOTES
Central Line    Diagnosis: resp failure  Patient location during procedure: ICU  Procedure start time: 9/27/2020 10:30 PM  Timeout: 9/27/2020 10:30 PM  Procedure end time: 9/27/2020 10:45 PM    Staffing  Authorizing Provider: Jerod Zendejas MD  Performing Provider: Jerod Zendejas MD    Staffing  Anesthesiologist: Jerod Zendejas MD  Performed: anesthesiologist   Anesthesiologist was present at the time of the procedure.  Preanesthetic Checklist  Completed: patient identified, site marked, timeout performed, risks and benefits discussed, monitors and equipment checked and anesthesia consent given  Indication   Indication: vascular access     Anesthesia   local infiltration and see MAR for details    Central Line   Skin Prep: skin prepped with ChloraPrep, skin prep agent completely dried prior to procedure  maximum sterile barriers used during central venous catheter insertion  hand hygiene performed prior to central venous catheter insertion  Location: right, internal jugular.   Catheter type: triple lumen  Catheter Size: 7 Fr  Inserted Catheter Length: 13 cm  Ultrasound: vascular probe with ultrasound  Vessel Caliber: medium, patent, compressibility normal  Needle advanced into vessel with real time Ultrasound guidance.  Guidewire confirmed in vessel.  Manometry: none  Insertion Attempts: 1   Securement:line sutured, chlorhexidine patch, sterile dressing applied and blood return through all ports    Post-Procedure   X-Ray: no pneumothorax on x-ray, placement verified by x-ray, tip termination and successful placement  Tip termination comments: SVC   Adverse Events:none    Guidewire Guidewire removed intact.   Additional Notes  No indication of carotid puncture at any time.  All ports aspirated and flushed easily.  Patient tolerated procedure well with propofol sedation while intubated.

## 2020-09-28 NOTE — ANESTHESIA PROCEDURE NOTES
Intubation  Performed by: Jerod Zendejas MD  Authorized by: Jerod Zendejas MD     Intubation:     Induction:  Rapid sequence induction    Intubated:  Postinduction    Mask Ventilation:  N/a    Attempts:  1    Attempted By:  Staff anesthesiologist    Method of Intubation:  Video laryngoscopy    Blade:  Glidescope 3    Laryngeal View Grade: Grade I - full view of chords      Difficult Airway Encountered?: No      Complications:  None    Airway Device:  Oral endotracheal tube    Airway Device Size:  8.0    Style/Cuff Inflation:  Cuffed (inflated to minimal occlusive pressure)    Tube secured:  21    Secured at:  The teeth    Placement Verified By:  Colorimetric ETCO2 device    Findings Post-Intubation:  BS equal bilateral and atraumatic/condition of teeth unchanged  Notes:      Consulted for intubation and line placement.  Informed consent obtained by me from patient's  over the telephone.  Patient informed me that she ate dinner 3 hours prior to my arrival.  Cricoid pressure held during RSI with etomidate 14 mg IV and succinylcholine 120 mg IV.  Patient intubated easily.  Vital signs stable throughout.  Chest x-ray shows tip of endotracheal tube well above lawrence.

## 2020-09-28 NOTE — HPI
Ms. Juliane Ramos is a 60-year-old lady with past medical history of COPD, congestive heart failure, end-stage renal disease on dialysis Tuesday, Thursday, Saturday.  She presented to [facility] Emergency Department a days ago complaining of tachycardia and hypotension.  She was admitted the hospital received some IV fluids and was transferred out of the intensive care unit a days ago.  Yesterday evening a rapid response was called because she was unresponsive and apneic.  She was found to have acute on chronic hypercapnic respiratory failure with hypoxemia prompting her to be transferred back to the intensive care unit and placed on noninvasive ventilation.  After 2 hours of noninvasive ventilation her gas exchange not improved significantly leading to her being placed on invasive mechanical ventilation.  Pulmonology was consulted for assistance in managing her respiratory failure.  This morning when I examined her she was deeply sedated.  I attempted to perform a spontaneous breathing trial which she was unable to tolerate due to a precipitous increase in her minute ventilation and hypoxemia.  At this time she remains on volume control ventilation but is no longer on sedation.  She has remained hemodynamically stable throughout.

## 2020-09-28 NOTE — PT/OT/SLP PROGRESS
Physical Therapy      Patient Name:  Juliane Ramos   MRN:  [id number]    Patient not seen today secondary to (now intubated). Will need new PT order when pt is appropriate.    Cherelle Smith, PT

## 2020-09-28 NOTE — ANESTHESIA PROCEDURE NOTES
Arterial    Diagnosis: resp failure    Patient location during procedure: ICU  Procedure start time: 9/27/2020 10:45 PM  Timeout: 9/27/2020 10:45 PM  Procedure end time: 9/27/2020 11:00 PM    Staffing  Authorizing Provider: Jerod Zendejas MD  Performing Provider: Jerod Zendejas MD    Anesthesiologist was present at the time of the procedure.    Preanesthetic Checklist  Completed: patient identified, timeout performed, risks and benefits discussed and anesthesia consent givenArterial  Skin Prep: chlorhexidine gluconate  Local Infiltration: none  Orientation: right  Location: brachial  Catheter Size: 20 G  Catheter placement by Ultrasound guidance. Heme positive aspiration all ports.  Vessel Caliber: medium, patent, compressibility normal  Vascular Doppler:  not done  Needle advanced into vessel with real time Ultrasound guidance.  Sterile sheath used.Insertion Attempts: 1  Assessment  Dressing: sutured in place and taped and tegaderm  Patient: Tolerated well  Additional Notes  Left arm unavailable due to AV fistula.  Right radial artery is very small and I was unable to pass the guidewire.  I abandoned the radial site after one unsuccessful attempt and easily placed a right brachial arterial line into the much larger brachial artery.

## 2020-09-28 NOTE — CONSULTS
[facility]  Pulmonology   Consult Note    Inpatient consult to Pulmonology  Consult performed by: Ehsan Hui MD  Consult ordered by: Oscar Ruth DO  Reason for consult: Acute respiratory failrue on a ventilatory  Assessment/Recommendations: Acute on chronic hypoxemic / hypercapnic respiratory failure requiring invasive mechanical ventilation:  -  Continue LPV for now.  -  Daily SBT/SAT.  -  Titrate sedation to maintain RASS of -2.  -  Dialysis at the direction of nephrology.         Subjective     Chief Complaint   Patient presents with    Tachycardia    Hypotension      History of Present Illness:  Ms. Juliane Ramos is a 60-year-old lady with past medical history of COPD, congestive heart failure, end-stage renal disease on dialysis Tuesday, Thursday, Saturday.  She presented to [facility] Emergency Department a days ago complaining of tachycardia and hypotension.  She was admitted the hospital received some IV fluids and was transferred out of the intensive care unit a days ago.  Yesterday evening a rapid response was called because she was unresponsive and apneic.  She was found to have acute on chronic hypercapnic respiratory failure with hypoxemia prompting her to be transferred back to the intensive care unit and placed on noninvasive ventilation.  After 2 hours of noninvasive ventilation her gas exchange not improved significantly leading to her being placed on invasive mechanical ventilation.  Pulmonology was consulted for assistance in managing her respiratory failure.  This morning when I examined her she was deeply sedated.  I attempted to perform a spontaneous breathing trial which she was unable to tolerate due to a precipitous increase in her minute ventilation and hypoxemia.  At this time she remains on volume control ventilation but is no longer on sedation.  She has remained hemodynamically stable throughout.     Past Medical History:   Diagnosis Date     "Anemia     Anemia in stage 3 chronic kidney disease 2017    Anticoagulant long-term use     CHF (congestive heart failure)     COPD (chronic obstructive pulmonary disease)     COPD exacerbation 3/10/2020    Coronary artery disease     Diabetes mellitus     Digestive disorder     Encounter for blood transfusion     Essential hypertension 2017    Hypertension     Low blood pressure     MI (myocardial infarction)     Segmental and subsegmental pulmonary emboli of RLL without acute cor pulmonale 2017    Stroke     2005    Thyroid disease     Traumatic subarachnoid hemorrhage 2017    Type 2 diabetes mellitus with stage 3 chronic kidney disease, without long-term current use of insulin 2017     Past Surgical History:   Procedure Laterality Date    ABCESS DRAINAGE Right     Between "little toe" and the one next to it    BREAST SURGERY      Reduction [id number]    CARDIAC SURGERY  2011    CABG 4vessel ring in one valve mitral valve prolapse    CORONARY ARTERY BYPASS GRAFT      DEBRIDEMENT OF FOOT Left 2020    Procedure: DEBRIDEMENT, FOOT;  Surgeon: Dennis Wick DPM;  Location: [facility];  Service: Podiatry;  Laterality: Left;    FOOT SURGERY      4 grafts to left foot    hyperlipidemia      TUBAL LIGATION  1986     Family History   Problem Relation Age of Onset    Arthritis Mother     Heart disease Father     Cancer Father 68        prostae and bladder ca  in     Diabetes Father     Hypertension Father     Depression Sister     Hypertension Son     Diabetes Maternal Grandmother         lost leg    Kidney disease Paternal Grandfather         had kidney removed    Stroke Paternal Grandfather       Social History     Tobacco Use    Smoking status: Never Smoker    Smokeless tobacco: Never Used   Substance and Sexual Activity    Alcohol use: Yes     Alcohol/week: 1.0 - 2.0 standard drinks     Types: 1 - 2 Glasses of wine per week     " "Comment: "socially" not in awhile    Drug use: No    Sexual activity: Yes     Partners: Male     Birth control/protection: None      Allergies:  Patient has no known allergies.     Facility-Administered Medications as of 2020   Medication Route Frequency    [COMPLETED] 0.9%  NaCl infusion Intravenous Once    acetaminophen tablet 650 mg Oral Q6H PRN    [COMPLETED] adenosine injection 12 mg Intravenous ED 1 Time    [COMPLETED] adenosine injection 6 mg Intravenous ED 1 Time    albuterol nebulizer solution 2.5 mg Nebulization Q4H PRN    [COMPLETED] amiodarone injection 150 mg Intravenous ED 1 Time    amiodarone tablet 200 mg Oral BID    apixaban tablet 2.5 mg Oral BID    citalopram tablet 20 mg Oral Daily    collagenase ointment Topical (Top) Daily    dextrose 50% injection 12.5 g Intravenous PRN    dextrose 50% injection 25 g Intravenous PRN    [COMPLETED] digoxin injection 250 mcg Intravenous Once    [COMPLETED] digoxin injection 250 mcg Intravenous Once    famotidine (PF) injection 20 mg Intravenous Daily    fluconazole tablet 100 mg Oral Daily    glucagon (human recombinant) injection 1 mg Intramuscular PRN    glucose chewable tablet 16 g Oral PRN    glucose chewable tablet 24 g Oral PRN    insulin aspart U-100 pen 1-10 Units Subcutaneous QID (AC + HS) PRN    levothyroxine tablet 50 mcg Oral Before breakfast    [COMPLETED] metoprolol injection 2.5 mg Intravenous Once    metoprolol succinate (TOPROL-XL) 24 hr tablet 25 mg Oral Daily    miconazole NITRATE 2 % top powder Topical (Top) BID    [] norepinephrine 1 mg/mL injection      ondansetron injection 4 mg Intravenous Q6H PRN    propofol (DIPRIVAN) 10 mg/mL infusion Intravenous Continuous    rosuvastatin tablet 10 mg Oral Daily    sevelamer carbonate tablet 1,600 mg Oral TID WM    [COMPLETED] sodium chloride 0.9% bolus 500 mL Intravenous Once    sodium zirconium cyclosilicate packet 5 g Oral Daily     Outpatient " "Medications as of 9/28/2020   Medication    gabapentin (NEURONTIN) 100 MG capsule    midodrine (PROAMATINE) 5 MG Tab    rosuvastatin (CRESTOR) 10 MG tablet    albuterol-ipratropium (DUO-NEB) 2.5 mg-0.5 mg/3 mL nebulizer solution    apixaban 5 mg Tab    blood sugar diagnostic Strp    CLARITIN-D 12 HOUR 5-120 mg per tablet    fluticasone (FLONASE) 50 mcg/actuation nasal spray    levothyroxine (SYNTHROID) 50 MCG tablet    pen needle, diabetic (BD ULTRA-FINE OBDULIA PEN NEEDLE) 32 gauge x 5/32" Ndle    ramelteon (ROZEREM) 8 mg tablet    sevelamer carbonate (RENVELA) 800 mg Tab    tiotropium (SPIRIVA) 18 mcg inhalation capsule      Review of Systems   Unable to perform ROS: Intubated      I have personally reviewed the following: active problem list, medication list, allergies, family history, social history, health maintenance, notes from last encounter, lab results, endoscopy procedure notes, imaging and nursing/provider documentation .    Objective     VS: Temp:  [97.4 °F (36.3 °C)-98 °F (36.7 °C)]   Pulse:  [59-82]   Resp:  [11-32]   BP: ()/(38-59)   SpO2:  [86 %-100 %]   Arterial Line BP: ()/(33-62)    Estimated body mass index is 28.72 kg/m² as calculated from the following:    Height as of this encounter: 5' 6" (1.676 m).    Weight as of this encounter: 80.7 kg (177 lb 14.6 oz).   Ideal body weight: 59.3 kg (130 lb 11.7 oz)  Adjusted ideal body weight: 67.9 kg (149 lb 9.7 oz)   I/O:   Intake/Output Summary (Last 24 hours) at 9/28/2020 1303  Last data filed at 9/28/2020 0600  Gross per 24 hour   Intake 334.75 ml   Output --   Net 334.75 ml        Vent: SpO2 96%   Vent Mode: A/C  Oxygen Concentration (%):  [] 30  Resp Rate Total:  [23 br/min-47 br/min] 23 br/min  Vt Set:  [390 mL-450 mL] 390 mL  PEEP/CPAP:  [5 cmH20] 5 cmH20  Pressure Support:  [0 cmH20] 0 cmH20  Mean Airway Pressure:  [12 shU50-46 cmH20] 12 cmH20     PE Physical Exam   Constitutional: She appears well-developed and " well-nourished. She is cooperative.  Non-toxic appearance. No distress. She is sedated, intubated and restrained.   HENT:   Head: Normocephalic and atraumatic.   Right Ear: External ear normal.   Left Ear: External ear normal.   Mouth/Throat: Uvula is midline and mucous membranes are normal. Mallampati Score: unable to assess.   Neck: Trachea normal and normal range of motion. Neck supple. No JVD present. No thyromegaly present.   R IJ TLC   Cardiovascular: Normal rate, regular rhythm, normal heart sounds and intact distal pulses.   Pulmonary/Chest: Normal expansion, symmetric chest wall expansion and effort normal. She is intubated. She has no wheezes. She has no rhonchi. She has no rales.   Abdominal: Soft. Bowel sounds are normal. She exhibits no distension. There is no abdominal tenderness.   Musculoskeletal: Normal range of motion.         General: No tenderness, deformity or edema.      Comments: LUE AVF   Lymphadenopathy: No supraclavicular adenopathy is present.     She has no cervical adenopathy.     She has no axillary adenopathy.   Neurological: She is alert. She has normal strength. No cranial nerve deficit or sensory deficit. She exhibits normal muscle tone. GCS eye subscore is 4. GCS verbal subscore is 5. GCS motor subscore is 6.   Skin: Skin is warm, dry and intact. No rash noted.   Nursing note and vitals reviewed.       Recent Diagnostic Studies:  Labs I have personally reviewed and interpreted all recent labs / diagnostic studies, and noted the following relevant results:  All pertinent labs within the past 24 hours have been reviewed.  Recent Labs   Lab 09/27/20  1805  09/28/20  0338  09/28/20  0958   WBC 11.32  --  7.75  --   --    RBC 4.38  --  3.75*  --   --    HGB 12.4  --  10.9*  --   --    HCT 40.6  --  33.4*  --   --      --  119*  --   --    MCV 93  --  89  --   --    MCH 28.3  --  29.1  --   --    MCHC 30.5*  --  32.6  --   --    *  --  128*  --   --    K 4.6  --  4.4  --    --    CL 88*  --  95  --   --    CO2 24  --  20*  --   --    BUN 32*  --  37*  --   --    CREATININE 4.3*  --  4.7*  --   --    MG 2.1  --   --   --   --    ALT 20  --  19  --   --    AST 25  --  20  --   --    ALKPHOS 78  --  61  --   --    BILITOT 0.4  --  0.9  --   --    PROT 5.2*  --  4.5*  --   --    ALBUMIN 2.6*  --  2.2*  --   --    PH  --    < >  --    < > 7.307*   PCO2  --    < >  --    < > 42.9   PO2  --    < >  --    < > 91   HCO3  --    < >  --    < > 21.5*   POCSATURATED  --    < >  --    < > 96   BE  --    < >  --    < > -5    < > = values in this interval not displayed.     BNP:  570  Trop:  0.344, 0.277  Lactate:  1.1     Imaging I have personally reviewed and interpreted all available images and reviewed the associated Radiology reports.  My personal impression of the relevant studies is as follows:  I have reviewed and interpreted all pertinent imaging results/findings within the past 24 hours.  CXR:  · ETT and right IJ central vascular catheter are satisfactory.  · Retrocardiac opacification is unchanged. Pneumonia versus atelectasis.  · Mild cardiomegaly is stable.    TTE:  · The left ventricle is mildly enlarged in systole. The estimated ejection fraction is 38%-2D.  · Diastolic dysfunction.  · Normal central venous pressure (3 mmHg).  · Atrial flutter with 2:1 AV block     Micro I have personally reviewed and interpreted the available culture data.  Relevant results are as follows.  Blood Culture   Lab Results   Component Value Date    LABBLOO No Growth to date 09/25/2020    LABBLOO No Growth to date 09/25/2020    LABBLOO No Growth to date 09/25/2020   , Sputum Culture No results found for: GSRESP, RESPIRATORYC and Urine Culture    Lab Results   Component Value Date    LABURIN (A) 09/25/2020     PRESUMPTIVE JORDIN ALBICANS  10,000 - 49,999 cfu/ml          Assessment       Active Hospital Problems    Diagnosis    COPD (chronic obstructive pulmonary disease)    Acute on chronic respiratory  failure with hypoxia and hypercapnia    Anemia, chronic disease    Tachycardia    Stage 5 chronic kidney disease on chronic dialysis    Open wound of left heel    Chronic kidney disease with end stage renal failure on dialysis    CHF (congestive heart failure)      My Impression:  Acute on chronic hypoxemic / hypercapnic respiratory failrue.    Plan     Neuro  · Intermittent fentanyl boluses as needed to maintain RASS of -1  · Daily SAT.    Pulmonary  · Continue LPV for now.  · Daily SAT/SBT.    Cardiac/Vascular  · Volume removal with HD.    Renal/  · HD at the direction of nephrology.    Hematology  · No indication for blood products.  · Observation for now.    Endocrine  · Continue levothyroxine.  · Serial blood glucose monitoring.  · Sliding scale insulin as needed to maintain moderate glycemic control.    GI  · H2 blocker for stress ulcer ppx.  · Start enteral feeds.     Thank you for your consult. I will follow-up with patient. Please contact us if you have any additional questions.     Critical Care Time: Approximately >35 minutes    The patient is critically ill due to the following conditions that actively pose threat to life and bodily function:  Respiratory Failure requiring intubation and invasive mechanical ventilation    The patient is at high risk of death due to respiratory failure and requiring ongoing treatment including invasive mechanical ventilation to prevent further life-threatening deterioration.  Due to a high probability of clinically significant, life threatening deterioration, the patient required my highest level of preparedness to intervene emergently and I personally spent this critical care time directly and personally managing the patient.     Critical care was time spent personally by me on the following activities:    Obtaining a history, examination of patient, reviewing pulse oximetry, providing medical care at the patients bedside, developing a treatment plan with  patient or surrogate and bedside caregivers, ordering and reviewing laboratory studies, radiographic studies, pulse oximetry, ordering and performing treatments and interventions, evaluation of patient's response to treatment,  discussions with consultants while on the unit and immediately available to the patient, re-evaluation of the patient's condition, and documentation in the medical record.    This critical care time did not overlap with that of any other provider or involve time for any procedures.     Ehsan Hui MD  Pulmonary / Critical Care Medicine  formerly [facility]

## 2020-09-28 NOTE — PROGRESS NOTES
Progress Note  Cardiology    Admit Date: 9/24/2020   LOS: 4 days     Follow-up For:  Cardiomyopathy; CHF; atrial flutter with 2:1 AV block, status post cardioversion, IV amiodarone and oral amiodarone now chronic renal failure on dialysis.  Acute respiratory failure and intubation    Scheduled Meds:   amiodarone  200 mg Oral BID    apixaban  2.5 mg Oral BID    citalopram  20 mg Oral Daily    collagenase   Topical (Top) Daily    famotidine (PF)  20 mg Intravenous Daily    fluconazole  100 mg Oral Daily    levothyroxine  50 mcg Oral Before breakfast    metoprolol succinate  25 mg Oral Daily    miconazole NITRATE 2 %   Topical (Top) BID    rosuvastatin  10 mg Oral Daily    sevelamer carbonate  1,600 mg Oral TID WM    sodium zirconium cyclosilicate  5 g Oral Daily     Continuous Infusions:  PRN Meds:acetaminophen, albuterol sulfate, dextrose 50%, dextrose 50%, glucagon (human recombinant), glucose, glucose, insulin aspart U-100, ondansetron    Review of patient's allergies indicates:  No Known Allergies    SUBJECTIVE:     Interval History: Patient was obtunded yesterday.  He is intubated with FiO2 of 0.3.    Review of Systems  Intubated    OBJECTIVE:     Vital Signs (Most Recent)  Temp: 97.5 °F (36.4 °C) (09/28/20 0701)  Pulse: 67 (09/28/20 1252)  Resp: (!) 32 (09/28/20 1043)  BP: (!) 132/59 (09/28/20 1001)  SpO2: 96 % (09/28/20 1252)    Vital Signs Range (Last 24H):  Temp:  [97.4 °F (36.3 °C)-98 °F (36.7 °C)]   Pulse:  [59-82]   Resp:  [11-32]   BP: ()/(38-59)   SpO2:  [86 %-100 %]   Arterial Line BP: ()/(33-62)       Physical Exam:  Neck: no carotid bruit, no JVD and supple, symmetrical, trachea midline  Lungs: clear to auscultation bilaterally, normal respiratory effort  Heart: regular rate and rhythm, S1, S2 normal, no murmur, click, rub or gallop  Abdomen: soft, non-tender; bowel sounds normal; no masses,  no organomegaly  Extremities: Extremities normal, atraumatic, no cyanosis, clubbing,  or edema    Recent Results (from the past 24 hour(s))   POCT glucose    Collection Time: 09/27/20  3:21 PM   Result Value Ref Range    POC Glucose 122 (H) 70 - 110   POCT glucose    Collection Time: 09/27/20  5:41 PM   Result Value Ref Range    POC Glucose 122 (H) 70 - 110   ISTAT PROCEDURE    Collection Time: 09/27/20  5:48 PM   Result Value Ref Range    POC PH 7.144 (LL) 7.35 - 7.45    POC PCO2 78.9 (HH) 35 - 45 mmHg    POC PO2 241 (H) 80 - 100 mmHg    POC HCO3 27.1 24 - 28 mmol/L    POC BE -2 -2 to 2 mmol/L    POC SATURATED O2 100 95 - 100 %    POC TCO2 29 (H) 23 - 27 mmol/L    Sample ARTERIAL     Site RR     Allens Test Pass     DelSys NRB    CBC Without Differential    Collection Time: 09/27/20  6:05 PM   Result Value Ref Range    WBC 11.32 3.90 - 12.70 K/uL    RBC 4.38 4.00 - 5.40 M/uL    Hemoglobin 12.4 12.0 - 16.0 g/dL    Hematocrit 40.6 37.0 - 48.5 %    Mean Corpuscular Volume 93 82 - 98 fL    Mean Corpuscular Hemoglobin 28.3 27.0 - 31.0 pg    Mean Corpuscular Hemoglobin Conc 30.5 (L) 32.0 - 36.0 g/dL    RDW 16.3 (H) 11.5 - 14.5 %    Platelets 157 150 - 350 K/uL    MPV 10.2 9.2 - 12.9 fL   Comprehensive metabolic panel    Collection Time: 09/27/20  6:05 PM   Result Value Ref Range    Sodium 128 (L) 136 - 145 mmol/L    Potassium 4.6 3.5 - 5.1 mmol/L    Chloride 88 (L) 95 - 110 mmol/L    CO2 24 23 - 29 mmol/L    Glucose 118 (H) 70 - 110 mg/dL    BUN, Bld 32 (H) 6 - 20 mg/dL    Creatinine 4.3 (H) 0.5 - 1.4 mg/dL    Calcium 8.5 (L) 8.7 - 10.5 mg/dL    Total Protein 5.2 (L) 6.0 - 8.4 g/dL    Albumin 2.6 (L) 3.5 - 5.2 g/dL    Total Bilirubin 0.4 0.1 - 1.0 mg/dL    Alkaline Phosphatase 78 55 - 135 U/L    AST 25 10 - 40 U/L    ALT 20 10 - 44 U/L    Anion Gap 16 8 - 16 mmol/L    eGFR if African American 12.3 (A) >60 mL/min/1.73 m^2    eGFR if non African American 10.7 (A) >60 mL/min/1.73 m^2   Magnesium    Collection Time: 09/27/20  6:05 PM   Result Value Ref Range    Magnesium 2.1 1.6 - 2.6 mg/dL   POCT glucose     Collection Time: 09/27/20  8:02 PM   Result Value Ref Range    POC Glucose 118 (H) 70 - 110   ISTAT PROCEDURE    Collection Time: 09/27/20  8:04 PM   Result Value Ref Range    POC PH 7.188 (LL) 7.35 - 7.45    POC PCO2 72.5 (HH) 35 - 45 mmHg    POC PO2 130 (H) 80 - 100 mmHg    POC HCO3 27.6 24 - 28 mmol/L    POC BE -1 -2 to 2 mmol/L    POC SATURATED O2 98 95 - 100 %    POC TCO2 30 (H) 23 - 27 mmol/L    Rate 30     Sample ARTERIAL     Site RR     Allens Test N/A     DelSys CPAP/BiPAP     Mode BiPAP     FiO2 50     Spont Rate 31     Min Vol 6.2     Sp02 100     IP 20     EP 10    ISTAT PROCEDURE    Collection Time: 09/28/20 12:16 AM   Result Value Ref Range    POC PH 7.356 7.35 - 7.45    POC PCO2 35.6 35 - 45 mmHg    POC PO2 111 (H) 80 - 100 mmHg    POC HCO3 19.9 (L) 24 - 28 mmol/L    POC BE -6 -2 to 2 mmol/L    POC SATURATED O2 98 95 - 100 %    POC TCO2 21 (L) 23 - 27 mmol/L    Rate 24     Sample ARTERIAL     Site Tanika/UAC     Allens Test N/A     DelSys Adult Vent     Mode AC/PRVC     Vt 450     PEEP 5     PiP 40     FiO2 40     Min Vol 13.2     Sp02 99    CBC auto differential    Collection Time: 09/28/20  3:38 AM   Result Value Ref Range    WBC 7.75 3.90 - 12.70 K/uL    RBC 3.75 (L) 4.00 - 5.40 M/uL    Hemoglobin 10.9 (L) 12.0 - 16.0 g/dL    Hematocrit 33.4 (L) 37.0 - 48.5 %    Mean Corpuscular Volume 89 82 - 98 fL    Mean Corpuscular Hemoglobin 29.1 27.0 - 31.0 pg    Mean Corpuscular Hemoglobin Conc 32.6 32.0 - 36.0 g/dL    RDW 15.5 (H) 11.5 - 14.5 %    Platelets 119 (L) 150 - 350 K/uL    MPV 9.6 9.2 - 12.9 fL    Immature Granulocytes 0.3 0.0 - 0.5 %    Gran # (ANC) 5.9 1.8 - 7.7 K/uL    Immature Grans (Abs) 0.02 0.00 - 0.04 K/uL    Lymph # 0.7 (L) 1.0 - 4.8 K/uL    Mono # 0.8 0.3 - 1.0 K/uL    Eos # 0.3 0.0 - 0.5 K/uL    Baso # 0.05 0.00 - 0.20 K/uL    nRBC 0 0 /100 WBC    Gran% 75.8 (H) 38.0 - 73.0 %    Lymph% 9.3 (L) 18.0 - 48.0 %    Mono% 10.5 4.0 - 15.0 %    Eosinophil% 3.5 0.0 - 8.0 %    Basophil% 0.6 0.0 -  1.9 %    Differential Method Automated    Comprehensive metabolic panel    Collection Time: 09/28/20  3:38 AM   Result Value Ref Range    Sodium 128 (L) 136 - 145 mmol/L    Potassium 4.4 3.5 - 5.1 mmol/L    Chloride 95 95 - 110 mmol/L    CO2 20 (L) 23 - 29 mmol/L    Glucose 68 (L) 70 - 110 mg/dL    BUN, Bld 37 (H) 6 - 20 mg/dL    Creatinine 4.7 (H) 0.5 - 1.4 mg/dL    Calcium 8.0 (L) 8.7 - 10.5 mg/dL    Total Protein 4.5 (L) 6.0 - 8.4 g/dL    Albumin 2.2 (L) 3.5 - 5.2 g/dL    Total Bilirubin 0.9 0.1 - 1.0 mg/dL    Alkaline Phosphatase 61 55 - 135 U/L    AST 20 10 - 40 U/L    ALT 19 10 - 44 U/L    Anion Gap 13 8 - 16 mmol/L    eGFR if African American 11.0 (A) >60 mL/min/1.73 m^2    eGFR if non African American 9.6 (A) >60 mL/min/1.73 m^2   Lactic acid, plasma    Collection Time: 09/28/20  3:38 AM   Result Value Ref Range    Lactate (Lactic Acid) 1.1 0.5 - 1.9 mmol/L   ISTAT PROCEDURE    Collection Time: 09/28/20  5:51 AM   Result Value Ref Range    POC PH 7.411 7.35 - 7.45    POC PCO2 32.9 (L) 35 - 45 mmHg    POC PO2 94 80 - 100 mmHg    POC HCO3 20.9 (L) 24 - 28 mmol/L    POC BE -4 -2 to 2 mmol/L    POC SATURATED O2 98 95 - 100 %    POC TCO2 22 (L) 23 - 27 mmol/L    Rate 24     Sample ARTERIAL     Site Tanika/UAC     Allens Test N/A     DelSys Adult Vent     Mode AC/PRVC     Vt 450     PEEP 5     PiP 35     FiO2 30     Min Vol 12     Sp02 100    POCT glucose    Collection Time: 09/28/20  6:10 AM   Result Value Ref Range    POC Glucose 74 70 - 110   ISTAT PROCEDURE    Collection Time: 09/28/20  9:58 AM   Result Value Ref Range    POC PH 7.307 (L) 7.35 - 7.45    POC PCO2 42.9 35 - 45 mmHg    POC PO2 91 80 - 100 mmHg    POC HCO3 21.5 (L) 24 - 28 mmol/L    POC BE -5 -2 to 2 mmol/L    POC SATURATED O2 96 95 - 100 %    POC TCO2 23 23 - 27 mmol/L    Rate 10     Sample ARTERIAL     Site Tanika/UAC     Allens Test N/A     DelSys Adult Vent     Mode AC/PRVC     Vt 390     PEEP 5     FiO2 30    POCT glucose    Collection Time:  09/28/20 12:11 PM   Result Value Ref Range    POC Glucose 68 (L) 70 - 110       Diagnostic Results:  Labs: Reviewed  ECG: Reviewed  X-Ray: Reviewed    ASSESSMENT/PLAN:     CO2 retention on initial ABG on the floor prior to transfer.  Respiratory and metabolic acidosis.  The patient was on BiPAP for 2 hours but continued to be somnolent; she was intubated and placed on the ventilator.  She is alert at present; FiO2 0.3.  She continues in sinus rhythm.  Blood pressure is maintained; she has not required any Jasper-Synephrine for support.  Dialysis planned for the morning.  Continue amiodarone orally.

## 2020-09-28 NOTE — PROGRESS NOTES
Patient with right brachial A-line in place.  Site looks clear.  Hand warm to touch good with capillary refill.  Good waveform.  Will follow.

## 2020-09-28 NOTE — CARE UPDATE
09/27/20 2003   Patient Assessment/Suction   Level of Consciousness (AVPU) responds to voice   Respiratory Effort Normal;Unlabored   Expansion/Accessory Muscles/Retractions no use of accessory muscles;no retractions;expansion symmetric   All Lung Fields Breath Sounds diminished   PRE-TX-O2   O2 Device (Oxygen Therapy) BiPAP   $ Is the patient on Low Flow Oxygen? Yes   Oxygen Concentration (%) 50   SpO2 99 %   Pulse Oximetry Type Continuous   $ Pulse Oximetry - Multiple Charge Pulse Oximetry - Multiple   Pulse 66   Resp 20   BP (!) 96/46   Aerosol Therapy   $ Aerosol Therapy Charges PRN treatment not required   Ready to Wean/Extubation Screen   FIO2<=50 (chart decimal) 0.5   Preset CPAP/BiPAP Settings   Mode Of Delivery BiPAP   $ Is patient using? Yes   Size of Mask Small   Sized Appropriately? Yes   Equipment Type V60   Ipap 20   EPAP (cm H2O) 10   Pressure Support (cm H2O) 10   ITime (sec) 0.9   Rise Time (sec) 3   Patient CPAP/BiPAP Settings   RR Total (Breaths/Min) 31   Tidal Volume (mL) 373   VE Minute Ventilation (L/min) 10.7 L/min   Peak Inspiratory Pressure (cm H2O) 20   Total Leak (L/Min) 87   CPAP/BiPAP Backup Settings   Backup Rate 30 breaths per minute (bpm)   CPAP/BiPAP Alarms   High Pressure (cm H2O) 40   Low Pressure (cm H2O) 10   Minute Ventilation (L/Min) 2   High RR (breaths/min) 40   Low RR (breaths/min) 10   Apnea (Sec) 20

## 2020-09-28 NOTE — PROGRESS NOTES
"[facility]  Adult Nutrition   Progress Note (Initial Assessment)     SUMMARY     Recommendations/Interventions:  1. If patient to remain intubated/sedated, recommend starting enteral nutrition within the next 24 hours (patient intubated yesterday). Consult RD for tube feeding management.  2. If Tube feedings desired, start Nepro @ 10 mL/hr and increase by 10 mL Q4hrs as tolerated to goal rate of 35mL/hr with 30 mL FWF's Q4hrs.   · TF + FWF's provides: 1512 kcals/day, 68 g protein, 791 FW/day.   3. Hyponatremia-continue to monitor and nephrology to manage.  4. RD to monitor # of days intubated, vent settings, I/O's, labs, plan of care, and make recommendations accordingly.    Goals: 1. TF to be started and patient to tolerate @ goal rate.  Nutrition Goal Status: new    Dietitian Rounds Brief:  · Seen 2' ICU status/LOS. Patient intubated, but no longer sedated per Dr. Hui. Messaged Dr. Ruth about starting enteral nutrition-he said he wasn't sure and to wait till tomorrow. Ok per Dr. uHi, however no consult placed. Will continue to monitor.    Reason for Assessment  Reason For Assessment: length of stay, identified at risk by screening criteria(ICU status)  Diagnosis: (Tachycardia)  Relevant Medical History: ESRD on HD, CHF, COPD, CAD, T2DM, HTN, MI, stroke, thyroid disease  Interdisciplinary Rounds: attended    Nutrition Risk Screen  Nutrition Risk Screen: no indicators present     MST Score: 0  Have you recently lost weight without trying?: No  Weight loss score: 0  Have you been eating poorly because of a decreased appetite?: No  Appetite score: 0     Nutrition/Diet History  Food Allergies: NKFA  Factors Affecting Nutritional Intake: NPO, on mechanical ventilation    Anthropometrics  Temp: 97.5 °F (36.4 °C)  Height Method: Stated  Height: 5' 6" (167.6 cm)  Height (inches): 66 in  Weight Method: Bed Scale  Weight: 80.7 kg (177 lb 14.6 oz)  Weight (lb): 177.91 lb  Ideal Body Weight (IBW), Female: " 130 lb  % Ideal Body Weight, Female (lb): 136.85 %  BMI (Calculated): 28.7  BMI Grade: 25 - 29.9 - overweight     Weight History:  Wt Readings from Last 10 Encounters:   09/26/20 80.7 kg (177 lb 14.6 oz)   09/26/20 80.7 kg (177 lb 14.6 oz)   09/08/20 79.8 kg (176 lb)   09/04/20 80 kg (176 lb 5.9 oz)   09/02/20 80 kg (176 lb 5.9 oz)   08/01/20 82.1 kg (181 lb)   06/22/20 80.7 kg (178 lb)   05/26/20 80.7 kg (178 lb)   05/18/20 80.7 kg (178 lb)   05/13/20 81 kg (178 lb 9.2 oz)     Lab/Procedures/Meds: Pertinent Labs Reviewed  Clinical Chemistry:  Recent Labs   Lab 09/27/20  1805 09/28/20  0338   * 128*   K 4.6 4.4   CL 88* 95   CO2 24 20*   * 68*   BUN 32* 37*   CREATININE 4.3* 4.7*   CALCIUM 8.5* 8.0*   PROT 5.2* 4.5*   ALBUMIN 2.6* 2.2*   BILITOT 0.4 0.9   ALKPHOS 78 61   AST 25 20   ALT 20 19   ANIONGAP 16 13   ESTGFRAFRICA 12.3* 11.0*   EGFRNONAA 10.7* 9.6*   MG 2.1  --      CBC:   Recent Labs   Lab 09/28/20  0338   WBC 7.75   RBC 3.75*   HGB 10.9*   HCT 33.4*   *   MCV 89   MCH 29.1   MCHC 32.6     Cardiac Profile:  Recent Labs   Lab 09/24/20  0630 09/24/20  1930 09/25/20  0110 09/25/20  0636   *  --   --   --    TROPONINI 0.373* 0.344* 0.357* 0.277*    < > = values in this interval not displayed.     Diabetes:  Recent Labs   Lab 09/24/20  0630   HGBA1C 6.3*     Thyroid & Parathyroid:  Recent Labs   Lab 09/24/20  0630   TSH 10.620*   FREET4 1.26       Medications: Pertinent Medications reviewed  Scheduled Meds:   amiodarone  200 mg Oral BID    apixaban  2.5 mg Oral BID    citalopram  20 mg Oral Daily    collagenase   Topical (Top) Daily    famotidine (PF)  20 mg Intravenous Daily    fluconazole  100 mg Oral Daily    levothyroxine  50 mcg Oral Before breakfast    metoprolol succinate  25 mg Oral Daily    miconazole NITRATE 2 %   Topical (Top) BID    rosuvastatin  10 mg Oral Daily    sevelamer carbonate  1,600 mg Oral TID WM    sodium zirconium cyclosilicate  5 g Oral Daily      Continuous Infusions:  PRN Meds:.acetaminophen, albuterol sulfate, dextrose 50%, dextrose 50%, glucagon (human recombinant), glucose, glucose, insulin aspart U-100, ondansetron    Estimated/Assessed Needs    Weight Used For Calorie Calculations: 80.7 kg (177 lb 14.6 oz)  Energy Calorie Requirements (kcal): While Intubated: 1696 kcals/day (21 kcals/kg PSE)  ; Once Extubated: 3028-9749 kcals/day (20-25 kcals/kg)  Energy Need Method: Kcal/kg  Protein Requirements:  g/day (1.2-1.5 g/kg)  Weight Used For Protein Calculations: 80.7 kg (177 lb 14.6 oz)  Fluid Requirements (mL): UOP + 1000 mL or per MD       Nutrition Prescription Ordered    Current Diet Order: NPO    Evaluation of Received Nutrient/Fluid Intake    Other Calories (kcal): 319(Propofol @ 12.1 mL/hr)  Energy Calories Required: not meeting needs  Protein Required: not meeting needs  Fluid Required: not meeting needs  Tolerance: (NPO; intubated/sedated)  % Intake of Estimated Energy Needs: 0%  % Meal Intake: NPO    Intake/Output Summary (Last 24 hours) at 9/28/2020 1352  Last data filed at 9/28/2020 0600  Gross per 24 hour   Intake 334.75 ml   Output --   Net 334.75 ml      Nutrition Risk    Level of Risk/Frequency of Follow-up: high   Monitor and Evaluation    Food and Nutrient Intake: energy intake, enteral nutrition intake  Food and Nutrient Adminstration: enteral and parenteral nutrition administration  Physical Activity and Function: nutrition-related ADLs and IADLs, factors affecting access to physical activity  Anthropometric Measurements: weight, weight change, body mass index  Biochemical Data, Medical Tests and Procedures: electrolyte and renal panel, gastrointestinal profile, glucose/endocrine profile, inflammatory profile, lipid profile  Nutrition-Focused Physical Findings: overall appearance     Nutrition Follow-Up    RD Follow-up?: Yes  Mary Quintero RD 09/28/2020 1:59 PM

## 2020-09-28 NOTE — ASSESSMENT & PLAN NOTE
· Continue levothyroxine.  · Serial blood glucose monitoring.  · Sliding scale insulin as needed to maintain moderate glycemic control.

## 2020-09-28 NOTE — CONSULTS
Nephrology Consult Note        Patient Name: Juliane Ramos  MRN: [id number]    Patient Class: IP- Inpatient   Admission Date: 9/24/2020  Length of Stay: 4 days  Date of Service: 9/28/2020    Attending Physician: Oscar Ruth DO  Primary Care Provider: JOS Dumont    Reason for Consult: esrd/anemia/hyperkalemia/hypotension/shpt/tachycardia    SUBJECTIVE:     HPI: 60F hx ESRD HD (T,Th, Sat)  CAD/CABG ,EF20%?, COPD, DM ,chronic hypotension on midodrine daily presents to ED with complaints of having low blood pressure and rapid heart rate. In the emergency department patient had a heart rate in the 130s regular and SBP in 100s  Patient denies shortness of breath, chest pain, fever, chills, abdominal pain, nausea, vomiting. Last dialysis on Tuesday and she did not have any volume removed.  Patient has been given approximately 500 cc of IV fluids in the emergency department at remains tachycardic in the 120s. Cards eval is pending, Amiodarone was started.    9/25 Getting HD for hyperkalemia today, before cardioversion. Continue HD per TTS schedule.  9/26 Seen and examined on Hd today, tolerating well. Continue HD per TTS schedule.     9/28  Intubated.  Sedated. TTS dialysis planned    Past Medical History:   Diagnosis Date    Anemia     Anemia in stage 3 chronic kidney disease 11/26/2017    Anticoagulant long-term use     CHF (congestive heart failure)     COPD (chronic obstructive pulmonary disease)     COPD exacerbation 3/10/2020    Coronary artery disease     Diabetes mellitus     Digestive disorder     Encounter for blood transfusion     Essential hypertension 6/24/2017    Hypertension     Low blood pressure     MI (myocardial infarction) 2010    Segmental and subsegmental pulmonary emboli of RLL without acute cor pulmonale 11/25/2017    Stroke     July 2005    Thyroid disease     Traumatic subarachnoid hemorrhage 11/24/2017    Type 2 diabetes mellitus with stage 3 chronic kidney  "disease, without long-term current use of insulin 2017     Past Surgical History:   Procedure Laterality Date    ABCESS DRAINAGE Right     Between "little toe" and the one next to it    BREAST SURGERY      Reduction [id number]    CARDIAC SURGERY  2011    CABG 4vessel ring in one valve mitral valve prolapse    CORONARY ARTERY BYPASS GRAFT      DEBRIDEMENT OF FOOT Left 2020    Procedure: DEBRIDEMENT, FOOT;  Surgeon: Dennis Wick DPM;  Location: [facility];  Service: Podiatry;  Laterality: Left;    FOOT SURGERY      4 grafts to left foot    hyperlipidemia      TUBAL LIGATION  1986     Family History   Problem Relation Age of Onset    Arthritis Mother     Heart disease Father     Cancer Father 68        prostae and bladder ca  in     Diabetes Father     Hypertension Father     Depression Sister     Hypertension Son     Diabetes Maternal Grandmother         lost leg    Kidney disease Paternal Grandfather         had kidney removed    Stroke Paternal Grandfather      Social History     Tobacco Use    Smoking status: Never Smoker    Smokeless tobacco: Never Used   Substance Use Topics    Alcohol use: Yes     Alcohol/week: 1.0 - 2.0 standard drinks     Types: 1 - 2 Glasses of wine per week     Comment: "socially" not in awhile    Drug use: No       Review of patient's allergies indicates:  No Known Allergies    Outpatient meds:  No current facility-administered medications on file prior to encounter.      Current Outpatient Medications on File Prior to Encounter   Medication Sig Dispense Refill    gabapentin (NEURONTIN) 100 MG capsule Take 100 mg by mouth 3 (three) times daily.  3    midodrine (PROAMATINE) 5 MG Tab Take 5 mg by mouth 3 (three) times daily with meals.       rosuvastatin (CRESTOR) 10 MG tablet Take 10 mg by mouth every evening.       albuterol-ipratropium (DUO-NEB) 2.5 mg-0.5 mg/3 mL nebulizer solution Take 3 mLs by nebulization every 6 (six) hours. Rescue 1 " "Box 11    apixaban 5 mg Tab Take 1 tablet (5 mg total) by mouth 2 (two) times daily. (Patient taking differently: Take 5 mg by mouth 2 (two) times daily. ) 60 tablet 1    blood sugar diagnostic Strp Test 3-4 times daily PRN      CLARITIN-D 12 HOUR 5-120 mg per tablet Take 1 tablet by mouth once daily.   0    fluticasone (FLONASE) 50 mcg/actuation nasal spray 1 spray by Each Nostril route once daily.   12    insulin aspart (NOVOLOG) 100 unit/mL InPn pen Inject 1-10 Units into the skin 3 (three) times daily. (Patient taking differently: Inject 1-10 Units into the skin 3 (three) times daily. If sugar >150 on sliding scale) 3 mL 1    levothyroxine (SYNTHROID) 50 MCG tablet Take 50 mcg by mouth before breakfast.       pen needle, diabetic (BD ULTRA-FINE OBDULIA PEN NEEDLE) 32 gauge x 5/32" Ndle       ramelteon (ROZEREM) 8 mg tablet Take 8 mg by mouth every evening.      sevelamer carbonate (RENVELA) 800 mg Tab Take 1,600 mg by mouth 3 (three) times daily with meals.      tiotropium (SPIRIVA) 18 mcg inhalation capsule Inhale 1 capsule (18 mcg total) into the lungs once daily. Controller 30 capsule 11    umeclidinium (INCRUSE ELLIPTA) 62.5 mcg/actuation inhalation capsule Inhale 62.5 mcg into the lungs once daily. Controller         Scheduled meds:   amiodarone  200 mg Oral BID    apixaban  2.5 mg Oral BID    chlorhexidine  15 mL Mouth/Throat BID    chlorhexidine  15 mL Mouth/Throat BID    citalopram  20 mg Oral Daily    collagenase   Topical (Top) Daily    famotidine (PF)  20 mg Intravenous Daily    fluconazole  100 mg Oral Daily    levothyroxine  50 mcg Oral Before breakfast    metoprolol succinate  25 mg Oral Daily    miconazole NITRATE 2 %   Topical (Top) BID    mupirocin   Nasal BID    rosuvastatin  10 mg Oral Daily    sevelamer carbonate  1,600 mg Oral TID WM    sodium zirconium cyclosilicate  5 g Oral Daily       Infusions:   propofoL 25 mcg/kg/min (09/28/20 0430)       PRN " meds:  acetaminophen, albuterol sulfate, dextrose 50%, dextrose 50%, glucagon (human recombinant), glucose, glucose, insulin aspart U-100, ondansetron    Review of Systems:  ROS    OBJECTIVE:     Vital Signs and IO (Last 24H):  Temp:  [97.4 °F (36.3 °C)-98 °F (36.7 °C)]   Pulse:  [59-87]   Resp:  [11-25]   BP: ()/(38-59)   SpO2:  [86 %-100 %]   Arterial Line BP: ()/(33-62)   I/O last 3 completed shifts:  In: 814.8 [P.O.:720; I.V.:94.8]  Out: -     Wt Readings from Last 5 Encounters:   09/26/20 80.7 kg (177 lb 14.6 oz)   09/26/20 80.7 kg (177 lb 14.6 oz)   09/08/20 79.8 kg (176 lb)   09/04/20 80 kg (176 lb 5.9 oz)   09/02/20 80 kg (176 lb 5.9 oz)         Physical Exam:  Physical Exam  Constitutional:       Appearance: She is well-developed. She is not diaphoretic.   HENT:      Head: Normocephalic and atraumatic.   Eyes:      General: No scleral icterus.     Pupils: Pupils are equal, round, and reactive to light.   Neck:      Musculoskeletal: Neck supple.   Cardiovascular:      Rate and Rhythm: Normal rate and regular rhythm.   Pulmonary:      Effort: Pulmonary effort is normal. No respiratory distress.      Breath sounds: No stridor.   Abdominal:      General: There is no distension.      Palpations: Abdomen is soft.   Musculoskeletal: Normal range of motion.         General: No deformity.   Skin:     General: Skin is warm and dry.      Findings: No erythema or rash.   Neurological:      Mental Status: She is alert and oriented to person, place, and time.      Cranial Nerves: No cranial nerve deficit.   Psychiatric:         Behavior: Behavior normal.         Body mass index is 28.72 kg/m².    Laboratory:  Recent Labs   Lab 09/27/20  0450 09/27/20  1805 09/28/20  0338   * 128* 128*   K 4.3 4.6 4.4   CL 95 88* 95   CO2 24 24 20*   BUN 27* 32* 37*   CREATININE 4.0* 4.3* 4.7*   ESTGFRAFRICA 13.4* 12.3* 11.0*   EGFRNONAA 11.6* 10.7* 9.6*    118* 68*       Recent Labs   Lab 09/26/20  0405  09/27/20  0450 09/27/20  1805 09/28/20  0338   CALCIUM 8.2* 8.1* 8.5* 8.0*   ALBUMIN 2.5*  --  2.6* 2.2*   MG  --   --  2.1  --              No results for input(s): POCTGLUCOSE in the last 168 hours.    Recent Labs   Lab 02/21/18  2120 09/12/18  1715 09/24/20  0630   Hemoglobin A1C 6.1 H 5.2 6.3 H       Recent Labs   Lab 09/24/20  0630  09/27/20  0450 09/27/20  1805 09/28/20  0338   WBC 11.72   < > 11.80 11.32 7.75   HGB 12.8   < > 12.1 12.4 10.9*   HCT 41.9   < > 39.9 40.6 33.4*      < > 155 157 119*   MCV 93   < > 93 93 89   MCHC 30.5*   < > 30.3* 30.5* 32.6   MONO 11.9  1.4*  --   --   --  10.5  0.8    < > = values in this interval not displayed.       Recent Labs   Lab 09/26/20  0404 09/27/20  1805 09/28/20  0338   BILITOT 0.6 0.4 0.9   PROT 4.9* 5.2* 4.5*   ALBUMIN 2.5* 2.6* 2.2*   ALKPHOS 72 78 61   ALT 21 20 19   AST 29 25 20       Recent Labs   Lab 11/24/17  1837 09/11/18  0246 09/25/20  1434   Color, UA Red A Yellow Yellow   Appearance, UA Cloudy A Hazy A Cloudy A   pH, UA 5.0 5.0 6.0   Specific Gravity, UA >1.030 A >=1.030 A 1.020   Protein, UA 2+ A 2+ A 2+ A   Glucose, UA 1+ A Negative Negative   Ketones, UA Negative Negative Negative   Urobilinogen, UA Negative Negative Negative   Bilirubin (UA) Negative Negative 1+ A   Occult Blood UA 3+ A 1+ A 3+ A   Nitrite, UA Negative Negative Negative   RBC, UA >100 H 5 H 83 H   WBC, UA 0 >100 H >100 H   Bacteria Rare Moderate A Occasional   Hyaline Casts, UA 0 0 1785 A       Recent Labs   Lab 09/27/20 2004 09/28/20  0016 09/28/20  0551   POC PH 7.188 LL 7.356 7.411   POC PCO2 72.5 HH 35.6 32.9 L   POC HCO3 27.6 19.9 L 20.9 L   POC PO2 130 H 111 H 94   POC SATURATED O2 98 98 98   POC BE -1 -6 -4   Sample ARTERIAL ARTERIAL ARTERIAL       Microbiology Results (last 7 days)     Procedure Component Value Units Date/Time    Urine culture [[id number]]  (Abnormal) Collected: 09/25/20 1434    Order Status: Completed Specimen: Urine Updated: 09/28/20 0811      Urine Culture, Routine PRESUMPTIVE JORDIN ALBICANS  10,000 - 49,999 cfu/ml      Narrative:      Specimen Source->Urine    Culture, Respiratory with Gram Stain [[id number]] Collected: 09/28/20 0045    Order Status: Sent Specimen: Respiratory from Sputum Updated: 09/28/20 0053    Blood culture [[id number]] Collected: 09/25/20 1523    Order Status: Completed Specimen: Blood Updated: 09/27/20 1632     Blood Culture, Routine No Growth to date      No Growth to date      No Growth to date    Urine Culture High Risk [[id number]]     Order Status: No result Specimen: Urine           ASSESSMENT/PLAN:     Active Hospital Problems    Diagnosis  POA    Tachycardia [R00.0]  Yes    Stage 5 chronic kidney disease on chronic dialysis [N18.6, Z99.2]  Not Applicable    Open wound of left heel [S91.302A]  Yes      Resolved Hospital Problems   No resolved problems to display.     ESRD on HD TTS via AVF  Hyperkalemia, mild  Hypotension  A.Flutter s/p CV 9/25 now on amiodarone, cant give midodrine  High TSH with normal T4, ? uncontrolled hypothyroidism  Continue current dialysis prescription. UF as tolerated.  Next HD per schedule.  Renal diet - low K, low phos. Continue Lokelma.  No IVs or BP checks on access and/or non-dominant arm.  Apprecaite cards eval. Holding midodrine for now.    Anemia of CKD  Hgb and HCT are acceptable. Monitor.  Will provide YASHIRA and/or IV iron PRN.    MBD / Secondary HPT  Ca, phos, PTH and vitamin D levels are acceptable.   Phos binders, vitamin D analogues and calcimimetics as needed.    Thank you for allowing us to participate in the care of your patient!   We will follow the patient and provide recommendations as needed.    Elijah Gonzalez MD    [facility]  [address] [place] [address]  [place] [id number]    [phone] - tel  [phone] - fax    9/28/2020 1:53 PM

## 2020-09-28 NOTE — PROGRESS NOTES
[facility]  Progress Note    Patient Name: Juliane Ramos  MRN: [id number]  Patient Class: IP- Inpatient   Admission Date: 9/24/2020  Length of Stay: 4 days  Attending Physician: Oscar Ruth DO  Primary Care Provider: JOS Dumont        Subjective:     Principal Problem:<principal problem not specified>        HPI:  Patient is a 60-year-old female hx ESRD HD (T,Th, Sat)  CAD/CABG ,EF20%?, COPD, DM ,chronic hypotension  presents ED with complaints of having low blood pressure and rapid heart rate.  In the emergency department patient had a heart rate in the 130s regular and a blood pressure systolic of approximately 100  Patient denies shortness of breath chest pain fever chills abdominal pain nausea vomiting.  She states her last dialysis on Tuesday she did not have any volume removed.  Patient has been given approximately 500 cc of IV fluids in the emergency department at remains tachycardic in the 120s.      Overview/Hospital Course:  No notes on file    Interval History:  Yesterday nursing stated that patient was not wearing her O2 and rapid response was called due to syncope and lethargy.  Patient was acidotic and required BiPAP.  Case was discussed with Renal at that time.  Now intubated and pH / CO2 improved    Unable to obtain review of system denied sedation  Objective:     Vital Signs (Most Recent):  Temp: 97.8 °F (36.6 °C) (09/28/20 0400)  Pulse: 61 (09/28/20 0706)  Resp: (!) 24 (09/28/20 0706)  BP: (!) 101/51 (09/28/20 0600)  SpO2: 99 % (09/28/20 0706) Vital Signs (24h Range):  Temp:  [97.4 °F (36.3 °C)-98 °F (36.7 °C)] 97.8 °F (36.6 °C)  Pulse:  [61-87] 61  Resp:  [11-25] 24  SpO2:  [86 %-100 %] 99 %  BP: ()/(38-59) 101/51  Arterial Line BP: ()/(33-62) 124/53     Weight: 80.7 kg (177 lb 14.6 oz)  Body mass index is 28.72 kg/m².    Intake/Output Summary (Last 24 hours) at 9/28/2020 0806  Last data filed at 9/28/2020 0600  Gross per 24 hour    Intake 814.75 ml   Output --   Net 814.75 ml      Physical Exam patient appears older than stated age.  She is intubated sedated  HEENT sclerae nonicteric endotracheal tube is in place  Neck is supple  Lungs are coarse moderate air movement assisted  Heart bradycardic in the 50s regular no murmur  Abdomen is soft nontender, +BS  Extremities no edema   exam Chi is in place  Neuro moves extremities sedated intubated    Significant Labs:   BMP:   Recent Labs   Lab 09/27/20 1805 09/28/20  0338   * 68*   * 128*   K 4.6 4.4   CL 88* 95   CO2 24 20*   BUN 32* 37*   CREATININE 4.3* 4.7*   CALCIUM 8.5* 8.0*   MG 2.1  --      CBC:   Recent Labs   Lab 09/27/20 0450 09/27/20 1805 09/28/20  0338   WBC 11.80 11.32 7.75   HGB 12.1 12.4 10.9*   HCT 39.9 40.6 33.4*    157 119*     CMP:   Recent Labs   Lab 09/27/20 0450 09/27/20 1805 09/28/20  0338   * 128* 128*   K 4.3 4.6 4.4   CL 95 88* 95   CO2 24 24 20*    118* 68*   BUN 27* 32* 37*   CREATININE 4.0* 4.3* 4.7*   CALCIUM 8.1* 8.5* 8.0*   PROT  --  5.2* 4.5*   ALBUMIN  --  2.6* 2.2*   BILITOT  --  0.4 0.9   ALKPHOS  --  78 61   AST  --  25 20   ALT  --  20 19   ANIONGAP 12 16 13   EGFRNONAA 11.6* 10.7* 9.6*     Troponin: No results for input(s): TROPONINI in the last 48 hours.    Significant Imaging:       Assessment/Plan:   #1 Acute hypoxic hypercapnic respiratory failure -now intubated.  Consult pulmonary to wean vent as tolerated  #2 New onset atrial  Flutter w/RVR   -S/P cardioversion - NSR   cardiology following, continuing amiodarone, anticoagulation, EF46%  #3 ESRD HD Tuesday Thursday Saturday nephrology following   #4 CAD/CABG-/EF30% -Slight elevation of troponin most likely secondary to demand ischemia secondary to hypotension on admission   End-stage renal disease contributing  #5 Chronic wound left distal posterior lower extremity-Wound care follow-up  Followed by Dr. Wick  #6  Hyponatremia-monitoring  labs   #7 Hyperkalemia-resolved after dialysis  #8 DM2-follow sliding scale hemoglobin A1c 6.3   #9 Hx PE -details unknown   #10 Metabolic acidosis on admission -secondary to underlying renal disease.  Resolved  #11 Acute UTI POA-yeast  .  Repeat culture  #12 anemia chronic dx -monitoring      VTE Risk Mitigation (From admission, onward)         Ordered     apixaban tablet 2.5 mg  2 times daily      09/24/20 1018     Place MARY ELLEN hose  Until discontinued      09/24/20 1018     IP VTE HIGH RISK PATIENT  Once      09/24/20 1018     Place sequential compression device  Until discontinued      09/24/20 1018                    Oscar Ruth DO  Department of Hospital Medicine   [facility]

## 2020-09-28 NOTE — PT/OT/SLP DISCHARGE
Physical Therapy Discharge Summary    Name: Juliane Ramos  MRN: [id number]   Principal Problem: <principal problem not specified>     Patient Discharged from acute Physical Therapy on 2020  Please refer to prior PT noted date on 2020 for functional status.     Assessment:     pt is intubated    Objective:     GOALS:   Multidisciplinary Problems     Physical Therapy Goals        Problem: Physical Therapy Goal    Goal Priority Disciplines Outcome Goal Variances Interventions   Physical Therapy Goal     PT, PT/OT      Description: Goals to be met by: D/C    Patient will increase functional independence with mobility by performin. Supine to sit with MInimal Assistance  2. Sit to stand transfer with Contact Guard Assistance  3. Gait  x 25  feet with Minimal Assistance using Rolling Walker.                      Reasons for Discontinuation of Therapy Services  Transfer to alternate level of care.      Plan:     Patient Discharged to: AMIRA Smith, PT  2020

## 2020-09-28 NOTE — PLAN OF CARE
09/28/20 0706   Patient Assessment/Suction   Level of Consciousness (AVPU) unresponsive   Respiratory Effort Normal;Unlabored   Expansion/Accessory Muscles/Retractions no use of accessory muscles   All Lung Fields Breath Sounds clear;diminished   YULIET Breath Sounds diminished   LLL Breath Sounds diminished   RUL Breath Sounds clear   RML Breath Sounds diminished   RLL Breath Sounds diminished   Rhythm/Pattern, Respiratory depth regular   Cough Frequency with stimulation   Cough Type assisted   Suction Method oral   Suction Pressure (mmHg) 120 mmHg   $ Suction Charges Inline Suction Procedure Stat Charge   Secretions Amount small   Secretions Color white   Secretions Characteristics thick   Sputum Collection sample obtained per suctioning   PRE-TX-O2   O2 Device (Oxygen Therapy) ventilator   $ Is the patient on Low Flow Oxygen? Yes   Oxygen Concentration (%) 30   SpO2 99 %   Pulse Oximetry Type Continuous   $ Pulse Oximetry - Multiple Charge Pulse Oximetry - Multiple   Pulse 61   Resp (!) 24   Wound Care   $ Wound Care Tech Time 15 min   Area of Concern Upper lip;Lower lip;Corner lip   Skin Color/Characteristics without discoloration   Skin Temperature cool        Airway - Non-Surgical 09/27/20 2345   Placement Date/Time: 09/27/20 (c) 2345   Method of Intubation: Video Laryngoscopy  Mask Ventilation: Mask ventilation not attempted  Intubated: Postinduction  Airway Device Size: 8.0  Cuff Inflation: Minimal occlusive pressure  Placement Verified By: ...   Secured at 23 cm   Measured At Lips   Secured Location Center   Secured by Commercial tube [email or web address]   Site Condition Cool;Dry   Status Intact   Site Assessment Clean;Dry;No bleeding;No drainage   Vent Select   Conventional Vent Y   $ Ventilator Subsequent 1   Charged w/in last 24h YES   Preset Conventional Ventilator Settings   Vent ID [id number]   Vent Type    Ventilation Type VC   Vent Mode A/C   Humidity HME   Set Rate 24 BPM   Vt Set 450 mL   PEEP/CPAP 5 cmH20    Pressure Support 0 cmH20   Waveform RAMP   Peak Flow 60 L/min   Plateau Set/Insp. Hold (sec) 0   Trigger Sensitivity Flow/I-Trigger 3 L/min   Patient Ventilator Parameters   Resp Rate Total 26 br/min   Peak Airway Pressure 36 cmH2O   Mean Airway Pressure 15 cmH20   Plateau Pressure 0 cmH20   Exhaled Vt 458 mL   Total Ve 12.3 mL   I:E Ratio Measured 1:2.00   Conventional Ventilator Alarms   Resp Rate High Alarm 45 br/min   Press High Alarm 50 cmH2O   Apnea Rate 14   Apnea Volume (mL) 0 mL   Apnea Oxygen Concentration  100   Apnea Flow Rate (L/min) 60   T Apnea 20 sec(s)   IHI Ventilator Associated Pneumonia Bundle   Head of Bed Elevated  HOB 30   Oral Care Mouth suctioned;Lip moisturizer applied;Teeth brushed   Additional VAP Prevention Documentation Clean equipment maintained   Ready to Wean/Extubation Screen   FIO2<=50 (chart decimal) 0.3   MV<16L (chart vol.) 12.3   PEEP <=8 (chart #) 5   Ready to Wean Parameters   F/VT Ratio<105 (RSBI) (!) 52.4   Respiratory Evaluation   $ Care Plan Tech Time 15 min

## 2020-09-28 NOTE — NURSING
Contacted Dr. Ruth and reviewed labs with him.  Orders to repeat ABGs in 2 hours.    Results for VERNON MILLER (MRN [id number]) as of 9/27/2020 20:20   Ref. Range 9/27/2020 18:05 9/27/2020 20:04   WBC Latest Ref Range: 3.90 - 12.70 K/uL 11.32    RBC Latest Ref Range: 4.00 - 5.40 M/uL 4.38    Hemoglobin Latest Ref Range: 12.0 - 16.0 g/dL 12.4    Hematocrit Latest Ref Range: 37.0 - 48.5 % 40.6    MCV Latest Ref Range: 82 - 98 fL 93    MCH Latest Ref Range: 27.0 - 31.0 pg 28.3    MCHC Latest Ref Range: 32.0 - 36.0 g/dL 30.5 (L)    RDW Latest Ref Range: 11.5 - 14.5 % 16.3 (H)    Platelets Latest Ref Range: 150 - 350 K/uL 157    MPV Latest Ref Range: 9.2 - 12.9 fL 10.2    Sodium Latest Ref Range: 136 - 145 mmol/L 128 (L)    Potassium Latest Ref Range: 3.5 - 5.1 mmol/L 4.6    Chloride Latest Ref Range: 95 - 110 mmol/L 88 (L)    CO2 Latest Ref Range: 23 - 29 mmol/L 24    Anion Gap Latest Ref Range: 8 - 16 mmol/L 16    BUN, Bld Latest Ref Range: 6 - 20 mg/dL 32 (H)    Creatinine Latest Ref Range: 0.5 - 1.4 mg/dL 4.3 (H)    eGFR if non African American Latest Ref Range: >60 mL/min/1.73 m^2 10.7 (A)    eGFR if African American Latest Ref Range: >60 mL/min/1.73 m^2 12.3 (A)    Glucose Latest Ref Range: 70 - 110 mg/dL 118 (H)    Calcium Latest Ref Range: 8.7 - 10.5 mg/dL 8.5 (L)    Magnesium Latest Ref Range: 1.6 - 2.6 mg/dL 2.1    Alkaline Phosphatase Latest Ref Range: 55 - 135 U/L 78    PROTEIN TOTAL Latest Ref Range: 6.0 - 8.4 g/dL 5.2 (L)    Albumin Latest Ref Range: 3.5 - 5.2 g/dL 2.6 (L)    BILIRUBIN TOTAL Latest Ref Range: 0.1 - 1.0 mg/dL 0.4    AST Latest Ref Range: 10 - 40 U/L 25    ALT Latest Ref Range: 10 - 44 U/L 20    POC PH Latest Ref Range: 7.35 - 7.45   7.188 (LL)   POC PCO2 Latest Ref Range: 35 - 45 mmHg  72.5 (HH)   POC PO2 Latest Ref Range: 80 - 100 mmHg  130 (H)   POC BE Latest Ref Range: -2 to 2 mmol/L  -1   POC HCO3 Latest Ref Range: 24 - 28 mmol/L  27.6   POC SATURATED O2 Latest Ref Range: 95 - 100 %   98   POC TCO2 Latest Ref Range: 23 - 27 mmol/L  30 (H)   FiO2 Unknown  50   Sample Unknown  ARTERIAL   DelSys Unknown  CPAP/BiPAP   Allens Test Unknown  N/A   Site Unknown  RR   Mode Unknown  BiPAP   Rate Unknown  30   EP Unknown  10   IP Unknown  20   Min Vol Unknown  6.2   Sp02 Unknown  100   Spont Rate Unknown  31

## 2020-09-29 PROBLEM — R00.0 TACHYCARDIA: Status: RESOLVED | Noted: 2020-01-01 | Resolved: 2020-01-01

## 2020-09-29 NOTE — PLAN OF CARE
09/28/20 2045   Patient Assessment/Suction   Level of Consciousness (AVPU) unresponsive   Respiratory Effort Normal;Unlabored   Expansion/Accessory Muscles/Retractions no use of accessory muscles   All Lung Fields Breath Sounds equal bilaterally;diminished   Rhythm/Pattern, Respiratory assisted mechanically   Cough Frequency with stimulation   Cough Type assisted   Suction Method tracheal   $ Suction Charges Inline Suction Procedure Stat Charge   Secretions Amount small   Secretions Color cloudy;white   Secretions Characteristics thick   PRE-TX-O2   O2 Device (Oxygen Therapy) ventilator   Pulse Oximetry Type Continuous   $ Pulse Oximetry - Multiple Charge Pulse Oximetry - Multiple   Wound Care   $ Wound Care Tech Time 15 min   Area of Concern Left;Right;Upper lip;Lower lip;Corner lip   Skin Color/Characteristics without discoloration   Skin Temperature cool        Airway - Non-Surgical 09/27/20 2345   Placement Date/Time: 09/27/20 (c) 2345   Method of Intubation: Video Laryngoscopy  Mask Ventilation: Mask ventilation not attempted  Intubated: Postinduction  Airway Device Size: 8.0  Cuff Inflation: Minimal occlusive pressure  Placement Verified By: ...   Secured at 23 cm   Measured At Lips   Secured Location Center   Secured by Commercial tube [email or web address]   Bite Block none   Site Condition Cool;Dry   Status Intact;Secured;Patent   Site Assessment Clean;Dry   Cuff Volume   (MLT)   Respiratory Interventions   Airway/Ventilation Management airway patency maintained   Vent Select   Conventional Vent Y   Charged w/in last 24h YES   Preset Conventional Ventilator Settings   Vent ID [id number]   Vent Type    Humidity HME   Conventional Ventilator Alarms   Alarms On Y   Respiratory Evaluation   $ Care Plan Tech Time 30 min   Evaluation For New Orders

## 2020-09-29 NOTE — PROGRESS NOTES
Progress Note  Cardiology    Admit Date: 9/24/2020   LOS: 5 days     Follow-up For:  Chronic renal failure, dialysis; CHF; ischemic cardiomyopathy, pulmonary hypertension, LVEF 45-48%; atrial flutter, status post cardioversion, amiodarone    Scheduled Meds:   norepinephrine        amiodarone  200 mg Oral BID    apixaban  2.5 mg Oral BID    citalopram  20 mg Oral Daily    collagenase   Topical (Top) Daily    famotidine (PF)  20 mg Intravenous Daily    fluconazole  100 mg Oral Daily    levothyroxine  50 mcg Oral Before breakfast    metoprolol succinate  25 mg Oral Daily    miconazole NITRATE 2 %   Topical (Top) BID    rosuvastatin  10 mg Oral Daily    sevelamer carbonate  1,600 mg Oral TID WM    sodium zirconium cyclosilicate  5 g Oral Daily     Continuous Infusions:  PRN Meds:acetaminophen, albuterol sulfate, dextrose 50%, dextrose 50%, glucagon (human recombinant), glucose, glucose, insulin aspart U-100, ondansetron    Review of patient's allergies indicates:  No Known Allergies    SUBJECTIVE:     Interval History: Patient intubated, FiO2 0.3.  Patient seen on dialysis.    Review of Systems  Intubated    OBJECTIVE:     Vital Signs (Most Recent)  Temp: 97.6 °F (36.4 °C) (09/29/20 1515)  Pulse: 70 (09/29/20 1808)  Resp: (!) 30 (09/29/20 1808)  BP: (!) 94/55 (09/29/20 1700)  SpO2: (!) 93 % (09/29/20 1808)    Vital Signs Range (Last 24H):  Temp:  [97.3 °F (36.3 °C)-98.4 °F (36.9 °C)]   Pulse:  [66-80]   Resp:  [19-30]   BP: ()/(41-67)   SpO2:  [91 %-100 %]   Arterial Line BP: ()/(48-75)       Physical Exam:  Neck: JVD - 2 cm above sternal notch, no carotid bruit and supple, symmetrical, trachea midline  Lungs: clear to auscultation bilaterally, normal respiratory effort  Heart: regular rate and rhythm, S1, S2 normal, no murmur, click, rub or gallop  Abdomen: soft, non-tender; bowel sounds normal; no masses,  no organomegaly  Extremities: Extremities normal, atraumatic, no cyanosis, clubbing, or  edema    Recent Results (from the past 24 hour(s))   POCT glucose    Collection Time: 09/28/20  8:20 PM   Result Value Ref Range    POC Glucose 96 70 - 110   POCT glucose    Collection Time: 09/29/20 12:16 PM   Result Value Ref Range    POC Glucose 76 70 - 110   POCT glucose    Collection Time: 09/29/20  5:53 PM   Result Value Ref Range    POC Glucose 77 70 - 110   ISTAT PROCEDURE    Collection Time: 09/29/20  6:08 PM   Result Value Ref Range    POC PH 7.216 (LL) 7.35 - 7.45    POC PCO2 67.2 (HH) 35 - 45 mmHg    POC PO2 68 (L) 80 - 100 mmHg    POC HCO3 27.2 24 - 28 mmol/L    POC BE -1 -2 to 2 mmol/L    POC SATURATED O2 88 (L) 95 - 100 %    POC TCO2 29 (H) 23 - 27 mmol/L    Rate 16     Sample ARTERIAL     Site Tanika/UAC     Allens Test N/A     DelSys CPAP/BiPAP     Mode BiPAP     FiO2 55     IP 16     EP 10        Diagnostic Results:  Labs: Reviewed  ECG: Reviewed    ASSESSMENT/PLAN:     The patient is presently on dialysis.  She maintains of blood pressure.  She has not required [place]-Synephrine.  2 L negative fluid balance is planned.

## 2020-09-29 NOTE — CARE UPDATE
09/29/20 1736   PRE-TX-O2   O2 Device (Oxygen Therapy) BiPAP   $ Is the patient on Low Flow Oxygen? Yes   Oxygen Analyzed Concentration (%) 50 %   SpO2 (!) 93 %   Pulse 70   Resp (!) 28   Preset CPAP/BiPAP Settings   Mode Of Delivery BiPAP   $ Initial CPAP/BiPAP Setup? Yes   $ Is patient using? Yes   Size of Mask Small   Sized Appropriately? Yes   Equipment Type V60   Airway Device Type small full face mask   Ipap 16   EPAP (cm H2O) 8   Pressure Support (cm H2O) 8   Set Rate (Breaths/Min) 16   ITime (sec) 0.9   Rise Time (sec) 3   CPAP/BiPAP Alarms   High RR (breaths/min) 40   Low RR (breaths/min) 10     Extubated pt to BiPAP per Dr Hui's orders. Vent left at pt bedside incase of possible reintubation.

## 2020-09-29 NOTE — CARE UPDATE
09/29/20 1808   PRE-TX-O2   SpO2 (!) 93 %   Pulse 70   Resp (!) 30   Labs   $ Was an ABG obtained? A Line;ISTAT - Blood gas   $ Labs Tech Time 15 min   Critical Value Communication   Date Result Received 09/29/20   Time Result Received 1808   Resulting Department of Critical Value RESP   Who communicated critical value from resulting department? SL   Critical Test #1 pH   Critical Test #1 Result 7.21   Critical Test #2 PCO2   Critical Test #2 Result 67.2   Name of Notified Physician/Designee DION   Date Notified 09/29/20   Time Notified 1808   Pt reintubated per Dr Lopez orders.

## 2020-09-29 NOTE — CONSULTS
Nephrology Consult Note        Patient Name: Juliane Ramos  MRN: [id number]    Patient Class: IP- Inpatient   Admission Date: 9/24/2020  Length of Stay: 5 days  Date of Service: 9/29/2020    Attending Physician: Ralph Tilley MD  Primary Care Provider: JOS Dumont    Reason for Consult: esrd/anemia/hyperkalemia/hypotension/shpt/tachycardia    SUBJECTIVE:     HPI: 60F hx ESRD HD (T,Th, Sat)  CAD/CABG ,EF20%?, COPD, DM ,chronic hypotension on midodrine daily presents to ED with complaints of having low blood pressure and rapid heart rate. In the emergency department patient had a heart rate in the 130s regular and SBP in 100s  Patient denies shortness of breath, chest pain, fever, chills, abdominal pain, nausea, vomiting. Last dialysis on Tuesday and she did not have any volume removed.  Patient has been given approximately 500 cc of IV fluids in the emergency department at remains tachycardic in the 120s. Cards eval is pending, Amiodarone was started.    9/25 Getting HD for hyperkalemia today, before cardioversion. Continue HD per TTS schedule.  9/26 Seen and examined on Hd today, tolerating well. Continue HD per TTS schedule.   9/28  Intubated.  Sedated. TTS dialysis planned  9/29  Seen today on dialysis.  Tolerating well.  Still intubated, arousable, good eye contact.  No lab results yet today.    Past Medical History:   Diagnosis Date    Anemia     Anemia in stage 3 chronic kidney disease 11/26/2017    Anticoagulant long-term use     CHF (congestive heart failure)     COPD (chronic obstructive pulmonary disease)     COPD exacerbation 3/10/2020    Coronary artery disease     Diabetes mellitus     Digestive disorder     Encounter for blood transfusion     Essential hypertension 6/24/2017    Hypertension     Low blood pressure     MI (myocardial infarction) 2010    Segmental and subsegmental pulmonary emboli of RLL without acute cor pulmonale 11/25/2017    Stroke     July 2005    Thyroid  "disease     Traumatic subarachnoid hemorrhage 2017    Type 2 diabetes mellitus with stage 3 chronic kidney disease, without long-term current use of insulin 2017     Past Surgical History:   Procedure Laterality Date    ABCESS DRAINAGE Right     Between "little toe" and the one next to it    BREAST SURGERY      Reduction [id number]    CARDIAC SURGERY  2011    CABG 4vessel ring in one valve mitral valve prolapse    CORONARY ARTERY BYPASS GRAFT      DEBRIDEMENT OF FOOT Left 2020    Procedure: DEBRIDEMENT, FOOT;  Surgeon: Dennis Wick DPM;  Location: [facility];  Service: Podiatry;  Laterality: Left;    FOOT SURGERY      4 grafts to left foot    hyperlipidemia      TUBAL LIGATION  1986     Family History   Problem Relation Age of Onset    Arthritis Mother     Heart disease Father     Cancer Father 68        prostae and bladder ca  in     Diabetes Father     Hypertension Father     Depression Sister     Hypertension Son     Diabetes Maternal Grandmother         lost leg    Kidney disease Paternal Grandfather         had kidney removed    Stroke Paternal Grandfather      Social History     Tobacco Use    Smoking status: Never Smoker    Smokeless tobacco: Never Used   Substance Use Topics    Alcohol use: Yes     Alcohol/week: 1.0 - 2.0 standard drinks     Types: 1 - 2 Glasses of wine per week     Comment: "socially" not in awhile    Drug use: No       Review of patient's allergies indicates:  No Known Allergies    Outpatient meds:  No current facility-administered medications on file prior to encounter.      Current Outpatient Medications on File Prior to Encounter   Medication Sig Dispense Refill    gabapentin (NEURONTIN) 100 MG capsule Take 100 mg by mouth 3 (three) times daily.  3    midodrine (PROAMATINE) 5 MG Tab Take 5 mg by mouth 3 (three) times daily with meals.       rosuvastatin (CRESTOR) 10 MG tablet Take 10 mg by mouth every evening.       " "albuterol-ipratropium (DUO-NEB) 2.5 mg-0.5 mg/3 mL nebulizer solution Take 3 mLs by nebulization every 6 (six) hours. Rescue 1 Box 11    apixaban 5 mg Tab Take 1 tablet (5 mg total) by mouth 2 (two) times daily. (Patient taking differently: Take 5 mg by mouth 2 (two) times daily. ) 60 tablet 1    blood sugar diagnostic Strp Test 3-4 times daily PRN      CLARITIN-D 12 HOUR 5-120 mg per tablet Take 1 tablet by mouth once daily.   0    fluticasone (FLONASE) 50 mcg/actuation nasal spray 1 spray by Each Nostril route once daily.   12    insulin aspart (NOVOLOG) 100 unit/mL InPn pen Inject 1-10 Units into the skin 3 (three) times daily. (Patient taking differently: Inject 1-10 Units into the skin 3 (three) times daily. If sugar >150 on sliding scale) 3 mL 1    levothyroxine (SYNTHROID) 50 MCG tablet Take 50 mcg by mouth before breakfast.       pen needle, diabetic (BD ULTRA-FINE OBDULIA PEN NEEDLE) 32 gauge x 5/32" Ndle       ramelteon (ROZEREM) 8 mg tablet Take 8 mg by mouth every evening.      sevelamer carbonate (RENVELA) 800 mg Tab Take 1,600 mg by mouth 3 (three) times daily with meals.      tiotropium (SPIRIVA) 18 mcg inhalation capsule Inhale 1 capsule (18 mcg total) into the lungs once daily. Controller 30 capsule 11    umeclidinium (INCRUSE ELLIPTA) 62.5 mcg/actuation inhalation capsule Inhale 62.5 mcg into the lungs once daily. Controller         Scheduled meds:   amiodarone  200 mg Oral BID    apixaban  2.5 mg Oral BID    citalopram  20 mg Oral Daily    collagenase   Topical (Top) Daily    famotidine (PF)  20 mg Intravenous Daily    fluconazole  100 mg Oral Daily    levothyroxine  50 mcg Oral Before breakfast    metoprolol succinate  25 mg Oral Daily    miconazole NITRATE 2 %   Topical (Top) BID    rosuvastatin  10 mg Oral Daily    sevelamer carbonate  1,600 mg Oral TID WM    sodium zirconium cyclosilicate  5 g Oral Daily       Infusions:      PRN meds:  acetaminophen, albuterol sulfate, " dextrose 50%, dextrose 50%, glucagon (human recombinant), glucose, glucose, insulin aspart U-100, ondansetron    Review of Systems:  ROS    OBJECTIVE:     Vital Signs and IO (Last 24H):  Temp:  [97 °F (36.1 °C)-98.4 °F (36.9 °C)]   Pulse:  [64-77]   Resp:  [19-26]   BP: ()/(38-67)   SpO2:  [91 %-100 %]   Arterial Line BP: (109-140)/(49-75)   I/O last 3 completed shifts:  In: 459.8 [P.O.:240; I.V.:129.8; NG/GT:90]  Out: 0     Wt Readings from Last 5 Encounters:   09/26/20 80.7 kg (177 lb 14.6 oz)   09/26/20 80.7 kg (177 lb 14.6 oz)   09/08/20 79.8 kg (176 lb)   09/04/20 80 kg (176 lb 5.9 oz)   09/02/20 80 kg (176 lb 5.9 oz)         Physical Exam:  Physical Exam  Constitutional:       Appearance: She is well-developed. She is not diaphoretic.   HENT:      Head: Normocephalic and atraumatic.   Eyes:      General: No scleral icterus.     Pupils: Pupils are equal, round, and reactive to light.   Neck:      Musculoskeletal: Neck supple.   Cardiovascular:      Rate and Rhythm: Normal rate and regular rhythm.   Pulmonary:      Effort: Pulmonary effort is normal. No respiratory distress.      Breath sounds: No stridor.   Abdominal:      General: There is no distension.      Palpations: Abdomen is soft.   Musculoskeletal: Normal range of motion.         General: No deformity.   Skin:     General: Skin is warm and dry.      Findings: No erythema or rash.   Neurological:      Mental Status: She is alert and oriented to person, place, and time.      Cranial Nerves: No cranial nerve deficit.   Psychiatric:         Behavior: Behavior normal.         Body mass index is 28.72 kg/m².    Laboratory:  Recent Labs   Lab 09/27/20  0450 09/27/20  1805 09/28/20  0338   * 128* 128*   K 4.3 4.6 4.4   CL 95 88* 95   CO2 24 24 20*   BUN 27* 32* 37*   CREATININE 4.0* 4.3* 4.7*   ESTGFRAFRICA 13.4* 12.3* 11.0*   EGFRNONAA 11.6* 10.7* 9.6*    118* 68*       Recent Labs   Lab 09/26/20  0404 09/27/20  0450 09/27/20  1809  09/28/20  0338   CALCIUM 8.2* 8.1* 8.5* 8.0*   ALBUMIN 2.5*  --  2.6* 2.2*   MG  --   --  2.1  --              No results for input(s): POCTGLUCOSE in the last 168 hours.    Recent Labs   Lab 02/21/18  2120 09/12/18  1715 09/24/20  0630   Hemoglobin A1C 6.1 H 5.2 6.3 H       Recent Labs   Lab 09/24/20  0630  09/27/20  0450 09/27/20  1805 09/28/20  0338   WBC 11.72   < > 11.80 11.32 7.75   HGB 12.8   < > 12.1 12.4 10.9*   HCT 41.9   < > 39.9 40.6 33.4*      < > 155 157 119*   MCV 93   < > 93 93 89   MCHC 30.5*   < > 30.3* 30.5* 32.6   MONO 11.9  1.4*  --   --   --  10.5  0.8    < > = values in this interval not displayed.       Recent Labs   Lab 09/26/20  0404 09/27/20  1805 09/28/20  0338   BILITOT 0.6 0.4 0.9   PROT 4.9* 5.2* 4.5*   ALBUMIN 2.5* 2.6* 2.2*   ALKPHOS 72 78 61   ALT 21 20 19   AST 29 25 20       Recent Labs   Lab 11/24/17  1837 09/11/18  0246 09/25/20  1434   Color, UA Red A Yellow Yellow   Appearance, UA Cloudy A Hazy A Cloudy A   pH, UA 5.0 5.0 6.0   Specific Gravity, UA >1.030 A >=1.030 A 1.020   Protein, UA 2+ A 2+ A 2+ A   Glucose, UA 1+ A Negative Negative   Ketones, UA Negative Negative Negative   Urobilinogen, UA Negative Negative Negative   Bilirubin (UA) Negative Negative 1+ A   Occult Blood UA 3+ A 1+ A 3+ A   Nitrite, UA Negative Negative Negative   RBC, UA >100 H 5 H 83 H   WBC, UA 0 >100 H >100 H   Bacteria Rare Moderate A Occasional   Hyaline Casts, UA 0 0 1785 A       Recent Labs   Lab 09/28/20  0016 09/28/20  0551 09/28/20  0958   POC PH 7.356 7.411 7.307 L   POC PCO2 35.6 32.9 L 42.9   POC HCO3 19.9 L 20.9 L 21.5 L   POC PO2 111 H 94 91   POC SATURATED O2 98 98 96   POC BE -6 -4 -5   Sample ARTERIAL ARTERIAL ARTERIAL       Microbiology Results (last 7 days)     Procedure Component Value Units Date/Time    Culture, Respiratory with Gram Stain [[id number]] Collected: 09/28/20 0045    Order Status: Completed Specimen: Respiratory from Sputum Updated: 09/29/20 0705      Respiratory Culture Reduced Normal Respiratory nyla     Gram Stain (Respiratory) <10 epithelial cells per low power field.     Gram Stain (Respiratory) Many WBC's     Gram Stain (Respiratory) Rare Gram positive cocci    Blood culture [[id number]] Collected: 09/25/20 1523    Order Status: Completed Specimen: Blood Updated: 09/28/20 1632     Blood Culture, Routine No Growth to date      No Growth to date      No Growth to date      No Growth to date    Urine culture [[id number]]  (Abnormal) Collected: 09/25/20 1434    Order Status: Completed Specimen: Urine Updated: 09/28/20 0811     Urine Culture, Routine PRESUMPTIVE JORDIN ALBICANS  10,000 - 49,999 cfu/ml      Narrative:      Specimen Source->Urine    Urine Culture High Risk [[id number]]     Order Status: No result Specimen: Urine           ASSESSMENT/PLAN:     Active Hospital Problems    Diagnosis  POA    *CHF (congestive heart failure) [I50.9]  Unknown    COPD (chronic obstructive pulmonary disease) [J44.9]  Yes    Acute on chronic respiratory failure with hypoxia and hypercapnia [J96.21, J96.22]  Yes    Anemia, chronic disease [D63.8]  Yes    Stage 5 chronic kidney disease on chronic dialysis [N18.6, Z99.2]  Not Applicable    Open wound of left heel [S91.302A]  Yes    Chronic kidney disease with end stage renal failure on dialysis [N18.6, Z99.2]  Not Applicable      Resolved Hospital Problems    Diagnosis Date Resolved POA    Tachycardia [R00.0] 09/29/2020 Yes     ESRD on HD TTS via AVF  Hyperkalemia, mild  Hypotension  A.Flutter s/p CV 9/25 now on amiodarone, cant give midodrine  High TSH with normal T4, ? uncontrolled hypothyroidism  Continue current dialysis prescription. UF as tolerated.  Next HD per schedule.  Renal diet - low K, low phos. Continue Lokelma.  No IVs or BP checks on access and/or non-dominant arm.  Apprecaite cards eval. Holding midodrine for now.    Anemia of CKD  Hgb and HCT are acceptable. Monitor.  Will provide YASHIRA and/or IV iron  PRN.    MBD / Secondary HPT  Ca, phos, PTH and vitamin D levels are acceptable.   Phos binders, vitamin D analogues and calcimimetics as needed.    Thank you for allowing us to participate in the care of your patient!   We will follow the patient and provide recommendations as needed.    Selin Klein NP    [facility]  [address] [place] [address]  [place] [address]    [phone] - tel  [phone] - fax    9/29/2020 1:53 PM

## 2020-09-29 NOTE — PLAN OF CARE
09/29/20 1711   Patient Assessment/Suction   $ Suction Charges Inline Suction Procedure Stat Charge   Secretions Amount small   Secretions Color pale;white   Secretions Characteristics thick   PRE-TX-O2   Oxygen Concentration (%) 30   SpO2 98 %   Pulse 71   Resp (!) 25   Vent Select   Charged w/in last 24h YES   Preset Conventional Ventilator Settings   Ventilation Type VC   Vent Mode Spont   Vt Set 390 mL   PEEP/CPAP 5 cmH20   Pressure Support 5 cmH20   Waveform RAMP   Peak Flow 60 L/min   Plateau Set/Insp. Hold (sec) 0   Insp Rise Time  50 %   Trigger Sensitivity Flow/I-Trigger 6 L/min   Patient Ventilator Parameters   Resp Rate Total 20 br/min   Peak Airway Pressure 10 cmH2O   Mean Airway Pressure 8.1 cmH20   Plateau Pressure 0 cmH20   Exhaled Vt 0 mL   Total Ve 3.97 mL   Spont Ve 3.97 L   I:E Ratio Measured 18.0:1   Conventional Ventilator Alarms   Resp Rate High Alarm 45 br/min   Press High Alarm 50 cmH2O   Apnea Rate 14   Apnea Volume (mL) 0 mL   Apnea Oxygen Concentration  100   Apnea Flow Rate (L/min) 60   T Apnea 20 sec(s)   Ready to Wean/Extubation Screen   FIO2<=50 (chart decimal) 0.3   MV<16L (chart vol.) 3.97   PEEP <=8 (chart #) 5   Ready to Wean Parameters   $ Extubation Tech Time Tech Time 15 min   Extubated? Yes   Reason for Extubation Extubated   Ventilator Discontinued Yes   PATIENT EXTUBATED AT THIS TIME PLACED ON  5L HFNC

## 2020-09-29 NOTE — SUBJECTIVE & OBJECTIVE
Interval History: improving    Review of Systems   Unable to perform ROS: Intubated     Objective:     Vital Signs (Most Recent):  Temp: 98 °F (36.7 °C) (09/29/20 0400)  Pulse: 74 (09/29/20 0853)  Resp: (!) 26 (09/29/20 0853)  BP: (!) 100/49 (09/29/20 0500)  SpO2: 95 % (09/29/20 0853) Vital Signs (24h Range):  Temp:  [97 °F (36.1 °C)-98.4 °F (36.9 °C)] 98 °F (36.7 °C)  Pulse:  [64-77] 74  Resp:  [19-26] 26  SpO2:  [91 %-100 %] 95 %  BP: ()/(38-67) 100/49  Arterial Line BP: (109-140)/(49-75) 117/58     Weight: 80.7 kg (177 lb 14.6 oz)  Body mass index is 28.72 kg/m².    Intake/Output Summary (Last 24 hours) at 9/29/2020 1137  Last data filed at 9/29/2020 0500  Gross per 24 hour   Intake 125 ml   Output 0 ml   Net 125 ml      Physical Exam  Constitutional:       Comments: Intubated and sedated   HENT:      Right Ear: External ear normal.      Left Ear: External ear normal.      Nose: Nose normal.      Mouth/Throat:      Comments: Intubated and sedated  Eyes:      Conjunctiva/sclera: Conjunctivae normal.   Neck:      Musculoskeletal: Neck supple.   Cardiovascular:      Rate and Rhythm: Normal rate and regular rhythm.   Pulmonary:      Comments: Intubated and sedated, decreased entry bases without adventitious sounds  Abdominal:      General: Bowel sounds are normal.      Palpations: Abdomen is soft.   Genitourinary:     Comments: Chi   Musculoskeletal:      Comments: Intubated and sedated   Skin:     General: Skin is warm and dry.      Capillary Refill: Capillary refill takes less than 2 seconds.   Neurological:      Comments: Intubated and sedated   Psychiatric:      Comments: Intubated and sedated         Significant Labs:   CBC:   Recent Labs   Lab 09/27/20 1805 09/28/20  0338   WBC 11.32 7.75   HGB 12.4 10.9*   HCT 40.6 33.4*    119*     CMP:   Recent Labs   Lab 09/27/20 1805 09/28/20  0338   * 128*   K 4.6 4.4   CL 88* 95   CO2 24 20*   * 68*   BUN 32* 37*   CREATININE 4.3* 4.7*    CALCIUM 8.5* 8.0*   PROT 5.2* 4.5*   ALBUMIN 2.6* 2.2*   BILITOT 0.4 0.9   ALKPHOS 78 61   AST 25 20   ALT 20 19   ANIONGAP 16 13   EGFRNONAA 10.7* 9.6*       Significant Imaging: I have reviewed and interpreted all pertinent imaging results/findings within the past 24 hours.

## 2020-09-29 NOTE — PROGRESS NOTES
[facility]  Pulmonology  Progress Note    Subjective     9/29:  No major issues overnight.  Tolerating SBT.  Off of sedation all night.   .   Review of Systems   Unable to perform ROS: Intubated      I have personally reviewed the following during today's evaluation:  past medical history, ROS, family history, social history, surgical history, current inpatient medications,drug allergies, vital signs over the past 24 hours, results of relevant diagnostic studies and nursing/provider documentation from the past 24 hours.     Objective     VS Temp:  [97.3 °F (36.3 °C)-98.4 °F (36.9 °C)]   Pulse:  [66-77]   Resp:  [19-26]   BP: ()/(41-67)   SpO2:  [91 %-100 %]   Arterial Line BP: (100-140)/(49-75)   Ideal body weight: 59.3 kg (130 lb 11.7 oz)  Adjusted ideal body weight: 67.9 kg (149 lb 9.7 oz)   I/O   Intake/Output Summary (Last 24 hours) at 9/29/2020 1549  Last data filed at 9/29/2020 0500  Gross per 24 hour   Intake 125 ml   Output 0 ml   Net 125 ml        Vent SpO2 100%   Vent Mode: Spont  Oxygen Concentration (%):  [30] 30  Resp Rate Total:  [17 br/min-28 br/min] 27 br/min  Vt Set:  [390 mL] 390 mL  PEEP/CPAP:  [5 cmH20] 5 cmH20  Pressure Support:  [0 cmH20-5 cmH20] 5 cmH20  Mean Airway Pressure:  [7.4 unB27-25 cmH20] 7.5 cmH20     PE Physical Exam   Constitutional: She appears well-developed and well-nourished. She is cooperative.  Non-toxic appearance. No distress. She is sedated, intubated and restrained.   HENT:   Head: Normocephalic and atraumatic.   Right Ear: External ear normal.   Left Ear: External ear normal.   Mouth/Throat: Uvula is midline and mucous membranes are normal. Mallampati Score: unable to assess.   Neck: Trachea normal and normal range of motion. Neck supple. No JVD present. No thyromegaly present.   R IJ TLC   Cardiovascular: Normal rate, regular rhythm, normal heart sounds and intact distal pulses.   Pulmonary/Chest: Normal expansion, symmetric chest wall expansion and  effort normal. She is intubated. She has no wheezes. She has no rhonchi. She has no rales.   Abdominal: Soft. Bowel sounds are normal. She exhibits no distension. There is no abdominal tenderness.   Musculoskeletal: Normal range of motion.         General: No tenderness, deformity or edema.      Comments: LAMINE MCKENZIE   Lymphadenopathy: No supraclavicular adenopathy is present.     She has no cervical adenopathy.     She has no axillary adenopathy.   Neurological: She is alert. She has normal strength. No cranial nerve deficit or sensory deficit. She exhibits normal muscle tone. GCS eye subscore is 4. GCS verbal subscore is 5. GCS motor subscore is 6.   Skin: Skin is warm, dry and intact. No rash noted.   Nursing note and vitals reviewed.        Labs I have personally reviewed and interpreted all labs / diagnostic studies obtained over the past 24 hours, and relevant results are as follows:  No results for input(s): WBC, RBC, HGB, HCT, PLT, MCV, MCH, MCHC, GLUCOSE, NA, K, CL, CO2, BUN, CREATININE, MG, ALT, AST, ALKPHOS, BILITOT, PROT, ALBUMIN, PH, PCO2, PO2, HCO3, POCSATURATED, BE, PT, INR, APTT, CPK, CPKMB, TROPONINI, MB in the last 24 hours.    Invalid input(s):  CALCIUM      Imaging I have personally reviewed and interpreted the following images and reviewed the associated Radiology report.  I have reviewed and interpreted all pertinent imaging results/findings within the past 24 hours.     Micro I have personally reviewed and interpreted the available culture data.  Relevant results are as follows.  Blood Culture   Lab Results   Component Value Date    LABBLOO No Growth to date 09/25/2020    LABBLOO No Growth to date 09/25/2020    LABBLOO No Growth to date 09/25/2020    LABBLOO No Growth to date 09/25/2020   , Sputum Culture   Lab Results   Component Value Date    GSRESP <10 epithelial cells per low power field. 09/28/2020    GSRESP Many WBC's 09/28/2020    GSRESP Rare Gram positive cocci 09/28/2020    RESPIRATORYC  Reduced Normal Respiratory nyla 09/28/2020    and Urine Culture    Lab Results   Component Value Date    LABURIN (A) 09/25/2020     PRESUMPTIVE JORDIN ALBICANS  10,000 - 49,999 cfu/ml        Medications Scheduled    amiodarone  200 mg Oral BID    apixaban  2.5 mg Oral BID    citalopram  20 mg Oral Daily    collagenase   Topical (Top) Daily    famotidine (PF)  20 mg Intravenous Daily    fluconazole  100 mg Oral Daily    levothyroxine  50 mcg Oral Before breakfast    metoprolol succinate  25 mg Oral Daily    miconazole NITRATE 2 %   Topical (Top) BID    rosuvastatin  10 mg Oral Daily    sevelamer carbonate  1,600 mg Oral TID WM    sodium zirconium cyclosilicate  5 g Oral Daily      Continuous Infusions:      PRN   acetaminophen, albuterol sulfate, dextrose 50%, dextrose 50%, glucagon (human recombinant), glucose, glucose, insulin aspart U-100, ondansetron        Assessment       Active Hospital Problems    Diagnosis    *CHF (congestive heart failure)    COPD (chronic obstructive pulmonary disease)    Acute on chronic respiratory failure with hypoxia and hypercapnia    Anemia, chronic disease    Stage 5 chronic kidney disease on chronic dialysis    Open wound of left heel    Chronic kidney disease with end stage renal failure on dialysis      My Impression:  Acute on chronic hypoxemic / hypercapnic respiratory failrue.    Plan     Neuro  · Intermittent fentanyl boluses as needed to maintain RASS of -1  · Daily SAT.    Pulmonary  · Continue LPV for now.  · Daily SAT/SBT.    Cardiac/Vascular  · Volume removal with HD.    Renal/  · HD at the direction of nephrology.    Hematology  · No indication for blood products.  · Observation for now.    Endocrine  · Continue levothyroxine.  · Serial blood glucose monitoring.  · Sliding scale insulin as needed to maintain moderate glycemic control.    GI  · H2 blocker for stress ulcer ppx.  · Start enteral feeds.    Critical Care Time: Approximately >35  minutes     The patient is critically ill due to the following conditions that actively pose threat to life and bodily function:  Respiratory Failure requiring intubation and invasive mechanical ventilation     The patient is at high risk of death due to respiratory failure and requiring ongoing treatment including invasive mechanical ventilation to prevent further life-threatening deterioration.  Due to a high probability of clinically significant, life threatening deterioration, the patient required my highest level of preparedness to intervene emergently and I personally spent this critical care time directly and personally managing the patient.      Critical care was time spent personally by me on the following activities:   · Obtaining a history, examination of patient, reviewing pulse oximetry, providing medical care at the patients bedside, developing a treatment plan with patient or surrogate and bedside caregivers, ordering and reviewing laboratory studies, radiographic studies, pulse oximetry, ordering and performing treatments and interventions, evaluation of patient's response to treatment,  discussions with consultants while on the unit and immediately available to the patient, re-evaluation of the patient's condition, and documentation in the medical record.   This critical care time did not overlap with that of any other provider or involve time for any procedures.     Ehsan Hui MD  Pulmonary / Critical Care Medicine  [facility]

## 2020-09-29 NOTE — PROGRESS NOTES
[facility]  Progress Note    Patient Name: Juliane Ramos  MRN: [id number]  Patient Class: IP- Inpatient   Admission Date: 9/24/2020  Length of Stay: 5 days  Attending Physician: Ralph Tilley MD  Primary Care Provider: JOS Dumont        Subjective:     Principal Problem:CHF (congestive heart failure)        HPI:  Patient is a 60-year-old female hx ESRD HD (T,Th, Sat)  CAD/CABG ,EF20%?, COPD, DM ,chronic hypotension  presents ED with complaints of having low blood pressure and rapid heart rate.  In the emergency department patient had a heart rate in the 130s regular and a blood pressure systolic of approximately 100  Patient denies shortness of breath chest pain fever chills abdominal pain nausea vomiting.  She states her last dialysis on Tuesday she did not have any volume removed.  Patient has been given approximately 500 cc of IV fluids in the emergency department at remains tachycardic in the 120s.      Overview/Hospital Course:  09/29  Assumed care. Chart reviewed. Labs reviewed:  End stage renal function. Receiving dialysis today. 2 liters to be removed. Discussed with dietician: tube feeds if not extubated.    Interval History: improving    Review of Systems   Unable to perform ROS: Intubated     Objective:     Vital Signs (Most Recent):  Temp: 98 °F (36.7 °C) (09/29/20 0400)  Pulse: 74 (09/29/20 0853)  Resp: (!) 26 (09/29/20 0853)  BP: (!) 100/49 (09/29/20 0500)  SpO2: 95 % (09/29/20 0853) Vital Signs (24h Range):  Temp:  [97 °F (36.1 °C)-98.4 °F (36.9 °C)] 98 °F (36.7 °C)  Pulse:  [64-77] 74  Resp:  [19-26] 26  SpO2:  [91 %-100 %] 95 %  BP: ()/(38-67) 100/49  Arterial Line BP: (109-140)/(49-75) 117/58     Weight: 80.7 kg (177 lb 14.6 oz)  Body mass index is 28.72 kg/m².    Intake/Output Summary (Last 24 hours) at 9/29/2020 1137  Last data filed at 9/29/2020 0500  Gross per 24 hour   Intake 125 ml   Output 0 ml   Net 125 ml      Physical  Exam  Constitutional:       Comments: Intubated and sedated   HENT:      Right Ear: External ear normal.      Left Ear: External ear normal.      Nose: Nose normal.      Mouth/Throat:      Comments: Intubated and sedated  Eyes:      Conjunctiva/sclera: Conjunctivae normal.   Neck:      Musculoskeletal: Neck supple.   Cardiovascular:      Rate and Rhythm: Normal rate and regular rhythm.   Pulmonary:      Comments: Intubated and sedated, decreased entry bases without adventitious sounds  Abdominal:      General: Bowel sounds are normal.      Palpations: Abdomen is soft.   Genitourinary:     Comments: Chi   Musculoskeletal:      Comments: Intubated and sedated   Skin:     General: Skin is warm and dry.      Capillary Refill: Capillary refill takes less than 2 seconds.   Neurological:      Comments: Intubated and sedated   Psychiatric:      Comments: Intubated and sedated         Significant Labs:   CBC:   Recent Labs   Lab 09/27/20 1805 09/28/20  0338   WBC 11.32 7.75   HGB 12.4 10.9*   HCT 40.6 33.4*    119*     CMP:   Recent Labs   Lab 09/27/20 1805 09/28/20  0338   * 128*   K 4.6 4.4   CL 88* 95   CO2 24 20*   * 68*   BUN 32* 37*   CREATININE 4.3* 4.7*   CALCIUM 8.5* 8.0*   PROT 5.2* 4.5*   ALBUMIN 2.6* 2.2*   BILITOT 0.4 0.9   ALKPHOS 78 61   AST 25 20   ALT 20 19   ANIONGAP 16 13   EGFRNONAA 10.7* 9.6*       Significant Imaging: I have reviewed and interpreted all pertinent imaging results/findings within the past 24 hours.      Assessment/Plan:      * CHF (congestive heart failure)  Continue current regimen      Anemia, chronic disease  Continue current regimen      Acute on chronic respiratory failure with hypoxia and hypercapnia    Extubation today if possible      COPD (chronic obstructive pulmonary disease)  Continue current regimen      Stage 5 chronic kidney disease on chronic dialysis  Continue current regimen      Open wound of left heel  Continue current regimen      Chronic  kidney disease with end stage renal failure on dialysis  Continue current regimen        VTE Risk Mitigation (From admission, onward)         Ordered     apixaban tablet 2.5 mg  2 times daily      09/24/20 1018     Place MARY ELLEN hose  Until discontinued      09/24/20 1018     IP VTE HIGH RISK PATIENT  Once      09/24/20 1018     Place sequential compression device  Until discontinued      09/24/20 1018                Discharge Planning   SHIKHA:      Code Status: Full Code   Is the patient medically ready for discharge?:     Reason for patient still in hospital (select all that apply): Treatment  Discharge Plan A: Home Health                  Ralph Tilley MD  Department of Hospital Medicine   [facility]

## 2020-09-29 NOTE — PLAN OF CARE
This note also relates to the following rows which could not be included:  Oxygen Concentration (%) - Cannot attach notes to unvalidated device data  SpO2 - Cannot attach notes to unvalidated device data  Pulse - Cannot attach notes to unvalidated device data  Resp - Cannot attach notes to unvalidated device data  Ventilation Type - Cannot attach notes to unvalidated device data  Vent Mode - Cannot attach notes to unvalidated device data  Set Rate - Cannot attach notes to unvalidated device data  Vt Set - Cannot attach notes to unvalidated device data  PEEP/CPAP - Cannot attach notes to unvalidated device data  Pressure Support - Cannot attach notes to unvalidated device data  Waveform - Cannot attach notes to unvalidated device data  Peak Flow - Cannot attach notes to unvalidated device data  Plateau Set/Insp. Hold (sec) - Cannot attach notes to unvalidated device data  Trigger Sensitivity Flow/I-Trigger - Cannot attach notes to unvalidated device data  Resp Rate Total - Cannot attach notes to unvalidated device data  Peak Airway Pressure - Cannot attach notes to unvalidated device data  Mean Airway Pressure - Cannot attach notes to unvalidated device data  Plateau Pressure - Cannot attach notes to unvalidated device data  Exhaled Vt - Cannot attach notes to unvalidated device data  Total Ve - Cannot attach notes to unvalidated device data  I:E Ratio Measured - Cannot attach notes to unvalidated device data  Resp Rate High Alarm - Cannot attach notes to unvalidated device data  Press High Alarm - Cannot attach notes to unvalidated device data  Apnea Rate - Cannot attach notes to unvalidated device data  Apnea Volume (mL) - Cannot attach notes to unvalidated device data  Apnea Oxygen Concentration  - Cannot attach notes to unvalidated device data  Apnea Flow Rate (L/min) - Cannot attach notes to unvalidated device data  T Apnea - Cannot attach notes to unvalidated device data       09/29/20 0659   Patient  Assessment/Suction   Level of Consciousness (AVPU) alert   Respiratory Effort Normal   Expansion/Accessory Muscles/Retractions no use of accessory muscles   All Lung Fields Breath Sounds coarse   YULIET Breath Sounds coarse   LLL Breath Sounds coarse   RUL Breath Sounds coarse   RML Breath Sounds coarse   RLL Breath Sounds coarse   Rhythm/Pattern, Respiratory assisted mechanically   Cough Frequency with stimulation   Cough Type assisted   Suction Method tracheal   Suction Pressure (mmHg) 120 mmHg   $ Suction Charges Inline Suction Procedure Stat Charge   Secretions Amount small   Secretions Color white   Secretions Characteristics thick   PRE-TX-O2   O2 Device (Oxygen Therapy) ventilator   $ Is the patient on Low Flow Oxygen? Yes   Pulse Oximetry Type Continuous   $ Pulse Oximetry - Multiple Charge Pulse Oximetry - Multiple        Airway - Non-Surgical 09/27/20 6459   Placement Date/Time: 09/27/20 (c) 6069   Method of Intubation: Video Laryngoscopy  Mask Ventilation: Mask ventilation not attempted  Intubated: Postinduction  Airway Device Size: 8.0  Cuff Inflation: Minimal occlusive pressure  Placement Verified By: ...   Secured at 23 cm   Measured At Lips   Secured Location Center   Secured by Commercial tube mckinney   Bite Block none   Site Condition Cool;Dry   Status Intact;Secured;Patent   Site Assessment Clean;Dry   Vent Select   Conventional Vent Y   Charged w/in last 24h YES   Preset Conventional Ventilator Settings   Vent ID [id number]   Vent Type    Humidity HME   Respiratory Evaluation   $ Care Plan Tech Time 15 min

## 2020-09-29 NOTE — HOSPITAL COURSE
9/29:  No major issues overnight.  Tolerating SBT.  Off of sedation all night.   9/30:  Failed extubation and was re-intubated yesterday evening.  No issues since.  10/1:  No major issues overnight.  Remains intubated.  Off of sedation.  10/2/2020 - Remains critically ill, no new issues overnight, respiratory reports significant secretions.  Fever curve good.  No new weight, cumulative I/O 2.1 liters +., vital signs OK.  Ventilating pressures good, Pplat - 25, Ve - 14,  P/F -  258, attempted brief SBT but sTV only 150 - 200 (not ready to wean/extubate).  Labs reviewed.  10/3- remains on vent, on Levophed 0.022 mcg/kg/min, afebrile, HR controlled in 70s-90s. Completed HD this am with removal of 4L fluid.  10/4- remains on vent,  Febrile to 103, sedated w/ propofol, oxygenation improved compared to yesterday. Requiring min levophed  10/5:  Remains intubated but not sedated.  Fever curve down trending.  Tolerating relatively minimal ventilator settings, but she becomes hypercapnic and hypo ventilates with placed pressure support ventilation.  She is only requiring low-dose vasopressors today.  10/6:  Continues spike fevers despite receiving antibiotics yesterday.  Rapidly developed hypercapnia while on pressure support ventilation this morning.  She remains off sedation.  Seen by ID . See consult note and recs for IV abx   10/7:  No major issues overnight.  Fever curve down trending.  More alert today.  Dialyzed yesterday and central lines removed.  10/8:  No major issues overnight.  Fever cure downtrending.  Alert this morning.  No new issues.  Remains intubated/sedated.  10/9  the patient is awake on the ventilator.  She is on dialysis.  She is afebrile.  The plan is to leave her on the ventilator going forward.  Dr. Hui will reassess on Monday.  The  states at that point, or may be later on during the week, they may consider withdrawing care.  10/10  below from Dr Mcfadden: pt min arouses?  10/11 no new  c/o  10/12 the patient almost extubated herself yesterday she did not want to continue living like this.  Apparently her  was in the room at the time.  The patient is not weanable.  10/13:  No major issues overnight.  No changes.  Somnolent and still intubated this morning.  Plan is for terminal extubation this morning.  Patient  at 1023 with loving family at bedside

## 2020-09-30 NOTE — CONSULTS
Nephrology Consult Note        Patient Name: Juliane Ramos  MRN: [id number]    Patient Class: IP- Inpatient   Admission Date: 9/24/2020  Length of Stay: 6 days  Date of Service: 9/30/2020    Attending Physician: Ralph Tilley MD  Primary Care Provider: JOS Dumont    Reason for Consult: esrd/anemia/hyperkalemia/hypotension/shpt/tachycardia    SUBJECTIVE:     HPI: 60F hx ESRD HD (T,Th, Sat)  CAD/CABG ,EF20%?, COPD, DM ,chronic hypotension on midodrine daily presents to ED with complaints of having low blood pressure and rapid heart rate. In the emergency department patient had a heart rate in the 130s regular and SBP in 100s  Patient denies shortness of breath, chest pain, fever, chills, abdominal pain, nausea, vomiting. Last dialysis on Tuesday and she did not have any volume removed.  Patient has been given approximately 500 cc of IV fluids in the emergency department at remains tachycardic in the 120s. Cards eval is pending, Amiodarone was started.    9/25 Getting HD for hyperkalemia today, before cardioversion. Continue HD per TTS schedule.  9/26 Seen and examined on Hd today, tolerating well. Continue HD per TTS schedule.   9/28  Intubated.  Sedated. TTS dialysis planned  9/29  Seen today on dialysis.  Tolerating well.  Still intubated, arousable, good eye contact.  No lab results yet today.    Past Medical History:   Diagnosis Date    Anemia     Anemia in stage 3 chronic kidney disease 11/26/2017    Anticoagulant long-term use     CHF (congestive heart failure)     COPD (chronic obstructive pulmonary disease)     COPD exacerbation 3/10/2020    Coronary artery disease     Diabetes mellitus     Digestive disorder     Encounter for blood transfusion     Essential hypertension 6/24/2017    Hypertension     Low blood pressure     MI (myocardial infarction) 2010    Segmental and subsegmental pulmonary emboli of RLL without acute cor pulmonale 11/25/2017    Stroke     July 2005    Thyroid  "disease     Traumatic subarachnoid hemorrhage 2017    Type 2 diabetes mellitus with stage 3 chronic kidney disease, without long-term current use of insulin 2017     Past Surgical History:   Procedure Laterality Date    ABCESS DRAINAGE Right     Between "little toe" and the one next to it    BREAST SURGERY      Reduction [id number]    CARDIAC SURGERY  2011    CABG 4vessel ring in one valve mitral valve prolapse    CORONARY ARTERY BYPASS GRAFT      DEBRIDEMENT OF FOOT Left 2020    Procedure: DEBRIDEMENT, FOOT;  Surgeon: Dennis Wick DPM;  Location: [facility];  Service: Podiatry;  Laterality: Left;    FOOT SURGERY      4 grafts to left foot    hyperlipidemia      TUBAL LIGATION  1986     Family History   Problem Relation Age of Onset    Arthritis Mother     Heart disease Father     Cancer Father 68        prostae and bladder ca  in     Diabetes Father     Hypertension Father     Depression Sister     Hypertension Son     Diabetes Maternal Grandmother         lost leg    Kidney disease Paternal Grandfather         had kidney removed    Stroke Paternal Grandfather      Social History     Tobacco Use    Smoking status: Never Smoker    Smokeless tobacco: Never Used   Substance Use Topics    Alcohol use: Yes     Alcohol/week: 1.0 - 2.0 standard drinks     Types: 1 - 2 Glasses of wine per week     Comment: "socially" not in awhile    Drug use: No       Review of patient's allergies indicates:  No Known Allergies    Outpatient meds:  No current facility-administered medications on file prior to encounter.      Current Outpatient Medications on File Prior to Encounter   Medication Sig Dispense Refill    gabapentin (NEURONTIN) 100 MG capsule Take 100 mg by mouth 3 (three) times daily.  3    midodrine (PROAMATINE) 5 MG Tab Take 5 mg by mouth 3 (three) times daily with meals.       rosuvastatin (CRESTOR) 10 MG tablet Take 10 mg by mouth every evening.       " "albuterol-ipratropium (DUO-NEB) 2.5 mg-0.5 mg/3 mL nebulizer solution Take 3 mLs by nebulization every 6 (six) hours. Rescue 1 Box 11    apixaban 5 mg Tab Take 1 tablet (5 mg total) by mouth 2 (two) times daily. (Patient taking differently: Take 5 mg by mouth 2 (two) times daily. ) 60 tablet 1    blood sugar diagnostic Strp Test 3-4 times daily PRN      CLARITIN-D 12 HOUR 5-120 mg per tablet Take 1 tablet by mouth once daily.   0    fluticasone (FLONASE) 50 mcg/actuation nasal spray 1 spray by Each Nostril route once daily.   12    insulin aspart (NOVOLOG) 100 unit/mL InPn pen Inject 1-10 Units into the skin 3 (three) times daily. (Patient taking differently: Inject 1-10 Units into the skin 3 (three) times daily. If sugar >150 on sliding scale) 3 mL 1    levothyroxine (SYNTHROID) 50 MCG tablet Take 50 mcg by mouth before breakfast.       pen needle, diabetic (BD ULTRA-FINE OBDULIA PEN NEEDLE) 32 gauge x 5/32" Ndle       ramelteon (ROZEREM) 8 mg tablet Take 8 mg by mouth every evening.      sevelamer carbonate (RENVELA) 800 mg Tab Take 1,600 mg by mouth 3 (three) times daily with meals.      tiotropium (SPIRIVA) 18 mcg inhalation capsule Inhale 1 capsule (18 mcg total) into the lungs once daily. Controller 30 capsule 11    umeclidinium (INCRUSE ELLIPTA) 62.5 mcg/actuation inhalation capsule Inhale 62.5 mcg into the lungs once daily. Controller         Scheduled meds:   amiodarone  200 mg Oral BID    apixaban  2.5 mg Oral BID    citalopram  20 mg Oral Daily    collagenase   Topical (Top) Daily    famotidine (PF)  20 mg Intravenous Daily    fluconazole  100 mg Oral Daily    levothyroxine  50 mcg Oral Before breakfast    metoprolol succinate  25 mg Oral Daily    miconazole NITRATE 2 %   Topical (Top) BID    rosuvastatin  10 mg Oral Daily    sevelamer carbonate  1,600 mg Oral TID WM    sodium zirconium cyclosilicate  5 g Oral Daily       Infusions:   norepinephrine bitartrate-D5W 0.02 mcg/kg/min " (09/29/20 2200)    propofoL 20 mcg/kg/min (09/30/20 0255)       PRN meds:  acetaminophen, albuterol sulfate, dextrose 50%, dextrose 50%, glucagon (human recombinant), glucose, glucose, insulin aspart U-100, ondansetron    Review of Systems:  ROS    OBJECTIVE:     Vital Signs and IO (Last 24H):  Temp:  [97.6 °F (36.4 °C)-98.8 °F (37.1 °C)]   Pulse:  [62-84]   Resp:  [16-30]   BP: ()/(39-55)   SpO2:  [88 %-98 %]   Arterial Line BP: ()/(48-66)   I/O last 3 completed shifts:  In: 834 [I.V.:264; Other:500; NG/GT:70]  Out: 2500 [Other:2500]    Wt Readings from Last 5 Encounters:   09/26/20 80.7 kg (177 lb 14.6 oz)   09/26/20 80.7 kg (177 lb 14.6 oz)   09/08/20 79.8 kg (176 lb)   09/04/20 80 kg (176 lb 5.9 oz)   09/02/20 80 kg (176 lb 5.9 oz)         Physical Exam:  Physical Exam  Constitutional:       Appearance: She is well-developed. She is not diaphoretic.   HENT:      Head: Normocephalic and atraumatic.   Eyes:      General: No scleral icterus.     Pupils: Pupils are equal, round, and reactive to light.   Neck:      Musculoskeletal: Neck supple.   Cardiovascular:      Rate and Rhythm: Normal rate and regular rhythm.   Pulmonary:      Effort: Pulmonary effort is normal. No respiratory distress.      Breath sounds: No stridor.   Abdominal:      General: There is no distension.      Palpations: Abdomen is soft.   Musculoskeletal: Normal range of motion.         General: Swelling present. No deformity.   Skin:     General: Skin is warm and dry.      Findings: No erythema or rash.   Neurological:      Mental Status: She is alert and oriented to person, place, and time.      Cranial Nerves: No cranial nerve deficit.   Psychiatric:         Behavior: Behavior normal.         Body mass index is 28.72 kg/m².    Laboratory:  Recent Labs   Lab 09/27/20  1805 09/28/20  0338 09/30/20  0332   * 128* 132*   K 4.6 4.4 4.3   CL 88* 95 92*   CO2 24 20* 21*   BUN 32* 37* 32*   CREATININE 4.3* 4.7* 4.1*   ESTGFRAFRICA  12.3* 11.0* 13.0*   EGFRNONAA 10.7* 9.6* 11.3*   * 68* 76       Recent Labs   Lab 09/26/20 0404 09/27/20 1805 09/28/20 0338 09/30/20  0332   CALCIUM 8.2*   < > 8.5* 8.0* 7.6*   ALBUMIN 2.5*  --  2.6* 2.2*  --    PHOS  --   --   --   --  5.6*   MG  --   --  2.1  --   --     < > = values in this interval not displayed.             No results for input(s): POCTGLUCOSE in the last 168 hours.    Recent Labs   Lab 02/21/18 2120 09/12/18 1715 09/24/20  0630   Hemoglobin A1C 6.1 H 5.2 6.3 H       Recent Labs   Lab 09/24/20  0630  09/27/20 1805 09/28/20 0338 09/30/20  0332 09/30/20  0341   WBC 11.72   < > 11.32 7.75 15.77*  --    HGB 12.8   < > 12.4 10.9* 12.5  --    HCT 41.9   < > 40.6 33.4* 39.4 43      < > 157 119* 116*  --    MCV 93   < > 93 89 89  --    MCHC 30.5*   < > 30.5* 32.6 31.7*  --    MONO 11.9  1.4*  --   --  10.5  0.8  --   --     < > = values in this interval not displayed.       Recent Labs   Lab 09/26/20 0404 09/27/20 1805 09/28/20 0338   BILITOT 0.6 0.4 0.9   PROT 4.9* 5.2* 4.5*   ALBUMIN 2.5* 2.6* 2.2*   ALKPHOS 72 78 61   ALT 21 20 19   AST 29 25 20       Recent Labs   Lab 11/24/17  1837 09/11/18  0246 09/25/20  1434   Color, UA Red A Yellow Yellow   Appearance, UA Cloudy A Hazy A Cloudy A   pH, UA 5.0 5.0 6.0   Specific Gravity, UA >1.030 A >=1.030 A 1.020   Protein, UA 2+ A 2+ A 2+ A   Glucose, UA 1+ A Negative Negative   Ketones, UA Negative Negative Negative   Urobilinogen, UA Negative Negative Negative   Bilirubin (UA) Negative Negative 1+ A   Occult Blood UA 3+ A 1+ A 3+ A   Nitrite, UA Negative Negative Negative   RBC, UA >100 H 5 H 83 H   WBC, UA 0 >100 H >100 H   Bacteria Rare Moderate A Occasional   Hyaline Casts, UA 0 0 1785 A       Recent Labs   Lab 09/29/20  1937 09/29/20  2144 09/30/20  0341   POC PH 7.275 LL 7.343 L 7.327 L   POC PCO2 61.1 HH 52.5 H 51.2 H   POC HCO3 28.4 H 28.5 H 26.8   POC PO2 243 H 121 H 71 L   POC SATURATED O2 100 98 93 L   POC BE 2 3 1    Sample ARTERIAL ARTERIAL ARTERIAL       Microbiology Results (last 7 days)     Procedure Component Value Units Date/Time    Culture, Respiratory with Gram Stain [[id number]] Collected: 09/28/20 0045    Order Status: Completed Specimen: Respiratory from Sputum Updated: 09/30/20 0859     Respiratory Culture Reduced normal respiratory nyla     Gram Stain (Respiratory) <10 epithelial cells per low power field.     Gram Stain (Respiratory) Many WBC's     Gram Stain (Respiratory) Rare Gram positive cocci    Blood culture [[id number]] Collected: 09/25/20 1523    Order Status: Completed Specimen: Blood Updated: 09/29/20 1632     Blood Culture, Routine No Growth to date      No Growth to date      No Growth to date      No Growth to date      No Growth to date    Urine culture [[id number]]  (Abnormal) Collected: 09/25/20 1434    Order Status: Completed Specimen: Urine Updated: 09/28/20 0811     Urine Culture, Routine PRESUMPTIVE JORDIN ALBICANS  10,000 - 49,999 cfu/ml      Narrative:      Specimen Source->Urine    Urine Culture High Risk [[id number]]     Order Status: No result Specimen: Urine           ASSESSMENT/PLAN:     Active Hospital Problems    Diagnosis  POA    *CHF (congestive heart failure) [I50.9]  Unknown    COPD (chronic obstructive pulmonary disease) [J44.9]  Yes    Acute on chronic respiratory failure with hypoxia and hypercapnia [J96.21, J96.22]  Yes    Anemia, chronic disease [D63.8]  Yes    Stage 5 chronic kidney disease on chronic dialysis [N18.6, Z99.2]  Not Applicable    Open wound of left heel [S91.302A]  Yes    Chronic kidney disease with end stage renal failure on dialysis [N18.6, Z99.2]  Not Applicable      Resolved Hospital Problems    Diagnosis Date Resolved POA    Tachycardia [R00.0] 09/29/2020 Yes     ESRD on HD TTS via AVF  Hyperkalemia, mild  Hypotension  A.Flutter s/p CV 9/25 now on amiodarone, cant give midodrine  High TSH with normal T4, ? uncontrolled hypothyroidism  Continue  current dialysis prescription. UF as tolerated.  Next HD per schedule.  Renal diet - low K, low phos. Continue Lokelma.  No IVs or BP checks on access and/or non-dominant arm.  Apprecaite cards eval. Holding midodrine for now.    Anemia of CKD  Hgb and HCT are acceptable. Monitor.  Will provide YASHIRA and/or IV iron PRN.    MBD / Secondary HPT  Ca, phos, PTH and vitamin D levels are acceptable.   Phos binders, vitamin D analogues and calcimimetics as needed.    Right arm swelling-new onset  --check duplex us    Thank you for allowing us to participate in the care of your patient!   We will follow the patient and provide recommendations as needed.    Elijah Gonzalez NP    [facility]  [address] [place] [address]  [place] [id number]    [phone] - tel  [phone] - fax    9/30/2020 1:53 PM

## 2020-09-30 NOTE — PROGRESS NOTES
Right brachial a line site clear. Pt with some mild swelling in arm. Good waveform. Hand warm to touch, with good cap refill. Pt reintubated yesterday.

## 2020-09-30 NOTE — RESPIRATORY THERAPY
09/29/20 2008   Patient Assessment/Suction   Respiratory Effort Unlabored   All Lung Fields Breath Sounds coarse   Rhythm/Pattern, Respiratory assisted mechanically   PRE-TX-O2   O2 Device (Oxygen Therapy) ventilator   Oxygen Concentration (%) 60   SpO2 95 %   Pulse Oximetry Type Continuous   Pulse 63   Resp (!) 24   [REMOVED]      Airway - Non-Surgical 09/27/20 2345   Removal Date: 09/29/20  Placement Date/Time: 09/27/20 (c) 2345   Method of Intubation: Video Laryngoscopy  Mask Ventilation: Mask ventilation not attempted  Intubated: Postinduction  Airway Device Size: 8.0  Cuff Inflation: Minimal occlusive pressure ...   Secured at 21 cm   Measured At Lips   Secured Location Left   Secured by Commercial tube mckinney   Bite Block none   Site Condition Cool;Dry   Status Intact;Secured;Patent   Site Assessment Clean;Dry   Vent Select   Charged w/in last 24h YES   Preset Conventional Ventilator Settings   Vent ID [id number]   Vent Type    Ventilation Type VC   Vent Mode A/C   Humidity HME   Set Rate 24 BPM   Vt Set 390 mL   PEEP/CPAP 5 cmH20   Pressure Support 0 cmH20   Waveform RAMP   Peak Flow 60 L/min   Plateau Set/Insp. Hold (sec) 0   Trigger Sensitivity Flow/I-Trigger 6 L/min   Patient Ventilator Parameters   Resp Rate Total 24 br/min   Peak Airway Pressure 42 cmH2O   Mean Airway Pressure 15 cmH20   Plateau Pressure 0 cmH20   Exhaled Vt 406 mL   Total Ve 9.68 mL   I:E Ratio Measured 1:2.60   Conventional Ventilator Alarms   Alarms On Y   Resp Rate High Alarm 45 br/min   Press High Alarm 50 cmH2O   Apnea Rate 14   Apnea Volume (mL) 0 mL   Apnea Oxygen Concentration  100   Apnea Flow Rate (L/min) 60   T Apnea 20 sec(s)   Ready to Wean/Extubation Screen   FIO2<=50 (chart decimal) (!) 0.6   MV<16L (chart vol.) 9.68   PEEP <=8 (chart #) 5   Ready to Wean Parameters   F/VT Ratio<105 (RSBI) (!) 59.11   Respiratory Evaluation   $ Care Plan Tech Time 15 min   Evaluation For Re-Eval 5+ day   Admitting Diagnosis chf

## 2020-09-30 NOTE — CONSULTS
"Formerly [facility], later [facility]  Adult Nutrition   Consult Note (Nutrition Support Management)    SUMMARY     Recommendations/Interventions:    Recommendation/Intervention: 1. Continue to increase Nepro by 10 mL/hr Q4hrs as tolerated to goal rate of 35mL/hr with 30 mL FWF's Q4hrs. TF + FWF's provides: 1512 kcals/day, 68 g protein, 791 FW/day. 2. Hyponatremia-continue to monitor and nephrology to manage. 3. RD to monitor # of days intubated, vent settings, I/O's, labs, plan of care, and make recommendations accordingly.  Goals: 1. TF to increase to goal rate without issues.  Nutrition Goal Status: progressing towards goal  Communication of RD Recs: reviewed with RN    Dietitian Rounds Brief:  · Seen 2; f/u. Patient was extubated yesterday, however was re-intubated. Tube feedings starting this morning. Were running at 10 mL/hr, however currently on hold due to pump malfunction. RN working on finding a new pump. HD yesterday. Will continue to monitor TF tolerance, labs, plan of care.  Reason for Assessment  Reason For Assessment: RD follow-up  Diagnosis: (Tachycardia)  Relevant Medical History: ESRD on HD, CHF, COPD, CAD, T2DM, HTN, MI, stroke, thyroid disease  Interdisciplinary Rounds: attended    Nutrition Risk Screen  Nutrition Risk Screen: tube feeding or parenteral nutrition     MST Score: 0  Have you recently lost weight without trying?: No  Weight loss score: 0  Have you been eating poorly because of a decreased appetite?: No  Appetite score: 0       Nutrition/Diet History  Food Allergies: NKFA  Factors Affecting Nutritional Intake: NPO, on mechanical ventilation    Anthropometrics  Temp: 98.8 °F (37.1 °C)  Height Method: Stated  Height: 5' 6" (167.6 cm)  Height (inches): 66 in  Weight Method: Bed Scale  Weight: 80.7 kg (177 lb 14.6 oz)  Weight (lb): 177.91 lb  Ideal Body Weight (IBW), Female: 130 lb  % Ideal Body Weight, Female (lb): 136.85 %  BMI (Calculated): 28.7  BMI Grade: 25 - 29.9 - overweight     Weight " History:  Wt Readings from Last 10 Encounters:   09/26/20 80.7 kg (177 lb 14.6 oz)   09/26/20 80.7 kg (177 lb 14.6 oz)   09/08/20 79.8 kg (176 lb)   09/04/20 80 kg (176 lb 5.9 oz)   09/02/20 80 kg (176 lb 5.9 oz)   08/01/20 82.1 kg (181 lb)   06/22/20 80.7 kg (178 lb)   05/26/20 80.7 kg (178 lb)   05/18/20 80.7 kg (178 lb)   05/13/20 81 kg (178 lb 9.2 oz)     Lab/Procedures/Meds: Pertinent Labs Reviewed  Clinical Chemistry:  Recent Labs   Lab 09/27/20  1805 09/28/20  0338 09/30/20  0332   * 128* 132*   K 4.6 4.4 4.3   CL 88* 95 92*   CO2 24 20* 21*   * 68* 76   BUN 32* 37* 32*   CREATININE 4.3* 4.7* 4.1*   CALCIUM 8.5* 8.0* 7.6*   PROT 5.2* 4.5*  --    ALBUMIN 2.6* 2.2*  --    BILITOT 0.4 0.9  --    ALKPHOS 78 61  --    AST 25 20  --    ALT 20 19  --    ANIONGAP 16 13 19*   ESTGFRAFRICA 12.3* 11.0* 13.0*   EGFRNONAA 10.7* 9.6* 11.3*   MG 2.1  --   --    PHOS  --   --  5.6*     CBC:   Recent Labs   Lab 09/30/20  0332 09/30/20  0341   WBC 15.77*  --    RBC 4.45  --    HGB 12.5  --    HCT 39.4 43   *  --    MCV 89  --    MCH 28.1  --    MCHC 31.7*  --      Cardiac Profile:  Recent Labs   Lab 09/24/20  0630 09/24/20  1930 09/25/20  0110 09/25/20  0636   *  --   --   --    TROPONINI 0.373* 0.344* 0.357* 0.277*    < > = values in this interval not displayed.     Diabetes:  Recent Labs   Lab 09/24/20 0630   HGBA1C 6.3*     Thyroid & Parathyroid:  Recent Labs   Lab 09/24/20 0630   TSH 10.620*   FREET4 1.26     Medications: Pertinent Medications reviewed  Scheduled Meds:   amiodarone  200 mg Oral BID    apixaban  2.5 mg Oral BID    citalopram  20 mg Oral Daily    collagenase   Topical (Top) Daily    famotidine (PF)  20 mg Intravenous Daily    fluconazole  100 mg Oral Daily    levothyroxine  50 mcg Oral Before breakfast    metoprolol succinate  25 mg Oral Daily    miconazole NITRATE 2 %   Topical (Top) BID    rosuvastatin  10 mg Oral Daily    sevelamer carbonate  1,600 mg Oral TID WM     sodium zirconium cyclosilicate  5 g Oral Daily     Continuous Infusions:   norepinephrine bitartrate-D5W 0.02 mcg/kg/min (09/29/20 2200)    propofoL 20 mcg/kg/min (09/30/20 0255)     PRN Meds:.acetaminophen, albuterol sulfate, dextrose 50%, dextrose 50%, glucagon (human recombinant), glucose, glucose, insulin aspart U-100, ondansetron    Estimated/Assessed Needs    Weight Used For Calorie Calculations: 80.7 kg (177 lb 14.6 oz)  Energy Calorie Requirements (kcal): While Intubated: 1696 kcals/day (21 kcals/kg PSE)  ; Once Extubated: 9914-3975 kcals/day (20-25 kcals/kg)  Energy Need Method: Kcal/kg  Protein Requirements:  g/day (1.2-1.5 g/kg)  Weight Used For Protein Calculations: 80.7 kg (177 lb 14.6 oz)  Fluid Requirements (mL): UOP + 1000 mL or per MD     Nutrition Prescription Ordered    Current Diet Order: NPO  Current Nutrition Support Formula Ordered: Nepro(Currently on hold due to malfunctioning pump)  Current Nutrition Support Rate Ordered: 10 (ml)  Current Nutrition Support Frequency Ordered: mL/hr; continuously    Evaluation of Received Nutrient/Fluid Intake    Enteral Calories (kcal): 432  Enteral Protein (gm): 19.4  Free Water Flush Fluid (mL): 180  Other Calories (kcal): 319(Propofol @ 12.1 mL/hr)  Energy Calories Required: not meeting needs  Protein Required: not meeting needs  Fluid Required: meeting needs  Tolerance: tolerating  % Intake of Estimated Energy Needs: 0 - 25 %  % Meal Intake: NPO    Intake/Output Summary (Last 24 hours) at 9/30/2020 1028  Last data filed at 9/30/2020 0600  Gross per 24 hour   Intake 804 ml   Output 2500 ml   Net -1696 ml      Nutrition Risk    Level of Risk/Frequency of Follow-up: high   Monitor and Evaluation    Food and Nutrient Intake: energy intake, enteral nutrition intake  Food and Nutrient Adminstration: enteral and parenteral nutrition administration  Physical Activity and Function: nutrition-related ADLs and IADLs, factors affecting access to physical  activity  Anthropometric Measurements: weight, weight change, body mass index  Biochemical Data, Medical Tests and Procedures: electrolyte and renal panel, gastrointestinal profile, glucose/endocrine profile, inflammatory profile, lipid profile  Nutrition-Focused Physical Findings: overall appearance     Nutrition Follow-Up    RD Follow-up?: Yes  Mary Quintero RD 09/30/2020 10:31 AM

## 2020-09-30 NOTE — SUBJECTIVE & OBJECTIVE
Interval History: sinus      Review of Systems   Unable to perform ROS: Intubated     Objective:     Vital Signs (Most Recent):  Temp: 98.8 °F (37.1 °C) (09/30/20 0300)  Pulse: 81 (09/30/20 0900)  Resp: (!) 27 (09/30/20 0900)  BP: (!) 102/47 (09/30/20 0900)  SpO2: (!) 93 % (09/30/20 0900) Vital Signs (24h Range):  Temp:  [97.3 °F (36.3 °C)-98.8 °F (37.1 °C)] 98.8 °F (37.1 °C)  Pulse:  [62-81] 81  Resp:  [16-30] 27  SpO2:  [88 %-100 %] 93 %  BP: ()/(39-55) 102/47  Arterial Line BP: ()/(48-66) 116/53     Weight: 80.7 kg (177 lb 14.6 oz)  Body mass index is 28.72 kg/m².    Intake/Output Summary (Last 24 hours) at 9/30/2020 0926  Last data filed at 9/30/2020 0600  Gross per 24 hour   Intake 804 ml   Output 2500 ml   Net -1696 ml      Physical Exam  Vitals signs and nursing note reviewed.   Constitutional:       Appearance: She is well-developed.      Comments: Sedated and intubated   HENT:      Head: Normocephalic and atraumatic.      Right Ear: External ear normal.      Left Ear: External ear normal.      Nose: Nose normal.   Eyes:      Conjunctiva/sclera: Conjunctivae normal.      Pupils: Pupils are equal, round, and reactive to light.   Neck:      Musculoskeletal: Neck supple.   Cardiovascular:      Rate and Rhythm: Normal rate and regular rhythm.      Heart sounds: Normal heart sounds.   Pulmonary:      Effort: Pulmonary effort is normal.      Comments: Decreased entry with coarse large upper airway sounds, no wheezes, nor opening crepitations  Abdominal:      General: Bowel sounds are normal.      Palpations: Abdomen is soft.   Musculoskeletal:      Comments: Sedated and intubated   Skin:     General: Skin is warm and dry.      Capillary Refill: Capillary refill takes less than 2 seconds.   Neurological:      Mental Status: She is alert.      Comments: Sedated and intubated   Psychiatric:      Comments: Sedated and intubated         Significant Labs:   BMP:   Recent Labs   Lab 09/30/20  0332   GLU 76    *   K 4.3   CL 92*   CO2 21*   BUN 32*   CREATININE 4.1*   CALCIUM 7.6*     CBC:   Recent Labs   Lab 09/30/20  0332 09/30/20  0341   WBC 15.77*  --    HGB 12.5  --    HCT 39.4 43   *  --        Significant Imaging: I have reviewed and interpreted all pertinent imaging results/findings within the past 24 hours.

## 2020-09-30 NOTE — PLAN OF CARE
Problem: Feeding Intolerance (Enteral Nutrition)  Goal: Feeding Tolerance  Outcome: Ongoing, Progressing  Intervention: Prevent and Manage Feeding Intolerance  Flowsheets (Taken 9/30/2020 1032)  Nutrition Support Management: tube feeding initiated- Advance to goal rate of 35mL as tolerated

## 2020-09-30 NOTE — PLAN OF CARE
09/30/20 0800   Patient Assessment/Suction   Level of Consciousness (AVPU) responds to voice   Respiratory Effort Unlabored   Expansion/Accessory Muscles/Retractions no use of accessory muscles   YULIET Breath Sounds crackles, coarse   LLL Breath Sounds crackles, coarse   Rhythm/Pattern, Respiratory assisted mechanically   Cough Frequency with stimulation   Cough Type assisted   Suction Method tracheal   $ Suction Charges Inline Suction Procedure Stat Charge   Secretions Amount scant   Secretions Color yellow   Secretions Characteristics thick   PRE-TX-O2   O2 Device (Oxygen Therapy) ventilator   Oxygen Concentration (%) 55   SpO2 (!) 91 %   Pulse 77   Resp (!) 24   BP (!) 91/42      Airway Anesthesia 09/29/20   Placement Date/Time: 09/29/20 (c) 2845   Method of Intubation: Video Laryngoscopy  Airway Device: Endotracheal Tube  Mask Ventilation: Easy  Intubated: Postinduction  Airway Device Size: 8.0  Cuffed: Cuffed  Complicating Factors: None  Findings Post-I...   Secured at 21 cm   Measured At Lips   Secured Location Center   Secured by Commercial tube mckinney   Bite Block none   Site Condition Cool;Dry   Status Secured   Site Assessment Clean;Dry   Airway Safety   Ambu bag with the patient? Yes, Adult Ambu   Is mask with the patient? Yes, Adult Mask   Vent Select   Conventional Vent Y   $ Ventilator Subsequent 1   Charged w/in last 24h YES   Preset Conventional Ventilator Settings   Vent ID [id number]   Vent Type    Ventilation Type VC   Vent Mode A/C   Humidity HME   Set Rate 24 BPM   Vt Set 390 mL   PEEP/CPAP 5 cmH20   Pressure Support 0 cmH20   Waveform RAMP   Peak Flow 60 L/min   Plateau Set/Insp. Hold (sec) 0   Trigger Sensitivity Flow/I-Trigger 6 L/min   Patient Ventilator Parameters   Resp Rate Total 24 br/min   Peak Airway Pressure 37 cmH2O   Mean Airway Pressure 14 cmH20   Plateau Pressure 0 cmH20   Exhaled Vt 406 mL   Total Ve 9.72 mL   I:E Ratio Measured 1:2.60   Tubing ID (mm) 8 mm   Tube Type ET    Conventional Ventilator Alarms   Alarms On Y   Ve High Alarm 24 L/min   Ve Low Alarm 5 L/min   Vt High Alarm 100 mL   Vt Low Alarm 250 mL   Resp Rate High Alarm 45 br/min   Press High Alarm 50 cmH2O   Apnea Rate 14   Apnea Volume (mL) 0 mL   Apnea Oxygen Concentration  100   Apnea Flow Rate (L/min) 60   T Apnea 20 sec(s)   IHI Ventilator Associated Pneumonia Bundle   Head of Bed Elevated  HOB 30   Additional VAP Prevention Documentation Clean equipment maintained;Oral suctioning prior to turn   Ready to Wean/Extubation Screen   FIO2<=50 (chart decimal) (!) 0.55   MV<16L (chart vol.) 9.72   PEEP <=8 (chart #) 5   Ready to Wean Parameters   F/VT Ratio<105 (RSBI) (!) 59.11   Respiratory Evaluation   $ Care Plan Tech Time 15 min

## 2020-09-30 NOTE — PROGRESS NOTES
[facility] Medicine  Progress Note    Patient Name: Juliane Ramos  MRN: [id number]  Patient Class: IP- Inpatient   Admission Date: 9/24/2020  Length of Stay: 6 days  Attending Physician: Ralph Tilley MD  Primary Care Provider: JOS Dumont        Subjective:     Principal Problem:CHF (congestive heart failure)        HPI:  Patient is a 60-year-old female hx ESRD HD (T,Th, Sat)  CAD/CABG ,EF20%?, COPD, DM ,chronic hypotension  presents ED with complaints of having low blood pressure and rapid heart rate.  In the emergency department patient had a heart rate in the 130s regular and a blood pressure systolic of approximately 100  Patient denies shortness of breath chest pain fever chills abdominal pain nausea vomiting.  She states her last dialysis on Tuesday she did not have any volume removed.  Patient has been given approximately 500 cc of IV fluids in the emergency department at remains tachycardic in the 120s.      Overview/Hospital Course:  09/29  Assumed care. Chart reviewed. Labs reviewed:  End stage renal function. Receiving dialysis today. 2 liters to be removed. Discussed with dietician: tube feeds if not extubated.    09/30  Consultants' attendance noted and appreciated. Required re-intubation yesterday.  Labs reviewed: mild leukocytosis with normal hematocrit; minimal hyponatremia with end-stage renal function values. Remains intubated. Discussed with Dietician: start enteral feeds.    Interval History: sinus      Review of Systems   Unable to perform ROS: Intubated     Objective:     Vital Signs (Most Recent):  Temp: 98.8 °F (37.1 °C) (09/30/20 0300)  Pulse: 81 (09/30/20 0900)  Resp: (!) 27 (09/30/20 0900)  BP: (!) 102/47 (09/30/20 0900)  SpO2: (!) 93 % (09/30/20 0900) Vital Signs (24h Range):  Temp:  [97.3 °F (36.3 °C)-98.8 °F (37.1 °C)] 98.8 °F (37.1 °C)  Pulse:  [62-81] 81  Resp:  [16-30] 27  SpO2:  [88 %-100 %] 93 %  BP: ()/(39-55) 102/47  Arterial Line BP:  ()/(48-66) 116/53     Weight: 80.7 kg (177 lb 14.6 oz)  Body mass index is 28.72 kg/m².    Intake/Output Summary (Last 24 hours) at 9/30/2020 0926  Last data filed at 9/30/2020 0600  Gross per 24 hour   Intake 804 ml   Output 2500 ml   Net -1696 ml      Physical Exam  Vitals signs and nursing note reviewed.   Constitutional:       Appearance: She is well-developed.      Comments: Sedated and intubated   HENT:      Head: Normocephalic and atraumatic.      Right Ear: External ear normal.      Left Ear: External ear normal.      Nose: Nose normal.   Eyes:      Conjunctiva/sclera: Conjunctivae normal.      Pupils: Pupils are equal, round, and reactive to light.   Neck:      Musculoskeletal: Neck supple.   Cardiovascular:      Rate and Rhythm: Normal rate and regular rhythm.      Heart sounds: Normal heart sounds.   Pulmonary:      Effort: Pulmonary effort is normal.      Comments: Decreased entry with coarse large upper airway sounds, no wheezes, nor opening crepitations  Abdominal:      General: Bowel sounds are normal.      Palpations: Abdomen is soft.   Musculoskeletal:      Comments: Sedated and intubated   Skin:     General: Skin is warm and dry.      Capillary Refill: Capillary refill takes less than 2 seconds.   Neurological:      Mental Status: She is alert.      Comments: Sedated and intubated   Psychiatric:      Comments: Sedated and intubated         Significant Labs:   BMP:   Recent Labs   Lab 09/30/20  0332   GLU 76   *   K 4.3   CL 92*   CO2 21*   BUN 32*   CREATININE 4.1*   CALCIUM 7.6*     CBC:   Recent Labs   Lab 09/30/20  0332 09/30/20  0341   WBC 15.77*  --    HGB 12.5  --    HCT 39.4 43   *  --        Significant Imaging: I have reviewed and interpreted all pertinent imaging results/findings within the past 24 hours.      Assessment/Plan:      * CHF (congestive heart failure)  Continue current regimen      Anemia, chronic disease  Continue current regimen      Acute on chronic  respiratory failure with hypoxia and hypercapnia  Continue current course      COPD (chronic obstructive pulmonary disease)  Continue current regimen      Stage 5 chronic kidney disease on chronic dialysis  Continue current regimen      Open wound of left heel  Continue current regimen      Chronic kidney disease with end stage renal failure on dialysis  Continue current regimen        VTE Risk Mitigation (From admission, onward)         Ordered     apixaban tablet 2.5 mg  2 times daily      09/24/20 1018     Place MARY ELLEN hose  Until discontinued      09/24/20 1018     IP VTE HIGH RISK PATIENT  Once      09/24/20 1018     Place sequential compression device  Until discontinued      09/24/20 1018                Discharge Planning   SHIKHA:      Code Status: Full Code   Is the patient medically ready for discharge?:     Reason for patient still in hospital (select all that apply): Treatment  Discharge Plan A: Home Health                  Ralph Tilley MD  Department of Hospital Medicine   [facility]

## 2020-09-30 NOTE — PROGRESS NOTES
[facility]  Pulmonology  Progress Note    Subjective     9/29:  No major issues overnight.  Tolerating SBT.  Off of sedation all night.   9/30:  Failed extubation and was re-intubated yesterday evening.  No issues since.     Review of Systems   Unable to perform ROS: Intubated      I have personally reviewed the following during today's evaluation:  past medical history, ROS, family history, social history, surgical history, current inpatient medications,drug allergies, vital signs over the past 24 hours, results of relevant diagnostic studies and nursing/provider documentation from the past 24 hours.     Objective     VS Temp:  [97.5 °F (36.4 °C)-98.8 °F (37.1 °C)]   Pulse:  [62-81]   Resp:  [16-30]   BP: ()/(39-55)   SpO2:  [88 %-100 %]   Arterial Line BP: ()/(48-66)   Ideal body weight: 59.3 kg (130 lb 11.7 oz)  Adjusted ideal body weight: 67.9 kg (149 lb 9.7 oz)   I/O   Intake/Output Summary (Last 24 hours) at 9/30/2020 1112  Last data filed at 9/30/2020 0600  Gross per 24 hour   Intake 804 ml   Output 2500 ml   Net -1696 ml        Vent SpO2 (!) 93%   Vent Mode: A/C  Oxygen Concentration (%):  [] 49  Resp Rate Total:  [16 br/min-31 br/min] 26 br/min  Vt Set:  [390 mL] 390 mL  PEEP/CPAP:  [5 cmH20] 5 cmH20  Pressure Support:  [0 cmH20-5 cmH20] 0 cmH20  Mean Airway Pressure:  [7.4 cmH20-15 cmH20] 13 cmH20     PE Physical Exam   Constitutional: She appears well-developed and well-nourished. She is cooperative.  Non-toxic appearance. No distress. She is sedated, intubated and restrained.   HENT:   Head: Normocephalic and atraumatic.   Right Ear: External ear normal.   Left Ear: External ear normal.   Mouth/Throat: Uvula is midline and mucous membranes are normal. Mallampati Score: unable to assess.   Neck: Trachea normal and normal range of motion. Neck supple. No JVD present. No thyromegaly present.   R IJ TLC   Cardiovascular: Normal rate, regular rhythm, normal heart sounds and  intact distal pulses.   Pulmonary/Chest: Normal expansion, symmetric chest wall expansion and effort normal. She is intubated. She has no wheezes. She has no rhonchi. She has no rales.   Abdominal: Soft. Bowel sounds are normal. She exhibits no distension. There is no abdominal tenderness.   Musculoskeletal: Normal range of motion.         General: No tenderness, deformity or edema.      Comments: LAMINE MCKENZIE   Lymphadenopathy: No supraclavicular adenopathy is present.     She has no cervical adenopathy.     She has no axillary adenopathy.   Neurological: She is alert. She has normal strength. No cranial nerve deficit or sensory deficit. She exhibits normal muscle tone. GCS eye subscore is 4. GCS verbal subscore is 5. GCS motor subscore is 6.   Skin: Skin is warm, dry and intact. No rash noted.   Nursing note and vitals reviewed.   =   Labs I have personally reviewed and interpreted all labs / diagnostic studies obtained over the past 24 hours, and relevant results are as follows:  Recent Labs   Lab 09/30/20  0332 09/30/20  0341   WBC 15.77*  --    RBC 4.45  --    HGB 12.5  --    HCT 39.4 43   *  --    MCV 89  --    MCH 28.1  --    MCHC 31.7*  --    *  --    K 4.3  --    CL 92*  --    CO2 21*  --    BUN 32*  --    CREATININE 4.1*  --    PH  --  7.327*   PCO2  --  51.2*   PO2  --  71*   HCO3  --  26.8   POCSATURATED  --  93*   BE  --  1         Imaging I have personally reviewed and interpreted the following images and reviewed the associated Radiology report.  I have reviewed and interpreted all pertinent imaging results/findings within the past 24 hours.  CXR:  9/30:  Interval introduction of NG tube. Additional lines and support devices are in position as above.  Persistence of bilateral parahilar and lower lung zone interstitial and alveolar opacities and small bilateral effusions. Stable cardiomegaly.     Micro I have personally reviewed and interpreted the available culture data.  Relevant results are  as follows.  Blood Culture   Lab Results   Component Value Date    LABBLOO No Growth to date 09/25/2020    LABBLOO No Growth to date 09/25/2020    LABBLOO No Growth to date 09/25/2020    LABBLOO No Growth to date 09/25/2020    LABBLOO No Growth to date 09/25/2020   , Sputum Culture   Lab Results   Component Value Date    GSRESP <10 epithelial cells per low power field. 09/28/2020    GSRESP Many WBC's 09/28/2020    GSRESP Rare Gram positive cocci 09/28/2020    RESPIRATORYC Reduced normal respiratory nyla 09/28/2020    and Urine Culture    Lab Results   Component Value Date    LABURIN (A) 09/25/2020     PRESUMPTIVE JORDIN ALBICANS  10,000 - 49,999 cfu/ml        Medications Scheduled    amiodarone  200 mg Oral BID    apixaban  2.5 mg Oral BID    citalopram  20 mg Oral Daily    collagenase   Topical (Top) Daily    famotidine (PF)  20 mg Intravenous Daily    fluconazole  100 mg Oral Daily    levothyroxine  50 mcg Oral Before breakfast    metoprolol succinate  25 mg Oral Daily    miconazole NITRATE 2 %   Topical (Top) BID    rosuvastatin  10 mg Oral Daily    sevelamer carbonate  1,600 mg Oral TID WM    sodium zirconium cyclosilicate  5 g Oral Daily      Continuous Infusions:    norepinephrine bitartrate-D5W 0.02 mcg/kg/min (09/29/20 2200)    propofoL 20 mcg/kg/min (09/30/20 0255)      PRN   acetaminophen, albuterol sulfate, dextrose 50%, dextrose 50%, glucagon (human recombinant), glucose, glucose, insulin aspart U-100, ondansetron        Assessment       Active Hospital Problems    Diagnosis    *CHF (congestive heart failure)    COPD (chronic obstructive pulmonary disease)    Acute on chronic respiratory failure with hypoxia and hypercapnia    Anemia, chronic disease    Stage 5 chronic kidney disease on chronic dialysis    Open wound of left heel    Chronic kidney disease with end stage renal failure on dialysis      My Impression:  Acute on chronic hypoxemic / hypercapnic respiratory  failrue.    Plan     Neuro  · Intermittent fentanyl boluses as needed to maintain RASS of -1  · Daily SAT.    Pulmonary  · Continue LPV for now.  · Daily SAT/SBT.    Cardiac/Vascular  · Volume removal with HD.    Renal/  · HD at the direction of nephrology.    Hematology  · No indication for blood products.  · Observation for now.    Endocrine  · Continue levothyroxine.  · Serial blood glucose monitoring.  · Sliding scale insulin as needed to maintain moderate glycemic control.    GI  · H2 blocker for stress ulcer ppx.  · Start enteral feeds.    Critical Care Time: Approximately >35 minutes     The patient is critically ill due to the following conditions that actively pose threat to life and bodily function:  Respiratory Failure requiring intubation and invasive mechanical ventilation     The patient is at high risk of death due to respiratory failure and requiring ongoing treatment including invasive mechanical ventilation to prevent further life-threatening deterioration.  Due to a high probability of clinically significant, life threatening deterioration, the patient required my highest level of preparedness to intervene emergently and I personally spent this critical care time directly and personally managing the patient.      Critical care was time spent personally by me on the following activities:   · Obtaining a history, examination of patient, reviewing pulse oximetry, providing medical care at the patients bedside, developing a treatment plan with patient or surrogate and bedside caregivers, ordering and reviewing laboratory studies, radiographic studies, pulse oximetry, ordering and performing treatments and interventions, evaluation of patient's response to treatment,  discussions with consultants while on the unit and immediately available to the patient, re-evaluation of the patient's condition, and documentation in the medical record.   This critical care time did not overlap with that of any  other provider or involve time for any procedures.     Ehsan Hui MD  Pulmonary / Critical Care Medicine  [facility]

## 2020-09-30 NOTE — ANESTHESIA PROCEDURE NOTES
Ad Hoc Intubation  Performed by: Alvarez Moctezuma Jr., MD  Authorized by: Alvarez Moctezuma Jr., MD     Indications:  Respiratory failure  Diagnosis:  ESRD  Patient Location:  ICU  Timeout:  9/29/2020 6:40 PM  Procedure Start Time:  9/29/2020 6:41 PM  Procedure End Time:  9/29/2020 6:41 PM  Staff:     patient identified, IV checked, site marked, risks and benefits discussed, surgical consent, monitors and equipment checked, pre-op evaluation, timeout performed and anesthesia consent      Anesthesiologist Present: Yes    Intubation:     Induction:  Intravenous    Intubated:  Postinduction    Mask Ventilation:  Easy mask    Attempts:  1    Attempted By:  Staff anesthesiologist    Method of Intubation:  Video laryngoscopy    Blade:  Bello 4    Laryngeal View Grade: Grade I - full view of chords      Difficult Airway Encountered?: No      Complications:  None    Airway Device:  Oral endotracheal tube    Airway Device Size:  8.0    Style/Cuff Inflation:  Cuffed    Inflation Amount (mL):  10    Tube secured:  21    Secured at:  The lips    Placement Verified By:  Colorimetric ETCO2 device and Chest x-ray    Complicating Factors:  None    Findings Post-Intubation:  BS equal bilateral  Notes:      BBS + and =

## 2020-10-01 NOTE — CARE UPDATE
10/01/20 0459   Patient Assessment/Suction   Level of Consciousness (AVPU) responds to pain   Respiratory Effort Unlabored   Expansion/Accessory Muscles/Retractions no use of accessory muscles   All Lung Fields Breath Sounds diminished   Rhythm/Pattern, Respiratory assisted mechanically   Cough Frequency with stimulation   Cough Type assisted   Suction Method tracheal   PRE-TX-O2   O2 Device (Oxygen Therapy) ventilator   Oxygen Concentration (%) 35   SpO2 (!) 93 %   Pulse Oximetry Type Continuous   Pulse 84   BP (!) 103/49   Positioning   Head of Bed (HOB) HOB at 20-30 degrees   Vent Select   Charged w/in last 24h YES   Preset Conventional Ventilator Settings   Vent Type    Ventilation Type VC   Vent Mode A/C   Humidity HME   Set Rate 24 BPM   Vt Set 390 mL   PEEP/CPAP 5 cmH20   Pressure Support 0 cmH20   Waveform RAMP   Peak Flow 55 L/min   Plateau Set/Insp. Hold (sec) 0   Trigger Sensitivity Flow/I-Trigger 6 L/min   Patient Ventilator Parameters   Resp Rate Total 25 br/min   Peak Airway Pressure 26 cmH2O   Mean Airway Pressure 12 cmH20   Plateau Pressure 0 cmH20   Exhaled Vt 358 mL   Total Ve 9.73 mL   I:E Ratio Measured 1:2.20   Conventional Ventilator Alarms   Alarms On Y   Resp Rate High Alarm 45 br/min   Press High Alarm 100 cmH2O   Apnea Rate 14   Apnea Volume (mL) 0 mL   Apnea Oxygen Concentration  100   Apnea Flow Rate (L/min) 60   T Apnea 20 sec(s)   Ready to Wean/Extubation Screen   FIO2<=50 (chart decimal) 0.35   MV<16L (chart vol.) 9.73   PEEP <=8 (chart #) 5   Labs   $ Was an ABG obtained? A Line;ISTAT - Blood gas;ISTAT - Calcium;ISTAT - Createnine;ISTAT - Hematocrit;ISTAT - Sodium;ISTAT - Potassium   $ Labs Tech Time 15 min   Critical Value Communication   Date Result Received 10/01/20   Time Result Received 0451   Resulting Department of Critical Value resp   Who communicated critical value from resulting department? tlj   Doctor not notified of critical value due to: known abnormal -  expected abnormal   Name of Notified Physician/Designee buzz [email or web address]   Date Notified 10/01/20   Time Notified 3092   Read Back Verification Yes

## 2020-10-01 NOTE — PROGRESS NOTES
Progress Note  Cardiology    Admit Date: 9/24/2020   LOS: 6 days     Follow-up For:  CHF; ischemic cardiomyopathy; respiratory failure; atrial flutter, DC cardioversion, amiodarone therapy    Scheduled Meds:   amiodarone  200 mg Oral BID    apixaban  2.5 mg Oral BID    citalopram  20 mg Oral Daily    collagenase   Topical (Top) Daily    famotidine (PF)  20 mg Intravenous Daily    fluconazole  100 mg Oral Daily    levothyroxine  50 mcg Oral Before breakfast    metoprolol succinate  25 mg Oral Daily    miconazole NITRATE 2 %   Topical (Top) BID    rosuvastatin  10 mg Oral Daily    sevelamer carbonate  1,600 mg Oral TID WM    sodium zirconium cyclosilicate  5 g Oral Daily     Continuous Infusions:   norepinephrine bitartrate-D5W 0.07 mcg/kg/min (09/30/20 1800)    propofoL 20 mcg/kg/min (09/30/20 0255)     PRN Meds:acetaminophen, albuterol sulfate, dextrose 50%, dextrose 50%, glucagon (human recombinant), glucose, glucose, insulin aspart U-100, ondansetron    Review of patient's allergies indicates:  No Known Allergies    SUBJECTIVE:     Interval History: Patient was extubated for approximately 2 hours.  She was extremely lethargic.  She was reintubated.  She has just returned from MRI.    Review of Systems  Intubated    OBJECTIVE:     Vital Signs (Most Recent)  Temp: 98.4 °F (36.9 °C) (09/30/20 1600)  Pulse: 94 (09/30/20 1830)  Resp: (!) 25 (09/30/20 1721)  BP: (!) 87/42 (09/30/20 1700)  SpO2: 95 % (09/30/20 1830)    Vital Signs Range (Last 24H):  Temp:  [98.3 °F (36.8 °C)-98.8 °F (37.1 °C)]   Pulse:  [62-94]   Resp:  [18-34]   BP: ()/(39-58)   SpO2:  [90 %-97 %]   Arterial Line BP: ()/(44-55)       Physical Exam:  Neck: no carotid bruit, no JVD and supple, symmetrical, trachea midline  Lungs: clear to auscultation bilaterally, normal respiratory effort  Heart: regular rate and rhythm, S1, S2 normal, no murmur, click, rub or gallop  Abdomen: soft, non-tender; bowel sounds normal; no masses,   no organomegaly  Extremities: Extremities normal, atraumatic, no cyanosis, clubbing, or edema    Recent Results (from the past 24 hour(s))   ISTAT PROCEDURE    Collection Time: 09/29/20  7:37 PM   Result Value Ref Range    POC PH 7.275 (LL) 7.35 - 7.45    POC PCO2 61.1 (HH) 35 - 45 mmHg    POC PO2 243 (H) 80 - 100 mmHg    POC HCO3 28.4 (H) 24 - 28 mmol/L    POC BE 2 -2 to 2 mmol/L    POC SATURATED O2 100 95 - 100 %    POC TCO2 30 (H) 23 - 27 mmol/L    Rate 16     Sample ARTERIAL     Site Tanika/UAC     Allens Test N/A     DelSys Adult Vent     Mode AC/PRVC     Vt 390     PEEP 5     PiP 43     FiO2 100     Min Vol 8.33     Sp02 98    POCT glucose    Collection Time: 09/29/20  9:19 PM   Result Value Ref Range    POC Glucose 82 70 - 110   ISTAT PROCEDURE    Collection Time: 09/29/20  9:44 PM   Result Value Ref Range    POC PH 7.343 (L) 7.35 - 7.45    POC PCO2 52.5 (H) 35 - 45 mmHg    POC PO2 121 (H) 80 - 100 mmHg    POC HCO3 28.5 (H) 24 - 28 mmol/L    POC BE 3 -2 to 2 mmol/L    POC SATURATED O2 98 95 - 100 %    POC TCO2 30 (H) 23 - 27 mmol/L    Rate 24     Sample ARTERIAL     Site Tanika/UAC     Allens Test N/A     DelSys Adult Vent     Mode AC/PRVC     Vt 390     PEEP 5     PiP 37     FiO2 60     Min Vol 9.8     Sp02 97    Basic metabolic panel    Collection Time: 09/30/20  3:32 AM   Result Value Ref Range    Sodium 132 (L) 136 - 145 mmol/L    Potassium 4.3 3.5 - 5.1 mmol/L    Chloride 92 (L) 95 - 110 mmol/L    CO2 21 (L) 23 - 29 mmol/L    Glucose 76 70 - 110 mg/dL    BUN, Bld 32 (H) 6 - 20 mg/dL    Creatinine 4.1 (H) 0.5 - 1.4 mg/dL    Calcium 7.6 (L) 8.7 - 10.5 mg/dL    Anion Gap 19 (H) 8 - 16 mmol/L    eGFR if African American 13.0 (A) >60 mL/min/1.73 m^2    eGFR if non  11.3 (A) >60 mL/min/1.73 m^2   CBC Without Differential    Collection Time: 09/30/20  3:32 AM   Result Value Ref Range    WBC 15.77 (H) 3.90 - 12.70 K/uL    RBC 4.45 4.00 - 5.40 M/uL    Hemoglobin 12.5 12.0 - 16.0 g/dL    Hematocrit  39.4 37.0 - 48.5 %    Mean Corpuscular Volume 89 82 - 98 fL    Mean Corpuscular Hemoglobin 28.1 27.0 - 31.0 pg    Mean Corpuscular Hemoglobin Conc 31.7 (L) 32.0 - 36.0 g/dL    RDW 16.5 (H) 11.5 - 14.5 %    Platelets 116 (L) 150 - 350 K/uL    MPV 10.0 9.2 - 12.9 fL   Phosphorus    Collection Time: 09/30/20  3:32 AM   Result Value Ref Range    Phosphorus 5.6 (H) 2.7 - 4.5 mg/dL   ISTAT PROCEDURE    Collection Time: 09/30/20  3:41 AM   Result Value Ref Range    POC PH 7.327 (L) 7.35 - 7.45    POC PCO2 51.2 (H) 35 - 45 mmHg    POC PO2 71 (L) 80 - 100 mmHg    POC HCO3 26.8 24 - 28 mmol/L    POC BE 1 -2 to 2 mmol/L    POC SATURATED O2 93 (L) 95 - 100 %    POC Glucose 73 70 - 110 mg/dL    POC Sodium 131 (L) 136 - 145 mmol/L    POC Potassium 4.3 3.5 - 5.1 mmol/L    POC TCO2 28 (H) 23 - 27 mmol/L    POC Ionized Calcium 1.06 1.06 - 1.42 mmol/L    POC Hematocrit 43 36 - 54 %PCV    Rate 24     Sample ARTERIAL     Site Tanika/UAC     Allens Test N/A     DelSys Adult Vent     Mode AC/PRVC     Vt 390     PEEP 5     PiP 32     FiO2 45     Min Vol 10.1     Sp02 91    POCT glucose    Collection Time: 09/30/20  4:09 PM   Result Value Ref Range    POC Glucose 67 (L) 70 - 110       Diagnostic Results:  Labs: Reviewed  ECG: Reviewed    ASSESSMENT/PLAN:     The patient continues in sinus rhythm.  Continue amiodarone.  The patient was the negative fluid balance 2 L on dialysis 09/29/2020.  Patient appears to be retaining CO2.

## 2020-10-01 NOTE — NURSING
HD tx complete, tolerated well with pressor  Received orders during tx to extend tx time 30 minutes and increase goal to 4L  Net UF 4L

## 2020-10-01 NOTE — PROGRESS NOTES
[facility]  Pulmonology  Progress Note    Subjective     9/29:  No major issues overnight.  Tolerating SBT.  Off of sedation all night.   9/30:  Failed extubation and was re-intubated yesterday evening.  No issues since.  10/1:  No major issues overnight.  Remains intubated.  Off of sedation.     Review of Systems   Unable to perform ROS: Intubated   All other systems reviewed and are negative.     I have personally reviewed the following during today's evaluation:  past medical history, ROS, family history, social history, surgical history, current inpatient medications,drug allergies, vital signs over the past 24 hours, results of relevant diagnostic studies and nursing/provider documentation from the past 24 hours.     Objective     VS Temp:  [98.4 °F (36.9 °C)-99.6 °F (37.6 °C)]   Pulse:  [77-94]   Resp:  [24-34]   BP: ()/(39-59)   SpO2:  [91 %-97 %]   Arterial Line BP: (100-130)/(39-55)   Ideal body weight: 59.3 kg (130 lb 11.7 oz)  Adjusted ideal body weight: 67.9 kg (149 lb 9.7 oz)   I/O   Intake/Output Summary (Last 24 hours) at 10/1/2020 1102  Last data filed at 10/1/2020 0600  Gross per 24 hour   Intake 223.38 ml   Output --   Net 223.38 ml        Vent SpO2 96%   Vent Mode: Spont  Oxygen Concentration (%):  [35-50] 35  Resp Rate Total:  [23 br/min-31 br/min] 23 br/min  Vt Set:  [390 mL] 390 mL  PEEP/CPAP:  [5 cmH20] 5 cmH20  Pressure Support:  [0 cmH20-10 cmH20] 10 cmH20  Mean Airway Pressure:  [8.6 gsG56-03 cmH20] 8.6 cmH20     PE Physical Exam   Constitutional: She appears well-developed and well-nourished. She is cooperative.  Non-toxic appearance. No distress. She is intubated and restrained.   HENT:   Head: Normocephalic and atraumatic.   Right Ear: External ear normal.   Left Ear: External ear normal.   Mouth/Throat: Uvula is midline and mucous membranes are normal. Mallampati Score: unable to assess.   Neck: Trachea normal and normal range of motion. Neck supple. No JVD present.  No thyromegaly present.   R IJ TLC   Cardiovascular: Normal rate, regular rhythm, normal heart sounds and intact distal pulses.   Pulmonary/Chest: Normal expansion, symmetric chest wall expansion and effort normal. She is intubated. She has no wheezes. She has no rhonchi. She has no rales.   Abdominal: Soft. Bowel sounds are normal. She exhibits no distension. There is no abdominal tenderness.   Musculoskeletal: Normal range of motion.         General: No tenderness, deformity or edema.      Comments: LAMINE MCKENZIE   Lymphadenopathy: No supraclavicular adenopathy is present.     She has no cervical adenopathy.     She has no axillary adenopathy.   Neurological: She is alert. She has normal strength. No cranial nerve deficit or sensory deficit. She exhibits normal muscle tone. GCS eye subscore is 4. GCS verbal subscore is 5. GCS motor subscore is 6.   Skin: Skin is warm, dry and intact. No rash noted.   Nursing note and vitals reviewed.      Labs I have personally reviewed and interpreted all labs / diagnostic studies obtained over the past 24 hours, and relevant results are as follows:  Recent Labs   Lab 10/01/20  0434 10/01/20  0442   WBC 17.45*  --    RBC 4.14  --    HGB 11.6*  --    HCT 36.5* 38   PLT 97*  --    MCV 88  --    MCH 28.0  --    MCHC 31.8*  --    *  --    K 4.4  --    CL 95  --    CO2 24  --    BUN 43*  --    CREATININE 5.2*  --    MG 1.9  --    PH  --  7.326*   PCO2  --  49.7*   PO2  --  129*   HCO3  --  25.9   POCSATURATED  --  99   BE  --  0         Imaging I have personally reviewed and interpreted the following images and reviewed the associated Radiology report.  I have reviewed and interpreted all pertinent imaging results/findings within the past 24 hours.  CXR:  9/30:  Interval introduction of NG tube. Additional lines and support devices are in position as above.  Persistence of bilateral parahilar and lower lung zone interstitial and alveolar opacities and small bilateral effusions. Stable  cardiomegaly.     Micro I have personally reviewed and interpreted the available culture data.  Relevant results are as follows.  Blood Culture   Lab Results   Component Value Date    LABBLOO No growth after 5 days. 09/25/2020   , Sputum Culture   Lab Results   Component Value Date    GSRESP <10 epithelial cells per low power field. 09/28/2020    GSRESP Many WBC's 09/28/2020    GSRESP Rare Gram positive cocci 09/28/2020    RESPIRATORYC Reduced normal respiratory nyla 09/28/2020    and Urine Culture    Lab Results   Component Value Date    LABURIN (A) 09/25/2020     PRESUMPTIVE JORDIN ALBICANS  10,000 - 49,999 cfu/ml        Medications Scheduled    amiodarone  200 mg Oral BID    apixaban  2.5 mg Oral BID    citalopram  20 mg Oral Daily    collagenase   Topical (Top) Daily    famotidine (PF)  20 mg Intravenous Daily    fluconazole  100 mg Oral Daily    levothyroxine  50 mcg Oral Before breakfast    metoprolol succinate  25 mg Oral Daily    miconazole NITRATE 2 %   Topical (Top) BID    rosuvastatin  10 mg Oral Daily    sevelamer carbonate  1,600 mg Oral TID WM    sodium zirconium cyclosilicate  5 g Oral Daily      Continuous Infusions:    norepinephrine bitartrate-D5W 0.04 mcg/kg/min (10/01/20 0301)    propofoL 20 mcg/kg/min (09/30/20 0255)      PRN   acetaminophen, albuterol sulfate, dextrose 50%, dextrose 50%, glucagon (human recombinant), glucose, glucose, insulin aspart U-100, ondansetron        Assessment       Active Hospital Problems    Diagnosis    *CHF (congestive heart failure)    COPD (chronic obstructive pulmonary disease)    Acute on chronic respiratory failure with hypoxia and hypercapnia    Anemia, chronic disease    Stage 5 chronic kidney disease on chronic dialysis    Open wound of left heel    Chronic kidney disease with end stage renal failure on dialysis      My Impression:  Acute on chronic hypoxemic / hypercapnic respiratory failrue.    Plan     Neuro  · Intermittent  fentanyl boluses as needed to maintain RASS of -1  · Daily SAT.    Pulmonary  · Continue LPV for now.  · Daily SAT/SBT.    Cardiac/Vascular  · Volume removal with HD.    Renal/  · HD at the direction of nephrology.    Hematology  · No indication for blood products.  · Observation for now.    Endocrine  · Continue levothyroxine.  · Serial blood glucose monitoring.  · Sliding scale insulin as needed to maintain moderate glycemic control.    GI  · H2 blocker for stress ulcer ppx.  · Start enteral feeds.    Critical Care Time: Approximately >35 minutes     The patient is critically ill due to the following conditions that actively pose threat to life and bodily function:  Respiratory Failure requiring intubation and invasive mechanical ventilation     The patient is at high risk of death due to respiratory failure and requiring ongoing treatment including invasive mechanical ventilation to prevent further life-threatening deterioration.  Due to a high probability of clinically significant, life threatening deterioration, the patient required my highest level of preparedness to intervene emergently and I personally spent this critical care time directly and personally managing the patient.      Critical care was time spent personally by me on the following activities:   · Obtaining a history, examination of patient, reviewing pulse oximetry, providing medical care at the patients bedside, developing a treatment plan with patient or surrogate and bedside caregivers, ordering and reviewing laboratory studies, radiographic studies, pulse oximetry, ordering and performing treatments and interventions, evaluation of patient's response to treatment,  discussions with consultants while on the unit and immediately available to the patient, re-evaluation of the patient's condition, and documentation in the medical record.   This critical care time did not overlap with that of any other provider or involve time for any  procedures.     Ehsan Hui MD  Pulmonary / Critical Care Medicine  [facility]

## 2020-10-01 NOTE — CARE UPDATE
09/30/20 2100   Patient Assessment/Suction   Level of Consciousness (AVPU) responds to pain   Respiratory Effort Unlabored   Expansion/Accessory Muscles/Retractions no use of accessory muscles   All Lung Fields Breath Sounds diminished   Rhythm/Pattern, Respiratory assisted mechanically   Cough Frequency with stimulation   Cough Type assisted   Suction Method tracheal;oral   $ Suction Charges Inline Suction Procedure Stat Charge   Secretions Amount small   Secretions Color white   Secretions Characteristics thick   PRE-TX-O2   O2 Device (Oxygen Therapy) ventilator   $ Is the patient on Low Flow Oxygen? Yes   Oxygen Concentration (%) 50   SpO2 95 %   Pulse Oximetry Type Continuous   $ Pulse Oximetry - Multiple Charge Pulse Oximetry - Multiple   Pulse 86   Resp (!) 25   BP (!) 105/55   Positioning   Head of Bed (HOB) HOB elevated      Airway Anesthesia 09/29/20   Placement Date/Time: 09/29/20 (c) 4341   Method of Intubation: Video Laryngoscopy  Airway Device: Endotracheal Tube  Mask Ventilation: Easy  Intubated: Postinduction  Airway Device Size: 8.0  Cuffed: Cuffed  Complicating Factors: None  Findings Post-I...   Secured at 21 cm   Measured At Lips   Secured Location Right   Secured by Commercial tube mckinney   Bite Block none   Site Condition Cool;Dry   Status Intact   Site Assessment Clean   Vent Select   Conventional Vent Y   Charged w/in last 24h YES   Preset Conventional Ventilator Settings   Vent ID [id number]   Vent Type    Ventilation Type VC   Vent Mode A/C   Humidity HME   Set Rate 24 BPM   Vt Set 390 mL   PEEP/CPAP 5 cmH20   Pressure Support 0 cmH20   Waveform RAMP   Peak Flow 55 L/min   Plateau Set/Insp. Hold (sec) 0   Trigger Sensitivity Flow/I-Trigger 6 L/min   Patient Ventilator Parameters   Resp Rate Total 24 br/min   Peak Airway Pressure 29 cmH2O   Mean Airway Pressure 12 cmH20   Plateau Pressure 0 cmH20   Exhaled Vt 388 mL   Total Ve 9.72 mL   I:E Ratio Measured 1:2.20   Tubing ID (mm) 8 mm   Tube  Type ET   Conventional Ventilator Alarms   Alarms On Y   Resp Rate High Alarm 45 br/min   Press High Alarm 50 cmH2O   Apnea Rate 14   Apnea Volume (mL) 0 mL   Apnea Oxygen Concentration  100   Apnea Flow Rate (L/min) 60   T Apnea 20 sec(s)   Ready to Wean/Extubation Screen   FIO2<=50 (chart decimal) 0.5   MV<16L (chart vol.) 9.72   PEEP <=8 (chart #) 5   Ready to Wean Parameters   F/VT Ratio<105 (RSBI) (!) 64.43   Respiratory Evaluation   $ Care Plan Tech Time 15 min

## 2020-10-01 NOTE — PROGRESS NOTES
[facility]  Progress Note    Patient Name: Juliane Ramos  MRN: [id number]  Patient Class: IP- Inpatient   Admission Date: 9/24/2020  Length of Stay: 7 days  Attending Physician: Ralph Tilley MD  Primary Care Provider: JOS Dumont        Subjective:     Principal Problem:CHF (congestive heart failure)        HPI:  Patient is a 60-year-old female hx ESRD HD (T,Th, Sat)  CAD/CABG ,EF20%?, COPD, DM ,chronic hypotension  presents ED with complaints of having low blood pressure and rapid heart rate.  In the emergency department patient had a heart rate in the 130s regular and a blood pressure systolic of approximately 100  Patient denies shortness of breath chest pain fever chills abdominal pain nausea vomiting.  She states her last dialysis on Tuesday she did not have any volume removed.  Patient has been given approximately 500 cc of IV fluids in the emergency department at remains tachycardic in the 120s.      Overview/Hospital Course:  09/29  Assumed care. Chart reviewed. Labs reviewed:  End stage renal function. Receiving dialysis today. 2 liters to be removed. Discussed with dietician: tube feeds if not extubated.    09/30  Consultants' attendance noted and appreciated. Required re-intubation yesterday.  Labs reviewed: mild leukocytosis with normal hematocrit; minimal hyponatremia with end-stage renal function values. Remains intubated. Discussed with Dietician: start enteral feeds.    10/01  Consultants' attendance noted and appreciated.. Unable to extubate. Receiving dialysis this AM. Labs reviewed: leukocytosis, stable normocytic anemia; mild hyponatremia, otherwise normal electrolytes with end stage renal function    Interval History: persisting respiratory failure    Review of Systems   Unable to perform ROS: Intubated     Objective:     Vital Signs (Most Recent):  Temp: 97.8 °F (36.6 °C) (10/01/20 1400)  Pulse: 92 (10/01/20 1730)  Resp: (!) 25 (10/01/20  1410)  BP: (!) 91/44 (10/01/20 1530)  SpO2: (!) 92 % (10/01/20 1724) Vital Signs (24h Range):  Temp:  [97.8 °F (36.6 °C)-99.6 °F (37.6 °C)] 97.8 °F (36.6 °C)  Pulse:  [77-94] 92  Resp:  [24-26] 25  SpO2:  [91 %-97 %] 92 %  BP: ()/(39-69) 91/44  Arterial Line BP: (100-130)/(39-55) 123/49     Weight: 80.7 kg (177 lb 14.6 oz)  Body mass index is 28.72 kg/m².    Intake/Output Summary (Last 24 hours) at 10/1/2020 1746  Last data filed at 10/1/2020 0600  Gross per 24 hour   Intake 122.98 ml   Output --   Net 122.98 ml      Physical Exam  Constitutional:       Comments: Sedated and intubated   HENT:      Head: Normocephalic.      Right Ear: External ear normal.      Left Ear: External ear normal.      Mouth/Throat:      Mouth: Mucous membranes are moist.   Eyes:      Conjunctiva/sclera: Conjunctivae normal.   Cardiovascular:      Rate and Rhythm: Regular rhythm.   Pulmonary:      Comments: Sedated and intubated  Decreased entry bases without adventitious sounds  Abdominal:      General: Bowel sounds are normal.      Palpations: Abdomen is soft.   Genitourinary:     Comments: Chi   Musculoskeletal:      Comments: Unable to fully assess   Skin:     General: Skin is warm and dry.      Capillary Refill: Capillary refill takes less than 2 seconds.   Neurological:      Comments: Sedated and intubated   Psychiatric:      Comments: Sedated and intubated         Significant Labs:   BMP:   Recent Labs   Lab 10/01/20  0434   GLU 93   *   K 4.4   CL 95   CO2 24   BUN 43*   CREATININE 5.2*   CALCIUM 7.5*   MG 1.9     CBC:   Recent Labs   Lab 09/30/20  0332 09/30/20  0341 10/01/20  0434 10/01/20  0442   WBC 15.77*  --  17.45*  --    HGB 12.5  --  11.6*  --    HCT 39.4 43 36.5* 38   *  --  97*  --        Significant Imaging: I have reviewed and interpreted all pertinent imaging results/findings within the past 24 hours.      Assessment/Plan:      * CHF (congestive heart failure)  Continue current regimen      Anemia,  chronic disease  Continue current regimen      Acute on chronic respiratory failure with hypoxia and hypercapnia  Continue current course      COPD (chronic obstructive pulmonary disease)  Continue current regimen      Stage 5 chronic kidney disease on chronic dialysis  Continue current regimen      Open wound of left heel  Continue current regimen      Chronic kidney disease with end stage renal failure on dialysis  Continue current regimen        VTE Risk Mitigation (From admission, onward)         Ordered     apixaban tablet 2.5 mg  2 times daily      09/24/20 1018     Place MARY ELLEN hose  Until discontinued      09/24/20 1018     IP VTE HIGH RISK PATIENT  Once      09/24/20 1018     Place sequential compression device  Until discontinued      09/24/20 1018                Discharge Planning   SHIKHA:      Code Status: Full Code   Is the patient medically ready for discharge?: No    Reason for patient still in hospital (select all that apply): Patient trending condition and Treatment  Discharge Plan A: Home Health                  Ralph Tilley MD  Department of Hospital Medicine   [facility]

## 2020-10-01 NOTE — CONSULTS
Nephrology Consult Note        Patient Name: Juliane Ramos  MRN: [id number]    Patient Class: IP- Inpatient   Admission Date: 9/24/2020  Length of Stay: 7 days  Date of Service: 10/1/2020    Attending Physician: Ralph Tilley MD  Primary Care Provider: JOS Dumont    Reason for Consult: esrd/anemia/hyperkalemia/hypotension/shpt/tachycardia    SUBJECTIVE:     HPI: 60F hx ESRD HD (T,Th, Sat)  CAD/CABG ,EF20%?, COPD, DM ,chronic hypotension on midodrine daily presents to ED with complaints of having low blood pressure and rapid heart rate. In the emergency department patient had a heart rate in the 130s regular and SBP in 100s  Patient denies shortness of breath, chest pain, fever, chills, abdominal pain, nausea, vomiting. Last dialysis on Tuesday and she did not have any volume removed.  Patient has been given approximately 500 cc of IV fluids in the emergency department at remains tachycardic in the 120s. Cards eval is pending, Amiodarone was started.    9/25 Getting HD for hyperkalemia today, before cardioversion. Continue HD per TTS schedule.  9/26 Seen and examined on Hd today, tolerating well. Continue HD per TTS schedule.   9/28  Intubated.  Sedated. TTS dialysis planned  9/29  Seen today on dialysis.  Tolerating well.  Still intubated, arousable, good eye contact.  No lab results yet today.    10/1  Intubated.  Unresponsive.  Output not recorded    Past Medical History:   Diagnosis Date    Anemia     Anemia in stage 3 chronic kidney disease 11/26/2017    Anticoagulant long-term use     CHF (congestive heart failure)     COPD (chronic obstructive pulmonary disease)     COPD exacerbation 3/10/2020    Coronary artery disease     Diabetes mellitus     Digestive disorder     Encounter for blood transfusion     Essential hypertension 6/24/2017    Hypertension     Low blood pressure     MI (myocardial infarction) 2010    Segmental and subsegmental pulmonary emboli of RLL without acute cor  "pulmonale 2017    Stroke     2005    Thyroid disease     Traumatic subarachnoid hemorrhage 2017    Type 2 diabetes mellitus with stage 3 chronic kidney disease, without long-term current use of insulin 2017     Past Surgical History:   Procedure Laterality Date    ABCESS DRAINAGE Right     Between "little toe" and the one next to it    BREAST SURGERY      Reduction [id number]    CARDIAC SURGERY  2011    CABG 4vessel ring in one valve mitral valve prolapse    CORONARY ARTERY BYPASS GRAFT      DEBRIDEMENT OF FOOT Left 2020    Procedure: DEBRIDEMENT, FOOT;  Surgeon: Dennis Wick DPM;  Location: [facility];  Service: Podiatry;  Laterality: Left;    FOOT SURGERY      4 grafts to left foot    hyperlipidemia      TUBAL LIGATION  1986     Family History   Problem Relation Age of Onset    Arthritis Mother     Heart disease Father     Cancer Father 68        prostae and bladder ca  in     Diabetes Father     Hypertension Father     Depression Sister     Hypertension Son     Diabetes Maternal Grandmother         lost leg    Kidney disease Paternal Grandfather         had kidney removed    Stroke Paternal Grandfather      Social History     Tobacco Use    Smoking status: Never Smoker    Smokeless tobacco: Never Used   Substance Use Topics    Alcohol use: Yes     Alcohol/week: 1.0 - 2.0 standard drinks     Types: 1 - 2 Glasses of wine per week     Comment: "socially" not in awhile    Drug use: No       Review of patient's allergies indicates:  No Known Allergies    Outpatient meds:  No current facility-administered medications on file prior to encounter.      Current Outpatient Medications on File Prior to Encounter   Medication Sig Dispense Refill    gabapentin (NEURONTIN) 100 MG capsule Take 100 mg by mouth 3 (three) times daily.  3    midodrine (PROAMATINE) 5 MG Tab Take 5 mg by mouth 3 (three) times daily with meals.       rosuvastatin (CRESTOR) 10 MG " "tablet Take 10 mg by mouth every evening.       albuterol-ipratropium (DUO-NEB) 2.5 mg-0.5 mg/3 mL nebulizer solution Take 3 mLs by nebulization every 6 (six) hours. Rescue 1 Box 11    apixaban 5 mg Tab Take 1 tablet (5 mg total) by mouth 2 (two) times daily. (Patient taking differently: Take 5 mg by mouth 2 (two) times daily. ) 60 tablet 1    blood sugar diagnostic Strp Test 3-4 times daily PRN      CLARITIN-D 12 HOUR 5-120 mg per tablet Take 1 tablet by mouth once daily.   0    fluticasone (FLONASE) 50 mcg/actuation nasal spray 1 spray by Each Nostril route once daily.   12    insulin aspart (NOVOLOG) 100 unit/mL InPn pen Inject 1-10 Units into the skin 3 (three) times daily. (Patient taking differently: Inject 1-10 Units into the skin 3 (three) times daily. If sugar >150 on sliding scale) 3 mL 1    levothyroxine (SYNTHROID) 50 MCG tablet Take 50 mcg by mouth before breakfast.       pen needle, diabetic (BD ULTRA-FINE OBDULIA PEN NEEDLE) 32 gauge x 5/32" Ndle       ramelteon (ROZEREM) 8 mg tablet Take 8 mg by mouth every evening.      sevelamer carbonate (RENVELA) 800 mg Tab Take 1,600 mg by mouth 3 (three) times daily with meals.      tiotropium (SPIRIVA) 18 mcg inhalation capsule Inhale 1 capsule (18 mcg total) into the lungs once daily. Controller 30 capsule 11    umeclidinium (INCRUSE ELLIPTA) 62.5 mcg/actuation inhalation capsule Inhale 62.5 mcg into the lungs once daily. Controller         Scheduled meds:   amiodarone  200 mg Oral BID    apixaban  2.5 mg Oral BID    citalopram  20 mg Oral Daily    collagenase   Topical (Top) Daily    famotidine (PF)  20 mg Intravenous Daily    fluconazole  100 mg Oral Daily    levothyroxine  50 mcg Oral Before breakfast    metoprolol succinate  25 mg Oral Daily    miconazole NITRATE 2 %   Topical (Top) BID    rosuvastatin  10 mg Oral Daily    sevelamer carbonate  1,600 mg Oral TID WM    sodium zirconium cyclosilicate  5 g Oral Daily       Infusions:   " norepinephrine bitartrate-D5W 0.04 mcg/kg/min (10/01/20 0301)    propofoL 20 mcg/kg/min (09/30/20 0255)       PRN meds:  acetaminophen, albuterol sulfate, dextrose 50%, dextrose 50%, glucagon (human recombinant), glucose, glucose, insulin aspart U-100, ondansetron    Review of Systems:  ROS    OBJECTIVE:     Vital Signs and IO (Last 24H):  Temp:  [98.3 °F (36.8 °C)-99.6 °F (37.6 °C)]   Pulse:  [78-94]   Resp:  [24-34]   BP: ()/(39-59)   SpO2:  [90 %-97 %]   Arterial Line BP: ()/(39-55)   I/O last 3 completed shifts:  In: 567.4 [I.V.:447.4; NG/GT:120]  Out: -     Wt Readings from Last 5 Encounters:   09/26/20 80.7 kg (177 lb 14.6 oz)   09/26/20 80.7 kg (177 lb 14.6 oz)   09/08/20 79.8 kg (176 lb)   09/04/20 80 kg (176 lb 5.9 oz)   09/02/20 80 kg (176 lb 5.9 oz)         Physical Exam:  Physical Exam  Constitutional:       Appearance: She is well-developed. She is not diaphoretic.   HENT:      Head: Normocephalic and atraumatic.   Eyes:      General: No scleral icterus.     Pupils: Pupils are equal, round, and reactive to light.   Neck:      Musculoskeletal: Neck supple.   Cardiovascular:      Rate and Rhythm: Normal rate and regular rhythm.   Pulmonary:      Effort: Pulmonary effort is normal. No respiratory distress.      Breath sounds: No stridor.   Abdominal:      General: There is no distension.      Palpations: Abdomen is soft.   Musculoskeletal: Normal range of motion.         General: Swelling present. No deformity.   Skin:     General: Skin is warm and dry.      Findings: No erythema or rash.   Neurological:      Mental Status: She is alert and oriented to person, place, and time.      Cranial Nerves: No cranial nerve deficit.   Psychiatric:         Behavior: Behavior normal.         Body mass index is 28.72 kg/m².    Laboratory:  Recent Labs   Lab 09/28/20  0338 09/30/20  0332 10/01/20  0434   * 132* 131*   K 4.4 4.3 4.4   CL 95 92* 95   CO2 20* 21* 24   BUN 37* 32* 43*   CREATININE 4.7*  4.1* 5.2*   ESTGFRAFRICA 11.0* 13.0* 9.8*   EGFRNONAA 9.6* 11.3* 8.5*   GLU 68* 76 93       Recent Labs   Lab 09/26/20 0404 09/27/20 1805 09/28/20 0338 09/30/20  0332 10/01/20  0434   CALCIUM 8.2*   < > 8.5* 8.0* 7.6* 7.5*   ALBUMIN 2.5*  --  2.6* 2.2*  --   --    PHOS  --   --   --   --  5.6* 6.0*   MG  --   --  2.1  --   --  1.9    < > = values in this interval not displayed.             No results for input(s): POCTGLUCOSE in the last 168 hours.    Recent Labs   Lab 02/21/18 2120 09/12/18  1715 09/24/20  0630   Hemoglobin A1C 6.1 H 5.2 6.3 H       Recent Labs   Lab 09/28/20 0338 09/30/20 0332 09/30/20  0341 10/01/20  0434 10/01/20  0442   WBC 7.75 15.77*  --  17.45*  --    HGB 10.9* 12.5  --  11.6*  --    HCT 33.4* 39.4 43 36.5* 38   * 116*  --  97*  --    MCV 89 89  --  88  --    MCHC 32.6 31.7*  --  31.8*  --    MONO 10.5  0.8  --   --   --   --        Recent Labs   Lab 09/26/20 0404 09/27/20 1805 09/28/20  0338   BILITOT 0.6 0.4 0.9   PROT 4.9* 5.2* 4.5*   ALBUMIN 2.5* 2.6* 2.2*   ALKPHOS 72 78 61   ALT 21 20 19   AST 29 25 20       Recent Labs   Lab 11/24/17  1837 09/11/18  0246 09/25/20  1434   Color, UA Red A Yellow Yellow   Appearance, UA Cloudy A Hazy A Cloudy A   pH, UA 5.0 5.0 6.0   Specific Gravity, UA >1.030 A >=1.030 A 1.020   Protein, UA 2+ A 2+ A 2+ A   Glucose, UA 1+ A Negative Negative   Ketones, UA Negative Negative Negative   Urobilinogen, UA Negative Negative Negative   Bilirubin (UA) Negative Negative 1+ A   Occult Blood UA 3+ A 1+ A 3+ A   Nitrite, UA Negative Negative Negative   RBC, UA >100 H 5 H 83 H   WBC, UA 0 >100 H >100 H   Bacteria Rare Moderate A Occasional   Hyaline Casts, UA 0 0 1785 A       Recent Labs   Lab 09/29/20  2144 09/30/20  0341 10/01/20  0442   POC PH 7.343 L 7.327 L 7.326 L   POC PCO2 52.5 H 51.2 H 49.7 H   POC HCO3 28.5 H 26.8 25.9   POC PO2 121 H 71 L 129 H   POC SATURATED O2 98 93 L 99   POC BE 3 1 0   Sample ARTERIAL ARTERIAL ARTERIAL        Microbiology Results (last 7 days)     Procedure Component Value Units Date/Time    Blood culture [[id number]] Collected: 09/25/20 1523    Order Status: Completed Specimen: Blood Updated: 09/30/20 1632     Blood Culture, Routine No growth after 5 days.    Culture, Respiratory with Gram Stain [[id number]] Collected: 09/28/20 0045    Order Status: Completed Specimen: Respiratory from Sputum Updated: 09/30/20 0859     Respiratory Culture Reduced normal respiratory nyla     Gram Stain (Respiratory) <10 epithelial cells per low power field.     Gram Stain (Respiratory) Many WBC's     Gram Stain (Respiratory) Rare Gram positive cocci    Urine culture [[id number]]  (Abnormal) Collected: 09/25/20 1434    Order Status: Completed Specimen: Urine Updated: 09/28/20 0811     Urine Culture, Routine PRESUMPTIVE JORDIN ALBICANS  10,000 - 49,999 cfu/ml      Narrative:      Specimen Source->Urine    Urine Culture High Risk [[id number]]     Order Status: No result Specimen: Urine           ASSESSMENT/PLAN:     Active Hospital Problems    Diagnosis  POA    *CHF (congestive heart failure) [I50.9]  Unknown    COPD (chronic obstructive pulmonary disease) [J44.9]  Yes    Acute on chronic respiratory failure with hypoxia and hypercapnia [J96.21, J96.22]  Yes    Anemia, chronic disease [D63.8]  Yes    Stage 5 chronic kidney disease on chronic dialysis [N18.6, Z99.2]  Not Applicable    Open wound of left heel [S91.302A]  Yes    Chronic kidney disease with end stage renal failure on dialysis [N18.6, Z99.2]  Not Applicable      Resolved Hospital Problems    Diagnosis Date Resolved POA    Tachycardia [R00.0] 09/29/2020 Yes     ESRD on HD TTS via AVF  Hyperkalemia, mild  Hypotension  A.Flutter s/p CV 9/25 now on amiodarone, cant give midodrine     Continue current dialysis prescription. UF as tolerated.  Next HD per schedule.  Renal diet - low K, low phos. Continue Lokelma.  No IVs or BP checks on access and/or non-dominant  arm.  Apprecaite cards eval. Holding midodrine for now.       Anemia of CKD  Hgb and HCT are acceptable. Monitor.  Will provide YASHIRA and/or IV iron PRN.    MBD / Secondary HPT  Ca, phos, PTH and vitamin D levels are acceptable.   Phos binders, vitamin D analogues and calcimimetics as needed.    Right arm swelling-new onset  --check duplex us    Thank you for allowing us to participate in the care of your patient!   We will follow the patient and provide recommendations as needed.    Elijah Gonzalez NP    [facility]  [address] [place] [address]  [place] [id number]    [phone] - tel  [phone] - fax    10/1/2020 1:53 PM

## 2020-10-01 NOTE — PLAN OF CARE
This note also relates to the following rows which could not be included:  Oxygen Concentration (%) - Cannot attach notes to unvalidated device data  SpO2 - Cannot attach notes to unvalidated device data  Pulse - Cannot attach notes to unvalidated device data  Ventilation Type - Cannot attach notes to unvalidated device data  Vent Mode - Cannot attach notes to unvalidated device data  Set Rate - Cannot attach notes to unvalidated device data  Vt Set - Cannot attach notes to unvalidated device data  PEEP/CPAP - Cannot attach notes to unvalidated device data  Pressure Support - Cannot attach notes to unvalidated device data  Waveform - Cannot attach notes to unvalidated device data  Peak Flow - Cannot attach notes to unvalidated device data  Plateau Set/Insp. Hold (sec) - Cannot attach notes to unvalidated device data  Trigger Sensitivity Flow/I-Trigger - Cannot attach notes to unvalidated device data  Resp Rate Total - Cannot attach notes to unvalidated device data  Peak Airway Pressure - Cannot attach notes to unvalidated device data  Mean Airway Pressure - Cannot attach notes to unvalidated device data  Plateau Pressure - Cannot attach notes to unvalidated device data  Exhaled Vt - Cannot attach notes to unvalidated device data  Total Ve - Cannot attach notes to unvalidated device data  I:E Ratio Measured - Cannot attach notes to unvalidated device data  Resp Rate High Alarm - Cannot attach notes to unvalidated device data  Press High Alarm - Cannot attach notes to unvalidated device data  Apnea Rate - Cannot attach notes to unvalidated device data  Apnea Volume (mL) - Cannot attach notes to unvalidated device data  Apnea Oxygen Concentration  - Cannot attach notes to unvalidated device data  Apnea Flow Rate (L/min) - Cannot attach notes to unvalidated device data  T Apnea - Cannot attach notes to unvalidated device data       10/01/20 0735   Patient Assessment/Suction   Level of Consciousness (AVPU) responds to  voice   All Lung Fields Breath Sounds coarse;crackles   Suction Method tracheal   $ Suction Charges Inline Suction Procedure Stat Charge   Secretions Amount small   Secretions Color tan   Secretions Characteristics thick   PRE-TX-O2   O2 Device (Oxygen Therapy) ventilator   $ Is the patient on Low Flow Oxygen? Yes   Pulse Oximetry Type Continuous   $ Pulse Oximetry - Multiple Charge Pulse Oximetry - Multiple   Resp (!) 24   Aerosol Therapy   $ Aerosol Therapy Charges PRN treatment not required      Airway Anesthesia 09/29/20   Placement Date/Time: 09/29/20 (c) 9531   Method of Intubation: Video Laryngoscopy  Airway Device: Endotracheal Tube  Mask Ventilation: Easy  Intubated: Postinduction  Airway Device Size: 8.0  Cuffed: Cuffed  Complicating Factors: None  Findings Post-I...   Secured at 23 cm   Measured At Lips   Secured Location Right   Secured by Commercial tube mckinney   Vent Select   $ Ventilator Subsequent 1   Charged w/in last 24h YES   Preset Conventional Ventilator Settings   Vent Type    Respiratory Evaluation   $ Care Plan Tech Time 15 min

## 2020-10-01 NOTE — PROCEDURES
EEG Report    Patient name: Juliane Ramos     [birth year]    Date of Service: 20    Duration: 36 minutes    Requested By: Ehsan Hui M.D.                                                                             Reading Physician: Dano Gay M.D.        Reason for Study: Encephalopathy.     Clinical History: 59yo woman who failed extubation.    AED/sedation: propofol      Exam Notes:  The recording was performed with the standard 10-20 electrode placement with additional ECG electrodes.     Summary:    The background consists of a mixture of theta and delta rhythms, at times sharply contoured, with no posterior dominant rhythm seen. Stage II sleep structures are not seen.     Triphasic waves are frequently seen.    Photic stimulation is performed with no abnormal reactivity noted. Hyperventilation is not performed.     No epileptiform discharges or seizures are captured.    Impression:      This is an abnormal EEG due to diffuse slowing of the background activity consistent with a moderate to severe encephalopathy.       No

## 2020-10-01 NOTE — PROGRESS NOTES
Progress Note  Cardiology    Admit Date: 9/24/2020   LOS: 7 days     Follow-up For:  Respiratory failure; CHF; pulmonary hypertension; chronic renal failure on dialysis    Scheduled Meds:   amiodarone  200 mg Oral BID    apixaban  2.5 mg Oral BID    citalopram  20 mg Oral Daily    collagenase   Topical (Top) Daily    famotidine (PF)  20 mg Intravenous Daily    fluconazole  100 mg Oral Daily    levothyroxine  50 mcg Oral Before breakfast    metoprolol succinate  25 mg Oral Daily    miconazole NITRATE 2 %   Topical (Top) BID    mupirocin   Nasal BID    rosuvastatin  10 mg Oral Daily    sevelamer carbonate  1,600 mg Oral TID WM    sodium zirconium cyclosilicate  5 g Oral Daily     Continuous Infusions:  PRN Meds:acetaminophen, albuterol sulfate, dextrose 50%, dextrose 50%, glucagon (human recombinant), glucose, glucose, insulin aspart U-100, ondansetron    Review of patient's allergies indicates:  No Known Allergies    SUBJECTIVE:     Interval History: Patient intubated.  FiO2 0.35.    Review of Systems  Intubated    OBJECTIVE:     Vital Signs (Most Recent)  Temp: 97.8 °F (36.6 °C) (10/01/20 1700)  Pulse: 95 (10/01/20 1800)  Resp: (!) 25 (10/01/20 1410)  BP: (!) 81/39 (10/01/20 1800)  SpO2: 95 % (10/01/20 1800)    Vital Signs Range (Last 24H):  Temp:  [97.8 °F (36.6 °C)-99.6 °F (37.6 °C)]   Pulse:  [77-96]   Resp:  [24-26]   BP: ()/(39-69)   SpO2:  [91 %-97 %]   Arterial Line BP: (100-130)/(39-55)       Physical Exam:  Neck: no carotid bruit and supple, symmetrical, trachea midline  Lungs: clear to auscultation bilaterally, normal respiratory effort  Heart: regular rate and rhythm, S1, S2 normal, no murmur, click, rub or gallop  Abdomen: soft, non-tender; bowel sounds normal; no masses,  no organomegaly  Extremities: Extremities normal, atraumatic, no cyanosis, clubbing, or edema    Recent Results (from the past 24 hour(s))   Basic metabolic panel    Collection Time: 10/01/20  4:34 AM   Result Value  Ref Range    Sodium 131 (L) 136 - 145 mmol/L    Potassium 4.4 3.5 - 5.1 mmol/L    Chloride 95 95 - 110 mmol/L    CO2 24 23 - 29 mmol/L    Glucose 93 70 - 110 mg/dL    BUN, Bld 43 (H) 6 - 20 mg/dL    Creatinine 5.2 (H) 0.5 - 1.4 mg/dL    Calcium 7.5 (L) 8.7 - 10.5 mg/dL    Anion Gap 12 8 - 16 mmol/L    eGFR if African American 9.8 (A) >60 mL/min/1.73 m^2    eGFR if non African American 8.5 (A) >60 mL/min/1.73 m^2   CBC Without Differential    Collection Time: 10/01/20  4:34 AM   Result Value Ref Range    WBC 17.45 (H) 3.90 - 12.70 K/uL    RBC 4.14 4.00 - 5.40 M/uL    Hemoglobin 11.6 (L) 12.0 - 16.0 g/dL    Hematocrit 36.5 (L) 37.0 - 48.5 %    Mean Corpuscular Volume 88 82 - 98 fL    Mean Corpuscular Hemoglobin 28.0 27.0 - 31.0 pg    Mean Corpuscular Hemoglobin Conc 31.8 (L) 32.0 - 36.0 g/dL    RDW 16.3 (H) 11.5 - 14.5 %    Platelets 97 (L) 150 - 350 K/uL    MPV 10.9 9.2 - 12.9 fL   Prealbumin    Collection Time: 10/01/20  4:34 AM   Result Value Ref Range    Prealbumin 3 (L) 20.0 - 43.0 mg/dL   Phosphorus    Collection Time: 10/01/20  4:34 AM   Result Value Ref Range    Phosphorus 6.0 (H) 2.7 - 4.5 mg/dL   Magnesium    Collection Time: 10/01/20  4:34 AM   Result Value Ref Range    Magnesium 1.9 1.6 - 2.6 mg/dL   ISTAT PROCEDURE    Collection Time: 10/01/20  4:42 AM   Result Value Ref Range    POC PH 7.326 (L) 7.35 - 7.45    POC PCO2 49.7 (H) 35 - 45 mmHg    POC PO2 129 (H) 80 - 100 mmHg    POC HCO3 25.9 24 - 28 mmol/L    POC BE 0 -2 to 2 mmol/L    POC SATURATED O2 99 95 - 100 %    POC Glucose 93 70 - 110 mg/dL    POC Sodium 130 (L) 136 - 145 mmol/L    POC Potassium 4.3 3.5 - 5.1 mmol/L    POC TCO2 27 23 - 27 mmol/L    POC Ionized Calcium 1.04 (L) 1.06 - 1.42 mmol/L    POC Hematocrit 38 36 - 54 %PCV    Rate 24     Sample ARTERIAL     Site Tanika/UAC     Allens Test N/A     DelSys Adult Vent     Mode AC/PRVC     Vt 390     PEEP 5     PiP 27     FiO2 50     Min Vol 10     Sp02 95    POCT glucose    Collection Time:  10/01/20  5:06 PM   Result Value Ref Range    POC Glucose 102 70 - 110       Diagnostic Results:  Labs: Reviewed  ECG: Reviewed  X-Ray: Reviewed    ASSESSMENT/PLAN:     The patient is just off dialysis.  She is negative 4 L today.  Sinus tachycardia at 95 B p.m..  The patient required Jasper-Synephrine to support blood pressure for short period of time during dialysis.  Hopefully the negative fluid balance will help with possible wean/extubation in the morning.

## 2020-10-01 NOTE — SUBJECTIVE & OBJECTIVE
Interval History: persisting respiratory failure    Review of Systems   Unable to perform ROS: Intubated     Objective:     Vital Signs (Most Recent):  Temp: 97.8 °F (36.6 °C) (10/01/20 1400)  Pulse: 92 (10/01/20 1730)  Resp: (!) 25 (10/01/20 1410)  BP: (!) 91/44 (10/01/20 1530)  SpO2: (!) 92 % (10/01/20 1724) Vital Signs (24h Range):  Temp:  [97.8 °F (36.6 °C)-99.6 °F (37.6 °C)] 97.8 °F (36.6 °C)  Pulse:  [77-94] 92  Resp:  [24-26] 25  SpO2:  [91 %-97 %] 92 %  BP: ()/(39-69) 91/44  Arterial Line BP: (100-130)/(39-55) 123/49     Weight: 80.7 kg (177 lb 14.6 oz)  Body mass index is 28.72 kg/m².    Intake/Output Summary (Last 24 hours) at 10/1/2020 1746  Last data filed at 10/1/2020 0600  Gross per 24 hour   Intake 122.98 ml   Output --   Net 122.98 ml      Physical Exam  Constitutional:       Comments: Sedated and intubated   HENT:      Head: Normocephalic.      Right Ear: External ear normal.      Left Ear: External ear normal.      Mouth/Throat:      Mouth: Mucous membranes are moist.   Eyes:      Conjunctiva/sclera: Conjunctivae normal.   Cardiovascular:      Rate and Rhythm: Regular rhythm.   Pulmonary:      Comments: Sedated and intubated  Decreased entry bases without adventitious sounds  Abdominal:      General: Bowel sounds are normal.      Palpations: Abdomen is soft.   Genitourinary:     Comments: Chi   Musculoskeletal:      Comments: Unable to fully assess   Skin:     General: Skin is warm and dry.      Capillary Refill: Capillary refill takes less than 2 seconds.   Neurological:      Comments: Sedated and intubated   Psychiatric:      Comments: Sedated and intubated         Significant Labs:   BMP:   Recent Labs   Lab 10/01/20  0434   GLU 93   *   K 4.4   CL 95   CO2 24   BUN 43*   CREATININE 5.2*   CALCIUM 7.5*   MG 1.9     CBC:   Recent Labs   Lab 09/30/20  0332 09/30/20  0341 10/01/20  0434 10/01/20  0442   WBC 15.77*  --  17.45*  --    HGB 12.5  --  11.6*  --    HCT 39.4 43 36.5* 38    *  --  97*  --        Significant Imaging: I have reviewed and interpreted all pertinent imaging results/findings within the past 24 hours.

## 2020-10-01 NOTE — PLAN OF CARE
09/30/20 1701   Transport Patient   $ Transport Tech Time Charge 1 hour and 30 minutes   Oxygen Method Transport Vent   Transport to MRI   Toleration Fair   Ambu and mask at bedside Yes

## 2020-10-02 NOTE — PROGRESS NOTES
Progress Note  Cardiology    Admit Date: 9/24/2020   LOS: 8 days     Follow-up For:  Atrial flutter with 2:1 AV block; amiodarone and DC cardioversion; CHF.  respiratory failure and intubation. CRF; Dialysis    Scheduled Meds:   sodium chloride 0.9%   Intravenous Once    sodium chloride 0.9%   Intravenous Once    amiodarone  200 mg Oral BID    apixaban  2.5 mg Oral BID    citalopram  20 mg Oral Daily    collagenase   Topical (Top) Daily    famotidine (PF)  20 mg Intravenous Daily    fluconazole  100 mg Oral Daily    levothyroxine  50 mcg Oral Before breakfast    metoprolol succinate  25 mg Oral Daily    miconazole NITRATE 2 %   Topical (Top) BID    mupirocin   Nasal BID    rosuvastatin  10 mg Oral Daily    sevelamer carbonate  1,600 mg Oral TID WM    sodium zirconium cyclosilicate  5 g Oral Daily     Continuous Infusions:   sodium chloride 0.9% 10 mL/hr at 10/01/20 2125    norepinephrine bitartrate-D5W 0.02 mcg/kg/min (10/02/20 0800)     PRN Meds:sodium chloride 0.9%, sodium chloride 0.9%, acetaminophen, albuterol sulfate, dextrose 50%, dextrose 50%, glucagon (human recombinant), glucose, glucose, insulin aspart U-100, ondansetron    Review of patient's allergies indicates:  No Known Allergies    SUBJECTIVE:     Interval History: Patient intubated.  FiO2 0.35.  Off sedation; appears comfortable    Review of Systems  Intubated    OBJECTIVE:     Vital Signs (Most Recent)  Temp: 97.8 °F (36.6 °C) (10/02/20 1230)  Pulse: 76 (10/02/20 1230)  Resp: (!) 24 (10/02/20 1236)  BP: (!) 112/54 (10/02/20 1230)  SpO2: 100 % (10/02/20 1230)    Vital Signs Range (Last 24H):  Temp:  [97.6 °F (36.4 °C)-98.3 °F (36.8 °C)]   Pulse:  []   Resp:  [24-25]   BP: ()/(39-80)   SpO2:  [89 %-100 %]   Arterial Line BP: (107-154)/(38-58)       Physical Exam:  Neck: no carotid bruit, no JVD and supple, symmetrical, trachea midline  Lungs: clear to auscultation bilaterally, normal respiratory effort  Heart: regular rate  and rhythm, S1, S2 normal, no murmur, click, rub or gallop  Abdomen: soft, non-tender; bowel sounds normal; no masses,  no organomegaly  Extremities: Extremities normal, atraumatic, no cyanosis, clubbing, or edema    Recent Results (from the past 24 hour(s))   POCT glucose    Collection Time: 10/01/20  5:06 PM   Result Value Ref Range    POC Glucose 102 70 - 110   POCT glucose    Collection Time: 10/01/20  9:37 PM   Result Value Ref Range    POC Glucose 106 70 - 110   Magnesium    Collection Time: 10/02/20  3:56 AM   Result Value Ref Range    Magnesium 2.0 1.6 - 2.6 mg/dL   Basic metabolic panel    Collection Time: 10/02/20  3:56 AM   Result Value Ref Range    Sodium 133 (L) 136 - 145 mmol/L    Potassium 4.2 3.5 - 5.1 mmol/L    Chloride 100 95 - 110 mmol/L    CO2 23 23 - 29 mmol/L    Glucose 114 (H) 70 - 110 mg/dL    BUN, Bld 29 (H) 6 - 20 mg/dL    Creatinine 3.9 (H) 0.5 - 1.4 mg/dL    Calcium 7.8 (L) 8.7 - 10.5 mg/dL    Anion Gap 10 8 - 16 mmol/L    eGFR if African American 13.8 (A) >60 mL/min/1.73 m^2    eGFR if non African American 12.0 (A) >60 mL/min/1.73 m^2   CBC Without Differential    Collection Time: 10/02/20  3:56 AM   Result Value Ref Range    WBC 14.41 (H) 3.90 - 12.70 K/uL    RBC 4.21 4.00 - 5.40 M/uL    Hemoglobin 12.1 12.0 - 16.0 g/dL    Hematocrit 37.5 37.0 - 48.5 %    Mean Corpuscular Volume 89 82 - 98 fL    Mean Corpuscular Hemoglobin 28.7 27.0 - 31.0 pg    Mean Corpuscular Hemoglobin Conc 32.3 32.0 - 36.0 g/dL    RDW 16.1 (H) 11.5 - 14.5 %    Platelets 118 (L) 150 - 350 K/uL    MPV 10.5 9.2 - 12.9 fL   POCT glucose    Collection Time: 10/02/20 11:26 AM   Result Value Ref Range    POC Glucose 106 70 - 110       Diagnostic Results:  Labs: Reviewed    ASSESSMENT/PLAN:     The patient is fairly comfortable; FiO2 0.35.  O2 sats 100%.  Patient was negative 4 L on dialysis  10/01/2020.  Amiodarone 200 mg b.i.d.; the patient maintains NSR.

## 2020-10-02 NOTE — SUBJECTIVE & OBJECTIVE
Interval History: unchanged     Review of Systems   Unable to perform ROS: Intubated     Objective:     Vital Signs (Most Recent):  Temp: 98.3 °F (36.8 °C) (10/02/20 0400)  Pulse: 84 (10/02/20 0800)  Resp: (!) 24 (10/02/20 0400)  BP: (!) 128/59 (10/02/20 0800)  SpO2: 100 % (10/02/20 0800) Vital Signs (24h Range):  Temp:  [97.7 °F (36.5 °C)-98.5 °F (36.9 °C)] 98.3 °F (36.8 °C)  Pulse:  [] 84  Resp:  [24-25] 24  SpO2:  [89 %-100 %] 100 %  BP: ()/(39-73) 128/59  Arterial Line BP: (107-144)/(38-57) 140/43     Weight: 80.7 kg (177 lb 14.6 oz)  Body mass index is 28.72 kg/m².    Intake/Output Summary (Last 24 hours) at 10/2/2020 0823  Last data filed at 10/2/2020 0600  Gross per 24 hour   Intake 297.03 ml   Output --   Net 297.03 ml      Physical Exam  Vitals signs and nursing note reviewed.   Constitutional:       Appearance: She is well-developed.      Comments: Sedated and intubated   HENT:      Head: Normocephalic and atraumatic.      Right Ear: External ear normal.      Left Ear: External ear normal.      Nose: Nose normal.   Eyes:      Conjunctiva/sclera: Conjunctivae normal.   Neck:      Musculoskeletal: Normal range of motion and neck supple.   Cardiovascular:      Rate and Rhythm: Normal rate and regular rhythm.      Heart sounds: Normal heart sounds.   Pulmonary:      Comments: Sedated and intubated  Decreased entry with expiratory wheezes mid-zones, no crepitations nor large airway sounds appreciated  Abdominal:      General: Bowel sounds are normal.      Palpations: Abdomen is soft.      Comments: Feeds    Genitourinary:     Comments: Chi   Musculoskeletal:      Comments: Unable to assess   Skin:     General: Skin is warm and dry.      Capillary Refill: Capillary refill takes less than 2 seconds.   Neurological:      Mental Status: She is alert.      Comments: Sedated and intubated   Psychiatric:      Comments: Sedated and intubated         Significant Labs:   BMP:   Recent Labs   Lab  10/02/20  0356   *   *   K 4.2      CO2 23   BUN 29*   CREATININE 3.9*   CALCIUM 7.8*   MG 2.0     CBC:   Recent Labs   Lab 10/01/20  0434 10/01/20  0442 10/02/20  0356   WBC 17.45*  --  14.41*   HGB 11.6*  --  12.1   HCT 36.5* 38 37.5   PLT 97*  --  118*       Significant Imaging: I have reviewed and interpreted all pertinent imaging results/findings within the past 24 hours.

## 2020-10-02 NOTE — CONSULTS
Nephrology Consult Note        Patient Name: Juliane Ramos  MRN: [id number]    Patient Class: IP- Inpatient   Admission Date: 9/24/2020  Length of Stay: 8 days  Date of Service: 10/2/2020    Attending Physician: Ralph Tilley MD  Primary Care Provider: JOS Dumont    Reason for Consult: esrd/anemia/hyperkalemia/hypotension/shpt/tachycardia    SUBJECTIVE:     HPI: 60F hx ESRD HD (T,Th, Sat)  CAD/CABG ,EF20%?, COPD, DM ,chronic hypotension on midodrine daily presents to ED with complaints of having low blood pressure and rapid heart rate. In the emergency department patient had a heart rate in the 130s regular and SBP in 100s  Patient denies shortness of breath, chest pain, fever, chills, abdominal pain, nausea, vomiting. Last dialysis on Tuesday and she did not have any volume removed.  Patient has been given approximately 500 cc of IV fluids in the emergency department at remains tachycardic in the 120s. Cards eval is pending, Amiodarone was started.    9/25 Getting HD for hyperkalemia today, before cardioversion. Continue HD per TTS schedule.  9/26 Seen and examined on Hd today, tolerating well. Continue HD per TTS schedule.   9/28  Intubated.  Sedated. TTS dialysis planned  9/29  Seen today on dialysis.  Tolerating well.  Still intubated, arousable, good eye contact.  No lab results yet today.    10/1  Intubated.  Unresponsive.     10/2  Makes eye contact.  No distress.  afebrile    Past Medical History:   Diagnosis Date    Anemia     Anemia in stage 3 chronic kidney disease 11/26/2017    Anticoagulant long-term use     CHF (congestive heart failure)     COPD (chronic obstructive pulmonary disease)     COPD exacerbation 3/10/2020    Coronary artery disease     Diabetes mellitus     Digestive disorder     Encounter for blood transfusion     Essential hypertension 6/24/2017    Hypertension     Low blood pressure     MI (myocardial infarction) 2010    Segmental and subsegmental pulmonary  "emboli of RLL without acute cor pulmonale 2017    Stroke     2005    Thyroid disease     Traumatic subarachnoid hemorrhage 2017    Type 2 diabetes mellitus with stage 3 chronic kidney disease, without long-term current use of insulin 2017     Past Surgical History:   Procedure Laterality Date    ABCESS DRAINAGE Right     Between "little toe" and the one next to it    BREAST SURGERY      Reduction [id number]    CARDIAC SURGERY  2011    CABG 4vessel ring in one valve mitral valve prolapse    CORONARY ARTERY BYPASS GRAFT      DEBRIDEMENT OF FOOT Left 2020    Procedure: DEBRIDEMENT, FOOT;  Surgeon: Dennis Wick DPM;  Location: [facility];  Service: Podiatry;  Laterality: Left;    FOOT SURGERY      4 grafts to left foot    hyperlipidemia      TUBAL LIGATION  1986     Family History   Problem Relation Age of Onset    Arthritis Mother     Heart disease Father     Cancer Father 68        prostae and bladder ca  in     Diabetes Father     Hypertension Father     Depression Sister     Hypertension Son     Diabetes Maternal Grandmother         lost leg    Kidney disease Paternal Grandfather         had kidney removed    Stroke Paternal Grandfather      Social History     Tobacco Use    Smoking status: Never Smoker    Smokeless tobacco: Never Used   Substance Use Topics    Alcohol use: Yes     Alcohol/week: 1.0 - 2.0 standard drinks     Types: 1 - 2 Glasses of wine per week     Comment: "socially" not in awhile    Drug use: No       Review of patient's allergies indicates:  No Known Allergies    Outpatient meds:  No current facility-administered medications on file prior to encounter.      Current Outpatient Medications on File Prior to Encounter   Medication Sig Dispense Refill    gabapentin (NEURONTIN) 100 MG capsule Take 100 mg by mouth 3 (three) times daily.  3    midodrine (PROAMATINE) 5 MG Tab Take 5 mg by mouth 3 (three) times daily with meals.       " "rosuvastatin (CRESTOR) 10 MG tablet Take 10 mg by mouth every evening.       albuterol-ipratropium (DUO-NEB) 2.5 mg-0.5 mg/3 mL nebulizer solution Take 3 mLs by nebulization every 6 (six) hours. Rescue 1 Box 11    apixaban 5 mg Tab Take 1 tablet (5 mg total) by mouth 2 (two) times daily. (Patient taking differently: Take 5 mg by mouth 2 (two) times daily. ) 60 tablet 1    blood sugar diagnostic Strp Test 3-4 times daily PRN      CLARITIN-D 12 HOUR 5-120 mg per tablet Take 1 tablet by mouth once daily.   0    fluticasone (FLONASE) 50 mcg/actuation nasal spray 1 spray by Each Nostril route once daily.   12    insulin aspart (NOVOLOG) 100 unit/mL InPn pen Inject 1-10 Units into the skin 3 (three) times daily. (Patient taking differently: Inject 1-10 Units into the skin 3 (three) times daily. If sugar >150 on sliding scale) 3 mL 1    levothyroxine (SYNTHROID) 50 MCG tablet Take 50 mcg by mouth before breakfast.       pen needle, diabetic (BD ULTRA-FINE OBDULIA PEN NEEDLE) 32 gauge x 5/32" Ndle       ramelteon (ROZEREM) 8 mg tablet Take 8 mg by mouth every evening.      sevelamer carbonate (RENVELA) 800 mg Tab Take 1,600 mg by mouth 3 (three) times daily with meals.      tiotropium (SPIRIVA) 18 mcg inhalation capsule Inhale 1 capsule (18 mcg total) into the lungs once daily. Controller 30 capsule 11    umeclidinium (INCRUSE ELLIPTA) 62.5 mcg/actuation inhalation capsule Inhale 62.5 mcg into the lungs once daily. Controller         Scheduled meds:   sodium chloride 0.9%   Intravenous Once    sodium chloride 0.9%   Intravenous Once    amiodarone  200 mg Oral BID    apixaban  2.5 mg Oral BID    citalopram  20 mg Oral Daily    collagenase   Topical (Top) Daily    famotidine (PF)  20 mg Intravenous Daily    fluconazole  100 mg Oral Daily    levothyroxine  50 mcg Oral Before breakfast    metoprolol succinate  25 mg Oral Daily    miconazole NITRATE 2 %   Topical (Top) BID    mupirocin   Nasal BID    " rosuvastatin  10 mg Oral Daily    sevelamer carbonate  1,600 mg Oral TID WM    sodium zirconium cyclosilicate  5 g Oral Daily       Infusions:   sodium chloride 0.9% 10 mL/hr at 10/01/20 2125    norepinephrine bitartrate-D5W 0.02 mcg/kg/min (10/02/20 0800)       PRN meds:  sodium chloride 0.9%, sodium chloride 0.9%, acetaminophen, albuterol sulfate, dextrose 50%, dextrose 50%, glucagon (human recombinant), glucose, glucose, insulin aspart U-100, ondansetron    Review of Systems:  ROS    OBJECTIVE:     Vital Signs and IO (Last 24H):  Temp:  [97.6 °F (36.4 °C)-98.3 °F (36.8 °C)]   Pulse:  []   Resp:  [24-25]   BP: ()/(39-80)   SpO2:  [89 %-100 %]   Arterial Line BP: (107-154)/(38-58)   I/O last 3 completed shifts:  In: 398.8 [I.V.:248.8; NG/GT:150]  Out: -     Wt Readings from Last 5 Encounters:   09/26/20 80.7 kg (177 lb 14.6 oz)   09/26/20 80.7 kg (177 lb 14.6 oz)   09/08/20 79.8 kg (176 lb)   09/04/20 80 kg (176 lb 5.9 oz)   09/02/20 80 kg (176 lb 5.9 oz)         Physical Exam:  Physical Exam  Constitutional:       Appearance: She is well-developed. She is not diaphoretic.   HENT:      Head: Normocephalic and atraumatic.   Eyes:      General: No scleral icterus.     Pupils: Pupils are equal, round, and reactive to light.   Neck:      Musculoskeletal: Neck supple.   Cardiovascular:      Rate and Rhythm: Normal rate and regular rhythm.   Pulmonary:      Effort: Pulmonary effort is normal. No respiratory distress.      Breath sounds: No stridor.   Abdominal:      General: There is no distension.      Palpations: Abdomen is soft.   Musculoskeletal: Normal range of motion.         General: Swelling present. No deformity.   Skin:     General: Skin is warm and dry.      Findings: No erythema or rash.   Neurological:      Mental Status: She is alert and oriented to person, place, and time.      Cranial Nerves: No cranial nerve deficit.   Psychiatric:         Behavior: Behavior normal.         Body mass  index is 28.72 kg/m².    Laboratory:  Recent Labs   Lab 09/30/20  0332 10/01/20  0434 10/02/20  0356   * 131* 133*   K 4.3 4.4 4.2   CL 92* 95 100   CO2 21* 24 23   BUN 32* 43* 29*   CREATININE 4.1* 5.2* 3.9*   ESTGFRAFRICA 13.0* 9.8* 13.8*   EGFRNONAA 11.3* 8.5* 12.0*   GLU 76 93 114*       Recent Labs   Lab 09/26/20  0404  09/27/20  1805 09/28/20  0338 09/30/20  0332 10/01/20  0434 10/02/20  0356   CALCIUM 8.2*   < > 8.5* 8.0* 7.6* 7.5* 7.8*   ALBUMIN 2.5*  --  2.6* 2.2*  --   --   --    PHOS  --   --   --   --  5.6* 6.0*  --    MG  --   --  2.1  --   --  1.9 2.0    < > = values in this interval not displayed.             No results for input(s): POCTGLUCOSE in the last 168 hours.    Recent Labs   Lab 02/21/18 2120 09/12/18 1715 09/24/20  0630   Hemoglobin A1C 6.1 H 5.2 6.3 H       Recent Labs   Lab 09/28/20  0338 09/30/20  0332  10/01/20  0434 10/01/20  0442 10/02/20  0356   WBC 7.75 15.77*  --  17.45*  --  14.41*   HGB 10.9* 12.5  --  11.6*  --  12.1   HCT 33.4* 39.4   < > 36.5* 38 37.5   * 116*  --  97*  --  118*   MCV 89 89  --  88  --  89   MCHC 32.6 31.7*  --  31.8*  --  32.3   MONO 10.5  0.8  --   --   --   --   --     < > = values in this interval not displayed.       Recent Labs   Lab 09/26/20  0404 09/27/20  1805 09/28/20  0338   BILITOT 0.6 0.4 0.9   PROT 4.9* 5.2* 4.5*   ALBUMIN 2.5* 2.6* 2.2*   ALKPHOS 72 78 61   ALT 21 20 19   AST 29 25 20       Recent Labs   Lab 11/24/17  1837 09/11/18  0246 09/25/20  1434   Color, UA Red A Yellow Yellow   Appearance, UA Cloudy A Hazy A Cloudy A   pH, UA 5.0 5.0 6.0   Specific Gravity, UA >1.030 A >=1.030 A 1.020   Protein, UA 2+ A 2+ A 2+ A   Glucose, UA 1+ A Negative Negative   Ketones, UA Negative Negative Negative   Urobilinogen, UA Negative Negative Negative   Bilirubin (UA) Negative Negative 1+ A   Occult Blood UA 3+ A 1+ A 3+ A   Nitrite, UA Negative Negative Negative   RBC, UA >100 H 5 H 83 H   WBC, UA 0 >100 H >100 H   Bacteria Rare Moderate  A Occasional   Hyaline Casts, UA 0 0 1785 A       Recent Labs   Lab 09/29/20  2144 09/30/20  0341 10/01/20  0442   POC PH 7.343 L 7.327 L 7.326 L   POC PCO2 52.5 H 51.2 H 49.7 H   POC HCO3 28.5 H 26.8 25.9   POC PO2 121 H 71 L 129 H   POC SATURATED O2 98 93 L 99   POC BE 3 1 0   Sample ARTERIAL ARTERIAL ARTERIAL       Microbiology Results (last 7 days)     Procedure Component Value Units Date/Time    Blood culture [[id number]] Collected: 09/25/20 1523    Order Status: Completed Specimen: Blood Updated: 09/30/20 1632     Blood Culture, Routine No growth after 5 days.    Culture, Respiratory with Gram Stain [[id number]] Collected: 09/28/20 0045    Order Status: Completed Specimen: Respiratory from Sputum Updated: 09/30/20 0859     Respiratory Culture Reduced normal respiratory nyla     Gram Stain (Respiratory) <10 epithelial cells per low power field.     Gram Stain (Respiratory) Many WBC's     Gram Stain (Respiratory) Rare Gram positive cocci    Urine culture [[id number]]  (Abnormal) Collected: 09/25/20 1434    Order Status: Completed Specimen: Urine Updated: 09/28/20 0811     Urine Culture, Routine PRESUMPTIVE JORDIN ALBICANS  10,000 - 49,999 cfu/ml      Narrative:      Specimen Source->Urine    Urine Culture High Risk [[id number]]     Order Status: No result Specimen: Urine           ASSESSMENT/PLAN:     Active Hospital Problems    Diagnosis  POA    *CHF (congestive heart failure) [I50.9]  Unknown    COPD (chronic obstructive pulmonary disease) [J44.9]  Yes    Acute on chronic respiratory failure with hypoxia and hypercapnia [J96.21, J96.22]  Yes    Anemia, chronic disease [D63.8]  Yes    Stage 5 chronic kidney disease on chronic dialysis [N18.6, Z99.2]  Not Applicable    Open wound of left heel [S91.302A]  Yes    Chronic kidney disease with end stage renal failure on dialysis [N18.6, Z99.2]  Not Applicable      Resolved Hospital Problems    Diagnosis Date Resolved POA    Tachycardia [R00.0] 09/29/2020 Yes      ESRD on HD TTS via AVF  Hyperkalemia, mild  Hypotension  A.Flutter s/p CV 9/25 now on amiodarone, cant give midodrine     Continue current dialysis prescription. UF as tolerated.  Next HD per schedule.  Renal diet - low K, low phos. Continue Lokelma.  No IVs or BP checks on access and/or non-dominant arm.  Apprecaite cards eval. Holding midodrine for now.       Anemia of CKD  Hgb and HCT are acceptable. Monitor.  Will provide YASHIRA and/or IV iron PRN.    MBD / Secondary HPT  Ca, phos, PTH and vitamin D levels are acceptable.   Phos binders, vitamin D analogues and calcimimetics as needed.    Right arm swelling-new onset  --check duplex us ordered but not done yet    Thank you for allowing us to participate in the care of your patient!   We will follow the patient and provide recommendations as needed.    Elijah Gonzalez NP    [facility]  [address] [place] [address]  [place] [address]    [phone] - tel  [phone] - fax    10/2/2020 1:53 PM

## 2020-10-02 NOTE — PLAN OF CARE
Unable to discuss plan of care with patient due to level of consciousness. Intubated, but not sedated. Answers simple questions with head nods appropriately. Skin cleansed and arms padded.

## 2020-10-02 NOTE — PROGRESS NOTES
"Formerly [facility], [facility]  Adult Nutrition   Progress Note (Nutrition Support Management)    SUMMARY     Recommendations  Recommendation/Intervention:   1) Continue to advance TF to goal rate 40ml/hr since she is off of propfol to provide 1728kcal, 78gm Protein, 698ml free fl.   2) RD will continue to monitor TF rate/tolerance, labs, weight and will make recommendations PRN.  Goals: 1. TF to increase to goal rate without issues.    Nutrition Goal Status: progressing towards goal  Communication of RD Recs: reviewed with RN    Dietitian Rounds Brief  TF advanced to 20ml/hr. Pt remains intubated, no longer on propofol. No ready to wean, per MD's note.    Reason for Assessment  Reason For Assessment: RD follow-up  Diagnosis: (Tachycardia)  Relevant Medical History: ESRD on HD, CHF, COPD, CAD, T2DM, HTN, MI, stroke, thyroid disease  Interdisciplinary Rounds: attended    Nutrition Risk Screen  Nutrition Risk Screen: no indicators present    Nutrition/Diet History  Food Allergies: NKFA  Factors Affecting Nutritional Intake: NPO, on mechanical ventilation    Anthropometrics  Temp: 97.8 °F (36.6 °C)  Height Method: Stated  Height: 5' 6" (167.6 cm)  Height (inches): 66 in  Weight Method: Bed Scale  Weight: 80.7 kg (177 lb 14.6 oz)  Weight (lb): 177.91 lb  Ideal Body Weight (IBW), Female: 130 lb  % Ideal Body Weight, Female (lb): 136.85 %  BMI (Calculated): 28.7  BMI Grade: 25 - 29.9 - overweight     Weight History:  Wt Readings from Last 10 Encounters:   09/26/20 80.7 kg (177 lb 14.6 oz)   09/26/20 80.7 kg (177 lb 14.6 oz)   09/08/20 79.8 kg (176 lb)   09/04/20 80 kg (176 lb 5.9 oz)   09/02/20 80 kg (176 lb 5.9 oz)   08/01/20 82.1 kg (181 lb)   06/22/20 80.7 kg (178 lb)   05/26/20 80.7 kg (178 lb)   05/18/20 80.7 kg (178 lb)   05/13/20 81 kg (178 lb 9.2 oz)       Lab/Procedures/Meds: Pertinent Labs Reviewed    Clinical Chemistry:  Recent Labs   Lab 09/28/20  0338 09/30/20  0332 10/01/20  0434 10/02/20  0356   * 132* " 131* 133*   K 4.4 4.3 4.4 4.2   CL 95 92* 95 100   CO2 20* 21* 24 23   GLU 68* 76 93 114*   BUN 37* 32* 43* 29*   CREATININE 4.7* 4.1* 5.2* 3.9*   CALCIUM 8.0* 7.6* 7.5* 7.8*   PROT 4.5*  --   --   --    ALBUMIN 2.2*  --   --   --    BILITOT 0.9  --   --   --    ALKPHOS 61  --   --   --    AST 20  --   --   --    ALT 19  --   --   --    ANIONGAP 13 19* 12 10   ESTGFRAFRICA 11.0* 13.0* 9.8* 13.8*   EGFRNONAA 9.6* 11.3* 8.5* 12.0*   MG  --   --  1.9 2.0   PHOS  --  5.6* 6.0*  --        CBC:   Recent Labs   Lab 10/02/20  0356   WBC 14.41*   RBC 4.21   HGB 12.1   HCT 37.5   *   MCV 89   MCH 28.7   MCHC 32.3     Medications: Pertinent Medications reviewed    Scheduled Meds:   sodium chloride 0.9%   Intravenous Once    sodium chloride 0.9%   Intravenous Once    amiodarone  200 mg Oral BID    apixaban  2.5 mg Oral BID    citalopram  20 mg Oral Daily    collagenase   Topical (Top) Daily    famotidine (PF)  20 mg Intravenous Daily    fluconazole  100 mg Oral Daily    levothyroxine  50 mcg Oral Before breakfast    metoprolol succinate  25 mg Oral Daily    miconazole NITRATE 2 %   Topical (Top) BID    mupirocin   Nasal BID    rosuvastatin  10 mg Oral Daily    sevelamer carbonate  1,600 mg Oral TID WM    sodium zirconium cyclosilicate  5 g Oral Daily       Continuous Infusions:   sodium chloride 0.9% 10 mL/hr at 10/01/20 2125    norepinephrine bitartrate-D5W 0.02 mcg/kg/min (10/02/20 0800)       PRN Meds:.sodium chloride 0.9%, sodium chloride 0.9%, acetaminophen, albuterol sulfate, dextrose 50%, dextrose 50%, glucagon (human recombinant), glucose, glucose, insulin aspart U-100, ondansetron    Estimated/Assessed Needs  Weight Used For Calorie Calculations: 80.7 kg (177 lb 14.6 oz)  Energy Calorie Requirements (kcal): While Intubated: 1696 kcals/day (21 kcals/kg PSE)  ; Once Extubated: 6165-1680 kcals/day (20-25 kcals/kg)  Energy Need Method: Kcal/kg  Protein Requirements:  g/day (1.2-1.5  g/kg)  Weight Used For Protein Calculations: 80.7 kg (177 lb 14.6 oz)  Fluid Requirements (mL): UOP + 1000 mL or per MD     RDA Method (mL): 0     Nutrition Prescription Ordered  Current Diet Order: NPO  Current Nutrition Support Rate Ordered: 20 (ml)    Evaluation of Received Nutrient/Fluid Intake  Enteral Calories (kcal): 864  Enteral Protein (gm): 39  Enteral (Free Water) Fluid (mL): 349  Free Water Flush Fluid (mL): 180  Other Calories (kcal): 0  Energy Calories Required: not meeting needs  Protein Required: not meeting needs  Comments: TF advanced to 20ml/hr. Pt remains intubated, no longer on propofol. No ready to wean, per MD's note.  Tolerance: tolerating     Intake/Output Summary (Last 24 hours) at 10/2/2020 1331  Last data filed at 10/2/2020 0745  Gross per 24 hour   Intake 417.03 ml   Output --   Net 417.03 ml      % Intake of Estimated Energy Needs: 25 - 50 %  % Meal Intake: NPO    Nutrition Risk  Level of Risk/Frequency of Follow-up: high     Monitor and Evaluation  Food and Nutrient Intake: energy intake, enteral nutrition intake  Food and Nutrient Adminstration: enteral and parenteral nutrition administration  Physical Activity and Function: nutrition-related ADLs and IADLs, factors affecting access to physical activity  Anthropometric Measurements: weight, weight change, body mass index  Biochemical Data, Medical Tests and Procedures: electrolyte and renal panel, gastrointestinal profile, glucose/endocrine profile, inflammatory profile, lipid profile  Nutrition-Focused Physical Findings: overall appearance     Nutrition Follow-Up  RD Follow-up?: Yes

## 2020-10-02 NOTE — PLAN OF CARE
10/02/20 0710   Patient Assessment/Suction   Level of Consciousness (AVPU) responds to pain   Respiratory Effort Unlabored   Expansion/Accessory Muscles/Retractions no use of accessory muscles   All Lung Fields Breath Sounds Anterior:;Lateral:;clear   Rhythm/Pattern, Respiratory assisted mechanically   Cough Frequency no cough   PRE-TX-O2   O2 Device (Oxygen Therapy) ventilator   $ Is the patient on Low Flow Oxygen? Yes   Oxygen Concentration (%) 35   SpO2 99 %   Pulse Oximetry Type Continuous   $ Pulse Oximetry - Multiple Charge Pulse Oximetry - Multiple   Oximetry Probe Site Assessed   Pulse 87      Airway Anesthesia 09/29/20   Placement Date/Time: 09/29/20 (c) 7316   Method of Intubation: Video Laryngoscopy  Airway Device: Endotracheal Tube  Mask Ventilation: Easy  Intubated: Postinduction  Airway Device Size: 8.0  Cuffed: Cuffed  Complicating Factors: None  Findings Post-I...   Secured at 21 cm   Measured At Lips   Secured Location Left   Secured by Commercial tube mckinney   Bite Block none   Site Condition Dry   Status Patent;Secured;Intact   Site Assessment Dry   Airway Safety   Ambu bag with the patient? Yes, Adult Ambu   Is mask with the patient? Yes, Adult Mask   Suction set is at the bedside? Yes   Respiratory Interventions   Airway/Ventilation Support comfort measures provided;cough relief provided;dyspnea relief promoted;humidification applied;pulmonary hygiene promoted   VAP Prevention Bundle HOB elevation maintained;oral care regularly provided;vent circuit breaks minimized   Vent Select   Conventional Vent Y   $ Ventilator Subsequent 1   Charged w/in last 24h YES   Preset Conventional Ventilator Settings   Vent ID [id number]   Vent Type    Ventilation Type VC   Vent Mode A/C   Set Rate 24 BPM   Vt Set 390 mL   PEEP/CPAP 5 cmH20   Pressure Support 0 cmH20   Waveform RAMP   Peak Flow 55 L/min   Plateau Set/Insp. Hold (sec) 0   Trigger Sensitivity Flow/I-Trigger 6 L/min   Patient Ventilator Parameters    Resp Rate Total 24 br/min   All Fields Breath Sounds clear   Peak Airway Pressure 28 cmH2O   Mean Airway Pressure 13 cmH20   Plateau Pressure 0 cmH20   Exhaled Vt 405 mL   Total Ve 9.82 mL   I:E Ratio Measured 1:2.20   Tubing ID (mm) 8 mm   Tube Type ET   Conventional Ventilator Alarms   Alarms On Y   Ve High Alarm 24 L/min   Ve Low Alarm 5 L/min   Vt High Alarm 1000 mL   Vt Low Alarm 250 mL   Resp Rate High Alarm 40 br/min   Press High Alarm 60 cmH2O   Apnea Rate 14   Apnea Volume (mL) 0 mL   Apnea Oxygen Concentration  100   Apnea Flow Rate (L/min) 60   T Apnea 20 sec(s)   IHI Ventilator Associated Pneumonia Bundle   Head of Bed Elevated  HOB 30   Additional VAP Prevention Documentation Clean equipment maintained;Oral suctioning prior to turn   Ready to Wean/Extubation Screen   FIO2<=50 (chart decimal) 0.35   MV<16L (chart vol.) 9.82   PEEP <=8 (chart #) 5   Respiratory Evaluation   $ Care Plan Tech Time 15 min

## 2020-10-02 NOTE — PLAN OF CARE
10/01/20 2030   Patient Assessment/Suction   Level of Consciousness (AVPU) responds to voice   Respiratory Effort Normal;Unlabored   Expansion/Accessory Muscles/Retractions no use of accessory muscles   All Lung Fields Breath Sounds equal bilaterally;coarse;crackles   Rhythm/Pattern, Respiratory assisted mechanically   Cough Frequency with stimulation   Cough Type assisted   Suction Method tracheal   $ Suction Charges Inline Suction Procedure Stat Charge   Secretions Amount small   Secretions Color pale;yellow   Secretions Characteristics thick   PRE-TX-O2   O2 Device (Oxygen Therapy) ventilator   Oxygen Concentration (%) 35   SpO2 97 %   Pulse Oximetry Type Continuous   $ Pulse Oximetry - Multiple Charge Pulse Oximetry - Multiple   Pulse 96   Wound Care   $ Wound Care Tech Time 15 min   Area of Concern Left;Right;Upper lip;Lower lip;Corner lip   Skin Color/Characteristics redness blanchable   Skin Temperature warm   Was wound care notified? No      Airway Anesthesia 09/29/20   Placement Date/Time: 09/29/20 (c) 4578   Method of Intubation: Video Laryngoscopy  Airway Device: Endotracheal Tube  Mask Ventilation: Easy  Intubated: Postinduction  Airway Device Size: 8.0  Cuffed: Cuffed  Complicating Factors: None  Findings Post-I...   Secured at 23 cm   Measured At Lips   Secured Location Right   Secured by Commercial tube mckinney   Site Condition Cool;Dry   Status Intact;Secured;Patent   Site Assessment Clean;Dry   Cuff Volume   (MLT)   Respiratory Interventions   Airway/Ventilation Management airway patency maintained   Vent Select   Conventional Vent Y   Charged w/in last 24h YES   Preset Conventional Ventilator Settings   Vent ID [id number]   Vent Type    Humidity HME   Patient Ventilator Parameters   Resp Rate Total 27 br/min   Tubing ID (mm) 8 mm   Tube Type ET   Conventional Ventilator Alarms   Alarms On Y   Ready to Wean/Extubation Screen   FIO2<=50 (chart decimal) 0.35   Respiratory Evaluation   $ Care Plan Tech  Time 15 min   Evaluation For Re-Eval 5+ day

## 2020-10-02 NOTE — PROGRESS NOTES
[facility] Medicine  Progress Note    Patient Name: Juliane Ramos  MRN: [id number]  Patient Class: IP- Inpatient   Admission Date: 9/24/2020  Length of Stay: 8 days  Attending Physician: Ralph Tilley MD  Primary Care Provider: JOS Dumont        Subjective:     Principal Problem:CHF (congestive heart failure)        HPI:  Patient is a 60-year-old female hx ESRD HD (T,Th, Sat)  CAD/CABG ,EF20%?, COPD, DM ,chronic hypotension  presents ED with complaints of having low blood pressure and rapid heart rate.  In the emergency department patient had a heart rate in the 130s regular and a blood pressure systolic of approximately 100  Patient denies shortness of breath chest pain fever chills abdominal pain nausea vomiting.  She states her last dialysis on Tuesday she did not have any volume removed.  Patient has been given approximately 500 cc of IV fluids in the emergency department at remains tachycardic in the 120s.      Overview/Hospital Course:  09/29  Assumed care. Chart reviewed. Labs reviewed:  End stage renal function. Receiving dialysis today. 2 liters to be removed. Discussed with dietician: tube feeds if not extubated.    09/30  Consultants' attendance noted and appreciated. Required re-intubation yesterday.  Labs reviewed: mild leukocytosis with normal hematocrit; minimal hyponatremia with end-stage renal function values. Remains intubated. Discussed with Dietician: start enteral feeds.    10/01  Consultants' attendance noted and appreciated.. Unable to extubate. Receiving dialysis this AM. Labs reviewed: leukocytosis, stable normocytic anemia; mild hyponatremia, otherwise normal electrolytes with end stage renal function    10/02  Consultants' attendance noted and appreciated.. Labs reviewed: leukocytosis persisting, yet improved; minimal hyponatremia otherwise normal electrolytes with end stage renal function. Telemetry reviewed: sinus. Remains intubated.    Interval  History: unchanged     Review of Systems   Unable to perform ROS: Intubated     Objective:     Vital Signs (Most Recent):  Temp: 98.3 °F (36.8 °C) (10/02/20 0400)  Pulse: 84 (10/02/20 0800)  Resp: (!) 24 (10/02/20 0400)  BP: (!) 128/59 (10/02/20 0800)  SpO2: 100 % (10/02/20 0800) Vital Signs (24h Range):  Temp:  [97.7 °F (36.5 °C)-98.5 °F (36.9 °C)] 98.3 °F (36.8 °C)  Pulse:  [] 84  Resp:  [24-25] 24  SpO2:  [89 %-100 %] 100 %  BP: ()/(39-73) 128/59  Arterial Line BP: (107-144)/(38-57) 140/43     Weight: 80.7 kg (177 lb 14.6 oz)  Body mass index is 28.72 kg/m².    Intake/Output Summary (Last 24 hours) at 10/2/2020 0823  Last data filed at 10/2/2020 0600  Gross per 24 hour   Intake 297.03 ml   Output --   Net 297.03 ml      Physical Exam  Vitals signs and nursing note reviewed.   Constitutional:       Appearance: She is well-developed.      Comments: Sedated and intubated   HENT:      Head: Normocephalic and atraumatic.      Right Ear: External ear normal.      Left Ear: External ear normal.      Nose: Nose normal.   Eyes:      Conjunctiva/sclera: Conjunctivae normal.   Neck:      Musculoskeletal: Normal range of motion and neck supple.   Cardiovascular:      Rate and Rhythm: Normal rate and regular rhythm.      Heart sounds: Normal heart sounds.   Pulmonary:      Comments: Sedated and intubated  Decreased entry with expiratory wheezes mid-zones, no crepitations nor large airway sounds appreciated  Abdominal:      General: Bowel sounds are normal.      Palpations: Abdomen is soft.      Comments: Feeds    Genitourinary:     Comments: Chi   Musculoskeletal:      Comments: Unable to assess   Skin:     General: Skin is warm and dry.      Capillary Refill: Capillary refill takes less than 2 seconds.   Neurological:      Mental Status: She is alert.      Comments: Sedated and intubated   Psychiatric:      Comments: Sedated and intubated         Significant Labs:   BMP:   Recent Labs   Lab 10/02/20  0356   GLU  114*   *   K 4.2      CO2 23   BUN 29*   CREATININE 3.9*   CALCIUM 7.8*   MG 2.0     CBC:   Recent Labs   Lab 10/01/20  0434 10/01/20  0442 10/02/20  0356   WBC 17.45*  --  14.41*   HGB 11.6*  --  12.1   HCT 36.5* 38 37.5   PLT 97*  --  118*       Significant Imaging: I have reviewed and interpreted all pertinent imaging results/findings within the past 24 hours.      Assessment/Plan:      * CHF (congestive heart failure)  Continue current regimen      Anemia, chronic disease  Continue current regimen      Acute on chronic respiratory failure with hypoxia and hypercapnia  Continue current course      COPD (chronic obstructive pulmonary disease)  Continue current regimen      Stage 5 chronic kidney disease on chronic dialysis  Continue current regimen      Open wound of left heel  Continue current regimen      Chronic kidney disease with end stage renal failure on dialysis  Continue current regimen        VTE Risk Mitigation (From admission, onward)         Ordered     apixaban tablet 2.5 mg  2 times daily      09/24/20 1018     Place MARY ELLEN hose  Until discontinued      09/24/20 1018     IP VTE HIGH RISK PATIENT  Once      09/24/20 1018     Place sequential compression device  Until discontinued      09/24/20 1018                Discharge Planning   SHIKHA:      Code Status: Full Code   Is the patient medically ready for discharge?: No    Reason for patient still in hospital (select all that apply): Treatment  Discharge Plan A: Home Health                  Ralph Tilley MD  Department of Hospital Medicine   [facility]

## 2020-10-02 NOTE — PLAN OF CARE
Pt failed SBT this AM. Pt tolerated vent throughout the day. Did not require any sedation, pt remains calm and cooperative. Wound care performed per orders. Pt and family provided on education regarding plan of care and pt status.Verbalized understanding of education provided.

## 2020-10-02 NOTE — PLAN OF CARE
Continue to advance TF to goal rate 40ml/hr since she is off of propofol.      Problem: Feeding Intolerance (Enteral Nutrition)  Goal: Feeding Tolerance  Outcome: Ongoing, Progressing

## 2020-10-02 NOTE — PROGRESS NOTES
[facility]  Pulmonology  Progress Note    Subjective     9/29:  No major issues overnight.  Tolerating SBT.  Off of sedation all night.   9/30:  Failed extubation and was re-intubated yesterday evening.  No issues since.  10/1:  No major issues overnight.  Remains intubated.  Off of sedation.    10/2/2020 - Remains critically ill, no new issues overnight, respiratory reports significant secretions.  Fever curve good.  No new weight, cumulative I/O 2.1 liters +., vital signs OK.  Ventilating pressures good, Pplat - 25, Ve - 14,  P/F -  258, attempted brief SBT but sTV only 150 - 200 (not ready to wean/extubate).  Labs reviewed.     Review of Systems   Unable to perform ROS: Intubated   All other systems reviewed and are negative.     I have personally reviewed the following during today's evaluation:  past medical history, ROS, family history, social history, surgical history, current inpatient medications,drug allergies, vital signs over the past 24 hours, results of relevant diagnostic studies and nursing/provider documentation from the past 24 hours.     Objective     VS Temp:  [97.6 °F (36.4 °C)-98.3 °F (36.8 °C)]   Pulse:  []   Resp:  [24-25]   BP: ()/(39-80)   SpO2:  [89 %-100 %]   Arterial Line BP: (107-154)/(38-57)   Ideal body weight: 59.3 kg (130 lb 11.7 oz)  Adjusted ideal body weight: 67.9 kg (149 lb 9.7 oz)   I/O   Intake/Output Summary (Last 24 hours) at 10/2/2020 0946  Last data filed at 10/2/2020 0600  Gross per 24 hour   Intake 297.03 ml   Output --   Net 297.03 ml        Vent SpO2 100%   Vent Mode: A/C  Oxygen Concentration (%):  [35] 35  Resp Rate Total:  [22 br/min-41 br/min] 34 br/min  Vt Set:  [390 mL] 390 mL  PEEP/CPAP:  [5 cmH20] 5 cmH20  Pressure Support:  [0 cmH20-10 cmH20] 0 cmH20  Mean Airway Pressure:  [8.6 [id number] cmH20] 13 cmH20     PE Physical Exam   Constitutional: She appears well-developed and well-nourished. She is cooperative.  Non-toxic appearance. No  distress. She is intubated and restrained.   HENT:   Head: Normocephalic and atraumatic.   Right Ear: External ear normal.   Left Ear: External ear normal.   Mouth/Throat: Uvula is midline and mucous membranes are normal. Mallampati Score: unable to assess.   Neck: Trachea normal and normal range of motion. Neck supple. No JVD present. No thyromegaly present.   R IJ TLC   Cardiovascular: Normal rate, regular rhythm, normal heart sounds and intact distal pulses.   Pulmonary/Chest: Normal expansion, symmetric chest wall expansion and effort normal. She is intubated. She has no wheezes. She has no rhonchi. She has no rales.   Abdominal: Soft. Bowel sounds are normal. She exhibits no distension. There is no abdominal tenderness.   Musculoskeletal: Normal range of motion.         General: No tenderness, deformity or edema.      Comments: LAMINE MCKENZIE   Lymphadenopathy: No supraclavicular adenopathy is present.     She has no cervical adenopathy.     She has no axillary adenopathy.   Neurological: She is alert. She has normal strength. No cranial nerve deficit or sensory deficit. She exhibits normal muscle tone. GCS eye subscore is 4. GCS verbal subscore is 5. GCS motor subscore is 6.   Skin: Skin is warm, dry and intact. No rash noted.   Nursing note and vitals reviewed.      Labs I have personally reviewed and interpreted all labs / diagnostic studies obtained over the past 24 hours, and relevant results are as follows:  Recent Labs   Lab 10/02/20  0356   WBC 14.41*   RBC 4.21   HGB 12.1   HCT 37.5   *   MCV 89   MCH 28.7   MCHC 32.3   *   K 4.2      CO2 23   BUN 29*   CREATININE 3.9*   MG 2.0         Imaging I have personally reviewed and interpreted the following images and reviewed the associated Radiology report.  I have reviewed and interpreted all pertinent imaging results/findings within the past 24 hours.  CXR:  9/30:  Interval introduction of NG tube. Additional lines and support devices are in  position as above.  Persistence of bilateral parahilar and lower lung zone interstitial and alveolar opacities and small bilateral effusions. Stable cardiomegaly.     Micro I have personally reviewed and interpreted the available culture data.  Relevant results are as follows.  Blood Culture   Lab Results   Component Value Date    LABBLOO No growth after 5 days. 09/25/2020   , Sputum Culture   Lab Results   Component Value Date    GSRESP <10 epithelial cells per low power field. 09/28/2020    GSRESP Many WBC's 09/28/2020    GSRESP Rare Gram positive cocci 09/28/2020    RESPIRATORYC Reduced normal respiratory nyla 09/28/2020    and Urine Culture    Lab Results   Component Value Date    LABURIN (A) 09/25/2020     PRESUMPTIVE JORDIN ALBICANS  10,000 - 49,999 cfu/ml        Medications Scheduled    sodium chloride 0.9%   Intravenous Once    sodium chloride 0.9%   Intravenous Once    amiodarone  200 mg Oral BID    apixaban  2.5 mg Oral BID    citalopram  20 mg Oral Daily    collagenase   Topical (Top) Daily    famotidine (PF)  20 mg Intravenous Daily    fluconazole  100 mg Oral Daily    levothyroxine  50 mcg Oral Before breakfast    metoprolol succinate  25 mg Oral Daily    miconazole NITRATE 2 %   Topical (Top) BID    mupirocin   Nasal BID    rosuvastatin  10 mg Oral Daily    sevelamer carbonate  1,600 mg Oral TID WM    sodium zirconium cyclosilicate  5 g Oral Daily      Continuous Infusions:    sodium chloride 0.9% 10 mL/hr at 10/01/20 2125    norepinephrine bitartrate-D5W 0.02 mcg/kg/min (10/02/20 0800)      PRN   sodium chloride 0.9%, sodium chloride 0.9%, acetaminophen, albuterol sulfate, dextrose 50%, dextrose 50%, glucagon (human recombinant), glucose, glucose, insulin aspart U-100, ondansetron        Assessment       Active Hospital Problems    Diagnosis    *CHF (congestive heart failure)    COPD (chronic obstructive pulmonary disease)    Acute on chronic respiratory failure with hypoxia and  hypercapnia    Anemia, chronic disease    Stage 5 chronic kidney disease on chronic dialysis    Open wound of left heel    Chronic kidney disease with end stage renal failure on dialysis      My Impression:  Acute on chronic hypoxemic / hypercapnic respiratory failrue.    Plan     Neuro  · Intermittent fentanyl boluses as needed to maintain RASS of -1  · Daily SAT.    Pulmonary  · Continue LPV for now.  · Daily SAT/SBT, not ready to consider wean or extubation    Cardiac/Vascular  · Volume removal with HD.    Renal/  · HD at the direction of nephrology.    Hematology  · No indication for blood products.  · Observation for now.    Endocrine  · Continue levothyroxine.  · Serial blood glucose monitoring.  · Sliding scale insulin as needed to maintain moderate glycemic control.    GI  · H2 blocker for stress ulcer ppx.  · Enteral feeds.    Critical Care Time    I have spent > 35 minutes providing critical care services for this pt for the above diagnoses.  These services have included pt evaluation, pt exam, ventilator assessment, SBT assessment,, discussions with staff, chart review, data review, note preparation and .  The patient has life threatening illness with a high risk of decompensation and/or death.      Ernesto Freeman MD  [facility] Pulmonary/Critical Care

## 2020-10-02 NOTE — NURSING
Wound care performed to BUE. Cleaned with CHG/NS. Arms are bruised. Skin tear noted to R forearm. Dried, Medihoney applied. Adaptic dsg applied, gauze, and wrapped with kerlix. Pt tolerated well. Pt turned and draw sheet changed. Small BM noted. Pt tolerated well.

## 2020-10-03 PROBLEM — R57.9 SHOCK: Status: ACTIVE | Noted: 2020-01-01

## 2020-10-03 NOTE — PROGRESS NOTES
10/03/20 1300   Post-Hemodialysis Assessment   Rinseback Volume (mL) 250 mL   Blood Volume Processed (Liters) 54 L   Dialyzer Clearance Lightly streaked   Duration of Treatment (minutes) 180 minutes   Hemodialysis Intake (mL) 500 mL   Total UF (mL) 4500 mL   Net Fluid Removal 4000   Patient Response to Treatment tolerated well   Post-Treatment Weight 76.8 kg (169 lb 5 oz)   Treatment Weight Change -3.9   Arterial bleeding stop time (min) 6 min   Venous bleeding stop time (min) 6 min   Post-Hemodialysis Comments no problems

## 2020-10-03 NOTE — PLAN OF CARE
10/03/20 0740   Patient Assessment/Suction   Level of Consciousness (AVPU) alert   Respiratory Effort Unlabored;Normal   Expansion/Accessory Muscles/Retractions no use of accessory muscles   All Lung Fields Breath Sounds coarse   Rhythm/Pattern, Respiratory assisted mechanically   Cough Frequency infrequent;with stimulation   Cough Type fair;productive   Suction Method tracheal;oral   $ Suction Charges Inline Suction Procedure Stat Charge   PRE-TX-O2   O2 Device (Oxygen Therapy) ventilator   $ Is the patient on Low Flow Oxygen? Yes   Oxygen Concentration (%) 35   SpO2 (!) 92 %   Pulse Oximetry Type Continuous   $ Pulse Oximetry - Multiple Charge Pulse Oximetry - Multiple   Pulse 86      Airway Anesthesia 09/29/20   Placement Date/Time: 09/29/20 (c) 2007   Method of Intubation: Video Laryngoscopy  Airway Device: Endotracheal Tube  Mask Ventilation: Easy  Intubated: Postinduction  Airway Device Size: 8.0  Cuffed: Cuffed  Complicating Factors: None  Findings Post-I...   Secured at 21 cm   Measured At Lips   Secured Location Center   Secured by Commercial tube mckinney   Bite Block none   Site Condition Dry   Status Secured;Intact;Patent   Site Assessment Clean;Dry   Respiratory Interventions   VAP Prevention Bundle oral care regularly provided;HOB elevation maintained;readiness to extubate assessed;sedation interruption performed;vent circuit breaks minimized   Vent Select   Conventional Vent Y   $ Ventilator Subsequent 1   Charged w/in last 24h YES   Preset Conventional Ventilator Settings   Vent ID [id number]   Vent Type GE CareScape   Ventilation Type VC   Vent Mode A/C   Humidity HME   Set Rate 24 BPM   Vt Set 390 mL   PEEP/CPAP 5 cmH20   Pressure Support 0 cmH20   Waveform RAMP   Peak Flow 55 L/min   Plateau Set/Insp. Hold (sec) 0   Trigger Sensitivity Flow/I-Trigger 6 L/min   Patient Ventilator Parameters   Resp Rate Total 24 br/min   All Fields Breath Sounds aeration increased   Peak Airway Pressure 40 cmH2O   Mean  Airway Pressure 16 cmH20   Plateau Pressure 26 cmH20   Exhaled Vt 410 mL   Total Ve 9.66 mL   I:E Ratio Measured 1:1.90   Tubing ID (mm) 8 mm   Tube Type ET   Conventional Ventilator Alarms   Alarms On Y   Ve High Alarm 24 L/min   Ve Low Alarm 5 L/min   Vt High Alarm 1000 mL   Vt Low Alarm 250 mL   Resp Rate High Alarm 40 br/min   Press High Alarm 60 cmH2O   Apnea Rate 14   Apnea Volume (mL) 0 mL   Apnea Oxygen Concentration  100   Apnea Flow Rate (L/min) 60   T Apnea 20 sec(s)   IHI Ventilator Associated Pneumonia Bundle   Oral Care Mouth suctioned   Additional VAP Prevention Documentation Clean equipment maintained   Ready to Wean/Extubation Screen   FIO2<=50 (chart decimal) 0.35   MV<16L (chart vol.) 9.66   PEEP <=8 (chart #) 5   Respiratory Evaluation   $ Care Plan Tech Time 15 min

## 2020-10-03 NOTE — PROGRESS NOTES
Progress Note  Cardiology    Admit Date: 9/24/2020   LOS: 9 days     Follow-up For:  Atrial flutter, cardioversion, amiodarone therapy.  Chronic renal failure, dialysis. CHF, volume overload    Scheduled Meds:   sodium chloride 0.9%   Intravenous Once    sodium chloride 0.9%   Intravenous Once    amiodarone  200 mg Oral BID    apixaban  2.5 mg Oral BID    citalopram  20 mg Oral Daily    collagenase   Topical (Top) Daily    famotidine (PF)  20 mg Intravenous Daily    fluconazole  100 mg Oral Daily    levothyroxine  50 mcg Oral Before breakfast    metoprolol succinate  25 mg Oral Daily    miconazole NITRATE 2 %   Topical (Top) BID    mupirocin   Nasal BID    rosuvastatin  10 mg Oral Daily    sevelamer carbonate  1,600 mg Oral TID WM    sodium zirconium cyclosilicate  5 g Oral Daily     Continuous Infusions:   sodium chloride 0.9% 10 mL/hr at 10/01/20 2125    norepinephrine bitartrate-D5W 0.05 mcg/kg/min (10/03/20 1347)    propofoL 30 mcg/kg/min (10/03/20 1347)     PRN Meds:sodium chloride 0.9%, sodium chloride 0.9%, acetaminophen, albuterol sulfate, dextrose 50%, dextrose 50%, glucagon (human recombinant), glucose, glucose, insulin aspart U-100, ondansetron, white petrolatum    Review of patient's allergies indicates:  No Known Allergies    SUBJECTIVE:     Interval History: Patient intubated; FiO2 1.0.  The patient is on Levophed    Review of Systems  Intubated    OBJECTIVE:     Vital Signs (Most Recent)  Temp: (!) 102.4 °F (39.1 °C) (10/03/20 1525)  Pulse: 94 (10/03/20 1516)  Resp: (!) 26 (10/03/20 1501)  BP: (!) 106/46 (10/03/20 1501)  SpO2: 97 % (10/03/20 1516)    Vital Signs Range (Last 24H):  Temp:  [97.6 °F (36.4 °C)-102.4 °F (39.1 °C)]   Pulse:  []   Resp:  [24-31]   BP: ()/(36-55)   SpO2:  [77 %-100 %]   Arterial Line BP: ()/(43-67)       Physical Exam:  Neck: no carotid bruit, no JVD and supple, symmetrical, trachea midline  Lungs: clear to auscultation bilaterally, normal  respiratory effort  Heart: regular rate and rhythm, S1, S2 normal, no murmur, click, rub or gallop  Abdomen: soft, non-tender; bowel sounds normal; no masses,  no organomegaly  Extremities: Extremities normal, atraumatic, no cyanosis, clubbing, or edema    Recent Results (from the past 24 hour(s))   Basic metabolic panel    Collection Time: 10/03/20  3:21 AM   Result Value Ref Range    Sodium 130 (L) 136 - 145 mmol/L    Potassium 3.8 3.5 - 5.1 mmol/L    Chloride 97 95 - 110 mmol/L    CO2 23 23 - 29 mmol/L    Glucose 160 (H) 70 - 110 mg/dL    BUN, Bld 44 (H) 6 - 20 mg/dL    Creatinine 4.6 (H) 0.5 - 1.4 mg/dL    Calcium 7.8 (L) 8.7 - 10.5 mg/dL    Anion Gap 10 8 - 16 mmol/L    eGFR if African American 11.3 (A) >60 mL/min/1.73 m^2    eGFR if non  9.8 (A) >60 mL/min/1.73 m^2   CBC Without Differential    Collection Time: 10/03/20  3:21 AM   Result Value Ref Range    WBC 11.51 3.90 - 12.70 K/uL    RBC 4.37 4.00 - 5.40 M/uL    Hemoglobin 12.2 12.0 - 16.0 g/dL    Hematocrit 38.2 37.0 - 48.5 %    Mean Corpuscular Volume 87 82 - 98 fL    Mean Corpuscular Hemoglobin 27.9 27.0 - 31.0 pg    Mean Corpuscular Hemoglobin Conc 31.9 (L) 32.0 - 36.0 g/dL    RDW 16.4 (H) 11.5 - 14.5 %    Platelets 122 (L) 150 - 350 K/uL    MPV 9.7 9.2 - 12.9 fL   Brain Natriuretic Peptide    Collection Time: 10/03/20  3:21 AM   Result Value Ref Range    BNP 2,866 (H) 0 - 99 pg/mL   ISTAT PROCEDURE    Collection Time: 10/03/20  3:35 AM   Result Value Ref Range    POC PH 7.324 (L) 7.35 - 7.45    POC PCO2 40.0 35 - 45 mmHg    POC PO2 79 (L) 80 - 100 mmHg    POC HCO3 20.8 (L) 24 - 28 mmol/L    POC BE -5 -2 to 2 mmol/L    POC SATURATED O2 95 95 - 100 %    POC Glucose 138 (H) 70 - 110 mg/dL    POC Sodium 135 (L) 136 - 145 mmol/L    POC Potassium 3.3 (L) 3.5 - 5.1 mmol/L    POC TCO2 22 (L) 23 - 27 mmol/L    POC Ionized Calcium 1.03 (L) 1.06 - 1.42 mmol/L    POC Hematocrit 37 36 - 54 %PCV    Rate 24     Sample ARTERIAL     Site [place]      Allens Test N/A     DelSys Adult Vent     Mode AC/PRVC     Vt 390     PEEP 5     FiO2 35    POCT glucose    Collection Time: 10/03/20 11:38 AM   Result Value Ref Range    POC Glucose 130 (H) 70 - 110       Diagnostic Results:  Labs: Reviewed  ECG: Reviewed  X-Ray: Reviewed    ASSESSMENT/PLAN:     Chest x-ray reveals some clearing of the infiltrates; small bilateral pleural effusions persist.  The patient is on Levophed.  Dialysis negative 4 L today.  Patient has a temperature spike; cultures are in progress.  Bicarb 20.8.  O2 requirements have gone up; she is on 100% now.

## 2020-10-03 NOTE — SUBJECTIVE & OBJECTIVE
Interval History: unable to wean off of ventilator     Review of Systems   Unable to perform ROS: Intubated     Objective:     Vital Signs (Most Recent):  Temp: 98.4 °F (36.9 °C) (10/03/20 1101)  Pulse: 93 (10/03/20 1230)  Resp: (!) 31 (10/03/20 1101)  BP: (!) 100/45 (10/03/20 1101)  SpO2: 95 % (10/03/20 1230) Vital Signs (24h Range):  Temp:  [97.6 °F (36.4 °C)-98.5 °F (36.9 °C)] 98.4 °F (36.9 °C)  Pulse:  [72-94] 93  Resp:  [24-31] 31  SpO2:  [92 %-100 %] 95 %  BP: ()/(36-55) 100/45  Arterial Line BP: ()/(44-59) 98/46     Weight: 80.7 kg (177 lb 14.6 oz)  Body mass index is 28.72 kg/m².    Intake/Output Summary (Last 24 hours) at 10/3/2020 1324  Last data filed at 10/2/2020 1700  Gross per 24 hour   Intake 649.2 ml   Output --   Net 649.2 ml      Physical Exam  Vitals signs and nursing note reviewed.   Constitutional:       Appearance: She is well-developed.   HENT:      Head: Normocephalic and atraumatic.      Right Ear: External ear normal.      Left Ear: External ear normal.      Nose: Nose normal.   Eyes:      Conjunctiva/sclera: Conjunctivae normal.      Pupils: Pupils are equal, round, and reactive to light.   Neck:      Musculoskeletal: Normal range of motion and neck supple.   Cardiovascular:      Rate and Rhythm: Normal rate and regular rhythm.      Heart sounds: Normal heart sounds.   Pulmonary:      Effort: Pulmonary effort is normal.      Breath sounds: Normal breath sounds.   Abdominal:      General: Bowel sounds are normal.      Palpations: Abdomen is soft.   Musculoskeletal: Normal range of motion.   Skin:     General: Skin is warm and dry.      Capillary Refill: Capillary refill takes less than 2 seconds.   Neurological:      Mental Status: She is alert.      Comments: Sedated and intubated   Psychiatric:      Comments: Sedated and intubated         Significant Labs:   BMP:   Recent Labs   Lab 10/02/20  0356 10/03/20  0321   * 160*   * 130*   K 4.2 3.8    97   CO2 23  23   BUN 29* 44*   CREATININE 3.9* 4.6*   CALCIUM 7.8* 7.8*   MG 2.0  --      CBC:   Recent Labs   Lab 10/02/20  0356 10/03/20  0321 10/03/20  0335   WBC 14.41* 11.51  --    HGB 12.1 12.2  --    HCT 37.5 38.2 37   * 122*  --        Significant Imaging: I have reviewed and interpreted all pertinent imaging results/findings within the past 24 hours.

## 2020-10-03 NOTE — PROGRESS NOTES
[facility] Medicine  Progress Note    Patient Name: Juliane Ramos  MRN: [id number]  Patient Class: IP- Inpatient   Admission Date: 9/24/2020  Length of Stay: 9 days  Attending Physician: Ralph Tilley MD  Primary Care Provider: JOS Dumont        Subjective:     Principal Problem:CHF (congestive heart failure)        HPI:  Patient is a 60-year-old female hx ESRD HD (T,Th, Sat)  CAD/CABG ,EF20%?, COPD, DM ,chronic hypotension  presents ED with complaints of having low blood pressure and rapid heart rate.  In the emergency department patient had a heart rate in the 130s regular and a blood pressure systolic of approximately 100  Patient denies shortness of breath chest pain fever chills abdominal pain nausea vomiting.  She states her last dialysis on Tuesday she did not have any volume removed.  Patient has been given approximately 500 cc of IV fluids in the emergency department at remains tachycardic in the 120s.      Overview/Hospital Course:  09/29  Assumed care. Chart reviewed. Labs reviewed:  End stage renal function. Receiving dialysis today. 2 liters to be removed. Discussed with dietician: tube feeds if not extubated.    09/30  Consultants' attendance noted and appreciated. Required re-intubation yesterday.  Labs reviewed: mild leukocytosis with normal hematocrit; minimal hyponatremia with end-stage renal function values. Remains intubated. Discussed with Dietician: start enteral feeds.    10/01  Consultants' attendance noted and appreciated.. Unable to extubate. Receiving dialysis this AM. Labs reviewed: leukocytosis, stable normocytic anemia; mild hyponatremia, otherwise normal electrolytes with end stage renal function    10/02  Consultants' attendance noted and appreciated.. Labs reviewed: leukocytosis persisting, yet improved; minimal hyponatremia otherwise normal electrolytes with end stage renal function. Telemetry reviewed: sinus. Remains intubated.    10/03  Discussed with Pulmonology. Had dialysis this AM with 4 liters removed. Labs reviewed: leukocytosis resolved, mild hyponatremia with otherwise normal electrolytes and end stage renal values. Telemetry revewed: sinus tach. Sedated and intubated     Interval History: unable to wean off of ventilator     Review of Systems   Unable to perform ROS: Intubated     Objective:     Vital Signs (Most Recent):  Temp: 98.4 °F (36.9 °C) (10/03/20 1101)  Pulse: 93 (10/03/20 1230)  Resp: (!) 31 (10/03/20 1101)  BP: (!) 100/45 (10/03/20 1101)  SpO2: 95 % (10/03/20 1230) Vital Signs (24h Range):  Temp:  [97.6 °F (36.4 °C)-98.5 °F (36.9 °C)] 98.4 °F (36.9 °C)  Pulse:  [72-94] 93  Resp:  [24-31] 31  SpO2:  [92 %-100 %] 95 %  BP: ()/(36-55) 100/45  Arterial Line BP: ()/(44-59) 98/46     Weight: 80.7 kg (177 lb 14.6 oz)  Body mass index is 28.72 kg/m².    Intake/Output Summary (Last 24 hours) at 10/3/2020 1324  Last data filed at 10/2/2020 1700  Gross per 24 hour   Intake 649.2 ml   Output --   Net 649.2 ml      Physical Exam  Vitals signs and nursing note reviewed.   Constitutional:       Appearance: She is well-developed.   HENT:      Head: Normocephalic and atraumatic.      Right Ear: External ear normal.      Left Ear: External ear normal.      Nose: Nose normal.   Eyes:      Conjunctiva/sclera: Conjunctivae normal.      Pupils: Pupils are equal, round, and reactive to light.   Neck:      Musculoskeletal: Normal range of motion and neck supple.   Cardiovascular:      Rate and Rhythm: Normal rate and regular rhythm.      Heart sounds: Normal heart sounds.   Pulmonary:      Effort: Pulmonary effort is normal.      Breath sounds: Normal breath sounds.   Abdominal:      General: Bowel sounds are normal.      Palpations: Abdomen is soft.   Musculoskeletal: Normal range of motion.   Skin:     General: Skin is warm and dry.      Capillary Refill: Capillary refill takes less than 2 seconds.   Neurological:      Mental Status: She  is alert.      Comments: Sedated and intubated   Psychiatric:      Comments: Sedated and intubated         Significant Labs:   BMP:   Recent Labs   Lab 10/02/20  0356 10/03/20  0321   * 160*   * 130*   K 4.2 3.8    97   CO2 23 23   BUN 29* 44*   CREATININE 3.9* 4.6*   CALCIUM 7.8* 7.8*   MG 2.0  --      CBC:   Recent Labs   Lab 10/02/20  0356 10/03/20  0321 10/03/20  0335   WBC 14.41* 11.51  --    HGB 12.1 12.2  --    HCT 37.5 38.2 37   * 122*  --        Significant Imaging: I have reviewed and interpreted all pertinent imaging results/findings within the past 24 hours.      Assessment/Plan:      * CHF (congestive heart failure)  Continue current regimen      Acute on chronic heart failure  Continue mechanical ventilation and dialysis; volume removal      Anemia, chronic disease  Continue current regimen      Acute on chronic respiratory failure with hypoxia and hypercapnia  Continue current course      COPD (chronic obstructive pulmonary disease)  Continue current regimen      Stage 5 chronic kidney disease on chronic dialysis  Continue current regimen      Open wound of left heel  Continue current regimen      Chronic kidney disease with end stage renal failure on dialysis  Continue current regimen        VTE Risk Mitigation (From admission, onward)         Ordered     apixaban tablet 2.5 mg  2 times daily      09/24/20 1018     Place MARY ELLEN hose  Until discontinued      09/24/20 1018     IP VTE HIGH RISK PATIENT  Once      09/24/20 1018     Place sequential compression device  Until discontinued      09/24/20 1018                Discharge Planning   SHIKHA:      Code Status: Full Code   Is the patient medically ready for discharge?: No    Reason for patient still in hospital (select all that apply): Patient trending condition and Treatment  Discharge Plan A: Home Health                  Ralph Tilley MD  Department of Hospital Medicine   [facility]

## 2020-10-03 NOTE — PROGRESS NOTES
Pt awake and responsive, follow commands. sats 70s on ventilator, tachypnic,-called RT and change sats prob around. Dr. Vazquez and Dr. Tilley at bedside during the time. See EMR for new orders.

## 2020-10-03 NOTE — PROGRESS NOTES
Formerly [facility]  Pulmonology  Progress Note    Subjective     9/29:  No major issues overnight.  Tolerating SBT.  Off of sedation all night.   9/30:  Failed extubation and was re-intubated yesterday evening.  No issues since.  10/1:  No major issues overnight.  Remains intubated.  Off of sedation.    10/2/2020 - Remains critically ill, no new issues overnight, respiratory reports significant secretions.  Fever curve good.  No new weight, cumulative I/O 2.1 liters +., vital signs OK.  Ventilating pressures good, Pplat - 25, Ve - 14,  P/F -  258, attempted brief SBT but sTV only 150 - 200 (not ready to wean/extubate).  Labs reviewed.    10/3- remains on vent, on Levophed 0.022 mcg/kg/min, afebrile, HR controlled in 70s-90s. Completed HD this am with removal of 4L fluid.     Review of Systems   Unable to perform ROS: Intubated   All other systems reviewed and are negative.     I have personally reviewed the following during today's evaluation:  past medical history, ROS, family history, social history, surgical history, current inpatient medications,drug allergies, vital signs over the past 24 hours, results of relevant diagnostic studies and nursing/provider documentation from the past 24 hours.     Objective     VS Temp:  [97.6 °F (36.4 °C)-98.5 °F (36.9 °C)]   Pulse:  [72-94]   Resp:  [24-31]   BP: ()/(36-55)   SpO2:  [92 %-100 %]   Arterial Line BP: ()/(44-59)   Ideal body weight: 59.3 kg (130 lb 11.7 oz)  Adjusted ideal body weight: 67.9 kg (149 lb 9.7 oz)   I/O   Intake/Output Summary (Last 24 hours) at 10/3/2020 1305  Last data filed at 10/2/2020 1700  Gross per 24 hour   Intake 649.2 ml   Output --   Net 649.2 ml        Vent SpO2 95%   Vent Mode: A/C  Oxygen Concentration (%):  [35] 35  Resp Rate Total:  [24 br/min-31 br/min] 31 br/min  Vt Set:  [390 mL] 390 mL  PEEP/CPAP:  [5 cmH20] 5 cmH20  Pressure Support:  [0 cmH20] 0 cmH20  Mean Airway Pressure:  [13 wqD42-15 cmH20] 14 cmH20     PE  Physical Exam   Constitutional: She appears well-developed and well-nourished. She is cooperative.  Non-toxic appearance. No distress. She is intubated and restrained.   Intubated, not on sedation   HENT:   Head: Normocephalic and atraumatic.   Mouth/Throat: Uvula is midline and mucous membranes are normal. Mallampati Score: unable to assess.   Neck: Trachea normal and normal range of motion. Neck supple. No JVD present. No thyromegaly present.   R IJ TLC   Cardiovascular: Normal rate, regular rhythm, normal heart sounds and intact distal pulses.   Pulmonary/Chest: Normal expansion, symmetric chest wall expansion and effort normal. She is intubated. She has no wheezes. She has no rhonchi. She has no rales.   Coarse breath sounds bilaterally  tachypneic in the 30s on vent   Abdominal: Soft. Bowel sounds are normal. She exhibits no distension. There is no abdominal tenderness.   Musculoskeletal: Normal range of motion.         General: No tenderness, deformity or edema.      Comments: JAYE AVJAMES   Lymphadenopathy: No supraclavicular adenopathy is present.     She has no cervical adenopathy.     She has no axillary adenopathy.   Neurological: She is alert. She has normal strength. No cranial nerve deficit or sensory deficit. She exhibits normal muscle tone. GCS eye subscore is 4. GCS verbal subscore is 5. GCS motor subscore is 6.   Follows commands in extremities   Skin: Skin is warm, dry and intact. No rash noted.   Nursing note and vitals reviewed.      Labs I have personally reviewed and interpreted all labs / diagnostic studies obtained over the past 24 hours, and relevant results are as follows:  Recent Labs   Lab 10/03/20  0321 10/03/20  0335   WBC 11.51  --    RBC 4.37  --    HGB 12.2  --    HCT 38.2 37   *  --    MCV 87  --    MCH 27.9  --    MCHC 31.9*  --    *  --    K 3.8  --    CL 97  --    CO2 23  --    BUN 44*  --    CREATININE 4.6*  --    PH  --  7.324*   PCO2  --  40.0   PO2  --  79*   HCO3   --  20.8*   POCSATURATED  --  95   BE  --  -5         Imaging I have personally reviewed and interpreted the following images and reviewed the associated Radiology report.  I have reviewed and interpreted all pertinent imaging results/findings within the past 24 hours.  CXR:  9/30:  Interval introduction of NG tube. Additional lines and support devices are in position as above.  Persistence of bilateral parahilar and lower lung zone interstitial and alveolar opacities and small bilateral effusions. Stable cardiomegaly.  CXR 10/3- ETT in place, sternotomy wires, with bilateral pulmonary edema and retrocardiac opacification w/ blunting of costophrenic angles bilaterally     Micro I have personally reviewed and interpreted the available culture data.  Relevant results are as follows.  Blood Culture   Lab Results   Component Value Date    LABBLOO No growth after 5 days. 09/25/2020   , Sputum Culture   Lab Results   Component Value Date    GSRESP <10 epithelial cells per low power field. 09/28/2020    GSRESP Many WBC's 09/28/2020    GSRESP Rare Gram positive cocci 09/28/2020    RESPIRATORYC Reduced normal respiratory nyla 09/28/2020    and Urine Culture    Lab Results   Component Value Date    LABURIN (A) 09/25/2020     PRESUMPTIVE JORDIN ALBICANS  10,000 - 49,999 cfu/ml        Medications Scheduled    sodium chloride 0.9%   Intravenous Once    sodium chloride 0.9%   Intravenous Once    amiodarone  200 mg Oral BID    apixaban  2.5 mg Oral BID    citalopram  20 mg Oral Daily    collagenase   Topical (Top) Daily    famotidine (PF)  20 mg Intravenous Daily    fluconazole  100 mg Oral Daily    levothyroxine  50 mcg Oral Before breakfast    metoprolol succinate  25 mg Oral Daily    miconazole NITRATE 2 %   Topical (Top) BID    mupirocin   Nasal BID    rosuvastatin  10 mg Oral Daily    sevelamer carbonate  1,600 mg Oral TID WM    sodium zirconium cyclosilicate  5 g Oral Daily      Continuous Infusions:     sodium chloride 0.9% 10 mL/hr at 10/01/20 2125    norepinephrine bitartrate-D5W 0.022 mcg/kg/min (10/03/20 1100)      PRN   sodium chloride 0.9%, sodium chloride 0.9%, acetaminophen, albuterol sulfate, dextrose 50%, dextrose 50%, glucagon (human recombinant), glucose, glucose, insulin aspart U-100, ondansetron, white petrolatum        Assessment       Active Hospital Problems    Diagnosis    *CHF (congestive heart failure)    Shock    COPD (chronic obstructive pulmonary disease)    Acute on chronic respiratory failure with hypoxia and hypercapnia    Anemia, chronic disease    Stage 5 chronic kidney disease on chronic dialysis    Open wound of left heel    Chronic kidney disease with end stage renal failure on dialysis      My Impression:  Acute on chronic hypoxemic / hypercapnic respiratory failrue.    Plan     Neuro  · Intermittent fentanyl boluses as needed to maintain RASS of -1  · Daily SAT.    Pulmonary  · Continue LPV for now.  · Daily SAT/SBT,failed today     Cardiac/Vascular  · Volume removal with HD.  · Continue pressor support as needed    Renal/  · HD at the direction of nephrology.    Hematology  · No indication for blood products.  · Observation for now.    Endocrine  · Continue levothyroxine.  · Serial blood glucose monitoring.  · Sliding scale insulin as needed to maintain moderate glycemic control.    GI  · H2 blocker for stress ulcer ppx.  · Enteral feeds.    The following were evaluated and adjusted as needed: mechanical ventilator settings and weaning status, vasopressors, acid base balance and oxygenation needs and input and output and renal status       Critical Care  - THE PATIENT HAS A HIGH PROBABILITY OF IMMINENT OR LIFE THREATENING DETERIORATION.  Over 50%time of encounter was in direct care at bedside.  Time was 30 to 74 minutes required for patient care.  Time needed for all of the above totaled 30 minutes.    Luisana Vazquez MD  Pulmonary & Critical Care Medicine

## 2020-10-04 NOTE — PROGRESS NOTES
VANCOMYCIN PHARMACOKINETIC NOTE:  Vancomycin Day # 1    Objective/Assessment:    Diagnosis/Indication for Vancomycin: Pneumonia     60 y.o., female; Actual Body Weight = 80.7 kg (177 lb 14.6 oz).    The patient has the following labs:  10/4/2020 Estimated Creatinine Clearance: 17.8 mL/min (A) (based on SCr of 3.7 mg/dL (H)). Lab Results   Component Value Date    BUN 31 (H) 10/04/2020     Lab Results   Component Value Date    WBC 15.20 (H) 10/04/2020        Patient is receiving hemodialysis therapy.    Plan:  Adjust vancomycin dose and/or frequency based on the patient's actual weight and renal function:  Initiate Vancomycin 1500 mg IV every once.  Orders have been entered into patient's chart.    Vancomycin dose = 18.5 mg/kg actual body weight    Vancomycin trough level has been ordered for AM DRAW.    Pharmacy will manage vancomycin therapy, monitor serum vancomycin levels, monitor renal function and adjust regimen as necessary.    Plan for Dialysis Patient:  Give first dose of IV vancomycin.    Vancomycin dose = 18.5 mg/kg actual body weight    Will follow random vancomycin levels and redose when serum vancomycin level decreases to 10-15 mcg/mL    Thank you for allowing us to participate in this patient's care.     Refugio Lindsay 10/4/2020 1:30 PM  Department of Pharmacy  Ext [phone]

## 2020-10-04 NOTE — PROGRESS NOTES
Progress Note  Cardiology    Admit Date: 9/24/2020   LOS: 10 days     Follow-up For:  Atrial flutter, cardioversion, amiodarone.  Ischemic cardiomyopathy, CHF.  Chronic renal failure, dialysis.  Fever    Scheduled Meds:   sodium chloride 0.9%   Intravenous Once    sodium chloride 0.9%   Intravenous Once    acetaminophen  1,000 mg Intravenous Q8H    amiodarone  200 mg Oral BID    apixaban  2.5 mg Oral BID    citalopram  20 mg Oral Daily    collagenase   Topical (Top) Daily    famotidine (PF)  20 mg Intravenous Daily    fluconazole  100 mg Oral Daily    levothyroxine  50 mcg Oral Before breakfast    metoprolol succinate  25 mg Oral Daily    miconazole NITRATE 2 %   Topical (Top) BID    mupirocin   Nasal BID    piperacillin-tazobactam (ZOSYN) IVPB  3.375 g Intravenous Q12H    rosuvastatin  10 mg Oral Daily    sevelamer carbonate  1,600 mg Oral TID WM    sodium zirconium cyclosilicate  5 g Oral Daily    vancomycin (VANCOCIN) IVPB  1,500 mg Intravenous Once     Continuous Infusions:   sodium chloride 0.9% 10 mL/hr at 10/01/20 2125    norepinephrine bitartrate-D5W 0.12 mcg/kg/min (10/04/20 1300)    propofoL 35 mcg/kg/min (10/04/20 1330)     PRN Meds:sodium chloride 0.9%, sodium chloride 0.9%, acetaminophen, albuterol sulfate, dextrose 50%, dextrose 50%, glucagon (human recombinant), glucose, glucose, insulin aspart U-100, ondansetron, Pharmacy to dose Vancomycin consult **AND** vancomycin - pharmacy to dose, white petrolatum    Review of patient's allergies indicates:  No Known Allergies    SUBJECTIVE:     Interval History: Patient intubated.  FiO2 0.5.    Review of Systems  na    OBJECTIVE:     Vital Signs (Most Recent)  Temp: (!) 101.3 °F (38.5 °C) (10/04/20 1308)  Pulse: 94 (10/04/20 1330)  Resp: (!) 25 (10/04/20 1330)  BP: (!) 104/44 (10/04/20 1300)  SpO2: 95 % (10/04/20 1330)    Vital Signs Range (Last 24H):  Temp:  [98.9 °F (37.2 °C)-103.1 °F (39.5 °C)]   Pulse:  []   Resp:  [3-30]   BP:  ()/(29-58)   SpO2:  [93 %-98 %]   Arterial Line BP: ()/(43-52)       Physical Exam:  Neck: no carotid bruit, no JVD and supple, symmetrical, trachea midline  Lungs: clear to auscultation bilaterally, normal respiratory effort  Heart: regular rate and rhythm, S1, S2 normal, no murmur, click, rub or gallop  Abdomen: soft, non-tender; bowel sounds normal; no masses,  no organomegaly  Extremities: Extremities normal, atraumatic, no cyanosis, clubbing, or edema    Recent Results (from the past 24 hour(s))   Blood culture    Collection Time: 10/03/20  3:58 PM    Specimen: Line, Arterial, Right; Blood   Result Value Ref Range    Blood Culture, Routine No Growth to date    Blood culture    Collection Time: 10/03/20  4:24 PM    Specimen: Peripheral, Right Hand; Blood   Result Value Ref Range    Blood Culture, Routine No Growth to date    POCT glucose    Collection Time: 10/03/20  5:08 PM   Result Value Ref Range    POC Glucose 153 (H) 70 - 110   POCT glucose    Collection Time: 10/03/20 10:53 PM   Result Value Ref Range    POC Glucose 134 (H) 70 - 110   Basic metabolic panel    Collection Time: 10/04/20  3:50 AM   Result Value Ref Range    Sodium 135 (L) 136 - 145 mmol/L    Potassium 3.7 3.5 - 5.1 mmol/L    Chloride 101 95 - 110 mmol/L    CO2 25 23 - 29 mmol/L    Glucose 149 (H) 70 - 110 mg/dL    BUN, Bld 31 (H) 6 - 20 mg/dL    Creatinine 3.7 (H) 0.5 - 1.4 mg/dL    Calcium 7.9 (L) 8.7 - 10.5 mg/dL    Anion Gap 9 8 - 16 mmol/L    eGFR if African American 14.8 (A) >60 mL/min/1.73 m^2    eGFR if non  12.8 (A) >60 mL/min/1.73 m^2   CBC Without Differential    Collection Time: 10/04/20  3:50 AM   Result Value Ref Range    WBC 15.20 (H) 3.90 - 12.70 K/uL    RBC 4.32 4.00 - 5.40 M/uL    Hemoglobin 12.1 12.0 - 16.0 g/dL    Hematocrit 37.8 37.0 - 48.5 %    Mean Corpuscular Volume 88 82 - 98 fL    Mean Corpuscular Hemoglobin 28.0 27.0 - 31.0 pg    Mean Corpuscular Hemoglobin Conc 32.0 32.0 - 36.0 g/dL     RDW 16.1 (H) 11.5 - 14.5 %    Platelets 116 (L) 150 - 350 K/uL    MPV 10.9 9.2 - 12.9 fL   ISTAT PROCEDURE    Collection Time: 10/04/20  4:45 AM   Result Value Ref Range    POC PH 7.418 7.35 - 7.45    POC PCO2 44.7 35 - 45 mmHg    POC PO2 192 (H) 80 - 100 mmHg    POC HCO3 28.9 (H) 24 - 28 mmol/L    POC BE 4 -2 to 2 mmol/L    POC SATURATED O2 100 95 - 100 %    POC Glucose 148 (H) 70 - 110 mg/dL    POC Sodium 135 (L) 136 - 145 mmol/L    POC Potassium 3.9 3.5 - 5.1 mmol/L    POC TCO2 30 (H) 23 - 27 mmol/L    POC Ionized Calcium 1.12 1.06 - 1.42 mmol/L    POC Hematocrit 40 36 - 54 %PCV    Rate 24     Sample ARTERIAL     Site Tanika/UAC     Allens Test N/A     DelSys Adult Vent     Mode AC/PRVC     Vt 390     PEEP 5     PiP 34     FiO2 70     Min Vol 9.81     Sp02 95    POCT glucose    Collection Time: 10/04/20 12:01 PM   Result Value Ref Range    POC Glucose 131 (H) 70 - 110       Diagnostic Results:  Labs: Reviewed  ECG: Reviewed    ASSESSMENT/PLAN:     Dialysis planned tomorrow.  FiO2 down to 0.5.  She continues febrile.  Had a 5 beat run of VT.  Keep potassium above 4 and Mag above 2 at all times.

## 2020-10-04 NOTE — NURSING
0320--Sedation off for sedation vacation. RR increased to 34 breaths/min and HR increased to 129. Patient pulling at restraints and anxious. Sedation restarted.

## 2020-10-04 NOTE — PLAN OF CARE
Problem: Fall Injury Risk  Goal: Absence of Fall and Fall-Related Injury  Intervention: Promote Injury-Free Environment  Flowsheets (Taken 10/4/2020 1651)  Safety Promotion/Fall Prevention:   assistive device/personal item within reach   side rails raised x 3   medications reviewed   lighting adjusted   high risk medications identified  Environmental Safety Modification:   assistive device/personal items within reach   clutter free environment maintained   room organization consistent     Problem: Diabetes Comorbidity  Goal: Blood Glucose Level Within Desired Range  Intervention: Maintain Glycemic Control  Flowsheets (Taken 10/4/2020 1651)  Glycemic Management:   supplemental insulin given   blood glucose monitoring     Problem: Infection  Goal: Infection Symptom Resolution  Intervention: Prevent or Manage Infection  Flowsheets (Taken 10/4/2020 1651)  Fever Reduction/Comfort Measures:   ice pack(s)   lightweight bedding   lightweight clothing   medication administered  Infection Management:   aseptic technique maintained   cultures obtained and sent to lab  Isolation Precautions: protective environment maintained     Problem: Wound  Goal: Optimal Wound Healing  Intervention: Promote Effective Wound Healing  Flowsheets (Taken 10/4/2020 1651)  Pain Management Interventions: cold applied     Problem: Skin Injury Risk Increased  Goal: Skin Health and Integrity  Intervention: Optimize Skin Protection  Flowsheets (Taken 10/4/2020 1651)  Head of Bed (HOB): HOB at 30-45 degrees     Problem: Ventilator-Induced Lung Injury (Mechanical Ventilation, Invasive)  Goal: Absence of Ventilator-Induced Lung Injury  Intervention: Prevent Ventilator-Associated Pneumonia  Flowsheets (Taken 10/4/2020 1651)  VAP Prevention Bundle:   sedation interruption performed   VTE prophylaxis provided   HOB elevation maintained   stress ulcer prophylaxis provided   vent circuit breaks minimized   readiness to extubate assessed   oral care regularly  provided  Head of Bed (HOB): HOB at 30-45 degrees  VAP Prevention Contraindications: condition unstable  Oral Care:   lip lubricant applied   mouth wash rinse   swabbed with antiseptic solution

## 2020-10-04 NOTE — CONSULTS
Nephrology Consult Note (from 10/3)        Patient Name: Juliane Ramos  MRN: [id number]    Patient Class: IP- Inpatient   Admission Date: 9/24/2020  Length of Stay: 10 days  Date of Service: 10/4/2020    Attending Physician: Ralph Tilley MD  Primary Care Provider: JOS Dumont    Reason for Consult: esrd/anemia/hyperkalemia/hypotension/shpt/tachycardia    SUBJECTIVE:     HPI: 60F hx ESRD HD (T,Th, Sat)  CAD/CABG ,EF20%?, COPD, DM ,chronic hypotension on midodrine daily presents to ED with complaints of having low blood pressure and rapid heart rate. In the emergency department patient had a heart rate in the 130s regular and SBP in 100s  Patient denies shortness of breath, chest pain, fever, chills, abdominal pain, nausea, vomiting. Last dialysis on Tuesday and she did not have any volume removed.  Patient has been given approximately 500 cc of IV fluids in the emergency department at remains tachycardic in the 120s. Cards eval is pending, Amiodarone was started.    9/25 Getting HD for hyperkalemia today, before cardioversion. Continue HD per TTS schedule.  9/26 Seen and examined on Hd today, tolerating well. Continue HD per TTS schedule.   9/28  Intubated.  Sedated. TTS dialysis planned  9/29  Seen today on dialysis.  Tolerating well.  Still intubated, arousable, good eye contact.  No lab results yet today.    10/1  Intubated.  Unresponsive.     10/2  Makes eye contact.  No distress.  afebrile  10/3  Seen on rounds.  Seen on dialysis.  Intubated.  Sedate      Past Medical History:   Diagnosis Date    Anemia     Anemia in stage 3 chronic kidney disease 11/26/2017    Anticoagulant long-term use     CHF (congestive heart failure)     COPD (chronic obstructive pulmonary disease)     COPD exacerbation 3/10/2020    Coronary artery disease     Diabetes mellitus     Digestive disorder     Encounter for blood transfusion     Essential hypertension 6/24/2017    Hypertension     Low blood pressure   "   MI (myocardial infarction)     Segmental and subsegmental pulmonary emboli of RLL without acute cor pulmonale 2017    Stroke     2005    Thyroid disease     Traumatic subarachnoid hemorrhage 2017    Type 2 diabetes mellitus with stage 3 chronic kidney disease, without long-term current use of insulin 2017     Past Surgical History:   Procedure Laterality Date    ABCESS DRAINAGE Right     Between "little toe" and the one next to it    BREAST SURGERY      Reduction [id number]    CARDIAC SURGERY  2011    CABG 4vessel ring in one valve mitral valve prolapse    CORONARY ARTERY BYPASS GRAFT      DEBRIDEMENT OF FOOT Left 2020    Procedure: DEBRIDEMENT, FOOT;  Surgeon: Dennis Wick DPM;  Location: [facility];  Service: Podiatry;  Laterality: Left;    FOOT SURGERY      4 grafts to left foot    hyperlipidemia      TUBAL LIGATION  1986     Family History   Problem Relation Age of Onset    Arthritis Mother     Heart disease Father     Cancer Father 68        prostae and bladder ca  in     Diabetes Father     Hypertension Father     Depression Sister     Hypertension Son     Diabetes Maternal Grandmother         lost leg    Kidney disease Paternal Grandfather         had kidney removed    Stroke Paternal Grandfather      Social History     Tobacco Use    Smoking status: Never Smoker    Smokeless tobacco: Never Used   Substance Use Topics    Alcohol use: Yes     Alcohol/week: 1.0 - 2.0 standard drinks     Types: 1 - 2 Glasses of wine per week     Comment: "socially" not in awhile    Drug use: No       Review of patient's allergies indicates:  No Known Allergies    Outpatient meds:  No current facility-administered medications on file prior to encounter.      Current Outpatient Medications on File Prior to Encounter   Medication Sig Dispense Refill    gabapentin (NEURONTIN) 100 MG capsule Take 100 mg by mouth 3 (three) times daily.  3    midodrine " "(PROAMATINE) 5 MG Tab Take 5 mg by mouth 3 (three) times daily with meals.       rosuvastatin (CRESTOR) 10 MG tablet Take 10 mg by mouth every evening.       albuterol-ipratropium (DUO-NEB) 2.5 mg-0.5 mg/3 mL nebulizer solution Take 3 mLs by nebulization every 6 (six) hours. Rescue 1 Box 11    apixaban 5 mg Tab Take 1 tablet (5 mg total) by mouth 2 (two) times daily. (Patient taking differently: Take 5 mg by mouth 2 (two) times daily. ) 60 tablet 1    blood sugar diagnostic Strp Test 3-4 times daily PRN      CLARITIN-D 12 HOUR 5-120 mg per tablet Take 1 tablet by mouth once daily.   0    fluticasone (FLONASE) 50 mcg/actuation nasal spray 1 spray by Each Nostril route once daily.   12    insulin aspart (NOVOLOG) 100 unit/mL InPn pen Inject 1-10 Units into the skin 3 (three) times daily. (Patient taking differently: Inject 1-10 Units into the skin 3 (three) times daily. If sugar >150 on sliding scale) 3 mL 1    levothyroxine (SYNTHROID) 50 MCG tablet Take 50 mcg by mouth before breakfast.       pen needle, diabetic (BD ULTRA-FINE OBDULIA PEN NEEDLE) 32 gauge x 5/32" Ndle       ramelteon (ROZEREM) 8 mg tablet Take 8 mg by mouth every evening.      sevelamer carbonate (RENVELA) 800 mg Tab Take 1,600 mg by mouth 3 (three) times daily with meals.      tiotropium (SPIRIVA) 18 mcg inhalation capsule Inhale 1 capsule (18 mcg total) into the lungs once daily. Controller 30 capsule 11    umeclidinium (INCRUSE ELLIPTA) 62.5 mcg/actuation inhalation capsule Inhale 62.5 mcg into the lungs once daily. Controller         Scheduled meds:   sodium chloride 0.9%   Intravenous Once    sodium chloride 0.9%   Intravenous Once    acetaminophen  1,000 mg Intravenous Q8H    amiodarone  200 mg Oral BID    apixaban  2.5 mg Oral BID    citalopram  20 mg Oral Daily    collagenase   Topical (Top) Daily    famotidine (PF)  20 mg Intravenous Daily    fluconazole  100 mg Oral Daily    levothyroxine  50 mcg Oral Before breakfast "    metoprolol succinate  25 mg Oral Daily    miconazole NITRATE 2 %   Topical (Top) BID    mupirocin   Nasal BID    rosuvastatin  10 mg Oral Daily    sevelamer carbonate  1,600 mg Oral TID WM    sodium zirconium cyclosilicate  5 g Oral Daily       Infusions:   sodium chloride 0.9% 10 mL/hr at 10/01/20 2125    norepinephrine bitartrate-D5W 0.12 mcg/kg/min (10/04/20 1100)    propofoL 35 mcg/kg/min (10/04/20 1100)       PRN meds:  sodium chloride 0.9%, sodium chloride 0.9%, acetaminophen, albuterol sulfate, dextrose 50%, dextrose 50%, glucagon (human recombinant), glucose, glucose, insulin aspart U-100, ondansetron, white petrolatum    Review of Systems:  ROS    OBJECTIVE:     Vital Signs and IO (Last 24H):  Temp:  [98.9 °F (37.2 °C)-103.1 °F (39.5 °C)]   Pulse:  []   Resp:  [3-30]   BP: ()/(29-58)   SpO2:  [77 %-98 %]   Arterial Line BP: ()/(43-67)   I/O last 3 completed shifts:  In: 1507.1 [I.V.:517.1; Other:500; NG/GT:490]  Out: 4500 [Other:4500]    Wt Readings from Last 5 Encounters:   09/26/20 80.7 kg (177 lb 14.6 oz)   09/26/20 80.7 kg (177 lb 14.6 oz)   09/08/20 79.8 kg (176 lb)   09/04/20 80 kg (176 lb 5.9 oz)   09/02/20 80 kg (176 lb 5.9 oz)         Physical Exam:  Physical Exam  Constitutional:       Appearance: She is well-developed. She is not diaphoretic.   HENT:      Head: Normocephalic and atraumatic.   Eyes:      General: No scleral icterus.     Pupils: Pupils are equal, round, and reactive to light.   Neck:      Musculoskeletal: Neck supple.   Cardiovascular:      Rate and Rhythm: Normal rate and regular rhythm.   Pulmonary:      Effort: Pulmonary effort is normal. No respiratory distress.      Breath sounds: No stridor.   Abdominal:      General: There is no distension.      Palpations: Abdomen is soft.   Musculoskeletal: Normal range of motion.         General: Swelling present. No deformity.   Skin:     General: Skin is warm and dry.      Findings: No erythema or rash.    Neurological:      Mental Status: She is alert and oriented to person, place, and time.      Cranial Nerves: No cranial nerve deficit.   Psychiatric:         Behavior: Behavior normal.         Body mass index is 28.72 kg/m².    Laboratory:  Recent Labs   Lab 10/02/20  0356 10/03/20  0321 10/04/20  0350   * 130* 135*   K 4.2 3.8 3.7    97 101   CO2 23 23 25   BUN 29* 44* 31*   CREATININE 3.9* 4.6* 3.7*   ESTGFRAFRICA 13.8* 11.3* 14.8*   EGFRNONAA 12.0* 9.8* 12.8*   * 160* 149*       Recent Labs   Lab 09/27/20  1805 09/28/20  0338 09/30/20  0332 10/01/20  0434 10/02/20  0356 10/03/20  0321 10/04/20  0350   CALCIUM 8.5* 8.0* 7.6* 7.5* 7.8* 7.8* 7.9*   ALBUMIN 2.6* 2.2*  --   --   --   --   --    PHOS  --   --  5.6* 6.0*  --   --   --    MG 2.1  --   --  1.9 2.0  --   --              No results for input(s): POCTGLUCOSE in the last 168 hours.    Recent Labs   Lab 02/21/18 2120 09/12/18  1715 09/24/20  0630   Hemoglobin A1C 6.1 H 5.2 6.3 H       Recent Labs   Lab 09/28/20  0338  10/02/20  0356 10/03/20  0321 10/03/20  0335 10/04/20  0350 10/04/20  0445   WBC 7.75   < > 14.41* 11.51  --  15.20*  --    HGB 10.9*   < > 12.1 12.2  --  12.1  --    HCT 33.4*   < > 37.5 38.2 37 37.8 40   *   < > 118* 122*  --  116*  --    MCV 89   < > 89 87  --  88  --    MCHC 32.6   < > 32.3 31.9*  --  32.0  --    MONO 10.5  0.8  --   --   --   --   --   --     < > = values in this interval not displayed.       Recent Labs   Lab 09/27/20  1805 09/28/20  0338   BILITOT 0.4 0.9   PROT 5.2* 4.5*   ALBUMIN 2.6* 2.2*   ALKPHOS 78 61   ALT 20 19   AST 25 20       Recent Labs   Lab 11/24/17  1837 09/11/18  0246 09/25/20  1434   Color, UA Red A Yellow Yellow   Appearance, UA Cloudy A Hazy A Cloudy A   pH, UA 5.0 5.0 6.0   Specific Gravity, UA >1.030 A >=1.030 A 1.020   Protein, UA 2+ A 2+ A 2+ A   Glucose, UA 1+ A Negative Negative   Ketones, UA Negative Negative Negative   Urobilinogen, UA Negative Negative Negative    Bilirubin (UA) Negative Negative 1+ A   Occult Blood UA 3+ A 1+ A 3+ A   Nitrite, UA Negative Negative Negative   RBC, UA >100 H 5 H 83 H   WBC, UA 0 >100 H >100 H   Bacteria Rare Moderate A Occasional   Hyaline Casts, UA 0 0 1785 A       Recent Labs   Lab 10/01/20  0442 10/03/20  0335 10/04/20  0445   POC PH 7.326 L 7.324 L 7.418   POC PCO2 49.7 H 40.0 44.7   POC HCO3 25.9 20.8 L 28.9 H   POC PO2 129 H 79 L 192 H   POC SATURATED O2 99 95 100   POC BE 0 -5 4   Sample ARTERIAL ARTERIAL ARTERIAL       Microbiology Results (last 7 days)     Procedure Component Value Units Date/Time    Blood culture [[id number]] Collected: 10/03/20 1558    Order Status: Completed Specimen: Blood from Line, Arterial, Right Updated: 10/03/20 2317     Blood Culture, Routine No Growth to date    Blood culture [[id number]] Collected: 10/03/20 1624    Order Status: Completed Specimen: Blood from Peripheral, Right Hand Updated: 10/03/20 2317     Blood Culture, Routine No Growth to date    Blood culture [[id number]] Collected: 09/25/20 1523    Order Status: Completed Specimen: Blood Updated: 09/30/20 1632     Blood Culture, Routine No growth after 5 days.    Culture, Respiratory with Gram Stain [[id number]] Collected: 09/28/20 0045    Order Status: Completed Specimen: Respiratory from Sputum Updated: 09/30/20 0859     Respiratory Culture Reduced normal respiratory nyla     Gram Stain (Respiratory) <10 epithelial cells per low power field.     Gram Stain (Respiratory) Many WBC's     Gram Stain (Respiratory) Rare Gram positive cocci    Urine culture [[id number]]  (Abnormal) Collected: 09/25/20 1434    Order Status: Completed Specimen: Urine Updated: 09/28/20 0811     Urine Culture, Routine PRESUMPTIVE JORDIN ALBICANS  10,000 - 49,999 cfu/ml      Narrative:      Specimen Source->Urine    Urine Culture High Risk [[id number]]     Order Status: No result Specimen: Urine           ASSESSMENT/PLAN:     Active Hospital Problems    Diagnosis  POA    *CHF  (congestive heart failure) [I50.9]  Yes    Shock [R57.9]  Yes    Atrial fibrillation and flutter [I48.91, I48.92]  Unknown    Acute on chronic heart failure [I50.9]  Unknown    COPD (chronic obstructive pulmonary disease) [J44.9]  Yes    Acute on chronic respiratory failure with hypoxia and hypercapnia [J96.21, J96.22]  Yes    Anemia, chronic disease [D63.8]  Yes    Stage 5 chronic kidney disease on chronic dialysis [N18.6, Z99.2]  Not Applicable    Open wound of left heel [S91.302A]  Yes    Chronic kidney disease with end stage renal failure on dialysis [N18.6, Z99.2]  Not Applicable      Resolved Hospital Problems    Diagnosis Date Resolved POA    Tachycardia [R00.0] 09/29/2020 Yes     ESRD on HD TTS via AVF  Hyperkalemia, mild  Hypotension  A.Flutter s/p CV 9/25 now on amiodarone, cant give midodrine     Continue current dialysis prescription. UF as tolerated.  Next HD per schedule.  Renal diet - low K, low phos. Continue Lokelma.  No IVs or BP checks on access and/or non-dominant arm.  Apprecaite cards eval. Holding midodrine for now.       Anemia of CKD  Hgb and HCT are acceptable. Monitor.  Will provide YASHIRA and/or IV iron PRN.    MBD / Secondary HPT  Ca, phos, PTH and vitamin D levels are acceptable.   Phos binders, vitamin D analogues and calcimimetics as needed.    Right arm swelling-new onset  --check duplex us ordered but not done yet    Thank you for allowing us to participate in the care of your patient!   We will follow the patient and provide recommendations as needed.    Elijah Gonzalez NP    [facility]  [address] [place] [address]  [place] [address]    [phone] - tel  [phone] - fax    10/4/2020 1:53 PM

## 2020-10-04 NOTE — CONSULTS
Nephrology Consult Note         Patient Name: Juliane Ramos  MRN: [id number]    Patient Class: IP- Inpatient   Admission Date: 9/24/2020  Length of Stay: 10 days  Date of Service: 10/4/2020    Attending Physician: Ralph Tilley MD  Primary Care Provider: JOS Dumont    Reason for Consult: esrd/anemia/hyperkalemia/hypotension/shpt/tachycardia    SUBJECTIVE:     HPI: 60F hx ESRD HD (T,Th, Sat)  CAD/CABG ,EF20%?, COPD, DM ,chronic hypotension on midodrine daily presents to ED with complaints of having low blood pressure and rapid heart rate. In the emergency department patient had a heart rate in the 130s regular and SBP in 100s  Patient denies shortness of breath, chest pain, fever, chills, abdominal pain, nausea, vomiting. Last dialysis on Tuesday and she did not have any volume removed.  Patient has been given approximately 500 cc of IV fluids in the emergency department at remains tachycardic in the 120s. Cards eval is pending, Amiodarone was started.    9/25 Getting HD for hyperkalemia today, before cardioversion. Continue HD per TTS schedule.  9/26 Seen and examined on Hd today, tolerating well. Continue HD per TTS schedule.   9/28  Intubated.  Sedated. TTS dialysis planned  9/29  Seen today on dialysis.  Tolerating well.  Still intubated, arousable, good eye contact.  No lab results yet today.    10/1  Intubated.  Unresponsive.     10/2  Makes eye contact.  No distress.  afebrile  10/3  Seen on rounds.  Seen on dialysis.  Intubated.  Sedate  10/4  Failed sedation vacation.        Past Medical History:   Diagnosis Date    Anemia     Anemia in stage 3 chronic kidney disease 11/26/2017    Anticoagulant long-term use     CHF (congestive heart failure)     COPD (chronic obstructive pulmonary disease)     COPD exacerbation 3/10/2020    Coronary artery disease     Diabetes mellitus     Digestive disorder     Encounter for blood transfusion     Essential hypertension 6/24/2017    Hypertension   "   Low blood pressure     MI (myocardial infarction)     Segmental and subsegmental pulmonary emboli of RLL without acute cor pulmonale 2017    Stroke     2005    Thyroid disease     Traumatic subarachnoid hemorrhage 2017    Type 2 diabetes mellitus with stage 3 chronic kidney disease, without long-term current use of insulin 2017     Past Surgical History:   Procedure Laterality Date    ABCESS DRAINAGE Right     Between "little toe" and the one next to it    BREAST SURGERY      Reduction [id number]    CARDIAC SURGERY  2011    CABG 4vessel ring in one valve mitral valve prolapse    CORONARY ARTERY BYPASS GRAFT      DEBRIDEMENT OF FOOT Left 2020    Procedure: DEBRIDEMENT, FOOT;  Surgeon: Dennis Wick DPM;  Location: [facility];  Service: Podiatry;  Laterality: Left;    FOOT SURGERY      4 grafts to left foot    hyperlipidemia      TUBAL LIGATION  1986     Family History   Problem Relation Age of Onset    Arthritis Mother     Heart disease Father     Cancer Father 68        prostae and bladder ca  in     Diabetes Father     Hypertension Father     Depression Sister     Hypertension Son     Diabetes Maternal Grandmother         lost leg    Kidney disease Paternal Grandfather         had kidney removed    Stroke Paternal Grandfather      Social History     Tobacco Use    Smoking status: Never Smoker    Smokeless tobacco: Never Used   Substance Use Topics    Alcohol use: Yes     Alcohol/week: 1.0 - 2.0 standard drinks     Types: 1 - 2 Glasses of wine per week     Comment: "socially" not in awhile    Drug use: No       Review of patient's allergies indicates:  No Known Allergies    Outpatient meds:  No current facility-administered medications on file prior to encounter.      Current Outpatient Medications on File Prior to Encounter   Medication Sig Dispense Refill    gabapentin (NEURONTIN) 100 MG capsule Take 100 mg by mouth 3 (three) times daily.  " "3    midodrine (PROAMATINE) 5 MG Tab Take 5 mg by mouth 3 (three) times daily with meals.       rosuvastatin (CRESTOR) 10 MG tablet Take 10 mg by mouth every evening.       albuterol-ipratropium (DUO-NEB) 2.5 mg-0.5 mg/3 mL nebulizer solution Take 3 mLs by nebulization every 6 (six) hours. Rescue 1 Box 11    apixaban 5 mg Tab Take 1 tablet (5 mg total) by mouth 2 (two) times daily. (Patient taking differently: Take 5 mg by mouth 2 (two) times daily. ) 60 tablet 1    blood sugar diagnostic Strp Test 3-4 times daily PRN      CLARITIN-D 12 HOUR 5-120 mg per tablet Take 1 tablet by mouth once daily.   0    fluticasone (FLONASE) 50 mcg/actuation nasal spray 1 spray by Each Nostril route once daily.   12    insulin aspart (NOVOLOG) 100 unit/mL InPn pen Inject 1-10 Units into the skin 3 (three) times daily. (Patient taking differently: Inject 1-10 Units into the skin 3 (three) times daily. If sugar >150 on sliding scale) 3 mL 1    levothyroxine (SYNTHROID) 50 MCG tablet Take 50 mcg by mouth before breakfast.       pen needle, diabetic (BD ULTRA-FINE OBDULIA PEN NEEDLE) 32 gauge x 5/32" Ndle       ramelteon (ROZEREM) 8 mg tablet Take 8 mg by mouth every evening.      sevelamer carbonate (RENVELA) 800 mg Tab Take 1,600 mg by mouth 3 (three) times daily with meals.      tiotropium (SPIRIVA) 18 mcg inhalation capsule Inhale 1 capsule (18 mcg total) into the lungs once daily. Controller 30 capsule 11    umeclidinium (INCRUSE ELLIPTA) 62.5 mcg/actuation inhalation capsule Inhale 62.5 mcg into the lungs once daily. Controller         Scheduled meds:   sodium chloride 0.9%   Intravenous Once    sodium chloride 0.9%   Intravenous Once    acetaminophen  1,000 mg Intravenous Q8H    amiodarone  200 mg Oral BID    apixaban  2.5 mg Oral BID    citalopram  20 mg Oral Daily    collagenase   Topical (Top) Daily    famotidine (PF)  20 mg Intravenous Daily    fluconazole  100 mg Oral Daily    levothyroxine  50 mcg Oral " Before breakfast    metoprolol succinate  25 mg Oral Daily    miconazole NITRATE 2 %   Topical (Top) BID    mupirocin   Nasal BID    rosuvastatin  10 mg Oral Daily    sevelamer carbonate  1,600 mg Oral TID WM    sodium zirconium cyclosilicate  5 g Oral Daily       Infusions:   sodium chloride 0.9% 10 mL/hr at 10/01/20 2125    norepinephrine bitartrate-D5W 0.12 mcg/kg/min (10/04/20 1100)    propofoL 35 mcg/kg/min (10/04/20 1100)       PRN meds:  sodium chloride 0.9%, sodium chloride 0.9%, acetaminophen, albuterol sulfate, dextrose 50%, dextrose 50%, glucagon (human recombinant), glucose, glucose, insulin aspart U-100, ondansetron, white petrolatum    Review of Systems:  ROS    OBJECTIVE:     Vital Signs and IO (Last 24H):  Temp:  [98.9 °F (37.2 °C)-103.1 °F (39.5 °C)]   Pulse:  []   Resp:  [3-30]   BP: ()/(29-58)   SpO2:  [77 %-98 %]   Arterial Line BP: ()/(43-67)   I/O last 3 completed shifts:  In: 1507.1 [I.V.:517.1; Other:500; NG/GT:490]  Out: 4500 [Other:4500]    Wt Readings from Last 5 Encounters:   09/26/20 80.7 kg (177 lb 14.6 oz)   09/26/20 80.7 kg (177 lb 14.6 oz)   09/08/20 79.8 kg (176 lb)   09/04/20 80 kg (176 lb 5.9 oz)   09/02/20 80 kg (176 lb 5.9 oz)         Physical Exam:  Physical Exam  Constitutional:       Appearance: She is well-developed. She is not diaphoretic.   HENT:      Head: Normocephalic and atraumatic.   Eyes:      General: No scleral icterus.     Pupils: Pupils are equal, round, and reactive to light.   Neck:      Musculoskeletal: Neck supple.   Cardiovascular:      Rate and Rhythm: Normal rate and regular rhythm.   Pulmonary:      Effort: Pulmonary effort is normal. No respiratory distress.      Breath sounds: No stridor.   Abdominal:      General: There is no distension.      Palpations: Abdomen is soft.   Musculoskeletal: Normal range of motion.         General: Swelling present. No deformity.   Skin:     General: Skin is warm and dry.      Findings: No  erythema or rash.   Neurological:      Mental Status: She is alert and oriented to person, place, and time.      Cranial Nerves: No cranial nerve deficit.   Psychiatric:         Behavior: Behavior normal.         Body mass index is 28.72 kg/m².    Laboratory:  Recent Labs   Lab 10/02/20  0356 10/03/20  0321 10/04/20  0350   * 130* 135*   K 4.2 3.8 3.7    97 101   CO2 23 23 25   BUN 29* 44* 31*   CREATININE 3.9* 4.6* 3.7*   ESTGFRAFRICA 13.8* 11.3* 14.8*   EGFRNONAA 12.0* 9.8* 12.8*   * 160* 149*       Recent Labs   Lab 09/27/20  1805 09/28/20  0338 09/30/20  0332 10/01/20  0434 10/02/20  0356 10/03/20  0321 10/04/20  0350   CALCIUM 8.5* 8.0* 7.6* 7.5* 7.8* 7.8* 7.9*   ALBUMIN 2.6* 2.2*  --   --   --   --   --    PHOS  --   --  5.6* 6.0*  --   --   --    MG 2.1  --   --  1.9 2.0  --   --              No results for input(s): POCTGLUCOSE in the last 168 hours.    Recent Labs   Lab 02/21/18 2120 09/12/18  1715 09/24/20  0630   Hemoglobin A1C 6.1 H 5.2 6.3 H       Recent Labs   Lab 09/28/20  0338  10/02/20  0356 10/03/20  0321 10/03/20  0335 10/04/20  0350 10/04/20  0445   WBC 7.75   < > 14.41* 11.51  --  15.20*  --    HGB 10.9*   < > 12.1 12.2  --  12.1  --    HCT 33.4*   < > 37.5 38.2 37 37.8 40   *   < > 118* 122*  --  116*  --    MCV 89   < > 89 87  --  88  --    MCHC 32.6   < > 32.3 31.9*  --  32.0  --    MONO 10.5  0.8  --   --   --   --   --   --     < > = values in this interval not displayed.       Recent Labs   Lab 09/27/20  1805 09/28/20  0338   BILITOT 0.4 0.9   PROT 5.2* 4.5*   ALBUMIN 2.6* 2.2*   ALKPHOS 78 61   ALT 20 19   AST 25 20       Recent Labs   Lab 11/24/17  1837 09/11/18  0246 09/25/20  1434   Color, UA Red A Yellow Yellow   Appearance, UA Cloudy A Hazy A Cloudy A   pH, UA 5.0 5.0 6.0   Specific Gravity, UA >1.030 A >=1.030 A 1.020   Protein, UA 2+ A 2+ A 2+ A   Glucose, UA 1+ A Negative Negative   Ketones, UA Negative Negative Negative   Urobilinogen, UA Negative  Negative Negative   Bilirubin (UA) Negative Negative 1+ A   Occult Blood UA 3+ A 1+ A 3+ A   Nitrite, UA Negative Negative Negative   RBC, UA >100 H 5 H 83 H   WBC, UA 0 >100 H >100 H   Bacteria Rare Moderate A Occasional   Hyaline Casts, UA 0 0 1785 A       Recent Labs   Lab 10/01/20  0442 10/03/20  0335 10/04/20  0445   POC PH 7.326 L 7.324 L 7.418   POC PCO2 49.7 H 40.0 44.7   POC HCO3 25.9 20.8 L 28.9 H   POC PO2 129 H 79 L 192 H   POC SATURATED O2 99 95 100   POC BE 0 -5 4   Sample ARTERIAL ARTERIAL ARTERIAL       Microbiology Results (last 7 days)     Procedure Component Value Units Date/Time    Blood culture [[id number]] Collected: 10/03/20 1558    Order Status: Completed Specimen: Blood from Line, Arterial, Right Updated: 10/03/20 2317     Blood Culture, Routine No Growth to date    Blood culture [[id number]] Collected: 10/03/20 1624    Order Status: Completed Specimen: Blood from Peripheral, Right Hand Updated: 10/03/20 2317     Blood Culture, Routine No Growth to date    Blood culture [[id number]] Collected: 09/25/20 1523    Order Status: Completed Specimen: Blood Updated: 09/30/20 1632     Blood Culture, Routine No growth after 5 days.    Culture, Respiratory with Gram Stain [[id number]] Collected: 09/28/20 0045    Order Status: Completed Specimen: Respiratory from Sputum Updated: 09/30/20 0859     Respiratory Culture Reduced normal respiratory nyla     Gram Stain (Respiratory) <10 epithelial cells per low power field.     Gram Stain (Respiratory) Many WBC's     Gram Stain (Respiratory) Rare Gram positive cocci    Urine culture [[id number]]  (Abnormal) Collected: 09/25/20 1434    Order Status: Completed Specimen: Urine Updated: 09/28/20 0811     Urine Culture, Routine PRESUMPTIVE JORDIN ALBICANS  10,000 - 49,999 cfu/ml      Narrative:      Specimen Source->Urine    Urine Culture High Risk [[id number]]     Order Status: No result Specimen: Urine           ASSESSMENT/PLAN:     Active Hospital Problems     Diagnosis  POA    *CHF (congestive heart failure) [I50.9]  Yes    Shock [R57.9]  Yes    Atrial fibrillation and flutter [I48.91, I48.92]  Unknown    Acute on chronic heart failure [I50.9]  Unknown    COPD (chronic obstructive pulmonary disease) [J44.9]  Yes    Acute on chronic respiratory failure with hypoxia and hypercapnia [J96.21, J96.22]  Yes    Anemia, chronic disease [D63.8]  Yes    Stage 5 chronic kidney disease on chronic dialysis [N18.6, Z99.2]  Not Applicable    Open wound of left heel [S91.302A]  Yes    Chronic kidney disease with end stage renal failure on dialysis [N18.6, Z99.2]  Not Applicable      Resolved Hospital Problems    Diagnosis Date Resolved POA    Tachycardia [R00.0] 09/29/2020 Yes     ESRD on HD TTS via AVF  Hyperkalemia, mild  Hypotension  A.Flutter s/p CV 9/25 now on amiodarone, cant give midodrine     Continue current dialysis prescription. UF as tolerated.  Next HD per schedule.  Renal diet - low K, low phos. Continue Lokelma.  No IVs or BP checks on access and/or non-dominant arm.  Apprecaite cards eval. Holding midodrine for now.   Will do extra uf tomorrow.  D/w  at bedside at length    Anemia of CKD  Hgb and HCT are acceptable. Monitor.  Will provide YASHIRA and/or IV iron PRN.    MBD / Secondary HPT  Ca, phos, PTH and vitamin D levels are acceptable.   Phos binders, vitamin D analogues and calcimimetics as needed.    Right arm swelling-new onset  --check duplex us ordered but not done yet    Thank you for allowing us to participate in the care of your patient!   We will follow the patient and provide recommendations as needed.    Elijah Gonzalez NP    [facility]  [address] [place] [address]  [place] [address]    [phone] - tel  [phone] - fax    10/4/2020 1:53 PM

## 2020-10-04 NOTE — PLAN OF CARE
10/03/20 2049   Patient Assessment/Suction   Level of Consciousness (AVPU) responds to voice   Respiratory Effort Normal;Unlabored   Expansion/Accessory Muscles/Retractions no use of accessory muscles;no retractions   All Lung Fields Breath Sounds coarse   Rhythm/Pattern, Respiratory assisted mechanically   Cough Frequency infrequent   PRE-TX-O2   O2 Device (Oxygen Therapy) ventilator   $ Is the patient on Low Flow Oxygen? Yes   Oxygen Concentration (%) 70   Pulse Oximetry Type Continuous   $ Pulse Oximetry - Multiple Charge Pulse Oximetry - Multiple   Wound Care   $ Wound Care Tech Time 15 min   Area of Concern Upper lip;Lower lip;Corner lip   Skin Temperature cool      Airway Anesthesia 09/29/20   Placement Date/Time: 09/29/20 (c) 6519   Method of Intubation: Video Laryngoscopy  Airway Device: Endotracheal Tube  Mask Ventilation: Easy  Intubated: Postinduction  Airway Device Size: 8.0  Cuffed: Cuffed  Complicating Factors: None  Findings Post-I...   Secured at 21 cm   Measured At Lips   Secured Location Left   Secured by Commercial tube mckinney   Bite Block none   Site Condition Dry;Cool   Status Secured;Intact   Site Assessment Clean;Dry   Cuff Volume   (MLT)   Airway Safety   Ambu bag with the patient? Yes, Adult Ambu   Is mask with the patient? Yes, Adult Mask   Vent Select   Conventional Vent Y   Charged w/in last 24h YES   Preset Conventional Ventilator Settings   Vent ID [id number]   Vent Type    Ventilation Type VC   Vent Mode A/C   Humidity HME   Set Rate 24 BPM   Vt Set 401 mL   PEEP/CPAP 8 cmH20   Waveform RAMP   Patient Ventilator Parameters   Tubing ID (mm) 8 mm   Tube Type ET   Conventional Ventilator Alarms   Alarms On Y   Ve High Alarm 25 L/min   Ve Low Alarm 5 L/min   Vt High Alarm 100 mL   Vt Low Alarm 250 mL   Resp Rate High Alarm 40 br/min   Resp Rate Low Alarm 10   Press High Alarm 60 cmH2O   Apnea Rate 14   Apnea Volume (mL) 500 mL   Apnea Oxygen Concentration  100   Apnea Flow Rate (L/min) 60    T Apnea 20 sec(s)   Ready to Wean/Extubation Screen   FIO2<=50 (chart decimal) (!) 0.7   PEEP <=8 (chart #) 8   Respiratory Evaluation   $ Care Plan Tech Time 15 min

## 2020-10-04 NOTE — PLAN OF CARE
10/03/20 2215   Patient Assessment/Suction   Level of Consciousness (AVPU) responds to voice   Respiratory Effort Unlabored;Normal   Expansion/Accessory Muscles/Retractions no use of accessory muscles;no retractions   All Lung Fields Breath Sounds coarse   Rhythm/Pattern, Respiratory assisted mechanically   Cough Frequency infrequent   Cough Type assisted   Suction Method tracheal;oral   Suction Pressure (mmHg) 120 mmHg   $ Suction Charges Inline Suction Procedure Stat Charge   Secretions Amount scant   Secretions Color clear;white   Secretions Characteristics thin   $ Swab or suction? Suction   PRE-TX-O2   O2 Device (Oxygen Therapy) ventilator   $ Is the patient on Low Flow Oxygen? Yes   Pulse Oximetry Type Continuous   $ Pulse Oximetry - Multiple Charge Pulse Oximetry - Multiple   Resp (!) 24      Airway Anesthesia 09/29/20   Placement Date/Time: 09/29/20 (c) 9130   Method of Intubation: Video Laryngoscopy  Airway Device: Endotracheal Tube  Mask Ventilation: Easy  Intubated: Postinduction  Airway Device Size: 8.0  Cuffed: Cuffed  Complicating Factors: None  Findings Post-I...   Secured at 21 cm   Measured At Lips   Secured Location Left   Bite Block none   Site Condition Cool;Dry   Status Intact;Secured;Patent   Site Assessment Clean;Dry;No drainage;No bleeding   Airway Safety   Ambu bag with the patient? Yes, Adult Ambu   Is mask with the patient? Yes, Adult Mask   Vent Select   Conventional Vent Y   Charged w/in last 24h YES   Preset Conventional Ventilator Settings   Vent ID [id number]   Vent Type    Humidity HME   Patient Ventilator Parameters   Tubing ID (mm) 8 mm   Tube Type ET   Conventional Ventilator Alarms   Alarms On Y   Ve High Alarm 25 L/min   Ve Low Alarm 5 L/min   Vt High Alarm 100 mL   Vt Low Alarm 250 mL   Respiratory Evaluation   $ Care Plan Tech Time 15 min

## 2020-10-04 NOTE — SUBJECTIVE & OBJECTIVE
Interval History: not improving    Review of Systems   Unable to perform ROS: Intubated     Objective:     Vital Signs (Most Recent):  Temp: (!) 102.7 °F (39.3 °C) (10/04/20 0809)  Pulse: 97 (10/04/20 0930)  Resp: (!) 25 (10/04/20 0930)  BP: (!) 114/51 (10/04/20 0900)  SpO2: 95 % (10/04/20 0930) Vital Signs (24h Range):  Temp:  [98.4 °F (36.9 °C)-103.1 °F (39.5 °C)] 102.7 °F (39.3 °C)  Pulse:  [] 97  Resp:  [10-31] 25  SpO2:  [77 %-100 %] 95 %  BP: ()/(29-58) 114/51  Arterial Line BP: ()/(43-67) 128/50     Weight: 80.7 kg (177 lb 14.6 oz)  Body mass index is 28.72 kg/m².    Intake/Output Summary (Last 24 hours) at 10/4/2020 0950  Last data filed at 10/4/2020 0800  Gross per 24 hour   Intake 1597.11 ml   Output 4500 ml   Net -2902.89 ml      Physical Exam  Vitals signs and nursing note reviewed.   Constitutional:       Appearance: She is well-developed.      Comments: Sedated and intubated   HENT:      Head: Normocephalic and atraumatic.      Right Ear: External ear normal.      Left Ear: External ear normal.      Nose: Nose normal.   Eyes:      Conjunctiva/sclera: Conjunctivae normal.   Neck:      Musculoskeletal: Neck supple.   Cardiovascular:      Rate and Rhythm: Regular rhythm. Tachycardia present.      Heart sounds: Normal heart sounds.   Pulmonary:      Effort: Pulmonary effort is normal.      Breath sounds: Normal breath sounds.      Comments: Sedated and intubated  Decreased entry without adventitious sounds    Abdominal:      General: Bowel sounds are normal.      Palpations: Abdomen is soft.   Musculoskeletal: Normal range of motion.   Skin:     General: Skin is warm and dry.      Capillary Refill: Capillary refill takes less than 2 seconds.   Neurological:      Mental Status: She is alert.      Comments: Sedated and intubated   Psychiatric:      Comments: Sedated and intubated         Significant Labs:   BMP:   Recent Labs   Lab 10/04/20  0350   *   *   K 3.7      CO2  25   BUN 31*   CREATININE 3.7*   CALCIUM 7.9*     CBC:   Recent Labs   Lab 10/03/20  0321 10/03/20  0335 10/04/20  0350 10/04/20  0445   WBC 11.51  --  15.20*  --    HGB 12.2  --  12.1  --    HCT 38.2 37 37.8 40   *  --  116*  --        Significant Imaging: I have reviewed and interpreted all pertinent imaging results/findings within the past 24 hours.

## 2020-10-04 NOTE — PROGRESS NOTES
[facility] Medicine  Progress Note    Patient Name: Juliane Ramos  MRN: [id number]  Patient Class: IP- Inpatient   Admission Date: 9/24/2020  Length of Stay: 10 days  Attending Physician: Ralph Tilley MD  Primary Care Provider: JOS Dumont        Subjective:     Principal Problem:CHF (congestive heart failure)        HPI:  Patient is a 60-year-old female hx ESRD HD (T,Th, Sat)  CAD/CABG ,EF20%?, COPD, DM ,chronic hypotension  presents ED with complaints of having low blood pressure and rapid heart rate.  In the emergency department patient had a heart rate in the 130s regular and a blood pressure systolic of approximately 100  Patient denies shortness of breath chest pain fever chills abdominal pain nausea vomiting.  She states her last dialysis on Tuesday she did not have any volume removed.  Patient has been given approximately 500 cc of IV fluids in the emergency department at remains tachycardic in the 120s.      Overview/Hospital Course:  09/29  Assumed care. Chart reviewed. Labs reviewed:  End stage renal function. Receiving dialysis today. 2 liters to be removed. Discussed with dietician: tube feeds if not extubated.    09/30  Consultants' attendance noted and appreciated. Required re-intubation yesterday.  Labs reviewed: mild leukocytosis with normal hematocrit; minimal hyponatremia with end-stage renal function values. Remains intubated. Discussed with Dietician: start enteral feeds.    10/01  Consultants' attendance noted and appreciated.. Unable to extubate. Receiving dialysis this AM. Labs reviewed: leukocytosis, stable normocytic anemia; mild hyponatremia, otherwise normal electrolytes with end stage renal function    10/02  Consultants' attendance noted and appreciated.. Labs reviewed: leukocytosis persisting, yet improved; minimal hyponatremia otherwise normal electrolytes with end stage renal function. Telemetry reviewed: sinus. Remains intubated.    10/03  "Discussed with Pulmonology. Had dialysis this AM with 4 liters removed. Labs reviewed: leukocytosis resolved, mild hyponatremia with otherwise normal electrolytes and end stage renal values. Telemetry revewed: sinus tach. Sedated and intubated     10/04 Consultants' attendance noted and appreciated. Did not tolerate "sedation vacation" this AM: heart rate and blood pressure went up. Labs reviewed: leukocytosis with normal Hct; mild hyponatremia otherwise electrolytes are normal with end stage renal function. CXR shows ET tube level at heads of clavicles--it was advanced further into the trachea.     Interval History: not improving    Review of Systems   Unable to perform ROS: Intubated     Objective:     Vital Signs (Most Recent):  Temp: (!) 102.7 °F (39.3 °C) (10/04/20 0809)  Pulse: 97 (10/04/20 0930)  Resp: (!) 25 (10/04/20 0930)  BP: (!) 114/51 (10/04/20 0900)  SpO2: 95 % (10/04/20 0930) Vital Signs (24h Range):  Temp:  [98.4 °F (36.9 °C)-103.1 °F (39.5 °C)] 102.7 °F (39.3 °C)  Pulse:  [] 97  Resp:  [10-31] 25  SpO2:  [77 %-100 %] 95 %  BP: ()/(29-58) 114/51  Arterial Line BP: ()/(43-67) 128/50     Weight: 80.7 kg (177 lb 14.6 oz)  Body mass index is 28.72 kg/m².    Intake/Output Summary (Last 24 hours) at 10/4/2020 0950  Last data filed at 10/4/2020 0800  Gross per 24 hour   Intake 1597.11 ml   Output 4500 ml   Net -2902.89 ml      Physical Exam  Vitals signs and nursing note reviewed.   Constitutional:       Appearance: She is well-developed.      Comments: Sedated and intubated   HENT:      Head: Normocephalic and atraumatic.      Right Ear: External ear normal.      Left Ear: External ear normal.      Nose: Nose normal.   Eyes:      Conjunctiva/sclera: Conjunctivae normal.   Neck:      Musculoskeletal: Neck supple.   Cardiovascular:      Rate and Rhythm: Regular rhythm. Tachycardia present.      Heart sounds: Normal heart sounds.   Pulmonary:      Effort: Pulmonary effort is normal.      " Breath sounds: Normal breath sounds.      Comments: Sedated and intubated  Decreased entry without adventitious sounds    Abdominal:      General: Bowel sounds are normal.      Palpations: Abdomen is soft.   Musculoskeletal: Normal range of motion.   Skin:     General: Skin is warm and dry.      Capillary Refill: Capillary refill takes less than 2 seconds.   Neurological:      Mental Status: She is alert.      Comments: Sedated and intubated   Psychiatric:      Comments: Sedated and intubated         Significant Labs:   BMP:   Recent Labs   Lab 10/04/20  0350   *   *   K 3.7      CO2 25   BUN 31*   CREATININE 3.7*   CALCIUM 7.9*     CBC:   Recent Labs   Lab 10/03/20  0321 10/03/20  0335 10/04/20  0350 10/04/20  0445   WBC 11.51  --  15.20*  --    HGB 12.2  --  12.1  --    HCT 38.2 37 37.8 40   *  --  116*  --        Significant Imaging: I have reviewed and interpreted all pertinent imaging results/findings within the past 24 hours.      Assessment/Plan:      * CHF (congestive heart failure)  Continue current regimen      Acute on chronic heart failure  Continue mechanical ventilation and dialysis; volume removal      Atrial fibrillation and flutter        Anemia, chronic disease  Continue current regimen      Acute on chronic respiratory failure with hypoxia and hypercapnia  Continue current course      COPD (chronic obstructive pulmonary disease)  Continue current regimen      Stage 5 chronic kidney disease on chronic dialysis  Continue current regimen      Open wound of left heel  Continue current regimen      Chronic kidney disease with end stage renal failure on dialysis  Continue current regimen        VTE Risk Mitigation (From admission, onward)         Ordered     apixaban tablet 2.5 mg  2 times daily      09/24/20 1018     Place MARY ELLEN hose  Until discontinued      09/24/20 1018     IP VTE HIGH RISK PATIENT  Once      09/24/20 1018     Place sequential compression device  Until  discontinued      09/24/20 1018                Discharge Planning   SHIKHA:      Code Status: Full Code   Is the patient medically ready for discharge?: No    Reason for patient still in hospital (select all that apply): Treatment  Discharge Plan A: Home Health                  Ralph Tilley MD  Department of Hospital Medicine   Critical access hospital

## 2020-10-05 NOTE — PROGRESS NOTES
[facility]  Pulmonology  Progress Note    Subjective     9/29:  No major issues overnight.  Tolerating SBT.  Off of sedation all night.   9/30:  Failed extubation and was re-intubated yesterday evening.  No issues since.  10/1:  No major issues overnight.  Remains intubated.  Off of sedation.  10/2/2020 - Remains critically ill, no new issues overnight, respiratory reports significant secretions.  Fever curve good.  No new weight, cumulative I/O 2.1 liters +., vital signs OK.  Ventilating pressures good, Pplat - 25, Ve - 14,  P/F -  258, attempted brief SBT but sTV only 150 - 200 (not ready to wean/extubate).  Labs reviewed.  10/3- remains on vent, on Levophed 0.022 mcg/kg/min, afebrile, HR controlled in 70s-90s. Completed HD this am with removal of 4L fluid.  10/4- remains on vent,  Febrile to 103, sedated w/ propofol, oxygenation improved compared to yesterday. Requiring min levophed  10/5:  Remains intubated but not sedated.  Fever curve down trending.  Tolerating relatively minimal ventilator settings, but she becomes hypercapnic and hypo ventilates with placed pressure support ventilation.  She is only requiring low-dose vasopressors today.    Review of Systems   Unable to perform ROS: Intubated      I have personally reviewed the following during today's evaluation:  past medical history, ROS, family history, social history, surgical history, current inpatient medications,drug allergies, vital signs over the past 24 hours, results of relevant diagnostic studies and nursing/provider documentation from the past 24 hours.     Objective     VS Temp:  [98.8 °F (37.1 °C)-102.2 °F (39 °C)]   Pulse:  []   Resp:  [24-39]   BP: ()/(35-61)   SpO2:  [94 %-100 %]   Arterial Line BP: ()/(39-52)   Ideal body weight: 59.3 kg (130 lb 11.7 oz)  Adjusted ideal body weight: 67.9 kg (149 lb 9.7 oz)   I/O   Intake/Output Summary (Last 24 hours) at 10/5/2020 9800  Last data filed at 10/5/2020  1700  Gross per 24 hour   Intake 1651.4 ml   Output 0 ml   Net 1651.4 ml        Vent SpO2 98%   Vent Mode: A/C  Oxygen Concentration (%):  [40-50] 40  Resp Rate Total:  [25 br/min-112 br/min] 30 br/min  Vt Set:  [390 mL] 390 mL  PEEP/CPAP:  [5 cmH20] 5 cmH20  Pressure Support:  [0 cmH20-5 cmH20] 0 cmH20  Mean Airway Pressure:  [7.5 ztC87-74 cmH20] 15 cmH20     PE Physical Exam   Constitutional: She appears well-developed and well-nourished. She is cooperative.  Non-toxic appearance. No distress. She is intubated and restrained.   Intubated, not on sedation   HENT:   Head: Normocephalic and atraumatic.   Mouth/Throat: Uvula is midline and mucous membranes are normal. Mallampati Score: unable to assess.   Neck: Trachea normal and normal range of motion. Neck supple. No JVD present. No thyromegaly present.   R IJ TLC   Cardiovascular: Normal rate, regular rhythm, normal heart sounds and intact distal pulses.   Pulmonary/Chest: Normal expansion, symmetric chest wall expansion and effort normal. She is intubated. She has no wheezes. She has no rhonchi. She has no rales.   Abdominal: Soft. Bowel sounds are normal. She exhibits no distension. There is no abdominal tenderness.   Musculoskeletal: Normal range of motion.         General: No tenderness, deformity or edema.      Comments: LUE AVF   Lymphadenopathy: No supraclavicular adenopathy is present.     She has no cervical adenopathy.     She has no axillary adenopathy.   Neurological: She is alert. She has normal strength. No cranial nerve deficit or sensory deficit. She exhibits normal muscle tone. GCS eye subscore is 4. GCS verbal subscore is 5. GCS motor subscore is 6.   Follows commands in extremities   Skin: Skin is warm, dry and intact. No rash noted.   Nursing note and vitals reviewed.      Labs I have personally reviewed and interpreted all labs / diagnostic studies obtained over the past 24 hours, and relevant results are as follows:  Recent Labs   Lab  10/05/20  0338  10/05/20  0507 10/05/20  1013   WBC 14.80*  --   --   --    RBC 4.07  --   --   --    HGB 11.4*  --   --   --    HCT 35.8*  --  38  --    *  --   --   --    MCV 88  --   --   --    MCH 28.0  --   --   --    MCHC 31.8*  --   --   --    *  --   --   --    K 3.9  --   --   --    CL 95  --   --   --    CO2 24  --   --   --    BUN 46*  --   --   --    CREATININE 4.6*  --   --   --    PH  --    < > 7.351 7.240*   PCO2  --    < > 44.1 60.2*   PO2  --    < > 97 84   HCO3  --    < > 24.4 25.8   POCSATURATED  --    < > 97 94*   BE  --    < > -1 -2    < > = values in this interval not displayed.       Imaging I have personally reviewed and interpreted the following images and reviewed the associated Radiology report.  I have reviewed and interpreted all pertinent imaging results/findings within the past 24 hours.  CXR:  9/30:  Interval introduction of NG tube. Additional lines and support devices are in position as above.  Persistence of bilateral parahilar and lower lung zone interstitial and alveolar opacities and small bilateral effusions. Stable cardiomegaly.  CXR 10/3- ETT in place, sternotomy wires, with bilateral pulmonary edema and retrocardiac opacification w/ blunting of costophrenic angles bilaterally  CXR 10/4- ETT in place, unchanged bilateral infiltrates & R pleural effusion     Micro I have personally reviewed and interpreted the available culture data.  Relevant results are as follows.  Blood Culture   Lab Results   Component Value Date    LABBLOO No Growth to date 10/03/2020    LABBLOO No Growth to date 10/03/2020   , Sputum Culture   Lab Results   Component Value Date    GSRESP <10 epithelial cells per low power field. 10/04/2020    GSRESP Many WBC's 10/04/2020    GSRESP Few Gram positive rods 10/04/2020    GSRESP Many Gram negative rods 10/04/2020    GSRESP Few Gram positive cocci 10/04/2020    RESPIRATORYC (A) 10/04/2020     PRESUMPTIVE PSEUDOMONAS SPECIES  Many  Identification  and susceptibility pending      and Urine Culture    Lab Results   Component Value Date    LABURIN (A) 09/25/2020     PRESUMPTIVE JORDIN ALBICANS  10,000 - 49,999 cfu/ml        Medications Scheduled    sodium chloride 0.9%   Intravenous Once    sodium chloride 0.9%   Intravenous Once    amiodarone  200 mg Oral BID    apixaban  2.5 mg Oral BID    citalopram  20 mg Oral Daily    collagenase   Topical (Top) Daily    famotidine (PF)  20 mg Intravenous Daily    fluconazole  100 mg Oral Daily    levothyroxine  50 mcg Oral Before breakfast    meropenem (MERREM) IVPB  500 mg Intravenous Q12H    metoprolol succinate  25 mg Oral Daily    miconazole NITRATE 2 %   Topical (Top) BID    mupirocin   Nasal BID    rosuvastatin  10 mg Oral Daily    sevelamer carbonate  1,600 mg Oral TID WM    sodium zirconium cyclosilicate  5 g Oral Daily      Continuous Infusions:    sodium chloride 0.9% 10 mL/hr at 10/01/20 2125    norepinephrine bitartrate-D5W 0.06 mcg/kg/min (10/05/20 1700)    propofoL Stopped (10/05/20 0500)      PRN   sodium chloride 0.9%, sodium chloride 0.9%, acetaminophen, albuterol sulfate, dextrose 50%, dextrose 50%, glucagon (human recombinant), glucose, glucose, insulin aspart U-100, ondansetron, Pharmacy to dose Vancomycin consult **AND** vancomycin - pharmacy to dose, white petrolatum        Assessment       Active Hospital Problems    Diagnosis    *CHF (congestive heart failure)    Shock    Atrial fibrillation and flutter    Acute on chronic heart failure    COPD (chronic obstructive pulmonary disease)    Acute on chronic respiratory failure with hypoxia and hypercapnia    Anemia, chronic disease    Stage 5 chronic kidney disease on chronic dialysis    Open wound of left heel    Chronic kidney disease with end stage renal failure on dialysis      My Impression:  Acute on chronic hypoxemic / hypercapnic respiratory failrue with some component of acute lower respiratory tract infection, but  her neuromuscular weakness seems to be the main pathology driving her inability to be liberated from mechanical ventilation.    Plan     Neuro  · Intermittent fentanyl boluses as needed to maintain RASS of -1  · Daily spontaneous awakening trials.    Pulmonary  · Continue LPV for now.  · Daily spontaneous breathing trial.  · Discontinue empiric vancomycin.  · Transition to empiric meropenem for pseudomonal pneumonia    Cardiac/Vascular  · Continued pursue of a net neutral fluid balance.  · Titrate vasopressors to maintain MAP > 65 mmHg.    Renal/  · Nephrology following.  · Dialysis at their direction.    Hematology  · No indication for blood products.  · Observation for now.    Endocrine  · Continue levothyroxine.  · Serial blood glucose monitoring.  · Sliding scale insulin as needed to maintain moderate glycemic control.    GI  · H2 blocker for stress ulcer ppx.  · Enteral feeds.    I had a long discussion with patient's  at the bedside this morning.  We confirmed the previously established goals of care.  He would like to attempt to liberate her from mechanical ventilation.  However, if it becomes apparent that this is not feasible, he would not want to proceed with a tracheostomy.  If in the following days she does not clinically improve, we can consider transitioning to comfort measures and terminally extubating.  In the meantime, if she develops cardiac arrest, initiation of advanced cardiac life support would not be in keeping with the patient's wishes.    Critical Care Time: Approximately >35 minutes    The patient is critically ill due to the following conditions that actively pose threat to life and bodily function:  Respiratory Failure requiring intubation and invasive mechanical ventilation, Shock     The patient is at high risk of death due to shock, respiratory failure and requiring ongoing treatment including invasive mechanical ventilation, titration of vasoactive medications to prevent  further life-threatening deterioration.  Due to a high probability of clinically significant, life threatening deterioration, the patient required my highest level of preparedness to intervene emergently and I personally spent this critical care time directly and personally managing the patient.     Critical care was time spent personally by me on the following activities:    Obtaining a history, examination of patient, reviewing pulse oximetry, providing medical care at the patients bedside, developing a treatment plan with patient or surrogate and bedside caregivers, ordering and reviewing laboratory studies, radiographic studies, pulse oximetry, ordering and performing treatments and interventions, evaluation of patient's response to treatment,  discussions with consultants while on the unit and immediately available to the patient, re-evaluation of the patient's condition, and documentation in the medical record.    This critical care time did not overlap with that of any other provider or involve time for any procedures.     Ehsan Hui MD  Pulmonary / Critical Care Medicine  [facility]  10/05/2020

## 2020-10-05 NOTE — PROGRESS NOTES
"Pharmacist Renal Dose Adjustment Note    Juliane Ramos is a 60 y.o. female being treated with the medication Meropenem    Patient Data:    Vital Signs (Most Recent):  Temp: 99.8 °F (37.7 °C) (10/05/20 0702)  Pulse: 101 (10/05/20 1018)  Resp: (!) 39 (10/05/20 1018)  BP: (!) 96/41 (10/05/20 1000)  SpO2: 97 % (10/05/20 1018)   Vital Signs (72h Range):  Temp:  [97.6 °F (36.4 °C)-103.1 °F (39.5 °C)]   Pulse:  []   Resp:  [3-39]   BP: ()/(29-58)   SpO2:  [77 %-100 %]   Arterial Line BP: ()/(39-67)      Recent Labs   Lab 10/03/20  0321 10/04/20  0350 10/05/20  0338   CREATININE 4.6* 3.7* 4.6*     Ht: 5' 6" (1.676 m)  Wt: 80.7 kg (177 lb 14.6 oz)  Estimated Creatinine Clearance: 14.3 mL/min (A) (based on SCr of 4.6 mg/dL (H)).    Meropenem 1 gm IV Q 8 hrs will be changed to Meropenem 500 mg IV Q 12 hrs.    Pharmacist's Name: Smith Crisostomo  Pharmacist's Extension: [phone]    "

## 2020-10-05 NOTE — PROGRESS NOTES
Formerly [facility], later [facility] Medicine  Progress Note    Patient Name: Juliane Ramos  MRN: [id number]  Patient Class: IP- Inpatient   Admission Date: 9/24/2020  Length of Stay: 11 days  Attending Physician: Ralph Tilley MD  Primary Care Provider: JOS Dumont        Subjective:     Principal Problem:CHF (congestive heart failure)        HPI:  Patient is a 60-year-old female hx ESRD HD (T,Th, Sat)  CAD/CABG ,EF20%?, COPD, DM ,chronic hypotension  presents ED with complaints of having low blood pressure and rapid heart rate.  In the emergency department patient had a heart rate in the 130s regular and a blood pressure systolic of approximately 100  Patient denies shortness of breath chest pain fever chills abdominal pain nausea vomiting.  She states her last dialysis on Tuesday she did not have any volume removed.  Patient has been given approximately 500 cc of IV fluids in the emergency department at remains tachycardic in the 120s.      Overview/Hospital Course:  09/29  Assumed care. Chart reviewed. Labs reviewed:  End stage renal function. Receiving dialysis today. 2 liters to be removed. Discussed with dietician: tube feeds if not extubated.    09/30  Consultants' attendance noted and appreciated. Required re-intubation yesterday.  Labs reviewed: mild leukocytosis with normal hematocrit; minimal hyponatremia with end-stage renal function values. Remains intubated. Discussed with Dietician: start enteral feeds.    10/01  Consultants' attendance noted and appreciated.. Unable to extubate. Receiving dialysis this AM. Labs reviewed: leukocytosis, stable normocytic anemia; mild hyponatremia, otherwise normal electrolytes with end stage renal function    10/02  Consultants' attendance noted and appreciated.. Labs reviewed: leukocytosis persisting, yet improved; minimal hyponatremia otherwise normal electrolytes with end stage renal function. Telemetry reviewed: sinus. Remains intubated.    10/03  "Discussed with Pulmonology. Had dialysis this AM with 4 liters removed. Labs reviewed: leukocytosis resolved, mild hyponatremia with otherwise normal electrolytes and end stage renal values. Telemetry revewed: sinus tach. Sedated and intubated     10/04 Consultants' attendance noted and appreciated. Did not tolerate "sedation vacation" this AM: heart rate and blood pressure went up. Labs reviewed: leukocytosis with normal Hct; mild hyponatremia otherwise electrolytes are normal with end stage renal function. CXR shows ET tube level at heads of clavicles--it was advanced further into the trachea.     10/05  Discussed with Pulmonology: failed trial. Labs reviewed: leukocytosis with minimal normocytic anemia;hyponatremia with end stage renal function. Growing pseudomonas in sputum despite being on piperacillin     Interval History: hypoxic respiratory failure    Review of Systems   Unable to perform ROS: Intubated     Objective:     Vital Signs (Most Recent):  Temp: 99.8 °F (37.7 °C) (10/05/20 0702)  Pulse: 103 (10/05/20 1013)  Resp: (!) 38 (10/05/20 1013)  BP: (!) 96/41 (10/05/20 1000)  SpO2: 95 % (10/05/20 1013) Vital Signs (24h Range):  Temp:  [98.9 °F (37.2 °C)-101.3 °F (38.5 °C)] 99.8 °F (37.7 °C)  Pulse:  [] 103  Resp:  [3-38] 38  SpO2:  [94 %-100 %] 95 %  BP: ()/(35-51) 96/41  Arterial Line BP: ()/(39-55) 125/51     Weight: 80.7 kg (177 lb 14.6 oz)  Body mass index is 28.72 kg/m².    Intake/Output Summary (Last 24 hours) at 10/5/2020 1018  Last data filed at 10/5/2020 1000  Gross per 24 hour   Intake 2371.4 ml   Output 0 ml   Net 2371.4 ml      Physical Exam  Vitals signs and nursing note reviewed.   Constitutional:       Appearance: She is well-developed.      Comments: Sedated and intubated   HENT:      Head: Normocephalic and atraumatic.      Right Ear: External ear normal.      Left Ear: External ear normal.      Nose: Nose normal.   Eyes:      Conjunctiva/sclera: Conjunctivae normal. "   Neck:      Musculoskeletal: Neck supple.   Cardiovascular:      Rate and Rhythm: Normal rate. Rhythm irregular.      Heart sounds: Normal heart sounds.   Pulmonary:      Comments: Very decreased entry  Abdominal:      General: Bowel sounds are normal.      Palpations: Abdomen is soft.   Genitourinary:     Comments: Chi   Musculoskeletal: Normal range of motion.   Skin:     General: Skin is warm and dry.      Capillary Refill: Capillary refill takes less than 2 seconds.   Neurological:      Mental Status: She is alert.      Comments: Sedated and intubated   Psychiatric:      Comments: Sedated and intubated         Significant Labs:   BMP:   Recent Labs   Lab 10/05/20  0338   *   *   K 3.9   CL 95   CO2 24   BUN 46*   CREATININE 4.6*   CALCIUM 7.6*     CBC:   Recent Labs   Lab 10/04/20  0350 10/04/20  0445 10/05/20  0338 10/05/20  0507   WBC 15.20*  --  14.80*  --    HGB 12.1  --  11.4*  --    HCT 37.8 40 35.8* 38   *  --  104*  --        Significant Imaging: I have reviewed and interpreted all pertinent imaging results/findings within the past 24 hours.      Assessment/Plan:      * CHF (congestive heart failure)  Continue current regimen      Acute on chronic heart failure  Continue mechanical ventilation and dialysis; volume removal      Atrial fibrillation and flutter  Rate controlled      Anemia, chronic disease  Continue current regimen      Acute on chronic respiratory failure with hypoxia and hypercapnia  Continue current course; change piperacillin to meropenem      COPD (chronic obstructive pulmonary disease)  Continue current regimen      Stage 5 chronic kidney disease on chronic dialysis  Continue current regimen      Open wound of left heel  Continue current regimen      Chronic kidney disease with end stage renal failure on dialysis  Continue current regimen        VTE Risk Mitigation (From admission, onward)         Ordered     apixaban tablet 2.5 mg  2 times daily      09/24/20  1018     Place MARY ELLEN hose  Until discontinued      09/24/20 1018     IP VTE HIGH RISK PATIENT  Once      09/24/20 1018     Place sequential compression device  Until discontinued      09/24/20 1018                Discharge Planning   SHIKHA:      Code Status: Full Code   Is the patient medically ready for discharge?: No    Reason for patient still in hospital (select all that apply): Patient trending condition and Treatment  Discharge Plan A: Home Health                  Ralph Tilley MD  Department of Hospital Medicine   [facility]

## 2020-10-05 NOTE — CONSULTS
Nephrology Consult Note         Patient Name: Juliane Ramos  MRN: [id number]    Patient Class: IP- Inpatient   Admission Date: 9/24/2020  Length of Stay: 11 days  Date of Service: 10/5/2020    Attending Physician: Ralph Tilley MD  Primary Care Provider: JOS Dumont    Reason for Consult: esrd/anemia/hyperkalemia/hypotension/shpt/tachycardia    SUBJECTIVE:     HPI: 60F hx ESRD HD (T,Th, Sat)  CAD/CABG ,EF20%?, COPD, DM ,chronic hypotension on midodrine daily presents to ED with complaints of having low blood pressure and rapid heart rate. In the emergency department patient had a heart rate in the 130s regular and SBP in 100s  Patient denies shortness of breath, chest pain, fever, chills, abdominal pain, nausea, vomiting. Last dialysis on Tuesday and she did not have any volume removed.  Patient has been given approximately 500 cc of IV fluids in the emergency department at remains tachycardic in the 120s. Cards eval is pending, Amiodarone was started.    9/25 Getting HD for hyperkalemia today, before cardioversion. Continue HD per TTS schedule.  9/26 Seen and examined on Hd today, tolerating well. Continue HD per TTS schedule.   9/28  Intubated.  Sedated. TTS dialysis planned  9/29  Seen today on dialysis.  Tolerating well.  Still intubated, arousable, good eye contact.  No lab results yet today.  10/1  Intubated.  Unresponsive.     10/2  Makes eye contact.  No distress.  afebrile  10/3  Seen on rounds.  Seen on dialysis.  Intubated.  Sedate  10/4  Failed sedation vacation.    10/5 VSS, remains intubated, plan HD in AM.    Past Medical History:   Diagnosis Date    Anemia     Anemia in stage 3 chronic kidney disease 11/26/2017    Anticoagulant long-term use     CHF (congestive heart failure)     COPD (chronic obstructive pulmonary disease)     COPD exacerbation 3/10/2020    Coronary artery disease     Diabetes mellitus     Digestive disorder     Encounter for blood transfusion     Essential  "hypertension 2017    Hypertension     Low blood pressure     MI (myocardial infarction)     Segmental and subsegmental pulmonary emboli of RLL without acute cor pulmonale 2017    Stroke     2005    Thyroid disease     Traumatic subarachnoid hemorrhage 2017    Type 2 diabetes mellitus with stage 3 chronic kidney disease, without long-term current use of insulin 2017     Past Surgical History:   Procedure Laterality Date    ABCESS DRAINAGE Right     Between "little toe" and the one next to it    BREAST SURGERY      Reduction [id number]    CARDIAC SURGERY  2011    CABG 4vessel ring in one valve mitral valve prolapse    CORONARY ARTERY BYPASS GRAFT      DEBRIDEMENT OF FOOT Left 2020    Procedure: DEBRIDEMENT, FOOT;  Surgeon: Dennis Wick DPM;  [facility];  Service: Podiatry;  Laterality: Left;    FOOT SURGERY      4 grafts to left foot    hyperlipidemia      TUBAL LIGATION  1986     Family History   Problem Relation Age of Onset    Arthritis Mother     Heart disease Father     Cancer Father 68        prostae and bladder ca  in     Diabetes Father     Hypertension Father     Depression Sister     Hypertension Son     Diabetes Maternal Grandmother         lost leg    Kidney disease Paternal Grandfather         had kidney removed    Stroke Paternal Grandfather      Social History     Tobacco Use    Smoking status: Never Smoker    Smokeless tobacco: Never Used   Substance Use Topics    Alcohol use: Yes     Alcohol/week: 1.0 - 2.0 standard drinks     Types: 1 - 2 Glasses of wine per week     Comment: "socially" not in awhile    Drug use: No       Review of patient's allergies indicates:  No Known Allergies    Outpatient meds:  No current facility-administered medications on file prior to encounter.      Current Outpatient Medications on File Prior to Encounter   Medication Sig Dispense Refill    gabapentin (NEURONTIN) 100 MG capsule " "Take 100 mg by mouth 3 (three) times daily.  3    midodrine (PROAMATINE) 5 MG Tab Take 5 mg by mouth 3 (three) times daily with meals.       rosuvastatin (CRESTOR) 10 MG tablet Take 10 mg by mouth every evening.       albuterol-ipratropium (DUO-NEB) 2.5 mg-0.5 mg/3 mL nebulizer solution Take 3 mLs by nebulization every 6 (six) hours. Rescue 1 Box 11    apixaban 5 mg Tab Take 1 tablet (5 mg total) by mouth 2 (two) times daily. (Patient taking differently: Take 5 mg by mouth 2 (two) times daily. ) 60 tablet 1    blood sugar diagnostic Strp Test 3-4 times daily PRN      CLARITIN-D 12 HOUR 5-120 mg per tablet Take 1 tablet by mouth once daily.   0    fluticasone (FLONASE) 50 mcg/actuation nasal spray 1 spray by Each Nostril route once daily.   12    insulin aspart (NOVOLOG) 100 unit/mL InPn pen Inject 1-10 Units into the skin 3 (three) times daily. (Patient taking differently: Inject 1-10 Units into the skin 3 (three) times daily. If sugar >150 on sliding scale) 3 mL 1    levothyroxine (SYNTHROID) 50 MCG tablet Take 50 mcg by mouth before breakfast.       pen needle, diabetic (BD ULTRA-FINE OBDULIA PEN NEEDLE) 32 gauge x 5/32" Ndle       ramelteon (ROZEREM) 8 mg tablet Take 8 mg by mouth every evening.      sevelamer carbonate (RENVELA) 800 mg Tab Take 1,600 mg by mouth 3 (three) times daily with meals.      tiotropium (SPIRIVA) 18 mcg inhalation capsule Inhale 1 capsule (18 mcg total) into the lungs once daily. Controller 30 capsule 11    umeclidinium (INCRUSE ELLIPTA) 62.5 mcg/actuation inhalation capsule Inhale 62.5 mcg into the lungs once daily. Controller         Scheduled meds:   sodium chloride 0.9%   Intravenous Once    sodium chloride 0.9%   Intravenous Once    amiodarone  200 mg Oral BID    apixaban  2.5 mg Oral BID    citalopram  20 mg Oral Daily    collagenase   Topical (Top) Daily    famotidine (PF)  20 mg Intravenous Daily    fluconazole  100 mg Oral Daily    levothyroxine  50 mcg Oral " Before breakfast    meropenem (MERREM) IVPB  500 mg Intravenous Q12H    metoprolol succinate  25 mg Oral Daily    miconazole NITRATE 2 %   Topical (Top) BID    mupirocin   Nasal BID    rosuvastatin  10 mg Oral Daily    sevelamer carbonate  1,600 mg Oral TID WM    sodium zirconium cyclosilicate  5 g Oral Daily       Infusions:   sodium chloride 0.9% 10 mL/hr at 10/01/20 2125    norepinephrine bitartrate-D5W 0.06 mcg/kg/min (10/05/20 1000)    propofoL Stopped (10/05/20 0500)       PRN meds:  sodium chloride 0.9%, sodium chloride 0.9%, acetaminophen, albuterol sulfate, dextrose 50%, dextrose 50%, glucagon (human recombinant), glucose, glucose, insulin aspart U-100, ondansetron, Pharmacy to dose Vancomycin consult **AND** vancomycin - pharmacy to dose, white petrolatum    Review of Systems:  ROS    OBJECTIVE:     Vital Signs and IO (Last 24H):  Temp:  [98.9 °F (37.2 °C)-101.3 °F (38.5 °C)]   Pulse:  []   Resp:  [14-39]   BP: ()/(35-51)   SpO2:  [94 %-100 %]   Arterial Line BP: ()/(39-55)   I/O last 3 completed shifts:  In: 3330.2 [I.V.:1040.2; NG/GT:1440; IV Piggyback:850]  Out: 0     Wt Readings from Last 5 Encounters:   09/26/20 80.7 kg (177 lb 14.6 oz)   09/26/20 80.7 kg (177 lb 14.6 oz)   09/08/20 79.8 kg (176 lb)   09/04/20 80 kg (176 lb 5.9 oz)   09/02/20 80 kg (176 lb 5.9 oz)         Physical Exam:  Physical Exam  Constitutional:       Appearance: She is well-developed. She is not diaphoretic.   HENT:      Head: Normocephalic and atraumatic.   Eyes:      General: No scleral icterus.     Pupils: Pupils are equal, round, and reactive to light.   Neck:      Musculoskeletal: Neck supple.   Cardiovascular:      Rate and Rhythm: Normal rate and regular rhythm.   Pulmonary:      Effort: Pulmonary effort is normal. No respiratory distress.      Breath sounds: No stridor.   Abdominal:      General: There is no distension.      Palpations: Abdomen is soft.   Musculoskeletal: Normal range of  motion.         General: Swelling present. No deformity.   Skin:     General: Skin is warm and dry.      Findings: No erythema or rash.   Neurological:      Mental Status: She is alert.      Cranial Nerves: No cranial nerve deficit.         Body mass index is 28.72 kg/m².    Laboratory:  Recent Labs   Lab 10/03/20  0321 10/04/20  0350 10/05/20  0338   * 135* 129*   K 3.8 3.7 3.9   CL 97 101 95   CO2 23 25 24   BUN 44* 31* 46*   CREATININE 4.6* 3.7* 4.6*   ESTGFRAFRICA 11.3* 14.8* 11.3*   EGFRNONAA 9.8* 12.8* 9.8*   * 149* 195*       Recent Labs   Lab 09/30/20  0332 10/01/20  0434 10/02/20  0356 10/03/20  0321 10/04/20  0350 10/05/20  0338   CALCIUM 7.6* 7.5* 7.8* 7.8* 7.9* 7.6*   PHOS 5.6* 6.0*  --   --   --   --    MG  --  1.9 2.0  --   --   --              No results for input(s): POCTGLUCOSE in the last 168 hours.    Recent Labs   Lab 02/21/18 2120 09/12/18  1715 09/24/20  0630   Hemoglobin A1C 6.1 H 5.2 6.3 H       Recent Labs   Lab 10/03/20  0321  10/04/20  0350 10/04/20  0445 10/05/20  0338 10/05/20  0507   WBC 11.51  --  15.20*  --  14.80*  --    HGB 12.2  --  12.1  --  11.4*  --    HCT 38.2   < > 37.8 40 35.8* 38   *  --  116*  --  104*  --    MCV 87  --  88  --  88  --    MCHC 31.9*  --  32.0  --  31.8*  --     < > = values in this interval not displayed.       No results for input(s): BILITOT, BILIDIR, PROT, ALBUMIN, ALKPHOS, ALT, AST in the last 168 hours.    Recent Labs   Lab 11/24/17  1837 09/11/18  0246 09/25/20  1434   Color, UA Red A Yellow Yellow   Appearance, UA Cloudy A Hazy A Cloudy A   pH, UA 5.0 5.0 6.0   Specific Gravity, UA >1.030 A >=1.030 A 1.020   Protein, UA 2+ A 2+ A 2+ A   Glucose, UA 1+ A Negative Negative   Ketones, UA Negative Negative Negative   Urobilinogen, UA Negative Negative Negative   Bilirubin (UA) Negative Negative 1+ A   Occult Blood UA 3+ A 1+ A 3+ A   Nitrite, UA Negative Negative Negative   RBC, UA >100 H 5 H 83 H   WBC, UA 0 >100 H >100 H   Bacteria  Rare Moderate A Occasional   Hyaline Casts, UA 0 0 1785 A       Recent Labs   Lab 10/04/20  0445 10/05/20  0507 10/05/20  1013   POC PH 7.418 7.351 7.240 LL   POC PCO2 44.7 44.1 60.2 HH   POC HCO3 28.9 H 24.4 25.8   POC PO2 192 H 97 84   POC SATURATED O2 100 97 94 L   POC BE 4 -1 -2   Sample ARTERIAL ARTERIAL ARTERIAL       Microbiology Results (last 7 days)     Procedure Component Value Units Date/Time    Culture, Respiratory with Gram Stain [[id number]]  (Abnormal) Collected: 10/04/20 1257    Order Status: Completed Specimen: Respiratory from Tracheal Aspirate Updated: 10/05/20 0726     Respiratory Culture PRESUMPTIVE PSEUDOMONAS SPECIES  Many  Identification and susceptibility pending       Gram Stain (Respiratory) <10 epithelial cells per low power field.     Gram Stain (Respiratory) Many WBC's     Gram Stain (Respiratory) Few Gram positive rods     Gram Stain (Respiratory) Many Gram negative rods     Gram Stain (Respiratory) Few Gram positive cocci    Blood culture [[id number]]  (Abnormal) Collected: 10/03/20 1558    Order Status: Completed Specimen: Blood from Line, Arterial, Right Updated: 10/05/20 0640     Blood Culture, Routine Gram stain patricia bottle: Gram positive cocci in pairs and chains      Results called to and read back by:Benito Mccormack RN/2BICU  10/04/2020        15:12 cea      ENTEROCOCCUS SPECIES  Identification and susceptibility pending      Blood culture [[id number]] Collected: 10/03/20 1624    Order Status: Completed Specimen: Blood from Peripheral, Right Hand Updated: 10/04/20 1832     Blood Culture, Routine No Growth to date      No Growth to date    Blood culture [[id number]] Collected: 09/25/20 1523    Order Status: Completed Specimen: Blood Updated: 09/30/20 1632     Blood Culture, Routine No growth after 5 days.    Culture, Respiratory with Gram Stain [[id number]] Collected: 09/28/20 0045    Order Status: Completed Specimen: Respiratory from Sputum Updated: 09/30/20 0859     Respiratory Culture  Reduced normal respiratory nyla     Gram Stain (Respiratory) <10 epithelial cells per low power field.     Gram Stain (Respiratory) Many WBC's     Gram Stain (Respiratory) Rare Gram positive cocci          ASSESSMENT/PLAN:     Active Hospital Problems    Diagnosis  POA    *CHF (congestive heart failure) [I50.9]  Yes    Shock [R57.9]  Yes    Atrial fibrillation and flutter [I48.91, I48.92]  Unknown    Acute on chronic heart failure [I50.9]  Unknown    COPD (chronic obstructive pulmonary disease) [J44.9]  Yes    Acute on chronic respiratory failure with hypoxia and hypercapnia [J96.21, J96.22]  Yes    Anemia, chronic disease [D63.8]  Yes    Stage 5 chronic kidney disease on chronic dialysis [N18.6, Z99.2]  Not Applicable    Open wound of left heel [S91.302A]  Yes    Chronic kidney disease with end stage renal failure on dialysis [N18.6, Z99.2]  Not Applicable      Resolved Hospital Problems    Diagnosis Date Resolved POA    Tachycardia [R00.0] 09/29/2020 Yes     ESRD on HD TTS via AVF  Hyperkalemia, mild  Hypotension  A.Flutter s/p CV 9/25 now on amiodarone, cant give midodrine  Continue current dialysis prescription. UF as tolerated.  Next HD per schedule.  Renal diet - low K, low phos. Continue Lokelma.  No IVs or BP checks on access and/or non-dominant arm.  Apprecaite cards eval. Holding midodrine for now.    Anemia of CKD  Hgb and HCT are acceptable. Monitor.  Will provide YASHIRA and/or IV iron PRN.    MBD / Secondary HPT  Ca, phos, PTH and vitamin D levels are acceptable.   Phos binders, vitamin D analogues and calcimimetics as needed.    Right arm swelling-new onset  Duplex US shows no DVT.    Respiratory failure, intubated, PNA    Thank you for allowing us to participate in the care of your patient!   We will follow the patient and provide recommendations as needed.    Edson Crystal MD    [facility]  [address] [place] [address]  [place] [address]    [phone] - tel  [phone] -  fax    10/5/2020 1:53 PM

## 2020-10-05 NOTE — PLAN OF CARE
This note also relates to the following rows which could not be included:  Oxygen Concentration (%) - Cannot attach notes to unvalidated device data  SpO2 - Cannot attach notes to unvalidated device data  Pulse - Cannot attach notes to unvalidated device data  Ventilation Type - Cannot attach notes to unvalidated device data  Vent Mode - Cannot attach notes to unvalidated device data  Set Rate - Cannot attach notes to unvalidated device data  Vt Set - Cannot attach notes to unvalidated device data  PEEP/CPAP - Cannot attach notes to unvalidated device data  Pressure Support - Cannot attach notes to unvalidated device data  Waveform - Cannot attach notes to unvalidated device data  Peak Flow - Cannot attach notes to unvalidated device data  Plateau Set/Insp. Hold (sec) - Cannot attach notes to unvalidated device data  Trigger Sensitivity Flow/I-Trigger - Cannot attach notes to unvalidated device data  Resp Rate Total - Cannot attach notes to unvalidated device data  Peak Airway Pressure - Cannot attach notes to unvalidated device data  Mean Airway Pressure - Cannot attach notes to unvalidated device data  Plateau Pressure - Cannot attach notes to unvalidated device data  Exhaled Vt - Cannot attach notes to unvalidated device data  Total Ve - Cannot attach notes to unvalidated device data  I:E Ratio Measured - Cannot attach notes to unvalidated device data  Resp Rate High Alarm - Cannot attach notes to unvalidated device data  Press High Alarm - Cannot attach notes to unvalidated device data  Apnea Rate - Cannot attach notes to unvalidated device data  Apnea Volume (mL) - Cannot attach notes to unvalidated device data  Apnea Oxygen Concentration  - Cannot attach notes to unvalidated device data  Apnea Flow Rate (L/min) - Cannot attach notes to unvalidated device data  T Apnea - Cannot attach notes to unvalidated device data       10/05/20 0713   Patient Assessment/Suction   All Lung Fields Breath Sounds coarse    Suction Method tracheal   $ Suction Charges Inline Suction Procedure Stat Charge   Secretions Amount moderate   Secretions Color yellow;tan   Secretions Characteristics thick   PRE-TX-O2   O2 Device (Oxygen Therapy) ventilator   $ Is the patient on Low Flow Oxygen? Yes   Pulse Oximetry Type Continuous   $ Pulse Oximetry - Multiple Charge Pulse Oximetry - Multiple   Resp (!) 28   Aerosol Therapy   $ Aerosol Therapy Charges PRN treatment not required      Airway Anesthesia 09/29/20   Placement Date/Time: 09/29/20 (c) 3501   Method of Intubation: Video Laryngoscopy  Airway Device: Endotracheal Tube  Mask Ventilation: Easy  Intubated: Postinduction  Airway Device Size: 8.0  Cuffed: Cuffed  Complicating Factors: None  Findings Post-I...   Secured at 25 cm   Measured At Lips   Secured Location Right   Secured by Commercial tube [email or web address]   Vent Select   Conventional Vent Y   $ Ventilator Subsequent 1   Charged w/in last 24h YES   Preset Conventional Ventilator Settings   Vent Type    Respiratory Evaluation   $ Care Plan Tech Time 15 min

## 2020-10-05 NOTE — PROGRESS NOTES
"[facility]  Adult Nutrition   Progress Note (Follow-Up)    SUMMARY     Recommendations  Recommendation/Intervention:   1) Continue current TF regimen.   2) RD will continue to monitor NPO/vent status, TF rate/tolerance, labs, weight.    Goals: 1. TF to increase to goal rate without issues.  Nutrition Goal Status: goal met  Communication of RD Recs: reviewed with RN    Dietitian Rounds Brief  Patient is tolerating TF at goal rate. Remains intubated.    Reason for Assessment  Reason For Assessment: RD follow-up  Diagnosis: (Tachycardia)  Relevant Medical History: ESRD on HD, CHF, COPD, CAD, T2DM, HTN, MI, stroke, thyroid disease  Interdisciplinary Rounds: attended    Nutrition Risk Screen  Nutrition Risk Screen: tube feeding or parenteral nutrition     MST Score: 0  Have you recently lost weight without trying?: No  Weight loss score: 0  Have you been eating poorly because of a decreased appetite?: No  Appetite score: 0       Nutrition/Diet History  Food Allergies: NKFA  Factors Affecting Nutritional Intake: NPO, on mechanical ventilation    Anthropometrics  Temp: (!) 102.2 °F (39 °C)  Height Method: Stated  Height: 5' 6" (167.6 cm)  Height (inches): 66 in  Weight Method: Bed Scale  Weight: 80.7 kg (177 lb 14.6 oz)  Weight (lb): 177.91 lb  Ideal Body Weight (IBW), Female: 130 lb  % Ideal Body Weight, Female (lb): 136.85 %  BMI (Calculated): 28.7  BMI Grade: 25 - 29.9 - overweight     Weight History:  Wt Readings from Last 10 Encounters:   09/26/20 80.7 kg (177 lb 14.6 oz)   09/26/20 80.7 kg (177 lb 14.6 oz)   09/08/20 79.8 kg (176 lb)   09/04/20 80 kg (176 lb 5.9 oz)   09/02/20 80 kg (176 lb 5.9 oz)   08/01/20 82.1 kg (181 lb)   06/22/20 80.7 kg (178 lb)   05/26/20 80.7 kg (178 lb)   05/18/20 80.7 kg (178 lb)   05/13/20 81 kg (178 lb 9.2 oz)     Lab/Procedures/Meds: Pertinent Labs Reviewed    Clinical Chemistry:  Recent Labs   Lab 09/30/20  0332  10/01/20  0434 10/02/20  0356  10/05/20  0338   *  " --  131* 133*   < > 129*   K 4.3  --  4.4 4.2   < > 3.9   CL 92*  --  95 100   < > 95   CO2 21*  --  24 23   < > 24   GLU 76  --  93 114*   < > 195*   BUN 32*  --  43* 29*   < > 46*   CREATININE 4.1*  --  5.2* 3.9*   < > 4.6*   CALCIUM 7.6*  --  7.5* 7.8*   < > 7.6*   ANIONGAP 19*  --  12 10   < > 10   ESTGFRAFRICA 13.0*  --  9.8* 13.8*   < > 11.3*   EGFRNONAA 11.3*  --  8.5* 12.0*   < > 9.8*   MG  --    < > 1.9 2.0  --   --    PHOS 5.6*  --  6.0*  --   --   --     < > = values in this interval not displayed.     CBC:   Recent Labs   Lab 10/05/20  0338 10/05/20  0507   WBC 14.80*  --    RBC 4.07  --    HGB 11.4*  --    HCT 35.8* 38   *  --    MCV 88  --    MCH 28.0  --    MCHC 31.8*  --      Cardiac Profile:  Recent Labs   Lab 10/03/20  0321 10/05/20  0338   BNP 2,866* >4,500*     Medications: Pertinent Medications reviewed    Scheduled Meds:   sodium chloride 0.9%   Intravenous Once    sodium chloride 0.9%   Intravenous Once    amiodarone  200 mg Oral BID    apixaban  2.5 mg Oral BID    citalopram  20 mg Oral Daily    collagenase   Topical (Top) Daily    famotidine (PF)  20 mg Intravenous Daily    fluconazole  100 mg Oral Daily    levothyroxine  50 mcg Oral Before breakfast    meropenem (MERREM) IVPB  500 mg Intravenous Q12H    metoprolol succinate  25 mg Oral Daily    miconazole NITRATE 2 %   Topical (Top) BID    mupirocin   Nasal BID    rosuvastatin  10 mg Oral Daily    sevelamer carbonate  1,600 mg Oral TID WM    sodium zirconium cyclosilicate  5 g Oral Daily       Continuous Infusions:   sodium chloride 0.9% 10 mL/hr at 10/01/20 2125    norepinephrine bitartrate-D5W 0.06 mcg/kg/min (10/05/20 1500)    propofoL Stopped (10/05/20 0500)       PRN Meds:.sodium chloride 0.9%, sodium chloride 0.9%, acetaminophen, albuterol sulfate, dextrose 50%, dextrose 50%, glucagon (human recombinant), glucose, glucose, insulin aspart U-100, ondansetron, Pharmacy to dose Vancomycin consult **AND**  vancomycin - pharmacy to dose, white petrolatum    Estimated/Assessed Needs  Weight Used For Calorie Calculations: 80.7 kg (177 lb 14.6 oz)  Energy Calorie Requirements (kcal): While Intubated: 1696 kcals/day (21 kcals/kg PSE)  ; Once Extubated: 8667-0108 kcals/day (20-25 kcals/kg)  Energy Need Method: Kcal/kg  Protein Requirements:  g/day (1.2-1.5 g/kg)  Weight Used For Protein Calculations: 80.7 kg (177 lb 14.6 oz)  Fluid Requirements (mL): UOP + 1000 mL or per MD     RDA Method (mL): 0       Nutrition Prescription Ordered  Current Diet Order: NPO  Current Nutrition Support Rate Ordered: 40 (ml)    Evaluation of Received Nutrient/Fluid Intake  Enteral Calories (kcal): 1728  Enteral Protein (gm): 78  Enteral (Free Water) Fluid (mL): 698  Free Water Flush Fluid (mL): 180  Other Calories (kcal): 0  Energy Calories Required: meeting needs  Protein Required: meeting needs  Comments: Patient is tolerating TF at goal rate. Remains intubated.  Tolerance: tolerating     Intake/Output Summary (Last 24 hours) at 10/5/2020 1527  Last data filed at 10/5/2020 1500  Gross per 24 hour   Intake 1761.4 ml   Output 0 ml   Net 1761.4 ml      % Intake of Estimated Energy Needs: 75 - 100 %  % Meal Intake: NPO    Nutrition Risk  Level of Risk/Frequency of Follow-up: high     Monitor and Evaluation  Food and Nutrient Intake: energy intake, enteral nutrition intake  Food and Nutrient Adminstration: enteral and parenteral nutrition administration  Physical Activity and Function: nutrition-related ADLs and IADLs, factors affecting access to physical activity  Anthropometric Measurements: weight, weight change, body mass index  Biochemical Data, Medical Tests and Procedures: electrolyte and renal panel, gastrointestinal profile, glucose/endocrine profile, inflammatory profile, lipid profile  Nutrition-Focused Physical Findings: overall appearance     Nutrition Follow-Up  RD Follow-up?: Yes

## 2020-10-05 NOTE — SUBJECTIVE & OBJECTIVE
Interval History: hypoxic respiratory failure    Review of Systems   Unable to perform ROS: Intubated     Objective:     Vital Signs (Most Recent):  Temp: 99.8 °F (37.7 °C) (10/05/20 0702)  Pulse: 103 (10/05/20 1013)  Resp: (!) 38 (10/05/20 1013)  BP: (!) 96/41 (10/05/20 1000)  SpO2: 95 % (10/05/20 1013) Vital Signs (24h Range):  Temp:  [98.9 °F (37.2 °C)-101.3 °F (38.5 °C)] 99.8 °F (37.7 °C)  Pulse:  [] 103  Resp:  [3-38] 38  SpO2:  [94 %-100 %] 95 %  BP: ()/(35-51) 96/41  Arterial Line BP: ()/(39-55) 125/51     Weight: 80.7 kg (177 lb 14.6 oz)  Body mass index is 28.72 kg/m².    Intake/Output Summary (Last 24 hours) at 10/5/2020 1018  Last data filed at 10/5/2020 1000  Gross per 24 hour   Intake 2371.4 ml   Output 0 ml   Net 2371.4 ml      Physical Exam  Vitals signs and nursing note reviewed.   Constitutional:       Appearance: She is well-developed.      Comments: Sedated and intubated   HENT:      Head: Normocephalic and atraumatic.      Right Ear: External ear normal.      Left Ear: External ear normal.      Nose: Nose normal.   Eyes:      Conjunctiva/sclera: Conjunctivae normal.   Neck:      Musculoskeletal: Neck supple.   Cardiovascular:      Rate and Rhythm: Normal rate. Rhythm irregular.      Heart sounds: Normal heart sounds.   Pulmonary:      Comments: Very decreased entry  Abdominal:      General: Bowel sounds are normal.      Palpations: Abdomen is soft.   Genitourinary:     Comments: Chi   Musculoskeletal: Normal range of motion.   Skin:     General: Skin is warm and dry.      Capillary Refill: Capillary refill takes less than 2 seconds.   Neurological:      Mental Status: She is alert.      Comments: Sedated and intubated   Psychiatric:      Comments: Sedated and intubated         Significant Labs:   BMP:   Recent Labs   Lab 10/05/20  0338   *   *   K 3.9   CL 95   CO2 24   BUN 46*   CREATININE 4.6*   CALCIUM 7.6*     CBC:   Recent Labs   Lab 10/04/20  4461  10/04/20  0445 10/05/20  0338 10/05/20  0507   WBC 15.20*  --  14.80*  --    HGB 12.1  --  11.4*  --    HCT 37.8 40 35.8* 38   *  --  104*  --        Significant Imaging: I have reviewed and interpreted all pertinent imaging results/findings within the past 24 hours.

## 2020-10-05 NOTE — PLAN OF CARE
Problem: Fall Injury Risk  Goal: Absence of Fall and Fall-Related Injury  Intervention: Promote Injury-Free Environment  Flowsheets (Taken 10/5/2020 1605)  Safety Promotion/Fall Prevention:   assistive device/personal item within reach   commode/urinal/bedpan at bedside   side rails raised x 3   nonskid shoes/socks when out of bed   medications reviewed   lighting adjusted  Environmental Safety Modification:   assistive device/personal items within reach   clutter free environment maintained   room near unit station   lighting adjusted   room organization consistent     Problem: Diabetes Comorbidity  Goal: Blood Glucose Level Within Desired Range  Intervention: Maintain Glycemic Control  Flowsheets (Taken 10/5/2020 1605)  Glycemic Management:   blood glucose monitoring   supplemental insulin given     Problem: Electrolyte Imbalance (Acute Kidney Injury/Impairment)  Goal: Serum Electrolyte Balance  Intervention: Monitor and Manage Electrolyte Imbalance  Flowsheets (Taken 10/5/2020 1605)  Fluid/Electrolyte Management: electrolyte supplement adjusted     Problem: Infection  Goal: Infection Symptom Resolution  Intervention: Prevent or Manage Infection  Flowsheets (Taken 10/5/2020 1605)  Fever Reduction/Comfort Measures:   lightweight clothing   medication administered   ice pack(s)  Infection Management: aseptic technique maintained  Isolation Precautions: protective environment discontinued     Problem: Skin Injury Risk Increased  Goal: Skin Health and Integrity  Intervention: Optimize Skin Protection  Flowsheets (Taken 10/5/2020 1605)  Pressure Reduction Techniques:   weight shift assistance provided   positioned off wounds   pressure points protected  Pressure Reduction Devices:   foam padding utilized   positioning supports utilized   heel offloading device utilized  Skin Protection:   transparent dressing maintained   incontinence pads utilized  Head of Bed (HOB): HOB at 30-45 degrees     Problem: Device-Related  Complication Risk (Mechanical Ventilation, Invasive)  Goal: Optimal Device Function  Intervention: Optimize Device Care and Function  Flowsheets (Taken 10/5/2020 1605)  Aspiration Precautions:   respiratory status monitored   tube feeding placement verified  Airway Safety Measures: suction at bedside     Problem: Inability to Wean (Mechanical Ventilation, Invasive)  Goal: Mechanical Ventilation Liberation  Intervention: Promote Extubation and Mechanical Ventilation Liberation  Flowsheets (Taken 10/5/2020 1605)  Sleep/Rest Enhancement:   family presence promoted   noise level reduced   relaxation techniques promoted   therapeutic touch utilized  Environmental Support:   calm environment promoted   environmental consistency promoted   distractions minimized  Medication Review/Management:   medications reviewed   dosing adjusted   infusion titrated     Problem: Ventilator-Induced Lung Injury (Mechanical Ventilation, Invasive)  Goal: Absence of Ventilator-Induced Lung Injury  Intervention: Facilitate Lung-Protection Measures  Flowsheets (Taken 10/5/2020 1605)  Lung Protection Measures:   optimal PEEP applied   ventilator waveforms monitored   fluid excess minimized     Problem: Device-Related Complication Risk (Artificial Airway)  Goal: Optimal Device Function  Intervention: Optimize Device Care and Function  Flowsheets (Taken 10/5/2020 1605)  Airway/Ventilation Management:   airway patency maintained   calming measures promoted   pulmonary hygiene promoted  Aspiration Precautions:   respiratory status monitored   tube feeding placement verified  Airway Safety Measures: suction at bedside  Oral Care:   swabbed with antiseptic solution   lip lubricant applied   mouth wash rinse   tongue brushed   teeth brushed     Problem: Feeding Intolerance (Enteral Nutrition)  Goal: Feeding Tolerance  Intervention: Prevent and Manage Feeding Intolerance  Flowsheets (Taken 10/5/2020 1605)  Nutrition Support Management: weight trending  reviewed

## 2020-10-05 NOTE — PROGRESS NOTES
Progress Note  Cardiology    Admit Date: 9/24/2020   LOS: 11 days     Follow-up For:  Atrial flutter, cardioversion, sinus rhythm on amiodarone.  Ischemic cardiomyopathy , old septal infarct , CHF.  Chronic renal failure, dialysis.    Scheduled Meds:   sodium chloride 0.9%   Intravenous Once    sodium chloride 0.9%   Intravenous Once    amiodarone  200 mg Oral BID    apixaban  2.5 mg Oral BID    citalopram  20 mg Oral Daily    collagenase   Topical (Top) Daily    famotidine (PF)  20 mg Intravenous Daily    fluconazole  100 mg Oral Daily    levothyroxine  50 mcg Oral Before breakfast    meropenem (MERREM) IVPB  500 mg Intravenous Q12H    metoprolol succinate  25 mg Oral Daily    miconazole NITRATE 2 %   Topical (Top) BID    mupirocin   Nasal BID    rosuvastatin  10 mg Oral Daily    sevelamer carbonate  1,600 mg Oral TID WM    sodium zirconium cyclosilicate  5 g Oral Daily     Continuous Infusions:   sodium chloride 0.9% 10 mL/hr at 10/01/20 2125    norepinephrine bitartrate-D5W 0.06 mcg/kg/min (10/05/20 1100)    propofoL Stopped (10/05/20 0500)     PRN Meds:sodium chloride 0.9%, sodium chloride 0.9%, acetaminophen, albuterol sulfate, dextrose 50%, dextrose 50%, glucagon (human recombinant), glucose, glucose, insulin aspart U-100, ondansetron, Pharmacy to dose Vancomycin consult **AND** vancomycin - pharmacy to dose, white petrolatum    Review of patient's allergies indicates:  No Known Allergies    SUBJECTIVE:     Interval History: Patient intubated.    Review of Systems  na    OBJECTIVE:     Vital Signs (Most Recent)  Temp: 99.8 °F (37.7 °C) (10/05/20 0702)  Pulse: 95 (10/05/20 1129)  Resp: (!) 32 (10/05/20 1129)  BP: (!) 96/41 (10/05/20 1000)  SpO2: 96 % (10/05/20 1129)      Vital Signs Range (Last 24H):  Temp:  [98.9 °F (37.2 °C)-101.3 °F (38.5 °C)]   Pulse:  []   Resp:  [24-39]   BP: ()/(35-51)   SpO2:  [94 %-100 %]   Arterial Line BP: ()/(39-55)       Physical Exam:  Neck:  JVD - 2 cm above sternal notch, no carotid bruit and supple, symmetrical, trachea midline  Lungs: clear to auscultation bilaterally  Heart: regular rate and rhythm, S1, S2 normal, no murmur, click, rub or gallop  Abdomen: soft, non-tender; bowel sounds normal; no masses,  no organomegaly  Extremities: Extremities normal, atraumatic, no cyanosis, clubbing, or edema    Recent Results (from the past 24 hour(s))   POCT glucose    Collection Time: 10/04/20 12:01 PM   Result Value Ref Range    POC Glucose 131 (H) 70 - 110   Culture, Respiratory with Gram Stain    Collection Time: 10/04/20 12:57 PM    Specimen: Tracheal Aspirate; Respiratory   Result Value Ref Range    Respiratory Culture (A)      PRESUMPTIVE PSEUDOMONAS SPECIES  Many  Identification and susceptibility pending      Gram Stain (Respiratory) <10 epithelial cells per low power field.     Gram Stain (Respiratory) Many WBC's     Gram Stain (Respiratory) Few Gram positive rods     Gram Stain (Respiratory) Many Gram negative rods     Gram Stain (Respiratory) Few Gram positive cocci    POCT glucose    Collection Time: 10/04/20  4:19 PM   Result Value Ref Range    POC Glucose 215 (H) 70 - 110   POCT glucose    Collection Time: 10/04/20  9:08 PM   Result Value Ref Range    POC Glucose 186 (H) 70 - 110   Brain Natriuretic Peptide    Collection Time: 10/05/20  3:38 AM   Result Value Ref Range    BNP >4,500 (H) 0 - 99 pg/mL   Basic metabolic panel    Collection Time: 10/05/20  3:38 AM   Result Value Ref Range    Sodium 129 (L) 136 - 145 mmol/L    Potassium 3.9 3.5 - 5.1 mmol/L    Chloride 95 95 - 110 mmol/L    CO2 24 23 - 29 mmol/L    Glucose 195 (H) 70 - 110 mg/dL    BUN, Bld 46 (H) 6 - 20 mg/dL    Creatinine 4.6 (H) 0.5 - 1.4 mg/dL    Calcium 7.6 (L) 8.7 - 10.5 mg/dL    Anion Gap 10 8 - 16 mmol/L    eGFR if African American 11.3 (A) >60 mL/min/1.73 m^2    eGFR if non  9.8 (A) >60 mL/min/1.73 m^2   CBC Without Differential    Collection Time: 10/05/20   3:38 AM   Result Value Ref Range    WBC 14.80 (H) 3.90 - 12.70 K/uL    RBC 4.07 4.00 - 5.40 M/uL    Hemoglobin 11.4 (L) 12.0 - 16.0 g/dL    Hematocrit 35.8 (L) 37.0 - 48.5 %    Mean Corpuscular Volume 88 82 - 98 fL    Mean Corpuscular Hemoglobin 28.0 27.0 - 31.0 pg    Mean Corpuscular Hemoglobin Conc 31.8 (L) 32.0 - 36.0 g/dL    RDW 16.8 (H) 11.5 - 14.5 %    Platelets 104 (L) 150 - 350 K/uL    MPV 10.8 9.2 - 12.9 fL   Vancomycin, random    Collection Time: 10/05/20  3:38 AM   Result Value Ref Range    Vancomycin, Random 24.7 Not established ug/mL   ISTAT PROCEDURE    Collection Time: 10/05/20  5:07 AM   Result Value Ref Range    POC PH 7.351 7.35 - 7.45    POC PCO2 44.1 35 - 45 mmHg    POC PO2 97 80 - 100 mmHg    POC HCO3 24.4 24 - 28 mmol/L    POC BE -1 -2 to 2 mmol/L    POC SATURATED O2 97 95 - 100 %    POC Glucose 191 (H) 70 - 110 mg/dL    POC Sodium 129 (L) 136 - 145 mmol/L    POC Potassium 3.8 3.5 - 5.1 mmol/L    POC TCO2 26 23 - 27 mmol/L    POC Ionized Calcium 1.14 1.06 - 1.42 mmol/L    POC Hematocrit 38 36 - 54 %PCV    Rate 24     Sample ARTERIAL     Site Tanika/UAC     Allens Test N/A     DelSys Adult Vent     Mode AC/PRVC     Vt 390     PEEP 5     FiO2 50     Sp02 98    POCT glucose    Collection Time: 10/05/20  7:41 AM   Result Value Ref Range    POC Glucose 179 (H) 70 - 110   ISTAT PROCEDURE    Collection Time: 10/05/20 10:13 AM   Result Value Ref Range    POC PH 7.240 (LL) 7.35 - 7.45    POC PCO2 60.2 (HH) 35 - 45 mmHg    POC PO2 84 80 - 100 mmHg    POC HCO3 25.8 24 - 28 mmol/L    POC BE -2 -2 to 2 mmol/L    POC SATURATED O2 94 (L) 95 - 100 %    POC TCO2 28 (H) 23 - 27 mmol/L    Sample ARTERIAL     Site Tanika/UAC     Allens Test N/A     DelSys Adult Vent     Mode CPAP     PEEP 5     FiO2 50     Spont Rate 38    POCT glucose    Collection Time: 10/05/20 11:12 AM   Result Value Ref Range    POC Glucose 154 (H) 70 - 110       Diagnostic Results:  Labs: Reviewed  ECG: Reviewed  X-Ray:  Reviewed    ASSESSMENT/PLAN:     BNP markedly elevated.  Temperature seems to be settling down.  She is on  Levophed.  EKG does not reveal any acute changes.  Dialysis as per Nephrology.

## 2020-10-06 NOTE — CARE UPDATE
10/06/20 0832   PRE-TX-O2   Oxygen Concentration (%) 40   SpO2 97 %   Pulse 98   Resp (!) 33   Vent Select   Charged w/in last 24h YES   Preset Conventional Ventilator Settings   Ventilation Type VC   Vent Mode Spont   Set Rate 24 BPM   Vt Set 390 mL   PEEP/CPAP 5 cmH20   Pressure Support 10 cmH20   Waveform RAMP   Peak Flow 90 L/min   Plateau Set/Insp. Hold (sec) 0   Insp Rise Time  50 %   Trigger Sensitivity Flow/I-Trigger 6 L/min   Patient Ventilator Parameters   Resp Rate Total 39 br/min   Peak Airway Pressure 15 cmH2O   Mean Airway Pressure 7.3 cmH20   Plateau Pressure 0 cmH20   Exhaled Vt 169 mL   Total Ve 4.73 mL   Spont Ve 4.73 L   I:E Ratio Measured 1:2.30   Conventional Ventilator Alarms   Resp Rate High Alarm 55 br/min   Press High Alarm 60 cmH2O   Apnea Rate 14   Apnea Volume (mL) 0 mL   Apnea Oxygen Concentration  100   Apnea Flow Rate (L/min) 60   T Apnea 20 sec(s)   Ready to Wean/Extubation Screen   FIO2<=50 (chart decimal) 0.4   MV<16L (chart vol.) 4.73   PEEP <=8 (chart #) 5   Ready to Wean Parameters   F/VT Ratio<105 (RSBI) 195.27   Pt placed on CPAP by Dr. Hui.

## 2020-10-06 NOTE — PROGRESS NOTES
Pharmacist Renal Dose Adjustment Note    Juliane Ramos is a 60 y.o. female being treated with the medication Zosyn    Patient Data:    Vital Signs (Most Recent):  Temp: 98.1 °F (36.7 °C) (10/06/20 1101)  Pulse: 97 (10/06/20 1215)  Resp: (!) 28 (10/06/20 1215)  BP: (!) 99/53 (10/06/20 1215)  SpO2: 100 % (10/06/20 1215)   Vital Signs (72h Range):  Temp:  [98.1 °F (36.7 °C)-103.1 °F (39.5 °C)]   Pulse:  []   Resp:  [3-41]   BP: ()/(29-74)   SpO2:  [77 %-100 %]   Arterial Line BP: ()/(39-67)      Recent Labs   Lab 10/04/20  0350 10/05/20  0338 10/06/20  0434   CREATININE 3.7* 4.6* 5.1*     Serum creatinine: 5.1 mg/dL (H) 10/06/20 0434  Estimated creatinine clearance: 12.9 mL/min (A)    Medication: 2.25 gm dose: every 12 hours frequency will be changed to medication: 3.375 gm dose: every 12 hours frequency.    Pharmacist's Name: Kizzy Perry  Pharmacist's Extension: 1156

## 2020-10-06 NOTE — CONSULTS
Nephrology Consult Note         Patient Name: Juliane Ramos  MRN: [id number]    Patient Class: IP- Inpatient   Admission Date: 9/24/2020  Length of Stay: 12 days  Date of Service: 10/6/2020    Attending Physician: Oscar Ruth DO  Primary Care Provider: JOS Dumont    Reason for Consult: esrd/anemia/hyperkalemia/hypotension/shpt/tachycardia    SUBJECTIVE:     HPI: 60F hx ESRD HD (T,Th, Sat)  CAD/CABG ,EF20%?, COPD, DM ,chronic hypotension on midodrine daily presents to ED with complaints of having low blood pressure and rapid heart rate. In the emergency department patient had a heart rate in the 130s regular and SBP in 100s  Patient denies shortness of breath, chest pain, fever, chills, abdominal pain, nausea, vomiting. Last dialysis on Tuesday and she did not have any volume removed.  Patient has been given approximately 500 cc of IV fluids in the emergency department at remains tachycardic in the 120s. Cards eval is pending, Amiodarone was started.    9/25 Getting HD for hyperkalemia today, before cardioversion. Continue HD per TTS schedule.  9/26 Seen and examined on Hd today, tolerating well. Continue HD per TTS schedule.   9/28  Intubated.  Sedated. TTS dialysis planned  9/29  Seen today on dialysis.  Tolerating well.  Still intubated, arousable, good eye contact.  No lab results yet today.  10/1  Intubated.  Unresponsive.     10/2  Makes eye contact.  No distress.  afebrile  10/3  Seen on rounds.  Seen on dialysis.  Intubated.  Sedate  10/4  Failed sedation vacation.    10/5 VSS, remains intubated, plan HD in AM.  10/6 VSS, seen and examined on HD. Significant third-spacing in dependent parts, BP is OK with increased pressor, trying for 4L as tolerated..    Past Medical History:   Diagnosis Date    Anemia     Anemia in stage 3 chronic kidney disease 11/26/2017    Anticoagulant long-term use     CHF (congestive heart failure)     COPD (chronic obstructive pulmonary disease)     COPD  "exacerbation 3/10/2020    Coronary artery disease     Diabetes mellitus     Digestive disorder     Encounter for blood transfusion     Essential hypertension 2017    Hypertension     Low blood pressure     MI (myocardial infarction)     Segmental and subsegmental pulmonary emboli of RLL without acute cor pulmonale 2017    Stroke     2005    Thyroid disease     Traumatic subarachnoid hemorrhage 2017    Type 2 diabetes mellitus with stage 3 chronic kidney disease, without long-term current use of insulin 2017     Past Surgical History:   Procedure Laterality Date    ABCESS DRAINAGE Right     Between "little toe" and the one next to it    BREAST SURGERY      Reduction [id number]    CARDIAC SURGERY  2011    CABG 4vessel ring in one valve mitral valve prolapse    CORONARY ARTERY BYPASS GRAFT      DEBRIDEMENT OF FOOT Left 2020    Procedure: DEBRIDEMENT, FOOT;  Surgeon: Dennis Wick DPM;  Location: [facility];  Service: Podiatry;  Laterality: Left;    FOOT SURGERY      4 grafts to left foot    hyperlipidemia      TUBAL LIGATION  1986     Family History   Problem Relation Age of Onset    Arthritis Mother     Heart disease Father     Cancer Father 68        prostae and bladder ca  in     Diabetes Father     Hypertension Father     Depression Sister     Hypertension Son     Diabetes Maternal Grandmother         lost leg    Kidney disease Paternal Grandfather         had kidney removed    Stroke Paternal Grandfather      Social History     Tobacco Use    Smoking status: Never Smoker    Smokeless tobacco: Never Used   Substance Use Topics    Alcohol use: Yes     Alcohol/week: 1.0 - 2.0 standard drinks     Types: 1 - 2 Glasses of wine per week     Comment: "socially" not in awhile    Drug use: No       Review of patient's allergies indicates:  No Known Allergies    Outpatient meds:  No current facility-administered medications on file prior to " "encounter.      Current Outpatient Medications on File Prior to Encounter   Medication Sig Dispense Refill    gabapentin (NEURONTIN) 100 MG capsule Take 100 mg by mouth 3 (three) times daily.  3    midodrine (PROAMATINE) 5 MG Tab Take 5 mg by mouth 3 (three) times daily with meals.       rosuvastatin (CRESTOR) 10 MG tablet Take 10 mg by mouth every evening.       albuterol-ipratropium (DUO-NEB) 2.5 mg-0.5 mg/3 mL nebulizer solution Take 3 mLs by nebulization every 6 (six) hours. Rescue 1 Box 11    apixaban 5 mg Tab Take 1 tablet (5 mg total) by mouth 2 (two) times daily. (Patient taking differently: Take 5 mg by mouth 2 (two) times daily. ) 60 tablet 1    blood sugar diagnostic Strp Test 3-4 times daily PRN      CLARITIN-D 12 HOUR 5-120 mg per tablet Take 1 tablet by mouth once daily.   0    fluticasone (FLONASE) 50 mcg/actuation nasal spray 1 spray by Each Nostril route once daily.   12    insulin aspart (NOVOLOG) 100 unit/mL InPn pen Inject 1-10 Units into the skin 3 (three) times daily. (Patient taking differently: Inject 1-10 Units into the skin 3 (three) times daily. If sugar >150 on sliding scale) 3 mL 1    levothyroxine (SYNTHROID) 50 MCG tablet Take 50 mcg by mouth before breakfast.       pen needle, diabetic (BD ULTRA-FINE OBDULIA PEN NEEDLE) 32 gauge x 5/32" Ndle       ramelteon (ROZEREM) 8 mg tablet Take 8 mg by mouth every evening.      sevelamer carbonate (RENVELA) 800 mg Tab Take 1,600 mg by mouth 3 (three) times daily with meals.      tiotropium (SPIRIVA) 18 mcg inhalation capsule Inhale 1 capsule (18 mcg total) into the lungs once daily. Controller 30 capsule 11    umeclidinium (INCRUSE ELLIPTA) 62.5 mcg/actuation inhalation capsule Inhale 62.5 mcg into the lungs once daily. Controller         Scheduled meds:   amiodarone  200 mg Oral BID    apixaban  2.5 mg Oral BID    citalopram  20 mg Oral Daily    collagenase   Topical (Top) Daily    famotidine (PF)  20 mg Intravenous Daily    " fluconazole  100 mg Oral Daily    levothyroxine  50 mcg Oral Before breakfast    meropenem (MERREM) IVPB  500 mg Intravenous Q12H    metoprolol succinate  25 mg Oral Daily    miconazole NITRATE 2 %   Topical (Top) BID    rosuvastatin  10 mg Oral Daily    sevelamer carbonate  1,600 mg Oral TID WM    sodium zirconium cyclosilicate  5 g Oral Daily       Infusions:   sodium chloride 0.9% 10 mL/hr at 10/01/20 2125    norepinephrine bitartrate-D5W 0.02 mcg/kg/min (10/06/20 0600)       PRN meds:  sodium chloride 0.9%, sodium chloride 0.9%, acetaminophen, albuterol sulfate, dextrose 50%, dextrose 50%, glucagon (human recombinant), glucose, glucose, insulin aspart U-100, ondansetron, Pharmacy to dose Vancomycin consult **AND** vancomycin - pharmacy to dose, white petrolatum    Review of Systems:  ROS    OBJECTIVE:     Vital Signs and IO (Last 24H):  Temp:  [98.4 °F (36.9 °C)-102.8 °F (39.3 °C)]   Pulse:  []   Resp:  [20-41]   BP: ()/(41-61)   SpO2:  [94 %-100 %]   Arterial Line BP: ()/(43-52)   I/O last 3 completed shifts:  In: 2049.6 [I.V.:349.6; NG/GT:1300; IV Piggyback:400]  Out: 0     Wt Readings from Last 5 Encounters:   09/26/20 80.7 kg (177 lb 14.6 oz)   09/26/20 80.7 kg (177 lb 14.6 oz)   09/08/20 79.8 kg (176 lb)   09/04/20 80 kg (176 lb 5.9 oz)   09/02/20 80 kg (176 lb 5.9 oz)         Physical Exam:  Physical Exam  Constitutional:       Appearance: She is well-developed. She is not diaphoretic.   HENT:      Head: Normocephalic and atraumatic.   Eyes:      General: No scleral icterus.     Pupils: Pupils are equal, round, and reactive to light.   Neck:      Musculoskeletal: Neck supple.   Cardiovascular:      Rate and Rhythm: Normal rate and regular rhythm.   Pulmonary:      Effort: Pulmonary effort is normal. No respiratory distress.      Breath sounds: No stridor.   Abdominal:      General: There is no distension.      Palpations: Abdomen is soft.   Musculoskeletal: Normal range of  motion.         General: Swelling present. No deformity.   Skin:     General: Skin is warm and dry.      Findings: No erythema or rash.   Neurological:      Mental Status: She is alert.      Cranial Nerves: No cranial nerve deficit.         Body mass index is 28.72 kg/m².    Laboratory:  Recent Labs   Lab 10/04/20  0350 10/05/20  0338 10/06/20  0434   * 129* 126*   K 3.7 3.9 4.4    95 92*   CO2 25 24 22*   BUN 31* 46* 62*   CREATININE 3.7* 4.6* 5.1*   ESTGFRAFRICA 14.8* 11.3* 10.0*   EGFRNONAA 12.8* 9.8* 8.7*   * 195* 190*       Recent Labs   Lab 09/30/20  0332 10/01/20  0434 10/02/20  0356  10/04/20  0350 10/05/20  0338 10/06/20  0434   CALCIUM 7.6* 7.5* 7.8*   < > 7.9* 7.6* 7.2*   ALBUMIN  --   --   --   --   --   --  1.3*   PHOS 5.6* 6.0*  --   --   --   --   --    MG  --  1.9 2.0  --   --   --   --     < > = values in this interval not displayed.             No results for input(s): POCTGLUCOSE in the last 168 hours.    Recent Labs   Lab 02/21/18 2120 09/12/18  1715 09/24/20  0630   Hemoglobin A1C 6.1 H 5.2 6.3 H       Recent Labs   Lab 10/03/20  0321  10/04/20  0350 10/04/20  0445 10/05/20  0338 10/05/20  0507   WBC 11.51  --  15.20*  --  14.80*  --    HGB 12.2  --  12.1  --  11.4*  --    HCT 38.2   < > 37.8 40 35.8* 38   *  --  116*  --  104*  --    MCV 87  --  88  --  88  --    MCHC 31.9*  --  32.0  --  31.8*  --     < > = values in this interval not displayed.       Recent Labs   Lab 10/06/20  0434   BILITOT 1.1*   PROT 4.4*   ALBUMIN 1.3*   ALKPHOS 85   ALT 15   AST 19       Recent Labs   Lab 11/24/17  1837 09/11/18  0246 09/25/20  1434   Color, UA Red A Yellow Yellow   Appearance, UA Cloudy A Hazy A Cloudy A   pH, UA 5.0 5.0 6.0   Specific Gravity, UA >1.030 A >=1.030 A 1.020   Protein, UA 2+ A 2+ A 2+ A   Glucose, UA 1+ A Negative Negative   Ketones, UA Negative Negative Negative   Urobilinogen, UA Negative Negative Negative   Bilirubin (UA) Negative Negative 1+ A   Occult Blood  UA 3+ A 1+ A 3+ A   Nitrite, UA Negative Negative Negative   RBC, UA >100 H 5 H 83 H   WBC, UA 0 >100 H >100 H   Bacteria Rare Moderate A Occasional   Hyaline Casts, UA 0 0 1785 A       Recent Labs   Lab 10/04/20  0445 10/05/20  0507 10/05/20  1013   POC PH 7.418 7.351 7.240 LL   POC PCO2 44.7 44.1 60.2 HH   POC HCO3 28.9 H 24.4 25.8   POC PO2 192 H 97 84   POC SATURATED O2 100 97 94 L   POC BE 4 -1 -2   Sample ARTERIAL ARTERIAL ARTERIAL       Microbiology Results (last 7 days)     Procedure Component Value Units Date/Time    Culture, Respiratory with Gram Stain [[id number]]  (Abnormal)  (Susceptibility) Collected: 10/04/20 1257    Order Status: Completed Specimen: Respiratory from Tracheal Aspirate Updated: 10/06/20 0632     Respiratory Culture Reduced normal respiratory nyla      PSEUDOMONAS AERUGINOSA  Many       Gram Stain (Respiratory) <10 epithelial cells per low power field.     Gram Stain (Respiratory) Many WBC's     Gram Stain (Respiratory) Few Gram positive rods     Gram Stain (Respiratory) Many Gram negative rods     Gram Stain (Respiratory) Few Gram positive cocci    Blood culture [[id number]]  (Abnormal)  (Susceptibility) Collected: 10/03/20 1558    Order Status: Completed Specimen: Blood from Line, Arterial, Right Updated: 10/06/20 0621     Blood Culture, Routine Gram stain patricia bottle: Gram positive cocci in pairs and chains      Results called to and read back by:Benito Mccormack RN/2[facility]  10/04/2020        15:12 cea      ENTEROCOCCUS FAECALIS    Blood culture [[id number]] Collected: 10/03/20 1624    Order Status: Completed Specimen: Blood from Peripheral, Right Hand Updated: 10/05/20 1832     Blood Culture, Routine No Growth to date      No Growth to date      No Growth to date    Blood culture [[id number]] Collected: 09/25/20 1523    Order Status: Completed Specimen: Blood Updated: 09/30/20 1632     Blood Culture, Routine No growth after 5 days.    Culture, Respiratory with Gram Stain [[id number]] Collected:  09/28/20 0045    Order Status: Completed Specimen: Respiratory from Sputum Updated: 09/30/20 0859     Respiratory Culture Reduced normal respiratory nyla     Gram Stain (Respiratory) <10 epithelial cells per low power field.     Gram Stain (Respiratory) Many WBC's     Gram Stain (Respiratory) Rare Gram positive cocci          ASSESSMENT/PLAN:     Active Hospital Problems    Diagnosis  POA    *CHF (congestive heart failure) [I50.9]  Yes    Shock [R57.9]  Yes    Atrial fibrillation and flutter [I48.91, I48.92]  Unknown    Acute on chronic heart failure [I50.9]  Unknown    COPD (chronic obstructive pulmonary disease) [J44.9]  Yes    Acute on chronic respiratory failure with hypoxia and hypercapnia [J96.21, J96.22]  Yes    Anemia, chronic disease [D63.8]  Yes    Stage 5 chronic kidney disease on chronic dialysis [N18.6, Z99.2]  Not Applicable    Open wound of left heel [S91.302A]  Yes    Chronic kidney disease with end stage renal failure on dialysis [N18.6, Z99.2]  Not Applicable      Resolved Hospital Problems    Diagnosis Date Resolved POA    Tachycardia [R00.0] 09/29/2020 Yes     ESRD on HD TTS via AVF  Hyperkalemia, mild  Hypotension  A.Flutter s/p CV 9/25 now on amiodarone, cant give midodrine  Significant third-spacing  Continue current dialysis prescription. UF as tolerated.  Next HD per schedule.  Renal diet - low K, low phos.   Continue Lokelma.  No IVs or BP checks on access and/or non-dominant arm.  Apprecaite cards eval.   Holding midodrine for now.    Anemia of CKD  Hgb and HCT are acceptable. Monitor.  Will provide YASHIRA and/or IV iron PRN.    MBD / Secondary HPT  Ca, phos, PTH and vitamin D levels are acceptable.   Phos binders, vitamin D analogues and calcimimetics as needed.    Right arm swelling-new onset  Duplex US shows no DVT.    Respiratory failure, intubated, PNA  Plan per Pulm, ID, primary team.    Thank you for allowing us to participate in the care of your patient!   We will follow  the patient and provide recommendations as needed.    Edson Crystal MD    [facility]  [address] [place] [address], [place] [address] [phone] - tel  [phone] - fax    10/6/2020 1:53 PM

## 2020-10-06 NOTE — PROGRESS NOTES
[facility]  Pulmonary / Critical Care Medicine  Progress Note    Subjective     9/29:  No major issues overnight.  Tolerating SBT.  Off of sedation all night.   9/30:  Failed extubation and was re-intubated yesterday evening.  No issues since.  10/1:  No major issues overnight.  Remains intubated.  Off of sedation.  10/2/2020 - Remains critically ill, no new issues overnight, respiratory reports significant secretions.  Fever curve good.  No new weight, cumulative I/O 2.1 liters +., vital signs OK.  Ventilating pressures good, Pplat - 25, Ve - 14,  P/F -  258, attempted brief SBT but sTV only 150 - 200 (not ready to wean/extubate).  Labs reviewed.  10/3- remains on vent, on Levophed 0.022 mcg/kg/min, afebrile, HR controlled in 70s-90s. Completed HD this am with removal of 4L fluid.  10/4- remains on vent,  Febrile to 103, sedated w/ propofol, oxygenation improved compared to yesterday. Requiring min levophed  10/5:  Remains intubated but not sedated.  Fever curve down trending.  Tolerating relatively minimal ventilator settings, but she becomes hypercapnic and hypo ventilates with placed pressure support ventilation.  She is only requiring low-dose vasopressors today.  10/6:  Continues spike fevers despite receiving antibiotics yesterday.  Rapidly developed hypercapnia while on pressure support ventilation this morning.  She remains off sedation.    Review of Systems   Unable to perform ROS: Intubated      I have personally reviewed the following during today's evaluation:  past medical history, ROS, family history, social history, surgical history, current inpatient medications,drug allergies, vital signs over the past 24 hours, results of relevant diagnostic studies and nursing/provider documentation from the past 24 hours.     Objective     VS Temp:  [98.1 °F (36.7 °C)-102.8 °F (39.3 °C)]   Pulse:  []   Resp:  [20-41]   BP: ()/(41-74)   SpO2:  [94 %-100 %]   Arterial Line BP:  ()/(42-56)   Ideal body weight: 59.3 kg (130 lb 11.7 oz)  Adjusted ideal body weight: 67.9 kg (149 lb 9.7 oz)   I/O   Intake/Output Summary (Last 24 hours) at 10/6/2020 1805  Last data filed at 10/6/2020 1700  Gross per 24 hour   Intake 2067.34 ml   Output 4500 ml   Net -2432.66 ml        Vent SpO2 100%   Vent Mode: A/C  Oxygen Concentration (%):  [40] 40  Resp Rate Total:  [24 br/min-51 br/min] 28 br/min  Vt Set:  [390 mL] 390 mL  PEEP/CPAP:  [5 cmH20] 5 cmH20  Pressure Support:  [0 cmH20-10 cmH20] 0 cmH20  Mean Airway Pressure:  [7.3 fkM46-23 cmH20] 12 cmH20     PE Physical Exam   Constitutional: She appears well-developed and well-nourished. She is cooperative.  Non-toxic appearance. No distress. She is intubated and restrained.   Intubated, not on sedation   HENT:   Head: Normocephalic and atraumatic.   Mouth/Throat: Uvula is midline and mucous membranes are normal. Mallampati Score: unable to assess.   Neck: Trachea normal and normal range of motion. Neck supple. No JVD present. No thyromegaly present.   R IJ TLC   Cardiovascular: Normal rate, regular rhythm, normal heart sounds and intact distal pulses.   Pulmonary/Chest: Normal expansion, symmetric chest wall expansion and effort normal. She is intubated. She has no wheezes. She has no rhonchi. She has no rales.   Abdominal: Soft. Bowel sounds are normal. She exhibits no distension. There is no abdominal tenderness.   Musculoskeletal: Normal range of motion.         General: No tenderness, deformity or edema.      Comments: LUE AVF   Lymphadenopathy: No supraclavicular adenopathy is present.     She has no cervical adenopathy.     She has no axillary adenopathy.   Neurological: She is alert. She has normal strength. No cranial nerve deficit or sensory deficit. She exhibits normal muscle tone. GCS eye subscore is 4. GCS verbal subscore is 5. GCS motor subscore is 6.   Follows commands in extremities   Skin: Skin is warm, dry and intact. No rash noted.    Nursing note and vitals reviewed.      Labs I have personally reviewed and interpreted all labs / diagnostic studies obtained over the past 24 hours, and relevant results are as follows:  Recent Labs   Lab 10/06/20  0434 10/06/20  1007   *  --    K 4.4  --    CL 92*  --    CO2 22*  --    BUN 62*  --    CREATININE 5.1*  --    ALT 15  --    AST 19  --    ALKPHOS 85  --    BILITOT 1.1*  --    PROT 4.4*  --    ALBUMIN 1.3*  --    PH  --  7.276*   PCO2  --  50.5*   PO2  --  79*   HCO3  --  23.5*   POCSATURATED  --  94*   BE  --  -3      Procalcitonin:  63.77     Imaging I have personally reviewed and interpreted the following images and reviewed the associated Radiology report.  I have reviewed and interpreted all pertinent imaging results/findings within the past 24 hours.  CXR:  9/30:  Interval introduction of NG tube. Additional lines and support devices are in position as above.  Persistence of bilateral parahilar and lower lung zone interstitial and alveolar opacities and small bilateral effusions. Stable cardiomegaly.  CXR 10/3- ETT in place, sternotomy wires, with bilateral pulmonary edema and retrocardiac opacification w/ blunting of costophrenic angles bilaterally  CXR 10/4- ETT in place, unchanged bilateral infiltrates & R pleural effusion     Micro I have personally reviewed and interpreted the available culture data.  Relevant results are as follows.  Blood Culture   Lab Results   Component Value Date    LABBLOO No Growth to date 10/06/2020   , Sputum Culture   Lab Results   Component Value Date    GSRESP <10 epithelial cells per low power field. 10/04/2020    GSRESP Many WBC's 10/04/2020    GSRESP Few Gram positive rods 10/04/2020    GSRESP Many Gram negative rods 10/04/2020    GSRESP Few Gram positive cocci 10/04/2020    RESPIRATORYC Reduced normal respiratory nyla 10/04/2020    RESPIRATORYC PSEUDOMONAS AERUGINOSA  Many   (A) 10/04/2020    and Urine Culture    Lab Results   Component Value Date     LABURIN (A) 09/25/2020     PRESUMPTIVE JORDIN ALBICANS  10,000 - 49,999 cfu/ml        Medications Scheduled    amiodarone  200 mg Oral BID    apixaban  2.5 mg Oral BID    citalopram  20 mg Oral Daily    collagenase   Topical (Top) Daily    famotidine (PF)  20 mg Intravenous Daily    fluconazole  100 mg Oral Daily    levothyroxine  50 mcg Oral Before breakfast    metoprolol succinate  25 mg Oral Daily    miconazole NITRATE 2 %   Topical (Top) BID    piperacillin-tazobactam (ZOSYN) IVPB  3.375 g Intravenous Q12H    rosuvastatin  10 mg Oral Daily    sevelamer carbonate  1,600 mg Oral TID WM    sodium zirconium cyclosilicate  5 g Oral Daily      Continuous Infusions:    sodium chloride 0.9% 10 mL/hr at 10/01/20 2125    norepinephrine bitartrate-D5W 0.06 mcg/kg/min (10/06/20 1501)      PRN   sodium chloride 0.9%, sodium chloride 0.9%, acetaminophen, albuterol sulfate, dextrose 50%, dextrose 50%, glucagon (human recombinant), glucose, glucose, insulin aspart U-100, ondansetron, Pharmacy to dose Vancomycin consult **AND** vancomycin - pharmacy to dose, white petrolatum        Assessment       Active Hospital Problems    Diagnosis    *CHF (congestive heart failure)    Shock    Atrial fibrillation and flutter    Acute on chronic heart failure    COPD (chronic obstructive pulmonary disease)    Acute on chronic respiratory failure with hypoxia and hypercapnia    Anemia, chronic disease    Stage 5 chronic kidney disease on chronic dialysis    Open wound of left heel    Chronic kidney disease with end stage renal failure on dialysis      My Impression:  Acute on chronic hypoxemic / hypercapnic respiratory failrue with some component of acute lower respiratory tract infection, but her neuromuscular weakness seems to be the main pathology driving her inability to be liberated from mechanical ventilation.  She appears to have line sepsis as well.    Plan     Neuro  · Intermittent fentanyl boluses as needed  to maintain RASS of -1  · Daily spontaneous awakening trials.    Pulmonary  · Continue LPV for now.  · Daily spontaneous breathing trial.  · Infectious Disease is consulted.  · Transitioned back to Zosyn.    Cardiac/Vascular  · Continued pursue of a net neutral fluid balance.  · Titrate vasopressors to maintain MAP > 65 mmHg.    Renal/  · Nephrology following.  · Dialysis at their direction.    Hematology  · No indication for blood products.  · Observation for now.    ID:  · Pseudomonal pneumonia and enterococcal bacteremia.  · Infectious Disease is consulted.  Antibiotics at their direction.  · Remove all central venous/arterial catheters.    Endocrine  · Continue levothyroxine.  · Serial blood glucose monitoring.  · Sliding scale insulin as needed to maintain moderate glycemic control.    GI  · H2 blocker for stress ulcer ppx.  · Enteral feeds.    I had a long discussion with patient's  at the bedside this morning.  We confirmed the previously established goals of care.  He would like to attempt to liberate her from mechanical ventilation.  However, if it becomes apparent that this is not feasible, he would not want to proceed with a tracheostomy.  If in the following days she does not clinically improve, we can consider transitioning to comfort measures and terminally extubating.  In the meantime, if she develops cardiac arrest, initiation of advanced cardiac life support would not be in keeping with the patient's wishes.    Critical Care Time: Approximately >35 minutes    The patient is critically ill due to the following conditions that actively pose threat to life and bodily function:  Respiratory Failure requiring intubation and invasive mechanical ventilation, Shock     The patient is at high risk of death due to shock, respiratory failure and requiring ongoing treatment including invasive mechanical ventilation, titration of vasoactive medications to prevent further life-threatening  deterioration.  Due to a high probability of clinically significant, life threatening deterioration, the patient required my highest level of preparedness to intervene emergently and I personally spent this critical care time directly and personally managing the patient.     Critical care was time spent personally by me on the following activities:    Obtaining a history, examination of patient, reviewing pulse oximetry, providing medical care at the patients bedside, developing a treatment plan with patient or surrogate and bedside caregivers, ordering and reviewing laboratory studies, radiographic studies, pulse oximetry, ordering and performing treatments and interventions, evaluation of patient's response to treatment,  discussions with consultants while on the unit and immediately available to the patient, re-evaluation of the patient's condition, and documentation in the medical record.    This critical care time did not overlap with that of any other provider or involve time for any procedures.     Ehsan Hui MD  Pulmonary / Critical Care Medicine  [facility]  10/06/2020

## 2020-10-06 NOTE — RESPIRATORY THERAPY
Results for VERNON MILLER (MRN [id number]) as of 10/6/2020 10:07   Ref. Range 10/6/2020 10:07   POC PH Latest Ref Range: 7.35 - 7.45  7.276 (LL)   POC PCO2 Latest Ref Range: 35 - 45 mmHg 50.5 (H)   POC PO2 Latest Ref Range: 80 - 100 mmHg 79 (L)   POC BE Latest Ref Range: -2 to 2 mmol/L -3   POC HCO3 Latest Ref Range: 24 - 28 mmol/L 23.5 (L)   POC SATURATED O2 Latest Ref Range: 95 - 100 % 94 (L)   POC TCO2 Latest Ref Range: 23 - 27 mmol/L 25   FiO2 Unknown 0.40   PEEP Unknown 5   Sample Unknown ARTERIAL   DelSys Unknown Adult Vent   Allens Test Unknown N/A   Site Unknown Tanika/UAC   Mode Unknown CPAP   ABG RESULTS GIVEN TO DR. LIND. PT PLACED BACK ON AC/VC.

## 2020-10-06 NOTE — PLAN OF CARE
10/06/20 0700   Patient Assessment/Suction   Level of Consciousness (AVPU) responds to pain   Respiratory Effort Unlabored   Expansion/Accessory Muscles/Retractions expansion symmetric;no retractions;no use of accessory muscles   All Lung Fields Breath Sounds coarse   Rhythm/Pattern, Respiratory assisted mechanically;tachypneic   Cough Frequency with stimulation   Cough Type assisted;productive   Suction Method tracheal   $ Suction Charges Inline Suction Procedure Stat Charge   Secretions Amount large   Secretions Color tan   Secretions Characteristics thick   PRE-TX-O2   O2 Device (Oxygen Therapy) ventilator   $ Is the patient on Low Flow Oxygen? Yes   Oxygen Concentration (%) 40   SpO2 97 %   Pulse Oximetry Type Continuous   $ Pulse Oximetry - Multiple Charge Pulse Oximetry - Multiple   Pulse 96   Resp (!) 27      Airway Anesthesia 09/29/20   Placement Date/Time: 09/29/20 (c) 2971   Method of Intubation: Video Laryngoscopy  Airway Device: Endotracheal Tube  Mask Ventilation: Easy  Intubated: Postinduction  Airway Device Size: 8.0  Cuffed: Cuffed  Complicating Factors: None  Findings Post-I...   Secured Location Right   Secured by Commercial tube mckinney   Bite Block right;secure and patent   Site Condition Cool;Dry   Status Intact;Secured;Patent   Site Assessment Clean;Dry;No bleeding;No drainage   Cuff Volume   (mlt)   Airway Safety   Ambu bag with the patient? Yes, Adult Ambu   Is mask with the patient? Yes, Adult Mask   Suction set is at the bedside? Yes   Vent Select   Conventional Vent Y   $ Ventilator Subsequent 1   Charged w/in last 24h YES   Preset Conventional Ventilator Settings   Vent ID [id number]   Vent Type    Ventilation Type VC   Vent Mode A/C   Humidity HME   Set Rate 24 BPM   Vt Set 390 mL   PEEP/CPAP 5 cmH20   Pressure Support 0 cmH20   Waveform RAMP   Peak Flow 90 L/min   Plateau Set/Insp. Hold (sec) 0   Trigger Sensitivity Flow/I-Trigger 6 L/min   Patient Ventilator Parameters   Resp Rate  Total 28 br/min   Peak Airway Pressure 39 cmH2O   Mean Airway Pressure 14 cmH20   Plateau Pressure 0 cmH20   Exhaled Vt 393 mL   Total Ve 11.3 mL   I:E Ratio Measured 1:2.90   Conventional Ventilator Alarms   Alarms On Y   Resp Rate High Alarm 55 br/min   Press High Alarm 60 cmH2O   Apnea Rate 14   Apnea Volume (mL) 0 mL   Apnea Oxygen Concentration  100   Apnea Flow Rate (L/min) 60   T Apnea 20 sec(s)   Ready to Wean/Extubation Screen   FIO2<=50 (chart decimal) 0.4   MV<16L (chart vol.) 11.3   PEEP <=8 (chart #) 5   Ready to Wean Parameters   F/VT Ratio<105 (RSBI) (!) 68.7

## 2020-10-06 NOTE — NURSING
60 yr old female  Intubated and vented   Follow up bilat feet and hands are ice cold    Lt achilles chronic      bilat buttock  mepilex in place and blanching     Continue with daily dressing changes tot he achilles and maintain pressure prevention protocol

## 2020-10-06 NOTE — PROGRESS NOTES
This note also relates to the following rows which could not be included:  Arterial Line MAP (mmHg) - Cannot attach notes to unvalidated device data  Pulse - Cannot attach notes to unvalidated device data  Resp - Cannot attach notes to unvalidated device data  Resp Rate Total - Cannot attach notes to unvalidated device data  SpO2 - Cannot attach notes to unvalidated device data  Oxygen Concentration (%) - Cannot attach notes to unvalidated device data  BP - Cannot attach notes to unvalidated device data  MAP (mmHg) - Cannot attach notes to unvalidated device data  Arterial Line BP - Cannot attach notes to unvalidated device data

## 2020-10-06 NOTE — PROGRESS NOTES
Critical access hospital Medicine  Progress Note    Patient Name: Juliane Ramos  MRN: [id number]  Patient Class: IP- Inpatient   Admission Date: 9/24/2020  Length of Stay: 12 days  Attending Physician: Oscar Ruth DO  Primary Care Provider: JOS Dumont        Subjective:     Principal Problem:CHF (congestive heart failure)        HPI:  Patient is a 60-year-old female hx ESRD HD (T,Th, Sat)  CAD/CABG ,EF20%?, COPD, DM ,chronic hypotension  presents ED with complaints of having low blood pressure and rapid heart rate.  In the emergency department patient had a heart rate in the 130s regular and a blood pressure systolic of approximately 100  Patient denies shortness of breath chest pain fever chills abdominal pain nausea vomiting.  She states her last dialysis on Tuesday she did not have any volume removed.  Patient has been given approximately 500 cc of IV fluids in the emergency department at remains tachycardic in the 120s.      Overview/Hospital Course:  09/29  Assumed care. Chart reviewed. Labs reviewed:  End stage renal function. Receiving dialysis today. 2 liters to be removed. Discussed with dietician: tube feeds if not extubated.    09/30  Consultants' attendance noted and appreciated. Required re-intubation yesterday.  Labs reviewed: mild leukocytosis with normal hematocrit; minimal hyponatremia with end-stage renal function values. Remains intubated. Discussed with Dietician: start enteral feeds.    10/01  Consultants' attendance noted and appreciated.. Unable to extubate. Receiving dialysis this AM. Labs reviewed: leukocytosis, stable normocytic anemia; mild hyponatremia, otherwise normal electrolytes with end stage renal function    10/02  Consultants' attendance noted and appreciated.. Labs reviewed: leukocytosis persisting, yet improved; minimal hyponatremia otherwise normal electrolytes with end stage renal function. Telemetry reviewed: sinus. Remains intubated.    10/03  "Discussed with Pulmonology. Had dialysis this AM with 4 liters removed. Labs reviewed: leukocytosis resolved, mild hyponatremia with otherwise normal electrolytes and end stage renal values. Telemetry revewed: sinus tach. Sedated and intubated     10/04 Consultants' attendance noted and appreciated. Did not tolerate "sedation vacation" this AM: heart rate and blood pressure went up. Labs reviewed: leukocytosis with normal Hct; mild hyponatremia otherwise electrolytes are normal with end stage renal function. CXR shows ET tube level at heads of clavicles--it was advanced further into the trachea.     10/05  Discussed with Pulmonology: failed trial. Labs reviewed: leukocytosis with minimal normocytic anemia;hyponatremia with end stage renal function. Growing pseudomonas in sputum despite being on piperacillin     Interval History:  24 hour events noted no acute  Patient known to me from last week    Objective:     Vital Signs (Most Recent):  Temp: (!) 102.8 °F (39.3 °C) (10/06/20 0514)  Pulse: 96 (10/06/20 0700)  Resp: (!) 27 (10/06/20 0700)  BP: (!) 81/53 (10/06/20 0700)  SpO2: 97 % (10/06/20 0700) Vital Signs (24h Range):  Temp:  [98.4 °F (36.9 °C)-102.8 °F (39.3 °C)] 102.8 °F (39.3 °C)  Pulse:  [] 96  Resp:  [20-41] 27  SpO2:  [94 %-100 %] 97 %  BP: ()/(41-61) 81/53  Arterial Line BP: ()/(43-52) 97/43     Weight: 80.7 kg (177 lb 14.6 oz)  Body mass index is 28.72 kg/m².    Intake/Output Summary (Last 24 hours) at 10/6/2020 0802  Last data filed at 10/6/2020 0500  Gross per 24 hour   Intake 1429.35 ml   Output --   Net 1429.35 ml      Physical Exam patient intubated, sedated  HEENT sclerae nonicteric, ETT  Neck is supple  Lungs assisted coarse breath sounds  Heart regular Sinus tach at 100  Telemetry reviewed by me no acute  Abdomen is soft , +BS  Extremities no edema  Skin left heel wound see photos  Neuro patient moves extremities, sedated   exam Chi is in place    Significant Labs:   BMP: "   Recent Labs   Lab 10/06/20  0434   *   *   K 4.4   CL 92*   CO2 22*   BUN 62*   CREATININE 5.1*   CALCIUM 7.2*     CBC:   Recent Labs   Lab 10/05/20  0338 10/05/20  0507   WBC 14.80*  --    HGB 11.4*  --    HCT 35.8* 38   *  --      CMP:   Recent Labs   Lab 10/05/20  0338 10/06/20  0434   * 126*   K 3.9 4.4   CL 95 92*   CO2 24 22*   * 190*   BUN 46* 62*   CREATININE 4.6* 5.1*   CALCIUM 7.6* 7.2*   PROT  --  4.4*   ALBUMIN  --  1.3*   BILITOT  --  1.1*   ALKPHOS  --  85   AST  --  19   ALT  --  15   ANIONGAP 10 12   EGFRNONAA 9.8* 8.7*       Significant Imaging:       Assessment/Plan:   #1 Acute hypoxic hypercapnic respiratory failure -remains intubated.  Multifactorial Weaning as tolerated  #2 Acute COPD exacerbation  #3 Acute diastolic  CHF -dialysis as per Nephrology  #4 New onset atrial  Flutter w/RVR on admission   -S/P cardioversion - NSR   cardiology following, continuing amiodarone, anticoagulation, EF46%  #5 ESRD HD Tuesday Thursday Saturday nephrology following   #6 CAD/CABG-/EF30% -Slight elevation of troponin most likely secondary to demand ischemia secondary to hypotension on admission   End-stage renal disease contributing  #7 Chronic wound left distal posterior lower extremity-Wound care follow-up  Followed by Dr. Wick  #8  Hyponatremia-monitoring labs   #9 Hyperkalemia-resolved  #10  DM2-follow sliding scale hemoglobin A1c 6.3   #11 Hx PE -details unknown   #12 Metabolic acidosis .  Resolved  #13 Acute UTI POA-yeast  .  Resolved  #14anemia chronic dx -monitoring  #15 Secondary HPT-as per Renal  #16 +BC Enterococcus   /sputum Pseudomonas-continue IV antibiotic, ID eval     Patient known to me from hospitalization last week  Appreciate help from consultants  Will discussed with patient's  long-term goals/prognosis  Case discussed with nurse and pulmonologist  Will consult ID for treatment of bacteremia  Overall prognosis is very poor    VTE Risk Mitigation  (From admission, onward)         Ordered     apixaban tablet 2.5 mg  2 times daily      09/24/20 1018     Place MARY ELLEN hose  Until discontinued      09/24/20 1018     IP VTE HIGH RISK PATIENT  Once      09/24/20 1018     Place sequential compression device  Until discontinued      09/24/20 1018                               Oscar Ruth DO  Department of Hospital Medicine   [facility]

## 2020-10-06 NOTE — RESPIRATORY THERAPY
10/06/20 1031   Patient Assessment/Suction   Level of Consciousness (AVPU) responds to voice   Respiratory Effort Normal;Unlabored   Expansion/Accessory Muscles/Retractions expansion symmetric;no retractions;no use of accessory muscles   PRE-TX-O2   O2 Device (Oxygen Therapy) ventilator   Oxygen Concentration (%) 40   SpO2 97 %   Pulse 99   Resp (!) 29   Vent Select   Charged w/in last 24h YES   Preset Conventional Ventilator Settings   Vent ID [id number]   Vent Type    Ventilation Type VC   Vent Mode A/C   Humidity HME   Set Rate 24 BPM   Vt Set 390 mL   PEEP/CPAP 5 cmH20   Pressure Support 0 cmH20   Waveform RAMP   Peak Flow 90 L/min   Plateau Set/Insp. Hold (sec) 0   Insp Rise Time  50 %   Trigger Sensitivity Flow/I-Trigger 6 L/min   Patient Ventilator Parameters   Resp Rate Total 28 br/min   Peak Airway Pressure 29 cmH2O   Mean Airway Pressure 9 cmH20   Plateau Pressure 0 cmH20   Exhaled Vt 421 mL   Total Ve 11.2 mL   Spont Ve 11.1 L   I:E Ratio Measured 1:3.40   Conventional Ventilator Alarms   Alarms On Y   Resp Rate High Alarm 55 br/min   Press High Alarm 60 cmH2O   Apnea Rate 14   Apnea Volume (mL) 0 mL   Apnea Oxygen Concentration  100   Apnea Flow Rate (L/min) 60   T Apnea 20 sec(s)   Ready to Wean/Extubation Screen   FIO2<=50 (chart decimal) 0.4   MV<16L (chart vol.) 11.2   PEEP <=8 (chart #) 5   Ready to Wean Parameters   F/VT Ratio<105 (RSBI) (!) 68.88   PT PLACED BACK ON AC/VC PER DR. LNID FOLLOWING ABG RESULTS.

## 2020-10-06 NOTE — SUBJECTIVE & OBJECTIVE
Interval History:  24 hour events noted no acute  Patient known to me from last week    Objective:     Vital Signs (Most Recent):  Temp: (!) 102.8 °F (39.3 °C) (10/06/20 0514)  Pulse: 96 (10/06/20 0700)  Resp: (!) 27 (10/06/20 0700)  BP: (!) 81/53 (10/06/20 0700)  SpO2: 97 % (10/06/20 0700) Vital Signs (24h Range):  Temp:  [98.4 °F (36.9 °C)-102.8 °F (39.3 °C)] 102.8 °F (39.3 °C)  Pulse:  [] 96  Resp:  [20-41] 27  SpO2:  [94 %-100 %] 97 %  BP: ()/(41-61) 81/53  Arterial Line BP: ()/(43-52) 97/43     Weight: 80.7 kg (177 lb 14.6 oz)  Body mass index is 28.72 kg/m².    Intake/Output Summary (Last 24 hours) at 10/6/2020 0802  Last data filed at 10/6/2020 0500  Gross per 24 hour   Intake 1429.35 ml   Output --   Net 1429.35 ml      Physical Exam patient intubated, sedated  HEENT sclerae nonicteric, ETT  Neck is supple  Lungs assisted coarse breath sounds  Heart regular Sinus tach at 100  Telemetry reviewed by me no acute  Abdomen is soft , +BS  Extremities no edema  Skin left heel wound see photos  Neuro patient moves extremities, sedated   exam Chi is in place    Significant Labs:   BMP:   Recent Labs   Lab 10/06/20  0434   *   *   K 4.4   CL 92*   CO2 22*   BUN 62*   CREATININE 5.1*   CALCIUM 7.2*     CBC:   Recent Labs   Lab 10/05/20  0338 10/05/20  0507   WBC 14.80*  --    HGB 11.4*  --    HCT 35.8* 38   *  --      CMP:   Recent Labs   Lab 10/05/20  0338 10/06/20  0434   * 126*   K 3.9 4.4   CL 95 92*   CO2 24 22*   * 190*   BUN 46* 62*   CREATININE 4.6* 5.1*   CALCIUM 7.6* 7.2*   PROT  --  4.4*   ALBUMIN  --  1.3*   BILITOT  --  1.1*   ALKPHOS  --  85   AST  --  19   ALT  --  15   ANIONGAP 10 12   EGFRNONAA 9.8* 8.7*       Significant Imaging:

## 2020-10-06 NOTE — PROGRESS NOTES
10/06/20 1246   Post-Hemodialysis Assessment   Rinseback Volume (mL) 250 mL   Blood Volume Processed (Liters) 52 L   Dialyzer Clearance Lightly streaked   Duration of Treatment (minutes) 180 minutes   Hemodialysis Intake (mL) 500 mL   Total UF (mL) 4500 mL   Net Fluid Removal 4000   Patient Response to Treatment tolerated well with titration of vasopressors   Post-Treatment Weight 77 kg (169 lb 12.1 oz)   Treatment Weight Change -4   Arterial bleeding stop time (min) 8 min   Venous bleeding stop time (min) 8 min   Post-Hemodialysis Comments no problems

## 2020-10-06 NOTE — CONSULTS
Consult Note  Infectious Disease    Reason for Consult:      HPI: Juliane Ramos is a 60 y.o. female with  Hx of CAD/CABG, ESRD HD per AVF, DM, OPD,  chronic hypotension is admitted to the ICU with resp failure and tachycardia. She has required intubation and pressor support since admission. She was afebrile on admission and was started on CTX for a couple of days. Blood cx and urine cx were done 2 days into admission due to hypothermia. Urine was positive for C. alibcan and thus was started on Fluconazole.  She then spiked a fever on 10/3, blood cx came back positive for E feacalis from the A line. Resp cx is positive for PSA. She was started on Piptazo 10/4 and then changed 10/5 to Meropenem. A resp cx is positive for PSA. Chart review shows positive PSA in suptum in 2018. She does have copious thick secretons per RN. She otherwise is tolerating feeds, on going loose stools. L achillis wound is stable. Skin tear in the bottom is not infected. She has RIJ CVL and the pervious A line in place. She is tolerating HD as it is going when visited.      Review of patient's allergies indicates:  No Known Allergies  Past Medical History:   Diagnosis Date    Anemia     Anemia in stage 3 chronic kidney disease 11/26/2017    Anticoagulant long-term use     CHF (congestive heart failure)     COPD (chronic obstructive pulmonary disease)     COPD exacerbation 3/10/2020    Coronary artery disease     Diabetes mellitus     Digestive disorder     Encounter for blood transfusion     Essential hypertension 6/24/2017    Hypertension     Low blood pressure     MI (myocardial infarction) 2010    Segmental and subsegmental pulmonary emboli of RLL without acute cor pulmonale 11/25/2017    Stroke     July 2005    Thyroid disease     Traumatic subarachnoid hemorrhage 11/24/2017    Type 2 diabetes mellitus with stage 3 chronic kidney disease, without long-term current use of insulin 6/24/2017     Past Surgical History:  "  Procedure Laterality Date    ABCESS DRAINAGE Right     Between "little toe" and the one next to it    BREAST SURGERY      Reduction [id number]    CARDIAC SURGERY  2011    CABG 4vessel ring in one valve mitral valve prolapse    CORONARY ARTERY BYPASS GRAFT      DEBRIDEMENT OF FOOT Left 2020    Procedure: DEBRIDEMENT, FOOT;  Surgeon: Dennis Wick DPM;  Location: [facility];  Service: Podiatry;  Laterality: Left;    FOOT SURGERY      4 grafts to left foot    hyperlipidemia      TUBAL LIGATION  1986     Social History     Tobacco Use    Smoking status: Never Smoker    Smokeless tobacco: Never Used   Substance Use Topics    Alcohol use: Yes     Alcohol/week: 1.0 - 2.0 standard drinks     Types: 1 - 2 Glasses of wine per week     Comment: "socially" not in awhile     Social History     Occupational History    Not on file     Family History   Problem Relation Age of Onset    Arthritis Mother     Heart disease Father     Cancer Father 68        prostae and bladder ca  in     Diabetes Father     Hypertension Father     Depression Sister     Hypertension Son     Diabetes Maternal Grandmother         lost leg    Kidney disease Paternal Grandfather         had kidney removed    Stroke Paternal Grandfather        Pertinent medications noted:     Review of Systems:   Intubated, NO ROS      EXAM & DIAGNOSTICS REVIEWED:   Vitals:     Temp:  [98.1 °F (36.7 °C)-102.8 °F (39.3 °C)]   Temp: 98.1 °F (36.7 °C) (10/06/20 1101)  Pulse: 97 (10/06/20 1200)  Resp: (!) 29 (10/06/20 1200)  BP: (!) 106/49 (10/06/20 1200)  SpO2: 100 % (10/06/20 1200)    Intake/Output Summary (Last 24 hours) at 10/6/2020 1212  Last data filed at 10/6/2020 0701  Gross per 24 hour   Intake 1299.35 ml   Output --   Net 1299.35 ml       General:  In NAD. Comfortable, intubated, and not very responsive  Eyes:  Anicteric, PERRL  ENT:  ETT in. Poor dentition noted  Neck:  supple, no adenopathy appreciated  Lungs: Coarse breath " sounds b/l  Heart:  RRR, tachycardia   Abd:  Soft, NT, ND, normal BS, abd wall edema  Musc:  Joints without effusion, L achilli's wound clean  Skin:  Stage IV pressure ulcer of buttocks-   Wound:   Neuro: Alert, attentive, speech fluent, face symmetric, moves all extremities, no focal weakness  Psych:  Calm, cooperative  Lymphatic:     No cervical, supraclavicular, axillary, or inguinal nodes  Extrem: R forearm A line with sllight bleeding.   VAD:  A line, and RIJ CVL,        Isolation:       General Labs reviewed:  Recent Labs   Lab 10/03/20  0321  10/04/20  0350 10/04/20  0445 10/05/20  0338 10/05/20  0507   WBC 11.51  --  15.20*  --  14.80*  --    HGB 12.2  --  12.1  --  11.4*  --    HCT 38.2   < > 37.8 40 35.8* 38   *  --  116*  --  104*  --     < > = values in this interval not displayed.       Recent Labs   Lab 10/04/20  0350 10/05/20  0338 10/06/20  0434   * 129* 126*   K 3.7 3.9 4.4    95 92*   CO2 25 24 22*   BUN 31* 46* 62*   CREATININE 3.7* 4.6* 5.1*   CALCIUM 7.9* 7.6* 7.2*   PROT  --   --  4.4*   BILITOT  --   --  1.1*   ALKPHOS  --   --  85   ALT  --   --  15   AST  --   --  19         Micro:  Microbiology Results (last 7 days)     Procedure Component Value Units Date/Time    Blood culture [[id number]] Collected: 10/06/20 1126    Order Status: Sent Specimen: Blood from AV Fistula, Left Updated: 10/06/20 1135    Culture, Respiratory with Gram Stain [[id number]]  (Abnormal)  (Susceptibility) Collected: 10/04/20 1257    Order Status: Completed Specimen: Respiratory from Tracheal Aspirate Updated: 10/06/20 0632     Respiratory Culture Reduced normal respiratory nyla      PSEUDOMONAS AERUGINOSA  Many       Gram Stain (Respiratory) <10 epithelial cells per low power field.     Gram Stain (Respiratory) Many WBC's     Gram Stain (Respiratory) Few Gram positive rods     Gram Stain (Respiratory) Many Gram negative rods     Gram Stain (Respiratory) Few Gram positive cocci    Blood culture  [[id number]]  (Abnormal)  (Susceptibility) Collected: 10/03/20 1558    Order Status: Completed Specimen: Blood from Line, Arterial, Right Updated: 10/06/20 0621     Blood Culture, Routine Gram stain patricia bottle: Gram positive cocci in pairs and chains      Results called to and read back by:Benito Mccormack RN/2[facility]  10/04/2020        15:12 cea      ENTEROCOCCUS FAECALIS    Blood culture [[id number]] Collected: 10/03/20 1624    Order Status: Completed Specimen: Blood from Peripheral, Right Hand Updated: 10/05/20 1832     Blood Culture, Routine No Growth to date      No Growth to date      No Growth to date    Blood culture [[id number]] Collected: 09/25/20 1523    Order Status: Completed Specimen: Blood Updated: 09/30/20 1632     Blood Culture, Routine No growth after 5 days.    Culture, Respiratory with Gram Stain [[id number]] Collected: 09/28/20 0045    Order Status: Completed Specimen: Respiratory from Sputum Updated: 09/30/20 0859     Respiratory Culture Reduced normal respiratory nyla     Gram Stain (Respiratory) <10 epithelial cells per low power field.     Gram Stain (Respiratory) Many WBC's     Gram Stain (Respiratory) Rare Gram positive cocci        Imaging Reviewed:  10/3 CXR- subsequent cxr the same    Portable chest at 512 compared with 09/30/2020 shows unchanged support tubes and lines.  Cardiac silhouette enlargement is unchanged.  Mediastinal contours also remain unchanged with postsurgical changes of CABG.     Bibasilar pleuroparenchymal opacities show no significant change.  Lung volumes remain low.  Central pulmonary vascular prominence is unchanged.  No pneumothorax.     ECHO 9/26:    · The estimated PA systolic pressure is 71 mmHg.  · The estimated ejection fraction is 46%.  · Diastolic dysfunction.  · There are segmental left ventricular wall motion abnormalities.  · Mitral valve annuloplasty ring  ·   IMPRESSION & PLAN   60 y.o. female with  Hx of CAD/CABG, ESRD HD per AVF, DM, OPD,  chronic hypotension  is admitted to the ICU with resp failure and tachycardia. She has required intubation and pressor support since admission:    1- E faecalis bacteremia: Catheter related BSI- the set from A line positive. She still has this line  - Fevers on going,. Wbc 15K  - Also has RIJ CVL  - One day of piptazo 10/4- now on Merem    2- PSA pneumonia- pan sensitive on 10/3 cx-   CXR with persistent basilar opacities. Thick secretions.   Previous PSA PNA in 2018  Now on merem  3- Mitral valve annuloplasty ring  4- DM  5- ICMO with EF 46%, PulmHTN   6- Chronic L Achilli's wound. Stable    Recommendations:    Switch back to Piptazo to cover both organisms. 1) Merem has poor activity against Enterococcus spp, 2) There is no abx failure after one day of Piptazo 3) PSA very quickly induces porin mediated resistance to Carbapenems. Not a good choice to start with in PSA infection  Blood cx x 2 sets 15 mins part. One can be taken from AVF  MUST remove all lines, laverne the A line. If possible to get PIV with line holiday-   If does not clear bacteremia, then will need to repeat echo  PCT   Supportive care. Wound care  Pt discussed with RN at bedside

## 2020-10-07 PROBLEM — J15.1 PSEUDOMONAL PNEUMONIA: Status: ACTIVE | Noted: 2020-01-01

## 2020-10-07 PROBLEM — R78.81 ENTEROCOCCAL BACTEREMIA: Status: ACTIVE | Noted: 2020-01-01

## 2020-10-07 PROBLEM — B95.2 ENTEROCOCCAL BACTEREMIA: Status: ACTIVE | Noted: 2020-01-01

## 2020-10-07 NOTE — CONSULTS
Nephrology Consult Note         Patient Name: Juliane Ramos  MRN: [id number]    Patient Class: IP- Inpatient   Admission Date: 9/24/2020  Length of Stay: 13 days  Date of Service: 10/7/2020    Attending Physician: Oscar Ruth DO  Primary Care Provider: JOS Dumont    Reason for Consult: esrd/anemia/hyperkalemia/hypotension/shpt/tachycardia    SUBJECTIVE:     HPI: 60F hx ESRD HD (T,Th, Sat)  CAD/CABG ,EF20%?, COPD, DM ,chronic hypotension on midodrine daily presents to ED with complaints of having low blood pressure and rapid heart rate. In the emergency department patient had a heart rate in the 130s regular and SBP in 100s  Patient denies shortness of breath, chest pain, fever, chills, abdominal pain, nausea, vomiting. Last dialysis on Tuesday and she did not have any volume removed.  Patient has been given approximately 500 cc of IV fluids in the emergency department at remains tachycardic in the 120s. Cards eval is pending, Amiodarone was started.    9/25 Getting HD for hyperkalemia today, before cardioversion. Continue HD per TTS schedule.  9/26 Seen and examined on Hd today, tolerating well. Continue HD per TTS schedule.   9/28  Intubated.  Sedated. TTS dialysis planned  9/29  Seen today on dialysis.  Tolerating well.  Still intubated, arousable, good eye contact.  No lab results yet today.  10/1  Intubated.  Unresponsive.     10/2  Makes eye contact.  No distress.  afebrile  10/3  Seen on rounds.  Seen on dialysis.  Intubated.  Sedate  10/4  Failed sedation vacation.    10/5 VSS, remains intubated, plan HD in AM.  10/6 VSS, seen and examined on HD. Significant third-spacing in dependent parts, BP is OK with increased pressor, trying for 4L as tolerated..  10/7 VSS, unable to keep her net negative with TIW dialysis due to high obligate intake, will start MWF UF as tolerated as well. UF today.    Past Medical History:   Diagnosis Date    Anemia     Anemia in stage 3 chronic kidney disease  "2017    Anticoagulant long-term use     CHF (congestive heart failure)     COPD (chronic obstructive pulmonary disease)     COPD exacerbation 3/10/2020    Coronary artery disease     Diabetes mellitus     Digestive disorder     Encounter for blood transfusion     Essential hypertension 2017    Hypertension     Low blood pressure     MI (myocardial infarction)     Segmental and subsegmental pulmonary emboli of RLL without acute cor pulmonale 2017    Stroke     2005    Thyroid disease     Traumatic subarachnoid hemorrhage 2017    Type 2 diabetes mellitus with stage 3 chronic kidney disease, without long-term current use of insulin 2017     Past Surgical History:   Procedure Laterality Date    ABCESS DRAINAGE Right     Between "little toe" and the one next to it    BREAST SURGERY      Reduction [id number]    CARDIAC SURGERY  2011    CABG 4vessel ring in one valve mitral valve prolapse    CORONARY ARTERY BYPASS GRAFT      DEBRIDEMENT OF FOOT Left 2020    Procedure: DEBRIDEMENT, FOOT;  Surgeon: Dennis Wick DPM;  Location: [facility];  Service: Podiatry;  Laterality: Left;    FOOT SURGERY      4 grafts to left foot    hyperlipidemia      TUBAL LIGATION  1986     Family History   Problem Relation Age of Onset    Arthritis Mother     Heart disease Father     Cancer Father 68        prostae and bladder ca  in     Diabetes Father     Hypertension Father     Depression Sister     Hypertension Son     Diabetes Maternal Grandmother         lost leg    Kidney disease Paternal Grandfather         had kidney removed    Stroke Paternal Grandfather      Social History     Tobacco Use    Smoking status: Never Smoker    Smokeless tobacco: Never Used   Substance Use Topics    Alcohol use: Yes     Alcohol/week: 1.0 - 2.0 standard drinks     Types: 1 - 2 Glasses of wine per week     Comment: "socially" not in awhile    Drug use: No       Review " "of patient's allergies indicates:  No Known Allergies    Outpatient meds:  No current facility-administered medications on file prior to encounter.      Current Outpatient Medications on File Prior to Encounter   Medication Sig Dispense Refill    gabapentin (NEURONTIN) 100 MG capsule Take 100 mg by mouth 3 (three) times daily.  3    midodrine (PROAMATINE) 5 MG Tab Take 5 mg by mouth 3 (three) times daily with meals.       rosuvastatin (CRESTOR) 10 MG tablet Take 10 mg by mouth every evening.       albuterol-ipratropium (DUO-NEB) 2.5 mg-0.5 mg/3 mL nebulizer solution Take 3 mLs by nebulization every 6 (six) hours. Rescue 1 Box 11    apixaban 5 mg Tab Take 1 tablet (5 mg total) by mouth 2 (two) times daily. (Patient taking differently: Take 5 mg by mouth 2 (two) times daily. ) 60 tablet 1    blood sugar diagnostic Strp Test 3-4 times daily PRN      CLARITIN-D 12 HOUR 5-120 mg per tablet Take 1 tablet by mouth once daily.   0    fluticasone (FLONASE) 50 mcg/actuation nasal spray 1 spray by Each Nostril route once daily.   12    insulin aspart (NOVOLOG) 100 unit/mL InPn pen Inject 1-10 Units into the skin 3 (three) times daily. (Patient taking differently: Inject 1-10 Units into the skin 3 (three) times daily. If sugar >150 on sliding scale) 3 mL 1    levothyroxine (SYNTHROID) 50 MCG tablet Take 50 mcg by mouth before breakfast.       pen needle, diabetic (BD ULTRA-FINE OBDULIA PEN NEEDLE) 32 gauge x 5/32" Ndle       ramelteon (ROZEREM) 8 mg tablet Take 8 mg by mouth every evening.      sevelamer carbonate (RENVELA) 800 mg Tab Take 1,600 mg by mouth 3 (three) times daily with meals.      tiotropium (SPIRIVA) 18 mcg inhalation capsule Inhale 1 capsule (18 mcg total) into the lungs once daily. Controller 30 capsule 11    umeclidinium (INCRUSE ELLIPTA) 62.5 mcg/actuation inhalation capsule Inhale 62.5 mcg into the lungs once daily. Controller         Scheduled meds:   amiodarone  200 mg Oral BID    apixaban  " 2.5 mg Oral BID    citalopram  20 mg Oral Daily    collagenase   Topical (Top) Daily    famotidine (PF)  20 mg Intravenous Daily    fluconazole  100 mg Oral Daily    levothyroxine  50 mcg Oral Before breakfast    metoprolol succinate  25 mg Oral Daily    miconazole NITRATE 2 %   Topical (Top) BID    piperacillin-tazobactam (ZOSYN) IVPB  3.375 g Intravenous Q12H    rosuvastatin  10 mg Oral Daily    sevelamer carbonate  1,600 mg Oral TID WM    sodium zirconium cyclosilicate  5 g Oral Daily       Infusions:   sodium chloride 0.9% 10 mL/hr at 10/01/20 2125    norepinephrine bitartrate-D5W 0.06 mcg/kg/min (10/06/20 1501)       PRN meds:  sodium chloride 0.9%, sodium chloride 0.9%, acetaminophen, albuterol sulfate, dextrose 50%, dextrose 50%, glucagon (human recombinant), glucose, glucose, insulin aspart U-100, ondansetron, Pharmacy to dose Vancomycin consult **AND** vancomycin - pharmacy to dose, white petrolatum    Review of Systems:  HARJIT    OBJECTIVE:     Vital Signs and IO (Last 24H):  Temp:  [97.6 °F (36.4 °C)-98.7 °F (37.1 °C)]   Pulse:  []   Resp:  [21-40]   BP: ()/(42-74)   SpO2:  [76 %-100 %]   Arterial Line BP: ()/(42-56)   I/O last 3 completed shifts:  In: 2749.8 [I.V.:269.8; Other:500; NG/GT:1780; IV Piggyback:200]  Out: 4500 [Other:4500]    Wt Readings from Last 5 Encounters:   10/07/20 87.1 kg (192 lb 0.3 oz)   09/26/20 80.7 kg (177 lb 14.6 oz)   09/08/20 79.8 kg (176 lb)   09/04/20 80 kg (176 lb 5.9 oz)   09/02/20 80 kg (176 lb 5.9 oz)         Physical Exam:  Physical Exam  Constitutional:       Appearance: Normal appearance. She is well-developed. She is ill-appearing. She is not diaphoretic.   HENT:      Head: Normocephalic and atraumatic.   Eyes:      General: No scleral icterus.     Pupils: Pupils are equal, round, and reactive to light.   Neck:      Musculoskeletal: Neck supple.   Cardiovascular:      Rate and Rhythm: Normal rate and regular rhythm.   Pulmonary:       Effort: Pulmonary effort is normal. No respiratory distress.      Breath sounds: No stridor.   Abdominal:      General: There is no distension.      Palpations: Abdomen is soft.   Musculoskeletal: Normal range of motion.         General: Swelling present. No deformity.   Skin:     General: Skin is warm and dry.      Findings: No erythema or rash.   Neurological:      Cranial Nerves: No cranial nerve deficit.         Body mass index is 30.99 kg/m².    Laboratory:  Recent Labs   Lab 10/05/20  0338 10/06/20  0434 10/07/20  0505   * 126* 132*   K 3.9 4.4 4.3   CL 95 92* 94*   CO2 24 22* 24   BUN 46* 62* 48*   CREATININE 4.6* 5.1* 4.2*   ESTGFRAFRICA 11.3* 10.0* 12.7*   EGFRNONAA 9.8* 8.7* 11.0*   * 190* 212*       Recent Labs   Lab 10/01/20  0434 10/02/20  0356  10/05/20  0338 10/06/20  0434 10/07/20  0505   CALCIUM 7.5* 7.8*   < > 7.6* 7.2* 7.5*   ALBUMIN  --   --   --   --  1.3* 1.4*   PHOS 6.0*  --   --   --   --   --    MG 1.9 2.0  --   --   --   --     < > = values in this interval not displayed.             No results for input(s): POCTGLUCOSE in the last 168 hours.    Recent Labs   Lab 02/21/18 2120 09/12/18  1715 09/24/20  0630   Hemoglobin A1C 6.1 H 5.2 6.3 H       Recent Labs   Lab 10/04/20  0350  10/05/20  0338 10/05/20  0507 10/07/20  0505   WBC 15.20*  --  14.80*  --  17.77*   HGB 12.1  --  11.4*  --  11.1*   HCT 37.8   < > 35.8* 38 34.6*   *  --  104*  --  147*   MCV 88  --  88  --  86   MCHC 32.0  --  31.8*  --  32.1   MONO  --   --   --   --  8.0  1.4*    < > = values in this interval not displayed.       Recent Labs   Lab 10/06/20  0434 10/07/20  0505   BILITOT 1.1* 0.8   PROT 4.4* 4.5*   ALBUMIN 1.3* 1.4*   ALKPHOS 85 101   ALT 15 17   AST 19 25       Recent Labs   Lab 11/24/17  1837 09/11/18  0246 09/25/20  1434   Color, UA Red A Yellow Yellow   Appearance, UA Cloudy A Hazy A Cloudy A   pH, UA 5.0 5.0 6.0   Specific Gravity, UA >1.030 A >=1.030 A 1.020   Protein, UA 2+ A 2+ A 2+  A   Glucose, UA 1+ A Negative Negative   Ketones, UA Negative Negative Negative   Urobilinogen, UA Negative Negative Negative   Bilirubin (UA) Negative Negative 1+ A   Occult Blood UA 3+ A 1+ A 3+ A   Nitrite, UA Negative Negative Negative   RBC, UA >100 H 5 H 83 H   WBC, UA 0 >100 H >100 H   Bacteria Rare Moderate A Occasional   Hyaline Casts, UA 0 0 1785 A       Recent Labs   Lab 10/05/20  0507 10/05/20  1013 10/06/20  1007   POC PH 7.351 7.240 LL 7.276 LL   POC PCO2 44.1 60.2 HH 50.5 H   POC HCO3 24.4 25.8 23.5 L   POC PO2 97 84 79 L   POC SATURATED O2 97 94 L 94 L   POC BE -1 -2 -3   Sample ARTERIAL ARTERIAL ARTERIAL       Microbiology Results (last 7 days)     Procedure Component Value Units Date/Time    Blood culture [[id number]] Collected: 10/06/20 1641    Order Status: Completed Specimen: Blood Updated: 10/06/20 2358     Blood Culture, Routine No Growth to date    Blood culture [[id number]] Collected: 10/03/20 1624    Order Status: Completed Specimen: Blood from Peripheral, Right Hand Updated: 10/06/20 1832     Blood Culture, Routine No Growth to date      No Growth to date      No Growth to date      No Growth to date    Blood culture [[id number]] Collected: 10/06/20 1126    Order Status: Completed Specimen: Blood from AV Fistula, Left Updated: 10/06/20 1758     Blood Culture, Routine No Growth to date    Narrative:      PLEASE draw 2 sets (4 bottles) 15 mins apart from 2 different  sites- One MUST include peripheral stick    Culture, Respiratory with Gram Stain [[id number]]  (Abnormal)  (Susceptibility) Collected: 10/04/20 1257    Order Status: Completed Specimen: Respiratory from Tracheal Aspirate Updated: 10/06/20 0632     Respiratory Culture Reduced normal respiratory nyla      PSEUDOMONAS AERUGINOSA  Many       Gram Stain (Respiratory) <10 epithelial cells per low power field.     Gram Stain (Respiratory) Many WBC's     Gram Stain (Respiratory) Few Gram positive rods     Gram Stain (Respiratory) Many Gram  negative rods     Gram Stain (Respiratory) Few Gram positive cocci    Blood culture [[id number]]  (Abnormal)  (Susceptibility) Collected: 10/03/20 1558    Order Status: Completed Specimen: Blood from Line, Arterial, Right Updated: 10/06/20 0621     Blood Culture, Routine Gram stain patricia bottle: Gram positive cocci in pairs and chains      Results called to and read back by:Benito Mccormack RN/2BICU  10/04/2020        15:12 cea      ENTEROCOCCUS FAECALIS    Blood culture [[id number]] Collected: 09/25/20 1523    Order Status: Completed Specimen: Blood Updated: 09/30/20 1632     Blood Culture, Routine No growth after 5 days.    Culture, Respiratory with Gram Stain [[id number]] Collected: 09/28/20 0045    Order Status: Completed Specimen: Respiratory from Sputum Updated: 09/30/20 0859     Respiratory Culture Reduced normal respiratory nyla     Gram Stain (Respiratory) <10 epithelial cells per low power field.     Gram Stain (Respiratory) Many WBC's     Gram Stain (Respiratory) Rare Gram positive cocci          ASSESSMENT/PLAN:     Active Hospital Problems    Diagnosis  POA    *CHF (congestive heart failure) [I50.9]  Yes    Shock [R57.9]  Yes    Atrial fibrillation and flutter [I48.91, I48.92]  Unknown    Acute on chronic heart failure [I50.9]  Unknown    COPD (chronic obstructive pulmonary disease) [J44.9]  Yes    Acute on chronic respiratory failure with hypoxia and hypercapnia [J96.21, J96.22]  Yes    Anemia, chronic disease [D63.8]  Yes    Stage 5 chronic kidney disease on chronic dialysis [N18.6, Z99.2]  Not Applicable    Open wound of left heel [S91.302A]  Yes    Chronic kidney disease with end stage renal failure on dialysis [N18.6, Z99.2]  Not Applicable      Resolved Hospital Problems    Diagnosis Date Resolved POA    Tachycardia [R00.0] 09/29/2020 Yes     ESRD on HD TTS via AVF  Hyperkalemia, mild  Hypotension  A.Flutter s/p CV 9/25 now on amiodarone, cant give midodrine  Significant  third-spacing  Pneumonia  Unable to keep her net negative with TIW dialysis due to high obligate intake, will start MWF UF as tolerated as well.  Continue Lokelma.  No IVs or BP checks on access and/or non-dominant arm.  Apprecaite cards eval.   Holding midodrine for now.    Anemia of CKD  Hgb and HCT are acceptable. Monitor.  Will provide YASHIRA and/or IV iron PRN.    MBD / Secondary HPT  Ca, phos, PTH and vitamin D levels are acceptable.   Phos binders, vitamin D analogues and calcimimetics as needed.    Right arm swelling-new onset  Duplex US shows no DVT.    Respiratory failure, intubated, PNA  Plan per Pulm, ID, primary team.    Thank you for allowing us to participate in the care of your patient!   We will follow the patient and provide recommendations as needed.    Edson Crystal MD    [facility]  [address] [place] [address]  [place] [address]    [phone] - tel  [phone] - fax    10/7/2020 1:53 PM

## 2020-10-07 NOTE — PROGRESS NOTES
Progress Note  Cardiology    Admit Date: 9/24/2020   LOS: 13 days     Follow-up For:  Atrial Flutter with two-to-one AV block, IV amiodarone and cardioversion, maintains NSR.  She cardiomyopathy , old ASMI.  Chronic renal failure, dialysis.  Fever, sepsis    Scheduled Meds:   amiodarone  200 mg Oral BID    apixaban  2.5 mg Oral BID    collagenase   Topical (Top) Daily    famotidine (PF)  20 mg Intravenous Daily    fluconazole  100 mg Oral Daily    levothyroxine  50 mcg Oral Before breakfast    metoprolol succinate  25 mg Oral Daily    miconazole NITRATE 2 %   Topical (Top) BID    piperacillin-tazobactam (ZOSYN) IVPB  3.375 g Intravenous Q12H    rosuvastatin  10 mg Oral Daily    sevelamer carbonate  1,600 mg Oral TID WM    sodium zirconium cyclosilicate  5 g Oral Daily     Continuous Infusions:   sodium chloride 0.9% 10 mL/hr at 10/01/20 2125    norepinephrine bitartrate-D5W 0.06 mcg/kg/min (10/06/20 1501)     PRN Meds:sodium chloride 0.9%, sodium chloride 0.9%, acetaminophen, albuterol sulfate, dextrose 50%, dextrose 50%, glucagon (human recombinant), glucose, glucose, insulin aspart U-100, ondansetron, Pharmacy to dose Vancomycin consult **AND** vancomycin - pharmacy to dose, white petrolatum    Review of patient's allergies indicates:  No Known Allergies    SUBJECTIVE:     Interval History: Patient intubated, FiO2 0.4, peep 5..    Review of Systems  Intubated    OBJECTIVE:     Vital Signs (Most Recent)  Temp: 100 °F (37.8 °C) (10/07/20 1101)  Pulse: 99 (10/07/20 1131)  Resp: (!) 39 (10/07/20 1131)  BP: (!) 110/51 (10/07/20 1131)  SpO2: 96 % (10/07/20 1131)    Vital Signs Range (Last 24H):  Temp:  [97.6 °F (36.4 °C)-101 °F (38.3 °C)]   Pulse:  []   Resp:  [24-40]   BP: ()/(42-63)   SpO2:  [76 %-100 %]       Physical Exam:  Neck: no carotid bruit and supple, symmetrical, trachea midline  Lungs: clear to auscultation bilaterally, normal respiratory effort  Heart: regular rate and rhythm, S1,  S2 normal, no murmur, click, rub or gallop  Abdomen: soft, non-tender; bowel sounds normal; no masses,  no organomegaly  Extremities: Extremities normal, atraumatic, no cyanosis, clubbing, or edema    Recent Results (from the past 24 hour(s))   Procalcitonin    Collection Time: 10/06/20  4:05 PM   Result Value Ref Range    Procalcitonin 63.77 (H) 0.00 - 0.50 ng/mL   Blood culture    Collection Time: 10/06/20  4:41 PM    Specimen: Blood   Result Value Ref Range    Blood Culture, Routine No Growth to date    POCT glucose    Collection Time: 10/06/20  5:11 PM   Result Value Ref Range    POC Glucose 193 (H) 70 - 110   POCT glucose    Collection Time: 10/07/20 12:56 AM   Result Value Ref Range    POC Glucose 180 (H) 70 - 110   Vancomycin, Random    Collection Time: 10/07/20  5:05 AM   Result Value Ref Range    Vancomycin, Random 16.6 Not established ug/mL   CBC auto differential    Collection Time: 10/07/20  5:05 AM   Result Value Ref Range    WBC 17.77 (H) 3.90 - 12.70 K/uL    RBC 4.04 4.00 - 5.40 M/uL    Hemoglobin 11.1 (L) 12.0 - 16.0 g/dL    Hematocrit 34.6 (L) 37.0 - 48.5 %    Mean Corpuscular Volume 86 82 - 98 fL    Mean Corpuscular Hemoglobin 27.5 27.0 - 31.0 pg    Mean Corpuscular Hemoglobin Conc 32.1 32.0 - 36.0 g/dL    RDW 17.1 (H) 11.5 - 14.5 %    Platelets 147 (L) 150 - 350 K/uL    MPV 11.5 9.2 - 12.9 fL    Immature Granulocytes 1.1 (H) 0.0 - 0.5 %    Gran # (ANC) 14.8 (H) 1.8 - 7.7 K/uL    Immature Grans (Abs) 0.20 (H) 0.00 - 0.04 K/uL    Lymph # 0.8 (L) 1.0 - 4.8 K/uL    Mono # 1.4 (H) 0.3 - 1.0 K/uL    Eos # 0.5 0.0 - 0.5 K/uL    Baso # 0.11 0.00 - 0.20 K/uL    nRBC 0 0 /100 WBC    Gran% 83.4 (H) 38.0 - 73.0 %    Lymph% 4.2 (L) 18.0 - 48.0 %    Mono% 8.0 4.0 - 15.0 %    Eosinophil% 2.7 0.0 - 8.0 %    Basophil% 0.6 0.0 - 1.9 %    Differential Method Automated    Comprehensive metabolic panel    Collection Time: 10/07/20  5:05 AM   Result Value Ref Range    Sodium 132 (L) 136 - 145 mmol/L    Potassium 4.3  3.5 - 5.1 mmol/L    Chloride 94 (L) 95 - 110 mmol/L    CO2 24 23 - 29 mmol/L    Glucose 212 (H) 70 - 110 mg/dL    BUN, Bld 48 (H) 6 - 20 mg/dL    Creatinine 4.2 (H) 0.5 - 1.4 mg/dL    Calcium 7.5 (L) 8.7 - 10.5 mg/dL    Total Protein 4.5 (L) 6.0 - 8.4 g/dL    Albumin 1.4 (L) 3.5 - 5.2 g/dL    Total Bilirubin 0.8 0.1 - 1.0 mg/dL    Alkaline Phosphatase 101 55 - 135 U/L    AST 25 10 - 40 U/L    ALT 17 10 - 44 U/L    Anion Gap 14 8 - 16 mmol/L    eGFR if African American 12.7 (A) >60 mL/min/1.73 m^2    eGFR if non African American 11.0 (A) >60 mL/min/1.73 m^2       Diagnostic Results:  Labs: Reviewed  ECG: Reviewed    ASSESSMENT/PLAN:     Temperature 100° F. she was negative 4 L on dialysis 10/06/2020 and 2 L negative on 10/07/2020.  She failed a CPAP trial 10/06/2020-elevated pCO2 level; patient has been off sedation since 10/05/2020.

## 2020-10-07 NOTE — PLAN OF CARE
10/07/20 0703   Patient Assessment/Suction   Level of Consciousness (AVPU) responds to voice   Respiratory Effort Mild   Expansion/Accessory Muscles/Retractions no use of accessory muscles   All Lung Fields Breath Sounds coarse;diminished   YULIET Breath Sounds coarse   LLL Breath Sounds diminished   RUL Breath Sounds coarse   RML Breath Sounds diminished   RLL Breath Sounds diminished   Rhythm/Pattern, Respiratory assisted mechanically   Cough Frequency with stimulation   Cough Type assisted   Suction Method tracheal;oral   Suction Pressure (mmHg) 120 mmHg   $ Suction Charges Inline Suction Procedure Stat Charge   Secretions Amount moderate   Secretions Color yellow   Secretions Characteristics thick   PRE-TX-O2   Oxygen Concentration (%) 40   SpO2 100 %   Pulse Oximetry Type Continuous   $ Pulse Oximetry - Multiple Charge Pulse Oximetry - Multiple   Pulse 88   Resp (!) 30   BP (!) 111/55   Aerosol Therapy   $ Aerosol Therapy Charges Aerosol Treatment   Daily Review of Necessity (SVN) completed   Respiratory Treatment Status (SVN) given   Treatment Route (SVN) in-line;ventilator   Patient Position (SVN) HOB elevated   Post Treatment Assessment (SVN) breath sounds improved   Signs of Intolerance (SVN) none   Breath Sounds Post-Respiratory Treatment   Throughout All Fields Post-Treatment All Fields   Throughout All Fields Post-Treatment aeration increased   Post-treatment Heart Rate (beats/min) 87   Post-treatment Resp Rate (breaths/min) 32   Wound Care   $ Wound Care Tech Time 15 min   Area of Concern Upper lip;Lower lip;Corner lip   Skin Color/Characteristics without discoloration   Skin Temperature cool      Airway Anesthesia 09/29/20   Placement Date/Time: 09/29/20 (c) 7075   Method of Intubation: Video Laryngoscopy  Airway Device: Endotracheal Tube  Mask Ventilation: Easy  Intubated: Postinduction  Airway Device Size: 8.0  Cuffed: Cuffed  Complicating Factors: None  Findings Post-I...   Secured at 25 cm    Measured At Lips   Secured Location Right   Secured by Commercial tube mckinney   Bite Block right   Site Condition Cool;Dry   Status Intact;Secured;Patent   Site Assessment Clean;Dry;No bleeding   Vent Select   Conventional Vent Y   $ Ventilator Subsequent 1   Charged w/in last 24h YES   Preset Conventional Ventilator Settings   Vent ID [id number]   Vent Type    Ventilation Type VC   Vent Mode A/C   Humidity HME   Set Rate 24 BPM   Vt Set 390 mL   PEEP/CPAP 5 cmH20   Pressure Support 0 cmH20   Waveform RAMP   Peak Flow 90 L/min   Plateau Set/Insp. Hold (sec) 0   Trigger Sensitivity Flow/I-Trigger 5 L/min   Patient Ventilator Parameters   Resp Rate Total 31 br/min   Peak Airway Pressure 39 cmH2O   Mean Airway Pressure 14 cmH20   Plateau Pressure 0 cmH20   Exhaled Vt 447 mL   Total Ve 12.7 mL   I:E Ratio Measured 1:3.20   Conventional Ventilator Alarms   Resp Rate High Alarm 55 br/min   Press High Alarm 60 cmH2O   Apnea Rate 14   Apnea Volume (mL) 0 mL   Apnea Oxygen Concentration  100   Apnea Flow Rate (L/min) 60   T Apnea 20 sec(s)   Ready to Wean/Extubation Screen   FIO2<=50 (chart decimal) 0.4   MV<16L (chart vol.) 12.7   PEEP <=8 (chart #) 5   Ready to Wean Parameters   F/VT Ratio<105 (RSBI) (!) 67.11   Respiratory Evaluation   $ Care Plan Tech Time 15 min   Continue ventilator support with prn tx as needed

## 2020-10-07 NOTE — PROGRESS NOTES
Infectious Disease  Progress note      HPI: Juliane Ramos is a 60 y.o. female with  Hx of CAD/CABG, ESRD HD per AVF, DM, OPD,  chronic hypotension is admitted to the ICU with resp failure and tachycardia. She has required intubation and pressor support since admission. She was afebrile on admission and was started on CTX for a couple of days. Blood cx and urine cx were done 2 days into admission due to hypothermia. Urine was positive for C. alibcan and thus was started on Fluconazole.  She then spiked a fever on 10/3, blood cx came back positive for E feacalis from the A line. Resp cx is positive for PSA. She was started on Piptazo 10/4 and then changed 10/5 to Meropenem. A resp cx is positive for PSA. Chart review shows positive PSA in suptum in 2018. She does have copious thick secretons per RN. She otherwise is tolerating feeds, on going loose stools. L [facility] wound is stable. Skin tear in the bottom is not infected. She has RIJ CVL and the pervious A line in place. She is tolerating HD as it is going when visited.    10/7/20:  Patient remains intubated.  More awake and responsive today.  Fevers overnight,  continues to require pressors.  No diarrhea today per RN,  tolerates tube feeds.  All indwelling lines removed.  Currently with peripheral IV lines.       EXAM & DIAGNOSTICS REVIEWED:   Vitals:     Temp:  [97.6 °F (36.4 °C)-101 °F (38.3 °C)]   Temp: 100 °F (37.8 °C) (10/07/20 1101)  Pulse: 96 (10/07/20 1506)  Resp: (!) 36 (10/07/20 1506)  BP: (!) 134/50 (10/07/20 1501)  SpO2: 100 % (10/07/20 1506)    Intake/Output Summary (Last 24 hours) at 10/7/2020 1519  Last data filed at 10/7/2020 0600  Gross per 24 hour   Intake 1599.84 ml   Output 0 ml   Net 1599.84 ml       General:  In NAD. Awake and alert, but not following. intubated  Eyes:  Anicteric, PERRL  ENT:  ETT in. Poor dentition noted  Neck:  supple, no adenopathy appreciated  Lungs: Coarse breath sounds b/l-   Heart:  RRR, tachycardia   Abd:  Soft,  NT, ND, normal BS, abd wall edema  Musc:  Joints without effusion, L achilli's wound clean  Skin:  Stage IV pressure ulcer of buttocks- pictures reviewed  Wound:   Neuro: Alert, more responsive however the asthma follow-up  Psych:  Calm, cooperative  Lymphatic:     No cervical, supraclavicular, axillary, or inguinal nodes  Extrem: 2 peripheral IV lines, bilateral upper and lower extremity edema.   VAD:  A line, and RIJ CVL,        Isolation:       General Labs reviewed:  Recent Labs   Lab 10/04/20  0350  10/05/20  0338 10/05/20  0507 10/07/20  0505   WBC 15.20*  --  14.80*  --  17.77*   HGB 12.1  --  11.4*  --  11.1*   HCT 37.8   < > 35.8* 38 34.6*   *  --  104*  --  147*    < > = values in this interval not displayed.       Recent Labs   Lab 10/05/20  0338 10/06/20  0434 10/07/20  0505   * 126* 132*   K 3.9 4.4 4.3   CL 95 92* 94*   CO2 24 22* 24   BUN 46* 62* 48*   CREATININE 4.6* 5.1* 4.2*   CALCIUM 7.6* 7.2* 7.5*   PROT  --  4.4* 4.5*   BILITOT  --  1.1* 0.8   ALKPHOS  --  85 101   ALT  --  15 17   AST  --  19 25         Micro:  Microbiology Results (last 7 days)     Procedure Component Value Units Date/Time    Blood culture [[id number]] Collected: 10/06/20 1126    Order Status: Completed Specimen: Blood from AV Fistula, Left Updated: 10/07/20 1232     Blood Culture, Routine No Growth to date      No Growth to date    Narrative:      PLEASE draw 2 sets (4 bottles) 15 mins apart from 2 different  sites- One MUST include peripheral stick    Blood culture [[id number]] Collected: 10/06/20 1641    Order Status: Completed Specimen: Blood Updated: 10/06/20 2358     Blood Culture, Routine No Growth to date    Blood culture [[id number]] Collected: 10/03/20 1624    Order Status: Completed Specimen: Blood from Peripheral, Right Hand Updated: 10/06/20 1832     Blood Culture, Routine No Growth to date      No Growth to date      No Growth to date      No Growth to date    Culture, Respiratory with Gram Stain  [[id number]]  (Abnormal)  (Susceptibility) Collected: 10/04/20 1257    Order Status: Completed Specimen: Respiratory from Tracheal Aspirate Updated: 10/06/20 0632     Respiratory Culture Reduced normal respiratory nyla      PSEUDOMONAS AERUGINOSA  Many       Gram Stain (Respiratory) <10 epithelial cells per low power field.     Gram Stain (Respiratory) Many WBC's     Gram Stain (Respiratory) Few Gram positive rods     Gram Stain (Respiratory) Many Gram negative rods     Gram Stain (Respiratory) Few Gram positive cocci    Blood culture [[id number]]  (Abnormal)  (Susceptibility) Collected: 10/03/20 1558    Order Status: Completed Specimen: Blood from Line, Arterial, Right Updated: 10/06/20 0621     Blood Culture, Routine Gram stain patricia bottle: Gram positive cocci in pairs and chains      Results called to and read back by:Benito Mccormack RN/2[facility]  10/04/2020        15:12 cea      ENTEROCOCCUS FAECALIS    Blood culture [[id number]] Collected: 09/25/20 1523    Order Status: Completed Specimen: Blood Updated: 09/30/20 1632     Blood Culture, Routine No growth after 5 days.        Imaging Reviewed:  10/3 CXR- subsequent cxr the same    Portable chest at 512 compared with 09/30/2020 shows unchanged support tubes and lines.  Cardiac silhouette enlargement is unchanged.  Mediastinal contours also remain unchanged with postsurgical changes of CABG.     Bibasilar pleuroparenchymal opacities show no significant change.  Lung volumes remain low.  Central pulmonary vascular prominence is unchanged.  No pneumothorax.     Chest x-ray 10/7:    Persistent diffuse interstitial and groundglass opacities  suggestive of edema versus pneumonia. There are small right pleural  effusion    ECHO 9/26:    · The estimated PA systolic pressure is 71 mmHg.  · The estimated ejection fraction is 46%.  · Diastolic dysfunction.  · There are segmental left ventricular wall motion abnormalities.  · Mitral valve annuloplasty ring  ·   IMPRESSION & PLAN   60  y.o. female with  Hx of CAD/CABG, ESRD HD per AVF, DM, OPD,  chronic hypotension is admitted to the ICU with resp failure and tachycardia. She has required intubation and pressor support since admission:    1- E faecalis bacteremia: Catheter related BSI- the set from A line positive. She still has this line  - repeat blood culture on 10/06 negative x1 day  - T-max 101° this,  Wbc rising to 17.7  - all indwelling lines removed, now with PIV times  - On piptazo 10/7- (status post 2 days of Merem)    2- PSA pneumonia- pan sensitive on 10/3 cx-   CXR with persistent basilar opacities. Thick secretions.   Previous PSA PNA in 2018  - PCT 63.7  3- Mitral valve annuloplasty ring  4- DM  5- ICMO with EF 46%, PulmHTN   6- Chronic L Achilli's wound. Stable    Recommendations:    Continue Piptazo to cover both organisms.   If continues with watery stools please send for C diff- Discussed with RN  Follow-up repeat blood culture results   Chest x-ray with b/l effusions- in the setting of persistent fevers and increasing leukocytosis and a procalcitonin of 63 may want to consider a CT scan-   If does not clear bacteremia, then will need to repeat echo    Supportive care. Wound care  Pt discussed with RN at bedside

## 2020-10-07 NOTE — PROGRESS NOTES
[facility]  Pulmonary / Critical Care Medicine  Progress Note    Subjective     9/29:  No major issues overnight.  Tolerating SBT.  Off of sedation all night.   9/30:  Failed extubation and was re-intubated yesterday evening.  No issues since.  10/1:  No major issues overnight.  Remains intubated.  Off of sedation.  10/2/2020 - Remains critically ill, no new issues overnight, respiratory reports significant secretions.  Fever curve good.  No new weight, cumulative I/O 2.1 liters +., vital signs OK.  Ventilating pressures good, Pplat - 25, Ve - 14,  P/F -  258, attempted brief SBT but sTV only 150 - 200 (not ready to wean/extubate).  Labs reviewed.  10/3- remains on vent, on Levophed 0.022 mcg/kg/min, afebrile, HR controlled in 70s-90s. Completed HD this am with removal of 4L fluid.  10/4- remains on vent,  Febrile to 103, sedated w/ propofol, oxygenation improved compared to yesterday. Requiring min levophed  10/5:  Remains intubated but not sedated.  Fever curve down trending.  Tolerating relatively minimal ventilator settings, but she becomes hypercapnic and hypo ventilates with placed pressure support ventilation.  She is only requiring low-dose vasopressors today.  10/6:  Continues spike fevers despite receiving antibiotics yesterday.  Rapidly developed hypercapnia while on pressure support ventilation this morning.  She remains off sedation.  10/7:  No major issues overnight.  Fever curve down trending.  More alert today.  Dialyzed yesterday and central lines removed.    Review of Systems   Unable to perform ROS: Intubated      I have personally reviewed the following during today's evaluation:  past medical history, ROS, family history, social history, surgical history, current inpatient medications,drug allergies, vital signs over the past 24 hours, results of relevant diagnostic studies and nursing/provider documentation from the past 24 hours.     Objective     VS Temp:  [97.6 °F (36.4  °C)-101 °F (38.3 °C)]   Pulse:  []   Resp:  [24-40]   BP: ()/(42-63)   SpO2:  [76 %-100 %]   Ideal body weight: 59.3 kg (130 lb 11.7 oz)  Adjusted ideal body weight: 70.4 kg (155 lb 4 oz)   I/O   Intake/Output Summary (Last 24 hours) at 10/7/2020 1217  Last data filed at 10/7/2020 0600  Gross per 24 hour   Intake 2099.84 ml   Output 4500 ml   Net -2400.16 ml        Vent SpO2 96%   Vent Mode: A/C  Oxygen Concentration (%):  [40] 40  Resp Rate Total:  [24 br/min-51 br/min] 39 br/min  Vt Set:  [390 mL] 390 mL  PEEP/CPAP:  [5 cmH20] 5 cmH20  Pressure Support:  [0 cmH20] 0 cmH20  Mean Airway Pressure:  [12 shH89-28 cmH20] 13 cmH20     PE Physical Exam   Constitutional: She appears well-developed and well-nourished. She is cooperative.  Non-toxic appearance. No distress. She is intubated and restrained.   Intubated, not on sedation   HENT:   Head: Normocephalic and atraumatic.   Mouth/Throat: Uvula is midline and mucous membranes are normal. Mallampati Score: unable to assess.   Neck: Trachea normal and normal range of motion. Neck supple. No JVD present. No thyromegaly present.   Cardiovascular: Normal rate, regular rhythm, normal heart sounds and intact distal pulses.   Pulmonary/Chest: Normal expansion, symmetric chest wall expansion and effort normal. She is intubated. She has no wheezes. She has no rhonchi. She has no rales.   Abdominal: Soft. Bowel sounds are normal. She exhibits no distension. There is no abdominal tenderness.   Musculoskeletal: Normal range of motion.         General: No tenderness, deformity or edema.      Comments: LAMINE MCKENZIE   Lymphadenopathy: No supraclavicular adenopathy is present.     She has no cervical adenopathy.     She has no axillary adenopathy.   Neurological: She is alert. She has normal strength. No cranial nerve deficit or sensory deficit. She exhibits normal muscle tone. GCS eye subscore is 4. GCS verbal subscore is 5. GCS motor subscore is 6.   Follows commands in  extremities   Skin: Skin is warm, dry and intact. No rash noted.   Nursing note and vitals reviewed.      Labs I have personally reviewed and interpreted all labs / diagnostic studies obtained over the past 24 hours, and relevant results are as follows:  Recent Labs   Lab 10/07/20  0505   WBC 17.77*   RBC 4.04   HGB 11.1*   HCT 34.6*   *   MCV 86   MCH 27.5   MCHC 32.1   *   K 4.3   CL 94*   CO2 24   BUN 48*   CREATININE 4.2*   ALT 17   AST 25   ALKPHOS 101   BILITOT 0.8   PROT 4.5*   ALBUMIN 1.4*      Procalcitonin:  63.77     Imaging I have personally reviewed and interpreted the following images and reviewed the associated Radiology report.  I have reviewed and interpreted all pertinent imaging results/findings within the past 24 hours.  CXR:  9/30:  Interval introduction of NG tube. Additional lines and support devices are in position as above.  Persistence of bilateral parahilar and lower lung zone interstitial and alveolar opacities and small bilateral effusions. Stable cardiomegaly.  CXR 10/3- ETT in place, sternotomy wires, with bilateral pulmonary edema and retrocardiac opacification w/ blunting of costophrenic angles bilaterally  CXR 10/4- ETT in place, unchanged bilateral infiltrates & R pleural effusion     Micro I have personally reviewed and interpreted the available culture data.  Relevant results are as follows.  Blood Culture   Lab Results   Component Value Date    LABBLOO No Growth to date 10/06/2020   , Sputum Culture   Lab Results   Component Value Date    GSRESP <10 epithelial cells per low power field. 10/04/2020    GSRESP Many WBC's 10/04/2020    GSRESP Few Gram positive rods 10/04/2020    GSRESP Many Gram negative rods 10/04/2020    GSRESP Few Gram positive cocci 10/04/2020    RESPIRATORYC Reduced normal respiratory nyla 10/04/2020    RESPIRATORYC PSEUDOMONAS AERUGINOSA  Many   (A) 10/04/2020    and Urine Culture    Lab Results   Component Value Date    LABURIN (A) 09/25/2020      PRESUMPTIVE JORDIN ALBICANS  10,000 - 49,999 cfu/ml        Medications Scheduled    amiodarone  200 mg Oral BID    apixaban  2.5 mg Oral BID    collagenase   Topical (Top) Daily    famotidine (PF)  20 mg Intravenous Daily    fluconazole  100 mg Oral Daily    levothyroxine  50 mcg Oral Before breakfast    metoprolol succinate  25 mg Oral Daily    miconazole NITRATE 2 %   Topical (Top) BID    piperacillin-tazobactam (ZOSYN) IVPB  3.375 g Intravenous Q12H    rosuvastatin  10 mg Oral Daily    sevelamer carbonate  1,600 mg Oral TID WM    sodium zirconium cyclosilicate  5 g Oral Daily      Continuous Infusions:    sodium chloride 0.9% 10 mL/hr at 10/01/20 2125    norepinephrine bitartrate-D5W 0.06 mcg/kg/min (10/06/20 1501)      PRN   sodium chloride 0.9%, sodium chloride 0.9%, acetaminophen, albuterol sulfate, dextrose 50%, dextrose 50%, glucagon (human recombinant), glucose, glucose, insulin aspart U-100, ondansetron, Pharmacy to dose Vancomycin consult **AND** vancomycin - pharmacy to dose, white petrolatum        Assessment       Active Hospital Problems    Diagnosis    *CHF (congestive heart failure)    Shock    Atrial fibrillation and flutter    Acute on chronic heart failure    COPD (chronic obstructive pulmonary disease)    Acute on chronic respiratory failure with hypoxia and hypercapnia    Anemia, chronic disease    Stage 5 chronic kidney disease on chronic dialysis    Open wound of left heel    Chronic kidney disease with end stage renal failure on dialysis      My Impression:  Acute on chronic hypoxemic / hypercapnic respiratory failrue with some component of acute lower respiratory tract infection, but her neuromuscular weakness seems to be the main pathology driving her inability to be liberated from mechanical ventilation.  She appears to have line sepsis as well.    Plan     Neuro  · Intermittent fentanyl boluses as needed to maintain RASS of -1  · Daily spontaneous awakening  trials.    Pulmonary  · Prolonged trial of PS today.  · Daily spontaneous breathing trial.  · Infectious Disease is following and managing aBX.  · Continue Zosyn.  · Continued pursuit of a negative fluid balance with HD.  · Up in a chair today.    Cardiac/Vascular  · Continued pursue of a net neutral fluid balance.  · Titrate vasopressors to maintain MAP > 65 mmHg.    Renal/  · Nephrology following.  · Dialysis at their direction.    Hematology  · No indication for blood products.  · Observation for now.    ID:  · Pseudomonal pneumonia and enterococcal bacteremia.  · Infectious Disease is consulted.  Antibiotics at their direction.  · Remove all central venous/arterial catheters.  · Trend fever curve for now.    Endocrine  · Continue levothyroxine.  · Serial blood glucose monitoring.  · Sliding scale insulin as needed to maintain moderate glycemic control.    GI  · H2 blocker for stress ulcer ppx.  · Enteral feeds.    I had a long discussion with patient's  at the bedside this morning.  We confirmed the previously established goals of care.  He would like to attempt to liberate her from mechanical ventilation.  However, if it becomes apparent that this is not feasible, he would not want to proceed with a tracheostomy.  If in the following days she does not clinically improve, we can consider transitioning to comfort measures and terminally extubating.  In the meantime, if she develops cardiac arrest, initiation of advanced cardiac life support would not be in keeping with the patient's wishes.    Critical Care Time: Approximately >35 minutes    The patient is critically ill due to the following conditions that actively pose threat to life and bodily function:  Respiratory Failure requiring intubation and invasive mechanical ventilation, Shock     The patient is at high risk of death due to shock, respiratory failure and requiring ongoing treatment including invasive mechanical ventilation, titration of  vasoactive medications to prevent further life-threatening deterioration.  Due to a high probability of clinically significant, life threatening deterioration, the patient required my highest level of preparedness to intervene emergently and I personally spent this critical care time directly and personally managing the patient.     Critical care was time spent personally by me on the following activities:    Obtaining a history, examination of patient, reviewing pulse oximetry, providing medical care at the patients bedside, developing a treatment plan with patient or surrogate and bedside caregivers, ordering and reviewing laboratory studies, radiographic studies, pulse oximetry, ordering and performing treatments and interventions, evaluation of patient's response to treatment,  discussions with consultants while on the unit and immediately available to the patient, re-evaluation of the patient's condition, and documentation in the medical record.    This critical care time did not overlap with that of any other provider or involve time for any procedures.     Ehsan Hui MD  Pulmonary / Critical Care Medicine  [facility]  10/07/2020

## 2020-10-07 NOTE — SUBJECTIVE & OBJECTIVE
Interval History:  No acute events last 24 hours  Patient is sedated but will respond to my questions occasionally.  Review of systems are limited secondary to sedation and intubation  Case discussed with ID yesterday  Patient with bacteremia all potential line sources of infection have been removed      Objective:     Vital Signs (Most Recent):  Temp: 98.1 °F (36.7 °C) (10/07/20 0307)  Pulse: 88 (10/07/20 0816)  Resp: (!) 28 (10/07/20 0816)  BP: (!) 106/49 (10/07/20 0816)  SpO2: 99 % (10/07/20 0816) Vital Signs (24h Range):  Temp:  [97.6 °F (36.4 °C)-98.7 °F (37.1 °C)] 98.1 °F (36.7 °C)  Pulse:  [] 88  Resp:  [21-40] 28  SpO2:  [76 %-100 %] 99 %  BP: ()/(42-74) 106/49  Arterial Line BP: ()/(42-56) 122/52     Weight: 87.1 kg (192 lb 0.3 oz)  Body mass index is 30.99 kg/m².    Intake/Output Summary (Last 24 hours) at 10/7/2020 0838  Last data filed at 10/7/2020 0600  Gross per 24 hour   Intake 2099.84 ml   Output 4500 ml   Net -2400.16 ml      Physical Exam patient appears in no acute distress.  She is intubated , sedated but arousable and will respond to some questioning  HEENT sclerae nonicteric,ETT in place  Neck is supple  Lungs coarse breath sounds, symmetrical expansion moderate air movement  Heart regular rate and rhythm no rub no murmur  Abdomen is soft nontender, +BS  Extremities no edema  Skin warm no rash   exam Chi is in place  Neuro patient is sedated moves all extremities        Significant Labs:   BMP:   Recent Labs   Lab 10/07/20  0505   *   *   K 4.3   CL 94*   CO2 24   BUN 48*   CREATININE 4.2*   CALCIUM 7.5*     CBC:   Recent Labs   Lab 10/07/20  0505   WBC 17.77*   HGB 11.1*   HCT 34.6*   *     CMP:   Recent Labs   Lab 10/06/20  0434 10/07/20  0505   * 132*   K 4.4 4.3   CL 92* 94*   CO2 22* 24   * 212*   BUN 62* 48*   CREATININE 5.1* 4.2*   CALCIUM 7.2* 7.5*   PROT 4.4* 4.5*   ALBUMIN 1.3* 1.4*   BILITOT 1.1* 0.8   ALKPHOS 85 101   AST 19 25    ALT 15 17   ANIONGAP 12 14   EGFRNONAA 8.7* 11.0*       Significant Imaging:

## 2020-10-07 NOTE — PROGRESS NOTES
[facility] Medicine  Progress Note    Patient Name: Juliane Ramos  MRN: [id number]  Patient Class: IP- Inpatient   Admission Date: 9/24/2020  Length of Stay: 13 days  Attending Physician: Oscar Ruth DO  Primary Care Provider: JOS Dumont        Subjective:     Principal Problem:CHF (congestive heart failure)        HPI:  Patient is a 60-year-old female hx ESRD HD (T,Th, Sat)  CAD/CABG ,EF20%?, COPD, DM ,chronic hypotension  presents ED with complaints of having low blood pressure and rapid heart rate.  In the emergency department patient had a heart rate in the 130s regular and a blood pressure systolic of approximately 100  Patient denies shortness of breath chest pain fever chills abdominal pain nausea vomiting.  She states her last dialysis on Tuesday she did not have any volume removed.  Patient has been given approximately 500 cc of IV fluids in the emergency department at remains tachycardic in the 120s.      Overview/Hospital Course:  09/29  Assumed care. Chart reviewed. Labs reviewed:  End stage renal function. Receiving dialysis today. 2 liters to be removed. Discussed with dietician: tube feeds if not extubated.    09/30  Consultants' attendance noted and appreciated. Required re-intubation yesterday.  Labs reviewed: mild leukocytosis with normal hematocrit; minimal hyponatremia with end-stage renal function values. Remains intubated. Discussed with Dietician: start enteral feeds.    10/01  Consultants' attendance noted and appreciated.. Unable to extubate. Receiving dialysis this AM. Labs reviewed: leukocytosis, stable normocytic anemia; mild hyponatremia, otherwise normal electrolytes with end stage renal function    10/02  Consultants' attendance noted and appreciated.. Labs reviewed: leukocytosis persisting, yet improved; minimal hyponatremia otherwise normal electrolytes with end stage renal function. Telemetry reviewed: sinus. Remains intubated.    10/03  "Discussed with Pulmonology. Had dialysis this AM with 4 liters removed. Labs reviewed: leukocytosis resolved, mild hyponatremia with otherwise normal electrolytes and end stage renal values. Telemetry revewed: sinus tach. Sedated and intubated     10/04 Consultants' attendance noted and appreciated. Did not tolerate "sedation vacation" this AM: heart rate and blood pressure went up. Labs reviewed: leukocytosis with normal Hct; mild hyponatremia otherwise electrolytes are normal with end stage renal function. CXR shows ET tube level at heads of clavicles--it was advanced further into the trachea.     10/05  Discussed with Pulmonology: failed trial. Labs reviewed: leukocytosis with minimal normocytic anemia;hyponatremia with end stage renal function. Growing pseudomonas in sputum despite being on piperacillin     Interval History:  No acute events last 24 hours  Patient is sedated but will respond to my questions occasionally.  Review of systems are limited secondary to sedation and intubation  Case discussed with ID yesterday  Patient with bacteremia all potential line sources of infection have been removed      Objective:     Vital Signs (Most Recent):  Temp: 98.1 °F (36.7 °C) (10/07/20 0307)  Pulse: 88 (10/07/20 0816)  Resp: (!) 28 (10/07/20 0816)  BP: (!) 106/49 (10/07/20 0816)  SpO2: 99 % (10/07/20 0816) Vital Signs (24h Range):  Temp:  [97.6 °F (36.4 °C)-98.7 °F (37.1 °C)] 98.1 °F (36.7 °C)  Pulse:  [] 88  Resp:  [21-40] 28  SpO2:  [76 %-100 %] 99 %  BP: ()/(42-74) 106/49  Arterial Line BP: ()/(42-56) 122/52     Weight: 87.1 kg (192 lb 0.3 oz)  Body mass index is 30.99 kg/m².    Intake/Output Summary (Last 24 hours) at 10/7/2020 0838  Last data filed at 10/7/2020 0600  Gross per 24 hour   Intake 2099.84 ml   Output 4500 ml   Net -2400.16 ml      Physical Exam patient appears in no acute distress.  She is intubated , sedated but arousable and will respond to some questioning  HEENT sclerae " nonicteric,ETT in place  Neck is supple  Lungs coarse breath sounds, symmetrical expansion moderate air movement  Heart regular rate and rhythm no rub no murmur  Abdomen is soft nontender, +BS  Extremities no edema  Skin warm no rash   exam Chi is in place  Neuro patient is sedated moves all extremities        Significant Labs:   BMP:   Recent Labs   Lab 10/07/20  0505   *   *   K 4.3   CL 94*   CO2 24   BUN 48*   CREATININE 4.2*   CALCIUM 7.5*     CBC:   Recent Labs   Lab 10/07/20  0505   WBC 17.77*   HGB 11.1*   HCT 34.6*   *     CMP:   Recent Labs   Lab 10/06/20  0434 10/07/20  0505   * 132*   K 4.4 4.3   CL 92* 94*   CO2 22* 24   * 212*   BUN 62* 48*   CREATININE 5.1* 4.2*   CALCIUM 7.2* 7.5*   PROT 4.4* 4.5*   ALBUMIN 1.3* 1.4*   BILITOT 1.1* 0.8   ALKPHOS 85 101   AST 19 25   ALT 15 17   ANIONGAP 12 14   EGFRNONAA 8.7* 11.0*       Significant Imaging:       Assessment/Plan:   #1 Acute hypoxic hypercapnic respiratory failure -remains intubated.  Multifactorial Weaning as tolerated  Appreciate help from Pulmonary  #2 Pseudomonas pneumonia/acute COPD exacerbation-continuing IV antibiotics  #3 Catheter related Enterococcus faecalis bacteremia-lytes have now been removed appreciate help from ID  Continue IV Zosyn  #4 Acute diastolic  CHF -dialysis as per Nephrology  #5 New onset atrial  Flutter w/RVR on admission   -S/P cardioversion - NSR   cardiology following, continuing amiodarone, anticoagulation, EF46%  #6 ESRD HD Tuesday Thursday Saturday nephrology following   #7 Chronic hypotension/CAD/CABG-/EF46%% -Slight elevation of troponin most likely secondary to demand ischemia secondary to hypotension on admission   #8 Chronic wound left distal posterior lower extremity-Wound care follow-up  Followed by Dr. Wick  #9  Hyponatremia-Improved ,monitoring labs   #10 Hyperkalemia-resolved  #11  DM2-follow sliding scale hemoglobin A1c 6.3   #12 Hx PE -details unknown   #13 Metabolic  acidosis .  Resolved  #14 Acute UTI POA-yeast.Resolved  #15 anemia chronic dx -monitoring  #16 Secondary HPT-as per Renal  #17 moderate protein calorie malnutrition  -continuing tube feeds dietary following       Appreciate help from consultants  Will discuss with patient's husbandin regard to long-term goals/prognosis  Case discussed with nurse   Overall prognosis is very poor   If BC remain + patient will need repeat echo    VTE Risk Mitigation (From admission, onward)         Ordered     apixaban tablet 2.5 mg  2 times daily      09/24/20 1018     Place MARY ELLEN hose  Until discontinued      09/24/20 1018     IP VTE HIGH RISK PATIENT  Once      09/24/20 1018     Place sequential compression device  Until discontinued      09/24/20 1018              Discharge planning as discussed at 1:00 p.m. social service meeting    Oscar Ruth DO  Department of Hospital Medicine   [facility]

## 2020-10-08 NOTE — NURSING
Report received and care assumed. Spouse at bedside. Updated to POC. Dialysis in progress. Levophed at 0.04 mcg/kg/min. Temp 101.3 auxillary. Tylenol was administered prior to my arrival at approximately 5pm per day shift nurse. Bowel sounds hypoactive. Tube feed residuals 270cc's. Tube feedings placed on hold. Patient aroused to repeated tactile and verbal stimulation. Opened eyes, nodded head slightly and moved all extremities/digits when prompted. Extremely weak and lethargic. Will continue to monitor.

## 2020-10-08 NOTE — RESPIRATORY THERAPY
10/07/20 1944   Patient Assessment/Suction   Level of Consciousness (AVPU) responds to voice   Respiratory Effort Normal;Unlabored   Expansion/Accessory Muscles/Retractions no use of accessory muscles   All Lung Fields Breath Sounds coarse   Rhythm/Pattern, Respiratory assisted mechanically   Cough Frequency infrequent   Cough Type fair   Suction Method tracheal   $ Suction Charges Inline Suction Procedure Stat Charge   Secretions Amount large   Secretions Color tan   Secretions Characteristics frothy   PRE-TX-O2   O2 Device (Oxygen Therapy) ventilator   Oxygen Concentration (%) 40   SpO2 100 %   Pulse 85   Resp (!) 31   Vent Select   Conventional Vent Y   Charged w/in last 24h YES   Preset Conventional Ventilator Settings   Vent Type    Ventilation Type VC   Vent Mode A/C   Set Rate 24 BPM   Vt Set 390 mL   PEEP/CPAP 5 cmH20   Pressure Support 0 cmH20   Waveform RAMP   Peak Flow 90 L/min   Plateau Set/Insp. Hold (sec) 0   Trigger Sensitivity Flow/I-Trigger 5 L/min   Patient Ventilator Parameters   Resp Rate Total 25 br/min   Peak Airway Pressure 39 cmH2O   Mean Airway Pressure 11 cmH20   Plateau Pressure 0 cmH20   Exhaled Vt 401 mL   Total Ve 9.16 mL   I:E Ratio Measured 1:4.20   Conventional Ventilator Alarms   Resp Rate High Alarm 55 br/min   Press High Alarm 60 cmH2O   Apnea Rate 14   Apnea Volume (mL) 0 mL   Apnea Oxygen Concentration  100   Apnea Flow Rate (L/min) 60   T Apnea 20 sec(s)   Ready to Wean/Extubation Screen   FIO2<=50 (chart decimal) 0.4   MV<16L (chart vol.) 9.16   PEEP <=8 (chart #) 5   Ready to Wean Parameters   F/VT Ratio<105 (RSBI) (!) 77.31

## 2020-10-08 NOTE — RESPIRATORY THERAPY
10/08/20 0434   PRE-TX-O2   Oxygen Concentration (%) 40   SpO2 99 %   Pulse 81   Resp (!) 34   Vent Select   Charged w/in last 24h YES   Preset Conventional Ventilator Settings   Ventilation Type VC   Vent Mode A/C   Set Rate 24 BPM   Vt Set 390 mL   PEEP/CPAP 5 cmH20   Pressure Support 0 cmH20   Waveform RAMP   Peak Flow 90 L/min   Plateau Set/Insp. Hold (sec) 0   Insp Rise Time  50 %   Trigger Sensitivity Flow/I-Trigger 5 L/min   Patient Ventilator Parameters   Resp Rate Total 48 br/min   Peak Airway Pressure 13 cmH2O   Mean Airway Pressure 12 cmH20   Plateau Pressure 0 cmH20   Exhaled Vt 0 mL   Total Ve 12.3 mL   Spont Ve 7.96 L   I:E Ratio Measured 2.30:1   Conventional Ventilator Alarms   Resp Rate High Alarm 55 br/min   Press High Alarm 60 cmH2O   Apnea Rate 14   Apnea Volume (mL) 0 mL   Apnea Oxygen Concentration  100   Apnea Flow Rate (L/min) 60   T Apnea 20 sec(s)   Ready to Wean/Extubation Screen   FIO2<=50 (chart decimal) 0.4   MV<16L (chart vol.) 12.3   PEEP <=8 (chart #) 5

## 2020-10-08 NOTE — NURSING
Patient unable to tolerate SBT, TV less that 180. Tube feeding residuals remain high >110cc. Feedings held through the shift.

## 2020-10-08 NOTE — PROGRESS NOTES
10/08/20 1215   Post-Hemodialysis Assessment   Rinseback Volume (mL) 250 mL   Blood Volume Processed (Liters) 51.9 L   Dialyzer Clearance Lightly streaked   Duration of Treatment (minutes) 180 minutes   Hemodialysis Intake (mL) 500 mL   Total UF (mL) 4200 mL   Net Fluid Removal 3700   Patient Response to Treatment UF tolerated with levophed   Post-Treatment Weight 82.5 kg (181 lb 14.1 oz)   Treatment Weight Change -3.7   Arterial bleeding stop time (min) 6 min   Venous bleeding stop time (min) 8 min   Post-Hemodialysis Comments no problems

## 2020-10-08 NOTE — PROGRESS NOTES
"[facility]  Adult Nutrition   Progress Note (Follow-Up)    SUMMARY     Recommendations  Recommendation/Intervention:   1) Continue to advance TF to goal rate 40ml/hr.   2) Do not hold TF unless patient has other signs of intolerance such as abd distention, vomiting or if residuals >250-500ml. Hold for 2 hours and recheck residuals.   3) RD will continue to monitor TF rate/tolerance, labs, weight, NPO/vent status and will make recommendations PRN.    Goals: 1. TF to increase to goal rate without issues.  Nutrition Goal Status: progressing towards goal  Communication of RD Recs: reviewed with RN    Dietitian Rounds Brief  TF held overnight d/t residual of >110ml. TF restarted and patient is tolerating at 30ml/hr.    Reason for Assessment  Reason For Assessment: RD follow-up  Diagnosis: (Tachycardia)  Relevant Medical History: ESRD on HD, CHF, COPD, CAD, T2DM, HTN, MI, stroke, thyroid disease  Interdisciplinary Rounds: attended    Nutrition Risk Screen  Nutrition Risk Screen: tube feeding or parenteral nutrition     Nutrition/Diet History  Food Allergies: NKFA  Factors Affecting Nutritional Intake: NPO, on mechanical ventilation    Anthropometrics  Temp: 98 °F (36.7 °C)  Height Method: Stated  Height: 5' 6" (167.6 cm)  Height (inches): 66 in  Weight Method: Bed Scale  Weight: 86.2 kg (190 lb 0.6 oz)  Weight (lb): 190.04 lb  Ideal Body Weight (IBW), Female: 130 lb  % Ideal Body Weight, Female (lb): 136.85 %  BMI (Calculated): 30.7  BMI Grade: 25 - 29.9 - overweight     Weight History:  Wt Readings from Last 10 Encounters:   10/08/20 86.2 kg (190 lb 0.6 oz)   09/26/20 80.7 kg (177 lb 14.6 oz)   09/08/20 79.8 kg (176 lb)   09/04/20 80 kg (176 lb 5.9 oz)   09/02/20 80 kg (176 lb 5.9 oz)   08/01/20 82.1 kg (181 lb)   06/22/20 80.7 kg (178 lb)   05/26/20 80.7 kg (178 lb)   05/18/20 80.7 kg (178 lb)   05/13/20 81 kg (178 lb 9.2 oz)     Lab/Procedures/Meds: Pertinent Labs Reviewed    Clinical Chemistry:  Recent " Labs   Lab 10/02/20  0356  10/08/20  0419   *   < > 130*   K 4.2   < > 4.5      < > 93*   CO2 23   < > 25   *   < > 141*   BUN 29*   < > 62*   CREATININE 3.9*   < > 4.8*   CALCIUM 7.8*   < > 7.4*   PROT  --    < > 4.7*   ALBUMIN  --    < > 1.4*   BILITOT  --    < > 0.8   ALKPHOS  --    < > 99   AST  --    < > 23   ALT  --    < > 17   ANIONGAP 10   < > 12   ESTGFRAFRICA 13.8*   < > 10.8*   EGFRNONAA 12.0*   < > 9.3*   MG 2.0  --   --     < > = values in this interval not displayed.     CBC:   Recent Labs   Lab 10/08/20  0419 10/08/20  0512   WBC 10.66  --    RBC 3.83*  --    HGB 10.6*  --    HCT 33.4* 35*   *  --    MCV 87  --    MCH 27.7  --    MCHC 31.7*  --      Cardiac Profile:  Recent Labs   Lab 10/03/20  0321 10/05/20  0338   BNP 2,866* >4,500*     Medications: Pertinent Medications reviewed    Scheduled Meds:   sodium chloride 0.9%   Intravenous Once    amiodarone  200 mg Oral BID    apixaban  2.5 mg Oral BID    collagenase   Topical (Top) Daily    famotidine (PF)  20 mg Intravenous Daily    [START ON 10/9/2020] levoFLOXacin  250 mg Intravenous Q48H    levothyroxine  50 mcg Oral Before breakfast    metoprolol succinate  25 mg Oral Daily    miconazole NITRATE 2 %   Topical (Top) BID    piperacillin-tazobactam (ZOSYN) IVPB  3.375 g Intravenous Q12H    rosuvastatin  10 mg Oral Daily    sevelamer carbonate  1,600 mg Oral TID WM    sodium zirconium cyclosilicate  5 g Oral Daily       Continuous Infusions:   sodium chloride 0.9% 10 mL/hr at 10/01/20 2125    norepinephrine bitartrate-D5W Stopped (10/07/20 2300)       PRN Meds:.sodium chloride 0.9%, sodium chloride 0.9%, sodium chloride 0.9%, acetaminophen, albuterol sulfate, dextrose 50%, dextrose 50%, glucagon (human recombinant), glucose, glucose, insulin aspart U-100, ondansetron, Pharmacy to dose Vancomycin consult **AND** vancomycin - pharmacy to dose, white petrolatum    Estimated/Assessed Needs  Weight Used For Calorie  Calculations: 80.7 kg (177 lb 14.6 oz)  Energy Calorie Requirements (kcal): While Intubated: 1696 kcals/day (21 kcals/kg PSE)  ; Once Extubated: 7291-9875 kcals/day (20-25 kcals/kg)  Energy Need Method: Kcal/kg  Protein Requirements:  g/day (1.2-1.5 g/kg)  Weight Used For Protein Calculations: 80.7 kg (177 lb 14.6 oz)  Fluid Requirements (mL): UOP + 1000 mL or per MD     RDA Method (mL): 0     Nutrition Prescription Ordered  Current Diet Order: NPO  Current Nutrition Support Rate Ordered: 30 (ml)    Evaluation of Received Nutrient/Fluid Intake    Intake/Output Summary (Last 24 hours) at 10/8/2020 1530  Last data filed at 10/8/2020 1215  Gross per 24 hour   Intake 1694 ml   Output 6700 ml   Net -5006 ml      % Intake of Estimated Energy Needs: 50 - 75 %  % Meal Intake: NPO    Nutrition Risk  Level of Risk/Frequency of Follow-up: high     Monitor and Evaluation  Food and Nutrient Intake: energy intake, enteral nutrition intake  Food and Nutrient Adminstration: enteral and parenteral nutrition administration  Physical Activity and Function: nutrition-related ADLs and IADLs, factors affecting access to physical activity  Anthropometric Measurements: weight, weight change, body mass index  Biochemical Data, Medical Tests and Procedures: electrolyte and renal panel, gastrointestinal profile, glucose/endocrine profile, inflammatory profile, lipid profile  Nutrition-Focused Physical Findings: overall appearance     Nutrition Follow-Up  RD Follow-up?: Yes

## 2020-10-08 NOTE — PLAN OF CARE
This note also relates to the following rows which could not be included:  Oxygen Concentration (%) - Cannot attach notes to unvalidated device data  Ventilation Type - Cannot attach notes to unvalidated device data  Vent Mode - Cannot attach notes to unvalidated device data  Set Rate - Cannot attach notes to unvalidated device data  Vt Set - Cannot attach notes to unvalidated device data  PEEP/CPAP - Cannot attach notes to unvalidated device data  Pressure Support - Cannot attach notes to unvalidated device data  Waveform - Cannot attach notes to unvalidated device data  Peak Flow - Cannot attach notes to unvalidated device data  Plateau Set/Insp. Hold (sec) - Cannot attach notes to unvalidated device data  Trigger Sensitivity Flow/I-Trigger - Cannot attach notes to unvalidated device data  Resp Rate Total - Cannot attach notes to unvalidated device data  Peak Airway Pressure - Cannot attach notes to unvalidated device data  Mean Airway Pressure - Cannot attach notes to unvalidated device data  Plateau Pressure - Cannot attach notes to unvalidated device data  Exhaled Vt - Cannot attach notes to unvalidated device data  Total Ve - Cannot attach notes to unvalidated device data  I:E Ratio Measured - Cannot attach notes to unvalidated device data  Resp Rate High Alarm - Cannot attach notes to unvalidated device data  Press High Alarm - Cannot attach notes to unvalidated device data  Apnea Rate - Cannot attach notes to unvalidated device data  Apnea Volume (mL) - Cannot attach notes to unvalidated device data  Apnea Oxygen Concentration  - Cannot attach notes to unvalidated device data  Apnea Flow Rate (L/min) - Cannot attach notes to unvalidated device data  T Apnea - Cannot attach notes to unvalidated device data       10/08/20 0646   Patient Assessment/Suction   Level of Consciousness (AVPU) alert   Respiratory Effort Normal;Unlabored   Expansion/Accessory Muscles/Retractions no use of accessory muscles   All Lung  Fields Breath Sounds coarse   YULIET Breath Sounds coarse   LLL Breath Sounds diminished   RUL Breath Sounds coarse   RML Breath Sounds diminished   RLL Breath Sounds diminished   Rhythm/Pattern, Respiratory assisted mechanically   Cough Frequency infrequent   Cough Type assisted   Suction Method oral;tracheal   Suction Pressure (mmHg) 120 mmHg   $ Suction Charges Inline Suction Procedure Stat Charge   Secretions Amount moderate   Secretions Color tan   Secretions Characteristics thick   PRE-TX-O2   SpO2 100 %   Pulse 78   Resp (!) 29   Aerosol Therapy   $ Aerosol Therapy Charges Aerosol Treatment   Daily Review of Necessity (SVN) completed   Respiratory Treatment Status (SVN) given   Treatment Route (SVN) in-line;ventilator   Patient Position (SVN) HOB elevated   Post Treatment Assessment (SVN) breath sounds improved;increased aeration   Signs of Intolerance (SVN) none   Breath Sounds Post-Respiratory Treatment   Throughout All Fields Post-Treatment All Fields   Throughout All Fields Post-Treatment aeration increased   Post-treatment Heart Rate (beats/min) 78   Post-treatment Resp Rate (breaths/min) 27   Wound Care   $ Wound Care Tech Time 15 min   Area of Concern Upper lip;Lower lip;Corner lip   Skin Color/Characteristics without discoloration   Skin Temperature warm      Airway Anesthesia 09/29/20   Placement Date/Time: 09/29/20 (c) 5705   Method of Intubation: Video Laryngoscopy  Airway Device: Endotracheal Tube  Mask Ventilation: Easy  Intubated: Postinduction  Airway Device Size: 8.0  Cuffed: Cuffed  Complicating Factors: None  Findings Post-I...   Secured at 25 cm   Measured At Lips   Secured Location Center   Secured by Commercial tube [email or web address]   Bite Block center   Site Condition Cool;Dry   Status Intact;Secured;Patent   Site Assessment Clean;Dry;No bleeding   Airway Safety   Ambu bag with the patient? Yes, Adult Ambu   Vent Select   Conventional Vent Y   $ Ventilator Subsequent 1   Charged w/in last 24h YES    Preset Conventional Ventilator Settings   Vent ID [id number]   Vent Type    Humidity HME   Patient Ventilator Parameters   Tubing ID (mm) 8 mm   Conventional Ventilator Alarms   Alarms On Y   IHI Ventilator Associated Pneumonia Bundle   Head of Bed Elevated  HOB 60   Oral Care Lip moisturizer applied   Additional VAP Prevention Documentation Clean equipment maintained   continue to ventilate ,prn tx available

## 2020-10-08 NOTE — NURSING
Attempted to notify infectious disease MD consulted, Dr. Newton, to notify of positive stool cx for C-diff. Also notified hospitalist. No new orders at this moment. Will continue to update and monitor.

## 2020-10-08 NOTE — PROGRESS NOTES
Pharmacokinetic Assessment Follow Up: IV Vancomycin    Vancomycin serum concentration assessment(s):    The random level was drawn correctly and can be used to guide therapy at this time. The measurement is within the desired definitive target range of 10 to 15 mcg/mL.    Vancomycin Regimen Plan:    Vancomycin 1500 mg once at 0600  Re-dose when the random level is less than 15 mcg/mL, next level to be drawn at 0430 on 10/09    Drug levels (last 3 results):  Recent Labs   Lab Result Units 10/06/20  0434 10/07/20  0505 10/08/20  0419   Vancomycin, Random ug/mL 21.5 16.6 14.6       Pharmacy will continue to follow and monitor vancomycin.    Please contact pharmacy at extension [phone] for questions regarding this assessment.    Thank you for the consult,   Isaiah Perry       Patient brief summary:  Juliane Ramos is a 60 y.o. female initiated on antimicrobial therapy with IV Vancomycin for treatment of lower respiratory infection    The patient's current regimen is Intermittent Vancomycin Dosing    Drug Allergies:   Review of patient's allergies indicates:  No Known Allergies    Actual Body Weight:   87.1 kg    Renal Function:   Estimated Creatinine Clearance: 14.2 mL/min (A) (based on SCr of 4.8 mg/dL (H)).,     CBC (last 72 hours):  Recent Labs   Lab Result Units 10/07/20  0505 10/08/20  0419   WBC K/uL 17.77* 10.66   Hemoglobin g/dL 11.1* 10.6*   Hematocrit % 34.6* 33.4*   Platelets K/uL 147* 131*   Gran% % 83.4* 79.1*   Lymph% % 4.2* 7.0*   Mono% % 8.0 8.4   Eosinophil% % 2.7 4.1   Basophil% % 0.6 0.7   Differential Method  Automated Automated       Metabolic Panel (last 72 hours):  Recent Labs   Lab Result Units 10/06/20  0434 10/07/20  0505 10/08/20  0419   Sodium mmol/L 126* 132* 130*   Potassium mmol/L 4.4 4.3 4.5   Chloride mmol/L 92* 94* 93*   CO2 mmol/L 22* 24 25   Glucose mg/dL 190* 212* 141*   BUN, Bld mg/dL 62* 48* 62*   Creatinine mg/dL 5.1* 4.2* 4.8*   Albumin g/dL 1.3* 1.4* 1.4*   Total Bilirubin mg/dL  1.1* 0.8 0.8   Alkaline Phosphatase U/L 85 101 99   AST U/L 19 25 23   ALT U/L 15 17 17       Vancomycin Administrations:  vancomycin given in the last 96 hours                     vancomycin (VANCOCIN) 1,500 mg in dextrose 5 % 500 mL IVPB (mg) 1,500 mg New Bag 10/04/20 1323                    Microbiologic Results:  Microbiology Results (last 7 days)       Procedure Component Value Units Date/Time    Blood culture [[id number]] Collected: 10/03/20 1624    Order Status: Completed Specimen: Blood from Peripheral, Right Hand Updated: 10/07/20 1832     Blood Culture, Routine No Growth to date      No Growth to date      No Growth to date      No Growth to date      No Growth to date    Blood culture [[id number]] Collected: 10/06/20 1641    Order Status: Completed Specimen: Blood Updated: 10/07/20 1832     Blood Culture, Routine No Growth to date      No Growth to date    Blood culture [[id number]] Collected: 10/06/20 1126    Order Status: Completed Specimen: Blood from AV Fistula, Left Updated: 10/07/20 1232     Blood Culture, Routine No Growth to date      No Growth to date    Narrative:      PLEASE draw 2 sets (4 bottles) 15 mins apart from 2 different  sites- One MUST include peripheral stick    Culture, Respiratory with Gram Stain [[id number]]  (Abnormal)  (Susceptibility) Collected: 10/04/20 1257    Order Status: Completed Specimen: Respiratory from Tracheal Aspirate Updated: 10/06/20 0632     Respiratory Culture Reduced normal respiratory nyla      PSEUDOMONAS AERUGINOSA  Many       Gram Stain (Respiratory) <10 epithelial cells per low power field.     Gram Stain (Respiratory) Many WBC's     Gram Stain (Respiratory) Few Gram positive rods     Gram Stain (Respiratory) Many Gram negative rods     Gram Stain (Respiratory) Few Gram positive cocci    Blood culture [[id number]]  (Abnormal)  (Susceptibility) Collected: 10/03/20 1558    Order Status: Completed Specimen: Blood from Line, Arterial, Right Updated: 10/06/20 0621      Blood Culture, Routine Gram stain patricia bottle: Gram positive cocci in pairs and chains      Results called to and read back by:Benito Mccormack RN/[facility]  10/04/2020        15:12 cea      ENTEROCOCCUS FAECALIS

## 2020-10-08 NOTE — RESPIRATORY THERAPY
10/08/20 0434   PRE-TX-O2   Oxygen Concentration (%) 40   SpO2 99 %   Pulse 81   Resp (!) 34   Vent Select   Charged w/in last 24h YES   Preset Conventional Ventilator Settings   Ventilation Type VC   Vent Mode A/C   Set Rate 24 BPM   Vt Set 390 mL   PEEP/CPAP 5 cmH20   Pressure Support 0 cmH20   Waveform RAMP   Peak Flow 90 L/min   Plateau Set/Insp. Hold (sec) 0   Insp Rise Time  50 %   Trigger Sensitivity Flow/I-Trigger 5 L/min   Patient Ventilator Parameters   Resp Rate Total 48 br/min   Peak Airway Pressure 13 cmH2O   Mean Airway Pressure 12 cmH20   Plateau Pressure 0 cmH20   Exhaled Vt 0 mL   Total Ve 12.3 mL   Spont Ve 7.96 L   I:E Ratio Measured 2.30:1   Conventional Ventilator Alarms   Resp Rate High Alarm 55 br/min   Press High Alarm 60 cmH2O   Apnea Rate 14   Apnea Volume (mL) 0 mL   Apnea Oxygen Concentration  100   Apnea Flow Rate (L/min) 60   T Apnea 20 sec(s)   Ready to Wean/Extubation Screen   FIO2<=50 (chart decimal) 0.4   MV<16L (chart vol.) 12.3   PEEP <=8 (chart #) 5   pt. Placed back on a rate for low VT's

## 2020-10-08 NOTE — SUBJECTIVE & OBJECTIVE
Interval History no acute events last 24 hours  Patient remains sedated intubated    Objective:     Vital Signs (Most Recent):  Temp: 98.3 °F (36.8 °C) (10/08/20 0715)  Pulse: 80 (10/08/20 1300)  Resp: (!) 26 (10/08/20 1300)  BP: (!) 104/51 (10/08/20 1300)  SpO2: (!) 92 % (10/08/20 1300) Vital Signs (24h Range):  Temp:  [98.3 °F (36.8 °C)-101.4 °F (38.6 °C)] 98.3 °F (36.8 °C)  Pulse:  [] 80  Resp:  [16-38] 26  SpO2:  [87 %-100 %] 92 %  BP: ()/(35-63) 104/51     Weight: 86.2 kg (190 lb 0.6 oz)  Body mass index is 30.67 kg/m².    Intake/Output Summary (Last 24 hours) at 10/8/2020 1351  Last data filed at 10/8/2020 1215  Gross per 24 hour   Intake 1905 ml   Output 6700 ml   Net -4795 ml      Physical Exam patient intubated sedated sitting in a chair,  HEENT endotracheal tube is in place  Neck is supple nontender  Lungs improved breath sounds moderate air movement  Heart regular rate rhythm  Abdomen is soft nontender, =BS  Extremities no edema  Skin bilateral heel wounds see photos from wound care  Neuro patient is sedated moves all extremities equally   exam Chi is in place    Significant Labs:   BMP:   Recent Labs   Lab 10/08/20  0419   *   *   K 4.5   CL 93*   CO2 25   BUN 62*   CREATININE 4.8*   CALCIUM 7.4*     CBC:   Recent Labs   Lab 10/07/20  0505 10/08/20  0419 10/08/20  0512   WBC 17.77* 10.66  --    HGB 11.1* 10.6*  --    HCT 34.6* 33.4* 35*   * 131*  --      CMP:   Recent Labs   Lab 10/07/20  0505 10/08/20  0419   * 130*   K 4.3 4.5   CL 94* 93*   CO2 24 25   * 141*   BUN 48* 62*   CREATININE 4.2* 4.8*   CALCIUM 7.5* 7.4*   PROT 4.5* 4.7*   ALBUMIN 1.4* 1.4*   BILITOT 0.8 0.8   ALKPHOS 101 99   AST 25 23   ALT 17 17   ANIONGAP 14 12   EGFRNONAA 11.0* 9.3*       Significant Imaging:

## 2020-10-08 NOTE — PROGRESS NOTES
[facility]  Pulmonary / Critical Care Medicine  Progress Note    Subjective     9/29:  No major issues overnight.  Tolerating SBT.  Off of sedation all night.   9/30:  Failed extubation and was re-intubated yesterday evening.  No issues since.  10/1:  No major issues overnight.  Remains intubated.  Off of sedation.  10/2/2020 - Remains critically ill, no new issues overnight, respiratory reports significant secretions.  Fever curve good.  No new weight, cumulative I/O 2.1 liters +., vital signs OK.  Ventilating pressures good, Pplat - 25, Ve - 14,  P/F -  258, attempted brief SBT but sTV only 150 - 200 (not ready to wean/extubate).  Labs reviewed.  10/3- remains on vent, on Levophed 0.022 mcg/kg/min, afebrile, HR controlled in 70s-90s. Completed HD this am with removal of 4L fluid.  10/4- remains on vent,  Febrile to 103, sedated w/ propofol, oxygenation improved compared to yesterday. Requiring min levophed  10/5:  Remains intubated but not sedated.  Fever curve down trending.  Tolerating relatively minimal ventilator settings, but she becomes hypercapnic and hypo ventilates with placed pressure support ventilation.  She is only requiring low-dose vasopressors today.  10/6:  Continues spike fevers despite receiving antibiotics yesterday.  Rapidly developed hypercapnia while on pressure support ventilation this morning.  She remains off sedation.  10/7:  No major issues overnight.  Fever curve down trending.  More alert today.  Dialyzed yesterday and central lines removed.  10/8:  No major issues overnight.  Fever cure downtrending.  Alert this morning.  No new issues.  Remains intubated/sedated.    Review of Systems   Unable to perform ROS: Intubated      I have personally reviewed the following during today's evaluation:  past medical history, ROS, family history, social history, surgical history, current inpatient medications,drug allergies, vital signs over the past 24 hours, results of relevant  diagnostic studies and nursing/provider documentation from the past 24 hours.     Objective     VS Temp:  [98.3 °F (36.8 °C)-101.4 °F (38.6 °C)]   Pulse:  []   Resp:  [16-38]   BP: ()/(35-63)   SpO2:  [87 %-100 %]   Ideal body weight: 59.3 kg (130 lb 11.7 oz)  Adjusted ideal body weight: 70.1 kg (154 lb 7.3 oz)   I/O   Intake/Output Summary (Last 24 hours) at 10/8/2020 1238  Last data filed at 10/8/2020 1215  Gross per 24 hour   Intake 1855 ml   Output 6700 ml   Net -4845 ml        Vent SpO2 95%   Vent Mode: A/C  Oxygen Concentration (%):  [40] 40  Resp Rate Total:  [25 br/min-48 br/min] 31 br/min  Vt Set:  [390 mL] 390 mL  PEEP/CPAP:  [5 cmH20] 5 cmH20  Pressure Support:  [0 cmH20-10 cmH20] 0 cmH20  Mean Airway Pressure:  [8.3 cmH20-15 cmH20] 12 cmH20     PE Physical Exam   Constitutional: She appears well-developed and well-nourished. She is cooperative.  Non-toxic appearance. No distress. She is intubated and restrained.   Intubated, not on sedation   HENT:   Head: Normocephalic and atraumatic.   Mouth/Throat: Uvula is midline and mucous membranes are normal. Mallampati Score: unable to assess.   Neck: Trachea normal and normal range of motion. Neck supple. No JVD present. No thyromegaly present.   Cardiovascular: Normal rate, regular rhythm, normal heart sounds and intact distal pulses.   Pulmonary/Chest: Normal expansion, symmetric chest wall expansion and effort normal. She is intubated. She has no wheezes. She has no rhonchi. She has no rales.   Abdominal: Soft. Bowel sounds are normal. She exhibits no distension. There is no abdominal tenderness.   Musculoskeletal: Normal range of motion.         General: No tenderness, deformity or edema.      Comments: LUE AVF   Lymphadenopathy: No supraclavicular adenopathy is present.     She has no cervical adenopathy.     She has no axillary adenopathy.   Neurological: She is alert. She has normal strength. No cranial nerve deficit or sensory deficit. She  exhibits normal muscle tone. GCS eye subscore is 4. GCS verbal subscore is 5. GCS motor subscore is 6.   Follows commands in extremities   Skin: Skin is warm, dry and intact. No rash noted.   Nursing note and vitals reviewed.      Labs I have personally reviewed and interpreted all labs / diagnostic studies obtained over the past 24 hours, and relevant results are as follows:  Recent Labs   Lab 10/08/20  0419 10/08/20  0512   WBC 10.66  --    RBC 3.83*  --    HGB 10.6*  --    HCT 33.4* 35*   *  --    MCV 87  --    MCH 27.7  --    MCHC 31.7*  --    *  --    K 4.5  --    CL 93*  --    CO2 25  --    BUN 62*  --    CREATININE 4.8*  --    ALT 17  --    AST 23  --    ALKPHOS 99  --    BILITOT 0.8  --    PROT 4.7*  --    ALBUMIN 1.4*  --    PH  --  7.327*   PCO2  --  51.0*   PO2  --  115*   HCO3  --  26.7   POCSATURATED  --  98   BE  --  1      Procalcitonin:  63.77     Imaging I have personally reviewed and interpreted the following images and reviewed the associated Radiology report.  I have reviewed and interpreted all pertinent imaging results/findings within the past 24 hours.  CXR:  9/30:  Interval introduction of NG tube. Additional lines and support devices are in position as above.  Persistence of bilateral parahilar and lower lung zone interstitial and alveolar opacities and small bilateral effusions. Stable cardiomegaly.  CXR 10/3- ETT in place, sternotomy wires, with bilateral pulmonary edema and retrocardiac opacification w/ blunting of costophrenic angles bilaterally  CXR 10/4- ETT in place, unchanged bilateral infiltrates & R pleural effusion     Micro I have personally reviewed and interpreted the available culture data.  Relevant results are as follows.  Blood Culture   Lab Results   Component Value Date    LABBLOO No Growth to date 10/06/2020    LABBLOO No Growth to date 10/06/2020   , Sputum Culture   Lab Results   Component Value Date    GSRESP <10 epithelial cells per low power field.  10/04/2020    GSRESP Many WBC's 10/04/2020    GSRESP Few Gram positive rods 10/04/2020    GSRESP Many Gram negative rods 10/04/2020    GSRESP Few Gram positive cocci 10/04/2020    RESPIRATORYC Reduced normal respiratory nyla 10/04/2020    RESPIRATORYC PSEUDOMONAS AERUGINOSA  Many   (A) 10/04/2020    and Urine Culture    Lab Results   Component Value Date    LABURIN (A) 09/25/2020     PRESUMPTIVE JORDIN ALBICANS  10,000 - 49,999 cfu/ml        Medications Scheduled    sodium chloride 0.9%   Intravenous Once    amiodarone  200 mg Oral BID    apixaban  2.5 mg Oral BID    collagenase   Topical (Top) Daily    famotidine (PF)  20 mg Intravenous Daily    [START ON 10/9/2020] levoFLOXacin  250 mg Intravenous Q48H    levoFLOXacin  500 mg Intravenous Once    levothyroxine  50 mcg Oral Before breakfast    metoprolol succinate  25 mg Oral Daily    miconazole NITRATE 2 %   Topical (Top) BID    piperacillin-tazobactam (ZOSYN) IVPB  3.375 g Intravenous Q12H    rosuvastatin  10 mg Oral Daily    sevelamer carbonate  1,600 mg Oral TID WM    sodium zirconium cyclosilicate  5 g Oral Daily      Continuous Infusions:    sodium chloride 0.9% 10 mL/hr at 10/01/20 2125    norepinephrine bitartrate-D5W Stopped (10/07/20 2300)      PRN   sodium chloride 0.9%, sodium chloride 0.9%, sodium chloride 0.9%, acetaminophen, albuterol sulfate, dextrose 50%, dextrose 50%, glucagon (human recombinant), glucose, glucose, insulin aspart U-100, ondansetron, Pharmacy to dose Vancomycin consult **AND** vancomycin - pharmacy to dose, white petrolatum        Assessment       Active Hospital Problems    Diagnosis    *CHF (congestive heart failure)    Enterococcal bacteremia    Pseudomonal pneumonia    Shock    Atrial fibrillation and flutter    Acute on chronic heart failure    COPD (chronic obstructive pulmonary disease)    Acute on chronic respiratory failure with hypoxia and hypercapnia    Anemia, chronic disease    Stage 5  chronic kidney disease on chronic dialysis    Open wound of left heel    Chronic kidney disease with end stage renal failure on dialysis      My Impression:  Acute on chronic hypoxemic / hypercapnic respiratory failrue with some component of acute lower respiratory tract infection, but her neuromuscular weakness seems to be the main pathology driving her inability to be liberated from mechanical ventilation.  Improving sepsis 2/2 line sepsis + HAP with slowly improving fever curve.    Plan     Neuro  · Holding sedation.    Pulmonary  · Prolonged trial of PS daily.  · Infectious Disease is following and managing aBX.  · Continue pip/tazo + FQ.  · Continued pursuit of a negative fluid balance with HD.  · Up in a chair.    Cardiac/Vascular  · Continued pursue of a net neutral fluid balance.  · Continue home DOAC + BB + amiodarone.    Renal/  · Nephrology following.  · Dialysis at their direction.    Hematology  · No indication for blood products.  · Observation for now.    ID:  · Pseudomonal pneumonia and enterococcal bacteremia.  · Infectious Disease is following.  Antibiotics at their direction.  · Agree with obtaining more detailed chest imaging to evaluate for occult pleural space infection.  · Trend fever curve for now.    Endocrine  · Continue levothyroxine.  · Serial blood glucose monitoring.  · Sliding scale insulin as needed to maintain moderate glycemic control.    GI  · H2 blocker for stress ulcer ppx.  · Enteral feeds.    I had a long discussion with patient's  .  We confirmed the previously established goals of care.  He would like to attempt to liberate her from mechanical ventilation.  However, if it becomes apparent that this is not feasible, he would not want to proceed with a tracheostomy.  If in the following days she does not clinically improve, we can consider transitioning to comfort measures and terminally extubating.  In the meantime, if she develops cardiac arrest, initiation of  advanced cardiac life support would not be in keeping with the patient's wishes.    Critical Care Time: Approximately >35 minutes    The patient is critically ill due to the following conditions that actively pose threat to life and bodily function:  Respiratory Failure requiring intubation and invasive mechanical ventilation, Shock     The patient is at high risk of death due to shock, respiratory failure and requiring ongoing treatment including invasive mechanical ventilation, titration of vasoactive medications to prevent further life-threatening deterioration.  Due to a high probability of clinically significant, life threatening deterioration, the patient required my highest level of preparedness to intervene emergently and I personally spent this critical care time directly and personally managing the patient.     Critical care was time spent personally by me on the following activities:    Obtaining a history, examination of patient, reviewing pulse oximetry, providing medical care at the patients bedside, developing a treatment plan with patient or surrogate and bedside caregivers, ordering and reviewing laboratory studies, radiographic studies, pulse oximetry, ordering and performing treatments and interventions, evaluation of patient's response to treatment,  discussions with consultants while on the unit and immediately available to the patient, re-evaluation of the patient's condition, and documentation in the medical record.    This critical care time did not overlap with that of any other provider or involve time for any procedures.     Ehsan Hui MD  Pulmonary / Critical Care Medicine  [facility] hospital  10/08/2020

## 2020-10-08 NOTE — PROGRESS NOTES
Infectious Disease  Progress note      HPI: Juliane Ramos is a 60 y.o. female with  Hx of CAD/CABG, ESRD HD per AVF, DM, OPD,  chronic hypotension is admitted to the ICU with resp failure and tachycardia. She has required intubation and pressor support since admission. She was afebrile on admission and was started on CTX for a couple of days. Blood cx and urine cx were done 2 days into admission due to hypothermia. Urine was positive for C. alibcan and thus was started on Fluconazole.  She then spiked a fever on 10/3, blood cx came back positive for E feacalis from the A line. Resp cx is positive for PSA. She was started on Piptazo 10/4 and then changed 10/5 to Meropenem. A resp cx is positive for PSA. Chart review shows positive PSA in suptum in 2018. She does have copious thick secretons per RN. She otherwise is tolerating feeds, on going loose stools. L [facility] wound is stable. Skin tear in the bottom is not infected. She has RIJ CVL and the pervious A line in place. She is tolerating HD as it is going when visited.    10/7/20:  Patient remains intubated.  More awake and responsive today.  Fevers overnight,  continues to require pressors.  No diarrhea today per RN,  tolerates tube feeds.  All indwelling lines removed.  Currently with peripheral IV lines.   10/8:  Patient remains intubated, mildly interactive per RN.  She continues to have very thick tan colored respiratory secretions.  No fevers now.  T-max 101.3 20 hours ago,  off pressors.  Currently getting dialyzed.  She has increased loose stools so FMS was placed.  A sample was sent for C diff testing.  Pressure ulcers remain the same and stable per RN    EXAM & DIAGNOSTICS REVIEWED:   Vitals:     Temp:  [99.5 °F (37.5 °C)-101.4 °F (38.6 °C)]   Temp: 99.9 °F (37.7 °C) (10/08/20 0311)  Pulse: 75 (10/08/20 1115)  Resp: (!) 25 (10/08/20 1115)  BP: (!) 100/52 (10/08/20 1115)  SpO2: 97 % (10/08/20 1115)    Intake/Output Summary (Last 24 hours) at 10/8/2020  1137  Last data filed at 10/7/2020 2300  Gross per 24 hour   Intake 1355 ml   Output 2500 ml   Net -1145 ml       General:  In NAD. Awake and mildly interactive.  Does not following. intubated  Eyes:  Anicteric, PERRL  ENT:  ETT in. Poor dentition noted  Neck:  supple, no adenopathy appreciated  Lungs: Coarse breath sounds b/l- unchanged  Heart:  RRR, mild tachycardia   Abd:  Soft, NT, ND, normal BS, abd wall edema  Musc:  Joints without effusion, L achilli's wound clean  Skin:  Stage IV pressure ulcer of buttocks- pictures reviewed  Wound:   Neuro: Alert, slightly responsive   Psych:  Calm  Lymphatic:     No cervical, supraclavicular, axillary, or inguinal nodes  Extrem: 2 peripheral IV lines, bilateral upper and lower extremity edema.   VAD:  PIV       Isolation:  None      General Labs reviewed:  Recent Labs   Lab 10/05/20  0338  10/07/20  0505 10/08/20  0419 10/08/20  0512   WBC 14.80*  --  17.77* 10.66  --    HGB 11.4*  --  11.1* 10.6*  --    HCT 35.8*   < > 34.6* 33.4* 35*   *  --  147* 131*  --     < > = values in this interval not displayed.       Recent Labs   Lab 10/06/20  0434 10/07/20  0505 10/08/20  0419   * 132* 130*   K 4.4 4.3 4.5   CL 92* 94* 93*   CO2 22* 24 25   BUN 62* 48* 62*   CREATININE 5.1* 4.2* 4.8*   CALCIUM 7.2* 7.5* 7.4*   PROT 4.4* 4.5* 4.7*   BILITOT 1.1* 0.8 0.8   ALKPHOS 85 101 99   ALT 15 17 17   AST 19 25 23         Micro:  Microbiology Results (last 7 days)     Procedure Component Value Units Date/Time    Clostridium difficile EIA [[id number]] Collected: 10/08/20 0559    Order Status: Sent Specimen: Stool Updated: 10/08/20 0613    Blood culture [[id number]] Collected: 10/03/20 1624    Order Status: Completed Specimen: Blood from Peripheral, Right Hand Updated: 10/07/20 1832     Blood Culture, Routine No Growth to date      No Growth to date      No Growth to date      No Growth to date      No Growth to date    Blood culture [[id number]] Collected: 10/06/20 1641    Order  Status: Completed Specimen: Blood Updated: 10/07/20 1832     Blood Culture, Routine No Growth to date      No Growth to date    Blood culture [[id number]] Collected: 10/06/20 1126    Order Status: Completed Specimen: Blood from AV Fistula, Left Updated: 10/07/20 1232     Blood Culture, Routine No Growth to date      No Growth to date    Narrative:      PLEASE draw 2 sets (4 bottles) 15 mins apart from 2 different  sites- One MUST include peripheral stick    Culture, Respiratory with Gram Stain [[id number]]  (Abnormal)  (Susceptibility) Collected: 10/04/20 1257    Order Status: Completed Specimen: Respiratory from Tracheal Aspirate Updated: 10/06/20 0632     Respiratory Culture Reduced normal respiratory nyla      PSEUDOMONAS AERUGINOSA  Many       Gram Stain (Respiratory) <10 epithelial cells per low power field.     Gram Stain (Respiratory) Many WBC's     Gram Stain (Respiratory) Few Gram positive rods     Gram Stain (Respiratory) Many Gram negative rods     Gram Stain (Respiratory) Few Gram positive cocci    Blood culture [[id number]]  (Abnormal)  (Susceptibility) Collected: 10/03/20 1558    Order Status: Completed Specimen: Blood from Line, Arterial, Right Updated: 10/06/20 0621     Blood Culture, Routine Gram stain patricia bottle: Gram positive cocci in pairs and chains      Results called to and read back by:Benito Mccormack RN/2[facility]  10/04/2020        15:12 cea      ENTEROCOCCUS FAECALIS        Imaging Reviewed:  10/3 CXR- subsequent cxr the same    Portable chest at 512 compared with 09/30/2020 shows unchanged support tubes and lines.  Cardiac silhouette enlargement is unchanged.  Mediastinal contours also remain unchanged with postsurgical changes of CABG.     Bibasilar pleuroparenchymal opacities show no significant change.  Lung volumes remain low.  Central pulmonary vascular prominence is unchanged.  No pneumothorax.     Chest x-ray 10/7:    Persistent diffuse interstitial and groundglass opacities  suggestive of  edema versus pneumonia. There are small right pleural  effusion    ECHO 9/26:    · The estimated PA systolic pressure is 71 mmHg.  · The estimated ejection fraction is 46%.  · Diastolic dysfunction.  · There are segmental left ventricular wall motion abnormalities.  · Mitral valve annuloplasty ring  ·   IMPRESSION & PLAN   60 y.o. female with  Hx of CAD/CABG, ESRD HD per AVF, DM, OPD,  chronic hypotension is admitted to the ICU with resp failure and tachycardia. She has required intubation and pressor support since admission:    1- E faecalis bacteremia: Catheter related BSI- the set from A line positive. She still has this line  - repeat blood culture on 10/06 negative x2 days  - T-max 101° this,  Wbc rising to 17.7  - all indwelling lines removed, now with PIV times  - On piptazo 10/7- (status post 2 days of Merem)    2- PSA pneumonia- pan sensitive on 10/3 cx-   CXR with persistent basilar opacities. Thick secretions.   Previous PSA PNA in 2018  - PCT 63.7  3- Mitral valve annuloplasty ring  4- DM  5- ICMO with EF 46%, PulmHTN   6- Chronic L Achilli's wound. Stable  7- diarrhea- on tube feeding  -C diff pending    Recommendations:    Continue Piptazo to cover both organisms.  We will add levofloxacin for double coverage of Pseudomonas given high anamika to Zosyn  Follow-up C diff- resolved  Follow-up repeat blood culture results   Chest x-ray with b/l effusions- if persistent fevers and increasing leukocytosis and a procalcitonin of 63 may want to consider a CT scan-     Repeat PCT tomorrow    Supportive care. Wound care  Pt discussed with RN at bedside

## 2020-10-08 NOTE — PROGRESS NOTES
[facility]  Progress Note    Patient Name: Juliane Ramos  MRN: [id number]  Patient Class: IP- Inpatient   Admission Date: 9/24/2020  Length of Stay: 14 days  Attending Physician: Oscar Ruth DO  Primary Care Provider: JOS Dumont        Subjective:     Principal Problem:CHF (congestive heart failure)        HPI:  Patient is a 60-year-old female hx ESRD HD (T,Th, Sat)  CAD/CABG ,EF20%?, COPD, DM ,chronic hypotension  presents ED with complaints of having low blood pressure and rapid heart rate.  In the emergency department patient had a heart rate in the 130s regular and a blood pressure systolic of approximately 100  Patient denies shortness of breath chest pain fever chills abdominal pain nausea vomiting.  She states her last dialysis on Tuesday she did not have any volume removed.  Patient has been given approximately 500 cc of IV fluids in the emergency department at remains tachycardic in the 120s.      Overview/Hospital Course:  09/29  Assumed care. Chart reviewed. Labs reviewed:  End stage renal function. Receiving dialysis today. 2 liters to be removed. Discussed with dietician: tube feeds if not extubated.    09/30  Consultants' attendance noted and appreciated. Required re-intubation yesterday.  Labs reviewed: mild leukocytosis with normal hematocrit; minimal hyponatremia with end-stage renal function values. Remains intubated. Discussed with Dietician: start enteral feeds.    10/01  Consultants' attendance noted and appreciated.. Unable to extubate. Receiving dialysis this AM. Labs reviewed: leukocytosis, stable normocytic anemia; mild hyponatremia, otherwise normal electrolytes with end stage renal function    10/02  Consultants' attendance noted and appreciated.. Labs reviewed: leukocytosis persisting, yet improved; minimal hyponatremia otherwise normal electrolytes with end stage renal function. Telemetry reviewed: sinus. Remains intubated.    10/03  "Discussed with Pulmonology. Had dialysis this AM with 4 liters removed. Labs reviewed: leukocytosis resolved, mild hyponatremia with otherwise normal electrolytes and end stage renal values. Telemetry revewed: sinus tach. Sedated and intubated     10/04 Consultants' attendance noted and appreciated. Did not tolerate "sedation vacation" this AM: heart rate and blood pressure went up. Labs reviewed: leukocytosis with normal Hct; mild hyponatremia otherwise electrolytes are normal with end stage renal function. CXR shows ET tube level at heads of clavicles--it was advanced further into the trachea.     10/05  Discussed with Pulmonology: failed trial. Labs reviewed: leukocytosis with minimal normocytic anemia;hyponatremia with end stage renal function. Growing pseudomonas in sputum despite being on piperacillin     Interval History no acute events last 24 hours  Patient remains sedated intubated    Objective:     Vital Signs (Most Recent):  Temp: 98.3 °F (36.8 °C) (10/08/20 0715)  Pulse: 80 (10/08/20 1300)  Resp: (!) 26 (10/08/20 1300)  BP: (!) 104/51 (10/08/20 1300)  SpO2: (!) 92 % (10/08/20 1300) Vital Signs (24h Range):  Temp:  [98.3 °F (36.8 °C)-101.4 °F (38.6 °C)] 98.3 °F (36.8 °C)  Pulse:  [] 80  Resp:  [16-38] 26  SpO2:  [87 %-100 %] 92 %  BP: ()/(35-63) 104/51     Weight: 86.2 kg (190 lb 0.6 oz)  Body mass index is 30.67 kg/m².    Intake/Output Summary (Last 24 hours) at 10/8/2020 1351  Last data filed at 10/8/2020 1215  Gross per 24 hour   Intake 1905 ml   Output 6700 ml   Net -4795 ml      Physical Exam patient intubated sedated sitting in a chair,  HEENT endotracheal tube is in place  Neck is supple nontender  Lungs improved breath sounds moderate air movement  Heart regular rate rhythm  Abdomen is soft nontender, =BS  Extremities no edema  Skin bilateral heel wounds see photos from wound care  Neuro patient is sedated moves all extremities equally   exam Chi is in place    Significant Labs: "   BMP:   Recent Labs   Lab 10/08/20  0419   *   *   K 4.5   CL 93*   CO2 25   BUN 62*   CREATININE 4.8*   CALCIUM 7.4*     CBC:   Recent Labs   Lab 10/07/20  0505 10/08/20  0419 10/08/20  0512   WBC 17.77* 10.66  --    HGB 11.1* 10.6*  --    HCT 34.6* 33.4* 35*   * 131*  --      CMP:   Recent Labs   Lab 10/07/20  0505 10/08/20  0419   * 130*   K 4.3 4.5   CL 94* 93*   CO2 24 25   * 141*   BUN 48* 62*   CREATININE 4.2* 4.8*   CALCIUM 7.5* 7.4*   PROT 4.5* 4.7*   ALBUMIN 1.4* 1.4*   BILITOT 0.8 0.8   ALKPHOS 101 99   AST 25 23   ALT 17 17   ANIONGAP 14 12   EGFRNONAA 11.0* 9.3*       Significant Imaging:       Assessment/Plan:   #1 Acute hypoxic hypercapnic respiratory failure -remains intubated.  Multifactorial Weaning as tolerated  Appreciate help from Pulmonary  #2 Pseudomonas pneumonia/acute COPD exacerbation-continuing IV antibiotics  #3 Catheter related Enterococcus faecalis bacteremia-lines  Removed, appreciate help from ID  Continue IV Zosyn,  added levaquin . CT to R/O loculation   #4 Acute diastolic  CHF -dialysis as per Nephrology  #5 New onset atrial  Flutter w/RVR on admission   -S/P cardioversion - NSR   cardiology following, continuing amiodarone, anticoagulation, EF46%  #6 ESRD HD Tuesday Thursday Saturday nephrology following   #7 Chronic hypotension/CAD/CABG-/EF46%% -Slight elevation of troponin most likely secondary to demand ischemia secondary to hypotension on admission   #8 Chronic wound left distal posterior lower extremity-Wound care follow-up  Followed by Dr. Wick  #9  Hyponatremia-chronic  ,monitoring labs   #10 Hyperkalemia-resolved  #11  DM2-follow sliding scale hemoglobin A1c 6.3   #12 Hx PE -details unknown   #13 Metabolic acidosis .  Resolved  #14 Acute UTI POA-yeast.Resolved  #15 anemia chronic dx -monitoring  #16 Secondary HPT-as per Renal  #17 moderate protein calorie malnutrition  -continuing tube feeds dietary following     VTE Risk Mitigation  (From admission, onward)         Ordered     apixaban tablet 2.5 mg  2 times daily      09/24/20 1018     Place MARY ELLEN hose  Until discontinued      09/24/20 1018     IP VTE HIGH RISK PATIENT  Once      09/24/20 1018     Place sequential compression device  Until discontinued      09/24/20 1018                  Oscar Ruth DO  Department of Hospital Medicine   [facility]

## 2020-10-08 NOTE — CONSULTS
Nephrology Consult Note         Patient Name: Juliane Ramos  MRN: [id number]    Patient Class: IP- Inpatient   Admission Date: 9/24/2020  Length of Stay: 14 days  Date of Service: 10/8/2020    Attending Physician: Oscar Ruth DO  Primary Care Provider: JOS Dumont    Reason for Consult: esrd/anemia/hyperkalemia/hypotension/shpt/tachycardia    SUBJECTIVE:     HPI: 60F hx ESRD HD (T,Th, Sat)  CAD/CABG ,EF20%?, COPD, DM ,chronic hypotension on midodrine daily presents to ED with complaints of having low blood pressure and rapid heart rate. In the emergency department patient had a heart rate in the 130s regular and SBP in 100s  Patient denies shortness of breath, chest pain, fever, chills, abdominal pain, nausea, vomiting. Last dialysis on Tuesday and she did not have any volume removed.  Patient has been given approximately 500 cc of IV fluids in the emergency department at remains tachycardic in the 120s. Cards eval is pending, Amiodarone was started.    9/25 Getting HD for hyperkalemia today, before cardioversion. Continue HD per TTS schedule.  9/26 Seen and examined on Hd today, tolerating well. Continue HD per TTS schedule.   9/28  Intubated.  Sedated. TTS dialysis planned  9/29  Seen today on dialysis.  Tolerating well.  Still intubated, arousable, good eye contact.  No lab results yet today.  10/1  Intubated.  Unresponsive.     10/2  Makes eye contact.  No distress.  afebrile  10/3  Seen on rounds.  Seen on dialysis.  Intubated.  Sedate  10/4  Failed sedation vacation.    10/5 VSS, remains intubated, plan HD in AM.  10/6 VSS, seen and examined on HD. Significant third-spacing in dependent parts, BP is OK with increased pressor, trying for 4L as tolerated..  10/7 VSS, unable to keep her net negative with TIW dialysis due to high obligate intake, will start MWF UF as tolerated as well. UF today.  10/8 VSS, had HD today, tolerated well.    Past Medical History:   Diagnosis Date    Anemia     Anemia  "in stage 3 chronic kidney disease 2017    Anticoagulant long-term use     CHF (congestive heart failure)     COPD (chronic obstructive pulmonary disease)     COPD exacerbation 3/10/2020    Coronary artery disease     Diabetes mellitus     Digestive disorder     Encounter for blood transfusion     Essential hypertension 2017    Hypertension     Low blood pressure     MI (myocardial infarction)     Segmental and subsegmental pulmonary emboli of RLL without acute cor pulmonale 2017    Stroke     2005    Thyroid disease     Traumatic subarachnoid hemorrhage 2017    Type 2 diabetes mellitus with stage 3 chronic kidney disease, without long-term current use of insulin 2017     Past Surgical History:   Procedure Laterality Date    ABCESS DRAINAGE Right     Between "little toe" and the one next to it    BREAST SURGERY      Reduction [id number]    CARDIAC SURGERY  2011    CABG 4vessel ring in one valve mitral valve prolapse    CORONARY ARTERY BYPASS GRAFT      DEBRIDEMENT OF FOOT Left 2020    Procedure: DEBRIDEMENT, FOOT;  Surgeon: Dennis Wick DPM;  Location: [facility];  Service: Podiatry;  Laterality: Left;    FOOT SURGERY      4 grafts to left foot    hyperlipidemia      TUBAL LIGATION  1986     Family History   Problem Relation Age of Onset    Arthritis Mother     Heart disease Father     Cancer Father 68        prostae and bladder ca  in     Diabetes Father     Hypertension Father     Depression Sister     Hypertension Son     Diabetes Maternal Grandmother         lost leg    Kidney disease Paternal Grandfather         had kidney removed    Stroke Paternal Grandfather      Social History     Tobacco Use    Smoking status: Never Smoker    Smokeless tobacco: Never Used   Substance Use Topics    Alcohol use: Yes     Alcohol/week: 1.0 - 2.0 standard drinks     Types: 1 - 2 Glasses of wine per week     Comment: "socially" not in " "awhile    Drug use: No       Review of patient's allergies indicates:  No Known Allergies    Outpatient meds:  No current facility-administered medications on file prior to encounter.      Current Outpatient Medications on File Prior to Encounter   Medication Sig Dispense Refill    gabapentin (NEURONTIN) 100 MG capsule Take 100 mg by mouth 3 (three) times daily.  3    midodrine (PROAMATINE) 5 MG Tab Take 5 mg by mouth 3 (three) times daily with meals.       rosuvastatin (CRESTOR) 10 MG tablet Take 10 mg by mouth every evening.       albuterol-ipratropium (DUO-NEB) 2.5 mg-0.5 mg/3 mL nebulizer solution Take 3 mLs by nebulization every 6 (six) hours. Rescue 1 Box 11    apixaban 5 mg Tab Take 1 tablet (5 mg total) by mouth 2 (two) times daily. (Patient taking differently: Take 5 mg by mouth 2 (two) times daily. ) 60 tablet 1    blood sugar diagnostic Strp Test 3-4 times daily PRN      CLARITIN-D 12 HOUR 5-120 mg per tablet Take 1 tablet by mouth once daily.   0    fluticasone (FLONASE) 50 mcg/actuation nasal spray 1 spray by Each Nostril route once daily.   12    insulin aspart (NOVOLOG) 100 unit/mL InPn pen Inject 1-10 Units into the skin 3 (three) times daily. (Patient taking differently: Inject 1-10 Units into the skin 3 (three) times daily. If sugar >150 on sliding scale) 3 mL 1    levothyroxine (SYNTHROID) 50 MCG tablet Take 50 mcg by mouth before breakfast.       pen needle, diabetic (BD ULTRA-FINE OBDULIA PEN NEEDLE) 32 gauge x 5/32" Ndle       ramelteon (ROZEREM) 8 mg tablet Take 8 mg by mouth every evening.      sevelamer carbonate (RENVELA) 800 mg Tab Take 1,600 mg by mouth 3 (three) times daily with meals.      tiotropium (SPIRIVA) 18 mcg inhalation capsule Inhale 1 capsule (18 mcg total) into the lungs once daily. Controller 30 capsule 11    umeclidinium (INCRUSE ELLIPTA) 62.5 mcg/actuation inhalation capsule Inhale 62.5 mcg into the lungs once daily. Controller         Scheduled meds:   " sodium chloride 0.9%   Intravenous Once    amiodarone  200 mg Oral BID    apixaban  2.5 mg Oral BID    collagenase   Topical (Top) Daily    famotidine (PF)  20 mg Intravenous Daily    [START ON 10/9/2020] levoFLOXacin  250 mg Intravenous Q48H    levoFLOXacin  500 mg Intravenous Once    levothyroxine  50 mcg Oral Before breakfast    metoprolol succinate  25 mg Oral Daily    miconazole NITRATE 2 %   Topical (Top) BID    piperacillin-tazobactam (ZOSYN) IVPB  3.375 g Intravenous Q12H    rosuvastatin  10 mg Oral Daily    sevelamer carbonate  1,600 mg Oral TID WM    sodium zirconium cyclosilicate  5 g Oral Daily       Infusions:   sodium chloride 0.9% 10 mL/hr at 10/01/20 2125    norepinephrine bitartrate-D5W Stopped (10/07/20 2300)       PRN meds:  sodium chloride 0.9%, sodium chloride 0.9%, sodium chloride 0.9%, acetaminophen, albuterol sulfate, dextrose 50%, dextrose 50%, glucagon (human recombinant), glucose, glucose, insulin aspart U-100, ondansetron, Pharmacy to dose Vancomycin consult **AND** vancomycin - pharmacy to dose, white petrolatum    Review of Systems:  ROS    OBJECTIVE:     Vital Signs and IO (Last 24H):  Temp:  [98.3 °F (36.8 °C)-101.4 °F (38.6 °C)]   Pulse:  []   Resp:  [16-38]   BP: ()/(35-63)   SpO2:  [87 %-100 %]   I/O last 3 completed shifts:  In: 2087.5 [I.V.:207.5; Other:500; NG/GT:1230; IV Piggyback:150]  Out: 2500 [Other:2500]    Wt Readings from Last 5 Encounters:   10/08/20 86.2 kg (190 lb 0.6 oz)   09/26/20 80.7 kg (177 lb 14.6 oz)   09/08/20 79.8 kg (176 lb)   09/04/20 80 kg (176 lb 5.9 oz)   09/02/20 80 kg (176 lb 5.9 oz)         Physical Exam:  Physical Exam  Constitutional:       Appearance: Normal appearance. She is well-developed. She is ill-appearing. She is not diaphoretic.   HENT:      Head: Normocephalic and atraumatic.   Eyes:      General: No scleral icterus.     Pupils: Pupils are equal, round, and reactive to light.   Neck:      Musculoskeletal: Neck  supple.   Cardiovascular:      Rate and Rhythm: Normal rate and regular rhythm.   Pulmonary:      Effort: Pulmonary effort is normal. No respiratory distress.      Breath sounds: No stridor.   Abdominal:      General: There is no distension.      Palpations: Abdomen is soft.   Musculoskeletal: Normal range of motion.         General: Swelling present. No deformity.   Skin:     General: Skin is warm and dry.      Findings: No erythema or rash.   Neurological:      Cranial Nerves: No cranial nerve deficit.         Body mass index is 30.67 kg/m².    Laboratory:  Recent Labs   Lab 10/06/20  0434 10/07/20  0505 10/08/20  0419   * 132* 130*   K 4.4 4.3 4.5   CL 92* 94* 93*   CO2 22* 24 25   BUN 62* 48* 62*   CREATININE 5.1* 4.2* 4.8*   ESTGFRAFRICA 10.0* 12.7* 10.8*   EGFRNONAA 8.7* 11.0* 9.3*   * 212* 141*       Recent Labs   Lab 10/02/20  0356  10/06/20  0434 10/07/20  0505 10/08/20  0419   CALCIUM 7.8*   < > 7.2* 7.5* 7.4*   ALBUMIN  --   --  1.3* 1.4* 1.4*   MG 2.0  --   --   --   --     < > = values in this interval not displayed.             No results for input(s): POCTGLUCOSE in the last 168 hours.    Recent Labs   Lab 02/21/18 2120 09/12/18  1715 09/24/20  0630   Hemoglobin A1C 6.1 H 5.2 6.3 H       Recent Labs   Lab 10/05/20  0338  10/07/20  0505 10/08/20  0419 10/08/20  0512   WBC 14.80*  --  17.77* 10.66  --    HGB 11.4*  --  11.1* 10.6*  --    HCT 35.8*   < > 34.6* 33.4* 35*   *  --  147* 131*  --    MCV 88  --  86 87  --    MCHC 31.8*  --  32.1 31.7*  --    MONO  --   --  8.0  1.4* 8.4  0.9  --     < > = values in this interval not displayed.       Recent Labs   Lab 10/06/20  0434 10/07/20  0505 10/08/20  0419   BILITOT 1.1* 0.8 0.8   PROT 4.4* 4.5* 4.7*   ALBUMIN 1.3* 1.4* 1.4*   ALKPHOS 85 101 99   ALT 15 17 17   AST 19 25 23       Recent Labs   Lab 11/24/17  1837 09/11/18  0246 09/25/20  1434   Color, UA Red A Yellow Yellow   Appearance, UA Cloudy A Hazy A Cloudy A   pH, UA 5.0 5.0  6.0   Specific Gravity, UA >1.030 A >=1.030 A 1.020   Protein, UA 2+ A 2+ A 2+ A   Glucose, UA 1+ A Negative Negative   Ketones, UA Negative Negative Negative   Urobilinogen, UA Negative Negative Negative   Bilirubin (UA) Negative Negative 1+ A   Occult Blood UA 3+ A 1+ A 3+ A   Nitrite, UA Negative Negative Negative   RBC, UA >100 H 5 H 83 H   WBC, UA 0 >100 H >100 H   Bacteria Rare Moderate A Occasional   Hyaline Casts, UA 0 0 1785 A       Recent Labs   Lab 10/05/20  1013 10/06/20  1007 10/08/20  0512   POC PH 7.240 LL 7.276 LL 7.327 L   POC PCO2 60.2 HH 50.5 H 51.0 H   POC HCO3 25.8 23.5 L 26.7   POC PO2 84 79 L 115 H   POC SATURATED O2 94 L 94 L 98   POC BE -2 -3 1   Sample ARTERIAL ARTERIAL ARTERIAL       Microbiology Results (last 7 days)     Procedure Component Value Units Date/Time    Blood culture [[id number]] Collected: 10/06/20 1126    Order Status: Completed Specimen: Blood from AV Fistula, Left Updated: 10/08/20 1232     Blood Culture, Routine No Growth to date      No Growth to date      No Growth to date    Narrative:      PLEASE draw 2 sets (4 bottles) 15 mins apart from 2 different  sites- One MUST include peripheral stick    Clostridium difficile EIA [[id number]] Collected: 10/08/20 0559    Order Status: Sent Specimen: Stool Updated: 10/08/20 0613    Blood culture [[id number]] Collected: 10/03/20 1624    Order Status: Completed Specimen: Blood from Peripheral, Right Hand Updated: 10/07/20 1832     Blood Culture, Routine No Growth to date      No Growth to date      No Growth to date      No Growth to date      No Growth to date    Blood culture [[id number]] Collected: 10/06/20 1641    Order Status: Completed Specimen: Blood Updated: 10/07/20 1832     Blood Culture, Routine No Growth to date      No Growth to date    Culture, Respiratory with Gram Stain [[id number]]  (Abnormal)  (Susceptibility) Collected: 10/04/20 1257    Order Status: Completed Specimen: Respiratory from Tracheal Aspirate Updated:  10/06/20 0632     Respiratory Culture Reduced normal respiratory nyla      PSEUDOMONAS AERUGINOSA  Many       Gram Stain (Respiratory) <10 epithelial cells per low power field.     Gram Stain (Respiratory) Many WBC's     Gram Stain (Respiratory) Few Gram positive rods     Gram Stain (Respiratory) Many Gram negative rods     Gram Stain (Respiratory) Few Gram positive cocci    Blood culture [[id number]]  (Abnormal)  (Susceptibility) Collected: 10/03/20 1558    Order Status: Completed Specimen: Blood from Line, Arterial, Right Updated: 10/06/20 0621     Blood Culture, Routine Gram stain patricia bottle: Gram positive cocci in pairs and chains      Results called to and read back by:Benito Mccormack RN/2BICU  10/04/2020        15:12 cea      ENTEROCOCCUS FAECALIS          ASSESSMENT/PLAN:     Active Hospital Problems    Diagnosis  POA    *CHF (congestive heart failure) [I50.9]  Yes    Enterococcal bacteremia [R78.81, B95.2]  No    Pseudomonal pneumonia [J15.1]  Yes    Shock [R57.9]  Yes    Atrial fibrillation and flutter [I48.91, I48.92]  Unknown    Acute on chronic heart failure [I50.9]  Unknown    COPD (chronic obstructive pulmonary disease) [J44.9]  Yes    Acute on chronic respiratory failure with hypoxia and hypercapnia [J96.21, J96.22]  Yes    Anemia, chronic disease [D63.8]  Yes    Stage 5 chronic kidney disease on chronic dialysis [N18.6, Z99.2]  Not Applicable    Open wound of left heel [S91.302A]  Yes    Chronic kidney disease with end stage renal failure on dialysis [N18.6, Z99.2]  Not Applicable      Resolved Hospital Problems    Diagnosis Date Resolved POA    Tachycardia [R00.0] 09/29/2020 Yes     ESRD on HD TTS via AVF  Hyperkalemia, mild  Hypotension  A.Flutter s/p CV 9/25 now on amiodarone, cant give midodrine  Significant third-spacing  Pneumonia  Unable to keep her net negative with TIW dialysis due to high obligate intake, will do PRN UF as well.  Continue Lokelma.  No IVs or BP checks on access  and/or non-dominant arm.  Apprecaite cards eval.   Holding midodrine for now.    Anemia of CKD  Hgb and HCT are acceptable. Monitor.  Will provide YASHIRA and/or IV iron PRN.    MBD / Secondary HPT  Ca, phos, PTH and vitamin D levels are acceptable.   Phos binders, vitamin D analogues and calcimimetics as needed.    Right arm swelling-new onset  Duplex US shows no DVT.    Respiratory failure, intubated, PNA  Plan per Pulm, ID, primary team.    Thank you for allowing us to participate in the care of your patient!   We will follow the patient and provide recommendations as needed.    Edson Crystal MD    [facility]  [address] [place] [address]  CECE Kamara [id number] [phone] - tel  [phone] - fax    10/8/2020 1:53 PM

## 2020-10-09 NOTE — PLAN OF CARE
10/08/20 1915   Patient Assessment/Suction   Level of Consciousness (AVPU) responds to voice   Respiratory Effort Normal;Unlabored   Expansion/Accessory Muscles/Retractions expansion symmetric;no retractions;no use of accessory muscles   All Lung Fields Breath Sounds coarse;diminished;equal bilaterally   $ Suction Charges Inline Suction Procedure Stat Charge   Secretions Amount small   Secretions Color tan   Secretions Characteristics thick   PRE-TX-O2   O2 Device (Oxygen Therapy) ventilator   Oxygen Concentration (%) 40   SpO2 100 %   Pulse Oximetry Type Continuous   $ Pulse Oximetry - Multiple Charge Pulse Oximetry - Multiple   Pulse 73   Resp (!) 25   BP (!) 101/44      Airway Anesthesia 09/29/20   Placement Date/Time: 09/29/20 (c) 4841   Method of Intubation: Video Laryngoscopy  Airway Device: Endotracheal Tube  Mask Ventilation: Easy  Intubated: Postinduction  Airway Device Size: 8.0  Cuffed: Cuffed  Complicating Factors: None  Findings Post-I...   Secured at 24 cm   Measured At Lips   Secured Location Center   Secured by Commercial tube mckinney   Bite Block right   Site Condition Cool;Dry   Status Intact;Patent;Secured   Site Assessment Clean;Dry   Cuff Volume   (mlt)   Vent Select   Conventional Vent Y   Charged w/in last 24h YES   Preset Conventional Ventilator Settings   Vent ID [id number]   Vent Type    Ventilation Type VC   Vent Mode A/C   Humidity HME   Set Rate 24 BPM   Vt Set 390 mL   PEEP/CPAP 5 cmH20   Pressure Support 0 cmH20   Waveform RAMP   Peak Flow 90 L/min   Plateau Set/Insp. Hold (sec) 0   Trigger Sensitivity Flow/I-Trigger 5 L/min   Patient Ventilator Parameters   Resp Rate Total 25 br/min   Peak Airway Pressure 38 cmH2O   Mean Airway Pressure 12 cmH20   Plateau Pressure 0 cmH20   Exhaled Vt 397 mL   Total Ve 9.95 mL   I:E Ratio Measured 1:4.10   Conventional Ventilator Alarms   Alarms On Y   Resp Rate High Alarm 55 br/min   Press High Alarm 60 cmH2O   Apnea Rate 14   Apnea Volume (mL) 0 mL    Apnea Oxygen Concentration  100   Apnea Flow Rate (L/min) 60   T Apnea 20 sec(s)   Ready to Wean/Extubation Screen   FIO2<=50 (chart decimal) 0.4   MV<16L (chart vol.) 9.95   PEEP <=8 (chart #) 5   Ready to Wean Parameters   F/VT Ratio<105 (RSBI) (!) 62.97   Respiratory Evaluation   $ Care Plan Tech Time 15 min

## 2020-10-09 NOTE — PROGRESS NOTES
Progress Note  Cardiology    Admit Date: 9/24/2020   LOS: 15 days     Follow-up For:  Respiratory failure,intubated, FiO2 0.4.  Chronic renal failure on dialysis.  CHF.  Atrial flutter, cardioversion, sinus rhythm on amiodarone    Scheduled Meds:   amiodarone  200 mg Oral BID    apixaban  2.5 mg Oral BID    collagenase   Topical (Top) Daily    famotidine (PF)  20 mg Intravenous Daily    levoFLOXacin  250 mg Intravenous Q48H    levothyroxine  50 mcg Oral Before breakfast    metoprolol succinate  25 mg Oral Daily    miconazole NITRATE 2 %   Topical (Top) BID    piperacillin-tazobactam (ZOSYN) IVPB  3.375 g Intravenous Q12H    rosuvastatin  10 mg Oral Daily    sevelamer carbonate  1,600 mg Oral TID WM    sodium zirconium cyclosilicate  5 g Oral Daily    vancomycin  125 mg Oral Q6H     Continuous Infusions:   norepinephrine bitartrate-D5W Stopped (10/09/20 0500)     PRN Meds:acetaminophen, albuterol sulfate, dextrose 50%, dextrose 50%, glucagon (human recombinant), glucose, glucose, insulin aspart U-100, ondansetron, Pharmacy to dose Vancomycin consult **AND** vancomycin - pharmacy to dose, white petrolatum    Review of patient's allergies indicates:  No Known Allergies    SUBJECTIVE:     Interval History: Patient awake and responding to questions appropriately.    Review of Systems  na    OBJECTIVE:     Vital Signs (Most Recent)  Temp: 96.4 °F (35.8 °C) (10/09/20 1300)  Pulse: 72 (10/09/20 1300)  Resp: (!) 28 (10/09/20 1300)  BP: (!) 114/53 (10/09/20 1300)  SpO2: 96 % (10/09/20 1300)    Vital Signs Range (Last 24H):  Temp:  [96.4 °F (35.8 °C)-98.8 °F (37.1 °C)]   Pulse:  [69-90]   Resp:  [24-37]   BP: ()/(35-61)   SpO2:  [92 %-100 %]       Physical Exam:  Neck: no carotid bruit, no JVD and supple, symmetrical, trachea midline  Lungs: clear to auscultation bilaterally, normal respiratory effort  Heart: regular rate and rhythm, S1, S2 normal, no murmur, click, rub or gallop  Abdomen: soft, non-tender;  bowel sounds normal; no masses,  no organomegaly  Extremities: Extremities normal, atraumatic, no cyanosis, clubbing, or edema    Recent Results (from the past 24 hour(s))   POCT glucose    Collection Time: 10/08/20  6:25 PM   Result Value Ref Range    POC Glucose 145 (H) 70 - 110   POCT glucose    Collection Time: 10/08/20 11:44 PM   Result Value Ref Range    POC Glucose 208 (H) 70 - 110   Vancomycin, Random    Collection Time: 10/09/20  3:52 AM   Result Value Ref Range    Vancomycin, Random 29.9 Not established ug/mL   ISTAT PROCEDURE    Collection Time: 10/09/20  4:48 AM   Result Value Ref Range    POC PH 7.311 (L) 7.35 - 7.45    POC PCO2 56.2 (HH) 35 - 45 mmHg    POC PO2 119 (H) 80 - 100 mmHg    POC HCO3 28.4 (H) 24 - 28 mmol/L    POC BE 2 -2 to 2 mmol/L    POC SATURATED O2 98 95 - 100 %    POC Glucose 204 (H) 70 - 110 mg/dL    POC Sodium 134 (L) 136 - 145 mmol/L    POC Potassium 3.6 3.5 - 5.1 mmol/L    POC TCO2 30 (H) 23 - 27 mmol/L    POC Ionized Calcium 1.15 1.06 - 1.42 mmol/L    POC Hematocrit 37 36 - 54 %PCV    Rate 24     Sample ARTERIAL     Site RR     Allens Test Pass     DelSys Adult Vent     Mode AC/PRVC     Vt 390     PEEP 5     FiO2 40     Sp02 100    POCT glucose    Collection Time: 10/09/20 12:58 PM   Result Value Ref Range    POC Glucose 187 (H) 70 - 110       Diagnostic Results:  Labs: Reviewed  ECG: Reviewed  CT: Reviewed  .    ASSESSMENT/PLAN:     The patient had dialysis today; negative 2 L. continue amiodarone b.i.d..  Hopefully will be able to get off ventilator.

## 2020-10-09 NOTE — PROGRESS NOTES
[facility] Medicine  Progress Note    Patient Name: Juliane Ramos  MRN: [id number]  Patient Class: IP- Inpatient   Admission Date: 9/24/2020  Length of Stay: 15 days  Attending Physician: Oscar Ruth DO  Primary Care Provider: JOS Dumont        Subjective:     Principal Problem:CHF (congestive heart failure)        HPI:  Patient is a 60-year-old female hx ESRD HD (T,Th, Sat)  CAD/CABG ,EF20%?, COPD, DM ,chronic hypotension  presents ED with complaints of having low blood pressure and rapid heart rate.  In the emergency department patient had a heart rate in the 130s regular and a blood pressure systolic of approximately 100  Patient denies shortness of breath chest pain fever chills abdominal pain nausea vomiting.  She states her last dialysis on Tuesday she did not have any volume removed.  Patient has been given approximately 500 cc of IV fluids in the emergency department at remains tachycardic in the 120s.      Overview/Hospital Course:  09/29  Assumed care. Chart reviewed. Labs reviewed:  End stage renal function. Receiving dialysis today. 2 liters to be removed. Discussed with dietician: tube feeds if not extubated.    09/30  Consultants' attendance noted and appreciated. Required re-intubation yesterday.  Labs reviewed: mild leukocytosis with normal hematocrit; minimal hyponatremia with end-stage renal function values. Remains intubated. Discussed with Dietician: start enteral feeds.    10/01  Consultants' attendance noted and appreciated.. Unable to extubate. Receiving dialysis this AM. Labs reviewed: leukocytosis, stable normocytic anemia; mild hyponatremia, otherwise normal electrolytes with end stage renal function    10/02  Consultants' attendance noted and appreciated.. Labs reviewed: leukocytosis persisting, yet improved; minimal hyponatremia otherwise normal electrolytes with end stage renal function. Telemetry reviewed: sinus. Remains intubated.    10/03  "Discussed with Pulmonology. Had dialysis this AM with 4 liters removed. Labs reviewed: leukocytosis resolved, mild hyponatremia with otherwise normal electrolytes and end stage renal values. Telemetry revewed: sinus tach. Sedated and intubated     10/04 Consultants' attendance noted and appreciated. Did not tolerate "sedation vacation" this AM: heart rate and blood pressure went up. Labs reviewed: leukocytosis with normal Hct; mild hyponatremia otherwise electrolytes are normal with end stage renal function. CXR shows ET tube level at heads of clavicles--it was advanced further into the trachea.     10/05  Discussed with Pulmonology: failed trial. Labs reviewed: leukocytosis with minimal normocytic anemia;hyponatremia with end stage renal function. Growing pseudomonas in sputum despite being on piperacillin     Interval History:  No acute events last 24 hours  Patient remains on ventilator however she is awake and will nod to some questions  She denies chest pain or abdominal pain      Objective:     Vital Signs (Most Recent):  Temp: 96.4 °F (35.8 °C) (10/09/20 1300)  Pulse: 72 (10/09/20 1300)  Resp: (!) 28 (10/09/20 1300)  BP: (!) 114/53 (10/09/20 1300)  SpO2: 96 % (10/09/20 1300) Vital Signs (24h Range):  Temp:  [96.4 °F (35.8 °C)-98.8 °F (37.1 °C)] 96.4 °F (35.8 °C)  Pulse:  [69-78] 72  Resp:  [25-31] 28  SpO2:  [92 %-100 %] 96 %  BP: ()/(35-61) 114/53     Weight: 84 kg (185 lb 3 oz)  Body mass index is 29.89 kg/m².    Intake/Output Summary (Last 24 hours) at 10/9/2020 1507  Last data filed at 10/9/2020 1225  Gross per 24 hour   Intake 1502.05 ml   Output 2750 ml   Net -1247.95 ml      Physical Exam intubated sedated but easily arousable and will respond to some questions  HEENT sclerae endotracheal tube is in place  Neck is supple  Lungs coarse breath sounds symmetrical expansion intubated  Heart regular rate and rhythm no rub no murmur  Extremities no edema  Skin  wounds as previously documented  Neuro " patient will follow commands  Moves all extremities   exam Chi is in place    Significant Labs:   BMP:   Recent Labs   Lab 10/08/20  0419   *   *   K 4.5   CL 93*   CO2 25   BUN 62*   CREATININE 4.8*   CALCIUM 7.4*     CBC:   Recent Labs   Lab 10/08/20  0419 10/08/20  0512 10/09/20  0448   WBC 10.66  --   --    HGB 10.6*  --   --    HCT 33.4* 35* 37   *  --   --      CMP:   Recent Labs   Lab 10/08/20  0419   *   K 4.5   CL 93*   CO2 25   *   BUN 62*   CREATININE 4.8*   CALCIUM 7.4*   PROT 4.7*   ALBUMIN 1.4*   BILITOT 0.8   ALKPHOS 99   AST 23   ALT 17   ANIONGAP 12   EGFRNONAA 9.3*       Significant Imaging:       Assessment/Plan:    #1 Acute hypoxic hypercapnic respiratory failure -remains intubated.  Multifactorial Weaning as tolerated .Appreciate help from Pulmonary  #2 Pseudomonas pneumonia/acute COPD exacerbation-continuing IV antibiotics  #3 Catheter related Enterococcus faecalis bacteremia-lines removed, appreciate help from ID. Continue IV abx as per ID   #4 Acute diastolic  CHF -dialysis as per Nephrology  #5 New onset atrial  Flutter w/RVR on admission   -S/P cardioversion - NSR   cardiology following, continuing amiodarone, anticoagulation, EF46%  #6 ESRD HD Tuesday Thursday Saturday nephrology following   #7 Chronic hypotension/CAD/CABG-/EF46%% -Slight elevation of troponin most likely secondary to demand ischemia secondary to hypotension on admission   #8 Chronic wound left distal posterior lower extremity-Wound care follow-up  Followed by Dr. Wick  #9  Hyponatremia-chronic  ,monitoring labs   #10 Hyperkalemia-resolved  #11  DM2-follow sliding scale hemoglobin A1c 6.3   #12 Hx PE -details unknown   #13 Metabolic acidosis .  Resolved  #14 Acute UTI POA-yeast.Resolved  #15 anemia chronic dx -monitoring  #16 Secondary HPT-as per Renal  #17 moderate protein calorie malnutrition  -continuing tube feeds dietary following  #18 C diff -colitis p.o. vancomycin           Patient high risk for decline and life-threatening deterioration.   is aware.  VTE Risk Mitigation (From admission, onward)         Ordered     apixaban tablet 2.5 mg  2 times daily      09/24/20 1018     Place MARY ELLEN hose  Until discontinued      09/24/20 1018     IP VTE HIGH RISK PATIENT  Once      09/24/20 1018     Place sequential compression device  Until discontinued      09/24/20 1018                  Oscar Ruth DO  Department of Hospital Medicine   [facility]

## 2020-10-09 NOTE — PROGRESS NOTES
HD: consent and hepatitis status confirmed prior to HD, pt stable, tx completed, lines flushed, needles removed, pressure and bandage applied> NET UF:2L. Report given to Gayathri

## 2020-10-09 NOTE — CONSULTS
Nephrology Consult Note         Patient Name: Juliane Ramos  MRN: [id number]    Patient Class: IP- Inpatient   Admission Date: 9/24/2020  Length of Stay: 15 days  Date of Service: 10/9/2020    Attending Physician: Oscar Ruth DO  Primary Care Provider: JOS Dumont    Reason for Consult: esrd/anemia/hyperkalemia/hypotension/shpt/tachycardia    SUBJECTIVE:     HPI: 60F hx ESRD HD (T,Th, Sat)  CAD/CABG ,EF20%?, COPD, DM ,chronic hypotension on midodrine daily presents to ED with complaints of having low blood pressure and rapid heart rate. In the emergency department patient had a heart rate in the 130s regular and SBP in 100s  Patient denies shortness of breath, chest pain, fever, chills, abdominal pain, nausea, vomiting. Last dialysis on Tuesday and she did not have any volume removed.  Patient has been given approximately 500 cc of IV fluids in the emergency department at remains tachycardic in the 120s. Cards eval is pending, Amiodarone was started.    9/25 Getting HD for hyperkalemia today, before cardioversion. Continue HD per TTS schedule.  9/26 Seen and examined on Hd today, tolerating well. Continue HD per TTS schedule.   9/28  Intubated.  Sedated. TTS dialysis planned  9/29  Seen today on dialysis.  Tolerating well.  Still intubated, arousable, good eye contact.  No lab results yet today.  10/1  Intubated.  Unresponsive.     10/2  Makes eye contact.  No distress.  afebrile  10/3  Seen on rounds.  Seen on dialysis.  Intubated.  Sedate  10/4  Failed sedation vacation.    10/5 VSS, remains intubated, plan HD in AM.  10/6 VSS, seen and examined on HD. Significant third-spacing in dependent parts, BP is OK with increased pressor, trying for 4L as tolerated..  10/7 VSS, unable to keep her net negative with TIW dialysis due to high obligate intake, will start MWF UF as tolerated as well. UF today.  10/8 VSS, had HD today, tolerated well.  10/9 VSS, had HD today, tolerated well. Next HD in AM, mayra hernandez  "lgive her a break on .    Past Medical History:   Diagnosis Date    Anemia     Anemia in stage 3 chronic kidney disease 2017    Anticoagulant long-term use     CHF (congestive heart failure)     COPD (chronic obstructive pulmonary disease)     COPD exacerbation 3/10/2020    Coronary artery disease     Diabetes mellitus     Digestive disorder     Encounter for blood transfusion     Essential hypertension 2017    Hypertension     Low blood pressure     MI (myocardial infarction)     Segmental and subsegmental pulmonary emboli of RLL without acute cor pulmonale 2017    Stroke     2005    Thyroid disease     Traumatic subarachnoid hemorrhage 2017    Type 2 diabetes mellitus with stage 3 chronic kidney disease, without long-term current use of insulin 2017     Past Surgical History:   Procedure Laterality Date    ABCESS DRAINAGE Right     Between "little toe" and the one next to it    BREAST SURGERY      Reduction [id number]    CARDIAC SURGERY  2011    CABG 4vessel ring in one valve mitral valve prolapse    CORONARY ARTERY BYPASS GRAFT      DEBRIDEMENT OF FOOT Left 2020    Procedure: DEBRIDEMENT, FOOT;  Surgeon: Dennis Wick DPM;  Location[facility];  Service: Podiatry;  Laterality: Left;    FOOT SURGERY      4 grafts to left foot    hyperlipidemia      TUBAL LIGATION  1986     Family History   Problem Relation Age of Onset    Arthritis Mother     Heart disease Father     Cancer Father 68        prostae and bladder ca  in     Diabetes Father     Hypertension Father     Depression Sister     Hypertension Son     Diabetes Maternal Grandmother         lost leg    Kidney disease Paternal Grandfather         had kidney removed    Stroke Paternal Grandfather      Social History     Tobacco Use    Smoking status: Never Smoker    Smokeless tobacco: Never Used   Substance Use Topics    Alcohol use: Yes     Alcohol/week: 1.0 - " "2.0 standard drinks     Types: 1 - 2 Glasses of wine per week     Comment: "socially" not in awhile    Drug use: No       Review of patient's allergies indicates:  No Known Allergies    Outpatient meds:  No current facility-administered medications on file prior to encounter.      Current Outpatient Medications on File Prior to Encounter   Medication Sig Dispense Refill    gabapentin (NEURONTIN) 100 MG capsule Take 100 mg by mouth 3 (three) times daily.  3    midodrine (PROAMATINE) 5 MG Tab Take 5 mg by mouth 3 (three) times daily with meals.       rosuvastatin (CRESTOR) 10 MG tablet Take 10 mg by mouth every evening.       albuterol-ipratropium (DUO-NEB) 2.5 mg-0.5 mg/3 mL nebulizer solution Take 3 mLs by nebulization every 6 (six) hours. Rescue 1 Box 11    apixaban 5 mg Tab Take 1 tablet (5 mg total) by mouth 2 (two) times daily. (Patient taking differently: Take 5 mg by mouth 2 (two) times daily. ) 60 tablet 1    blood sugar diagnostic Strp Test 3-4 times daily PRN      CLARITIN-D 12 HOUR 5-120 mg per tablet Take 1 tablet by mouth once daily.   0    fluticasone (FLONASE) 50 mcg/actuation nasal spray 1 spray by Each Nostril route once daily.   12    insulin aspart (NOVOLOG) 100 unit/mL InPn pen Inject 1-10 Units into the skin 3 (three) times daily. (Patient taking differently: Inject 1-10 Units into the skin 3 (three) times daily. If sugar >150 on sliding scale) 3 mL 1    levothyroxine (SYNTHROID) 50 MCG tablet Take 50 mcg by mouth before breakfast.       pen needle, diabetic (BD ULTRA-FINE OBDULIA PEN NEEDLE) 32 gauge x 5/32" Ndle       ramelteon (ROZEREM) 8 mg tablet Take 8 mg by mouth every evening.      sevelamer carbonate (RENVELA) 800 mg Tab Take 1,600 mg by mouth 3 (three) times daily with meals.      tiotropium (SPIRIVA) 18 mcg inhalation capsule Inhale 1 capsule (18 mcg total) into the lungs once daily. Controller 30 capsule 11    umeclidinium (INCRUSE ELLIPTA) 62.5 mcg/actuation inhalation " capsule Inhale 62.5 mcg into the lungs once daily. Controller         Scheduled meds:   amiodarone  200 mg Oral BID    apixaban  2.5 mg Oral BID    collagenase   Topical (Top) Daily    famotidine (PF)  20 mg Intravenous Daily    levoFLOXacin  250 mg Intravenous Q48H    levothyroxine  50 mcg Oral Before breakfast    metoprolol succinate  25 mg Oral Daily    miconazole NITRATE 2 %   Topical (Top) BID    piperacillin-tazobactam (ZOSYN) IVPB  3.375 g Intravenous Q12H    rosuvastatin  10 mg Oral Daily    sevelamer carbonate  1,600 mg Oral TID WM    sodium zirconium cyclosilicate  5 g Oral Daily    vancomycin  125 mg Oral Q6H       Infusions:   norepinephrine bitartrate-D5W Stopped (10/09/20 0500)       PRN meds:  acetaminophen, albuterol sulfate, dextrose 50%, dextrose 50%, glucagon (human recombinant), glucose, glucose, insulin aspart U-100, ondansetron, Pharmacy to dose Vancomycin consult **AND** vancomycin - pharmacy to dose, white petrolatum    Review of Systems:  ROS    OBJECTIVE:     Vital Signs and IO (Last 24H):  Temp:  [96.4 °F (35.8 °C)-98.8 °F (37.1 °C)]   Pulse:  [69-90]   Resp:  [24-37]   BP: ()/(35-61)   SpO2:  [92 %-100 %]   I/O last 3 completed shifts:  In: 2176.1 [I.V.:51.1; Other:1000; NG/GT:875; IV Piggyback:250]  Out: 6950 [Other:6700; Stool:250]    Wt Readings from Last 5 Encounters:   10/09/20 84 kg (185 lb 3 oz)   09/26/20 80.7 kg (177 lb 14.6 oz)   09/08/20 79.8 kg (176 lb)   09/04/20 80 kg (176 lb 5.9 oz)   09/02/20 80 kg (176 lb 5.9 oz)         Physical Exam:  Physical Exam  Constitutional:       Appearance: Normal appearance. She is well-developed. She is ill-appearing. She is not diaphoretic.   HENT:      Head: Normocephalic and atraumatic.   Eyes:      General: No scleral icterus.     Pupils: Pupils are equal, round, and reactive to light.   Neck:      Musculoskeletal: Neck supple.   Cardiovascular:      Rate and Rhythm: Normal rate and regular rhythm.   Pulmonary:       Effort: Pulmonary effort is normal. No respiratory distress.      Breath sounds: No stridor.   Abdominal:      General: There is no distension.      Palpations: Abdomen is soft.   Musculoskeletal: Normal range of motion.         General: Swelling present. No deformity.   Skin:     General: Skin is warm and dry.      Findings: No erythema or rash.   Neurological:      Cranial Nerves: No cranial nerve deficit.         Body mass index is 29.89 kg/m².    Laboratory:  Recent Labs   Lab 10/06/20  0434 10/07/20  0505 10/08/20  0419   * 132* 130*   K 4.4 4.3 4.5   CL 92* 94* 93*   CO2 22* 24 25   BUN 62* 48* 62*   CREATININE 5.1* 4.2* 4.8*   ESTGFRAFRICA 10.0* 12.7* 10.8*   EGFRNONAA 8.7* 11.0* 9.3*   * 212* 141*       Recent Labs   Lab 10/06/20  0434 10/07/20  0505 10/08/20  0419   CALCIUM 7.2* 7.5* 7.4*   ALBUMIN 1.3* 1.4* 1.4*             No results for input(s): POCTGLUCOSE in the last 168 hours.    Recent Labs   Lab 02/21/18 2120 09/12/18  1715 09/24/20  0630   Hemoglobin A1C 6.1 H 5.2 6.3 H       Recent Labs   Lab 10/05/20  0338  10/07/20  0505 10/08/20  0419 10/08/20  0512 10/09/20  0448   WBC 14.80*  --  17.77* 10.66  --   --    HGB 11.4*  --  11.1* 10.6*  --   --    HCT 35.8*   < > 34.6* 33.4* 35* 37   *  --  147* 131*  --   --    MCV 88  --  86 87  --   --    MCHC 31.8*  --  32.1 31.7*  --   --    MONO  --   --  8.0  1.4* 8.4  0.9  --   --     < > = values in this interval not displayed.       Recent Labs   Lab 10/06/20  0434 10/07/20  0505 10/08/20  0419   BILITOT 1.1* 0.8 0.8   PROT 4.4* 4.5* 4.7*   ALBUMIN 1.3* 1.4* 1.4*   ALKPHOS 85 101 99   ALT 15 17 17   AST 19 25 23       Recent Labs   Lab 11/24/17  1837 09/11/18  0246 09/25/20  1434   Color, UA Red A Yellow Yellow   Appearance, UA Cloudy A Hazy A Cloudy A   pH, UA 5.0 5.0 6.0   Specific Gravity, UA >1.030 A >=1.030 A 1.020   Protein, UA 2+ A 2+ A 2+ A   Glucose, UA 1+ A Negative Negative   Ketones, UA Negative Negative Negative    Urobilinogen, UA Negative Negative Negative   Bilirubin (UA) Negative Negative 1+ A   Occult Blood UA 3+ A 1+ A 3+ A   Nitrite, UA Negative Negative Negative   RBC, UA >100 H 5 H 83 H   WBC, UA 0 >100 H >100 H   Bacteria Rare Moderate A Occasional   Hyaline Casts, UA 0 0 1785 A       Recent Labs   Lab 10/06/20  1007 10/08/20  0512 10/09/20  0448   POC PH 7.276 LL 7.327 L 7.311 L   POC PCO2 50.5 H 51.0 H 56.2 HH   POC HCO3 23.5 L 26.7 28.4 H   POC PO2 79 L 115 H 119 H   POC SATURATED O2 94 L 98 98   POC BE -3 1 2   Sample ARTERIAL ARTERIAL ARTERIAL       Microbiology Results (last 7 days)     Procedure Component Value Units Date/Time    Blood culture [[id number]] Collected: 10/06/20 1126    Order Status: Completed Specimen: Blood from AV Fistula, Left Updated: 10/09/20 1232     Blood Culture, Routine No Growth to date      No Growth to date      No Growth to date      No Growth to date    Narrative:      PLEASE draw 2 sets (4 bottles) 15 mins apart from 2 different  sites- One MUST include peripheral stick    Blood culture [[id number]] Collected: 10/03/20 1624    Order Status: Completed Specimen: Blood from Peripheral, Right Hand Updated: 10/08/20 1832     Blood Culture, Routine No growth after 5 days.    Blood culture [[id number]] Collected: 10/06/20 1641    Order Status: Completed Specimen: Blood Updated: 10/08/20 1832     Blood Culture, Routine No Growth to date      No Growth to date      No Growth to date    C Diff Toxin by PCR [[id number]]  (Abnormal) Collected: 10/08/20 0559    Order Status: Completed Updated: 10/08/20 1528     C. diff PCR Positive     Comment: result(s) called and verbal readback obtained from Riaz Houston RN on MICU, re: positive CDIFF PCR 10/8/20 at 1527 vcb by   [facility]B 10/08/2020 15:27         Narrative:        result(s) called and verbal readback obtained from Riaz Houston RN on MICU, re: positive CDIFF PCR 10/8/20 at 1527 vcb by   VCB 10/08/2020 15:27    Clostridium difficile  EIA [[id number]]  (Abnormal) Collected: 10/08/20 0559    Order Status: Completed Specimen: Stool Updated: 10/08/20 1521     C. diff Antigen Positive     C difficile Toxins A+B, EIA Negative     Comment: Testing not recommended for children <24 months old.       Culture, Respiratory with Gram Stain [[id number]]  (Abnormal)  (Susceptibility) Collected: 10/04/20 1257    Order Status: Completed Specimen: Respiratory from Tracheal Aspirate Updated: 10/06/20 0632     Respiratory Culture Reduced normal respiratory nyla      PSEUDOMONAS AERUGINOSA  Many       Gram Stain (Respiratory) <10 epithelial cells per low power field.     Gram Stain (Respiratory) Many WBC's     Gram Stain (Respiratory) Few Gram positive rods     Gram Stain (Respiratory) Many Gram negative rods     Gram Stain (Respiratory) Few Gram positive cocci    Blood culture [[id number]]  (Abnormal)  (Susceptibility) Collected: 10/03/20 1558    Order Status: Completed Specimen: Blood from Line, Arterial, Right Updated: 10/06/20 0621     Blood Culture, Routine Gram stain patricia bottle: Gram positive cocci in pairs and chains      Results called to and read back by:Benito Mccormack RN/2BICU  10/04/2020        15:12 cea      ENTEROCOCCUS FAECALIS          ASSESSMENT/PLAN:     Active Hospital Problems    Diagnosis  POA    *CHF (congestive heart failure) [I50.9]  Yes    Enterococcal bacteremia [R78.81, B95.2]  No    Pseudomonal pneumonia [J15.1]  Yes    Shock [R57.9]  Yes    Atrial fibrillation and flutter [I48.91, I48.92]  Unknown    Acute on chronic heart failure [I50.9]  Unknown    COPD (chronic obstructive pulmonary disease) [J44.9]  Yes    Acute on chronic respiratory failure with hypoxia and hypercapnia [J96.21, J96.22]  Yes    Anemia, chronic disease [D63.8]  Yes    Stage 5 chronic kidney disease on chronic dialysis [N18.6, Z99.2]  Not Applicable    Open wound of left heel [S91.302A]  Yes    Chronic kidney disease with end stage renal failure on dialysis  [N18.6, Z99.2]  Not Applicable      Resolved Hospital Problems    Diagnosis Date Resolved POA    Tachycardia [R00.0] 09/29/2020 Yes     ESRD on HD TTS via AVF  Hyperkalemia, mild  Hypotension  A.Flutter s/p CV 9/25 now on amiodarone, cant give midodrine  Significant third-spacing  Pneumonia  Unable to keep her net negative with TIW dialysis due to high obligate intake, will do PRN UF as well.  Continue Lokelma.  No IVs or BP checks on access and/or non-dominant arm.  Apprecaite cards eval.   Holding midodrine for now.    Anemia of CKD  Hgb and HCT are acceptable. Monitor.  Will provide YASHIRA and/or IV iron PRN.    MBD / Secondary HPT  Ca, phos, PTH and vitamin D levels are acceptable.   Phos binders, vitamin D analogues and calcimimetics as needed.    Right arm swelling-new onset  Duplex US shows no DVT.    Respiratory failure, intubated, PNA  Plan per Pulm, ID, primary team.    Thank you for allowing us to participate in the care of your patient!   We will follow the patient and provide recommendations as needed.    Edson Crystal MD    [facility]  [address] [place] [address]  [place] [address]    [phone] - tel  [phone] - fax    10/9/2020

## 2020-10-09 NOTE — SUBJECTIVE & OBJECTIVE
Interval History:  No acute events last 24 hours  Patient remains on ventilator however she is awake and will nod to some questions  She denies chest pain or abdominal pain      Objective:     Vital Signs (Most Recent):  Temp: 96.4 °F (35.8 °C) (10/09/20 1300)  Pulse: 72 (10/09/20 1300)  Resp: (!) 28 (10/09/20 1300)  BP: (!) 114/53 (10/09/20 1300)  SpO2: 96 % (10/09/20 1300) Vital Signs (24h Range):  Temp:  [96.4 °F (35.8 °C)-98.8 °F (37.1 °C)] 96.4 °F (35.8 °C)  Pulse:  [69-78] 72  Resp:  [25-31] 28  SpO2:  [92 %-100 %] 96 %  BP: ()/(35-61) 114/53     Weight: 84 kg (185 lb 3 oz)  Body mass index is 29.89 kg/m².    Intake/Output Summary (Last 24 hours) at 10/9/2020 1507  Last data filed at 10/9/2020 1225  Gross per 24 hour   Intake 1502.05 ml   Output 2750 ml   Net -1247.95 ml      Physical Exam intubated sedated but easily arousable and will respond to some questions  HEENT sclerae endotracheal tube is in place  Neck is supple  Lungs coarse breath sounds symmetrical expansion intubated  Heart regular rate and rhythm no rub no murmur  Extremities no edema  Skin  wounds as previously documented  Neuro patient will follow commands  Moves all extremities   exam Chi is in place    Significant Labs:   BMP:   Recent Labs   Lab 10/08/20  0419   *   *   K 4.5   CL 93*   CO2 25   BUN 62*   CREATININE 4.8*   CALCIUM 7.4*     CBC:   Recent Labs   Lab 10/08/20  0419 10/08/20  0512 10/09/20  0448   WBC 10.66  --   --    HGB 10.6*  --   --    HCT 33.4* 35* 37   *  --   --      CMP:   Recent Labs   Lab 10/08/20  0419   *   K 4.5   CL 93*   CO2 25   *   BUN 62*   CREATININE 4.8*   CALCIUM 7.4*   PROT 4.7*   ALBUMIN 1.4*   BILITOT 0.8   ALKPHOS 99   AST 23   ALT 17   ANIONGAP 12   EGFRNONAA 9.3*       Significant Imaging:

## 2020-10-09 NOTE — PROGRESS NOTES
Formerly [facility], later [facility]  Pulmonary / Critical Care Medicine  Progress Note    Subjective     9/29:  No major issues overnight.  Tolerating SBT.  Off of sedation all night.   9/30:  Failed extubation and was re-intubated yesterday evening.  No issues since.  10/1:  No major issues overnight.  Remains intubated.  Off of sedation.  10/2/2020 - Remains critically ill, no new issues overnight, respiratory reports significant secretions.  Fever curve good.  No new weight, cumulative I/O 2.1 liters +., vital signs OK.  Ventilating pressures good, Pplat - 25, Ve - 14,  P/F -  258, attempted brief SBT but sTV only 150 - 200 (not ready to wean/extubate).  Labs reviewed.  10/3- remains on vent, on Levophed 0.022 mcg/kg/min, afebrile, HR controlled in 70s-90s. Completed HD this am with removal of 4L fluid.  10/4- remains on vent,  Febrile to 103, sedated w/ propofol, oxygenation improved compared to yesterday. Requiring min levophed  10/5:  Remains intubated but not sedated.  Fever curve down trending.  Tolerating relatively minimal ventilator settings, but she becomes hypercapnic and hypo ventilates with placed pressure support ventilation.  She is only requiring low-dose vasopressors today.  10/6:  Continues spike fevers despite receiving antibiotics yesterday.  Rapidly developed hypercapnia while on pressure support ventilation this morning.  She remains off sedation.  10/7:  No major issues overnight.  Fever curve down trending.  More alert today.  Dialyzed yesterday and central lines removed.  10/8:  No major issues overnight.  Fever cure downtrending.  Alert this morning.  No new issues.  Remains intubated/sedated.  10/9  the patient is awake on the ventilator.  She is on dialysis.  She is afebrile.  The plan is to leave her on the ventilator going forward.  Dr. Hui will reassess on Monday.  The  states at that point, or may be later on during the week, they may consider withdrawing care.    Review of Systems    Unable to perform ROS: Intubated      I have personally reviewed the following during today's evaluation:  past medical history, ROS, family history, social history, surgical history, current inpatient medications,drug allergies, vital signs over the past 24 hours, results of relevant diagnostic studies and nursing/provider documentation from the past 24 hours.     Objective     VS Temp:  [98 °F (36.7 °C)-98.8 °F (37.1 °C)]   Pulse:  [69-90]   Resp:  [24-37]   BP: ()/(35-56)   SpO2:  [91 %-100 %]   Ideal body weight: 59.3 kg (130 lb 11.7 oz)  Adjusted ideal body weight: 69.2 kg (152 lb 8.2 oz)   I/O   Intake/Output Summary (Last 24 hours) at 10/9/2020 0719  Last data filed at 10/9/2020 0500  Gross per 24 hour   Intake 1602.05 ml   Output 4450 ml   Net -2847.95 ml        Vent SpO2 100%   Vent Mode: A/C  Oxygen Concentration (%):  [40] 40  Resp Rate Total:  [24 br/min-31 br/min] 28 br/min  Vt Set:  [390 mL] 390 mL  PEEP/CPAP:  [5 cmH20] 5 cmH20  Pressure Support:  [0 cmH20] 0 cmH20  Mean Airway Pressure:  [11 rjH46-97 cmH20] 12 cmH20     PE Physical Exam   Constitutional: She appears well-developed and well-nourished. She is cooperative.  Non-toxic appearance. No distress. She is intubated and restrained.   Intubated, not on sedation.  She is awake and nods her head appropriately.   HENT:   Head: Normocephalic and atraumatic.   Mouth/Throat: Uvula is midline and mucous membranes are normal. Mallampati Score: unable to assess.   She is orally intubated.  There are eschar in both nares.   Neck: Trachea normal and normal range of motion. Neck supple. No JVD present. No thyromegaly present.   Cardiovascular: Normal rate, regular rhythm, normal heart sounds and intact distal pulses.   Pulmonary/Chest: Normal expansion, symmetric chest wall expansion and effort normal. She is intubated. She has no wheezes. She has no rhonchi. She has no rales.   Abdominal: Soft. Bowel sounds are normal. She exhibits no distension.  There is no abdominal tenderness.   Musculoskeletal: Normal range of motion.         General: No tenderness, deformity or edema.      Comments: LAMINE MCKENZIE   Lymphadenopathy: No supraclavicular adenopathy is present.     She has no cervical adenopathy.     She has no axillary adenopathy.   Neurological: She is alert. She has normal strength. No cranial nerve deficit or sensory deficit. She exhibits normal muscle tone. GCS eye subscore is 4. GCS verbal subscore is 5. GCS motor subscore is 6.   Follows commands in extremities   Skin: Skin is warm, dry and intact. No rash noted.   There is a decubitus to the left Achilles area   Nursing note and vitals reviewed.      Labs I have personally reviewed and interpreted all labs / diagnostic studies obtained over the past 24 hours, and relevant results are as follows:  Recent Labs   Lab 10/09/20  0448   HCT 37   PH 7.311*   PCO2 56.2*   PO2 119*   HCO3 28.4*   POCSATURATED 98   BE 2      Procalcitonin:  63.77     Imaging I have personally reviewed and interpreted the following images and reviewed the associated Radiology report.  I have reviewed and interpreted all pertinent imaging results/findings within the past 24 hours.  CXR:  9/30:  Interval introduction of NG tube. Additional lines and support devices are in position as above.  Persistence of bilateral parahilar and lower lung zone interstitial and alveolar opacities and small bilateral effusions. Stable cardiomegaly.  CXR 10/3- ETT in place, sternotomy wires, with bilateral pulmonary edema and retrocardiac opacification w/ blunting of costophrenic angles bilaterally  CXR 10/4- ETT in place, unchanged bilateral infiltrates & R pleural effusion    CT chest, abdomen, and pelvis, 10/8  1. Bilateral pulmonary opacities as described, perhaps reflecting  consolidation, atelectasis, minimal alveolar edema, or some  combination thereof.  2. Tiny right pleural effusion and trace left pleural fluid.  3. Cardiomegaly and native  coronary artery calcification with  postsurgical changes of CABG. Reflux of right atrial contrast into  distended intrahepatic IVC and hepatic veins suggest elevated right  atrial pressure/right heart failure.  4. Moderate-sized dermal defect near site of left upper extremity  arteriovenous fistula, containing surgical packing.  5. Hyperdensity in gallbladder suggest cholelithiasis or sludge.  6. Moderate amount of stool focally in rectum with mild rectal  distention and very mild perirectal fat stranding. A rectal catheter  is in place. In the appropriate clinical setting, this could represent  mild stercoral colitis.  7. Diffuse subcutaneous edema throughout the abdomen and pelvis.      Micro I have personally reviewed and interpreted the available culture data.  Relevant results are as follows.  Blood Culture   Lab Results   Component Value Date    LABBLOO No Growth to date 10/06/2020    LABBLOO No Growth to date 10/06/2020    LABBLOO No Growth to date 10/06/2020   , Sputum Culture   Lab Results   Component Value Date    GSRESP <10 epithelial cells per low power field. 10/04/2020    GSRESP Many WBC's 10/04/2020    GSRESP Few Gram positive rods 10/04/2020    GSRESP Many Gram negative rods 10/04/2020    GSRESP Few Gram positive cocci 10/04/2020    RESPIRATORYC Reduced normal respiratory nyla 10/04/2020    RESPIRATORYC PSEUDOMONAS AERUGINOSA  Many   (A) 10/04/2020    and Urine Culture    Lab Results   Component Value Date    LABURIN (A) 09/25/2020     PRESUMPTIVE JORDIN ALBICANS  10,000 - 49,999 cfu/ml        Medications Scheduled    sodium chloride 0.9%   Intravenous Once    amiodarone  200 mg Oral BID    apixaban  2.5 mg Oral BID    collagenase   Topical (Top) Daily    famotidine (PF)  20 mg Intravenous Daily    levoFLOXacin  250 mg Intravenous Q48H    levothyroxine  50 mcg Oral Before breakfast    metoprolol succinate  25 mg Oral Daily    miconazole NITRATE 2 %   Topical (Top) BID     piperacillin-tazobactam (ZOSYN) IVPB  3.375 g Intravenous Q12H    rosuvastatin  10 mg Oral Daily    sevelamer carbonate  1,600 mg Oral TID WM    sodium zirconium cyclosilicate  5 g Oral Daily    vancomycin  125 mg Oral Q6H      Continuous Infusions:    sodium chloride 0.9% 10 mL/hr at 10/01/20 2125    norepinephrine bitartrate-D5W Stopped (10/09/20 0500)      PRN   sodium chloride 0.9%, sodium chloride 0.9%, sodium chloride 0.9%, acetaminophen, albuterol sulfate, dextrose 50%, dextrose 50%, glucagon (human recombinant), glucose, glucose, insulin aspart U-100, ondansetron, Pharmacy to dose Vancomycin consult **AND** vancomycin - pharmacy to dose, white petrolatum        Assessment       Active Hospital Problems    Diagnosis    *CHF (congestive heart failure)    Enterococcal bacteremia    Pseudomonal pneumonia    Shock    Atrial fibrillation and flutter    Acute on chronic heart failure    COPD (chronic obstructive pulmonary disease)    Acute on chronic respiratory failure with hypoxia and hypercapnia    Anemia, chronic disease    Stage 5 chronic kidney disease on chronic dialysis    Open wound of left heel    Chronic kidney disease with end stage renal failure on dialysis      My Impression:  Acute on chronic hypoxemic / hypercapnic respiratory failrue with some component of acute lower respiratory tract infection, but her neuromuscular weakness seems to be the main pathology driving her inability to be liberated from mechanical ventilation.  Improving sepsis 2/2 line sepsis + HAP with slowly improving fever curve.  Enterococcus bacteremia and C difficile enterocolitis are problematic.  Failure to thrive    Plan     Neuro  · Holding sedation, awake on the ventilator.    Pulmonary  · Prolonged trial of PS daily.  · Infectious Disease is following and managing aBX.  · Continue pip/tazo for another day  · Continued pursuit of a negative fluid balance with HD.  · Up in a  chair.    Cardiac/Vascular  · Continued pursue of a net neutral fluid balance.  · Continue home DOAC + BB + amiodarone.    Renal/  · Nephrology following.  · Dialysis at their direction.    Hematology  · No indication for blood products.  · Observation for now.    ID:  · Pseudomonal pneumonia and enterococcal bacteremia and C difficile enterocolitis.  · Infectious Disease is following.  Antibiotics at their direction.  · Trend fever curve for now.    Endocrine  · Continue levothyroxine.  · Serial blood glucose monitoring.  · Sliding scale insulin as needed to maintain moderate glycemic control.    GI  · H2 blocker for stress ulcer ppx.  · Enteral feeds.    I had a long discussion with patient's  .  We confirmed the previously established goals of care.  He would like to attempt to liberate her from mechanical ventilation.  However, if it becomes apparent that this is not feasible, he would not want to proceed with a tracheostomy.  If in the following days she does not clinically improve, we can consider transitioning to comfort measures and terminally extubating.  In the meantime, if she develops cardiac arrest, initiation of advanced cardiac life support would not be in keeping with the patient's wishes.

## 2020-10-09 NOTE — PLAN OF CARE
10/09/20 1027   Patient Assessment/Suction   Level of Consciousness (AVPU) responds to voice   Respiratory Effort Unlabored;Normal   Expansion/Accessory Muscles/Retractions expansion symmetric   All Lung Fields Breath Sounds coarse   Rhythm/Pattern, Respiratory assisted mechanically   Suction Method tracheal   $ Suction Charges Inline Suction Procedure Stat Charge   Secretions Amount moderate   Secretions Color tan   Secretions Characteristics thick   PRE-TX-O2   Oxygen Concentration (%) 40   SpO2 98 %   Pulse 74   Resp (!) 28   Vent Select   Conventional Vent Y   $ Ventilator Subsequent 1   Charged w/in last 24h YES   Preset Conventional Ventilator Settings   Vent ID [id number]   Vent Type    Ventilation Type VC   Vent Mode A/C   Set Rate 28 BPM   Vt Set 390 mL   PEEP/CPAP 5 cmH20   Pressure Support 0 cmH20   Waveform RAMP   Peak Flow 75 L/min   Plateau Set/Insp. Hold (sec) 0   Trigger Sensitivity Flow/I-Trigger 5 L/min   Patient Ventilator Parameters   Resp Rate Total 28 br/min   Peak Airway Pressure 31 cmH2O   Mean Airway Pressure 12 cmH20   Plateau Pressure 0 cmH20   Exhaled Vt 445 mL   Total Ve 11.9 mL   I:E Ratio Measured 1:2.80   Conventional Ventilator Alarms   Resp Rate High Alarm 55 br/min   Press High Alarm 60 cmH2O   Apnea Rate 14   Apnea Volume (mL) 0 mL   Apnea Oxygen Concentration  100   Apnea Flow Rate (L/min) 60   T Apnea 20 sec(s)   Ready to Wean/Extubation Screen   FIO2<=50 (chart decimal) 0.4   MV<16L (chart vol.) 11.9   PEEP <=8 (chart #) 5   Ready to Wean Parameters   F/VT Ratio<105 (RSBI) (!) 62.92

## 2020-10-09 NOTE — PROGRESS NOTES
Infectious Disease  Progress note      HPI: Juliane Ramos is a 60 y.o. female with  Hx of CAD/CABG, ESRD HD per AVF, DM, OPD,  chronic hypotension is admitted to the ICU with resp failure and tachycardia. She has required intubation and pressor support since admission. She was afebrile on admission and was started on CTX for a couple of days. Blood cx and urine cx were done 2 days into admission due to hypothermia. Urine was positive for C. alibcan and thus was started on Fluconazole.  She then spiked a fever on 10/3, blood cx came back positive for E feacalis from the A line. Resp cx is positive for PSA. She was started on Piptazo 10/4 and then changed 10/5 to Meropenem. A resp cx is positive for PSA. Chart review shows positive PSA in suptum in 2018. She does have copious thick secretons per RN. She otherwise is tolerating feeds, on going loose stools. L [facility] wound is stable. Skin tear in the bottom is not infected. She has RIJ CVL and the pervious A line in place. She is tolerating HD as it is going when visited.    10/7/20:  Patient remains intubated.  More awake and responsive today.  Fevers overnight,  continues to require pressors.  No diarrhea today per RN,  tolerates tube feeds.  All indwelling lines removed.  Currently with peripheral IV lines.   10/8:  Patient remains intubated, mildly interactive per RN.  She continues to have very thick tan colored respiratory secretions.  No fevers now.  T-max 101.3 20 hours ago,  off pressors.  Currently getting dialyzed.  She has increased loose stools so FMS was placed.  A sample was sent for C diff testing.  Pressure ulcers remain the same and stable per RN    10/9: Pt nods no to pain. She nods yes to feeling ok. Some nose bleed. She is having watery stool, has slowed down somewhat. Still on the vent, very thick copious secretions continue.     EXAM & DIAGNOSTICS REVIEWED:   Vitals:     Temp:  [98 °F (36.7 °C)-98.8 °F (37.1 °C)]   Temp: 98.3 °F (36.8 °C)  (10/09/20 1015)  Pulse: 72 (10/09/20 1115)  Resp: (!) 28 (10/09/20 1027)  BP: 109/61 (10/09/20 1115)  SpO2: 98 % (10/09/20 1027)    Intake/Output Summary (Last 24 hours) at 10/9/2020 1132  Last data filed at 10/9/2020 0500  Gross per 24 hour   Intake 1602.05 ml   Output 4450 ml   Net -2847.95 ml       General:  In NAD. Awake and interactive.  intubated  ENT:  ETT in. Poor dentition noted, crested blood in b/l nares, some in mouth  Lungs: Coarse breath sounds b/l- unchanged  Heart:  RRR, mild tachycardia   Abd:  Soft, NT, ND  Musc:  Joints without effusion, L achilli's wound clean  Skin:  Stage IV pressure ulcer of buttocks- pictures reviewed  Wound:   Neuro: Alert, awake, nodding yes and no appropriately  Psych:  Calm  Extrem: 2 peripheral IV lines, bilateral upper and lower extremity edema.   VAD:  PIV,        Isolation:  None      General Labs reviewed:  Recent Labs   Lab 10/05/20  0338  10/07/20  0505 10/08/20  0419 10/08/20  0512 10/09/20  0448   WBC 14.80*  --  17.77* 10.66  --   --    HGB 11.4*  --  11.1* 10.6*  --   --    HCT 35.8*   < > 34.6* 33.4* 35* 37   *  --  147* 131*  --   --     < > = values in this interval not displayed.       Recent Labs   Lab 10/06/20  0434 10/07/20  0505 10/08/20  0419   * 132* 130*   K 4.4 4.3 4.5   CL 92* 94* 93*   CO2 22* 24 25   BUN 62* 48* 62*   CREATININE 5.1* 4.2* 4.8*   CALCIUM 7.2* 7.5* 7.4*   PROT 4.4* 4.5* 4.7*   BILITOT 1.1* 0.8 0.8   ALKPHOS 85 101 99   ALT 15 17 17   AST 19 25 23         Micro:  Microbiology Results (last 7 days)     Procedure Component Value Units Date/Time    Blood culture [[id number]] Collected: 10/03/20 1624    Order Status: Completed Specimen: Blood from Peripheral, Right Hand Updated: 10/08/20 1832     Blood Culture, Routine No growth after 5 days.    Blood culture [[id number]] Collected: 10/06/20 1641    Order Status: Completed Specimen: Blood Updated: 10/08/20 1832     Blood Culture, Routine No Growth to date      No Growth to  date      No Growth to date    C Diff Toxin by PCR [[id number]]  (Abnormal) Collected: 10/08/20 0559    Order Status: Completed Updated: 10/08/20 1528     C. diff PCR Positive     Comment: result(s) called and verbal readback obtained from Riaz Houston RN on MICU, re: positive CDIFF PCR 10/8/20 at 1527 vcb by   VCB 10/08/2020 15:27         Narrative:        result(s) called and verbal readback obtained from Riaz Houston RN on MICU, re: positive CDIFF PCR 10/8/20 at 1527 vcb by   VCB 10/08/2020 15:27    Clostridium difficile EIA [[id number]]  (Abnormal) Collected: 10/08/20 0559    Order Status: Completed Specimen: Stool Updated: 10/08/20 1521     C. diff Antigen Positive     C difficile Toxins A+B, EIA Negative     Comment: Testing not recommended for children <24 months old.       Blood culture [[id number]] Collected: 10/06/20 1126    Order Status: Completed Specimen: Blood from AV Fistula, Left Updated: 10/08/20 1232     Blood Culture, Routine No Growth to date      No Growth to date      No Growth to date    Narrative:      PLEASE draw 2 sets (4 bottles) 15 mins apart from 2 different  sites- One MUST include peripheral stick    Culture, Respiratory with Gram Stain [[id number]]  (Abnormal)  (Susceptibility) Collected: 10/04/20 1257    Order Status: Completed Specimen: Respiratory from Tracheal Aspirate Updated: 10/06/20 0632     Respiratory Culture Reduced normal respiratory nyla      PSEUDOMONAS AERUGINOSA  Many       Gram Stain (Respiratory) <10 epithelial cells per low power field.     Gram Stain (Respiratory) Many WBC's     Gram Stain (Respiratory) Few Gram positive rods     Gram Stain (Respiratory) Many Gram negative rods     Gram Stain (Respiratory) Few Gram positive cocci    Blood culture [[id number]]  (Abnormal)  (Susceptibility) Collected: 10/03/20 1558    Order Status: Completed Specimen: Blood from Line, Arterial, Right Updated: 10/06/20 0621     Blood Culture, Routine Gram stain patricia bottle:  Gram positive cocci in pairs and chains      Results called to and read back by:Benito Mccormack RN/2BICU  10/04/2020        15:12 cea      ENTEROCOCCUS FAECALIS        Imaging Reviewed:  10/3 CXR- subsequent cxr the same    Portable chest at 512 compared with 09/30/2020 shows unchanged support tubes and lines.  Cardiac silhouette enlargement is unchanged.  Mediastinal contours also remain unchanged with postsurgical changes of CABG.     Bibasilar pleuroparenchymal opacities show no significant change.  Lung volumes remain low.  Central pulmonary vascular prominence is unchanged.  No pneumothorax.     Chest x-ray 10/7:    Persistent diffuse interstitial and groundglass opacities  suggestive of edema versus pneumonia. There are small right pleural  effusion    ECHO 9/26:    · The estimated PA systolic pressure is 71 mmHg.  · The estimated ejection fraction is 46%.  · Diastolic dysfunction.  · There are segmental left ventricular wall motion abnormalities.  · Mitral valve annuloplasty ring    CT c/a/p 10/8  1. Bilateral pulmonary opacities as described, perhaps reflecting  consolidation, atelectasis, minimal alveolar edema, or some  combination thereof.  2. Tiny right pleural effusion and trace left pleural fluid.  3. Cardiomegaly and native coronary artery calcification with  postsurgical changes of CABG. Reflux of right atrial contrast into  distended intrahepatic IVC and hepatic veins suggest elevated right  atrial pressure/right heart failure.  4. Moderate-sized dermal defect near site of left upper extremity  arteriovenous fistula, containing surgical packing.  5. Hyperdensity in gallbladder suggest cholelithiasis or sludge.  6. Moderate amount of stool focally in rectum with mild rectal  distention and very mild perirectal fat stranding. A rectal catheter  is in place. In the appropriate clinical setting, this could represent  mild stercoral colitis.  7. Diffuse subcutaneous edema throughout the abdomen and  pelvis.    IMPRESSION & PLAN   60 y.o. female with  Hx of CAD/CABG, ESRD HD per AVF, DM, OPD,  chronic hypotension is admitted to the ICU with resp failure and tachycardia. She has required intubation and pressor support since admission:    1- E faecalis bacteremia: Catheter related BSI- the set from A line positive. Now  removed  - repeat blood culture on 10/06 negative x3 days  - Afebrile,  Wbc rising to 10K  - all indwelling lines removed, now with PIVs  - On piptazo 10/7 (status post 2 days of Merem)    2- PSA pneumonia- pan sensitive on 10/3 cx-   CXR with persistent basilar opacities. Thick secretions.   Previous PSA PNA in 2018  - PCT 63.7  - On Piptazo 10/7, Levofloxacin 10/8 (due to high to ANAMIKA to piptazo)     3- Mitral valve annuloplasty ring  4- DM  5- ICMO with EF 46%, PulmHTN   6- Chronic L Achilli's wound. Stable  7- C diff colitis:   10/8 Vancomycin Po    Recommendations:    Continue Piptazo and levofloxacin double coverage for PSA pneumonia (high anamika to Zosyn)  Continue PO Vancomycin for C diff colitis     Repeat PCT tomorrow    Supportive care. Wound care  Pt discussed with RN and Dr Lombardi

## 2020-10-10 PROBLEM — J98.11 ATELECTASIS: Status: ACTIVE | Noted: 2020-01-01

## 2020-10-10 PROBLEM — J98.6 DIAPHRAGM DYSFUNCTION: Status: ACTIVE | Noted: 2020-01-01

## 2020-10-10 NOTE — PROGRESS NOTES
formerly [facility] Medicine  Progress Note    Patient Name: Juliane Ramos  MRN: [id number]  Patient Class: IP- Inpatient   Admission Date: 9/24/2020  Length of Stay: 16 days  Attending Physician: Oscar Ruth DO  Primary Care Provider: JOS Dumont        Subjective:     Principal Problem:CHF (congestive heart failure)        HPI:  Patient is a 60-year-old female hx ESRD HD (T,Th, Sat)  CAD/CABG ,EF20%?, COPD, DM ,chronic hypotension  presents ED with complaints of having low blood pressure and rapid heart rate.  In the emergency department patient had a heart rate in the 130s regular and a blood pressure systolic of approximately 100  Patient denies shortness of breath chest pain fever chills abdominal pain nausea vomiting.  She states her last dialysis on Tuesday she did not have any volume removed.  Patient has been given approximately 500 cc of IV fluids in the emergency department at remains tachycardic in the 120s.      Overview/Hospital Course:  09/29  Assumed care. Chart reviewed. Labs reviewed:  End stage renal function. Receiving dialysis today. 2 liters to be removed. Discussed with dietician: tube feeds if not extubated.    09/30  Consultants' attendance noted and appreciated. Required re-intubation yesterday.  Labs reviewed: mild leukocytosis with normal hematocrit; minimal hyponatremia with end-stage renal function values. Remains intubated. Discussed with Dietician: start enteral feeds.    10/01  Consultants' attendance noted and appreciated.. Unable to extubate. Receiving dialysis this AM. Labs reviewed: leukocytosis, stable normocytic anemia; mild hyponatremia, otherwise normal electrolytes with end stage renal function    10/02  Consultants' attendance noted and appreciated.. Labs reviewed: leukocytosis persisting, yet improved; minimal hyponatremia otherwise normal electrolytes with end stage renal function. Telemetry reviewed: sinus. Remains intubated.    10/03  "Discussed with Pulmonology. Had dialysis this AM with 4 liters removed. Labs reviewed: leukocytosis resolved, mild hyponatremia with otherwise normal electrolytes and end stage renal values. Telemetry revewed: sinus tach. Sedated and intubated     10/04 Consultants' attendance noted and appreciated. Did not tolerate "sedation vacation" this AM: heart rate and blood pressure went up. Labs reviewed: leukocytosis with normal Hct; mild hyponatremia otherwise electrolytes are normal with end stage renal function. CXR shows ET tube level at heads of clavicles--it was advanced further into the trachea.     10/05  Discussed with Pulmonology: failed trial. Labs reviewed: leukocytosis with minimal normocytic anemia;hyponatremia with end stage renal function. Growing pseudomonas in sputum despite being on piperacillin     Interval History:  No acute events last 24 hours  Stool is more formed rectal tube has been discontinued  Patient is sedated but will respond to questioning  Denies chest pain abdominal pain  Limited review of systems secondary intubation  Case discussed with nurse      Objective:     Vital Signs (Most Recent):  Temp: 96.7 °F (35.9 °C) (10/10/20 0700)  Pulse: 74 (10/10/20 0748)  Resp: (!) 28 (10/10/20 0748)  BP: (!) 106/49 (10/10/20 0700)  SpO2: 97 % (10/10/20 0748) Vital Signs (24h Range):  Temp:  [96.2 °F (35.7 °C)-98.3 °F (36.8 °C)] 96.7 °F (35.9 °C)  Pulse:  [] 74  Resp:  [22-32] 28  SpO2:  [90 %-100 %] 97 %  BP: ()/(39-61) 106/49     Weight: 83.7 kg (184 lb 8.4 oz)  Body mass index is 29.78 kg/m².    Intake/Output Summary (Last 24 hours) at 10/10/2020 0805  Last data filed at 10/10/2020 0525  Gross per 24 hour   Intake 1950 ml   Output 2700 ml   Net -750 ml      Physical Exam patient appears in no acute distress lying in bed intubated restraint  HEENT sclerae nonicteric endotracheal tube is in place  Neck is supple nontender  Lungs coarse breath sounds  Heart regular rate rhythm no rub no " murmur  Abdomen is soft nontender  Neuro patient is sedated moves extremities equally  Skin decubitus left Achilles area    Significant Labs:   BMP:   Recent Labs   Lab 10/10/20  0642   *   *   K 3.3*   CL 92*   CO2 24   BUN 51*   CREATININE 4.2*   CALCIUM 8.0*   MG 2.7*     CBC:   Recent Labs   Lab 10/09/20  0448 10/10/20  0437   HCT 37 32*     CMP:   Recent Labs   Lab 10/10/20  0642   *   K 3.3*   CL 92*   CO2 24   *   BUN 51*   CREATININE 4.2*   CALCIUM 8.0*   PROT 5.0*   ALBUMIN 1.5*   BILITOT 0.8   ALKPHOS 140*   AST 20   ALT 18   ANIONGAP 17*   EGFRNONAA 11.0*     Laundry reviewed by me shows sinus rhythm no acute ST segment changes    Significant Imaging:       Assessment/Plan:   #1 Acute hypoxic hypercapnic respiratory failure -remains intubated.  Multifactorial  Weaning as tolerated .Appreciate help from Pulmonary  #2 Pseudomonas pneumonia/acute COPD exacerbation-continuing IV antibiotics  #3 Catheter related Enterococcus faecalis bacteremia-lines removed, appreciate help from ID.   Continue IV abx as per ID   #4 Acute diastolic  CHF -dialysis as per Nephrology  #5 New onset atrial  Flutter w/RVR on admission   -S/P cardioversion - NSR   cardiology following, continuing amiodarone, anticoagulation, EF46%  #6 ESRD HD Tuesday Thursday Saturday nephrology following   #7 Chronic hypotension/CAD/CABG-/EF46%% -Slight elevation of troponin most likely secondary to demand ischemia secondary to hypotension on admission   #8 Chronic wound left distal posterior lower extremity-Wound care follow-up  Followed by Dr. Wick  #9  Hyponatremia-chronic  ,monitoring labs   #10 Hypookalemia-replace  #11  DM2-follow sliding scale hemoglobin A1c 6.3   #12 Hx PE -details unknown   #13 Metabolic acidosis .  Resolved  #14 Acute UTI POA-yeast.Resolved  #15 anemia chronic dx -monitoring  #16 Secondary HPT-as per Renal  #17 moderate protein calorie malnutrition  -continuing tube feeds dietary following  #18  C diff -colitis p.o. vancomycin       Continue IV antibiotics, weaning as tolerated.  Patient's overall prognosis is extremely poor.  Patient has a high chance for decline and life-threatening deterioration     VTE Risk Mitigation (From admission, onward)         Ordered     apixaban tablet 2.5 mg  2 times daily      09/24/20 1018     Place MARY ELLEN hose  Until discontinued      09/24/20 1018     IP VTE HIGH RISK PATIENT  Once      09/24/20 1018     Place sequential compression device  Until discontinued      09/24/20 1018                Oscar Ruth DO  Department of Hospital Medicine   [facility]

## 2020-10-10 NOTE — PLAN OF CARE
10/10/20 0748   Patient Assessment/Suction   Level of Consciousness (AVPU) unresponsive  (vent)   Respiratory Effort Normal;Unlabored   All Lung Fields Breath Sounds diminished   Suction Method tracheal   $ Suction Charges Inline Suction Procedure Stat Charge   Secretions Amount moderate   Secretions Color tan   Secretions Characteristics thick   PRE-TX-O2   O2 Device (Oxygen Therapy) ventilator   $ Is the patient on Low Flow Oxygen? Yes   Oxygen Concentration (%) 40   SpO2 97 %   Pulse Oximetry Type Continuous   $ Pulse Oximetry - Multiple Charge Pulse Oximetry - Multiple   Pulse 74   Resp (!) 28      Airway Anesthesia 09/29/20   Placement Date/Time: 09/29/20 (c) 2963   Method of Intubation: Video Laryngoscopy  Airway Device: Endotracheal Tube  Mask Ventilation: Easy  Intubated: Postinduction  Airway Device Size: 8.0  Cuffed: Cuffed  Complicating Factors: None  Findings Post-I...   Secured at 24 cm   Measured At Lips   Secured Location Right   Secured by Commercial tube mckinney   Bite Block right   Status Intact;Secured   Vent Select   Conventional Vent Y   $ Ventilator Subsequent 1   Charged w/in last 24h YES   Preset Conventional Ventilator Settings   Vent Type    Ventilation Type VC   Vent Mode A/C   Humidity HME   Set Rate 28 BPM   Vt Set 390 mL   PEEP/CPAP 5 cmH20   Pressure Support 0 cmH20   Waveform RAMP   Peak Flow 75 L/min   Plateau Set/Insp. Hold (sec) 0   Trigger Sensitivity Flow/I-Trigger 5 L/min   Patient Ventilator Parameters   Resp Rate Total 28 br/min   Peak Airway Pressure 31 cmH2O   Mean Airway Pressure 13 cmH20   Plateau Pressure 0 cmH20   Exhaled Vt 397 mL   Total Ve 11.1 mL   I:E Ratio Measured 1:2.80   Conventional Ventilator Alarms   Alarms On Y   Resp Rate High Alarm 55 br/min   Press High Alarm 60 cmH2O   Apnea Rate 14   Apnea Volume (mL) 0 mL   Apnea Oxygen Concentration  100   Apnea Flow Rate (L/min) 60   T Apnea 20 sec(s)   Ready to Wean/Extubation Screen   FIO2<=50 (chart  decimal) 0.4   MV<16L (chart vol.) 11.1   PEEP <=8 (chart #) 5   Ready to Wean Parameters   F/VT Ratio<105 (RSBI) (!) 70.53   Respiratory Evaluation   $ Care Plan Tech Time 15 min

## 2020-10-10 NOTE — PROGRESS NOTES
[facility]  Pulmonary / Critical Care Medicine  Progress Note    Subjective     9/29:  No major issues overnight.  Tolerating SBT.  Off of sedation all night.   9/30:  Failed extubation and was re-intubated yesterday evening.  No issues since.  10/1:  No major issues overnight.  Remains intubated.  Off of sedation.  10/2/2020 - Remains critically ill, no new issues overnight, respiratory reports significant secretions.  Fever curve good.  No new weight, cumulative I/O 2.1 liters +., vital signs OK.  Ventilating pressures good, Pplat - 25, Ve - 14,  P/F -  258, attempted brief SBT but sTV only 150 - 200 (not ready to wean/extubate).  Labs reviewed.  10/3- remains on vent, on Levophed 0.022 mcg/kg/min, afebrile, HR controlled in 70s-90s. Completed HD this am with removal of 4L fluid.  10/4- remains on vent,  Febrile to 103, sedated w/ propofol, oxygenation improved compared to yesterday. Requiring min levophed  10/5:  Remains intubated but not sedated.  Fever curve down trending.  Tolerating relatively minimal ventilator settings, but she becomes hypercapnic and hypo ventilates with placed pressure support ventilation.  She is only requiring low-dose vasopressors today.  10/6:  Continues spike fevers despite receiving antibiotics yesterday.  Rapidly developed hypercapnia while on pressure support ventilation this morning.  She remains off sedation.  10/7:  No major issues overnight.  Fever curve down trending.  More alert today.  Dialyzed yesterday and central lines removed.  10/8:  No major issues overnight.  Fever cure downtrending.  Alert this morning.  No new issues.  Remains intubated/sedated.  10/9  the patient is awake on the ventilator.  She is on dialysis.  She is afebrile.  The plan is to leave her on the ventilator going forward.  Dr. Hui will reassess on Monday.  The  states at that point, or may be later on during the week, they may consider withdrawing care.  10/10 above from other  pulm docs, below from Dr Mcfadden: pt min arouses?    Review of Systems   Unable to perform ROS: Intubated      I have personally reviewed the following during today's evaluation:  past medical history, ROS, family history, social history, surgical history, current inpatient medications,drug allergies, vital signs over the past 24 hours, results of relevant diagnostic studies and nursing/provider documentation from the past 24 hours.     Objective     VS Temp:  [96.2 °F (35.7 °C)-98.3 °F (36.8 °C)]   Pulse:  []   Resp:  [22-32]   BP: ()/(39-61)   SpO2:  [90 %-100 %]   Ideal body weight: 59.3 kg (130 lb 11.7 oz)  Adjusted ideal body weight: 69.1 kg (152 lb 4 oz)   I/O   Intake/Output Summary (Last 24 hours) at 10/10/2020 1000  Last data filed at 10/10/2020 0525  Gross per 24 hour   Intake 1950 ml   Output 2700 ml   Net -750 ml        Vent SpO2 96%   Vent Mode: Spont  Oxygen Concentration (%):  [40] 40  Resp Rate Total:  [22 br/min-31 br/min] 22 br/min  Vt Set:  [390 mL] 390 mL  PEEP/CPAP:  [5 cmH20] 5 cmH20  Pressure Support:  [0 [id number] cmH20] 25 cmH20  Mean Airway Pressure:  [11 jiO97-09 cmH20] 11 cmH20     PE Physical Exam   Constitutional: She appears well-developed and well-nourished. She is cooperative.  Non-toxic appearance. No distress. She is intubated and restrained.   Intubated, not on sedation.  She is awake and nods her head appropriately.   HENT:   Head: Normocephalic and atraumatic.   Mouth/Throat: Uvula is midline and mucous membranes are normal. Mallampati Score: unable to assess.   She is orally intubated.  There are eschar in both nares.   Neck: Trachea normal and normal range of motion. Neck supple. No JVD present. No thyromegaly present.   Cardiovascular: Normal rate, regular rhythm, normal heart sounds and intact distal pulses.   Pulmonary/Chest: Normal expansion, symmetric chest wall expansion, effort normal and breath sounds normal. She is intubated. She has no wheezes. She has no  rhonchi. She has no rales.   Abdominal: Soft. Bowel sounds are normal. She exhibits no distension. There is no abdominal tenderness.   Musculoskeletal: Normal range of motion.         General: No tenderness, deformity or edema.      Comments: LAMINE MCKENZIE   Lymphadenopathy: No supraclavicular adenopathy is present.     She has no cervical adenopathy.     She has no axillary adenopathy.   Neurological: She has normal strength. No cranial nerve deficit or sensory deficit. She exhibits normal muscle tone. GCS eye subscore is 4. GCS verbal subscore is 5. GCS motor subscore is 6.   Not arousing well now off sedation, et tube.   Skin: Skin is warm, dry and intact. No rash noted.   There is a decubitus to the left Achilles area   Nursing note and vitals reviewed.      Labs I have personally reviewed and interpreted all labs / diagnostic studies obtained over the past 24 hours, and relevant results are as follows:  Recent Labs   Lab 10/10/20  0437 10/10/20  0642   HCT 32*  --    NA  --  133*   K  --  3.3*   CL  --  92*   CO2  --  24   BUN  --  51*   CREATININE  --  4.2*   MG  --  2.7*   ALT  --  18   AST  --  20   ALKPHOS  --  140*   BILITOT  --  0.8   PROT  --  5.0*   ALBUMIN  --  1.5*   PH 7.336*  --    PCO2 55.7*  --    PO2 97  --    HCO3 29.7*  --    POCSATURATED 97  --    BE 4  --       Procalcitonin:  63.77     Imaging I have personally reviewed and interpreted the following images and reviewed the associated Radiology report.  I have reviewed and interpreted all pertinent imaging results/findings within the past 24 hours.  CXR:  9/30:  Interval introduction of NG tube. Additional lines and support devices are in position as above.  Persistence of bilateral parahilar and lower lung zone interstitial and alveolar opacities and small bilateral effusions. Stable cardiomegaly.  CXR 10/3- ETT in place, sternotomy wires, with bilateral pulmonary edema and retrocardiac opacification w/ blunting of costophrenic angles  bilaterally  CXR 10/4- ETT in place, unchanged bilateral infiltrates & R pleural effusion    CT chest, abdomen, and pelvis, 10/8  1. Bilateral pulmonary opacities as described, perhaps reflecting  consolidation, atelectasis, minimal alveolar edema, or some  combination thereof.  2. Tiny right pleural effusion and trace left pleural fluid.  3. Cardiomegaly and native coronary artery calcification with  postsurgical changes of CABG. Reflux of right atrial contrast into  distended intrahepatic IVC and hepatic veins suggest elevated right  atrial pressure/right heart failure.  4. Moderate-sized dermal defect near site of left upper extremity  arteriovenous fistula, containing surgical packing.  5. Hyperdensity in gallbladder suggest cholelithiasis or sludge.  6. Moderate amount of stool focally in rectum with mild rectal  distention and very mild perirectal fat stranding. A rectal catheter  is in place. In the appropriate clinical setting, this could represent  mild stercoral colitis.  7. Diffuse subcutaneous edema throughout the abdomen and pelvis.      Micro I have personally reviewed and interpreted the available culture data.  Relevant results are as follows.  Blood Culture   Lab Results   Component Value Date    LABBLOO No Growth to date 10/06/2020    LABBLOO No Growth to date 10/06/2020    LABBLOO No Growth to date 10/06/2020    LABBLOO No Growth to date 10/06/2020   , Sputum Culture   Lab Results   Component Value Date    GSRESP <10 epithelial cells per low power field. 10/04/2020    GSRESP Many WBC's 10/04/2020    GSRESP Few Gram positive rods 10/04/2020    GSRESP Many Gram negative rods 10/04/2020    GSRESP Few Gram positive cocci 10/04/2020    RESPIRATORYC Reduced normal respiratory nyla 10/04/2020    RESPIRATORYC PSEUDOMONAS AERUGINOSA  Many   (A) 10/04/2020    and Urine Culture    Lab Results   Component Value Date    LABURIN (A) 09/25/2020     PRESUMPTIVE JORDIN ALBICANS  10,000 - 49,999 cfu/ml         Medications Scheduled    amiodarone  200 mg Oral BID    apixaban  2.5 mg Oral BID    collagenase   Topical (Top) Daily    famotidine (PF)  20 mg Intravenous Daily    levoFLOXacin  250 mg Intravenous Q48H    [START ON 10/11/2020] levothyroxine  75 mcg Oral Before breakfast    metoprolol succinate  25 mg Oral Daily    miconazole NITRATE 2 %   Topical (Top) BID    piperacillin-tazobactam (ZOSYN) IVPB  3.375 g Intravenous Q12H    rosuvastatin  10 mg Oral Daily    sevelamer carbonate  1,600 mg Oral TID WM    sodium zirconium cyclosilicate  5 g Oral Daily    vancomycin  125 mg Oral Q6H      Continuous Infusions:    norepinephrine bitartrate-D5W 0.02 mcg/kg/min (10/10/20 0524)      PRN   acetaminophen, albuterol sulfate, dextrose 50%, dextrose 50%, glucagon (human recombinant), glucose, glucose, insulin aspart U-100, ondansetron, Pharmacy to dose Vancomycin consult **AND** vancomycin - pharmacy to dose, white petrolatum      Conclusion echo 9/26/2020     · The estimated PA systolic pressure is 71 mmHg.  · The estimated ejection fraction is 46%.  · Diastolic dysfunction.  · There are segmental left ventricular wall motion abnormalities.  · Mitral valve annuloplasty ring.          Assessment       Active Hospital Problems    Diagnosis    *CHF (congestive heart failure)    Diaphragm dysfunction    Atelectasis    Enterococcal bacteremia    Pseudomonal pneumonia    Shock    Atrial fibrillation and flutter    Acute on chronic heart failure    COPD (chronic obstructive pulmonary disease)    Acute on chronic respiratory failure with hypoxia and hypercapnia    Anemia, chronic disease    Stage 5 chronic kidney disease on chronic dialysis    Open wound of left heel    Chronic kidney disease with end stage renal failure on dialysis      My Impression:  Acute on chronic hypoxemic / hypercapnic respiratory failrue with some component of acute lower respiratory tract infection, but her neuromuscular weakness  seems to be the main pathology driving her inability to be liberated from mechanical ventilation.  Improving sepsis 2/2 line sepsis + HAP with slowly improving fever curve.  Enterococcus bacteremia and C difficile enterocolitis are problematic.  Failure to thrive    Plan     Neuro  · Holding sedation, awake on the ventilator.    Pulmonary  · Prolonged trial of PS daily.  · Infectious Disease is following and managing aBX.  · Continue pip/tazo for another day  · Continued pursuit of a negative fluid balance with HD.  · Up in a chair.    Cardiac/Vascular  · Continued pursue of a net neutral fluid balance.  · Continue home DOAC + BB + amiodarone.    Renal/  · Nephrology following.  · Dialysis at their direction.    Hematology  · No indication for blood products.  · Observation for now.    ID:  · Pseudomonal pneumonia and enterococcal bacteremia and C difficile enterocolitis.  · Infectious Disease is following.  Antibiotics at their direction.  · Trend fever curve for now.    Endocrine  · Continue levothyroxine.  · Serial blood glucose monitoring.  · Sliding scale insulin as needed to maintain moderate glycemic control.    GI  · H2 blocker for stress ulcer ppx.  · Enteral feeds.    I had a long discussion with patient's  .  We confirmed the previously established goals of care.  He would like to attempt to liberate her from mechanical ventilation.  However, if it becomes apparent that this is not feasible, he would not want to proceed with a tracheostomy.  If in the following days she does not clinically improve, we can consider transitioning to comfort measures and terminally extubating.  In the meantime, if she develops cardiac arrest, initiation of advanced cardiac life support would not be in keeping with the patient's wishes.      10/10 above from other pulm doctors, below from Dr Mcfadden: no fever since 10/7, po vanc/zosyn/l;evaquin, pseudomona in sputum sensitive- on double coverage per id.  Creat 4.2  procal 64 on 6th,   Ct chest 10/8 right ll> lll consolidation,   cxr 7th oct - rll dense.   c diff ag + but toxin neg.    Pt has not been  Able to breath supine for yrs, had low ef in past but last echo ef into 40's with pulm htn.  Pt had poor loc now, tsh level sl high- had been on synthroid.  No pft on epic search.  Used incruse and neb at home.      Will monitor vital capacity and neg insp force.  If cannot generate reasonable vital capacity/nif- trach needed as couldn't tolerate niv.      Has ivc filter and on anticoag.    Would increase synthroid to 75- doubt hypothryoid big component.    Suspect bilat diaphragm dysfunction- supine erect vc may help?  Ammonia up 5 months ago- will fu.      F/u cxr am. High level psv may correct lower lung atelectasis.    The following were evaluated and adjusted as needed: mechanical ventilator settings and weaning status, intravenous fluids and nutritional status, sedation and neurologic status, antibiotics, hemodynamics, support tubes and access lines and invasive monitoring, acid base balance and oxygenation needs, input and output and renal status and CODE STATUS/OUTLOOK DISCUSSED WITH AVAILABLE NEXT OF  KIN       Critical Care  - THE PATIENT HAS A HIGH PROBABILITY OF IMMINENT OR LIFE THREATENING DETERIORATION.  Over 50%time of encounter was in direct care at bedside.  Time was 30 to 74 minutes required for patient care.  Time needed for all of the above totaled 46 minutes.

## 2020-10-10 NOTE — CONSULTS
Nephrology Consult Note         Patient Name: Juliane Ramos  MRN: [id number]    Patient Class: IP- Inpatient   Admission Date: 9/24/2020  Length of Stay: 16 days  Date of Service: 10/10/2020    Attending Physician: Oscar Ruth DO  Primary Care Provider: JOS Dumont    Reason for Consult: esrd/anemia/hyperkalemia/hypotension/shpt/tachycardia    SUBJECTIVE:     HPI: 60F hx ESRD HD (T,Th, Sat)  CAD/CABG ,EF20%?, COPD, DM ,chronic hypotension on midodrine daily presents to ED with complaints of having low blood pressure and rapid heart rate. In the emergency department patient had a heart rate in the 130s regular and SBP in 100s  Patient denies shortness of breath, chest pain, fever, chills, abdominal pain, nausea, vomiting. Last dialysis on Tuesday and she did not have any volume removed.  Patient has been given approximately 500 cc of IV fluids in the emergency department at remains tachycardic in the 120s. Cards eval is pending, Amiodarone was started.    9/25 Getting HD for hyperkalemia today, before cardioversion. Continue HD per TTS schedule.  9/26 Seen and examined on Hd today, tolerating well. Continue HD per TTS schedule.   9/28  Intubated.  Sedated. TTS dialysis planned  9/29  Seen today on dialysis.  Tolerating well.  Still intubated, arousable, good eye contact.  No lab results yet today.  10/1  Intubated.  Unresponsive.     10/2  Makes eye contact.  No distress.  afebrile  10/3  Seen on rounds.  Seen on dialysis.  Intubated.  Sedate  10/4  Failed sedation vacation.    10/5 VSS, remains intubated, plan HD in AM.  10/6 VSS, seen and examined on HD. Significant third-spacing in dependent parts, BP is OK with increased pressor, trying for 4L as tolerated..  10/7 VSS, unable to keep her net negative with TIW dialysis due to high obligate intake, will start MWF UF as tolerated as well. UF today.  10/8 VSS, had HD today, tolerated well.  10/9 VSS, had HD today, tolerated well. Next HD in AM, mayra  "david lgive her a break on .  10/9 VSS, seen and examined on HD tolerating well. Next on Monday or PRN.    Past Medical History:   Diagnosis Date    Anemia     Anemia in stage 3 chronic kidney disease 2017    Anticoagulant long-term use     CHF (congestive heart failure)     COPD (chronic obstructive pulmonary disease)     COPD exacerbation 3/10/2020    Coronary artery disease     Diabetes mellitus     Digestive disorder     Encounter for blood transfusion     Essential hypertension 2017    Hypertension     Low blood pressure     MI (myocardial infarction)     Segmental and subsegmental pulmonary emboli of RLL without acute cor pulmonale 2017    Stroke     2005    Thyroid disease     Traumatic subarachnoid hemorrhage 2017    Type 2 diabetes mellitus with stage 3 chronic kidney disease, without long-term current use of insulin 2017     Past Surgical History:   Procedure Laterality Date    ABCESS DRAINAGE Right     Between "little toe" and the one next to it    BREAST SURGERY      Reduction [id number]    CARDIAC SURGERY  2011    CABG 4vessel ring in one valve mitral valve prolapse    CORONARY ARTERY BYPASS GRAFT      DEBRIDEMENT OF FOOT Left 2020    Procedure: DEBRIDEMENT, FOOT;  Surgeon: Dennis Wick DPM;  Location: [facility];  Service: Podiatry;  Laterality: Left;    FOOT SURGERY      4 grafts to left foot    hyperlipidemia      TUBAL LIGATION  1986     Family History   Problem Relation Age of Onset    Arthritis Mother     Heart disease Father     Cancer Father 68        prostae and bladder ca  in     Diabetes Father     Hypertension Father     Depression Sister     Hypertension Son     Diabetes Maternal Grandmother         lost leg    Kidney disease Paternal Grandfather         had kidney removed    Stroke Paternal Grandfather      Social History     Tobacco Use    Smoking status: Never Smoker    Smokeless tobacco: " "Never Used   Substance Use Topics    Alcohol use: Yes     Alcohol/week: 1.0 - 2.0 standard drinks     Types: 1 - 2 Glasses of wine per week     Comment: "socially" not in awhile    Drug use: No       Review of patient's allergies indicates:  No Known Allergies    Outpatient meds:  No current facility-administered medications on file prior to encounter.      Current Outpatient Medications on File Prior to Encounter   Medication Sig Dispense Refill    gabapentin (NEURONTIN) 100 MG capsule Take 100 mg by mouth 3 (three) times daily.  3    midodrine (PROAMATINE) 5 MG Tab Take 5 mg by mouth 3 (three) times daily with meals.       rosuvastatin (CRESTOR) 10 MG tablet Take 10 mg by mouth every evening.       albuterol-ipratropium (DUO-NEB) 2.5 mg-0.5 mg/3 mL nebulizer solution Take 3 mLs by nebulization every 6 (six) hours. Rescue 1 Box 11    apixaban 5 mg Tab Take 1 tablet (5 mg total) by mouth 2 (two) times daily. (Patient taking differently: Take 5 mg by mouth 2 (two) times daily. ) 60 tablet 1    blood sugar diagnostic Strp Test 3-4 times daily PRN      CLARITIN-D 12 HOUR 5-120 mg per tablet Take 1 tablet by mouth once daily.   0    fluticasone (FLONASE) 50 mcg/actuation nasal spray 1 spray by Each Nostril route once daily.   12    insulin aspart (NOVOLOG) 100 unit/mL InPn pen Inject 1-10 Units into the skin 3 (three) times daily. (Patient taking differently: Inject 1-10 Units into the skin 3 (three) times daily. If sugar >150 on sliding scale) 3 mL 1    levothyroxine (SYNTHROID) 50 MCG tablet Take 50 mcg by mouth before breakfast.       pen needle, diabetic (BD ULTRA-FINE OBDULIA PEN NEEDLE) 32 gauge x 5/32" Ndle       ramelteon (ROZEREM) 8 mg tablet Take 8 mg by mouth every evening.      sevelamer carbonate (RENVELA) 800 mg Tab Take 1,600 mg by mouth 3 (three) times daily with meals.      tiotropium (SPIRIVA) 18 mcg inhalation capsule Inhale 1 capsule (18 mcg total) into the lungs once daily. Controller " 30 capsule 11    umeclidinium (INCRUSE ELLIPTA) 62.5 mcg/actuation inhalation capsule Inhale 62.5 mcg into the lungs once daily. Controller         Scheduled meds:   amiodarone  200 mg Oral BID    apixaban  2.5 mg Oral BID    collagenase   Topical (Top) Daily    famotidine (PF)  20 mg Intravenous Daily    levoFLOXacin  250 mg Intravenous Q48H    levothyroxine  50 mcg Oral Before breakfast    metoprolol succinate  25 mg Oral Daily    miconazole NITRATE 2 %   Topical (Top) BID    piperacillin-tazobactam (ZOSYN) IVPB  3.375 g Intravenous Q12H    rosuvastatin  10 mg Oral Daily    sevelamer carbonate  1,600 mg Oral TID WM    sodium zirconium cyclosilicate  5 g Oral Daily    vancomycin  125 mg Oral Q6H       Infusions:   norepinephrine bitartrate-D5W 0.02 mcg/kg/min (10/10/20 0524)       PRN meds:  acetaminophen, albuterol sulfate, dextrose 50%, dextrose 50%, glucagon (human recombinant), glucose, glucose, insulin aspart U-100, ondansetron, Pharmacy to dose Vancomycin consult **AND** vancomycin - pharmacy to dose, white petrolatum    Review of Systems:  ROS    OBJECTIVE:     Vital Signs and IO (Last 24H):  Temp:  [96.2 °F (35.7 °C)-98.3 °F (36.8 °C)]   Pulse:  []   Resp:  [22-32]   BP: ()/(39-61)   SpO2:  [90 %-100 %]   I/O last 3 completed shifts:  In: 2602.6 [I.V.:22.6; Other:500; NG/GT:1830; IV Piggyback:250]  Out: 2900 [Other:2500; Stool:400]    Wt Readings from Last 5 Encounters:   10/10/20 83.7 kg (184 lb 8.4 oz)   09/26/20 80.7 kg (177 lb 14.6 oz)   09/08/20 79.8 kg (176 lb)   09/04/20 80 kg (176 lb 5.9 oz)   09/02/20 80 kg (176 lb 5.9 oz)         Physical Exam:  Physical Exam  Constitutional:       Appearance: Normal appearance. She is well-developed. She is ill-appearing. She is not diaphoretic.   HENT:      Head: Normocephalic and atraumatic.   Eyes:      General: No scleral icterus.     Pupils: Pupils are equal, round, and reactive to light.   Neck:      Musculoskeletal: Neck supple.    Cardiovascular:      Rate and Rhythm: Normal rate and regular rhythm.   Pulmonary:      Effort: Pulmonary effort is normal. No respiratory distress.      Breath sounds: No stridor.   Abdominal:      General: There is no distension.      Palpations: Abdomen is soft.   Musculoskeletal: Normal range of motion.         General: Swelling present. No deformity.   Skin:     General: Skin is warm and dry.      Findings: No erythema or rash.   Neurological:      Cranial Nerves: No cranial nerve deficit.         Body mass index is 29.78 kg/m².    Laboratory:  Recent Labs   Lab 10/07/20  0505 10/08/20  0419 10/10/20  0642   * 130* 133*   K 4.3 4.5 3.3*   CL 94* 93* 92*   CO2 24 25 24   BUN 48* 62* 51*   CREATININE 4.2* 4.8* 4.2*   ESTGFRAFRICA 12.7* 10.8* 12.7*   EGFRNONAA 11.0* 9.3* 11.0*   * 141* 171*       Recent Labs   Lab 10/07/20  0505 10/08/20  0419 10/10/20  0642   CALCIUM 7.5* 7.4* 8.0*   ALBUMIN 1.4* 1.4* 1.5*   PHOS  --   --  1.8*   MG  --   --  2.7*             No results for input(s): POCTGLUCOSE in the last 168 hours.    Recent Labs   Lab 02/21/18 2120 09/12/18  1715 09/24/20  0630   Hemoglobin A1C 6.1 H 5.2 6.3 H       Recent Labs   Lab 10/05/20  0338  10/07/20  0505 10/08/20  0419 10/08/20  0512 10/09/20  0448 10/10/20  0437   WBC 14.80*  --  17.77* 10.66  --   --   --    HGB 11.4*  --  11.1* 10.6*  --   --   --    HCT 35.8*   < > 34.6* 33.4* 35* 37 32*   *  --  147* 131*  --   --   --    MCV 88  --  86 87  --   --   --    MCHC 31.8*  --  32.1 31.7*  --   --   --    MONO  --   --  8.0  1.4* 8.4  0.9  --   --   --     < > = values in this interval not displayed.       Recent Labs   Lab 10/07/20  0505 10/08/20  0419 10/10/20  0642   BILITOT 0.8 0.8 0.8   PROT 4.5* 4.7* 5.0*   ALBUMIN 1.4* 1.4* 1.5*   ALKPHOS 101 99 140*   ALT 17 17 18   AST 25 23 20       Recent Labs   Lab 11/24/17  1837 09/11/18  0246 09/25/20  1434   Color, UA Red A Yellow Yellow   Appearance, UA Cloudy A Hazy A Cloudy  A   pH, UA 5.0 5.0 6.0   Specific Gravity, UA >1.030 A >=1.030 A 1.020   Protein, UA 2+ A 2+ A 2+ A   Glucose, UA 1+ A Negative Negative   Ketones, UA Negative Negative Negative   Urobilinogen, UA Negative Negative Negative   Bilirubin (UA) Negative Negative 1+ A   Occult Blood UA 3+ A 1+ A 3+ A   Nitrite, UA Negative Negative Negative   RBC, UA >100 H 5 H 83 H   WBC, UA 0 >100 H >100 H   Bacteria Rare Moderate A Occasional   Hyaline Casts, UA 0 0 1785 A       Recent Labs   Lab 10/08/20  0512 10/09/20  0448 10/10/20  0437   POC PH 7.327 L 7.311 L 7.336 L   POC PCO2 51.0 H 56.2 HH 55.7 H   POC HCO3 26.7 28.4 H 29.7 H   POC PO2 115 H 119 H 97   POC SATURATED O2 98 98 97   POC BE 1 2 4   Sample ARTERIAL ARTERIAL ARTERIAL       Microbiology Results (last 7 days)     Procedure Component Value Units Date/Time    Blood culture [[id number]] Collected: 10/06/20 1641    Order Status: Completed Specimen: Blood Updated: 10/09/20 1832     Blood Culture, Routine No Growth to date      No Growth to date      No Growth to date      No Growth to date    Blood culture [[id number]] Collected: 10/06/20 1126    Order Status: Completed Specimen: Blood from AV Fistula, Left Updated: 10/09/20 1232     Blood Culture, Routine No Growth to date      No Growth to date      No Growth to date      No Growth to date    Narrative:      PLEASE draw 2 sets (4 bottles) 15 mins apart from 2 different  sites- One MUST include peripheral stick    Blood culture [[id number]] Collected: 10/03/20 1624    Order Status: Completed Specimen: Blood from Peripheral, Right Hand Updated: 10/08/20 1832     Blood Culture, Routine No growth after 5 days.    C Diff Toxin by PCR [[id number]]  (Abnormal) Collected: 10/08/20 0559    Order Status: Completed Updated: 10/08/20 1528     C. diff PCR Positive     Comment: result(s) called and verbal readback obtained from Riaz Houston RN on MICU, re: positive CDIFF PCR 10/8/20 at 1527 vcb by   [facility] 10/08/2020 15:27          Narrative:        result(s) called and verbal readback obtained from Riaz Houston RN on MICU, re: positive CDIFF PCR 10/8/20 at 1527 vcb by   VCB 10/08/2020 15:27    Clostridium difficile EIA [[id number]]  (Abnormal) Collected: 10/08/20 0559    Order Status: Completed Specimen: Stool Updated: 10/08/20 1521     C. diff Antigen Positive     C difficile Toxins A+B, EIA Negative     Comment: Testing not recommended for children <24 months old.       Culture, Respiratory with Gram Stain [[id number]]  (Abnormal)  (Susceptibility) Collected: 10/04/20 1257    Order Status: Completed Specimen: Respiratory from Tracheal Aspirate Updated: 10/06/20 0632     Respiratory Culture Reduced normal respiratory nyla      PSEUDOMONAS AERUGINOSA  Many       Gram Stain (Respiratory) <10 epithelial cells per low power field.     Gram Stain (Respiratory) Many WBC's     Gram Stain (Respiratory) Few Gram positive rods     Gram Stain (Respiratory) Many Gram negative rods     Gram Stain (Respiratory) Few Gram positive cocci    Blood culture [[id number]]  (Abnormal)  (Susceptibility) Collected: 10/03/20 1558    Order Status: Completed Specimen: Blood from Line, Arterial, Right Updated: 10/06/20 0621     Blood Culture, Routine Gram stain patricia bottle: Gram positive cocci in pairs and chains      Results called to and read back by:Benito Mccormack RN/2BICU  10/04/2020        15:12 cea      ENTEROCOCCUS FAECALIS          ASSESSMENT/PLAN:     Active Hospital Problems    Diagnosis  POA    *CHF (congestive heart failure) [I50.9]  Yes    Enterococcal bacteremia [R78.81, B95.2]  No    Pseudomonal pneumonia [J15.1]  Yes    Shock [R57.9]  Yes    Atrial fibrillation and flutter [I48.91, I48.92]  Unknown    Acute on chronic heart failure [I50.9]  Unknown    COPD (chronic obstructive pulmonary disease) [J44.9]  Yes    Acute on chronic respiratory failure with hypoxia and hypercapnia [J96.21, J96.22]  Yes    Anemia, chronic disease [D63.8]  Yes     Stage 5 chronic kidney disease on chronic dialysis [N18.6, Z99.2]  Not Applicable    Open wound of left heel [S91.302A]  Yes    Chronic kidney disease with end stage renal failure on dialysis [N18.6, Z99.2]  Not Applicable      Resolved Hospital Problems    Diagnosis Date Resolved POA    Tachycardia [R00.0] 09/29/2020 Yes     ESRD on HD TTS via AVF  Hyperkalemia, mild  Hypotension  A.Flutter s/p CV 9/25 now on amiodarone, cant give midodrine  Significant third-spacing  Pneumonia  Unable to keep her net negative with TIW dialysis due to high obligate intake, will do PRN UF as well.  Continue Lokelma.  No IVs or BP checks on access and/or non-dominant arm.  Apprecaite cards eval.   Holding midodrine for now.    Anemia of CKD  Hgb and HCT are acceptable. Monitor.  Will provide YASHIRA and/or IV iron PRN.    MBD / Secondary HPT  Ca, phos, PTH and vitamin D levels are acceptable.   Phos binders, vitamin D analogues and calcimimetics as needed.    Right arm swelling-new onset  Duplex US shows no DVT.    Respiratory failure, intubated, PNA  Plan per Pulm, ID, primary team.    Thank you for allowing us to participate in the care of your patient!   We will follow the patient and provide recommendations as needed.    Edson Crystal MD    [facility]  [address] [place] [address]  [place] [address]    [phone] - tel  [phone] - fax    10/10/2020

## 2020-10-10 NOTE — SUBJECTIVE & OBJECTIVE
Interval History:  No acute events last 24 hours  Stool is more formed rectal tube has been discontinued  Patient is sedated but will respond to questioning  Denies chest pain abdominal pain  Limited review of systems secondary intubation  Case discussed with nurse      Objective:     Vital Signs (Most Recent):  Temp: 96.7 °F (35.9 °C) (10/10/20 0700)  Pulse: 74 (10/10/20 0748)  Resp: (!) 28 (10/10/20 0748)  BP: (!) 106/49 (10/10/20 0700)  SpO2: 97 % (10/10/20 0748) Vital Signs (24h Range):  Temp:  [96.2 °F (35.7 °C)-98.3 °F (36.8 °C)] 96.7 °F (35.9 °C)  Pulse:  [] 74  Resp:  [22-32] 28  SpO2:  [90 %-100 %] 97 %  BP: ()/(39-61) 106/49     Weight: 83.7 kg (184 lb 8.4 oz)  Body mass index is 29.78 kg/m².    Intake/Output Summary (Last 24 hours) at 10/10/2020 0805  Last data filed at 10/10/2020 0525  Gross per 24 hour   Intake 1950 ml   Output 2700 ml   Net -750 ml      Physical Exam patient appears in no acute distress lying in bed intubated restraint  HEENT sclerae nonicteric endotracheal tube is in place  Neck is supple nontender  Lungs coarse breath sounds  Heart regular rate rhythm no rub no murmur  Abdomen is soft nontender  Neuro patient is sedated moves extremities equally  Skin decubitus left Achilles area    Significant Labs:   BMP:   Recent Labs   Lab 10/10/20  0642   *   *   K 3.3*   CL 92*   CO2 24   BUN 51*   CREATININE 4.2*   CALCIUM 8.0*   MG 2.7*     CBC:   Recent Labs   Lab 10/09/20  0448 10/10/20  0437   HCT 37 32*     CMP:   Recent Labs   Lab 10/10/20  0642   *   K 3.3*   CL 92*   CO2 24   *   BUN 51*   CREATININE 4.2*   CALCIUM 8.0*   PROT 5.0*   ALBUMIN 1.5*   BILITOT 0.8   ALKPHOS 140*   AST 20   ALT 18   ANIONGAP 17*   EGFRNONAA 11.0*     Laundry reviewed by me shows sinus rhythm no acute ST segment changes    Significant Imaging:

## 2020-10-10 NOTE — NURSING
HD tx complete, tolerated fair  Net UF 2.5L  Pt bled for approx 15 min arterial site and 10 minutes venous site

## 2020-10-10 NOTE — PROGRESS NOTES
Infectious Disease  Progress note      HPI: Juliane Ramos is a 60 y.o. female with  Hx of CAD/CABG, ESRD HD per AVF, DM, OPD,  chronic hypotension is admitted to the ICU with resp failure and tachycardia. She has required intubation and pressor support since admission. She was afebrile on admission and was started on CTX for a couple of days. Blood cx and urine cx were done 2 days into admission due to hypothermia. Urine was positive for C. alibcan and thus was started on Fluconazole.  She then spiked a fever on 10/3, blood cx came back positive for E feacalis from the A line. Resp cx is positive for PSA. She was started on Piptazo 10/4 and then changed 10/5 to Meropenem. A resp cx is positive for PSA. Chart review shows positive PSA in suptum in 2018. She does have copious thick secretons per RN. She otherwise is tolerating feeds, on going loose stools. L [facility] wound is stable. Skin tear in the bottom is not infected. She has RIJ CVL and the pervious A line in place. She is tolerating HD as it is going when visited.    10/7/20:  Patient remains intubated.  More awake and responsive today.  Fevers overnight,  continues to require pressors.  No diarrhea today per RN,  tolerates tube feeds.  All indwelling lines removed.  Currently with peripheral IV lines.   10/8:  Patient remains intubated, mildly interactive per RN.  She continues to have very thick tan colored respiratory secretions.  No fevers now.  T-max 101.3 20 hours ago,  off pressors.  Currently getting dialyzed.  She has increased loose stools so FMS was placed.  A sample was sent for C diff testing.  Pressure ulcers remain the same and stable per RN    10/9: Pt nods no to pain. She nods yes to feeling ok. Some nose bleed. She is having watery stool, has slowed down somewhat. Still on the vent, very thick copious secretions continue.     10/10: NO acute changes per RN. Diarrhea has improved considerably. FMS out. Only 2 loose stools over night.  Unable to get any ROS from pt    EXAM & DIAGNOSTICS REVIEWED:   Vitals:     Temp:  [96.2 °F (35.7 °C)-97.6 °F (36.4 °C)]   Temp: 96.7 °F (35.9 °C) (10/10/20 0700)  Pulse: 77 (10/10/20 1117)  Resp: 19 (10/10/20 1117)  BP: (!) 106/49 (10/10/20 0700)  SpO2: 96 % (10/10/20 1117)    Intake/Output Summary (Last 24 hours) at 10/10/2020 1158  Last data filed at 10/10/2020 0502  Gross per 24 hour   Intake 1950 ml   Output 2700 ml   Net -750 ml       General:  In NAD. Sleeping comforrtably.  intubated  ENT:  ETT in. crested blood in b/l nares, some in mouth  Lungs: Coarse breath sounds b/l- unchanged  Heart:  RRR  Abd:  Soft, NT, ND  Musc:  Joints without effusion, L achilli's wound clean  Skin:  Stage IV pressure ulcer of buttocks- pictures reviewed  Wound:   Neuro: Alert, awake, nodding yes and no appropriately  Psych:  Calm  Extrem: 2 peripheral IV lines, bilateral upper and lower extremity edema.   VAD:  PIV,        Isolation:  None      General Labs reviewed:  Recent Labs   Lab 10/05/20  0338  10/07/20  0505 10/08/20  0419 10/08/20  0512 10/09/20  0448 10/10/20  0437   WBC 14.80*  --  17.77* 10.66  --   --   --    HGB 11.4*  --  11.1* 10.6*  --   --   --    HCT 35.8*   < > 34.6* 33.4* 35* 37 32*   *  --  147* 131*  --   --   --     < > = values in this interval not displayed.       Recent Labs   Lab 10/07/20  0505 10/08/20  0419 10/10/20  0642   * 130* 133*   K 4.3 4.5 3.3*   CL 94* 93* 92*   CO2 24 25 24   BUN 48* 62* 51*   CREATININE 4.2* 4.8* 4.2*   CALCIUM 7.5* 7.4* 8.0*   PROT 4.5* 4.7* 5.0*   BILITOT 0.8 0.8 0.8   ALKPHOS 101 99 140*   ALT 17 17 18   AST 25 23 20         Micro:  Microbiology Results (last 7 days)     Procedure Component Value Units Date/Time    Blood culture [[id number]] Collected: 10/06/20 1641    Order Status: Completed Specimen: Blood Updated: 10/09/20 1832     Blood Culture, Routine No Growth to date      No Growth to date      No Growth to date      No Growth to date    Blood  culture [[id number]] Collected: 10/06/20 1126    Order Status: Completed Specimen: Blood from AV Fistula, Left Updated: 10/09/20 1232     Blood Culture, Routine No Growth to date      No Growth to date      No Growth to date      No Growth to date    Narrative:      PLEASE draw 2 sets (4 bottles) 15 mins apart from 2 different  sites- One MUST include peripheral stick    Blood culture [[id number]] Collected: 10/03/20 1624    Order Status: Completed Specimen: Blood from Peripheral, Right Hand Updated: 10/08/20 1832     Blood Culture, Routine No growth after 5 days.    C Diff Toxin by PCR [[id number]]  (Abnormal) Collected: 10/08/20 0559    Order Status: Completed Updated: 10/08/20 1528     C. diff PCR Positive     Comment: result(s) called and verbal readback obtained from Riaz Houston RN on MICU, re: positive CDIFF PCR 10/8/20 at 1527 vcb by   [facility]B 10/08/2020 15:27         Narrative:        result(s) called and verbal readback obtained from Riaz Houston RN on MICU, re: positive CDIFF PCR 10/8/20 at 1527 vcb by   VCB 10/08/2020 15:27    Clostridium difficile EIA [[id number]]  (Abnormal) Collected: 10/08/20 0559    Order Status: Completed Specimen: Stool Updated: 10/08/20 1521     C. diff Antigen Positive     C difficile Toxins A+B, EIA Negative     Comment: Testing not recommended for children <24 months old.       Culture, Respiratory with Gram Stain [[id number]]  (Abnormal)  (Susceptibility) Collected: 10/04/20 1257    Order Status: Completed Specimen: Respiratory from Tracheal Aspirate Updated: 10/06/20 0632     Respiratory Culture Reduced normal respiratory nyla      PSEUDOMONAS AERUGINOSA  Many       Gram Stain (Respiratory) <10 epithelial cells per low power field.     Gram Stain (Respiratory) Many WBC's     Gram Stain (Respiratory) Few Gram positive rods     Gram Stain (Respiratory) Many Gram negative rods     Gram Stain (Respiratory) Few Gram positive cocci    Blood culture [[id number]]  (Abnormal)   (Susceptibility) Collected: 10/03/20 1558    Order Status: Completed Specimen: Blood from Line, Arterial, Right Updated: 10/06/20 0621     Blood Culture, Routine Gram stain patricia bottle: Gram positive cocci in pairs and chains      Results called to and read back by:Benito Mccormack RN/2BICU  10/04/2020        15:12 cea      ENTEROCOCCUS FAECALIS        Imaging Reviewed:  10/3 CXR- subsequent cxr the same    Portable chest at 512 compared with 09/30/2020 shows unchanged support tubes and lines.  Cardiac silhouette enlargement is unchanged.  Mediastinal contours also remain unchanged with postsurgical changes of CABG.     Bibasilar pleuroparenchymal opacities show no significant change.  Lung volumes remain low.  Central pulmonary vascular prominence is unchanged.  No pneumothorax.     Chest x-ray 10/7:    Persistent diffuse interstitial and groundglass opacities  suggestive of edema versus pneumonia. There are small right pleural  effusion    ECHO 9/26:    · The estimated PA systolic pressure is 71 mmHg.  · The estimated ejection fraction is 46%.  · Diastolic dysfunction.  · There are segmental left ventricular wall motion abnormalities.  · Mitral valve annuloplasty ring    CT c/a/p 10/8  1. Bilateral pulmonary opacities as described, perhaps reflecting  consolidation, atelectasis, minimal alveolar edema, or some  combination thereof.  2. Tiny right pleural effusion and trace left pleural fluid.  3. Cardiomegaly and native coronary artery calcification with  postsurgical changes of CABG. Reflux of right atrial contrast into  distended intrahepatic IVC and hepatic veins suggest elevated right  atrial pressure/right heart failure.  4. Moderate-sized dermal defect near site of left upper extremity  arteriovenous fistula, containing surgical packing.  5. Hyperdensity in gallbladder suggest cholelithiasis or sludge.  6. Moderate amount of stool focally in rectum with mild rectal  distention and very mild perirectal fat  stranding. A rectal catheter  is in place. In the appropriate clinical setting, this could represent  mild stercoral colitis.  7. Diffuse subcutaneous edema throughout the abdomen and pelvis.    IMPRESSION & PLAN   60 y.o. female with  Hx of CAD/CABG, ESRD HD per AVF, DM, OPD,  chronic hypotension is admitted to the ICU with resp failure and tachycardia. She has required intubation and pressor support since admission:    1- E faecalis bacteremia: Catheter related BSI- the set from A line positive. Now  removed  - repeat blood culture on 10/06 negative x 4days  - Afebrile,  Wbc 10K  - all indwelling lines removed, now with PIVs  - On piptazo 10/7 (status post 2 days of Merem)    2- PSA pneumonia- pan sensitive on 10/3 cx-   CXR with persistent basilar opacities. Thick secretions.   Previous PSA PNA in 2018  - PCT 63.7  - On Piptazo 10/7, Levofloxacin 10/8 (due to high to ANAMIKA to piptazo)     3- Mitral valve annuloplasty ring  4- DM  5- ICMO with EF 46%, PulmHTN   6- Chronic L Achilli's wound. Stable  7- C diff colitis: pos PCR, Toxin neg   10/8 Vancomycin P-- improved diarrhea     Recommendations:    Continue Piptazo and levofloxacin double coverage for PSA pneumonia (high anamika to Zosyn)  Continue PO Vancomycin despite test results. Improved diarrhea and wbc   Repeat PCT tomorrow  Supportive care. Wound care      Pt discussed with RN

## 2020-10-10 NOTE — PROGRESS NOTES
Progress Note  Cardiology    Admit Date: 9/24/2020   LOS: 16 days     Follow-up For:  Atrial flutter, cardioversion, sinus rhythm on amiodarone.  Respiratory failure, intubated FiO2 0.4.  Chronic renal failure on dialysis.  Ischemic cardiomyopathy, CHF    Scheduled Meds:   amiodarone  200 mg Oral BID    apixaban  2.5 mg Oral BID    collagenase   Topical (Top) Daily    famotidine (PF)  20 mg Intravenous Daily    levoFLOXacin  250 mg Intravenous Q48H    [START ON 10/11/2020] levothyroxine  75 mcg Oral Before breakfast    metoprolol succinate  25 mg Oral Daily    miconazole NITRATE 2 %   Topical (Top) BID    piperacillin-tazobactam (ZOSYN) IVPB  3.375 g Intravenous Q12H    rosuvastatin  10 mg Oral Daily    sevelamer carbonate  1,600 mg Oral TID WM    sodium zirconium cyclosilicate  5 g Oral Daily    vancomycin  125 mg Oral Q6H     Continuous Infusions:   norepinephrine bitartrate-D5W 0.04 mcg/kg/min (10/10/20 1100)     PRN Meds:acetaminophen, albuterol sulfate, dextrose 50%, dextrose 50%, glucagon (human recombinant), glucose, glucose, insulin aspart U-100, ondansetron, Pharmacy to dose Vancomycin consult **AND** vancomycin - pharmacy to dose, white petrolatum    Review of patient's allergies indicates:  No Known Allergies    SUBJECTIVE:     Interval History: Patient intubated.  Awake and responds to questions.    Review of Systems  na    OBJECTIVE:     Vital Signs (Most Recent)  Temp: 98 °F (36.7 °C) (10/10/20 1515)  Pulse: 78 (10/10/20 1600)  Resp: (!) 25 (10/10/20 1600)  BP: (!) 151/58 (10/10/20 1600)  SpO2: 99 % (10/10/20 1600)    Vital Signs Range (Last 24H):  Temp:  [96.7 °F (35.9 °C)-98.9 °F (37.2 °C)]   Pulse:  []   Resp:  [19-39]   BP: ()/(39-73)   SpO2:  [90 %-100 %]       Physical Exam:  Neck: no carotid bruit, no JVD and supple, symmetrical, trachea midline  Lungs: clear to auscultation bilaterally, normal respiratory effort  Heart: regular rate and rhythm, S1, S2 normal, no  murmur, click, rub or gallop  Abdomen: soft, non-tender; bowel sounds normal; no masses,  no organomegaly  Extremities: Chronic stasis changes and ulceration    Recent Results (from the past 24 hour(s))   POCT glucose    Collection Time: 10/09/20  5:51 PM   Result Value Ref Range    POC Glucose 248 (H) 70 - 110   POCT glucose    Collection Time: 10/09/20  9:23 PM   Result Value Ref Range    POC Glucose 171 (H) 70 - 110   ISTAT PROCEDURE    Collection Time: 10/10/20  4:37 AM   Result Value Ref Range    POC PH 7.336 (L) 7.35 - 7.45    POC PCO2 55.7 (H) 35 - 45 mmHg    POC PO2 97 80 - 100 mmHg    POC HCO3 29.7 (H) 24 - 28 mmol/L    POC BE 4 -2 to 2 mmol/L    POC SATURATED O2 97 95 - 100 %    POC Glucose 147 (H) 70 - 110 mg/dL    POC Sodium 130 (L) 136 - 145 mmol/L    POC Potassium 3.3 (L) 3.5 - 5.1 mmol/L    POC TCO2 31 (H) 23 - 27 mmol/L    POC Ionized Calcium 1.14 1.06 - 1.42 mmol/L    POC Hematocrit 32 (L) 36 - 54 %PCV    Rate 28     Sample ARTERIAL     Site RR     Allens Test Pass     DelSys Adult Vent     Mode AC/PRVC     Vt 390     PEEP 5     PiP 32     FiO2 40     Min Vol 11.2     Sp02 96    Comprehensive Metabolic Panel    Collection Time: 10/10/20  6:42 AM   Result Value Ref Range    Sodium 133 (L) 136 - 145 mmol/L    Potassium 3.3 (L) 3.5 - 5.1 mmol/L    Chloride 92 (L) 95 - 110 mmol/L    CO2 24 23 - 29 mmol/L    Glucose 171 (H) 70 - 110 mg/dL    BUN, Bld 51 (H) 6 - 20 mg/dL    Creatinine 4.2 (H) 0.5 - 1.4 mg/dL    Calcium 8.0 (L) 8.7 - 10.5 mg/dL    Total Protein 5.0 (L) 6.0 - 8.4 g/dL    Albumin 1.5 (L) 3.5 - 5.2 g/dL    Total Bilirubin 0.8 0.1 - 1.0 mg/dL    Alkaline Phosphatase 140 (H) 55 - 135 U/L    AST 20 10 - 40 U/L    ALT 18 10 - 44 U/L    Anion Gap 17 (H) 8 - 16 mmol/L    eGFR if African American 12.7 (A) >60 mL/min/1.73 m^2    eGFR if non African American 11.0 (A) >60 mL/min/1.73 m^2   Magnesium    Collection Time: 10/10/20  6:42 AM   Result Value Ref Range    Magnesium 2.7 (H) 1.6 - 2.6 mg/dL    Phosphorus    Collection Time: 10/10/20  6:42 AM   Result Value Ref Range    Phosphorus 1.8 (L) 2.7 - 4.5 mg/dL   POCT glucose    Collection Time: 10/10/20 11:32 AM   Result Value Ref Range    POC Glucose 237 (H) 70 - 110   Basic metabolic panel    Collection Time: 10/10/20  3:00 PM   Result Value Ref Range    Sodium 133 (L) 136 - 145 mmol/L    Potassium 3.4 (L) 3.5 - 5.1 mmol/L    Chloride 97 95 - 110 mmol/L    CO2 24 23 - 29 mmol/L    Glucose 189 (H) 70 - 110 mg/dL    BUN, Bld 22 (H) 6 - 20 mg/dL    Creatinine 2.2 (H) 0.5 - 1.4 mg/dL    Calcium 7.9 (L) 8.7 - 10.5 mg/dL    Anion Gap 12 8 - 16 mmol/L    eGFR if African American 27.7 (A) >60 mL/min/1.73 m^2    eGFR if non  24.0 (A) >60 mL/min/1.73 m^2   Ammonia    Collection Time: 10/10/20  3:00 PM   Result Value Ref Range    Ammonia 19 10 - 50 umol/L   POCT glucose    Collection Time: 10/10/20  3:21 PM   Result Value Ref Range    POC Glucose 172 (H) 70 - 110       Diagnostic Results:  Labs: Reviewed  ECG: Reviewed    ASSESSMENT/PLAN:     The patient underwent dialysis again today; negative 2.5 L. The patient continues on Levophed; it had to be up titrated mildly during dialysis.  She appears comfortable on the ventilator; CO2 retention on ABG and acidosis.  Afebrile now.

## 2020-10-10 NOTE — PLAN OF CARE
10/09/20 1950   Patient Assessment/Suction   Level of Consciousness (AVPU) responds to voice   Respiratory Effort Normal;Unlabored   Expansion/Accessory Muscles/Retractions expansion symmetric;no retractions;no use of accessory muscles   All Lung Fields Breath Sounds coarse;equal bilaterally   $ Suction Charges Inline Suction Procedure Stat Charge   Secretions Amount small   Secretions Color tan;yellow   Secretions Characteristics thick   PRE-TX-O2   O2 Device (Oxygen Therapy) ventilator   Oxygen Concentration (%) 40   SpO2 100 %   Pulse Oximetry Type Continuous   $ Pulse Oximetry - Multiple Charge Pulse Oximetry - Multiple   Pulse 70   Resp (!) 31      Airway Anesthesia 09/29/20   Placement Date/Time: 09/29/20 (c) 8490   Method of Intubation: Video Laryngoscopy  Airway Device: Endotracheal Tube  Mask Ventilation: Easy  Intubated: Postinduction  Airway Device Size: 8.0  Cuffed: Cuffed  Complicating Factors: None  Findings Post-I...   Secured at 24 cm   Measured At Lips   Secured Location Center   Secured by Commercial tube mckinney   Site Condition Cool;Dry   Status Intact;Secured;Patent   Cuff Volume   (mlt)   Vent Select   Conventional Vent Y   Charged w/in last 24h YES   Preset Conventional Ventilator Settings   Vent ID [id number]   Vent Type    Ventilation Type VC   Vent Mode A/C   Humidity HME   Set Rate 28 BPM   Vt Set 390 mL   PEEP/CPAP 5 cmH20   Pressure Support 0 cmH20   Waveform RAMP   Peak Flow 75 L/min   Plateau Set/Insp. Hold (sec) 0   Trigger Sensitivity Flow/I-Trigger 5 L/min   Patient Ventilator Parameters   Resp Rate Total 30 br/min   Peak Airway Pressure 24 cmH2O   Mean Airway Pressure 11 cmH20   Plateau Pressure 0 cmH20   Exhaled Vt 398 mL   Total Ve 12.1 mL   I:E Ratio Measured 1:2.80   Tubing ID (mm) 8 mm   Conventional Ventilator Alarms   Alarms On Y   Resp Rate High Alarm 55 br/min   Press High Alarm 60 cmH2O   Apnea Rate 14   Apnea Volume (mL) 0 mL   Apnea Oxygen Concentration  100   Apnea Flow  Rate (L/min) 60   T Apnea 20 sec(s)   Ready to Wean/Extubation Screen   FIO2<=50 (chart decimal) 0.4   MV<16L (chart vol.) 12.1   PEEP <=8 (chart #) 5   Ready to Wean Parameters   F/VT Ratio<105 (RSBI) (!) 77.89   Respiratory Evaluation   $ Care Plan Tech Time 15 min

## 2020-10-11 NOTE — PLAN OF CARE
10/10/20 2010   Patient Assessment/Suction   Level of Consciousness (AVPU) responds to voice   Respiratory Effort Normal;Unlabored   Expansion/Accessory Muscles/Retractions expansion symmetric;no retractions;no use of accessory muscles   All Lung Fields Breath Sounds coarse;equal bilaterally   PRE-TX-O2   O2 Device (Oxygen Therapy) ventilator   Oxygen Concentration (%) 40   SpO2 100 %   Pulse Oximetry Type Continuous   $ Pulse Oximetry - Multiple Charge Pulse Oximetry - Multiple   Pulse 75   Resp (!) 30   BP (!) 116/54   Aerosol Therapy   $ Aerosol Therapy Charges PRN treatment not required      Airway Anesthesia 09/29/20   Placement Date/Time: 09/29/20 (c) 7810   Method of Intubation: Video Laryngoscopy  Airway Device: Endotracheal Tube  Mask Ventilation: Easy  Intubated: Postinduction  Airway Device Size: 8.0  Cuffed: Cuffed  Complicating Factors: None  Findings Post-I...   Secured at 24 cm   Measured At Lips   Secured Location Right   Secured by Commercial tube mckinney   Site Condition Cool;Dry   Status Intact;Secured;Patent   Site Assessment Clean;Dry   Cuff Volume   (mlt)   Vent Select   Conventional Vent Y   Charged w/in last 24h YES   Preset Conventional Ventilator Settings   Vent ID [id number]   Vent Type    Ventilation Type VC   Vent Mode A/C   Humidity HME   Set Rate 28 BPM   Vt Set 390 mL   PEEP/CPAP 5 cmH20   Pressure Support 0 cmH20   Waveform RAMP   Peak Flow 75 L/min   Plateau Set/Insp. Hold (sec) 0   Trigger Sensitivity Flow/I-Trigger 5 L/min   Patient Ventilator Parameters   Resp Rate Total 29 br/min   Peak Airway Pressure 33 cmH2O   Mean Airway Pressure 13 cmH20   Plateau Pressure 0 cmH20   Exhaled Vt 399 mL   Total Ve 11.6 mL   I:E Ratio Measured 1:2.80   Conventional Ventilator Alarms   Alarms On Y   Resp Rate High Alarm 55 br/min   Press High Alarm 60 cmH2O   Apnea Rate 14   Apnea Volume (mL) 0 mL   Apnea Oxygen Concentration  100   Apnea Flow Rate (L/min) 60   T Apnea 20 sec(s)   Ready to  Wean/Extubation Screen   FIO2<=50 (chart decimal) 0.4   MV<16L (chart vol.) 11.6   PEEP <=8 (chart #) 5   Ready to Wean Parameters   F/VT Ratio<105 (RSBI) (!) 75.19   Respiratory Evaluation   $ Care Plan Tech Time 15 min

## 2020-10-11 NOTE — PLAN OF CARE
Problem: Fall Injury Risk  Goal: Absence of Fall and Fall-Related Injury  Intervention: Identify and Manage Contributors to Fall Injury Risk  Flowsheets (Taken 10/11/2020 1609)  Self-Care Promotion:   independence encouraged   safe use of adaptive equipment encouraged  Medication Review/Management: medications reviewed     Problem: Diabetes Comorbidity  Goal: Blood Glucose Level Within Desired Range  Intervention: Maintain Glycemic Control  Flowsheets (Taken 10/11/2020 1609)  Glycemic Management: blood glucose monitoring     Problem: Renal Function Impairment (Acute Kidney Injury/Impairment)  Goal: Effective Renal Function  Intervention: Monitor and Support Renal Function  Flowsheets (Taken 10/11/2020 1609)  Stabilization Measures: respiratory support increased  Medication Review/Management: medications reviewed     Problem: Skin Injury Risk Increased  Goal: Skin Health and Integrity  Intervention: Optimize Skin Protection  Flowsheets (Taken 10/11/2020 1609)  Pressure Reduction Techniques: heels elevated off bed  Pressure Reduction Devices: foam padding utilized  Skin Protection:   adhesive use limited   transparent dressing maintained  Head of Bed (HOB): HOB at 30-45 degrees

## 2020-10-11 NOTE — NURSING
screaming for nurse, ran into room to find patient attempting to pull ET tube out. Able to stop and safely secure until respiratory therapist arrived to assess ET tube and Vent. All vitals stable, vent volumes and peak pressures normal. ET tube re-secured at 24 cm. Notified MD Faraz. Per MD to place bilateral soft wrist restraints on patient. Will complete orders and continue to monitor closely.

## 2020-10-11 NOTE — PROGRESS NOTES
[facility] Medicine  Progress Note    Patient Name: Juliane Ramos  MRN: [id number]  Patient Class: IP- Inpatient   Admission Date: 9/24/2020  Length of Stay: 17 days  Attending Physician: Oscar Ruth DO  Primary Care Provider: JOS Dumont        Subjective:     Principal Problem:CHF (congestive heart failure)        HPI:  Patient is a 60-year-old female hx ESRD HD (T,Th, Sat)  CAD/CABG ,EF20%?, COPD, DM ,chronic hypotension  presents ED with complaints of having low blood pressure and rapid heart rate.  In the emergency department patient had a heart rate in the 130s regular and a blood pressure systolic of approximately 100  Patient denies shortness of breath chest pain fever chills abdominal pain nausea vomiting.  She states her last dialysis on Tuesday she did not have any volume removed.  Patient has been given approximately 500 cc of IV fluids in the emergency department at remains tachycardic in the 120s.      Overview/Hospital Course:  09/29  Assumed care. Chart reviewed. Labs reviewed:  End stage renal function. Receiving dialysis today. 2 liters to be removed. Discussed with dietician: tube feeds if not extubated.    09/30  Consultants' attendance noted and appreciated. Required re-intubation yesterday.  Labs reviewed: mild leukocytosis with normal hematocrit; minimal hyponatremia with end-stage renal function values. Remains intubated. Discussed with Dietician: start enteral feeds.    10/01  Consultants' attendance noted and appreciated.. Unable to extubate. Receiving dialysis this AM. Labs reviewed: leukocytosis, stable normocytic anemia; mild hyponatremia, otherwise normal electrolytes with end stage renal function    10/02  Consultants' attendance noted and appreciated.. Labs reviewed: leukocytosis persisting, yet improved; minimal hyponatremia otherwise normal electrolytes with end stage renal function. Telemetry reviewed: sinus. Remains intubated.    10/03  "Discussed with Pulmonology. Had dialysis this AM with 4 liters removed. Labs reviewed: leukocytosis resolved, mild hyponatremia with otherwise normal electrolytes and end stage renal values. Telemetry revewed: sinus tach. Sedated and intubated     10/04 Consultants' attendance noted and appreciated. Did not tolerate "sedation vacation" this AM: heart rate and blood pressure went up. Labs reviewed: leukocytosis with normal Hct; mild hyponatremia otherwise electrolytes are normal with end stage renal function. CXR shows ET tube level at heads of clavicles--it was advanced further into the trachea.     10/05  Discussed with Pulmonology: failed trial. Labs reviewed: leukocytosis with minimal normocytic anemia;hyponatremia with end stage renal function. Growing pseudomonas in sputum despite being on piperacillin     Interval History:  No acute events last 24 hours  Patient remains intubated, in ICU  She is easily arousable and will answer some questions by shaking her head yes or no      Objective:     Vital Signs (Most Recent):  Temp: 97 °F (36.1 °C) (10/11/20 1102)  Pulse: 76 (10/11/20 1400)  Resp: (!) 28 (10/11/20 1400)  BP: (!) 120/56 (10/11/20 1400)  SpO2: 100 % (10/11/20 1400) Vital Signs (24h Range):  Temp:  [96.2 °F (35.7 °C)-98 °F (36.7 °C)] 97 °F (36.1 °C)  Pulse:  [73-79] 76  Resp:  [23-32] 28  SpO2:  [96 %-100 %] 100 %  BP: ()/(42-71) 120/56     Weight: 83.7 kg (184 lb 8.4 oz)  Body mass index is 29.78 kg/m².    Intake/Output Summary (Last 24 hours) at 10/11/2020 1430  Last data filed at 10/11/2020 1400  Gross per 24 hour   Intake 1777.08 ml   Output 3000 ml   Net -1222.92 ml      Physical Exam patient appears chronically ill she has no acute distress  She was asleep but awoke easily  HEENT endotracheal tube is in place  Neck supple  Lungs coarse breath sounds moderate air movement  Heart regular rate and rhythm no rub or murmur  Abdomen is soft nontender ,+BS  Extremities no edema  Neuro patient is " alert oriented x3 motor exam is nonfocal   exam Chi is in place  Skin wound left Achilles area      Significant Labs:   BMP:   Recent Labs   Lab 10/11/20  0637   *      K 3.7   CL 96   CO2 27   BUN 33*   CREATININE 3.1*   CALCIUM 7.8*   MG 2.3     CBC:   Recent Labs   Lab 10/10/20  0437   HCT 32*     CMP:   Recent Labs   Lab 10/10/20  0642 10/10/20  1500 10/11/20  0637   * 133* 136   K 3.3* 3.4* 3.7   CL 92* 97 96   CO2 24 24 27   * 189* 190*   BUN 51* 22* 33*   CREATININE 4.2* 2.2* 3.1*   CALCIUM 8.0* 7.9* 7.8*   PROT 5.0*  --  5.3*   ALBUMIN 1.5*  --  1.6*   BILITOT 0.8  --  0.8   ALKPHOS 140*  --  156*   AST 20  --  32   ALT 18  --  24   ANIONGAP 17* 12 13   EGFRNONAA 11.0* 24.0* 15.9*       Significant Imaging:       Assessment/Plan:   #1 Acute hypoxic hypercapnic respiratory failure -remains intubated.  Multifactorial  Weaning as tolerated .Appreciate help from Pulmonary  Difficulty liberating patient from ventilator  #2 Pseudomonas pneumonia/acute COPD exacerbation-continuing IV antibiotics  #3 Catheter related Enterococcus faecalis bacteremia-lines removed, appreciate help from ID.   Continue IV abx as per ID   #4 Acute diastolic  CHF -dialysis as per Nephrology  #5 New onset atrial  Flutter w/RVR on admission   -S/P cardioversion - NSR   cardiology following, continuing amiodarone, anticoagulation, EF46%  #6 ESRD HD Tuesday Thursday Saturday nephrology following   #7 Chronic hypotension/CAD/CABG-/EF46%% -  #8 Chronic wound left distal posterior lower extremity-Wound care following  Followed by Dr. Wick  #9  Hyponatremia-resolved  ,monitoring labs   #10 Hypookalemia-replaced.  Monitor  #11  DM2-follow sliding scale hemoglobin A1c 6.3   #12 Hx PE -details unknown .  Continue anticoagulation  #13 Metabolic acidosis .  Resolved  #14 Acute UTI POA-yeast.Resolved  #15 anemia chronic dx -monitoring  #16 Secondary HPT-as per Renal  #17 moderate protein calorie malnutrition   -continuing tube feeds dietary following  #18 C diff -colitis p.o. vancomycin     VTE Risk Mitigation (From admission, onward)         Ordered     apixaban tablet 2.5 mg  2 times daily      09/24/20 1018     Place MARY ELLEN hose  Until discontinued      09/24/20 1018     IP VTE HIGH RISK PATIENT  Once      09/24/20 1018     Place sequential compression device  Until discontinued      09/24/20 1018                  Oscar Ruth DO  Department of Hospital Medicine   [facility]

## 2020-10-11 NOTE — CARE UPDATE
10/11/20 1037   PRE-TX-O2   Oxygen Concentration (%) 40   SpO2 100 %   Pulse 74   Resp (!) 30   Negative Inspiratory Force   $ Negative Inspiratory Force Charges Given   Negative Inspiratory Force (cm H2O) -18   Effort (NIF) fair   Patient Position (NIF) semi-Wilson's   Vital Capacity   $ Vital Capacity Charges Given   Vital Capacity (mL) 188  (ON +5PS)   Patient Position (VC) HOB elevated   Effort (VC) good   Spont VT   $ Spont Vol Completed Completed   Spont Vol (mL) 225 mL  (AVE ON  ZERO PS )   Vent Select   Charged w/in last 24h YES   Preset Conventional Ventilator Settings   Ventilation Type VC   Vent Mode Spont   Vt Set 390 mL   PEEP/CPAP 5 cmH20   Pressure Support 15 cmH20   Waveform RAMP   Peak Flow 75 L/min   Plateau Set/Insp. Hold (sec) 0   Insp Rise Time  50 %   Patient Ventilator Parameters   Resp Rate Total 27 br/min   Peak Airway Pressure 21 cmH2O   Mean Airway Pressure 10 cmH20   Plateau Pressure 0 cmH20   Exhaled Vt 429 mL   Total Ve 11.6 mL   Spont Ve 11.6 L   I:E Ratio Measured 1:1.80   Conventional Ventilator Alarms   Resp Rate High Alarm 50 br/min   Press High Alarm 60 cmH2O   Apnea Rate 28   Apnea Volume (mL) 0 mL   Apnea Oxygen Concentration  100   Apnea Flow Rate (L/min) 75   T Apnea 20 sec(s)   Ready to Wean/Extubation Screen   FIO2<=50 (chart decimal) 0.4   MV<16L (chart vol.) 11.6   PEEP <=8 (chart #) 5   Ready to Wean Parameters   F/VT Ratio<105 (RSBI) (!) 69.93

## 2020-10-11 NOTE — CARE UPDATE
This note also relates to the following rows which could not be included:  Oxygen Concentration (%) - Cannot attach notes to unvalidated device data  SpO2 - Cannot attach notes to unvalidated device data  Pulse - Cannot attach notes to unvalidated device data  Resp - Cannot attach notes to unvalidated device data  Ventilation Type - Cannot attach notes to unvalidated device data  Vent Mode - Cannot attach notes to unvalidated device data  Vt Set - Cannot attach notes to unvalidated device data  PEEP/CPAP - Cannot attach notes to unvalidated device data  Pressure Support - Cannot attach notes to unvalidated device data  Waveform - Cannot attach notes to unvalidated device data  Peak Flow - Cannot attach notes to unvalidated device data  Plateau Set/Insp. Hold (sec) - Cannot attach notes to unvalidated device data  Insp Rise Time  - Cannot attach notes to unvalidated device data  Resp Rate Total - Cannot attach notes to unvalidated device data  Peak Airway Pressure - Cannot attach notes to unvalidated device data  Mean Airway Pressure - Cannot attach notes to unvalidated device data  Plateau Pressure - Cannot attach notes to unvalidated device data  Exhaled Vt - Cannot attach notes to unvalidated device data  Total Ve - Cannot attach notes to unvalidated device data  Spont Ve - Cannot attach notes to unvalidated device data  I:E Ratio Measured - Cannot attach notes to unvalidated device data  Resp Rate High Alarm - Cannot attach notes to unvalidated device data  Press High Alarm - Cannot attach notes to unvalidated device data  Apnea Rate - Cannot attach notes to unvalidated device data  Apnea Volume (mL) - Cannot attach notes to unvalidated device data  Apnea Oxygen Concentration  - Cannot attach notes to unvalidated device data  Apnea Flow Rate (L/min) - Cannot attach notes to unvalidated device data  T Apnea - Cannot attach notes to unvalidated device data    CALLED TO B/S FOR NEAR PT SELF-EXTUBATION.  RN PUSHED  ETT BACK.  PT NAD, RETURN VOLUMES GOOD, WE CHANGED OUT TUBE RODRIGUEZ, RN PLACED PT IN RESTRAINTS.  ETT  RETURNED TO 24 LIP W/ BITE BLOCK AND SECURED

## 2020-10-11 NOTE — CARE UPDATE
This note also relates to the following rows which could not be included:  Oxygen Concentration (%) - Cannot attach notes to unvalidated device data  SpO2 - Cannot attach notes to unvalidated device data  Pulse - Cannot attach notes to unvalidated device data  Resp - Cannot attach notes to unvalidated device data  BP - Cannot attach notes to unvalidated device data  Ventilation Type - Cannot attach notes to unvalidated device data  Vent Mode - Cannot attach notes to unvalidated device data  Set Rate - Cannot attach notes to unvalidated device data  Vt Set - Cannot attach notes to unvalidated device data  PEEP/CPAP - Cannot attach notes to unvalidated device data  Pressure Support - Cannot attach notes to unvalidated device data  Waveform - Cannot attach notes to unvalidated device data  Peak Flow - Cannot attach notes to unvalidated device data  Plateau Set/Insp. Hold (sec) - Cannot attach notes to unvalidated device data  Trigger Sensitivity Flow/I-Trigger - Cannot attach notes to unvalidated device data  Resp Rate Total - Cannot attach notes to unvalidated device data  Peak Airway Pressure - Cannot attach notes to unvalidated device data  Mean Airway Pressure - Cannot attach notes to unvalidated device data  Plateau Pressure - Cannot attach notes to unvalidated device data  Exhaled Vt - Cannot attach notes to unvalidated device data  Total Ve - Cannot attach notes to unvalidated device data  I:E Ratio Measured - Cannot attach notes to unvalidated device data  Resp Rate High Alarm - Cannot attach notes to unvalidated device data  Press High Alarm - Cannot attach notes to unvalidated device data  Apnea Rate - Cannot attach notes to unvalidated device data  Apnea Volume (mL) - Cannot attach notes to unvalidated device data  Apnea Oxygen Concentration  - Cannot attach notes to unvalidated device data  Apnea Flow Rate (L/min) - Cannot attach notes to unvalidated device data  T Apnea - Cannot attach notes to unvalidated  device data       10/11/20 0749   Patient Assessment/Suction   Level of Consciousness (AVPU) alert   Respiratory Effort Normal;Unlabored   Expansion/Accessory Muscles/Retractions no use of accessory muscles   All Lung Fields Breath Sounds diminished;rhonchi   Rhythm/Pattern, Respiratory assisted mechanically;unlabored;pattern regular;depth regular   $ Suction Charges Inline Suction Procedure Stat Charge   Secretions Amount scant   PRE-TX-O2   O2 Device (Oxygen Therapy) ventilator   $ Is the patient on Low Flow Oxygen? Yes   Pulse Oximetry Type Continuous   $ Pulse Oximetry - Multiple Charge Pulse Oximetry - Multiple   Positioning HOB elevated 30 degrees   Aerosol Therapy   $ Aerosol Therapy Charges PRN treatment not required      Airway Anesthesia 09/29/20   Placement Date/Time: 09/29/20 c) 2728   Method of Intubation: Video Laryngoscopy  Airway Device: Endotracheal Tube  Mask Ventilation: Easy  Intubated: Postinduction  Airway Device Size: 8.0  Cuffed: Cuffed  Complicating Factors: None  Findings Post-I...   Secured at 24 cm   Measured At Lips   Secured Location Center   Secured by Commercial tube mckinney   Bite Block none   Cuff Pressure   (MLT)   Airway Safety   Ambu bag with the patient? Yes, Adult Ambu   Is mask with the patient? Yes, Adult Mask   Vent Select   Conventional Vent Y   $ Ventilator Subsequent 1   Charged w/in last 24h YES   Preset Conventional Ventilator Settings   Vent ID [id number]   Vent Type    Humidity HME   Patient Ventilator Parameters   Tubing ID (mm) 8 mm   Tube Type ET   Conventional Ventilator Alarms   Alarms On Y   Ve High Alarm 25 L/min   Ve Low Alarm 3 L/min   Vt High Alarm 1500 mL   Vt Low Alarm 300 mL   IHI Ventilator Associated Pneumonia Bundle   Daily Awakening Trials Performed Yes   Daily Assessment of Readiness to Extubate Yes   Head of Bed Elevated  HOB 30   Oral Care Mouth suctioned   Additional VAP Prevention Documentation Clean equipment maintained;Oral suctioning prior to  turn   Education   $ Education Ventilator Oxygen;15 min  (WEAN)   Respiratory Evaluation   $ Care Plan Tech Time 15 min

## 2020-10-11 NOTE — SUBJECTIVE & OBJECTIVE
Interval History:  No acute events last 24 hours  Patient remains intubated, in ICU  She is easily arousable and will answer some questions by shaking her head yes or no      Objective:     Vital Signs (Most Recent):  Temp: 97 °F (36.1 °C) (10/11/20 1102)  Pulse: 76 (10/11/20 1400)  Resp: (!) 28 (10/11/20 1400)  BP: (!) 120/56 (10/11/20 1400)  SpO2: 100 % (10/11/20 1400) Vital Signs (24h Range):  Temp:  [96.2 °F (35.7 °C)-98 °F (36.7 °C)] 97 °F (36.1 °C)  Pulse:  [73-79] 76  Resp:  [23-32] 28  SpO2:  [96 %-100 %] 100 %  BP: ()/(42-71) 120/56     Weight: 83.7 kg (184 lb 8.4 oz)  Body mass index is 29.78 kg/m².    Intake/Output Summary (Last 24 hours) at 10/11/2020 1430  Last data filed at 10/11/2020 1400  Gross per 24 hour   Intake 1777.08 ml   Output 3000 ml   Net -1222.92 ml      Physical Exam patient appears chronically ill she has no acute distress  She was asleep but awoke easily  HEENT endotracheal tube is in place  Neck supple  Lungs coarse breath sounds moderate air movement  Heart regular rate and rhythm no rub or murmur  Abdomen is soft nontender ,+BS  Extremities no edema  Neuro patient is alert oriented x3 motor exam is nonfocal   exam Chi is in place  Skin wound left Achilles area      Significant Labs:   BMP:   Recent Labs   Lab 10/11/20  0637   *      K 3.7   CL 96   CO2 27   BUN 33*   CREATININE 3.1*   CALCIUM 7.8*   MG 2.3     CBC:   Recent Labs   Lab 10/10/20  0437   HCT 32*     CMP:   Recent Labs   Lab 10/10/20  0642 10/10/20  1500 10/11/20  0637   * 133* 136   K 3.3* 3.4* 3.7   CL 92* 97 96   CO2 24 24 27   * 189* 190*   BUN 51* 22* 33*   CREATININE 4.2* 2.2* 3.1*   CALCIUM 8.0* 7.9* 7.8*   PROT 5.0*  --  5.3*   ALBUMIN 1.5*  --  1.6*   BILITOT 0.8  --  0.8   ALKPHOS 140*  --  156*   AST 20  --  32   ALT 18  --  24   ANIONGAP 17* 12 13   EGFRNONAA 11.0* 24.0* 15.9*       Significant Imaging:

## 2020-10-11 NOTE — PROGRESS NOTES
Infectious Disease  Progress note      HPI: Juliane Ramos is a 60 y.o. female with  Hx of CAD/CABG, ESRD HD per AVF, DM, OPD,  chronic hypotension is admitted to the ICU with resp failure and tachycardia. She has required intubation and pressor support since admission. She was afebrile on admission and was started on CTX for a couple of days. Blood cx and urine cx were done 2 days into admission due to hypothermia. Urine was positive for C. alibcan and thus was started on Fluconazole.  She then spiked a fever on 10/3, blood cx came back positive for E feacalis from the A line. Resp cx is positive for PSA. She was started on Piptazo 10/4 and then changed 10/5 to Meropenem. A resp cx is positive for PSA. Chart review shows positive PSA in suptum in 2018. She does have copious thick secretons per RN. She otherwise is tolerating feeds, on going loose stools. L [facility] wound is stable. Skin tear in the bottom is not infected. She has RIJ CVL and the pervious A line in place. She is tolerating HD as it is going when visited.    10/7/20:  Patient remains intubated.  More awake and responsive today.  Fevers overnight,  continues to require pressors.  No diarrhea today per RN,  tolerates tube feeds.  All indwelling lines removed.  Currently with peripheral IV lines.   10/8:  Patient remains intubated, mildly interactive per RN.  She continues to have very thick tan colored respiratory secretions.  No fevers now.  T-max 101.3 20 hours ago,  off pressors.  Currently getting dialyzed.  She has increased loose stools so FMS was placed.  A sample was sent for C diff testing.  Pressure ulcers remain the same and stable per RN    10/9: Pt nods no to pain. She nods yes to feeling ok. Some nose bleed. She is having watery stool, has slowed down somewhat. Still on the vent, very thick copious secretions continue.     10/10: NO acute changes per RN. Diarrhea has improved considerably. FMS out. Only 2 loose stools over night.  Unable to get any ROS from pt    10/11: Pt more awake and alert. Reports no pain. Nods yes to being comfortable. Bps good, not requiring pressors per RN. No fevers. On going frequent diarrhea. No HD today,.     EXAM & DIAGNOSTICS REVIEWED:   Vitals:     Temp:  [96.2 °F (35.7 °C)-98 °F (36.7 °C)]   Temp: 97 °F (36.1 °C) (10/11/20 1102)  Pulse: 76 (10/11/20 1102)  Resp: (!) 30 (10/11/20 1102)  BP: (!) 121/57 (10/11/20 1102)  SpO2: 100 % (10/11/20 1102)    Intake/Output Summary (Last 24 hours) at 10/11/2020 1157  Last data filed at 10/11/2020 1100  Gross per 24 hour   Intake 1537.08 ml   Output 3000 ml   Net -1462.92 ml       General:  In NAD. More alert and interactive. Nods head yes and no appropriately. Intubated and intermittent coughing.   ENT:  ETT in. crested blood in b/l nares  Lungs: Coarse breath sounds b/l- unchanged  Heart:  RRR-  Abd:  Soft, NT, ND  Musc:  Joints without effusion, L achilli's wound clean  Skin:  Stage IV pressure ulcer of buttocks- pictures reviewed    Neuro: Alert, awake, nodding yes and no appropriately  Psych:  Calm  Extrem: 2 peripheral IV lines, bilateral upper and lower extremity edema.   VAD:  PIV,        Isolation:  None      General Labs reviewed:  Recent Labs   Lab 10/05/20  0338  10/07/20  0505 10/08/20  0419 10/08/20  0512 10/09/20  0448 10/10/20  0437   WBC 14.80*  --  17.77* 10.66  --   --   --    HGB 11.4*  --  11.1* 10.6*  --   --   --    HCT 35.8*   < > 34.6* 33.4* 35* 37 32*   *  --  147* 131*  --   --   --     < > = values in this interval not displayed.       Recent Labs   Lab 10/08/20  0419 10/10/20  0642 10/10/20  1500 10/11/20  0637   * 133* 133* 136   K 4.5 3.3* 3.4* 3.7   CL 93* 92* 97 96   CO2 25 24 24 27   BUN 62* 51* 22* 33*   CREATININE 4.8* 4.2* 2.2* 3.1*   CALCIUM 7.4* 8.0* 7.9* 7.8*   PROT 4.7* 5.0*  --  5.3*   BILITOT 0.8 0.8  --  0.8   ALKPHOS 99 140*  --  156*   ALT 17 18  --  24   AST 23 20  --  32         Micro:  Microbiology Results (last  7 days)     Procedure Component Value Units Date/Time    Blood culture [[id number]] Collected: 10/06/20 1641    Order Status: Completed Specimen: Blood Updated: 10/10/20 1832     Blood Culture, Routine No Growth to date      No Growth to date      No Growth to date      No Growth to date      No Growth to date    Blood culture [[id number]] Collected: 10/06/20 1126    Order Status: Completed Specimen: Blood from AV Fistula, Left Updated: 10/10/20 1232     Blood Culture, Routine No Growth to date      No Growth to date      No Growth to date      No Growth to date      No Growth to date    Narrative:      PLEASE draw 2 sets (4 bottles) 15 mins apart from 2 different  sites- One MUST include peripheral stick    Blood culture [[id number]] Collected: 10/03/20 1624    Order Status: Completed Specimen: Blood from Peripheral, Right Hand Updated: 10/08/20 1832     Blood Culture, Routine No growth after 5 days.    C Diff Toxin by PCR [[id number]]  (Abnormal) Collected: 10/08/20 0559    Order Status: Completed Updated: 10/08/20 1528     C. diff PCR Positive     Comment: result(s) called and verbal readback obtained from Riaz Houston RN on MICU, re: positive CDIFF PCR 10/8/20 at 1527 vcb by   [facility] 10/08/2020 15:27         Narrative:        result(s) called and verbal readback obtained from Riaz Houston RN on MICU, re: positive CDIFF PCR 10/8/20 at 1527 vcb by   [facility] 10/08/2020 15:27    Clostridium difficile EIA [[id number]]  (Abnormal) Collected: 10/08/20 0559    Order Status: Completed Specimen: Stool Updated: 10/08/20 1521     C. diff Antigen Positive     C difficile Toxins A+B, EIA Negative     Comment: Testing not recommended for children <24 months old.       Culture, Respiratory with Gram Stain [[id number]]  (Abnormal)  (Susceptibility) Collected: 10/04/20 1257    Order Status: Completed Specimen: Respiratory from Tracheal Aspirate Updated: 10/06/20 0632     Respiratory Culture Reduced normal respiratory nyla       PSEUDOMONAS AERUGINOSA  Many       Gram Stain (Respiratory) <10 epithelial cells per low power field.     Gram Stain (Respiratory) Many WBC's     Gram Stain (Respiratory) Few Gram positive rods     Gram Stain (Respiratory) Many Gram negative rods     Gram Stain (Respiratory) Few Gram positive cocci    Blood culture [[id number]]  (Abnormal)  (Susceptibility) Collected: 10/03/20 1558    Order Status: Completed Specimen: Blood from Line, Arterial, Right Updated: 10/06/20 0621     Blood Culture, Routine Gram stain patricia bottle: Gram positive cocci in pairs and chains      Results called to and read back by:Benito Mccormack RN/2[facility]  10/04/2020        15:12 cea      ENTEROCOCCUS FAECALIS        Imaging Reviewed:  10/3 CXR- subsequent cxr the same    Portable chest at 512 compared with 09/30/2020 shows unchanged support tubes and lines.  Cardiac silhouette enlargement is unchanged.  Mediastinal contours also remain unchanged with postsurgical changes of CABG.     Bibasilar pleuroparenchymal opacities show no significant change.  Lung volumes remain low.  Central pulmonary vascular prominence is unchanged.  No pneumothorax.     Chest x-ray 10/7:    Persistent diffuse interstitial and groundglass opacities  suggestive of edema versus pneumonia. There are small right pleural  effusion    ECHO 9/26:    · The estimated PA systolic pressure is 71 mmHg.  · The estimated ejection fraction is 46%.  · Diastolic dysfunction.  · There are segmental left ventricular wall motion abnormalities.  · Mitral valve annuloplasty ring    CT c/a/p 10/8  1. Bilateral pulmonary opacities as described, perhaps reflecting  consolidation, atelectasis, minimal alveolar edema, or some  combination thereof.  2. Tiny right pleural effusion and trace left pleural fluid.  3. Cardiomegaly and native coronary artery calcification with  postsurgical changes of CABG. Reflux of right atrial contrast into  distended intrahepatic IVC and hepatic veins suggest elevated  right  atrial pressure/right heart failure.  4. Moderate-sized dermal defect near site of left upper extremity  arteriovenous fistula, containing surgical packing.  5. Hyperdensity in gallbladder suggest cholelithiasis or sludge.  6. Moderate amount of stool focally in rectum with mild rectal  distention and very mild perirectal fat stranding. A rectal catheter  is in place. In the appropriate clinical setting, this could represent  mild stercoral colitis.  7. Diffuse subcutaneous edema throughout the abdomen and pelvis.    IMPRESSION & PLAN   60 y.o. female with  Hx of CAD/CABG, ESRD HD per AVF, DM, OPD,  chronic hypotension is admitted to the ICU with resp failure and tachycardia. She has required intubation and pressor support since admission:    1- E faecalis bacteremia: Catheter related BSI- the set from A line positive. Now  removed  - repeat blood culture on 10/06 negative  - Afebrile,  Wbc 10K  - all indwelling lines removed, now with PIVs  - On piptazo 10/7 (status post 2 days of Merem)    2- PSA pneumonia- pan sensitive on 10/3 cx-   CXR with persistent basilar opacities. Thick secretions.   Previous PSA PNA in 2018  - PCT 63.7-->21  - On Piptazo 10/7, Levofloxacin 10/8 (due to high to ANAMIKA to piptazo- for psa)     3- Mitral valve annuloplasty ring  4- DM  5- ICMO with EF 46%, PulmHTN   6- Chronic L Achilli's wound. Stable  7- C diff colitis: pos PCR, Toxin neg   10/8 Vancomycin P-- improved diarrhea     Recommendations:    Continue Piptazo and levofloxacin double coverage x10-14 days for PSA pneumonia (high anamika to Zosyn), and Enterococcus  Continue PO Vancomycin despite test results- while on abx. Improved diarrhea and wbc     Supportive care. Wound care. Discussion about goal of care on going.       Pt discussed with RN     Dr Turner will assume care 10/12

## 2020-10-11 NOTE — CONSULTS
Nephrology Consult Note         Patient Name: Juliane Ramos  MRN: [id number]    Patient Class: IP- Inpatient   Admission Date: 9/24/2020  Length of Stay: 17 days  Date of Service: 10/11/2020    Attending Physician: Oscar Ruth DO  Primary Care Provider: JOS Dumont    Reason for Consult: esrd/anemia/hyperkalemia/hypotension/shpt/tachycardia    SUBJECTIVE:     HPI: 60F hx ESRD HD (T,Th, Sat)  CAD/CABG ,EF20%?, COPD, DM ,chronic hypotension on midodrine daily presents to ED with complaints of having low blood pressure and rapid heart rate. In the emergency department patient had a heart rate in the 130s regular and SBP in 100s  Patient denies shortness of breath, chest pain, fever, chills, abdominal pain, nausea, vomiting. Last dialysis on Tuesday and she did not have any volume removed.  Patient has been given approximately 500 cc of IV fluids in the emergency department at remains tachycardic in the 120s. Cards eval is pending, Amiodarone was started.    9/25 Getting HD for hyperkalemia today, before cardioversion. Continue HD per TTS schedule.  9/26 Seen and examined on Hd today, tolerating well. Continue HD per TTS schedule.   9/28  Intubated.  Sedated. TTS dialysis planned  9/29  Seen today on dialysis.  Tolerating well.  Still intubated, arousable, good eye contact.  No lab results yet today.  10/1  Intubated.  Unresponsive.     10/2  Makes eye contact.  No distress.  afebrile  10/3  Seen on rounds.  Seen on dialysis.  Intubated.  Sedate  10/4  Failed sedation vacation.    10/5 VSS, remains intubated, plan HD in AM.  10/6 VSS, seen and examined on HD. Significant third-spacing in dependent parts, BP is OK with increased pressor, trying for 4L as tolerated..  10/7 VSS, unable to keep her net negative with TIW dialysis due to high obligate intake, will start MWF UF as tolerated as well. UF today.  10/8 VSS, had HD today, tolerated well.  10/9 VSS, had HD today, tolerated well. Next HD in AM, mayra  "david lgive her a break on .  10/10 VSS, seen and examined on HD tolerating well. Next on Monday or PRN.  10/11 VSS, no new complains, still on vent. Next on Monday or PRN.    Past Medical History:   Diagnosis Date    Anemia     Anemia in stage 3 chronic kidney disease 2017    Anticoagulant long-term use     CHF (congestive heart failure)     COPD (chronic obstructive pulmonary disease)     COPD exacerbation 3/10/2020    Coronary artery disease     Diabetes mellitus     Digestive disorder     Encounter for blood transfusion     Essential hypertension 2017    Hypertension     Low blood pressure     MI (myocardial infarction)     Segmental and subsegmental pulmonary emboli of RLL without acute cor pulmonale 2017    Stroke     2005    Thyroid disease     Traumatic subarachnoid hemorrhage 2017    Type 2 diabetes mellitus with stage 3 chronic kidney disease, without long-term current use of insulin 2017     Past Surgical History:   Procedure Laterality Date    ABCESS DRAINAGE Right     Between "little toe" and the one next to it    BREAST SURGERY      Reduction [id number]    CARDIAC SURGERY  2011    CABG 4vessel ring in one valve mitral valve prolapse    CORONARY ARTERY BYPASS GRAFT      DEBRIDEMENT OF FOOT Left 2020    Procedure: DEBRIDEMENT, FOOT;  Surgeon: Dennis Wick DPM;  Location: [facility];  Service: Podiatry;  Laterality: Left;    FOOT SURGERY      4 grafts to left foot    hyperlipidemia      TUBAL LIGATION  1986     Family History   Problem Relation Age of Onset    Arthritis Mother     Heart disease Father     Cancer Father 68        prostae and bladder ca  in     Diabetes Father     Hypertension Father     Depression Sister     Hypertension Son     Diabetes Maternal Grandmother         lost leg    Kidney disease Paternal Grandfather         had kidney removed    Stroke Paternal Grandfather      Social History " "    Tobacco Use    Smoking status: Never Smoker    Smokeless tobacco: Never Used   Substance Use Topics    Alcohol use: Yes     Alcohol/week: 1.0 - 2.0 standard drinks     Types: 1 - 2 Glasses of wine per week     Comment: "socially" not in awhile    Drug use: No       Review of patient's allergies indicates:  No Known Allergies    Outpatient meds:  No current facility-administered medications on file prior to encounter.      Current Outpatient Medications on File Prior to Encounter   Medication Sig Dispense Refill    gabapentin (NEURONTIN) 100 MG capsule Take 100 mg by mouth 3 (three) times daily.  3    midodrine (PROAMATINE) 5 MG Tab Take 5 mg by mouth 3 (three) times daily with meals.       rosuvastatin (CRESTOR) 10 MG tablet Take 10 mg by mouth every evening.       albuterol-ipratropium (DUO-NEB) 2.5 mg-0.5 mg/3 mL nebulizer solution Take 3 mLs by nebulization every 6 (six) hours. Rescue 1 Box 11    apixaban 5 mg Tab Take 1 tablet (5 mg total) by mouth 2 (two) times daily. (Patient taking differently: Take 5 mg by mouth 2 (two) times daily. ) 60 tablet 1    blood sugar diagnostic Strp Test 3-4 times daily PRN      CLARITIN-D 12 HOUR 5-120 mg per tablet Take 1 tablet by mouth once daily.   0    fluticasone (FLONASE) 50 mcg/actuation nasal spray 1 spray by Each Nostril route once daily.   12    insulin aspart (NOVOLOG) 100 unit/mL InPn pen Inject 1-10 Units into the skin 3 (three) times daily. (Patient taking differently: Inject 1-10 Units into the skin 3 (three) times daily. If sugar >150 on sliding scale) 3 mL 1    levothyroxine (SYNTHROID) 50 MCG tablet Take 50 mcg by mouth before breakfast.       pen needle, diabetic (BD ULTRA-FINE OBDULIA PEN NEEDLE) 32 gauge x 5/32" Ndle       ramelteon (ROZEREM) 8 mg tablet Take 8 mg by mouth every evening.      sevelamer carbonate (RENVELA) 800 mg Tab Take 1,600 mg by mouth 3 (three) times daily with meals.      tiotropium (SPIRIVA) 18 mcg inhalation capsule " Inhale 1 capsule (18 mcg total) into the lungs once daily. Controller 30 capsule 11    umeclidinium (INCRUSE ELLIPTA) 62.5 mcg/actuation inhalation capsule Inhale 62.5 mcg into the lungs once daily. Controller         Scheduled meds:   amiodarone  200 mg Oral BID    apixaban  2.5 mg Oral BID    collagenase   Topical (Top) Daily    famotidine (PF)  20 mg Intravenous Daily    levoFLOXacin  250 mg Intravenous Q48H    levothyroxine  75 mcg Oral Before breakfast    metoprolol succinate  25 mg Oral Daily    miconazole NITRATE 2 %   Topical (Top) BID    piperacillin-tazobactam (ZOSYN) IVPB  3.375 g Intravenous Q12H    rosuvastatin  10 mg Oral Daily    sevelamer carbonate  1,600 mg Oral TID WM    sodium zirconium cyclosilicate  5 g Oral Daily    vancomycin  125 mg Oral Q6H       Infusions:   norepinephrine bitartrate-D5W Stopped (10/11/20 0514)       PRN meds:  acetaminophen, albuterol sulfate, calcium gluconate IVPB, calcium gluconate IVPB, calcium gluconate IVPB, dextrose 50%, dextrose 50%, glucagon (human recombinant), glucose, glucose, insulin aspart U-100, magnesium oxide, magnesium oxide, magnesium sulfate IVPB, magnesium sulfate IVPB, ondansetron, potassium chloride in water **AND** potassium chloride in water **AND** potassium chloride in water, potassium chloride, potassium chloride, potassium chloride, potassium, sodium phosphates, potassium, sodium phosphates, potassium, sodium phosphates, sodium phosphate IVPB, sodium phosphate IVPB, sodium phosphate IVPB, Pharmacy to dose Vancomycin consult **AND** vancomycin - pharmacy to dose, white petrolatum    Review of Systems:  ROS    OBJECTIVE:     Vital Signs and IO (Last 24H):  Temp:  [96.2 °F (35.7 °C)-98.9 °F (37.2 °C)]   Pulse:  [73-85]   Resp:  [19-39]   BP: ()/(42-73)   SpO2:  [96 %-100 %]   I/O last 3 completed shifts:  In: 1867.1 [I.V.:97.1; Other:500; NG/GT:1120; IV Piggyback:150]  Out: 3100 [Other:3000; Stool:100]    Wt Readings from Last  5 Encounters:   10/10/20 83.7 kg (184 lb 8.4 oz)   09/26/20 80.7 kg (177 lb 14.6 oz)   09/08/20 79.8 kg (176 lb)   09/04/20 80 kg (176 lb 5.9 oz)   09/02/20 80 kg (176 lb 5.9 oz)         Physical Exam:  Physical Exam  Constitutional:       Appearance: Normal appearance. She is well-developed. She is ill-appearing. She is not diaphoretic.   HENT:      Head: Normocephalic and atraumatic.   Eyes:      General: No scleral icterus.     Pupils: Pupils are equal, round, and reactive to light.   Neck:      Musculoskeletal: Neck supple.   Cardiovascular:      Rate and Rhythm: Normal rate and regular rhythm.   Pulmonary:      Effort: Pulmonary effort is normal. No respiratory distress.      Breath sounds: No stridor.   Abdominal:      General: There is no distension.      Palpations: Abdomen is soft.   Musculoskeletal: Normal range of motion.         General: Swelling present. No deformity.   Skin:     General: Skin is warm and dry.      Findings: No erythema or rash.   Neurological:      Cranial Nerves: No cranial nerve deficit.         Body mass index is 29.78 kg/m².    Laboratory:  Recent Labs   Lab 10/10/20  0642 10/10/20  1500 10/11/20  0637   * 133* 136   K 3.3* 3.4* 3.7   CL 92* 97 96   CO2 24 24 27   BUN 51* 22* 33*   CREATININE 4.2* 2.2* 3.1*   ESTGFRAFRICA 12.7* 27.7* 18.3*   EGFRNONAA 11.0* 24.0* 15.9*   * 189* 190*       Recent Labs   Lab 10/08/20  0419 10/10/20  0642 10/10/20  1500 10/11/20  0637   CALCIUM 7.4* 8.0* 7.9* 7.8*   ALBUMIN 1.4* 1.5*  --  1.6*   PHOS  --  1.8* 1.2* 2.0*   MG  --  2.7* 2.3 2.3             No results for input(s): POCTGLUCOSE in the last 168 hours.    Recent Labs   Lab 02/21/18  2120 09/12/18  1715 09/24/20  0630   Hemoglobin A1C 6.1 H 5.2 6.3 H       Recent Labs   Lab 10/05/20  0338  10/07/20  0505 10/08/20  0419 10/08/20  0512 10/09/20  0448 10/10/20  0437   WBC 14.80*  --  17.77* 10.66  --   --   --    HGB 11.4*  --  11.1* 10.6*  --   --   --    HCT 35.8*   < > 34.6*  33.4* 35* 37 32*   *  --  147* 131*  --   --   --    MCV 88  --  86 87  --   --   --    MCHC 31.8*  --  32.1 31.7*  --   --   --    MONO  --   --  8.0  1.4* 8.4  0.9  --   --   --     < > = values in this interval not displayed.       Recent Labs   Lab 10/08/20  0419 10/10/20  0642 10/11/20  0637   BILITOT 0.8 0.8 0.8   PROT 4.7* 5.0* 5.3*   ALBUMIN 1.4* 1.5* 1.6*   ALKPHOS 99 140* 156*   ALT 17 18 24   AST 23 20 32       Recent Labs   Lab 11/24/17  1837 09/11/18  0246 09/25/20  1434   Color, UA Red A Yellow Yellow   Appearance, UA Cloudy A Hazy A Cloudy A   pH, UA 5.0 5.0 6.0   Specific Gravity, UA >1.030 A >=1.030 A 1.020   Protein, UA 2+ A 2+ A 2+ A   Glucose, UA 1+ A Negative Negative   Ketones, UA Negative Negative Negative   Urobilinogen, UA Negative Negative Negative   Bilirubin (UA) Negative Negative 1+ A   Occult Blood UA 3+ A 1+ A 3+ A   Nitrite, UA Negative Negative Negative   RBC, UA >100 H 5 H 83 H   WBC, UA 0 >100 H >100 H   Bacteria Rare Moderate A Occasional   Hyaline Casts, UA 0 0 1785 A       Recent Labs   Lab 10/08/20  0512 10/09/20  0448 10/10/20  0437   POC PH 7.327 L 7.311 L 7.336 L   POC PCO2 51.0 H 56.2 HH 55.7 H   POC HCO3 26.7 28.4 H 29.7 H   POC PO2 115 H 119 H 97   POC SATURATED O2 98 98 97   POC BE 1 2 4   Sample ARTERIAL ARTERIAL ARTERIAL       Microbiology Results (last 7 days)     Procedure Component Value Units Date/Time    Blood culture [[id number]] Collected: 10/06/20 1641    Order Status: Completed Specimen: Blood Updated: 10/10/20 1832     Blood Culture, Routine No Growth to date      No Growth to date      No Growth to date      No Growth to date      No Growth to date    Blood culture [[id number]] Collected: 10/06/20 1126    Order Status: Completed Specimen: Blood from AV Fistula, Left Updated: 10/10/20 1232     Blood Culture, Routine No Growth to date      No Growth to date      No Growth to date      No Growth to date      No Growth to date    Narrative:      PLEASE  draw 2 sets (4 bottles) 15 mins apart from 2 different  sites- One MUST include peripheral stick    Blood culture [[id number]] Collected: 10/03/20 1624    Order Status: Completed Specimen: Blood from Peripheral, Right Hand Updated: 10/08/20 1832     Blood Culture, Routine No growth after 5 days.    C Diff Toxin by PCR [[id number]]  (Abnormal) Collected: 10/08/20 0559    Order Status: Completed Updated: 10/08/20 1528     C. diff PCR Positive     Comment: result(s) called and verbal readback obtained from Riza Houston RN on MICU, re: positive CDIFF PCR 10/8/20 at 1527 vcb by   [facility] 10/08/2020 15:27         Narrative:        result(s) called and verbal readback obtained from Riaz Houston RN on MICU, re: positive CDIFF PCR 10/8/20 at 1527 vcb by   [facility] 10/08/2020 15:27    Clostridium difficile EIA [[id number]]  (Abnormal) Collected: 10/08/20 0559    Order Status: Completed Specimen: Stool Updated: 10/08/20 1521     C. diff Antigen Positive     C difficile Toxins A+B, EIA Negative     Comment: Testing not recommended for children <24 months old.       Culture, Respiratory with Gram Stain [[id number]]  (Abnormal)  (Susceptibility) Collected: 10/04/20 1257    Order Status: Completed Specimen: Respiratory from Tracheal Aspirate Updated: 10/06/20 0632     Respiratory Culture Reduced normal respiratory nyla      PSEUDOMONAS AERUGINOSA  Many       Gram Stain (Respiratory) <10 epithelial cells per low power field.     Gram Stain (Respiratory) Many WBC's     Gram Stain (Respiratory) Few Gram positive rods     Gram Stain (Respiratory) Many Gram negative rods     Gram Stain (Respiratory) Few Gram positive cocci    Blood culture [[id number]]  (Abnormal)  (Susceptibility) Collected: 10/03/20 1558    Order Status: Completed Specimen: Blood from Line, Arterial, Right Updated: 10/06/20 0621     Blood Culture, Routine Gram stain patricia bottle: Gram positive cocci in pairs and chains      Results called to and read back by:Benito  Easton HUGO/2[facility]  10/04/2020        15:12 cea      ENTEROCOCCUS FAECALIS          ASSESSMENT/PLAN:     Active Hospital Problems    Diagnosis  POA    *CHF (congestive heart failure) [I50.9]  Yes    Diaphragm dysfunction [J98.6]  Yes    Atelectasis [J98.11]  Yes    Unresponsive episode [R41.89]  Yes    Enterococcal bacteremia [R78.81, B95.2]  No    Pseudomonal pneumonia [J15.1]  Yes    Shock [R57.9]  Yes    Atrial fibrillation and flutter [I48.91, I48.92]  Unknown    Acute on chronic heart failure [I50.9]  Yes    COPD (chronic obstructive pulmonary disease) [J44.9]  Yes    Acute on chronic respiratory failure with hypoxia and hypercapnia [J96.21, J96.22]  Yes    Anemia, chronic disease [D63.8]  Yes    Stage 5 chronic kidney disease on chronic dialysis [N18.6, Z99.2]  Not Applicable    Open wound of left heel [S91.302A]  Yes    Chronic kidney disease with end stage renal failure on dialysis [N18.6, Z99.2]  Not Applicable      Resolved Hospital Problems    Diagnosis Date Resolved POA    Tachycardia [R00.0] 09/29/2020 Yes     ESRD on HD TTS via AVF  Hyperkalemia, mild  Hypotension  A.Flutter s/p CV 9/25 now on amiodarone, cant give midodrine  Significant third-spacing  Pneumonia  Unable to keep her net negative with TIW dialysis due to high obligate intake, will do PRN UF as well.  Continue Lokelma.  No IVs or BP checks on access and/or non-dominant arm.  Apprecaite cards eval.   Holding midodrine for now.    Anemia of CKD  Hgb and HCT are acceptable. Monitor.  Will provide YASHIRA and/or IV iron PRN.    MBD / Secondary HPT  Ca, phos, PTH and vitamin D levels are acceptable.   Phos binders, vitamin D analogues and calcimimetics as needed.    Right arm swelling-new onset  Duplex US shows no DVT.    Respiratory failure, intubated, PNA  Plan per Pulm, ID, primary team.    Thank you for allowing us to participate in the care of your patient!   We will follow the patient and provide recommendations as  needed.    Edson Crystal MD    [facility]  [address] [place] [id number]    [phone] - tel  [phone] - fax    10/11/2020

## 2020-10-11 NOTE — PROGRESS NOTES
[facility]  Pulmonary / Critical Care Medicine  Progress Note    Subjective     9/29:  No major issues overnight.  Tolerating SBT.  Off of sedation all night.   9/30:  Failed extubation and was re-intubated yesterday evening.  No issues since.  10/1:  No major issues overnight.  Remains intubated.  Off of sedation.  10/2/2020 - Remains critically ill, no new issues overnight, respiratory reports significant secretions.  Fever curve good.  No new weight, cumulative I/O 2.1 liters +., vital signs OK.  Ventilating pressures good, Pplat - 25, Ve - 14,  P/F -  258, attempted brief SBT but sTV only 150 - 200 (not ready to wean/extubate).  Labs reviewed.  10/3- remains on vent, on Levophed 0.022 mcg/kg/min, afebrile, HR controlled in 70s-90s. Completed HD this am with removal of 4L fluid.  10/4- remains on vent,  Febrile to 103, sedated w/ propofol, oxygenation improved compared to yesterday. Requiring min levophed  10/5:  Remains intubated but not sedated.  Fever curve down trending.  Tolerating relatively minimal ventilator settings, but she becomes hypercapnic and hypo ventilates with placed pressure support ventilation.  She is only requiring low-dose vasopressors today.  10/6:  Continues spike fevers despite receiving antibiotics yesterday.  Rapidly developed hypercapnia while on pressure support ventilation this morning.  She remains off sedation.  10/7:  No major issues overnight.  Fever curve down trending.  More alert today.  Dialyzed yesterday and central lines removed.  10/8:  No major issues overnight.  Fever cure downtrending.  Alert this morning.  No new issues.  Remains intubated/sedated.  10/9  the patient is awake on the ventilator.  She is on dialysis.  She is afebrile.  The plan is to leave her on the ventilator going forward.  Dr. Hui will reassess on Monday.  The  states at that point, or may be later on during the week, they may consider withdrawing care.  10/10  below from   Bogdan: pt min arouses?  10/11 no new c/o          Review of Systems   Unable to perform ROS: Intubated      I have personally reviewed the following during today's evaluation:  past medical history, ROS, family history, social history, surgical history, current inpatient medications,drug allergies, vital signs over the past 24 hours, results of relevant diagnostic studies and nursing/provider documentation from the past 24 hours.     Objective     VS Temp:  [96.2 °F (35.7 °C)-98.9 °F (37.2 °C)]   Pulse:  [73-85]   Resp:  [19-39]   BP: ()/(42-73)   SpO2:  [96 %-100 %]   Ideal body weight: 59.3 kg (130 lb 11.7 oz)  Adjusted ideal body weight: 69.1 kg (152 lb 4 oz)   I/O   Intake/Output Summary (Last 24 hours) at 10/11/2020 1023  Last data filed at 10/11/2020 1000  Gross per 24 hour   Intake 1497.08 ml   Output 3000 ml   Net -1502.92 ml        Vent SpO2 100%   Vent Mode: Spont  Oxygen Concentration (%):  [40] 40  Resp Rate Total:  [19 br/min-33 br/min] 27 br/min  Vt Set:  [390 mL] 390 mL  PEEP/CPAP:  [5 cmH20] 5 cmH20  Pressure Support:  [0 [id number] cmH20] 15 cmH20  Mean Airway Pressure:  [9.6 kpI27-94 cmH20] 10 cmH20     PE Physical Exam   Constitutional: She appears well-developed and well-nourished. She is cooperative.  Non-toxic appearance. No distress. She is intubated and restrained.   Intubated, not on sedation.  She is awake and nods her head appropriately.   HENT:   Head: Normocephalic and atraumatic.   Mouth/Throat: Uvula is midline and mucous membranes are normal. Mallampati Score: unable to assess.   She is orally intubated.  There are eschar in both nares.   Neck: Trachea normal and normal range of motion. Neck supple. No JVD present. No thyromegaly present.   Cardiovascular: Normal rate, regular rhythm, normal heart sounds and intact distal pulses.   Pulmonary/Chest: Normal expansion, symmetric chest wall expansion, effort normal and breath sounds normal. She is intubated. She has no wheezes. She has  no rhonchi. She has no rales.   Abdominal: Soft. Bowel sounds are normal. She exhibits no distension. There is no abdominal tenderness.   Musculoskeletal: Normal range of motion.         General: No tenderness, deformity or edema.      Comments: LAMINE MCKENZIE   Lymphadenopathy: No supraclavicular adenopathy is present.     She has no cervical adenopathy.     She has no axillary adenopathy.   Neurological: She has normal strength. No cranial nerve deficit or sensory deficit. She exhibits normal muscle tone. GCS eye subscore is 4. GCS verbal subscore is 5. GCS motor subscore is 6.   Not arousing well now off sedation, et tube.   Skin: Skin is warm, dry and intact. No rash noted.   There is a decubitus to the left Achilles area   Nursing note and vitals reviewed.      Labs I have personally reviewed and interpreted all labs / diagnostic studies obtained over the past 24 hours, and relevant results are as follows:  Recent Labs   Lab 10/11/20  0637      K 3.7   CL 96   CO2 27   BUN 33*   CREATININE 3.1*   MG 2.3   ALT 24   AST 32   ALKPHOS 156*   BILITOT 0.8   PROT 5.3*   ALBUMIN 1.6*      Procalcitonin:  63.77     Imaging I have personally reviewed and interpreted the following images and reviewed the associated Radiology report.  I have reviewed and interpreted all pertinent imaging results/findings within the past 24 hours.  CXR:  9/30:  Interval introduction of NG tube. Additional lines and support devices are in position as above.  Persistence of bilateral parahilar and lower lung zone interstitial and alveolar opacities and small bilateral effusions. Stable cardiomegaly.  CXR 10/3- ETT in place, sternotomy wires, with bilateral pulmonary edema and retrocardiac opacification w/ blunting of costophrenic angles bilaterally  CXR 10/4- ETT in place, unchanged bilateral infiltrates & R pleural effusion    CT chest, abdomen, and pelvis, 10/8  1. Bilateral pulmonary opacities as described, perhaps  reflecting  consolidation, atelectasis, minimal alveolar edema, or some  combination thereof.  2. Tiny right pleural effusion and trace left pleural fluid.  3. Cardiomegaly and native coronary artery calcification with  postsurgical changes of CABG. Reflux of right atrial contrast into  distended intrahepatic IVC and hepatic veins suggest elevated right  atrial pressure/right heart failure.  4. Moderate-sized dermal defect near site of left upper extremity  arteriovenous fistula, containing surgical packing.  5. Hyperdensity in gallbladder suggest cholelithiasis or sludge.  6. Moderate amount of stool focally in rectum with mild rectal  distention and very mild perirectal fat stranding. A rectal catheter  is in place. In the appropriate clinical setting, this could represent  mild stercoral colitis.  7. Diffuse subcutaneous edema throughout the abdomen and pelvis.      Micro I have personally reviewed and interpreted the available culture data.  Relevant results are as follows.  Blood Culture   Lab Results   Component Value Date    LABBLOO No Growth to date 10/06/2020    LABBLOO No Growth to date 10/06/2020    LABBLOO No Growth to date 10/06/2020    LABBLOO No Growth to date 10/06/2020    LABBLOO No Growth to date 10/06/2020   , Sputum Culture   Lab Results   Component Value Date    GSRESP <10 epithelial cells per low power field. 10/04/2020    GSRESP Many WBC's 10/04/2020    GSRESP Few Gram positive rods 10/04/2020    GSRESP Many Gram negative rods 10/04/2020    GSRESP Few Gram positive cocci 10/04/2020    RESPIRATORYC Reduced normal respiratory nyla 10/04/2020    RESPIRATORYC PSEUDOMONAS AERUGINOSA  Many   (A) 10/04/2020    and Urine Culture    Lab Results   Component Value Date    LABURIN (A) 09/25/2020     PRESUMPTIVE JORDIN ALBICANS  10,000 - 49,999 cfu/ml        Medications Scheduled    amiodarone  200 mg Oral BID    apixaban  2.5 mg Oral BID    collagenase   Topical (Top) Daily    famotidine (PF)  20 mg  Intravenous Daily    levoFLOXacin  250 mg Intravenous Q48H    levothyroxine  75 mcg Oral Before breakfast    metoprolol succinate  25 mg Oral Daily    miconazole NITRATE 2 %   Topical (Top) BID    piperacillin-tazobactam (ZOSYN) IVPB  3.375 g Intravenous Q12H    rosuvastatin  10 mg Oral Daily    sevelamer carbonate  1,600 mg Oral TID WM    sodium zirconium cyclosilicate  5 g Oral Daily    vancomycin  125 mg Oral Q6H      Continuous Infusions:    norepinephrine bitartrate-D5W Stopped (10/11/20 0514)      PRN   acetaminophen, albuterol sulfate, calcium gluconate IVPB, calcium gluconate IVPB, calcium gluconate IVPB, dextrose 50%, dextrose 50%, glucagon (human recombinant), glucose, glucose, insulin aspart U-100, magnesium oxide, magnesium oxide, magnesium sulfate IVPB, magnesium sulfate IVPB, ondansetron, potassium chloride in water **AND** potassium chloride in water **AND** potassium chloride in water, potassium chloride, potassium chloride, potassium chloride, potassium, sodium phosphates, potassium, sodium phosphates, potassium, sodium phosphates, sodium phosphate IVPB, sodium phosphate IVPB, sodium phosphate IVPB, Pharmacy to dose Vancomycin consult **AND** vancomycin - pharmacy to dose, white petrolatum      Conclusion echo 9/26/2020     · The estimated PA systolic pressure is 71 mmHg.  · The estimated ejection fraction is 46%.  · Diastolic dysfunction.  · There are segmental left ventricular wall motion abnormalities.  · Mitral valve annuloplasty ring.          Assessment       Active Hospital Problems    Diagnosis    *CHF (congestive heart failure)    Diaphragm dysfunction    Atelectasis    Unresponsive episode    Enterococcal bacteremia    Pseudomonal pneumonia    Shock    Atrial fibrillation and flutter    Acute on chronic heart failure    COPD (chronic obstructive pulmonary disease)    Acute on chronic respiratory failure with hypoxia and hypercapnia    Anemia, chronic disease    Stage  5 chronic kidney disease on chronic dialysis    Open wound of left heel    Chronic kidney disease with end stage renal failure on dialysis      My Impression:  Acute on chronic hypoxemic / hypercapnic respiratory failrue with some component of acute lower respiratory tract infection, but her neuromuscular weakness seems to be the main pathology driving her inability to be liberated from mechanical ventilation.  Improving sepsis 2/2 line sepsis + HAP with slowly improving fever curve.  Enterococcus bacteremia and C difficile enterocolitis are problematic.  Failure to thrive    Plan     Neuro  · Holding sedation, awake on the ventilator.    Pulmonary  · Prolonged trial of PS daily.  · Infectious Disease is following and managing aBX.  · Continue pip/tazo for another day  · Continued pursuit of a negative fluid balance with HD.  · Up in a chair.    Cardiac/Vascular  · Continued pursue of a net neutral fluid balance.  · Continue home DOAC + BB + amiodarone.    Renal/  · Nephrology following.  · Dialysis at their direction.    Hematology  · No indication for blood products.  · Observation for now.    ID:  · Pseudomonal pneumonia and enterococcal bacteremia and C difficile enterocolitis.  · Infectious Disease is following.  Antibiotics at their direction.  · Trend fever curve for now.    Endocrine  · Continue levothyroxine.  · Serial blood glucose monitoring.  · Sliding scale insulin as needed to maintain moderate glycemic control.    GI  · H2 blocker for stress ulcer ppx.  · Enteral feeds.    I had a long discussion with patient's  .  We confirmed the previously established goals of care.  He would like to attempt to liberate her from mechanical ventilation.  However, if it becomes apparent that this is not feasible, he would not want to proceed with a tracheostomy.  If in the following days she does not clinically improve, we can consider transitioning to comfort measures and terminally extubating.  In the  meantime, if she develops cardiac arrest, initiation of advanced cardiac life support would not be in keeping with the patient's wishes.      10/10 above from other pulm doctors, below from Dr Mcfadden: no fever since 10/7, po vanc/zosyn/l;evaquin, pseudomona in sputum sensitive- on double coverage per id.  Creat 4.2 procal 64 on 6th,   Ct chest 10/8 right ll> lll consolidation,   cxr 7th oct - rll dense.   c diff ag + but toxin neg.    Pt has not been  Able to breath supine for yrs, had low ef in past but last echo ef into 40's with pulm htn.  Pt had poor loc now, tsh level sl high- had been on synthroid.  No pft on epic search.  Used incruse and neb at home.      Will monitor vital capacity and neg insp force.  If cannot generate reasonable vital capacity/nif- trach needed as couldn't tolerate niv.      Has ivc filter and on anticoag.    Would increase synthroid to 75- doubt hypothryoid big component.    Suspect bilat diaphragm dysfunction- supine erect vc may help?  Ammonia up 5 months ago- will fu.      F/u cxr am. High level psv may correct lower lung atelectasis.    10/11-creat 3.1 today.  procal 21.6 from 64 on 6th.  No vital capacity recorded nor nif.  Ammonia yesterday was good at 19.  No cxr done.    cxr am ordered.    Nip 18 and vital capacity 180 cc- resp therapy relates had good effort.    Discussed with .  Discussed pt can breath on cpap/psv 5/15- can reproduce with niv.  Discussed resp mechanics are so so poor- doubt pt can stay off vent.  Would f/u mechanics - discussed removal from vent, niv, and having comfort rx if (or as) needed.      The following were evaluated and adjusted as needed: mechanical ventilator settings and weaning status, intravenous fluids and nutritional status, sedation and neurologic status, antibiotics, hemodynamics, support tubes and access lines and invasive monitoring, acid base balance and oxygenation needs, input and output and renal status and CODE STATUS/OUTLOOK  DISCUSSED WITH AVAILABLE NEXT OF  KIN       Critical Care  - THE PATIENT HAS A HIGH PROBABILITY OF IMMINENT OR LIFE THREATENING DETERIORATION.  Over 50%time of encounter was in direct care at bedside.  Time was 30 to 74 minutes required for patient care.  Time needed for all of the above totaled 43 minutes.

## 2020-10-11 NOTE — CARE UPDATE
10/11/20 0756   PRE-TX-O2   Oxygen Concentration (%) 40   SpO2 100 %   Pulse 75   Resp (!) 30   Vent Select   Charged w/in last 24h YES   Preset Conventional Ventilator Settings   Ventilation Type VC   Vent Mode Spont   Vt Set 390 mL   PEEP/CPAP 5 cmH20   Pressure Support 15 cmH20   Waveform RAMP   Peak Flow 75 L/min   Plateau Set/Insp. Hold (sec) 0   Insp Rise Time  50 %   Trigger Sensitivity Flow/I-Trigger 1 L/min   Patient Ventilator Parameters   Resp Rate Total 27 br/min   Peak Airway Pressure 21 cmH2O   Mean Airway Pressure 10 cmH20   Plateau Pressure 0 cmH20   Exhaled Vt 413 mL   Total Ve 10.6 mL   Spont Ve 10.6 L   I:E Ratio Measured 1:2.30   Conventional Ventilator Alarms   Resp Rate High Alarm 50 br/min   Press High Alarm 60 cmH2O   Apnea Rate 28   Apnea Volume (mL) 0 mL   Apnea Oxygen Concentration  100   Apnea Flow Rate (L/min) 75   T Apnea 20 sec(s)   Ready to Wean/Extubation Screen   FIO2<=50 (chart decimal) 0.4   MV<16L (chart vol.) 10.6   PEEP <=8 (chart #) 5   Ready to Wean Parameters   F/VT Ratio<105 (RSBI) (!) 72.64   PSV TRIALS

## 2020-10-12 NOTE — SUBJECTIVE & OBJECTIVE
Interval History:  Patient almost extubated herself yesterday while  was present  She is not wanting to continue being intubated  Patient is not able to be weaned and does not wish to have trach    Review of Systems  Objective:     Vital Signs (Most Recent):  Temp: 96.8 °F (36 °C) (10/12/20 0702)  Pulse: 78 (10/12/20 0905)  Resp: (!) 24 (10/12/20 0900)  BP: (!) 113/55 (10/12/20 0900)  SpO2: 97 % (10/12/20 0900) Vital Signs (24h Range):  Temp:  [96.8 °F (36 °C)-97.7 °F (36.5 °C)] 96.8 °F (36 °C)  Pulse:  [69-78] 78  Resp:  [19-32] 24  SpO2:  [93 %-100 %] 97 %  BP: ()/(45-60) 113/55     Weight: 83.7 kg (184 lb 8.4 oz)  Body mass index is 29.78 kg/m².    Intake/Output Summary (Last 24 hours) at 10/12/2020 0910  Last data filed at 10/12/2020 0900  Gross per 24 hour   Intake 1690 ml   Output 400 ml   Net 1290 ml      Physical Exam  Patient is awake will answer questions by nodding  HEENT sclerae nonicteric endotracheal tube is in place  Neck is supple nontender  Lungs moderate air movement coarse breath sounds symmetric chest wall expansion  Patient intubated  Heart regular rate and rhythm no rub no murmur  Abdomen is soft nontender   exam Chi is in place  Neuro patient is alert she is oriented moves all extremities equally  Skin acute is left Achilles area    Significant Labs:   BMP:   Recent Labs   Lab 10/12/20  0425   *      K 3.4*   CL 97   CO2 27   BUN 42*   CREATININE 4.0*   CALCIUM 7.6*   MG 2.6     CBC:   Recent Labs   Lab 10/12/20  0408   HCT 33*     CMP:   Recent Labs   Lab 10/10/20  1500 10/11/20  0637 10/12/20  0425   * 136 136   K 3.4* 3.7 3.4*   CL 97 96 97   CO2 24 27 27   * 190* 133*   BUN 22* 33* 42*   CREATININE 2.2* 3.1* 4.0*   CALCIUM 7.9* 7.8* 7.6*   PROT  --  5.3* 5.3*   ALBUMIN  --  1.6* 1.6*   BILITOT  --  0.8 0.8   ALKPHOS  --  156* 152*   AST  --  32 21   ALT  --  24 22   ANIONGAP 12 13 12   EGFRNONAA 24.0* 15.9* 11.6*       Significant Imaging:

## 2020-10-12 NOTE — PLAN OF CARE
10/12/20 0723   Patient Assessment/Suction   Level of Consciousness (AVPU) alert   Respiratory Effort Normal;Unlabored   Expansion/Accessory Muscles/Retractions expansion symmetric   All Lung Fields Breath Sounds coarse;diminished   YULIET Breath Sounds coarse   LLL Breath Sounds diminished   RUL Breath Sounds coarse   RML Breath Sounds coarse   RLL Breath Sounds diminished   Rhythm/Pattern, Respiratory assisted mechanically   Cough Frequency with stimulation   Cough Type weak;assisted   Suction Method oral;tracheal   $ Suction Charges Inline Suction Procedure Stat Charge   Secretions Amount large   Secretions Color tan   Secretions Characteristics thick   PRE-TX-O2   Oxygen Concentration (%) 30   SpO2 (!) 94 %   Pulse 72   Resp 20      Airway Anesthesia 09/29/20   Placement Date/Time: 09/29/20 (c) 1266   Method of Intubation: Video Laryngoscopy  Airway Device: Endotracheal Tube  Mask Ventilation: Easy  Intubated: Postinduction  Airway Device Size: 8.0  Cuffed: Cuffed  Complicating Factors: None  Findings Post-I...   Secured at 24 cm   Measured At Lips   Secured Location Right   Secured by Commercial tube [email or web address]   Bite Block none   Site Condition Cool;Dry   Status Intact;Secured   Site Assessment Clean;Dry;No bleeding   Vent Select   Conventional Vent Y   $ Ventilator Subsequent 1   Charged w/in last 24h YES   Preset Conventional Ventilator Settings   Vent ID [id number]   Vent Type    Ventilation Type VC   Vent Mode Spont   Humidity HME   Set Rate 18 BPM   Vt Set 390 mL   PEEP/CPAP 5 cmH20   Pressure Support 15 cmH20   Waveform RAMP   Peak Flow 75 L/min   Plateau Set/Insp. Hold (sec) 0   Insp Rise Time  50 %   Patient Ventilator Parameters   Resp Rate Total 20 br/min   Peak Airway Pressure 21 cmH2O   Mean Airway Pressure 9.2 cmH20   Plateau Pressure 0 cmH20   Exhaled Vt 408 mL   Total Ve 7.95 mL   Spont Ve 7.95 L   I:E Ratio Measured 1:2.00   Conventional Ventilator Alarms   Resp Rate High Alarm 50 br/min   Press High  Alarm 60 cmH2O   Apnea Rate 28   Apnea Volume (mL) 0 mL   Apnea Oxygen Concentration  100   Apnea Flow Rate (L/min) 75   T Apnea 20 sec(s)   Ready to Wean/Extubation Screen   FIO2<=50 (chart decimal) 0.3   MV<16L (chart vol.) 7.95   PEEP <=8 (chart #) 5   Ready to Wean Parameters   F/VT Ratio<105 (RSBI) (!) 49.02   Respiratory Evaluation   $ Care Plan Tech Time 15 min   Continue cpap trials and tx prn

## 2020-10-12 NOTE — PROGRESS NOTES
[facility]  Pulmonary / Critical Care Medicine  Progress Note    Subjective     9/29:  No major issues overnight.  Tolerating SBT.  Off of sedation all night.   9/30:  Failed extubation and was re-intubated yesterday evening.  No issues since.  10/1:  No major issues overnight.  Remains intubated.  Off of sedation.  10/2/2020 - Remains critically ill, no new issues overnight, respiratory reports significant secretions.  Fever curve good.  No new weight, cumulative I/O 2.1 liters +., vital signs OK.  Ventilating pressures good, Pplat - 25, Ve - 14,  P/F -  258, attempted brief SBT but sTV only 150 - 200 (not ready to wean/extubate).  Labs reviewed.  10/3- remains on vent, on Levophed 0.022 mcg/kg/min, afebrile, HR controlled in 70s-90s. Completed HD this am with removal of 4L fluid.  10/4- remains on vent,  Febrile to 103, sedated w/ propofol, oxygenation improved compared to yesterday. Requiring min levophed  10/5:  Remains intubated but not sedated.  Fever curve down trending.  Tolerating relatively minimal ventilator settings, but she becomes hypercapnic and hypo ventilates with placed pressure support ventilation.  She is only requiring low-dose vasopressors today.  10/6:  Continues spike fevers despite receiving antibiotics yesterday.  Rapidly developed hypercapnia while on pressure support ventilation this morning.  She remains off sedation.  10/7:  No major issues overnight.  Fever curve down trending.  More alert today.  Dialyzed yesterday and central lines removed.  10/8:  No major issues overnight.  Fever cure downtrending.  Alert this morning.  No new issues.  Remains intubated/sedated.  10/9  the patient is awake on the ventilator.  She is on dialysis.  She is afebrile.  The plan is to leave her on the ventilator going forward.  Dr. Hui will reassess on Monday.  The  states at that point, or may be later on during the week, they may consider withdrawing care.  10/10  below from   Bogdan: pt min arouses?  10/11 no new c/o  10/12 the patient almost extubated herself yesterday she did not want to continue living like this.  Apparently her  was in the room at the time.  The patient is not weanable.          Review of Systems   Unable to perform ROS: Intubated      I have personally reviewed the following during today's evaluation:  past medical history, ROS, family history, social history, surgical history, current inpatient medications,drug allergies, vital signs over the past 24 hours, results of relevant diagnostic studies and nursing/provider documentation from the past 24 hours.     Objective     VS Temp:  [96.8 °F (36 °C)-97.7 °F (36.5 °C)]   Pulse:  [69-78]   Resp:  [19-32]   BP: ()/(45-60)   SpO2:  [93 %-100 %]   Ideal body weight: 59.3 kg (130 lb 11.7 oz)  Adjusted ideal body weight: 69.1 kg (152 lb 4 oz)   I/O   Intake/Output Summary (Last 24 hours) at 10/12/2020 0831  Last data filed at 10/12/2020 0800  Gross per 24 hour   Intake 1690 ml   Output 400 ml   Net 1290 ml        Vent SpO2 97%   Vent Mode: A/C  Oxygen Concentration (%):  [30-40] 30  Resp Rate Total:  [19 br/min-39 br/min] 19 br/min  Vt Set:  [390 mL] 390 mL  PEEP/CPAP:  [5 cmH20] 5 cmH20  Pressure Support:  [0 cmH20-15 cmH20] 0 cmH20  Mean Airway Pressure:  [6.4 ceZ51-76 cmH20] 11 cmH20     PE Physical Exam   Constitutional: She appears well-developed and well-nourished. She is cooperative.  Non-toxic appearance. No distress. She is intubated and restrained.   Intubated, not on sedation.  She is awake and nods her head appropriately.   HENT:   Head: Normocephalic and atraumatic.   Mouth/Throat: Uvula is midline and mucous membranes are normal. Mallampati Score: unable to assess.   She is orally intubated.  OG tube also. There are eschar in both nares.   Neck: Trachea normal and normal range of motion. Neck supple. No JVD present. No thyromegaly present.   Cardiovascular: Normal rate, regular rhythm, normal heart  sounds and intact distal pulses.   Pulmonary/Chest: Normal expansion, symmetric chest wall expansion, effort normal and breath sounds normal. She is intubated. She has no wheezes. She has no rhonchi. She has no rales.   Abdominal: Soft. Bowel sounds are normal. She exhibits no distension. There is no abdominal tenderness.   There is a rectal tube in place with liquid stool draining   Musculoskeletal: Normal range of motion.         General: No tenderness, deformity or edema.      Comments: LUE AVJAMES   Lymphadenopathy: No supraclavicular adenopathy is present.     She has no cervical adenopathy.     She has no axillary adenopathy.   Neurological: She has normal strength. No cranial nerve deficit or sensory deficit. She exhibits normal muscle tone. GCS eye subscore is 4. GCS verbal subscore is 5. GCS motor subscore is 6.   Skin: Skin is warm, dry and intact. No rash noted.   There is a decubitus to the left Achilles area   Nursing note and vitals reviewed.      Labs I have personally reviewed and interpreted all labs / diagnostic studies obtained over the past 24 hours, and relevant results are as follows:  Recent Labs   Lab 10/12/20  0408 10/12/20  0425   HCT 33*  --    NA  --  136   K  --  3.4*   CL  --  97   CO2  --  27   BUN  --  42*   CREATININE  --  4.0*   MG  --  2.6   ALT  --  22   AST  --  21   ALKPHOS  --  152*   BILITOT  --  0.8   PROT  --  5.3*   ALBUMIN  --  1.6*   PH 7.298*  --    PCO2 61.5*  --    PO2 135*  --    HCO3 30.1*  --    POCSATURATED 99  --    BE 4  --       Procalcitonin:  63.77     Imaging I have personally reviewed and interpreted the following images and reviewed the associated Radiology report.  I have reviewed and interpreted all pertinent imaging results/findings within the past 24 hours.  CXR:  9/30:  Interval introduction of NG tube. Additional lines and support devices are in position as above.  Persistence of bilateral parahilar and lower lung zone interstitial and alveolar opacities  and small bilateral effusions. Stable cardiomegaly.  CXR 10/3- ETT in place, sternotomy wires, with bilateral pulmonary edema and retrocardiac opacification w/ blunting of costophrenic angles bilaterally  CXR 10/4- ETT in place, unchanged bilateral infiltrates & R pleural effusion    CT chest, abdomen, and pelvis, 10/8  1. Bilateral pulmonary opacities as described, perhaps reflecting  consolidation, atelectasis, minimal alveolar edema, or some  combination thereof.  2. Tiny right pleural effusion and trace left pleural fluid.  3. Cardiomegaly and native coronary artery calcification with  postsurgical changes of CABG. Reflux of right atrial contrast into  distended intrahepatic IVC and hepatic veins suggest elevated right  atrial pressure/right heart failure.  4. Moderate-sized dermal defect near site of left upper extremity  arteriovenous fistula, containing surgical packing.  5. Hyperdensity in gallbladder suggest cholelithiasis or sludge.  6. Moderate amount of stool focally in rectum with mild rectal  distention and very mild perirectal fat stranding. A rectal catheter  is in place. In the appropriate clinical setting, this could represent  mild stercoral colitis.  7. Diffuse subcutaneous edema throughout the abdomen and pelvis.      Micro I have personally reviewed and interpreted the available culture data.  Relevant results are as follows.  Blood Culture   Lab Results   Component Value Date    LABBLOO No growth after 5 days. 10/06/2020   , Sputum Culture   Lab Results   Component Value Date    GSRESP <10 epithelial cells per low power field. 10/04/2020    GSRESP Many WBC's 10/04/2020    GSRESP Few Gram positive rods 10/04/2020    GSRESP Many Gram negative rods 10/04/2020    GSRESP Few Gram positive cocci 10/04/2020    RESPIRATORYC Reduced normal respiratory nyla 10/04/2020    RESPIRATORYC PSEUDOMONAS AERUGINOSA  Many   (A) 10/04/2020    and Urine Culture    Lab Results   Component Value Date    LABURIN (A)  09/25/2020     PRESUMPTIVE JORDIN ALBICANS  10,000 - 49,999 cfu/ml        Medications Scheduled    amiodarone  200 mg Oral BID    apixaban  2.5 mg Oral BID    collagenase   Topical (Top) Daily    famotidine (PF)  20 mg Intravenous Daily    levoFLOXacin  250 mg Intravenous Q48H    levothyroxine  75 mcg Oral Before breakfast    metoprolol succinate  25 mg Oral Daily    miconazole NITRATE 2 %   Topical (Top) BID    piperacillin-tazobactam (ZOSYN) IVPB  3.375 g Intravenous Q12H    rosuvastatin  10 mg Oral Daily    sevelamer carbonate  1,600 mg Oral TID WM    sodium zirconium cyclosilicate  5 g Oral Daily    vancomycin  125 mg Oral Q6H      Continuous Infusions:    norepinephrine bitartrate-D5W Stopped (10/11/20 0514)      PRN   acetaminophen, albuterol sulfate, calcium gluconate IVPB, calcium gluconate IVPB, calcium gluconate IVPB, dextrose 50%, dextrose 50%, glucagon (human recombinant), glucose, glucose, insulin aspart U-100, magnesium oxide, magnesium oxide, magnesium sulfate IVPB, magnesium sulfate IVPB, ondansetron, potassium chloride in water **AND** potassium chloride in water **AND** potassium chloride in water, potassium chloride, potassium chloride, potassium chloride, potassium, sodium phosphates, potassium, sodium phosphates, potassium, sodium phosphates, sodium phosphate IVPB, sodium phosphate IVPB, sodium phosphate IVPB, Pharmacy to dose Vancomycin consult **AND** vancomycin - pharmacy to dose, white petrolatum      Conclusion echo 9/26/2020     · The estimated PA systolic pressure is 71 mmHg.  · The estimated ejection fraction is 46%.  · Diastolic dysfunction.  · There are segmental left ventricular wall motion abnormalities.  · Mitral valve annuloplasty ring.          Assessment       Active Hospital Problems    Diagnosis    *CHF (congestive heart failure)    Diaphragm dysfunction    Atelectasis    Unresponsive episode    Enterococcal bacteremia    Pseudomonal pneumonia    Shock     Atrial fibrillation and flutter    Acute on chronic heart failure    COPD (chronic obstructive pulmonary disease)    Acute on chronic respiratory failure with hypoxia and hypercapnia    Anemia, chronic disease    Stage 5 chronic kidney disease on chronic dialysis    Open wound of left heel    Chronic kidney disease with end stage renal failure on dialysis      My Impression:  Acute on chronic hypoxemic / hypercapnic respiratory failrue with  neuromuscular weakness persists.  The patient peak comes progressively more hypercapnic with attempts at pressure support ventilation  Enterococcus bacteremia and C difficile enterocolitis are problematic.  Failure to thrive and no desire to continue living on the ventilator    Plan     Neuro  · Holding sedation, awake on the ventilator.    Pulmonary  · Assist control ventilation as pressure support is insufficient to maintain her pH despite an increased bicarbonate  · Infectious Disease is following and managing aBX.  · Continued pursuit of a negative fluid balance with HD.  · Up in a chair.    Cardiac/Vascular  · Continued pursue of a net neutral fluid balance.  · Continue home DOAC + BB + amiodarone.    Renal/  · Nephrology following.  · Dialysis at their direction.    Hematology  · No indication for blood products.  · Observation for now.    ID:  · Pseudomonal pneumonia and enterococcal bacteremia and C difficile enterocolitis.  · Infectious Disease is following.  Antibiotics at their direction.  · Afebrile for days    Endocrine  · Continue levothyroxine.  · Serial blood glucose monitoring.  · Sliding scale insulin as needed to maintain moderate glycemic control.    GI  · H2 blocker for stress ulcer ppx.  · Enteral feeds.    I had a long discussion with patient's  .  We confirmed the previously established goals of care.  He would like to attempt to liberate her from mechanical ventilation.  However, if it becomes apparent that this is not feasible, he would  not want to proceed with a tracheostomy.  If in the following days she does not clinically improve, we can consider transitioning to comfort measures and terminally extubating.  In the meantime, if she develops cardiac arrest, initiation of advanced cardiac life support would not be in keeping with the patient's wishes.      10/10 above from other pulm doctors, below from Dr Mcfadden: no fever since 10/7, po vanc/zosyn/l;evaquin, pseudomona in sputum sensitive- on double coverage per id.  Creat 4.2 procal 64 on 6th,   Ct chest 10/8 right ll> lll consolidation,   cxr 7th oct - rll dense.   c diff ag + but toxin neg.    Pt has not been  Able to breath supine for yrs, had low ef in past but last echo ef into 40's with pulm htn.  Pt had poor loc now, tsh level sl high- had been on synthroid.  No pft on epic search.  Used incruse and neb at home.      Will monitor vital capacity and neg insp force.  If cannot generate reasonable vital capacity/nif- trach needed as couldn't tolerate niv.      Has ivc filter and on anticoag.    Would increase synthroid to 75- doubt hypothryoid big component.    Suspect bilat diaphragm dysfunction- supine erect vc may help?  Ammonia up 5 months ago- will fu.      F/u cxr am. High level psv may correct lower lung atelectasis.    10/11-creat 3.1 today.  procal 21.6 from 64 on 6th.  No vital capacity recorded nor nif.  Ammonia yesterday was good at 19.  No cxr done.    cxr am ordered.    Nip 18 and vital capacity 180 cc- resp therapy relates had good effort.    Discussed with .  Discussed pt can breath on cpap/psv 5/15- can reproduce with niv.  Discussed resp mechanics are so so poor- doubt pt can stay off vent.  Would f/u mechanics - discussed removal from vent, niv, and having comfort rx if (or as) needed.    10/12 the patient states that she does not wish to continue on the ventilator.  Will await her 's arrival and see if they are ready to move to comfort care she did not  tolerate pressure support ventilation.

## 2020-10-12 NOTE — PLAN OF CARE
Problem: Fall Injury Risk  Goal: Absence of Fall and Fall-Related Injury  Intervention: Identify and Manage Contributors to Fall Injury Risk  Flowsheets (Taken 10/12/2020 1344)  Self-Care Promotion:   independence encouraged   safe use of adaptive equipment encouraged  Medication Review/Management: medications reviewed     Problem: Adult Inpatient Plan of Care  Goal: Optimal Comfort and Wellbeing  Intervention: Provide Person-Centered Care  Flowsheets (Taken 10/12/2020 1344)  Trust Relationship/Rapport:   care explained   thoughts/feelings acknowledged   reassurance provided     Problem: Infection  Goal: Infection Symptom Resolution  Intervention: Prevent or Manage Infection  Flowsheets (Taken 10/12/2020 1344)  Isolation Precautions: protective environment maintained

## 2020-10-12 NOTE — PROGRESS NOTES
The patient's  agrees that she would not want to continue living like this.  He would like her to be asleep 1 comfort care is initiated.  He would like to do this tomorrow morning at 9:00 a.m. when his children could be with him.  Will plan on placing the patient on propofol at 9:00 a.m. and then removing the endotracheal tube so that she is asleep and does not suffer.

## 2020-10-12 NOTE — PLAN OF CARE
10/11/20 1957   Patient Assessment/Suction   Level of Consciousness (AVPU) alert   Respiratory Effort Normal;Unlabored   Expansion/Accessory Muscles/Retractions expansion symmetric;no retractions;no use of accessory muscles   All Lung Fields Breath Sounds coarse;diminished;equal bilaterally   $ Suction Charges Inline Suction Procedure Stat Charge   Secretions Amount small   Secretions Color yellow   Secretions Characteristics thick   PRE-TX-O2   O2 Device (Oxygen Therapy) ventilator   Oxygen Concentration (%) 40   SpO2 100 %   Pulse Oximetry Type Continuous   $ Pulse Oximetry - Multiple Charge Pulse Oximetry - Multiple   Pulse 78   Resp (!) 26   Aerosol Therapy   $ Aerosol Therapy Charges PRN treatment not required      Airway Anesthesia 09/29/20   Placement Date/Time: 09/29/20 (c) 0541   Method of Intubation: Video Laryngoscopy  Airway Device: Endotracheal Tube  Mask Ventilation: Easy  Intubated: Postinduction  Airway Device Size: 8.0  Cuffed: Cuffed  Complicating Factors: None  Findings Post-I...   Secured at 24 cm   Measured At Lips   Secured Location Center   Secured by Commercial tube mckinney   Bite Block none   Site Condition Cool;Dry   Status Intact;Secured;Patent   Site Assessment Clean;Dry   Cuff Volume   (mlt)   Vent Select   Conventional Vent Y   Charged w/in last 24h YES   Preset Conventional Ventilator Settings   Vent ID [id number]   Vent Type    Ventilation Type VC   Vent Mode Spont   Humidity HME   Vt Set 390 mL   PEEP/CPAP 5 cmH20   Pressure Support 15 cmH20   Waveform RAMP   Peak Flow 75 L/min   Plateau Set/Insp. Hold (sec) 0   Insp Rise Time  50 %   Patient Ventilator Parameters   Resp Rate Total 27 br/min   Peak Airway Pressure 20 cmH2O   Mean Airway Pressure 10 cmH20   Plateau Pressure 0 cmH20   Exhaled Vt 423 mL   Total Ve 10.8 mL   Spont Ve 10.8 L   I:E Ratio Measured 1:1.70   Tubing ID (mm) 8 mm   Tube Type ET   Conventional Ventilator Alarms   Alarms On Y   Resp Rate High Alarm 50 br/min    Press High Alarm 60 cmH2O   Apnea Rate 28   Apnea Volume (mL) 0 mL   Apnea Oxygen Concentration  100   Apnea Flow Rate (L/min) 75   T Apnea 20 sec(s)   Ready to Wean/Extubation Screen   FIO2<=50 (chart decimal) 0.4   MV<16L (chart vol.) 10.8   PEEP <=8 (chart #) 5   Ready to Wean Parameters   F/VT Ratio<105 (RSBI) (!) 61.47   Respiratory Evaluation   $ Care Plan Tech Time 15 min   Evaluation For   (careplan)

## 2020-10-12 NOTE — CONSULTS
Nephrology Consult Note         Patient Name: Juliane Ramos  MRN: [id number]    Patient Class: IP- Inpatient   Admission Date: 9/24/2020  Length of Stay: 18 days  Date of Service: 10/12/2020    Attending Physician: Oscar Ruth DO  Primary Care Provider: JOS Dumont    Reason for Consult: esrd/anemia/hyperkalemia/hypotension/shpt/tachycardia    SUBJECTIVE:     HPI: 60F hx ESRD HD (T,Th, Sat)  CAD/CABG ,EF20%?, COPD, DM ,chronic hypotension on midodrine daily presents to ED with complaints of having low blood pressure and rapid heart rate. In the emergency department patient had a heart rate in the 130s regular and SBP in 100s  Patient denies shortness of breath, chest pain, fever, chills, abdominal pain, nausea, vomiting. Last dialysis on Tuesday and she did not have any volume removed.  Patient has been given approximately 500 cc of IV fluids in the emergency department at remains tachycardic in the 120s. Cards eval is pending, Amiodarone was started.    9/25 Getting HD for hyperkalemia today, before cardioversion. Continue HD per TTS schedule.  9/26 Seen and examined on Hd today, tolerating well. Continue HD per TTS schedule.   9/28  Intubated.  Sedated. TTS dialysis planned  9/29  Seen today on dialysis.  Tolerating well.  Still intubated, arousable, good eye contact.  No lab results yet today.  10/1  Intubated.  Unresponsive.     10/2  Makes eye contact.  No distress.  afebrile  10/3  Seen on rounds.  Seen on dialysis.  Intubated.  Sedate  10/4  Failed sedation vacation.    10/5 VSS, remains intubated, plan HD in AM.  10/6 VSS, seen and examined on HD. Significant third-spacing in dependent parts, BP is OK with increased pressor, trying for 4L as tolerated..  10/7 VSS, unable to keep her net negative with TIW dialysis due to high obligate intake, will start MWF UF as tolerated as well. UF today.  10/8 VSS, had HD today, tolerated well.  10/9 VSS, had HD today, tolerated well. Next HD in AM, mayra  "david lgive her a break on .  10/10 VSS, seen and examined on HD tolerating well. Next on Monday or PRN.  10/11 VSS, no new complains, still on vent. Next on Monday or PRN.  10/12 intubated.  sedated    Past Medical History:   Diagnosis Date    Anemia     Anemia in stage 3 chronic kidney disease 2017    Anticoagulant long-term use     CHF (congestive heart failure)     COPD (chronic obstructive pulmonary disease)     COPD exacerbation 3/10/2020    Coronary artery disease     Diabetes mellitus     Digestive disorder     Encounter for blood transfusion     Essential hypertension 2017    Hypertension     Low blood pressure     MI (myocardial infarction)     Segmental and subsegmental pulmonary emboli of RLL without acute cor pulmonale 2017    Stroke     2005    Thyroid disease     Traumatic subarachnoid hemorrhage 2017    Type 2 diabetes mellitus with stage 3 chronic kidney disease, without long-term current use of insulin 2017     Past Surgical History:   Procedure Laterality Date    ABCESS DRAINAGE Right     Between "little toe" and the one next to it    BREAST SURGERY      Reduction [id number]    CARDIAC SURGERY  2011    CABG 4vessel ring in one valve mitral valve prolapse    CORONARY ARTERY BYPASS GRAFT      DEBRIDEMENT OF FOOT Left 2020    Procedure: DEBRIDEMENT, FOOT;  Surgeon: Dennis Wick DPM;  Location: [facility] OR;  Service: Podiatry;  Laterality: Left;    FOOT SURGERY      4 grafts to left foot    hyperlipidemia      TUBAL LIGATION  1986     Family History   Problem Relation Age of Onset    Arthritis Mother     Heart disease Father     Cancer Father 68        prostae and bladder ca  in     Diabetes Father     Hypertension Father     Depression Sister     Hypertension Son     Diabetes Maternal Grandmother         lost leg    Kidney disease Paternal Grandfather         had kidney removed    Stroke Paternal " "Grandfather      Social History     Tobacco Use    Smoking status: Never Smoker    Smokeless tobacco: Never Used   Substance Use Topics    Alcohol use: Yes     Alcohol/week: 1.0 - 2.0 standard drinks     Types: 1 - 2 Glasses of wine per week     Comment: "socially" not in awhile    Drug use: No       Review of patient's allergies indicates:  No Known Allergies    Outpatient meds:  No current facility-administered medications on file prior to encounter.      Current Outpatient Medications on File Prior to Encounter   Medication Sig Dispense Refill    gabapentin (NEURONTIN) 100 MG capsule Take 100 mg by mouth 3 (three) times daily.  3    midodrine (PROAMATINE) 5 MG Tab Take 5 mg by mouth 3 (three) times daily with meals.       rosuvastatin (CRESTOR) 10 MG tablet Take 10 mg by mouth every evening.       albuterol-ipratropium (DUO-NEB) 2.5 mg-0.5 mg/3 mL nebulizer solution Take 3 mLs by nebulization every 6 (six) hours. Rescue 1 Box 11    apixaban 5 mg Tab Take 1 tablet (5 mg total) by mouth 2 (two) times daily. (Patient taking differently: Take 5 mg by mouth 2 (two) times daily. ) 60 tablet 1    blood sugar diagnostic Strp Test 3-4 times daily PRN      CLARITIN-D 12 HOUR 5-120 mg per tablet Take 1 tablet by mouth once daily.   0    fluticasone (FLONASE) 50 mcg/actuation nasal spray 1 spray by Each Nostril route once daily.   12    insulin aspart (NOVOLOG) 100 unit/mL InPn pen Inject 1-10 Units into the skin 3 (three) times daily. (Patient taking differently: Inject 1-10 Units into the skin 3 (three) times daily. If sugar >150 on sliding scale) 3 mL 1    levothyroxine (SYNTHROID) 50 MCG tablet Take 50 mcg by mouth before breakfast.       pen needle, diabetic (BD ULTRA-FINE OBDULIA PEN NEEDLE) 32 gauge x 5/32" Ndle       ramelteon (ROZEREM) 8 mg tablet Take 8 mg by mouth every evening.      sevelamer carbonate (RENVELA) 800 mg Tab Take 1,600 mg by mouth 3 (three) times daily with meals.      tiotropium " (SPIRIVA) 18 mcg inhalation capsule Inhale 1 capsule (18 mcg total) into the lungs once daily. Controller 30 capsule 11    umeclidinium (INCRUSE ELLIPTA) 62.5 mcg/actuation inhalation capsule Inhale 62.5 mcg into the lungs once daily. Controller         Scheduled meds:   sodium chloride 0.9%   Intravenous Once    amiodarone  200 mg Oral BID    apixaban  2.5 mg Oral BID    collagenase   Topical (Top) Daily    famotidine (PF)  20 mg Intravenous Daily    levoFLOXacin  250 mg Intravenous Q48H    levothyroxine  75 mcg Oral Before breakfast    metoprolol succinate  25 mg Oral Daily    miconazole NITRATE 2 %   Topical (Top) BID    piperacillin-tazobactam (ZOSYN) IVPB  3.375 g Intravenous Q12H    rosuvastatin  10 mg Oral Daily    sevelamer carbonate  1,600 mg Oral TID WM    sodium zirconium cyclosilicate  5 g Oral Daily    vancomycin  125 mg Oral Q6H       Infusions:   norepinephrine bitartrate-D5W Stopped (10/11/20 0514)       PRN meds:  sodium chloride 0.9%, acetaminophen, albuterol sulfate, calcium gluconate IVPB, calcium gluconate IVPB, calcium gluconate IVPB, dextrose 50%, dextrose 50%, glucagon (human recombinant), glucose, glucose, insulin aspart U-100, magnesium oxide, magnesium oxide, magnesium sulfate IVPB, magnesium sulfate IVPB, ondansetron, potassium chloride in water **AND** potassium chloride in water **AND** potassium chloride in water, potassium chloride, potassium chloride, potassium chloride, potassium, sodium phosphates, potassium, sodium phosphates, potassium, sodium phosphates, sodium phosphate IVPB, sodium phosphate IVPB, sodium phosphate IVPB, Pharmacy to dose Vancomycin consult **AND** vancomycin - pharmacy to dose, white petrolatum    Review of Systems:  ROS    OBJECTIVE:     Vital Signs and IO (Last 24H):  Temp:  [96.8 °F (36 °C)-97.7 °F (36.5 °C)]   Pulse:  [69-78]   Resp:  [19-32]   BP: ()/(45-60)   SpO2:  [93 %-100 %]   I/O last 3 completed shifts:  In: 2477.1 [I.V.:97.1;  NG/GT:2130; IV Piggyback:250]  Out: 400 [Stool:400]    Wt Readings from Last 5 Encounters:   10/10/20 83.7 kg (184 lb 8.4 oz)   09/26/20 80.7 kg (177 lb 14.6 oz)   09/08/20 79.8 kg (176 lb)   09/04/20 80 kg (176 lb 5.9 oz)   09/02/20 80 kg (176 lb 5.9 oz)         Physical Exam:  Physical Exam  Constitutional:       Appearance: Normal appearance. She is well-developed. She is ill-appearing. She is not diaphoretic.   HENT:      Head: Normocephalic and atraumatic.   Eyes:      General: No scleral icterus.     Pupils: Pupils are equal, round, and reactive to light.   Neck:      Musculoskeletal: Neck supple.   Cardiovascular:      Rate and Rhythm: Normal rate and regular rhythm.   Pulmonary:      Effort: Pulmonary effort is normal. No respiratory distress.      Breath sounds: No stridor.   Abdominal:      General: There is no distension.      Palpations: Abdomen is soft.   Musculoskeletal: Normal range of motion.         General: Swelling present. No deformity.   Skin:     General: Skin is warm and dry.      Findings: No erythema or rash.   Neurological:      Cranial Nerves: No cranial nerve deficit.         Body mass index is 29.78 kg/m².    Laboratory:  Recent Labs   Lab 10/10/20  1500 10/11/20  0637 10/12/20  0425   * 136 136   K 3.4* 3.7 3.4*   CL 97 96 97   CO2 24 27 27   BUN 22* 33* 42*   CREATININE 2.2* 3.1* 4.0*   ESTGFRAFRICA 27.7* 18.3* 13.4*   EGFRNONAA 24.0* 15.9* 11.6*   * 190* 133*       Recent Labs   Lab 10/10/20  0642 10/10/20  1500 10/11/20  0637 10/12/20  0425   CALCIUM 8.0* 7.9* 7.8* 7.6*   ALBUMIN 1.5*  --  1.6* 1.6*   PHOS 1.8* 1.2* 2.0*  --    MG 2.7* 2.3 2.3 2.6             No results for input(s): POCTGLUCOSE in the last 168 hours.    Recent Labs   Lab 02/21/18  2120 09/12/18  1715 09/24/20  0630   Hemoglobin A1C 6.1 H 5.2 6.3 H       Recent Labs   Lab 10/07/20  0505 10/08/20  0419  10/09/20  0448 10/10/20  0437 10/12/20  0408   WBC 17.77* 10.66  --   --   --   --    HGB 11.1* 10.6*   --   --   --   --    HCT 34.6* 33.4*   < > 37 32* 33*   * 131*  --   --   --   --    MCV 86 87  --   --   --   --    MCHC 32.1 31.7*  --   --   --   --    MONO 8.0  1.4* 8.4  0.9  --   --   --   --     < > = values in this interval not displayed.       Recent Labs   Lab 10/10/20  0642 10/11/20  0637 10/12/20  0425   BILITOT 0.8 0.8 0.8   PROT 5.0* 5.3* 5.3*   ALBUMIN 1.5* 1.6* 1.6*   ALKPHOS 140* 156* 152*   ALT 18 24 22   AST 20 32 21       Recent Labs   Lab 11/24/17  1837 09/11/18  0246 09/25/20  1434   Color, UA Red A Yellow Yellow   Appearance, UA Cloudy A Hazy A Cloudy A   pH, UA 5.0 5.0 6.0   Specific Gravity, UA >1.030 A >=1.030 A 1.020   Protein, UA 2+ A 2+ A 2+ A   Glucose, UA 1+ A Negative Negative   Ketones, UA Negative Negative Negative   Urobilinogen, UA Negative Negative Negative   Bilirubin (UA) Negative Negative 1+ A   Occult Blood UA 3+ A 1+ A 3+ A   Nitrite, UA Negative Negative Negative   RBC, UA >100 H 5 H 83 H   WBC, UA 0 >100 H >100 H   Bacteria Rare Moderate A Occasional   Hyaline Casts, UA 0 0 1785 A       Recent Labs   Lab 10/09/20  0448 10/10/20  0437 10/12/20  0408   POC PH 7.311 L 7.336 L 7.298 L   POC PCO2 56.2 HH 55.7 H 61.5 HH   POC HCO3 28.4 H 29.7 H 30.1 H   POC PO2 119 H 97 135 H   POC SATURATED O2 98 97 99   POC BE 2 4 4   Sample ARTERIAL ARTERIAL ARTERIAL       Microbiology Results (last 7 days)     Procedure Component Value Units Date/Time    Blood culture [[id number]] Collected: 10/06/20 1641    Order Status: Completed Specimen: Blood Updated: 10/11/20 1832     Blood Culture, Routine No growth after 5 days.    Blood culture [[id number]] Collected: 10/06/20 1126    Order Status: Completed Specimen: Blood from AV Fistula, Left Updated: 10/11/20 1232     Blood Culture, Routine No growth after 5 days.    Narrative:      PLEASE draw 2 sets (4 bottles) 15 mins apart from 2 different  sites- One MUST include peripheral stick    Blood culture [[id number]] Collected: 10/03/20 2157     Order Status: Completed Specimen: Blood from Peripheral, Right Hand Updated: 10/08/20 1832     Blood Culture, Routine No growth after 5 days.    C Diff Toxin by PCR [[id number]]  (Abnormal) Collected: 10/08/20 0559    Order Status: Completed Updated: 10/08/20 1528     C. diff PCR Positive     Comment: result(s) called and verbal readback obtained from Riaz Houston RN on MICU, re: positive CDIFF PCR 10/8/20 at 1527 vcb by   VCB 10/08/2020 15:27         Narrative:        result(s) called and verbal readback obtained from Riaz Houston RN on MICU, re: positive CDIFF PCR 10/8/20 at 1527 vcb by   VCB 10/08/2020 15:27    Clostridium difficile EIA [[id number]]  (Abnormal) Collected: 10/08/20 0559    Order Status: Completed Specimen: Stool Updated: 10/08/20 1521     C. diff Antigen Positive     C difficile Toxins A+B, EIA Negative     Comment: Testing not recommended for children <24 months old.       Culture, Respiratory with Gram Stain [[id number]]  (Abnormal)  (Susceptibility) Collected: 10/04/20 1257    Order Status: Completed Specimen: Respiratory from Tracheal Aspirate Updated: 10/06/20 0632     Respiratory Culture Reduced normal respiratory nyla      PSEUDOMONAS AERUGINOSA  Many       Gram Stain (Respiratory) <10 epithelial cells per low power field.     Gram Stain (Respiratory) Many WBC's     Gram Stain (Respiratory) Few Gram positive rods     Gram Stain (Respiratory) Many Gram negative rods     Gram Stain (Respiratory) Few Gram positive cocci    Blood culture [[id number]]  (Abnormal)  (Susceptibility) Collected: 10/03/20 1558    Order Status: Completed Specimen: Blood from Line, Arterial, Right Updated: 10/06/20 0621     Blood Culture, Routine Gram stain patricia bottle: Gram positive cocci in pairs and chains      Results called to and read back by:Benito Mccormack RN/2BICU  10/04/2020        15:12 cea      ENTEROCOCCUS FAECALIS          ASSESSMENT/PLAN:     Active Hospital Problems    Diagnosis  POA    *CHF  (congestive heart failure) [I50.9]  Yes    Diaphragm dysfunction [J98.6]  Yes    Atelectasis [J98.11]  Yes    Unresponsive episode [R41.89]  Yes    Enterococcal bacteremia [R78.81, B95.2]  No    Pseudomonal pneumonia [J15.1]  Yes    Shock [R57.9]  Yes    Atrial fibrillation and flutter [I48.91, I48.92]  Unknown    Acute on chronic heart failure [I50.9]  Yes    COPD (chronic obstructive pulmonary disease) [J44.9]  Yes    Acute on chronic respiratory failure with hypoxia and hypercapnia [J96.21, J96.22]  Yes    Anemia, chronic disease [D63.8]  Yes    Stage 5 chronic kidney disease on chronic dialysis [N18.6, Z99.2]  Not Applicable    Open wound of left heel [S91.302A]  Yes    Chronic kidney disease with end stage renal failure on dialysis [N18.6, Z99.2]  Not Applicable      Resolved Hospital Problems    Diagnosis Date Resolved POA    Tachycardia [R00.0] 09/29/2020 Yes     ESRD on HD TTS via AVF  Hyperkalemia, mild  Hypotension  A.Flutter s/p CV 9/25 now on amiodarone, cant give midodrine  Significant third-spacing  Pneumonia  Unable to keep her net negative with TIW dialysis due to high obligate intake, will do PRN UF as well.  Continue Lokelma.  No IVs or BP checks on access and/or non-dominant arm.  Apprecaite cards eval.   Holding midodrine for now.    Anemia of CKD  Hgb and HCT are acceptable. Monitor.  Will provide YASHIRA and/or IV iron PRN.    MBD / Secondary HPT  Ca, phos, PTH and vitamin D levels are acceptable.   Phos binders, vitamin D analogues and calcimimetics as needed.    Right arm swelling-new onset  Duplex US shows no DVT.    Respiratory failure, intubated, PNA  Plan per Pulm, ID, primary team.    Thank you for allowing us to participate in the care of your patient!   We will follow the patient and provide recommendations as needed.    Elijah Gonzalez MD    [facility]  [address] [place] [address]  [place] [id number]    [phone] - tel  [phone] - fax    10/12/2020

## 2020-10-12 NOTE — PROGRESS NOTES
[facility]  Progress Note    Patient Name: Juliane Ramos  MRN: [id number]  Patient Class: IP- Inpatient   Admission Date: 9/24/2020  Length of Stay: 18 days  Attending Physician: Oscar Ruth DO  Primary Care Provider: JOS Dumont        Subjective:     Principal Problem:CHF (congestive heart failure)        HPI:  Patient is a 60-year-old female hx ESRD HD (T,Th, Sat)  CAD/CABG ,EF20%?, COPD, DM ,chronic hypotension  presents ED with complaints of having low blood pressure and rapid heart rate.  In the emergency department patient had a heart rate in the 130s regular and a blood pressure systolic of approximately 100  Patient denies shortness of breath chest pain fever chills abdominal pain nausea vomiting.  She states her last dialysis on Tuesday she did not have any volume removed.  Patient has been given approximately 500 cc of IV fluids in the emergency department at remains tachycardic in the 120s.      Overview/Hospital Course:  09/29  Assumed care. Chart reviewed. Labs reviewed:  End stage renal function. Receiving dialysis today. 2 liters to be removed. Discussed with dietician: tube feeds if not extubated.    09/30  Consultants' attendance noted and appreciated. Required re-intubation yesterday.  Labs reviewed: mild leukocytosis with normal hematocrit; minimal hyponatremia with end-stage renal function values. Remains intubated. Discussed with Dietician: start enteral feeds.    10/01  Consultants' attendance noted and appreciated.. Unable to extubate. Receiving dialysis this AM. Labs reviewed: leukocytosis, stable normocytic anemia; mild hyponatremia, otherwise normal electrolytes with end stage renal function    10/02  Consultants' attendance noted and appreciated.. Labs reviewed: leukocytosis persisting, yet improved; minimal hyponatremia otherwise normal electrolytes with end stage renal function. Telemetry reviewed: sinus. Remains intubated.    10/03  "Discussed with Pulmonology. Had dialysis this AM with 4 liters removed. Labs reviewed: leukocytosis resolved, mild hyponatremia with otherwise normal electrolytes and end stage renal values. Telemetry revewed: sinus tach. Sedated and intubated     10/04 Consultants' attendance noted and appreciated. Did not tolerate "sedation vacation" this AM: heart rate and blood pressure went up. Labs reviewed: leukocytosis with normal Hct; mild hyponatremia otherwise electrolytes are normal with end stage renal function. CXR shows ET tube level at heads of clavicles--it was advanced further into the trachea.     10/05  Discussed with Pulmonology: failed trial. Labs reviewed: leukocytosis with minimal normocytic anemia;hyponatremia with end stage renal function. Growing pseudomonas in sputum despite being on piperacillin     Interval History:  Patient almost extubated herself yesterday while  was present  She is not wanting to continue being intubated  Patient is not able to be weaned and does not wish to have trach    Review of Systems  Objective:     Vital Signs (Most Recent):  Temp: 96.8 °F (36 °C) (10/12/20 0702)  Pulse: 78 (10/12/20 0905)  Resp: (!) 24 (10/12/20 0900)  BP: (!) 113/55 (10/12/20 0900)  SpO2: 97 % (10/12/20 0900) Vital Signs (24h Range):  Temp:  [96.8 °F (36 °C)-97.7 °F (36.5 °C)] 96.8 °F (36 °C)  Pulse:  [69-78] 78  Resp:  [19-32] 24  SpO2:  [93 %-100 %] 97 %  BP: ()/(45-60) 113/55     Weight: 83.7 kg (184 lb 8.4 oz)  Body mass index is 29.78 kg/m².    Intake/Output Summary (Last 24 hours) at 10/12/2020 0910  Last data filed at 10/12/2020 0900  Gross per 24 hour   Intake 1690 ml   Output 400 ml   Net 1290 ml      Physical Exam  Patient is awake will answer questions by nodding  HEENT sclerae nonicteric endotracheal tube is in place  Neck is supple nontender  Lungs moderate air movement coarse breath sounds symmetric chest wall expansion  Patient intubated  Heart regular rate and rhythm no rub no " murmur  Abdomen is soft nontender   exam Chi is in place  Neuro patient is alert she is oriented moves all extremities equally  Skin acute is left Achilles area    Significant Labs:   BMP:   Recent Labs   Lab 10/12/20  0425   *      K 3.4*   CL 97   CO2 27   BUN 42*   CREATININE 4.0*   CALCIUM 7.6*   MG 2.6     CBC:   Recent Labs   Lab 10/12/20  0408   HCT 33*     CMP:   Recent Labs   Lab 10/10/20  1500 10/11/20  0637 10/12/20  0425   * 136 136   K 3.4* 3.7 3.4*   CL 97 96 97   CO2 24 27 27   * 190* 133*   BUN 22* 33* 42*   CREATININE 2.2* 3.1* 4.0*   CALCIUM 7.9* 7.8* 7.6*   PROT  --  5.3* 5.3*   ALBUMIN  --  1.6* 1.6*   BILITOT  --  0.8 0.8   ALKPHOS  --  156* 152*   AST  --  32 21   ALT  --  24 22   ANIONGAP 12 13 12   EGFRNONAA 24.0* 15.9* 11.6*       Significant Imaging:       Assessment/Plan:   #1 Acute hypoxic hypercapnic respiratory failure -remains intubated.  Multifactorial  Weaning as tolerated .Appreciate help from Pulmonary  Difficulty liberating patient from ventilator  #2 Pseudomonas pneumonia/acute COPD exacerbation-continuing IV antibiotics  #3 Catheter related Enterococcus faecalis bacteremia-lines removed, appreciate help from ID.   Continue IV abx as per ID   #4 Acute diastolic  CHF -dialysis as per Nephrology  #5 New onset atrial  Flutter w/RVR on admission   -S/P cardioversion - NSR   cardiology following, continuing amiodarone, anticoagulation, EF46%  #6 ESRD HD Tuesday Thursday Saturday nephrology following   #7 Chronic hypotension/CAD/CABG-/EF46%% -  #8 Chronic wound left distal posterior lower extremity-Wound care following  Followed by Dr. Wick  #9  Hyponatremia-resolved  ,monitoring labs   #10 Hypookalemia-replaced.  Monitor  #11  DM2-follow sliding scale hemoglobin A1c 6.3   #12 Hx PE -details unknown .  Continue anticoagulation  #13 Metabolic acidosis .  Resolved  #14 Acute UTI POA-yeast.Resolved  #15 anemia chronic dx -monitoring  #16 Secondary HPT-as  per Renal  #17 moderate protein calorie malnutrition  -continuing tube feeds dietary following  #18 C diff -colitis p.o. vancomycin      Discussed case with Pulmonary  Patient does not wish to be on ventilator.   has stated that patient would not want tracheostomy.  Patient is unable to be weaned.  Will discussed with  if he wishes to transition patient to comfort care.    VTE Risk Mitigation (From admission, onward)         Ordered     apixaban tablet 2.5 mg  2 times daily      09/24/20 1018     Place MARY ELLEN hose  Until discontinued      09/24/20 1018     IP VTE HIGH RISK PATIENT  Once      09/24/20 1018     Place sequential compression device  Until discontinued      09/24/20 1018              Oscar Ruth DO  Department of Hospital Medicine   [facility]

## 2020-10-12 NOTE — PROGRESS NOTES
Infectious Disease  Progress note      HPI: Juliane Ramos is a 60 y.o. female with  Hx of CAD/CABG, ESRD HD per AVF, DM, OPD,  chronic hypotension is admitted to the ICU with resp failure and tachycardia. She has required intubation and pressor support since admission. She was afebrile on admission and was started on CTX for a couple of days. Blood cx and urine cx were done 2 days into admission due to hypothermia. Urine was positive for C. alibcan and thus was started on Fluconazole.  She then spiked a fever on 10/3, blood cx came back positive for E feacalis from the A line. Resp cx is positive for PSA. She was started on Piptazo 10/4 and then changed 10/5 to Meropenem. A resp cx is positive for PSA. Chart review shows positive PSA in suptum in 2018. She does have copious thick secretons per RN. She otherwise is tolerating feeds, on going loose stools. L [facility] wound is stable. Skin tear in the bottom is not infected. She has RIJ CVL and the pervious A line in place. She is tolerating HD as it is going when visited.    10/7/20:  Patient remains intubated.  More awake and responsive today.  Fevers overnight,  continues to require pressors.  No diarrhea today per RN,  tolerates tube feeds.  All indwelling lines removed.  Currently with peripheral IV lines.   10/8:  Patient remains intubated, mildly interactive per RN.  She continues to have very thick tan colored respiratory secretions.  No fevers now.  T-max 101.3 20 hours ago,  off pressors.  Currently getting dialyzed.  She has increased loose stools so FMS was placed.  A sample was sent for C diff testing.  Pressure ulcers remain the same and stable per RN    10/9: Pt nods no to pain. She nods yes to feeling ok. Some nose bleed. She is having watery stool, has slowed down somewhat. Still on the vent, very thick copious secretions continue.     10/10: NO acute changes per RN. Diarrhea has improved considerably. FMS out. Only 2 loose stools over night.  Unable to get any ROS from pt    10/11: Pt more awake and alert. Reports no pain. Nods yes to being comfortable. Bps good, not requiring pressors per RN. No fevers. On going frequent diarrhea. No HD today,.     10/12(Johnny) notes, images, photos,  Reviewed. WBC now normal, PCT lower., alb 1.6. awake and a little miserable. Secretions purulent. Vent needs no change. Liquid stool emanating from rectal tube(which was out for at least a day, as CT showed impaction above rectal tube 10/8). Bleeding from skin tears on arms.     EXAM & DIAGNOSTICS REVIEWED:   Vitals:     Temp:  [96.8 °F (36 °C)-97.7 °F (36.5 °C)]   Temp: 96.8 °F (36 °C) (10/12/20 0702)  Pulse: 70 (10/12/20 0800)  Resp: 19 (10/12/20 0800)  BP: (!) 105/54 (10/12/20 0702)  SpO2: 97 % (10/12/20 0800)    Intake/Output Summary (Last 24 hours) at 10/12/2020 0822  Last data filed at 10/12/2020 0800  Gross per 24 hour   Intake 1600 ml   Output 400 ml   Net 1200 ml       General:  In NAD.   Alert, interactive. Nods head yes and no appropriately. Recognizes me.  Intubated   ENT:  ETT in. crusted blood in b/l nares  Lungs: Coarse breath sounds b/l- unchanged, secretions purulent  Heart:  RRR-  Abd:  Soft, NT, ND, Bs positive, rectal tube with liquid brown stool  Musc:  Joints without effusion, L emy's wound clean, smaller than last exam  Skin:    pictures reviewed  Wounds:  Left wrist skin tear, bleeding. Right forearm skin tear,   Neuro: Alert, awake, nodding yes and no appropriately  Psych:  Calm  Extrem: 2 peripheral IV lines, bilateral upper and lower extremity edema.   VAD:  PIV,      LUE AVF  Isolation:  enteric      General Labs reviewed:  Recent Labs   Lab 10/07/20  0505 10/08/20  0419  10/09/20  0448 10/10/20  0437 10/12/20  0408   WBC 17.77* 10.66  --   --   --   --    HGB 11.1* 10.6*  --   --   --   --    HCT 34.6* 33.4*   < > 37 32* 33*   * 131*  --   --   --   --     < > = values in this interval not displayed.       Recent Labs   Lab  10/10/20  0642 10/10/20  1500 10/11/20  0637 10/12/20  0425   * 133* 136 136   K 3.3* 3.4* 3.7 3.4*   CL 92* 97 96 97   CO2 24 24 27 27   BUN 51* 22* 33* 42*   CREATININE 4.2* 2.2* 3.1* 4.0*   CALCIUM 8.0* 7.9* 7.8* 7.6*   PROT 5.0*  --  5.3* 5.3*   BILITOT 0.8  --  0.8 0.8   ALKPHOS 140*  --  156* 152*   ALT 18  --  24 22   AST 20  --  32 21         Micro:  Microbiology Results (last 7 days)     Procedure Component Value Units Date/Time    Blood culture [[id number]] Collected: 10/06/20 1641    Order Status: Completed Specimen: Blood Updated: 10/11/20 1832     Blood Culture, Routine No growth after 5 days.    Blood culture [[id number]] Collected: 10/06/20 1126    Order Status: Completed Specimen: Blood from AV Fistula, Left Updated: 10/11/20 1232     Blood Culture, Routine No growth after 5 days.    Narrative:      PLEASE draw 2 sets (4 bottles) 15 mins apart from 2 different  sites- One MUST include peripheral stick    Blood culture [[id number]] Collected: 10/03/20 1624    Order Status: Completed Specimen: Blood from Peripheral, Right Hand Updated: 10/08/20 1832     Blood Culture, Routine No growth after 5 days.    C Diff Toxin by PCR [[id number]]  (Abnormal) Collected: 10/08/20 0559    Order Status: Completed Updated: 10/08/20 1528     C. diff PCR Positive     Comment: result(s) called and verbal readback obtained from Riaz Houston RN on MICU, re: positive CDIFF PCR 10/8/20 at 1527 vcb by   VCB 10/08/2020 15:27         Narrative:        result(s) called and verbal readback obtained from Riaz Houston RN on MICU, re: positive CDIFF PCR 10/8/20 at 1527 vcb by   VCB 10/08/2020 15:27    Clostridium difficile EIA [[id number]]  (Abnormal) Collected: 10/08/20 0559    Order Status: Completed Specimen: Stool Updated: 10/08/20 1521     C. diff Antigen Positive     C difficile Toxins A+B, EIA Negative     Comment: Testing not recommended for children <24 months old.       Culture, Respiratory with Gram Stain  [[id number]]  (Abnormal)  (Susceptibility) Collected: 10/04/20 1257    Order Status: Completed Specimen: Respiratory from Tracheal Aspirate Updated: 10/06/20 0632     Respiratory Culture Reduced normal respiratory nyla      PSEUDOMONAS AERUGINOSA  Many       Gram Stain (Respiratory) <10 epithelial cells per low power field.     Gram Stain (Respiratory) Many WBC's     Gram Stain (Respiratory) Few Gram positive rods     Gram Stain (Respiratory) Many Gram negative rods     Gram Stain (Respiratory) Few Gram positive cocci    Blood culture [[id number]]  (Abnormal)  (Susceptibility) Collected: 10/03/20 1558    Order Status: Completed Specimen: Blood from Line, Arterial, Right Updated: 10/06/20 0621     Blood Culture, Routine Gram stain patricia bottle: Gram positive cocci in pairs and chains      Results called to and read back by:Benito Mccormack RN/2BICU  10/04/2020        15:12 cea      ENTEROCOCCUS FAECALIS        Imaging Reviewed:  10/3 CXR- subsequent cxr the same  Portable chest at 512 compared with 09/30/2020 shows unchanged support tubes and lines.  Cardiac silhouette enlargement is unchanged.  Mediastinal contours also remain unchanged with postsurgical changes of CABG.  Bibasilar pleuroparenchymal opacities show no significant change.  Lung volumes remain low.  Central pulmonary vascular prominence is unchanged.  No pneumothorax.     Chest x-ray 10/7:  Persistent diffuse interstitial and groundglass opacities  suggestive of edema versus pneumonia. There are small right pleural  effusion    ECHO 9/26:  · The estimated PA systolic pressure is 71 mmHg.  · The estimated ejection fraction is 46%.  · Diastolic dysfunction.  · There are segmental left ventricular wall motion abnormalities.  · Mitral valve annuloplasty ring    CT c/a/p 10/8  1. Bilateral pulmonary opacities as described, perhaps reflecting  consolidation, atelectasis, minimal alveolar edema, or some  combination thereof.  2. Tiny right pleural effusion and trace  left pleural fluid.  3. Cardiomegaly and native coronary artery calcification with  postsurgical changes of CABG. Reflux of right atrial contrast into  distended intrahepatic IVC and hepatic veins suggest elevated right  atrial pressure/right heart failure.  4. Moderate-sized dermal defect near site of left upper extremity  arteriovenous fistula, containing surgical packing.  5. Hyperdensity in gallbladder suggest cholelithiasis or sludge.  6. Moderate amount of stool focally in rectum with mild rectal  distention and very mild perirectal fat stranding. A rectal catheter  is in place. In the appropriate clinical setting, this could represent  mild stercoral colitis.  7. Diffuse subcutaneous edema throughout the abdomen and pelvis.    IMPRESSION & PLAN   60 y.o. female with  Hx of CAD/CABG, ESRD HD per AVF, DM, OPD,  chronic hypotension is admitted to the ICU with resp failure and tachycardia. She has required intubation and pressor support since admission:    1- E faecalis bacteremia: Catheter related BSI?- the set from A line positive. Now  Removed, ?mild stercoral colitis on CT  - repeat blood culture on 10/06 negative  - Afebrile,  Wbc 10K  - all indwelling lines removed, now with PIVs  - On piptazo 10/7 (status post 2 days of Merem)    2- PSA pneumonia- pan sensitive on 10/3 cx-   CXR with persistent basilar opacities. Thick secretions.   Previous PSA PNA in 2018  - PCT 63.7-->21  - On Piptazo 10/7, Levofloxacin 10/8 (due to high to ANAMIKA to piptazo- for psa)     3- Mitral valve annuloplasty ring  4- DM  5- ICMO with EF 46%, PulmHTN   6- Chronic L Awa's wound. Stable  7- C diff colitis: pos PCR,     10/8 Vancomycin P-- improved diarrhea   8- candida of skin    Recommendations:    Continue Piptazo and levofloxacin double coverage x10-14 days for PSA pneumonia (high anamika to Zosyn), and Enterococcus  Continue PO Vancomycin , will need prolonged course considering exposure now to broad spectrum antibiotics    Supportive care. Wound care. Discussion about goal of care on going.     Pt discussed with RN and Dr. whitley

## 2020-10-13 NOTE — PLAN OF CARE
10/12/20 1923   Patient Assessment/Suction   Level of Consciousness (AVPU) alert   Respiratory Effort Unlabored;Normal   Expansion/Accessory Muscles/Retractions expansion symmetric   All Lung Fields Breath Sounds diminished;coarse   YULIET Breath Sounds coarse   Rhythm/Pattern, Respiratory assisted mechanically   Cough Frequency with stimulation   Cough Type assisted;weak   Suction Method tracheal;oral   Suction Pressure (mmHg) 120 mmHg   $ Suction Charges Inline Suction Procedure Stat Charge   Secretions Amount small   Secretions Color tan   Secretions Characteristics thin   $ Swab or suction? Suction   PRE-TX-O2   O2 Device (Oxygen Therapy) ventilator   $ Is the patient on Low Flow Oxygen? Yes   Pulse Oximetry Type Continuous   $ Pulse Oximetry - Multiple Charge Pulse Oximetry - Multiple   Wound Care   $ Wound Care Tech Time 15 min   Area of Concern Upper lip;Lower lip;Corner lip   Skin Color/Characteristics velvet   Skin Temperature warm      Airway Anesthesia 09/29/20   Placement Date/Time: 09/29/20 (c) 2906   Method of Intubation: Video Laryngoscopy  Airway Device: Endotracheal Tube  Mask Ventilation: Easy  Intubated: Postinduction  Airway Device Size: 8.0  Cuffed: Cuffed  Complicating Factors: None  Findings Post-I...   Secured at 24 cm   Measured At Lips   Secured Location Center   Secured by Commercial tube mckinney   Bite Block none   Site Condition Dry;Cool   Status Secured;Intact   Site Assessment Clean;Dry   Cuff Volume   (\)   Cuff Pressure   (MLT)   Airway Safety   Ambu bag with the patient? Yes, Adult Ambu   Is mask with the patient? Yes, Adult Mask   Vent Select   Conventional Vent Y   Charged w/in last 24h YES   Preset Conventional Ventilator Settings   Vent ID [id number]   Vent Type    Humidity HME   Patient Ventilator Parameters   Tubing ID (mm) 8 mm   Tube Type ET   Conventional Ventilator Alarms   Alarms On Y   Ve High Alarm 25 L/min   Ve Low Alarm 3 L/min   Vt High Alarm 1500 mL   Vt Low Alarm 300 mL    Apnea Volume (mL) 390 mL

## 2020-10-13 NOTE — PLAN OF CARE
10/13/20 0923   PRE-TX-O2   Oxygen Concentration (%) 21   SpO2 (!) 76 %   Pulse 75   Resp (!) 32   Vent Select   Charged w/in last 24h YES   Preset Conventional Ventilator Settings   Ventilation Type VC   Vent Mode A/C   Set Rate 12 BPM   Vt Set 275 mL   PEEP/CPAP 5 cmH20   Pressure Support 0 cmH20   Waveform RAMP   Peak Flow 75 L/min   Plateau Set/Insp. Hold (sec) 0   Patient Ventilator Parameters   Resp Rate Total 25 br/min   Peak Airway Pressure 20 cmH2O   Mean Airway Pressure 8.1 cmH20   Plateau Pressure 0 cmH20   Exhaled Vt 2 mL   Total Ve 5.57 mL   I:E Ratio Measured 2.00:1   Conventional Ventilator Alarms   Resp Rate High Alarm 50 br/min   Press High Alarm 60 cmH2O   Apnea Rate 28   Apnea Volume (mL) 0 mL   Apnea Oxygen Concentration  100   Apnea Flow Rate (L/min) 75   T Apnea 20 sec(s)   Ready to Wean/Extubation Screen   FIO2<=50 (chart decimal) 0.21   MV<16L (chart vol.) 5.57   PEEP <=8 (chart #) 5   Ready to Wean Parameters   $ Extubation Tech Time Tech Time 15 min   F/VT Ratio<105 (RSBI) 65700   Extubated? Yes   Reason for Extubation Withdrawl of care   Ventilator Discontinued Yes   PATIENT EXTUBATED AT THIS TIME TO ROOM AIR

## 2020-10-13 NOTE — DISCHARGE SUMMARY
[facility] Medicine  Discharge Summary      Patient Name: Juliane Ramos  MRN: [id number]  Admission Date: 9/24/2020  Hospital Length of Stay: 19 days  Discharge Date and Time:  10/13/2020 10:58 AM  Attending Physician: sOcar Ruth DO   Discharging Provider: Oscar Ruth DO  Primary Care Provider: JOS Dumont      HPI:   Patient is a 60-year-old female hx ESRD HD (T,Th, Sat)  CAD/CABG ,EF20%?, COPD, DM ,chronic hypotension  presents ED with complaints of having low blood pressure and rapid heart rate.  In the emergency department patient had a heart rate in the 130s regular and a blood pressure systolic of approximately 100  Patient denies shortness of breath chest pain fever chills abdominal pain nausea vomiting.  She states her last dialysis on Tuesday she did not have any volume removed.  Patient has been given approximately 500 cc of IV fluids in the emergency department at remains tachycardic in the 120s.      * No surgery found *      Hospital Course:   9/29:  No major issues overnight.  Tolerating SBT.  Off of sedation all night.   9/30:  Failed extubation and was re-intubated yesterday evening.  No issues since.  10/1:  No major issues overnight.  Remains intubated.  Off of sedation.  10/2/2020 - Remains critically ill, no new issues overnight, respiratory reports significant secretions.  Fever curve good.  No new weight, cumulative I/O 2.1 liters +., vital signs OK.  Ventilating pressures good, Pplat - 25, Ve - 14,  P/F -  258, attempted brief SBT but sTV only 150 - 200 (not ready to wean/extubate).  Labs reviewed.  10/3- remains on vent, on Levophed 0.022 mcg/kg/min, afebrile, HR controlled in 70s-90s. Completed HD this am with removal of 4L fluid.  10/4- remains on vent,  Febrile to 103, sedated w/ propofol, oxygenation improved compared to yesterday. Requiring min levophed  10/5:  Remains intubated but not sedated.  Fever curve down trending.  Tolerating  relatively minimal ventilator settings, but she becomes hypercapnic and hypo ventilates with placed pressure support ventilation.  She is only requiring low-dose vasopressors today.  10/6:  Continues spike fevers despite receiving antibiotics yesterday.  Rapidly developed hypercapnia while on pressure support ventilation this morning.  She remains off sedation.  Seen by ID . See consult note and recs for IV abx   10/7:  No major issues overnight.  Fever curve down trending.  More alert today.  Dialyzed yesterday and central lines removed.  10/8:  No major issues overnight.  Fever cure downtrending.  Alert this morning.  No new issues.  Remains intubated/sedated.  10/9  the patient is awake on the ventilator.  She is on dialysis.  She is afebrile.  The plan is to leave her on the ventilator going forward.  Dr. Hui will reassess on Monday.  The  states at that point, or may be later on during the week, they may consider withdrawing care.  10/10  below from Dr Mcfadden: pt min arouses?  10/11 no new c/o  10/12 the patient almost extubated herself yesterday she did not want to continue living like this.  Apparently her  was in the room at the time.  The patient is not weanable.  10/13:  No major issues overnight.  No changes.  Somnolent and still intubated this morning.  Plan is for terminal extubation this morning.  Patient  at 1023 with loving family at bedside     Patient terminally extubated and transitioned to comfort care as per family wishes.  Pulmonologist and myself were in agreement.  Patient was unable to be wean from ventilator.    Consults:   Consults (From admission, onward)        Status Ordering Provider     Inpatient consult to Anesthesiology  Once     Provider:  Hieu Newton MD    Acknowledged SHERRY SHORT     Inpatient consult to Cardiology  Once     Provider:  Dale Palomo MD    Acknowledged SHERRY SHORT     Inpatient consult to Hospitalist  Once     Provider:  Oscar CEDILLO  Faraz,     Acknowledged JOSE DE JESUS DURAN     Inpatient consult to Infectious Diseases  Once     Provider:  Pina Turner MD    Acknowledged JOSE DE JESUS DURAN     Inpatient consult to Nephrology  Once     Provider:  Edson Crystal MD    Completed JOSE DE JESUS DURAN     Inpatient consult to Podiatry  Once     Provider:  Dennis Wick DPM    Acknowledged SHERRY SHORT     Inpatient consult to Pulmonology  Once     Provider:  Adriane Lombardi MD    Completed JOSE DE JESUS DURAN     Inpatient consult to Registered Dietitian/Nutritionist  Once     Provider:  (Not yet assigned)    ROBEL Moreno        Discharge diagnoses  #1 Acute hypoxic hypercapnic respiratory failure -  #2 Pseudomonas pneumonia/acute COPD exacerbation-  #3 Catheter related Enterococcus faecalis bacteremia-  #4 Acute diastolic  CHF -  #5 New onset atrial  Flutter w/RVR   #6 ESRD HD   #7 Chronic hypotension/CAD/CABG-/EF46%% -  #8 Chronic wound left distal posterior lower extremity-  #9  Hyponatremia-  #10 Hypookalemia-  #11  DM2-   #12 Hx PE -  #13 Metabolic acidosis .  #14 Acute UTI POA-yeast  #15 anemia chronic dx -  #16 Secondary HPT-  #17 moderate protein calorie malnutrition    #18 C diff -colitis     Final Active Diagnoses:    Diagnosis Date Noted POA    PRINCIPAL PROBLEM:  Atrial fibrillation and flutter [I48.91, I48.92]  Yes    Diaphragm dysfunction [J98.6] 10/10/2020 Yes    Atelectasis [J98.11] 10/10/2020 Yes    Unresponsive episode [R41.89]  Yes    Enterococcal bacteremia [R78.81, B95.2] 10/07/2020 No    Pseudomonal pneumonia [J15.1] 10/07/2020 Yes    Shock [R57.9] 10/03/2020 Yes    Acute on chronic heart failure [I50.9]  Yes    COPD (chronic obstructive pulmonary disease) [J44.9]  Yes    Acute on chronic respiratory failure with hypoxia and hypercapnia [J96.21, J96.22]  Yes    Anemia, chronic disease [D63.8]  Yes    Stage 5 chronic kidney disease on chronic dialysis [N18.6, Z99.2] 09/24/2020 Not  Applicable    Open wound of left heel [S91.302A] 2018 Yes    Chronic kidney disease with end stage renal failure on dialysis [N18.6, Z99.2] 2018 Not Applicable    CHF (congestive heart failure) [I50.9] 2017 Yes      Problems Resolved During this Admission:    Diagnosis Date Noted Date Resolved POA    Tachycardia [R00.0] 2020 Yes       Discharged Condition:   Family at bedside   Patient without heart sounds  No breath sounds, no reflexes  Pupils are fixed  Pronounced at 1023 this a.m.    Disposition:         Patient Instructions:      COVID-19 Routine Screening   Order Comments: Is the patient symptomatic?->No  Is this needed for pre-procedure or pre-op testing?->Yes  Diagnosis:->Pre-op testing     Order Specific Question Answer Comments   Is the patient symptomatic? No    Is this needed for pre-procedure or pre-op testing? Yes    Diagnosis: Pre-op testing [[id number]]            Pending Diagnostic Studies:     Procedure Component Value Units Date/Time    Urinalysis, Reflex to Urine Culture Urine, Clean Catch [[id number]] Collected: 20 1143    Order Status: Sent Lab Status: In process Updated: 20    Specimen: Urine          Medications:  N/A    Indwelling Lines/Drains at time of discharge:   Lines/Drains/Airways     Drain                 Hemodialysis AV Graft Left upper arm -- days         Rectal Tube 10/11/20 0900 rectal tube w/ balloon (indicate number of mLs) 2 days          Airway               Airway Anesthesia 20 13 days                Time spent on the discharge of patient: 42 minutes           Oscar Ruth DO  Department of Hospital Medicine  [facility]

## 2020-10-13 NOTE — NURSING
POC continued. Pt remains intubated, no longer sedated. VSS- afebrile. Tube feedings continued. Nods and gestures accordingly. No c/o of pain overnight.   Plan to be terminally extubated this morning.   Pt resting comfortably at this time. Will continue to monitor.

## 2020-10-13 NOTE — PLAN OF CARE
10/13/20 1232   Final Note   Assessment Type Final Discharge Note   Anticipated Discharge Disposition

## 2020-10-13 NOTE — PROGRESS NOTES
[facility]  Pulmonary / Critical Care Medicine  Progress Note    Subjective     9/29:  No major issues overnight.  Tolerating SBT.  Off of sedation all night.   9/30:  Failed extubation and was re-intubated yesterday evening.  No issues since.  10/1:  No major issues overnight.  Remains intubated.  Off of sedation.  10/2/2020 - Remains critically ill, no new issues overnight, respiratory reports significant secretions.  Fever curve good.  No new weight, cumulative I/O 2.1 liters +., vital signs OK.  Ventilating pressures good, Pplat - 25, Ve - 14,  P/F -  258, attempted brief SBT but sTV only 150 - 200 (not ready to wean/extubate).  Labs reviewed.  10/3- remains on vent, on Levophed 0.022 mcg/kg/min, afebrile, HR controlled in 70s-90s. Completed HD this am with removal of 4L fluid.  10/4- remains on vent,  Febrile to 103, sedated w/ propofol, oxygenation improved compared to yesterday. Requiring min levophed  10/5:  Remains intubated but not sedated.  Fever curve down trending.  Tolerating relatively minimal ventilator settings, but she becomes hypercapnic and hypo ventilates with placed pressure support ventilation.  She is only requiring low-dose vasopressors today.  10/6:  Continues spike fevers despite receiving antibiotics yesterday.  Rapidly developed hypercapnia while on pressure support ventilation this morning.  She remains off sedation.  10/7:  No major issues overnight.  Fever curve down trending.  More alert today.  Dialyzed yesterday and central lines removed.  10/8:  No major issues overnight.  Fever cure downtrending.  Alert this morning.  No new issues.  Remains intubated/sedated.  10/9  the patient is awake on the ventilator.  She is on dialysis.  She is afebrile.  The plan is to leave her on the ventilator going forward.  Dr. Hui will reassess on Monday.  The  states at that point, or may be later on during the week, they may consider withdrawing care.  10/10  below from   Bogdan: pt min arouses?  10/11 no new c/o  10/12 the patient almost extubated herself yesterday she did not want to continue living like this.  Apparently her  was in the room at the time.  The patient is not weanable.  10/13:  No major issues overnight.  No changes.  Somnolent and still intubated this morning.  Plan is for terminal extubation this morning.    Review of Systems   Unable to perform ROS: Intubated      I have personally reviewed the following during today's evaluation:  past medical history, ROS, family history, social history, surgical history, current inpatient medications,drug allergies, vital signs over the past 24 hours, results of relevant diagnostic studies and nursing/provider documentation from the past 24 hours.     Objective     VS Temp:  [94.8 °F (34.9 °C)-97.3 °F (36.3 °C)]   Pulse:  [70-85]   Resp:  [17-33]   BP: ()/(46-58)   SpO2:  [93 %-100 %]   Ideal body weight: 59.3 kg (130 lb 11.7 oz)  Adjusted ideal body weight: 69.1 kg (152 lb 4 oz)   I/O   Intake/Output Summary (Last 24 hours) at 10/13/2020 0844  Last data filed at 10/13/2020 0700  Gross per 24 hour   Intake 1840 ml   Output 3050 ml   Net -1210 ml        Vent SpO2 96%   Vent Mode: A/C  Oxygen Concentration (%):  [30] 30  Resp Rate Total:  [21 br/min-40 br/min] 23 br/min  Vt Set:  [390 mL] 390 mL  PEEP/CPAP:  [5 cmH20] 5 cmH20  Pressure Support:  [0 cmH20] 0 cmH20  Mean Airway Pressure:  [11 glD09-61 cmH20] 11 cmH20     PE Physical Exam   Constitutional: She appears well-developed and well-nourished. She is cooperative.  Non-toxic appearance. No distress. She is intubated and restrained.   Intubated, not on sedation.   HENT:   Head: Normocephalic and atraumatic.   Mouth/Throat: Uvula is midline and mucous membranes are normal. Mallampati Score: unable to assess.   Neck: Trachea normal and normal range of motion. Neck supple. No JVD present. No thyromegaly present.   Cardiovascular: Normal rate, regular rhythm, normal  heart sounds and intact distal pulses.   Pulmonary/Chest: Normal expansion, symmetric chest wall expansion, effort normal and breath sounds normal. She is intubated. She has no wheezes. She has no rhonchi. She has no rales.   Abdominal: Soft. Bowel sounds are normal. She exhibits no distension. There is no abdominal tenderness.   Musculoskeletal: Normal range of motion.         General: No tenderness, deformity or edema.      Comments: LUE AVF   Lymphadenopathy: No supraclavicular adenopathy is present.     She has no cervical adenopathy.     She has no axillary adenopathy.   Neurological: She has normal strength. No cranial nerve deficit or sensory deficit. She exhibits normal muscle tone. GCS eye subscore is 4. GCS verbal subscore is 5. GCS motor subscore is 6.   Skin: Skin is warm, dry and intact. No rash noted.   Nursing note and vitals reviewed.      Labs I have personally reviewed and interpreted all labs / diagnostic studies obtained over the past 24 hours, and relevant results are as follows:  Recent Labs   Lab 10/13/20  0316   *   K 3.3*   CL 95   CO2 25   BUN 55*   CREATININE 4.7*   MG 2.9*   ALT 19   AST 16   ALKPHOS 147*   BILITOT 0.8   PROT 5.8*   ALBUMIN 1.7*      Procalcitonin:  63.77     Imaging I have personally reviewed and interpreted the following images and reviewed the associated Radiology report.  I have reviewed and interpreted all pertinent imaging results/findings within the past 24 hours.  CXR:  9/30:  Interval introduction of NG tube. Additional lines and support devices are in position as above.  Persistence of bilateral parahilar and lower lung zone interstitial and alveolar opacities and small bilateral effusions. Stable cardiomegaly.  CXR 10/3- ETT in place, sternotomy wires, with bilateral pulmonary edema and retrocardiac opacification w/ blunting of costophrenic angles bilaterally  CXR 10/4- ETT in place, unchanged bilateral infiltrates & R pleural effusion    CT chest,  abdomen, and pelvis, 10/8  1. Bilateral pulmonary opacities as described, perhaps reflecting  consolidation, atelectasis, minimal alveolar edema, or some  combination thereof.  2. Tiny right pleural effusion and trace left pleural fluid.  3. Cardiomegaly and native coronary artery calcification with  postsurgical changes of CABG. Reflux of right atrial contrast into  distended intrahepatic IVC and hepatic veins suggest elevated right  atrial pressure/right heart failure.  4. Moderate-sized dermal defect near site of left upper extremity  arteriovenous fistula, containing surgical packing.  5. Hyperdensity in gallbladder suggest cholelithiasis or sludge.  6. Moderate amount of stool focally in rectum with mild rectal  distention and very mild perirectal fat stranding. A rectal catheter  is in place. In the appropriate clinical setting, this could represent  mild stercoral colitis.  7. Diffuse subcutaneous edema throughout the abdomen and pelvis.      Micro I have personally reviewed and interpreted the available culture data.  Relevant results are as follows.  Blood Culture   Lab Results   Component Value Date    LABBLOO No growth after 5 days. 10/06/2020   , Sputum Culture   Lab Results   Component Value Date    GSRESP <10 epithelial cells per low power field. 10/04/2020    GSRESP Many WBC's 10/04/2020    GSRESP Few Gram positive rods 10/04/2020    GSRESP Many Gram negative rods 10/04/2020    GSRESP Few Gram positive cocci 10/04/2020    RESPIRATORYC Reduced normal respiratory nyla 10/04/2020    RESPIRATORYC PSEUDOMONAS AERUGINOSA  Many   (A) 10/04/2020    and Urine Culture    Lab Results   Component Value Date    LABURIN (A) 09/25/2020     PRESUMPTIVE JORDIN ALBICANS  10,000 - 49,999 cfu/ml        Medications Scheduled    scopolamine  1 patch Transdermal Q3 Days      Continuous Infusions:    morphine        PRN   lorazepam, lorazepam, morphine        Assessment       Active Hospital Problems    Diagnosis     *CHF (congestive heart failure)    Diaphragm dysfunction    Atelectasis    Unresponsive episode    Enterococcal bacteremia    Pseudomonal pneumonia    Shock    Atrial fibrillation and flutter    Acute on chronic heart failure    COPD (chronic obstructive pulmonary disease)    Acute on chronic respiratory failure with hypoxia and hypercapnia    Anemia, chronic disease    Stage 5 chronic kidney disease on chronic dialysis    Open wound of left heel    Chronic kidney disease with end stage renal failure on dialysis      My Impression:  Acute on chronic hypoxemic / hypercapnic respiratory failrue with  neuromuscular weakness persists.  The patient becomes progressively hypercapnic with attempts at pressure support ventilation.  Clearly she will not be able to be liberated from MV without a tracheostomy and her family ensures me that would not be in keeping with her best interests.    Plan     · Anticipate extubation later this morning with no plans to replace ETT.  · Will treat symptoms of dyspnea, pain and anxiety with opiates and benzodiazepines while allowing death to occur naturally.        Critical Care Time: Approximately >35 minutes    The patient is critically ill due to the following conditions that actively pose threat to life and bodily function:  Respiratory Failure requiring intubation and invasive mechanical ventilation    The patient is at high risk of death due to respiratory failure and requiring ongoing treatment including invasive mechanical ventilation to prevent further life-threatening deterioration.  Due to a high probability of clinically significant, life threatening deterioration, the patient required my highest level of preparedness to intervene emergently and I personally spent this critical care time directly and personally managing the patient.     Critical care was time spent personally by me on the following activities:    Obtaining a history, examination of patient,  reviewing pulse oximetry, providing medical care at the patients bedside, developing a treatment plan with patient or surrogate and bedside caregivers, ordering and reviewing laboratory studies, radiographic studies, pulse oximetry, ordering and performing treatments and interventions, evaluation of patient's response to treatment,  discussions with consultants while on the unit and immediately available to the patient, re-evaluation of the patient's condition, and documentation in the medical record.    This critical care time did not overlap with that of any other provider or involve time for any procedures.     Ehsan Hui MD  Pulmonary / Critical Care Medicine  [facility]  10/13/2020  8:48 AM

## 2022-05-26 NOTE — ADDENDUM NOTE
Addended by: NABOR WEATHERS on: 2/24/2021 12:20 PM     Modules accepted: Orders    
Addended by: NABOR WEATHERS on: 7/22/2019 10:24 AM     Modules accepted: Orders    
Detail Level: Detailed

## 2023-01-16 NOTE — HPI
Patient is a 57 year old female with HTN, HLD, T2DM (a1c 8.4%), HFrEF ( EF 35% on 11/21/17), CKD 3, remote CVA (R frontal lobe in 2005), 4v CABG (2011) who presents as a transfer from [facility] for subarachnoid hemorrhage after a mechanical fall. She initially presented to the OSH on 11/18 for a diabetic left heel wound, non-healing x3 weeks with fever, purulent drainage, and reportedly with maggots. She was being treated with zosyn and vanc. Debridement was planned, but on the day of surgery she reportedly vomited chunks of [employer] despite being NPO. In addition, she had one episode of unresponsiveness on 11/20, ABG showed acute on chronic hypoxic and hypocarbic respiratory failure. CTA was done and showed a segmental and subsegmental PE of the RLL for which therapeutic lovenox (1mg/kg) BID was started. She developed an MC with rising creatinine, nephrology was consulted and it was thought to be due to ATN 2/2 a combination of ischemia (from hypotension) and IV contrast (from CTA). She was being treated with gentle hydration in the setting of HFrEF, and fluids were later stopped due to development of effusions on 11/23. She developed bleeding at the site of her Iv's, at the site of lovenox injection, and hematuria and lovenox was held, last dose the morning of 11/23. On the morning of 11/24, she was reportedly ambulating to the bathroom with her  when she tripped, had a mechanical fall and hit her forehead on the wall. She denies loss of consciousness, confusion, headache, blurry vision, focal deficits. CT Head showed bilateral posttraumatic subarachnoid blood. She was subsequently transferred to [facility] for higher level of care. Here, she denies headache, confusion, blurry vision, weakness, focal deficits, chest pain, shortness of breath.    Secondary Defect Length In Cm (Required For Flaps): 0

## 2023-01-18 NOTE — PROGRESS NOTES
[facility]  Pulmonology  Progress Note    Subjective     9/29:  No major issues overnight.  Tolerating SBT.  Off of sedation all night.   9/30:  Failed extubation and was re-intubated yesterday evening.  No issues since.  10/1:  No major issues overnight.  Remains intubated.  Off of sedation.    10/2/2020 - Remains critically ill, no new issues overnight, respiratory reports significant secretions.  Fever curve good.  No new weight, cumulative I/O 2.1 liters +., vital signs OK.  Ventilating pressures good, Pplat - 25, Ve - 14,  P/F -  258, attempted brief SBT but sTV only 150 - 200 (not ready to wean/extubate).  Labs reviewed.    10/3- remains on vent, on Levophed 0.022 mcg/kg/min, afebrile, HR controlled in 70s-90s. Completed HD this am with removal of 4L fluid.    10/4- remains on vent,  Febrile to 103, sedated w/ propofol, oxygenation improved compared to yesterday. Requiring min levophed    Review of Systems   Unable to perform ROS: Intubated   All other systems reviewed and are negative.     I have personally reviewed the following during today's evaluation:  past medical history, ROS, family history, social history, surgical history, current inpatient medications,drug allergies, vital signs over the past 24 hours, results of relevant diagnostic studies and nursing/provider documentation from the past 24 hours.     Objective     VS Temp:  [98.9 °F (37.2 °C)-103.1 °F (39.5 °C)]   Pulse:  []   Resp:  [3-30]   BP: ()/(29-58)   SpO2:  [77 %-98 %]   Arterial Line BP: ()/(43-67)   Ideal body weight: 59.3 kg (130 lb 11.7 oz)  Adjusted ideal body weight: 67.9 kg (149 lb 9.7 oz)   I/O   Intake/Output Summary (Last 24 hours) at 10/4/2020 1153  Last data filed at 10/4/2020 1100  Gross per 24 hour   Intake 1677.11 ml   Output 4500 ml   Net -2822.89 ml        Vent SpO2 96%   Vent Mode: A/C  Oxygen Concentration (%):  [] 50  Resp Rate Total:  [24 br/min-38 br/min] 25 br/min  Vt Set:   [390 mL-401 mL] 390 mL  PEEP/CPAP:  [5 cmH20-8 cmH20] 5 cmH20  Pressure Support:  [0 cmH20] 0 cmH20  Mean Airway Pressure:  [11 coG22-12 cmH20] 11 cmH20     PE Physical Exam   Constitutional: She appears well-developed and well-nourished. She is cooperative.  Non-toxic appearance. No distress. She is intubated and restrained.   Intubated, not on sedation   HENT:   Head: Normocephalic and atraumatic.   Mouth/Throat: Uvula is midline and mucous membranes are normal. Mallampati Score: unable to assess.   Neck: Trachea normal and normal range of motion. Neck supple. No JVD present. No thyromegaly present.   R IJ TLC   Cardiovascular: Normal rate, regular rhythm, normal heart sounds and intact distal pulses.   Pulmonary/Chest: Normal expansion, symmetric chest wall expansion and effort normal. She is intubated. She has no wheezes. She has no rhonchi. She has no rales.   Coarse breath sounds bilaterally  tachypneic in the 30s on vent   Abdominal: Soft. Bowel sounds are normal. She exhibits no distension. There is no abdominal tenderness.   Musculoskeletal: Normal range of motion.         General: No tenderness, deformity or edema.      Comments: LAMINE AVJAMES   Lymphadenopathy: No supraclavicular adenopathy is present.     She has no cervical adenopathy.     She has no axillary adenopathy.   Neurological: She is alert. She has normal strength. No cranial nerve deficit or sensory deficit. She exhibits normal muscle tone. GCS eye subscore is 4. GCS verbal subscore is 5. GCS motor subscore is 6.   Follows commands in extremities   Skin: Skin is warm, dry and intact. No rash noted.   Nursing note and vitals reviewed.      Labs I have personally reviewed and interpreted all labs / diagnostic studies obtained over the past 24 hours, and relevant results are as follows:  Recent Labs   Lab 10/04/20  0350 10/04/20  0445   WBC 15.20*  --    RBC 4.32  --    HGB 12.1  --    HCT 37.8 40   *  --    MCV 88  --    MCH 28.0  --    MCHC  32.0  --    *  --    K 3.7  --      --    CO2 25  --    BUN 31*  --    CREATININE 3.7*  --    PH  --  7.418   PCO2  --  44.7   PO2  --  192*   HCO3  --  28.9*   POCSATURATED  --  100   BE  --  4         Imaging I have personally reviewed and interpreted the following images and reviewed the associated Radiology report.  I have reviewed and interpreted all pertinent imaging results/findings within the past 24 hours.  CXR:  9/30:  Interval introduction of NG tube. Additional lines and support devices are in position as above.  Persistence of bilateral parahilar and lower lung zone interstitial and alveolar opacities and small bilateral effusions. Stable cardiomegaly.  CXR 10/3- ETT in place, sternotomy wires, with bilateral pulmonary edema and retrocardiac opacification w/ blunting of costophrenic angles bilaterally  CXR 10/4- ETT in place, unchanged bilateral infiltrates & R pleural effusion     Micro I have personally reviewed and interpreted the available culture data.  Relevant results are as follows.  Blood Culture   Lab Results   Component Value Date    LABBLOO No Growth to date 10/03/2020   , Sputum Culture   Lab Results   Component Value Date    GSRESP <10 epithelial cells per low power field. 09/28/2020    GSRESP Many WBC's 09/28/2020    GSRESP Rare Gram positive cocci 09/28/2020    RESPIRATORYC Reduced normal respiratory nyla 09/28/2020    and Urine Culture    Lab Results   Component Value Date    LABURIN (A) 09/25/2020     PRESUMPTIVE JORDIN ALBICANS  10,000 - 49,999 cfu/ml        Medications Scheduled    sodium chloride 0.9%   Intravenous Once    sodium chloride 0.9%   Intravenous Once    acetaminophen  1,000 mg Intravenous Q8H    amiodarone  200 mg Oral BID    apixaban  2.5 mg Oral BID    citalopram  20 mg Oral Daily    collagenase   Topical (Top) Daily    famotidine (PF)  20 mg Intravenous Daily    fluconazole  100 mg Oral Daily    levothyroxine  50 mcg Oral Before breakfast     metoprolol succinate  25 mg Oral Daily    miconazole NITRATE 2 %   Topical (Top) BID    mupirocin   Nasal BID    rosuvastatin  10 mg Oral Daily    sevelamer carbonate  1,600 mg Oral TID WM    sodium zirconium cyclosilicate  5 g Oral Daily      Continuous Infusions:    sodium chloride 0.9% 10 mL/hr at 10/01/20 2125    norepinephrine bitartrate-D5W 0.12 mcg/kg/min (10/04/20 1100)    propofoL 35 mcg/kg/min (10/04/20 1100)      PRN   sodium chloride 0.9%, sodium chloride 0.9%, acetaminophen, albuterol sulfate, dextrose 50%, dextrose 50%, glucagon (human recombinant), glucose, glucose, insulin aspart U-100, ondansetron, white petrolatum        Assessment       Active Hospital Problems    Diagnosis    *CHF (congestive heart failure)    Shock    Atrial fibrillation and flutter    Acute on chronic heart failure    COPD (chronic obstructive pulmonary disease)    Acute on chronic respiratory failure with hypoxia and hypercapnia    Anemia, chronic disease    Stage 5 chronic kidney disease on chronic dialysis    Open wound of left heel    Chronic kidney disease with end stage renal failure on dialysis      My Impression:  Acute on chronic hypoxemic / hypercapnic respiratory failrue.  Febrile, suspect acute infection- possible pneumonia    Plan     Neuro  · Intermittent fentanyl boluses as needed to maintain RASS of -1  · She is now requiring propofol due to increased work of breathing w/ desaturation    Pulmonary  · Continue LPV for now.  · SAT/SBT when improves  · Start empiric vanco + zosyn for possible HAP  · Culture sputum again    Cardiac/Vascular  · Volume removal with HD.  · Continue pressor support as needed    Renal/  · HD at the direction of nephrology.    Hematology  · No indication for blood products.  · Observation for now.    Endocrine  · Continue levothyroxine.  · Serial blood glucose monitoring.  · Sliding scale insulin as needed to maintain moderate glycemic control.    GI  · H2 blocker for  stress ulcer ppx.  · Enteral feeds.    I discussed w/ her  at bedside. He feels she would not want prolonged mechanical ventilation or tracheostomy or to live in a facility longterm. I advised him that she may have a treatable infection going on now but that we can decide on comfort care in the coming days if she does not improve.    The following were evaluated and adjusted as needed: mechanical ventilator settings and weaning status, vasopressors, antibiotics, acid base balance and oxygenation needs and input and output and renal status       Critical Care  - THE PATIENT HAS A HIGH PROBABILITY OF IMMINENT OR LIFE THREATENING DETERIORATION.  Over 50%time of encounter was in direct care at bedside.  Time was 30 to 74 minutes required for patient care.  Time needed for all of the above totaled 30 minutes.    Luisana Vazquez MD  Pulmonary & Critical Care Medicine     Star Wedge Flap Text: The defect edges were debeveled with a #15 scalpel blade.  Given the location of the defect, shape of the defect and the proximity to free margins a star wedge flap was deemed most appropriate.  Using a sterile surgical marker, an appropriate rotation flap was drawn incorporating the defect and placing the expected incisions within the relaxed skin tension lines where possible. The area thus outlined was incised deep to adipose tissue with a #15 scalpel blade.  The skin margins were undermined to an appropriate distance in all directions utilizing iris scissors.

## 2023-03-23 NOTE — PATIENT CARE CONFERENCE
Overall Plan of Care      Juliane Ramos   MRN: [id number]  Admit Date/Time: 12/11/2017  5:09 PM   Admitting Diagnosis: SAH    Estimated Length of Stay (days):  2-3 weeks     1.  Medical Prognosis:     I have reviewed each team member's Treatment Plan and the current list of barriers/impairments and limitations for this patient.  I approve the goals and recommendations outlined in the Transdisciplinary Plan of Care     1. Assessment:      Medical Prognosis       [ x  ]  Improvement expected in admitting condition, with associated             Functional improvement       [   ] Medical condition is chronic or deteriorating, but the patient          Should have functional improvement       [   ]  Other:        Medical/Nursing Interventions:    [ x ]  Fall Prevention Program                                 [ x ]  Adequate Nutrition/Hydration    [ x ]  Monitor Skin Condition                                   [x  ]  Adaptive Equipment    [ x ]  Implement Rehab Therapy Goals                  [x  ]  Bowel and Bladder Program    [ x ]  Monitor Surgical Site Healing                         [  ]  Manage Swallowing disorder    [  ]  Manage Swallowing Disorder                         [x  ]  Pain Management    [ x ]  Effectiveness of Medications                          [ x ]  Patient / Family Education    [ x ]  Manage Risk of UTI ([facility])                   [x  ]  Diabetic Teaching, Blood Sugar                                                                                      Checks / Insulin Administration    [  ]  Peg Tube Feeds                                             [ x ]  Frequent Vitals/Neuro                                                                                       Assessments and/or Changes     [  ] Trach Care/Education           [  ]  Ostomy Care/Education      Therapy Plan:    Physical Therapy:  Intensity (hrs/day): 1-1.5  Frequency (day/wk:) 7  Estimated Discharge Date:       PT Treatment  Plan:  Plan:        Occupational Therapy:  Intensity (hrs/day): 1-1.5  Frequency (day/wk:) 7  Estimated Discharge Date:  12/29/17    OT Treatment Plan:  OT Plan: self-care/home management, community/work re-entry, therapeutic activities, therapeutic exercises, therapeutic groups, wheelchair management/training    SLP:  Intensity (hrs/day): 1-1.5  Frequency (day/wk:) 5  Estimated Discharge Date:      SLP Treatment Plan:  SLP Plan: Therapy Frequency: 5 x/week      Therapy Recommendations:    Therapy Discharge Recommendations: home     Therapy Equipment Recommendations:  (TBD)          Anticipated Functional Outcome:    [  ]  Independent    [ x]  Modified Independent    [  ]  Requiring Supervision / Assistance      Discharge Planning:    Discharge Disposition:    [ x ]  Home with Home Health    [  ]  Home with Oupatient    [  ]  Assisted Living Facility      Team Goal:    To enable the patient's safe return to the home or a community based environment upon discharge.       Rishi Ness MD  12/13/2017  8:39 AM               take metformin 1000mg BID at home  - HbA1c 8.0%  - Insulin adjustment per endocrine - 14u lantus and 5u TID w/ meals   - carb controlled diet

## 2023-08-09 NOTE — PLAN OF CARE
Problem: Patient Care Overview  Goal: Plan of Care Review  Outcome: Ongoing (interventions implemented as appropriate)  Awake, alert and oriented in no distress. Denies pain or discomfort. Max assist with transfers. Safety ongoing with alarms in use.        Benzoyl Peroxide Pregnancy And Lactation Text: This medication is Pregnancy Category C. It is unknown if benzoyl peroxide is excreted in breast milk.

## 2023-11-22 NOTE — PLAN OF CARE
----- Message from Reji Mcfadden sent at 11/22/2023  7:02 AM CST -----  Contact: Pt   .Type:  Needs Medical Advice    Who Called: pt    Would the patient rather a call back or a response via [facility]?  Call back  Best Call Back Number: [phone]  Additional Information: Pt. Is at the lab and no orders for blood work in the system.  She is upset.  Because she has an appt. With no orders.        C/o nausea, medicated  With zofransivp.

## 2024-11-18 NOTE — CONSULTS
Abdomen , soft, nontender, nondistended  Full note to follow    Reviewed imaging studies (CT head from 12/20/17 and comparison to 11/24/17 and 11/29/17.      She has a medical need to resume anticoagulation, and traumatic SAH has resolved with low risk of recurrence.  OK to resume oral anticoagulation or Lovenox for treatment of pulmonary embolism.

## 2025-07-01 NOTE — SIGNIFICANT EVENT
Anesthesia Post-op Note    Patient: Ashely Richey  Procedure(s) Performed: ABLATION ATRIAL FIB - CV  ICE  ULTRASOUND ACCESS  CLOSURE DEVICE  MAPPING INTRACARDIAC 3-D  Sync Cardioversion  Anesthesia type: General    Vitals Value Taken Time   Temp 36.5 °C (97.7 °F) 07/01/25 0945   Pulse 65 07/01/25 0945   Resp 13 07/01/25 0945   SpO2 100 % 07/01/25 0945   /62 07/01/25 0945         Patient Location: PACU Phase 1  Post-op Vital Signs:stable  Level of Consciousness: awake and alert  Respiratory Status: spontaneous ventilation  Cardiovascular stable  Hydration: euvolemic  Pain Management: adequately controlled  Handoff: Handoff to receiving clinician was performed and questions were answered  Vomiting: none  Nausea: None  Airway Patency:patent  Post-op Assessment: no complications and patient tolerated procedure well      There were no known notable events for this encounter.                       Results for VERNON MILLER (MRN [id number]) as of 11/20/2017 18:36   Ref. Range 11/20/2017 15:07   POC PH Latest Ref Range: 7.35 - 7.45  7.285 (LL)   POC PCO2 Latest Ref Range: 35 - 45 mmHg 58.4 (HH)   POC PO2 Latest Ref Range: 80 - 100 mmHg 78 (L)   POC BE Latest Ref Range: -2 to 2 mmol/L 1   POC HCO3 Latest Ref Range: 24 - 28 mmol/L 27.8   POC SATURATED O2 Latest Ref Range: 95 - 100 % 93 (L)   POC TCO2 Latest Ref Range: 23 - 27 mmol/L 30 (H)   Sample Unknown ARTERIAL   DelSys Unknown Nasal Can   Allens Test Unknown Pass   Site Unknown RR   Reported to Dr. Devine

## (undated) DEVICE — BANDAGE SOFTBAND 4 441506

## (undated) DEVICE — DRESSING N ADH OIL EMUL 3X8 3S

## (undated) DEVICE — SCRUB 10% POVIDONE IODINE 4OZ

## (undated) DEVICE — GAUZE SPONGE BULKEE 6X6.75IN

## (undated) DEVICE — SPONGE GAUZE 10S 4X4  442214

## (undated) DEVICE — TRAY SKIN PREP DRY

## (undated) DEVICE — SHOE POST OP FEMALE LARGE

## (undated) DEVICE — BANDAGE ACE STERILE 4 REB3114

## (undated) DEVICE — ADHESIVE MASTISOL VIAL 0523-48

## (undated) DEVICE — SEE MEDLINE ITEM 157117

## (undated) DEVICE — BLADE SCALPEL #15 371115

## (undated) DEVICE — SOLUTION SCRUB IODINE 4OZ

## (undated) DEVICE — PREP CHLORA 26ML

## (undated) DEVICE — SWAB CULTURETTE SINGLE

## (undated) DEVICE — GLOVE BIOGEL PI   GOLD SIZE 8

## (undated) DEVICE — DRESSING ADAPTIC 3X8 2015

## (undated) DEVICE — ELECTRODE REM PLYHSV RETURN 9

## (undated) DEVICE — STRAP OR TABLE 5IN X 72IN

## (undated) DEVICE — GLOVE SURG ULTRA TOUCH 8

## (undated) DEVICE — PAD BOVIE ADULT

## (undated) DEVICE — SOLUTION IRRI NS BOTTLE 1000ML R5200-01

## (undated) DEVICE — PACK ARTHROSCOPY W/ISO BAC

## (undated) DEVICE — SEE MEDLINE ITEM 152515

## (undated) DEVICE — TRAY LOWER EXTREMITY  SMHS029-05

## (undated) DEVICE — PACK CUSTOM UNIV BASIN SLI

## (undated) DEVICE — SLEEVE SCD EXPRESS CALF MEDIUM

## (undated) DEVICE — TRAY DRY SPONGE SCRUB W FOAM

## (undated) DEVICE — STERISTRIP 1/2 R1547

## (undated) DEVICE — COLLECTOR SPECIMEN ANAEROBIC

## (undated) DEVICE — SOLUTION PREP IODINE 4OZ

## (undated) DEVICE — SOL PVP-I SCRUB 7.5% 4OZ

## (undated) DEVICE — BANDAGE ESMARK LATEX FREE 4INX

## (undated) DEVICE — SPONGE DERMACEA GAUZE 4X4

## (undated) DEVICE — SUT 4-0 PROLENE SH-1 BL MON

## (undated) DEVICE — SEE MEDLINE ITEM 157131

## (undated) DEVICE — DRAPE LIMB BILATERAL 89291